# Patient Record
Sex: MALE | Race: WHITE | NOT HISPANIC OR LATINO | Employment: OTHER | URBAN - METROPOLITAN AREA
[De-identification: names, ages, dates, MRNs, and addresses within clinical notes are randomized per-mention and may not be internally consistent; named-entity substitution may affect disease eponyms.]

---

## 2017-01-02 ENCOUNTER — PRE VISIT (OUTPATIENT)
Dept: UROLOGY | Facility: CLINIC | Age: 72
End: 2017-01-02

## 2017-01-04 ENCOUNTER — ANTICOAGULATION THERAPY VISIT (OUTPATIENT)
Dept: ANTICOAGULATION | Facility: CLINIC | Age: 72
End: 2017-01-04
Payer: MEDICARE

## 2017-01-04 DIAGNOSIS — I48.0 PAROXYSMAL ATRIAL FIBRILLATION (H): Primary | ICD-10-CM

## 2017-01-04 DIAGNOSIS — Z79.01 LONG TERM CURRENT USE OF ANTICOAGULANT THERAPY: ICD-10-CM

## 2017-01-04 DIAGNOSIS — Z95.2 S/P AORTIC VALVE REPLACEMENT: ICD-10-CM

## 2017-01-04 DIAGNOSIS — Z95.2 S/P AVR (AORTIC VALVE REPLACEMENT): ICD-10-CM

## 2017-01-04 LAB — INR POINT OF CARE: 5 (ref 0.86–1.14)

## 2017-01-04 PROCEDURE — 85610 PROTHROMBIN TIME: CPT | Mod: QW

## 2017-01-04 PROCEDURE — 99207 ZZC NO CHARGE NURSE ONLY: CPT

## 2017-01-04 PROCEDURE — 36416 COLLJ CAPILLARY BLOOD SPEC: CPT

## 2017-01-04 RX ORDER — WARFARIN SODIUM 5 MG/1
TABLET ORAL
Qty: 172 TABLET | Refills: 0 | Status: SHIPPED | OUTPATIENT
Start: 2017-01-04 | End: 2017-01-18

## 2017-01-04 NOTE — MR AVS SNAPSHOT
Brooks Summers   1/4/2017 1:00 PM   Anticoagulation Therapy Visit    Description:  71 year old male   Provider:  LL ANTI COAG   Department:  [facility]           INR as of 1/4/2017     Selected INR 5.0! (1/4/2017)      Anticoagulation Summary as of 1/4/2017     INR goal 2.0-3.0   Selected INR 5.0! (1/4/2017)   Full instructions 1/4: Hold; 1/5: Hold; Otherwise 10 mg every day   Next INR check 1/18/2017    Indications   S/P aortic valve replacement [Z95.2]  Paroxysmal atrial fibrillation (H) [I48.0]  Long term current use of anticoagulant therapy [Z79.01]         Description     Recheck INR 2 weeks, 1/18/17  No warfarin Wed, or Thurs, then resume usual doseof 10 mg daily.  Eat a serving of greens today  Make sure to be consistent with weekly amount of greens      Contact Numbers     Please call [phone] to cancel and/or reschedule your appointment.  Please call [phone] with any problems or questions regarding your therapy          January 2017 Details    Sun Mon Tue Wed Thu Fri Sat     1               2               3               4      Hold   See details      5      Hold   See details      6      10 mg         7      10 mg           8      10 mg         9      10 mg         10      10 mg         11      10 mg         12      10 mg         13      10 mg         14      10 mg           15      10 mg         16      10 mg         17      10 mg         18            19               20               21                 22               23               24               25               26               27               28                 29               30               31                    Date Details   01/04 This INR check   Hold dose   have a serving of greens today      01/05 Hold dose   have a serving of greens today       Date of next INR:  1/18/2017         How to take your warfarin dose     To take:  10 mg Take 2 of the 5 mg tablets.    Hold Do not take your warfarin dose. See the Details table  to the right for additional instructions.

## 2017-01-04 NOTE — PROGRESS NOTES
ANTICOAGULATION FOLLOW-UP CLINIC VISIT    Patient Name:  Brooks Summers  Date:  1/4/2017  Contact Type:  Face to Face    SUBJECTIVE:     Patient Findings     Positives Diet Changes (pt thinks he has had his usual weekly amount of greens), Inflammation (possibly inflamation in the back causing the INR to be elevated.)    Comments Reteaching done on which veggies have enough Vit k to effect the INR.  His UA at his recent physical  Showed RBC's so he is seeing urology for a consult on 1/27/17           OBJECTIVE    INR PROTIME   Date Value Ref Range Status   01/04/2017 5.0* 0.86 - 1.14 Final       ASSESSMENT / PLAN  INR assessment SUPRA    Recheck INR In: 2 WEEKS    INR Location Clinic      Anticoagulation Summary as of 1/4/2017     INR goal 2.0-3.0   Selected INR 5.0! (1/4/2017)   Maintenance plan 10 mg (5 mg x 2) every day   Full instructions 1/4: Hold; 1/5: Hold; Otherwise 10 mg every day   Weekly total 70 mg   Plan last modified Lindsey Epstein RN (5/4/2016)   Next INR check 1/18/2017   Priority INR   Target end date Indefinite    Indications   S/P aortic valve replacement [Z95.2]  Paroxysmal atrial fibrillation (H) [I48.0]  Long term current use of anticoagulant therapy [Z79.01]         Anticoagulation Episode Summary     INR check location     Preferred lab     Send INR reminders to [facility]    Comments  * warfarin 5 mg tabs      Anticoagulation Care Providers     Provider Role Specialty Phone number    Edin Mays MD [facility] Internal Medicine [phone]            See the Encounter Report to view Anticoagulation Flowsheet and Dosing Calendar (Go to Encounters tab in chart review, and find the Anticoagulation Therapy Visit)        Lindsey Epstein RN

## 2017-01-16 ENCOUNTER — ALLIED HEALTH/NURSE VISIT (OUTPATIENT)
Dept: CARDIOLOGY | Facility: CLINIC | Age: 72
End: 2017-01-16
Attending: INTERNAL MEDICINE
Payer: MEDICARE

## 2017-01-16 DIAGNOSIS — I42.0 CARDIOMYOPATHY, DILATED, NONISCHEMIC (H): Primary | ICD-10-CM

## 2017-01-16 PROCEDURE — 93296 REM INTERROG EVL PM/IDS: CPT | Mod: ZF

## 2017-01-16 PROCEDURE — 93295 DEV INTERROG REMOTE 1/2/MLT: CPT | Performed by: INTERNAL MEDICINE

## 2017-01-17 NOTE — PROGRESS NOTES
Pt sent in a routine remote ICD check for evaluation per MD order.  His ICD check does not show any episodes of atrial or ventricular arrhythmias.  He is Bi V pacing 97.8% of the time, his heart rate histograms show good heart rate variation, his optivol is at baseline and his ICD battery estimates 4.7 years left.  Pt was called with the results and his wife reports that he is well and he does not offer any complaints.  We will plan to see him back on 4/7/17 when he returns to see Dr Be.    remote ICD

## 2017-01-18 ENCOUNTER — ANTICOAGULATION THERAPY VISIT (OUTPATIENT)
Dept: ANTICOAGULATION | Facility: CLINIC | Age: 72
End: 2017-01-18
Payer: MEDICARE

## 2017-01-18 DIAGNOSIS — Z79.01 LONG TERM CURRENT USE OF ANTICOAGULANT THERAPY: ICD-10-CM

## 2017-01-18 DIAGNOSIS — Z95.2 S/P AORTIC VALVE REPLACEMENT: ICD-10-CM

## 2017-01-18 DIAGNOSIS — I48.0 PAROXYSMAL ATRIAL FIBRILLATION (H): Primary | ICD-10-CM

## 2017-01-18 DIAGNOSIS — Z95.2 S/P AVR (AORTIC VALVE REPLACEMENT): ICD-10-CM

## 2017-01-18 LAB — INR POINT OF CARE: 3.3 (ref 0.86–1.14)

## 2017-01-18 PROCEDURE — 36416 COLLJ CAPILLARY BLOOD SPEC: CPT

## 2017-01-18 PROCEDURE — 99207 ZZC NO CHARGE NURSE ONLY: CPT

## 2017-01-18 PROCEDURE — 85610 PROTHROMBIN TIME: CPT | Mod: QW

## 2017-01-18 RX ORDER — WARFARIN SODIUM 5 MG/1
TABLET ORAL
Qty: 172 TABLET | Refills: 0 | COMMUNITY
Start: 2017-01-18 | End: 2017-03-16

## 2017-01-18 NOTE — MR AVS SNAPSHOT
Brooks Summers   1/18/2017 1:00 PM   Anticoagulation Therapy Visit    Description:  71 year old male   Provider:  LL ANTI COAG   Department:  [facility]           INR as of 1/18/2017     Selected INR 3.3! (1/18/2017)      Anticoagulation Summary as of 1/18/2017     INR goal 2.0-3.0   Selected INR 3.3! (1/18/2017)   Full instructions 7.5 mg on Wed; 10 mg all other days   Next INR check 2/1/2017    Indications   S/P aortic valve replacement [Z95.2]  Paroxysmal atrial fibrillation (H) [I48.0]  Long term current use of anticoagulant therapy [Z79.01]         Description     Recheck INR 2/1/17, 2 weeks  Decrease warfarin to 7.5 mg Wed, 10 mg rest of week       Your next Anticoagulation Clinic appointment(s)     Feb 01, 2017  1:15 PM   [facility] LL [facility]   [facility]    [address] [address] [phone]              Contact Numbers     Please call [phone] to cancel and/or reschedule your appointment.  Please call [phone] with any problems or questions regarding your therapy          January 2017 Details    Sun Mon Tue Wed Thu Fri Sat     1               2               3               4               5               6               7                 8               9               10               11               12               13               14                 15               16               17               18      7.5 mg   See details      19      10 mg         20      10 mg         21      10 mg           22      10 mg         23      10 mg         24      10 mg         25      7.5 mg         26      10 mg         27      10 mg         28      10 mg           29      10 mg         30      10 mg         31      10 mg              Date Details   01/18 This INR check               How to take your warfarin dose     To take:  7.5 mg Take 1.5 of the 5 mg tablets.    To take:  10 mg Take 2 of the 5 mg tablets.            February 2017 Details    Sun Mon Tue Wed Thu Fri Sat        1            2               3               4                 5               6               7               8               9               10               11                 12               13               14               15               16               17               18                 19               20               21               22               23               24               25                 26               27               28                    Date Details   No additional details    Date of next INR:  2/1/2017         How to take your warfarin dose     To take:  7.5 mg Take 1.5 of the 5 mg tablets.

## 2017-01-18 NOTE — PROGRESS NOTES
ANTICOAGULATION FOLLOW-UP CLINIC VISIT    Patient Name:  Brooks Summers  Date:  1/18/2017  Contact Type:  Face to Face    SUBJECTIVE:     Patient Findings     Positives Diet Changes (states diet is consistent with usual weekly amount of greens), Activity level change (he is active but not as active as he is in summer)           OBJECTIVE    INR PROTIME   Date Value Ref Range Status   01/18/2017 3.3* 0.86 - 1.14 Final       ASSESSMENT / PLAN  INR assessment SUPRA    Recheck INR In: 2 WEEKS    INR Location Clinic      Anticoagulation Summary as of 1/18/2017     INR goal 2.0-3.0   Selected INR 3.3! (1/18/2017)   Maintenance plan 7.5 mg (5 mg x 1.5) on Wed; 10 mg (5 mg x 2) all other days   Full instructions 7.5 mg on Wed; 10 mg all other days   Weekly total 67.5 mg   Plan last modified Lindsey Epstein RN (1/18/2017)   Next INR check 2/1/2017   Priority INR   Target end date Indefinite    Indications   S/P aortic valve replacement [Z95.2]  Paroxysmal atrial fibrillation (H) [I48.0]  Long term current use of anticoagulant therapy [Z79.01]         Anticoagulation Episode Summary     INR check location     Preferred lab     Send INR reminders to [facility]    Comments  * warfarin 5 mg tabs      Anticoagulation Care Providers     Provider Role Specialty Phone number    Edin Mays MD [facility] Internal Medicine [phone]            See the Encounter Report to view Anticoagulation Flowsheet and Dosing Calendar (Go to Encounters tab in chart review, and find the Anticoagulation Therapy Visit)        Lindsey Epstein RN

## 2017-01-27 ENCOUNTER — OFFICE VISIT (OUTPATIENT)
Dept: UROLOGY | Facility: CLINIC | Age: 72
End: 2017-01-27

## 2017-01-27 VITALS
HEIGHT: 70 IN | BODY MASS INDEX: 26.45 KG/M2 | SYSTOLIC BLOOD PRESSURE: 111 MMHG | DIASTOLIC BLOOD PRESSURE: 70 MMHG | HEART RATE: 71 BPM | WEIGHT: 184.8 LBS

## 2017-01-27 DIAGNOSIS — R31.9 HEMATURIA: Primary | ICD-10-CM

## 2017-01-27 LAB
ALBUMIN UR-MCNC: NEGATIVE MG/DL
AMORPH CRY #/AREA URNS HPF: ABNORMAL /HPF
APPEARANCE UR: ABNORMAL
BILIRUB UR QL STRIP: NEGATIVE
COLOR UR AUTO: YELLOW
GLUCOSE UR STRIP-MCNC: NEGATIVE MG/DL
HGB UR QL STRIP: NEGATIVE
KETONES UR STRIP-MCNC: NEGATIVE MG/DL
LEUKOCYTE ESTERASE UR QL STRIP: NEGATIVE
MUCOUS THREADS #/AREA URNS LPF: PRESENT /LPF
NITRATE UR QL: NEGATIVE
PH UR STRIP: 7 PH (ref 5–7)
RBC #/AREA URNS AUTO: 2 /HPF (ref 0–2)
SP GR UR STRIP: 1.02 (ref 1–1.03)
URN SPEC COLLECT METH UR: ABNORMAL
UROBILINOGEN UR STRIP-MCNC: 0 MG/DL (ref 0–2)
WBC #/AREA URNS AUTO: 0 /HPF (ref 0–2)

## 2017-01-27 PROCEDURE — 88112 CYTOPATH CELL ENHANCE TECH: CPT | Performed by: UROLOGY

## 2017-01-27 PROCEDURE — 87086 URINE CULTURE/COLONY COUNT: CPT | Performed by: UROLOGY

## 2017-01-27 ASSESSMENT — PAIN SCALES - GENERAL: PAINLEVEL: NO PAIN (0)

## 2017-01-27 NOTE — MR AVS SNAPSHOT
After Visit Summary   1/27/2017    Brooks Summers    MRN: [id number]           Patient Information     Date Of Birth          1945        Visit Information        Provider Department      1/27/2017 1:00 PM Marlena Mora MD [facility] Urology and [facility]        Today's Diagnoses     Hematuria    -  1       Care Instructions    Please follow up with  for a cystoscopy and CT before.        Follow-ups after your visit        Your next 10 appointments already scheduled     Feb 01, 2017  1:15 PM   Anticoagulation Visit with LL ANTI COAG   [facility]    [address] [address]   [phone]            Feb 24, 2017  9:00 AM   (Arrive by 8:45 AM)   CT ABDOMEN PELVIS W/O & W CONTRAST with UCCT1   [facility] CT ([facility])    [address] [address] [phone]           Please bring any scans or X-rays taken at other hospitals, if similar tests were done. Also bring a list of your medicines, including vitamins, minerals and over-the-counter drugs. It is safest to leave personal items at home.  Be sure to tell your doctor:   If you have any allergies.   If there s any chance you are pregnant.   If you are breastfeeding.   If you have any special needs.  You may have contrast for this exam. To prepare:   Do not eat or drink for 2 hours before your exam. If you need to take medicine, you may take it with small sips of water. (We may ask you to take liquid medicine as well.)   The day before your exam, drink extra fluids at least six 8-ounce glasses (unless your doctor tells you to restrict your fluids).  Patients over 70 or patients with diabetes or kidney problems:   If you haven t had a blood test (creatinine test) within the last 30 days, go to your clinic or Diagnostic Imaging Department for this test.  If you  have diabetes:   If your kidney function is normal, continue taking your metformin (Avandamet, Glucophage, Glucovance, Metaglip) on the day of your exam.   If your kidney function is abnormal, wait 48 hours before restarting this medicine.  You will have oral contrast for this exam:   You will drink the contrast at home. Get this from your clinic or Diagnostic Imaging Department. Please follow the directions given.  Please wear loose clothing, such as a sweat suit or jogging clothes. Avoid snaps, zippers and other metal. We may ask you to undress and put on a hospital gown.  If you have any questions, please call the Imaging Department where you will have your exam.            Feb 24, 2017  1:00 PM   (Arrive by 12:45 PM)   Cystoscopy with Marlena Mora MD   [facility] Urology and [facility]rologic C[facility] ([facility])    [address] [place] [id number] [phone]            Apr 07, 2017 11:30 AM   (Arrive by 11:15 AM)   Pacemaker Check with Uc Cv Device 1   [facility] ([facility])    [address] [place] [id number] [phone]            Apr 07, 2017  1:30 PM   (Arrive by 1:15 PM)   RETURN ARRHYTHMIA with Mekhi Be MD   [facility] ([facility])    [address] [place] [address] [phone]              Future tests that were ordered for you today     Open Future Orders        Priority Expected Expires Ordered    CT Urogram Routine  1/27/2018 1/27/2017            Who to contact     Please call your clinic at [phone] to:    Ask questions about your health    Make or cancel appointments    Discuss your medicines    Learn about your test results    Speak to your doctor   If you have compliments or concerns about an experience at your clinic, or if you wish to file a complaint, please contact [place]  [facility] Patient Relations at [phone] or email us at [email or web address]         Additional Information About Your Visit        [employer] Information     Venmo is an electronic gateway that provides easy, online access to your medical records. With Venmo, you can request a clinic appointment, read your test results, renew a prescription or communicate with your care team.     To sign up for Venmo visit the website at [email or web address]   You will be asked to enter the access code listed below, as well as some personal information. Please follow the directions to create your username and password.     Your access code is: [email or web address]  Expires: 2017  6:30 AM     Your access code will  in 90 days. If you need help or a new code, please contact your [facility] or call [phone] for assistance.        Care EveryWhere ID     This is your Care EveryWhere ID. This could be used by other organizations to access your [place] medical records  [id number]        Your Vitals Were     Pulse Height BMI (Body Mass Index)             71 1.778 m (5' 10\") 26.52 kg/m2          Blood Pressure from Last 3 Encounters:   17 111/70   16 130/87   10/10/16 127/82    Weight from Last 3 Encounters:   17 83.825 kg (184 lb 12.8 oz)   16 84.369 kg (186 lb)   10/10/16 85.049 kg (187 lb 8 oz)              We Performed the Following     Cytology non gyn     Routine UA with micro reflex to culture     Urine Culture Aerobic Bacterial        Primary Care Provider Office Phone # Fax #    Edin Mays [phone]       [address] [address]        Thank you!     Thank you for choosing [facility] UROLOGY AND [facility] FOR PROSTATE AND UROLOGIC CANCERS  for your care. Our goal is always to provide you with excellent care. Hearing back from our patients is one way we can continue to " improve our services. Please take a few minutes to complete the written survey that you may receive in the mail after your visit with us. Thank you!             Your Updated Medication List - Protect others around you: Learn how to safely use, store and throw away your medicines at www.disposemymeds.org.          This list is accurate as of: 1/27/17  2:07 PM.  Always use your most recent med list.                   Brand Name Dispense Instructions for use    allopurinol 100 MG tablet    ZYLOPRIM    90 tablet    Take 1 tablet (100 mg) by mouth daily       CALCIUM 600 + D PO      Take 1 tablet by mouth daily.       losartan 25 MG tablet    COZAAR    45 tablet    Take 0.5 tablets (12.5 mg) by mouth daily       metoprolol 50 MG 24 hr tablet    TOPROL XL    90 tablet    Take 1 tablet (50 mg) by mouth daily       Multi-vitamin Tabs tablet   Generic drug:  multivitamin, therapeutic with minerals      Take 1 tablet by mouth daily.       * PROVIDER DOSED MANAGED WARFARIN (COUMADIN) OUTPATIENT      Per coumadin clinic       REFRESH PLUS OP          simvastatin 40 MG tablet    ZOCOR    90 tablet    Take 1 tablet (40 mg) by mouth At Bedtime       * warfarin 5 MG tablet    COUMADIN    172 tablet    Take as directed by anticoagulation clinic.  Current dose 7.5 mg Wed, 10 mg rest of week       * Notice:  This list has 2 medication(s) that are the same as other medications prescribed for you. Read the directions carefully, and ask your doctor or other care provider to review them with you.

## 2017-01-27 NOTE — NURSING NOTE
"Chief Complaint   Patient presents with     Consult     Microscopic hematuria       Blood pressure 111/70, pulse 71, height 1.778 m (5' 10\"), weight 83.825 kg (184 lb 12.8 oz). Body mass index is 26.52 kg/(m^2).    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  Current dose 7.5 mg Wed, 10 mg rest of week 172 tablet 0     metoprolol (TOPROL XL) 50 MG 24 hr tablet Take 1 tablet (50 mg) by mouth daily 90 tablet 3     simvastatin (ZOCOR) 40 MG tablet Take 1 tablet (40 mg) by mouth At Bedtime 90 tablet 3     losartan (COZAAR) 25 MG tablet Take 0.5 tablets (12.5 mg) by mouth daily 45 tablet 3     allopurinol (ZYLOPRIM) 100 MG tablet Take 1 tablet (100 mg) by mouth daily 90 tablet 1     Carboxymethylcellulose Sodium (REFRESH PLUS OP)        PROVIDER DOSED MANAGED WARFARIN, COUMADIN, OUTPATIENT Per coumadin clinic       Calcium Carbonate-Vitamin D (CALCIUM 600 + D OR) Take 1 tablet by mouth daily.   "     Multiple Vitamin (MULTI-VITAMIN) per tablet Take 1 tablet by mouth daily.         Social History   Substance Use Topics     Smoking status: Former Smoker -- 1.00 packs/day for 20 years     Types: Cigarettes     Quit date: 12/31/1989     Smokeless tobacco: Never Used     Alcohol Use: Yes       TAINA Rojas  1/27/2017  12:56 PM

## 2017-01-27 NOTE — Clinical Note
1/27/2017       RE: Brooks Summers  [address]  [facility] [address]     Dear Colleague,    Thank you for referring your patient, Brooks Summers, to the [facility] UROLOGY AND INST FOR PROSTATE AND UROLOGIC CANCERS at [facility]. Please see a copy of my visit note below.    It was my pleasure to see Brooks Summers a 71 year old year old male today. Patient was seen in consultation for hematuria.    HPI:   Patient with an episode of microhematuria, no gross hematuria.   Denies significant lower urinary tract symptoms.   Patient smoked 1ppd for 20 years.       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 Tx with Dapto/RIFx 8 weeks.     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 Intra-operative Transesophogeal Echocardiogram.; Surgeon:STEPHANIE HAMPTON; Location:[facility] OR     Anesthesia cardioversion  7/18/2011     Procedure:ANESTHESIA CARDIOVERSION; Cardioversion; Surgeon:GENERIC ANESTHESIA PROVIDER; Location:[facility] OR     Redo sternotomy replace  valve aortic  1/25/2013     Procedure: REDO STERNOTOMY REPLACE VALVE AORTIC;  Redo Sternotomy, Redo Aortic Valve Replacement on pump oxygenator. Intraoperative Transesophageal Echocardiogram;  Surgeon: Navin Singh MD;  Location:  OR     Mohs micrographic procedure       Implant automatic implantable cardioverter defibrillator       Repair valve mitral       Coronary angiography adult order       Hc remov & replace implt defib pulse gen mult lead  12/3/2015             Family History   Problem Relation Age of Onset     C.A.D. Father 68     bypass, carotid      Prostate Cancer Father 70     CANCER Mother 92     stomach, colon     Hypertension Mother      Retinal detachment Mother      CANCER Brother 64     lung cancer, petroleum     Glaucoma No family hx of      Macular Degeneration No family hx of      Strabismus No family hx of      Glasses (<7 y/o) No family hx of        Current Outpatient Prescriptions   Medication Sig Dispense Refill     warfarin (COUMADIN) 5 MG tablet Take as directed by anticoagulation clinic.  Current dose 7.5 mg Wed, 10 mg rest of week 172 tablet 0     metoprolol (TOPROL XL) 50 MG 24 hr tablet Take 1 tablet (50 mg) by mouth daily 90 tablet 3     simvastatin (ZOCOR) 40 MG tablet Take 1 tablet (40 mg) by mouth At Bedtime 90 tablet 3     losartan (COZAAR) 25 MG tablet Take 0.5 tablets (12.5 mg) by mouth daily 45 tablet 3     allopurinol (ZYLOPRIM) 100 MG tablet Take 1 tablet (100 mg) by mouth daily 90 tablet 1     Carboxymethylcellulose Sodium (REFRESH PLUS OP)        PROVIDER DOSED MANAGED WARFARIN, COUMADIN, OUTPATIENT Per coumadin clinic       Calcium Carbonate-Vitamin D (CALCIUM 600 + D OR) Take 1 tablet by mouth daily.       Multiple Vitamin (MULTI-VITAMIN) per tablet Take 1 tablet by mouth daily.         ALLERGIES: Lisinopril      REVIEW OF SYSTEMS:  Skin: negative  Eyes: negative  Ears/Nose/Throat: negative  Respiratory: No shortness of breath, dyspnea on exertion, cough, or  "hemoptysis  Cardiovascular: negative  Gastrointestinal: negative  Genitourinary: as above  Musculoskeletal: negative  Neurologic: negative  Psychiatric: negative  Hematologic/Lymphatic/Immunologic: negative  Endocrine: negative      GENERAL PHYSICAL EXAM:   Vitals: /70 mmHg  Pulse 71  Ht 1.778 m (5' 10\")  Wt 83.825 kg (184 lb 12.8 oz)  BMI 26.52 kg/m2  Body mass index is 26.52 kg/(m^2).  Constitutional: healthy, alert and no distress  Head: Normocephalic. No masses, lesions, tenderness or abnormalities  Neck: Neck supple. No adenopathy. Thyroid symmetric, normal size,, Carotids without bruits.  ENT: ENT exam normal, no neck nodes or sinus tenderness  Cardiovascular: negative, PMI normal. No lifts, heaves, or thrills. RRR. No murmurs, clicks gallops or rub  Respiratory: negative, Percussion normal. Good diaphragmatic excursion. Lungs clear  Gastrointestinal: Abdomen soft, non-tender. BS normal. No masses, organomegaly  Musculoskeletal: extremities normal- no gross deformities noted, gait normal and normal muscle tone  Skin: no suspicious lesions or rashes  Neurologic: Gait normal. Reflexes normal and symmetric. Sensation grossly WNL.  Psychiatric: affect normal/bright and mentation appears normal.  Hematologic/Lymphatic/Immunologic: normal ant/post cervical, axillary, supraclavicular and inguinal nodes     EXAM:   Left Flank: negative  Right Flank: negative  Inguinal Area: normal  Phallus is meatus normal and no plaques palpated.   Left testis descended, size is normal , consistency is normal. Intra-testicular masses Absent.  Right testis descended, size is normal, consistency is normal. Intra-testicular masses Absent.    RECTAL EXAM:   Size: 1+  Sphincter tone: normal  Tenderness: Absent  Rectal Mass: Absent  Prostate Nodule: Absent    RADIOLOGY: The following tests were reviewed: Need to complete the following Radiology exams prior to the office appointment:  LABS: The last test results for Needs to " complete the necessary tests prior to appointment: were reviewed.     ASSESSMENT:  72 y/o M with hematuria     PLAN:   Will obtain CT urogram   Schedule for cystoscopy next available     I spent over 30 minutes with the patient.  Over half this time was spent on counseling for hematuria, work up for hematuria, cystoscopy.      Again, thank you for allowing me to participate in the care of your patient.      Sincerely,    Marlena Mora MD

## 2017-01-27 NOTE — PROGRESS NOTES
It was my pleasure to see Broosk Summers a 71 year old year old male today. Patient was seen in consultation for hematuria.    HPI:   Patient with an episode of microhematuria, no gross hematuria.   Denies significant lower urinary tract symptoms.   Patient smoked 1ppd for 20 years.       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 Tx with Dapto/RIFx 8 weeks.     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 Intra-operative Transesophogeal Echocardiogram.; Surgeon:STEPHANIE HAMPTON; Location[facility]     Anesthesia cardioversion  7/18/2011     Procedure:ANESTHESIA CARDIOVERSION; Cardioversion; Surgeon:GENERIC ANESTHESIA PROVIDER; Location:[facility] OR     Redo sternotomy replace valve aortic  1/25/2013     Procedure: REDO STERNOTOMY REPLACE VALVE AORTIC;  Redo Sternotomy, Redo Aortic Valve Replacement on pump oxygenator. Intraoperative Transesophageal Echocardiogram;  Surgeon: Stephanie Hampton MD;  Location: [facility] OR     Mohs micrographic procedure       Implant automatic implantable cardioverter  "defibrillator       Repair valve mitral       Coronary angiography adult order       Hc remov & replace implt defib pulse gen mult lead  12/3/2015             Family History   Problem Relation Age of Onset     C.A.D. Father 68     bypass, carotid      Prostate Cancer Father 70     CANCER Mother 92     stomach, colon     Hypertension Mother      Retinal detachment Mother      CANCER Brother 64     lung cancer, petroleum     Glaucoma No family hx of      Macular Degeneration No family hx of      Strabismus No family hx of      Glasses (<9 y/o) No family hx of        Current Outpatient Prescriptions   Medication Sig Dispense Refill     warfarin (COUMADIN) 5 MG tablet Take as directed by anticoagulation clinic.  Current dose 7.5 mg Wed, 10 mg rest of week 172 tablet 0     metoprolol (TOPROL XL) 50 MG 24 hr tablet Take 1 tablet (50 mg) by mouth daily 90 tablet 3     simvastatin (ZOCOR) 40 MG tablet Take 1 tablet (40 mg) by mouth At Bedtime 90 tablet 3     losartan (COZAAR) 25 MG tablet Take 0.5 tablets (12.5 mg) by mouth daily 45 tablet 3     allopurinol (ZYLOPRIM) 100 MG tablet Take 1 tablet (100 mg) by mouth daily 90 tablet 1     Carboxymethylcellulose Sodium (REFRESH PLUS OP)        PROVIDER DOSED MANAGED WARFARIN, COUMADIN, OUTPATIENT Per coumadin clinic       Calcium Carbonate-Vitamin D (CALCIUM 600 + D OR) Take 1 tablet by mouth daily.       Multiple Vitamin (MULTI-VITAMIN) per tablet Take 1 tablet by mouth daily.         ALLERGIES: Lisinopril      REVIEW OF SYSTEMS:  Skin: negative  Eyes: negative  Ears/Nose/Throat: negative  Respiratory: No shortness of breath, dyspnea on exertion, cough, or hemoptysis  Cardiovascular: negative  Gastrointestinal: negative  Genitourinary: as above  Musculoskeletal: negative  Neurologic: negative  Psychiatric: negative  Hematologic/Lymphatic/Immunologic: negative  Endocrine: negative      GENERAL PHYSICAL EXAM:   Vitals: /70 mmHg  Pulse 71  Ht 1.778 m (5' 10\")  Wt 83.825 " kg (184 lb 12.8 oz)  BMI 26.52 kg/m2  Body mass index is 26.52 kg/(m^2).  Constitutional: healthy, alert and no distress  Head: Normocephalic. No masses, lesions, tenderness or abnormalities  Neck: Neck supple. No adenopathy. Thyroid symmetric, normal size,, Carotids without bruits.  ENT: ENT exam normal, no neck nodes or sinus tenderness  Cardiovascular: negative, PMI normal. No lifts, heaves, or thrills. RRR. No murmurs, clicks gallops or rub  Respiratory: negative, Percussion normal. Good diaphragmatic excursion. Lungs clear  Gastrointestinal: Abdomen soft, non-tender. BS normal. No masses, organomegaly  Musculoskeletal: extremities normal- no gross deformities noted, gait normal and normal muscle tone  Skin: no suspicious lesions or rashes  Neurologic: Gait normal. Reflexes normal and symmetric. Sensation grossly WNL.  Psychiatric: affect normal/bright and mentation appears normal.  Hematologic/Lymphatic/Immunologic: normal ant/post cervical, axillary, supraclavicular and inguinal nodes     EXAM:   Left Flank: negative  Right Flank: negative  Inguinal Area: normal  Phallus is meatus normal and no plaques palpated.   Left testis descended, size is normal , consistency is normal. Intra-testicular masses Absent.  Right testis descended, size is normal, consistency is normal. Intra-testicular masses Absent.    RECTAL EXAM:   Size: 1+  Sphincter tone: normal  Tenderness: Absent  Rectal Mass: Absent  Prostate Nodule: Absent         RADIOLOGY: The following tests were reviewed: Need to complete the following Radiology exams prior to the office appointment:  LABS: The last test results for Needs to complete the necessary tests prior to appointment: were reviewed.     ASSESSMENT:  72 y/o M with hematuria     PLAN:   Will obtain CT urogram   Schedule for cystoscopy next available         I spent over 30 minutes with the patient.  Over half this time was spent on counseling for hematuria, work up for hematuria,  cystoscopy.

## 2017-01-28 LAB
BACTERIA SPEC CULT: NO GROWTH
Lab: NORMAL
MICRO REPORT STATUS: NORMAL
SPECIMEN SOURCE: NORMAL

## 2017-01-30 LAB — COPATH REPORT: NORMAL

## 2017-02-01 ENCOUNTER — TELEPHONE (OUTPATIENT)
Dept: ANTICOAGULATION | Facility: CLINIC | Age: 72
End: 2017-02-01

## 2017-02-01 ENCOUNTER — ANTICOAGULATION THERAPY VISIT (OUTPATIENT)
Dept: ANTICOAGULATION | Facility: CLINIC | Age: 72
End: 2017-02-01
Payer: MEDICARE

## 2017-02-01 DIAGNOSIS — Z79.01 LONG TERM CURRENT USE OF ANTICOAGULANT THERAPY: ICD-10-CM

## 2017-02-01 DIAGNOSIS — Z95.2 S/P AORTIC VALVE REPLACEMENT: ICD-10-CM

## 2017-02-01 DIAGNOSIS — I48.0 PAROXYSMAL ATRIAL FIBRILLATION (H): Primary | ICD-10-CM

## 2017-02-01 LAB — INR POINT OF CARE: 2.8 (ref 0.86–1.14)

## 2017-02-01 PROCEDURE — 36416 COLLJ CAPILLARY BLOOD SPEC: CPT

## 2017-02-01 PROCEDURE — 85610 PROTHROMBIN TIME: CPT | Mod: QW

## 2017-02-01 PROCEDURE — 99207 ZZC NO CHARGE NURSE ONLY: CPT

## 2017-02-01 NOTE — TELEPHONE ENCOUNTER
BALAJI Mora  Brooks Jacobs will have a cystoscopy on 2/24/17. Please advise if he will need to stop his warfarin before this procedure.  Is so for how long would you like him to hold his warfarin dose?  Please respond to the [facility] Anticoagulation [place] [phone] so all staff will see the plan.  We will contact the pt and give him specific directions as needed.  Thank you.  Lindsey Epstein RN  Anticoagulation Clinic

## 2017-02-01 NOTE — PROGRESS NOTES
ANTICOAGULATION FOLLOW-UP CLINIC VISIT    Patient Name:  Brooks Summers  Date:  2/1/2017  Contact Type:  Face to Face    SUBJECTIVE:     Patient Findings     Positives Blood in Urine (pt states that over a month ago he had a UA that showed some blood inthe urine.  He was sent to the  urologist by PCP.  he is to have a Cysto  on 2/24/17)    Comments chandleritr will check with Urology in regards to Brooks' s  Warfarin management before the procedure           OBJECTIVE    INR PROTIME   Date Value Ref Range Status   02/01/2017 2.8* 0.86 - 1.14 Final       ASSESSMENT / PLAN  INR assessment THER    Recheck INR In: 4 WEEKS    INR Location Clinic      Anticoagulation Summary as of 2/1/2017     INR goal 2.0-3.0   Selected INR 2.8 (2/1/2017)   Maintenance plan 7.5 mg (5 mg x 1.5) on Wed; 10 mg (5 mg x 2) all other days   Full instructions 7.5 mg on Wed; 10 mg all other days   Weekly total 67.5 mg   No change documented Lindsey Epstein RN   Plan last modified Lindsey Epstein RN (1/18/2017)   Next INR check 3/1/2017   Priority INR   Target end date Indefinite    Indications   S/P aortic valve replacement [Z95.2]  Paroxysmal atrial fibrillation (H) [I48.0]  Long term current use of anticoagulant therapy [Z79.01]         Anticoagulation Episode Summary     INR check location     Preferred lab     Send INR reminders to [facility]    Comments  * warfarin 5 mg tabs      Anticoagulation Care Providers     Provider Role Specialty Phone number    Edin Mays MD [facility] Internal Medicine [phone]            See the Encounter Report to view Anticoagulation Flowsheet and Dosing Calendar (Go to Encounters tab in chart review, and find the Anticoagulation Therapy Visit)        Lindsey Epstein RN

## 2017-02-01 NOTE — MR AVS SNAPSHOT
Brooks Summers   2/1/2017 1:15 PM   Anticoagulation Therapy Visit    Description:  71 year old male   Provider:  [facility]   Department:  [facility]           INR as of 2/1/2017     Selected INR 2.8 (2/1/2017)      Anticoagulation Summary as of 2/1/2017     INR goal 2.0-3.0   Selected INR 2.8 (2/1/2017)   Full instructions 7.5 mg on Wed; 10 mg all other days   Next INR check 3/1/2017    Indications   S/P aortic valve replacement [Z95.2]  Paroxysmal atrial fibrillation (H) [I48.0]  Long term current use of anticoagulant therapy [Z79.01]         Description     Recheck INR 4 weeks, 3/1/17  continue warfarin 7.5 mg Wed, 10 mg rest of week       Contact Numbers     Please call [phone] to cancel and/or reschedule your appointment.  Please call [phone] with any problems or questions regarding your therapy          February 2017 Details    Sun Mon Tue Wed Thu Fri Sat        1      7.5 mg   See details      2      10 mg         3      10 mg         4      10 mg           5      10 mg         6      10 mg         7      10 mg         8      7.5 mg         9      10 mg         10      10 mg         11      10 mg           12      10 mg         13      10 mg         14      10 mg         15      7.5 mg         16      10 mg         17      10 mg         18      10 mg           19      10 mg         20      10 mg         21      10 mg         22      7.5 mg         23      10 mg         24      10 mg         25      10 mg           26      10 mg         27      10 mg         28      10 mg              Date Details   02/01 This INR check               How to take your warfarin dose     To take:  7.5 mg Take 1.5 of the 5 mg tablets.    To take:  10 mg Take 2 of the 5 mg tablets.           March 2017 Details    Sun Mon Tue Wed Thu Fri Sat        1            2               3               4                 5               6               7               8               9               10               11                  12               13               14               15               16               17               18                 19               20               21               22               23               24               25                 26               27               28               29               30               31                 Date Details   No additional details    Date of next INR:  3/1/2017         How to take your warfarin dose     To take:  7.5 mg Take 1.5 of the 5 mg tablets.

## 2017-02-18 ENCOUNTER — PRE VISIT (OUTPATIENT)
Dept: UROLOGY | Facility: CLINIC | Age: 72
End: 2017-02-18

## 2017-02-18 NOTE — TELEPHONE ENCOUNTER
Patient is coming in to see  for a cystoscopy for hematuria with a CT before, CT is before appointment no need for a call.

## 2017-02-24 ENCOUNTER — ALLIED HEALTH/NURSE VISIT (OUTPATIENT)
Dept: UROLOGY | Facility: CLINIC | Age: 72
End: 2017-02-24

## 2017-02-24 ENCOUNTER — OFFICE VISIT (OUTPATIENT)
Dept: UROLOGY | Facility: CLINIC | Age: 72
End: 2017-02-24

## 2017-02-24 VITALS
HEART RATE: 65 BPM | DIASTOLIC BLOOD PRESSURE: 76 MMHG | WEIGHT: 185 LBS | BODY MASS INDEX: 26.48 KG/M2 | SYSTOLIC BLOOD PRESSURE: 113 MMHG | HEIGHT: 70 IN

## 2017-02-24 DIAGNOSIS — R31.0 GROSS HEMATURIA: Primary | ICD-10-CM

## 2017-02-24 DIAGNOSIS — N36.9 URETHRAL LESION: Primary | ICD-10-CM

## 2017-02-24 DIAGNOSIS — N36.9 URETHRAL LESION: ICD-10-CM

## 2017-02-24 RX ORDER — CEFAZOLIN SODIUM 1 G/3ML
1 INJECTION, POWDER, FOR SOLUTION INTRAMUSCULAR; INTRAVENOUS SEE ADMIN INSTRUCTIONS
Status: CANCELLED | OUTPATIENT
Start: 2017-02-24

## 2017-02-24 ASSESSMENT — PAIN SCALES - GENERAL
PAINLEVEL: NO PAIN (0)
PAINLEVEL: NO PAIN (0)

## 2017-02-24 NOTE — LETTER
2/24/2017       RE: Brooks Summers  [address]  [facility] [address]     Dear Colleague,    Thank you for referring your patient, Brooks Summers, to the [facility] UROLOGY AND INST FOR PROSTATE AND UROLOGIC CANCERS at [facility]. Please see a copy of my visit note below.    Indication: hematuria       February 24, 2017 CYSTOSCOPY:  The patient was brought to the procedures room where he was placed in the supine position.  He was prepped and draped in a sterile fashion.  A #16-Bruneian flexible cystoscope was introduced into the urinary bladder.  The anterior urethra and membranous urethra were negative.  The prostatic urethra was minimally obstructing with a slight enlargement of the lateral lobes. A very small lesion was noted lateral to the verumontanum.  Within the urinary bladder, there was no evidence of stones, tumors, or growths.  The ureteral orifices were well visualized bilaterally and found to have clear efflux of urine.  The patient had minimal trabeculation.    On retroflex view, no lesions were seen.       Will schedule for biopsy of the urethral lesion next available in the ASC    Again, thank you for allowing me to participate in the care of your patient.      Sincerely,    Marlena Mora MD

## 2017-02-24 NOTE — MR AVS SNAPSHOT
After Visit Summary   2/24/2017    Brooks Summers    MRN: [id number]           Patient Information     Date Of Birth          1945        Visit Information        Provider Department      2/24/2017 2:15 PM Nurse, [facility] and Inst for Prostate and Urologic Cancers        Today's Diagnoses     Urethral lesion    -  1       Follow-ups after your visit        Your next 10 appointments already scheduled     Mar 01, 2017  1:15 PM CST   Anticoagulation Visit with LL ANTI COAG   [facility] ([facility])    [address] [facility] [address]   [phone]            Mar 17, 2017  1:00 PM CDT   (Arrive by 12:45 PM)   PAC RN ASSESSMENT with Robert Pac Rn   [facility] [place] ([facility])    [address] [place] [address] [phone]            Mar 17, 2017  1:30 PM CDT   (Arrive by 1:15 PM)   PAC EVALUATION with  Pac Sherri 2   [facility] ([facility] [place])    [address] [place] [address] [phone]            Mar 17, 2017  2:30 PM CDT   (Arrive by 2:15 PM)   PAC Anesthesia Consult with  Pac Anesthesiologist   [facility] [place] ([facility])    [address] [place] [address] [phone]            Mar 27, 2017   Procedure with Marlena Mora MD   [facility] ([facility] [place])    [address] [place] [address]  [address] [facility] [address] [phone]           Located in the Clinics and Surgery Center at [address] [place] [address].   parking is very convenient and highly recommended.  is a $6 flat rate fee; no tips accepted.  Both  and self parkers should enter the main arrival plaza from [facility]; parking attendants  will direct you based on your parking preference.            2017  1:00 PM CDT   (Arrive by 12:45 PM)   Pacemaker Check with Uc Cv Device 1   [facility] ([facility])    [address] [place] [id number] [phone]            2017  1:00 PM CDT   RESEARCH with  Cv Research Coordinator   [facility])    [address] [place] [id number] [phone]            2017  1:30 PM CDT   (Arrive by 1:15 PM)   RETURN ATRIAL FIBULATION VISIT with SUKH Hernandez CNP   [facility] ([facility])    [address] [place] [address] [phone]              Who to contact     Please call your clinic at [phone] to:    Ask questions about your health    Make or cancel appointments    Discuss your medicines    Learn about your test results    Speak to your doctor   If you have compliments or concerns about an experience at your clinic, or if you wish to file a complaint, please contact [facility] Patient Relations at [phone] or email us at [email or web address]         Additional Information About Your Visit        [employer] Information     ZAOZAO is an electronic gateway that provides easy, online access to your medical records. With ZAOZAO, you can request a clinic appointment, read your test results, renew a prescription or communicate with your care team.     To sign up for [employer] visit the website at [email or web address]   You will be asked to enter the access code listed below, as well as some personal information. Please follow the directions to create your username and password.     Your access code is: [email or web address]  Expires: 2017  6:30 AM     Your access code will  in 90 days. If you need help or a new code, please contact your  [facility] or call [phone] for assistance.        Care EveryWhere ID     This is your Care EveryWhere ID. This could be used by other organizations to access your [place] medical records  [id number]         Blood Pressure from Last 3 Encounters:   02/24/17 113/76   01/27/17 111/70   11/28/16 130/87    Weight from Last 3 Encounters:   02/24/17 83.9 kg (185 lb)   01/27/17 83.8 kg (184 lb 12.8 oz)   11/28/16 84.4 kg (186 lb)              Today, you had the following     No orders found for display       Primary Care Provider Office Phone # Fax #    Edin Mays [phone]       [address] [address]        Thank you!     Thank you for choosing [facility] UROLOGY AND [facility] FOR PROSTATE AND UROLOGIC CANCERS  for your care. Our goal is always to provide you with excellent care. Hearing back from our patients is one way we can continue to improve our services. Please take a few minutes to complete the written survey that you may receive in the mail after your visit with us. Thank you!             Your Updated Medication List - Protect others around you: Learn how to safely use, store and throw away your medicines at www.disposemymeds.org.          This list is accurate as of: 2/24/17  2:27 PM.  Always use your most recent med list.                   Brand Name Dispense Instructions for use    allopurinol 100 MG tablet    ZYLOPRIM    90 tablet    Take 1 tablet (100 mg) by mouth daily       CALCIUM 600 + D PO      Take 1 tablet by mouth daily.       losartan 25 MG tablet    COZAAR    45 tablet    Take 0.5 tablets (12.5 mg) by mouth daily       metoprolol 50 MG 24 hr tablet    TOPROL XL    90 tablet    Take 1 tablet (50 mg) by mouth daily       Multi-vitamin Tabs tablet   Generic drug:  multivitamin, therapeutic with minerals      Take 1 tablet by mouth daily.       * PROVIDER DOSED MANAGED WARFARIN (COUMADIN) OUTPATIENT       Per coumadin clinic       REFRESH PLUS OP          simvastatin 40 MG tablet    ZOCOR    90 tablet    Take 1 tablet (40 mg) by mouth At Bedtime       * warfarin 5 MG tablet    COUMADIN    172 tablet    Take as directed by anticoagulation clinic.  Current dose 7.5 mg Wed, 10 mg rest of week       * Notice:  This list has 2 medication(s) that are the same as other medications prescribed for you. Read the directions carefully, and ask your doctor or other care provider to review them with you.

## 2017-02-24 NOTE — NURSING NOTE
Pre Op Teaching Flowsheet       Pre and Post op Patient Education  Relevant Diagnosis:  Urethral lesion  Teaching Topic:  Pre and post op teaching  Person Involved in teaching: Brooks Summers    Motivation Level:  Asks Questions: Yes  Eager to Learn:  Yes  Cooperative: Yes  Receptive (willing/able to accept information):  Yes  Patient demonstrates understanding of the following:  Date and time of surgery:  March 27th 7:15am  Location of surgery:  [facility]- 5th Floor If he passes PAC appointment. Patient has a very significant cardiac history.   History and Physical and any other testing necessary prior to surgery: Yes, Patient is having PAC on March 17th  Required time line for completion of History and Physical and any pre-op testing: Yes    NPO Guidelines: Nothing to eat 8hrs prior, Nothing to drink 2hrs prior    Patient demonstrates understanding of the following:  Pre-op bowel prep:  N/A  Pre-op showering/scrub information with PCMX Soap: Yes  Medications to take the day of surgery:  Per PCP  Blood thinner medications discussed and when to stop (if applicable):  Yes  Diabetes medication management (if applicable):  N/A  Discussed pain control after surgery: pain medications  Infection Prevention: Patient demonstrates understanding of the following  Signs and symptoms of infection taught:  Yes  Wound care will be taught at the time of discharge.  Central venous catheter care will be taught at the time of discharge (if applicable).    Post-op follow-up:  Discussed how to contact the hospital, nurse, and clinic scheduling staff if necessary.    Instructional materials used/given/mailed:  [facility] Booklet, post op teaching sheet, Map, Soap, and arrival/location information.    Surgical instructions packet given to patient in office:  Yes.    Malena Kline RN

## 2017-02-24 NOTE — NURSING NOTE
Invasive Procedure Safety Checklist:    Procedure: cystoscopy     Action: Complete sections and checkboxes as appropriate.    Pre-procedure:  1. Patient ID Verified with 2 identifiers (Ebony and  or MRN) : YES    2. Procedure and site verified with patient/designee (when able) : YES    3. Accurate consent documentation in medical record : YES    4. H&P (or appropriate assessment) documented in medical record : NO  H&P must be up to 30 days prior to procedure an updated within 24 hours of                 Procedure as applicable.     5. Relevant diagnostic and radiology test results appropriately labeled and displayed as applicable : YES    6. Blood products, implants, devices, and/or special equipment available for the procedure as applicable : YES    7. Procedure site(s) marked with provider initials [Exclusions: none] : NO    8. Marking not required. Reason : Yes  Procedure does not require site marking    Time Out:     Time-Out performed immediately prior to starting procedure, including verbal and active participation of all team members addressing: YES    1. Correct patient identity.  2. Confirmed that the correct side and site are marked.  3. An accurate procedure to be done.  4. Agreement on the procedure to be done.  5. Correct patient position.  6. Relevant images and results are properly labeled and appropriately displayed.  7. The need to administer antibiotics or fluids for irrigation purposes during the procedure as applicable.  8. Safety precautions based on patient history or medication use.    During Procedure: Verification of correct person, site, and procedure occurs any time the responsibility for care of the patient is transferred to another member of the care team.

## 2017-02-24 NOTE — PROGRESS NOTES
Indication: hematuria       February 24, 2017 CYSTOSCOPY:  The patient was brought to the procedures room where he was placed in the supine position.  He was prepped and draped in a sterile fashion.  A #16-Dominican flexible cystoscope was introduced into the urinary bladder.  The anterior urethra and membranous urethra were negative.  The prostatic urethra was minimally obstructing with a slight enlargement of the lateral lobes. A very small lesion was noted lateral to the verumontanum.  Within the urinary bladder, there was no evidence of stones, tumors, or growths.  The ureteral orifices were well visualized bilaterally and found to have clear efflux of urine.  The patient had minimal trabeculation.    On retroflex view, no lesions were seen.       Will schedule for biopsy of the urethral lesion next available in the ASC

## 2017-02-24 NOTE — MR AVS SNAPSHOT
After Visit Summary   2/24/2017    Brooks Summers    MRN: [id number]           Patient Information     Date Of Birth          1945        Visit Information        Provider Department      2/24/2017 1:00 PM Marlena Mora MD [facility] Urology and [facility] for Prostate and Urologic Cancers        Today's Diagnoses     Gross hematuria    -  1    Urethral lesion           Follow-ups after your visit        Additional Services     PAC Visit Referral (For [facility] Only)       Does this visit require an Anesthesia consult?  No -  If this visit is not for evaluation for co-morbidity (such as patient request due to anxiety), what is the purpose of the visit?: H and P     H&P done by:  N/A      Please be aware that coverage of these services is subject to the terms and limitations of your health insurance plan.  Call member services at your health plan with any benefit or coverage questions.      Please bring the following to your appointment:  >>   Any x-rays, CTs or MRIs which have been performed.  Contact the facility where they were done to arrange for  prior to your scheduled appointment.  Any new CT, MRI or other procedures ordered by your specialist must be performed at a [facility] facility or coordinated by your clinic's referral office.    >>   List of current medications  >>   This referral request   >>   Any documents/labs given to you for this referral                  Your next 10 appointments already scheduled     Mar 01, 2017  1:15 PM CST   Anticoagulation Visit with LL ANTI COAG   [facility]    [address] [facility] [address]   [phone]            Apr 28, 2017  1:00 PM CDT   (Arrive by 12:45 PM)   Pacemaker Check with  Cv Device [address] [place] [address] [facility] ([facility])    [address] [facility]  3rd Floor  [facility] [address] [phone]            Apr 28, 2017  1:00 PM CDT   RESEARCH with Robert  "Cv Research Coordinator   [facility] ([facility])    [address] [place] [address] [phone]            2017  1:30 PM CDT   (Arrive by 1:15 PM)   RETURN ATRIAL FIBULATION VISIT with SUKH Hernandez CNP   [facility] ([facility])    [address] [place] [address] [phone]              Who to contact     Please call your clinic at [phone] to:    Ask questions about your health    Make or cancel appointments    Discuss your medicines    Learn about your test results    Speak to your doctor   If you have compliments or concerns about an experience at your clinic, or if you wish to file a complaint, please contact [facility] Patient Relations at [phone] or email us at [email or web address]         Additional Information About Your Visit        [employer] Information     [employer] is an electronic gateway that provides easy, online access to your medical records. With [employer], you can request a clinic appointment, read your test results, renew a prescription or communicate with your care team.     To sign up for [employer] visit the website at [email or web address] [email or web address]   You will be asked to enter the access code listed below, as well as some personal information. Please follow the directions to create your username and password.     Your access code is: [email or web address]  Expires: 2017  6:30 AM     Your access code will  in 90 days. If you need help or a new code, please contact your [facility] or call [phone] for assistance.        Care EveryWhere ID     This is your Care EveryWhere ID. This could be used by other organizations to access your [place] medical records  [id number]        Your Vitals Were     Pulse Height BMI (Body Mass Index)             65 1.778 m (5' 10\") 26.54 " kg/m2          Blood Pressure from Last 3 Encounters:   02/24/17 113/76   01/27/17 111/70   11/28/16 130/87    Weight from Last 3 Encounters:   02/24/17 83.9 kg (185 lb)   01/27/17 83.8 kg (184 lb 12.8 oz)   11/28/16 84.4 kg (186 lb)              We Performed the Following     Cystoscopy ([address])     PAC Visit Referral (For [facility] Only)     [facility]aven-Operative Worksheet  (Urology General)        Primary Care Provider Office Phone # Fax #    Edin Mays [phone]       [facility] [address] [address]        Thank you!     Thank you for choosing [facility] UROLOGY AND [facility] FOR PROSTATE AND UROLOGIC CANCERS  for your care. Our goal is always to provide you with excellent care. Hearing back from our patients is one way we can continue to improve our services. Please take a few minutes to complete the written survey that you may receive in the mail after your visit with us. Thank you!             Your Updated Medication List - Protect others around you: Learn how to safely use, store and throw away your medicines at www.disposemymeds.org.          This list is accurate as of: 2/24/17  1:42 PM.  Always use your most recent med list.                   Brand Name Dispense Instructions for use    allopurinol 100 MG tablet    ZYLOPRIM    90 tablet    Take 1 tablet (100 mg) by mouth daily       CALCIUM 600 + D PO      Take 1 tablet by mouth daily.       losartan 25 MG tablet    COZAAR    45 tablet    Take 0.5 tablets (12.5 mg) by mouth daily       metoprolol 50 MG 24 hr tablet    TOPROL XL    90 tablet    Take 1 tablet (50 mg) by mouth daily       Multi-vitamin Tabs tablet   Generic drug:  multivitamin, therapeutic with minerals      Take 1 tablet by mouth daily.       * PROVIDER DOSED MANAGED WARFARIN (COUMADIN) OUTPATIENT      Per coumadin clinic       REFRESH PLUS OP          simvastatin 40 MG tablet    ZOCOR    90 tablet    Take 1 tablet (40 mg) by mouth At Bedtime       *  warfarin 5 MG tablet    COUMADIN    172 tablet    Take as directed by anticoagulation clinic.  Current dose 7.5 mg Wed, 10 mg rest of week       * Notice:  This list has 2 medication(s) that are the same as other medications prescribed for you. Read the directions carefully, and ask your doctor or other care provider to review them with you.

## 2017-03-01 ENCOUNTER — TELEPHONE (OUTPATIENT)
Dept: ANTICOAGULATION | Facility: CLINIC | Age: 72
End: 2017-03-01

## 2017-03-01 ENCOUNTER — ANTICOAGULATION THERAPY VISIT (OUTPATIENT)
Dept: ANTICOAGULATION | Facility: CLINIC | Age: 72
End: 2017-03-01
Payer: MEDICARE

## 2017-03-01 DIAGNOSIS — Z79.01 LONG TERM CURRENT USE OF ANTICOAGULANT THERAPY: ICD-10-CM

## 2017-03-01 DIAGNOSIS — I48.0 PAROXYSMAL ATRIAL FIBRILLATION (H): ICD-10-CM

## 2017-03-01 DIAGNOSIS — Z95.2 S/P AORTIC VALVE REPLACEMENT: ICD-10-CM

## 2017-03-01 LAB — INR POINT OF CARE: 2 (ref 0.86–1.14)

## 2017-03-01 PROCEDURE — 99207 ZZC NO CHARGE NURSE ONLY: CPT

## 2017-03-01 PROCEDURE — 36416 COLLJ CAPILLARY BLOOD SPEC: CPT

## 2017-03-01 PROCEDURE — 85610 PROTHROMBIN TIME: CPT | Mod: QW

## 2017-03-01 NOTE — TELEPHONE ENCOUNTER
Dr. Mays  Brooks Jacobs  is scheduled to have a biopsy of his urethral lesion by Dr. Mora completed on  3/17/17 and will need to hold warfarin for 5 days.   Patient is currently on warfarin for  A fib and Aortic Valve replaced  Current CHEST guidelines suggest considering bridging for those with high thrombotic risk.   While the patient is off warfarin, would you recommend the patient use enoxaparin injections as a bridge? If yes, would you like the prophylactic dose (40mg daily) or the therapeutic dose (1mg/kg twice daily)? Should the patient use enoxaparin both before and after the procedure or only afterwards?  CURRENT BRIDGING GUIDELINES  *NOTE: This does not take into consideration the bleeding risk of the procedure.     Pre-Procedural bridging is not needed for most patient's except for the following:    VTE within the previous month    Prior history of recurrent VTE during anticoagulation therapy interruption    Underingoing a procedure with high inherent risk for VTE (ie. Joint replacement, major abdominal cancer resection)     Perioperative Thrombotic Risk Stratification    High Thrombotic Risk Moderate Thrombotic Risk Low Thrombotic Risk     Mechanical Heart Valve   Any mitral valve prosthesis    Any caged-ball or tilting disk aortic    valve prothesis    Stroke or TIA within 6 months   Bileaflet aortic valve prothesis and one or more of the following risk factors: Afib, prior stroke or TIA, hypertension, diabetes, CHF, age >75 years   Bileaflet aortic valve prothesis without Afib and no other risk factors for stroke     Atrial Fibrillation   CHADS2 score 5 or 6    Stroke or TIA within 3 months    Rheumatic vavlular heart disease     CHADS2 score of 3 or 4   CHADS2 score of 0 to 2 (assuming no prior stroke or TIA)       VTE   VTE within 3 months    Severe thrombophilia (eg. Deficiency of protein C, protein S, or antithrombin; antiphospholipid antibodies; Homozygous Factor V Leiden or Prothrombin Gene  Mutation; muliple abormalities)   VTE within the past 3-12 months    Non-severe thrombophilia (eg. Heterozygous Factor V Leiden or Prothrombin Gene Mutation)    Recurrent VTE    Active cancer (treated within 6 months or palliative)   VTE >12 months and no other risk factors    For additional Anticoagulation Bridging Guidelines -- Click HERE    Please respond to the [facility] pool ([id number]) so all staff are aware of the plan. The Anticoagulation Clinic will order any necessary medications and contact the patient with a written plan regarding the upcoming procedure. Thank you!    Anticoagulation Clinic Staff  Phone: [phone]  Fax: [phone]  Pool # [id number]

## 2017-03-01 NOTE — TELEPHONE ENCOUNTER
Brooks Jacobs will be having a biopsy of the urethral lesion on 3/17/17.  Would like him to hold his warfarin in preparation for the biopsy?   If so for how many days would you like him to hold the warfarin before the  Procedure?  Please respond to the [facility] pool [phone] so all will be aware of the plan,  Thank you  Lindsey Epstein RN  Anticoagulation Clinic

## 2017-03-01 NOTE — MR AVS SNAPSHOT
Brooks Summers   3/1/2017 1:15 PM   Anticoagulation Therapy Visit    Description:  71 year old male   Provider:  LL ANTI COAG   Department:  [facility]           INR as of 3/1/2017     Today's INR 2.0      Anticoagulation Summary as of 3/1/2017     INR goal 2.0-3.0   Today's INR 2.0   Full instructions 7.5 mg on Wed; 10 mg all other days   Next INR check 3/29/2017    Indications   S/P aortic valve replacement [Z95.2]  Paroxysmal atrial fibrillation (H) [I48.0]  Long term current use of anticoagulant therapy [Z79.01]         Description     Recheck INR 3/29/17  Continue warfarin 7.5 mg Wed, 10 mg rest of week         Contact Numbers     Please call [phone] to cancel and/or reschedule your appointment.  Please call [phone] with any problems or questions regarding your therapy          March 2017 Details    Sun Mon Tue Wed Thu Fri Sat        1      7.5 mg   See details      2      10 mg         3      10 mg         4      10 mg           5      10 mg         6      10 mg         7      10 mg         8      7.5 mg         9      10 mg         10      10 mg         11      10 mg           12      10 mg         13      10 mg         14      10 mg         15      7.5 mg         16      10 mg         17      10 mg         18      10 mg           19      10 mg         20      10 mg         21      10 mg         22      7.5 mg         23      10 mg         24      10 mg         25      10 mg           26      10 mg         27      10 mg         28      10 mg         29            30               31                 Date Details   03/01 This INR check       Date of next INR:  3/29/2017         How to take your warfarin dose     To take:  7.5 mg Take 1.5 of the 5 mg tablets.    To take:  10 mg Take 2 of the 5 mg tablets.

## 2017-03-01 NOTE — PROGRESS NOTES
ANTICOAGULATION FOLLOW-UP CLINIC VISIT    Patient Name:  Brooks Summers  Date:  3/1/2017  Contact Type:  Face to Face    SUBJECTIVE:     Patient Findings     Positives Change in diet/appetite (extra greens in the past week, taught consistency, verbalized understanding)    Comments Pt will be having a biopsy of a urethral lesion on 3/17/17 by Dr Mora at [facility].  Writer will message providers about warfarin management           OBJECTIVE    INR Protime   Date Value Ref Range Status   03/01/2017 2.0 (A) 0.86 - 1.14 Final       ASSESSMENT / PLAN  INR assessment THER    Recheck INR In: 4 WEEKS    INR Location Clinic      Anticoagulation Summary as of 3/1/2017     INR goal 2.0-3.0   Today's INR 2.0   Maintenance plan 7.5 mg (5 mg x 1.5) on Wed; 10 mg (5 mg x 2) all other days   Full instructions 7.5 mg on Wed; 10 mg all other days   Weekly total 67.5 mg   No change documented Lindsey Epstein RN   Plan last modified Lindsey Epstein RN (1/18/2017)   Next INR check 3/29/2017   Priority INR   Target end date Indefinite    Indications   S/P aortic valve replacement [Z95.2]  Paroxysmal atrial fibrillation (H) [I48.0]  Long term current use of anticoagulant therapy [Z79.01]         Anticoagulation Episode Summary     INR check location     Preferred lab     Send INR reminders to [facility] POOL    Comments  * warfarin 5 mg tabs      Anticoagulation Care Providers     Provider Role Specialty Phone number    Edin Mays MD [facility] Internal Medicine [phone]            See the Encounter Report to view Anticoagulation Flowsheet and Dosing Calendar (Go to Encounters tab in chart review, and find the Anticoagulation Therapy Visit)        Lindsey Epstein RN

## 2017-03-03 ENCOUNTER — ANTICOAGULATION THERAPY VISIT (OUTPATIENT)
Dept: ANTICOAGULATION | Facility: CLINIC | Age: 72
End: 2017-03-03
Payer: MEDICARE

## 2017-03-03 ENCOUNTER — TELEPHONE (OUTPATIENT)
Dept: ANTICOAGULATION | Facility: CLINIC | Age: 72
End: 2017-03-03

## 2017-03-03 ENCOUNTER — TELEPHONE (OUTPATIENT)
Dept: INTERNAL MEDICINE | Facility: CLINIC | Age: 72
End: 2017-03-03

## 2017-03-03 DIAGNOSIS — Z95.2 S/P AORTIC VALVE REPLACEMENT: ICD-10-CM

## 2017-03-03 DIAGNOSIS — I48.0 PAROXYSMAL ATRIAL FIBRILLATION (H): ICD-10-CM

## 2017-03-03 DIAGNOSIS — Z79.01 LONG TERM CURRENT USE OF ANTICOAGULANT THERAPY: ICD-10-CM

## 2017-03-03 PROCEDURE — 99207 ZZC NO CHARGE NURSE ONLY: CPT | Performed by: REGISTERED NURSE

## 2017-03-03 NOTE — MR AVS SNAPSHOT
Brooks Summers   3/3/2017   Anticoagulation Therapy Visit    Description:  71 year old male   Provider:  Lindsey Epstein, RN   Department:  [facility]           INR as of 3/3/2017     Today's INR No new INR was available at the time of this encounter.      Anticoagulation Summary as of 3/3/2017     INR goal 2.0-3.0   Today's INR No new INR was available at the time of this encounter.   Full instructions 3/12: Hold; 3/13: Hold; 3/14: Hold; 3/15: Hold; 3/16: Hold; Otherwise 7.5 mg on Wed; 10 mg all other days   Next INR check 3/29/2017    Indications   S/P aortic valve replacement [Z95.2]  Paroxysmal atrial fibrillation (H) [I48.0]  Long term current use of anticoagulant therapy [Z79.01]         Description     restart your warfarin at you usual dose after your procedure unless directed other wise after procedure.  Recheck INR 3/29/17  Warfarin 7.5 mg Wed, 10 mg rest of week       Your next Anticoagulation Clinic appointment(s)     Mar 29, 2017  1:15 PM CDT   [facility]ticoagulation [facility] with LL ANTI COAG   [facility]    [address] [address] [phone]              Contact Numbers     Please call [phone] to cancel and/or reschedule your appointment.  Please call [phone] with any problems or questions regarding your therapy          March 2017 Details    Sun Mon Tue Wed Thu Fri Sat        1               2               3      10 mg   See details      4      10 mg           5      10 mg         6      10 mg         7      10 mg         8      7.5 mg         9      10 mg         10      10 mg         11      10 mg           12      Hold         13      Hold         14      Hold         15      Hold         16      Hold         17      10 mg   See details      18      10 mg           19      10 mg         20      10 mg         21      10 mg         22      7.5 mg         23      10 mg         24      10 mg         25      10 mg            26      10 mg         27      10 mg         28      10 mg         29            30               31                 Date Details   03/03 This INR check      03/17 restart warfarin as directed after your procedure       Date of next INR:  3/29/2017         How to take your warfarin dose     To take:  7.5 mg Take 1.5 of the 5 mg tablets.    To take:  10 mg Take 2 of the 5 mg tablets.    Hold Do not take your warfarin dose. See the Details table to the right for additional instructions.

## 2017-03-03 NOTE — TELEPHONE ENCOUNTER
P/Call to Brooks to inform that he does need to stop his warfarin for 5 days before his procedure on 3/17/17.  Also informed that this has been approved by Dr. Mays and he does not have to bridge while he is off his warfarin.  Lindsey Epstein RN  Anticoagulation Clinic

## 2017-03-03 NOTE — TELEPHONE ENCOUNTER
Dear Lindsey;    I discussed with Dr. Be, Cardiology. He has tissue AVR (does not need coumadin for this). For afib Dr. Be states no need to bridge before or after procedure.    ThanksBALAJI

## 2017-03-03 NOTE — PROGRESS NOTES
ANTICOAGULATION FOLLOW-UP CLINIC VISIT    Patient Name:  Boroks Summers  Date:  3/3/2017  Contact Type:  Telephone/ Pt called and given new dosing and recheck instructions.  AVS sent to pt via US mail    SUBJECTIVE:        OBJECTIVE    INR Protime   Date Value Ref Range Status   03/01/2017 2.0 (A) 0.86 - 1.14 Final       ASSESSMENT / PLAN  No question data found.  Anticoagulation Summary as of 3/3/2017     INR goal 2.0-3.0   Today's INR No new INR was available at the time of this encounter.   Maintenance plan 7.5 mg (5 mg x 1.5) on Wed; 10 mg (5 mg x 2) all other days   Full instructions 3/12: Hold; 3/13: Hold; 3/14: Hold; 3/15: Hold; 3/16: Hold; Otherwise 7.5 mg on Wed; 10 mg all other days   Weekly total 67.5 mg   Plan last modified Lindsey Epstein RN (1/18/2017)   Next INR check 3/29/2017   Priority INR   Target end date Indefinite    Indications   S/P aortic valve replacement [Z95.2]  Paroxysmal atrial fibrillation (H) [I48.0]  Long term current use of anticoagulant therapy [Z79.01]         Anticoagulation Episode Summary     INR check location     Preferred lab     Send INR reminders to [facility]    Comments  * warfarin 5 mg tabs      Anticoagulation Care Providers     Provider Role Specialty Phone number    Edin Mays MD [facility] Internal Medicine [phone]            See the Encounter Report to view Anticoagulation Flowsheet and Dosing Calendar (Go to Encounters tab in chart review, and find the Anticoagulation Therapy Visit)        Lindsey Epstein RN               HPI      ROS      Physical Exam

## 2017-03-13 NOTE — PROGRESS NOTES
ANTICOAGULATION FOLLOW-UP CLINIC VISIT    Patient Name:  Brooks Summers  Date:  3/13/2017  Contact Type:  Telephone/ Mrs Jacobs called today, 3/13/17 with date change for Brooks's procedure.  New instructions were given to Mrs. Jacobs who wrote down instructions and verbalized understanding    SUBJECTIVE:     Patient Findings     Positives Other complaints, Intentional hold of therapy (intentional hold 5 days before procedure which is 3/27/27, restart after procedure as directed by provider)    Comments Pt procedure is 3/27/17             OBJECTIVE    INR Protime   Date Value Ref Range Status   03/01/2017 2.0 (A) 0.86 - 1.14 Final       ASSESSMENT / PLAN  No question data found.  Anticoagulation Summary as of 3/3/2017     INR goal 2.0-3.0   Today's INR No new INR was available at the time of this encounter.   Maintenance plan 7.5 mg (5 mg x 1.5) on Wed; 10 mg (5 mg x 2) all other days   Full instructions 3/22: Hold; 3/23: Hold; 3/24: Hold; 3/25: Hold; 3/26: Hold; Otherwise 7.5 mg on Wed; 10 mg all other days   Weekly total 67.5 mg   Plan last modified Lindsey Epstein RN (1/18/2017)   Next INR check 4/12/2017   Priority INR   Target end date Indefinite    Indications   S/P aortic valve replacement [Z95.2]  Paroxysmal atrial fibrillation (H) [I48.0]  Long term current use of anticoagulant therapy [Z79.01]         Anticoagulation Episode Summary     INR check location     Preferred lab     Send INR reminders to [facility]    Comments  * warfarin 5 mg tabs      Anticoagulation Care Providers     Provider Role Specialty Phone number    Edin Mays MD [facility] Internal Medicine [phone]            See the Encounter Report to view Anticoagulation Flowsheet and Dosing Calendar (Go to Encounters tab in chart review, and find the Anticoagulation Therapy Visit)        Lindsey Epstein RN

## 2017-03-15 ENCOUNTER — ANESTHESIA EVENT (OUTPATIENT)
Dept: SURGERY | Facility: AMBULATORY SURGERY CENTER | Age: 72
End: 2017-03-15

## 2017-03-16 DIAGNOSIS — Z95.2 S/P AVR (AORTIC VALVE REPLACEMENT): ICD-10-CM

## 2017-03-16 DIAGNOSIS — I48.0 PAROXYSMAL ATRIAL FIBRILLATION (H): ICD-10-CM

## 2017-03-16 DIAGNOSIS — Z95.2 S/P AORTIC VALVE REPLACEMENT: ICD-10-CM

## 2017-03-16 DIAGNOSIS — Z79.01 LONG TERM CURRENT USE OF ANTICOAGULANT THERAPY: ICD-10-CM

## 2017-03-17 ENCOUNTER — ALLIED HEALTH/NURSE VISIT (OUTPATIENT)
Dept: SURGERY | Facility: CLINIC | Age: 72
End: 2017-03-17

## 2017-03-17 ENCOUNTER — OFFICE VISIT (OUTPATIENT)
Dept: SURGERY | Facility: CLINIC | Age: 72
End: 2017-03-17

## 2017-03-17 VITALS
OXYGEN SATURATION: 95 % | DIASTOLIC BLOOD PRESSURE: 77 MMHG | RESPIRATION RATE: 16 BRPM | BODY MASS INDEX: 26.27 KG/M2 | HEART RATE: 77 BPM | TEMPERATURE: 98.1 F | HEIGHT: 70 IN | SYSTOLIC BLOOD PRESSURE: 116 MMHG | WEIGHT: 183.5 LBS

## 2017-03-17 DIAGNOSIS — Z01.818 PREOP EXAMINATION: Primary | ICD-10-CM

## 2017-03-17 DIAGNOSIS — D69.6 THROMBOCYTOPENIA (H): Primary | ICD-10-CM

## 2017-03-17 DIAGNOSIS — I10 ESSENTIAL HYPERTENSION: ICD-10-CM

## 2017-03-17 DIAGNOSIS — Z01.818 PRE-OP EVALUATION: ICD-10-CM

## 2017-03-17 DIAGNOSIS — N36.9 URETHRAL LESION: ICD-10-CM

## 2017-03-17 DIAGNOSIS — D69.6 THROMBOCYTOPENIA (H): ICD-10-CM

## 2017-03-17 LAB
ALBUMIN UR-MCNC: NEGATIVE MG/DL
APPEARANCE UR: ABNORMAL
BILIRUB UR QL STRIP: NEGATIVE
COLOR UR AUTO: YELLOW
GLUCOSE UR STRIP-MCNC: NEGATIVE MG/DL
HGB BLD-MCNC: 14.7 G/DL (ref 13.3–17.7)
HGB UR QL STRIP: NEGATIVE
KETONES UR STRIP-MCNC: NEGATIVE MG/DL
LEUKOCYTE ESTERASE UR QL STRIP: NEGATIVE
MUCOUS THREADS #/AREA URNS LPF: PRESENT /LPF
NITRATE UR QL: NEGATIVE
PH UR STRIP: 7 PH (ref 5–7)
PLATELET # BLD AUTO: 96 10E9/L (ref 150–450)
POTASSIUM SERPL-SCNC: 4.7 MMOL/L (ref 3.4–5.3)
RBC #/AREA URNS AUTO: 6 /HPF (ref 0–2)
SP GR UR STRIP: 1.01 (ref 1–1.03)
URN SPEC COLLECT METH UR: ABNORMAL
UROBILINOGEN UR STRIP-MCNC: 0 MG/DL (ref 0–2)
WBC #/AREA URNS AUTO: 1 /HPF (ref 0–2)

## 2017-03-17 RX ORDER — WARFARIN SODIUM 5 MG/1
TABLET ORAL
Qty: 172 TABLET | Refills: 1 | Status: SHIPPED | OUTPATIENT
Start: 2017-03-17 | End: 2017-06-07

## 2017-03-17 ASSESSMENT — LIFESTYLE VARIABLES: TOBACCO_USE: 1

## 2017-03-17 NOTE — PATIENT INSTRUCTIONS
Preparing for Your Surgery      Name:  Brooks Summers   MRN:  [id number]   :  1945   Today's Date:  3/17/2017     Arriving for surgery:  Surgery date:  4/3/17    Surgery time:  1105  Arrival time:  0900    Please come to:       [facility] [address]  [address] [place]  [address]    -   parking is available in front of the hospital from 5:15 am to 8:00 pm    -  Stop at the Information Desk in the lobby    -   Inform the information person that you are here for surgery. An escort to 3c will be provided. If you would not like an escort, please proceed to 3C on the 3rd floor. [phone]     What can I eat or drink?  -  You may have solid food or milk products until 8 hours prior to your surgery. 0300  -  You may have water, apple juice or 7up/Sprite until 2 hours prior to your surgery. 0900    Which medicines can I take?  -  Do NOT take these medications in the morning, the day of surgery:  Stop coumadin 5 days prior to surgery, as of 3/29/17 take evening medications the night before    -  Please take these medications the day of surgery:  Take metoprolol the morning of surgery    How do I prepare myself?  -  Take two showers: one the night before surgery; and one the morning of surgery.         Use Scrubcare or Hibiclens to wash from neck down.  You may use your own shampoo and conditioner. No other hair products.   -  Do NOT use lotion, powder, deodorant, or antiperspirant the day of your surgery.  -  Do NOT wear any jewelry.  -Do not bring your own medications to the hospital, except for inhalers and eye drops.  -  Bring your ID and insurance card.    Questions or Concerns:  If you have questions or concerns, please call the  Preoperative Assessment Center, Monday-Friday 7AM-7PM:  [phone]          AFTER YOUR SURGERY  Breathing exercises   Breathing exercises help you recover faster. Take deep breaths and let the air out slowly. This will:     Help you  wake up after surgery.    Help prevent complications like pneumonia.  Preventing complications will help you go home sooner.   Nausea and vomiting   You may feel sick to your stomach after surgery; if so, let your nurse know.    Pain control:  After surgery, you may have pain. Our goal is to help you manage your pain. Pain medicine will help you feel comfortable enough to do activities that will help you heal.  These activities may include breathing exercises, walking and physical therapy.   To help your health care team treat your pain we will ask: 1) If you have pain  2) where it is located 3) describe your pain in your words  Methods of pain control include medications given by mouth, vein or by nerve block for some surgeries.  Sequential Compression Device (SCD) or Pneumo Boots:  You may need to wear SCD S on your legs or feet. These are wraps connected to a machine that pumps in air and releases it. The repeated pumping helps prevent blood clots from forming.

## 2017-03-17 NOTE — MR AVS SNAPSHOT
After Visit Summary   3/17/2017    Brooks Summers    MRN: [id number]           Patient Information     Date Of Birth          1945        Visit Information        Provider Department      3/17/2017 2:30 PM Rn, [facility] Preoperative [facility]        Care Instructions    Preparing for Your Surgery      Name:  Brooks Summers   MRN:  [id number]   :  1945   Today's Date:  3/17/2017     Arriving for surgery:  Surgery date:  4/3/17    Surgery time:  1105  Arrival time:  0900    Please come to:       [facility] [address]  [address] [place]  [address]    -   parking is available in front of the hospital from 5:15 am to 8:00 pm    -  Stop at the Information Desk in the lobby    -   Inform the information person that you are here for surgery. An escort to 3c will be provided. If you would not like an escort, please proceed to 3C on the 3rd floor. [phone]     What can I eat or drink?  -  You may have solid food or milk products until 8 hours prior to your surgery. 0300  -  You may have water, apple juice or 7up/Sprite until 2 hours prior to your surgery. 0900    Which medicines can I take?  -  Do NOT take these medications in the morning, the day of surgery:  Stop coumadin 5 days prior to surgery, as of 3/29/17 take evening medications the night before    -  Please take these medications the day of surgery:  Take metoprolol the morning of surgery    How do I prepare myself?  -  Take two showers: one the night before surgery; and one the morning of surgery.         Use Scrubcare or Hibiclens to wash from neck down.  You may use your own shampoo and conditioner. No other hair products.   -  Do NOT use lotion, powder, deodorant, or antiperspirant the day of your surgery.  -  Do NOT wear any jewelry.  -Do not bring your own medications to the hospital, except for inhalers and eye drops.  -  Bring your ID and insurance card.    Questions  or Concerns:  If you have questions or concerns, please call the  Preoperative Assessment Center, Monday-Friday 7AM-7PM:  [phone]          AFTER YOUR SURGERY  Breathing exercises   Breathing exercises help you recover faster. Take deep breaths and let the air out slowly. This will:     Help you wake up after surgery.    Help prevent complications like pneumonia.  Preventing complications will help you go home sooner.   Nausea and vomiting   You may feel sick to your stomach after surgery; if so, let your nurse know.    Pain control:  After surgery, you may have pain. Our goal is to help you manage your pain. Pain medicine will help you feel comfortable enough to do activities that will help you heal.  These activities may include breathing exercises, walking and physical therapy.   To help your health care team treat your pain we will ask: 1) If you have pain  2) where it is located 3) describe your pain in your words  Methods of pain control include medications given by mouth, vein or by nerve block for some surgeries.  Sequential Compression Device (SCD) or Pneumo Boots:  You may need to wear SCD S on your legs or feet. These are wraps connected to a machine that pumps in air and releases it. The repeated pumping helps prevent blood clots from forming.         Follow-ups after your visit        Your next 10 appointments already scheduled     Mar 17, 2017  2:30 PM CDT   (Arrive by 2:15 PM)   PAC RN ASSESSMENT with  Pac Rn   [facility] Preoperative [facility] Center ([facility])    [address] [place] [address] [phone]            Mar 17, 2017  3:00 PM CDT   (Arrive by 2:45 PM)   PAC Anesthesia Consult with  Pac Anesthesiologist   [facility] Preoperative Assessment Center ([facility])    [address] [place] [address] [phone]            Mar 17, 2017  3:15 PM CDT   LAB with JAIME LAB   [facility]  Lab ([facility])    [address]  1st Floor  [place] [address] [phone]           Patient must bring picture ID.  Patient should be prepared to give a urine specimen  Please do not eat 10-12 hours before your appointment if you are coming in fasting for labs on lipids, cholesterol, or glucose (sugar).  Pregnant women should follow their Care Team instructions. Water with medications is okay. Do not drink coffee or other fluids.   If you have concerns about taking  your medications, please ask at office or if scheduling via [employer], send a message by clicking on Secure Messaging, Message Your Care Team.            Mar 29, 2017  1:15 PM CDT   Anticoagulation Visit with LL ANTI COAG   [facility] ([facility])    [address] [address]   [phone]            Apr 03, 2017   Procedure with Marlena Mora MD   [facility] [place] Same Day Surgery (--)    [address] [address]   [phone]            Apr 05, 2017  1:15 PM CDT   Anticoagulation Visit with LL ANTI COAG   [facility] ([facility])    [address] [address]   [phone]            Apr 07, 2017 11:15 AM CDT   (Arrive by 11:00 AM)   Post-Op with Marlena Mora MD   [facility] ([facility])    [address]  4th Floor  [place] [address] [phone]            Apr 12, 2017  1:00 PM CDT   Anticoagulation Visit with LL ANTI COAG   [facility] ([facility])    [address] [address]   [phone]            Apr 28, 2017  1:00 PM CDT   (Arrive by 12:45 PM)   Pacemaker Check with Uc Cv Device 1   [facility] ([facility])    [address]  3rd Floor  [place] [address] [phone]             2017  1:00 PM CDT   RESEARCH with  Cv Research Coordinator   [facility] ([facility])    [address]  [address]rd [place] [id number] [phone]              Future tests that were ordered for you today     Open Future Orders        Priority Expected Expires Ordered    UA reflex to Microscopic and Culture Routine 3/17/2017 2017 3/17/2017    Platelet count Routine 3/17/2017 2017 3/17/2017    Potassium Routine 3/17/2017 2017 3/17/2017    Hemoglobin Routine 3/17/2017 2017 3/17/2017            Who to contact     Please call your clinic at [phone] to:    Ask questions about your health    Make or cancel appointments    Discuss your medicines    Learn about your test results    Speak to your doctor   If you have compliments or concerns about an experience at your clinic, or if you wish to file a complaint, please contact [facility] Patient Relations at [phone] or email us at [email or web address]         Additional Information About Your Visit        [employer] Information     [employer] is an electronic gateway that provides easy, online access to your medical records. With [employer], you can request a clinic appointment, read your test results, renew a prescription or communicate with your care team.     To sign up for [employer] visit the website at [email or web address]   You will be asked to enter the access code listed below, as well as some personal information. Please follow the directions to create your username and password.     Your access code is: [email or web address]  Expires: 2017  7:30 AM     Your access code will  in 90 days. If you need help or a new code, please contact your [facility] or call [phone] for assistance.        Care EveryWhere ID     This is your Care EveryWhere ID. This could be used by other organizations to access your  [place] medical records  [id number]         Blood Pressure from Last 3 Encounters:   03/17/17 116/77   02/24/17 113/76   01/27/17 111/70    Weight from Last 3 Encounters:   03/17/17 83.2 kg (183 lb 8 oz)   02/24/17 83.9 kg (185 lb)   01/27/17 83.8 kg (184 lb 12.8 oz)              Today, you had the following     No orders found for display         Today's Medication Changes          These changes are accurate as of: 3/17/17  2:03 PM.  If you have any questions, ask your nurse or doctor.               These medicines have changed or have updated prescriptions.        Dose/Directions    allopurinol 100 MG tablet   Commonly known as:  ZYLOPRIM   This may have changed:  when to take this   Used for:  Idiopathic gout, unspecified chronicity, unspecified site        Dose:  100 mg   Take 1 tablet (100 mg) by mouth daily   Quantity:  90 tablet   Refills:  1       losartan 25 MG tablet   Commonly known as:  COZAAR   This may have changed:  when to take this   Used for:  Benign essential hypertension        Dose:  12.5 mg   Take 0.5 tablets (12.5 mg) by mouth daily   Quantity:  45 tablet   Refills:  3       metoprolol 50 MG 24 hr tablet   Commonly known as:  TOPROL XL   This may have changed:  when to take this   Used for:  Benign essential hypertension        Dose:  50 mg   Take 1 tablet (50 mg) by mouth daily   Quantity:  90 tablet   Refills:  3         Stop taking these medicines if you haven't already. Please contact your care team if you have questions.     Multi-vitamin Tabs tablet   Generic drug:  multivitamin, therapeutic with minerals   Stopped by:  Pharmacist,  Pac           REFRESH PLUS OP   Stopped by:  Pharmacist  Pac                    Primary Care Provider Office Phone # Fax #    Edin Mays [phone]       [address] [address]        Thank you!     Thank you for choosing [facility]  for your care. Our goal  is always to provide you with excellent care. Hearing back from our patients is one way we can continue to improve our services. Please take a few minutes to complete the written survey that you may receive in the mail after your visit with us. Thank you!             Your Updated Medication List - Protect others around you: Learn how to safely use, store and throw away your medicines at www.disposemymeds.org.          This list is accurate as of: 3/17/17  2:03 PM.  Always use your most recent med list.                   Brand Name Dispense Instructions for use    allopurinol 100 MG tablet    ZYLOPRIM    90 tablet    Take 1 tablet (100 mg) by mouth daily       CALCIUM 600 + D PO      Take 1 tablet by mouth every morning       losartan 25 MG tablet    COZAAR    45 tablet    Take 0.5 tablets (12.5 mg) by mouth daily       metoprolol 50 MG 24 hr tablet    TOPROL XL    90 tablet    Take 1 tablet (50 mg) by mouth daily       * PROVIDER DOSED MANAGED WARFARIN (COUMADIN) OUTPATIENT      Per coumadin clinic       simvastatin 40 MG tablet    ZOCOR    90 tablet    Take 1 tablet (40 mg) by mouth At Bedtime       * warfarin 5 MG tablet    COUMADIN    172 tablet    Take as directed by anticoagulation clinic.  Current dose 7.5 mg Wed, 10 mg rest of week       * Notice:  This list has 2 medication(s) that are the same as other medications prescribed for you. Read the directions carefully, and ask your doctor or other care provider to review them with you.

## 2017-03-17 NOTE — H&P
Pre-Operative H & P     CC:  Preoperative exam to assess for increased cardiopulmonary risk while undergoing surgery and anesthesia.    Date of Encounter: 3/17/2017  Primary Care Physician:  Edin Mays  Brooks Summers is a 72 year old male who presents for pre-operative H & P in preparation for  Cysto, biopsy urethral lesion on 4/3/17 at [facility] by [facility]. Mora in treatment of urethral lesion. Patient is a poor historian and cannot provide much in the way of details why he is having the surgery.  He did state blood was noted in his urine by his PMD.  He denies any urinary symptoms.  No gross hematuria.  Prior cysto done in office procedure room.     History is obtained from the patient and electronic health record.     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 Tx with Dapto/RIFx 8 weeks.     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     RE>>LE     Paroxysmal a-fib (H)      Post-op 7/14/2011, 1/26/13     Rotator Cuff (Capsule) Sprain and Strain      S/P aortic valve replacement      7/14/2011, re-do 1/25/13 due to endocarditis     Smoking hx      Thrombocytopenia (H)        Past Surgical History  Past Surgical History   Procedure Laterality Date     Orthopedic surgery       shoulder,right     Colonoscopy       Endoscopy upper, colonoscopy, combined       Teeth extractions       Hemorrhoid surgery       Replace valve aortic  7/14/2011     Procedure:REPLACE VALVE AORTIC; Median Sternotomy, Bioprosthesis,  Aortic Valve replacement on pump with oxygenator. Intra-operative Transesophogeal Echocardiogram.; Surgeon:STEPHANIE HAMPTON; Location:[facility] OR     Anesthesia cardioversion  7/18/2011     Procedure:ANESTHESIA CARDIOVERSION;  Cardioversion; Surgeon:GENERIC ANESTHESIA PROVIDER; Location:[facility] OR     Redo sternotomy replace valve aortic  1/25/2013     Procedure: REDO STERNOTOMY REPLACE VALVE AORTIC;  Redo Sternotomy, Redo Aortic Valve Replacement on pump oxygenator. Intraoperative Transesophageal Echocardiogram;  Surgeon: Navin Singh MD;  Location:  OR     Mohs micrographic procedure       Implant automatic implantable cardioverter defibrillator       Repair valve mitral  2013     Coronary angiography adult order       Hc remov & replace implt defib pulse gen mult lead  12/3/2015             Hx of Blood transfusions/reactions: no     Hx of abnormal bleeding or anti-platelet use: no    Steroid use in the last year: no    Personal or FH with difficulty with Anesthesia:  no    Prior to Admission Medications  Current Outpatient Prescriptions   Medication Sig Dispense Refill     warfarin (COUMADIN) 5 MG tablet Take as directed by anticoagulation clinic.  Current dose 7.5 mg Wed, 10 mg rest of week 172 tablet 1     metoprolol (TOPROL XL) 50 MG 24 hr tablet Take 1 tablet (50 mg) by mouth daily (Patient taking differently: Take 50 mg by mouth every morning ) 90 tablet 3     simvastatin (ZOCOR) 40 MG tablet Take 1 tablet (40 mg) by mouth At Bedtime 90 tablet 3     losartan (COZAAR) 25 MG tablet Take 0.5 tablets (12.5 mg) by mouth daily (Patient taking differently: Take 12.5 mg by mouth every evening ) 45 tablet 3     allopurinol (ZYLOPRIM) 100 MG tablet Take 1 tablet (100 mg) by mouth daily (Patient taking differently: Take 100 mg by mouth every evening ) 90 tablet 1     PROVIDER DOSED MANAGED WARFARIN, COUMADIN, OUTPATIENT Per coumadin clinic       Calcium Carbonate-Vitamin D (CALCIUM 600 + D OR) Take 1 tablet by mouth every morning          Allergies  Allergies   Allergen Reactions     Lisinopril Cough       Social History  Social History     Social History     Marital status:      Spouse name: N/A     Number of children: N/A     Years of  "education: N/A     Occupational History     Not on file.     Social History Main Topics     Smoking status: Former Smoker     Packs/day: 1.00     Years: 20.00     Types: Cigarettes     Quit date: 12/31/1989     Smokeless tobacco: Never Used     Alcohol use 1.2 oz/week     2 Cans of beer per week     Drug use: No     Sexual activity: Yes     Partners: Female      Comment:      Other Topics Concern     Not on file     Social History Narrative     since 2/1982 2 children 4 grandchildren.    Carpet sales:  Semi retired. Athlete in school, Golf, fish, yardwork       Family History  Family History   Problem Relation Age of Onset     C.A.D. Father 68     bypass, carotid      Prostate Cancer Father 70     CANCER Mother 92     stomach, colon     Hypertension Mother      Retinal detachment Mother      CANCER Brother 64     lung cancer, petroleum     Glaucoma No family hx of      Macular Degeneration No family hx of      Strabismus No family hx of      Glasses (<9 y/o) No family hx of          ROS/MED HX  The complete review of systems is negative other than noted in the HPI or here.     ENT/Pulmonary: Comment: Per patient, wife has told stops breathing \"once in awhile\" but no other symptoms.    (+)RUBY risk factors hypertension, observed stopped breathing tobacco use, Past use , . .    Neurologic:     (+)neuropathy - Tingling in both legs,     Cardiovascular: Comment: Diastolic dysfunction    (+) Dyslipidemia, hypertension----. Taking blood thinners Pt has received instructions: Instructions Given to patient: Stop coumadin 5 days before (no bridging). . . pacemaker :type: Medtronic - Patient is not dependent on pacemaker ICD  type;Medtronic . valvular problems/murmurs Bioprosthetic Aortic Valve:.      (-) CAD, MORELOS and stent   METS/Exercise Tolerance: Comment: Golfs, fishes, yard work, bikes. >4 METS   Hematologic:  - neg hematologic  ROS       Musculoskeletal:  - neg musculoskeletal ROS (+) , , other " "musculoskeletal- Right shoulder: limited mobility      GI/Hepatic:  - neg GI/hepatic ROS       Renal/Genitourinary:     (+) Other Renal/ Genitourinary, Microhematuria      Endo:  - neg endo ROS       Psychiatric:  - neg psychiatric ROS       Infectious Disease:  - neg infectious disease ROS       Malignancy:      - no malignancy   Other:           Temp: 98.1  F (36.7  C) Temp src: Oral BP: 116/77 Pulse: 77   Resp: 16 SpO2: 95 %         183 lbs 8 oz  5' 10\"   Body mass index is 26.33 kg/(m^2).       Physical Exam  Constitutional: Awake, alert, cooperative, no apparent distress, and appears stated age.  Poor historian.   Eyes: Pupils equal, round and reactive to light,  sclera clear, conjunctiva normal.  HENT: Normocephalic, oral pharynx with moist mucus membranes. No goiter appreciated.   Respiratory: Clear to auscultation bilaterally, no crackles or wheezing.  Cardiovascular: Regular rate and rhythm, normal S1 and S2, II/VI  murmur noted RUSB and apex.   Carotids +2, no bruits. No edema. Palpable pulses to  DP and PT arteries.   GI: Normal bowel sounds, soft, non-distended, non-tender, no masses palpated, no hepatosplenomegaly.  Surgical scars: well healed  Lymph/Hematologic: No cervical lymphadenopathy and no supraclavicular lymphadenopathy.  Skin: Warm and dry.  No rashes at anticipated surgical site.   Musculoskeletal: Full ROM of neck. There is no redness, warmth, or swelling of the joints. Gross motor strength is normal.    Neurologic: Awake, alert, oriented to name, place and time. Cranial nerves II-XII are grossly intact. Gait is normal.   Neuropsychiatric: Calm, cooperative. Normal affect.     Labs: (personally reviewed)  Last Basic Metabolic Panel:  Lab Results   Component Value Date     11/28/2016      Lab Results   Component Value Date    POTASSIUM 4.1 11/28/2016     Lab Results   Component Value Date    CHLORIDE 106 11/28/2016     Lab Results   Component Value Date    MARTIN 9.2 11/28/2016     Lab " Results   Component Value Date    CO2 27 11/28/2016     Lab Results   Component Value Date    BUN 17 11/28/2016     Lab Results   Component Value Date    CR 1.03 11/28/2016     Lab Results   Component Value Date    GLC 92 11/28/2016     CBC RESULTS:   Recent Labs   Lab Test  11/28/16   1731   WBC  6.4   RBC  4.78   HGB  15.1   HCT  44.0   MCV  92   MCH  31.6   MCHC  34.3   RDW  13.2   PLT  99*       EKG: ordered for DOS.     Cardiac Echocardiogram 10/2016:   Mild concentric wall thickening consistent with left ventricular hypertrophy  is present. Global and regional left ventricular function is normal with an  EF of 55-60%.  Global right ventricular function is mildly reduced.  A bioprosthetic aortic valve is present.Doppler interrogation of the aortic  valve is normal. There is tarce to mild eccentric aortic insufficiency.  Pulmonary artery systolic pressure is normal.  The inferior vena cava was normal in size with preserved respiratory  variability.  No pericardial effusion is present.    Cardiac cath:  Cath 1/2013  LEFT MAIN:  The left main arises from the left coronary cusp.  The left main is widely patent . There are trivial luminal  irregularities composed of noncalcific plaque present.  LEFT ANTERIOR DESCENDING ARTERY:  The proximal left anterior descending has moderate calcification  present. There is mild (25 - 49%) mixed plaque present..  The mid left anterior descending is mild calcification. There is  trivial (<25%) mixed plaque present.  The distal left anterior descending trivial calcification. There is  trivial (<25%) mixed plaque present.  The first diagonal branch is small in caliber and patent.  The second diagonal branch is a smaller caliber vessel with trivial  noncalcified plaque present.     Ref. Range 3/17/2017 13:40 3/17/2017 14:53   Potassium Latest Ref Range: 3.4 - 5.3 mmol/L  4.7   Hemoglobin Latest Ref Range: 13.3 - 17.7 g/dL  14.7   Platelet Count Latest Ref Range: 150 - 450 10e9/L  96  "(L)   Color Urine Unknown Yellow    Appearance Urine Unknown Slightly Cloudy    Glucose Urine Latest Ref Range: NEG mg/dL Negative    Bilirubin Urine Latest Ref Range: NEG  Negative    Ketones Urine Latest Ref Range: NEG mg/dL Negative    Specific Gravity Urine Latest Ref Range: 1.003 - 1.035  1.015    pH Urine Latest Ref Range: 5.0 - 7.0 pH 7.0    Protein Albumin Urine Latest Ref Range: NEG mg/dL Negative    Urobilinogen mg/dL Latest Ref Range: 0.0 - 2.0 mg/dL 0.0    Nitrite Urine Latest Ref Range: NEG  Negative    Blood Urine Latest Ref Range: NEG  Negative    Leukocyte Esterase Urine Latest Ref Range: NEG  Negative    Source Unknown Midstream Urine    WBC Urine Latest Ref Range: 0 - 2 /HPF 1    RBC Urine Latest Ref Range: 0 - 2 /HPF 6 (H)    Mucous Urine Latest Ref Range: NEG /LPF Present (A)        ASSESSMENT and PLAN  Brooks Summers is a 72 year old male scheduled to undergo  Cysto, biopsy urethral lesion on 4/3/17 at [facility] by [facility]. Mora in treatment of urethral lesion.  PAC referral for risk assessment and optimization for anesthesia with comorbid conditions of:    **right shoulder limited mobility after injury - caution with positioning**    Pre-operative considerations:  1.  Cardiac:  Functional status very good.  METS >4. Mild CAD on cath 2013.  MACE < 1%  No further cardiac evaluation needed per 2014 ACC/AHA guidelines for non-cardiac surgery.   + Afib (on warfarin).  PPM. IACD (no shocks).PPM nurse notified.   EF 55-60%.  +bioprosthetic aortic valve.  + heart murmurs. 2 open heart surgeries (due to endocarditis).  INR DOS ordered.    2.  Pulm:  Airway feasible.  RUBY risk:  Intermediate:  Wife not here but he states she has noted apnea \"on occassions\".  No problems with anesthesia.   3.  GI:  Risk of PONV score = 1.  If > 2, anti-emetic intervention recommended.  4.  Heme:  Thrombocytopenia ~ 100.  ? Etiology.  Will defer to PMD.         Patient is optimized and is acceptable candidate for the proposed " procedure.  No further diagnostic evaluation is needed.       Patient was discussed with Dr Jane.    Geetha Cai PA-C  Preoperative Assessment Center  [facility]  Phone: [phone]  Fax: [phone]

## 2017-03-17 NOTE — ANESTHESIA PREPROCEDURE EVALUATION
"  Anesthesia Evaluation     . Pt has had prior anesthetic. Type: General and MAC    No history of anesthetic complications     ROS/MED HX    ENT/Pulmonary: Comment: Per patient, wife has told stops breathing \"once in awhile\" but no other symptoms.    (+)RUBY risk factors hypertension, observed stopped breathing tobacco use, Past use , . .    Neurologic:     (+)neuropathy - Tingling in both legs,     Cardiovascular: Comment: Diastolic dysfunction    (+) Dyslipidemia, hypertension----. Taking blood thinners Pt has received instructions: Instructions Given to patient: Stop coumadin 5 days before (no bridging). . . pacemaker :type: Medtronic - Patientt is not dependent on pacemaker ICD  type;Medtronic . valvular problems/murmurs Bioprosthetic Aortic Valve:. Previous cardiac testing Echodate:results:See belowdate: results:ECG reviewed date:pending: DOS results: date: results:         (-) CAD, MORELOS and stent   METS/Exercise Tolerance: Comment: Golfs, fishes, yard work, bikes. >4 METS   Hematologic:  - neg hematologic  ROS       Musculoskeletal:  - neg musculoskeletal ROS (+) , , other musculoskeletal- Right shoulder: limited mobility      GI/Hepatic:  - neg GI/hepatic ROS       Renal/Genitourinary:     (+) Other Renal/ Genitourinary, Microhematuria      Endo:  - neg endo ROS       Psychiatric:  - neg psychiatric ROS       Infectious Disease:  - neg infectious disease ROS       Malignancy:      - no malignancy   Other:               Physical Exam      Airway   Mallampati: III  TM distance: >3 FB  Neck ROM: full    Dental   (+) missing    Cardiovascular   Rhythm and rate: regular and normal  (+) murmur   (-) no peripheral edema    Pulmonary    breath sounds clear to auscultation    Other findings:   Cath 1/2013  LEFT MAIN:      The left main arises from the left coronary cusp.      The left main is widely patent . There are trivial luminal  irregularities composed of noncalcific plaque present.          LEFT ANTERIOR " DESCENDING ARTERY:      The proximal left anterior descending has moderate calcification  present. There is mild (25 - 49%) mixed plaque present..      The mid left anterior descending is mild calcification. There is  trivial (<25%) mixed plaque present.          The distal left anterior descending trivial calcification. There is  trivial (<25%) mixed plaque present.      The first diagonal branch is small in caliber and patent.      The second diagonal branch is a smaller caliber vessel with trivial  noncalcified plaque present.    Cardiac Echocardiogram 10/2016:   Mild concentric wall thickening consistent with left ventricular hypertrophy  is present. Global and regional left ventricular function is normal with an  EF of 55-60%.  Global right ventricular function is mildly reduced.  A bioprosthetic aortic valve is present.Doppler interrogation of the aortic  valve is normal. There is tarce to mild eccentric aortic insufficiency.  Pulmonary artery systolic pressure is normal.  The inferior vena cava was normal in size with preserved respiratory  variability.  No pericardial effusion is present.    3/17/2017 13:40  Color Urine: Yellow  Appearance Urine: Slightly Cloudy  Glucose Urine: Negative  Bilirubin Urine: Negative  Ketones Urine: Negative  Specific Gravity Urine: 1.015  pH Urine: 7.0  Protein Albumin Urine: Negative  Urobilinogen mg/dL: 0.0  Nitrite Urine: Negative  Blood Urine: Negative  Leukocyte Esterase Urine: Negative  Source: Midstream Urine  WBC Urine: 1  RBC Urine: 6 (H)  Mucous Urine: Present (A)    3/17/2017 14:53  Potassium: 4.7  Hemoglobin: 14.7  Platelet Count: 96 (L)           PAC Discussion and Assessment    ASA Classification: 3  Case is suitable for: [place]  Anesthetic techniques and relevant risks discussed:   Invasive monitoring and risk discussed: No  Types:   Possibility and Risk of blood transfusion discussed: No  NPO instructions given:   Additional anesthetic  "preparation and risks discussed:   Needs early admission to pre-op area:   Other:     PAC Resident/NP Anesthesia Assessment:  Scheduled for Cysto, biopsy urethral lesion on 4/3/17 at [facility] by [facility]. Mora in treatment of urethral lesion.  PAC referral for risk assessment and optimization for anesthesia with comorbid conditions of:    **right shoulder limited mobility after injury - caution with positioning**    Pre-operative considerations:  1.  Cardiac:  Functional status very good.  METS >4. Mild CAD on cath 2013. Cardiac echocardiogram 10/2016: EF 55-60%.  No significant valvular abnormalities. MACE < 1%  No further cardiac evaluation needed per 2014 ACC/AHA guidelines for non-cardiac surgery.   + Afib (on warfarin).  PPM. IACD (no shocks). +bioprosthetic aortic valve.  2 open heart surgeries   2.  Pulm:  Airway feasible.  RUBY risk:  Intermediate:  Wife not here but he states she has noted apnea \"on occassions\".  No problems with anesthesia.   3.  GI:  Risk of PONV score = 1.  If > 2, anti-emetic intervention recommended.  4.  Heme:  Thrombocytopenia ~ 100.  ? Etiology        Patient is optimized and is acceptable candidate for the proposed procedure.  No further diagnostic evaluation is needed.     Patient also evaluated by Dr. Jane. See recommendations below.           Reviewed and Signed by PAC Mid-Level Provider/Resident  Mid-Level Provider/Resident: Geetha Cai PA-C  Date: 3/17/17  Time: 1500    Attending Anesthesiologist Anesthesia Assessment:  72 year old for cysto, biopsy of urethral lesion. Chart reviewed, patient seen and evaluated; agree with above assessment. Patient is S/P 2 bioprosthetic aortic valves. First one placed in 2011, complicated by endocarditis; replaced in 2013 and has been asymptomatic since that time. He is physically very active, no symptoms. Although his risk of MACE is low, the presence of an ICD make him inappropriate for the ASC. Will reschedule to [facility] or [facility].    Patient is " appropriate for the planned procedure without further workup or medical management change. The final anesthesia plan will be determined by the physician anesthesiologist caring for the patient on the day of surgery.      Reviewed and Signed by PAC Anesthesiologist  Anesthesiologist: SAM  Date: 3/17/2017  Time:   Pass/Fail: Pass  Disposition:     PAC Pharmacist Assessment:        Pharmacist:   Date:   Time:                           .

## 2017-03-17 NOTE — PROGRESS NOTES
Preoperative Assessment Center medication history for March 17, 2017 is complete.  See Epic admission navigator for allergy information, pharmacy, prior to admission medications and immunization status.    Operating room staff will still need to confirm medications and last dose information on day of surgery.     Medication history interview sources:  Patient Interview     Changes made to PTA medication list (reason)  Added: None    Deleted:   -- MVI - patient reports no longer taking    Changed: None    Additional medication history information (including reliability of information, actions taken by pharmacist):  -- patient takes warfarin for his afib goal 2-3. He is taking 7.5mg on wednesdays and 10mg ROW. Currently being managed by [facility]      Prior to Admission medications    Medication Sig Last Dose Taking? Auth Provider   warfarin (COUMADIN) 5 MG tablet Take as directed by anticoagulation clinic.  Current dose 7.5 mg Wed, 10 mg rest of week Taking Yes Yemi Castro MD   metoprolol (TOPROL XL) 50 MG 24 hr tablet Take 1 tablet (50 mg) by mouth daily  Patient taking differently: Take 50 mg by mouth every morning  Taking Yes Yemi Castro MD   simvastatin (ZOCOR) 40 MG tablet Take 1 tablet (40 mg) by mouth At Bedtime Taking Yes Yemi Castro MD   losartan (COZAAR) 25 MG tablet Take 0.5 tablets (12.5 mg) by mouth daily  Patient taking differently: Take 12.5 mg by mouth every evening  Taking Yes Yemi Castro MD   allopurinol (ZYLOPRIM) 100 MG tablet Take 1 tablet (100 mg) by mouth daily  Patient taking differently: Take 100 mg by mouth every evening  Taking Yes Yemi Castro MD   Calcium Carbonate-Vitamin D (CALCIUM 600 + D OR) Take 1 tablet by mouth every morning  Taking Yes Reported, Patient   PROVIDER DOSED MANAGED WARFARIN, COUMADIN, OUTPATIENT Per coumadin clinic   Reported, Patient         Medication history completed by:   Corby Palomares, Pharm.D,  BCPS

## 2017-03-28 ENCOUNTER — ANTICOAGULATION THERAPY VISIT (OUTPATIENT)
Dept: ANTICOAGULATION | Facility: CLINIC | Age: 72
End: 2017-03-28
Payer: MEDICARE

## 2017-03-28 DIAGNOSIS — I48.0 PAROXYSMAL ATRIAL FIBRILLATION (H): ICD-10-CM

## 2017-03-28 DIAGNOSIS — Z95.2 S/P AORTIC VALVE REPLACEMENT: ICD-10-CM

## 2017-03-28 DIAGNOSIS — Z79.01 LONG TERM CURRENT USE OF ANTICOAGULANT THERAPY: ICD-10-CM

## 2017-03-28 PROCEDURE — 99207 ZZC NO CHARGE NURSE ONLY: CPT

## 2017-03-28 NOTE — PROGRESS NOTES
ANTICOAGULATION FOLLOW-UP CLINIC VISIT    Patient Name:  Brooks Summers  Date:  3/28/2017  Contact Type:  Telephone/ Lucero Summers    SUBJECTIVE:     Patient Findings     Positives Dental/Other procedures (cystoscopy rescheduled for 4-3-17)    Comments Received call from patient's wife. His cytoscopy was rescheduled to 4-3-17. He did not end up holding warfarin previously since he knew ahead of time the procedure would be rescheduled. Patient will start his 5 day hold tomorrow and resume warfarin at regular dose on 4-3-17 if okay with physician performing procedure. Recheck INR appointment already scheduled for 4-12-17.           OBJECTIVE    INR Protime   Date Value Ref Range Status   03/01/2017 2.0 (A) 0.86 - 1.14 Final       ASSESSMENT / PLAN  No question data found.  Anticoagulation Summary as of 3/28/2017     INR goal 2.0-3.0   Today's INR No new INR was available at the time of this encounter.   Maintenance plan 7.5 mg (5 mg x 1.5) on Wed; 10 mg (5 mg x 2) all other days   Full instructions 3/29: Hold; 3/30: Hold; 3/31: Hold; 4/1: Hold; 4/2: Hold; Otherwise 7.5 mg on Wed; 10 mg all other days   Weekly total 67.5 mg   Plan last modified Lindsey Epstein RN (1/18/2017)   Next INR check 4/12/2017   Priority INR   Target end date Indefinite    Indications   S/P aortic valve replacement [Z95.2]  Paroxysmal atrial fibrillation (H) [I48.0]  Long term current use of anticoagulant therapy [Z79.01]         Anticoagulation Episode Summary     INR check location     Preferred lab     Send INR reminders to [facility]    Comments  * warfarin 5 mg tabs      Anticoagulation Care Providers     Provider Role Specialty Phone number    Edin Mays MD [facility] Internal Medicine [phone]            See the Encounter Report to view Anticoagulation Flowsheet and Dosing Calendar (Go to Encounters tab in chart review, and find the Anticoagulation Therapy Visit)        Aniya Garcia RN

## 2017-03-28 NOTE — MR AVS SNAPSHOT
Brooks Summers   3/28/2017   Anticoagulation Therapy Visit    Description:  72 year old male   Provider:  Aniya Garcia, RN   Department:  [facility]           INR as of 3/28/2017     Today's INR No new INR was available at the time of this encounter.      Anticoagulation Summary as of 3/28/2017     INR goal 2.0-3.0   Today's INR No new INR was available at the time of this encounter.   Full instructions 3/29: Hold; 3/30: Hold; 3/31: Hold; 4/1: Hold; 4/2: Hold; Otherwise 7.5 mg on Wed; 10 mg all other days   Next INR check 4/12/2017    Indications   S/P aortic valve replacement [Z95.2]  Paroxysmal atrial fibrillation (H) [I48.0]  Long term current use of anticoagulant therapy [Z79.01]         Your next Anticoagulation Clinic appointment(s)     Apr 12, 2017  1:00 PM CDT   [facility]    [address] [facility] [address]   [phone]              March 2017 Details    Sun Mon Tue Wed Thu Fri Sat        1               2               3               4                 5               6               7               8               9               10               11                 12               13               14               15               16               17               18                 19               20               21               22               23               24               25                 26               27               28      10 mg   See details      29      Hold         30      Hold         31      Hold           Date Details   03/28 This INR check               How to take your warfarin dose     To take:  10 mg Take 2 of the 5 mg tablets.    Hold Do not take your warfarin dose. See the Details table to the right for additional instructions.                April 2017 Details    Sun Mon Tue Wed Thu Fri Sat           1      Hold           2      Hold         3      10 mg          4      10 mg         5      7.5 mg         6      10 mg         7      10 mg         8      10 mg           9      10 mg         10      10 mg         11      10 mg         12            13               14               15                 16               17               18               19               20               21               22                 23               24               25               26               27               28               29                 30                      Date Details   No additional details    Date of next INR:  4/12/2017         How to take your warfarin dose     To take:  7.5 mg Take 1.5 of the 5 mg tablets.    To take:  10 mg Take 2 of the 5 mg tablets.    Hold Do not take your warfarin dose. See the Details table to the right for additional instructions.

## 2017-04-03 ENCOUNTER — HOSPITAL ENCOUNTER (OUTPATIENT)
Facility: CLINIC | Age: 72
Discharge: HOME | End: 2017-04-03
Attending: UROLOGY | Admitting: UROLOGY
Payer: MEDICARE

## 2017-04-03 ENCOUNTER — ANESTHESIA EVENT (OUTPATIENT)
Dept: SURGERY | Facility: CLINIC | Age: 72
End: 2017-04-03
Payer: MEDICARE

## 2017-04-03 ENCOUNTER — ANESTHESIA (OUTPATIENT)
Dept: SURGERY | Facility: CLINIC | Age: 72
End: 2017-04-03
Payer: MEDICARE

## 2017-04-03 ENCOUNTER — SURGERY (OUTPATIENT)
Age: 72
End: 2017-04-03

## 2017-04-03 VITALS
HEIGHT: 70 IN | SYSTOLIC BLOOD PRESSURE: 109 MMHG | TEMPERATURE: 98 F | BODY MASS INDEX: 25.98 KG/M2 | RESPIRATION RATE: 16 BRPM | WEIGHT: 181.44 LBS | DIASTOLIC BLOOD PRESSURE: 75 MMHG | OXYGEN SATURATION: 98 %

## 2017-04-03 DIAGNOSIS — N36.9 URETHRAL LESION: Primary | ICD-10-CM

## 2017-04-03 LAB
CREAT SERPL-MCNC: 0.88 MG/DL (ref 0.66–1.25)
GFR SERPL CREATININE-BSD FRML MDRD: 85 ML/MIN/1.7M2
HGB BLD-MCNC: 13.6 G/DL (ref 13.3–17.7)
INR PPP: 1.17 (ref 0.86–1.14)
POTASSIUM SERPL-SCNC: 4 MMOL/L (ref 3.4–5.3)

## 2017-04-03 PROCEDURE — 36000053 ZZH SURGERY LEVEL 2 EA 15 ADDTL MIN - UMMC: Performed by: UROLOGY

## 2017-04-03 PROCEDURE — 40000065 ZZH STATISTIC EKG NON-CHARGEABLE

## 2017-04-03 PROCEDURE — 37000008 ZZH ANESTHESIA TECHNICAL FEE, 1ST 30 MIN: Performed by: UROLOGY

## 2017-04-03 PROCEDURE — 27210794 ZZH OR GENERAL SUPPLY STERILE: Performed by: UROLOGY

## 2017-04-03 PROCEDURE — 36000055 ZZH SURGERY LEVEL 2 W FLUORO 1ST 30 MIN - UMMC: Performed by: UROLOGY

## 2017-04-03 PROCEDURE — 25000128 H RX IP 250 OP 636: Performed by: UROLOGY

## 2017-04-03 PROCEDURE — 85018 HEMOGLOBIN: CPT | Performed by: PHYSICIAN ASSISTANT

## 2017-04-03 PROCEDURE — 84132 ASSAY OF SERUM POTASSIUM: CPT | Performed by: PHYSICIAN ASSISTANT

## 2017-04-03 PROCEDURE — 71000027 ZZH RECOVERY PHASE 2 EACH 15 MINS: Performed by: UROLOGY

## 2017-04-03 PROCEDURE — 25000125 ZZHC RX 250: Performed by: NURSE ANESTHETIST, CERTIFIED REGISTERED

## 2017-04-03 PROCEDURE — 88305 TISSUE EXAM BY PATHOLOGIST: CPT | Performed by: UROLOGY

## 2017-04-03 PROCEDURE — 25000128 H RX IP 250 OP 636: Performed by: NURSE ANESTHETIST, CERTIFIED REGISTERED

## 2017-04-03 PROCEDURE — 25800025 ZZH RX 258: Performed by: NURSE ANESTHETIST, CERTIFIED REGISTERED

## 2017-04-03 PROCEDURE — 82565 ASSAY OF CREATININE: CPT | Performed by: PHYSICIAN ASSISTANT

## 2017-04-03 PROCEDURE — 93010 ELECTROCARDIOGRAM REPORT: CPT | Performed by: INTERNAL MEDICINE

## 2017-04-03 PROCEDURE — 40000170 ZZH STATISTIC PRE-PROCEDURE ASSESSMENT II: Performed by: UROLOGY

## 2017-04-03 PROCEDURE — 85610 PROTHROMBIN TIME: CPT | Performed by: PHYSICIAN ASSISTANT

## 2017-04-03 PROCEDURE — 36415 COLL VENOUS BLD VENIPUNCTURE: CPT | Performed by: PHYSICIAN ASSISTANT

## 2017-04-03 PROCEDURE — 37000009 ZZH ANESTHESIA TECHNICAL FEE, EACH ADDTL 15 MIN: Performed by: UROLOGY

## 2017-04-03 RX ORDER — SODIUM CHLORIDE, SODIUM LACTATE, POTASSIUM CHLORIDE, CALCIUM CHLORIDE 600; 310; 30; 20 MG/100ML; MG/100ML; MG/100ML; MG/100ML
INJECTION, SOLUTION INTRAVENOUS CONTINUOUS
Status: DISCONTINUED | OUTPATIENT
Start: 2017-04-03 | End: 2017-04-03 | Stop reason: HOSPADM

## 2017-04-03 RX ORDER — LIDOCAINE HYDROCHLORIDE 20 MG/ML
INJECTION, SOLUTION INFILTRATION; PERINEURAL PRN
Status: DISCONTINUED | OUTPATIENT
Start: 2017-04-03 | End: 2017-04-03

## 2017-04-03 RX ORDER — MEPERIDINE HYDROCHLORIDE 25 MG/ML
12.5 INJECTION INTRAMUSCULAR; INTRAVENOUS; SUBCUTANEOUS EVERY 5 MIN PRN
Status: DISCONTINUED | OUTPATIENT
Start: 2017-04-03 | End: 2017-04-03 | Stop reason: HOSPADM

## 2017-04-03 RX ORDER — ONDANSETRON 2 MG/ML
4 INJECTION INTRAMUSCULAR; INTRAVENOUS EVERY 30 MIN PRN
Status: DISCONTINUED | OUTPATIENT
Start: 2017-04-03 | End: 2017-04-03 | Stop reason: HOSPADM

## 2017-04-03 RX ORDER — PROPOFOL 10 MG/ML
INJECTION, EMULSION INTRAVENOUS CONTINUOUS PRN
Status: DISCONTINUED | OUTPATIENT
Start: 2017-04-03 | End: 2017-04-03

## 2017-04-03 RX ORDER — HYDROMORPHONE HYDROCHLORIDE 1 MG/ML
.3-.5 INJECTION, SOLUTION INTRAMUSCULAR; INTRAVENOUS; SUBCUTANEOUS EVERY 5 MIN PRN
Status: DISCONTINUED | OUTPATIENT
Start: 2017-04-03 | End: 2017-04-03 | Stop reason: HOSPADM

## 2017-04-03 RX ORDER — FENTANYL CITRATE 50 UG/ML
25-50 INJECTION, SOLUTION INTRAMUSCULAR; INTRAVENOUS
Status: DISCONTINUED | OUTPATIENT
Start: 2017-04-03 | End: 2017-04-03 | Stop reason: HOSPADM

## 2017-04-03 RX ORDER — PROPOFOL 10 MG/ML
INJECTION, EMULSION INTRAVENOUS PRN
Status: DISCONTINUED | OUTPATIENT
Start: 2017-04-03 | End: 2017-04-03

## 2017-04-03 RX ORDER — ONDANSETRON 4 MG/1
4 TABLET, ORALLY DISINTEGRATING ORAL EVERY 30 MIN PRN
Status: DISCONTINUED | OUTPATIENT
Start: 2017-04-03 | End: 2017-04-03 | Stop reason: HOSPADM

## 2017-04-03 RX ORDER — LIDOCAINE 40 MG/G
CREAM TOPICAL
Status: DISCONTINUED | OUTPATIENT
Start: 2017-04-03 | End: 2017-04-03 | Stop reason: HOSPADM

## 2017-04-03 RX ORDER — CEFAZOLIN SODIUM 2 G/100ML
2 INJECTION, SOLUTION INTRAVENOUS
Status: COMPLETED | OUTPATIENT
Start: 2017-04-03 | End: 2017-04-03

## 2017-04-03 RX ORDER — ONDANSETRON 2 MG/ML
INJECTION INTRAMUSCULAR; INTRAVENOUS PRN
Status: DISCONTINUED | OUTPATIENT
Start: 2017-04-03 | End: 2017-04-03

## 2017-04-03 RX ORDER — NALOXONE HYDROCHLORIDE 0.4 MG/ML
.1-.4 INJECTION, SOLUTION INTRAMUSCULAR; INTRAVENOUS; SUBCUTANEOUS
Status: DISCONTINUED | OUTPATIENT
Start: 2017-04-03 | End: 2017-04-03 | Stop reason: HOSPADM

## 2017-04-03 RX ORDER — SODIUM CHLORIDE, SODIUM LACTATE, POTASSIUM CHLORIDE, CALCIUM CHLORIDE 600; 310; 30; 20 MG/100ML; MG/100ML; MG/100ML; MG/100ML
INJECTION, SOLUTION INTRAVENOUS CONTINUOUS PRN
Status: DISCONTINUED | OUTPATIENT
Start: 2017-04-03 | End: 2017-04-03

## 2017-04-03 RX ORDER — OXYCODONE HYDROCHLORIDE 5 MG/1
5-10 TABLET ORAL EVERY 4 HOURS PRN
Qty: 20 TABLET | Refills: 0 | Status: SHIPPED | OUTPATIENT
Start: 2017-04-03 | End: 2017-04-07

## 2017-04-03 RX ORDER — SODIUM CHLORIDE, SODIUM LACTATE, POTASSIUM CHLORIDE, CALCIUM CHLORIDE 600; 310; 30; 20 MG/100ML; MG/100ML; MG/100ML; MG/100ML
INJECTION, SOLUTION INTRAVENOUS CONTINUOUS
Status: DISCONTINUED | OUTPATIENT
Start: 2017-04-03 | End: 2017-04-03

## 2017-04-03 RX ORDER — CEFAZOLIN SODIUM 1 G/3ML
1 INJECTION, POWDER, FOR SOLUTION INTRAMUSCULAR; INTRAVENOUS SEE ADMIN INSTRUCTIONS
Status: DISCONTINUED | OUTPATIENT
Start: 2017-04-03 | End: 2017-04-03 | Stop reason: HOSPADM

## 2017-04-03 RX ORDER — HYDROCODONE BITARTRATE AND ACETAMINOPHEN 5; 325 MG/1; MG/1
1-2 TABLET ORAL ONCE
Status: DISCONTINUED | OUTPATIENT
Start: 2017-04-03 | End: 2017-04-03 | Stop reason: HOSPADM

## 2017-04-03 RX ORDER — FENTANYL CITRATE 50 UG/ML
INJECTION, SOLUTION INTRAMUSCULAR; INTRAVENOUS PRN
Status: DISCONTINUED | OUTPATIENT
Start: 2017-04-03 | End: 2017-04-03

## 2017-04-03 RX ADMIN — MIDAZOLAM HYDROCHLORIDE 1 MG: 1 INJECTION, SOLUTION INTRAMUSCULAR; INTRAVENOUS at 12:00

## 2017-04-03 RX ADMIN — PROPOFOL 10 MG: 10 INJECTION, EMULSION INTRAVENOUS at 12:24

## 2017-04-03 RX ADMIN — LIDOCAINE HYDROCHLORIDE 60 MG: 20 INJECTION, SOLUTION INFILTRATION; PERINEURAL at 12:08

## 2017-04-03 RX ADMIN — SODIUM CHLORIDE, POTASSIUM CHLORIDE, SODIUM LACTATE AND CALCIUM CHLORIDE: 600; 310; 30; 20 INJECTION, SOLUTION INTRAVENOUS at 12:00

## 2017-04-03 RX ADMIN — ONDANSETRON 4 MG: 2 INJECTION INTRAMUSCULAR; INTRAVENOUS at 12:15

## 2017-04-03 RX ADMIN — PROPOFOL 50 MG: 10 INJECTION, EMULSION INTRAVENOUS at 12:08

## 2017-04-03 RX ADMIN — PROPOFOL 20 MG: 10 INJECTION, EMULSION INTRAVENOUS at 12:15

## 2017-04-03 RX ADMIN — FENTANYL CITRATE 50 MCG: 50 INJECTION, SOLUTION INTRAMUSCULAR; INTRAVENOUS at 12:05

## 2017-04-03 RX ADMIN — CEFAZOLIN SODIUM 2 G: 2 INJECTION, SOLUTION INTRAVENOUS at 12:10

## 2017-04-03 RX ADMIN — PROPOFOL 10 MG: 10 INJECTION, EMULSION INTRAVENOUS at 12:21

## 2017-04-03 RX ADMIN — PROPOFOL 50 MCG/KG/MIN: 10 INJECTION, EMULSION INTRAVENOUS at 12:15

## 2017-04-03 ASSESSMENT — ENCOUNTER SYMPTOMS: DYSRHYTHMIAS: 1

## 2017-04-03 NOTE — IP AVS SNAPSHOT
Same Day Surgery [address] [address]    Phone:  [phone]                                       After Visit Summary   4/3/2017    Brooks Summers    MRN: [id number]           After Visit Summary Signature Page     I have received my discharge instructions, and my questions have been answered. I have discussed any challenges I see with this plan with the nurse or doctor.    ..........................................................................................................................................  Patient/Patient Representative Signature      ..........................................................................................................................................  Patient Representative Print Name and Relationship to Patient    ..................................................               ................................................  Date                                            Time    ..........................................................................................................................................  Reviewed by Signature/Title    ...................................................              ..............................................  Date                                                            Time

## 2017-04-03 NOTE — DISCHARGE INSTRUCTIONS
[facility]  Same-Day Surgery   Adult Discharge Orders & Instructions     For 24 hours after surgery    1. Get plenty of rest.  A responsible adult must stay with you for at least 24 hours after you leave the hospital.   2. Do not drive or use heavy equipment.  If you have weakness or tingling, don't drive or use heavy equipment until this feeling goes away.  3. Do not drink alcohol.  4. Avoid strenuous or risky activities.  Ask for help when climbing stairs.   5. You may feel lightheaded.  IF so, sit for a few minutes before standing.  Have someone help you get up.   6. If you have nausea (feel sick to your stomach): Drink only clear liquids such as apple juice, ginger ale, broth or 7-Up.  Rest may also help.  Be sure to drink enough fluids.  Move to a regular diet as you feel able.  7. You may have a slight fever. Call the doctor if your fever is over 100 F (37.7 C) (taken under the tongue) or lasts longer than 24 hours.  8. You may have a dry mouth, a sore throat, muscle aches or trouble sleeping.  These should go away after 24 hours.  9. Do not make important or legal decisions.   Call your doctor for any of the followin.  Signs of infection (fever, growing tenderness at the surgery site, a large amount of drainage or bleeding, severe pain, foul-smelling drainage, redness, swelling).    2. It has been over 8 to 10 hours since surgery and you are still not able to urinate (pass water).    3.  Headache for over 24 hours.    To contact a doctor, call Dr. Mora's  office at [phone] or:        [phone] and ask for the resident on call for surgery (answered 24 hours a day)      Emergency Department:    [facility]: [phone]       (TTY for hearing impaired: [phone])

## 2017-04-03 NOTE — ANESTHESIA CARE TRANSFER NOTE
Patient: Brooks Summers    Procedure(s):  Cystoscopy, Biopsy of Urethral Lesion - Wound Class: II-Clean Contaminated    Diagnosis: Urethral Lesions Hematuria  Diagnosis Additional Information: No value filed.    Anesthesia Type:   General, LMA     Note:  Airway :Room Air  Patient transferred to:Phase II  Comments:   -on RA, Pt Spont.  breathing, awake & alert, monitors placed, vss, RN at bedside, no airway      Vitals: (Last set prior to Anesthesia Care Transfer)    CRNA VITALS  4/3/2017 1208 - 4/3/2017 1244      4/3/2017             Pulse: 71    SpO2: 95 %    Resp Rate (set): 10                Electronically Signed By: SUKH Parker CRNA  April 3, 2017  12:44 PM

## 2017-04-03 NOTE — ANESTHESIA POSTPROCEDURE EVALUATION
Patient: Brooks Summers    Procedure(s):  Cystoscopy, Biopsy of Urethral Lesion - Wound Class: II-Clean Contaminated    Diagnosis:Urethral Lesions Hematuria  Diagnosis Additional Information: No value filed.    Anesthesia Type:  General, LMA    Note:  Anesthesia Post Evaluation    Patient location during evaluation: PACU  Patient participation: Able to fully participate in evaluation  Level of consciousness: awake and alert  Pain management: adequate  Airway patency: patent  Cardiovascular status: acceptable  Respiratory status: acceptable  Hydration status: acceptable  PONV: controlled     Anesthetic complications: None          Last vitals:  Vitals:    04/03/17 0855 04/03/17 1242 04/03/17 1245   BP: 124/79 99/71 106/64   Resp: 16 16 16   Temp: 36.8  C (98.2  F) 36.5  C (97.7  F)    SpO2: 99% 99% 98%         Electronically Signed By: Sawyer Hook MD  April 3, 2017  12:59 PM

## 2017-04-03 NOTE — ANESTHESIA PREPROCEDURE EVALUATION
Anesthesia Evaluation     .             ROS/MED HX    ENT/Pulmonary:  - neg pulmonary ROS     Neurologic:  - neg neurologic ROS     Cardiovascular:     (+) hypertension----. Taking blood thinners : . CHF . . :. dysrhythmias a-fib, Irregular Heartbeat/Palpitations, valvular problems/murmurs type: AI .       METS/Exercise Tolerance:     Hematologic:  - neg hematologic  ROS       Musculoskeletal:  - neg musculoskeletal ROS       GI/Hepatic:         Renal/Genitourinary:     (+) chronic renal disease,       Endo:  - neg endo ROS       Psychiatric:  - neg psychiatric ROS       Infectious Disease:  - neg infectious disease ROS       Malignancy:      - no malignancy   Other:    - neg other ROS                 Physical Exam      Airway   Mallampati: II  TM distance: >3 FB  Neck ROM: full    Dental     Cardiovascular       Pulmonary                     Anesthesia Plan      History & Physical Review  History and physical reviewed and following examination; no interval change.    ASA Status:  3 .        Plan for MAC          Postoperative Care      Consents  Anesthetic plan, risks, benefits and alternatives discussed with:  Patient..                          .

## 2017-04-03 NOTE — OP NOTE
OPERATIVE REPORT    PREOPERATIVE DIAGNOSIS:  Urethral lesion    POSTOPERATIVE DIAGNOSIS: Same    PROCEDURES PERFORMED:   1. Cystourethroscopy  2. Urethral biopsies  3. Fulguration of biopsied areas    STAFF SURGEON: Dr. Marlena Kent MD, available for entire case.     RESIDENT(S):  Cecil Hare MD    ANESTHESIA: General    ESTIMATED BLOOD LOSS: < 10 mL.     DRAINS: None    OPERATIVE INDICATIONS:   Brooks Summers is a(n) 72 year old male with a history of microhematuria; his urine cytology was negative and cystoscopy revealed a <5 mm urethral lesion adjacent to the veru. No lesions noted in the bladder. The patient elected for biopsy and maximal fulguration, after a discussion of all risks, benefits, and alternatives.    DESCRIPTION OF PROCEDURE:   After verification of informed consent was obtained, the patient was brought to the operating room, and moved to the operating table. After adequate anesthesia was induced, the patient was repositioned in dorsal lithotomy position and prepped and draped in the usual sterile fashion. A formal timeout was performed to confirm the correct patient, procedure and operative site.     A 22-Irish rigid cystoscope was inserted into a well lubricated urethra. We began by performing a white light cystourethroscopy. The urethra was remarkable for a 5 mm frondular lesion in the prostatic urethra, just to the right of the veru. The ureteral orifices were in their orthotopic positions bilaterally. There were no intravesical stones, tumors or diverticuli.     We used a cold-cup flexible biopsy forceps to biopsy the aforementioned lesion. A Bugbee was used to fulgurate the biopsied sites and any bleeding areas. The bladder was re-examined under low pressure and hemostasis was confirmed. At this point, the bladder was drained and the cystoscope withdrawn.    The procedure was then concluded. The patient was transferred to the postanesthesia care unit in stable condition and tolerated  the procedure well.    POSTOP PLAN:  1. Discharge to home today  2. RTC to review pathology

## 2017-04-03 NOTE — IP AVS SNAPSHOT
"                  MRN:[id number]                      After Visit Summary   4/3/2017    Brooks Summers    MRN: [id number]           Thank you!     Thank you for choosing [facility] for your care. Our goal is always to provide you with excellent care. Hearing back from our patients is one way we can continue to improve our services. Please take a few minutes to complete the written survey that you may receive in the mail after you visit with us. Thank you!        Patient Information     Date Of Birth          1945        About your hospital stay     You were admitted on:  April 3, 2017 You last received care in the:  Same Day Surgery [facility]    You were discharged on:  April 3, 2017       Who to Call     For medical emergencies, please call 911.  For non-urgent questions about your medical care, please call your primary care provider or clinic, [phone]  For questions related to your surgery, please call your surgery clinic        Attending Provider     Provider Marlena Ackerman MD Urology       Primary Care Provider Office Phone # Fax #    Edin BAUDILIO Mays [phone]       [place] [address]        After Care Instructions     Activity       - No lifting over 15 pounds and no strenuous physical activity for 4 weeks  - Do not strain with bowel movements.  - Do not drive until you can press the brake pedal quickly and fully without pain.   - Do not operate a motor vehicle while taking narcotic pain medications            Contact Information       Phone numbers:   - Monday through Friday 8am to 4:30pm: Call [phone] with questions or to schedule or confirm appointment.    - Nights or weekends: call the after hours emergency pager - [phone] and tell the  \"I would like to page the Urology Resident on call.\"  - For emergencies, call [phone]            Diet Instructions       Resume pre procedure diet            " Discharge Instructions       Resume Aspirin / warfarin (COUMADIN) when urine is clear to faint pink            Encourage fluids       Encourage fluids at home to keep urine clear to light pink            Follow-Up       Follow-Up:  - Follow up with Dr. Mora as previously scheduled  - Call your primary care provider to touch base regarding your recent procedure.   - Call or return sooner than your regularly scheduled visit if you develop any of the following: fever (greater than 101.5), uncontrolled pain, uncontrolled nausea or vomiting, as well as increased redness, swelling, or drainage from your wound.            Medication Instructions       - Transition from narcotic pain medications to tylenol (acetaminophen) as you are able.  Wean yourself off all pain medications as you are able.  - Some pain medications contain both tylenol (acetaminophen) and a narcotic (Norco, vicodin, percocet), do not take more than 4,000mg of Tylenol (acetaminophen) from all sources in any 24 hour period.  - Narcotics can make you constipated.  Take over the counter fiber (metamucil or benefiber) and stool softeners (miralax, docusate or senna) while taking narcotic pain medications, but stop if you develop diarrhea.  - No driving or operating machinery while taking narcotic pain medications            No Alcohol       for 24 hours post procedure            No driving or operating machinery        until the day after procedure                  Your next 10 appointments already scheduled     Apr 07, 2017 11:15 AM CDT   (Arrive by 11:00 AM)   Post-Op with Marlena Mora MD   [facility] ([facility])    [address] [facility] [address] [phone]            Apr 12, 2017  1:00 PM CDT   Anticoagulation Visit with LL ANTI COAG   [facility] ([facility])    [address]  [address]   [phone]            Apr 28, 2017  1:00 PM CDT   (Arrive by 12:45 PM)   Pacemaker Check with  Cv Device 1   [facility] ([facility])    [address] [place] [address]   [phone]            Apr 28, 2017  1:00 PM CDT   RESEARCH with  Cv Research Coordinator   [facility])    [address] [place] [address] [phone]            Apr 28, 2017  1:30 PM CDT   (Arrive by 1:15 PM)   RETURN ATRIAL FIBULATION VISIT with SUKH Hernandez CNP   [facility] ([facility])    [address] [place] [address] [phone]              Further instructions from your care team       [facility]  Same-Day Surgery   Adult Discharge Orders & Instructions     For 24 hours after surgery    1. Get plenty of rest.  A responsible adult must stay with you for at least 24 hours after you leave the hospital.   2. Do not drive or use heavy equipment.  If you have weakness or tingling, don't drive or use heavy equipment until this feeling goes away.  3. Do not drink alcohol.  4. Avoid strenuous or risky activities.  Ask for help when climbing stairs.   5. You may feel lightheaded.  IF so, sit for a few minutes before standing.  Have someone help you get up.   6. If you have nausea (feel sick to your stomach): Drink only clear liquids such as apple juice, ginger ale, broth or 7-Up.  Rest may also help.  Be sure to drink enough fluids.  Move to a regular diet as you feel able.  7. You may have a slight fever. Call the doctor if your fever is over 100 F (37.7 C) (taken under the tongue) or lasts longer than 24 hours.  8. You may have a dry mouth, a sore throat, muscle aches or trouble sleeping.  These should go away after 24 hours.  9. Do not make important or legal decisions.  "  Call your doctor for any of the followin.  Signs of infection (fever, growing tenderness at the surgery site, a large amount of drainage or bleeding, severe pain, foul-smelling drainage, redness, swelling).    2. It has been over 8 to 10 hours since surgery and you are still not able to urinate (pass water).    3.  Headache for over 24 hours.    To contact a doctor, call Dr. Mora's  office at [phone] or:        [phone] and ask for the resident on call for surgery (answered 24 hours a day)      Emergency Department:    [facility]: [phone]       (TTY for hearing impaired: [phone])            Pending Results     Date and Time Order Name Status Description    4/3/2017 1226 Surgical pathology exam In process     4/3/2017 1057 EKG 12-lead, complete Preliminary             Admission Information     Date & Time Provider Department Dept. Phone    4/3/2017 Marlena Mora MD Same Day Surgery [facility] [phone]      Your Vitals Were     Blood Pressure Temperature Respirations Height Weight Pulse Oximetry    106/64 97.7  F (36.5  C) (Oral) 16 1.778 m (5' 10\") 82.3 kg (181 lb 7 oz) 98%    BMI (Body Mass Index)                   26.03 kg/m2           MyChart Information     [employer] lets you send messages to your doctor, view your test results, renew your prescriptions, schedule appointments and more. To sign up, go to [email or web address] . Click on \"Log in\" on the left side of the screen, which will take you to the Welcome page. Then click on \"Sign up Now\" on the right side of the page.     You will be asked to enter the access code listed below, as well as some personal information. Please follow the directions to create your username and password.     Your access code is: [email or web address]  Expires: 2017  7:30 AM     Your access code will  in 90 days. If you need help or a new code, please call your [facility] or [phone].        Care EveryWhere ID     " This is your Care EveryWhere ID. This could be used by other organizations to access your [facility] medical records  [id number]           Review of your medicines      START taking        Dose / Directions    oxyCODONE 5 MG IR tablet   Commonly known as:  ROXICODONE   Used for:  Urethral lesion        Dose:  5-10 mg   Take 1-2 tablets (5-10 mg) by mouth every 4 hours as needed for pain   Quantity:  20 tablet   Refills:  0         CONTINUE these medicines which may have CHANGED, or have new prescriptions. If we are uncertain of the size of tablets/capsules you have at home, strength may be listed as something that might have changed.        Dose / Directions    allopurinol 100 MG tablet   Commonly known as:  ZYLOPRIM   This may have changed:  when to take this   Used for:  Idiopathic gout, unspecified chronicity, unspecified site        Dose:  100 mg   Take 1 tablet (100 mg) by mouth daily   Quantity:  90 tablet   Refills:  1       losartan 25 MG tablet   Commonly known as:  COZAAR   This may have changed:  when to take this   Used for:  Benign essential hypertension        Dose:  12.5 mg   Take 0.5 tablets (12.5 mg) by mouth daily   Quantity:  45 tablet   Refills:  3       metoprolol 50 MG 24 hr tablet   Commonly known as:  TOPROL XL   This may have changed:  when to take this   Used for:  Benign essential hypertension        Dose:  50 mg   Take 1 tablet (50 mg) by mouth daily   Quantity:  90 tablet   Refills:  3         CONTINUE these medicines which have NOT CHANGED        Dose / Directions    CALCIUM 600 + D PO        Dose:  1 tablet   Take 1 tablet by mouth every morning   Refills:  0       * PROVIDER DOSED MANAGED WARFARIN (COUMADIN) OUTPATIENT        Per coumadin clinic   Refills:  0       simvastatin 40 MG tablet   Commonly known as:  ZOCOR   Used for:  Hyperlipidemia LDL goal <100        Dose:  40 mg   Take 1 tablet (40 mg) by mouth At Bedtime   Quantity:  90 tablet   Refills:  3       * warfarin 5 MG  tablet   Commonly known as:  COUMADIN   Used for:  S/P AVR (aortic valve replacement), Paroxysmal atrial fibrillation (H), Long term current use of anticoagulant therapy, S/P aortic valve replacement        Take as directed by anticoagulation clinic.  Current dose 7.5 mg Wed, 10 mg rest of week   Quantity:  172 tablet   Refills:  1       * Notice:  This list has 2 medication(s) that are the same as other medications prescribed for you. Read the directions carefully, and ask your doctor or other care provider to review them with you.         Where to get your medicines      Some of these will need a paper prescription and others can be bought over the counter. Ask your nurse if you have questions.     Bring a paper prescription for each of these medications     oxyCODONE 5 MG IR tablet                Protect others around you: Learn how to safely use, store and throw away your medicines at www.disposemymeds.org.             Medication List: This is a list of all your medications and when to take them. Check marks below indicate your daily home schedule. Keep this list as a reference.      Medications           Morning Afternoon Evening Bedtime As Needed    allopurinol 100 MG tablet   Commonly known as:  ZYLOPRIM   Take 1 tablet (100 mg) by mouth daily                                CALCIUM 600 + D PO   Take 1 tablet by mouth every morning                                losartan 25 MG tablet   Commonly known as:  COZAAR   Take 0.5 tablets (12.5 mg) by mouth daily                                metoprolol 50 MG 24 hr tablet   Commonly known as:  TOPROL XL   Take 1 tablet (50 mg) by mouth daily                                oxyCODONE 5 MG IR tablet   Commonly known as:  ROXICODONE   Take 1-2 tablets (5-10 mg) by mouth every 4 hours as needed for pain                                * PROVIDER DOSED MANAGED WARFARIN (COUMADIN) OUTPATIENT   Per coumadin clinic                                simvastatin 40 MG tablet    Commonly known as:  ZOCOR   Take 1 tablet (40 mg) by mouth At Bedtime                                * warfarin 5 MG tablet   Commonly known as:  COUMADIN   Take as directed by anticoagulation clinic.  Current dose 7.5 mg Wed, 10 mg rest of week                                * Notice:  This list has 2 medication(s) that are the same as other medications prescribed for you. Read the directions carefully, and ask your doctor or other care provider to review them with you.

## 2017-04-03 NOTE — OR NURSING
The following text page sent to Dr. Hare:     PROMISE Summers. pt able to void 100 mls. Ok to d/c? Also urine yellow in color- ok to take warfarin tonight? thanks. [facility] [id number]: Dr. Hare returned call and states ok for pt to d/c with 100 mls UOP. MD states for pt to monitor UOP color this afternoon, if yellow without redness ok to take coumadin. RN relayed message.

## 2017-04-03 NOTE — PROGRESS NOTES
PRe-op Rn spoke with EP RN Shahnaz Wilson, who stated the patient has an implanted defibrillator that he is not dependent on. The OR staff may use a magnet, if cauterization is needed. If magnet is used, once it is removed the patient setting will go back to normal. The EP RN does not need to be contacted post op.

## 2017-04-04 LAB
COPATH REPORT: NORMAL
INTERPRETATION ECG - MUSE: NORMAL

## 2017-04-06 ENCOUNTER — PRE VISIT (OUTPATIENT)
Dept: UROLOGY | Facility: CLINIC | Age: 72
End: 2017-04-06

## 2017-04-06 NOTE — TELEPHONE ENCOUNTER
Patient is coming in to see  to review pathology, no need for a call very thing is in epic ready for appointment.

## 2017-04-07 ENCOUNTER — OFFICE VISIT (OUTPATIENT)
Dept: UROLOGY | Facility: CLINIC | Age: 72
End: 2017-04-07

## 2017-04-07 VITALS
HEART RATE: 65 BPM | HEIGHT: 70 IN | WEIGHT: 181.9 LBS | BODY MASS INDEX: 26.04 KG/M2 | DIASTOLIC BLOOD PRESSURE: 79 MMHG | SYSTOLIC BLOOD PRESSURE: 123 MMHG

## 2017-04-07 DIAGNOSIS — R31.9 HEMATURIA: Primary | ICD-10-CM

## 2017-04-07 ASSESSMENT — PAIN SCALES - GENERAL: PAINLEVEL: NO PAIN (0)

## 2017-04-07 NOTE — PATIENT INSTRUCTIONS
Follow up as needed.    It was a pleasure meeting with you today.  Thank you for allowing me and my team the privilege of caring for you today.  YOU are the reason we are here, and I truly hope we provided you with the excellent service you deserve.  Please let us know if there is anything else we can do for you so that we can be sure you are leaving completely satisfied with your care experience.

## 2017-04-07 NOTE — NURSING NOTE
"Chief Complaint   Patient presents with     RECHECK     Bladder biopsy results       Blood pressure 123/79, pulse 65, height 1.778 m (5' 10\"), weight 82.5 kg (181 lb 14.4 oz). Body mass index is 26.1 kg/(m^2).    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  Current dose 7.5 mg Wed, 10 mg rest of week 172 tablet 1     metoprolol (TOPROL XL) 50 MG 24 hr tablet Take 1 tablet (50 mg) by mouth daily (Patient taking differently: Take 50 mg by mouth every morning ) 90 tablet 3     simvastatin (ZOCOR) 40 MG tablet Take 1 tablet (40 mg) by mouth At Bedtime 90 tablet 3     losartan (COZAAR) 25 MG tablet Take 0.5 tablets (12.5 mg) by mouth daily (Patient taking differently: Take 12.5 mg by mouth every evening ) 45 tablet 3     allopurinol (ZYLOPRIM) 100 MG tablet Take 1 tablet (100 mg) by mouth daily (Patient taking differently: Take 100 mg by mouth every evening ) 90 tablet 1     PROVIDER DOSED " MANAGED WARFARIN, COUMADIN, OUTPATIENT Per coumadin clinic       Calcium Carbonate-Vitamin D (CALCIUM 600 + D OR) Take 1 tablet by mouth every morning          Social History   Substance Use Topics     Smoking status: Former Smoker     Packs/day: 1.00     Years: 20.00     Types: Cigarettes     Quit date: 12/31/1989     Smokeless tobacco: Never Used     Alcohol use 1.2 oz/week     2 Cans of beer per week       TAINA Rojas  4/7/2017  1:06 PM

## 2017-04-07 NOTE — LETTER
4/7/2017       RE: Brooks Summers  [address]  [facility] [address]     Dear Colleague,    Thank you for referring your patient, Brooks Summers, to the [facility]OLOGY AND INST FOR PROSTATE AND UROLOGIC CANCERS at [facility]. Please see a copy of my visit note below.    Office Visit Note      UROLOGIC DIAGNOSES:       CURRENT INTERVENTIONS:       HISTORY:   Patient with history of hematuria, found on cystoscopy in the office to have a prostatic urethral papillary lesion.   He is currently s/p biopsy of the lesion.   Pathology did not show any evidence of malignancy.   Patient notes no issues post op.       We reviewed the pathology and reviewed the need for follow up should hematuria recur.       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 Tx with Dapto/RIFx 8 weeks.     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     Paroxysmal a-fib (H)     Post-op 7/14/2011, 1/26/13     Rotator Cuff (Capsule) Sprain and Strain      S/P aortic valve replacement     7/14/2011, re-do 1/25/13 due to endocarditis     Smoking hx      Thrombocytopenia (H)        PAST SURGICAL HISTORY:   Past Surgical History:   Procedure Laterality Date     ANESTHESIA CARDIOVERSION  7/18/2011    Procedure:ANESTHESIA CARDIOVERSION; Cardioversion; Surgeon:GENERIC ANESTHESIA PROVIDER; Location:[facility] OR     COLONOSCOPY       CORONARY ANGIOGRAPHY ADULT ORDER       CYSTOSCOPY, BIOPSY URETHRA N/A 4/3/2017    Procedure: CYSTOSCOPY, BIOPSY URETHRA;  Surgeon: Marlena Mora MD;  Location: [facility] OR     ENDOSCOPY UPPER, COLONOSCOPY, COMBINED       HC REMOV & REPLACE IMPLT DEFIB PULSE GEN MULT LEAD  12/3/2015  "         HEMORRHOID SURGERY       IMPLANT AUTOMATIC IMPLANTABLE CARDIOVERTER DEFIBRILLATOR       MOHS MICROGRAPHIC PROCEDURE       ORTHOPEDIC SURGERY      shoulder,right     REDO STERNOTOMY REPLACE VALVE AORTIC  1/25/2013    Procedure: REDO STERNOTOMY REPLACE VALVE AORTIC;  Redo Sternotomy, Redo Aortic Valve Replacement on pump oxygenator. Intraoperative Transesophageal Echocardiogram;  Surgeon: Stephanie Hampton MD;  Location: [facility] OR     REPAIR VALVE MITRAL  2013     REPLACE VALVE AORTIC  7/14/2011    Procedure:REPLACE VALVE AORTIC; Median Sternotomy, Bioprosthesis,  Aortic Valve replacement on pump with oxygenator. Intra-operative Transesophogeal Echocardiogram.; Surgeon:STEPHANIE HAMPTON; Location:[facility] OR     teeth extractions         FAMILY HISTORY:   Family History   Problem Relation Age of Onset     C.A.D. Father 68     bypass, carotid      Prostate Cancer Father 70     CANCER Mother 92     stomach, colon     Hypertension Mother      Retinal detachment Mother      CANCER Brother 64     lung cancer, petroleum     Glaucoma No family hx of      Macular Degeneration No family hx of      Strabismus No family hx of      Glasses (<7 y/o) No family hx of        SOCIAL HISTORY:   Social History   Substance Use Topics     Smoking status: Former Smoker     Packs/day: 1.00     Years: 20.00     Types: Cigarettes     Quit date: 12/31/1989     Smokeless tobacco: Never Used     Alcohol use 1.2 oz/week     2 Cans of beer per week       Current Outpatient Prescriptions   Medication     warfarin (COUMADIN) 5 MG tablet     metoprolol (TOPROL XL) 50 MG 24 hr tablet     simvastatin (ZOCOR) 40 MG tablet     losartan (COZAAR) 25 MG tablet     allopurinol (ZYLOPRIM) 100 MG tablet     PROVIDER DOSED MANAGED WARFARIN, COUMADIN, OUTPATIENT     Calcium Carbonate-Vitamin D (CALCIUM 600 + D OR)     No current facility-administered medications for this visit.          PHYSICAL EXAM:    /79  Pulse 65  Ht 1.778 m (5' 10\")  Wt 82.5 kg (181 lb 14.4 " oz)  BMI 26.1 kg/m2    HEENT: Normocephalic and atraumatic   Cardiac: Not done  Back/Flank: Not done  CNS/PNS: Not done  Respiratory: Normal non-labored breathing  Abdomen: Soft nontender and nondistended  Peripheral Vascular: Not done  Mental Status: Not done    Penis: Not done  Scrotal Skin: Not done  Testicles: Not done  Epididymis: Not done  Digital Rectal Exam:     Cystoscopy: Not done    Imaging: None    Urinalysis: [facility] RESULTS:  Recent Labs   Lab Test  03/17/17   1340   COLOR  Yellow   APPEARANCE  Slightly Cloudy   URINEGLC  Negative   URINEBILI  Negative   URINEKETONE  Negative   SG  1.015   UBLD  Negative   URINEPH  7.0   PROTEIN  Negative   NITRITE  Negative   LEUKEST  Negative   RBCU  6*   WBCU  1       PSA:    Post Void Residual:     Other labs: None today      IMPRESSION:  71 y/o M with hematuria and benign prostatic lesion     PLAN:  Continue regular PSA screening with PCP   Follow up PRN     Total Time: 10 minutes                                       Total in Consultation: greater than 50%                     Again, thank you for allowing me to participate in the care of your patient.      Sincerely,    Marlena Mora MD

## 2017-04-07 NOTE — MR AVS SNAPSHOT
After Visit Summary   4/7/2017    Brooks Summers    MRN: [id number]           Patient Information     Date Of Birth          1945        Visit Information        Provider Department      4/7/2017 11:15 AM Marlena Mora MD [facility] and [facility] for Prostate and Urologic Cancers        Today's Diagnoses     Hematuria    -  1      Care Instructions    Follow up as needed.    It was a pleasure meeting with you today.  Thank you for allowing me and my team the privilege of caring for you today.  YOU are the reason we are here, and I truly hope we provided you with the excellent service you deserve.  Please let us know if there is anything else we can do for you so that we can be sure you are leaving completely satisfied with your care experience.                Follow-ups after your visit        Your next 10 appointments already scheduled     Apr 12, 2017  1:00 PM CDT   Anticoagulation Visit with LL ANTI COAG   [facility] ([facility])    [address] [address]   [phone]            Apr 28, 2017  1:00 PM CDT   (Arrive by 12:45 PM)   Pacemaker Check with  Cv Device 1   [facility])    [address] [place] [address] [id number] [phone]            Apr 28, 2017  1:00 PM CDT   RESEARCH with  Cv Research Coordinator   [facility])    [address] [place] [address] [id number] [phone]            Apr 28, 2017  1:30 PM CDT   (Arrive by 1:15 PM)   RETURN ATRIAL FIBULATION VISIT with SUKH Hernandez [facility])    [address] [place] [id number] [phone]              Who to contact     Please call your clinic at [phone] to:    Ask questions about your health    Make or cancel appointments    Discuss  "your medicines    Learn about your test results    Speak to your doctor   If you have compliments or concerns about an experience at your clinic, or if you wish to file a complaint, please contact [facility] Patient Relations at [phone] or email us at [email or web address]         Additional Information About Your Visit        [employer]art Information     [employer] is an electronic gateway that provides easy, online access to your medical records. With [employer], you can request a clinic appointment, read your test results, renew a prescription or communicate with your care team.     To sign up for [employer] visit the website at [email or web address]   You will be asked to enter the access code listed below, as well as some personal information. Please follow the directions to create your username and password.     Your access code is: [email or web address]  Expires: 2017  7:30 AM     Your access code will  in 90 days. If you need help or a new code, please contact your [facility] or call [phone] for assistance.        Care EveryWhere ID     This is your Care EveryWhere ID. This could be used by other organizations to access your [facility] medical records  [id number]        Your Vitals Were     Pulse Height BMI (Body Mass Index)             65 1.778 m (5' 10\") 26.1 kg/m2          Blood Pressure from Last 3 Encounters:   17 123/79   17 109/75   17 116/77    Weight from Last 3 Encounters:   17 82.5 kg (181 lb 14.4 oz)   17 82.3 kg (181 lb 7 oz)   17 83.2 kg (183 lb 8 oz)              Today, you had the following     No orders found for display         Today's Medication Changes          These changes are accurate as of: 17  7:37 PM.  If you have any questions, ask your nurse or doctor.               These medicines have changed or have updated prescriptions.        Dose/Directions    allopurinol 100 MG " tablet   Commonly known as:  ZYLOPRIM   This may have changed:  when to take this   Used for:  Idiopathic gout, unspecified chronicity, unspecified site        Dose:  100 mg   Take 1 tablet (100 mg) by mouth daily   Quantity:  90 tablet   Refills:  1       losartan 25 MG tablet   Commonly known as:  COZAAR   This may have changed:  when to take this   Used for:  Benign essential hypertension        Dose:  12.5 mg   Take 0.5 tablets (12.5 mg) by mouth daily   Quantity:  45 tablet   Refills:  3       metoprolol 50 MG 24 hr tablet   Commonly known as:  TOPROL XL   This may have changed:  when to take this   Used for:  Benign essential hypertension        Dose:  50 mg   Take 1 tablet (50 mg) by mouth daily   Quantity:  90 tablet   Refills:  3                Primary Care Provider Office Phone # Fax #    Edin Mays [phone]       [facility] [address] [place] [id number]        Thank you!     Thank you for choosing [facility] AND [facility] [[email or web address]] FOR PROSTATE AND UROLOGIC CANCERS  for your care. Our goal is always to provide you with excellent care. Hearing back from our patients is one way we can continue to improve our services. Please take a few minutes to complete the written survey that you may receive in the mail after your visit with us. Thank you!             Your Updated Medication List - Protect others around you: Learn how to safely use, store and throw away your medicines at www.disposemymeds.org.          This list is accurate as of: 4/7/17  7:37 PM.  Always use your most recent med list.                   Brand Name Dispense Instructions for use    allopurinol 100 MG tablet    ZYLOPRIM    90 tablet    Take 1 tablet (100 mg) by mouth daily       CALCIUM 600 + D PO      Take 1 tablet by mouth every morning       losartan 25 MG tablet    COZAAR    45 tablet    Take 0.5 tablets (12.5 mg) by mouth daily       metoprolol 50 MG 24 hr tablet    TOPROL XL    90 tablet    Take  1 tablet (50 mg) by mouth daily       * PROVIDER DOSED MANAGED WARFARIN (COUMADIN) OUTPATIENT      Per coumadin clinic       simvastatin 40 MG tablet    ZOCOR    90 tablet    Take 1 tablet (40 mg) by mouth At Bedtime       * warfarin 5 MG tablet    COUMADIN    172 tablet    Take as directed by anticoagulation clinic.  Current dose 7.5 mg Wed, 10 mg rest of week       * Notice:  This list has 2 medication(s) that are the same as other medications prescribed for you. Read the directions carefully, and ask your doctor or other care provider to review them with you.

## 2017-04-08 NOTE — PROGRESS NOTES
Office Visit Note      UROLOGIC DIAGNOSES:       CURRENT INTERVENTIONS:       HISTORY:   Patient with history of hematuria, found on cystoscopy in the office to have a prostatic urethral papillary lesion.   He is currently s/p biopsy of the lesion.   Pathology did not show any evidence of malignancy.   Patient notes no issues post op.       We reviewed the pathology and reviewed the need for follow up should hematuria recur.       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 Tx with Dapto/RIFx 8 weeks.     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     Paroxysmal a-fib (H)     Post-op 7/14/2011, 1/26/13     Rotator Cuff (Capsule) Sprain and Strain      S/P aortic valve replacement     7/14/2011, re-do 1/25/13 due to endocarditis     Smoking hx      Thrombocytopenia (H)        PAST SURGICAL HISTORY:   Past Surgical History:   Procedure Laterality Date     ANESTHESIA CARDIOVERSION  7/18/2011    Procedure:ANESTHESIA CARDIOVERSION; Cardioversion; Surgeon:GENERIC ANESTHESIA PROVIDER; Location:[facility] OR     COLONOSCOPY       CORONARY ANGIOGRAPHY ADULT ORDER       CYSTOSCOPY, BIOPSY URETHRA N/A 4/3/2017    Procedure: CYSTOSCOPY, BIOPSY URETHRA;  Surgeon: Marlena Mora MD;  Location: [facility] OR     ENDOSCOPY UPPER, COLONOSCOPY, COMBINED       HC REMOV & REPLACE IMPLT DEFIB PULSE GEN MULT LEAD  12/3/2015          HEMORRHOID SURGERY       IMPLANT AUTOMATIC IMPLANTABLE CARDIOVERTER DEFIBRILLATOR       MOHS MICROGRAPHIC PROCEDURE       ORTHOPEDIC SURGERY      shoulder,right     REDO STERNOTOMY REPLACE VALVE AORTIC  1/25/2013    Procedure: REDO STERNOTOMY REPLACE VALVE AORTIC;  Redo Sternotomy, Redo Aortic Valve Replacement on  "pump oxygenator. Intraoperative Transesophageal Echocardiogram;  Surgeon: Stephanie Hampton MD;  Location: [facility] OR     REPAIR VALVE MITRAL  2013     REPLACE VALVE AORTIC  7/14/2011    Procedure:REPLACE VALVE AORTIC; Median Sternotomy, Bioprosthesis,  Aortic Valve replacement on pump with oxygenator. Intra-operative Transesophogeal Echocardiogram.; Surgeon:STEPHANIE HAMPTON; Location:[facility] OR     teeth extractions         FAMILY HISTORY:   Family History   Problem Relation Age of Onset     C.A.D. Father 68     bypass, carotid      Prostate Cancer Father 70     CANCER Mother 92     stomach, colon     Hypertension Mother      Retinal detachment Mother      CANCER Brother 64     lung cancer, petroleum     Glaucoma No family hx of      Macular Degeneration No family hx of      Strabismus No family hx of      Glasses (<9 y/o) No family hx of        SOCIAL HISTORY:   Social History   Substance Use Topics     Smoking status: Former Smoker     Packs/day: 1.00     Years: 20.00     Types: Cigarettes     Quit date: 12/31/1989     Smokeless tobacco: Never Used     Alcohol use 1.2 oz/week     2 Cans of beer per week       Current Outpatient Prescriptions   Medication     warfarin (COUMADIN) 5 MG tablet     metoprolol (TOPROL XL) 50 MG 24 hr tablet     simvastatin (ZOCOR) 40 MG tablet     losartan (COZAAR) 25 MG tablet     allopurinol (ZYLOPRIM) 100 MG tablet     PROVIDER DOSED MANAGED WARFARIN, COUMADIN, OUTPATIENT     Calcium Carbonate-Vitamin D (CALCIUM 600 + D OR)     No current facility-administered medications for this visit.          PHYSICAL EXAM:    /79  Pulse 65  Ht 1.778 m (5' 10\")  Wt 82.5 kg (181 lb 14.4 oz)  BMI 26.1 kg/m2    HEENT: Normocephalic and atraumatic   Cardiac: Not done  Back/Flank: Not done  CNS/PNS: Not done  Respiratory: Normal non-labored breathing  Abdomen: Soft nontender and nondistended  Peripheral Vascular: Not done  Mental Status: Not done    Penis: Not done  Scrotal Skin: Not done  Testicles: Not " done  Epididymis: Not done  Digital Rectal Exam:     Cystoscopy: Not done    Imaging: None    Urinalysis: [facility] RESULTS:  Recent Labs   Lab Test  03/17/17   1340   COLOR  Yellow   APPEARANCE  Slightly Cloudy   URINEGLC  Negative   URINEBILI  Negative   URINEKETONE  Negative   SG  1.015   UBLD  Negative   URINEPH  7.0   PROTEIN  Negative   NITRITE  Negative   LEUKEST  Negative   RBCU  6*   WBCU  1       PSA:    Post Void Residual:     Other labs: None today      IMPRESSION:  73 y/o M with hematuria and benign prostatic lesion     PLAN:  Continue regular PSA screening with PCP   Follow up PRN     Total Time: 10 minutes                                       Total in Consultation: greater than 50%

## 2017-04-12 ENCOUNTER — ANTICOAGULATION THERAPY VISIT (OUTPATIENT)
Dept: ANTICOAGULATION | Facility: CLINIC | Age: 72
End: 2017-04-12
Payer: MEDICARE

## 2017-04-12 DIAGNOSIS — Z95.2 S/P AORTIC VALVE REPLACEMENT: ICD-10-CM

## 2017-04-12 DIAGNOSIS — I48.0 PAROXYSMAL ATRIAL FIBRILLATION (H): ICD-10-CM

## 2017-04-12 DIAGNOSIS — Z79.01 LONG TERM CURRENT USE OF ANTICOAGULANT THERAPY: ICD-10-CM

## 2017-04-12 LAB — INR POINT OF CARE: 2 (ref 0.86–1.14)

## 2017-04-12 PROCEDURE — 99207 ZZC NO CHARGE NURSE ONLY: CPT

## 2017-04-12 PROCEDURE — 36416 COLLJ CAPILLARY BLOOD SPEC: CPT

## 2017-04-12 PROCEDURE — 85610 PROTHROMBIN TIME: CPT | Mod: QW

## 2017-04-12 NOTE — PROGRESS NOTES
ANTICOAGULATION FOLLOW-UP CLINIC VISIT    Patient Name:  Brooks Summers  Date:  4/12/2017  Contact Type:  Face to Face    SUBJECTIVE:     Patient Findings     Positives Intentional hold of therapy (intentional hold before his Cystoscopy, restarted warfarin 4/3/17)    Comments His biopsy showed no malignancy.  He states he still feels a little tired but no other post op problems.             OBJECTIVE    INR Protime   Date Value Ref Range Status   04/12/2017 2.0 (A) 0.86 - 1.14 Final       ASSESSMENT / PLAN  INR assessment THER    Recheck INR In: 4 WEEKS    INR Location Clinic      Anticoagulation Summary as of 4/12/2017     INR goal 2.0-3.0   Today's INR 2.0   Maintenance plan 7.5 mg (5 mg x 1.5) on Wed; 10 mg (5 mg x 2) all other days   Full instructions 7.5 mg on Wed; 10 mg all other days   Weekly total 67.5 mg   No change documented Lindsey Epstein RN   Plan last modified Lindsey Epstein RN (1/18/2017)   Next INR check 5/10/2017   Priority INR   Target end date Indefinite    Indications   S/P aortic valve replacement [Z95.2]  Paroxysmal atrial fibrillation (H) [I48.0]  Long term current use of anticoagulant therapy [Z79.01]         Anticoagulation Episode Summary     INR check location     Preferred lab     Send INR reminders to [facility]    Comments  * warfarin 5 mg tabs      Anticoagulation Care Providers     Provider Role Specialty Phone number    Edin Mays MD [facility] Internal [facility] [phone]            See the Encounter Report to view Anticoagulation Flowsheet and Dosing Calendar (Go to Encounters tab in chart review, and find the Anticoagulation Therapy Visit)        Lindsey Epstein RN

## 2017-04-12 NOTE — MR AVS SNAPSHOT
Brooks Summers   4/12/2017 1:00 PM   Anticoagulation Therapy Visit    Description:  72 year old male   Provider:  LL ANTI COAG   Department:  [facility]           INR as of 4/12/2017     Today's INR 2.0      Anticoagulation Summary as of 4/12/2017     INR goal 2.0-3.0   Today's INR 2.0   Full instructions 7.5 mg on Wed; 10 mg all other days   Next INR check 5/10/2017    Indications   S/P aortic valve replacement [Z95.2]  Paroxysmal atrial fibrillation (H) [I48.0]  Long term current use of anticoagulant therapy [Z79.01]         Description     recheck INR 4 weeks, 5/10/17  Continue warfarin 7.5 mg Wed, 10 mg rest of week         Your next Anticoagulation Clinic appointment(s)     May 10, 2017  1:15 PM CDT   Anticoagulation Visit with LL ANTI COAG   [facility]    [address] [facility] [id number] [phone]              Contact Numbers     Please call [phone] to cancel and/or reschedule your appointment.  Please call [phone] with any problems or questions regarding your therapy          April 2017 Details    Sun Mon Tue Wed Thu Fri Sat           1                 2               3               4               5               6               7               8                 9               10               11               12      7.5 mg   See details      13      10 mg         14      10 mg         15      10 mg           16      10 mg         17      10 mg         18      10 mg         19      7.5 mg         20      10 mg         21      10 mg         22      10 mg           23      10 mg         24      10 mg         25      10 mg         26      7.5 mg         27      10 mg         28      10 mg         29      10 mg           30      10 mg                Date Details   04/12 This INR check               How to take your warfarin dose     To take:  7.5 mg Take 1.5 of the 5 mg tablets.    To take:  10 mg Take 2 of the 5 mg tablets.            May 2017 Details    Sun Mon Tue Wed Thu Fri Sat      1      10 mg         2      10 mg         3      7.5 mg         4      10 mg         5      10 mg         6      10 mg           7      10 mg         8      10 mg         9      10 mg         10            11               12               13                 14               15               16               17               18               19               20                 21               22               23               24               25               26               27                 28               29               30               31                   Date Details   No additional details    Date of next INR:  5/10/2017         How to take your warfarin dose     To take:  7.5 mg Take 1.5 of the 5 mg tablets.    To take:  10 mg Take 2 of the 5 mg tablets.

## 2017-04-28 ENCOUNTER — OFFICE VISIT (OUTPATIENT)
Dept: CARDIOLOGY | Facility: CLINIC | Age: 72
End: 2017-04-28
Attending: INTERNAL MEDICINE
Payer: MEDICARE

## 2017-04-28 ENCOUNTER — PRE VISIT (OUTPATIENT)
Dept: CARDIOLOGY | Facility: CLINIC | Age: 72
End: 2017-04-28

## 2017-04-28 VITALS
BODY MASS INDEX: 26.01 KG/M2 | DIASTOLIC BLOOD PRESSURE: 76 MMHG | WEIGHT: 181.7 LBS | OXYGEN SATURATION: 96 % | HEART RATE: 67 BPM | HEIGHT: 70 IN | SYSTOLIC BLOOD PRESSURE: 117 MMHG

## 2017-04-28 DIAGNOSIS — I48.0 PAROXYSMAL ATRIAL FIBRILLATION (H): Primary | ICD-10-CM

## 2017-04-28 DIAGNOSIS — I42.0 CARDIOMYOPATHY, DILATED, NONISCHEMIC (H): Primary | ICD-10-CM

## 2017-04-28 PROCEDURE — 99213 OFFICE O/P EST LOW 20 MIN: CPT | Mod: ZP | Performed by: NURSE PRACTITIONER

## 2017-04-28 PROCEDURE — 93289 INTERROG DEVICE EVAL HEART: CPT | Mod: 26 | Performed by: INTERNAL MEDICINE

## 2017-04-28 ASSESSMENT — PAIN SCALES - GENERAL: PAINLEVEL: NO PAIN (0)

## 2017-04-28 NOTE — PATIENT INSTRUCTIONS
It was a pleasure seeing you in clinic today.  Please do not hesitate to call with any questions or concerns.  You are scheduled for a remote transmission on 7/31/2017.  We look forward to seeing you in clinic at your next device check in 6 months.      Lashay Marie RN  Electrophysiology Nurse Clinician  [facility]  [facility]    During Business Hours Please Call:  [phone]  After Hours Please Call:  [phone]- select option #4 and ask for job code [phone]

## 2017-04-28 NOTE — PROGRESS NOTES
"      Clinical Cardiac Electrophysiology    Chief Complaint:     HPI: Mr. 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 He previously had severe left ventricular systolic dysfunction with previous EF 10-15% s/p ICD placement (1/2012) now with improved EF (50-55%).     13Nov2015: Saw Dr. Be due to nearing SIMONE (RRT) mode on his device. The patient and his wife note that he can walk up to 3-4 miles a day    19Apr2016 Saw Dr. Be for follow up of  generator revision in Dec 2015 due to battery depletion. He reports no problems with the incision. He has been feeling well     10/2016 Saw Dr. Castro and he subsequently improved his functional status and had been walking 3-4 miles a day, golfing 18 holes of golf without sx and carrying his golf clubs.     4/28/2017 Today he presents with his wife.  He recently had hematuria due to prostatic urethral papillary lesion which is now resolved. Has isolated paresthesia in legs which is relieved with activity.  Continues to  walk 3-4 miles 3x/week, yard work, golfing 18 holes carrying clubs, bike rides 2x/week for 6 miles, continues to work with floor covering.  Denies headaches, dizziness, syncope, angina, dyspnea at rest or with exertion, palpitations, orthopnea, PND, abdominal pain, pedal edema, claudication, or any new numbness/weakness, hematuria, hematochezia, and epistaxis.    Device check per device RN;\"1 NSVT  episodes recorded on 1/21/2017 for 8 seconds at a rate of 205 BPM. Intrinsic rhythm = SR @ 80 C (CHB) 40 bpm. AP= 26% and = 98.4%. OptiVol fluid index is at baseline. Estimated battery longevity = 3.2 years to SIMONE. No short v-v intervals recorded. RV lead impedance trends appear stable\"    Current Outpatient Prescriptions   Medication Sig " Dispense Refill     warfarin (COUMADIN) 5 MG tablet Take as directed by anticoagulation clinic.  Current dose 7.5 mg Wed, 10 mg rest of week 172 tablet 1     metoprolol (TOPROL XL) 50 MG 24 hr tablet Take 1 tablet (50 mg) by mouth daily (Patient taking differently: Take 50 mg by mouth every morning ) 90 tablet 3     simvastatin (ZOCOR) 40 MG tablet Take 1 tablet (40 mg) by mouth At Bedtime 90 tablet 3     losartan (COZAAR) 25 MG tablet Take 0.5 tablets (12.5 mg) by mouth daily (Patient taking differently: Take 12.5 mg by mouth every evening ) 45 tablet 3     allopurinol (ZYLOPRIM) 100 MG tablet Take 1 tablet (100 mg) by mouth daily (Patient taking differently: Take 100 mg by mouth every evening ) 90 tablet 1     PROVIDER DOSED MANAGED WARFARIN, COUMADIN, OUTPATIENT Per coumadin clinic       Calcium Carbonate-Vitamin D (CALCIUM 600 + D OR) Take 1 tablet by mouth every morning          Past Medical History:   Diagnosis Date     BCC (basal cell carcinoma), face 5/16/2013     Bicuspid aortic valve      Chronic systolic heart failure (H)      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 Tx with Dapto/RIFx 8 weeks.     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     Paroxysmal a-fib (H)     Post-op 7/14/2011, 1/26/13     Rotator Cuff (Capsule) Sprain and Strain      S/P aortic valve replacement     7/14/2011, re-do 1/25/13 due to endocarditis     Smoking hx      Thrombocytopenia (H)        Past Surgical History:   Procedure Laterality Date     ANESTHESIA CARDIOVERSION  7/18/2011    Procedure:ANESTHESIA CARDIOVERSION; Cardioversion; Surgeon:GENERIC ANESTHESIA PROVIDER; Location:[facility] OR     COLONOSCOPY       CORONARY ANGIOGRAPHY ADULT ORDER       CYSTOSCOPY, BIOPSY URETHRA N/A 4/3/2017    Procedure: CYSTOSCOPY, BIOPSY URETHRA;  Surgeon: Marlena Mora MD;  Location: [facility] OR     ENDOSCOPY UPPER,  COLONOSCOPY, COMBINED       HC REMOV & REPLACE IMPLT DEFIB PULSE GEN MULT LEAD  12/3/2015          HEMORRHOID SURGERY       IMPLANT AUTOMATIC IMPLANTABLE CARDIOVERTER DEFIBRILLATOR       MOHS MICROGRAPHIC PROCEDURE       ORTHOPEDIC SURGERY      shoulder,right     REDO STERNOTOMY REPLACE VALVE AORTIC  1/25/2013    Procedure: REDO STERNOTOMY REPLACE VALVE AORTIC;  Redo Sternotomy, Redo Aortic Valve Replacement on pump oxygenator. Intraoperative Transesophageal Echocardiogram;  Surgeon: Stephanie Hampton MD;  Location: [facility] OR     REPAIR VALVE MITRAL  2013     REPLACE VALVE AORTIC  7/14/2011    Procedure:REPLACE VALVE AORTIC; Median Sternotomy, Bioprosthesis,  Aortic Valve replacement on pump with oxygenator. Intra-operative Transesophogeal Echocardiogram.; Surgeon:STEPHANIE HAMPTON; Location:[facility] OR     teeth extractions         Family History   Problem Relation Age of Onset     C.A.D. Father 68     bypass, carotid      Prostate Cancer Father 70     CANCER Mother 92     stomach, colon     Hypertension Mother      Retinal detachment Mother      CANCER Brother 64     lung cancer, petroleum     Glaucoma No family hx of      Macular Degeneration No family hx of      Strabismus No family hx of      Glasses (<9 y/o) No family hx of        Social History   Substance Use Topics     Smoking status: Former Smoker     Packs/day: 1.00     Years: 20.00     Types: Cigarettes     Quit date: 12/31/1989     Smokeless tobacco: Never Used     Alcohol use 1.2 oz/week     2 Cans of beer per week       Allergies   Allergen Reactions     Lisinopril Cough         ROS:   CONSTITUTIONAL:No report of fever, chills,  or change in weight  RESPIRATORY: No cough, wheezing, SOB, or hemoptysis  CARDIOVASCULAR: see HPI  MUSCULO-SKELETAL: No joint pain/swelling, no muslce pain  NEURO: No paresthesias, syncope, pre-syncope, light headness, dizziness or vertigo  ENDOCRINE: No temperature intolerance, no skin/hair changes  PSYCHIATRIC: No change in mood, feeling  "down/anxious, no change in sleep or appetite  GI: no melena or hematochezia, no change in bowel habits  : no hematuria or dysurea, no hesitancy, dribbling or incontinence  HEME: no easy bruising or bleeding, no history of anemia, no history of blood clots  SKIN: no rashes or sores, no unusual itching        Physical Examination:  Vitals: /76 (BP Location: Right arm, Patient Position: Chair, Cuff Size: Adult Regular)  Pulse 67  Ht 1.778 m (5' 10\")  Wt 82.4 kg (181 lb 11.2 oz)  SpO2 96%  BMI 26.07 kg/m2  BMI= Body mass index is 26.07 kg/(m^2).    GENERAL APPEARANCE: healthy, alert and no distress  HEENT: no icterus, no xanthelasmas, normal pupil size and reaction, normal palate, mucosa moist, no cyanosis.  NECK: no adenopathy, no asymmetry, masses, or scars, thyroid normal to palpation, carotid upstrokes are normal bilaterally, no cervical bruits, JVP is not visible  CHEST: lungs clear to auscultation - no rales, rhonchi or wheezes, no use of accessory muscles, no retractions, respirations are unlabored, normal respiratory rate, no kyphosis, no scoliosis  CARDIOVASCULAR: regular rhythm, normal S1 with physiologic split S2, no S3 or S4 and no murmur, click or rub, precordium quiet with normal PMI.  ABDOMEN: soft, non tender, bowel sounds normal, aorta not enlarged by palpation, no abdominal bruits  EXTREMITIES: no clubbing, cyanosis or edema  NEURO: alert and oriented to person/place/time, normal speech, gait and affect  VASC: Radial and posterior tibialis pulses are normal in volumes and symmetric bilaterally.  SKIN: no ecchymoses, no rashes      Laboratory:    [facility]  Diagnostic and Treamtent-3rd Floor  [address] [place] [address]     Name: DAR CRUZ  MRN: [id number]  : 1945  Study Date: 10/10/2016 02:57 PM  Age: 71 yrs  Gender: Male  Patient Location: [facility]  Reason For Study: Acute and subacute infection  Ordering Physician: " NOEMÍ ROLDAN  Referring Physician: NOEMÍ ROLDAN MAZIN  Performed By: Feng Maguire RDCS     BSA: 2.0 m2  Height: 70 in  Weight: 190 lb  HR: 68  BP: 126/76 mmHg  ______________________________________________________________________________        Procedure  Echocardiogram with two-dimensional, color and spectral Doppler performed.  Contrast Optison. Optison (NDC #9897-2946-99) given intravenously. Patient  was given 4 ml mixture of 3 ml Optison and 6 ml saline. 5 ml wasted. IV start  location LAC . IV start time 15:25 .  ______________________________________________________________________________     Interpretation Summary  Mild concentric wall thickening consistent with left ventricular hypertrophy  is present. Global and regional left ventricular function is normal with an  EF of 55-60%.  Global right ventricular function is mildly reduced.  A bioprosthetic aortic valve is present.Doppler interrogation of the aortic  valve is normal. There is tarce to mild eccentric aortic insufficiency.  Pulmonary artery systolic pressure is normal.  The inferior vena cava was normal in size with preserved respiratory  variability.  No pericardial effusion is present.  ______________________________________________________________________________           Left Ventricle  Left ventricular size is normal. Global and regional left ventricular  function is normal with an EF of 55-60%. Mild concentric wall thickening  consistent with left ventricular hypertrophy is present. Some features of  diastolic dysfunction present, however the severity is indeterminate.     Right Ventricle  Mild right ventricular dilation is present. Global right ventricular function  is mildly reduced.  Atria  Moderate biatrial enlargement is present. A pacemaker lead is noted in the  right atrium.     Mitral Valve  Mild mitral annular calcification is present. Mild mitral insufficiency is  present.     Aortic Valve  There is tarce to mild eccentric aortic  insufficiency. A bioprosthetic aortic  valve is present. Doppler interrogation of the aortic valve is normal. The  mean gradient is 8 mmHg. DVI = 0.57.     Tricuspid Valve  The tricuspid valve is normal. Mild tricuspid insufficiency is present.  Estimated pulmonary artery systolic pressure is 21 mmHg plus right atrial  pressure. Pulmonary artery systolic pressure is normal.     Pulmonic Valve  The pulmonic valve is normal.     Vessels  The inferior vena cava was normal in size with preserved respiratory  variability. Mild dilatation of the aorta is present. Ascending aorta 4.1 cm.  Estimated mean right atrial pressure is <3 mmHg.  Pericardium  No pericardial effusion is present.     Compared to Previous Study  This study was compared with the study from 10.12.15, no significant  changes .     ______________________________________________________________________________  MMode/2D Measurements & Calculations  IVSd: 1.1 cm  LVIDd: 3.6 cm  LVIDs: 3.1 cm  LVPWd: 1.5 cm  FS: 12.8 %  EDV(Teich): 54.3 ml  ESV(Teich): 39.0 ml  LV mass(C)d: 159.7 grams  asc Aorta Diam: 4.1 cm  LVOT diam: 2.5 cm  LVOT area: 4.8 cm2  LA Volume (BP): 96.0 ml     LA Volume Index (BP): 47.1 ml/m2        Doppler Measurements & Calculations  MV E max harika: 56.9 cm/sec  MV A max harika: 85.3 cm/sec  MV E/A: 0.67  MV dec time: 0.19 sec  Ao V2 max: 180.3 cm/sec  Ao max P.1 mmHg  Ao V2 mean: 130.0 cm/sec  Ao mean P.6 mmHg  Ao V2 VTI: 35.8 cm  MCKENNA(I,D): 2.8 cm2  MCKENNA(V,D): 2.7 cm2  LV V1 max P.3 mmHg  LV V1 max: 102.6 cm/sec  LV V1 VTI: 21.4 cm  SV(LVOT): 101.9 ml  TR max harika: 218.9 cm/sec  TR max P.2 mmHg  MCKENNA Index (cm2/m2): 1.4         Assessment and recommendations:      -Complete heart block s/p CRT - normal device function    -Cardiomyopathy recovered EF 50-55% s/p CRT    -AVR redo secondary to endocarditis     Atrial fibrillation  1. Stroke Prophylaxis:  CHADSVASC=age, HF,   2, corresponding to a 2.2% annual stroke / systemic  [facility] event rate. indicating need for long term oral anticoagulation.  He is appropriately on warfarin  2. Rate Control: Has complete heart block and is on currently he is on metoprolol XL 50 mg daily   3. Risk Factor Management: continue statin therapy and BP control.  Recommend RUBY evaluation.      -hyperlipidemia - currently on simvastatin    -thrombocytopenia (In the past she was seen by hematology with a negative workup)       I appreciate the chance to help with Mr. Melina obregon. Please let me know if you have any questions or concerns.    SUKH Malloy, CNP

## 2017-04-28 NOTE — PATIENT INSTRUCTIONS
Cardiology Provider you saw in clinic today: SUKH Malloy, CNP    Medication Changes:  none    Labs/Tests needed: none     Follow-up Visit:  Sleep study    Further Instructions:      You will receive all normal lab and testing results via [employer]ISS[employer] or mail if not reviewed in clinic today. Please contact our office if you need assistance with setting up MyChart.    If you need a medication refill please contact your pharmacy. Please allow 3 business days for your refill to be completed.     As always, thank you for trusting us with your health care needs!    If you have any questions regarding your visit please contact your care team:   Cardiology  Telephone Number    EP RN  Electrophysiology Nurse Coordinator [phone]     Call for EP procedure scheduling concerns  VICKY Man  [phone]           Device Clinic (Pacemakers, ICDs, Loop)   RN's : Kailee Christie Connie, Dawn During business hours: [phone]    After business hours:   [phone]- select option 4 and ask for job code [phone].

## 2017-04-28 NOTE — MR AVS SNAPSHOT
After Visit Summary   4/28/2017    Brooks Summers    MRN: [id number]           Patient Information     Date Of Birth          1945        Visit Information        Provider Department      4/28/2017 1:00 PM 1, Robert Cv Device [facility]        Today's Diagnoses     Cardiomyopathy, dilated, nonischemic (H)    -  1      Care Instructions    It was a pleasure seeing you in clinic today.  Please do not hesitate to call with any questions or concerns.  You are scheduled for a remote transmission on 7/31/2017.  We look forward to seeing you in clinic at your next device check in 6 months.      Lashay Marie RN  Electrophysiology Nurse Clinician  [facility]    During Business Hours Please Call:  [phone]  After Hours Please Call:  [phone]- select option #4 and ask for job code [phone]             Follow-ups after your visit        Your next 10 appointments already scheduled     Apr 28, 2017  1:30 PM CDT   (Arrive by 1:15 PM)   RETURN ATRIAL FIBULATION VISIT with SUKH Hernandez [facility] ([facility])    [address] [address] [phone]            May 10, 2017  1:15 PM CDT   Anticoagulation Visit with LL ANTI COAG   [facility] ([facility])    [address] [address] [phone]              Who to contact     If you have questions or need follow up information about today's clinic visit or your schedule please contact [facility] directly at [phone].  Normal or non-critical lab and imaging results will be communicated to you by MyChart, letter or phone within 4 business days after the clinic has received the results. If you do not hear from us within 7 days, please contact the clinic through MyChart or phone. If you have a critical or abnormal lab result, we will notify you by phone as soon as  "possible.  Submit refill requests through [employer] or call your pharmacy and they will forward the refill request to us. Please allow 3 business days for your refill to be completed.          Additional Information About Your Visit        Carebase Information     [employer]base lets you send messages to your doctor, view your test results, renew your prescriptions, schedule appointments and more. To sign up, go to [email or web address] . Click on \"Log in\" on the left side of the screen, which will take you to the Welcome page. Then click on \"Sign up Now\" on the right side of the page.     You will be asked to enter the access code listed below, as well as some personal information. Please follow the directions to create your username and password.     Your access code is: [email or web address]  Expires: 2017  6:31 AM     Your access code will  in 90 days. If you need help or a new code, please call your [place] clinic or [phone].        Care EveryWhere ID     This is your Care EveryWhere ID. This could be used by other organizations to access your [place] medical records  [id number]         Blood Pressure from Last 3 Encounters:   17 123/79   17 109/75   17 116/77    Weight from Last 3 Encounters:   17 82.5 kg (181 lb 14.4 oz)   17 82.3 kg (181 lb 7 oz)   17 83.2 kg (183 lb 8 oz)              Today, you had the following     No orders found for display         Today's Medication Changes          These changes are accurate as of: 17  1:20 PM.  If you have any questions, ask your nurse or doctor.               These medicines have changed or have updated prescriptions.        Dose/Directions    allopurinol 100 MG tablet   Commonly known as:  ZYLOPRIM   This may have changed:  when to take this   Used for:  Idiopathic gout, unspecified chronicity, unspecified site        Dose:  100 mg   Take 1 tablet (100 mg) by mouth daily   Quantity:  90 tablet   Refills:  1       losartan " 25 MG tablet   Commonly known as:  COZAAR   This may have changed:  when to take this   Used for:  Benign essential hypertension        Dose:  12.5 mg   Take 0.5 tablets (12.5 mg) by mouth daily   Quantity:  45 tablet   Refills:  3       metoprolol 50 MG 24 hr tablet   Commonly known as:  TOPROL XL   This may have changed:  when to take this   Used for:  Benign essential hypertension        Dose:  50 mg   Take 1 tablet (50 mg) by mouth daily   Quantity:  90 tablet   Refills:  3                Primary Care Provider Office Phone # Fax #    Edin Mays [phone]       [facility] [[email or web address]] [address] [facility] [address] [facility] [address]        Thank you!     Thank you for choosing [facility]  for your care. Our goal is always to provide you with excellent care. Hearing back from our patients is one way we can continue to improve our services. Please take a few minutes to complete the written survey that you may receive in the mail after your visit with us. Thank you!             Your Updated Medication List - Protect others around you: Learn how to safely use, store and throw away your medicines at www.disposemymeds.org.          This list is accurate as of: 4/28/17  1:20 PM.  Always use your most recent med list.                   Brand Name Dispense Instructions for use    allopurinol 100 MG tablet    ZYLOPRIM    90 tablet    Take 1 tablet (100 mg) by mouth daily       CALCIUM 600 + D PO      Take 1 tablet by mouth every morning       losartan 25 MG tablet    COZAAR    45 tablet    Take 0.5 tablets (12.5 mg) by mouth daily       metoprolol 50 MG 24 hr tablet    TOPROL XL    90 tablet    Take 1 tablet (50 mg) by mouth daily       * PROVIDER DOSED MANAGED WARFARIN (COUMADIN) OUTPATIENT      Per coumadin clinic       simvastatin 40 MG tablet    ZOCOR    90 tablet    Take 1 tablet (40 mg) by mouth At Bedtime       * warfarin 5 MG tablet    COUMADIN    172 tablet    Take as directed by  anticoagulation clinic.  Current dose 7.5 mg Wed, 10 mg rest of week       * Notice:  This list has 2 medication(s) that are the same as other medications prescribed for you. Read the directions carefully, and ask your doctor or other care provider to review them with you.

## 2017-04-28 NOTE — TELEPHONE ENCOUNTER
Clinical Cardiac Electrophysiology    Chief Complaint:     HPI: Mr. 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 He previously had severe left ventricular systolic dysfunction with previous EF 10-15% s/p ICD placement (1/2012) now with improved EF (50-55%).     [id number]: Saw Dr. Be due to nearing SIMONE (RRT) mode on his device. The patient and his wife note that he can walk up to 3-4 miles a day    19Apr2016 Saw Dr. Be for follow up of  generator revision in Dec 2015 due to battery depletion. He reports no problems with the incision. He has been feeling well     10/2016 Saw Dr. Castro and he subsequently improved his functional status and had been walking 3-4 miles a day, golfing 18 holes of golf without sx and carrying his golf clubs.     4/28/2017 hematuria due to pprostatic urethral papillary lesion.     Current Outpatient Prescriptions   Medication Sig Dispense Refill     warfarin (COUMADIN) 5 MG tablet Take as directed by anticoagulation clinic.  Current dose 7.5 mg Wed, 10 mg rest of week 172 tablet 1     metoprolol (TOPROL XL) 50 MG 24 hr tablet Take 1 tablet (50 mg) by mouth daily (Patient taking differently: Take 50 mg by mouth every morning ) 90 tablet 3     simvastatin (ZOCOR) 40 MG tablet Take 1 tablet (40 mg) by mouth At Bedtime 90 tablet 3     losartan (COZAAR) 25 MG tablet Take 0.5 tablets (12.5 mg) by mouth daily (Patient taking differently: Take 12.5 mg by mouth every evening ) 45 tablet 3     allopurinol (ZYLOPRIM) 100 MG tablet Take 1 tablet (100 mg) by mouth daily (Patient taking differently: Take 100 mg by mouth every evening ) 90 tablet 1     PROVIDER DOSED MANAGED WARFARIN, COUMADIN, OUTPATIENT Per coumadin clinic       Calcium Carbonate-Vitamin D (CALCIUM 600  + D OR) Take 1 tablet by mouth every morning          Past Medical History:   Diagnosis Date     BCC (basal cell carcinoma), face 5/16/2013     Bicuspid aortic valve      Chronic systolic heart failure (H)      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 Tx with Dapto/RIFx 8 weeks.     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     Paroxysmal a-fib (H)     Post-op 7/14/2011, 1/26/13     Rotator Cuff (Capsule) Sprain and Strain      S/P aortic valve replacement     7/14/2011, re-do 1/25/13 due to endocarditis     Smoking hx      Thrombocytopenia (H)        Past Surgical History:   Procedure Laterality Date     ANESTHESIA CARDIOVERSION  7/18/2011    Procedure:ANESTHESIA CARDIOVERSION; Cardioversion; Surgeon:GENERIC ANESTHESIA PROVIDER; Location:[facility]     COLONOSCOPY       CORONARY ANGIOGRAPHY ADULT ORDER       CYSTOSCOPY, BIOPSY URETHRA N/A 4/3/2017    Procedure: CYSTOSCOPY, BIOPSY URETHRA;  Surgeon: Marlena Mora MD;  Location: [facility] OR     ENDOSCOPY UPPER, COLONOSCOPY, COMBINED       HC REMOV & REPLACE IMPLT DEFIB PULSE GEN MULT LEAD  12/3/2015          HEMORRHOID SURGERY       IMPLANT AUTOMATIC IMPLANTABLE CARDIOVERTER DEFIBRILLATOR       MOHS MICROGRAPHIC PROCEDURE       ORTHOPEDIC SURGERY      shoulder,right     REDO STERNOTOMY REPLACE VALVE AORTIC  1/25/2013    Procedure: REDO STERNOTOMY REPLACE VALVE AORTIC;  Redo Sternotomy, Redo Aortic Valve Replacement on pump oxygenator. Intraoperative Transesophageal Echocardiogram;  Surgeon: Navin Hampton MD;  Location: [facility] OR     REPAIR VALVE MITRAL  2013     REPLACE VALVE AORTIC  7/14/2011    Procedure:REPLACE VALVE AORTIC; Median Sternotomy, Bioprosthesis,  Aortic Valve replacement on pump with oxygenator. Intra-operative Transesophogeal Echocardiogram.; Surgeon:NAVIN HAMPTON; Location:[facility] OR     teeth extractions         Family History    Problem Relation Age of Onset     C.A.D. Father 68     bypass, carotid      Prostate Cancer Father 70     CANCER Mother 92     stomach, colon     Hypertension Mother      Retinal detachment Mother      CANCER Brother 64     lung cancer, petroleum     Glaucoma No family hx of      Macular Degeneration No family hx of      Strabismus No family hx of      Glasses (<9 y/o) No family hx of        Social History   Substance Use Topics     Smoking status: Former Smoker     Packs/day: 1.00     Years: 20.00     Types: Cigarettes     Quit date: 12/31/1989     Smokeless tobacco: Never Used     Alcohol use 1.2 oz/week     2 Cans of beer per week       Allergies   Allergen Reactions     Lisinopril Cough         ROS:   CONSTITUTIONAL:No report of fever, chills,  or change in weight  RESPIRATORY: No cough, wheezing, SOB, or hemoptysis  CARDIOVASCULAR: see HPI  MUSCULO-SKELETAL: No joint pain/swelling, no muslce pain  NEURO: No paresthesias, syncope, pre-syncope, light headness, dizziness or vertigo  ENDOCRINE: No temperature intolerance, no skin/hair changes  PSYCHIATRIC: No change in mood, feeling down/anxious, no change in sleep or appetite  GI: no melena or hematochezia, no change in bowel habits  : no hematuria or dysurea, no hesitancy, dribbling or incontinence  HEME: no easy bruising or bleeding, no history of anemia, no history of blood clots  SKIN: no rashes or sores, no unusual itching        Physical Examination:  Vitals: There were no vitals taken for this visit.  BMI= There is no height or weight on file to calculate BMI.    GENERAL APPEARANCE: healthy, alert and no distress  HEENT: no icterus, no xanthelasmas, normal pupil size and reaction, normal palate, mucosa moist, no cyanosis.  NECK: no adenopathy, no asymmetry, masses, or scars, thyroid normal to palpation, carotid upstrokes are normal bilaterally, no cervical bruits, JVP is not visible  CHEST: lungs clear to auscultation - no rales, rhonchi or wheezes, no  use of accessory muscles, no retractions, respirations are unlabored, normal respiratory rate, no kyphosis, no scoliosis  CARDIOVASCULAR: regular rhythm, normal S1 with physiologic split S2, no S3 or S4 and no murmur, click or rub, precordium quiet with normal PMI.  ABDOMEN: soft, non tender, without hepatosplenomegaly, no masses palpable, bowel sounds normal, aorta not enlarged by palpation, no abdominal bruits  EXTREMITIES: no clubbing, cyanosis or edema  NEURO: alert and oriented to person/place/time, normal speech, gait and affect  VASC: Radial, femoral, dorsalis pedis and posterior tibialis pulses are normal in volumes and symmetric bilaterally. No vascular bruits heard.  SKIN: no ecchymoses, no rashes      Laboratory:    [facility]  [facility] Floor  [address] [place] [address]     Name: DAR CRUZ  MRN: [id number]  : 1945  Study Date: 10/10/2016 02:57 PM  Age: 71 yrs  Gender: Male  Patient Location: [facility]  Reason For Study: Acute and subacute infection  Ordering Physician: NOEMÍ ROLDAN  Referring Physician: NOEMÍ ROLDANITROV  Performed By: Feng Maguire RDCS     BSA: 2.0 m2  Height: 70 in  Weight: 190 lb  HR: 68  BP: 126/76 mmHg  ______________________________________________________________________________        Procedure  Echocardiogram with two-dimensional, color and spectral Doppler performed.  Contrast Optison. Optison (NDC #3508-4407-43) given intravenously. Patient  was given 4 ml mixture of 3 ml Optison and 6 ml saline. 5 ml wasted. IV start  location LAC . IV start time 15:25 .  ______________________________________________________________________________     Interpretation Summary  Mild concentric wall thickening consistent with left ventricular hypertrophy  is present. Global and regional left ventricular function is normal with an  EF of 55-60%.  Global right ventricular function is mildly reduced.  A  bioprosthetic aortic valve is present.Doppler interrogation of the aortic  valve is normal. There is tarce to mild eccentric aortic insufficiency.  Pulmonary artery systolic pressure is normal.  The inferior vena cava was normal in size with preserved respiratory  variability.  No pericardial effusion is present.  ______________________________________________________________________________           Left Ventricle  Left ventricular size is normal. Global and regional left ventricular  function is normal with an EF of 55-60%. Mild concentric wall thickening  consistent with left ventricular hypertrophy is present. Some features of  diastolic dysfunction present, however the severity is indeterminate.     Right Ventricle  Mild right ventricular dilation is present. Global right ventricular function  is mildly reduced.  Atria  Moderate biatrial enlargement is present. A pacemaker lead is noted in the  right atrium.     Mitral Valve  Mild mitral annular calcification is present. Mild mitral insufficiency is  present.     Aortic Valve  There is tarce to mild eccentric aortic insufficiency. A bioprosthetic aortic  valve is present. Doppler interrogation of the aortic valve is normal. The  mean gradient is 8 mmHg. DVI = 0.57.     Tricuspid Valve  The tricuspid valve is normal. Mild tricuspid insufficiency is present.  Estimated pulmonary artery systolic pressure is 21 mmHg plus right atrial  pressure. Pulmonary artery systolic pressure is normal.     Pulmonic Valve  The pulmonic valve is normal.     Vessels  The inferior vena cava was normal in size with preserved respiratory  variability. Mild dilatation of the aorta is present. Ascending aorta 4.1 cm.  Estimated mean right atrial pressure is <3 mmHg.  Pericardium  No pericardial effusion is present.     Compared to Previous Study  This study was compared with the study from 10.12.15, no significant  changes  .     ______________________________________________________________________________  MMode/2D Measurements & Calculations  IVSd: 1.1 cm  LVIDd: 3.6 cm  LVIDs: 3.1 cm  LVPWd: 1.5 cm  FS: 12.8 %  EDV(Teich): 54.3 ml  ESV(Teich): 39.0 ml  LV mass(C)d: 159.7 grams  asc Aorta Diam: 4.1 cm  LVOT diam: 2.5 cm  LVOT area: 4.8 cm2  LA Volume (BP): 96.0 ml     LA Volume Index (BP): 47.1 ml/m2        Doppler Measurements & Calculations  MV E max harika: 56.9 cm/sec  MV A max harika: 85.3 cm/sec  MV E/A: 0.67  MV dec time: 0.19 sec  Ao V2 max: 180.3 cm/sec  Ao max P.1 mmHg  Ao V2 mean: 130.0 cm/sec  Ao mean P.6 mmHg  Ao V2 VTI: 35.8 cm  MCKENNA(I,D): 2.8 cm2  MCKENNA(V,D): 2.7 cm2  LV V1 max P.3 mmHg  LV V1 max: 102.6 cm/sec  LV V1 VTI: 21.4 cm  SV(LVOT): 101.9 ml  TR max harika: 218.9 cm/sec  TR max P.2 mmHg  MCKENNA Index (cm2/m2): 1.4         Assessment and recommendations:  Problem List  -Complete heart block s/p CRT  -Cardiomyopathy recovered EF 50-55% s/p CRT  -AVR redo secondary to endocarditis   -Paroxysmal AF on anticoagulation  -hyperlipidemia  -thrombocytopenia (seen by hematology; negative workup)     Plan  1. Complete heart block, hx of low EF s/p Biventricular pacemaker     - normal device function     2. Paroxysmal atrial fibrillation      - continue warfarin         I appreciate the chance to help with Mr. Melina obregon. Please let me know if you have any questions or concerns.

## 2017-04-28 NOTE — LETTER
"4/28/2017    RE: Brooks Summers  [address]  [facility] [address]       Dear Colleague,    Thank you for the opportunity to participate in the care of your patient, Brooks Summers, at the [facility]. Please see a copy of my visit note below.          Clinical Cardiac Electrophysiology    Chief Complaint:     HPI: Mr. 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 He previously had severe left ventricular systolic dysfunction with previous EF 10-15% s/p ICD placement (1/2012) now with improved EF (50-55%).     13Nov2015: Saw Dr. Be due to nearing SIMONE (RRT) mode on his device. The patient and his wife note that he can walk up to 3-4 miles a day    19Apr2016 Saw Dr. Be for follow up of  generator revision in Dec 2015 due to battery depletion. He reports no problems with the incision. He has been feeling well     10/2016 Saw Dr. Castro and he subsequently improved his functional status and had been walking 3-4 miles a day, golfing 18 holes of golf without sx and carrying his golf clubs.     4/28/2017 Today he presents with his wife.  He recently had hematuria due to prostatic urethral papillary lesion which is now resolved. Has isolated paresthesia in legs which is relieved with activity.  Continues to  walk 3-4 miles 3x/week, yard work, golfing 18 holes carrying clubs, bike rides 2x/week for 6 miles, continues to work with floor covering.  Denies headaches, dizziness, syncope, angina, dyspnea at rest or with exertion, palpitations, orthopnea, PND, abdominal pain, pedal edema, claudication, or any new numbness/weakness, hematuria, hematochezia, and epistaxis.    Device check per device RN;\"1 NSVT  episodes recorded on " "1/21/2017 for 8 seconds at a rate of 205 BPM. Intrinsic rhythm = SR @ 80 C (CHB) 40 bpm. AP= 26% and = 98.4%. OptiVol fluid index is at baseline. Estimated battery longevity = 3.2 years to SIMONE. No short v-v intervals recorded. RV lead impedance trends appear stable\"    Current Outpatient Prescriptions   Medication Sig Dispense Refill     warfarin (COUMADIN) 5 MG tablet Take as directed by anticoagulation clinic.  Current dose 7.5 mg Wed, 10 mg rest of week 172 tablet 1     metoprolol (TOPROL XL) 50 MG 24 hr tablet Take 1 tablet (50 mg) by mouth daily (Patient taking differently: Take 50 mg by mouth every morning ) 90 tablet 3     simvastatin (ZOCOR) 40 MG tablet Take 1 tablet (40 mg) by mouth At Bedtime 90 tablet 3     losartan (COZAAR) 25 MG tablet Take 0.5 tablets (12.5 mg) by mouth daily (Patient taking differently: Take 12.5 mg by mouth every evening ) 45 tablet 3     allopurinol (ZYLOPRIM) 100 MG tablet Take 1 tablet (100 mg) by mouth daily (Patient taking differently: Take 100 mg by mouth every evening ) 90 tablet 1     PROVIDER DOSED MANAGED WARFARIN, COUMADIN, OUTPATIENT Per coumadin clinic       Calcium Carbonate-Vitamin D (CALCIUM 600 + D OR) Take 1 tablet by mouth every morning          Past Medical History:   Diagnosis Date     BCC (basal cell carcinoma), face 5/16/2013     Bicuspid aortic valve      Chronic systolic heart failure (H)      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 Tx with Dapto/RIFx 8 weeks.     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     Paroxysmal a-fib (H)     Post-op 7/14/2011, 1/26/13     Rotator Cuff (Capsule) Sprain and Strain      S/P aortic valve replacement     7/14/2011, re-do 1/25/13 due to endocarditis     Smoking hx      Thrombocytopenia (H)        Past Surgical History:   Procedure Laterality Date     ANESTHESIA CARDIOVERSION  7/18/2011    " Procedure:ANESTHESIA CARDIOVERSION; Cardioversion; Surgeon:GENERIC ANESTHESIA PROVIDER; Location:[facility] OR     COLONOSCOPY       CORONARY ANGIOGRAPHY ADULT ORDER       CYSTOSCOPY, BIOPSY URETHRA N/A 4/3/2017    Procedure: CYSTOSCOPY, BIOPSY URETHRA;  Surgeon: Marlena Mora MD;  Location: [facility] OR     ENDOSCOPY UPPER, COLONOSCOPY, COMBINED       HC REMOV & REPLACE IMPLT DEFIB PULSE GEN MULT LEAD  12/3/2015          HEMORRHOID SURGERY       IMPLANT AUTOMATIC IMPLANTABLE CARDIOVERTER DEFIBRILLATOR       MOHS MICROGRAPHIC PROCEDURE       ORTHOPEDIC SURGERY      shoulder,right     REDO STERNOTOMY REPLACE VALVE AORTIC  1/25/2013    Procedure: REDO STERNOTOMY REPLACE VALVE AORTIC;  Redo Sternotomy, Redo Aortic Valve Replacement on pump oxygenator. Intraoperative Transesophageal Echocardiogram;  Surgeon: Navin Hampton MD;  Location: [facility] OR     REPAIR VALVE MITRAL  2013     REPLACE VALVE AORTIC  7/14/2011    Procedure:REPLACE VALVE AORTIC; Median Sternotomy, Bioprosthesis,  Aortic Valve replacement on pump with oxygenator. Intra-operative Transesophogeal Echocardiogram.; Surgeon:NAVIN HAMPTON; Location:[facility] OR     teeth extractions         Family History   Problem Relation Age of Onset     C.A.D. Father 68     bypass, carotid      Prostate Cancer Father 70     CANCER Mother 92     stomach, colon     Hypertension Mother      Retinal detachment Mother      CANCER Brother 64     lung cancer, petroleum     Glaucoma No family hx of      Macular Degeneration No family hx of      Strabismus No family hx of      Glasses (<7 y/o) No family hx of        Social History   Substance Use Topics     Smoking status: Former Smoker     Packs/day: 1.00     Years: 20.00     Types: Cigarettes     Quit date: 12/31/1989     Smokeless tobacco: Never Used     Alcohol use 1.2 oz/week     2 Cans of beer per week       Allergies   Allergen Reactions     Lisinopril Cough         ROS:   CONSTITUTIONAL:No report of fever, chills,  or change in  "weight  RESPIRATORY: No cough, wheezing, SOB, or hemoptysis  CARDIOVASCULAR: see HPI  MUSCULO-SKELETAL: No joint pain/swelling, no muslce pain  NEURO: No paresthesias, syncope, pre-syncope, light headness, dizziness or vertigo  ENDOCRINE: No temperature intolerance, no skin/hair changes  PSYCHIATRIC: No change in mood, feeling down/anxious, no change in sleep or appetite  GI: no melena or hematochezia, no change in bowel habits  : no hematuria or dysurea, no hesitancy, dribbling or incontinence  HEME: no easy bruising or bleeding, no history of anemia, no history of blood clots  SKIN: no rashes or sores, no unusual itching        Physical Examination:  Vitals: /76 (BP Location: Right arm, Patient Position: Chair, Cuff Size: Adult Regular)  Pulse 67  Ht 1.778 m (5' 10\")  Wt 82.4 kg (181 lb 11.2 oz)  SpO2 96%  BMI 26.07 kg/m2  BMI= Body mass index is 26.07 kg/(m^2).    GENERAL APPEARANCE: healthy, alert and no distress  HEENT: no icterus, no xanthelasmas, normal pupil size and reaction, normal palate, mucosa moist, no cyanosis.  NECK: no adenopathy, no asymmetry, masses, or scars, thyroid normal to palpation, carotid upstrokes are normal bilaterally, no cervical bruits, JVP is not visible  CHEST: lungs clear to auscultation - no rales, rhonchi or wheezes, no use of accessory muscles, no retractions, respirations are unlabored, normal respiratory rate, no kyphosis, no scoliosis  CARDIOVASCULAR: regular rhythm, normal S1 with physiologic split S2, no S3 or S4 and no murmur, click or rub, precordium quiet with normal PMI.  ABDOMEN: soft, non tender, bowel sounds normal, aorta not enlarged by palpation, no abdominal bruits  EXTREMITIES: no clubbing, cyanosis or edema  NEURO: alert and oriented to person/place/time, normal speech, gait and affect  VASC: Radial and posterior tibialis pulses are normal in volumes and symmetric bilaterally.  SKIN: no ecchymoses, no rashes      Laboratory:    [facility]  Diagnostic and Treamtent-3rd Floor  [address] [place] [address]     Name: DAR CRUZ  MRN: [id number]  : 1945  Study Date: 10/10/2016 02:57 PM  Age: 71 yrs  Gender: Male  Patient [facility]: [facility]  Reason For Study: Acute and subacute infection  Ordering Physician: NOEMÍ ROLDAN  Referring Physician: NOEMÍ ROLDAN  Performed By: Feng Maguire RDCS     BSA: 2.0 m2  Height: 70 in  Weight: 190 lb  HR: 68  BP: 126/76 mmHg  ______________________________________________________________________________        Procedure  Echocardiogram with two-dimensional, color and spectral Doppler performed.  Contrast Optison. Optison (NDC #6102-6943-98) given intravenously. Patient  was given 4 ml mixture of 3 ml Optison and 6 ml saline. 5 ml wasted. IV start  location LAC . IV start time 15:25 .  ______________________________________________________________________________     Interpretation Summary  Mild concentric wall thickening consistent with left ventricular hypertrophy  is present. Global and regional left ventricular function is normal with an  EF of 55-60%.  Global right ventricular function is mildly reduced.  A bioprosthetic aortic valve is present.Doppler interrogation of the aortic  valve is normal. There is tarce to mild eccentric aortic insufficiency.  Pulmonary artery systolic pressure is normal.  The inferior vena cava was normal in size with preserved respiratory  variability.  No pericardial effusion is present.  ______________________________________________________________________________           Left Ventricle  Left ventricular size is normal. Global and regional left ventricular  function is normal with an EF of 55-60%. Mild concentric wall thickening  consistent with left ventricular hypertrophy is present. Some features of  diastolic dysfunction present, however the severity is indeterminate.     Right Ventricle  Mild right ventricular  dilation is present. Global right ventricular function  is mildly reduced.  Atria  Moderate biatrial enlargement is present. A pacemaker lead is noted in the  right atrium.     Mitral Valve  Mild mitral annular calcification is present. Mild mitral insufficiency is  present.     Aortic Valve  There is tarce to mild eccentric aortic insufficiency. A bioprosthetic aortic  valve is present. Doppler interrogation of the aortic valve is normal. The  mean gradient is 8 mmHg. DVI = 0.57.     Tricuspid Valve  The tricuspid valve is normal. Mild tricuspid insufficiency is present.  Estimated pulmonary artery systolic pressure is 21 mmHg plus right atrial  pressure. Pulmonary artery systolic pressure is normal.     Pulmonic Valve  The pulmonic valve is normal.     Vessels  The inferior vena cava was normal in size with preserved respiratory  variability. Mild dilatation of the aorta is present. Ascending aorta 4.1 cm.  Estimated mean right atrial pressure is <3 mmHg.  Pericardium  No pericardial effusion is present.     Compared to Previous Study  This study was compared with the study from 10.12.15, no significant  changes .     ______________________________________________________________________________  MMode/2D Measurements & Calculations  IVSd: 1.1 cm  LVIDd: 3.6 cm  LVIDs: 3.1 cm  LVPWd: 1.5 cm  FS: 12.8 %  EDV(Teich): 54.3 ml  ESV(Teich): 39.0 ml  LV mass(C)d: 159.7 grams  asc Aorta Diam: 4.1 cm  LVOT diam: 2.5 cm  LVOT area: 4.8 cm2  LA Volume (BP): 96.0 ml     LA Volume Index (BP): 47.1 ml/m2        Doppler Measurements & Calculations  MV E max harika: 56.9 cm/sec  MV A max harika: 85.3 cm/sec  MV E/A: 0.67  MV dec time: 0.19 sec  Ao V2 max: 180.3 cm/sec  Ao max P.1 mmHg  Ao V2 mean: 130.0 cm/sec  Ao mean P.6 mmHg  Ao V2 VTI: 35.8 cm  MCKENNA(I,D): 2.8 cm2  MCKENNA(V,D): 2.7 cm2  LV V1 max P.3 mmHg  LV V1 max: 102.6 cm/sec  LV V1 VTI: 21.4 cm  SV(LVOT): 101.9 ml  TR max harika: 218.9 cm/sec  TR max P.2 mmHg  MCKENNA  Index (cm2/m2): 1.4         Assessment and recommendations:      -Complete heart block s/p CRT - normal device function    -Cardiomyopathy recovered EF 50-55% s/p CRT    -AVR redo secondary to endocarditis 1/13    Atrial fibrillation  1. Stroke Prophylaxis:  CHADSVASC=age, HF,   2, corresponding to a 2.2% annual stroke / systemic emolism event rate. indicating need for long term oral anticoagulation.  He is appropriately on warfarin  2. Rate Control: Has complete heart block and is on currently he is on metoprolol XL 50 mg daily   3. Risk Factor Management: continue statin therapy and BP control.  Recommend RUBY evaluation.      -hyperlipidemia - currently on simvastatin    -thrombocytopenia (In the past she was seen by hematology with a negative workup)     I appreciate the chance to help with Mr. Melina obregon. Please let me know if you have any questions or concerns.    SUKH Malloy, CNP

## 2017-04-28 NOTE — PROGRESS NOTES
Patient seen at [facility] [place] [place] for evaluation and iterative programming of a Med[employer] Multi lead ICD per MD orders. Patient has an appointment to see Joaquina Arana NP today. Normal ICD function.  1 NSVT  episodes recorded on 1/21/2017 for 8 seconds at a rate of 205 BPM. Intrinsic rhythm = SR @ 80 C (CHB) 40 bpm.  AP= 26% and = 98.4%. OptiVol fluid index is at baseline.  Estimated battery longevity = 3.2 years to SIMONE. No short v-v intervals recorded.  RV lead impedance trends appear stable. Patient reports that he is feeling well and denies complaints. Plan for patient to send a remote transmission on 7/3/1207 and return to clinic in 6 months. Changed voltage and pulse width from 2.5V@1.4ms to 3.0V@1.0ms to conserve battery.

## 2017-04-28 NOTE — NURSING NOTE
Chief Complaint   Patient presents with     Follow Up For     ATRIAL FIBRILLATION     TAINA Silverio

## 2017-04-28 NOTE — MR AVS SNAPSHOT
After Visit Summary   4/28/2017    Brooks Summers    MRN: [id number]           Patient Information     Date Of Birth          1945        Visit Information        Provider Department      4/28/2017 1:30 PM Joaquina Nowak APRN CNP [facility]        Today's Diagnoses     Paroxysmal atrial fibrillation (H)    -  1      Care Instructions    Cardiology Provider you saw in clinic today: SUKH Malloy CNP    Medication Changes:  none    Labs/Tests needed: none     Follow-up Visit:  Sleep study    Further Instructions:      You will receive all normal lab and testing results via MyChart or mail if not reviewed in clinic today. Please contact our office if you need assistance with setting up MyChart.    If you need a medication refill please contact your pharmacy. Please allow 3 business days for your refill to be completed.     As always, thank you for trusting us with your health care needs!    If you have any questions regarding your visit please contact your care team:   Cardiology  Telephone Number    EP RN  Electrophysiology Nurse Coordinator [phone]     Call for EP procedure scheduling concerns  VICKY Man  [phone]           Device Clinic (Pacemakers, ICDs, Loop)   RN's : Kailee Christie Connie, Dawn During business hours: [phone]    After business hours:   [phone]- select option 4 and ask for job code [id number].                Follow-ups after your visit        Additional Services     Sleep Study Referral       Please be aware that coverage of these services is subject to the terms and limitations of your health insurance plan.  Call member services at your health plan with any benefit or coverage questions.      Please bring the following to your appointment:    >>   List of current medications   >>   This referral request   >>   Any documents/labs given to you for this referral    [facility]  Ph [phone] (Age  15 and up)                  Follow-up notes from your care team     Return in about 6 months (around 10/28/2017) for Dr. Castro, Device check prior.      Your next 10 appointments already scheduled     May 10, 2017  1:15 PM CDT   Anticoagulation Visit with YVETTE ANTI COAG   [facility]    [address] [address]   [phone]            May 26, 2017  1:00 PM CDT   New Sleep Patient with SUKH Todd CNP   [facility] [place] ([facility])    [address] [place] [address]   [phone]            Nov 03, 2017  1:00 PM CDT   (Arrive by 12:45 PM)   Pacemaker Check with Uc Cv Device 1   [facility] ([facility])    [address] [place] [id number] [phone]            Nov 03, 2017  1:30 PM CDT   (Arrive by 1:15 PM)   RETURN HEART FAILURE with Yemi Castro MD   [facility] ([facility])    [address] [place] [address] [phone]              Future tests that were ordered for you today     Open Future Orders        Priority Expected Expires Ordered    Sleep Study Referral Routine  4/28/2018 4/28/2017            Who to contact     If you have questions or need follow up information about today's clinic visit or your schedule please contact [facility] directly at [phone].  Normal or non-critical lab and imaging results will be communicated to you by MyChart, letter or phone within 4 business days after the clinic has received the results. If you do not hear from us within 7 days, please contact the clinic through [employer] or phone. If you have a critical or abnormal lab result, we will notify you by phone as soon as possible.  Submit refill requests through [email or web address] or call your pharmacy and they will forward the refill request to  "us. Please allow 3 business days for your refill to be completed.          Additional Information About Your Visit        [employer]Acthart Information     [employer] lets you send messages to your doctor, view your test results, renew your prescriptions, schedule appointments and more. To sign up, go to [email or web address] . Click on \"Log in\" on the left side of the screen, which will take you to the Welcome page. Then click on \"Sign up Now\" on the right side of the page.     You will be asked to enter the access code listed below, as well as some personal information. Please follow the directions to create your username and password.     Your access code is: [email or web address]  Expires: 2017  6:31 AM     Your access code will  in 90 days. If you need help or a new code, please call your [place] clinic or [phone].        Care EveryWhere ID     This is your Care EveryWhere ID. This could be used by other organizations to access your [place] medical records  [id number]        Your Vitals Were     Pulse Height Pulse Oximetry BMI (Body Mass Index)          67 1.778 m (5' 10\") 96% 26.07 kg/m2         Blood Pressure from Last 3 Encounters:   17 117/76   17 123/79   17 109/75    Weight from Last 3 Encounters:   17 82.4 kg (181 lb 11.2 oz)   17 82.5 kg (181 lb 14.4 oz)   17 82.3 kg (181 lb 7 oz)                 Today's Medication Changes          These changes are accurate as of: 17  2:04 PM.  If you have any questions, ask your nurse or doctor.               These medicines have changed or have updated prescriptions.        Dose/Directions    allopurinol 100 MG tablet   Commonly known as:  ZYLOPRIM   This may have changed:  when to take this   Used for:  Idiopathic gout, unspecified chronicity, unspecified site        Dose:  100 mg   Take 1 tablet (100 mg) by mouth daily   Quantity:  90 tablet   Refills:  1       losartan 25 MG tablet   Commonly known as:  COZAAR   This may " have changed:  when to take this   Used for:  Benign essential hypertension        Dose:  12.5 mg   Take 0.5 tablets (12.5 mg) by mouth daily   Quantity:  45 tablet   Refills:  3       metoprolol 50 MG 24 hr tablet   Commonly known as:  TOPROL XL   This may have changed:  when to take this   Used for:  Benign essential hypertension        Dose:  50 mg   Take 1 tablet (50 mg) by mouth daily   Quantity:  90 tablet   Refills:  3                Primary Care Provider Office Phone # Fax #    Edin Mays [phone]       [address] [address]        Thank you!     Thank you for choosing [facility]  for your care. Our goal is always to provide you with excellent care. Hearing back from our patients is one way we can continue to improve our services. Please take a few minutes to complete the written survey that you may receive in the mail after your visit with us. Thank you!             Your Updated Medication List - Protect others around you: Learn how to safely use, store and throw away your medicines at www.disposemymeds.org.          This list is accurate as of: 4/28/17  2:04 PM.  Always use your most recent med list.                   Brand Name Dispense Instructions for use    allopurinol 100 MG tablet    ZYLOPRIM    90 tablet    Take 1 tablet (100 mg) by mouth daily       CALCIUM 600 + D PO      Take 1 tablet by mouth every morning       losartan 25 MG tablet    COZAAR    45 tablet    Take 0.5 tablets (12.5 mg) by mouth daily       metoprolol 50 MG 24 hr tablet    TOPROL XL    90 tablet    Take 1 tablet (50 mg) by mouth daily       * PROVIDER DOSED MANAGED WARFARIN (COUMADIN) OUTPATIENT      Per coumadin clinic       simvastatin 40 MG tablet    ZOCOR    90 tablet    Take 1 tablet (40 mg) by mouth At Bedtime       * warfarin 5 MG tablet    COUMADIN    172 tablet    Take as directed by anticoagulation clinic.  Current dose 7.5 mg Wed, 10 mg rest  of week       * Notice:  This list has 2 medication(s) that are the same as other medications prescribed for you. Read the directions carefully, and ask your doctor or other care provider to review them with you.

## 2017-05-10 ENCOUNTER — ANTICOAGULATION THERAPY VISIT (OUTPATIENT)
Dept: ANTICOAGULATION | Facility: CLINIC | Age: 72
End: 2017-05-10
Payer: MEDICARE

## 2017-05-10 DIAGNOSIS — Z95.2 S/P AORTIC VALVE REPLACEMENT: ICD-10-CM

## 2017-05-10 DIAGNOSIS — I48.0 PAROXYSMAL ATRIAL FIBRILLATION (H): ICD-10-CM

## 2017-05-10 DIAGNOSIS — Z79.01 LONG TERM CURRENT USE OF ANTICOAGULANT THERAPY: ICD-10-CM

## 2017-05-10 LAB — INR POINT OF CARE: 3.6 (ref 0.86–1.14)

## 2017-05-10 PROCEDURE — 36416 COLLJ CAPILLARY BLOOD SPEC: CPT

## 2017-05-10 PROCEDURE — 99207 ZZC NO CHARGE NURSE ONLY: CPT

## 2017-05-10 PROCEDURE — 85610 PROTHROMBIN TIME: CPT | Mod: QW

## 2017-05-10 NOTE — MR AVS SNAPSHOT
Brooks Summers   5/10/2017 1:15 PM   Anticoagulation Therapy Visit    Description:  72 year old male   Provider:  LL ANTI COAG   Department:  [facility]           INR as of 5/10/2017     Today's INR 3.6!      Anticoagulation Summary as of 5/10/2017     INR goal 2.0-3.0   Today's INR 3.6!   Full instructions 5/10: 2.5 mg; Otherwise 7.5 mg on Wed; 10 mg all other days   Next INR check 6/7/2017    Indications   S/P aortic valve replacement [Z95.2]  Paroxysmal atrial fibrillation (H) [I48.0]  Long term current use of anticoagulant therapy [Z79.01]         Description     Recheck INR 4 weeks, 6/7/17  Take warfarin 2.5 mg today, then resume usual 7.5 mg Wed, 10 mg rest of week         Your next Anticoagulation Clinic appointment(s)     Jun 07, 2017  1:45 PM CDT   Anticoagulation Visit with LL ANTI COAG   [facility]    [address] [address] [phone]              Contact Numbers     Please call [phone] to cancel and/or reschedule your appointment.  Please call [phone] with any problems or questions regarding your therapy          May 2017 Details    Sun Mon Tue Wed Thu Fri Sat      1               2               3               4               5               6                 7               8               9               10      2.5 mg   See details      11      10 mg         12      10 mg         13      10 mg           14      10 mg         15      10 mg         16      10 mg         17      7.5 mg         18      10 mg         19      10 mg         20      10 mg           21      10 mg         22      10 mg         23      10 mg         24      7.5 mg         25      10 mg         26      10 mg         27      10 mg           28      10 mg         29      10 mg         30      10 mg         31      7.5 mg             Date Details   05/10 This INR check   Take 2.5 mg (5 mg tablets x 0.5)   have a serving of greens                 How to take your warfarin dose     To take:  2.5 mg Take 0.5 of a 5 mg tablet.    To take:  7.5 mg Take 1.5 of the 5 mg tablets.    To take:  10 mg Take 2 of the 5 mg tablets.           June 2017 Details    Sun Mon Tue Wed Thu Fri Sat         1      10 mg         2      10 mg         3      10 mg           4      10 mg         5      10 mg         6      10 mg         7            8               9               10                 11               12               13               14               15               16               17                 18               19               20               21               22               23               24                 25               26               27               28               29               30                 Date Details   No additional details    Date of next INR:  6/7/2017         How to take your warfarin dose     To take:  7.5 mg Take 1.5 of the 5 mg tablets.    To take:  10 mg Take 2 of the 5 mg tablets.

## 2017-05-10 NOTE — PROGRESS NOTES
ANTICOAGULATION FOLLOW-UP CLINIC VISIT    Patient Name:  Brooks Summers  Date:  5/10/2017  Contact Type:  Face to Face    SUBJECTIVE:     Patient Findings     Positives Change in diet/appetite (he will have a serving of Vit K foods today, then resume his usual weekly amount), Inflammation (shoulder and elbow have been more painful than usual for the last few days)           OBJECTIVE    INR Protime   Date Value Ref Range Status   05/10/2017 3.6 (A) 0.86 - 1.14 Final       ASSESSMENT / PLAN  INR assessment THER    Recheck INR In: 4 WEEKS    INR Location Clinic      Anticoagulation Summary as of 5/10/2017     INR goal 2.0-3.0   Today's INR 3.6!   Maintenance plan 7.5 mg (5 mg x 1.5) on Wed; 10 mg (5 mg x 2) all other days   Full instructions 5/10: 2.5 mg; Otherwise 7.5 mg on Wed; 10 mg all other days   Weekly total 67.5 mg   Plan last modified Lindsey Epstein RN (1/18/2017)   Next INR check 6/7/2017   Priority INR   Target end date Indefinite    Indications   S/P aortic valve replacement [Z95.2]  Paroxysmal atrial fibrillation (H) [I48.0]  Long term current use of anticoagulant therapy [Z79.01]         Anticoagulation Episode Summary     INR check location     Preferred lab     Send INR reminders to [facility]    Comments  * warfarin 5 mg tabs      Anticoagulation Care Providers     Provider Role Specialty Phone number    Edin Mays MD [facility] [phone]            See the Encounter Report to view Anticoagulation Flowsheet and Dosing Calendar (Go to Encounters tab in chart review, and find the Anticoagulation Therapy Visit)        Lindsey Epstein RN

## 2017-05-19 DIAGNOSIS — I10 BENIGN ESSENTIAL HYPERTENSION: ICD-10-CM

## 2017-05-19 NOTE — TELEPHONE ENCOUNTER
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 Stroke Prophylaxis: CHADSVASC=age, HF, 2, corresponding to a 2.2% annual stroke / systemic emolism event rate. indicating need for long term oral anticoagulation. He is appropriately on warfarin  2. Rate Control: Has complete heart block and is on currently he is on metoprolol XL 50 mg daily   3. Risk Factor Management: continue statin therapy and BP control. Recommend RUBY evaluation.      -hyperlipidemia - currently on simvastatin     -thrombocytopenia (In the past she was seen by hematology with a negative workup)

## 2017-05-23 RX ORDER — METOPROLOL SUCCINATE 50 MG/1
50 TABLET, EXTENDED RELEASE ORAL DAILY
Qty: 90 TABLET | Refills: 3 | Status: SHIPPED | OUTPATIENT
Start: 2017-05-23 | End: 2017-11-13

## 2017-05-26 ENCOUNTER — OFFICE VISIT (OUTPATIENT)
Dept: SLEEP MEDICINE | Facility: CLINIC | Age: 72
End: 2017-05-26
Attending: NURSE PRACTITIONER
Payer: MEDICARE

## 2017-05-26 VITALS
HEIGHT: 70 IN | OXYGEN SATURATION: 97 % | SYSTOLIC BLOOD PRESSURE: 108 MMHG | BODY MASS INDEX: 25.96 KG/M2 | HEART RATE: 65 BPM | WEIGHT: 181.3 LBS | RESPIRATION RATE: 18 BRPM | DIASTOLIC BLOOD PRESSURE: 69 MMHG

## 2017-05-26 DIAGNOSIS — M10.00 IDIOPATHIC GOUT, UNSPECIFIED CHRONICITY, UNSPECIFIED SITE: ICD-10-CM

## 2017-05-26 DIAGNOSIS — I48.0 PAROXYSMAL ATRIAL FIBRILLATION (H): ICD-10-CM

## 2017-05-26 DIAGNOSIS — R06.83 SNORING: Primary | ICD-10-CM

## 2017-05-26 DIAGNOSIS — G25.81 RESTLESS LEGS SYNDROME (RLS): ICD-10-CM

## 2017-05-26 PROCEDURE — 99211 OFF/OP EST MAY X REQ PHY/QHP: CPT | Mod: ZF

## 2017-05-26 RX ORDER — ALLOPURINOL 100 MG/1
100 TABLET ORAL DAILY
Qty: 90 TABLET | Refills: 1 | OUTPATIENT
Start: 2017-05-26

## 2017-05-26 NOTE — MR AVS SNAPSHOT
"              After Visit Summary   5/26/2017    Brooks Summers    MRN: [id number]           Patient Information     Date Of Birth          1945        Visit Information        Provider Department      5/26/2017 1:00 PM Tawana Choudhury APRN [facility], [place], Sleep Study        Today's Diagnoses     Restless legs syndrome (RLS)    -  1    Paroxysmal atrial fibrillation (H)          Care Instructions    MY TREATMENT INFORMATION FOR SLEEP APNEA-  Brooks Summers    NP : Tawana Choudhury  SLEEP CENTER :  [facility]    MY CONTACT NUMBER: [phone]  Ferritin level-ask anti coag to do  Will review pacer involvement with home study.    Will call or write re: ferritin, pacer with home    Legs: develop a routine: massage, warm shower, stretching  Am I having a sleep study at a sleep center?  Make sure you have an appointment for the study before you leave!    Am I having a home sleep study?  Watch this video:  https://www.readeo.com/watch?v=CteI_[email or web address]&[email or web address]    Frequently asked questions:  1. What is Obstructive Sleep Apnea (RUBY)? RUBY is the most common type of sleep apnea. Apnea means, \"without breath.\"  Apnea is most often caused by narrowing or collapse of the upper airway as muscles relax during sleep.   Almost everyone has occasional apneas. Most people with sleep apnea have had brief interruptions at night frequently for many years.  The severity of sleep apnea is related to how frequent and severe the events are.   2. What are the consequences of RUBY? Symptoms include: feeling sleepy during the day, snoring loudly, gasping or stopping of breathing, trouble sleeping, and occasionally morning headaches or heartburn at night.  Sleepiness can be serious and even increase the risk of falling asleep while driving. Other health consequences may include development of high blood pressure and other cardiovascular disease in persons who are susceptible. Untreated RUBY  can " contribute to heart disease, stroke and diabetes.   3. What are the treatment options? In most situations, sleep apnea is a lifelong disease that must be managed with daily therapy. Medications are not effective for sleep apnea and surgery is generally not considered until other therapies have been tried. Your treatment is your choice . Continuous Positive Airway (CPAP) works right away and is the therapy that is effective in nearly everyone. An oral device to hold your jaw forward is usually the next most reliable option. Other options include postioning devices (to keep you off your back), weight loss, and surgery including a tongue pacing device. There is more detail about some of these options below.    Important tips for using CPAP and similar devices   Know your equipment:  CPAP is continuous positive airway pressure that prevents obstructive sleep apnea by keeping the throat from collapsing while you are sleeping. In most cases, the device is  smart  and can slowly self-adjusts if your throat collapses and keeps a record every day of how well you are treated-this information is available to you and your care team.  BPAP is bilevel positive airway pressure that keeps your throat open and also assists each breath with a pressure boost to maintain adequate breathing.  Special kinds of BPAP are used in patients who have inadequate breathing from lung or heart disease. In most cases, the device is  smart  and can slowly self-adjusts to assist breathing. Like CPAP, the device keeps a record of how well you are treated.  Your mask is your connection to the device. You get to choose what feels most comfortable and the staff will help to make sure if fits. Here: are some examples of the different masks that are available:       Key points to remember on your journey with sleep apnea:  1. Sleep study.  PAP devices often need to be adjusted during a sleep study to show that they are effective and adjusted  right.  2. Good tips to remember: Try wearing just the mask during a quiet time during the day so your body adapts to wearing it. A humidifier is recommended for comfort in most cases to prevent drying of your nose and throat. Allergy medication from your provider may help you if you are having nasal congestion.  3. Getting settled-in. It takes more than one night for most of us to get used to wearing a mask. Try wearing just the mask during a quiet time during the day so your body adapts to wearing it. A humidifier is recommended for comfort in most cases. Our team will work with you carefully on the first day and will be in contact within 4 days and again at 2 and 4 weeks for advice and remote device adjustments. Your therapy is evaluated by the device each day.   4. Use it every night. The more you are able to sleep naturally for 7-8 hours, the more likely you will have good sleep and to prevent health risks or symptoms from sleep apnea. Even if you use it 4 hours it helps. Occasionally all of us are unable to use a medical therapy, in sleep apnea, it is not dangerous to miss one night.   5. Communicate. Call our skilled team on the number provided on the first day if your visit for problems that make it difficult to wear the device. Over 2 out of 3 patients can learn to wear the device long-term with help from our team. Remember to call our team or your sleep providers if you are unable to wear the device as we may have other solutions for those who cannot adapt to mask CPAP therapy. It is recommended that you sleep your sleep provider within the first 3 months and yearly after that if you are not having problems.   Take care of your equipment. Make sure you clean your mask and tubing using directions every day and that your filter and mask are replaced as recommended or if they are not working.     BESIDES CPAP, WHAT OTHER THERAPIES ARE THERE?    Positioning Device  Positioning devices are generally used when  sleep apnea is mild and only occurs on your back.This example shows a pillow that straps around the waist. It may be appropriate for those whose sleep study shows milder sleep apnea that occurs primarily when lying flat on one's back. Preliminary studies have shown benefit but effectiveness at home may need to be verified by a home sleep test. These devices are generally not covered by medical insurance.  Positioning Device  Positioning devices are generally used when sleep apnea is mild and only occurs on your back.This example shows a pillow that straps around the waist. It may be appropriate for those whose sleep study shows milder sleep apnea that occurs primarily when lying flat on one's back. Preliminary studies have shown benefit but effectiveness at home may need to be verified by a home sleep test. These devices are generally not covered by medical insurance.    Try amazon  or  [employer] for a bumper snore pillow, or backpack w/ chest strap stuffed w/ pillow or t shirt 2 / pockets sew in tennis balls    Oral Appliance  What is oral appliance therapy?  An oral appliance device fits on your teeth at night like a retainer used after having braces. The device is made by a specialized dentist and requires several visits over 1-2 months before a manufactured device is made to fit your teeth and is adjusted to prevent your sleep apnea. Once an oral device is working properly, snoring should be improved. A home sleep test may be recommended at that time if to determine whether the sleep apnea is adequately treated.       Some things to remember:  -Oral devices are often, but not always, covered by your medical insurance. Be sure to check with your insurance provider.   -If you are referred for oral therapy, you will be given a list of specialized dentists to consider or you may choose to visit the Web site of the American Academy of Dental Sleep Medicine  -Oral devices are less likely to work if you have severe sleep  apnea or are extremely overweight.     More detailed information  An oral appliance is a small acrylic device that fits over the upper and lower teeth  (similar to a retainer or a mouth guard). This device slightly moves jaw forward, which moves the base of the tongue forward, opens the airway, improves breathing for effective treat snoring and obstructive sleep apnea in perhaps 7 out of 10 people .  The best working devices are custom-made by a dental device  after a mold is made of the teeth 1, 2, 3.  When is an oral appliance indicated?  Oral appliance therapy is recommended as a first-line treatment for patients with primary snoring, mild sleep apnea, and for patients with moderate sleep apnea who prefer appliance therapy to use of CPAP4, 5. Severity of sleep apnea is determined by sleep testing and is based on the number of respiratory events per hour of sleep.   How successful is oral appliance therapy?  The success rate of oral appliance therapy in patients with mild sleep apnea is 75-80% while in patients with moderate sleep apnea it is 50-70%. The chance of success in patients with severe sleep apnea is 40-50%. The research also shows that oral appliances have a beneficial effect on the cardiovascular health of RUBY patients at the same magnitude as CPAP therapy7.  Oral appliances should be a second-line treatment in cases of severe sleep apnea, but if not completely successful then a combination therapy utilizing CPAP plus oral appliance therapy may be effective. Oral appliances tend to be effective in a broad range of patients although studies show that the patients who have the highest success are females, younger patients, those with milder disease, and less severe obesity. 3, 6.   Finding a dentist that practices dental sleep medicine  Specific training is available through the American Academy of Dental Sleep Medicine for dentists interested in working in the field of sleep. To find a  dentist who is educated in the field of sleep and the use of oral appliances, near you, visit the Web site of the American Academy of Dental Sleep Medicine.    References  1. Alicia, et al. Objectively measured vs self-reported compliance during oral appliance therapy for sleep-disordered breathing. Chest 2013; 144(5): 3081-0090.  2. Lex et al. Objective measurement of compliance during oral appliance therapy for sleep-disordered breathing. Thorax 2013; 68(1): 91-96.  3. Shelby et al. Mandibular advancement devices in 620 men and women with RUBY and snoring: tolerability and predictors of treatment success. Chest 2004; 125: 7495-7104.  4. Lynn et al. Oral appliances for snoring and RUBY: a review. Sleep 2006; 29: 244-262.  5. Rey et al. Oral appliance treatment for RUBY: an update. J Clin Sleep Med 2014; 10(2): 215-227.  6. Amy et al. Predictors of OSAH treatment outcome. J Dent Res 2007; 86: 2534-6759.      Weight Loss:    Weight loss is a long-term strategy that may improve sleep apnea in some patients.    Weight management is a personal decision.  If you are interested in exploring weight loss strategies, the following discussion covers the impact on weight loss on sleep apnea and the approaches that may be successful.    Weight loss decreases severity of sleep apnea in most people with obesity. For those with mild obesity who have developed snoring with weight gain, even 15-30 pound weight loss can improve and occasionally eliminate sleep apnea.  Structured and life-long dietary and health habits are necessary to lose weight and keep healthier weight levels.     Though there may be significant health benefits from weight loss, long-term weight loss is very difficult to achieve- studies show success with dietary management in less than 10% of people. In addition, substantial weight loss may require years of dietary control and may be difficult if patients have severe obesity. In  these cases, surgical management may be considered.  Finally, older individuals who have tolerated obesity without health complications may be less likely to benefit from weight loss strategies.      More detailed information    Your BMI is Body mass index is 26.01 kg/(m^2).  Body mass index (BMI) is one way to tell whether you are at a healthy weight, overweight, or obese. It measures your weight in relation to your height.  A BMI of 18.5 to 24.9 is in the healthy range. A person with a BMI of 25 to 29.9 is considered overweight, and someone with a BMI of 30 or greater is considered obese. More than two-thirds of American adults are considered overweight or obese.  Being overweight or obese increases the risk for further weight gain. Excess weight may lead to heart disease and diabetes.  Creating and following plans for healthy eating and physical activity may help you improve your health.  Weight control is part of healthy lifestyle and includes exercise, emotional health, and healthy eating habits. Careful eating habits lifelong are the mainstay of weight control. Though there are significant health benefits from weight loss, long-term weight loss with diet alone may be very difficult to achieve- studies show long-term success with dietary management in less than 10% of people. Attaining a healthy weight may be especially difficult to achieve in those with severe obesity. In some cases, medications, devices and surgical management might be considered.  What can you do?  If you are overweight or obese and are interested in methods for weight loss, you should discuss this with your provider.     Consider reducing daily calorie intake by 500 calories.     Keep a food journal.     Avoiding skipping meals, consider cutting portions instead.    Diet combined with exercise helps maintain muscle while optimizing fat loss. Strength training is particularly important for building and maintaining muscle mass. Exercise helps  reduce stress, increase energy, and improves fitness. Increasing exercise without diet control, however, may not burn enough calories to loose weight.       Start walking three days a week 10-20 minutes at a time    Work towards walking thirty minutes five days a week     Eventually, increase the speed of your walking for 1-2 minutes at time    In addition, we recommend that you review healthy lifestyles and methods for weight loss available through the National Institutes of Health patient information sites:  http://win.niddk.nih.gov/publications/index.htm    And look into health and wellness programs that may be available through your health insurance provider, employer, local community center, or nayely club.    Weight management plan: Patient was referred to their primary physician to discuss a diet and exercise plan.    Surgery:    Surgery for obstructive sleep apnea is considered generally only when other therapies fail to work. Surgery may be discussed with you if you are having a difficult time tolerating CPAP and or when there is an abnormal structure that requires surgical correction.  Nose and throat surgeries often enlarge the airway to prevent collapse.  Most of these surgeries create pain for 1-2 weeks and up to half of the most common surgeries are not effective throughout life.  You should carefully discuss the benefits and drawbacks to surgery with your sleep provider and surgeon to determine if it is the best solution for you.   More information  Surgery for RUBY is directed at areas that are responsible for narrowing or complete obstruction of the airway during sleep.  There are a wide range of procedures available to enlarge and/or stabilize the airway to prevent blockage of breathing in the three major areas where it can occur: the palate, tongue, and nasal regions.  Successful surgical treatment depends on the accurate identification of the factors responsible for obstructive sleep apnea in each  person.  A personalized approach is required because there is no single treatment that works well for everyone.  Because of anatomic variation, consultation with an examination by a sleep surgeon is a critical first step in determining what surgical options are best for each patient.  In some cases, examination during sedation may be recommended in order to guide the selection of procedures.  Patients will be counseled about risks and benefits as well as the typical recovery course after surgery. Surgery is typically not a cure for a person s RUBY.  However, surgery will often significantly improve one s RBUY severity (termed  success rate ).  Even in the absence of a cure, surgery will decrease the cardiovascular risk associated with OSA7; improve overall quality of life8 (sleepiness, functionality, sleep quality, etc).      Palate Procedures:  Patients with RUBY often have narrowing of their airway in the region of their tonsils and uvula.  The goals of palate procedures are to widen the airway in this region as well as to help the tissues resist collapse.  Modern palate procedure techniques focus on tissue conservation and soft tissue rearrangement, rather than tissue removal.  Often the uvula is preserved in this procedure. Residual sleep apnea is common in patient after pharyngoplasty with an average reduction in sleep apnea events of 33%2.      Tongue Procedures:  ExamWhile patients are awake, the muscles that surround the throat are active and keep this region open for breathing. These muscles relax during sleep, allowing the tongue and other structures to collapse and block breathing.  There are several different tongue procedures available.  Selection of a tongue base procedure depends on characteristics seen on physical exam.  Generally, procedures are aimed at removing bulky tissues in this area or preventing the back of the tongue from falling back during sleep.  Success rates for tongue surgery range from  50-62%3.    Hypoglossal Nerve Stimulation:  Hypoglossal nerve stimulation has recently received approval from the [place] Food and Drug Administration for the treatment of obstructive sleep apnea.  This is based on research showing that the system was safe and effective in treating sleep apnea6.  Results showed that the median AHI score decreased 68%, from 29.3 to 9.0. This therapy uses an implant system that senses breathing patterns and delivers mild stimulation to airway muscles, which keeps the airway open during sleep.  The system consists of three fully implanted components: a small generator (similar in size to a pacemaker), a breathing sensor, and a stimulation lead.  Using a small handheld remote, a patient turns the therapy on before bed and off upon awakening.    Candidates for this device must be greater than 22 years of age, have moderate to severe RUBY (AHI between 20-65), BMI less than 32, have tried CPAP/oral appliance without success, and have appropriate upper airway anatomy (determined by a sleep endoscopy performed by Dr. Suero).    Hypoglossal Nerve Stimulation Pathway:    The sleep surgeon s office will work with the patient through the insurance prior-authorization process (including communications and appeals).    Nasal Procedures:  Nasal obstruction can interfere with nasal breathing during the day and night.  Studies have shown that relief of nasal obstruction can improve the ability of some patients to tolerate positive airway pressure therapy for obstructive sleep apnea1.  Treatment options include medications such as nasal saline, topical corticosteroid and antihistamine sprays, and oral medications such as antihistamines or decongestants. Non-surgical treatments can include external nasal dilators for selected patients. If these are not successful by themselves, surgery can improve the nasal airway either alone or in combination with these other options.      Combination  Procedures:  Combination of surgical procedures and other treatments may be recommended, particularly if patients have more than one area of narrowing or persistent positional disease.  The success rate of combination surgery ranges from 66-80%2,3.    References  1. Julius TERRELL. The Role of the Nose in Snoring and Obstructive Sleep Apnoea: An Update.  Eur Arch Otorhinolaryngol. 2011; 268: 1365-73.  2.  Judit SM; Lashell JA; Neymar JR; Pallanch JF; León MB; Marta SG; Brandon ECHOLS. Surgical modifications of the upper airway for obstructive sleep apnea in adults: a systematic review and meta-analysis. SLEEP 2010;33(10):7743-0534. Lauren BESS. Hypopharyngeal surgery in obstructive sleep apnea: an evidence-based medicine review.  Arch Otolaryngol Head Neck Surg. 2006 Feb;132(2):206-13.  3. Shady YH1, Grayson Y, Santi JANAY. The efficacy of anatomically based multilevel surgery for obstructive sleep apnea. Otolaryngol Head Neck Surg. 2003 Oct;129(4):327-35.  4. Kezirian E, Goldberg A. Hypopharyngeal Surgery in Obstructive Sleep Apnea: An Evidence-Based Medicine Review. Arch Otolaryngol Head Neck Surg. 2006 Feb;132(2):206-13.  5. Donna ASTUDILLO et al. Upper-Airway Stimulation for Obstructive Sleep Apnea.  N Engl J Med. 2014 Jan 9;370(2):139-49.  6. Eric Y et al. Increased Incidence of Cardiovascular Disease in Middle-aged Men with Obstructive Sleep Apnea. Am J Respir Crit Care Med; 2002 166: 159-165  7. Janay EM et al. Studying Life Effects and Effectiveness of Palatopharyngoplasty (SLEEP) study: Subjective Outcomes of Isolated Uvulopalatopharyngoplasty. Otolaryngol Head Neck Surg. 2011; 144: 623-631.              Your BMI is Body mass index is 26.01 kg/(m^2).  Weight management is a personal decision.  If you are interested in exploring weight loss strategies, the following discussion covers the approaches that may be successful. Body mass index (BMI) is one way to tell whether you are at a healthy weight, overweight, or obese.  It measures your weight in relation to your height.  A BMI of 18.5 to 24.9 is in the healthy range. A person with a BMI of 25 to 29.9 is considered overweight, and someone with a BMI of 30 or greater is considered obese. More than two-thirds of American adults are considered overweight or obese.  Being overweight or obese increases the risk for further weight gain. Excess weight may lead to heart disease and diabetes.  Creating and following plans for healthy eating and physical activity may help you improve your health.  Weight control is part of healthy lifestyle and includes exercise, emotional health, and healthy eating habits. Careful eating habits lifelong are the mainstay of weight control. Though there are significant health benefits from weight loss, long-term weight loss with diet alone may be very difficult to achieve- studies show long-term success with dietary management in less than 10% of people. Attaining a healthy weight may be especially difficult to achieve in those with severe obesity. In some cases, medications, devices and surgical management might be considered.  What can you do?  If you are overweight or obese and are interested in methods for weight loss, you should discuss this with your provider.     Consider reducing daily calorie intake by 500 calories.     Keep a food journal.     Avoiding skipping meals, consider cutting portions instead.    Diet combined with exercise helps maintain muscle while optimizing fat loss. Strength training is particularly important for building and maintaining muscle mass. Exercise helps reduce stress, increase energy, and improves fitness. Increasing exercise without diet control, however, may not burn enough calories to loose weight.       Start walking three days a week 10-20 minutes at a time    Work towards walking thirty minutes five days a week     Eventually, increase the speed of your walking for 1-2 minutes at time    In addition, we recommend  that you review healthy lifestyles and methods for weight loss available through the National Institutes of Health patient information sites:  http://win.niddk.nih.gov/publications/index.htm    And look into health and wellness programs that may be available through your health insurance provider, employer, local community center, or nayely club.    Weight management plan: Patient was referred to their PCP to discuss a diet and exercise plan.              Follow-ups after your visit        Your next 10 appointments already scheduled     Jun 07, 2017  1:45 PM CDT   Anticoagulation Visit with LL ANTI COAG   [facility] ([facility])    [address] [facility] [id number]   [phone]            Nov 03, 2017  1:00 PM CDT   (Arrive by 12:45 PM)   Pacemaker Check with [facility] Cv Device [address] [place] [address])    [address] [place] [address] [id number] [phone]            Nov 03, 2017  1:00 PM CDT   RESEARCH with  Cv Research Coordinator   [facility])    [address] [place] [address] [id number] [phone]            Nov 03, 2017  1:30 PM CDT   (Arrive by 1:15 PM)   RETURN HEART FAILURE with Yemi Castro MD   [facility])    [address] [place] [address] [id number] [phone]              Future tests that were ordered for you today     Open Future Orders        Priority Expected Expires Ordered    Ferritin Routine  7/25/2017 5/26/2017            Who to contact     If you have questions or need follow up information about today's clinic visit or your schedule please [facility] directly at [phone].  Normal or non-critical lab and imaging results will be communicated to you by MyChart, letter or phone within 4 business days after the clinic has received  "the results. If you do not hear from us within 7 days, please contact the clinic through [employer] or phone. If you have a critical or abnormal lab result, we will notify you by phone as soon as possible.  Submit refill requests through [employer] or call your pharmacy and they will forward the refill request to us. Please allow 3 business days for your refill to be completed.          Additional Information About Your Visit        [employer] Information     [employer] lets you send messages to your doctor, view your test results, renew your prescriptions, schedule appointments and more. To sign up, go to [email or web address] . Click on \"Log in\" on the left side of the screen, which will take you to the Welcome page. Then click on \"Sign up Now\" on the right side of the page.     You will be asked to enter the access code listed below, as well as some personal information. Please follow the directions to create your username and password.     Your access code is: [email or web address]  Expires: 2017  6:31 AM     Your access code will  in 90 days. If you need help or a new code, please call your [facility] clinic or [phone].        Care EveryWhere ID     This is your Care EveryWhere ID. This could be used by other organizations to access your [facility] medical records  [id number]        Your Vitals Were     Pulse Respirations Height Pulse Oximetry BMI (Body Mass Index)       65 18 1.778 m (5' 10\") 97% 26.01 kg/m2        Blood Pressure from Last 3 Encounters:   17 108/69   17 117/76   17 123/79    Weight from Last 3 Encounters:   17 82.2 kg (181 lb 4.8 oz)   17 82.4 kg (181 lb 11.2 oz)   17 82.5 kg (181 lb 14.4 oz)              We Performed the Following     Sleep Study Referral          Today's Medication Changes          These changes are accurate as of: 17  2:03 PM.  If you have any questions, ask your nurse or doctor.               These medicines have changed or have updated " prescriptions.        Dose/Directions    allopurinol 100 MG tablet   Commonly known as:  ZYLOPRIM   This may have changed:  when to take this   Used for:  Idiopathic gout, unspecified chronicity, unspecified site        Dose:  100 mg   Take 1 tablet (100 mg) by mouth daily   Quantity:  90 tablet   Refills:  1       losartan 25 MG tablet   Commonly known as:  COZAAR   This may have changed:  when to take this   Used for:  Benign essential hypertension        Dose:  12.5 mg   Take 0.5 tablets (12.5 mg) by mouth daily   Quantity:  45 tablet   Refills:  3                Primary Care Provider Office Phone # Fax #    Edin Mays [phone]       [address] [address]        Thank you!     Thank you for choosing [facility]  for your care. Our goal is always to provide you with excellent care. Hearing back from our patients is one way we can continue to improve our services. Please take a few minutes to complete the written survey that you may receive in the mail after your visit with us. Thank you!             Your Updated Medication List - Protect others around you: Learn how to safely use, store and throw away your medicines at www.disposemymeds.org.          This list is accurate as of: 5/26/17  2:03 PM.  Always use your most recent med list.                   Brand Name Dispense Instructions for use    allopurinol 100 MG tablet    ZYLOPRIM    90 tablet    Take 1 tablet (100 mg) by mouth daily       CALCIUM 600 + D PO      Take 1 tablet by mouth every morning       losartan 25 MG tablet    COZAAR    45 tablet    Take 0.5 tablets (12.5 mg) by mouth daily       metoprolol 50 MG 24 hr tablet    TOPROL XL    90 tablet    Take 1 tablet (50 mg) by mouth daily       * PROVIDER DOSED MANAGED WARFARIN (COUMADIN) OUTPATIENT      Per coumadin clinic       simvastatin 40 MG tablet    ZOCOR    90 tablet    Take 1 tablet (40 mg) by mouth At Bedtime        * warfarin 5 MG tablet    COUMADIN    172 tablet    Take as directed by anticoagulation clinic.  Current dose 7.5 mg Wed, 10 mg rest of week       * Notice:  This list has 2 medication(s) that are the same as other medications prescribed for you. Read the directions carefully, and ask your doctor or other care provider to review them with you.

## 2017-05-26 NOTE — Clinical Note
"Bobby-any interferance with PPM and HST or PSG?  I don't suspect it's a \"problem\" with either, but you need to know with psg"

## 2017-05-26 NOTE — PATIENT INSTRUCTIONS
"MY TREATMENT INFORMATION FOR SLEEP APNEA-  Brooks Summers    NP : Tawana Barnes [place]  SLEEP CENTER :  [place]    MY CONTACT NUMBER: [phone]  Ferritin level-ask anti coag to do  Will review pacer involvement with home study.    Will call or write re: ferritin, pacer with home    Legs: develop a routine: massage, warm shower, stretching  Am I having a sleep study at a sleep center?  Make sure you have an appointment for the study before you leave!    Am I having a home sleep study?  Watch this video:  https://www.Shoptiques.com/watch?v=CteI_GhyP9g&list=[email or web address]_[email or web address]    Frequently asked questions:  1. What is Obstructive Sleep Apnea (RUBY)? RUBY is the most common type of sleep apnea. Apnea means, \"without breath.\"  Apnea is most often caused by narrowing or collapse of the upper airway as muscles relax during sleep.   Almost everyone has occasional apneas. Most people with sleep apnea have had brief interruptions at night frequently for many years.  The severity of sleep apnea is related to how frequent and severe the events are.   2. What are the consequences of RUBY? Symptoms include: feeling sleepy during the day, snoring loudly, gasping or stopping of breathing, trouble sleeping, and occasionally morning headaches or heartburn at night.  Sleepiness can be serious and even increase the risk of falling asleep while driving. Other health consequences may include development of high blood pressure and other cardiovascular disease in persons who are susceptible. Untreated RUBY  can contribute to heart disease, stroke and diabetes.   3. What are the treatment options? In most situations, sleep apnea is a lifelong disease that must be managed with daily therapy. Medications are not effective for sleep apnea and surgery is generally not considered until other therapies have been tried. Your treatment is your choice . Continuous Positive Airway (CPAP) works right away and is the therapy that is " effective in nearly everyone. An oral device to hold your jaw forward is usually the next most reliable option. Other options include postioning devices (to keep you off your back), weight loss, and surgery including a tongue pacing device. There is more detail about some of these options below.    Important tips for using CPAP and similar devices   Know your equipment:  CPAP is continuous positive airway pressure that prevents obstructive sleep apnea by keeping the throat from collapsing while you are sleeping. In most cases, the device is  smart  and can slowly self-adjusts if your throat collapses and keeps a record every day of how well you are treated-this information is available to you and your care team.  BPAP is bilevel positive airway pressure that keeps your throat open and also assists each breath with a pressure boost to maintain adequate breathing.  Special kinds of BPAP are used in patients who have inadequate breathing from lung or heart disease. In most cases, the device is  smart  and can slowly self-adjusts to assist breathing. Like CPAP, the device keeps a record of how well you are treated.  Your mask is your connection to the device. You get to choose what feels most comfortable and the staff will help to make sure if fits. Here: are some examples of the different masks that are available:       Key points to remember on your journey with sleep apnea:  1. Sleep study.  PAP devices often need to be adjusted during a sleep study to show that they are effective and adjusted right.  2. Good tips to remember: Try wearing just the mask during a quiet time during the day so your body adapts to wearing it. A humidifier is recommended for comfort in most cases to prevent drying of your nose and throat. Allergy medication from your provider may help you if you are having nasal congestion.  3. Getting settled-in. It takes more than one night for most of us to get used to wearing a mask. Try wearing just  the mask during a quiet time during the day so your body adapts to wearing it. A humidifier is recommended for comfort in most cases. Our team will work with you carefully on the first day and will be in contact within 4 days and again at 2 and 4 weeks for advice and remote device adjustments. Your therapy is evaluated by the device each day.   4. Use it every night. The more you are able to sleep naturally for 7-8 hours, the more likely you will have good sleep and to prevent health risks or symptoms from sleep apnea. Even if you use it 4 hours it helps. Occasionally all of us are unable to use a medical therapy, in sleep apnea, it is not dangerous to miss one night.   5. Communicate. Call our skilled team on the number provided on the first day if your visit for problems that make it difficult to wear the device. Over 2 out of 3 patients can learn to wear the device long-term with help from our team. Remember to call our team or your sleep providers if you are unable to wear the device as we may have other solutions for those who cannot adapt to mask CPAP therapy. It is recommended that you sleep your sleep provider within the first 3 months and yearly after that if you are not having problems.   Take care of your equipment. Make sure you clean your mask and tubing using directions every day and that your filter and mask are replaced as recommended or if they are not working.     BESIDES CPAP, WHAT OTHER THERAPIES ARE THERE?    Positioning Device  Positioning devices are generally used when sleep apnea is mild and only occurs on your back.This example shows a pillow that straps around the waist. It may be appropriate for those whose sleep study shows milder sleep apnea that occurs primarily when lying flat on one's back. Preliminary studies have shown benefit but effectiveness at home may need to be verified by a home sleep test. These devices are generally not covered by medical insurance.  Positioning  Device  Positioning devices are generally used when sleep apnea is mild and only occurs on your back.This example shows a pillow that straps around the waist. It may be appropriate for those whose sleep study shows milder sleep apnea that occurs primarily when lying flat on one's back. Preliminary studies have shown benefit but effectiveness at home may need to be verified by a home sleep test. These devices are generally not covered by medical insurance.    Try amazon  or  ebay for a bumper snore pillow, or backpack w/ chest strap stuffed w/ pillow or t shirt 2 / pockets sew in tennis balls    Oral Appliance  What is oral appliance therapy?  An oral appliance device fits on your teeth at night like a retainer used after having braces. The device is made by a specialized dentist and requires several visits over 1-2 months before a manufactured device is made to fit your teeth and is adjusted to prevent your sleep apnea. Once an oral device is working properly, snoring should be improved. A home sleep test may be recommended at that time if to determine whether the sleep apnea is adequately treated.       Some things to remember:  -Oral devices are often, but not always, covered by your medical insurance. Be sure to check with your insurance provider.   -If you are referred for oral therapy, you will be given a list of specialized dentists to consider or you may choose to visit the Web site of the American Academy of Dental Sleep Medicine  -Oral devices are less likely to work if you have severe sleep apnea or are extremely overweight.     More detailed information  An oral appliance is a small acrylic device that fits over the upper and lower teeth  (similar to a retainer or a mouth guard). This device slightly moves jaw forward, which moves the base of the tongue forward, opens the airway, improves breathing for effective treat snoring and obstructive sleep apnea in perhaps 7 out of 10 people .  The best working  devices are custom-made by a dental device  after a mold is made of the teeth 1, 2, 3.  When is an oral appliance indicated?  Oral appliance therapy is recommended as a first-line treatment for patients with primary snoring, mild sleep apnea, and for patients with moderate sleep apnea who prefer appliance therapy to use of CPAP4, 5. Severity of sleep apnea is determined by sleep testing and is based on the number of respiratory events per hour of sleep.   How successful is oral appliance therapy?  The success rate of oral appliance therapy in patients with mild sleep apnea is 75-80% while in patients with moderate sleep apnea it is 50-70%. The chance of success in patients with severe sleep apnea is 40-50%. The research also shows that oral appliances have a beneficial effect on the cardiovascular health of RUBY patients at the same magnitude as CPAP therapy7.  Oral appliances should be a second-line treatment in cases of severe sleep apnea, but if not completely successful then a combination therapy utilizing CPAP plus oral appliance therapy may be effective. Oral appliances tend to be effective in a broad range of patients although studies show that the patients who have the highest success are females, younger patients, those with milder disease, and less severe obesity. 3, 6.   Finding a dentist that practices dental sleep medicine  Specific training is available through the American Academy of Dental Sleep Medicine for dentists interested in working in the field of sleep. To find a dentist who is educated in the field of sleep and the use of oral appliances, near you, visit the Web site of the American Academy of Dental Sleep Medicine.    References  1. Alicia et al. Objectively measured vs self-reported compliance during oral appliance therapy for sleep-disordered breathing. Chest 2013; 144(5): 1644-1380.  2. Lex et al. Objective measurement of compliance during oral appliance therapy  for sleep-disordered breathing. Thorax 2013; 68(1): 91-96.  3. Shelby et al. Mandibular advancement devices in 620 men and women with RUBY and snoring: tolerability and predictors of treatment success. Chest 2004; 125: 0048-1656.  4. Lynn et al. Oral appliances for snoring and RUBY: a review. Sleep 2006; 29: 244-262.  5. Rey et al. Oral appliance treatment for URBY: an update. J Clin Sleep Med 2014; 10(2): 215-227.  6. Amy et al. Predictors of OSAH treatment outcome. J Dent Res 2007; 86: 3718-7323.      Weight Loss:    Weight loss is a long-term strategy that may improve sleep apnea in some patients.    Weight management is a personal decision.  If you are interested in exploring weight loss strategies, the following discussion covers the impact on weight loss on sleep apnea and the approaches that may be successful.    Weight loss decreases severity of sleep apnea in most people with obesity. For those with mild obesity who have developed snoring with weight gain, even 15-30 pound weight loss can improve and occasionally eliminate sleep apnea.  Structured and life-long dietary and health habits are necessary to lose weight and keep healthier weight levels.     Though there may be significant health benefits from weight loss, long-term weight loss is very difficult to achieve- studies show success with dietary management in less than 10% of people. In addition, substantial weight loss may require years of dietary control and may be difficult if patients have severe obesity. In these cases, surgical management may be considered.  Finally, older individuals who have tolerated obesity without health complications may be less likely to benefit from weight loss strategies.      More detailed information    Your BMI is Body mass index is 26.01 kg/(m^2).  Body mass index (BMI) is one way to tell whether you are at a healthy weight, overweight, or obese. It measures your weight in relation to your  height.  A BMI of 18.5 to 24.9 is in the healthy range. A person with a BMI of 25 to 29.9 is considered overweight, and someone with a BMI of 30 or greater is considered obese. More than two-thirds of American adults are considered overweight or obese.  Being overweight or obese increases the risk for further weight gain. Excess weight may lead to heart disease and diabetes.  Creating and following plans for healthy eating and physical activity may help you improve your health.  Weight control is part of healthy lifestyle and includes exercise, emotional health, and healthy eating habits. Careful eating habits lifelong are the mainstay of weight control. Though there are significant health benefits from weight loss, long-term weight loss with diet alone may be very difficult to achieve- studies show long-term success with dietary management in less than 10% of people. Attaining a healthy weight may be especially difficult to achieve in those with severe obesity. In some cases, medications, devices and surgical management might be considered.  What can you do?  If you are overweight or obese and are interested in methods for weight loss, you should discuss this with your provider.     Consider reducing daily calorie intake by 500 calories.     Keep a food journal.     Avoiding skipping meals, consider cutting portions instead.    Diet combined with exercise helps maintain muscle while optimizing fat loss. Strength training is particularly important for building and maintaining muscle mass. Exercise helps reduce stress, increase energy, and improves fitness. Increasing exercise without diet control, however, may not burn enough calories to loose weight.       Start walking three days a week 10-20 minutes at a time    Work towards walking thirty minutes five days a week     Eventually, increase the speed of your walking for 1-2 minutes at time    In addition, we recommend that you review healthy lifestyles and methods  for weight loss available through the National Institutes of Health patient information sites:  http://win.niddk.nih.gov/publications/index.htm    And look into health and wellness programs that may be available through your health insurance provider, employer, local community center, or nayely club.    Weight management plan: Patient was referred to their primary physician to discuss a diet and exercise plan.    Surgery:    Surgery for obstructive sleep apnea is considered generally only when other therapies fail to work. Surgery may be discussed with you if you are having a difficult time tolerating CPAP and or when there is an abnormal structure that requires surgical correction.  Nose and throat surgeries often enlarge the airway to prevent collapse.  Most of these surgeries create pain for 1-2 weeks and up to half of the most common surgeries are not effective throughout life.  You should carefully discuss the benefits and drawbacks to surgery with your sleep provider and surgeon to determine if it is the best solution for you.   More information  Surgery for RUBY is directed at areas that are responsible for narrowing or complete obstruction of the airway during sleep.  There are a wide range of procedures available to enlarge and/or stabilize the airway to prevent blockage of breathing in the three major areas where it can occur: the palate, tongue, and nasal regions.  Successful surgical treatment depends on the accurate identification of the factors responsible for obstructive sleep apnea in each person.  A personalized approach is required because there is no single treatment that works well for everyone.  Because of anatomic variation, consultation with an examination by a sleep surgeon is a critical first step in determining what surgical options are best for each patient.  In some cases, examination during sedation may be recommended in order to guide the selection of procedures.  Patients will be  counseled about risks and benefits as well as the typical recovery course after surgery. Surgery is typically not a cure for a person s RUBY.  However, surgery will often significantly improve one s RUBY severity (termed  success rate ).  Even in the absence of a cure, surgery will decrease the cardiovascular risk associated with OSA7; improve overall quality of life8 (sleepiness, functionality, sleep quality, etc).      Palate Procedures:  Patients with RUBY often have narrowing of their airway in the region of their tonsils and uvula.  The goals of palate procedures are to widen the airway in this region as well as to help the tissues resist collapse.  Modern palate procedure techniques focus on tissue conservation and soft tissue rearrangement, rather than tissue removal.  Often the uvula is preserved in this procedure. Residual sleep apnea is common in patient after pharyngoplasty with an average reduction in sleep apnea events of 33%2.      Tongue Procedures:  ExamWhile patients are awake, the muscles that surround the throat are active and keep this region open for breathing. These muscles relax during sleep, allowing the tongue and other structures to collapse and block breathing.  There are several different tongue procedures available.  Selection of a tongue base procedure depends on characteristics seen on physical exam.  Generally, procedures are aimed at removing bulky tissues in this area or preventing the back of the tongue from falling back during sleep.  Success rates for tongue surgery range from 50-62%3.    Hypoglossal Nerve Stimulation:  Hypoglossal nerve stimulation has recently received approval from the [place] Food and Drug Administration for the treatment of obstructive sleep apnea.  This is based on research showing that the system was safe and effective in treating sleep apnea6.  Results showed that the median AHI score decreased 68%, from 29.3 to 9.0. This therapy uses an implant system  that senses breathing patterns and delivers mild stimulation to airway muscles, which keeps the airway open during sleep.  The system consists of three fully implanted components: a small generator (similar in size to a pacemaker), a breathing sensor, and a stimulation lead.  Using a small handheld remote, a patient turns the therapy on before bed and off upon awakening.    Candidates for this device must be greater than 22 years of age, have moderate to severe RUBY (AHI between 20-65), BMI less than 32, have tried CPAP/oral appliance without success, and have appropriate upper airway anatomy (determined by a sleep endoscopy performed by Dr. Suero).    Hypoglossal Nerve Stimulation Pathway:    The sleep surgeon s office will work with the patient through the insurance prior-authorization process (including communications and appeals).    Nasal Procedures:  Nasal obstruction can interfere with nasal breathing during the day and night.  Studies have shown that relief of nasal obstruction can improve the ability of some patients to tolerate positive airway pressure therapy for obstructive sleep apnea1.  Treatment options include medications such as nasal saline, topical corticosteroid and antihistamine sprays, and oral medications such as antihistamines or decongestants. Non-surgical treatments can include external nasal dilators for selected patients. If these are not successful by themselves, surgery can improve the nasal airway either alone or in combination with these other options.      Combination Procedures:  Combination of surgical procedures and other treatments may be recommended, particularly if patients have more than one area of narrowing or persistent positional disease.  The success rate of combination surgery ranges from 66-80%2,3.    References  1. Julius TERRELL. The Role of the Nose in Snoring and Obstructive Sleep Apnoea: An Update.  Eur Arch Otorhinolaryngol. 2011; 268: 1365-73.  2.  Judit NO; Lashell  JA; Neymar JR; Pallanch JF; León MB; Marta SG; Brandon ECHOLS. Surgical modifications of the upper airway for obstructive sleep apnea in adults: a systematic review and meta-analysis. SLEEP 2010;33(10):6039-1747. Lauren BESS. Hypopharyngeal surgery in obstructive sleep apnea: an evidence-based medicine review.  Arch Otolaryngol Head Neck Surg. 2006 Feb;132(2):206-13.  3. Shady YH1, Grayson Y, Santi JANAY. The efficacy of anatomically based multilevel surgery for obstructive sleep apnea. Otolaryngol Head Neck Surg. 2003 Oct;129(4):327-35.  4. Lauren BESS, Goldberg A. Hypopharyngeal Surgery in Obstructive Sleep Apnea: An Evidence-Based Medicine Review. Arch Otolaryngol Head Neck Surg. 2006 Feb;132(2):206-13.  5. Donna ASTUDILLO et al. Upper-Airway Stimulation for Obstructive Sleep Apnea.  N Engl J Med. 2014 Jan 9;370(2):139-49.  6. Eric Y et al. Increased Incidence of Cardiovascular Disease in Middle-aged Men with Obstructive Sleep Apnea. Am J Respir Crit Care Med; 2002 166: 159-165  7. Gustafson EM et al. Studying Life Effects and Effectiveness of Palatopharyngoplasty (SLEEP) study: Subjective Outcomes of Isolated Uvulopalatopharyngoplasty. Otolaryngol Head Neck Surg. 2011; 144: 623-631.              Your BMI is Body mass index is 26.01 kg/(m^2).  Weight management is a personal decision.  If you are interested in exploring weight loss strategies, the following discussion covers the approaches that may be successful. Body mass index (BMI) is one way to tell whether you are at a healthy weight, overweight, or obese. It measures your weight in relation to your height.  A BMI of 18.5 to 24.9 is in the healthy range. A person with a BMI of 25 to 29.9 is considered overweight, and someone with a BMI of 30 or greater is considered obese. More than two-thirds of American adults are considered overweight or obese.  Being overweight or obese increases the risk for further weight gain. Excess weight may lead to heart disease and  diabetes.  Creating and following plans for healthy eating and physical activity may help you improve your health.  Weight control is part of healthy lifestyle and includes exercise, emotional health, and healthy eating habits. Careful eating habits lifelong are the mainstay of weight control. Though there are significant health benefits from weight loss, long-term weight loss with diet alone may be very difficult to achieve- studies show long-term success with dietary management in less than 10% of people. Attaining a healthy weight may be especially difficult to achieve in those with severe obesity. In some cases, medications, devices and surgical management might be considered.  What can you do?  If you are overweight or obese and are interested in methods for weight loss, you should discuss this with your provider.     Consider reducing daily calorie intake by 500 calories.     Keep a food journal.     Avoiding skipping meals, consider cutting portions instead.    Diet combined with exercise helps maintain muscle while optimizing fat loss. Strength training is particularly important for building and maintaining muscle mass. Exercise helps reduce stress, increase energy, and improves fitness. Increasing exercise without diet control, however, may not burn enough calories to loose weight.       Start walking three days a week 10-20 minutes at a time    Work towards walking thirty minutes five days a week     Eventually, increase the speed of your walking for 1-2 minutes at time    In addition, we recommend that you review healthy lifestyles and methods for weight loss available through the National Institutes of Health patient information sites:  http://win.niddk.nih.gov/publications/index.htm    And look into health and wellness programs that may be available through your health insurance provider, employer, local community center, or nayely club.    Weight management plan: Patient was referred to their PCP to  discuss a diet and exercise plan.

## 2017-05-26 NOTE — NURSING NOTE
"Chief Complaint   Patient presents with     Consult     Snoring       Initial /69  Pulse 65  Resp 18  Ht 1.778 m (5' 10\")  Wt 82.2 kg (181 lb 4.8 oz)  SpO2 97%  BMI 26.01 kg/m2 Estimated body mass index is 26.01 kg/(m^2) as calculated from the following:    Height as of this encounter: 1.778 m (5' 10\").    Weight as of this encounter: 82.2 kg (181 lb 4.8 oz).  Medication Reconciliation: complete   Neck 41cm      Harrison CARRINGTON      "

## 2017-05-27 NOTE — PROGRESS NOTES
Allergies:    Allergies   Allergen Reactions     Lisinopril Cough       Medications:    Current Outpatient Prescriptions   Medication Sig Dispense Refill     metoprolol (TOPROL XL) 50 MG 24 hr tablet Take 1 tablet (50 mg) by mouth daily 90 tablet 3     warfarin (COUMADIN) 5 MG tablet Take as directed by anticoagulation clinic.  Current dose 7.5 mg Wed, 10 mg rest of week 172 tablet 1     simvastatin (ZOCOR) 40 MG tablet Take 1 tablet (40 mg) by mouth At Bedtime 90 tablet 3     losartan (COZAAR) 25 MG tablet Take 0.5 tablets (12.5 mg) by mouth daily (Patient taking differently: Take 12.5 mg by mouth every evening ) 45 tablet 3     allopurinol (ZYLOPRIM) 100 MG tablet Take 1 tablet (100 mg) by mouth daily (Patient taking differently: Take 100 mg by mouth every evening ) 90 tablet 1     PROVIDER DOSED MANAGED WARFARIN, COUMADIN, OUTPATIENT Per coumadin clinic       Calcium Carbonate-Vitamin D (CALCIUM 600 + D OR) Take 1 tablet by mouth every morning          Problem List:  Patient Active Problem List    Diagnosis Date Noted     Trigger ring finger of left hand 11/16/2015     Priority: Medium     Hyperlipidemia with target LDL less than 100 10/21/2013     Priority: Medium     Diagnosis updated by automated process. Provider to review and confirm.       Paroxysmal atrial fibrillation (H) 02/25/2015     Long term current use of anticoagulant therapy 02/25/2015     Problem list name updated by automated process. Provider to review       Finger injury 10/08/2014     BCC (basal cell carcinoma), face 05/16/2013     Need for prophylactic antibiotic 04/12/2013     Aortic valve and past endocarditis        S/P AVR 01/25/2013     Endocarditis 01/21/2013     Cultures negative but 16S rRNA PCR showed Staph capitis.  Treated with Dapto and RIF x 8 weeks.       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 Provider to  review       LBBB (left bundle branch block) 01/28/2012     Received CRT-D;  msec       Pneumothorax, left 01/28/2012     Observed following CRT-D implant; small. Observe and repeat CXR       Heart failure, chronic systolic (H) 01/28/2012     Stable; NYHA classII       Cardiomyopathy, dilated, nonischemic (H) 01/28/2012     Mild CAD; EF 15-20%       CKD (chronic kidney disease) stage 3, GFR 30-59 ml/min 01/28/2012     Dyslipidemia 01/28/2012     On Pravavastatin       Aortic valve stenosis, severe 07/14/2011     Chronic systolic heart failure (H) 07/14/2011     Bicuspid aortic valve 07/14/2011     S/P aortic valve replacement 07/14/2011     Smoking hx 07/14/2011     Rotator cuff (capsule) sprain 02/17/2009     Other postprocedural status(V45.89) 02/17/2009        Past Medical/Surgical History:  Past Medical History:   Diagnosis Date     BCC (basal cell carcinoma), face 5/16/2013     Bicuspid aortic valve      Chronic systolic heart failure (H)      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 Tx with Dapto/RIFx 8 weeks.     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     Paroxysmal a-fib (H)     Post-op 7/14/2011, 1/26/13     Rotator Cuff (Capsule) Sprain and Strain      S/P aortic valve replacement     7/14/2011, re-do 1/25/13 due to endocarditis     Smoking hx      Thrombocytopenia (H)      Past Surgical History:   Procedure Laterality Date     ANESTHESIA CARDIOVERSION  7/18/2011    Procedure:ANESTHESIA CARDIOVERSION; Cardioversion; Surgeon:GENERIC ANESTHESIA PROVIDER; Location:[facility]     COLONOSCOPY       CORONARY ANGIOGRAPHY ADULT ORDER       CYSTOSCOPY, BIOPSY URETHRA N/A 4/3/2017    Procedure: CYSTOSCOPY, BIOPSY URETHRA;  Surgeon: Marlena Mora MD;  Location: [facility] OR     ENDOSCOPY UPPER, COLONOSCOPY, COMBINED       HC REMOV & REPLACE IMPLT DEFIB PULSE GEN MULT LEAD   "12/3/2015          HEMORRHOID SURGERY       IMPLANT AUTOMATIC IMPLANTABLE CARDIOVERTER DEFIBRILLATOR       MOHS MICROGRAPHIC PROCEDURE       ORTHOPEDIC SURGERY      shoulder,right     REDO STERNOTOMY REPLACE VALVE AORTIC  1/25/2013    Procedure: REDO STERNOTOMY REPLACE VALVE AORTIC;  Redo Sternotomy, Redo Aortic Valve Replacement on pump oxygenator. Intraoperative Transesophageal Echocardiogram;  Surgeon: Stephanie Hampton MD;  Location: [facility] OR     REPAIR VALVE MITRAL  2013     REPLACE VALVE AORTIC  7/14/2011    Procedure:REPLACE VALVE AORTIC; Median Sternotomy, Bioprosthesis,  Aortic Valve replacement on pump with oxygenator. Intra-operative Transesophogeal Echocardiogram.; Surgeon:STEPHANIE HAMPTON; Location:[facility] OR     teeth extractions             Physical Examination:  Vitals: /69  Pulse 65  Resp 18  Ht 1.778 m (5' 10\")  Wt 82.2 kg (181 lb 4.8 oz)  SpO2 97%  BMI 26.01 kg/m2  BMI= Body mass index is 26.01 kg/(m^2).    Neck Cir (cm): 41 cm    [place] Total Score 5/26/2017   Total score - [place] 5       GENERAL APPEARANCE: healthy, alert and no distress  EYES: Eyes grossly normal to inspection  HENT: oropharynx crowded, uvula elongated, soft palate dependent with redundant tissue and nares patent  NECK: thyroid normal to palpation  RESP: lungs clear to auscultation - no rales, rhonchi or wheezes  CV: regular rates and rhythm, normal S1 S2, no S3 or S4 and no murmur, click or rub  Extremities are without clubbing, edema  Musculoskeletal:Moves without difficulty  Mallampati Class: IV.  Tonsillar Stage: 1  hidden by pillars.  Dental: Dentition in fair condition.  Good advancement of lower jaw without tmd  Skin: without facial rash        CC: Joaquina Nowak        "

## 2017-05-27 NOTE — PROGRESS NOTES
DATE OF VISIT:  05/26/2017      [facility]      CHIEF COMPLAINT:  I have been asked by provider Joaquina Arana see Mr. Brooks Summers in consultation, for possible obstructive sleep apnea in the setting of afib, s/p ablation and PPM, and valve surgery.     HISTORY OF PRESENT ILLNESS:  The patient, whose bed partner and wife is a nurse has noted witnessed apneas, loud snoring and occasional snort or gasping arousals for the past couple of years.  This was not present after his last heart surgery, but over time has developed.  Patient rates primarily on stomach and sides.  No signs of orexin deficiency, no abnormal behaviors with the exception of an occasional nightmare.  He does report every 2-3 days a tingling in his lower extremities that comes at the end of the day.  He can walk it out, a warm bath helps and sometimes his bed partner has massaged the lower extremities and that also does give him relief.  Last ferritin was in the 100s, quite normal.      SLEEP SCHEDULE:  Enters bed at 8:00 p.m. throughout the week and exits bed at 4:00 a.m. on workdays, 6:30 a.m. on weekends.  He works from 5:00 in the morning until the end of his shift and likes to get there early.  When he enters bed, the TV still might be on with his bed partner until about 1:00 in the morning.  It does not make it more difficult for him to fall asleep but as I had mentioned to them may be affecting his quality of sleep.  He often will set his alarm an hour and a half earlier so that he does not miss getting up for work.  He can fall back asleep after that alarm after he realizes he has another hour and a half.  Wakes up zero to twice a night to turn over or zero to 1 time a night he will use the bathroom.  Total sleep time is probably around 7 hours on workdays, around 8-1/2 hours on weekends.      SOCIAL AND PERSONAL HISTORY:  He is , lives with his wife.  He is an  in the [employer].   Sleep environment is conducive to good sleep.  He has several family members or friends who use CPAP and have  had an excellent response with improvement in their sleep.   Family members snore.  No one with a sleep diagnosis.  Does use alcohol to help himself fall asleep.  He will have a beer or two between 5:00 and 6:00 p.m.  He is getting into bed at 8:00 p.m.  He states he sleeps real well until 1:00, 1:30 in the morning.  We did have a discussion on the effects of alcohol and sleep quality.  Minimal caffeine.  Does exercise.      [place] Sleepiness Scale today is 5.      REVIEW OF SYSTEMS:  A 14-point comprehensive review of systems was  completed and negative with the exception of the following:   CONSTITUTIONAL:  Allergy to lisinopril.    EYES:  Some vision changes.   CARDIOVASCULAR:  Heart disease.  He is status post aortic valve replacement x2.  He has had what sounds like an ablation and a pacemaker placed for atrial fibrillation, has a remote history of heart failure; however, his last echo does show a normal ejection fraction.  It was done on 10/10/2016.  His LVEF is 55-60%.  Global right ventricular function is mildly reduced.  He has a little bit of LVH on the left side.  There is trace to mild eccentric aortic insufficiency, inferior vena cava normal in size with preserved variability.  Estimated right atrial pressure was less than 3 mmHg.     NEUROLOGIC:  RLS.   LUNGS:  Shortness of breath with activity but can do 2 flights of stairs without stopping without any difficulty.   MUSCLES AND BONES:  Some muscle and joint pain.         Allergies:    Allergies   Allergen Reactions     Lisinopril Cough       Medications:    Current Outpatient Prescriptions   Medication Sig Dispense Refill     metoprolol (TOPROL XL) 50 MG 24 hr tablet Take 1 tablet (50 mg) by mouth daily 90 tablet 3     warfarin (COUMADIN) 5 MG tablet Take as directed by anticoagulation clinic.  Current dose 7.5 mg Wed, 10 mg rest of week 172  tablet 1     simvastatin (ZOCOR) 40 MG tablet Take 1 tablet (40 mg) by mouth At Bedtime 90 tablet 3     losartan (COZAAR) 25 MG tablet Take 0.5 tablets (12.5 mg) by mouth daily (Patient taking differently: Take 12.5 mg by mouth every evening ) 45 tablet 3     allopurinol (ZYLOPRIM) 100 MG tablet Take 1 tablet (100 mg) by mouth daily (Patient taking differently: Take 100 mg by mouth every evening ) 90 tablet 1     PROVIDER DOSED MANAGED WARFARIN, COUMADIN, OUTPATIENT Per coumadin clinic       Calcium Carbonate-Vitamin D (CALCIUM 600 + D OR) Take 1 tablet by mouth every morning          Problem List:  Patient Active Problem List    Diagnosis Date Noted     Trigger ring finger of left hand 11/16/2015     Priority: Medium     Hyperlipidemia with target LDL less than 100 10/21/2013     Priority: Medium     Diagnosis updated by automated process. Provider to review and confirm.       Paroxysmal atrial fibrillation (H) 02/25/2015     Long term current use of anticoagulant therapy 02/25/2015     Problem list name updated by automated process. Provider to review       Finger injury 10/08/2014     BCC (basal cell carcinoma), face 05/16/2013     Need for prophylactic antibiotic 04/12/2013     Aortic valve and past endocarditis        S/P AVR 01/25/2013     Endocarditis 01/21/2013     Cultures negative but 16S rRNA PCR showed Staph capitis.  Treated with Dapto and RIF x 8 weeks.       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 Provider to review       LBBB (left bundle branch block) 01/28/2012     Received CRT-D;  msec       Pneumothorax, left 01/28/2012     Observed following CRT-D implant; small. Observe and repeat CXR       Heart failure, chronic systolic (H) 01/28/2012     Stable; NYHA classII       Cardiomyopathy, dilated, nonischemic (H) 01/28/2012     Mild CAD; EF 15-20%       CKD (chronic kidney  disease) stage 3, GFR 30-59 ml/min 01/28/2012     Dyslipidemia 01/28/2012     On Pravavastatin       Aortic valve stenosis, severe 07/14/2011     Chronic systolic heart failure (H) 07/14/2011     Bicuspid aortic valve 07/14/2011     S/P aortic valve replacement 07/14/2011     Smoking hx 07/14/2011     Rotator cuff (capsule) sprain 02/17/2009     Other postprocedural status(V45.89) 02/17/2009        Past Medical/Surgical History:  Past Medical History:   Diagnosis Date     BCC (basal cell carcinoma), face 5/16/2013     Bicuspid aortic valve      Chronic systolic heart failure (H)      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 Tx with Dapto/RIFx 8 weeks.     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     Paroxysmal a-fib (H)     Post-op 7/14/2011, 1/26/13     Rotator Cuff (Capsule) Sprain and Strain      S/P aortic valve replacement     7/14/2011, re-do 1/25/13 due to endocarditis     Smoking hx      Thrombocytopenia (H)      Past Surgical History:   Procedure Laterality Date     ANESTHESIA CARDIOVERSION  7/18/2011    Procedure:ANESTHESIA CARDIOVERSION; Cardioversion; Surgeon:GENERIC ANESTHESIA PROVIDER; Location:[facility]     COLONOSCOPY       CORONARY ANGIOGRAPHY ADULT ORDER       CYSTOSCOPY, BIOPSY URETHRA N/A 4/3/2017    Procedure: CYSTOSCOPY, BIOPSY URETHRA;  Surgeon: Marlena Mora MD;  Location: [facility] OR     ENDOSCOPY UPPER, COLONOSCOPY, COMBINED       HC REMOV & REPLACE IMPLT DEFIB PULSE GEN MULT LEAD  12/3/2015          HEMORRHOID SURGERY       IMPLANT AUTOMATIC IMPLANTABLE CARDIOVERTER DEFIBRILLATOR       MOHS MICROGRAPHIC PROCEDURE       ORTHOPEDIC SURGERY      shoulder,right     REDO STERNOTOMY REPLACE VALVE AORTIC  1/25/2013    Procedure: REDO STERNOTOMY REPLACE VALVE AORTIC;  Redo Sternotomy, Redo Aortic Valve Replacement on pump oxygenator. Intraoperative Transesophageal  "Echocardiogram;  Surgeon: Stephanie Hampton MD;  Location: [facility] OR     REPAIR VALVE MITRAL  2013     REPLACE VALVE AORTIC  7/14/2011    Procedure:REPLACE VALVE AORTIC; Median Sternotomy, Bioprosthesis,  Aortic Valve replacement on pump with oxygenator. Intra-operative Transesophogeal Echocardiogram.; Surgeon:STEPHANIE HAMPTON; Location:[facility] OR     teeth extractions             Physical Examination:  Vitals: /69  Pulse 65  Resp 18  Ht 1.778 m (5' 10\")  Wt 82.2 kg (181 lb 4.8 oz)  SpO2 97%  BMI 26.01 kg/m2  BMI= Body mass index is 26.01 kg/(m^2).    Neck Cir (cm): 41 cm    [place] Total Score 5/26/2017   Total score - [place] 5       GENERAL APPEARANCE: healthy, alert and no distress  EYES: Eyes grossly normal to inspection  HENT: oropharynx crowded, uvula elongated, soft palate dependent with redundant tissue and nares patent  NECK: thyroid normal to palpation  RESP: lungs clear to auscultation - no rales, rhonchi or wheezes  CV: regular rates and rhythm, normal S1 S2, no S3 or S4 and no murmur, click or rub  Extremities are without clubbing, edema  Musculoskeletal:Moves without difficulty  Mallampati Class: IV.  Tonsillar Stage: 1  hidden by pillars.  Dental: Dentition in fair condition.  Good advancement of lower jaw without tmd  Skin: without facial rash     ASSESSMENT AND PLAN:  It is my impression that Mr. Summers has a likely greater than 50% risk of having obstructive sleep apnea with a history of atrial fibrillation and with his age.  He does have minimal consequences due to possible sleep-disordered breathing; however, apparent from his wife is the fact that he does nap at times.  The following plan of care has been developed:   1.  Loud snoring with sleep fragmented with witnessed apneas in a patient with coronary artery disease.  I have discussed modalities of diagnosis and he would prefer to do a home study.  He understands the need to protect his sleep time in the morning as well as use nonpharmacologic " measures with regard to his lower extremity discomfort on a regular basis so that would not be a problem.  Otherwise, I see that he has no other sleep problems standing in the way.  I will be in touch with him following his ferritin level regarding treatment of intermittent restless legs syndrome and we will determine at that time whether we should move forward with a home study or an in-lab study.   2.  Restless legs syndrome.  I have given him  The American Academy of Sleep [employer] pamphlet, ordered a ferritin level and have recommended massage, warm showers and stretching at the end of the day done in a routine fashion.  I will write him regarding the ferritin level and possible treatments.   3.  Sleep habits.  His bed partner does listen to the television in the night while he is sleeping.  It does awaken him at times due to snoring and apneas.  Otherwise, his sleep in the last third of the night is likely fragmented from having alcohol within 3 hours of bedtime.  I have encouraged him to not have it within that window and we will see if this does improve his quality of sleep.  Other areas for improvement include setting 1 alarm and kind of sticking with that throughout the night.      Thank you for allowing me to participate in this kind man's care.      Time spent with the patient and bed partner 60 minutes with greater than 50% spent in counseling, education and coordination of care.         SUKH CARREON, DOMINICK             D: 2017 14:16   T: 2017 18:06   MT: SAIRA      Name:     DAR CURZ   MRN:      [id number]        Account:      [id number]   :      1945           Visit Date:   2017      Document: [id number]

## 2017-06-07 ENCOUNTER — ANTICOAGULATION THERAPY VISIT (OUTPATIENT)
Dept: ANTICOAGULATION | Facility: CLINIC | Age: 72
End: 2017-06-07
Payer: MEDICARE

## 2017-06-07 DIAGNOSIS — I48.0 PAROXYSMAL ATRIAL FIBRILLATION (H): ICD-10-CM

## 2017-06-07 DIAGNOSIS — Z95.2 S/P AVR (AORTIC VALVE REPLACEMENT): ICD-10-CM

## 2017-06-07 DIAGNOSIS — Z95.2 S/P AORTIC VALVE REPLACEMENT: ICD-10-CM

## 2017-06-07 DIAGNOSIS — Z79.01 LONG TERM CURRENT USE OF ANTICOAGULANT THERAPY: ICD-10-CM

## 2017-06-07 LAB — INR POINT OF CARE: 3.4 (ref 0.86–1.14)

## 2017-06-07 PROCEDURE — 36416 COLLJ CAPILLARY BLOOD SPEC: CPT

## 2017-06-07 PROCEDURE — 85610 PROTHROMBIN TIME: CPT | Mod: QW

## 2017-06-07 PROCEDURE — 99207 ZZC NO CHARGE NURSE ONLY: CPT

## 2017-06-07 RX ORDER — WARFARIN SODIUM 5 MG/1
TABLET ORAL
Qty: 172 TABLET | Refills: 1 | COMMUNITY
Start: 2017-06-07 | End: 2017-08-30

## 2017-06-07 NOTE — PROGRESS NOTES
ANTICOAGULATION FOLLOW-UP CLINIC VISIT    Patient Name:  Brooks Summers  Date:  6/7/2017  Contact Type:  Face to Face    SUBJECTIVE:     Patient Findings     Positives No Problem Findings (no concerns or problems reported), Unexplained INR or factor level change (no reason found for elevated INR )           OBJECTIVE    INR Protime   Date Value Ref Range Status   06/07/2017 3.4 (A) 0.86 - 1.14 Final       ASSESSMENT / PLAN  INR assessment SUPRA    Recheck INR In: 2 WEEKS    INR Location Clinic      Anticoagulation Summary as of 6/7/2017     INR goal 2.0-3.0   Today's INR 3.4!   Maintenance plan 7.5 mg (5 mg x 1.5) on Mon, Wed, Fri; 10 mg (5 mg x 2) all other days   Full instructions 7.5 mg on Mon, Wed, Fri; 10 mg all other days   Weekly total 62.5 mg   Plan last modified Lindsey Epstein RN (6/7/2017)   Next INR check 6/21/2017   Priority INR   Target end date Indefinite    Indications   S/P aortic valve replacement [Z95.2]  Paroxysmal atrial fibrillation (H) [I48.0]  Long term current use of anticoagulant therapy [Z79.01]         Anticoagulation Episode Summary     INR check location     Preferred lab     Send INR reminders to [facility]    Comments  * warfarin 5 mg tabs      Anticoagulation Care Providers     Provider Role Specialty Phone number    Edin Mays MD [facility] [phone]            See the Encounter Report to view Anticoagulation Flowsheet and Dosing Calendar (Go to Encounters tab in chart review, and find the Anticoagulation Therapy Visit)        Lindsey Epstein RN

## 2017-06-07 NOTE — MR AVS SNAPSHOT
Brooks Summers   6/7/2017 1:45 PM   Anticoagulation Therapy Visit    Description:  72 year old male   Provider:  LL ANTI COAG   Department:  [facility]           INR as of 6/7/2017     Today's INR 3.4!      Anticoagulation Summary as of 6/7/2017     INR goal 2.0-3.0   Today's INR 3.4!   Full instructions 7.5 mg on Mon, Wed, Fri; 10 mg all other days   Next INR check 6/21/2017    Indications   S/P aortic valve replacement [Z95.2]  Paroxysmal atrial fibrillation (H) [I48.0]  Long term current use of anticoagulant therapy [Z79.01]         Description     Recheck INR 2 weeks, 6/21/17  Decrease warfarin to 7.5 mg Mon, Wed, Fri, 10 mg rest of week , have a serving of greens today      Your next Anticoagulation Clinic appointment(s)     Jun 21, 2017  1:00 PM CDT   Anticoagulation Visit with [facility] ANTI COAG   [facility]    [address] [address] [phone]              Contact Numbers     Please call [phone] to cancel and/or reschedule your appointment.  Please call [phone] with any problems or questions regarding your therapy          June 2017 Details    Sun Mon Tue Wed Thu Fri Sat         1               2               3                 4               5               6               7      7.5 mg   See details      8      10 mg         9      7.5 mg         10      10 mg           11      10 mg         12      7.5 mg         13      10 mg         14      7.5 mg         15      10 mg         16      7.5 mg         17      10 mg           18      10 mg         19      7.5 mg         20      10 mg         21            22               23               24                 25               26               27               28               29               30                 Date Details   06/07 This INR check   have a serving of Vit K food today,        Date of next INR:  6/21/2017         How to take your warfarin dose     To take:  7.5  mg Take 1.5 of the 5 mg tablets.    To take:  10 mg Take 2 of the 5 mg tablets.

## 2017-06-20 ENCOUNTER — TELEPHONE (OUTPATIENT)
Dept: SLEEP MEDICINE | Facility: CLINIC | Age: 72
End: 2017-06-20

## 2017-06-20 NOTE — TELEPHONE ENCOUNTER
Call out to pt, wife, re: decision as to doing a home vs inlab study.    Wife (Pinky) will give him the numbers to call, or call back with his impression.  She feels an in-lab study would be in his best interest.  He does have medicare.

## 2017-06-21 ENCOUNTER — ANTICOAGULATION THERAPY VISIT (OUTPATIENT)
Dept: ANTICOAGULATION | Facility: CLINIC | Age: 72
End: 2017-06-21
Payer: MEDICARE

## 2017-06-21 DIAGNOSIS — Z95.2 S/P AORTIC VALVE REPLACEMENT: ICD-10-CM

## 2017-06-21 DIAGNOSIS — Z79.01 LONG TERM CURRENT USE OF ANTICOAGULANT THERAPY: ICD-10-CM

## 2017-06-21 DIAGNOSIS — I48.0 PAROXYSMAL ATRIAL FIBRILLATION (H): ICD-10-CM

## 2017-06-21 LAB — INR POINT OF CARE: 2.4 (ref 0.86–1.14)

## 2017-06-21 PROCEDURE — 85610 PROTHROMBIN TIME: CPT | Mod: QW

## 2017-06-21 PROCEDURE — 99207 ZZC NO CHARGE NURSE ONLY: CPT

## 2017-06-21 PROCEDURE — 36416 COLLJ CAPILLARY BLOOD SPEC: CPT

## 2017-06-21 NOTE — MR AVS SNAPSHOT
Brooks Summers   6/21/2017 1:00 PM   Anticoagulation Therapy Visit    Description:  72 year old male   Provider:  LL ANTI COAG   Department:  [facility]           INR as of 6/21/2017     Today's INR 2.4      Anticoagulation Summary as of 6/21/2017     INR goal 2.0-3.0   Today's INR 2.4   Full instructions 7.5 mg on Mon, Wed, Fri; 10 mg all other days   Next INR check 7/19/2017    Indications   S/P aortic valve replacement [Z95.2]  Paroxysmal atrial fibrillation (H) [I48.0]  Long term current use of anticoagulant therapy [Z79.01]         Your next Anticoagulation Clinic appointment(s)     Jul 19, 2017  1:15 PM CDT   [facility]agulation [facility] with LL ANTI COAG   [facility]    [address] [address] [phone]              Contact Numbers     Please call [phone] to cancel and/or reschedule your appointment.  Please call [phone] with any problems or questions regarding your therapy          June 2017 Details    Sun Mon Tue Wed Thu Fri Sat         1               2               3                 4               5               6               7               8               9               10                 11               12               13               14               15               16               17                 18               19               20               21      7.5 mg   See details      22      10 mg         23      7.5 mg         24      10 mg           25      10 mg         26      7.5 mg         27      10 mg         28      7.5 mg         29      10 mg         30      7.5 mg           Date Details   06/21 This INR check               How to take your warfarin dose     To take:  7.5 mg Take 1.5 of the 5 mg tablets.    To take:  10 mg Take 2 of the 5 mg tablets.           July 2017 Details    Sun Mon Tue Wed Thu Fri Sat           1      10 mg           2      10 mg         3      7.5 mg         4      10 mg          5      7.5 mg         6      10 mg         7      7.5 mg         8      10 mg           9      10 mg         10      7.5 mg         11      10 mg         12      7.5 mg         13      10 mg         14      7.5 mg         15      10 mg           16      10 mg         17      7.5 mg         18      10 mg         19            20               21               22                 23               24               25               26               27               28               29                 30               31                     Date Details   No additional details    Date of next INR:  7/19/2017         How to take your warfarin dose     To take:  7.5 mg Take 1.5 of the 5 mg tablets.    To take:  10 mg Take 2 of the 5 mg tablets.

## 2017-06-21 NOTE — PROGRESS NOTES
ANTICOAGULATION FOLLOW-UP CLINIC VISIT    Patient Name:  Brooks Summers  Date:  6/21/2017  Contact Type:  Face to Face    SUBJECTIVE:     Patient Findings     Positives No Problem Findings           OBJECTIVE    INR Protime   Date Value Ref Range Status   06/21/2017 2.4 (A) 0.86 - 1.14 Final       ASSESSMENT / PLAN  INR assessment THER    Recheck INR In: 4 WEEKS    INR Location Clinic      Anticoagulation Summary as of 6/21/2017     INR goal 2.0-3.0   Today's INR 2.4   Maintenance plan 7.5 mg (5 mg x 1.5) on Mon, Wed, Fri; 10 mg (5 mg x 2) all other days   Full instructions 7.5 mg on Mon, Wed, Fri; 10 mg all other days   Weekly total 62.5 mg   No change documented Sheri Frausto RN   Plan last modified Lindsey Epstein RN (6/7/2017)   Next INR check 7/19/2017   Priority INR   Target end date Indefinite    Indications   S/P aortic valve replacement [Z95.2]  Paroxysmal atrial fibrillation (H) [I48.0]  Long term current use of anticoagulant therapy [Z79.01]         Anticoagulation Episode Summary     INR check location     Preferred lab     Send INR reminders to [facility]    Comments  * warfarin 5 mg tabs      Anticoagulation Care Providers     Provider Role Specialty Phone number    Edin Mays MD [facility] Internal Medicine [phone]            See the Encounter Report to view Anticoagulation Flowsheet and Dosing Calendar (Go to Encounters tab in chart review, and find the Anticoagulation Therapy Visit)        Sheri Frausto RN

## 2017-07-03 DIAGNOSIS — E78.5 HYPERLIPIDEMIA LDL GOAL <100: ICD-10-CM

## 2017-07-03 DIAGNOSIS — I10 BENIGN ESSENTIAL HYPERTENSION: ICD-10-CM

## 2017-07-03 DIAGNOSIS — M10.00 IDIOPATHIC GOUT, UNSPECIFIED CHRONICITY, UNSPECIFIED SITE: ICD-10-CM

## 2017-07-03 DIAGNOSIS — Z29.89 NEED FOR SUBACUTE BACTERIAL ENDOCARDITIS PROPHYLAXIS: Primary | ICD-10-CM

## 2017-07-05 RX ORDER — LOSARTAN POTASSIUM 25 MG/1
12.5 TABLET ORAL EVERY EVENING
Qty: 45 TABLET | Refills: 2 | Status: SHIPPED | OUTPATIENT
Start: 2017-07-05 | End: 2018-03-30

## 2017-07-05 RX ORDER — AMOXICILLIN 500 MG/1
2000 CAPSULE ORAL ONCE
Qty: 4 CAPSULE | Refills: 0 | Status: SHIPPED | OUTPATIENT
Start: 2017-07-05 | End: 2017-07-05

## 2017-07-05 RX ORDER — SIMVASTATIN 40 MG
40 TABLET ORAL AT BEDTIME
Qty: 90 TABLET | Refills: 2 | Status: SHIPPED | OUTPATIENT
Start: 2017-07-05 | End: 2018-03-30

## 2017-07-05 RX ORDER — ALLOPURINOL 100 MG/1
100 TABLET ORAL DAILY
Qty: 90 TABLET | Refills: 1 | OUTPATIENT
Start: 2017-07-05

## 2017-07-05 NOTE — TELEPHONE ENCOUNTER
Pt calling and stating he needs before and after antibiotic for oral surgery. Need for prophylactic antibiotic..DX S/P aortic valve replacement

## 2017-07-07 DIAGNOSIS — M10.00 IDIOPATHIC GOUT, UNSPECIFIED CHRONICITY, UNSPECIFIED SITE: ICD-10-CM

## 2017-07-07 RX ORDER — ALLOPURINOL 100 MG/1
100 TABLET ORAL DAILY
Qty: 90 TABLET | Refills: 1 | OUTPATIENT
Start: 2017-07-07

## 2017-07-07 NOTE — TELEPHONE ENCOUNTER
LAST VISIT    10/10/16  NEXT VISIT    11/3/17  PLAN      ASSESSMENT   In summary, Mr. 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  Also has history of complete heart block, low EF 10-15%, now recovered to 50-55% in the setting of biventricular-ICD.     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); echo 12/15/2015 notable for Ao mean gradient 13 mm Hg  -Complete heart block s/p CRT-D (s/p generator change 12/3/2015)  -Cardiomyopathy recovered EF 50-55% s/p CRT-D  -Paroxysmal AF on anticoagulation  -Hyperlipidemia  -Thrombocytopenia (seen by hematology; negative workup)     Plan  -will continue current heart failure regimen (losartan 12.5, and metoprolol 50 xl), along with cardiac resynchronization therapy  -absolutely requires endocarditis prophylaxis for procedures (amoxicillin 2 g PO 1 hour prior to procedures)  -echo surveillance of his valve repair and replacement;  -continue warfarin for faesy5ghxa of 2, very low burden on device interrogation  -RTC in 1 year. If the echocardiogram is normal this year, will switch to every other year surveillance.

## 2017-07-07 NOTE — TELEPHONE ENCOUNTER
allopurinol (ZYLOPRIM) 100 MG tablet    [id number] - [place] [id number] AT [facility]

## 2017-07-10 RX ORDER — ALLOPURINOL 100 MG/1
100 TABLET ORAL EVERY EVENING
Qty: 90 TABLET | Refills: 3 | Status: SHIPPED | OUTPATIENT
Start: 2017-07-10 | End: 2018-08-13

## 2017-07-18 ENCOUNTER — ANTICOAGULATION THERAPY VISIT (OUTPATIENT)
Dept: ANTICOAGULATION | Facility: CLINIC | Age: 72
End: 2017-07-18
Payer: MEDICARE

## 2017-07-18 DIAGNOSIS — Z95.2 S/P AORTIC VALVE REPLACEMENT: ICD-10-CM

## 2017-07-18 DIAGNOSIS — I48.0 PAROXYSMAL ATRIAL FIBRILLATION (H): ICD-10-CM

## 2017-07-18 DIAGNOSIS — Z79.01 LONG TERM CURRENT USE OF ANTICOAGULANT THERAPY: ICD-10-CM

## 2017-07-18 PROCEDURE — 99207 ZZC NO CHARGE NURSE ONLY: CPT | Performed by: REGISTERED NURSE

## 2017-07-18 NOTE — PROGRESS NOTES
ANTICOAGULATION FOLLOW-UP CLINIC VISIT    Patient Name:  Brooks Summers  Date:  7/18/2017  Contact Type:  Telephone/ Call from Mrs. Jacobs, questions answered.  (see notes)    SUBJECTIVE:     Patient Findings     Positives Other complaints (Dental work pending)    Comments P/C from Mrs Jacobs today reporting pt will be having a dental extraction and wanted to know what she is to do with his warfarin dosing.  She handles Brooks's meds.  Pt is being treated at the dental school at the [facility].  Writer called the dental school and was told Brooks has an appt on 8/4/17 to complete his initial examination.  He will then be scheduled to see the oral surgeon and a plan will be developed.  At the appt with the oral surgeon all medical details are discussed including what range they want Alessias INR to be .  All of this information was given to Mrs Jacbos who verbalized understanding. Oral Surgeon at the [facility] [phone]           OBJECTIVE    INR Protime   Date Value Ref Range Status   06/21/2017 2.4 (A) 0.86 - 1.14 Final       ASSESSMENT / PLAN  No question data found.  Anticoagulation Summary as of 7/18/2017     INR goal 2.0-3.0   Today's INR No new INR was available at the time of this encounter.   Maintenance plan 7.5 mg (5 mg x 1.5) on Mon, Wed, Fri; 10 mg (5 mg x 2) all other days   Full instructions 7.5 mg on Mon, Wed, Fri; 10 mg all other days   Weekly total 62.5 mg   Plan last modified Lindsey Epstein RN (6/7/2017)   Next INR check 7/19/2017   Priority INR   Target end date Indefinite    Indications   S/P aortic valve replacement [Z95.2]  Paroxysmal atrial fibrillation (H) [I48.0]  Long term current use of anticoagulant therapy [Z79.01]         Anticoagulation Episode Summary     INR check location     Preferred lab     Send INR reminders to [facility] POOL    Comments  * warfarin 5 mg tabs, oral surgeon at the [facility], [phone]      Anticoagulation Care Providers     Provider Role Specialty Phone  number    Edin Mays MD [facility] Internal Medicine [phone]            See the Encounter Report to view Anticoagulation Flowsheet and Dosing Calendar (Go to Encounters tab in chart review, and find the Anticoagulation Therapy Visit)        Lindsey Epstein RN

## 2017-07-18 NOTE — MR AVS SNAPSHOT
Brooks Summers   7/18/2017   Anticoagulation Therapy Visit    Description:  72 year old male   Provider:  Lindsey Epstein, RN   Department:  [facility]           INR as of 7/18/2017     Today's INR No new INR was available at the time of this encounter.      Anticoagulation Summary as of 7/18/2017     INR goal 2.0-3.0   Today's INR No new INR was available at the time of this encounter.   Full instructions 7.5 mg on Mon, Wed, Fri; 10 mg all other days   Next INR check 7/19/2017    Indications   S/P aortic valve replacement [Z95.2]  Paroxysmal atrial fibrillation (H) [I48.0]  Long term current use of anticoagulant therapy [Z79.01]         Your next Anticoagulation Clinic appointment(s)     Jul 19, 2017  1:15 PM CDT   [facility]agulation [facility] with [facility]   [facility]    [address] [address]   [phone]              July 2017 Details    Sun Mon Tue Wed Thu Fri Sat           1                 2               3               4               5               6               7               8                 9               10               11               12               13               14               15                 16               17               18      10 mg   See details      19            20               21               22                 23               24               25               26               27               28               29                 30               31                     Date Details   07/18 This INR check       Date of next INR:  7/19/2017         How to take your warfarin dose     To take:  7.5 mg Take 1.5 of the 5 mg tablets.    To take:  10 mg Take 2 of the 5 mg tablets.

## 2017-07-19 ENCOUNTER — ANTICOAGULATION THERAPY VISIT (OUTPATIENT)
Dept: ANTICOAGULATION | Facility: CLINIC | Age: 72
End: 2017-07-19
Payer: MEDICARE

## 2017-07-19 DIAGNOSIS — Z95.2 S/P AORTIC VALVE REPLACEMENT: ICD-10-CM

## 2017-07-19 DIAGNOSIS — I48.0 PAROXYSMAL ATRIAL FIBRILLATION (H): ICD-10-CM

## 2017-07-19 DIAGNOSIS — Z79.01 LONG TERM CURRENT USE OF ANTICOAGULANT THERAPY: ICD-10-CM

## 2017-07-19 LAB — INR POINT OF CARE: 3 (ref 0.86–1.14)

## 2017-07-19 PROCEDURE — 99207 ZZC NO CHARGE NURSE ONLY: CPT

## 2017-07-19 PROCEDURE — 85610 PROTHROMBIN TIME: CPT | Mod: QW

## 2017-07-19 PROCEDURE — 36416 COLLJ CAPILLARY BLOOD SPEC: CPT

## 2017-07-19 NOTE — PROGRESS NOTES
ANTICOAGULATION FOLLOW-UP CLINIC VISIT    Patient Name:  Brooks Summers  Date:  7/19/2017  Contact Type:  Face to Face    SUBJECTIVE:     Patient Findings     Positives No Problem Findings    Comments Patient will be having upcoming dental work completed. The exact details are not worked out yet. Writer requested Brooks call [facility] if he needs any further assistance from our clinic. Looking at the [facility] note from 7/18, patient's wife will find out further information on August 4th at the initial consult appointment at the [facility].            OBJECTIVE    INR Protime   Date Value Ref Range Status   07/19/2017 3.0 (A) 0.86 - 1.14 Final       ASSESSMENT / PLAN  INR assessment THER    Recheck INR In: 4 WEEKS    INR Location Clinic      Anticoagulation Summary as of 7/19/2017     INR goal 2.0-3.0   Today's INR 3.0   Maintenance plan 7.5 mg (5 mg x 1.5) on Mon, Wed, Fri; 10 mg (5 mg x 2) all other days   Full instructions 7.5 mg on Mon, Wed, Fri; 10 mg all other days   Weekly total 62.5 mg   No change documented Sheri Frausto RN   Plan last modified Lindsey Epstein RN (6/7/2017)   Next INR check 8/16/2017   Priority INR   Target end date Indefinite    Indications   S/P aortic valve replacement [Z95.2]  Paroxysmal atrial fibrillation (H) [I48.0]  Long term current use of anticoagulant therapy [Z79.01]         Anticoagulation Episode Summary     INR check location     Preferred lab     Send INR reminders to [facility] POOL    Comments  * warfarin 5 mg tabs, [facility] , [phone]      Anticoagulation Care Providers     Provider Role Specialty Phone number    Edin Mays MD [facility] Internal Medicine [phone]            See the Encounter Report to view Anticoagulation Flowsheet and Dosing Calendar (Go to Encounters tab in chart review, and find the Anticoagulation Therapy Visit)        Sheri Frausto RN

## 2017-07-19 NOTE — MR AVS SNAPSHOT
Brooks Summers   7/19/2017 1:15 PM   Anticoagulation Therapy Visit    Description:  72 year old male   Provider:  [facility] ANTI COAG   Department:  [facility]           INR as of 7/19/2017     Today's INR 3.0      Anticoagulation Summary as of 7/19/2017     INR goal 2.0-3.0   Today's INR 3.0   Full instructions 7.5 mg on Mon, Wed, Fri; 10 mg all other days   Next INR check 8/16/2017    Indications   S/P aortic valve replacement [Z95.2]  Paroxysmal atrial fibrillation (H) [I48.0]  Long term current use of anticoagulant therapy [Z79.01]         Your next Anticoagulation Clinic appointment(s)     Aug 16, 2017  1:30 PM CDT   Anticoagulation Visit with [facility] ANTI COAG   [facility]    [address] [address] [phone]              Contact Numbers     Please call [phone] to cancel and/or reschedule your appointment.  Please call [phone] with any problems or questions regarding your therapy          July 2017 Details    Sun Mon Tue Wed Thu Fri Sat           1                 2               3               4               5               6               7               8                 9               10               11               12               13               14               15                 16               17               18               19      7.5 mg   See details      20      10 mg         21      7.5 mg         22      10 mg           23      10 mg         24      7.5 mg         25      10 mg         26      7.5 mg         27      10 mg         28      7.5 mg         29      10 mg           30      10 mg         31      7.5 mg               Date Details   07/19 This INR check               How to take your warfarin dose     To take:  7.5 mg Take 1.5 of the 5 mg tablets.    To take:  10 mg Take 2 of the 5 mg tablets.           August 2017 Details    Sun Mon Tue Wed Thu Fri Sat       1      10 mg         2      7.5 mg         3       10 mg         4      7.5 mg         5      10 mg           6      10 mg         7      7.5 mg         8      10 mg         9      7.5 mg         10      10 mg         11      7.5 mg         12      10 mg           13      10 mg         14      7.5 mg         15      10 mg         16            17               18               19                 20               21               22               23               24               25               26                 27               28               29               30               31                  Date Details   No additional details    Date of next INR:  8/16/2017         How to take your warfarin dose     To take:  7.5 mg Take 1.5 of the 5 mg tablets.    To take:  10 mg Take 2 of the 5 mg tablets.

## 2017-08-16 ENCOUNTER — ANTICOAGULATION THERAPY VISIT (OUTPATIENT)
Dept: ANTICOAGULATION | Facility: CLINIC | Age: 72
End: 2017-08-16
Payer: MEDICARE

## 2017-08-16 DIAGNOSIS — Z95.2 S/P AORTIC VALVE REPLACEMENT: ICD-10-CM

## 2017-08-16 DIAGNOSIS — I48.0 PAROXYSMAL ATRIAL FIBRILLATION (H): ICD-10-CM

## 2017-08-16 DIAGNOSIS — Z79.01 LONG TERM CURRENT USE OF ANTICOAGULANT THERAPY: ICD-10-CM

## 2017-08-16 LAB — INR POINT OF CARE: 1.6 (ref 0.86–1.14)

## 2017-08-16 PROCEDURE — 85610 PROTHROMBIN TIME: CPT | Mod: QW

## 2017-08-16 PROCEDURE — 36416 COLLJ CAPILLARY BLOOD SPEC: CPT

## 2017-08-16 PROCEDURE — 99207 ZZC NO CHARGE NURSE ONLY: CPT

## 2017-08-16 NOTE — PROGRESS NOTES
ANTICOAGULATION FOLLOW-UP CLINIC VISIT    Patient Name:  Brooks Summers  Date:  8/16/2017  Contact Type:  Face to Face, telephone with patient's wife    SUBJECTIVE:     Patient Findings     Positives Dental/Other procedures (Dental extraction on Friday - did not need to hold his warfarin), Activity level change (Increased activity level per his wife's report), Unexplained INR or factor level change    Comments Patient will be having a dental extraction on Friday. He really did not know anything about his plan for the warfarin. He suggested I call his wife - she stated there was no need to make any changes to his warfarin. She could not think of a reason why his INR was low today. Patient's diet has been about the same. They had family over this past couple weeks and they ate a lot of Dee. She is not sure if this has vitamin K in it or not. Writer is questioning if patient missed a dose while family was visiting since they were out of the usual routine. Since patient has a dental extraction on Friday, he was instructed to take the increased dose next Monday. Patient will return to [facility] in 2 weeks for an INR recheck. The appointment that was made for 4 weeks out may not be needed.            OBJECTIVE    INR Protime   Date Value Ref Range Status   08/16/2017 1.6 (A) 0.86 - 1.14 Final       ASSESSMENT / PLAN  INR assessment SUB    Recheck INR In: 2 WEEKS    INR Location Clinic      Anticoagulation Summary as of 8/16/2017     INR goal 2.0-3.0   Today's INR 1.6!   Maintenance plan 7.5 mg (5 mg x 1.5) on Mon, Wed, Fri; 10 mg (5 mg x 2) all other days   Full instructions 8/21: 12.5 mg; Otherwise 7.5 mg on Mon, Wed, Fri; 10 mg all other days   Weekly total 62.5 mg   Plan last modified Lindsey Epstein RN (6/7/2017)   Next INR check 8/30/2017   Priority INR   Target end date Indefinite    Indications   S/P aortic valve replacement [Z95.2]  Paroxysmal atrial fibrillation (H) [I48.0]  Long term current use of  anticoagulant therapy [Z79.01]         Anticoagulation Episode Summary     INR check location     Preferred lab     Send INR reminders to [facility] POOL    Comments  * warfarin 5 mg tabs, oral surgeon at the [facility], [phone]      Anticoagulation Care Providers     Provider Role Specialty Phone number    Edin Mays MD [facility] Internal Medicine [phone]            See the Encounter Report to view Anticoagulation Flowsheet and Dosing Calendar (Go to Encounters tab in chart review, and find the Anticoagulation Therapy Visit)        Sheri Frausto RN

## 2017-08-16 NOTE — MR AVS SNAPSHOT
Brooks Summers   8/16/2017 1:30 PM   Anticoagulation Therapy Visit    Description:  72 year old male   Provider:  [facility]   Department:  [facility]           INR as of 8/16/2017     Today's INR 1.6!      Anticoagulation Summary as of 8/16/2017     INR goal 2.0-3.0   Today's INR 1.6!   Full instructions 7.5 mg on Mon, Wed, Fri; 10 mg all other days   Next INR check 9/13/2017    Indications   S/P aortic valve replacement [Z95.2]  Paroxysmal atrial fibrillation (H) [I48.0]  Long term current use of anticoagulant therapy [Z79.01]         Your next Anticoagulation Clinic appointment(s)     Sep 13, 2017  1:30 PM CDT   [facility]   [facility]    [address] [address] [phone]              Contact Numbers     Please call [phone] to cancel and/or reschedule your appointment.  Please call [phone] with any problems or questions regarding your therapy          August 2017 Details    Sun Mon Tue Wed Thu Fri Sat       1               2               3               4               5                 6               7               8               9               10               11               12                 13               14               15               16      7.5 mg   See details      17      10 mg         18      7.5 mg         19      10 mg           20      10 mg         21      7.5 mg         22      10 mg         23      7.5 mg         24      10 mg         25      7.5 mg         26      10 mg           27      10 mg         28      7.5 mg         29      10 mg         30      7.5 mg         31      10 mg            Date Details   08/16 This INR check               How to take your warfarin dose     To take:  7.5 mg Take 1.5 of the 5 mg tablets.    To take:  10 mg Take 2 of the 5 mg tablets.           September 2017 Details    Sun Mon Tue Wed Thu Fri Sat          1      7.5 mg         2       10 mg           3      10 mg         4      7.5 mg         5      10 mg         6      7.5 mg         7      10 mg         8      7.5 mg         9      10 mg           10      10 mg         11      7.5 mg         12      10 mg         13            14               15               16                 17               18               19               20               21               22               23                 24               25               26               27               28               29               30                Date Details   No additional details    Date of next INR:  9/13/2017         How to take your warfarin dose     To take:  7.5 mg Take 1.5 of the 5 mg tablets.    To take:  10 mg Take 2 of the 5 mg tablets.

## 2017-08-30 ENCOUNTER — ANTICOAGULATION THERAPY VISIT (OUTPATIENT)
Dept: ANTICOAGULATION | Facility: CLINIC | Age: 72
End: 2017-08-30
Payer: MEDICARE

## 2017-08-30 DIAGNOSIS — Z95.2 S/P AVR (AORTIC VALVE REPLACEMENT): ICD-10-CM

## 2017-08-30 DIAGNOSIS — Z95.2 S/P AORTIC VALVE REPLACEMENT: ICD-10-CM

## 2017-08-30 DIAGNOSIS — Z79.01 LONG TERM CURRENT USE OF ANTICOAGULANT THERAPY: ICD-10-CM

## 2017-08-30 DIAGNOSIS — I48.0 PAROXYSMAL ATRIAL FIBRILLATION (H): ICD-10-CM

## 2017-08-30 LAB — INR POINT OF CARE: 1.9 (ref 0.86–1.14)

## 2017-08-30 PROCEDURE — 36416 COLLJ CAPILLARY BLOOD SPEC: CPT

## 2017-08-30 PROCEDURE — 99207 ZZC NO CHARGE NURSE ONLY: CPT

## 2017-08-30 PROCEDURE — 85610 PROTHROMBIN TIME: CPT | Mod: QW

## 2017-08-30 RX ORDER — WARFARIN SODIUM 5 MG/1
TABLET ORAL
Qty: 172 TABLET | Refills: 1 | COMMUNITY
Start: 2017-08-30 | End: 2017-09-18

## 2017-08-30 NOTE — PROGRESS NOTES
ANTICOAGULATION FOLLOW-UP CLINIC VISIT    Patient Name:  Brooks Summers  Date:  8/30/2017  Contact Type:  Face to Face    SUBJECTIVE:     Patient Findings     Positives Activity level change (probably more exercise as he is walking and bike riding and golfing.  This will change when the cold weather comes)    Comments Increased activity is probably why the INR is low, pt states no other changes           OBJECTIVE    INR Protime   Date Value Ref Range Status   08/30/2017 1.9 (A) 0.86 - 1.14 Final       ASSESSMENT / PLAN  INR assessment THER    Recheck INR In: 2 WEEKS    INR Location Clinic      Anticoagulation Summary as of 8/30/2017     INR goal 2.0-3.0   Today's INR 1.9!   Maintenance plan 7.5 mg (5 mg x 1.5) on Mon, Fri; 10 mg (5 mg x 2) all other days   Full instructions 7.5 mg on Mon, Fri; 10 mg all other days   Weekly total 65 mg   Plan last modified Lindsey Epstein RN (8/30/2017)   Next INR check 9/13/2017   Priority INR   Target end date Indefinite    Indications   S/P aortic valve replacement [Z95.2]  Paroxysmal atrial fibrillation (H) [I48.0]  Long term current use of anticoagulant therapy [Z79.01]         Anticoagulation Episode Summary     INR check location     Preferred lab     Send INR reminders to [facility]    Comments  * warfarin 5 mg tabs, oral surgeon at the  of , [phone]      Anticoagulation Care Providers     Provider Role Specialty Phone number    Edin Mays MD [facility] Internal Medicine [phone]            See the Encounter Report to view Anticoagulation Flowsheet and Dosing Calendar (Go to Encounters tab in chart review, and find the Anticoagulation Therapy Visit)        Lindsey Epstein RN

## 2017-08-30 NOTE — MR AVS SNAPSHOT
Brooks Summers   8/30/2017 1:15 PM   Anticoagulation Therapy Visit    Description:  72 year old male   Provider:  [facility] COAG   Department:  [facility]           INR as of 8/30/2017     Today's INR 1.9!      Anticoagulation Summary as of 8/30/2017     INR goal 2.0-3.0   Today's INR 1.9!   Full instructions 7.5 mg on Mon, Fri; 10 mg all other days   Next INR check 9/13/2017    Indications   S/P aortic valve replacement [Z95.2]  Paroxysmal atrial fibrillation (H) [I48.0]  Long term current use of anticoagulant therapy [Z79.01]         Description     Recheck INR 2 weeks, 9/13/17  Increase warfarin to 7.5 mg Mon, Fri, 10 mg rest of week         Your next Anticoagulation Clinic appointment(s)     Sep 13, 2017  1:30 PM CDT   [facility]    [address] [address] [phone]              Contact Numbers     Please call [phone] to cancel and/or reschedule your appointment.  Please call [phone] with any problems or questions regarding your therapy          August 2017 Details    Sun Mon Tue Wed Thu Fri Sat       1               2               3               4               5                 6               7               8               9               10               11               12                 13               14               15               16               17               18               19                 20               21               22               23               24               25               26                 27               28               29               30      10 mg   See details      31      10 mg            Date Details   08/30 This INR check               How to take your warfarin dose     To take:  10 mg Take 2 of the 5 mg tablets.           September 2017 Details    Sun Mon Tue Wed Thu Fri Sat          1      7.5 mg         2      10 mg           3       10 mg         4      7.5 mg         5      10 mg         6      10 mg         7      10 mg         8      7.5 mg         9      10 mg           10      10 mg         11      7.5 mg         12      10 mg         13            14               15               16                 17               18               19               20               21               22               23                 24               25               26               27               28               29               30                Date Details   No additional details    Date of next INR:  9/13/2017         How to take your warfarin dose     To take:  7.5 mg Take 1.5 of the 5 mg tablets.    To take:  10 mg Take 2 of the 5 mg tablets.

## 2017-09-13 ENCOUNTER — ANTICOAGULATION THERAPY VISIT (OUTPATIENT)
Dept: ANTICOAGULATION | Facility: CLINIC | Age: 72
End: 2017-09-13
Payer: MEDICARE

## 2017-09-13 DIAGNOSIS — I48.0 PAROXYSMAL ATRIAL FIBRILLATION (H): ICD-10-CM

## 2017-09-13 DIAGNOSIS — Z95.2 S/P AORTIC VALVE REPLACEMENT: ICD-10-CM

## 2017-09-13 DIAGNOSIS — Z79.01 LONG TERM CURRENT USE OF ANTICOAGULANT THERAPY: ICD-10-CM

## 2017-09-13 LAB — INR POINT OF CARE: 2.8 (ref 0.86–1.14)

## 2017-09-13 PROCEDURE — 85610 PROTHROMBIN TIME: CPT | Mod: QW

## 2017-09-13 PROCEDURE — 36416 COLLJ CAPILLARY BLOOD SPEC: CPT

## 2017-09-13 PROCEDURE — 99207 ZZC NO CHARGE NURSE ONLY: CPT

## 2017-09-13 NOTE — MR AVS SNAPSHOT
Brooks Summers   9/13/2017 1:30 PM   Anticoagulation Therapy Visit    Description:  72 year old male   Provider:  YEVTTE ANTI COAG   Department:  [facility]           INR as of 9/13/2017     Today's INR 2.8      Anticoagulation Summary as of 9/13/2017     INR goal 2.0-3.0   Today's INR 2.8   Full instructions 7.5 mg on Mon, Fri; 10 mg all other days   Next INR check 10/11/2017    Indications   S/P aortic valve replacement [Z95.2]  Paroxysmal atrial fibrillation (H) [I48.0]  Long term current use of anticoagulant therapy [Z79.01]         Description     Recheck INR 4 weeks, 10/11/17  Continue warfarin 7.5 mg Mon, Fri, 10 mg rest of week       Your next Anticoagulation Clinic appointment(s)     Sep 13, 2017  1:30 PM CDT   Anticoagulation Visit with [facility]   [facility] ([facility]    [address] [address]   [phone]            Oct 11, 2017  1:15 PM CDT   Anticoagulation Visit with LL ANTI COAG   [facility] ([facility])    [address] [id number] [phone]              Contact Numbers     Please call [phone] to cancel and/or reschedule your appointment.  Please call [phone] with any problems or questions regarding your therapy          September 2017 Details    Sun Mon Tue Wed Thu Fri Sat          1               2                 3               4               5               6               7               8               9                 10               11               12               13      10 mg   See details      14      10 mg         15      7.5 mg         16      10 mg           17      10 mg         18      7.5 mg         19      10 mg         20      10 mg         21      10 mg         22      7.5 mg         23      10 mg           24      10 mg         25      7.5 mg         26      10 mg         27      10 mg         28      10 mg         29      7.5 mg         30       10 mg          Date Details   09/13 This INR check               How to take your warfarin dose     To take:  7.5 mg Take 1.5 of the 5 mg tablets.    To take:  10 mg Take 2 of the 5 mg tablets.           October 2017 Details    Sun Mon Tue Wed Thu Fri Sat     1      10 mg         2      7.5 mg         3      10 mg         4      10 mg         5      10 mg         6      7.5 mg         7      10 mg           8      10 mg         9      7.5 mg         10      10 mg         11            12               13               14                 15               16               17               18               19               20               21                 22               23               24               25               26               27               28                 29               30               31                    Date Details   No additional details    Date of next INR:  10/11/2017         How to take your warfarin dose     To take:  7.5 mg Take 1.5 of the 5 mg tablets.    To take:  10 mg Take 2 of the 5 mg tablets.

## 2017-09-13 NOTE — PROGRESS NOTES
ANTICOAGULATION FOLLOW-UP CLINIC VISIT    Patient Name:  Brooks Summers  Date:  9/13/2017  Contact Type:  Face to Face    SUBJECTIVE:     Patient Findings     Positives Other complaints (pt had a dental extraction last week at the [facility].  Reports an INR of 2.7.   Extraction went well, no problems reported)           OBJECTIVE    INR Protime   Date Value Ref Range Status   09/13/2017 2.8 (A) 0.86 - 1.14 Final       ASSESSMENT / PLAN  INR assessment THER    Recheck INR In: 4 WEEKS    INR Location Clinic      Anticoagulation Summary as of 9/13/2017     INR goal 2.0-3.0   Today's INR 2.8   Maintenance plan 7.5 mg (5 mg x 1.5) on Mon, Fri; 10 mg (5 mg x 2) all other days   Full instructions 7.5 mg on Mon, Fri; 10 mg all other days   Weekly total 65 mg   No change documented Lindsey Epstein RN   Plan last modified Lindsey Epstein RN (8/30/2017)   Next INR check 10/11/2017   Priority INR   Target end date Indefinite    Indications   S/P aortic valve replacement [Z95.2]  Paroxysmal atrial fibrillation (H) [I48.0]  Long term current use of anticoagulant therapy [Z79.01]         Anticoagulation Episode Summary     INR check location     Preferred lab     Send INR reminders to [facility]    Comments  * warfarin 5 mg tabs, oral surgeon at the [facility], [phone]      Anticoagulation Care Providers     Provider Role Specialty Phone number    Edin Mays MD [facility] Internal Medicine [phone]            See the Encounter Report to view Anticoagulation Flowsheet and Dosing Calendar (Go to Encounters tab in chart review, and find the Anticoagulation Therapy Visit)        Lindsey Epstein RN

## 2017-09-18 DIAGNOSIS — Z95.2 S/P AORTIC VALVE REPLACEMENT: ICD-10-CM

## 2017-09-18 DIAGNOSIS — I48.0 PAROXYSMAL ATRIAL FIBRILLATION (H): ICD-10-CM

## 2017-09-18 DIAGNOSIS — Z95.2 S/P AVR (AORTIC VALVE REPLACEMENT): ICD-10-CM

## 2017-09-18 DIAGNOSIS — Z79.01 LONG TERM CURRENT USE OF ANTICOAGULANT THERAPY: ICD-10-CM

## 2017-09-18 NOTE — TELEPHONE ENCOUNTER
LAST VISIT       10/10/16  NEXT VISIT    11/3/17  PLAN                 Plan  -will continue current heart failure regimen (losartan 12.5, and metoprolol 50 xl), along with cardiac resynchronization therapy  -absolutely requires endocarditis prophylaxis for procedures (amoxicillin 2 g PO 1 hour prior to procedures)  -echo surveillance of his valve repair and replacement;  -continue warfarin for [id number] of 2, very low burden on device interrogation  -RTC in 1 year. If the echocardiogram is normal this year, will switch to every other year surveillance

## 2017-09-19 RX ORDER — WARFARIN SODIUM 5 MG/1
TABLET ORAL
Qty: 105 TABLET | Refills: 1 | Status: SHIPPED | OUTPATIENT
Start: 2017-09-19 | End: 2017-10-11

## 2017-09-29 ENCOUNTER — OFFICE VISIT (OUTPATIENT)
Dept: DERMATOLOGY | Facility: CLINIC | Age: 72
End: 2017-09-29

## 2017-09-29 DIAGNOSIS — L57.8 SOLAR ELASTOSIS: ICD-10-CM

## 2017-09-29 DIAGNOSIS — L57.0 ACTINIC KERATOSIS: ICD-10-CM

## 2017-09-29 DIAGNOSIS — D48.5 NEOPLASM OF UNCERTAIN BEHAVIOR OF SKIN: Primary | ICD-10-CM

## 2017-09-29 PROCEDURE — 88305 TISSUE EXAM BY PATHOLOGIST: CPT | Performed by: DERMATOLOGY

## 2017-09-29 RX ORDER — LIDOCAINE HYDROCHLORIDE AND EPINEPHRINE 10; 10 MG/ML; UG/ML
3 INJECTION, SOLUTION INFILTRATION; PERINEURAL ONCE
Qty: 2 ML | Refills: 0 | OUTPATIENT
Start: 2017-09-29 | End: 2017-09-29

## 2017-09-29 ASSESSMENT — PAIN SCALES - GENERAL
PAINLEVEL: NO PAIN (0)
PAINLEVEL: NO PAIN (0)

## 2017-09-29 NOTE — PROGRESS NOTES
"[facility] Dermatology Note      Dermatology Problem List:  1. H/o NMSC  - BCC dorsal nose s/p MMS 5/2013  2. AKs  3. Seborrheic keratoses        Encounter Date: Sep 29, 2017    CC:   Chief Complaint   Patient presents with     Derm Problem     Brooks is here today for lesions on his body.        History of Present Illness:  Mr. Brooks Summers is a 72 year old male who presents in self referral for new skin lesions.  H/o NMSC in 2013 of BCC on nose removed by Dr. Hancock with MMS.  Complains of:  -left cheek \"mole\" present for two years.  Feels something underneath the skin.  It does bleed and it is not healing.  -Left eyelid lesion present within the last month or two.  Non-tender, non bleeding.  -non-tender, non-bleeding spots on back    Wears hat, does not stay out of the sun.  Periodically uses sunscreen.    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[employer], Bi-Ventricular- INT dependent     Endocarditis     S/P AVR     Need for prophylactic antibiotic     BCC (basal cell carcinoma), face     Hyperlipidemia with target LDL less than 100     Finger injury     Paroxysmal atrial fibrillation (H)     Long term current use of anticoagulant therapy     Trigger ring finger of left hand     Past Medical History:   Diagnosis Date     BCC (basal cell carcinoma), face 5/16/2013     Bicuspid aortic valve      Chronic systolic heart failure (H)      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 Tx with Dapto/RIFx 8 weeks.     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     Paroxysmal a-fib (H)     Post-op 7/14/2011, 1/26/13     Rotator Cuff (Capsule) Sprain and Strain      S/P aortic valve replacement     7/14/2011, re-do 1/25/13 due to endocarditis     Smoking hx      Thrombocytopenia (H)      Past Surgical History:   Procedure Laterality Date     ANESTHESIA CARDIOVERSION  7/18/2011    Procedure:ANESTHESIA CARDIOVERSION; Cardioversion; Surgeon:GENERIC ANESTHESIA PROVIDER; Location:[facility] OR     COLONOSCOPY       CORONARY ANGIOGRAPHY ADULT ORDER       CYSTOSCOPY, BIOPSY URETHRA N/A 4/3/2017    Procedure: CYSTOSCOPY, BIOPSY URETHRA;  Surgeon: Marlena Mora MD;  Location: [facility] OR     ENDOSCOPY UPPER, COLONOSCOPY, COMBINED       HC REMOV & REPLACE IMPLT DEFIB PULSE GEN MULT LEAD  12/3/2015          HEMORRHOID SURGERY       IMPLANT AUTOMATIC IMPLANTABLE CARDIOVERTER DEFIBRILLATOR       MOHS MICROGRAPHIC PROCEDURE       ORTHOPEDIC SURGERY      shoulder,right     REDO STERNOTOMY REPLACE VALVE AORTIC  1/25/2013    Procedure: REDO STERNOTOMY REPLACE VALVE AORTIC;  Redo Sternotomy, Redo Aortic Valve Replacement on pump oxygenator. Intraoperative Transesophageal Echocardiogram;  Surgeon: Navin Hampton MD;  Location: [facility] OR     REPAIR VALVE MITRAL  2013     REPLACE VALVE AORTIC  7/14/2011    Procedure:REPLACE VALVE AORTIC; Median Sternotomy, Bioprosthesis,  Aortic Valve replacement on pump with oxygenator. 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 The patient denies use of tanning beds.  Lots of sun exposure in the past    Family History:  There is a family history of non-melanoma skin cancer in the patient's sister.    Medications:  Current Outpatient  Prescriptions   Medication Sig Dispense Refill     warfarin (COUMADIN) 5 MG tablet Take 1 and 1/2 Tablet on Wednesday, and take 2 Tablets on all other days. 105 tablet 1     allopurinol (ZYLOPRIM) 100 MG tablet Take 1 tablet (100 mg) by mouth every evening 90 tablet 3     losartan (COZAAR) 25 MG tablet Take 0.5 tablets (12.5 mg) by mouth every evening 45 tablet 2     simvastatin (ZOCOR) 40 MG tablet Take 1 tablet (40 mg) by mouth At Bedtime 90 tablet 2     metoprolol (TOPROL XL) 50 MG 24 hr tablet Take 1 tablet (50 mg) by mouth daily 90 tablet 3     PROVIDER DOSED MANAGED WARFARIN, COUMADIN, OUTPATIENT Per coumadin clinic       Calcium Carbonate-Vitamin D (CALCIUM 600 + D OR) Take 1 tablet by mouth every morning           Allergies   Allergen Reactions     Lisinopril Cough         Review of Systems:  -Skin/Heme New Pt: The patient admits to frequent sun exposure. The patient denies excessive scarring or problems healing except as per HPI. The patient denies excessive bleeding.  -Constitutional: The patient denies fatigue, fevers, chills, unintended weight loss, and night sweats.  -HEENT: Patient denies nonhealing oral sores.  -Skin: As above in HPI. No additional skin concerns.    Physical exam:  Vitals: There were no vitals taken for this visit.  GEN: This is a well developed, well-nourished male in no acute distress, in a pleasant mood.    SKIN: Waist-up skin, which includes the head/face, neck, both arms, chest, back, abdomen, digits and/or nails was examined.  -There is a 4mm ulcerated pearly pink papule with heaped borders lesion located on the left malar cheek.   -There is a 2mm telangiectatic pink papule on right nose with minimal superficial erosion.   - There are erythematous macules with overyling adherent scale on the right cheek, left malar cheek, lef temple, left upper eyelid and left forehead.   - multiple hyperpigmented stuck-on waxy papules on the trunk  -No other lesions of concern on areas  examined.     Impression/Plan:  1. Neoplasm of uncertain behavior on the left cheek. The differential diagnosis includes BCC vs. SCC.     Shave biopsy:  After discussion of benefits and risks including but not limited to bleeding/bruising, pain/swelling, infection, scar, incomplete removal, nerve damage/numbness, recurrence, and non-diagnostic biopsy, written consent, verbal consent and photographs were obtained. Time-out was performed. The area was cleaned with isopropyl alcohol.  was injected to obtain adequate anesthesia of the lesion on the left cheek. 1ml of 1% lidocaine was injected to obtain adequate anesthesia. A  shave biopsy was performed. Hemostasis was achieved with aluminium chloride. Vaseline and a sterile dressing were applied. The patient tolerated the procedure and no complications were noted. The patient was provided with verbal and written post care instructions.      2. Neoplasm of uncertain behavior on the right nasal ala. The differential diagnosis includes BCC vs. Sebaceous hyperplasia vs. AK.     Shave biopsy:  After discussion of benefits and risks including but not limited to bleeding/bruising, pain/swelling, infection, scar, incomplete removal, nerve damage/numbness, recurrence, and non-diagnostic biopsy, written consent, verbal consent and photographs were obtained. Time-out was performed. The area was cleaned with isopropyl alcohol.  was injected to obtain adequate anesthesia of the lesion on the right nasal ala. 0.5 ml of 1% lidocaine was injected to obtain adequate anesthesia. A  shave biopsy was performed. Hemostasis was achieved with aluminium chloride. Vaseline and a sterile dressing were applied. The patient tolerated the procedure and no complications were noted. The patient was provided with verbal and written post care instructions.      3. Actinic keratosis    Cryotherapy procedure note: After verbal consent and discussion of risks and benefits including but no limited to  dyspigmentation/scar, blister, and pain, above documented 4 sites treated with 1-2mm freeze border for 2 cycles with liquid nitrogen. Post cryotherapy instructions were provided.       4. Seborrheic keratosis, non irritated    Benign, reassurance given    Follow-up pending biopsy results    Dr. Loly Hirsch staffed the patient.    Staff Involved:  Resident(Marco Vora)/Staff(as above)  Marco Vora MD  PGY-2 Dermatology  (p) [phone]    I saw and evaluated this patient with Dr. Vora. The above note has been read and reviewed and where appropriate amended and corrected. It accurately reflects my clinical findings, observations, diagnoses, treatment recommendations and follow-up plans.  I was present during the entire surgical procedure which was performed by Dr. Vora appropriately and without complications.     Loly Hirsch MD  Clinical Professor   Department of Dermatology  [facility]

## 2017-09-29 NOTE — NURSING NOTE
Dermatology Rooming Note    Brooks Summers's goals for this visit include:   Chief Complaint   Patient presents with     Derm Problem     Brooks is here today for lesions on his body.      TAINA Church

## 2017-09-29 NOTE — PATIENT INSTRUCTIONS
We will let you know about the biopsy results within two weeks, but have high suspicion for at least 1 skin cancer.      Wound Care After a Biopsy    What is a skin biopsy?  A skin biopsy allows the doctor to examine a very small piece of tissue under the microscope to determine the diagnosis and the best treatment for the skin condition. A local anesthetic (numbing medicine)  is injected with a very small needle into the skin area to be tested. A small piece of skin is taken from the area. Sometimes a suture (stitch) is used.     What are the risks of a skin biopsy?  I will experience scar, bleeding, swelling, pain, crusting and redness. I may experience incomplete removal or recurrence. Risks of this procedure are excessive bleeding, bruising, infection, nerve damage, numbness, thick (hypertrophic or keloidal) scar and non-diagnostic biopsy.    How should I care for my wound for the first 24 hours?    Keep the wound dry and covered for 24 hours    If it bleeds, hold direct pressure on the area for 15 minutes. If bleeding does not stop then go to the emergency room    Avoid strenuous exercise the first 1-2 days or as your doctor instructs you    How should I care for the wound after 24 hours?    After 24 hours, remove the bandage    You may bathe or shower as normal    If you had a scalp biopsy, you can shampoo as usual and can use shower water to clean the biopsy site daily    Clean the wound twice a day with gentle soap and water    Do not scrub, be gentle    Apply white petroleum/Vaseline after cleaning the wound with a cotton swab or a clean finger, and keep the site covered with a Bandaid /bandage. Bandages are not necessary with a scalp biopsy    If you are unable to cover the site with a Bandaid /bandage, re-apply ointment 2-3 times a day to keep the site moist. Moisture will help with healing    Avoid strenuous activity for first 1-2 days    Avoid lakes, rivers, pools, and oceans until the stitches are  removed or the site is healed    How do I clean my wound?    Wash hands thoroughly with soap or use hand  before all wound care    Clean the wound with gentle soap and water    Apply white petroleum/Vaseline  to wound after it is clean    Replace the Bandaid /bandage to keep the wound covered for the first few days or as instructed by your doctor    If you had a scalp biopsy, warm shower water to the area on a daily basis should suffice    What should I use to clean my wound?     Cotton-tipped applicators (Qtips )    White petroleum jelly (Vaseline ). Use a clean new container and use Q-tips to apply.    Bandaids   as needed    Gentle soap     How should I care for my wound long term?    Do not get your wound dirty    Keep up with wound care for one week or until the area is healed.    A small scab will form and fall off by itself when the area is completely healed. The area will be red and will become pink in color as it heals. Sun protection is very important for how your scar will turn out. Sunscreen with an SPF 30 or greater is recommended once the area is healed.    If you have stitches, stitches need to be removed in 14 days. You may return to our clinic for this or you may have it done locally at your doctor s office.    You should have some soreness but it should be mild and slowly go away over several days. Talk to your doctor about using tylenol for pain,    When should I call my doctor?  If you have increased:     Pain or swelling    Pus or drainage (clear or slightly yellow drainage is ok)    Temperature over 100F    Spreading redness or warmth around wound    When will I hear about my results?  The biopsy results can take 2-3 weeks to come back. The clinic will call you with the results, send you a [employer] message, or have you schedule a follow-up clinic or phone time to discuss the results. Contact our clinics if you do not hear from us in 3 weeks.     Who should I call with  questions?    [facility]: [phone]     [facility]: [phone]    For urgent needs outside of business hours call the [facility] at [phone] and ask for the dermatology resident on call        Cryotherapy    What is it?    Use of a very cold liquid, such as liquid nitrogen, to freeze and destroy abnormal skin cells that need to be removed    What should I expect?    Tenderness and redness    A small blister that might grow and fill with dark purple blood. There may be crusting.    More than one treatment may be needed if the lesions do not go away.    How do I care for the treated area?    Gently wash the area with your hands when bathing.    Use a thin layer of Vaseline to help with healing. You may use a Band-Aid.     The area should heal within 7-10 days and may leave behind a pink or lighter color.     Do not use an antibiotic or Neosporin ointment.     You may take acetaminophen (Tylenol) for pain.     Call your Doctor if you have:    Severe pain    Signs of infection (warmth, redness, cloudy yellow drainage, and or a bad smell)    Questions or concerns    Who should I call with questions?       [facility]: [phone]       [facility]: [phone]       For urgent needs outside of business hours call the [facility] at [phone]        and ask for the dermatology resident on call

## 2017-09-29 NOTE — LETTER
"9/29/2017       RE: Brooks Summers  [address]  [facility] [address]     Dear Colleague,    Thank you for referring your patient, Brooks Summers, to the [facility]. Please see a copy of my visit note below.    [facility] Dermatology Note      Dermatology Problem List:  1. H/o NMSC  - BCC dorsal nose s/p MMS 5/2013  2. AKs  3. Seborrheic keratoses        Encounter Date: Sep 29, 2017    CC:   Chief Complaint   Patient presents with     Derm Problem     Brooks is here today for lesions on his body.        History of Present Illness:  Mr. Brooks Summers is a 72 year old male who presents in self referral for new skin lesions.  H/o NMSC in 2013 of BCC on nose removed by Dr. Hancock with MMS.  Complains of:  -left cheek \"mole\" present for two years.  Feels something underneath the skin.  It does bleed and it is not healing.  -Left eyelid lesion present within the last month or two.  Non-tender, non bleeding.  -non-tender, non-bleeding spots on back    Wears hat, does not stay out of the sun.  Periodically uses sunscreen.    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[employer], Bi-Ventricular- INT dependent     Endocarditis     S/P AVR     Need for prophylactic antibiotic     BCC (basal cell carcinoma), face     Hyperlipidemia with target LDL less than 100     Finger injury     Paroxysmal atrial fibrillation (H)     Long term current " use of anticoagulant therapy     Trigger ring finger of left hand     Past Medical History:   Diagnosis Date     BCC (basal cell carcinoma), face 5/16/2013     Bicuspid aortic valve      Chronic systolic heart failure (H)      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 Tx with Dapto/RIFx 8 weeks.     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     Paroxysmal a-fib (H)     Post-op 7/14/2011, 1/26/13     Rotator Cuff (Capsule) Sprain and Strain      S/P aortic valve replacement     7/14/2011, re-do 1/25/13 due to endocarditis     Smoking hx      Thrombocytopenia (H)      Past Surgical History:   Procedure Laterality Date     ANESTHESIA CARDIOVERSION  7/18/2011    Procedure:ANESTHESIA CARDIOVERSION; Cardioversion; Surgeon:GENERIC ANESTHESIA PROVIDER; Location:[facility] OR     COLONOSCOPY       CORONARY ANGIOGRAPHY ADULT ORDER       CYSTOSCOPY, BIOPSY URETHRA N/A 4/3/2017    Procedure: CYSTOSCOPY, BIOPSY URETHRA;  Surgeon: Marlena Mora MD;  Location: [facility] OR     ENDOSCOPY UPPER, COLONOSCOPY, COMBINED       HC REMOV & REPLACE IMPLT DEFIB PULSE GEN MULT LEAD  12/3/2015          HEMORRHOID SURGERY       IMPLANT AUTOMATIC IMPLANTABLE CARDIOVERTER DEFIBRILLATOR       MOHS MICROGRAPHIC PROCEDURE       ORTHOPEDIC SURGERY      shoulder,right     REDO STERNOTOMY REPLACE VALVE AORTIC  1/25/2013    Procedure: REDO STERNOTOMY REPLACE VALVE AORTIC;  Redo Sternotomy, Redo Aortic Valve Replacement on pump oxygenator. Intraoperative Transesophageal Echocardiogram;  Surgeon: Navin Hampton MD;  Location: [facility] OR     REPAIR VALVE MITRAL  2013     REPLACE VALVE AORTIC  7/14/2011    Procedure:REPLACE VALVE AORTIC; Median Sternotomy, Bioprosthesis,  Aortic Valve replacement on pump with oxygenator. 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 The patient denies use of tanning beds.  Lots of sun exposure in the past    Family History:  There is a family history of non-melanoma skin cancer in the patient's sister.    Medications:  Current Outpatient Prescriptions   Medication Sig Dispense Refill     warfarin (COUMADIN) 5 MG tablet Take 1 and 1/2 Tablet on Wednesday, and take 2 Tablets on all other days. 105 tablet 1     allopurinol (ZYLOPRIM) 100 MG tablet Take 1 tablet (100 mg) by mouth every evening 90 tablet 3     losartan (COZAAR) 25 MG tablet Take 0.5 tablets (12.5 mg) by mouth every evening 45 tablet 2     simvastatin (ZOCOR) 40 MG tablet Take 1 tablet (40 mg) by mouth At Bedtime 90 tablet 2     metoprolol (TOPROL XL) 50 MG 24 hr tablet Take 1 tablet (50 mg) by mouth daily 90 tablet 3     PROVIDER DOSED MANAGED WARFARIN, COUMADIN, OUTPATIENT Per coumadin clinic       Calcium Carbonate-Vitamin D (CALCIUM 600 + D OR) Take 1 tablet by mouth every morning           Allergies   Allergen Reactions     Lisinopril Cough         Review of Systems:  -Skin/Heme New Pt: The patient admits to frequent sun exposure. The patient denies excessive scarring or problems healing except as per HPI. The patient denies excessive bleeding.  -Constitutional: The patient denies fatigue, fevers, chills, unintended weight loss, and night sweats.  -HEENT: Patient denies nonhealing oral sores.  -Skin: As above in HPI. No additional skin concerns.    Physical exam:  Vitals: There were no vitals taken for this visit.  GEN: This is a well developed, well-nourished male in no acute distress, in a pleasant mood.    SKIN: Waist-up skin, which includes the head/face, neck, both arms, chest, back, abdomen, digits and/or nails was examined.  -There is a 4mm ulcerated pearly pink papule with heaped borders lesion located on the left malar cheek.   -There is a 2mm telangiectatic pink papule on right nose with  minimal superficial erosion.   - There are erythematous macules with overyling adherent scale on the right cheek, left malar cheek, lef temple, left upper eyelid and left forehead.   - multiple hyperpigmented stuck-on waxy papules on the trunk  -No other lesions of concern on areas examined.     Impression/Plan:  1. Neoplasm of uncertain behavior on the left cheek. The differential diagnosis includes BCC vs. SCC.     Shave biopsy:  After discussion of benefits and risks including but not limited to bleeding/bruising, pain/swelling, infection, scar, incomplete removal, nerve damage/numbness, recurrence, and non-diagnostic biopsy, written consent, verbal consent and photographs were obtained. Time-out was performed. The area was cleaned with isopropyl alcohol.  was injected to obtain adequate anesthesia of the lesion on the left cheek. 1ml of 1% lidocaine was injected to obtain adequate anesthesia. A  shave biopsy was performed. Hemostasis was achieved with aluminium chloride. Vaseline and a sterile dressing were applied. The patient tolerated the procedure and no complications were noted. The patient was provided with verbal and written post care instructions.      2. Neoplasm of uncertain behavior on the right nasal ala. The differential diagnosis includes BCC vs. Sebaceous hyperplasia vs. AK.     Shave biopsy:  After discussion of benefits and risks including but not limited to bleeding/bruising, pain/swelling, infection, scar, incomplete removal, nerve damage/numbness, recurrence, and non-diagnostic biopsy, written consent, verbal consent and photographs were obtained. Time-out was performed. The area was cleaned with isopropyl alcohol.  was injected to obtain adequate anesthesia of the lesion on the right nasal ala. 0.5 ml of 1% lidocaine was injected to obtain adequate anesthesia. A  shave biopsy was performed. Hemostasis was achieved with aluminium chloride. Vaseline and a sterile dressing were applied. The  patient tolerated the procedure and no complications were noted. The patient was provided with verbal and written post care instructions.      3. Actinic keratosis    Cryotherapy procedure note: After verbal consent and discussion of risks and benefits including but no limited to dyspigmentation/scar, blister, and pain, above documented 4 sites treated with 1-2mm freeze border for 2 cycles with liquid nitrogen. Post cryotherapy instructions were provided.       4. Seborrheic keratosis, non irritated    Benign, reassurance given    Follow-up pending biopsy results    Dr. Loly Hirsch staffed the patient.    Staff Involved:  Resident(Marco Vora)/Staff(as above)  Marco Vora MD  PGY-2 Dermatology  (p) [phone]    I saw and evaluated this patient with Dr. Vora. The above note has been read and reviewed and where appropriate amended and corrected. It accurately reflects my clinical findings, observations, diagnoses, treatment recommendations and follow-up plans.  I was present during the entire surgical procedure which was performed by Dr. Vora appropriately and without complications.     Loly Hirsch MD  Clinical Professor   Department of Dermatology  [facility]                    Pictures were placed in Pt's chart today for future reference.

## 2017-09-29 NOTE — MR AVS SNAPSHOT
After Visit Summary   9/29/2017    Brooks Summers    MRN: [id number]           Patient Information     Date Of Birth          1945        Visit Information        Provider Department      9/29/2017 10:30 AM Loly Hirsch MD [facility] Dermatology        Today's Diagnoses     Neoplasm of uncertain behavior of skin    -  1      Care Instructions    We will let you know about the biopsy results within two weeks, but have high suspicion for at least 1 skin cancer.      Wound Care After a Biopsy    What is a skin biopsy?  A skin biopsy allows the doctor to examine a very small piece of tissue under the microscope to determine the diagnosis and the best treatment for the skin condition. A local anesthetic (numbing medicine)  is injected with a very small needle into the skin area to be tested. A small piece of skin is taken from the area. Sometimes a suture (stitch) is used.     What are the risks of a skin biopsy?  I will experience scar, bleeding, swelling, pain, crusting and redness. I may experience incomplete removal or recurrence. Risks of this procedure are excessive bleeding, bruising, infection, nerve damage, numbness, thick (hypertrophic or keloidal) scar and non-diagnostic biopsy.    How should I care for my wound for the first 24 hours?    Keep the wound dry and covered for 24 hours    If it bleeds, hold direct pressure on the area for 15 minutes. If bleeding does not stop then go to the emergency room    Avoid strenuous exercise the first 1-2 days or as your doctor instructs you    How should I care for the wound after 24 hours?    After 24 hours, remove the bandage    You may bathe or shower as normal    If you had a scalp biopsy, you can shampoo as usual and can use shower water to clean the biopsy site daily    Clean the wound twice a day with gentle soap and water    Do not scrub, be gentle    Apply white petroleum/Vaseline after cleaning the wound with a cotton swab or a clean finger,  and keep the site covered with a Bandaid /bandage. Bandages are not necessary with a scalp biopsy    If you are unable to cover the site with a Bandaid /bandage, re-apply ointment 2-3 times a day to keep the site moist. Moisture will help with healing    Avoid strenuous activity for first 1-2 days    Avoid lakes, rivers, pools, and oceans until the stitches are removed or the site is healed    How do I clean my wound?    Wash hands thoroughly with soap or use hand  before all wound care    Clean the wound with gentle soap and water    Apply white petroleum/Vaseline  to wound after it is clean    Replace the Bandaid /bandage to keep the wound covered for the first few days or as instructed by your doctor    If you had a scalp biopsy, warm shower water to the area on a daily basis should suffice    What should I use to clean my wound?     Cotton-tipped applicators (Qtips )    White petroleum jelly (Vaseline ). Use a clean new container and use Q-tips to apply.    Bandaids   as needed    Gentle soap     How should I care for my wound long term?    Do not get your wound dirty    Keep up with wound care for one week or until the area is healed.    A small scab will form and fall off by itself when the area is completely healed. The area will be red and will become pink in color as it heals. Sun protection is very important for how your scar will turn out. Sunscreen with an SPF 30 or greater is recommended once the area is healed.    If you have stitches, stitches need to be removed in 14 days. You may return to our clinic for this or you may have it done locally at your doctor s office.    You should have some soreness but it should be mild and slowly go away over several days. Talk to your doctor about using tylenol for pain,    When should I call my doctor?  If you have increased:     Pain or swelling    Pus or drainage (clear or slightly yellow drainage is ok)    Temperature over 100F    Spreading redness or  warmth around wound    When will I hear about my results?  The biopsy results can take 2-3 weeks to come back. The clinic will call you with the results, send you a Rezolvet message, or have you schedule a follow-up clinic or phone time to discuss the results. Contact our clinics if you do not hear from us in 3 weeks.     Who should I call with questions?    [facility]: [phone]     [facility]: [phone]    For urgent needs outside of business hours call the [facility] at [phone] and ask for the dermatology resident on call        Cryotherapy    What is it?    Use of a very cold liquid, such as liquid nitrogen, to freeze and destroy abnormal skin cells that need to be removed    What should I expect?    Tenderness and redness    A small blister that might grow and fill with dark purple blood. There may be crusting.    More than one treatment may be needed if the lesions do not go away.    How do I care for the treated area?    Gently wash the area with your hands when bathing.    Use a thin layer of Vaseline to help with healing. You may use a Band-Aid.     The area should heal within 7-10 days and may leave behind a pink or lighter color.     Do not use an antibiotic or Neosporin ointment.     You may take acetaminophen (Tylenol) for pain.     Call your Doctor if you have:    Severe pain    Signs of infection (warmth, redness, cloudy yellow drainage, and or a bad smell)    Questions or concerns    Who should I call with questions?       [facility]: [phone]       [facility]: [phone]       For urgent needs outside of business hours call the [facility] at [phone]        and ask for the dermatology resident on call            Follow-ups after your visit        Your next 10 appointments already scheduled     Oct 11, 2017  1:15 PM CDT   Anticoagulation  Visit with LL ANTI COAG   [facility]    [address] [address]   [phone]            Nov 03, 2017  1:00 PM CDT   (Arrive by 12:45 PM)   Pacemaker Check with Uc Cv Device 1   [facility])    [address] [place] [id number] [phone]            Nov 03, 2017  1:00 PM CDT   RESEARCH with  Cv Research Coordinator   [facility] ([facility])    [address] [place] [id number] [phone]            Nov 03, 2017  1:30 PM CDT   (Arrive by 1:15 PM)   RETURN HEART FAILURE with Yemi Castro MD   [facility] ([facility])    [address] [place] [id number] [phone]              Who to contact     Please call your clinic at [phone] to:    Ask questions about your health    Make or cancel appointments    Discuss your medicines    Learn about your test results    Speak to your doctor   If you have compliments or concerns about an experience at your clinic, or if you wish to file a complaint, please contact [facility] Patient Relations at [phone] or email us at [email or web address]         Additional Information About Your Visit        MyChart Information     The[employer]est is an electronic gateway that provides easy, online access to your medical records. With [employer], you can request a clinic appointment, read your test results, renew a prescription or communicate with your care team.     To sign up for [employer]est visit the website at [email or web address]   You will be asked to enter the access code listed below, as well as some personal information. Please follow the directions to create your username and password.     Your access code is: [email or web address]  Expires: 12/14/2017  6:30 AM     Your access code  will  in 90 days. If you need help or a new code, please contact your [facility] or call [phone] for assistance.        Care EveryWhere ID     This is your Care EveryWhere ID. This could be used by other organizations to access your [place] medical records  [id number]         Blood Pressure from Last 3 Encounters:   17 108/69   17 117/76   17 123/79    Weight from Last 3 Encounters:   17 82.2 kg (181 lb 4.8 oz)   17 82.4 kg (181 lb 11.2 oz)   17 82.5 kg (181 lb 14.4 oz)              Today, you had the following     No orders found for display       Primary Care Provider Office Phone # Fax #    Edin Mays [phone] [address] [address]        Equal Access to Services     JASMYNE RALPH : Hadii aad ku hadasho Soomaali, waaxda luqadaha, qaybta kaalmada adeegyada, waxay randi berrios . So [facility] [phone].    ATENCIÓN: Si habla español, tiene a herrera disposición servicios gratuitos de asistencia lingüística. Llame al [phone].    We comply with applicable federal civil rights laws and [place] laws. We do not discriminate on the basis of race, color, national origin, age, disability sex, sexual orientation or gender identity.            Thank you!     Thank you for choosing [facility] DERMATOLOGY  for your care. Our goal is always to provide you with excellent care. Hearing back from our patients is one way we can continue to improve our services. Please take a few minutes to complete the written survey that you may receive in the mail after your visit with us. Thank you!             Your Updated Medication List - Protect others around you: Learn how to safely use, store and throw away your medicines at www.disposemymeds.org.          This list is accurate as of: 17 11:59 AM.  Always use your most recent med list.                   Brand Name Dispense  Instructions for use Diagnosis    allopurinol 100 MG tablet    ZYLOPRIM    90 tablet    Take 1 tablet (100 mg) by mouth every evening    Idiopathic gout, unspecified chronicity, unspecified site       CALCIUM 600 + D PO      Take 1 tablet by mouth every morning        losartan 25 MG tablet    COZAAR    45 tablet    Take 0.5 tablets (12.5 mg) by mouth every evening    Benign essential hypertension       metoprolol 50 MG 24 hr tablet    TOPROL XL    90 tablet    Take 1 tablet (50 mg) by mouth daily    Benign essential hypertension       * PROVIDER DOSED MANAGED WARFARIN (COUMADIN) OUTPATIENT      Per coumadin clinic        simvastatin 40 MG tablet    ZOCOR    90 tablet    Take 1 tablet (40 mg) by mouth At Bedtime    Hyperlipidemia LDL goal <100       * warfarin 5 MG tablet    COUMADIN    105 tablet    Take 1 and 1/2 Tablet on Wednesday, and take 2 Tablets on all other days.    S/P AVR (aortic valve replacement), Paroxysmal atrial fibrillation (H), Long term current use of anticoagulant therapy, S/P aortic valve replacement       * Notice:  This list has 2 medication(s) that are the same as other medications prescribed for you. Read the directions carefully, and ask your doctor or other care provider to review them with you.

## 2017-09-29 NOTE — NURSING NOTE
Lidocaine-epinephrine 1-1:777046 % injection   1mL once for one use, starting 9/29/2017 ending 9/29/2017,  2mL disp, R-0, injection  Injected by Dr. Vora

## 2017-10-02 LAB — COPATH REPORT: NORMAL

## 2017-10-03 DIAGNOSIS — C44.311 BASAL CELL CARCINOMA OF NOSE: ICD-10-CM

## 2017-10-03 DIAGNOSIS — C44.319 BASAL CELL CARCINOMA OF LEFT CHEEK: Primary | ICD-10-CM

## 2017-10-11 ENCOUNTER — ANTICOAGULATION THERAPY VISIT (OUTPATIENT)
Dept: ANTICOAGULATION | Facility: CLINIC | Age: 72
End: 2017-10-11
Payer: MEDICARE

## 2017-10-11 DIAGNOSIS — Z95.2 S/P AVR (AORTIC VALVE REPLACEMENT): ICD-10-CM

## 2017-10-11 DIAGNOSIS — I48.0 PAROXYSMAL ATRIAL FIBRILLATION (H): ICD-10-CM

## 2017-10-11 DIAGNOSIS — Z95.2 S/P AORTIC VALVE REPLACEMENT: ICD-10-CM

## 2017-10-11 DIAGNOSIS — Z79.01 LONG TERM CURRENT USE OF ANTICOAGULANT THERAPY: ICD-10-CM

## 2017-10-11 LAB — INR POINT OF CARE: 2 (ref 0.86–1.14)

## 2017-10-11 PROCEDURE — 36416 COLLJ CAPILLARY BLOOD SPEC: CPT

## 2017-10-11 PROCEDURE — 99207 ZZC NO CHARGE NURSE ONLY: CPT

## 2017-10-11 PROCEDURE — 85610 PROTHROMBIN TIME: CPT | Mod: QW

## 2017-10-11 RX ORDER — WARFARIN SODIUM 5 MG/1
TABLET ORAL
Qty: 105 TABLET | Refills: 1 | COMMUNITY
Start: 2017-10-11 | End: 2018-01-16

## 2017-10-11 NOTE — PROGRESS NOTES
ANTICOAGULATION FOLLOW-UP CLINIC VISIT    Patient Name:  Brooks Summers  Date:  10/11/2017  Contact Type:  Face to Face    SUBJECTIVE:     Patient Findings     Positives No Problem Findings    Comments Patient was seen by dermatology at the [facility] on 9/29. He had a few spots that were removed from his face that the biopsy came back as basal cell carcinoma. He was referred to dermatology surgery for follow up to remove more of the cancerous areas. Writer requested patient call [facility] if he would need to hold his warfarin. At this point there is no set date for his appointments.            OBJECTIVE    INR Protime   Date Value Ref Range Status   10/11/2017 2.0 (A) 0.86 - 1.14 Final       ASSESSMENT / PLAN  INR assessment THER    Recheck INR In: 4 WEEKS    INR Location Clinic      Anticoagulation Summary as of 10/11/2017     INR goal 2.0-3.0   Today's INR 2.0   Maintenance plan 7.5 mg (5 mg x 1.5) on Mon, Fri; 10 mg (5 mg x 2) all other days   Full instructions 7.5 mg on Mon, Fri; 10 mg all other days   Weekly total 65 mg   No change documented Sheri Frausto RN   Plan last modified Lindsey Epstein RN (8/30/2017)   Next INR check 11/8/2017   Priority INR   Target end date Indefinite    Indications   S/P aortic valve replacement [Z95.2]  Paroxysmal atrial fibrillation (H) [I48.0]  Long term current use of anticoagulant therapy [Z79.01]         Anticoagulation Episode Summary     INR check location     Preferred lab     Send INR reminders to [facility]    Comments  * warfarin 5 mg tabs, oral surgeon at the [facility], [phone]      Anticoagulation Care Providers     Provider Role Specialty Phone number    Edin Mays MD Responsible Internal Medicine [phone]            See the Encounter Report to view Anticoagulation Flowsheet and Dosing Calendar (Go to Encounters tab in chart review, and find the Anticoagulation Therapy Visit)        Sheri Frausto RN

## 2017-10-11 NOTE — MR AVS SNAPSHOT
Brooks Summers   10/11/2017 1:15 PM   Anticoagulation Therapy Visit    Description:  72 year old male   Provider:  LL ANTI COAG   Department:  [facility]           INR as of 10/11/2017     Today's INR 2.0      Anticoagulation Summary as of 10/11/2017     INR goal 2.0-3.0   Today's INR 2.0   Full instructions 7.5 mg on Mon, Fri; 10 mg all other days   Next INR check 11/8/2017    Indications   S/P aortic valve replacement [Z95.2]  Paroxysmal atrial fibrillation (H) [I48.0]  Long term current use of anticoagulant therapy [Z79.01]         Your next Anticoagulation Clinic appointment(s)     Nov 08, 2017  1:15 PM CST   Anticoagulation Visit with LL ANTI COAG   [facility]    [address] [address] [phone]              Contact Numbers     Please call [phone] to cancel and/or reschedule your appointment.  Please call [phone] with any problems or questions regarding your therapy          October 2017 Details    Sun Mon Tue Wed Thu Fri Sat     1               2               3               4               5               6               7                 8               9               10               11      10 mg   See details      12      10 mg         13      7.5 mg         14      10 mg           15      10 mg         16      7.5 mg         17      10 mg         18      10 mg         19      10 mg         20      7.5 mg         21      10 mg           22      10 mg         23      7.5 mg         24      10 mg         25      10 mg         26      10 mg         27      7.5 mg         28      10 mg           29      10 mg         30      7.5 mg         31      10 mg              Date Details   10/11 This INR check               How to take your warfarin dose     To take:  7.5 mg Take 1.5 of the 5 mg tablets.    To take:  10 mg Take 2 of the 5 mg tablets.           November 2017 Details    Sun Mon Tue Wed Thu Fri Sat        1      10  mg         2      10 mg         3      7.5 mg         4      10 mg           5      10 mg         6      7.5 mg         7      10 mg         8            9               10               11                 12               13               14               15               16               17               18                 19               20               21               22               23               24               25                 26               27               28               29               30                  Date Details   No additional details    Date of next INR:  11/8/2017         How to take your warfarin dose     To take:  7.5 mg Take 1.5 of the 5 mg tablets.    To take:  10 mg Take 2 of the 5 mg tablets.

## 2017-10-25 ENCOUNTER — OFFICE VISIT (OUTPATIENT)
Dept: DERMATOLOGY | Facility: CLINIC | Age: 72
End: 2017-10-25

## 2017-10-25 DIAGNOSIS — Z79.2 NEED FOR PROPHYLACTIC ANTIBIOTIC: Primary | ICD-10-CM

## 2017-10-25 ASSESSMENT — PAIN SCALES - GENERAL: PAINLEVEL: NO PAIN (0)

## 2017-10-25 NOTE — NURSING NOTE
History of Skin cancer : Yes    History of Mohs Surgery: Yes    History of a solid organ transplant: no Organ(s):  Year(s):  Creatinine:  Bone Marrow Transplant : no (year, )    Immunosuppressive Medications: no (if yes, which ones: )    HIV or Hepatitis B or C : no    Chronic lymphocytic leukemia: no    Diabetes: no (Type 1 or 2: )    Any Bleeding disorders: no    Do you have a Pacemaker or Defibrillator: Yes (year placed: 2015- second Defib)    Artificial heart valve: Yes (mechanical or porcine: Porcine)    Joint Replacement in the last 2 years: no Joint(s):  Year(s):     Do you typically take Prophylactic Antibiotics before seeing a dentist or having a procedure?: Yes    If yes, verify that patient has the antibiotic on hand and instruct to take one hour before their surgery appointment.    If they do not have the antibiotic, verify the patients pharmacy and notify Dr. Stahl by printing the pre-mohs call sheet.    Do you wear a C Pap Mask: no    If yes, and procedure is on the face, ask patient to bring in the mask with them (Mask only)    Do you have any mobility issues: no    If yes, is a van service is required to transport you to/from your appointment? Does not have a     Smoking History (tobacco of any sort): no    Do you have any other health issues that we should know about? no    Do you take any of the following Blood Thinners:    Aspirin: no    Plavix/Aggrastat/Brilinta: no    Warfarin: Yes (Last INR: 2.1 Date: 10/18/17)    Pradaxa/Eliquis/Xarelto: no    Ibuprofen (Advil/Motrin): no    Naproxen (Aleve): no    Vitamin E: no    Fish Oil: no    Ginkgo biloba: no    Other:    Done-Paient was instructed of the following: Ibuprofen, naproxen, Vitamin E, Fish Oil, and Ginkgo biloba should be stopped 1 week prior to and 1 week after the procedure.    Medication Allergies: in EHR    Are medications in Epic: in EHR    Done-Patient was reminded to take all medications as usual, other than those listed  above, on the day of surgery    Done-Patient was instructed to bring all medications to clinic on day of surgery    Done-Patient will bring a     (A  is helpful for the following reasons: some patients need medications to relax, but once given, patients should not drive; sometimes bandages obstruct your vision making it difficult to see and unsafe to drive; the day can get long so it s nice to have a rodolfo; sometimes the procedure wears people out/makes them quite tired)    Done-Photograph of the surgical site(s) is/are available    Done-Patient was reminded that this can be an all-day procedure and that no other appointments should be scheduled on the day of Mohs

## 2017-10-25 NOTE — NURSING NOTE
Chief Complaint   Patient presents with     Consult     Mr. Summers is here today to have a consultation for MOHS for two sites.      Natali Houston CMA

## 2017-10-25 NOTE — PROGRESS NOTES
[facility] Dermatology Note    Dermatology Problem List:  1. NMSC  -BCC, nodular and micronodular types, extending to the deep margin, left malar cheek, s/p Bx 09/29/2017 pending treatment  -BCC, superficial and nodular types, extending to the lateral and deep margins, right nasal ala, s/p Bx 09/29/2017 pending treatment  -BCC, dorsal nose, s/p Mohs 05/2013  2. AK  3. SK    Encounter Date: Oct 25, 2017    CC:   Chief Complaint   Patient presents with     Consult     Mr. Summers is here today to have a consultation for MOHS for two sites.        History of Present Illness:  This 72 year old male presents as a referral from Dr. Hirsch (General Dermatology) for treatment of two biopsy-proven basal cell carcinomas on the left malar cheek and the right nasal ala. These lesions were biopsied by Dr. Hirsch on 09/29/2017. The pathology report commented on positive margins in both biopsy sites. The patient is otherwise feeling well. There are no other skin concerns at this time.      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 Tx with Dapto/RIFx 8 weeks.     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     Paroxysmal a-fib (H)     Post-op 7/14/2011, 1/26/13     Rotator Cuff (Capsule) Sprain and Strain      S/P aortic valve replacement     7/14/2011, re-do 1/25/13 due to endocarditis     Smoking hx      Thrombocytopenia (H)      Past Surgical History:   Procedure Laterality Date     ANESTHESIA CARDIOVERSION  7/18/2011    Procedure:ANESTHESIA CARDIOVERSION; Cardioversion; Surgeon:GENERIC ANESTHESIA PROVIDER; Location:[facility] OR     COLONOSCOPY       CORONARY ANGIOGRAPHY ADULT ORDER       CYSTOSCOPY, BIOPSY URETHRA N/A 4/3/2017    Procedure: CYSTOSCOPY, BIOPSY URETHRA;  Surgeon: Marlena Mora MD;  Location: [facility] OR     ENDOSCOPY UPPER, COLONOSCOPY, COMBINED       HC REMOV & REPLACE IMPLT DEFIB PULSE GEN MULT LEAD  12/3/2015          HEMORRHOID SURGERY       IMPLANT AUTOMATIC IMPLANTABLE CARDIOVERTER DEFIBRILLATOR       MOHS MICROGRAPHIC PROCEDURE       ORTHOPEDIC SURGERY      shoulder,right     REDO STERNOTOMY REPLACE VALVE AORTIC  1/25/2013    Procedure: REDO STERNOTOMY REPLACE VALVE AORTIC;  Redo Sternotomy, Redo Aortic Valve Replacement on pump oxygenator. Intraoperative Transesophageal Echocardiogram;  Surgeon: Navin Hampton MD;  Location: [facility] OR     REPAIR VALVE MITRAL  2013     REPLACE VALVE AORTIC  7/14/2011    Procedure:REPLACE VALVE AORTIC; Median Sternotomy, Bioprosthesis,  Aortic Valve replacement on pump with oxygenator. Intra-operative Transesophogeal Echocardiogram.; Surgeon:NAVIN HAMPTON; Location:[facility] OR     teeth extractions       Social History:  The patient's wife is a nurse and is involved in his healthcare.    Family History:  Sister with NMSC    Medications:  Current  Outpatient Prescriptions   Medication Sig Dispense Refill     warfarin (COUMADIN) 5 MG tablet Take 7.5 mg on Mon, Fri; 10 mg all other days or as directed by the Anticoagulation Clinic 105 tablet 1     allopurinol (ZYLOPRIM) 100 MG tablet Take 1 tablet (100 mg) by mouth every evening 90 tablet 3     losartan (COZAAR) 25 MG tablet Take 0.5 tablets (12.5 mg) by mouth every evening 45 tablet 2     simvastatin (ZOCOR) 40 MG tablet Take 1 tablet (40 mg) by mouth At Bedtime 90 tablet 2     metoprolol (TOPROL XL) 50 MG 24 hr tablet Take 1 tablet (50 mg) by mouth daily 90 tablet 3     PROVIDER DOSED MANAGED WARFARIN, COUMADIN, OUTPATIENT Per coumadin clinic       Calcium Carbonate-Vitamin D (CALCIUM 600 + D OR) Take 1 tablet by mouth every morning           Allergies   Allergen Reactions     Lisinopril Cough       Review of Systems:  -Skin: As per HPI  -Constitutional: The patient is generally feeling well.    Physical exam:  There were no vitals taken for this visit.  -GEN: This is a well developed, well-nourished male in no acute distress, in a pleasant mood.    NEURO: Alert and oriented, answers questions appropriately.   PSYCH: in a pleasant mood, appropriate affect  -SKIN: Focused examination of the face was performed.  - Left malar cheek - pink depressed 6mm macule, minimal scale.   - Right nasal ala within alar groove - pink depressed 5mm macule   - No other lesions of concern on areas examined.     Impression/Plan:  1. Basal cell carcinoma x2 (left malar cheek and right nasal ala) in a patient with a history of heart valve replacement.     We discussed Mohs micrographic surgery. Benefits of the procedure were discussed, including high cure rate and skin-sparing nature. It offers a high chance for a good cosmetic result, especially given the anatomy of the nasal ala. Risks were discussed, including damage to the local anatomy. Discussed risk of infection, which is present any time the skin is cut. Signs of infection  discussed, such as heat associated with the surgical wound, pain associated with the surgical wound, purulence, or honey colored crusting. Risk of bleeding discussed, which is never lethal in this procedure. There may be some excessive bleeding after treatment given the patient's anticoagulation therapy; however, INR labs indicate that anticoagulation is well under control.    Keflex 2g x1; sent to pharmacy to be taken on day of procedure.    L Malar Cheek; AUC Score = 8  R Nasal Ala; AUC Score = 7  Schedule Mohs   Discussed two appointments with patient. He prefers both sites on one day. He is aware the day will be longer with two sites.   Ideally will receive his INR within 7 days of surgery.     Repair of L malar cheek, likely complex linear closure  Repair of R nasal ala, granulation vs flap.       Staff Involved:  Scribe/Staff  I, Ovidio Pruitt, am serving as a scribe to document services personally performed by Dr. Yobany Irving, D.BENJI., based on data collection and the provider's statements to me.       Provider Disclosure:   The documentation recorded by the scribe accurately reflects the services I personally performed and the decisions made by me.  Yobany Irving DO    Department of Dermatology  [facility]: Phone: [phone], Fax:[phone]  [facility]: Phone: [phone], Fax: [phone]

## 2017-10-25 NOTE — MR AVS SNAPSHOT
After Visit Summary   10/25/2017    Brooks Summers    MRN: [id number]           Patient Information     Date Of Birth          1945        Visit Information        Provider Department      10/25/2017 2:45 PM Yobany Irving MD [facility] Dermatologic Surgery        Today's Diagnoses     Need for prophylactic antibiotic    -  1       Follow-ups after your visit        Your next 10 appointments already scheduled     Nov 03, 2017  1:00 PM CDT   (Arrive by 12:45 PM)   Pacemaker Check with  Cv Device 1   [facility] ([facility])    [address] [place] [address]   [phone]            Nov 03, 2017  1:00 PM CDT   RESEARCH with  Cv Research Coordinator   [facility])    [address] [place] [address]   [phone]            Nov 03, 2017  1:30 PM CDT   (Arrive by 1:15 PM)   RETURN HEART FAILURE with Yemi Castro MD   [facility] ([facility])    [address] [place] [id number] [phone]            Nov 08, 2017  9:00 AM CST   (Arrive by 8:45 AM)   New Mohs with Yobany Irving MD   [facility] Dermatologic Surgery ([facility])    [address] [place] [id number] [phone]            Nov 08, 2017  1:15 PM CST   Anticoagulation Visit with LL ANTI COAG   [facility] ([facility])    [address] [address] [phone]              Who to contact     Please call your clinic at [phone] to:    Ask questions about your health    Make or cancel appointments    Discuss your medicines    Learn about your test results    Speak to your doctor   If you have compliments or concerns about an experience at your clinic, or if you wish to file a complaint, please contact [facility]  Physicians Patient Relations at [phone] or email us at [email or web address]         Additional Information About Your Visit        [employer] Information     [employer] is an electronic gateway that provides easy, online access to your medical records. With [employer], you can request a clinic appointment, read your test results, renew a prescription or communicate with your care team.     To sign up for [employer] visit the website at [email or web address]   You will be asked to enter the access code listed below, as well as some personal information. Please follow the directions to create your username and password.     Your access code is: [email or web address]  Expires: 2017  6:30 AM     Your access code will  in 90 days. If you need help or a new code, please contact your [facility] or call [phone] for assistance.        Care EveryWhere ID     This is your Care EveryWhere ID. This could be used by other organizations to access your [place] medical records  [id number]         Blood Pressure from Last 3 Encounters:   17 108/69   17 117/76   17 123/79    Weight from Last 3 Encounters:   17 82.2 kg (181 lb 4.8 oz)   17 82.4 kg (181 lb 11.2 oz)   17 82.5 kg (181 lb 14.4 oz)              Today, you had the following     No orders found for display       Primary Care Provider Office Phone # Fax #    Edin Mays [phone]       [address] [address]        Equal Access to Services     KUN RALPH AH: Hadii robin barnes Sodarrius, waaxda luqadaha, qaybta kaalmaelisabeth parra. So [facility] [phone].    ATENCIÓN: Si habla español, tiene a herrera disposición servicios gratuitos de asistencia lingüística. Llame al [phone].    We comply with applicable federal civil rights laws and [place] laws. We do not discriminate on the basis of race,  color, national origin, age, disability, sex, sexual orientation, or gender identity.            Thank you!     Thank you for choosing [facility] DERMATOLOGIC SURGERY  for your care. Our goal is always to provide you with excellent care. Hearing back from our patients is one way we can continue to improve our services. Please take a few minutes to complete the written survey that you may receive in the mail after your visit with us. Thank you!             Your Updated Medication List - Protect others around you: Learn how to safely use, store and throw away your medicines at www.disposemymeds.org.          This list is accurate as of: 10/25/17  3:31 PM.  Always use your most recent med list.                   Brand Name Dispense Instructions for use Diagnosis    allopurinol 100 MG tablet    ZYLOPRIM    90 tablet    Take 1 tablet (100 mg) by mouth every evening    Idiopathic gout, unspecified chronicity, unspecified site       CALCIUM 600 + D PO      Take 1 tablet by mouth every morning        losartan 25 MG tablet    COZAAR    45 tablet    Take 0.5 tablets (12.5 mg) by mouth every evening    Benign essential hypertension       metoprolol 50 MG 24 hr tablet    TOPROL XL    90 tablet    Take 1 tablet (50 mg) by mouth daily    Benign essential hypertension       * PROVIDER DOSED MANAGED WARFARIN (COUMADIN) OUTPATIENT      Per coumadin clinic        simvastatin 40 MG tablet    ZOCOR    90 tablet    Take 1 tablet (40 mg) by mouth At Bedtime    Hyperlipidemia LDL goal <100       * warfarin 5 MG tablet    COUMADIN    105 tablet    Take 7.5 mg on Mon, Fri; 10 mg all other days or as directed by the Anticoagulation Clinic    S/P AVR (aortic valve replacement), Paroxysmal atrial fibrillation (H), Long term current use of anticoagulant therapy, S/P aortic valve replacement       * Notice:  This list has 2 medication(s) that are the same as other medications prescribed for you. Read the directions carefully, and ask your doctor  or other care provider to review them with you.

## 2017-10-25 NOTE — LETTER
10/25/2017       RE: Brooks Summers  [address]  [facility] [address]     Dear Colleague,    Thank you for referring your patient, Brooks Summers, to the [facility] DERMATOLOGIC SURGERY at [facility]. Please see a copy of my visit note below.    [facility] Dermatology Note    Dermatology Problem List:  1. NMSC  -BCC, nodular and micronodular types, extending to the deep margin, left malar cheek, s/p Bx 09/29/2017 pending treatment  -BCC, superficial and nodular types, extending to the lateral and deep margins, right nasal ala, s/p Bx 09/29/2017 pending treatment  -BCC, dorsal nose, s/p Mohs 05/2013  2. AK  3. SK    Encounter Date: Oct 25, 2017    CC:   Chief Complaint   Patient presents with     Consult     Mr. Summers is here today to have a consultation for MOHS for two sites.        History of Present Illness:  This 72 year old male presents as a referral from Dr. Hirsch (General Dermatology) for treatment of two biopsy-proven basal cell carcinomas on the left malar cheek and the right nasal ala. These lesions were biopsied by Dr. Hirsch on 09/29/2017. The pathology report commented on positive margins in both biopsy sites. The patient is otherwise feeling well. There are no other skin concerns at this time.      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 Tx with Dapto/RIFx 8 weeks.     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     Paroxysmal a-fib (H)     Post-op 7/14/2011, 1/26/13     Rotator Cuff (Capsule) Sprain and Strain      S/P aortic valve replacement     7/14/2011, re-do 1/25/13 due to endocarditis     Smoking hx      Thrombocytopenia (H)      Past Surgical History:   Procedure Laterality Date     ANESTHESIA CARDIOVERSION  7/18/2011    Procedure:ANESTHESIA CARDIOVERSION; Cardioversion; Surgeon:GENERIC ANESTHESIA PROVIDER; Location:[facility] OR     COLONOSCOPY       CORONARY ANGIOGRAPHY ADULT ORDER       CYSTOSCOPY, BIOPSY URETHRA N/A 4/3/2017    Procedure: CYSTOSCOPY, BIOPSY URETHRA;  Surgeon: Marlena Mora MD;  Location: [facility] OR     ENDOSCOPY UPPER, COLONOSCOPY, COMBINED       HC REMOV & REPLACE IMPLT DEFIB PULSE GEN MULT LEAD  12/3/2015          HEMORRHOID SURGERY       IMPLANT AUTOMATIC IMPLANTABLE CARDIOVERTER DEFIBRILLATOR       MOHS MICROGRAPHIC PROCEDURE       ORTHOPEDIC SURGERY      shoulder,right     REDO STERNOTOMY REPLACE VALVE AORTIC  1/25/2013    Procedure: REDO STERNOTOMY REPLACE VALVE AORTIC;  Redo Sternotomy, Redo Aortic Valve Replacement on pump oxygenator. Intraoperative Transesophageal Echocardiogram;  Surgeon: Nvain Singh MD;  Location: [facility] OR     REPAIR VALVE MITRAL  2013     REPLACE VALVE AORTIC  7/14/2011    Procedure:REPLACE VALVE AORTIC; Median Sternotomy, Bioprosthesis,  Aortic Valve  replacement on pump with oxygenator. Intra-operative Transesophogeal Echocardiogram.; Surgeon:STEPHANIE HAMPTON; Location:[facility] OR     teeth extractions       Social History:  The patient's wife is a nurse and is involved in his healthcare.    Family History:  Sister with NMSC    Medications:  Current Outpatient Prescriptions   Medication Sig Dispense Refill     warfarin (COUMADIN) 5 MG tablet Take 7.5 mg on Mon, Fri; 10 mg all other days or as directed by the Anticoagulation Clinic 105 tablet 1     allopurinol (ZYLOPRIM) 100 MG tablet Take 1 tablet (100 mg) by mouth every evening 90 tablet 3     losartan (COZAAR) 25 MG tablet Take 0.5 tablets (12.5 mg) by mouth every evening 45 tablet 2     simvastatin (ZOCOR) 40 MG tablet Take 1 tablet (40 mg) by mouth At Bedtime 90 tablet 2     metoprolol (TOPROL XL) 50 MG 24 hr tablet Take 1 tablet (50 mg) by mouth daily 90 tablet 3     PROVIDER DOSED MANAGED WARFARIN, COUMADIN, OUTPATIENT Per coumadin clinic       Calcium Carbonate-Vitamin D (CALCIUM 600 + D OR) Take 1 tablet by mouth every morning           Allergies   Allergen Reactions     Lisinopril Cough       Review of Systems:  -Skin: As per HPI  -Constitutional: The patient is generally feeling well.    Physical exam:  There were no vitals taken for this visit.  -GEN: This is a well developed, well-nourished male in no acute distress, in a pleasant mood.    NEURO: Alert and oriented, answers questions appropriately.   PSYCH: in a pleasant mood, appropriate affect  -SKIN: Focused examination of the face was performed.  - Left malar cheek - pink depressed 6mm macule, minimal scale.   - Right nasal ala within alar groove - pink depressed 5mm macule   - No other lesions of concern on areas examined.     Impression/Plan:  1. Basal cell carcinoma x2 (left malar cheek and right nasal ala) in a patient with a history of heart valve replacement.     We discussed Mohs micrographic surgery. Benefits of the procedure were discussed,  including high cure rate and skin-sparing nature. It offers a high chance for a good cosmetic result, especially given the anatomy of the nasal ala. Risks were discussed, including damage to the local anatomy. Discussed risk of infection, which is present any time the skin is cut. Signs of infection discussed, such as heat associated with the surgical wound, pain associated with the surgical wound, purulence, or honey colored crusting. Risk of bleeding discussed, which is never lethal in this procedure. There may be some excessive bleeding after treatment given the patient's anticoagulation therapy; however, INR labs indicate that anticoagulation is well under control.    Keflex 2g x1; sent to pharmacy to be taken on day of procedure.    L Malar Cheek; AUC Score = 8  R Nasal Ala; AUC Score = 7  Schedule Mohs   Discussed two appointments with patient. He prefers both sites on one day. He is aware the day will be longer with two sites.   Ideally will receive his INR within 7 days of surgery.     Repair of L malar cheek, likely complex linear closure  Repair of R nasal ala, granulation vs flap.       Staff Involved:  Scribe/Staff  I, Ovidio Pruitt, am serving as a scribe to document services personally performed by Dr. Yobany Irving, D.O., based on data collection and the provider's statements to me.       Provider Disclosure:   The documentation recorded by the scribe accurately reflects the services I personally performed and the decisions made by me.  Yobany Irving DO    Department of Dermatology  [facility]: Phone: [phone], Fax:[phone]  [facility]: Phone: [phone], Fax: [phone]

## 2017-10-29 RX ORDER — CEPHALEXIN 500 MG/1
2000 CAPSULE ORAL ONCE
Qty: 4 CAPSULE | Refills: 0 | Status: SHIPPED | OUTPATIENT
Start: 2017-10-29 | End: 2017-10-29

## 2017-11-01 ENCOUNTER — PRE VISIT (OUTPATIENT)
Dept: CARDIOLOGY | Facility: CLINIC | Age: 72
End: 2017-11-01

## 2017-11-01 NOTE — TELEPHONE ENCOUNTER
10/10/16--Echo  Interpretation Summary  Mild concentric wall thickening consistent with left ventricular hypertrophy  is present. Global and regional left ventricular function is normal with an  EF of 55-60%.  Global right ventricular function is mildly reduced.  A bioprosthetic aortic valve is present.Doppler interrogation of the aortic  valve is normal. There is tarce to mild eccentric aortic insufficiency.  Pulmonary artery systolic pressure is normal.  The inferior vena cava was normal in size with preserved respiratory  variability.  No pericardial effusion is present.    6/20/16--Device interrogation  Remote ICD transmission received and reviewed.  Device transmission sent per MD orders.  Patient has a Medtronic multi lead ICD.  Normal ICD function.  4 AT/AF episodes recorded - 24 seconds in duration.  No ventricular arrythmias recorded. Presenting EGM = AP-BVP @ 70 bpm.  BVP = 99.8%.  OptiVol fluid index is near baseline.  Estimated battery longevity = 4 years.  Patient notified of interrogation results via voicemail.  Plan for patient to return to clinic in 3 months as scheduled     Echocardiogram 10/12/2015  Interpretation Summary  AVR with 23 mm Perimount Aortic valve 2011. Leaflet mobility is normal and there is no calcification seen. There is mild central AI and normal transvalvar velocities (peak velocity 2.4 m/s, DVI .4, EOA 2.5 cm2)  LV size and function are normal with an LVEF at lower limits of normal (53%). There is no LVH.  Mean Aortic valve gradient 13 mm Hg.     Device interrogation 10/12/2015  Patient seen in clinic for evaluation and iterative programming of his multi lead ICD and appointment with Dr. Castro. Normal ICD function. No ventricular arrhythmias recorded. 3 AT/AF episodes recorded - total AT/AF time = 15 seconds, no stored EGM's for review. Intrinsic rhythm = NSR with CHB. BVP = 99.5%. OptiVol fluid index is near baseline. Battery voltage = 2.64 V. SIMONE = 2.63 V.    Multi lead  ICD     12/03/2015 - CRT-D generator change   FINAL PROGRAMMING  Francis Settings:  -Pacing mode: DDDR.  -Lower Rate Limit: 60 ppm.  -Upper Rate Limit: 140 ppm.  -LV Lead pacing configuration: LV tip-RV coil.  Tachy Settings:  -VT Zone: set at 188 bpm, Therapy: 35 J x 6.  -VF zone: set at 143 bpm, Therapy: monitor only.

## 2017-11-03 ENCOUNTER — APPOINTMENT (OUTPATIENT)
Dept: CARDIOLOGY | Facility: CLINIC | Age: 72
End: 2017-11-03
Attending: INTERNAL MEDICINE
Payer: MEDICARE

## 2017-11-03 ENCOUNTER — RESEARCH ENCOUNTER (OUTPATIENT)
Dept: CARDIOLOGY | Facility: CLINIC | Age: 72
End: 2017-11-03

## 2017-11-03 VITALS
WEIGHT: 185 LBS | SYSTOLIC BLOOD PRESSURE: 114 MMHG | HEART RATE: 70 BPM | DIASTOLIC BLOOD PRESSURE: 70 MMHG | OXYGEN SATURATION: 95 % | BODY MASS INDEX: 26.48 KG/M2 | HEIGHT: 70 IN

## 2017-11-03 DIAGNOSIS — I35.0 AORTIC VALVE STENOSIS, SEVERE: ICD-10-CM

## 2017-11-03 DIAGNOSIS — Z23 FLU VACCINE NEED: Primary | ICD-10-CM

## 2017-11-03 DIAGNOSIS — I50.22 CHRONIC SYSTOLIC HEART FAILURE (H): ICD-10-CM

## 2017-11-03 DIAGNOSIS — Z95.2 S/P AORTIC VALVE REPLACEMENT: ICD-10-CM

## 2017-11-03 DIAGNOSIS — I42.0 CARDIOMYOPATHY, DILATED, NONISCHEMIC (H): Primary | ICD-10-CM

## 2017-11-03 PROCEDURE — 96372 THER/PROPH/DIAG INJ SC/IM: CPT | Mod: ZF

## 2017-11-03 PROCEDURE — 93284 PRGRMG EVAL IMPLANTABLE DFB: CPT | Mod: ZF

## 2017-11-03 PROCEDURE — 93284 PRGRMG EVAL IMPLANTABLE DFB: CPT | Mod: 26 | Performed by: INTERNAL MEDICINE

## 2017-11-03 PROCEDURE — 99213 OFFICE O/P EST LOW 20 MIN: CPT | Mod: 25 | Performed by: INTERNAL MEDICINE

## 2017-11-03 PROCEDURE — 90662 IIV NO PRSV INCREASED AG IM: CPT | Mod: ZF | Performed by: INTERNAL MEDICINE

## 2017-11-03 PROCEDURE — 99213 OFFICE O/P EST LOW 20 MIN: CPT | Mod: 25,ZF

## 2017-11-03 PROCEDURE — G0008 ADMIN INFLUENZA VIRUS VAC: HCPCS | Mod: ZF

## 2017-11-03 PROCEDURE — 25000128 H RX IP 250 OP 636: Mod: ZF | Performed by: INTERNAL MEDICINE

## 2017-11-03 RX ORDER — PHENOL 1.4 %
1 AEROSOL, SPRAY (ML) MUCOUS MEMBRANE DAILY
COMMUNITY

## 2017-11-03 RX ADMIN — INFLUENZA A VIRUSA/MICHIGAN/45/2015 X-275 (H1N1) ANTIGEN (FORMALDEHYDE INACTIVATED), INFLUENZA A VIRUS A/HONG KONG/4801/2014 X-263B (H3N2) ANTIGEN (FORMALDEHYDE INACTIVATED), AND INFLUENZA B VIRUS B/BRISBANE/60/2008 ANTIGEN (FORMALDEHYDE INACTIVATED) 0.5 ML: 60; 60; 60 INJECTION, SUSPENSION INTRAMUSCULAR at 14:44

## 2017-11-03 ASSESSMENT — PAIN SCALES - GENERAL: PAINLEVEL: NO PAIN (0)

## 2017-11-03 NOTE — NURSING NOTE
Cardiac Testing: Patient given instructions regarding  echocardiogram in one year. Discussed purpose, preparation, procedure and when to expect results reported back to the patient. Patient demonstrated understanding of this information and agreed to call with further questions or concerns.  Med Reconcile: Reviewed and verified all current medications with the patient. The updated medication list was printed and given to the patient.  Return Appointment: Follow up in one year. Patient given instructions regarding scheduling next clinic visit. Patient demonstrated understanding of this information and agreed to call with further questions or concerns.  Patient stated he understood all health information given and agreed to call with further questions or concerns.

## 2017-11-03 NOTE — NURSING NOTE
Chief Complaint   Patient presents with     Follow Up For     bicuspid aortic valve with severe AS s/p AVR (7/14/2011) with tissue valve,  mitral valve repair and debridement of aortic root (1/25/13),  systolic dysfunction      Vitals were taken and medications were reconciled.     Litzy Baum MA  12:57 PM

## 2017-11-03 NOTE — PROGRESS NOTES
Pt seen in the [facility] for evaluation and iterative programming of a Medtronic, Bi-Ventricular ICD, per MD orders. He also has an appointment with Dr. Castro. Today his intrinsic rhythm is Sinus 64 with CHB- ventricular rate is <30 bpm. Normal ICD function. 5 NSVT episodes recorded over the last 6 months, lasting up to 5 seconds. OptiVol thoracic impedance is near his baseline. AP= 22% and BVP= 99.3%. No short v-v intervals recorded. Lead trends appear stable. Pt reports that he is feeling well. Battery estimates 2.6 years to SIMONE. Plan for pt to send a remote transmission on 2/5/18 and RTC in 6 months.  Multi lead ICD

## 2017-11-03 NOTE — MR AVS SNAPSHOT
After Visit Summary   11/3/2017    Brooks Summers    MRN: [id number]           Patient Information     Date Of Birth          1945        Visit Information        Provider Department      11/3/2017 1:30 PM Yemi Castro MD [facility]        Today's Diagnoses     Flu vaccine need    -  1    Aortic valve stenosis, severe        Chronic systolic heart failure (H)        S/P aortic valve replacement           Follow-ups after your visit        Follow-up notes from your care team     Return in about 1 year (around 11/3/2018) for valve disorder, Dr. Castro.      Your next 10 appointments already scheduled     Nov 08, 2017  9:00 AM CST   (Arrive by 8:45 AM)   New Mohs with Yobany Irving MD   [facility] Dermatologic Surgery ([facility])    [address]  [address] [facility] [address] [phone]            Nov 08, 2017  1:15 PM CST   Anticoagulation Visit with LL ANTI COAG   [facility] ([facility])    [address] [address] [phone]              Future tests that were ordered for you today     Open Future Orders        Priority Expected Expires Ordered    Echocardiogram Complete Routine  11/3/2018 11/3/2017            Who to contact     If you have questions or need follow up information about today's clinic visit or your schedule please contact [facility] directly at [phone].  Normal or non-critical lab and imaging results will be communicated to you by MyChart, letter or phone within 4 business days after the clinic has received the results. If you do not hear from us within 7 days, please contact the clinic through MyChart or phone. If you have a critical or abnormal lab result, we will notify you by phone as soon as possible.  Submit refill requests through [employer] or call your pharmacy and they will forward the refill request to us. Please allow 3 business days for  "your refill to be completed.          Additional Information About Your Visit        [facility] Information     [facility] lets you send messages to your doctor, view your test results, renew your prescriptions, schedule appointments and more. To sign up, go to [email or web address] . Click on \"Log in\" on the left side of the screen, which will take you to the Welcome page. Then click on \"Sign up Now\" on the right side of the page.     You will be asked to enter the access code listed below, as well as some personal information. Please follow the directions to create your username and password.     Your access code is: [email or web address]  Expires: 2017  6:30 AM     Your access code will  in 90 days. If you need help or a new code, please call your [facility] clinic or [phone].        Care EveryWhere ID     This is your [facility] EveryWhere ID. This could be used by other organizations to access your [facility] medical records  [id number]        Your Vitals Were     Pulse Height Pulse Oximetry BMI (Body Mass Index)          70 1.778 m (5' 10\") 95% 26.54 kg/m2         Blood Pressure from Last 3 Encounters:   17 114/70   17 108/69   17 117/76    Weight from Last 3 Encounters:   17 83.9 kg (185 lb)   17 82.2 kg (181 lb 4.8 oz)   17 82.4 kg (181 lb 11.2 oz)                 Today's Medication Changes          These changes are accurate as of: 11/3/17  2:34 PM.  If you have any questions, ask your nurse or doctor.               Stop taking these medicines if you haven't already. Please contact your care team if you have questions.     CALCIUM 600 + D PO   Stopped by:  Yemi Castro MD                    Primary Care Provider Office Phone # Fax #    Edin Mays [phone] [address] [address]        Equal Access to Services     [facility] LOREE AH: Paris Lopez, violeta han, qaybelisabeth garcia" randi shuklaana cristina rooney'aan ah. So [facility] [phone].    ATENCIÓN: Si joaquínla naomie, tiene a herrera disposición servicios gratuitos de asistencia lingüística. Yohannes al [phone].    We comply with applicable federal civil rights laws and [place] laws. We do not discriminate on the basis of race, color, national origin, age, disability, sex, sexual orientation, or gender identity.            Thank you!     Thank you for choosing [facility]  for your care. Our goal is always to provide you with excellent care. Hearing back from our patients is one way we can continue to improve our services. Please take a few minutes to complete the written survey that you may receive in the mail after your visit with us. Thank you!             Your Updated Medication List - Protect others around you: Learn how to safely use, store and throw away your medicines at www.disposemymeds.org.          This list is accurate as of: 11/3/17  2:34 PM.  Always use your most recent med list.                   Brand Name Dispense Instructions for use Diagnosis    allopurinol 100 MG tablet    ZYLOPRIM    90 tablet    Take 1 tablet (100 mg) by mouth every evening    Idiopathic gout, unspecified chronicity, unspecified site       losartan 25 MG tablet    COZAAR    45 tablet    Take 0.5 tablets (12.5 mg) by mouth every evening    Benign essential hypertension       metoprolol 50 MG 24 hr tablet    TOPROL XL    90 tablet    Take 1 tablet (50 mg) by mouth daily    Benign essential hypertension       MULTIVITAMIN ADULTS 50+ Tabs       Flu vaccine need, Aortic valve stenosis, severe, Chronic systolic heart failure (H), S/P aortic valve replacement       * PROVIDER DOSED MANAGED WARFARIN (COUMADIN) OUTPATIENT      Per coumadin clinic        simvastatin 40 MG tablet    ZOCOR    90 tablet    Take 1 tablet (40 mg) by mouth At Bedtime    Hyperlipidemia LDL goal <100       VITAMIN D (CHOLECALCIFEROL) PO      Take 1,000 Units by mouth daily    Flu  vaccine need, Aortic valve stenosis, severe, Chronic systolic heart failure (H), S/P aortic valve replacement       * warfarin 5 MG tablet    COUMADIN    105 tablet    Take 7.5 mg on Mon, Fri; 10 mg all other days or as directed by the Anticoagulation Clinic    S/P AVR (aortic valve replacement), Paroxysmal atrial fibrillation (H), Long term current use of anticoagulant therapy, S/P aortic valve replacement       * Notice:  This list has 2 medication(s) that are the same as other medications prescribed for you. Read the directions carefully, and ask your doctor or other care provider to review them with you.

## 2017-11-03 NOTE — MR AVS SNAPSHOT
After Visit Summary   11/3/2017    Brooks Summers    MRN: [id number]           Patient Information     Date Of Birth          1945        Visit Information        Provider Department      11/3/2017 1:00 PM Robert Auguste Cv Device [facility]        Today's Diagnoses     Cardiomyopathy, dilated, nonischemic (H)    -  1      Care Instructions    It was a pleasure to see you in clinic today. Please do not hesitate to call with any questions or concerns. You are scheduled for a remote ICD transmission on 2/5/18. This will happen automatically in the night. We will call in 1-2 business days to discuss the results with you. We look forward to seeing you in clinic at your next device check in 6 months.    Shahnaz Wilson RN  Electrophysiology Nurse Clinician  [facility]  During business hours call:  [phone]  After business hours please call: [phone]- select option #4 and ask for job code [phone].            Follow-ups after your visit        Follow-up notes from your care team     Return in about 6 months (around 5/3/2018) for Medtr[employer], ICD check.      Your next 10 appointments already scheduled     Nov 08, 2017  9:00 AM CST   (Arrive by 8:45 AM)   New Mohs with Yobany Irving MD   [facility] ([facility])    [address] [address] [phone]            Nov 08, 2017  1:15 PM CST   Anticoagulation Visit with YVETTE ANTI COAG   [facility] ([facility])    [address] [facility] [address] [phone]              Future tests that were ordered for you today     Open Future Orders        Priority Expected Expires Ordered    Echocardiogram Complete Routine  11/3/2018 11/3/2017            Who to contact     If you have questions or need follow up information about today's clinic visit or your schedule please contact [facility] directly at  "[phone].  Normal or non-critical lab and imaging results will be communicated to you by Shoutlyhart, letter or phone within 4 business days after the clinic has received the results. If you do not hear from us within 7 days, please contact the clinic through Shoutlyhart or phone. If you have a critical or abnormal lab result, we will notify you by phone as soon as possible.  Submit refill requests through Tonx or call your pharmacy and they will forward the refill request to us. Please allow 3 business days for your refill to be completed.          Additional Information About Your Visit        Shoutlyhart Information     Tonx lets you send messages to your doctor, view your test results, renew your prescriptions, schedule appointments and more. To sign up, go to [email or web address] . Click on \"Log in\" on the left side of the screen, which will take you to the Welcome page. Then click on \"Sign up Now\" on the right side of the page.     You will be asked to enter the access code listed below, as well as some personal information. Please follow the directions to create your username and password.     Your access code is: [email or web address]  Expires: 2017  6:30 AM     Your access code will  in 90 days. If you need help or a new code, please call your [place] clinic or [phone].        Care EveryWhere ID     This is your Care EveryWhere ID. This could be used by other organizations to access your [place] medical records  [id number]         Blood Pressure from Last 3 Encounters:   17 114/70   17 108/69   17 117/76    Weight from Last 3 Encounters:   17 83.9 kg (185 lb)   17 82.2 kg (181 lb 4.8 oz)   17 82.4 kg (181 lb 11.2 oz)              We Performed the Following     ICD DEVICE PROGRAMMING EVAL, MULTI LEAD ICD          Today's Medication Changes          These changes are accurate as of: 11/3/17  4:35 PM.  If you have any questions, ask your nurse or doctor.       "         Stop taking these medicines if you haven't already. Please contact your care team if you have questions.     CALCIUM 600 + D PO   Stopped by:  Yemi Castro MD                    Primary Care Provider Office Phone # Fax #    Edin ASTUDILLO MD [place] [phone] [address] [address]        Equal Access to Services     [facility]: Hadii aad ku hadasho Soomaali, waaxda luqadaha, qaybta kaalmada adeegyada, waxay idiin hayaan adeeg kharash la'aan ah. So [facility] [phone].    ATENCIÓN: Si habla espnicola, tiene a herrera disposición servicios gratuitos de asistencia lingüística. Yohannes al [phone].    We comply with applicable federal civil rights laws and [place] laws. We do not discriminate on the basis of race, color, national origin, age, disability, sex, sexual orientation, or gender identity.            Thank you!     Thank you for choosing [facility]  for your care. Our goal is always to provide you with excellent care. Hearing back from our patients is one way we can continue to improve our services. Please take a few minutes to complete the written survey that you may receive in the mail after your visit with us. Thank you!             Your Updated Medication List - Protect others around you: Learn how to safely use, store and throw away your medicines at www.disposemymeds.org.          This list is accurate as of: 11/3/17  4:35 PM.  Always use your most recent med list.                   Brand Name Dispense Instructions for use Diagnosis    allopurinol 100 MG tablet    ZYLOPRIM    90 tablet    Take 1 tablet (100 mg) by mouth every evening    Idiopathic gout, unspecified chronicity, unspecified site       losartan 25 MG tablet    COZAAR    45 tablet    Take 0.5 tablets (12.5 mg) by mouth every evening    Benign essential hypertension       metoprolol 50 MG 24 hr tablet    TOPROL XL    90 tablet    Take 1 tablet (50 mg) by mouth daily    Benign  essential hypertension       MULTIVITAMIN ADULTS 50+ Tabs       Flu vaccine need, Aortic valve stenosis, severe, Chronic systolic heart failure (H), S/P aortic valve replacement       * PROVIDER DOSED MANAGED WARFARIN (COUMADIN) OUTPATIENT      Per coumadin clinic        simvastatin 40 MG tablet    ZOCOR    90 tablet    Take 1 tablet (40 mg) by mouth At Bedtime    Hyperlipidemia LDL goal <100       VITAMIN D (CHOLECALCIFEROL) PO      Take 1,000 Units by mouth daily    Flu vaccine need, Aortic valve stenosis, severe, Chronic systolic heart failure (H), S/P aortic valve replacement       * warfarin 5 MG tablet    COUMADIN    105 tablet    Take 7.5 mg on Mon, Fri; 10 mg all other days or as directed by the Anticoagulation Clinic    S/P AVR (aortic valve replacement), Paroxysmal atrial fibrillation (H), Long term current use of anticoagulant therapy, S/P aortic valve replacement       * Notice:  This list has 2 medication(s) that are the same as other medications prescribed for you. Read the directions carefully, and ask your doctor or other care provider to review them with you.

## 2017-11-03 NOTE — NURSING NOTE
Brooks Summers      1.  Has the patient received the information for the influenza vaccine? YES    2.  Does the patient have any of the following contraindications?     Allergy to eggs? No     Allergic reaction to previous influenza vaccines? No     Any other problems to previous influenza vaccines? No     Paralyzed by Guillain-Monticello syndrome? No     Currently pregnant? NO     Current moderate or severe illness? No     Allergy to contact lens solution? No    3.  The vaccine has been administered in the usual fashion and the patient was instructed to wait 20 minutes before leaving the building in the event of an allergic reaction: YES    Vaccination given by Erika Parra MA.  Recorded by Erika Parra

## 2017-11-03 NOTE — PATIENT INSTRUCTIONS
It was a pleasure to see you in clinic today. Please do not hesitate to call with any questions or concerns. You are scheduled for a remote ICD transmission on 2/5/18. This will happen automatically in the night. We will call in 1-2 business days to discuss the results with you. We look forward to seeing you in clinic at your next device check in 6 months.    Shahnaz Wilson RN  Electrophysiology Nurse Clinician  [facility]  During business hours call:  [phone]  After business hours please call: [phone]- select option #4 and ask for job code [phone].

## 2017-11-03 NOTE — PROGRESS NOTES
HPI Mr. 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 He previously had severe left ventricular systolic dysfunction with previous EF 10-15% s/p ICD placement (1/2012) now with improved EF (50-55%). He subsequently improved his functional status and had been walking 3-4 miles a day, golfing 18 holes of golf without sx and carrying his golf clubs.     Today, the patient denies chest pain, orthopnea, PND, LE edema, early satiety, fatigue, racing heart beat, lightheadedness. No nausea or diaphoresis. No f/c/s.    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 Tx with Dapto/RIFx 8 weeks.     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 Father 68     bypass, carotid      Prostate Cancer Father 70     CANCER Mother 92     stomach, colon     Hypertension Mother      Retinal detachment Mother      CANCER Brother 64     lung cancer, petroleum     Glaucoma No family hx of      Macular Degeneration No family hx of      Strabismus No family hx of      Glasses (<9 y/o) No family hx of        SOCIAL HISTORY:  History     Social History     Marital Status:      Spouse Name: N/A     Number of Children: N/A      "Years of Education: N/A     Social History Main Topics     Smoking status: Former Smoker -- 1.00 packs/day for 20 years     Types: Cigarettes     Quit date: 12/31/1989     Smokeless tobacco: Never Used     Alcohol Use: Yes     Drug Use: No     Sexual Activity:     Partners: Female      Comment:      Other Topics Concern     None     Social History Narrative     since 2/1982 2 children 4 grandchildren.Carpet sales. Athlete in school, Golf, fish, yardwork       CURRENT MEDICATIONS:  Current Outpatient Prescriptions   Medication Sig Dispense Refill     Multiple Vitamins-Minerals (MULTIVITAMIN ADULTS 50+) TABS        VITAMIN D, CHOLECALCIFEROL, PO Take 1,000 Units by mouth daily       warfarin (COUMADIN) 5 MG tablet Take 7.5 mg on Mon, Fri; 10 mg all other days or as directed by the Anticoagulation Clinic 105 tablet 1     allopurinol (ZYLOPRIM) 100 MG tablet Take 1 tablet (100 mg) by mouth every evening 90 tablet 3     losartan (COZAAR) 25 MG tablet Take 0.5 tablets (12.5 mg) by mouth every evening 45 tablet 2     simvastatin (ZOCOR) 40 MG tablet Take 1 tablet (40 mg) by mouth At Bedtime 90 tablet 2     metoprolol (TOPROL XL) 50 MG 24 hr tablet Take 1 tablet (50 mg) by mouth daily 90 tablet 3     PROVIDER DOSED MANAGED WARFARIN, COUMADIN, OUTPATIENT Per coumadin clinic         ROS:   Constitutional: No fever, chills, or sweats. No weight gain/loss.   ENT: No visual disturbance, ear ache, epistaxis, sore throat.   Allergies/Immunologic: Negative.   Respiratory: No cough, hemoptysis.   Cardiovascular: As per HPI.   GI: No nausea, vomiting, hematemesis, melena, or hematochezia.   : No urinary frequency, dysuria, or hematuria.   Integument: Negative.   Psychiatric: Negative.   Neuro: Negative.   Endocrinology: Negative.   Musculoskeletal: Negative.    EXAM:  /70 (BP Location: Left arm, Cuff Size: Adult Regular)  Pulse 70  Ht 1.778 m (5' 10\")  Wt 83.9 kg (185 lb)  SpO2 95%  BMI 26.54 " kg/m2  General: appears comfortable, alert and articulate  Head: normocephalic, atraumatic  Eyes: anicteric sclera, EOMI  Neck: no adenopathy  Orophyarynx: moist mucosa, no lesions, dentition intact  Heart: regular, S1/S2; 2-3/6 systolic ejection murmur at RUSB; no gallop, rub, estimated JVP 7  Lungs: clear, no rales or wheezing  Abdomen: soft, non-tender, bowel sounds present, no hepatosplenomegaly  Extremities: no clubbing, cyanosis or edema  Neurological: normal speech and affect, no gross motor deficits    Labs:  CBC RESULTS:  Lab Results   Component Value Date    WBC 6.4 11/28/2016    RBC 4.78 11/28/2016    HGB 13.6 04/03/2017    HCT 44.0 11/28/2016    MCV 92 11/28/2016    MCH 31.6 11/28/2016    MCHC 34.3 11/28/2016    RDW 13.2 11/28/2016    PLT 96 (L) 03/17/2017     CMP RESULTS:  Lab Results   Component Value Date     11/28/2016    POTASSIUM 4.0 04/03/2017    CHLORIDE 106 11/28/2016    CO2 27 11/28/2016    ANIONGAP 7 11/28/2016    GLC 92 11/28/2016    BUN 17 11/28/2016    CR 0.88 04/03/2017    GFRESTIMATED 85 04/03/2017    GFRESTBLACK >90   GFR Calc   04/03/2017    MARTIN 9.2 11/28/2016    BILITOTAL 1.0 11/28/2016    ALBUMIN 4.2 11/28/2016    ALKPHOS 68 11/28/2016    ALT 45 11/28/2016    AST 34 11/28/2016      INR RESULTS:  Lab Results   Component Value Date    INR 2.0 (A) 10/11/2017    INR 1.17 (H) 04/03/2017     Lab Results   Component Value Date    MAG 2.1 08/20/2014     No results found for: NTBNPI  Lab Results   Component Value Date    NTBNP 552 (H) 12/17/2012         Echocardiogram 10/12/2015  Interpretation Summary  AVR with 23 mm Perimount Aortic valve 2011. Leaflet mobility is normal and there is no calcification seen. There is mild central AI and normal transvalvar velocities (peak velocity 2.4 m/s, DVI .4, EOA 2.5 cm2)  LV size and function are normal with an LVEF at lower limits of normal (53%). There is no LVH.  Mean Aortic valve gradient 13 mm Hg.    Device interrogation  10/12/2015  Patient seen in clinic for evaluation and iterative programming of his multi lead ICD and appointment with Dr. Castro. Normal ICD function. No ventricular arrhythmias recorded. 3 AT/AF episodes recorded - total AT/AF time = 15 seconds, no stored EGM's for review. Intrinsic rhythm = NSR with CHB. BVP = 99.5%. OptiVol fluid index is near baseline. Battery voltage = 2.64 V. SIMONE = 2.63 V.   Multi lead ICD    12/03/2015 - CRT-D generator change   FINAL PROGRAMMING  Francis Settings:  -Pacing mode: DDDR.  -Lower Rate Limit: 60 ppm.  -Upper Rate Limit: 140 ppm.  -LV Lead pacing configuration: LV tip-RV coil.  Tachy Settings:  -VT Zone: set at 188 bpm, Therapy: 35 J x 6.  -VF zone: set at 143 bpm, Therapy: monitor only.    Device interrogation 10/07/2016  Normal device parameters. Normal ICD function. No ventricular arrhythmias recorded. OptiVol fluid index is at baseline.    ASSESSMENT   In summary, Mr. 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  Also has history of complete heart block, low EF 10-15%, now recovered to 50-55% in the setting of biventricular-ICD.    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); echo 12/15/2015 notable for Ao mean gradient 13 mm Hg  -Complete heart block s/p CRT-D (s/p generator change 12/3/2015)  -Cardiomyopathy recovered EF 50-55% s/p CRT-D  -Paroxysmal AF on anticoagulation  -Hyperlipidemia  -Thrombocytopenia (seen by hematology; negative workup)    Plan  -will continue current heart failure regimen (losartan 12.5, and metoprolol 50 xl), along with cardiac resynchronization therapy  -absolutely requires endocarditis prophylaxis for procedures  (amoxicillin 2 g PO 1 hour prior to procedures)  -continue warfarin for [id number] of 2, very low burden on device interrogation  -RTC in 1 year with echocardiogram at that time    Teto Merino MD  Cardiovascular Disease Fellow  Pager: [phone]    Attending Attestation:  Patient seen and examined by me with the Fellow. I have performed all pertinent elements of the physical examination and reviewed the note above. I have reviewed pertinent laboratory, echocardiographic, imaging, and cardiac catheterization results. I agree with the plan of care as described in this note.    Yemi Castro MD, PhD

## 2017-11-03 NOTE — PATIENT INSTRUCTIONS
Thank you for your visit today.  Please call me with any questions or concerns.   Sawyer Mejia RN  Cardiology Care Coordinator  [phone], press option 1 then option 3

## 2017-11-07 ENCOUNTER — TELEPHONE (OUTPATIENT)
Dept: DERMATOLOGY | Facility: CLINIC | Age: 72
End: 2017-11-07

## 2017-11-07 DIAGNOSIS — C44.310 BCC (BASAL CELL CARCINOMA), FACE: Primary | ICD-10-CM

## 2017-11-07 RX ORDER — CEPHALEXIN 500 MG/1
CAPSULE ORAL
Qty: 4 CAPSULE | Refills: 0 | Status: SHIPPED | OUTPATIENT
Start: 2017-11-07 | End: 2018-07-05

## 2017-11-07 NOTE — TELEPHONE ENCOUNTER
Prophylaxis antibiotics indicated for Porcine Heart valve prior to surgery.   I e-scribed Keflex to his local pharmacy.  Keflex 2000mg PO once one hour prior to surgery.

## 2017-11-08 ENCOUNTER — OFFICE VISIT (OUTPATIENT)
Dept: DERMATOLOGY | Facility: CLINIC | Age: 72
End: 2017-11-08

## 2017-11-08 VITALS — HEART RATE: 65 BPM | DIASTOLIC BLOOD PRESSURE: 86 MMHG | SYSTOLIC BLOOD PRESSURE: 152 MMHG

## 2017-11-08 DIAGNOSIS — C44.311 BASAL CELL CARCINOMA OF RIGHT SIDE OF NOSE: ICD-10-CM

## 2017-11-08 DIAGNOSIS — C44.319 BASAL CELL CARCINOMA OF LEFT MEDIAL CHEEK: Primary | ICD-10-CM

## 2017-11-08 ASSESSMENT — PAIN SCALES - GENERAL: PAINLEVEL: NO PAIN (0)

## 2017-11-08 NOTE — PROGRESS NOTES
MOHS MICROGRAPHIC SURGERY REPORT (1 of 2)   Nov 8, 2017    Surgeon: Yobany Irving D.O.  Fellow:  None  Resident: Dr. Rosanne Heath     INDICATION:    Preoperative Diagnosis: primary basal cell carcinoma, nodule and micronodular, extending to the deep margin  Location: Left malar cheek  Postoperative Diagnosis: Same  Preoperative Lesion size: 0.8 x 0.8 cm    After appropriate discussion and informed consent for Mohs surgery and possible repair of the Mohs surgery defect, the patient underwent Mohs surgery as follows:    STAGE I:  The patient was placed on the operating room table.  The area was cleansed with chlorhexidine and infiltrated with 1% Lidocaine and epinephrine. The tumor was debulked with a 3mm curette. Using a #15-blade, complete excision was made around the tumor in 1 section.  Hemostasis was obtained by electrodesiccation.  A dressing was placed.  Tissue was kept as 1 tissue block that was subsequently mapped, color coded and processed in the Mohs Laboratory.  Microscopic tumor was not found in any of the tissue blocks.    With the lesion clear of micrographic tumor, surgery was considered complete.  The defect extended to the fat and measured 1.8 x 1.8 cm.    RECONSTRUCTIVE DERMATOLOGIC SURGERY REPORT  We discussed the options for wound management in full with the patient including risks/benefits/possible outcomes.     Complex Layered Linear Closure:  Because of the size and full thickness nature of the defect and tightness of the surrounding skin, a complex closure was planned.  Patient was prepped with cholrhexidine, local anesthesia administered, and draped in a sterile fashion. The Mohs defect/wound was circumferentially debeveled. Burow s triangles were excised in the relaxed skin tension lines from opposite ends of the defect, oriented supine , and the wound edges were widely undermined by dissection in the subcutaneous plane until adequate tissue mobility had been obtained and tissue could be  cosmetically re-draped.  Hemostasis was obtained. Deep sutures were placed at the subcutaneous fascial plane to close dead space and prevent hematoma/seroma formation.  The wound edges were then advanced and closed in a layered fashion (5-0 Monocryl Buried vertical mattress sutures; 5-0 fast absorbing gut percutaneous simple running sutures).  Postoperative length was 4.3 cm.      Estimated blood loss, minimal; complications, none; bandaging and wound care, routine. Patient was discharged in good condition and will return for bandage change in 1 week.    MOHS MICROGRAPHIC SURGERY REPORT (2 of 2)  Nov 8, 2017    Surgeon: Yobany Irving D.O.  Fellow:  None  Resident: Dr. Rosanne Heath    INDICATION:    Preoperative Diagnosis: primary basal cell carcinoma, superficial and nodular, extending to the lateral and deep margin   Location: Right nasal ala  Postoperative Diagnosis: Same  Preoperative Lesion size: 0.5 x 0.5 cm     After appropriate discussion and informed consent for Mohs surgery and possible repair of the Mohs surgery defect, the patient underwent Mohs surgery as follows:    STAGE I:  The patient was placed on the operating room table.  The area was cleansed with chlorhexidine and infiltrated with 1% Lidocaine and epinephrine. The tumor was debulked with a 3mm curette. Using a #15-blade, complete excision was made around the tumor in 1 section.  Hemostasis was obtained by electrodesiccation.  A dressing was placed.  Tissue was kept as one block that was subsequently mapped, color coded and processed in the Mohs Laboratory.  Microscopic tumor was not found in the tissue block.    With the lesion clear of micrographic tumor, surgery was considered complete.  The defect extended to the fascia and measured 0.7 x 0.7 cm.    RECONSTRUCTIVE DERMATOLOGIC SURGERY REPORT  We discussed the options for wound management in full with the patient including risks/benefits/possible outcomes.     We discussed the options for wound  management in full with the patient including risks/benefits/possible outcomes. Because of the superficial nature of the wound, the decision was made to narrow the wound with a buried deep dermal suture (5-0 monocryl) and allow the remaining surgical defect to heal by second intention. Estimated blood loss, minimal; complications, none; wound care, routine. Patient was discharged in good condition and will return for assessment in 1 week.     I, Sary Villalta, am serving as a scribe to document services personally performed by Yobany Irving D.O., based on data collection and the provider's statements to me.    Provider Disclosure:   The documentation recorded by the scribe accurately reflects the services I personally performed and the decisions made by me.  I personally performed the procedures today.    Yobany Irving DO    Department of Dermatology  [facility]: Phone: [phone], Fax:[phone]  [facility]: Phone: [phone], Fax: [phone]

## 2017-11-08 NOTE — NURSING NOTE
1st layer performed on the Left Malar Cheek. Injected 2.0ml of Xylocaine  (Lidocaine 1% and Epinephrine  (1:100,000)    1st layer performed on the Right Nasal Ala. Injected 0.5ml of Xylocaine  (Lidocaine 1% and Epinephrine  (1:100,000)      Present for procedure:  Dr. Yobany Houston, [facility]

## 2017-11-08 NOTE — NURSING NOTE
Chief Complaint   Patient presents with     Skin Cancer     Mr. Summers is here today to have MOHS on the Left Malar Cheek and on the Right Nasal Ala.      Natali Houston CMA

## 2017-11-08 NOTE — PATIENT INSTRUCTIONS
Sutured Wound Care  Caring for your skin after surgery:    After your surgery, a pressure bandage will be placed over the area that has stitches. This will prevent bleeding. Please follow these instructions over the next 1 to 2 weeks. Following this regimen will help to prevent complications as your wound heals.      No strenuous activity for 48 hours. Resume moderate activity in 48 hours. No heavy exercising until you are seen for follow up.    Take Tylenol one hour after surgery. Take Tylenol every 4 hours as needed and do not take any aspirin products for pain (continue taking aspirin if prescribed by your doctor to prevent heart attacks and strokes).    Do not drink alcoholic beverages for 48 hours.    No dietary restrictions.    Keep the pressure bandage in place for 24 hours. If the bandage becomes blood tinged or loose, reinforce it with gauze and tape.     Keep the bandage dry. Wash around it carefully    If the tape becomes soiled or starts to come off, reinforce it with additional paper tape.    Do not smoke for 3 weeks; smoking is detrimental to wound healing.    It is normal to have swelling and bruising around the surgical site. The bruising will fade in approximately 10-14 days. Elevate the area to reduce swelling.    Numbness, itchiness and sensitivity to temperature changes can occur after surgery and may take up to 18 months to normalize.  Remove the outer pressure bandage in 24 hours. Leave the flat bandage in place for 10 days or until your follow-up appointment if it is within those 10 days.     Bleeding:  You may see a small amount of drainage or blood on your pressure dressing. This is normal and the following instructions should be followed if the wound is bleeding saturates the dressing.    1. Leave the bandage in place.  2. Use tightly rolled up gauze or a cloth to apply direct pressure over the bandage for 20 minutes.  3. Reapply pressure for an additional 20 minutes if necessary.  4. Call  the office or go to the nearest emergency room if pressure fails to stop the bleeding.  5. Use additional gauze and tape to maintain pressure once the bleeding has stopped.    Pain:  1. Post operative pain should slowly get better, never worse.  2. A severe increase in pain may indicate a problem. Call the office if this occurs.  3. You may use ice directly over the dressing, but make sure the dressing does not get wet.    Phone numbers:  During business hours (M-F 8:00-4:30 p.m.)  [facility]-  [phone] Option 1 appt. desk  [phone]  Option 3 nurse triage line  ---------------------------------------------------------  Evenings/Weekends/Holidays  Hospital - [phone]   TTY for hearing [id number]-[phone]  *Ask  to page dermatologist on-call  Emergency Opnh-[phone]  TTY for hearing impaired- [phone]  Wound care instructions for Superficial Wounds      After 24 hours you should remove the bandage and begin daily dressing changes as follows:    1. Remove Dressing    2.   Clean and dry the area with tap water using a Q-tip or sterile gauze pad    3.   Apply polysporin ointment, bacitracin ointment, aquaphor ointment, or vaseline ointment over entire wound. Do NOT use neosporin ointment.    4.   Cover the wound with a band-aid or a sterile non-stick gauze pad and micropore paper tape.    Repeat these instructions at least once a day until the wound has completely healed.    No dietary restrictions.    Continue normal activity    The wound will not heal better when exposed to air and allowed to dry out. The wound will heal faster with a better cosmetic result if it is kept moist with ointment and covered with a bandage.  Do not let the wound dry out.    What to expect:    All wounds develop a small ring of redness surrounding the wound that indicate healing. Severe itching with extensive redness usually indicates sensitivity to the ointment or bandage tape  used to dress the wound. You should call the nurse triage line if this occurs.    It is normal for there to be bruising or swelling around your surgical site, especially when it is near, or on, the eyelid.    If your wound is draining, this is normal. Larger wounds tend to drain more than smaller wounds. Please call if the draining is extensive or if there is yellow/green discharge. After about a week, the wound size should shrink. The wound is healed when you can see skin has formed over the entire area. A healed wound has a healthy shiny appearance and is red/dark pink in color. Wounds may take four to six weeks to heal. Larger wounds generally take a few weeks longer to heal than smaller wounds. Once the wound is healed, you may stop dressing changes.    There may be a tightness as the wound heals, but this is normal and will gradually decrease and disappear.    Your wound may be sensitive to temperature changes after it is healed. Avoid extreme temperature changes if you are having discomfort, but this will improve with time.    If the wound seems to itch once it is healed, you may use vaseline to help relieve the itching.          Phone numbers:  During business hours (M-F 8:00-4:30 p.m.)  [facility]-  [phone] Option 1 appt. desk  [phone]  Option 3 nurse triage line  ---------------------------------------------------------  Evenings/Weekends/Holidays  Hospital - [phone]   TTY for hearing [phone]  *Ask  to page dermatologist on-call  Emergency Mpoo-[phone]  TTY for hearing impaired- [phone]

## 2017-11-08 NOTE — MR AVS SNAPSHOT
After Visit Summary   11/8/2017    Brooks Summers    MRN: [id number]           Patient Information     Date Of Birth          1945        Visit Information        Provider Department      11/8/2017 9:00 AM Yobany Irving MD [facility] Dermatologic [facility]        Care Instructions    Sutured Wound Care  Caring for your skin after surgery:    After your surgery, a pressure bandage will be placed over the area that has stitches. This will prevent bleeding. Please follow these instructions over the next 1 to 2 weeks. Following this regimen will help to prevent complications as your wound heals.      No strenuous activity for 48 hours. Resume moderate activity in 48 hours. No heavy exercising until you are seen for follow up.    Take Tylenol one hour after surgery. Take Tylenol every 4 hours as needed and do not take any aspirin products for pain (continue taking aspirin if prescribed by your doctor to prevent heart attacks and strokes).    Do not drink alcoholic beverages for 48 hours.    No dietary restrictions.    Keep the pressure bandage in place for 24 hours. If the bandage becomes blood tinged or loose, reinforce it with gauze and tape.     Keep the bandage dry. Wash around it carefully    If the tape becomes soiled or starts to come off, reinforce it with additional paper tape.    Do not smoke for 3 weeks; smoking is detrimental to wound healing.    It is normal to have swelling and bruising around the surgical site. The bruising will fade in approximately 10-14 days. Elevate the area to reduce swelling.    Numbness, itchiness and sensitivity to temperature changes can occur after surgery and may take up to 18 months to normalize.  Remove the outer pressure bandage in 24 hours. Leave the flat bandage in place for 10 days or until your follow-up appointment if it is within those 10 days.     Bleeding:  You may see a small amount of drainage or blood on your pressure dressing. This is normal and  the following instructions should be followed if the wound is bleeding saturates the dressing.    1. Leave the bandage in place.  2. Use tightly rolled up gauze or a cloth to apply direct pressure over the bandage for 20 minutes.  3. Reapply pressure for an additional 20 minutes if necessary.  4. Call the office or go to the nearest emergency room if pressure fails to stop the bleeding.  5. Use additional gauze and tape to maintain pressure once the bleeding has stopped.    Pain:  1. Post operative pain should slowly get better, never worse.  2. A severe increase in pain may indicate a problem. Call the office if this occurs.  3. You may use ice directly over the dressing, but make sure the dressing does not get wet.    Phone numbers:  During business hours (M-F 8:00-4:30 p.m.)  [facility]-  [phone] Option 1 appt. desk  [phone]  Option 3 nurse triage line  ---------------------------------------------------------  Evenings/Weekends/Holidays  Hospital - [phone]   TTY for hearing [id number]-[phone]  *Ask  to page dermatologist on-call  Emergency Frpk-[phone]  TTY for hearing impaired- [phone]  Wound care instructions for Superficial Wounds      After 24 hours you should remove the bandage and begin daily dressing changes as follows:    1. Remove Dressing    2.   Clean and dry the area with tap water using a Q-tip or sterile gauze pad    3.   Apply polysporin ointment, bacitracin ointment, aquaphor ointment, or vaseline ointment over entire wound. Do NOT use neosporin ointment.    4.   Cover the wound with a band-aid or a sterile non-stick gauze pad and micropore paper tape.    Repeat these instructions at least once a day until the wound has completely healed.    No dietary restrictions.    Continue normal activity    The wound will not heal better when exposed to air and allowed to dry out. The wound will heal faster with a better cosmetic result  if it is kept moist with ointment and covered with a bandage.  Do not let the wound dry out.    What to expect:    All wounds develop a small ring of redness surrounding the wound that indicate healing. Severe itching with extensive redness usually indicates sensitivity to the ointment or bandage tape used to dress the wound. You should call the nurse triage line if this occurs.    It is normal for there to be bruising or swelling around your surgical site, especially when it is near, or on, the eyelid.    If your wound is draining, this is normal. Larger wounds tend to drain more than smaller wounds. Please call if the draining is extensive or if there is yellow/green discharge. After about a week, the wound size should shrink. The wound is healed when you can see skin has formed over the entire area. A healed wound has a healthy shiny appearance and is red/dark pink in color. Wounds may take four to six weeks to heal. Larger wounds generally take a few weeks longer to heal than smaller wounds. Once the wound is healed, you may stop dressing changes.    There may be a tightness as the wound heals, but this is normal and will gradually decrease and disappear.    Your wound may be sensitive to temperature changes after it is healed. Avoid extreme temperature changes if you are having discomfort, but this will improve with time.    If the wound seems to itch once it is healed, you may use vaseline to help relieve the itching.          Phone numbers:  During business hours (M-F 8:00-4:30 p.m.)  [facility]-  [phone] Option 1 appt. desk  [phone]  Option 3 nurse triage line  ---------------------------------------------------------  Evenings/Weekends/Holidays  Hospital - [phone]   TTY for hearing [id number]-[phone]  *Ask  to page dermatologist on-call  Emergency Cxmf-[phone]  TTY for hearing impaired- [phone]            Follow-ups after your visit         Who to contact     Please call your clinic at [phone] to:    Ask questions about your health    Make or cancel appointments    Discuss your medicines    Learn about your test results    Speak to your doctor   If you have compliments or concerns about an experience at your clinic, or if you wish to file a complaint, please contact [facility] Patient Relations at [phone] or email us at [email or web address]         Additional Information About Your Visit        [employer] Information     [employer] is an electronic gateway that provides easy, online access to your medical records. With [employer], you can request a clinic appointment, read your test results, renew a prescription or communicate with your care team.     To sign up for [employer] visit the website at [email or web address]   You will be asked to enter the access code listed below, as well as some personal information. Please follow the directions to create your username and password.     Your access code is: [email or web address]  Expires: 2017  5:30 AM     Your access code will  in 90 days. If you need help or a new code, please contact your [facility] or call [phone] for assistance.        Care EveryWhere ID     This is your Care EveryWhere ID. This could be used by other organizations to access your [facility] medical records  [id number]        Your Vitals Were     Pulse                   65            Blood Pressure from Last 3 Encounters:   17 152/86   17 114/70   17 108/69    Weight from Last 3 Encounters:   17 83.9 kg (185 lb)   17 82.2 kg (181 lb 4.8 oz)   17 82.4 kg (181 lb 11.2 oz)              Today, you had the following     No orders found for display       Primary Care Provider Office Phone # Fax #    Edin Mays [phone]        [address] [address]        Equal Access to  Services     [facility]: Hadii robin pizano jose davidafia Malickali, waaxda luqadaha, qaybta kaalmada emery, elisabeth berrios . So [facility] [phone].    ATENCIÓN: Si joaquínla naomie, tiene a herrera disposición servicios gratuitos de asistencia lingüística. Llame al [phone].    We comply with applicable federal civil rights laws and [place] laws. We do not discriminate on the basis of race, color, national origin, age, disability, sex, sexual orientation, or gender identity.            Thank you!     Thank you for choosing [facility] DERMATOLOG[facility]GERY  for your care. Our goal is always to provide you with excellent care. Hearing back from our patients is one way we can continue to improve our services. Please take a few minutes to complete the written survey that you may receive in the mail after your visit with us. Thank you!             Your Updated Medication List - Protect others around you: Learn how to safely use, store and throw away your medicines at www.disposemymeds.org.          This list is accurate as of: 11/8/17  1:46 PM.  Always use your most recent med list.                   Brand Name Dispense Instructions for use Diagnosis    allopurinol 100 MG tablet    ZYLOPRIM    90 tablet    Take 1 tablet (100 mg) by mouth every evening    Idiopathic gout, unspecified chronicity, unspecified site       cephALEXin 500 MG capsule    KEFLEX    4 capsule    Take 4 capsules by mouth once, one hour prior to surgery.    BCC (basal cell carcinoma), face       losartan 25 MG tablet    COZAAR    45 tablet    Take 0.5 tablets (12.5 mg) by mouth every evening    Benign essential hypertension       metoprolol 50 MG 24 hr tablet    TOPROL XL    90 tablet    Take 1 tablet (50 mg) by mouth daily    Benign essential hypertension       MULTIVITAMIN ADULTS 50+ Tabs       Flu vaccine need, Aortic valve stenosis, severe, Chronic systolic heart failure (H), S/P aortic valve replacement       * PROVIDER DOSED  MANAGED WARFARIN (COUMADIN) OUTPATIENT      Per coumadin clinic        simvastatin 40 MG tablet    ZOCOR    90 tablet    Take 1 tablet (40 mg) by mouth At Bedtime    Hyperlipidemia LDL goal <100       VITAMIN D (CHOLECALCIFEROL) PO      Take 1,000 Units by mouth daily    Flu vaccine need, Aortic valve stenosis, severe, Chronic systolic heart failure (H), S/P aortic valve replacement       * warfarin 5 MG tablet    COUMADIN    105 tablet    Take 7.5 mg on Mon, Fri; 10 mg all other days or as directed by the Anticoagulation Clinic    S/P AVR (aortic valve replacement), Paroxysmal atrial fibrillation (H), Long term current use of anticoagulant therapy, S/P aortic valve replacement       * Notice:  This list has 2 medication(s) that are the same as other medications prescribed for you. Read the directions carefully, and ask your doctor or other care provider to review them with you.

## 2017-11-08 NOTE — NURSING NOTE
Closure performed on the Left Malar Cheek and the Right Nasal Ala. Injected 5.0ml of Xylocaine  (Lidocaine 1% and Epinephrine  (1:100,000) between the two sites.    Defect Photo: DH

## 2017-11-08 NOTE — LETTER
11/8/2017       RE: Brooks Summers  [address]  [facility] [address]     Dear Colleague,    Thank you for referring your patient, Brooks Summers, to the [facility] DERMATOLOGIC SURGERY at [facility]. Please see a copy of my visit note below.    MOHS MICROGRAPHIC SURGERY REPORT (1 of 2)   Nov 8, 2017    Surgeon: Yobany Irving D.O.  Fellow:  None  Resident: Dr. Rosanne Heath     INDICATION:    Preoperative Diagnosis: primary basal cell carcinoma, nodule and micronodular, extending to the deep margin  Location: Left malar cheek  Postoperative Diagnosis: Same  Preoperative Lesion size: 0.8 x 0.8 cm    After appropriate discussion and informed consent for Mohs surgery and possible repair of the Mohs surgery defect, the patient underwent Mohs surgery as follows:    STAGE I:  The patient was placed on the operating room table.  The area was cleansed with chlorhexidine and infiltrated with 1% Lidocaine and epinephrine. The tumor was debulked with a 3mm curette. Using a #15-blade, complete excision was made around the tumor in 1 section.  Hemostasis was obtained by electrodesiccation.  A dressing was placed.  Tissue was kept as 1 tissue block that was subsequently mapped, color coded and processed in the Mohs Laboratory.  Microscopic tumor was not found in any of the tissue blocks.    With the lesion clear of micrographic tumor, surgery was considered complete.  The defect extended to the fat and measured 1.8 x 1.8 cm.    RECONSTRUCTIVE DERMATOLOGIC SURGERY REPORT  We discussed the options for wound management in full with the patient including risks/benefits/possible outcomes.     Complex Layered Linear Closure:  Because of the size and full thickness nature of the defect and tightness of the surrounding skin, a complex closure was planned.  Patient was prepped with cholrhexidine, local anesthesia administered, and draped in a sterile fashion. The Mohs defect/wound was  circumferentially debeveled. Burow s triangles were excised in the relaxed skin tension lines from opposite ends of the defect, oriented supine , and the wound edges were widely undermined by dissection in the subcutaneous plane until adequate tissue mobility had been obtained and tissue could be cosmetically re-draped.  Hemostasis was obtained. Deep sutures were placed at the subcutaneous fascial plane to close dead space and prevent hematoma/seroma formation.  The wound edges were then advanced and closed in a layered fashion (5-0 Monocryl Buried vertical mattress sutures; 5-0 fast absorbing gut percutaneous simple running sutures).  Postoperative length was 4.3 cm.      Estimated blood loss, minimal; complications, none; bandaging and wound care, routine. Patient was discharged in good condition and will return for bandage change in 1 week.    MOHS MICROGRAPHIC SURGERY REPORT (2 of 2)  Nov 8, 2017    Surgeon: Yobany Irving D.O.  Fellow:  None  Resident: Dr. Rosanne Heath    INDICATION:    Preoperative Diagnosis: primary basal cell carcinoma, superficial and nodular, extending to the lateral and deep margin   Location: Right nasal ala  Postoperative Diagnosis: Same  Preoperative Lesion size: 0.5 x 0.5 cm     After appropriate discussion and informed consent for Mohs surgery and possible repair of the Mohs surgery defect, the patient underwent Mohs surgery as follows:    STAGE I:  The patient was placed on the operating room table.  The area was cleansed with chlorhexidine and infiltrated with 1% Lidocaine and epinephrine. The tumor was debulked with a 3mm curette. Using a #15-blade, complete excision was made around the tumor in 1 section.  Hemostasis was obtained by electrodesiccation.  A dressing was placed.  Tissue was kept as one block that was subsequently mapped, color coded and processed in the Mohs Laboratory.  Microscopic tumor was not found in the tissue block.    With the lesion clear of micrographic  tumor, surgery was considered complete.  The defect extended to the fascia and measured 0.7 x 0.7 cm.    RECONSTRUCTIVE DERMATOLOGIC SURGERY REPORT  We discussed the options for wound management in full with the patient including risks/benefits/possible outcomes.     We discussed the options for wound management in full with the patient including risks/benefits/possible outcomes. Because of the superficial nature of the wound, the decision was made to narrow the wound with a buried deep dermal suture (5-0 monocryl) and allow the remaining surgical defect to heal by second intention. Estimated blood loss, minimal; complications, none; wound care, routine. Patient was discharged in good condition and will return for assessment in 1 week.     I, Sary Villalta, am serving as a scribe to document services personally performed by Yobany Irving D.O., based on data collection and the provider's statements to me.    Provider Disclosure:   The documentation recorded by the scribe accurately reflects the services I personally performed and the decisions made by me.  I personally performed the procedures today.    Yobany Irving DO    Department of Dermatology  [facility] [place]: Phone: [phone], Fax:[phone]  [facility]: Phone: [phone], Fax: [phone]

## 2017-11-13 DIAGNOSIS — I10 BENIGN ESSENTIAL HYPERTENSION: ICD-10-CM

## 2017-11-14 RX ORDER — METOPROLOL SUCCINATE 50 MG/1
50 TABLET, EXTENDED RELEASE ORAL DAILY
Qty: 90 TABLET | Refills: 3 | Status: SHIPPED | OUTPATIENT
Start: 2017-11-14 | End: 2018-11-05

## 2017-11-22 ENCOUNTER — ANTICOAGULATION THERAPY VISIT (OUTPATIENT)
Dept: ANTICOAGULATION | Facility: CLINIC | Age: 72
End: 2017-11-22
Payer: MEDICARE

## 2017-11-22 DIAGNOSIS — Z79.01 LONG TERM CURRENT USE OF ANTICOAGULANT THERAPY: ICD-10-CM

## 2017-11-22 DIAGNOSIS — I48.0 PAROXYSMAL ATRIAL FIBRILLATION (H): ICD-10-CM

## 2017-11-22 DIAGNOSIS — Z95.2 S/P AORTIC VALVE REPLACEMENT: ICD-10-CM

## 2017-11-22 LAB — INR POINT OF CARE: 2 (ref 0.86–1.14)

## 2017-11-22 PROCEDURE — 85610 PROTHROMBIN TIME: CPT | Mod: QW

## 2017-11-22 PROCEDURE — 36416 COLLJ CAPILLARY BLOOD SPEC: CPT

## 2017-11-22 PROCEDURE — 99207 ZZC NO CHARGE NURSE ONLY: CPT

## 2017-11-22 NOTE — PROGRESS NOTES
ANTICOAGULATION FOLLOW-UP CLINIC VISIT    Patient Name:  Brooks Summers  Date:  11/22/2017  Contact Type:  Face to Face    SUBJECTIVE:     Patient Findings     Positives No Problem Findings    Comments No changes in medications, diet, or activity noted. Took warfarin as instructed.      11/18: Patient had primary basal cell carcinoma, nodule and micronodular, extending to the deep margin removed. Patient has stiches on his left cheek. No bleeding complications noted.            OBJECTIVE    INR Protime   Date Value Ref Range Status   11/22/2017 2.0 (A) 0.86 - 1.14 Final       ASSESSMENT / PLAN  INR assessment THER    Recheck INR In: 4 WEEKS    INR Location Clinic      Anticoagulation Summary as of 11/22/2017     INR goal 2.0-3.0   Today's INR 2.0   Maintenance plan 7.5 mg (5 mg x 1.5) on Mon, Fri; 10 mg (5 mg x 2) all other days   Full instructions 7.5 mg on Mon, Fri; 10 mg all other days   Weekly total 65 mg   No change documented Sheri Frausto RN   Plan last modified Lindsey Epstein RN (8/30/2017)   Next INR check 12/20/2017   Priority INR   Target end date Indefinite    Indications   S/P aortic valve replacement [Z95.2]  Paroxysmal atrial fibrillation (H) [I48.0]  Long term current use of anticoagulant therapy [Z79.01]         Anticoagulation Episode Summary     INR check location     Preferred lab     Send INR reminders to [facility] [place]    Comments  * warfarin 5 mg tabs, oral surgeon at the  of , [phone]      Anticoagulation Care Providers     Provider Role Specialty Phone number    Edin Mays MD [facility] Internal Medicine [phone]            See the Encounter Report to view Anticoagulation Flowsheet and Dosing Calendar (Go to Encounters tab in chart review, and find the Anticoagulation Therapy Visit)        Sheri Frausto RN

## 2017-11-22 NOTE — MR AVS SNAPSHOT
Brooks Summers   11/22/2017 1:15 PM   Anticoagulation Therapy Visit    Description:  72 year old male   Provider:  LL ANTI COAG   Department:  [facility]           INR as of 11/22/2017     Today's INR 2.0      Anticoagulation Summary as of 11/22/2017     INR goal 2.0-3.0   Today's INR 2.0   Full instructions 7.5 mg on Mon, Fri; 10 mg all other days   Next INR check 12/20/2017    Indications   S/P aortic valve replacement [Z95.2]  Paroxysmal atrial fibrillation (H) [I48.0]  Long term current use of anticoagulant therapy [Z79.01]         Your next Anticoagulation Clinic appointment(s)     Dec 20, 2017  1:15 PM CST   Anticoagulation Visit with LL ANTI COAG   [facility]    [address] [address] [phone]              Contact Numbers     Please call [phone] to cancel and/or reschedule your appointment.  Please call [phone] with any problems or questions regarding your therapy          November 2017 Details    Sun Mon Tue Wed Thu Fri Sat        1               2               3               4                 5               6               7               8               9               10               11                 12               13               14               15               16               17               18                 19               20               21               22      10 mg   See details      23      10 mg         24      7.5 mg         25      10 mg           26      10 mg         27      7.5 mg         28      10 mg         29      10 mg         30      10 mg            Date Details   11/22 This INR check               How to take your warfarin dose     To take:  7.5 mg Take 1.5 of the 5 mg tablets.    To take:  10 mg Take 2 of the 5 mg tablets.           December 2017 Details    Sun Mon Tue Wed Thu Fri Sat          1      7.5 mg         2      10 mg           3      10 mg         4      7.5 mg          5      10 mg         6      10 mg         7      10 mg         8      7.5 mg         9      10 mg           10      10 mg         11      7.5 mg         12      10 mg         13      10 mg         14      10 mg         15      7.5 mg         16      10 mg           17      10 mg         18      7.5 mg         19      10 mg         20            21               22               23                 24               25               26               27               28               29               30                 31                      Date Details   No additional details    Date of next INR:  12/20/2017         How to take your warfarin dose     To take:  7.5 mg Take 1.5 of the 5 mg tablets.    To take:  10 mg Take 2 of the 5 mg tablets.

## 2017-12-20 ENCOUNTER — ANTICOAGULATION THERAPY VISIT (OUTPATIENT)
Dept: ANTICOAGULATION | Facility: CLINIC | Age: 72
End: 2017-12-20
Payer: MEDICARE

## 2017-12-20 DIAGNOSIS — Z95.2 S/P AORTIC VALVE REPLACEMENT: ICD-10-CM

## 2017-12-20 DIAGNOSIS — I48.0 PAROXYSMAL ATRIAL FIBRILLATION (H): ICD-10-CM

## 2017-12-20 DIAGNOSIS — Z79.01 LONG TERM CURRENT USE OF ANTICOAGULANT THERAPY: ICD-10-CM

## 2017-12-20 LAB — INR POINT OF CARE: 3 (ref 0.86–1.14)

## 2017-12-20 PROCEDURE — 85610 PROTHROMBIN TIME: CPT | Mod: QW

## 2017-12-20 PROCEDURE — 99207 ZZC NO CHARGE NURSE ONLY: CPT

## 2017-12-20 PROCEDURE — 36416 COLLJ CAPILLARY BLOOD SPEC: CPT

## 2017-12-20 NOTE — MR AVS SNAPSHOT
Brooks Summers   12/20/2017 1:15 PM   Anticoagulation Therapy Visit    Description:  72 year old male   Provider:  LL ANTI COAG   Department:  [facility]           INR as of 12/20/2017     Today's INR 3.0      Anticoagulation Summary as of 12/20/2017     INR goal 2.0-3.0   Today's INR 3.0   Full instructions 7.5 mg on Mon, Fri; 10 mg all other days   Next INR check 1/17/2018    Indications   S/P aortic valve replacement [Z95.2]  Paroxysmal atrial fibrillation (H) [I48.0]  Long term current use of anticoagulant therapy [Z79.01]         Your next Anticoagulation Clinic appointment(s)     Jan 17, 2018  1:15 PM CST   [facility] LL [facility] COAG   [facility]    [address] [address]   [phone]              December 2017 Details    Sun Mon Tue Wed Thu Fri Sat          1               2                 3               4               5               6               7               8               9                 10               11               12               13               14               15               16                 17               18               19               20      10 mg   See details      21      10 mg         22      7.5 mg         23      10 mg           24      10 mg         25      7.5 mg         26      10 mg         27      10 mg         28      10 mg         29      7.5 mg         30      10 mg           31      10 mg                Date Details   12/20 This INR check               How to take your warfarin dose     To take:  7.5 mg Take 1.5 of the 5 mg tablets.    To take:  10 mg Take 2 of the 5 mg tablets.           January 2018 Details    Sun Mon Tue Wed Thu Fri Sat      1      7.5 mg         2      10 mg         3      10 mg         4      10 mg         5      7.5 mg         6      10 mg           7      10 mg         8      7.5 mg         9      10 mg         10      10 mg         11      10 mg          12      7.5 mg         13      10 mg           14      10 mg         15      7.5 mg         16      10 mg         17            18               19               20                 21               22               23               24               25               26               27                 28               29               30               31                   Date Details   No additional details    Date of next INR:  1/17/2018         How to take your warfarin dose     To take:  7.5 mg Take 1.5 of the 5 mg tablets.    To take:  10 mg Take 2 of the 5 mg tablets.

## 2017-12-20 NOTE — PROGRESS NOTES
ANTICOAGULATION FOLLOW-UP CLINIC VISIT    Patient Name:  Brooks Summers  Date:  12/20/2017  Contact Type:  Face to Face    SUBJECTIVE:     Patient Findings     Positives No Problem Findings    Comments No changes in medications, diet, or activity noted. Took warfarin as instructed.               OBJECTIVE    INR Protime   Date Value Ref Range Status   12/20/2017 3.0 (A) 0.86 - 1.14 Final       ASSESSMENT / PLAN  INR assessment THER    Recheck INR In: 4 WEEKS    INR Location Clinic      Anticoagulation Summary as of 12/20/2017     INR goal 2.0-3.0   Today's INR 3.0   Maintenance plan 7.5 mg (5 mg x 1.5) on Mon, Fri; 10 mg (5 mg x 2) all other days   Full instructions 7.5 mg on Mon, Fri; 10 mg all other days   Weekly total 65 mg   No change documented Sheri Frausto RN   Plan last modified Lindsey Epstein RN (8/30/2017)   Next INR check 1/17/2018   Priority INR   Target end date Indefinite    Indications   S/P aortic valve replacement [Z95.2]  Paroxysmal atrial fibrillation (H) [I48.0]  Long term current use of anticoagulant therapy [Z79.01]         Anticoagulation Episode Summary     INR check location     Preferred lab     Send INR reminders to [facility]    Comments  * warfarin 5 mg tabs[facility] [phone]      Anticoagulation Care Providers     Provider Role Specialty Phone number    Edin Mays MD [facility] Internal Medicine [phone]            See the Encounter Report to view Anticoagulation Flowsheet and Dosing Calendar (Go to Encounters tab in chart review, and find the Anticoagulation Therapy Visit)        Sheri Frausto RN

## 2018-01-16 DIAGNOSIS — I48.0 PAROXYSMAL ATRIAL FIBRILLATION (H): ICD-10-CM

## 2018-01-16 DIAGNOSIS — Z95.2 S/P AVR (AORTIC VALVE REPLACEMENT): ICD-10-CM

## 2018-01-16 DIAGNOSIS — Z79.01 LONG TERM CURRENT USE OF ANTICOAGULANT THERAPY: ICD-10-CM

## 2018-01-16 DIAGNOSIS — Z95.2 S/P AORTIC VALVE REPLACEMENT: ICD-10-CM

## 2018-01-17 ENCOUNTER — ANTICOAGULATION THERAPY VISIT (OUTPATIENT)
Dept: ANTICOAGULATION | Facility: CLINIC | Age: 73
End: 2018-01-17
Payer: MEDICARE

## 2018-01-17 DIAGNOSIS — Z95.2 S/P AORTIC VALVE REPLACEMENT: ICD-10-CM

## 2018-01-17 DIAGNOSIS — Z79.01 LONG TERM CURRENT USE OF ANTICOAGULANT THERAPY: ICD-10-CM

## 2018-01-17 DIAGNOSIS — I48.0 PAROXYSMAL ATRIAL FIBRILLATION (H): ICD-10-CM

## 2018-01-17 LAB — INR POINT OF CARE: 1.7 (ref 0.86–1.14)

## 2018-01-17 PROCEDURE — 99207 ZZC NO CHARGE NURSE ONLY: CPT

## 2018-01-17 PROCEDURE — 36416 COLLJ CAPILLARY BLOOD SPEC: CPT

## 2018-01-17 PROCEDURE — 85610 PROTHROMBIN TIME: CPT | Mod: QW

## 2018-01-17 RX ORDER — WARFARIN SODIUM 5 MG/1
TABLET ORAL
Qty: 105 TABLET | Refills: 1 | Status: SHIPPED | OUTPATIENT
Start: 2018-01-17 | End: 2018-01-31

## 2018-01-17 NOTE — MR AVS SNAPSHOT
Brooks Summers   1/17/2018 1:15 PM   Anticoagulation Therapy Visit    Description:  72 year old male   Provider:  LL ANTI COAG   Department:  [facility]           INR as of 1/17/2018     Today's INR 1.7!      Anticoagulation Summary as of 1/17/2018     INR goal 2.0-3.0   Today's INR 1.7!   Full instructions 1/17: 15 mg; Otherwise 7.5 mg on Mon, Fri; 10 mg all other days   Next INR check 1/31/2018    Indications   S/P aortic valve replacement [Z95.2]  Paroxysmal atrial fibrillation (H) [I48.0]  Long term current use of anticoagulant therapy [Z79.01]         Your next Anticoagulation Clinic appointment(s)     Jan 31, 2018  1:15 PM CST   Anticoagulation Visit with LL ANTI COAG   [facility]    [address] [address] [phone]              Contact Numbers     Please call [phone] with any problems or questions regarding your therapy.    If you need to cancel and/or reschedule your appointment please call one of the following numbers:  [facility] [phone]  [place] - [phone]  [place] - [phone]  [place] - [phone]  [place] - [phone]            January 2018 Details    Sun Mon Tue Wed Thu Fri Sat      1               2               3               4               5               6                 7               8               9               10               11               12               13                 14               15               16               17      15 mg   See details      18      10 mg         19      7.5 mg         20      10 mg           21      10 mg         22      7.5 mg         23      10 mg         24      10 mg         25      10 mg         26      7.5 mg         27      10 mg           28      10 mg         29      7.5 mg         30      10 mg         31                Date Details   01/17 This INR check       Date of next INR:  1/31/2018         How to take your warfarin dose      To take:  7.5 mg Take 1.5 of the 5 mg tablets.    To take:  10 mg Take 2 of the 5 mg tablets.    To take:  15 mg Take 3 of the 5 mg tablets.

## 2018-01-17 NOTE — PROGRESS NOTES
ANTICOAGULATION FOLLOW-UP CLINIC VISIT    Patient Name:  Brooks Summers  Date:  1/17/2018  Contact Type:  Face to Face    SUBJECTIVE:     Patient Findings     Positives Unexplained INR or factor level change    Comments Patient states he had a few extra salads this past week, using iceberg lettuce. This is unlikely the cause of his subtherpeutic INR. Writer is questioning if he happened to miss a dose without realizing it.     Plan to increase dose today only, resume maintenance dose and recheck INR in 2 weeks.            OBJECTIVE    INR Protime   Date Value Ref Range Status   01/17/2018 1.7 (A) 0.86 - 1.14 Final       ASSESSMENT / PLAN  INR assessment SUB    Recheck INR In: 2 WEEKS    INR Location Clinic      Anticoagulation Summary as of 1/17/2018     INR goal 2.0-3.0   Today's INR 1.7!   Maintenance plan 7.5 mg (5 mg x 1.5) on Mon, Fri; 10 mg (5 mg x 2) all other days   Full instructions 1/17: 15 mg; Otherwise 7.5 mg on Mon, Fri; 10 mg all other days   Weekly total 65 mg   Plan last modified Lindsey Epstein RN (8/30/2017)   Next INR check 1/31/2018   Priority INR   Target end date Indefinite    Indications   S/P aortic valve replacement [Z95.2]  Paroxysmal atrial fibrillation (H) [I48.0]  Long term current use of anticoagulant therapy [Z79.01]         Anticoagulation Episode Summary     INR check location     Preferred lab     Send INR reminders to [facility]    Comments  * warfarin 5 mg tabs, [facility] , [phone]      Anticoagulation Care Providers     Provider Role Specialty Phone number    Edin Mays MD [facility] Internal Medicine [phone]            See the Encounter Report to view Anticoagulation Flowsheet and Dosing Calendar (Go to Encounters tab in chart review, and find the Anticoagulation Therapy Visit)        Sheri Frausto, CALLUM

## 2018-01-31 ENCOUNTER — ANTICOAGULATION THERAPY VISIT (OUTPATIENT)
Dept: ANTICOAGULATION | Facility: CLINIC | Age: 73
End: 2018-01-31
Payer: MEDICARE

## 2018-01-31 DIAGNOSIS — Z79.01 LONG TERM CURRENT USE OF ANTICOAGULANT THERAPY: ICD-10-CM

## 2018-01-31 DIAGNOSIS — I48.0 PAROXYSMAL ATRIAL FIBRILLATION (H): ICD-10-CM

## 2018-01-31 DIAGNOSIS — Z95.2 S/P AVR (AORTIC VALVE REPLACEMENT): ICD-10-CM

## 2018-01-31 DIAGNOSIS — Z95.2 S/P AORTIC VALVE REPLACEMENT: ICD-10-CM

## 2018-01-31 LAB — INR POINT OF CARE: 1.9 (ref 0.86–1.14)

## 2018-01-31 PROCEDURE — 85610 PROTHROMBIN TIME: CPT | Mod: QW

## 2018-01-31 PROCEDURE — 36416 COLLJ CAPILLARY BLOOD SPEC: CPT

## 2018-01-31 PROCEDURE — 99207 ZZC NO CHARGE NURSE ONLY: CPT

## 2018-01-31 RX ORDER — WARFARIN SODIUM 5 MG/1
TABLET ORAL
Qty: 105 TABLET | Refills: 1 | COMMUNITY
Start: 2018-01-31 | End: 2018-05-22

## 2018-01-31 NOTE — MR AVS SNAPSHOT
Brooks Summers   1/31/2018 1:15 PM   Anticoagulation Therapy Visit    Description:  72 year old male   Provider:  LL ANTI COAG   Department:  [facility]           INR as of 1/31/2018     Today's INR 1.9!      Anticoagulation Summary as of 1/31/2018     INR goal 2.0-3.0   Today's INR 1.9!   Full instructions 10 mg every day   Next INR check 2/14/2018    Indications   S/P aortic valve replacement [Z95.2]  Paroxysmal atrial fibrillation (H) [I48.0]  Long term current use of anticoagulant therapy [Z79.01]         Your next Anticoagulation Clinic appointment(s)     Feb 14, 2018  1:00 PM CST   Anticoagulation Visit with LL ANTI COAG   [facility]    [address] [address] [phone]              Contact Numbers     Please call [phone] with any problems or questions regarding your therapy.    If you need to cancel and/or reschedule your appointment please call one of the following numbers:  [facility] [phone]  [place] - [phone]  [facility] [phone]  [place] - [phone]  [place] - [phone]            January 2018 Details    Sun Mon Tue Wed Thu Fri Sat      1               2               3               4               5               6                 7               8               9               10               11               12               13                 14               15               16               17               18               19               20                 21               22               23               24               25               26               27                 28               29               30               31      10 mg   See details          Date Details   01/31 This INR check               How to take your warfarin dose     To take:  10 mg Take 2 of the 5 mg tablets.           February 2018 Details    Sun Mon Tue Wed Thu Fri Sat         1      10 mg         2       10 mg         3      10 mg           4      10 mg         5      10 mg         6      10 mg         7      10 mg         8      10 mg         9      10 mg         10      10 mg           11      10 mg         12      10 mg         13      10 mg         14            15               16               17                 18               19               20               21               22               23               24                 25               26               27               28                   Date Details   No additional details    Date of next INR:  2/14/2018         How to take your warfarin dose     To take:  10 mg Take 2 of the 5 mg tablets.

## 2018-01-31 NOTE — PROGRESS NOTES
ANTICOAGULATION FOLLOW-UP CLINIC VISIT    Patient Name:  Brooks Summers  Date:  1/31/2018  Contact Type:  Face to Face    SUBJECTIVE:     Patient Findings     Positives No Problem Findings    Comments Denies any changes, states he did not miss any doses. Plan to increase dose by ~8% and recheck INR in 2 weeks.            OBJECTIVE    INR Protime   Date Value Ref Range Status   01/31/2018 1.9 (A) 0.86 - 1.14 Final       ASSESSMENT / PLAN  INR assessment THER +/- 0.1 of goal INR range   Recheck INR In: 2 WEEKS    INR Location Clinic      Anticoagulation Summary as of 1/31/2018     INR goal 2.0-3.0   Today's INR 1.9!   Maintenance plan 10 mg (5 mg x 2) every day   Full instructions 10 mg every day   Weekly total 70 mg   Plan last modified Sheri Frausto RN (1/31/2018)   Next INR check 2/14/2018   Priority INR   Target end date Indefinite    Indications   S/P aortic valve replacement [Z95.2]  Paroxysmal atrial fibrillation (H) [I48.0]  Long term current use of anticoagulant therapy [Z79.01]         Anticoagulation Episode Summary     INR check location     Preferred lab     Send INR reminders to [facility]    Comments  * warfarin 5 mg tabs, [facility], [phone] - 2/14 is pt's anniversary      Anticoagulation Care Providers     Provider Role Specialty Phone number    Edin Mays MD [facility] Internal Medicine [phone]            See the Encounter Report to view Anticoagulation Flowsheet and Dosing Calendar (Go to Encounters tab in chart review, and find the Anticoagulation Therapy Visit)        Sheri Frausto RN

## 2018-02-01 NOTE — PROGRESS NOTES
WRAP-IT  Principle Investigator: Mekhi Be MD, Jessica (Clinical Research Coordinator) [phone]    24 Month Visit: November 3, 2017     Completed in Clinic: YES      Completed via [facility] and Phone Call: NO  Date of Transmission:    Visit Follow-up    Has a diagnosis for CIED infection been made since last visit:  NO    Has there been a JOSEPH alert, LNA alert, high voltage impedance out of range alert, or pace/sence impedance out of range?  NO     Has a lead failure, header/connection issue, lead dislodgment, or perforation occurred since the procedure?  NO    Change in Medications:  NO    Did the subject have any invasive procedures or surgeries since last visit: No      Interrogation Collected: YES  USB: YES   Floppy Disk:  NO  Printout/Strip:YES

## 2018-02-05 ENCOUNTER — ALLIED HEALTH/NURSE VISIT (OUTPATIENT)
Dept: CARDIOLOGY | Facility: CLINIC | Age: 73
End: 2018-02-05
Attending: INTERNAL MEDICINE
Payer: MEDICARE

## 2018-02-05 DIAGNOSIS — I42.0 CARDIOMYOPATHY, DILATED, NONISCHEMIC (H): Primary | ICD-10-CM

## 2018-02-05 PROCEDURE — 93295 DEV INTERROG REMOTE 1/2/MLT: CPT | Performed by: INTERNAL MEDICINE

## 2018-02-05 PROCEDURE — 93296 REM INTERROG EVL PM/IDS: CPT | Mod: ZF

## 2018-02-05 NOTE — MR AVS SNAPSHOT
"              After Visit Summary   2/5/2018    Brooks Summers    MRN: [id number]           Patient Information     Date Of Birth          1945        Visit Information        Provider Department      2/5/2018 6:00 AM UC ICD REMOTE [facility]        Today's Diagnoses     Cardiomyopathy, dilated, nonischemic (H)    -  1       Follow-ups after your visit        Your next 10 appointments already scheduled     Feb 14, 2018  1:00 PM CST   Anticoagulation Visit with LL ANTI COAG   [facility]    [address] [address]   [phone]            May 11, 2018  9:00 AM CDT   (Arrive by 8:45 AM)   Pacemaker Check with  Cv Device 1   [facility] ([facility])    [address]  [address] [place] [address] [phone]              Who to contact     If you have questions or need follow up information about today's clinic visit or your schedule please contact [facility] directly at [phone].  Normal or non-critical lab and imaging results will be communicated to you by MyChart, letter or phone within 4 business days after the clinic has received the results. If you do not hear from us within 7 days, please contact the clinic through [employer] or phone. If you have a critical or abnormal lab result, we will notify you by phone as soon as possible.  Submit refill requests through [employer] or call your pharmacy and they will forward the refill request to us. Please allow 3 business days for your refill to be completed.          Additional Information About Your Visit        [employer] Information     [employer] lets you send messages to your doctor, view your test results, renew your prescriptions, schedule appointments and more. To sign up, go to [email or web address] . Click on \"Log in\" on the left side of the screen, which will take you to the Welcome page. Then click on \"Sign up Now\" on the " right side of the page.     You will be asked to enter the access code listed below, as well as some personal information. Please follow the directions to create your username and password.     Your access code is: [email or web address]  Expires: 2018  9:10 AM     Your access code will  in 90 days. If you need help or a new code, please call your [place] clinic or [phone].        Care EveryWhere ID     This is your Care EveryWhere ID. This could be used by other organizations to access your [place] medical records  [id number]         Blood Pressure from Last 3 Encounters:   17 152/86   17 114/70   17 108/69    Weight from Last 3 Encounters:   17 83.9 kg (185 lb)   17 82.2 kg (181 lb 4.8 oz)   17 82.4 kg (181 lb 11.2 oz)              We Performed the Following     INTERROGATION DEVICE EVAL REMOTE, PACER/ICD        Primary Care Provider Office Phone # Fax #    Edin Mays [phone] [address] [address]        Equal Access to Services     JASMYNE RALPH : Hadii aad ku hadasho Soomaali, waaxda luqadaha, qaybta kaalmada adeegyada, waxay soniin hayleahn sissy berrios . So [facility] [phone].    ATENCIÓN: Si habla español, tiene a herrera disposición servicios gratuitos de asistencia lingüística. Llame al [phone].    We comply with applicable federal civil rights laws and [place] laws. We do not discriminate on the basis of race, color, national origin, age, disability, sex, sexual orientation, or gender identity.            Thank you!     Thank you for choosing [facility]  for your care. Our goal is always to provide you with excellent care. Hearing back from our patients is one way we can continue to improve our services. Please take a few minutes to complete the written survey that you may receive in the mail after your visit with us. Thank you!             Your Updated Medication List - Protect others  around you: Learn how to safely use, store and throw away your medicines at www.disposemymeds.org.          This list is accurate as of 2/5/18 11:59 PM.  Always use your most recent med list.                   Brand Name Dispense Instructions for use Diagnosis    allopurinol 100 MG tablet    ZYLOPRIM    90 tablet    Take 1 tablet (100 mg) by mouth every evening    Idiopathic gout, unspecified chronicity, unspecified site       cephALEXin 500 MG capsule    KEFLEX    4 capsule    Take 4 capsules by mouth once, one hour prior to surgery.    BCC (basal cell carcinoma), face       losartan 25 MG tablet    COZAAR    45 tablet    Take 0.5 tablets (12.5 mg) by mouth every evening    Benign essential hypertension       metoprolol succinate 50 MG 24 hr tablet    TOPROL XL    90 tablet    Take 1 tablet (50 mg) by mouth daily    Benign essential hypertension       MULTIVITAMIN ADULTS 50+ Tabs       Flu vaccine need, Aortic valve stenosis, severe, Chronic systolic heart failure (H), S/P aortic valve replacement       * PROVIDER DOSED MANAGED WARFARIN (COUMADIN) OUTPATIENT      Per coumadin clinic        simvastatin 40 MG tablet    ZOCOR    90 tablet    Take 1 tablet (40 mg) by mouth At Bedtime    Hyperlipidemia LDL goal <100       VITAMIN D (CHOLECALCIFEROL) PO      Take 1,000 Units by mouth daily    Flu vaccine need, Aortic valve stenosis, severe, Chronic systolic heart failure (H), S/P aortic valve replacement       * warfarin 5 MG tablet    COUMADIN    105 tablet    Take 10mg daily or as directed by the Anticoagulation Clinic    S/P AVR (aortic valve replacement), Paroxysmal atrial fibrillation (H), Long term current use of anticoagulant therapy, S/P aortic valve replacement       * Notice:  This list has 2 medication(s) that are the same as other medications prescribed for you. Read the directions carefully, and ask your doctor or other care provider to review them with you.

## 2018-02-07 NOTE — PROGRESS NOTES
Remote ICD transmission received and reviewed.  Device transmission sent per MD orders.  Patient has a [employer]tr[employer] multi lead ICD.  Normal ICD function.  No episodes recorded.  Presenting EGM = AS/BV @ 73 bpm.  AP = 23.9%.   = 99.5%.  BVP = 99.9%.  OptiVol fluid index is near baseline.  Estimated battery longevity to SIMONE = 2.2 years.  No short v-v intervals recorded.  RV lead impedance trends appear stable.  Patient notified of interrogation results.  Patient reports that he is feeling well and denies complaints.  Plan for patient to return to clinic in 3 months as scheduled.    Remote ICD transmission

## 2018-02-14 ENCOUNTER — ANTICOAGULATION THERAPY VISIT (OUTPATIENT)
Dept: ANTICOAGULATION | Facility: CLINIC | Age: 73
End: 2018-02-14
Payer: MEDICARE

## 2018-02-14 DIAGNOSIS — Z79.01 LONG TERM CURRENT USE OF ANTICOAGULANT THERAPY: ICD-10-CM

## 2018-02-14 DIAGNOSIS — Z95.2 S/P AORTIC VALVE REPLACEMENT: ICD-10-CM

## 2018-02-14 DIAGNOSIS — I48.0 PAROXYSMAL ATRIAL FIBRILLATION (H): ICD-10-CM

## 2018-02-14 LAB — INR POINT OF CARE: 2.5 (ref 0.86–1.14)

## 2018-02-14 PROCEDURE — 36416 COLLJ CAPILLARY BLOOD SPEC: CPT

## 2018-02-14 PROCEDURE — 85610 PROTHROMBIN TIME: CPT | Mod: QW

## 2018-02-14 PROCEDURE — 99207 ZZC NO CHARGE NURSE ONLY: CPT

## 2018-02-14 NOTE — PROGRESS NOTES
ANTICOAGULATION FOLLOW-UP CLINIC VISIT    Patient Name:  Brooks Summers  Date:  2/14/2018  Contact Type:  Face to Face    SUBJECTIVE:     Patient Findings     Positives No Problem Findings    Comments No changes in medications, diet, or activity noted. Took warfarin as instructed, denies missing any warfarin doses.    Patient stated he did have a stomach bug about 3 days ago. Plan to continue taking 10mg daily, recheck in 4 weeks.            OBJECTIVE    INR Protime   Date Value Ref Range Status   02/14/2018 2.5 (A) 0.86 - 1.14 Final       ASSESSMENT / PLAN  INR assessment THER    Recheck INR In: 4 WEEKS    INR Location Clinic      Anticoagulation Summary as of 2/14/2018     INR goal 2.0-3.0   Today's INR 2.5   Maintenance plan 10 mg (5 mg x 2) every day   Full instructions 10 mg every day   Weekly total 70 mg   No change documented Sheri Frausto RN   Plan last modified Sheri Frausto RN (1/31/2018)   Next INR check 3/14/2018   Priority INR   Target end date Indefinite    Indications   S/P aortic valve replacement [Z95.2]  Paroxysmal atrial fibrillation (H) [I48.0]  Long term current use of anticoagulant therapy [Z79.01]         Anticoagulation Episode Summary     INR check location     Preferred lab     Send INR reminders to [facility]    Comments  * warfarin 5 mg tabs      Anticoagulation Care Providers     Provider Role Specialty Phone number    Edin Mays MD [facility] [phone]            See the Encounter Report to view Anticoagulation Flowsheet and Dosing Calendar (Go to Encounters tab in chart review, and find the Anticoagulation Therapy Visit)        Sheri Frausto RN

## 2018-02-14 NOTE — MR AVS SNAPSHOT
Brooks Summers   2/14/2018 1:00 PM   Anticoagulation Therapy Visit    Description:  72 year old male   Provider:  LL ANTI COAG   Department:  [facility]           INR as of 2/14/2018     Today's INR 2.5      Anticoagulation Summary as of 2/14/2018     INR goal 2.0-3.0   Today's INR 2.5   Full instructions 10 mg every day   Next INR check 3/14/2018    Indications   S/P aortic valve replacement [Z95.2]  Paroxysmal atrial fibrillation (H) [I48.0]  Long term current use of anticoagulant therapy [Z79.01]         Your next Anticoagulation Clinic appointment(s)     Mar 14, 2018  1:15 PM CDT   [facility]    [address] [address] [phone]              Contact Numbers     Please call [phone] with any problems or questions regarding your therapy.    If you need to cancel and/or reschedule your appointment please call one of the following numbers:  [facility] [phone]  [place] - [phone]  [facility] [phone]  [place] - [phone]  [place] - [phone]            February 2018 Details    Sun Mon Tue Wed Thu Fri Sat         1               2               3                 4               5               6               7               8               9               10                 11               12               13               14      10 mg   See details      15      10 mg         16      10 mg         17      10 mg           18      10 mg         19      10 mg         20      10 mg         21      10 mg         22      10 mg         23      10 mg         24      10 mg           25      10 mg         26      10 mg         27      10 mg         28      10 mg             Date Details   02/14 This INR check               How to take your warfarin dose     To take:  10 mg Take 2 of the 5 mg tablets.           March 2018 Details    Sun Mon Tue Wed Thu Fri Sat         1      10  mg         2      10 mg         3      10 mg           4      10 mg         5      10 mg         6      10 mg         7      10 mg         8      10 mg         9      10 mg         10      10 mg           11      10 mg         12      10 mg         13      10 mg         14            15               16               17                 18               19               20               21               22               23               24                 25               26               27               28               29               30               31                Date Details   No additional details    Date of next INR:  3/14/2018         How to take your warfarin dose     To take:  10 mg Take 2 of the 5 mg tablets.

## 2018-03-14 ENCOUNTER — ANTICOAGULATION THERAPY VISIT (OUTPATIENT)
Dept: ANTICOAGULATION | Facility: CLINIC | Age: 73
End: 2018-03-14
Payer: MEDICARE

## 2018-03-14 DIAGNOSIS — I48.0 PAROXYSMAL ATRIAL FIBRILLATION (H): ICD-10-CM

## 2018-03-14 DIAGNOSIS — Z79.01 LONG TERM CURRENT USE OF ANTICOAGULANT THERAPY: ICD-10-CM

## 2018-03-14 DIAGNOSIS — Z95.2 S/P AORTIC VALVE REPLACEMENT: ICD-10-CM

## 2018-03-14 LAB — INR POINT OF CARE: 3.3 (ref 0.86–1.14)

## 2018-03-14 PROCEDURE — 36416 COLLJ CAPILLARY BLOOD SPEC: CPT

## 2018-03-14 PROCEDURE — 85610 PROTHROMBIN TIME: CPT | Mod: QW

## 2018-03-14 PROCEDURE — 99207 ZZC NO CHARGE NURSE ONLY: CPT

## 2018-03-14 NOTE — PROGRESS NOTES
ANTICOAGULATION FOLLOW-UP CLINIC VISIT    Patient Name:  Brooks Summers  Date:  3/14/2018  Contact Type:  Face to Face    SUBJECTIVE:     Patient Findings     Positives Unexplained INR or factor level change    Comments No changes in medications, activity, or diet noted. No bleeding or increased bruising noted. Took warfarin as prescribed.  Patient states that it is possible that his stress is a factor, he has had an increased level recently.  Patient verbalizes understanding and agrees to plan. No further questions or concerns.           OBJECTIVE    INR Protime   Date Value Ref Range Status   03/14/2018 3.3 (A) 0.86 - 1.14 Final       ASSESSMENT / PLAN  INR assessment SUPRA    Recheck INR In: 2 WEEKS    INR Location Clinic      Anticoagulation Summary as of 3/14/2018     INR goal 2.0-3.0   Today's INR 3.3!   Maintenance plan 10 mg (5 mg x 2) every day   Full instructions 10 mg every day   Weekly total 70 mg   No change documented Becca Escamilla RN   Plan last modified Sheri Frausto RN (1/31/2018)   Next INR check 3/28/2018   Priority INR   Target end date Indefinite    Indications   S/P aortic valve replacement [Z95.2]  Paroxysmal atrial fibrillation (H) [I48.0]  Long term current use of anticoagulant therapy [Z79.01]         Anticoagulation Episode Summary     INR check location     Preferred lab     Send INR reminders to [facility]    Comments  * warfarin 5 mg tabs      Anticoagulation Care Providers     Provider Role Specialty Phone number    Edin Mays MD [facility] Internal [facility] [phone]            See the Encounter Report to view Anticoagulation Flowsheet and Dosing Calendar (Go to Encounters tab in chart review, and find the Anticoagulation Therapy Visit)        Becca Escamilla RN

## 2018-03-14 NOTE — MR AVS SNAPSHOT
Brooks Summers   3/14/2018 1:15 PM   Anticoagulation Therapy Visit    Description:  72 year old male   Provider:  LL ANTI COAG   Department:  [facility]           INR as of 3/14/2018     Today's INR 3.3!      Anticoagulation Summary as of 3/14/2018     INR goal 2.0-3.0   Today's INR 3.3!   Full instructions 10 mg every day   Next INR check 3/28/2018    Indications   S/P aortic valve replacement [Z95.2]  Paroxysmal atrial fibrillation (H) [I48.0]  Long term current use of anticoagulant therapy [Z79.01]         Your next Anticoagulation Clinic appointment(s)     Mar 28, 2018  1:15 PM CDT   Anticoagulation Visit with LL ANTI COAG   [facility]    [address] [address] [phone]              Contact Numbers     Please call [phone] with any problems or questions regarding your therapy.    If you need to cancel and/or reschedule your appointment please call one of the following numbers:  [facility] [phone]  [place] - [phone]  [facility] [phone]  [place] - [phone]  [place] - [phone]            March 2018 Details    Sun Mon Tue Wed Thu Fri Sat         1               2               3                 4               5               6               7               8               9               10                 11               12               13               14      10 mg   See details      15      10 mg         16      10 mg         17      10 mg           18      10 mg         19      10 mg         20      10 mg         21      10 mg         22      10 mg         23      10 mg         24      10 mg           25      10 mg         26      10 mg         27      10 mg         28            29               30               31                Date Details   03/14 This INR check       Date of next INR:  3/28/2018         How to take your warfarin dose     To take:  10 mg Take 2 of the 5 mg tablets.

## 2018-03-27 ENCOUNTER — OFFICE VISIT (OUTPATIENT)
Dept: INTERNAL MEDICINE | Facility: CLINIC | Age: 73
End: 2018-03-27
Payer: MEDICARE

## 2018-03-27 ENCOUNTER — HOSPITAL ENCOUNTER (OUTPATIENT)
Facility: CLINIC | Age: 73
End: 2018-03-27
Attending: INTERNAL MEDICINE | Admitting: INTERNAL MEDICINE
Payer: COMMERCIAL

## 2018-03-27 VITALS
DIASTOLIC BLOOD PRESSURE: 73 MMHG | OXYGEN SATURATION: 96 % | BODY MASS INDEX: 25.9 KG/M2 | HEIGHT: 70 IN | WEIGHT: 180.9 LBS | SYSTOLIC BLOOD PRESSURE: 158 MMHG | HEART RATE: 67 BPM | TEMPERATURE: 97.7 F

## 2018-03-27 DIAGNOSIS — I10 BENIGN ESSENTIAL HYPERTENSION: Primary | ICD-10-CM

## 2018-03-27 DIAGNOSIS — Z12.5 ENCOUNTER FOR SCREENING FOR MALIGNANT NEOPLASM OF PROSTATE: ICD-10-CM

## 2018-03-27 DIAGNOSIS — Z12.11 SPECIAL SCREENING FOR MALIGNANT NEOPLASMS, COLON: ICD-10-CM

## 2018-03-27 DIAGNOSIS — R20.9 DISTURBANCE OF SKIN SENSATION: ICD-10-CM

## 2018-03-27 DIAGNOSIS — N40.0 BENIGN PROSTATIC HYPERPLASIA, UNSPECIFIED WHETHER LOWER URINARY TRACT SYMPTOMS PRESENT: ICD-10-CM

## 2018-03-27 DIAGNOSIS — I10 BENIGN ESSENTIAL HYPERTENSION: ICD-10-CM

## 2018-03-27 LAB
ALBUMIN SERPL-MCNC: 3.7 G/DL (ref 3.4–5)
ALBUMIN UR-MCNC: NEGATIVE MG/DL
ALP SERPL-CCNC: 98 U/L (ref 40–150)
ALT SERPL W P-5'-P-CCNC: 35 U/L (ref 0–70)
ANION GAP SERPL CALCULATED.3IONS-SCNC: 4 MMOL/L (ref 3–14)
APPEARANCE UR: ABNORMAL
AST SERPL W P-5'-P-CCNC: 27 U/L (ref 0–45)
BASOPHILS # BLD AUTO: 0 10E9/L (ref 0–0.2)
BASOPHILS NFR BLD AUTO: 0.4 %
BILIRUB SERPL-MCNC: 0.5 MG/DL (ref 0.2–1.3)
BILIRUB UR QL STRIP: NEGATIVE
BUN SERPL-MCNC: 14 MG/DL (ref 7–30)
CALCIUM SERPL-MCNC: 9.1 MG/DL (ref 8.5–10.1)
CHLORIDE SERPL-SCNC: 108 MMOL/L (ref 94–109)
CHOLEST SERPL-MCNC: 131 MG/DL
CK SERPL-CCNC: 74 U/L (ref 30–300)
CO2 SERPL-SCNC: 28 MMOL/L (ref 20–32)
COLOR UR AUTO: YELLOW
CREAT SERPL-MCNC: 0.94 MG/DL (ref 0.66–1.25)
DIFFERENTIAL METHOD BLD: ABNORMAL
EOSINOPHIL # BLD AUTO: 0.1 10E9/L (ref 0–0.7)
EOSINOPHIL NFR BLD AUTO: 1.3 %
ERYTHROCYTE [DISTWIDTH] IN BLOOD BY AUTOMATED COUNT: 13.5 % (ref 10–15)
GFR SERPL CREATININE-BSD FRML MDRD: 79 ML/MIN/1.7M2
GLUCOSE SERPL-MCNC: 128 MG/DL (ref 70–99)
GLUCOSE UR STRIP-MCNC: NEGATIVE MG/DL
HCT VFR BLD AUTO: 41.6 % (ref 40–53)
HDLC SERPL-MCNC: 43 MG/DL
HGB BLD-MCNC: 13.9 G/DL (ref 13.3–17.7)
HGB UR QL STRIP: NEGATIVE
IMM GRANULOCYTES # BLD: 0 10E9/L (ref 0–0.4)
IMM GRANULOCYTES NFR BLD: 0.2 %
KETONES UR STRIP-MCNC: NEGATIVE MG/DL
LDLC SERPL CALC-MCNC: 71 MG/DL
LEUKOCYTE ESTERASE UR QL STRIP: NEGATIVE
LYMPHOCYTES # BLD AUTO: 1.4 10E9/L (ref 0.8–5.3)
LYMPHOCYTES NFR BLD AUTO: 27.2 %
MCH RBC QN AUTO: 31.9 PG (ref 26.5–33)
MCHC RBC AUTO-ENTMCNC: 33.4 G/DL (ref 31.5–36.5)
MCV RBC AUTO: 95 FL (ref 78–100)
MONOCYTES # BLD AUTO: 0.4 10E9/L (ref 0–1.3)
MONOCYTES NFR BLD AUTO: 8.4 %
MUCOUS THREADS #/AREA URNS LPF: PRESENT /LPF
NEUTROPHILS # BLD AUTO: 3.3 10E9/L (ref 1.6–8.3)
NEUTROPHILS NFR BLD AUTO: 62.5 %
NITRATE UR QL: NEGATIVE
NONHDLC SERPL-MCNC: 88 MG/DL
NRBC # BLD AUTO: 0 10*3/UL
NRBC BLD AUTO-RTO: 0 /100
PH UR STRIP: 6 PH (ref 5–7)
PLATELET # BLD AUTO: 107 10E9/L (ref 150–450)
POTASSIUM SERPL-SCNC: 4 MMOL/L (ref 3.4–5.3)
PROT SERPL-MCNC: 6.9 G/DL (ref 6.8–8.8)
PSA SERPL-ACNC: 2.78 UG/L (ref 0–4)
RBC # BLD AUTO: 4.36 10E12/L (ref 4.4–5.9)
RBC #/AREA URNS AUTO: 5 /HPF (ref 0–2)
SODIUM SERPL-SCNC: 140 MMOL/L (ref 133–144)
SOURCE: ABNORMAL
SP GR UR STRIP: 1.01 (ref 1–1.03)
TRIGL SERPL-MCNC: 87 MG/DL
UROBILINOGEN UR STRIP-MCNC: 0 MG/DL (ref 0–2)
VIT B12 SERPL-MCNC: 395 PG/ML (ref 193–986)
WBC # BLD AUTO: 5.3 10E9/L (ref 4–11)
WBC #/AREA URNS AUTO: <1 /HPF (ref 0–5)

## 2018-03-27 ASSESSMENT — ENCOUNTER SYMPTOMS
NUMBNESS: 1
SEIZURES: 0
STIFFNESS: 1
NECK PAIN: 0
ARTHRALGIAS: 1
LOSS OF CONSCIOUSNESS: 0
MYALGIAS: 1
MUSCLE WEAKNESS: 1
JOINT SWELLING: 0
BACK PAIN: 0
TINGLING: 1
MEMORY LOSS: 0
TREMORS: 0
SPEECH CHANGE: 0
WEAKNESS: 1
PARALYSIS: 0
HEADACHES: 0
DISTURBANCES IN COORDINATION: 1
MUSCLE CRAMPS: 1
DIZZINESS: 1

## 2018-03-27 ASSESSMENT — ACTIVITIES OF DAILY LIVING (ADL)
IN_THE_PAST_7_DAYS,_DID_YOU_NEED_HELP_FROM_OTHERS_TO_PERFORM_EVERYDAY_ACTIVITIES_SUCH_AS_EATING,_GETTING_DRESSED,_GROOMING,_BATHING,_WALKING,_OR_USING_THE_TOILET: N
IN_THE_PAST_7_DAYS,_DID_YOU_NEED_HELP_FROM_OTHERS_TO_TAKE_CARE_OF_THINGS_SUCH_AS_LAUNDRY_AND_HOUSEKEEPING,_BANKING,_SHOPPING,_USING_THE_TELEPHONE,_FOOD_PREPARATION,_TRANSPORTATION,_OR_TAKING_YOUR_OWN_MEDICATIONS?: N

## 2018-03-27 ASSESSMENT — PAIN SCALES - GENERAL: PAINLEVEL: NO PAIN (0)

## 2018-03-27 NOTE — NURSING NOTE
Chief Complaint   Patient presents with     Physical     Patient here for annual physical.     Nola Islas LPN at 6:58 AM on 3/27/2018.

## 2018-03-27 NOTE — PROGRESS NOTES
HPI:    Pt. Comes in for physical  today.  He has h/o prosthetic aortic valve endocarditis (see detailed ID note from Dr. Bravo 3/13). He also has h/o atrial fibrillation on coumadin. He is s/p ICD for prior low EF now normal. He o/w is doing fine and denies any new HEENT, cardiopulmonary, abdominal, , lymphatic, endocrine, vascular, systemic sxs. He was seen 11/3/2017 by Dr. Castro Cardiology. He was seen by Dr. Romie mace for trigger finger 11/18/15. He was seen by Dr. Tim, Hematology 8/19/15 for low platelets. States AM B hand numbness that goes away during the day for about 2 months. He states B leg numbness for a few months. No neck or back pain. He remains active. No balance complaints.       PE:    Vitals noted gen nad cooperative alert, HEENT, ears normal oropharynx clear no exudate, neck supple nl rom no adenopathy, LCTA, B, RRR, S1,S2, murmur present, abdomen, nd ,nt, no masses, Cervical neck w/o tenderness. Lower back w/o tenderness or mass. Neurological exam B UE/LE/proximal/distal is normal and symmetric for sensation, reflexes, and strength.          A/P:    1. He will continue to follow in cardiology for h/o AVR and remains on coumadin for afib.   See by Dr. Castro 11/3/2017  2. Ordered colonoscopy today 3/27/2018. Will discuss with both ID and Dr. Castro Cardiology  3. Seen in Dermatology 11/2017  4. He saw Dr. Tim, Hematology 8/15 for low platelets and will follow.   5. Seen by Dr. Mora Urology 4/2017 for BPH/hematuria.   PSA done 11/16 Reordered today both PSA and UA  6. Up to date on vaccinations and recommend he check with insurance for new Shingles vaccination  7. Will get EMG B UE and B LE for B hand numbness and B leg numbness. Will check glucose and B12    Will see him back after above testing

## 2018-03-27 NOTE — MR AVS SNAPSHOT
After Visit Summary   3/27/2018    Brooks Summers    MRN: [id number]           Patient Information     Date Of Birth          1945        Visit Information        Provider Department      3/27/2018 7:05 AM Edin Mays MD [facility]        Today's Diagnoses     Benign essential hypertension    -  1    Benign prostatic hyperplasia, unspecified whether lower urinary tract symptoms present        Encounter for screening for malignant neoplasm of prostate         Special screening for malignant neoplasms, colon        Disturbance of skin sensation          Care Instructions    [facility] [phone]                Follow-ups after your visit        Additional Services     GASTROENTEROLOGY ADULT REF PROCEDURE ONLY       Last Lab Result: Creatinine (mg/dL)       Date                     Value                 04/03/2017               0.88             ----------  Body mass index is 25.61 kg/(m^2).     Needed:  No  Language:  English    Patient will be contacted to schedule procedure.     Please be aware that coverage of these services is subject to the terms and limitations of your health insurance plan.  Call member services at your health plan with any benefit or coverage questions.  Any procedures must be performed at a [facility] facility OR coordinated by your clinic's referral office.    Please bring the following with you to your appointment:    (1) Any X-Rays, CTs or MRIs which have been performed.  Contact the facility where they were done to arrange for  prior to your scheduled appointment.    (2) List of current medications   (3) This referral request   (4) Any documents/labs given to you for this referral                  Your next 10 appointments already scheduled     Mar 28, 2018  1:15 PM CDT   Anticoagulation Visit with LL ANTI COAG   [facility]    [address] [address]   [phone]             2018  7:35 AM CDT   (Arrive by 7:20 AM)   PHYSICAL with Edin Mays MD   [facility] ([facility])    [address]  [address]  [place] [address] [phone]            May 11, 2018  9:00 AM CDT   (Arrive by 8:45 AM)   Pacemaker Check with Uc Cv Device 1   [facility] ([facility])    [address]  [address]  [place] [address] [phone]              Future tests that were ordered for you today     Open Future Orders        Priority Expected Expires Ordered    EMG Routine  3/27/2019 3/27/2018    CK total Routine 3/27/2018 3/27/2019 3/27/2018    Vitamin B12 Routine 3/27/2018 3/27/2019 3/27/2018    CBC with platelets differential Routine 3/27/2018 4/10/2018 3/27/2018    Comprehensive metabolic panel Routine 3/27/2018 4/10/2018 3/27/2018    Lipid panel reflex to direct LDL Fasting Routine  3/27/2019 3/27/2018    UA with Micro reflex to Culture Routine 3/27/2018 3/27/2019 3/27/2018    PSA screen Routine 3/27/2018 3/27/2019 3/27/2018            Who to contact     Please call your clinic at [phone] to:    Ask questions about your health    Make or cancel appointments    Discuss your medicines    Learn about your test results    Speak to your doctor            Additional Information About Your Visit        MyChart Information     [employer] is an electronic gateway that provides easy, online access to your medical records. With [employer], you can request a clinic appointment, read your test results, renew a prescription or communicate with your care team.     To sign up for [employer] visit the website at [email or web address]   You will be asked to enter the access code listed below, as well as some personal information. Please follow the directions to create your username and password.     Your access code is: [email or web address]  Expires: 2018 10:10 AM     Your access code will  in  "90 days. If you need help or a new code, please contact your [facility] or call [phone] for assistance.        Care EveryWhere ID     This is your Care EveryWhere ID. This could be used by other organizations to access your [place] medical records  [id number]        Your Vitals Were     Pulse Temperature Height Pulse Oximetry BMI (Body Mass Index)       67 97.7  F (36.5  C) (Oral) 1.79 m (5' 10.47\") 96% 25.61 kg/m2        Blood Pressure from Last 3 Encounters:   03/27/18 158/73   11/08/17 152/86   11/03/17 114/70    Weight from Last 3 Encounters:   03/27/18 82.1 kg (180 lb 14.4 oz)   11/03/17 83.9 kg (185 lb)   05/26/17 82.2 kg (181 lb 4.8 oz)              We Performed the Following     GASTROENTEROLOGY ADULT REF PROCEDURE ONLY        Primary Care Provider Office Phone # Fax #    Edin Mays [phone]       [address] [address]        Equal Access to Services     [facility] : Hadii aad ku hadasho Sodarrius, waaxda luqadaha, qaybta kaalmada adeana cristinayada, elisabeth berrios . [facility] [phone].    ATENCIÓN: Si habla español, tiene a herrera disposición servicios gratuitos de asistencia lingüística. [facility] [phone].    We comply with applicable federal civil rights laws and [place] laws. We do not discriminate on the basis of race, color, national origin, age, disability, sex, sexual orientation, or gender identity.            Thank you!     Thank you for choosing [facility]  for your care. Our goal is always to provide you with excellent care. Hearing back from our patients is one way we can continue to improve our services. Please take a few minutes to complete the written survey that you may receive in the mail after your visit with us. Thank you!             Your Updated Medication List - Protect others around you: Learn how to safely use, store and throw away your medicines at " www.disposemymeds.org.          This list is accurate as of 3/27/18  7:35 AM.  Always use your most recent med list.                   Brand Name Dispense Instructions for use Diagnosis    allopurinol 100 MG tablet    ZYLOPRIM    90 tablet    Take 1 tablet (100 mg) by mouth every evening    Idiopathic gout, unspecified chronicity, unspecified site       cephALEXin 500 MG capsule    KEFLEX    4 capsule    Take 4 capsules by mouth once, one hour prior to surgery.    BCC (basal cell carcinoma), face       losartan 25 MG tablet    COZAAR    45 tablet    Take 0.5 tablets (12.5 mg) by mouth every evening    Benign essential hypertension       metoprolol succinate 50 MG 24 hr tablet    TOPROL XL    90 tablet    Take 1 tablet (50 mg) by mouth daily    Benign essential hypertension       MULTIVITAMIN ADULTS 50+ Tabs       Flu vaccine need, Aortic valve stenosis, severe, Chronic systolic heart failure (H), S/P aortic valve replacement       * PROVIDER DOSED MANAGED WARFARIN (COUMADIN) OUTPATIENT      Per coumadin clinic        simvastatin 40 MG tablet    ZOCOR    90 tablet    Take 1 tablet (40 mg) by mouth At Bedtime    Hyperlipidemia LDL goal <100       VITAMIN D (CHOLECALCIFEROL) PO      Take 1,000 Units by mouth daily    Flu vaccine need, Aortic valve stenosis, severe, Chronic systolic heart failure (H), S/P aortic valve replacement       * warfarin 5 MG tablet    COUMADIN    105 tablet    Take 10mg daily or as directed by the Anticoagulation Clinic    S/P AVR (aortic valve replacement), Paroxysmal atrial fibrillation (H), Long term current use of anticoagulant therapy, S/P aortic valve replacement       * Notice:  This list has 2 medication(s) that are the same as other medications prescribed for you. Read the directions carefully, and ask your doctor or other care provider to review them with you.

## 2018-03-28 ENCOUNTER — ANTICOAGULATION THERAPY VISIT (OUTPATIENT)
Dept: ANTICOAGULATION | Facility: CLINIC | Age: 73
End: 2018-03-28
Payer: MEDICARE

## 2018-03-28 ENCOUNTER — TELEPHONE (OUTPATIENT)
Dept: ANTICOAGULATION | Facility: CLINIC | Age: 73
End: 2018-03-28

## 2018-03-28 DIAGNOSIS — Z95.2 S/P AORTIC VALVE REPLACEMENT: ICD-10-CM

## 2018-03-28 DIAGNOSIS — I48.0 PAROXYSMAL ATRIAL FIBRILLATION (H): ICD-10-CM

## 2018-03-28 DIAGNOSIS — Z79.01 LONG TERM CURRENT USE OF ANTICOAGULANT THERAPY: ICD-10-CM

## 2018-03-28 LAB — INR POINT OF CARE: 3.1 (ref 0.86–1.14)

## 2018-03-28 PROCEDURE — 36416 COLLJ CAPILLARY BLOOD SPEC: CPT

## 2018-03-28 PROCEDURE — 99207 ZZC NO CHARGE NURSE ONLY: CPT

## 2018-03-28 PROCEDURE — 85610 PROTHROMBIN TIME: CPT | Mod: QW

## 2018-03-28 NOTE — PROGRESS NOTES
ADDENDUM: Patient's wife, Lucero, called [facility] to report that colonoscopy has been cancelled until follow up appointment with PCP on 4-27-18. There is concern about red blood cells showing up in his urine. INR has been elevated the last two visits so writer recommended patient increase his greens. Several options were given over the phone and can also give list of vitamin K foods at next [facility] visit.    Aniya Garcia RN, BSN, PHN  4-4-2018  ANTICOAGULATION FOLLOW-UP CLINIC VISIT    Patient Name:  Brooks Summers  Date:  3/28/2018  Contact Type:  Face to Face    SUBJECTIVE:     Patient Findings     Positives No Problem Findings    Comments Patient had a PCP appointment yesterday and it was recommended he have a colonoscopy completed. Writer sent a telephone note to PCP to clarify it is okay to hold without bridging. Patient states there are no other changes in medications, diet, or activity.           OBJECTIVE    INR Protime   Date Value Ref Range Status   03/28/2018 3.1 (A) 0.86 - 1.14 Final       ASSESSMENT / PLAN  INR assessment THER +/- 0.1 of goal INR range   Recheck INR In: 4 WEEKS    INR Location Clinic      Anticoagulation Summary as of 3/28/2018     INR goal 2.0-3.0   Today's INR 3.1!   Maintenance plan 10 mg (5 mg x 2) every day   Full instructions 4/15: Hold; 4/16: Hold; 4/17: Hold; 4/18: Hold; 4/19: Hold; 4/20: 15 mg; 4/21: 15 mg; Otherwise 10 mg every day   Weekly total 70 mg   Plan last modified Sheri Frausto RN (1/31/2018)   Next INR check 4/25/2018   Priority INR   Target end date Indefinite    Indications   S/P aortic valve replacement [Z95.2]  Paroxysmal atrial fibrillation (H) [I48.0]  Long term current use of anticoagulant therapy [Z79.01]         Anticoagulation Episode Summary     INR check location     Preferred lab     Send INR reminders to [facility] POOL    Comments  * warfarin 5 mg tabs      Anticoagulation Care Providers     Provider Role Specialty Phone number    [place]  Edin ASTUDILLO MD [facility] [phone]            See the Encounter Report to view Anticoagulation Flowsheet and Dosing Calendar (Go to Encounters tab in chart review, and find the Anticoagulation Therapy Visit)        Sheri Frausto RN

## 2018-03-28 NOTE — MR AVS SNAPSHOT
Brooks Summers   3/28/2018 1:15 PM   Anticoagulation Therapy Visit    Description:  73 year old male   Provider:  LL ANTI COAG   Department:  [facility]           INR as of 3/28/2018     Today's INR 3.1!      Anticoagulation Summary as of 3/28/2018     INR goal 2.0-3.0   Today's INR 3.1!   Full instructions 4/15: Hold; 4/16: Hold; 4/17: Hold; 4/18: Hold; 4/19: Hold; 4/20: 15 mg; 4/21: 15 mg; Otherwise 10 mg every day   Next INR check 4/25/2018    Indications   S/P aortic valve replacement [Z95.2]  Paroxysmal atrial fibrillation (H) [I48.0]  Long term current use of anticoagulant therapy [Z79.01]         Your next Anticoagulation Clinic appointment(s)     Apr 25, 2018  1:15 PM CDT   Anticoagulation Visit with YVETTE ANTI COLT   [facility]    [address] [address] [phone]              Contact Numbers     Please call [phone] with any problems or questions regarding your therapy.    If you need to cancel and/or reschedule your appointment please call one of the following numbers:  [facility] - [phone]  [place] - [phone]  [facility] [phone]  [place] - [phone]  [place] - [phone]            March 2018 Details    Sun Mon Tue Wed Thu Fri Sat         1               2               3                 4               5               6               7               8               9               10                 11               12               13               14               15               16               17                 18               19               20               21               22               23               24                 25               26               27               28      10 mg   See details      29      10 mg         30      10 mg         31      10 mg          Date Details   03/28 This INR check               How to take your warfarin dose     To take:  10 mg Take 2 of the 5  mg tablets.           April 2018 Details    Sun Mon Tue Wed Thu Fri Sat     1      10 mg         2      10 mg         3      10 mg         4      10 mg         5      10 mg         6      10 mg         7      10 mg           8      10 mg         9      10 mg         10      10 mg         11      10 mg         12      10 mg         13      10 mg         14      10 mg           15      Hold         16      Hold         17      Hold         18      Hold         19      Hold         20      15 mg         21      15 mg           22      10 mg         23      10 mg         24      10 mg         25            26               27               28                 29               30                     Date Details   No additional details    Date of next INR:  4/25/2018         How to take your warfarin dose     To take:  10 mg Take 2 of the 5 mg tablets.    To take:  15 mg Take 3 of the 5 mg tablets.    Hold Do not take your warfarin dose. See the Details table to the right for additional instructions.

## 2018-03-28 NOTE — TELEPHONE ENCOUNTER
Brooks is scheduled to have a colonoscopy completed on April 20th and will need to hold warfarin for 5 days.   Patient is currently on warfarin for Atrial Fibrillation (CHADSVASc of 3 for age of 73, heart failure, and hypertension -- last 2 SBP were >140). Patient also had tissue heart valve replacement in 2011.   Are you okay with patient holding for 5 days without bridging? If you do want him to bridge with Lovenox please specify which dose therapeutic (1mg/kg/twice daily) or prophylactic (40mg daily).    Perioperative Thrombotic Risk Stratification    High Thrombotic Risk Moderate Thrombotic Risk Low Thrombotic Risk     Mechanical Heart Valve   Any mitral valve prosthesis    Any caged-ball or tilting disk aortic valve prothesis    Stroke or TIA within 6 months   Bileaflet aortic valve prothesis and one or more of the following risk factors: Afib, prior stroke or TIA, hypertension, diabetes, CHF, age >75 years   Bileaflet aortic valve prothesis without Afib and no other risk factors for stroke     Atrial Fibrillation   CHADS2-VASc score 7 to 9    Stroke or TIA within 3 months    Rheumatic vavlular heart disease     CHADS2-VASc score of 5 to 6   CHADS2-VASc score of 4 or less (assuming no prior stroke or TIA)       VTE   VTE within 3 months    Severe thrombophilia (eg. Deficiency of protein C, protein S, or antithrombin; antiphospholipid antibodies; Homozygous Factor V Leiden or Prothrombin Gene Mutation; muliple abormalities)   VTE within the past 3-12 months    Non-severe thrombophilia (eg. Heterozygous Factor V Leiden or Prothrombin Gene Mutation)    Recurrent VTE    Active cancer (treated within 6 months or palliative)   VTE >12 months and no other risk factors        Please respond to the Anticoag pool ([id number]) so all staff are aware of the plan. The Anticoagulation Clinic will order any necessary medications and contact the patient with a written plan regarding the upcoming procedure. Thank  you!  Anticoagulation Clinic Staff  Phone: [phone]  [place] # [id number]

## 2018-03-30 DIAGNOSIS — E78.5 HYPERLIPIDEMIA LDL GOAL <100: ICD-10-CM

## 2018-03-30 DIAGNOSIS — I10 BENIGN ESSENTIAL HYPERTENSION: ICD-10-CM

## 2018-03-30 RX ORDER — LOSARTAN POTASSIUM 25 MG/1
12.5 TABLET ORAL EVERY EVENING
Qty: 45 TABLET | Refills: 2 | Status: SHIPPED | OUTPATIENT
Start: 2018-03-30 | End: 2018-12-05

## 2018-03-30 RX ORDER — SIMVASTATIN 40 MG
40 TABLET ORAL AT BEDTIME
Qty: 90 TABLET | Refills: 2 | Status: SHIPPED | OUTPATIENT
Start: 2018-03-30 | End: 2018-12-05

## 2018-04-04 ENCOUNTER — TELEPHONE (OUTPATIENT)
Dept: GASTROENTEROLOGY | Facility: CLINIC | Age: 73
End: 2018-04-04

## 2018-04-04 NOTE — TELEPHONE ENCOUNTER
This patient's colonoscopy has been cancelled due to blood in urine with last lab results. Patient will see PCP on 4-27-18 to review lab results and decide from there.     Aniya Garcia RN, BSN, PHN

## 2018-04-25 ENCOUNTER — ANTICOAGULATION THERAPY VISIT (OUTPATIENT)
Dept: ANTICOAGULATION | Facility: CLINIC | Age: 73
End: 2018-04-25
Payer: MEDICARE

## 2018-04-25 DIAGNOSIS — Z79.01 LONG TERM CURRENT USE OF ANTICOAGULANT THERAPY: ICD-10-CM

## 2018-04-25 DIAGNOSIS — I48.0 PAROXYSMAL ATRIAL FIBRILLATION (H): ICD-10-CM

## 2018-04-25 DIAGNOSIS — Z95.2 S/P AORTIC VALVE REPLACEMENT: ICD-10-CM

## 2018-04-25 LAB — INR POINT OF CARE: 2.8 (ref 0.86–1.14)

## 2018-04-25 PROCEDURE — 85610 PROTHROMBIN TIME: CPT | Mod: QW

## 2018-04-25 PROCEDURE — 36416 COLLJ CAPILLARY BLOOD SPEC: CPT

## 2018-04-25 PROCEDURE — 99207 ZZC NO CHARGE NURSE ONLY: CPT

## 2018-04-25 NOTE — PROGRESS NOTES
ANTICOAGULATION FOLLOW-UP CLINIC VISIT    Patient Name:  Brooks Summers  Date:  4/25/2018  Contact Type:  Face to Face    SUBJECTIVE:     Patient Findings     Positives Change in diet/appetite (increase in greens per [facility] advice)    Comments Patient is seeing PCP on 4-27 to discuss blood in his urine. Patient states this is not visible to him and he has not had any other signs of bleeding. He will let us know if his colonoscopy gets rescheduled following his visit with PCP. He did increase his greens, as suggested, and will continue this. No other changes or concerns.            OBJECTIVE    INR Protime   Date Value Ref Range Status   04/25/2018 2.8 (A) 0.86 - 1.14 Final       ASSESSMENT / PLAN  INR assessment THER    Recheck INR In: 5 WEEKS    INR Location Clinic      Anticoagulation Summary as of 4/25/2018     INR goal 2.0-3.0   Today's INR 2.8   Maintenance plan 10 mg (5 mg x 2) every day   Full instructions 10 mg every day   Weekly total 70 mg   No change documented Aniya Garcia RN   Plan last modified Sheri Frausto RN (1/31/2018)   Next INR check 5/30/2018   Priority INR   Target end date Indefinite    Indications   S/P aortic valve replacement [Z95.2]  Paroxysmal atrial fibrillation (H) [I48.0]  Long term current use of anticoagulant therapy [Z79.01]         Anticoagulation Episode Summary     INR check location     Preferred lab     Send INR reminders to [facility]    Comments  * warfarin 5 mg tabs      Anticoagulation Care Providers     Provider Role Specialty Phone number    Edin Mays MD [facility] Internal Medicine [phone]            See the Encounter Report to view Anticoagulation Flowsheet and Dosing Calendar (Go to Encounters tab in chart review, and find the Anticoagulation Therapy Visit)        Aniya Garcia RN

## 2018-04-25 NOTE — MR AVS SNAPSHOT
Brooks Summers   4/25/2018 1:15 PM   Anticoagulation Therapy Visit    Description:  73 year old male   Provider:  LL ANTI COAG   Department:  [facility]           INR as of 4/25/2018     Today's INR 2.8      Anticoagulation Summary as of 4/25/2018     INR goal 2.0-3.0   Today's INR 2.8   Full instructions 10 mg every day   Next INR check 5/30/2018    Indications   S/P aortic valve replacement [Z95.2]  Paroxysmal atrial fibrillation (H) [I48.0]  Long term current use of anticoagulant therapy [Z79.01]         Your next Anticoagulation Clinic appointment(s)     May 30, 2018  1:15 PM CDT   Anticoagulation Visit with LL ANTI COAG   [facility]    [address] [address] [phone]              Contact Numbers     Please call [phone] with any problems or questions regarding your therapy.    If you need to cancel and/or reschedule your appointment please call one of the following numbers:  [facility] [phone]  [place] - [phone]  [facility] [phone]  [place] - [phone]  [place] - [phone]            April 2018 Details    Sun Mon Tue Wed Thu Fri Sat     1               2               3               4               5               6               7                 8               9               10               11               12               13               14                 15               16               17               18               19               20               21                 22               23               24               25      10 mg   See details      26      10 mg         27      10 mg         28      10 mg           29      10 mg         30      10 mg               Date Details   04/25 This INR check               How to take your warfarin dose     To take:  10 mg Take 2 of the 5 mg tablets.           May 2018 Details    Sun Mon Tue Wed Thu Fri Sat       1      10 mg         2       10 mg         3      10 mg         4      10 mg         5      10 mg           6      10 mg         7      10 mg         8      10 mg         9      10 mg         10      10 mg         11      10 mg         12      10 mg           13      10 mg         14      10 mg         15      10 mg         16      10 mg         17      10 mg         18      10 mg         19      10 mg           20      10 mg         21      10 mg         22      10 mg         23      10 mg         24      10 mg         25      10 mg         26      10 mg           27      10 mg         28      10 mg         29      10 mg         30            31                  Date Details   No additional details    Date of next INR:  5/30/2018         How to take your warfarin dose     To take:  10 mg Take 2 of the 5 mg tablets.

## 2018-04-27 ENCOUNTER — TELEPHONE (OUTPATIENT)
Dept: INTERNAL MEDICINE | Facility: CLINIC | Age: 73
End: 2018-04-27

## 2018-04-27 ENCOUNTER — OFFICE VISIT (OUTPATIENT)
Dept: INTERNAL MEDICINE | Facility: CLINIC | Age: 73
End: 2018-04-27
Payer: MEDICARE

## 2018-04-27 VITALS
RESPIRATION RATE: 16 BRPM | DIASTOLIC BLOOD PRESSURE: 75 MMHG | HEART RATE: 71 BPM | TEMPERATURE: 97.9 F | HEIGHT: 70 IN | WEIGHT: 180.6 LBS | BODY MASS INDEX: 25.86 KG/M2 | SYSTOLIC BLOOD PRESSURE: 128 MMHG | OXYGEN SATURATION: 98 %

## 2018-04-27 DIAGNOSIS — R31.29 OTHER MICROSCOPIC HEMATURIA: Primary | ICD-10-CM

## 2018-04-27 DIAGNOSIS — R31.9 HEMATURIA, UNSPECIFIED TYPE: Primary | ICD-10-CM

## 2018-04-27 DIAGNOSIS — R20.0 LEG NUMBNESS: Primary | ICD-10-CM

## 2018-04-27 ASSESSMENT — PAIN SCALES - GENERAL: PAINLEVEL: NO PAIN (0)

## 2018-04-27 NOTE — NURSING NOTE
Chief Complaint   Patient presents with     Results     Patient is here for results.      Noemy Pascual LPN at 7:18 AM on 4/27/2018.

## 2018-04-27 NOTE — MR AVS SNAPSHOT
After Visit Summary   4/27/2018    Brooks Summers    MRN: [id number]           Patient Information     Date Of Birth          1945        Visit Information        Provider Department      4/27/2018 7:35 AM Edin Mays MD [facility]        Today's Diagnoses     Leg numbness    -  1      Care Instructions    Neurology [phone] (3rd Floor [facility])           Follow-ups after your visit        Additional Services     NEUROLOGY ADULT REFERRAL       B leg numbness; do B LE EMG                  Your next 10 appointments already scheduled     May 11, 2018  9:00 AM CDT   (Arrive by 8:45 AM)   Pacemaker Check with Uc Cv Device 1   [facility] ([facility]    [address]  [address]  [place] [address] [phone]            May 30, 2018  1:15 PM CDT   [facility]agulation Visit with LL ANTI COAG   [facility]    [address] [address] [phone]            Jul 20, 2018  9:05 AM CDT   (Arrive by 8:50 AM)   Return Visit with Edin Mays MD   [facility] ([facility])    [address]  4th Floor  [place] [address] [phone]              Who to contact     Please call your clinic at [phone] to:    Ask questions about your health    Make or cancel appointments    Discuss your medicines    Learn about your test results    Speak to your doctor            Additional Information About Your Visit        MyChart Information     [employer] is an electronic gateway that provides easy, online access to your medical records. With LifeBook, you can request a clinic appointment, read your test results, renew a prescription or communicate with your care team.     To sign up for [employer] visit the website at [email or web address]   You will be asked to enter the access code listed below, as well as some  "personal information. Please follow the directions to create your username and password.     Your access code is: [email or web address]  Expires: 2018 10:10 AM     Your access code will  in 90 days. If you need help or a new code, please contact your [facility] or call [phone] for assistance.        Care EveryWhere ID     This is your Care EveryWhere ID. This could be used by other organizations to access your [place] medical records  [id number]        Your Vitals Were     Pulse Temperature Respirations Height Pulse Oximetry BMI (Body Mass Index)    71 97.9  F (36.6  C) (Oral) 16 1.778 m (5' 10\") 98% 25.91 kg/m2       Blood Pressure from Last 3 Encounters:   18 128/75   18 158/73   17 152/86    Weight from Last 3 Encounters:   18 81.9 kg (180 lb 9.6 oz)   18 82.1 kg (180 lb 14.4 oz)   17 83.9 kg (185 lb)              We Performed the Following     NEUROLOGY ADULT REFERRAL        Primary Care Provider Office Phone # Fax #    Edin Mays [phone]       [address] [address]        Equal Access to Services     [facility] Hospital[facility]REYA : Hadii robin ku hadasho Soomaali, waaxda luqadaha, qaybta kaalmada adeegyada, waxay idiin hayjosie berrios . So [facility] [phone].    ATENCIÓN: Si habla español, tiene a herrera disposición servicios gratuitos de asistencia lingüística. Llame al [phone].    We comply with applicable federal civil rights laws and [place] laws. We do not discriminate on the basis of race, color, national origin, age, disability, sex, sexual orientation, or gender identity.            Thank you!     Thank you for choosing [facility]  for your care. Our goal is always to provide you with excellent care. Hearing back from our patients is one way we can continue to improve our services. Please take a few minutes to complete the written survey that you may receive in the " mail after your visit with us. Thank you!             Your Updated Medication List - Protect others around you: Learn how to safely use, store and throw away your medicines at www.disposemymeds.org.          This list is accurate as of 4/27/18  7:54 AM.  Always use your most recent med list.                   Brand Name Dispense Instructions for use Diagnosis    allopurinol 100 MG tablet    ZYLOPRIM    90 tablet    Take 1 tablet (100 mg) by mouth every evening    Idiopathic gout, unspecified chronicity, unspecified site       cephALEXin 500 MG capsule    KEFLEX    4 capsule    Take 4 capsules by mouth once, one hour prior to surgery.    BCC (basal cell carcinoma), face       losartan 25 MG tablet    COZAAR    45 tablet    Take 0.5 tablets (12.5 mg) by mouth every evening    Benign essential hypertension       metoprolol succinate 50 MG 24 hr tablet    TOPROL XL    90 tablet    Take 1 tablet (50 mg) by mouth daily    Benign essential hypertension       MULTIVITAMIN ADULTS 50+ Tabs       Flu vaccine need, Aortic valve stenosis, severe, Chronic systolic heart failure (H), S/P aortic valve replacement       * PROVIDER DOSED MANAGED WARFARIN (COUMADIN) OUTPATIENT      Per coumadin clinic        simvastatin 40 MG tablet    ZOCOR    90 tablet    Take 1 tablet (40 mg) by mouth At Bedtime    Hyperlipidemia LDL goal <100       VITAMIN D (CHOLECALCIFEROL) PO      Take 1,000 Units by mouth daily    Flu vaccine need, Aortic valve stenosis, severe, Chronic systolic heart failure (H), S/P aortic valve replacement       * warfarin 5 MG tablet    COUMADIN    105 tablet    Take 10mg daily or as directed by the Anticoagulation Clinic    S/P AVR (aortic valve replacement), Paroxysmal atrial fibrillation (H), Long term current use of anticoagulant therapy, S/P aortic valve replacement       * Notice:  This list has 2 medication(s) that are the same as other medications prescribed for you. Read the directions carefully, and ask your  doctor or other care provider to review them with you.

## 2018-04-27 NOTE — TELEPHONE ENCOUNTER
Dear Juliana;    I saw Brooks today in follow up. He had previous UA with 5 RBC's. I discussed this with Dr. Mora Urology (who he had seen before). She recommended just repeating a UA. Placed future order this encounter. Please give him a call with this recommendation.    BALAJI Beckman    Left message for pt to call back.  Juliana Flores RN 8:23 AM on 4/30/2018.  Left message for pt to call back.  Juliana Flores RN 8:47 AM on 5/1/2018.  Left message with plan of care. Clinic numbers given for questions or concerns.  Juliana Flores RN 9:59 AM on 5/8/2018.

## 2018-04-27 NOTE — PROGRESS NOTES
HPI:    Pt. Comes in for follow up  today.  He has h/o prosthetic aortic valve endocarditis (see detailed ID note from Dr. Bravo 3/13). He also has h/o atrial fibrillation on coumadin. He is s/p ICD for prior low EF now normal. He o/w is doing fine and denies any new HEENT, cardiopulmonary, abdominal, , lymphatic, endocrine, vascular, systemic sxs. He was seen 11/3/2017 by Dr. Castro Cardiology. He was seen by Dr. Romie mace for trigger finger 11/18/15. He was seen by Dr. Tim, Hematology 8/19/15 for low platelets. States AM B hand numbness that goes away during the day for about 2 months. He states B leg numbness for a few months. No neck or back pain. He remains active. No balance complaints.       PE:    Vitals noted gen nad cooperative alert, HEENT, ears normal oropharynx clear no exudate, neck supple nl rom no adenopathy, LCTA, B, RRR, S1,S2, murmur present, abdomen, nd ,nt, no masses, Cervical neck w/o tenderness. Lower back w/o tenderness or mass. Neurological exam B UE/LE/proximal/distal is normal and symmetric for sensation, reflexes, and strength.          A/P:    1. He will continue to follow in cardiology for h/o AVR and remains on coumadin for afib.   Seen by Dr. Castro 11/3/2017  2. Ordered colonoscopy today 3/27/2018. Will discuss with both ID and Dr. Castro Cardiology  3. Seen in Dermatology 11/2017  4. He saw Dr. Tim, Hematology 8/15 for low platelets and will follow.   5. Seen by Dr. Mroa Urology 4/2017 for BPH/hematuria.   PSA done 3/2018. UA with 5 RBC's; he has no sxs. Will discuss with Dr. Mora  6. Up to date on vaccinations and recommend he check with insurance for new Shingles vaccination  7. Will get EMG B UE and B LE for B hand numbness and B leg numbness. Will check glucose and B12

## 2018-05-11 ENCOUNTER — ALLIED HEALTH/NURSE VISIT (OUTPATIENT)
Dept: CARDIOLOGY | Facility: CLINIC | Age: 73
End: 2018-05-11
Attending: INTERNAL MEDICINE
Payer: MEDICARE

## 2018-05-11 DIAGNOSIS — I42.0 CARDIOMYOPATHY, DILATED, NONISCHEMIC (H): Primary | ICD-10-CM

## 2018-05-11 PROCEDURE — 93284 PRGRMG EVAL IMPLANTABLE DFB: CPT | Mod: ZF

## 2018-05-11 NOTE — PROGRESS NOTES
Preliminary Device Interrogation Results.  Final physician signed paceart report to be scanned and attached.    Pt seen in the [facility] for evaluation and iterative programming of a Medtronic, Bi-Ventricular ICD, per MD orders. Today his intrinsic rhythm is Sinus 78 with CHB- ventricular rate 34 bpm. Normal ICD function. 2 V-Sensing episodes recorded lasting 8-15 seconds with rates 70-80 bpm. OptiVol thoracic impedance is near his baseline. AP= 23% BVP= 99% and VSR pacing=0.2%. No short v-v intervals recorded. Lead trends appear stable. Pt reports that he is feeling well. Battery estimates 1.8 years to SIMONE. Plan for pt to send a remote transmission on 8/14/18 and RTC in 6 months.  Multi lead ICD

## 2018-05-11 NOTE — PATIENT INSTRUCTIONS
It was a pleasure to see you in clinic today. Please do not hesitate to call with any questions or concerns. You are scheduled for a remote ICD transmission on 8/14/18. This will happen automatically in the night. We will call in 1-2 business days to discuss the results with you. We look forward to seeing you in clinic at your next device check in 6 months.    Shahnaz Wilson RN  Electrophysiology Nurse Clinician  [facility]  During business hours call:  [phone]  After business hours please call: [phone]- select option #4 and ask for job code [id number].

## 2018-05-11 NOTE — MR AVS SNAPSHOT
After Visit Summary   5/11/2018    Brooks Summers    MRN: [id number]           Patient Information     Date Of Birth          1945        Visit Information        Provider Department      5/11/2018 9:00 AM Robert Auguste Cv Device [facility]        Care Instructions    It was a pleasure to see you in clinic today. Please do not hesitate to call with any questions or concerns. You are scheduled for a remote ICD transmission on 8/14/18. This will happen automatically in the night. We will call in 1-2 business days to discuss the results with you. We look forward to seeing you in clinic at your next device check in 6 months.    Shahnaz Wilson, RN  Electrophysiology Nurse Clinician  [facility]  During business hours call:  [phone]  After business hours please call: [phone]- select option #4 and ask for job code [phone].            Follow-ups after your visit        Your next 10 appointments already scheduled     May 30, 2018  1:15 PM CDT   Anticoagulation Visit with YVETTE ANTI COAG   [facility]    [address] [address]   [phone]            Jul 20, 2018  9:05 AM CDT   (Arrive by 8:50 AM)   Return Visit with Edin Mays MD   [facility] ([facility])    [address] [place] [address] [phone]              Who to contact     If you have questions or need follow up information about today's clinic visit or your schedule please contact [facility] directly at [phone].  Normal or non-critical lab and imaging results will be communicated to you by MyChart, letter or phone within 4 business days after the clinic has received the results. If you do not hear from us within 7 days, please contact the clinic through MyChart or phone. If you have a critical or abnormal lab result, we will notify you by phone as  "soon as possible.  Submit refill requests through [employer] or call your pharmacy and they will forward the refill request to us. Please allow 3 business days for your refill to be completed.          Additional Information About Your Visit        [employer]em[employer] Information     [employer] lets you send messages to your doctor, view your test results, renew your prescriptions, schedule appointments and more. To sign up, go to [email or web address] with [email or web address] . Click on \"Log in\" on the left side of the screen, which will take you to the Welcome page. Then click on \"Sign up Now\" on the right side of the page.     You will be asked to enter the access code listed below, as well as some personal information. Please follow the directions to create your username and password.     Your access code is: [email or web address]  Expires: 2018  9:20 AM     Your access code will  in 90 days. If you need help or a new code, please call your [place] clinic or [phone].        Care EveryWhere ID     This is your Care EveryWhere ID. This could be used by other organizations to access your [place] medical records  [id number]         Blood Pressure from Last 3 Encounters:   18 128/75   18 158/73   17 152/86    Weight from Last 3 Encounters:   18 81.9 kg (180 lb 9.6 oz)   18 82.1 kg (180 lb 14.4 oz)   17 83.9 kg (185 lb)              Today, you had the following     No orders found for display       Primary Care Provider Office Phone # Fax #    Edin Mays [phone] [address] [address]        Equal Access to Services     [facility]REYA AH: Hadii robin Lopez, walaurentda luqadaha, qalaurenta kaalmada emery, elisabeth quinones. So [facility] [place] [phone].    ATENCIÓN: Si habla español, tiene a herrera disposición servicios gratuitos de asistencia lingüística. Llame al [phone].    We comply with applicable federal civil rights laws and " [place] laws. We do not discriminate on the basis of race, color, national origin, age, disability, sex, sexual orientation, or gender identity.            Thank you!     Thank you for choosing [facility]  for your care. Our goal is always to provide you with excellent care. Hearing back from our patients is one way we can continue to improve our services. Please take a few minutes to complete the written survey that you may receive in the mail after your visit with us. Thank you!             Your Updated Medication List - Protect others around you: Learn how to safely use, store and throw away your medicines at www.disposemymeds.org.          This list is accurate as of 5/11/18  9:20 AM.  Always use your most recent med list.                   Brand Name Dispense Instructions for use Diagnosis    allopurinol 100 MG tablet    ZYLOPRIM    90 tablet    Take 1 tablet (100 mg) by mouth every evening    Idiopathic gout, unspecified chronicity, unspecified site       cephALEXin 500 MG capsule    KEFLEX    4 capsule    Take 4 capsules by mouth once, one hour prior to surgery.    BCC (basal cell carcinoma), face       losartan 25 MG tablet    COZAAR    45 tablet    Take 0.5 tablets (12.5 mg) by mouth every evening    Benign essential hypertension       metoprolol succinate 50 MG 24 hr tablet    TOPROL XL    90 tablet    Take 1 tablet (50 mg) by mouth daily    Benign essential hypertension       MULTIVITAMIN ADULTS 50+ Tabs       Flu vaccine need, Aortic valve stenosis, severe, Chronic systolic heart failure (H), S/P aortic valve replacement       * PROVIDER DOSED MANAGED WARFARIN (COUMADIN) OUTPATIENT      Per coumadin clinic        simvastatin 40 MG tablet    ZOCOR    90 tablet    Take 1 tablet (40 mg) by mouth At Bedtime    Hyperlipidemia LDL goal <100       VITAMIN D (CHOLECALCIFEROL) PO      Take 1,000 Units by mouth daily    Flu vaccine need, Aortic valve stenosis, severe, Chronic systolic heart failure (H),  S/P aortic valve replacement       * warfarin 5 MG tablet    COUMADIN    105 tablet    Take 10mg daily or as directed by the Anticoagulation Clinic    S/P AVR (aortic valve replacement), Paroxysmal atrial fibrillation (H), Long term current use of anticoagulant therapy, S/P aortic valve replacement       * Notice:  This list has 2 medication(s) that are the same as other medications prescribed for you. Read the directions carefully, and ask your doctor or other care provider to review them with you.

## 2018-05-18 DIAGNOSIS — R31.29 OTHER MICROSCOPIC HEMATURIA: ICD-10-CM

## 2018-05-18 LAB
ALBUMIN UR-MCNC: NEGATIVE MG/DL
APPEARANCE UR: ABNORMAL
BILIRUB UR QL STRIP: NEGATIVE
COLOR UR AUTO: YELLOW
GLUCOSE UR STRIP-MCNC: NEGATIVE MG/DL
HGB UR QL STRIP: ABNORMAL
KETONES UR STRIP-MCNC: NEGATIVE MG/DL
LEUKOCYTE ESTERASE UR QL STRIP: NEGATIVE
MUCOUS THREADS #/AREA URNS LPF: PRESENT /LPF
NITRATE UR QL: NEGATIVE
PH UR STRIP: 6 PH (ref 5–7)
RBC #/AREA URNS AUTO: 6 /HPF (ref 0–2)
SOURCE: ABNORMAL
SP GR UR STRIP: 1.02 (ref 1–1.03)
UROBILINOGEN UR STRIP-MCNC: 0 MG/DL (ref 0–2)
WBC #/AREA URNS AUTO: <1 /HPF (ref 0–5)

## 2018-05-21 ENCOUNTER — TELEPHONE (OUTPATIENT)
Dept: INTERNAL MEDICINE | Facility: CLINIC | Age: 73
End: 2018-05-21

## 2018-05-21 DIAGNOSIS — R31.29 MICROSCOPIC HEMATURIA: Primary | ICD-10-CM

## 2018-05-21 NOTE — TELEPHONE ENCOUNTER
Placed Urology referral this encounter      Dear Brooks;    You still have red-blood cells in your urine. 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 I placed a referral today and you can call [phone] to schedule this appointment.    BALAJI Mays MD

## 2018-05-22 DIAGNOSIS — Z95.2 S/P AORTIC VALVE REPLACEMENT: ICD-10-CM

## 2018-05-22 DIAGNOSIS — I48.0 PAROXYSMAL ATRIAL FIBRILLATION (H): ICD-10-CM

## 2018-05-22 DIAGNOSIS — Z79.01 LONG TERM CURRENT USE OF ANTICOAGULANT THERAPY: ICD-10-CM

## 2018-05-22 DIAGNOSIS — Z95.2 S/P AVR (AORTIC VALVE REPLACEMENT): ICD-10-CM

## 2018-05-24 RX ORDER — WARFARIN SODIUM 5 MG/1
TABLET ORAL
Qty: 180 TABLET | Refills: 0 | Status: SHIPPED | OUTPATIENT
Start: 2018-05-24 | End: 2018-08-22

## 2018-05-24 NOTE — TELEPHONE ENCOUNTER
Signed Prescriptions:                        Disp   Refills    warfarin (COUMADIN) 5 MG tablet            180 ta*0        Sig: Take 10mg daily or as directed by the Anticoagulation           Clinic  Authorizing Provider: NOEMÍ ROLDAN  Ordering User: STEFANIE GARCIA RN refilled medication per [facility] refill protocol.  Stefanie Garcia RN

## 2018-05-30 ENCOUNTER — ANTICOAGULATION THERAPY VISIT (OUTPATIENT)
Dept: ANTICOAGULATION | Facility: CLINIC | Age: 73
End: 2018-05-30
Payer: MEDICARE

## 2018-05-30 DIAGNOSIS — Z79.01 LONG TERM CURRENT USE OF ANTICOAGULANT THERAPY: ICD-10-CM

## 2018-05-30 DIAGNOSIS — I48.0 PAROXYSMAL ATRIAL FIBRILLATION (H): ICD-10-CM

## 2018-05-30 DIAGNOSIS — Z95.2 S/P AORTIC VALVE REPLACEMENT: ICD-10-CM

## 2018-05-30 LAB — INR POINT OF CARE: 2.9 (ref 0.86–1.14)

## 2018-05-30 PROCEDURE — 85610 PROTHROMBIN TIME: CPT | Mod: QW

## 2018-05-30 PROCEDURE — 36416 COLLJ CAPILLARY BLOOD SPEC: CPT

## 2018-05-30 PROCEDURE — 99207 ZZC NO CHARGE NURSE ONLY: CPT

## 2018-05-30 NOTE — PROGRESS NOTES
ANTICOAGULATION FOLLOW-UP CLINIC VISIT    Patient Name:  Brooks Summers  Date:  5/30/2018  Contact Type:  Face to Face    SUBJECTIVE:     Patient Findings     Positives No Problem Findings    Comments No changes in diet, activity level, medications (including over the counter), or health. No missed doses of warfarin. Patient took dosing as prescribed. No signs of clots or bleeding concerns. Patient will continue maintenance warfarin dosing.     Patient will be making an appointment to see urology regarding rbc in urine.           OBJECTIVE    INR Protime   Date Value Ref Range Status   05/30/2018 2.9 (A) 0.86 - 1.14 Final       ASSESSMENT / PLAN  INR assessment THER    Recheck INR In: 6 WEEKS    INR Location Clinic      Anticoagulation Summary as of 5/30/2018     INR goal 2.0-3.0   Today's INR 2.9   Warfarin maintenance plan 10 mg (5 mg x 2) every day   Full warfarin instructions 10 mg every day   Weekly warfarin total 70 mg   No change documented Aniya Garcia RN   Plan last modified Sheri Frausto RN (1/31/2018)   Next INR check 7/11/2018   Priority INR   Target end date Indefinite    Indications   S/P aortic valve replacement [Z95.2]  Paroxysmal atrial fibrillation (H) [I48.0]  Long term current use of anticoagulant therapy [Z79.01]         Anticoagulation Episode Summary     INR check location     Preferred lab     Send INR reminders to [facility]    Comments  * warfarin 5 mg tabs      Anticoagulation Care Providers     Provider Role Specialty Phone number    Edin Mays MD [facility] Internal Medicine [phone]            See the Encounter Report to view Anticoagulation Flowsheet and Dosing Calendar (Go to Encounters tab in chart review, and find the Anticoagulation Therapy Visit)        Aniya Garcia RN

## 2018-05-30 NOTE — MR AVS SNAPSHOT
Brooks Summers   5/30/2018 1:15 PM   Anticoagulation Therapy Visit    Description:  73 year old male   Provider:  [facility]   Department:  [facility]           INR as of 5/30/2018     Today's INR 2.9      Anticoagulation Summary as of 5/30/2018     INR goal 2.0-3.0   Today's INR 2.9   Full warfarin instructions 10 mg every day   Next INR check 7/11/2018    Indications   S/P aortic valve replacement [Z95.2]  Paroxysmal atrial fibrillation (H) [I48.0]  Long term current use of anticoagulant therapy [Z79.01]         Your next Anticoagulation Clinic appointment(s)     Jul 11, 2018  1:15 PM CDT   Anticoagulation Visit with [facility]   [facility] ([facility]    [address] [address] [phone]              Contact Numbers     Please call [phone] with any problems or questions regarding your therapy.    If you need to cancel and/or reschedule your appointment please call one of the following numbers:  [facility] [phone]  [place] - [phone]  [facility] [phone]  [place] - [phone]  [place] - [phone]            May 2018 Details    Sun Mon Tue Wed Thu Fri Sat       1               2               3               4               5                 6               7               8               9               10               11               12                 13               14               15               16               17               18               19                 20               21               22               23               24               25               26                 27               28               29               30      10 mg   See details      31      10 mg            Date Details   05/30 This INR check               How to take your warfarin dose     To take:  10 mg Take 2 of the 5 mg tablets.           June 2018 Details    Sun Mon Tue Wed Thu Fri Sat          1      10 mg         2       10 mg           3      10 mg         4      10 mg         5      10 mg         6      10 mg         7      10 mg         8      10 mg         9      10 mg           10      10 mg         11      10 mg         12      10 mg         13      10 mg         14      10 mg         15      10 mg         16      10 mg           17      10 mg         18      10 mg         19      10 mg         20      10 mg         21      10 mg         22      10 mg         23      10 mg           24      10 mg         25      10 mg         26      10 mg         27      10 mg         28      10 mg         29      10 mg         30      10 mg          Date Details   No additional details            How to take your warfarin dose     To take:  10 mg Take 2 of the 5 mg tablets.           July 2018 Details    Sun Mon Tue Wed Thu Fri Sat     1      10 mg         2      10 mg         3      10 mg         4      10 mg         5      10 mg         6      10 mg         7      10 mg           8      10 mg         9      10 mg         10      10 mg         11            12               13               14                 15               16               17               18               19               20               21                 22               23               24               25               26               27               28                 29               30               31                    Date Details   No additional details    Date of next INR:  7/11/2018         How to take your warfarin dose     To take:  10 mg Take 2 of the 5 mg tablets.

## 2018-06-19 ENCOUNTER — CARE COORDINATION (OUTPATIENT)
Dept: CARDIOLOGY | Facility: CLINIC | Age: 73
End: 2018-06-19

## 2018-06-19 NOTE — PROGRESS NOTES
Patient called c/o dizziness and irregular heart rate, blood pressure last night 115.50/ and 135/70. Patient held metoprolol today. Requesting appointment to see Dr. Castro. Appointment scheduled for Friday, 6/22 with device check prior.. Patient encouraged to go to ER if starts to feel worse and to check blood pressures and call back with an update.

## 2018-06-22 ENCOUNTER — ALLIED HEALTH/NURSE VISIT (OUTPATIENT)
Dept: CARDIOLOGY | Facility: CLINIC | Age: 73
End: 2018-06-22
Attending: INTERNAL MEDICINE
Payer: MEDICARE

## 2018-06-22 VITALS
SYSTOLIC BLOOD PRESSURE: 106 MMHG | OXYGEN SATURATION: 97 % | HEART RATE: 75 BPM | BODY MASS INDEX: 24.91 KG/M2 | DIASTOLIC BLOOD PRESSURE: 70 MMHG | HEIGHT: 70 IN | WEIGHT: 174 LBS

## 2018-06-22 DIAGNOSIS — G58.8 OTHER MONONEUROPATHY: ICD-10-CM

## 2018-06-22 DIAGNOSIS — I50.22 CHRONIC SYSTOLIC HEART FAILURE (H): Primary | ICD-10-CM

## 2018-06-22 DIAGNOSIS — Z79.2 PROPHYLACTIC ANTIBIOTIC: ICD-10-CM

## 2018-06-22 DIAGNOSIS — G47.30 SLEEP APNEA, UNSPECIFIED TYPE: ICD-10-CM

## 2018-06-22 DIAGNOSIS — I35.0 AORTIC VALVE STENOSIS, SEVERE: ICD-10-CM

## 2018-06-22 PROCEDURE — 93284 PRGRMG EVAL IMPLANTABLE DFB: CPT | Mod: 26 | Performed by: INTERNAL MEDICINE

## 2018-06-22 PROCEDURE — G0463 HOSPITAL OUTPT CLINIC VISIT: HCPCS | Mod: 25,ZF

## 2018-06-22 PROCEDURE — 99213 OFFICE O/P EST LOW 20 MIN: CPT | Mod: 25 | Performed by: INTERNAL MEDICINE

## 2018-06-22 PROCEDURE — 93284 PRGRMG EVAL IMPLANTABLE DFB: CPT | Mod: ZF

## 2018-06-22 RX ORDER — AMOXICILLIN 500 MG/1
CAPSULE ORAL
Qty: 4 CAPSULE | Refills: 1 | Status: SHIPPED | OUTPATIENT
Start: 2018-06-22 | End: 2018-11-08

## 2018-06-22 ASSESSMENT — PAIN SCALES - GENERAL: PAINLEVEL: NO PAIN (0)

## 2018-06-22 NOTE — LETTER
6/22/2018      RE: Brooks Summers  [address]  [facility] [address]       Dear Colleague,    Thank you for the opportunity to participate in the care of your patient, Brooks Summers, at the [facility]. Please see a copy of my visit note below.           Cardiology Clinic Note:    HPI:    Dear referring physicians and associated providers,    We had the pleasure of seeing Pt Brooks Summers in Cardiology today on 6/22/2018. 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 He previously had severe left ventricular systolic dysfunction with previous EF 10-15% s/p ICD placement (1/2012) now with improved EF (50-55%).     Since then, Pt Brooks Summers has been doing well. He has been very active, continuing to work part time as well as stay busy around the house with gardening. He does note that he has been slightly more fatigued than usual, with increased daytime somnolence. He denies any chest pain or discomfort, but does note some palpitations. His wife also notes that he is sometimes dizzy/lighteaded when he stands up from a seated position. He denies any symptoms of orthopnea/PND, or abnormal lower extremity swelling. He does note some numbness/tingling in his feet that has become more bothersome.    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 Tx with Dapto/RIFx 8 weeks.     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    RE>>LE     Paroxysmal a-fib (H)     Post-op 7/14/2011, 1/26/13     Rotator Cuff (Capsule) Sprain and Strain      S/P aortic valve replacement     7/14/2011, re-do 1/25/13 due to endocarditis     Smoking hx      Thrombocytopenia (H)        Past Surgical History:   Past Surgical History:   Procedure Laterality Date     ANESTHESIA CARDIOVERSION  7/18/2011    Procedure:ANESTHESIA CARDIOVERSION; Cardioversion; Surgeon:GENERIC ANESTHESIA PROVIDER; Location:[facility] OR     COLONOSCOPY       CORONARY ANGIOGRAPHY ADULT ORDER       CYSTOSCOPY, BIOPSY URETHRA N/A 4/3/2017    Procedure: CYSTOSCOPY, BIOPSY URETHRA;  Surgeon: Marlena Mora MD;  Location: [facility] OR     ENDOSCOPY UPPER, COLONOSCOPY, COMBINED       HC REMOV & REPLACE IMPLT DEFIB PULSE GEN MULT LEAD  12/3/2015          HEMORRHOID SURGERY       IMPLANT AUTOMATIC IMPLANTABLE CARDIOVERTER DEFIBRILLATOR       MOHS MICROGRAPHIC PROCEDURE       ORTHOPEDIC SURGERY      shoulder,right     REDO STERNOTOMY REPLACE VALVE AORTIC  1/25/2013    Procedure: REDO STERNOTOMY REPLACE VALVE AORTIC;  Redo Sternotomy, Redo Aortic Valve Replacement on pump oxygenator. Intraoperative Transesophageal Echocardiogram;  Surgeon: Navin Hampton MD;  Location: [facility] OR     REPAIR VALVE MITRAL  2013     REPLACE VALVE AORTIC  7/14/2011    Procedure:REPLACE VALVE AORTIC; Median Sternotomy, Bioprosthesis,  Aortic Valve replacement on pump with oxygenator. Intra-operative Transesophogeal Echocardiogram.; Surgeon:NAVIN HAMPTON; Location:[facility] OR     teeth extractions         Medications (outpatient):  Current Outpatient Prescriptions   Medication Sig Dispense Refill     allopurinol (ZYLOPRIM) 100 MG tablet Take 1 tablet (100 mg) by mouth every evening 90 tablet 3     cephALEXin (KEFLEX) 500 MG capsule Take 4 capsules by mouth once, one hour prior to surgery. (Patient not taking: Reported on 4/27/2018) 4 capsule 0     losartan (COZAAR) 25 MG tablet Take 0.5 tablets (12.5 mg) by mouth  every evening 45 tablet 2     metoprolol (TOPROL XL) 50 MG 24 hr tablet Take 1 tablet (50 mg) by mouth daily 90 tablet 3     Multiple Vitamins-Minerals (MULTIVITAMIN ADULTS 50+) TABS        PROVIDER DOSED MANAGED WARFARIN, COUMADIN, OUTPATIENT Per coumadin clinic       simvastatin (ZOCOR) 40 MG tablet Take 1 tablet (40 mg) by mouth At Bedtime 90 tablet 2     VITAMIN D, CHOLECALCIFEROL, PO Take 1,000 Units by mouth daily       warfarin (COUMADIN) 5 MG tablet Take 10mg daily or as directed by the Anticoagulation Clinic 180 tablet 0       Allergy:  Allergies   Allergen Reactions     Lisinopril Cough       Social History:   Social History     Social History     Marital status:      Spouse name: N/A     Number of children: N/A     Years of education: N/A     Occupational History     Not on file.     Social History Main Topics     Smoking status: Former Smoker     Packs/day: 1.00     Years: 20.00     Types: Cigarettes     Quit date: 12/31/1989     Smokeless tobacco: Never Used     Alcohol use 1.2 oz/week     2 Cans of beer per week     Drug use: No     Sexual activity: Yes     Partners: Female      Comment:      Other Topics Concern     Not on file     Social History Narrative     since 2/1982 2 children 4 grandchildren.    Carpet sales:  Semi retired. Athlete in school, Golf, fish, yardwork     History   Smoking Status     Former Smoker     Packs/day: 1.00     Years: 20.00     Types: Cigarettes     Quit date: 12/31/1989   Smokeless Tobacco     Never Used       Family History:  Family History   Problem Relation Age of Onset     C.A.D. Father 68     bypass, carotid      Prostate Cancer Father 70     Cancer Mother 92     stomach, colon     Hypertension Mother      Retinal detachment Mother      Cancer Brother 64     lung cancer, petroleum     Glaucoma No family hx of      Macular Degeneration No family hx of      Strabismus No family hx of      Glasses (<7 y/o) No family hx of        ROS: 10 point ROS  neg other than the symptoms noted above in the HPI.    OBJECTIVE:  There were no vitals taken for this visit.  Wt Readings from Last 2 Encounters:   04/27/18 81.9 kg (180 lb 9.6 oz)   03/27/18 82.1 kg (180 lb 14.4 oz)       Physical Exam:    Gen: WD WN in NAD  HEENT: PERRL, EOMI  Neck: JVP not elevated at 45' elevation  Resp: CTAB without wheezes or rales  Cardiac: RRR nl s1, s2, no s3, s4, 1/6 TRINA   Abd: S, NT, ND  Extrem: no CCE    Labs:  CBC:   Lab Results   Component Value Date    WBC 5.3 03/27/2018    RBC 4.36 (L) 03/27/2018    HGB 13.9 03/27/2018    HCT 41.6 03/27/2018    MCV 95 03/27/2018    MCH 31.9 03/27/2018    MCHC 33.4 03/27/2018    RDW 13.5 03/27/2018     (L) 03/27/2018       BMP:   Lab Results   Component Value Date     03/27/2018    POTASSIUM 4.0 03/27/2018    CHLORIDE 108 03/27/2018    CO2 28 03/27/2018    ANIONGAP 4 03/27/2018     (H) 03/27/2018    BUN 14 03/27/2018    CR 0.94 03/27/2018    GFRESTIMATED 79 03/27/2018    GFRESTBLACK >90 03/27/2018    MARTIN 9.1 03/27/2018        INR RESULTS:  Lab Results   Component Value Date    INR 2.9 (A) 05/30/2018    INR 2.8 (A) 04/25/2018    INR 3.1 (A) 03/28/2018    INR 3.3 (A) 03/14/2018    INR 1.17 (H) 04/03/2017    INR 2.85 (H) 11/28/2016    INR 2.33 (H) 02/23/2016    INR 2.40 (H) 12/03/2015       Prior Cardiology studies:  TTE 10/2016  Interpretation Summary  Mild concentric wall thickening consistent with left ventricular hypertrophy  is present. Global and regional left ventricular function is normal with an  EF of 55-60%.  Global right ventricular function is mildly reduced.  A bioprosthetic aortic valve is present.Doppler interrogation of the aortic  valve is normal. There is tarce to mild eccentric aortic insufficiency.  Pulmonary artery systolic pressure is normal.  The inferior vena cava was normal in size with preserved respiratory  variability.  No pericardial effusion is present.    ASSESSMENT & PLAN :  Mr. Summers is a  71-year-old male, s/p re-do median sternotomy with an aortic valve replacement with a 21-mm Johnson PERIMOUNT Magna tissue valve plus mitral valve repair with pericardial patch repair of a large anterior mitral valve leaflet perforation and debridement of the infected aortic root with subannular infection due to endocarditis.  Also has history of complete heart block, low EF 10-15%, now recovered to 50-55% in the setting of biventricular-ICD.     He endorses increased fatigue, which is likely a reflection of undiagnosed RUBY, however we will also check a TTE in order to reassess his cardiac function.      Problem List  -Bioprosthetic AVR redu secondary to endocarditis (7/14/2011,1/25/13); echo 12/15/2015 notable for Ao mean gradient 13 mm Hg  -Complete heart block s/p CRT-D (s/p generator change 12/3/2015)  -Cardiomyopathy recovered EF 50-55% s/p CRT-D  -Paroxysmal AF on anticoagulation  -Hyperlipidemia  -Thrombocytopenia (seen by hematology; negative workup)     Plan  -will continue current heart failure regimen (losartan 12.5mg, and metoprolol succinate 50mg), along with cardiac resynchronization therapy  -requires endocarditis prophylaxis due to increased risk for infection given prior extensive endocarditis, has a colonoscopy scheduled, will order amoxicillin 2g 30min to 1 hr for ppx  -next TTE in 2-3 weeks  -continue warfarin for [id number] of 2  - Recommend referral to Neurology for peripheral neuropathy   - Recommend sleep study for suspected RUBY    Thank you for allowing us to participate in the care of this patient. Please do not hesitate to call with any questions or concerns.     Sincerely,    Yemi Castro MD, PhD  Department of Cardiology   [facility]     Author:  Ian Delaney MD  PGY5 Cardiology  Pager: [phone]    Attending Attestation:  Patient seen and examined by me with the Fellow, Dr. Delaney. I have performed all pertinent elements of the physical examination and reviewed the note  above. I have reviewed pertinent laboratory, echocardiographic, imaging, and cardiac catheterization results. I agree with the plan of care as described in this note.    Yemi Castro MD, PhD

## 2018-06-22 NOTE — PATIENT INSTRUCTIONS
Thank you for your visit today.  Please call me with any questions or concerns.   Sawyer Mejia RN  Cardiology Care Coordinator  [phone], press option 1 then option 3

## 2018-06-22 NOTE — MR AVS SNAPSHOT
After Visit Summary   6/22/2018    Brooks Summers    MRN: [id number]           Patient Information     Date Of Birth          1945        Visit Information        Provider Department      6/22/2018 3:00 PM 1, Uc Cv Device [facility]        Today's Diagnoses     Chronic systolic heart failure (H)    -  1       Follow-ups after your visit        Additional Services     Follow-Up with Device Clinic-6 months                 Your next 10 appointments already scheduled     Jul 05, 2018 12:30 PM CDT   (Arrive by 12:15 PM)   New Patient Visit with Adebayo Ingram MD   [facility] ([facility])    [address] [place] [id number] [phone]            Jul 11, 2018  1:15 PM CDT   Anticoagulation Visit with LL ANTI COAG   [facility] ([facility])    [address] [facility] [phone]            Jul 16, 2018  2:30 PM CDT   Ech Complete with UCECHCR1   [facility] Echo ([facility])    [address] [place] [id number] [phone]           1. Please bring or wear a comfortable two-piece outfit. 2. You may eat, drink and take your normal medicines. 3. For any questions that cannot be answered, please contact the ordering physician            Jul 20, 2018  9:05 AM CDT   (Arrive by 8:50 AM)   Return Visit with Edin Mays MD   [facility] ([facility])    [address] [place] [address] [phone]            Aug 10, 2018  2:30 PM CDT   (Arrive by 2:15 PM)   Return Visit with Marlena Mora MD   [facility] and Inst for Prostate and Urologic Cancers ([facility])    [address] [place] [id number] [phone]            Dec 07, 2018  9:30 AM CST   (Arrive by 9:15 AM)   Pacemaker Check with  "Uc Cv Device 1   [facility] ([facility] [place])    [address] [facility]  [address] [place] [address] [phone]              Who to contact     If you have questions or need follow up information about today's clinic visit or your schedule please contact [facility] directly at [phone].  Normal or non-critical lab and imaging results will be communicated to you by MyChart, letter or phone within 4 business days after the clinic has received the results. If you do not hear from us within 7 days, please contact the clinic through MyChart or phone. If you have a critical or abnormal lab result, we will notify you by phone as soon as possible.  Submit refill requests through [employer] or call your pharmacy and they will forward the refill request to us. Please allow 3 business days for your refill to be completed.          Additional Information About Your Visit        [employer] Information     [employer] lets you send messages to your doctor, view your test results, renew your prescriptions, schedule appointments and more. To sign up, go to [email or web address] . Click on \"Log in\" on the left side of the screen, which will take you to the Welcome page. Then click on \"Sign up Now\" on the right side of the page.     You will be asked to enter the access code listed below, as well as some personal information. Please follow the directions to create your username and password.     Your access code is: [email or web address]  Expires: 2018  9:20 AM     Your access code will  in 90 days. If you need help or a new code, please call your [facility] clinic or [phone].        Care EveryWhere ID     This is your Care EveryWhere ID. This could be used by other organizations to access your [facility] medical records  [id number]         Blood Pressure from Last 3 Encounters:   18 106/70   18 128/75   18 158/73    Weight from Last 3 Encounters:   18 78.9 kg " (174 lb)   04/27/18 81.9 kg (180 lb 9.6 oz)   03/27/18 82.1 kg (180 lb 14.4 oz)              We Performed the Following     (32838) ICD DEVICE PROGRAMMING EVAL, MULTI LEAD ICD          Today's Medication Changes          These changes are accurate as of 6/22/18 11:59 PM.  If you have any questions, ask your nurse or doctor.               Start taking these medicines.        Dose/Directions    amoxicillin 500 MG capsule   Commonly known as:  AMOXIL   Used for:  Prophylactic antibiotic   Started by:  Yemi Castro MD        Take 2000 MG one hour before colonoscopy.   Quantity:  4 capsule   Refills:  1            Where to get your medicines      These medications were sent to [facility] [address] [place] [address]  AT [facility] & [address] [facility] [address]     Phone:  [phone]     amoxicillin 500 MG capsule                Primary Care Provider Office Phone # Fax #    Edin Mays [phone]       [address] [address]        Equal Access to Services     [facility]: Hadii aad ku hadasho Soomaali, waaxda luqadaha, qaybta kaalmada adeegyada, waxay randi berrios . So [facility] [phone].    ATENCIÓN: Si habla español, tiene a herrera disposición servicios gratuitos de asistencia lingüística. [facility] [phone].    We comply with applicable federal civil rights laws and [place] laws. We do not discriminate on the basis of race, color, national origin, age, disability, sex, sexual orientation, or gender identity.            Thank you!     Thank you for choosing [facility]  for your care. Our goal is always to provide you with excellent care. Hearing back from our patients is one way we can continue to improve our services. Please take a few minutes to complete the written survey that you may receive in the mail after your visit with us. Thank you!             Your Updated Medication  List - Protect others around you: Learn how to safely use, store and throw away your medicines at www.disposemymeds.org.          This list is accurate as of 6/22/18 11:59 PM.  Always use your most recent med list.                   Brand Name Dispense Instructions for use Diagnosis    allopurinol 100 MG tablet    ZYLOPRIM    90 tablet    Take 1 tablet (100 mg) by mouth every evening    Idiopathic gout, unspecified chronicity, unspecified site       amoxicillin 500 MG capsule    AMOXIL    4 capsule    Take 2000 MG one hour before colonoscopy.    Prophylactic antibiotic       cephALEXin 500 MG capsule    KEFLEX    4 capsule    Take 4 capsules by mouth once, one hour prior to surgery.    BCC (basal cell carcinoma), face       losartan 25 MG tablet    COZAAR    45 tablet    Take 0.5 tablets (12.5 mg) by mouth every evening    Benign essential hypertension       metoprolol succinate 50 MG 24 hr tablet    TOPROL XL    90 tablet    Take 1 tablet (50 mg) by mouth daily    Benign essential hypertension       MULTIVITAMIN ADULTS 50+ Tabs       Flu vaccine need, Aortic valve stenosis, severe, Chronic systolic heart failure (H), S/P aortic valve replacement       * PROVIDER DOSED MANAGED WARFARIN (COUMADIN) OUTPATIENT      Per coumadin clinic        simvastatin 40 MG tablet    ZOCOR    90 tablet    Take 1 tablet (40 mg) by mouth At Bedtime    Hyperlipidemia LDL goal <100       VITAMIN B 12 PO           VITAMIN D (CHOLECALCIFEROL) PO      Take 1,000 Units by mouth daily    Flu vaccine need, Aortic valve stenosis, severe, Chronic systolic heart failure (H), S/P aortic valve replacement       * warfarin 5 MG tablet    COUMADIN    180 tablet    Take 10mg daily or as directed by the Anticoagulation Clinic    S/P AVR (aortic valve replacement), Paroxysmal atrial fibrillation (H), Long term current use of anticoagulant therapy, S/P aortic valve replacement       * Notice:  This list has 2 medication(s) that are the same as other  medications prescribed for you. Read the directions carefully, and ask your doctor or other care provider to review them with you.

## 2018-06-22 NOTE — PROGRESS NOTES
"Preliminary Device Interrogation Results.  Final physician signed paceart report to be scanned and attached.    Pt seen in the [facility] for evaluation and iterative programming of a Medtronic, Bi-Ventricular ICD, per MD orders. He also has an appointment with Dr. Castro. Today his intrinsic rhythm is Sinus 64 with CHB- ventricular rate is 32 bpm. Normal ICD function. No arrhythmias recorded. Pt reports \"feeling tired\" and palpitations. PVCs have increased from 20/hr to 235/hr. OptiVol thoracic impedance suggests some fluid retention. AP= 34% BVP= 92.9% and VSR pacing= 0.7%. No short v-v intervals recorded. Lead trends appear stable. Battery estimates 1.6 years to SIMONE. Plan for pt to send a remote transmission on 9/25/18 and RTC in 6 months.  Multi lead ICD    "

## 2018-06-22 NOTE — MR AVS SNAPSHOT
After Visit Summary   6/22/2018    Brooks Summers    MRN: [id number]           Patient Information     Date Of Birth          1945        Visit Information        Provider Department      6/22/2018 3:30 PM Yemi Castro MD [facility]        Today's Diagnoses     Chronic systolic heart failure (H)    -  1    Aortic valve stenosis, severe        Sleep apnea, unspecified type        Other mononeuropathy        Prophylactic antibiotic          Care Instructions    Thank you for your visit today.  Please call me with any questions or concerns.   Sawyer Mejia RN  Cardiology Care Coordinator  [phone], press option 1 then option 3          Follow-ups after your visit        Additional Services     NEUROLOGY ADULT REFERRAL       Your provider has referred you for the following:   Consult at [facility]: Neurology Clinic - [place] [phone]   [email or web address]  Peripheral neuropathy    Please be aware that coverage of these services is subject to the terms and limitations of your health insurance plan.  Call member services at your health plan with any benefit or coverage questions.      Please bring the following with you to your appointment:    (1) Any X-Rays, CTs or MRIs which have been performed.  Contact the facility where they were done to arrange for  prior to your scheduled appointment.    (2) List of current medications  (3) This referral request   (4) Any documents/labs given to you for this referral            SLEEP EVALUATION & MANAGEMENT REFERRAL - ADULT -[facility]  [phone] (Age 2 and up)       Please be aware that coverage of these services is subject to the terms and limitations of your health insurance plan.  Call member services at your health plan with any benefit or coverage questions.      Please bring the following to your appointment:    >>   List of current  medications   >>   This referral request   >>   Any documents/labs given to you for this referral                Follow-Up with Cardiologist       Referral placed for sleep study and neurology. Sleep study for sleep apnea and neurology for peripheral neuropathy. Please schedule an echocardiogram in the next couple of weeks and a follow up clinic visit in one year.                  Your next 10 appointments already scheduled     Jul 05, 2018 12:30 PM CDT   (Arrive by 12:15 PM)   New Patient Visit with Adebayo Ingram MD   [facility] ([facility])    [address] [place] [id number] [phone]            Jul 11, 2018  1:15 PM CDT   Anticoagulation Visit with LL ANTI COAG   [facility] ([facility])    [address] [facility] [address] [phone]            Jul 16, 2018  2:30 PM CDT   Ech Complete with UCECHCR1   [facility] Echo ([facility])    [address] [place] [id number] [phone]           1. Please bring or wear a comfortable two-piece outfit. 2. You may eat, drink and take your normal medicines. 3. For any questions that cannot be answered, please contact the ordering physician            Jul 20, 2018  9:05 AM CDT   (Arrive by 8:50 AM)   Return Visit with Edin Mays MD   [facility] ([facility])    [address] [place] [id number] [phone]            Aug 10, 2018  2:30 PM CDT   (Arrive by 2:15 PM)   Return Visit with Marlena Mora MD   [facility] Urology and Inst for Prostate and Urologic Cancers ([facility])    [address] [place] [phone] [phone]            Dec 07, 2018  9:30 AM CST   (Arrive by 9:15 AM)   Pacemaker Check with  Cv Device 1   [facility] ([facility] [place]  [place]    [address] [facility]  [address] [place] [address] [phone]              Who to contact     If you have questions or need follow up information about today's clinic visit or your schedule please contact [facility] directly at [phone].  Normal or non-critical lab and imaging results will be communicated to you by MyChart, letter or phone within 4 business days after the clinic has received the results. If you do not hear from us within 7 days, please contact the clinic through MyChart or phone. If you have a critical or abnormal lab result, we will notify you by phone as soon as possible.  Submit refill requests through [employer] or call your pharmacy and they will forward the refill request to us. Please allow 3 business days for your refill to be completed.          Additional Information About Your Visit        MyChart Information     [employer] lets you send messages to your doctor, view your test results, renew your prescriptions, schedule appointments and more. To sign up, go to [email or web address] . Click on \"Log in\" on the left side of the screen, which will take you to the Welcome page. Then click on \"Sign up Now\" on the right side of the page.     You will be asked to enter the access code listed below, as well as some personal information. Please follow the directions to create your username and password.     Your access code is: [email or web address]  Expires: 2018  9:20 AM     Your access code will  in 90 days. If you need help or a new code, please call your [facility] clinic or [phone].        Care EveryWhere ID     This is your Care EveryWhere ID. This could be used by other organizations to access your [facility] medical records  [id number]        Your Vitals Were     Pulse Height Pulse Oximetry BMI (Body Mass Index)          75 1.778 m (5' 10\") 97% 24.97 kg/m2         Blood Pressure from Last 3 Encounters:   18 106/70   18 128/75   18 " 158/73    Weight from Last 3 Encounters:   06/22/18 78.9 kg (174 lb)   04/27/18 81.9 kg (180 lb 9.6 oz)   03/27/18 82.1 kg (180 lb 14.4 oz)              We Performed the Following     NEUROLOGY ADULT REFERRAL          Today's Medication Changes          These changes are accurate as of 6/22/18 11:59 PM.  If you have any questions, ask your nurse or doctor.               Start taking these medicines.        Dose/Directions    amoxicillin 500 MG capsule   Commonly known as:  AMOXIL   Used for:  Prophylactic antibiotic   Started by:  Yemi Castro MD        Take 2000 MG one hour before colonoscopy.   Quantity:  4 capsule   Refills:  1            Where to get your medicines      [facility] [address] [place] [address]  AT [facility] & [address] [facility] [address]     Phone:  [phone]     amoxicillin 500 MG capsule                Primary Care Provider Office Phone # Fax #    Edin Mays [phone]       0 [address] [address]        Equal Access to Services     [facility]: Hadii aad ku hadasho Soomaali, waaxda luqadaha, qaybta kaalmada adeegyada, elisabeth bryan hayjosie berrios . So [facility] [phone].    ATENCIÓN: Si habla español, tiene a herrera disposición servicios gratuitos de asistencia lingüística. [facility] [phone].    We comply with applicable federal civil rights laws and [place] laws. We do not discriminate on the basis of race, color, national origin, age, disability, sex, sexual orientation, or gender identity.            Thank you!     Thank you for choosing [facility]  for your care. Our goal is always to provide you with excellent care. Hearing back from our patients is one way we can continue to improve our services. Please take a few minutes to complete the written survey that you may receive in the mail after your visit with us. Thank you!              Your Updated Medication List - Protect others around you: Learn how to safely use, store and throw away your medicines at www.disposemymeds.org.          This list is accurate as of 6/22/18 11:59 PM.  Always use your most recent med list.                   Brand Name Dispense Instructions for use Diagnosis    allopurinol 100 MG tablet    ZYLOPRIM    90 tablet    Take 1 tablet (100 mg) by mouth every evening    Idiopathic gout, unspecified chronicity, unspecified site       amoxicillin 500 MG capsule    AMOXIL    4 capsule    Take 2000 MG one hour before colonoscopy.    Prophylactic antibiotic       cephALEXin 500 MG capsule    KEFLEX    4 capsule    Take 4 capsules by mouth once, one hour prior to surgery.    BCC (basal cell carcinoma), face       losartan 25 MG tablet    COZAAR    45 tablet    Take 0.5 tablets (12.5 mg) by mouth every evening    Benign essential hypertension       metoprolol succinate 50 MG 24 hr tablet    TOPROL XL    90 tablet    Take 1 tablet (50 mg) by mouth daily    Benign essential hypertension       MULTIVITAMIN ADULTS 50+ Tabs       Flu vaccine need, Aortic valve stenosis, severe, Chronic systolic heart failure (H), S/P aortic valve replacement       * PROVIDER DOSED MANAGED WARFARIN (COUMADIN) OUTPATIENT      Per coumadin clinic        simvastatin 40 MG tablet    ZOCOR    90 tablet    Take 1 tablet (40 mg) by mouth At Bedtime    Hyperlipidemia LDL goal <100       VITAMIN B 12 PO           VITAMIN D (CHOLECALCIFEROL) PO      Take 1,000 Units by mouth daily    Flu vaccine need, Aortic valve stenosis, severe, Chronic systolic heart failure (H), S/P aortic valve replacement       * warfarin 5 MG tablet    COUMADIN    180 tablet    Take 10mg daily or as directed by the Anticoagulation Clinic    S/P AVR (aortic valve replacement), Paroxysmal atrial fibrillation (H), Long term current use of anticoagulant therapy, S/P aortic valve replacement       * Notice:  This list has 2  medication(s) that are the same as other medications prescribed for you. Read the directions carefully, and ask your doctor or other care provider to review them with you.

## 2018-06-22 NOTE — NURSING NOTE
Chief Complaint   Patient presents with     Follow Up For     return heart failure     Vitals were performed, medications were reconciled. Julianne Rubin MA

## 2018-06-22 NOTE — PROGRESS NOTES
Cardiology Clinic Note:    HPI:    Dear referring physicians and associated providers,    We had the pleasure of seeing Mario Summers in Cardiology today on 6/22/2018. 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 He previously had severe left ventricular systolic dysfunction with previous EF 10-15% s/p ICD placement (1/2012) now with improved EF (50-55%).     Since then, Mario Summers has been doing well. He has been very active, continuing to work part time as well as stay busy around the house with gardening. He does note that he has been slightly more fatigued than usual, with increased daytime somnolence. He denies any chest pain or discomfort, but does note some palpitations. His wife also notes that he is sometimes dizzy/lighteaded when he stands up from a seated position. He denies any symptoms of orthopnea/PND, or abnormal lower extremity swelling. He does note some numbness/tingling in his feet that has become more bothersome.    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 Tx with Dapto/RIFx 8 weeks.     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     Paroxysmal a-fib (H)     Post-op 7/14/2011, 1/26/13     Rotator Cuff (Capsule) Sprain and Strain      S/P aortic valve replacement     7/14/2011, re-do 1/25/13 due to endocarditis     Smoking hx      Thrombocytopenia (H)        Past Surgical History:   Past Surgical History:   Procedure  Laterality Date     ANESTHESIA CARDIOVERSION  7/18/2011    Procedure:ANESTHESIA CARDIOVERSION; Cardioversion; Surgeon:GENERIC ANESTHESIA PROVIDER; Location:[facility] OR     COLONOSCOPY       CORONARY ANGIOGRAPHY ADULT ORDER       CYSTOSCOPY, BIOPSY URETHRA N/A 4/3/2017    Procedure: CYSTOSCOPY, BIOPSY URETHRA;  Surgeon: Malrena Mora MD;  Location: [facility] OR     ENDOSCOPY UPPER, COLONOSCOPY, COMBINED       HC REMOV & REPLACE IMPLT DEFIB PULSE GEN MULT LEAD  12/3/2015          HEMORRHOID SURGERY       IMPLANT AUTOMATIC IMPLANTABLE CARDIOVERTER DEFIBRILLATOR       MOHS MICROGRAPHIC PROCEDURE       ORTHOPEDIC SURGERY      shoulder,right     REDO STERNOTOMY REPLACE VALVE AORTIC  1/25/2013    Procedure: REDO STERNOTOMY REPLACE VALVE AORTIC;  Redo Sternotomy, Redo Aortic Valve Replacement on pump oxygenator. Intraoperative Transesophageal Echocardiogram;  Surgeon: Navin Hampton MD;  Location: [facility] OR     REPAIR VALVE MITRAL  2013     REPLACE VALVE AORTIC  7/14/2011    Procedure:REPLACE VALVE AORTIC; Median Sternotomy, Bioprosthesis,  Aortic Valve replacement on pump with oxygenator. Intra-operative Transesophogeal Echocardiogram.; Surgeon:NAVIN HAMPTON; Location:[facility] OR     teeth extractions         Medications (outpatient):  Current Outpatient Prescriptions   Medication Sig Dispense Refill     allopurinol (ZYLOPRIM) 100 MG tablet Take 1 tablet (100 mg) by mouth every evening 90 tablet 3     cephALEXin (KEFLEX) 500 MG capsule Take 4 capsules by mouth once, one hour prior to surgery. (Patient not taking: Reported on 4/27/2018) 4 capsule 0     losartan (COZAAR) 25 MG tablet Take 0.5 tablets (12.5 mg) by mouth every evening 45 tablet 2     metoprolol (TOPROL XL) 50 MG 24 hr tablet Take 1 tablet (50 mg) by mouth daily 90 tablet 3     Multiple Vitamins-Minerals (MULTIVITAMIN ADULTS 50+) TABS        PROVIDER DOSED MANAGED WARFARIN, COUMADIN, OUTPATIENT Per coumadin clinic       simvastatin (ZOCOR) 40 MG tablet Take 1 tablet (40 mg)  by mouth At Bedtime 90 tablet 2     VITAMIN D, CHOLECALCIFEROL, PO Take 1,000 Units by mouth daily       warfarin (COUMADIN) 5 MG tablet Take 10mg daily or as directed by the Anticoagulation Clinic 180 tablet 0       Allergy:  Allergies   Allergen Reactions     Lisinopril Cough       Social History:   Social History     Social History     Marital status:      Spouse name: N/A     Number of children: N/A     Years of education: N/A     Occupational History     Not on file.     Social History Main Topics     Smoking status: Former Smoker     Packs/day: 1.00     Years: 20.00     Types: Cigarettes     Quit date: 12/31/1989     Smokeless tobacco: Never Used     Alcohol use 1.2 oz/week     2 Cans of beer per week     Drug use: No     Sexual activity: Yes     Partners: Female      Comment:      Other Topics Concern     Not on file     Social History Narrative     since 2/1982 2 children 4 grandchildren.    Carpet sales:  Semi retired. Athlete in school, Golf, fish, yardwork     History   Smoking Status     Former Smoker     Packs/day: 1.00     Years: 20.00     Types: Cigarettes     Quit date: 12/31/1989   Smokeless Tobacco     Never Used       Family History:  Family History   Problem Relation Age of Onset     C.A.D. Father 68     bypass, carotid      Prostate Cancer Father 70     Cancer Mother 92     stomach, colon     Hypertension Mother      Retinal detachment Mother      Cancer Brother 64     lung cancer, petroleum     Glaucoma No family hx of      Macular Degeneration No family hx of      Strabismus No family hx of      Glasses (<9 y/o) No family hx of        ROS: 10 point ROS neg other than the symptoms noted above in the HPI.    OBJECTIVE:  There were no vitals taken for this visit.  Wt Readings from Last 2 Encounters:   04/27/18 81.9 kg (180 lb 9.6 oz)   03/27/18 82.1 kg (180 lb 14.4 oz)       Physical Exam:    Gen: WD WN in NAD  HEENT: PERRL, EOMI  Neck: JVP not elevated at 45'  elevation  Resp: CTAB without wheezes or rales  Cardiac: RRR nl s1, s2, no s3, s4, 1/6 TRINA   Abd: S, NT, ND  Extrem: no CCE    Labs:  CBC:   Lab Results   Component Value Date    WBC 5.3 03/27/2018    RBC 4.36 (L) 03/27/2018    HGB 13.9 03/27/2018    HCT 41.6 03/27/2018    MCV 95 03/27/2018    MCH 31.9 03/27/2018    MCHC 33.4 03/27/2018    RDW 13.5 03/27/2018     (L) 03/27/2018       BMP:   Lab Results   Component Value Date     03/27/2018    POTASSIUM 4.0 03/27/2018    CHLORIDE 108 03/27/2018    CO2 28 03/27/2018    ANIONGAP 4 03/27/2018     (H) 03/27/2018    BUN 14 03/27/2018    CR 0.94 03/27/2018    GFRESTIMATED 79 03/27/2018    GFRESTBLACK >90 03/27/2018    MARTIN 9.1 03/27/2018        INR RESULTS:  Lab Results   Component Value Date    INR 2.9 (A) 05/30/2018    INR 2.8 (A) 04/25/2018    INR 3.1 (A) 03/28/2018    INR 3.3 (A) 03/14/2018    INR 1.17 (H) 04/03/2017    INR 2.85 (H) 11/28/2016    INR 2.33 (H) 02/23/2016    INR 2.40 (H) 12/03/2015       Prior Cardiology studies:  TTE 10/2016  Interpretation Summary  Mild concentric wall thickening consistent with left ventricular hypertrophy  is present. Global and regional left ventricular function is normal with an  EF of 55-60%.  Global right ventricular function is mildly reduced.  A bioprosthetic aortic valve is present.Doppler interrogation of the aortic  valve is normal. There is tarce to mild eccentric aortic insufficiency.  Pulmonary artery systolic pressure is normal.  The inferior vena cava was normal in size with preserved respiratory  variability.  No pericardial effusion is present.    ASSESSMENT & PLAN :  Mr. Summers is a 71-year-old male, s/p re-do median sternotomy with an aortic valve replacement with a 21-mm Johnson PERIMOUNT Magna tissue valve plus mitral valve repair with pericardial patch repair of a large anterior mitral valve leaflet perforation and debridement of the infected aortic root with subannular infection due to  endocarditis.  Also has history of complete heart block, low EF 10-15%, now recovered to 50-55% in the setting of biventricular-ICD.     He endorses increased fatigue, which is likely a reflection of undiagnosed RUBY, however we will also check a TTE in order to reassess his cardiac function.      Problem List  -Bioprosthetic AVR redu secondary to endocarditis (7/14/2011,1/25/13); echo 12/15/2015 notable for Ao mean gradient 13 mm Hg  -Complete heart block s/p CRT-D (s/p generator change 12/3/2015)  -Cardiomyopathy recovered EF 50-55% s/p CRT-D  -Paroxysmal AF on anticoagulation  -Hyperlipidemia  -Thrombocytopenia (seen by hematology; negative workup)     Plan  -will continue current heart failure regimen (losartan 12.5mg, and metoprolol succinate 50mg), along with cardiac resynchronization therapy  -requires endocarditis prophylaxis due to increased risk for infection given prior extensive endocarditis, has a colonoscopy scheduled, will order amoxicillin 2g 30min to 1 hr for ppx  -next TTE in 2-3 weeks  -continue warfarin for [id number] of 2  - Recommend referral to Neurology for peripheral neuropathy   - Recommend sleep study for suspected RUBY    Thank you for allowing us to participate in the care of this patient. Please do not hesitate to call with any questions or concerns.     Sincerely,    Yemi Castro MD, PhD  Department of Cardiology   [facility]     Author:  Ian Delaney MD  PGY5 Cardiology  Pager: [phone]    Attending Attestation:  Patient seen and examined by me with the Fellow, Dr. Delaney. I have performed all pertinent elements of the physical examination and reviewed the note above. I have reviewed pertinent laboratory, echocardiographic, imaging, and cardiac catheterization results. I agree with the plan of care as described in this note.    Yemi Castro MD, PhD

## 2018-06-22 NOTE — NURSING NOTE
Cardiac Testing: Patient given instructions regarding  echocardiogram in the near future. Discussed purpose, preparation, procedure and when to expect results reported back to the patient. Patient demonstrated understanding of this information and agreed to call with further questions or concerns.  Med Reconcile: Reviewed and verified all current medications with the patient. The updated medication list was printed and given to the patient.  Return Appointment: Referral for neurology for peripheral neuropathy and referral for sleep study for sleep apnea, follow up with general cardiology in one year.Patient given instructions regarding scheduling next clinic visit. Patient demonstrated understanding of this information and agreed to call with further questions or concerns.  Patient stated he understood all health information given and agreed to call with further questions or concerns.

## 2018-06-29 NOTE — TELEPHONE ENCOUNTER
FUTURE VISIT INFORMATION        FUTURE VISIT INFORMATION:    Date: 07/05/2018    Time:     Location:   REFERRAL INFORMATION:    Referring provider:  NOEMÍ ROLDAN    Referring providers clinic:      Reason for visit/diagnosis          RECORDS STATUS    Internal Records, Epic, Care Everywhere and PACS.

## 2018-07-05 ENCOUNTER — PRE VISIT (OUTPATIENT)
Dept: NEUROLOGY | Facility: CLINIC | Age: 73
End: 2018-07-05

## 2018-07-05 ENCOUNTER — OFFICE VISIT (OUTPATIENT)
Dept: NEUROLOGY | Facility: CLINIC | Age: 73
End: 2018-07-05
Payer: MEDICARE

## 2018-07-05 VITALS
RESPIRATION RATE: 22 BRPM | DIASTOLIC BLOOD PRESSURE: 64 MMHG | BODY MASS INDEX: 25.38 KG/M2 | HEIGHT: 70 IN | OXYGEN SATURATION: 97 % | SYSTOLIC BLOOD PRESSURE: 111 MMHG | WEIGHT: 177.3 LBS | HEART RATE: 73 BPM

## 2018-07-05 DIAGNOSIS — R31.9 HEMATURIA, UNSPECIFIED TYPE: ICD-10-CM

## 2018-07-05 DIAGNOSIS — G25.81 RESTLESS LEGS SYNDROME (RLS): ICD-10-CM

## 2018-07-05 DIAGNOSIS — R20.9 SENSORY DISTURBANCE: Primary | ICD-10-CM

## 2018-07-05 DIAGNOSIS — R20.9 SENSORY DISTURBANCE: ICD-10-CM

## 2018-07-05 LAB
ALBUMIN UR-MCNC: NEGATIVE MG/DL
AMORPH CRY #/AREA URNS HPF: ABNORMAL /HPF
APPEARANCE UR: ABNORMAL
BILIRUB UR QL STRIP: NEGATIVE
COLOR UR AUTO: YELLOW
GLUCOSE UR STRIP-MCNC: NEGATIVE MG/DL
HGB UR QL STRIP: NEGATIVE
KETONES UR STRIP-MCNC: NEGATIVE MG/DL
LEUKOCYTE ESTERASE UR QL STRIP: NEGATIVE
MUCOUS THREADS #/AREA URNS LPF: PRESENT /LPF
NITRATE UR QL: NEGATIVE
PH UR STRIP: 7 PH (ref 5–7)
RBC #/AREA URNS AUTO: 8 /HPF (ref 0–2)
SOURCE: ABNORMAL
SP GR UR STRIP: 1.01 (ref 1–1.03)
SQUAMOUS #/AREA URNS AUTO: <1 /HPF (ref 0–1)
UROBILINOGEN UR STRIP-MCNC: 0 MG/DL (ref 0–2)
WBC #/AREA URNS AUTO: 1 /HPF (ref 0–5)

## 2018-07-05 PROCEDURE — 00000402 ZZHCL STATISTIC TOTAL PROTEIN: Performed by: PSYCHIATRY & NEUROLOGY

## 2018-07-05 PROCEDURE — 84165 PROTEIN E-PHORESIS SERUM: CPT | Performed by: PSYCHIATRY & NEUROLOGY

## 2018-07-05 RX ORDER — GABAPENTIN 100 MG/1
CAPSULE ORAL
Qty: 120 CAPSULE | Refills: 3 | Status: SHIPPED | OUTPATIENT
Start: 2018-07-05 | End: 2019-05-17

## 2018-07-05 ASSESSMENT — ENCOUNTER SYMPTOMS
EYE WATERING: 0
LOSS OF CONSCIOUSNESS: 0
PARALYSIS: 0
EYE IRRITATION: 0
DOUBLE VISION: 0
HEADACHES: 0
TREMORS: 0
DECREASED APPETITE: 1
SPEECH CHANGE: 0
POLYPHAGIA: 0
EYE REDNESS: 0
NUMBNESS: 0
HALLUCINATIONS: 0
CHILLS: 0
POLYDIPSIA: 0
FATIGUE: 1
SEIZURES: 0
DIZZINESS: 0
WEAKNESS: 0
NIGHT SWEATS: 1
INCREASED ENERGY: 0
TINGLING: 1
DISTURBANCES IN COORDINATION: 0
WEIGHT GAIN: 0
FEVER: 0
WEIGHT LOSS: 1
MEMORY LOSS: 1
ALTERED TEMPERATURE REGULATION: 0
EYE PAIN: 0

## 2018-07-05 ASSESSMENT — PAIN SCALES - GENERAL: PAINLEVEL: NO PAIN (0)

## 2018-07-05 NOTE — MR AVS SNAPSHOT
After Visit Summary   7/5/2018    Brooks Summers    MRN: [id number]           Patient Information     Date Of Birth          1945        Visit Information        Provider Department      7/5/2018 12:30 PM Adebayo Ingram MD [facility] Neurology        Today's Diagnoses     Sensory disturbance    -  1    Restless legs syndrome (RLS)           Follow-ups after your visit        Your next 10 appointments already scheduled     Jul 05, 2018  2:15 PM CDT   LAB with [facility] ([facility])    [address] [place] [address]  [address] [facility] [address] [phone]           Please do not eat 10-12 hours before your appointment if you are coming in fasting for labs on lipids, cholesterol, or glucose (sugar). This does not apply to pregnant women. Water, hot tea and black coffee (with nothing added) are okay. Do not drink other fluids, diet soda or chew gum.            Jul 11, 2018  1:15 PM CDT   Anticoagulation Visit with LL ANTI COLT   [facility] ([facility])    [address] [place] [address] [phone]            Jul 16, 2018  2:30 PM CDT   Ech Complete with [facility] [facility] ([facility])    [address] [place] [address]  [address] [facility] [address] [phone]           1. Please bring or wear a comfortable two-piece outfit. 2. You may eat, drink and take your normal medicines. 3. For any questions that cannot be answered, please contact the ordering physician            Jul 20, 2018  9:05 AM CDT   (Arrive by 8:50 AM)   Return Visit with Edin Mays MD   [facility] ([facility])    [address] [place] [address]  [address] [facility] [id number] [phone]            Jul 20, 2018  1:00 PM CDT   (Arrive by 12:45 PM)   EMG with Cammie Montejo MD   [facility] ([facility])    [address] [place] [address]  Floor  [facility] [address]   [phone]            Aug 10, 2018  2:30 PM CDT   (Arrive by 2:15 PM)   Return Visit with Marlena Mora MD   [facility] ([facility])    [address]  4th Floor  [facility] [address]   [phone]            Aug 17, 2018 10:00 AM CDT   (Arrive by 9:45 AM)   Return Visit with Adebayo Ingram MD   [facility] ([facility])    [address]  3rd Floor  [facility] [address]   [phone]            Dec 07, 2018  9:30 AM CST   (Arrive by 9:15 AM)   Pacemaker Check with Uc Cv Device 1   [facility] ([facility])    [address] [place] [address]  [address]  [facility] [address]   [phone]              Future tests that were ordered for you today     Open Future Orders        Priority Expected Expires Ordered    ELP Routine 2018    EMG Routine  2019            Who to contact     Please call your clinic at [phone] to:    Ask questions about your health    Make or cancel appointments    Discuss your medicines    Learn about your test results    Speak to your doctor            Additional Information About Your Visit        MyChart Information     [employer] is an electronic gateway that provides easy, online access to your medical records. With [employer], you can request a clinic appointment, read your test results, renew a prescription or communicate with your care team.     To sign up for [employer] visit the website at [email or web address]   You will be asked to enter the access code listed below, as well as some personal information. Please follow the directions to create your username and password.     Your access code is: [email or web address]  Expires: 2018  9:20 AM     Your access code will  in 90 days. If you need help or a new code, please contact your University  [facility] or call [phone] for assistance.        Care EveryWhere ID     This is your Care EveryWhere ID. This could be used by other organizations to access your [place] medical records  [id number]        Your Vitals Were     Pulse Respirations Height Pulse Oximetry BMI (Body Mass Index)       73 22 1.778 m (5' 10\") 97% 25.44 kg/m2        Blood Pressure from Last 3 Encounters:   07/05/18 111/64   06/22/18 106/70   04/27/18 128/75    Weight from Last 3 Encounters:   07/05/18 80.4 kg (177 lb 4.8 oz)   06/22/18 78.9 kg (174 lb)   04/27/18 81.9 kg (180 lb 9.6 oz)                 Today's Medication Changes          These changes are accurate as of 7/5/18  2:10 PM.  If you have any questions, ask your nurse or doctor.               Start taking these medicines.        Dose/Directions    gabapentin 100 MG capsule   Commonly known as:  NEURONTIN   Used for:  Restless legs syndrome (RLS), Sensory disturbance   Started by:  Adebayo Ingram MD        Take 1 tablet (100 mg) once daily for 3 days, then 2 tablets once daily for 3 days, then 3 tablets once daily   Quantity:  120 capsule   Refills:  3            Where to get your medicines      These medications were sent to [facility] [address] [address]  AT [address] [address]     Phone:  [phone]     gabapentin 100 MG capsule                Primary Care Provider Office Phone # Fax #    Edin W MD Davon [phone]       0 [address] [address]        Equal Access to Services     [facility] : Hadii robin barnes Sodarrius, walaurentda luqadaha, qaybta kaalmada emery, elisabeth quinones. So [facility] [phone].    ATENCIÓN: Si habla español, tiene a herrera disposición servicios gratuitos de asistencia lingüística. Llame al [phone].    We comply with applicable federal civil rights laws and [place] " laws. We do not discriminate on the basis of race, color, national origin, age, disability, sex, sexual orientation, or gender identity.            Thank you!     Thank you for choosing [facility] NEUROLOG[facility]  for your care. Our goal is always to provide you with excellent care. Hearing back from our patients is one way we can continue to improve our services. Please take a few minutes to complete the written survey that you may receive in the mail after your visit with us. Thank you!             Your Updated Medication List - Protect others around you: Learn how to safely use, store and throw away your medicines at www.disposemymeds.org.          This list is accurate as of 7/5/18  2:10 PM.  Always use your most recent med list.                   Brand Name Dispense Instructions for use Diagnosis    allopurinol 100 MG tablet    ZYLOPRIM    90 tablet    Take 1 tablet (100 mg) by mouth every evening    Idiopathic gout, unspecified chronicity, unspecified site       amoxicillin 500 MG capsule    AMOXIL    4 capsule    Take 2000 MG one hour before colonoscopy.    Prophylactic antibiotic       gabapentin 100 MG capsule    NEURONTIN    120 capsule    Take 1 tablet (100 mg) once daily for 3 days, then 2 tablets once daily for 3 days, then 3 tablets once daily    Restless legs syndrome (RLS), Sensory disturbance       losartan 25 MG tablet    COZAAR    45 tablet    Take 0.5 tablets (12.5 mg) by mouth every evening    Benign essential hypertension       metoprolol succinate 50 MG 24 hr tablet    TOPROL XL    90 tablet    Take 1 tablet (50 mg) by mouth daily    Benign essential hypertension       MULTIVITAMIN ADULTS 50+ Tabs       Flu vaccine need, Aortic valve stenosis, severe, Chronic systolic heart failure (H), S/P aortic valve replacement       * PROVIDER DOSED MANAGED WARFARIN (COUMADIN) OUTPATIENT      Per coumadin clinic        simvastatin 40 MG tablet    ZOCOR    90 tablet    Take 1 tablet (40 mg) by mouth At Bedtime     Hyperlipidemia LDL goal <100       VITAMIN B 12 PO           VITAMIN D (CHOLECALCIFEROL) PO      Take 1,000 Units by mouth daily    Flu vaccine need, Aortic valve stenosis, severe, Chronic systolic heart failure (H), S/P aortic valve replacement       * warfarin 5 MG tablet    COUMADIN    180 tablet    Take 10mg daily or as directed by the Anticoagulation Clinic    S/P AVR (aortic valve replacement), Paroxysmal atrial fibrillation (H), Long term current use of anticoagulant therapy, S/P aortic valve replacement       * Notice:  This list has 2 medication(s) that are the same as other medications prescribed for you. Read the directions carefully, and ask your doctor or other care provider to review them with you.

## 2018-07-05 NOTE — LETTER
"2018       RE: Brooks Summers  [address]  [facility] [place] [address]     Dear Colleague,    Thank you for referring your patient, Brooks Summers, to the [facility] NEUROLOGY at [facility]. Please see a copy of my visit note below.    Service Date: 2018      RE: Brooks Summers   MRN: [id number]   : 1945      Dear Dr. Mays:      Thank you for referring Brooks Summers for neurologic consultation on 2018.  The patient is a 73-year-old man who comes with a chief complaint of an \"electric tingling\" in his legs in the evening and an urge to move his legs.      The patient has an indescribable sensation in his legs at night.  He notes this as an \"electric tingling\".  This spares his feet.  He has this as he tries to sleep and he said it mainly involves the upper legs.  He has had this over the last 2 years.  He does have to massage his legs at night.  He has also used BenGay and that seems to help.  He has normal sensation to touch.  The patient said that the symptoms mainly involve the upper legs from the knees to above the pubic rami.  He said this does seem to go into his buttocks at times.  Walking does seem to help.  He has not noticed any weakness.  He said otherwise he is not certain he has lost any strength but he did note he has to use a railing to walk upstairs the last year.  He described his sensation as an irresistible urge to stretch his legs.  He also has rarely cramps in his hamstrings at night.  The patient has not had any other recent complaints.  Specifically, he denied any spine complaint including cervical, thoracic or lumbar.  He has not had Lhermitte sign.  He is able to pass his urine and stool and is not impotent.  He did have some problem.  He was the second of a set of twins coming out and he had some left ankle deformity and he dragged his leg as a baby and his family was told he had some type of bone infection in the ankle.  " Despite that, he grew up normally and was athletic.  The patient has had some weight loss possibly in the last 3 months up to 5-10 pounds.  He is more active and his weight has now stabilized.  The onset of his symptoms in retrospect may have started after he started using allopurinol for gout.  The patient has not had any history of anemia.  He has been in atrial fibrillation for the last 7 years since he had treatment of prosthetic aortic valve endocarditis.  He had 2 surgeries 7 years ago and 5 years ago.  He is on chronic Coumadin.  He also has had an ICD placed.  He has been doing well now and denied any new cardiopulmonary issues.  He does follow with Cardiology [facility] at [facility].  He has been followed in Hematology for low platelets.  The patient did review with me his prior history of trace hematuria and he was seen last year in the Urology Department and he had 5 RBCs noted.  He has not had any gross hematuria.  He denied any change in bowel habit and has had no history of diarrhea or constipation issues.  The patient did have a prior history of bicuspid aortic valve with severe aortic stenosis and had his aortic valve replaced on 07/14/2011 and then developed endocarditis.  The patient also had had mitral valve repair.  He has had a prior history of basal cell cancers removed from his face.  He did have gout after his first cardiac surgery.  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  He has also diagnosis of rotator cuff disease.  The patient quit smoking in 1989.  The patient drinks 2 bottles of beer per day.  He has never had on further review of systems amaurosis fugax, focal weakness, focal paresthesias, dysequilibrium, headache or speech disturbance.      The patient has had right shoulder surgery.  He had the second heart surgery on 01/25/2013.  The patient is currently on allopurinol, cephalexin prior to surgery, losartan, metoprolol, multivitamin, simvastatin, warfarin and vitamin D.        ALLERGIES:   He has  allergies to lisinopril.        The patient's family history is such that his father  at 85 from coronary artery disease.  His mother of cancer of the colon at 93.  He lost a brother to lung cancer who smoked at 64.  He denied other relevant family history.      The patient's most recent labs showed that on 2018 his platelet count was 107,000.  He had a hemoglobin of 13.9 and a white count of 5300.  He had normal electrolytes and his glucose was 128.  His last ERIKA in 10/20/2016 showed that he had a bioprosthetic aortic valve present and his global and regional left ventricular function was normal with an EF of 55%-60%.  The patient had mild left ventricular hypertrophy.        The patient worked in [employer] sales.  He is not a vegetarian.  He eats fruits, vegetables and meat.      The patient had on 2018 a B12 level of 395 picograms and a CPK of 74.  On 2016, his iron level was 106 and his TIBC 314 with a normal saturation.  His TSH was 2.31 and his folate was 34.7.  His ferritin was 147 and his vitamin D level was 81.  He denied taking any unusual supplements such as B6 or zinc.      Neurologic examination revealed a pleasant man.  Gait, station, cerebellar testing, muscle stretch reflexes that showed trace biceps and brachioradialis reflexes with normal triceps reflexes and normal knee and ankle jerks, plantar stimulation, strength, cranial nerve examination, superficial and cortical sensory testing are all unremarkable.  He had bilateral mild excessive blepharochalasis.  The patient did not have cervical bruits.  He had a loud systolic murmur heard at the apex.  The patient's left ankle is slightly enlarged compared to the right.      IMPRESSION:  Probable restless leg syndrome.      The patient most likely has restless leg syndrome.  I did review this with him at length and he did note that his sister probably has had this.  I suspect this could be genetic, although it would be  unusual to start at his age without iron deficiency and his ferritin level is 147 checked in 2016.  I did ask that he have some appropriate blood work done today.  The patient is going to go ahead and try medication for this condition.  I went over potential side effects of gabapentin with him and his wife.  I have asked that he have neurologic followup with me in the next few weeks and on a p.r.n. basis.        I did review with them off the Internet the possibility that allopurinol can be associated with restless leg syndrome.            I spent 1 hour with the patient.  Over 50% of the time this involved counseling and coordination of care.  A complete review of medical systems was done today and a positive review is listed in the report above.         D: 2018   T: 2018   MT: AKA      Name:     DAR CRUZ   MRN:      [id number]        Account:      [id number]   :      1945           Service Date: 2018      Document: [id number]       Again, thank you for allowing me to participate in the care of your patient.      Sincerely,    Adebayo Ingram MD    CC:  Edin Mays MD   [facility]    [address] [place] [address], [address] [address]

## 2018-07-06 LAB
ALBUMIN SERPL ELPH-MCNC: 4.1 G/DL (ref 3.7–5.1)
ALPHA1 GLOB SERPL ELPH-MCNC: 0.3 G/DL (ref 0.2–0.4)
ALPHA2 GLOB SERPL ELPH-MCNC: 0.5 G/DL (ref 0.5–0.9)
B-GLOBULIN SERPL ELPH-MCNC: 0.6 G/DL (ref 0.6–1)
GAMMA GLOB SERPL ELPH-MCNC: 0.8 G/DL (ref 0.7–1.6)
M PROTEIN SERPL ELPH-MCNC: 0 G/DL
PROT PATTERN SERPL ELPH-IMP: NORMAL

## 2018-07-09 ENCOUNTER — TELEPHONE (OUTPATIENT)
Dept: NEUROLOGY | Facility: CLINIC | Age: 73
End: 2018-07-09

## 2018-07-09 NOTE — TELEPHONE ENCOUNTER
----- Message from Adebayo Ingram MD sent at 7/6/2018  5:57 PM CDT -----  Tell him or wife no abn on elp[no monoclonal protein];good news

## 2018-07-09 NOTE — TELEPHONE ENCOUNTER
Left a voicemail with patient about results that Dr. Ingram sent for me to inform the patient, and if they have any questions to call us back at our clinic number, [phone] option #3.

## 2018-07-11 ENCOUNTER — ANTICOAGULATION THERAPY VISIT (OUTPATIENT)
Dept: ANTICOAGULATION | Facility: CLINIC | Age: 73
End: 2018-07-11
Payer: MEDICARE

## 2018-07-11 DIAGNOSIS — I48.0 PAROXYSMAL ATRIAL FIBRILLATION (H): ICD-10-CM

## 2018-07-11 DIAGNOSIS — Z79.01 LONG TERM CURRENT USE OF ANTICOAGULANT THERAPY: ICD-10-CM

## 2018-07-11 DIAGNOSIS — Z95.2 S/P AORTIC VALVE REPLACEMENT: ICD-10-CM

## 2018-07-11 LAB — INR POINT OF CARE: 3.6 (ref 0.86–1.14)

## 2018-07-11 PROCEDURE — 36416 COLLJ CAPILLARY BLOOD SPEC: CPT

## 2018-07-11 PROCEDURE — 85610 PROTHROMBIN TIME: CPT | Mod: QW

## 2018-07-11 PROCEDURE — 99207 ZZC NO CHARGE NURSE ONLY: CPT

## 2018-07-11 NOTE — MR AVS SNAPSHOT
Brooks Summers   7/11/2018 1:15 PM   Anticoagulation Therapy Visit    Description:  73 year old male   Provider:  [facility]   Department:  [facility]           INR as of 7/11/2018     Today's INR 3.6!      Anticoagulation Summary as of 7/11/2018     INR goal 2.0-3.0   Today's INR 3.6!   Full warfarin instructions 7/11: 5 mg; Otherwise 10 mg every day   Next INR check 8/8/2018    Indications   S/P aortic valve replacement [Z95.2]  Paroxysmal atrial fibrillation (H) [I48.0]  Long term current use of anticoagulant therapy [Z79.01]         Description     Take 5 mg only today. Then resume 10 mg daily as of tomorrow      Your next Anticoagulation Clinic appointment(s)     Aug 08, 2018  1:15 PM CDT   [facility] with [facility]   [facility]    [address] [facility] [id number] [phone]              Contact Numbers     Please call [phone] with any problems or questions regarding your therapy.    If you need to cancel and/or reschedule your appointment please call one of the following numbers:  [facility] - [phone]  [place] - [phone]  [place] - [phone]  [facility] [phone]  [place] - [phone]            July 2018 Details    Sun Mon Tue Wed Thu Fri Sat     1               2               3               4               5               6               7                 8               9               10               11      5 mg   See details      12      10 mg         13      10 mg         14      10 mg           15      10 mg         16      10 mg         17      10 mg         18      10 mg         19      10 mg         20      10 mg         21      10 mg           22      10 mg         23      10 mg         24      10 mg         25      10 mg         26      10 mg         27      10 mg         28      10 mg           29      10 mg         30      10 mg         31      10 mg              Date Details    07/11 This INR check               How to take your warfarin dose     To take:  5 mg Take 1 of the 5 mg tablets.    To take:  10 mg Take 2 of the 5 mg tablets.           August 2018 Details    Sun Mon Tue Wed Thu Fri Sat        1      10 mg         2      10 mg         3      10 mg         4      10 mg           5      10 mg         6      10 mg         7      10 mg         8            9               10               11                 12               13               14               15               16               17               18                 19               20               21               22               23               24               25                 26               27               28               29               30               31                 Date Details   No additional details    Date of next INR:  8/8/2018         How to take your warfarin dose     To take:  10 mg Take 2 of the 5 mg tablets.

## 2018-07-11 NOTE — PROGRESS NOTES
ANTICOAGULATION FOLLOW-UP CLINIC VISIT    Patient Name:  Brooks Summers  Date:  7/11/2018  Contact Type:  Face to Face    SUBJECTIVE:     Patient Findings     Positives Unexplained INR or factor level change    Comments No changes in diet, activity level, medications (including over the counter), or health. No missed doses of warfarin. Patient took dosing as prescribed. No signs of clots or bleeding concerns.     Patient has not been ill or had any swelling, signs of CHF, or diarrhea. No changes in alcohol use. No bleeding concerns. Will lower dose today only and resume maintenance dosing tomorrow. He was prescribed gabapentin by neurology but has not started this.             OBJECTIVE    INR Protime   Date Value Ref Range Status   07/11/2018 3.6 (A) 0.86 - 1.14 Final       ASSESSMENT / PLAN  INR assessment SUPRA    Recheck INR In: 4 WEEKS    INR Location Clinic      Anticoagulation Summary as of 7/11/2018     INR goal 2.0-3.0   Today's INR 3.6!   Warfarin maintenance plan 10 mg (5 mg x 2) every day   Full warfarin instructions 7/11: 5 mg; Otherwise 10 mg every day   Weekly warfarin total 70 mg   Plan last modified Sheri Frausto RN (1/31/2018)   Next INR check 8/8/2018   Priority INR   Target end date Indefinite    Indications   S/P aortic valve replacement [Z95.2]  Paroxysmal atrial fibrillation (H) [I48.0]  Long term current use of anticoagulant therapy [Z79.01]         Anticoagulation Episode Summary     INR check location     Preferred lab     Send INR reminders to [facility]    Comments  * warfarin 5 mg tabs      Anticoagulation Care Providers     Provider Role Specialty Phone number    Edin Mays MD [facility] [phone]            See the Encounter Report to view Anticoagulation Flowsheet and Dosing Calendar (Go to Encounters tab in chart review, and find the Anticoagulation Therapy Visit)        Aniya Garcia RN

## 2018-07-13 ENCOUNTER — OFFICE VISIT (OUTPATIENT)
Dept: SLEEP MEDICINE | Facility: CLINIC | Age: 73
End: 2018-07-13
Attending: INTERNAL MEDICINE
Payer: MEDICARE

## 2018-07-13 VITALS
BODY MASS INDEX: 25.05 KG/M2 | WEIGHT: 175 LBS | OXYGEN SATURATION: 97 % | HEART RATE: 97 BPM | RESPIRATION RATE: 18 BRPM | HEIGHT: 70 IN

## 2018-07-13 DIAGNOSIS — R71.8 OTHER ABNORMALITY OF RED BLOOD CELLS: ICD-10-CM

## 2018-07-13 DIAGNOSIS — R06.83 SNORING: Primary | ICD-10-CM

## 2018-07-13 DIAGNOSIS — N02.0 IDIOPATHIC HEMATURIA WITH MINOR GLOMERULAR ABNORMALITY: ICD-10-CM

## 2018-07-13 DIAGNOSIS — F51.02 ADJUSTMENT INSOMNIA: ICD-10-CM

## 2018-07-13 DIAGNOSIS — G25.81 RESTLESS LEGS SYNDROME (RLS): ICD-10-CM

## 2018-07-13 PROCEDURE — 99215 OFFICE O/P EST HI 40 MIN: CPT | Performed by: NURSE PRACTITIONER

## 2018-07-13 NOTE — PATIENT INSTRUCTIONS
"The following is recommended:    Fasting ferritin and TIBC.     Ferrous sulfate 325mg and 500mg of vitamin C _____ daily. Most common side effects is constipation, which can be treated with increasing water and fiber or adding miralax if needed.   An iron infusion is also a possibility, but there are some side effects of this treatment with rare but possible anaphylaxis, so most of my patients elect ferrous sulfate and add gabapentin till the ferritin level rises.    Other treatments that can work well is developing a routine of stretching legs, warm bath, massage of legs with lotion, all before bedtime.  Avoiding excessive caffeine, alcohol can help as well.    Gabapentin 100mg pill, 1.5 hrs before bedtime. 1 pill for 3-5 days, if needed increase to 2 pills, then after 3-5 days can increase to 3 pills if needed.    No driving after taking gabapentin.     Let me know what pharmacy you would like this to go to.    Time spent in bed:  Reduce time in bed not devoted to sleep  Worry time: 3 hrs before bedtime.  Journal : list: to do list for next day, for week, thoughts/feelings/concerns  With wakeup: \"i've already done this work\"  Cover your clock    Overnight oximetry          "

## 2018-07-13 NOTE — Clinical Note
Thank you for the referral.  Much going on with him right now, with USP looming-and with normal wakeups-is not able to return to sleep.  Also, sounds like a possible RLS-I see he's seeing neuro as well.  Will continue his evaluation, and keep you updated. SUKH Weaver, CNP-BC Sleep Medicine

## 2018-07-13 NOTE — MR AVS SNAPSHOT
"              After Visit Summary   7/13/2018    Brooks Summers    MRN: [id number]           Patient Information     Date Of Birth          1945        Visit Information        Provider Department      7/13/2018 10:00 AM Tawana Choudhury APRN [facility], [place], Sleep Study        Today's Diagnoses     Restless legs syndrome (RLS)    -  1    Sleep apnea, unspecified type        Disrupted sleep-wake cycle        Other abnormality of red blood cells         Idiopathic hematuria with minor glomerular abnormality          Care Instructions    The following is recommended:    Fasting ferritin and TIBC.     Ferrous sulfate 325mg and 500mg of vitamin C _____ daily. Most common side effects is constipation, which can be treated with increasing water and fiber or adding miralax if needed.   An iron infusion is also a possibility, but there are some side effects of this treatment with rare but possible anaphylaxis, so most of my patients elect ferrous sulfate and add gabapentin till the ferritin level rises.    Other treatments that can work well is developing a routine of stretching legs, warm bath, massage of legs with lotion, all before bedtime.  Avoiding excessive caffeine, alcohol can help as well.    Gabapentin 100mg pill, 1.5 hrs before bedtime. 1 pill for 3-5 days, if needed increase to 2 pills, then after 3-5 days can increase to 3 pills if needed.    No driving after taking gabapentin.     Let me know what pharmacy you would like this to go to.    Time spent in bed:  Reduce time in bed not devoted to sleep  Worry time: 3 hrs before bedtime.  Journal : list: to do list for next day, for week, thoughts/feelings/concerns  With wakeup: \"i've already done this work\"  Cover your clock    Overnight oximetry                  Follow-ups after your visit        Follow-up notes from your care team     Return for my chart , ferritin level fasting, overnight ox, I'll my chart back with results.      Your next 10 " appointments already scheduled     Jul 16, 2018 10:00 AM CDT   Oximetry  with SLEEP STUDY RM 7   [facility]    [address] [place] [id number] [phone]            Jul 16, 2018  2:30 PM CDT   Ech Complete with [id number]   [facility] Echo ([facility])    [address]  [address] [facility] [id number] [phone]           1.  Please bring or wear a comfortable two-piece outfit. 2.  You may eat, drink and take your normal medicines. 3.  For any questions that cannot be answered, please contact the ordering physician 4.  Please do not wear perfumes or scented lotions on the day of your exam.            Jul 17, 2018  9:00 AM CDT   Oximetry Drop Off with DME SCHEDULE   [facility], [place], Sleep Study ([facility])    [address] [place] [id number] [phone]            Jul 20, 2018  9:05 AM CDT   (Arrive by 8:50 AM)   Return Visit with Edin Mays MD   [facility] ([facility])    [address]  4th Floor  [facility] [id number] [phone]            Jul 24, 2018  2:30 PM CDT   (Arrive by 2:15 PM)   EMG with Carter Hernandez MD   [facility] EMG ([facility])    [address]  [address] [facility] [id number] [phone]           Do not use lotions or creams on the area to be tested. If you are on blood thinners (Warfarin, Coumadin, Lovenox, etc), please contact your primary care physician to check if it is safe to stop them 3 days prior to testing. If you have anxiety, please check with your referring physician to obtain anti-anxiety medication for the procedure.            Aug 08, 2018  1:15 PM CDT   Anticoagulation Visit with LL ANTI COAG   [facility] [place] ([facility])    [address] [place] [address]  Drive  [place] [address]   [phone]            Aug 10, 2018  2:30 PM CDT   (Arrive by 2:15 PM)   Return Visit with Marlena Mora MD   [facility] ([facility])    [address]  4th Floor  [facility] [place] [address]   [phone]            Aug 17, 2018 10:00 AM CDT   (Arrive by 9:45 AM)   Return Visit with Adebayo Ingram MD   [facility] ([facility])    [address]  3rd Floor  [place] [place] [address]   [phone]            Dec 07, 2018  9:30 AM CST   (Arrive by 9:15 AM)   Pacemaker Check with Uc Cv Device 1   [facility] ([facility])    [address]  [address]  [facility] [place] [address]   [phone]              Future tests that were ordered for you today     Open Future Orders        Priority Expected Expires Ordered    Ferritin Routine  9/11/2018 7/13/2018    Iron and Iron Binding Capacity Routine  9/11/2018 7/13/2018    Overnight oximetry study Routine  1/9/2019 7/13/2018            Who to contact     If you have questions or need follow up information about today's clinic visit or your schedule please [facility] directly at [phone].  Normal or non-critical lab and imaging results will be communicated to you by [employer], letter or phone within 4 business days after the clinic has received the results. If you do not hear from us within 7 days, please contact the clinic through [employer] or phone. If you have a critical or abnormal lab result, we will notify you by phone as soon as possible.  Submit refill requests through [employer] or call your pharmacy and they will forward the refill request to us. Please allow 3 business days for your refill to be completed.          Additional Information About Your Visit        [employer] Information     [employer] lets you send messages to your doctor, view  "your test results, renew your prescriptions, schedule appointments and more. To sign up, go to [email or web address] . Click on \"Log in\" on the left side of the screen, which will take you to the Welcome page. Then click on \"Sign up Now\" on the right side of the page.     You will be asked to enter the access code listed below, as well as some personal information. Please follow the directions to create your username and password.     Your access code is: [email or web address]  Expires: 10/11/2018 11:08 AM     Your access code will  in 90 days. If you need help or a new code, please call your [place] clinic or [phone].        Care EveryWhere ID     This is your Care EveryWhere ID. This could be used by other organizations to access your [place] medical records  [id number]        Your Vitals Were     Pulse Respirations Height Pulse Oximetry BMI (Body Mass Index)       97 18 1.778 m (5' 10\") 97% 25.11 kg/m2        Blood Pressure from Last 3 Encounters:   18 111/64   18 106/70   18 128/75    Weight from Last 3 Encounters:   18 79.4 kg (175 lb)   18 80.4 kg (177 lb 4.8 oz)   18 78.9 kg (174 lb)              We Performed the Following     SLEEP EVALUATION & MANAGEMENT REFERRAL - ADULT -[place] [facility]  [phone] (Age 2 and up)        Primary Care Provider Office Phone # Fax #    Edin Mays [phone] [address] [address]        Equal Access to Services     KUN RALPH AH: Paris Lopez, violeta han, melanie mariealmakelby land, elisabeth quinoens. So [facility] [phone].    ATENCIÓN: Si habla español, tiene a herrera disposición servicios gratuitos de asistencia lingüística. Llame al [phone].    We comply with applicable federal civil rights laws and [place] laws. We do not discriminate on the basis of race, color, national origin, age, " disability, sex, sexual orientation, or gender identity.            Thank you!     Thank you for choosing [facility], [place], SLEEP STUDY  for your care. Our goal is always to provide you with excellent care. Hearing back from our patients is one way we can continue to improve our services. Please take a few minutes to complete the written survey that you may receive in the mail after your visit with us. Thank you!             Your Updated Medication List - Protect others around you: Learn how to safely use, store and throw away your medicines at www.disposemymeds.org.          This list is accurate as of 7/13/18 11:10 AM.  Always use your most recent med list.                   Brand Name Dispense Instructions for use Diagnosis    allopurinol 100 MG tablet    ZYLOPRIM    90 tablet    Take 1 tablet (100 mg) by mouth every evening    Idiopathic gout, unspecified chronicity, unspecified site       amoxicillin 500 MG capsule    AMOXIL    4 capsule    Take 2000 MG one hour before colonoscopy.    Prophylactic antibiotic       gabapentin 100 MG capsule    NEURONTIN    120 capsule    Take 1 tablet (100 mg) once daily for 3 days, then 2 tablets once daily for 3 days, then 3 tablets once daily    Restless legs syndrome (RLS), Sensory disturbance       losartan 25 MG tablet    COZAAR    45 tablet    Take 0.5 tablets (12.5 mg) by mouth every evening    Benign essential hypertension       metoprolol succinate 50 MG 24 hr tablet    TOPROL XL    90 tablet    Take 1 tablet (50 mg) by mouth daily    Benign essential hypertension       MULTIVITAMIN ADULTS 50+ Tabs       Flu vaccine need, Aortic valve stenosis, severe, Chronic systolic heart failure (H), S/P aortic valve replacement       * PROVIDER DOSED MANAGED WARFARIN (COUMADIN) OUTPATIENT      Per coumadin clinic        simvastatin 40 MG tablet    ZOCOR    90 tablet    Take 1 tablet (40 mg) by mouth At Bedtime    Hyperlipidemia LDL goal <100       VITAMIN B 12 PO           VITAMIN D  (CHOLECALCIFEROL) PO      Take 1,000 Units by mouth daily    Flu vaccine need, Aortic valve stenosis, severe, Chronic systolic heart failure (H), S/P aortic valve replacement       * warfarin 5 MG tablet    COUMADIN    180 tablet    Take 10mg daily or as directed by the Anticoagulation Clinic    S/P AVR (aortic valve replacement), Paroxysmal atrial fibrillation (H), Long term current use of anticoagulant therapy, S/P aortic valve replacement       * Notice:  This list has 2 medication(s) that are the same as other medications prescribed for you. Read the directions carefully, and ask your doctor or other care provider to review them with you.

## 2018-07-15 NOTE — PROGRESS NOTES
Allergies:    Allergies   Allergen Reactions     Lisinopril Cough       Medications:    Current Outpatient Prescriptions   Medication Sig Dispense Refill     allopurinol (ZYLOPRIM) 100 MG tablet Take 1 tablet (100 mg) by mouth every evening 90 tablet 3     amoxicillin (AMOXIL) 500 MG capsule Take 2000 MG one hour before colonoscopy. 4 capsule 1     Cyanocobalamin (VITAMIN B 12 PO)        gabapentin (NEURONTIN) 100 MG capsule Take 1 tablet (100 mg) once daily for 3 days, then 2 tablets once daily for 3 days, then 3 tablets once daily 120 capsule 3     losartan (COZAAR) 25 MG tablet Take 0.5 tablets (12.5 mg) by mouth every evening 45 tablet 2     metoprolol (TOPROL XL) 50 MG 24 hr tablet Take 1 tablet (50 mg) by mouth daily 90 tablet 3     Multiple Vitamins-Minerals (MULTIVITAMIN ADULTS 50+) TABS        PROVIDER DOSED MANAGED WARFARIN, COUMADIN, OUTPATIENT Per coumadin clinic       simvastatin (ZOCOR) 40 MG tablet Take 1 tablet (40 mg) by mouth At Bedtime 90 tablet 2     VITAMIN D, CHOLECALCIFEROL, PO Take 1,000 Units by mouth daily       warfarin (COUMADIN) 5 MG tablet Take 10mg daily or as directed by the Anticoagulation Clinic 180 tablet 0       Problem List:  Patient Active Problem List    Diagnosis Date Noted     Trigger ring finger of left hand 11/16/2015     Priority: Medium     Paroxysmal atrial fibrillation (H) 02/25/2015     Priority: Medium     Long term current use of anticoagulant therapy 02/25/2015     Priority: Medium     Problem list name updated by automated process. Provider to review       Finger injury 10/08/2014     Priority: Medium     Hyperlipidemia with target LDL less than 100 10/21/2013     Priority: Medium     Diagnosis updated by automated process. Provider to review and confirm.       Need for prophylactic antibiotic 04/12/2013     Priority: Medium     Aortic valve and past endocarditis        S/P AVR 01/25/2013     Priority: Medium     Endocarditis 01/21/2013     Priority: Medium      Cultures negative but 16S rRNA PCR showed Staph capitis.  Treated with Dapto and RIF x 8 weeks.       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 Provider to review       LBBB (left bundle branch block) 01/28/2012     Priority: Medium     Received CRT-D;  msec       Pneumothorax, left 01/28/2012     Priority: Medium     Observed following CRT-D implant; small. Observe and repeat CXR       Heart failure, chronic systolic (H) 01/28/2012     Priority: Medium     Stable; NYHA classII       Cardiomyopathy, dilated, nonischemic (H) 01/28/2012     Priority: Medium     Mild CAD; EF 15-20%       CKD (chronic kidney disease) stage 3, GFR 30-59 ml/min 01/28/2012     Priority: Medium     Dyslipidemia 01/28/2012     Priority: Medium     On Pravavastatin       Aortic valve stenosis, severe 07/14/2011     Priority: Medium     Chronic systolic heart failure (H) 07/14/2011     Priority: Medium     Bicuspid aortic valve 07/14/2011     Priority: Medium     S/P aortic valve replacement 07/14/2011     Priority: Medium     Smoking hx 07/14/2011     Priority: Medium     Rotator cuff (capsule) sprain 02/17/2009     Priority: Medium     Other postprocedural status(V45.89) 02/17/2009     Priority: Medium        Past Medical/Surgical History:  Past Medical History:   Diagnosis Date     BCC (basal cell carcinoma), face 5/16/2013     Bicuspid aortic valve      Chronic systolic heart failure (H)      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 Tx with Dapto/RIFx 8 weeks.     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     Paroxysmal a-fib (H)     Post-op 7/14/2011, 1/26/13     Rotator Cuff (Capsule) Sprain and Strain      S/P aortic valve replacement     7/14/2011, re-do 1/25/13  "due to endocarditis     Smoking hx      Thrombocytopenia (H)      Past Surgical History:   Procedure Laterality Date     ANESTHESIA CARDIOVERSION  7/18/2011    Procedure:ANESTHESIA CARDIOVERSION; Cardioversion; Surgeon:GENERIC ANESTHESIA PROVIDER; Location:[facility] OR     COLONOSCOPY       CORONARY ANGIOGRAPHY ADULT ORDER       CYSTOSCOPY, BIOPSY URETHRA N/A 4/3/2017    Procedure: CYSTOSCOPY, BIOPSY URETHRA;  Surgeon: Marlena Mora MD;  Location: [facility] OR     ENDOSCOPY UPPER, COLONOSCOPY, COMBINED       HC REMOV & REPLACE IMPLT DEFIB PULSE GEN MULT LEAD  12/3/2015          HEMORRHOID SURGERY       IMPLANT AUTOMATIC IMPLANTABLE CARDIOVERTER DEFIBRILLATOR       MOHS MICROGRAPHIC PROCEDURE       ORTHOPEDIC SURGERY      shoulder,right     REDO STERNOTOMY REPLACE VALVE AORTIC  1/25/2013    Procedure: REDO STERNOTOMY REPLACE VALVE AORTIC;  Redo Sternotomy, Redo Aortic Valve Replacement on pump oxygenator. Intraoperative Transesophageal Echocardiogram;  Surgeon: Navin Hampton MD;  Location: [facility] OR     REPAIR VALVE MITRAL  2013     REPLACE VALVE AORTIC  7/14/2011    Procedure:REPLACE VALVE AORTIC; Median Sternotomy, Bioprosthesis,  Aortic Valve replacement on pump with oxygenator. Intra-operative Transesophogeal Echocardiogram.; Surgeon:NAVIN HAMPTON; Location:[facility] OR     teeth extractions       TRANSPLANT             Physical Examination:  Vitals: Pulse 97  Resp 18  Ht 1.778 m (5' 10\")  Wt 79.4 kg (175 lb)  SpO2 97%  BMI 25.11 kg/m2  BMI= Body mass index is 25.11 kg/(m^2).         [place] Total Score 7/13/2018   Total score - [place] 6       GENERAL APPEARANCE: healthy, alert and no distress  EYES: Eyes grossly normal to inspection  HENT: oropharynx crowded, soft palate dependent and nares patent  NECK: thyroid normal to palpation  RESP: lungs clear to auscultation - no rales, rhonchi or wheezes  CV: regular rates and rhythm, 2/6 murmur, without S3 or S4, click or rub   Extremities are without clubbing, " edema  Musculoskeletal:Moves without difficulty  Mallampati Class: IV.  Tonsillar Stage: 1  hidden by pillars.  Dental: Dentition in good condition.  Good advancement of lower jaw without tmd  Skin: without facial rash

## 2018-07-15 NOTE — PROGRESS NOTES
Visit Date:   07/13/2018      DATE OF VISIT:   07/13/2018      LOCATION:  [facility], [place].      CHIEF COMPLAINT:  I have been asked to see Mr. Summers in consultation at the request of Dr. Castro in Cardiology for possible obstructive sleep apnea, undiagnosed and untreated at this point.  Mr. 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 Prior to surgery had severe left systolic dysfunction with an EF of 10%-15%, status post ICD placement 01/2012, now has been improved EF of 50%-55%.        He presented to Cardiology for I believe a routine evaluation.  He reported at his office visit with Dr. Castro that he was feeling slightly more fatigued than normal with increased daytime somnolence, and with what patient reports is some orthostatic sensation with going from bending over to standing up and some palpitations.  Due to these reports, he is going to have his echo moved up, and return to sleep clinic.      HISTORY OF PRESENT ILLNESS:  He is a 73-year-old patient who is not new to me.  I last saw him on 05/26/2017, and with his bed partner present, Pinky, who reporting loud snoring, occasional snort, gasping arousals and witnessed apneas.  The plan was to do a home sleep study.  He did not follow through with this test and does report at this presentation that he is not snoring as much.  There are less frequent witnessed apneas and the biggest change that he has had  is difficulty with falling asleep with wakeups.  He is planning on retiring very shortly and it seems that the anticipatory life change has been causing some difficulty with return to sleep with wakeups resulting in some sleep deprivation and daytime sleepiness .  Enters bed at about 8:00 p.m. on workdays, 9:00 p.m. on  weekends, and exits bed at 5:00 a.m. on workdays, 6:00 a.m. on weekends.  Often, he does wake up at about 3:00 a.m. and then has difficulty reinitiating sleep.  Sleep latency 10 minutes, wakes up 3 times a night and feels that he is missing about 3 hours of sleep at night.  It takes approximately 1 hour to reinitiate sleep with a wakeup.  Total sleep time is estimated between 5 and 6 hours.  While he is awake and waiting to fall back asleep, he will do his to-do list, he will check the clock.        SOCIAL, PERSONAL AND SLEEPY SCALES:  He lives with his wife, Pinky.  He is very active in his employment.  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  Alcohol:  Really, none.  Does have some caffeine, but it is minimal.  He does exercise.  He is very fit.  His Aristes sleepiness scale today is 6/24, and his insomnia scale is 14 with an Aristes sleepiness scale of 6.        FAMILY HISTORY:  Please seen old medical chart.          REVIEW OF SYSTEMS:   CONSTITUTIONAL:  He can have some night sweats at night.   CARDIOVASCULAR:  Occasional irregular beats, he states those are with his wakeups.   PULMONARY:  Shortness of breath with activity.  Can do 2 flights of stairs without stopping.   URINARY TRACT:  Will urinate twice per night.   MENTAL HEALTH:  More anxiety about depression than he would like to have happen.     Allergies:    Allergies   Allergen Reactions     Lisinopril Cough       Medications:    Current Outpatient Prescriptions   Medication Sig Dispense Refill     allopurinol (ZYLOPRIM) 100 MG tablet Take 1 tablet (100 mg) by mouth every evening 90 tablet 3     amoxicillin (AMOXIL) 500 MG capsule Take 2000 MG one hour before colonoscopy. 4 capsule 1     Cyanocobalamin (VITAMIN B 12 PO)        gabapentin (NEURONTIN) 100 MG capsule Take 1 tablet (100 mg) once daily for 3 days, then 2 tablets once daily for 3 days, then 3 tablets once daily 120 capsule 3     losartan (COZAAR) 25 MG tablet Take 0.5 tablets (12.5 mg) by mouth  every evening 45 tablet 2     metoprolol (TOPROL XL) 50 MG 24 hr tablet Take 1 tablet (50 mg) by mouth daily 90 tablet 3     Multiple Vitamins-Minerals (MULTIVITAMIN ADULTS 50+) TABS        PROVIDER DOSED MANAGED WARFARIN, COUMADIN, OUTPATIENT Per coumadin clinic       simvastatin (ZOCOR) 40 MG tablet Take 1 tablet (40 mg) by mouth At Bedtime 90 tablet 2     VITAMIN D, CHOLECALCIFEROL, PO Take 1,000 Units by mouth daily       warfarin (COUMADIN) 5 MG tablet Take 10mg daily or as directed by the Anticoagulation Clinic 180 tablet 0       Problem List:  Patient Active Problem List    Diagnosis Date Noted     Trigger ring finger of left hand 11/16/2015     Priority: Medium     Paroxysmal atrial fibrillation (H) 02/25/2015     Priority: Medium     Long term current use of anticoagulant therapy 02/25/2015     Priority: Medium     Problem list name updated by automated process. Provider to review       Finger injury 10/08/2014     Priority: Medium     Hyperlipidemia with target LDL less than 100 10/21/2013     Priority: Medium     Diagnosis updated by automated process. Provider to review and confirm.       Need for prophylactic antibiotic 04/12/2013     Priority: Medium     Aortic valve and past endocarditis        S/P AVR 01/25/2013     Priority: Medium     Endocarditis 01/21/2013     Priority: Medium     Cultures negative but 16S rRNA PCR showed Staph capitis.  Treated with Dapto and RIF x 8 weeks.       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 Provider to review       LBBB (left bundle branch block) 01/28/2012     Priority: Medium     Received CRT-D;  msec       Pneumothorax, left 01/28/2012     Priority: Medium     Observed following CRT-D implant; small. Observe and repeat CXR       Heart failure, chronic systolic (H) 01/28/2012     Priority: Medium     Stable; NYHA  classII       Cardiomyopathy, dilated, nonischemic (H) 01/28/2012     Priority: Medium     Mild CAD; EF 15-20%       CKD (chronic kidney disease) stage 3, GFR 30-59 ml/min 01/28/2012     Priority: Medium     Dyslipidemia 01/28/2012     Priority: Medium     On Pravavastatin       Aortic valve stenosis, severe 07/14/2011     Priority: Medium     Chronic systolic heart failure (H) 07/14/2011     Priority: Medium     Bicuspid aortic valve 07/14/2011     Priority: Medium     S/P aortic valve replacement 07/14/2011     Priority: Medium     Smoking hx 07/14/2011     Priority: Medium     Rotator cuff (capsule) sprain 02/17/2009     Priority: Medium     Other postprocedural status(V45.89) 02/17/2009     Priority: Medium        Past Medical/Surgical History:  Past Medical History:   Diagnosis Date     BCC (basal cell carcinoma), face 5/16/2013     Bicuspid aortic valve      Chronic systolic heart failure (H)      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 Tx with Dapto/RIFx 8 weeks.     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     Paroxysmal a-fib (H)     Post-op 7/14/2011, 1/26/13     Rotator Cuff (Capsule) Sprain and Strain      S/P aortic valve replacement     7/14/2011, re-do 1/25/13 due to endocarditis     Smoking hx      Thrombocytopenia (H)      Past Surgical History:   Procedure Laterality Date     ANESTHESIA CARDIOVERSION  7/18/2011    Procedure:ANESTHESIA CARDIOVERSION; Cardioversion; Surgeon:GENERIC ANESTHESIA PROVIDER; Location:[facility] OR     COLONOSCOPY       CORONARY ANGIOGRAPHY ADULT ORDER       CYSTOSCOPY, BIOPSY URETHRA N/A 4/3/2017    Procedure: CYSTOSCOPY, BIOPSY URETHRA;  Surgeon: Marlena Mora MD;  Location: [facility] OR     ENDOSCOPY UPPER, COLONOSCOPY, COMBINED       HC REMOV & REPLACE IMPLT DEFIB PULSE GEN MULT LEAD  12/3/2015          HEMORRHOID SURGERY       IMPLANT  "AUTOMATIC IMPLANTABLE CARDIOVERTER DEFIBRILLATOR       MOHS MICROGRAPHIC PROCEDURE       ORTHOPEDIC SURGERY      shoulder,right     REDO STERNOTOMY REPLACE VALVE AORTIC  1/25/2013    Procedure: REDO STERNOTOMY REPLACE VALVE AORTIC;  Redo Sternotomy, Redo Aortic Valve Replacement on pump oxygenator. Intraoperative Transesophageal Echocardiogram;  Surgeon: Stephanie Hampton MD;  Location: [facility] OR     REPAIR VALVE MITRAL  2013     REPLACE VALVE AORTIC  7/14/2011    Procedure:REPLACE VALVE AORTIC; Median Sternotomy, Bioprosthesis,  Aortic Valve replacement on pump with oxygenator. Intra-operative Transesophogeal Echocardiogram.; Surgeon:STEPHANIE HAMPTON; Location:[facility] OR     teeth extractions       TRANSPLANT       Physical Examination:  Vitals: Pulse 97  Resp 18  Ht 1.778 m (5' 10\")  Wt 79.4 kg (175 lb)  SpO2 97%  BMI 25.11 kg/m2  BMI= Body mass index is 25.11 kg/(m^2).    Wilder Total Score 7/13/2018   Total score - Wilder 6       GENERAL APPEARANCE: healthy, alert and no distress  EYES: Eyes grossly normal to inspection  HENT: oropharynx crowded, soft palate dependent and nares patent  NECK: thyroid normal to palpation  RESP: lungs clear to auscultation - no rales, rhonchi or wheezes  CV: regular rates and rhythm, 2/6 murmur, without S3 or S4, click or rub   Extremities are without clubbing, edema  Musculoskeletal:Moves without difficulty  Mallampati Class: IV.  Tonsillar Stage: 1  hidden by pillars.  Dental: Dentition in good condition.  Good advancement of lower jaw without tmd  Skin: without facial rash     ASSESSMENT AND PLAN:  It is my impression that Mr. Summers, who prefers to be called Brooks, has had some changes in his symptoms.  He is not having a lot of snorting or gasping arousals.  He has had some palpitations at night with his wakeups and some night sweats.  Snoring volume is reduced as well.  To that end, we decided to proceed in a cautious manner and the following plan of care has been developed:   1.  " Snoring with a current reduction in frequency and intensity despite a worsening of sleep fragmentation.  It is possible that the perceived change by the bedpartner in a reduction in symptoms of sleep disordered breathing are due to less sleep with an insomnia.  I have ordered an overnight oximetry as well as will be benefitted by giving the results of his echo which is to take place I believe on Monday.  I will be in touch with Brooks once I see the copy of his echo and his overnight oximetry.  We will then make a plan as to whether to move forward with regard to an in-lab study.     2.  Psychophysiologic insomnia with mind being weighted down with thoughts of correction and long range plans.  I have recommended 2 behaviors, which are employing worry time, a journaling activity to get worry out of the bed, and covering the clock.  We will also consider treating some symptoms that sound to be similar to RLS with an urge to move his lower extremities as he enters bed at night; however, he is uncertain how often this happens.     3.  Possible RLS.  Ferritin level pending.      Thank you for allowing me to participate in this kind man's care.      Time spent with patient, 40 minutes, of which greater than 50% has been spent in counseling, education and coordination of care.  Followup will be determined on results of his overnight oximetry, ferritin level, and how we do elect to move forward.  At this point in time, I will review the results of his upcoming echo in light of his change in cardiac symptoms.         SUKH CARREON, CNP             D: 07/15/2018   T: 07/15/2018   MT: RAMONITA      Name:     BROOKS CRUZ   MRN:      [id number]        Account:      [id number]   :      1945           Visit Date:   2018      Document: [id number]       cc: Yemi Castro MD

## 2018-07-16 ENCOUNTER — OFFICE VISIT (OUTPATIENT)
Dept: SLEEP MEDICINE | Facility: CLINIC | Age: 73
End: 2018-07-16
Payer: MEDICARE

## 2018-07-16 ENCOUNTER — RADIANT APPOINTMENT (OUTPATIENT)
Dept: CARDIOLOGY | Facility: CLINIC | Age: 73
End: 2018-07-16
Payer: MEDICARE

## 2018-07-16 DIAGNOSIS — G25.81 RESTLESS LEGS SYNDROME (RLS): ICD-10-CM

## 2018-07-16 DIAGNOSIS — N02.0 IDIOPATHIC HEMATURIA WITH MINOR GLOMERULAR ABNORMALITY: ICD-10-CM

## 2018-07-16 DIAGNOSIS — I35.0 AORTIC VALVE STENOSIS, SEVERE: ICD-10-CM

## 2018-07-16 DIAGNOSIS — R71.8 OTHER ABNORMALITY OF RED BLOOD CELLS: ICD-10-CM

## 2018-07-16 DIAGNOSIS — G47.20 DISTURBED SLEEP RHYTHM: Primary | ICD-10-CM

## 2018-07-16 DIAGNOSIS — I50.22 CHRONIC SYSTOLIC HEART FAILURE (H): ICD-10-CM

## 2018-07-16 LAB
FERRITIN SERPL-MCNC: 134 NG/ML (ref 26–388)
IRON SATN MFR SERPL: 36 % (ref 15–46)
IRON SERPL-MCNC: 101 UG/DL (ref 35–180)
TIBC SERPL-MCNC: 284 UG/DL (ref 240–430)

## 2018-07-16 RX ADMIN — Medication 6 ML: at 14:15

## 2018-07-16 NOTE — PROGRESS NOTES
"Service Date: 2018      Edin Mays MD   [facility]    [facility], [address] [address]      RE: Brooks Summers   MRN: [id number]   : 1945      Dear Dr. Mays:      Thank you for referring Brooks Summers for neurologic consultation on 2018.  The patient is a 73-year-old man who comes with a chief complaint of an \"electric tingling\" in his legs in the evening and an urge to move his legs.      The patient has an indescribable sensation in his legs at night.  He notes this as an \"electric tingling\".  This spares his feet.  He has this as he tries to sleep and he said it mainly involves the upper legs.  He has had this over the last 2 years.  He does have to massage his legs at night.  He has also used BenGay and that seems to help.  He has normal sensation to touch.  The patient said that the symptoms mainly involve the upper legs from the knees to above the pubic rami.  He said this does seem to go into his buttocks at times.  Walking does seem to help.  He has not noticed any weakness.  He said otherwise he is not certain he has lost any strength but he did note he has to use a railing to walk upstairs the last year.  He described his sensation as an irresistible urge to stretch his legs.  He also has rarely cramps in his hamstrings at night.  The patient has not had any other recent complaints.  Specifically, he denied any spine complaint including cervical, thoracic or lumbar.  He has not had Lhermitte sign.  He is able to pass his urine and stool and is not impotent.  He did have some problem.  He was the second of a set of twins coming out and he had some left ankle deformity and he dragged his leg as a baby and his family was told he had some type of bone infection in the ankle.  Despite that, he grew up normally and was athletic.  The patient has had some weight loss possibly in the last 3 months up to 5-10 pounds.  He is more active and his weight has now " stabilized.  The onset of his symptoms in retrospect may have started after he started using allopurinol for gout.  The patient has not had any history of anemia.  He has been in atrial fibrillation for the last 7 years since he had treatment of prosthetic aortic valve endocarditis.  He had 2 surgeries 7 years ago and 5 years ago.  He is on chronic Coumadin.  He also has had an ICD placed.  He has been doing well now and denied any new cardiopulmonary issues.  He does follow with Cardiology here at [facility].  He has been followed in Hematology for low platelets.  The patient did review with me his prior history of trace hematuria and he was seen last year in the Urology Department and he had 5 RBCs noted.  He has not had any gross hematuria.  He denied any change in bowel habit and has had no history of diarrhea or constipation issues.  The patient did have a prior history of bicuspid aortic valve with severe aortic stenosis and had his aortic valve replaced on 2011 and then developed endocarditis.  The patient also had had mitral valve repair.  He has had a prior history of basal cell cancers removed from his face.  He did have gout after his first cardiac surgery.  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  He has also diagnosis of rotator cuff disease.  The patient quit smoking in .  The patient drinks 2 bottles of beer per day.  He has never had on further review of systems amaurosis fugax, focal weakness, focal paresthesias, dysequilibrium, headache or speech disturbance.      The patient has had right shoulder surgery.  He had the second heart surgery on 2013.  The patient is currently on allopurinol, cephalexin prior to surgery, losartan, metoprolol, multivitamin, simvastatin, warfarin and vitamin D.        ALLERGIES:   He has allergies to lisinopril.        The patient's family history is such that his father  at 85 from coronary artery disease.  His mother of cancer of the colon at 93.  He lost a  brother to lung cancer who smoked at 64.  He denied other relevant family history.      The patient's most recent labs showed that on 03/27/2018 his platelet count was 107,000.  He had a hemoglobin of 13.9 and a white count of 5300.  He had normal electrolytes and his glucose was 128.  His last ERIKA in 10/20/2016 showed that he had a bioprosthetic aortic valve present and his global and regional left ventricular function was normal with an EF of 55%-60%.  The patient had mild left ventricular hypertrophy.        The patient worked in [employer].  He is not a vegetarian.  He eats fruits, vegetables and meat.      The patient had on 03/27/2018 a B12 level of 395 picograms and a CPK of 74.  On 11/28/2016, his iron level was 106 and his TIBC 314 with a normal saturation.  His TSH was 2.31 and his folate was 34.7.  His ferritin was 147 and his vitamin D level was 81.  He denied taking any unusual supplements such as B6 or zinc.      Neurologic examination revealed a pleasant man.  Gait, station, cerebellar testing, muscle stretch reflexes that showed trace biceps and brachioradialis reflexes with normal triceps reflexes and normal knee and ankle jerks, plantar stimulation, strength, cranial nerve examination, superficial and cortical sensory testing are all unremarkable.  He had bilateral mild excessive blepharochalasis.  The patient did not have cervical bruits.  He had a loud systolic murmur heard at the apex.  The patient's left ankle is slightly enlarged compared to the right.      IMPRESSION:  Probable restless leg syndrome.      The patient most likely has restless leg syndrome.  I did review this with him at length and he did note that his sister probably has had this.  I suspect this could be genetic, although it would be unusual to start at his age without iron deficiency and his ferritin level is 147 checked in 11/2016.  I did ask that he have some appropriate blood work done today.  The patient is  going to go ahead and try medication for this condition.  I went over potential side effects of gabapentin with him and his wife.  I have asked that he have neurologic followup with me in the next few weeks and on a p.r.n. basis.        I did review with them off the Internet the possibility that allopurinol can be associated with restless leg syndrome.         Sincerely yours,       Maira Ingram MD      I spent 1 hour with the patient.  Over 50% of the time this involved counseling and coordination of care.  A complete review of medical systems was done today and a positive review is listed in the report above.         MAIRA INGRAM MD             D: 2018   T: 2018   MT: AKA      Name:     DAR CRUZ   MRN:      [id number]        Account:      [id number]   :      1945           Service Date: 2018      Document: [id number]

## 2018-07-16 NOTE — PROGRESS NOTES
Patient presented to clinic for  and demonstration of the overnight oximetry. Patient was set up and instructed use. Patient verbalized understanding and demonstrated set up back to me. Patient will be returning device by 10 am tomorrow.    Patient was given sleep logs and written instructions for use.      TAINA Donnelly  Sleep Clinic-Specialist,    Registered Medical Assistant   [facility]

## 2018-07-16 NOTE — MR AVS SNAPSHOT
After Visit Summary   7/16/2018    Brooks Summers    MRN: [id number]           Patient Information     Date Of Birth          1945        Visit Information        Provider Department      7/16/2018 10:00 AM SLEEP STUDY  [facility]        Today's Diagnoses     Disturbed sleep rhythm    -  1       Follow-ups after your visit        Your next 10 appointments already scheduled     Jul 16, 2018 10:00 AM CDT   Oximetry  with SLEEP STUDY  7   [facility] ([facility])    [address] [place] [id number] [phone]            Jul 16, 2018  2:30 PM CDT   [facility]   [facility] Echo ([facility])    [address] [place] [address] [phone]           1.  Please bring or wear a comfortable two-piece outfit. 2.  You may eat, drink and take your normal medicines. 3.  For any questions that cannot be answered, please contact the ordering physician 4.  Please do not wear perfumes or scented lotions on the day of your exam.            Jul 17, 2018  9:00 AM CDT   Oximetry Drop Off with DME SCHEDULE   [facility] ([facility])    [address] [place] [id number] [phone]            Jul 20, 2018  9:05 AM CDT   (Arrive by 8:50 AM)   Return Visit with Edin Mays MD   [facility] Primary Care Clinic ([facility])    [address] [place] [address]  4th [facility] [address] [phone]            Jul 24, 2018  2:30 PM CDT   (Arrive by 2:15 PM)   EMG with Carter Hernandez MD   [facility] EMG ([facility])    [address] [place] [address] [phone]           Do not use lotions or creams on the area to be tested. If you are on blood thinners (Warfarin, Coumadin, Lovenox, etc),  please contact your primary care physician to check if it is safe to stop them 3 days prior to testing. If you have anxiety, please check with your referring physician to obtain anti-anxiety medication for the procedure.            Aug 08, 2018  1:15 PM CDT   Anticoagulation Visit with YVETTE ANTI COAG   [facility] ([facility])    [address] [facility] [address]   [phone]            Aug 10, 2018  2:30 PM CDT   (Arrive by 2:15 PM)   Return Visit with Marlena Mora MD   [facility] Urology and Inst for Prostate and Urologic Cancers ([facility])    [address]  4th Floor  [facility] [address] [phone]            Aug 17, 2018 10:00 AM CDT   (Arrive by 9:45 AM)   Return Visit with Adebayo Ingram MD   [facility] Neurology ([facility])    [address]  3rd Floor  [facility] [id number] [phone]            Dec 07, 2018  9:30 AM CST   (Arrive by 9:15 AM)   Pacemaker Check with Uc Cv Device 1   [facility] Heart Care ([facility])    [address]  [address]  [facility] [address] [phone]              Who to contact     If you have questions or need follow up information about today's clinic visit or your schedule please [facility] directly at [phone].  Normal or non-critical lab and imaging results will be communicated to you by MyChart, letter or phone within 4 business days after the clinic has received the results. If you do not hear from us within 7 days, please contact the clinic through MyChart or phone. If you have a critical or abnormal lab result, we will notify you by phone as soon as possible.  Submit refill requests through [employer] or call your pharmacy and they will forward the refill request to us. Please allow 3 business days for your refill to be completed.          Additional Information About  Your Visit        [employer] Information     [employer] gives you secure access to your electronic health record. If you see a primary care provider, you can also send messages to your care team and make appointments. If you have questions, please call your primary care clinic.  If you do not have a primary care provider, please call [phone] and they will assist you.        Care EveryWhere ID     This is your Care EveryWhere ID. This could be used by other organizations to access your [place] medical records  [id number]         Blood Pressure from Last 3 Encounters:   07/05/18 111/64   06/22/18 106/70   04/27/18 128/75    Weight from Last 3 Encounters:   07/13/18 79.4 kg (175 lb)   07/05/18 80.4 kg (177 lb 4.8 oz)   06/22/18 78.9 kg (174 lb)              We Performed the Following     Overnight oximetry study        Primary Care Provider Office Phone # Fax #    Edin Mays [phone]       [address] [address]        Equal Access to Services     [facility]: Hadii aad ku hadasho Soomaali, waaxda luqadaha, qaybta kaalmada adeegyada, elisabeth berrios . So [facility] [phone].    ATENCIÓN: Si habla español, tiene a herrera disposición servicios gratuitos de asistencia lingüística. Ll[facility] Medical [facility] [phone].    We comply with applicable federal civil rights laws and [place] laws. We do not discriminate on the basis of race, color, national origin, age, disability, sex, sexual orientation, or gender identity.            Thank you!     Thank you for choosing [facility] SLEEP STUDY  for your care. Our goal is always to provide you with excellent care. Hearing back from our patients is one way we can continue to improve our services. Please take a few minutes to complete the written survey that you may receive in the mail after your visit with us. Thank you!             Your Updated Medication List - Protect others around you: Learn how to safely use,  store and throw away your medicines at www.disposemymeds.org.          This list is accurate as of 7/16/18  9:25 AM.  Always use your most recent med list.                   Brand Name Dispense Instructions for use Diagnosis    allopurinol 100 MG tablet    ZYLOPRIM    90 tablet    Take 1 tablet (100 mg) by mouth every evening    Idiopathic gout, unspecified chronicity, unspecified site       amoxicillin 500 MG capsule    AMOXIL    4 capsule    Take 2000 MG one hour before colonoscopy.    Prophylactic antibiotic       gabapentin 100 MG capsule    NEURONTIN    120 capsule    Take 1 tablet (100 mg) once daily for 3 days, then 2 tablets once daily for 3 days, then 3 tablets once daily    Restless legs syndrome (RLS), Sensory disturbance       losartan 25 MG tablet    COZAAR    45 tablet    Take 0.5 tablets (12.5 mg) by mouth every evening    Benign essential hypertension       metoprolol succinate 50 MG 24 hr tablet    TOPROL XL    90 tablet    Take 1 tablet (50 mg) by mouth daily    Benign essential hypertension       MULTIVITAMIN ADULTS 50+ Tabs       Flu vaccine need, Aortic valve stenosis, severe, Chronic systolic heart failure (H), S/P aortic valve replacement       * PROVIDER DOSED MANAGED WARFARIN (COUMADIN) OUTPATIENT      Per coumadin clinic        simvastatin 40 MG tablet    ZOCOR    90 tablet    Take 1 tablet (40 mg) by mouth At Bedtime    Hyperlipidemia LDL goal <100       VITAMIN B 12 PO           VITAMIN D (CHOLECALCIFEROL) PO      Take 1,000 Units by mouth daily    Flu vaccine need, Aortic valve stenosis, severe, Chronic systolic heart failure (H), S/P aortic valve replacement       * warfarin 5 MG tablet    COUMADIN    180 tablet    Take 10mg daily or as directed by the Anticoagulation Clinic    S/P AVR (aortic valve replacement), Paroxysmal atrial fibrillation (H), Long term current use of anticoagulant therapy, S/P aortic valve replacement       * Notice:  This list has 2 medication(s) that are the  same as other medications prescribed for you. Read the directions carefully, and ask your doctor or other care provider to review them with you.

## 2018-07-17 ENCOUNTER — DOCUMENTATION ONLY (OUTPATIENT)
Dept: SLEEP MEDICINE | Facility: CLINIC | Age: 73
End: 2018-07-17
Payer: MEDICARE

## 2018-07-17 NOTE — PROGRESS NOTES
Results received from [employer] for overnight oximetry that was picked up on 07/16/2018.     Results labeled and scanned into chart.     Copy given to provider for review. Encounter also forwarded to provider.     Recording Date: 07/16/2018    Duration: 8:53:28    Time (min) Spent below 88%: 5.8    Completed on: [employer]

## 2018-07-20 ENCOUNTER — TELEPHONE (OUTPATIENT)
Dept: GASTROENTEROLOGY | Facility: CLINIC | Age: 73
End: 2018-07-20

## 2018-07-20 ENCOUNTER — OFFICE VISIT (OUTPATIENT)
Dept: INTERNAL MEDICINE | Facility: CLINIC | Age: 73
End: 2018-07-20
Payer: MEDICARE

## 2018-07-20 VITALS
SYSTOLIC BLOOD PRESSURE: 129 MMHG | DIASTOLIC BLOOD PRESSURE: 75 MMHG | BODY MASS INDEX: 25.3 KG/M2 | WEIGHT: 176.3 LBS | HEART RATE: 62 BPM | RESPIRATION RATE: 18 BRPM

## 2018-07-20 DIAGNOSIS — Z12.11 SPECIAL SCREENING FOR MALIGNANT NEOPLASMS, COLON: Primary | ICD-10-CM

## 2018-07-20 ASSESSMENT — PAIN SCALES - GENERAL: PAINLEVEL: NO PAIN (0)

## 2018-07-20 NOTE — MR AVS SNAPSHOT
After Visit Summary   7/20/2018    Brooks Summers    MRN: [id number]           Patient Information     Date Of Birth          1945        Visit Information        Provider Department      7/20/2018 9:05 AM Edin Mays MD [facility] Primary Care [facility]        Today's Diagnoses     Special screening for malignant neoplasms, colon    -  1      Care Instructions    Primary Care Center Medication Refill Request Information:  * Please contact your pharmacy regarding ANY request for medication refills.  ** [facility] Prescription Fax = [phone]  * Please allow 3 business days for routine medication refills.  * Please allow 5 business days for controlled substance medication refills.     Primary Care Center Test Result notification information:  *You will be notified with in 7-10 days of your appointment day regarding the results of your test.  If you are on MyChart you will be notified as soon as the provider has reviewed the results and signed off on them.    Primary Care Center [phone]     Colonoscopy [phone]            Follow-ups after your visit        Additional Services     GASTROENTEROLOGY ADULT REF PROCEDURE ONLY       Last Lab Result: Creatinine (mg/dL)       Date                     Value                 03/27/2018               0.94             ----------  Body mass index is 25.3 kg/(m^2).     Needed:  No  Language:  English    Patient will be contacted to schedule procedure.     Please be aware that coverage of these services is subject to the terms and limitations of your health insurance plan.  Call member services at your health plan with any benefit or coverage questions.  Any procedures must be performed at a [place] facility OR coordinated by your clinic's referral office.    Please bring the following with you to your appointment:    (1) Any X-Rays, CTs or MRIs which have been performed.  Contact the facility where they were done to arrange for  prior to  your scheduled appointment.    (2) List of current medications   (3) This referral request   (4) Any documents/labs given to you for this referral                  Your next 10 appointments already scheduled     Jul 26, 2018  8:30 AM CDT   (Arrive by 8:15 AM)   EMG with Raúl Gibbons MD   [facility] EMG ([facility])    [address]  3rd Floor  [facility] [address]   [phone]            Aug 08, 2018  1:15 PM CDT   [facility]ticoagulation Visit with LL ANTI COAG   [facility]    [address] [facility] [address] [facility] [address]   [phone]            Aug 10, 2018  2:30 PM CDT   (Arrive by 2:15 PM)   Return Visit with Marlena Mora MD   [facility]rology [facility] Inst for Prostate and Urologic Cancers ([facility])    [address]  4th Floor  [facility] [address]   [phone]            Aug 17, 2018 10:00 AM CDT   (Arrive by 9:45 AM)   Return Visit with Adebayo Ingram MD   [facility] Neurology ([facility])    [address]  3rd [facility] [address] [phone]            Dec 07, 2018  9:30 AM CST   (Arrive by 9:15 AM)   Pacemaker Check with Uc Cv Device 1   [facility] Heart Care ([facility])    [address]  [address]  [facility] [address] [phone]              Who to contact     Please call your clinic at [phone] to:    Ask questions about your health    Make or cancel appointments    Discuss your medicines    Learn about your test results    Speak to your doctor            Additional Information About Your Visit        MyChart Information     [employer] gives you secure access to your electronic health record. If you see a primary care provider, you can also send messages to your care team and make appointments. If you have questions, please call your primary care clinic.  If you do not have a  primary care provider, please call [phone] and they will assist you.      [employer] is an electronic gateway that provides easy, online access to your medical records. With [employer], you can request a clinic appointment, read your test results, renew a prescription or communicate with your care team.     To access your existing account, please contact your [facility] or call [phone] for assistance.        Care EveryWhere ID     This is your Care EveryWhere ID. This could be used by other organizations to access your [place] medical records  [id number]        Your Vitals Were     Pulse Respirations BMI (Body Mass Index)             62 18 25.3 kg/m2          Blood Pressure from Last 3 Encounters:   07/20/18 129/75   07/05/18 111/64   06/22/18 106/70    Weight from Last 3 Encounters:   07/20/18 80 kg (176 lb 4.8 oz)   07/13/18 79.4 kg (175 lb)   07/05/18 80.4 kg (177 lb 4.8 oz)              We Performed the Following     GASTROENTEROLOGY ADULT REF PROCEDURE ONLY        Primary Care Provider Office Phone # Fax #    Edin Mays [phone]       [address] [address]        Equal Access to Services     [facility] : Hadii aad ku hadasho Soomaali, waaxda luqadaha, qaybta kaalmada adeegyada, elisabeth quinones. So [facility] [phone].    ATENCIÓN: Si habla español, tiene a herrera disposición servicios gratuitos de asistencia lingüística. Llame al [phone].    We comply with applicable federal civil rights laws and [place] laws. We do not discriminate on the basis of race, color, national origin, age, disability, sex, sexual orientation, or gender identity.            Thank you!     Thank you for choosing [facility]  for your care. Our goal is always to provide you with excellent care. Hearing back from our patients is one way we can continue to improve our services. Please take a few minutes to  complete the written survey that you may receive in the mail after your visit with us. Thank you!             Your Updated Medication List - Protect others around you: Learn how to safely use, store and throw away your medicines at www.disposemymeds.org.          This list is accurate as of 7/20/18  9:09 AM.  Always use your most recent med list.                   Brand Name Dispense Instructions for use Diagnosis    allopurinol 100 MG tablet    ZYLOPRIM    90 tablet    Take 1 tablet (100 mg) by mouth every evening    Idiopathic gout, unspecified chronicity, unspecified site       amoxicillin 500 MG capsule    AMOXIL    4 capsule    Take 2000 MG one hour before colonoscopy.    Prophylactic antibiotic       gabapentin 100 MG capsule    NEURONTIN    120 capsule    Take 1 tablet (100 mg) once daily for 3 days, then 2 tablets once daily for 3 days, then 3 tablets once daily    Restless legs syndrome (RLS), Sensory disturbance       losartan 25 MG tablet    COZAAR    45 tablet    Take 0.5 tablets (12.5 mg) by mouth every evening    Benign essential hypertension       metoprolol succinate 50 MG 24 hr tablet    TOPROL XL    90 tablet    Take 1 tablet (50 mg) by mouth daily    Benign essential hypertension       MULTIVITAMIN ADULTS 50+ Tabs       Flu vaccine need, Aortic valve stenosis, severe, Chronic systolic heart failure (H), S/P aortic valve replacement       * PROVIDER DOSED MANAGED WARFARIN (COUMADIN) OUTPATIENT      Per coumadin clinic        simvastatin 40 MG tablet    ZOCOR    90 tablet    Take 1 tablet (40 mg) by mouth At Bedtime    Hyperlipidemia LDL goal <100       VITAMIN B 12 PO           VITAMIN D (CHOLECALCIFEROL) PO      Take 1,000 Units by mouth daily    Flu vaccine need, Aortic valve stenosis, severe, Chronic systolic heart failure (H), S/P aortic valve replacement       * warfarin 5 MG tablet    COUMADIN    180 tablet    Take 10mg daily or as directed by the Anticoagulation Clinic    S/P AVR (aortic  valve replacement), Paroxysmal atrial fibrillation (H), Long term current use of anticoagulant therapy, S/P aortic valve replacement       * Notice:  This list has 2 medication(s) that are the same as other medications prescribed for you. Read the directions carefully, and ask your doctor or other care provider to review them with you.

## 2018-07-20 NOTE — PATIENT INSTRUCTIONS
[facility] Medication Refill Request Information:  * Please contact your pharmacy regarding ANY request for medication refills.  ** [facility] Prescription Fax = [phone]  * Please allow 3 business days for routine medication refills.  * Please allow 5 business days for controlled substance medication refills.     [facility] Test Result notification information:  *You will be notified with in 7-10 days of your appointment day regarding the results of your test.  If you are on MyChart you will be notified as soon as the provider has reviewed the results and signed off on them.    [facility] [phone]     [facility] [phone]

## 2018-07-20 NOTE — PROGRESS NOTES
SUBJECTIVE:   Brooks Summers is a 73 year old male who presents alone to clinic for follow up from leg numbness visit on 4/27/18.  Was seen on 7/5/18 by Neurologist Dr. Ingram with a diagnosis of probable restless leg syndrome.  Was started on Gabapentin 100mg with an increase to 300mg daily on day 7.  States his leg symptoms have completely resolved.  He is doing so well that he was considering cancelling EMG appointment next week-encouraged patient to keep appointment and he was agreeable.      Completed sleep study last week.  [facility] message sent by ALVARO Choudhury to patient today.  Ferritin level: 134, iron: 101, iron binding cap: 284, iron saturation index: 36.  NP Tawana Choudhury is recommending non pharmacologic measures.  Plans to review overnight oximetry and connect with patient on Tuesday 7/24/18.      Brooks plans to fully retire and move to [place] January-March 2019.  He is also planning to downsize. He stated he is feeling some stress from these upcoming changes.      Denies cardiac symptoms including SOB, chest pain, headache, or dizziness.  Tries to walk or bike multiple times per week for exercise.  Active with housework and mowing the lawn.     Problem list and histories reviewed & adjusted, as indicated.  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[employer], Bi-Ventricular- INT dependent     Endocarditis     S/P AVR     Need for prophylactic antibiotic      Hyperlipidemia with target LDL less than 100     Finger injury     Paroxysmal atrial fibrillation (H)     Long term current use of anticoagulant therapy     Trigger ring finger of left hand     Past Surgical History:   Procedure Laterality Date     ANESTHESIA CARDIOVERSION  7/18/2011    Procedure:ANESTHESIA CARDIOVERSION; Cardioversion; Surgeon:GENERIC ANESTHESIA PROVIDER; Location:[facility] OR     COLONOSCOPY       CORONARY ANGIOGRAPHY ADULT ORDER       CYSTOSCOPY, BIOPSY URETHRA N/A 4/3/2017    Procedure: CYSTOSCOPY, BIOPSY URETHRA;  Surgeon: Marlena Mora MD;  Location: [facility] OR     ENDOSCOPY UPPER, COLONOSCOPY, COMBINED       HC REMOV & REPLACE IMPLT DEFIB PULSE GEN MULT LEAD  12/3/2015          HEMORRHOID SURGERY       IMPLANT AUTOMATIC IMPLANTABLE CARDIOVERTER DEFIBRILLATOR       MOHS MICROGRAPHIC PROCEDURE       ORTHOPEDIC SURGERY      shoulder,right     REDO STERNOTOMY REPLACE VALVE AORTIC  1/25/2013    Procedure: REDO STERNOTOMY REPLACE VALVE AORTIC;  Redo Sternotomy, Redo Aortic Valve Replacement on pump oxygenator. Intraoperative Transesophageal Echocardiogram;  Surgeon: Navin Hampton MD;  Location: [facility] OR     REPAIR VALVE MITRAL  2013     REPLACE VALVE AORTIC  7/14/2011    Procedure:REPLACE VALVE AORTIC; Median Sternotomy, Bioprosthesis,  Aortic Valve replacement on pump with oxygenator. Intra-operative Transesophogeal Echocardiogram.; Surgeon:NAVIN HAMPTON; Location:[facility] OR     teeth extractions       TRANSPLANT         Social History   Substance Use Topics     Smoking status: Former Smoker     Packs/day: 1.00     Years: 20.00     Types: Cigarettes     Quit date: 12/31/1989     Smokeless tobacco: Never Used     Alcohol use 1.2 oz/week     Family History   Problem Relation Age of Onset     C.A.D. Father 68     bypass, carotid      Prostate Cancer Father 70     HEART DISEASE Father      Cancer Mother 92     stomach, colon     Hypertension Mother      Retinal detachment Mother      Cancer Brother 64     lung  cancer, petroleum     Cancer Brother      Glaucoma No family hx of      Macular Degeneration No family hx of      Strabismus No family hx of      Glasses (<7 y/o) No family hx of          Current Outpatient Prescriptions   Medication Sig Dispense Refill     allopurinol (ZYLOPRIM) 100 MG tablet Take 1 tablet (100 mg) by mouth every evening 90 tablet 3     amoxicillin (AMOXIL) 500 MG capsule Take 2000 MG one hour before colonoscopy. 4 capsule 1     Cyanocobalamin (VITAMIN B 12 PO)        gabapentin (NEURONTIN) 100 MG capsule Take 1 tablet (100 mg) once daily for 3 days, then 2 tablets once daily for 3 days, then 3 tablets once daily 120 capsule 3     losartan (COZAAR) 25 MG tablet Take 0.5 tablets (12.5 mg) by mouth every evening 45 tablet 2     metoprolol (TOPROL XL) 50 MG 24 hr tablet Take 1 tablet (50 mg) by mouth daily 90 tablet 3     Multiple Vitamins-Minerals (MULTIVITAMIN ADULTS 50+) TABS        PROVIDER DOSED MANAGED WARFARIN, COUMADIN, OUTPATIENT Per coumadin clinic       simvastatin (ZOCOR) 40 MG tablet Take 1 tablet (40 mg) by mouth At Bedtime 90 tablet 2     VITAMIN D, CHOLECALCIFEROL, PO Take 1,000 Units by mouth daily       warfarin (COUMADIN) 5 MG tablet Take 10mg daily or as directed by the Anticoagulation Clinic 180 tablet 0     Reviewed and updated as needed this visit by clinical staff  Tobacco  Allergies  Meds       Reviewed and updated as needed this visit by Provider         ROS:  Constitutional, HEENT, cardiovascular, pulmonary, gi and gu systems are negative, except as otherwise noted.    OBJECTIVE:     /75 (BP Location: Right arm, Patient Position: Sitting, Cuff Size: Adult Regular)  Pulse 62  Resp 18  Wt 80 kg (176 lb 4.8 oz)  BMI 25.3 kg/m2  Body mass index is 25.3 kg/(m^2).  GENERAL: healthy, alert and no distress  RESP: lungs clear to auscultation - no rales, rhonchi or wheezes  CV: regular rate and rhythm, normal S1 S2, no S3 or S4, no murmur, click or rub  NEURO: mentation  intact and speech normal  PSYCH: mentation appears normal, affect normal/bright    ASSESSMENT/PLAN:   1. Special screening for malignant neoplasms, colon  - GASTROENTEROLOGY ADULT REF PROCEDURE ONLY    -Recommended keeping EMG appointment on 7/26/18.  Explained importance of keeping appointment  -Recommended keeping Pacemaker check appointment on 12/7/18  -Colonoscopy: plans to complete in September 2018  -Discussed Shingles vaccines- recommended checking with insurance before getting vaccine for [place].  Discussed need for completing two step vaccine series.      Health Maintenance:  1. Tetanus: completed 4/12/13  2. 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  This note reflects history and physical findings, which were verified by the above provider.     Edin Mays MD  [facility]      Staff note; I personally reviewed past medical history. I conducted interview and physical examination. I agree with above assessment and plan    Ordered future colonoscopy. Neuropathy less sxs. On Gabapentin    BALAJI Mays MD    Total time spent 25 minutes.  More than 50% of the time spent with Mr. Summers on counseling / coordinating his care

## 2018-07-20 NOTE — NURSING NOTE
Chief Complaint   Patient presents with     Recheck Medication     Patient is here for follow up       Fercho Benito CMA ([facility]) at 8:29 AM on 7/20/2018

## 2018-07-23 ENCOUNTER — TELEPHONE (OUTPATIENT)
Dept: GASTROENTEROLOGY | Facility: CLINIC | Age: 73
End: 2018-07-23

## 2018-07-24 ENCOUNTER — TELEPHONE (OUTPATIENT)
Dept: GASTROENTEROLOGY | Facility: CLINIC | Age: 73
End: 2018-07-24

## 2018-07-26 ENCOUNTER — OFFICE VISIT (OUTPATIENT)
Dept: NEUROLOGY | Facility: CLINIC | Age: 73
End: 2018-07-26
Attending: PSYCHIATRY & NEUROLOGY
Payer: MEDICARE

## 2018-07-26 DIAGNOSIS — G25.81 RESTLESS LEGS SYNDROME (RLS): ICD-10-CM

## 2018-07-26 DIAGNOSIS — R20.9 DISTURBANCE OF SKIN SENSATION: Primary | ICD-10-CM

## 2018-07-26 DIAGNOSIS — R20.9 SENSORY DISTURBANCE: ICD-10-CM

## 2018-07-26 NOTE — MR AVS SNAPSHOT
After Visit Summary   7/26/2018    Brooks Summers    MRN: [id number]           Patient Information     Date Of Birth          1945        Visit Information        Provider Department      7/26/2018 8:30 AM Raúl Gibbons MD [facility] EMG        Today's Diagnoses     Disturbance of skin sensation    -  1    Sensory disturbance        Restless legs syndrome (RLS)           Follow-ups after your visit        Your next 10 appointments already scheduled     Aug 08, 2018  1:15 PM CDT   [facility]   [facility]    [address] [address]   [phone]            Aug 10, 2018  2:30 PM CDT   (Arrive by 2:15 PM)   Return Visit with Marlena Mora MD   [facility] Urology and Inst for Prostate and Urologic Cancers ([facility])    [address]  [address] [facility] [id number] [phone]            Aug 17, 2018 10:00 AM CDT   (Arrive by 9:45 AM)   Return Visit with Adebayo Ingram MD   [facility] Neurology ([facility])    [address]  3rd Floor  [facility] [id number] [phone]            Dec 07, 2018  9:30 AM CST   (Arrive by 9:15 AM)   Pacemaker Check with Uc Cv Device 1   [facility] ([facility])    [address]  [address]  [facility] [address] [phone]            Dec 07, 2018 10:35 AM CST   (Arrive by 10:20 AM)   Return Visit with Edin Mays MD   [facility] Primary Care Clinic ([facility])    [address] [place] [address]  4th Floor  [facility] [id number] [phone]              Future tests that were ordered for you today     Open Future Orders        Priority Expected Expires Ordered    Needle EMG Each Extremity w/Paraspinal Area Complete (38959) Routine  7/26/2019 7/26/2018    Needle EMG Each Extremity w/Paraspinal Area Limited  (61238) Routine  7/26/2019 7/26/2018            Who to contact     Please call your clinic at [phone] to:    Ask questions about your health    Make or cancel appointments    Discuss your medicines    Learn about your test results    Speak to your doctor            Additional Information About Your Visit        MyChart Information     [employer] gives you secure access to your electronic health record. If you see a primary care provider, you can also send messages to your care team and make appointments. If you have questions, please call your primary care clinic.  If you do not have a primary care provider, please call [phone] and they will assist you.      [employer] is an electronic gateway that provides easy, online access to your medical records. With [employer], you can request a clinic appointment, read your test results, renew a prescription or communicate with your care team.     To access your existing account, please contact your [facility] or call [phone] for assistance.        Care EveryWhere ID     This is your Care EveryWhere ID. This could be used by other organizations to access your [facility] medical records  [id number]         Blood Pressure from Last 3 Encounters:   07/20/18 129/75   07/05/18 111/64   06/22/18 106/70    Weight from Last 3 Encounters:   07/20/18 80 kg (176 lb 4.8 oz)   07/13/18 79.4 kg (175 lb)   07/05/18 80.4 kg (177 lb 4.8 oz)              We Performed the Following     EMG     NCS Motor with or without F-Wave, 7-8 nerves (70599)        Primary Care Provider Office Phone # Fax #    Edin Mays [phone] [address] [address]        Equal Access to Services     JASMYNE [facility]REYA : Hadii robin Lopez, waaxda luqadaha, qaybta kaalmada emery, elisabeth quinones. So [facility] [phone].    ATENCIÓN: Si habla español, tiene a herrera disposición servicios gratuitos de  john lingüísticjosep. Yohannes al [phone].    We comply with applicable federal civil rights laws and [place] laws. We do not discriminate on the basis of race, color, national origin, age, disability, sex, sexual orientation, or gender identity.            Thank you!     Thank you for choosing [facility]  for your care. Our goal is always to provide you with excellent care. Hearing back from our patients is one way we can continue to improve our services. Please take a few minutes to complete the written survey that you may receive in the mail after your visit with us. Thank you!             Your Updated Medication List - Protect others around you: Learn how to safely use, store and throw away your medicines at www.disposemymeds.org.          This list is accurate as of 7/26/18  9:21 AM.  Always use your most recent med list.                   Brand Name Dispense Instructions for use Diagnosis    allopurinol 100 MG tablet    ZYLOPRIM    90 tablet    Take 1 tablet (100 mg) by mouth every evening    Idiopathic gout, unspecified chronicity, unspecified site       amoxicillin 500 MG capsule    AMOXIL    4 capsule    Take 2000 MG one hour before colonoscopy.    Prophylactic antibiotic       gabapentin 100 MG capsule    NEURONTIN    120 capsule    Take 1 tablet (100 mg) once daily for 3 days, then 2 tablets once daily for 3 days, then 3 tablets once daily    Restless legs syndrome (RLS), Sensory disturbance       losartan 25 MG tablet    COZAAR    45 tablet    Take 0.5 tablets (12.5 mg) by mouth every evening    Benign essential hypertension       metoprolol succinate 50 MG 24 hr tablet    TOPROL XL    90 tablet    Take 1 tablet (50 mg) by mouth daily    Benign essential hypertension       MULTIVITAMIN ADULTS 50+ Tabs       Flu vaccine need, Aortic valve stenosis, severe, Chronic systolic heart failure (H), S/P aortic valve replacement       * PROVIDER DOSED MANAGED WARFARIN (COUMADIN) OUTPATIENT      Per  coumadin clinic        simvastatin 40 MG tablet    ZOCOR    90 tablet    Take 1 tablet (40 mg) by mouth At Bedtime    Hyperlipidemia LDL goal <100       VITAMIN B 12 PO           VITAMIN D (CHOLECALCIFEROL) PO      Take 1,000 Units by mouth daily    Flu vaccine need, Aortic valve stenosis, severe, Chronic systolic heart failure (H), S/P aortic valve replacement       * warfarin 5 MG tablet    COUMADIN    180 tablet    Take 10mg daily or as directed by the Anticoagulation Clinic    S/P AVR (aortic valve replacement), Paroxysmal atrial fibrillation (H), Long term current use of anticoagulant therapy, S/P aortic valve replacement       * Notice:  This list has 2 medication(s) that are the same as other medications prescribed for you. Read the directions carefully, and ask your doctor or other care provider to review them with you.

## 2018-07-26 NOTE — PROGRESS NOTES
[facility]  Electrodiagnostic Laboratory    Nerve Conduction & EMG Report          Patient: Brooks Summers YOB: 1945  Patient ID: [id number] Age: 73 Years 4 Months  Gender: Male      History & Examination: This is a 73-year-old gentleman with probable restless leg syndrome referred by Dr. Ingram.  The patient does have some sensory disturbance in his feet and Dr. Ingram assess to evaluate for peripheral neuropathy.    Techniques:  Motor conduction studies were done with surface recording electrodes. Sensory conduction studies were performed with surface electrodes, unless indicated otherwise by (n), designating the use of subdermal recording electrodes. Temperature was monitored and recorded throughout the study. Upper extremities were maintained at a temperature of 32 degrees Centigrade or higher. Lower extremities were maintained at a temperature of 31 degrees Centigrade or higher. EMG was done with a concentric needle electrode.     Results: Bilateral sural and superficial peroneal sensory nerve action potentials are normal.  The left peroneal/EDB compound motor action potential is absent.  The right peroneal/EDB, bilateral peroneal/AT and bilateral tibial motor conduction studies are normal.  Needle examination revealed mild prolongation of motor unit potential durations in the left L5 muscles both proximal and distal.  No fibrillation was seen.    Impression:   The EMG is abnormal.  There is EMG evidence for a mild, chronic, inactive left L5 radiculopathy.  There is no EMG evidence for a generalized, sensorimotor,  peripheral neuropathy.    Examiner: Raúl Gibbons MD      Sensory NCS      Nerve / Sites Rec. Site Onset Peak NP Amp Ref. PP Amp Dist Adebayo Ref. Temp     ms ms  V  V  V cm m/s m/s  C   R SURAL - Lat Mall 60      Calf Ankle 3.13 4.01 5.6 5.0 4.1 14 44.8 38.0 31.5   L SURAL - Lat Mall 60      Calf Ankle 3.39 4.27 6.6 5.0 7.5 14 41.4 38.0 29.7   R SUP PERONEAL       Lat Leg Perez 3.28 4.69 1.9  1.9 12.5 38.1 38.0 31.5   L SUP PERONEAL      Lat Leg Perez 2.97 3.85 3.9  3.9 12.5 42.1 38.0 30.6       Motor NCS      Nerve / Sites Rec. Site Lat Ref. Amp Ref. Rel Amp Dist Adebayo Ref. Dur. Area Temp.     ms ms mV mV % cm m/s m/s ms %  C   R DEEP PERONEAL - EDB 60      Ankle EDB 3.54 6.00 6.4 2.0 100 8   4.74 100 32.1      FibHead EDB 11.56  5.3  81.8 36 44.9 38.0 5.47 90.7 32      Pop Fos EDB 13.44  4.7  72.7 9 48.0 38.0 5.68 86.9 32   L DEEP PERONEAL - EDB 60      Ankle EDB NR 6.00 NR 2.0 NR 8   NR NR 28.1   R TIBIAL - AH      Ankle AH 4.11 6.00 9.1 4.0 100 8   5.62 100 31.5      Pop Fos AH 13.75  7.6  83.7 40 41.5 38.0 6.61 108 31.5   L TIBIAL - AH      Ankle AH 3.85 6.00 9.2 4.0 100 8   6.25 100 29.9   L PERONEAL - Tib Ant      Fib Head Tib Ant 4.43  4.6  100 10   7.08 100 28.3      Knee Tib Ant 6.51  4.6  99.3 12 57.6  7.24 104 28.1   R PERONEAL - Tib Ant      Fib Head Tib Ant 4.69  7.0  100    8.59 100 30.4      Knee Tib Ant 6.35  7.0  100    8.65 102 30.6           F  Wave      Nerve Min F Lat Max F Lat Mean FLat Temp.    ms ms ms  C   R TIBIAL 53.44 61.56 57.34 31.7       Needle EMG (W)          Rep Nerve Stim 6      EMG Summary Table     Spontaneous MUAP Recruitment    IA Fib PSW Fasc H.F. Amp Dur. PPP Pattern   L. FIRST D INTEROSS N None None None None N N None Normal   L. TIB ANTERIOR N None None None None 1+ 1+ None Normal   L. GASTROCN (MED) N None None None None N N None Normal   L. TIB POSTERIOR N None None None None 1+ 1+ None Normal   L. PERON TERTIUS N None None None None 1+ 1+ None Normal   R. TIB ANTERIOR N None None None None N N None Normal   L. T FASCIA JYOTI N None None None None 1+ 1+ None Normal

## 2018-07-26 NOTE — LETTER
7/26/2018       RE: Brooks Summers  [address]  [facility] [address]     Dear Colleague,    Thank you for referring your patient, rBooks Summers, to the [facility] EMG at [facility]. Please see a copy of my visit note below.        [facility]  Electrodiagnostic Laboratory    Nerve Conduction & EMG Report          Patient: Brooks Summers YOB: 1945  Patient ID: [id number] Age: 73 Years 4 Months  Gender: Male      History & Examination: This is a 73-year-old gentleman with probable restless leg syndrome referred by Dr. Ingram.  The patient does have some sensory disturbance in his feet and Dr. Ingram assess to evaluate for peripheral neuropathy.    Techniques:  Motor conduction studies were done with surface recording electrodes. Sensory conduction studies were performed with surface electrodes, unless indicated otherwise by (n), designating the use of subdermal recording electrodes. Temperature was monitored and recorded throughout the study. Upper extremities were maintained at a temperature of 32 degrees Centigrade or higher. Lower extremities were maintained at a temperature of 31 degrees Centigrade or higher. EMG was done with a concentric needle electrode.     Results: Bilateral sural and superficial peroneal sensory nerve action potentials are normal.  The left peroneal/EDB compound motor action potential is absent.  The right peroneal/EDB, bilateral peroneal/AT and bilateral tibial motor conduction studies are normal.  Needle examination revealed mild prolongation of motor unit potential durations in the left L5 muscles both proximal and distal.  No fibrillation was seen.    Impression:   The EMG is abnormal.  There is EMG evidence for a mild, chronic, inactive left L5 radiculopathy.  There is no EMG evidence for a generalized, sensorimotor,  peripheral neuropathy.    Examiner: Raúl Gibbons MD      Sensory NCS      Nerve /  Sites Rec. Site Onset Peak NP Amp Ref. PP Amp Dist Adebayo Ref. Temp     ms ms  V  V  V cm m/s m/s  C   R SURAL - Lat Mall 60      Calf Ankle 3.13 4.01 5.6 5.0 4.1 14 44.8 38.0 31.5   L SURAL - Lat Mall 60      Calf Ankle 3.39 4.27 6.6 5.0 7.5 14 41.4 38.0 29.7   R SUP PERONEAL      Lat Leg Perez 3.28 4.69 1.9  1.9 12.5 38.1 38.0 31.5   L SUP PERONEAL      Lat Leg Perez 2.97 3.85 3.9  3.9 12.5 42.1 38.0 30.6       Motor NCS      Nerve / Sites Rec. Site Lat Ref. Amp Ref. Rel Amp Dist Adebayo Ref. Dur. Area Temp.     ms ms mV mV % cm m/s m/s ms %  C   R DEEP PERONEAL - EDB 60      Ankle EDB 3.54 6.00 6.4 2.0 100 8   4.74 100 32.1      FibHead EDB 11.56  5.3  81.8 36 44.9 38.0 5.47 90.7 32      Pop Fos EDB 13.44  4.7  72.7 9 48.0 38.0 5.68 86.9 32   L DEEP PERONEAL - EDB 60      Ankle EDB NR 6.00 NR 2.0 NR 8   NR NR 28.1   R TIBIAL - AH      Ankle AH 4.11 6.00 9.1 4.0 100 8   5.62 100 31.5      Pop Fos AH 13.75  7.6  83.7 40 41.5 38.0 6.61 108 31.5   L TIBIAL - AH      Ankle AH 3.85 6.00 9.2 4.0 100 8   6.25 100 29.9   L PERONEAL - Tib Ant      Fib Head Tib Ant 4.43  4.6  100 10   7.08 100 28.3      Knee Tib Ant 6.51  4.6  99.3 12 57.6  7.24 104 28.1   R PERONEAL - Tib Ant      Fib Head Tib Ant 4.69  7.0  100    8.59 100 30.4      Knee Tib Ant 6.35  7.0  100    8.65 102 30.6           F  Wave      Nerve Min F Lat Max F Lat Mean FLat Temp.    ms ms ms  C   R TIBIAL 53.44 61.56 57.34 31.7       Needle EMG (W)          Rep Nerve Stim 6      EMG Summary Table     Spontaneous MUAP Recruitment    IA Fib PSW Fasc H.F. Amp Dur. PPP Pattern   L. FIRST D INTEROSS N None None None None N N None Normal   L. TIB ANTERIOR N None None None None 1+ 1+ None Normal   L. GASTROCN (MED) N None None None None N N None Normal   L. TIB POSTERIOR N None None None None 1+ 1+ None Normal   L. PERON TERTIUS N None None None None 1+ 1+ None Normal   R. TIB ANTERIOR N None None None None N N None Normal   L. T FASCIA JYOTI N None None None None 1+ 1+ None  Normal                                      Again, thank you for allowing me to participate in the care of your patient.      Sincerely,    Raúl Gibbons MD

## 2018-07-27 ENCOUNTER — TELEPHONE (OUTPATIENT)
Dept: NEUROLOGY | Facility: CLINIC | Age: 73
End: 2018-07-27

## 2018-07-27 NOTE — TELEPHONE ENCOUNTER
Left a voicemail with patient about results that Dr. Ingram sent for me to inform the patient, and if they have any questions to call us back at our clinic number, [phone] option #3.

## 2018-07-27 NOTE — TELEPHONE ENCOUNTER
----- Message from Adebayo Ingram MD sent at 7/27/2018 10:56 AM CDT -----  Tell him old inactive L5 radiculopathy on emg study; I'll review it with him when I see him again

## 2018-07-28 ENCOUNTER — PRE VISIT (OUTPATIENT)
Dept: UROLOGY | Facility: CLINIC | Age: 73
End: 2018-07-28

## 2018-07-28 ENCOUNTER — MYC MEDICAL ADVICE (OUTPATIENT)
Dept: SLEEP MEDICINE | Facility: CLINIC | Age: 73
End: 2018-07-28

## 2018-07-28 NOTE — PROCEDURES
PHYSICIAN INTERPRETATION   HOME OXIMETRY   Patient: Brooks Summers  MRN: [id number]  YOB: 1945  Study Date: 7/`6/18   Referring Physician: Edin Mays  Ordering Physician: Tawana Choudhury MD     Conditions:  Room air  Quality: artifact noted 0.7%     Measure [threshold]                 Time less than or equal to SpO2 88% [5 min]:  5.8 min  Duration monitoring [> 2 hours artifact free]:  8:53 hours                    4% O2 desat index [ > 15/ hour]:    14.7/ hour                    Pattern:       oscillatory  With hypoxemia                                Assessment: likely obstructive sleep apnea in pt with recent insomnia (wakeups between events or positional sleep apnea  Recommendations:    Discussion of pt wishes: 02 or sleep study    Diagnosis Code(s):  Hypoxemia G47.36, Sleep apnea G47.33   SUKH Todd CNP, July 28, 2018

## 2018-07-31 NOTE — TELEPHONE ENCOUNTER
Please call and ask if he'd like a virtual visit or face to face as recommended in my mychart to him.    SUKH Weaver, CNP-BC  Sleep Medicine

## 2018-08-08 ENCOUNTER — ANTICOAGULATION THERAPY VISIT (OUTPATIENT)
Dept: ANTICOAGULATION | Facility: CLINIC | Age: 73
End: 2018-08-08
Payer: MEDICARE

## 2018-08-08 DIAGNOSIS — I48.0 PAROXYSMAL ATRIAL FIBRILLATION (H): ICD-10-CM

## 2018-08-08 DIAGNOSIS — Z79.01 LONG TERM CURRENT USE OF ANTICOAGULANT THERAPY: ICD-10-CM

## 2018-08-08 DIAGNOSIS — Z95.2 S/P AORTIC VALVE REPLACEMENT: ICD-10-CM

## 2018-08-08 LAB
INR POINT OF CARE: 7.9 (ref 0.86–1.14)
INR PPP: 4.32 (ref 0.86–1.14)

## 2018-08-08 PROCEDURE — 85610 PROTHROMBIN TIME: CPT | Mod: QW

## 2018-08-08 PROCEDURE — 36416 COLLJ CAPILLARY BLOOD SPEC: CPT

## 2018-08-08 PROCEDURE — 99207 ZZC NO CHARGE NURSE ONLY: CPT

## 2018-08-08 PROCEDURE — 85610 PROTHROMBIN TIME: CPT | Performed by: INTERNAL MEDICINE

## 2018-08-08 NOTE — MR AVS SNAPSHOT
Brooks Summers   8/8/2018 1:15 PM   Anticoagulation Therapy Visit    Description:  73 year old male   Provider:  [facility]   Department:  [facility]           INR as of 8/8/2018     Today's INR 7.9!      Anticoagulation Summary as of 8/8/2018     INR goal 2.0-3.0   Today's INR 7.9!   Full warfarin instructions 8/8: Hold; Otherwise 10 mg every day   Next INR check 8/9/2018    Indications   S/P aortic valve replacement [Z95.2]  Paroxysmal atrial fibrillation (H) [I48.0]  Long term current use of anticoagulant therapy [Z79.01]         August 2018 Details    Sun Mon Tue Wed Thu Fri Sat        1               2               3               4                 5               6               7               8      Hold   See details      9            10               11                 12               13               14               15               16               17               18                 19               20               21               22               23               24               25                 26               27               28               29               30               31                 Date Details   08/08 This INR check       Date of next INR:  8/9/2018         How to take your warfarin dose     To take:  10 mg Take 2 of the 5 mg tablets.    Hold Do not take your warfarin dose. See the Details table to the right for additional instructions.

## 2018-08-08 NOTE — PROGRESS NOTES
Addendum: Writer called and spoke with wife, Lucero (we have signed consent to do so). Advised of new dosing instructions and recheck appointment scheduled for 2 weeks in [facility]. She confirmed that Brooks did hold his warfarin yesterday.    Aniya Garcia RN, BSN, PHN  8-9-18  ANTICOAGULATION FOLLOW-UP CLINIC VISIT    Patient Name:  Brooks Summers  Date:  8/8/2018  Contact Type:  Face to Face    SUBJECTIVE:     Patient Findings     Positives Unexplained INR or factor level change    Comments The patient reports no changes in health and no recent illnesses. No signs of CHF or swelling. No diarrhea or changes in alcohol use (has 2 beers at night). No medication or activity changes. It is unclear why INR is supra therapeutic but no bleeding signs. Will have the patient hold his warfarin today, recheck at lab and [facility] will call either today or tomorrow with results. Okay to leave a detailed voicemail for the patient on his mobile phone.           OBJECTIVE    INR Protime   Date Value Ref Range Status   08/08/2018 7.9 (A) 0.86 - 1.14 Final       ASSESSMENT / PLAN  INR assessment SUPRA    Recheck INR In: 1 DAY    INR Location Clinic      Anticoagulation Summary as of 8/8/2018     INR goal 2.0-3.0   Today's INR 7.9!   Warfarin maintenance plan 10 mg (5 mg x 2) every day   Full warfarin instructions 8/8: Hold; Otherwise 10 mg every day   Weekly warfarin total 70 mg   Plan last modified Sheri Frausto RN (1/31/2018)   Next INR check 8/9/2018   Priority INR   Target end date Indefinite    Indications   S/P aortic valve replacement [Z95.2]  Paroxysmal atrial fibrillation (H) [I48.0]  Long term current use of anticoagulant therapy [Z79.01]         Anticoagulation Episode Summary     INR check location     Preferred lab     Send INR reminders to [facility] CLINIC POOL    Comments  * warfarin 5 mg tabs      Anticoagulation Care Providers     Provider Role Specialty Phone number    Edin Mays MD Responsible Internal  Medicine [phone]            See the Encounter Report to view Anticoagulation Flowsheet and Dosing Calendar (Go to Encounters tab in chart review, and find the Anticoagulation Therapy Visit)        Aniya Garcia RN

## 2018-08-10 ENCOUNTER — OFFICE VISIT (OUTPATIENT)
Dept: UROLOGY | Facility: CLINIC | Age: 73
End: 2018-08-10
Payer: MEDICARE

## 2018-08-10 VITALS
HEIGHT: 70 IN | HEART RATE: 67 BPM | DIASTOLIC BLOOD PRESSURE: 66 MMHG | SYSTOLIC BLOOD PRESSURE: 103 MMHG | WEIGHT: 175 LBS | BODY MASS INDEX: 25.05 KG/M2

## 2018-08-10 DIAGNOSIS — R31.0 GROSS HEMATURIA: ICD-10-CM

## 2018-08-10 DIAGNOSIS — R31.29 MICROHEMATURIA: Primary | ICD-10-CM

## 2018-08-10 LAB
ALBUMIN UR-MCNC: 30 MG/DL
AMORPH CRY #/AREA URNS HPF: ABNORMAL /HPF
APPEARANCE UR: CLEAR
BILIRUB UR QL STRIP: NEGATIVE
COLOR UR AUTO: YELLOW
GLUCOSE UR STRIP-MCNC: NEGATIVE MG/DL
HGB UR QL STRIP: NEGATIVE
HYALINE CASTS #/AREA URNS LPF: 3 /LPF (ref 0–2)
KETONES UR STRIP-MCNC: NEGATIVE MG/DL
LEUKOCYTE ESTERASE UR QL STRIP: NEGATIVE
MUCOUS THREADS #/AREA URNS LPF: PRESENT /LPF
NITRATE UR QL: NEGATIVE
PH UR STRIP: 7 PH (ref 5–7)
RBC #/AREA URNS AUTO: 5 /HPF (ref 0–2)
SOURCE: ABNORMAL
SP GR UR STRIP: 1.02 (ref 1–1.03)
UROBILINOGEN UR STRIP-MCNC: 0 MG/DL (ref 0–2)
WBC #/AREA URNS AUTO: <1 /HPF (ref 0–5)

## 2018-08-10 PROCEDURE — 88112 CYTOPATH CELL ENHANCE TECH: CPT | Performed by: UROLOGY

## 2018-08-10 PROCEDURE — 87086 URINE CULTURE/COLONY COUNT: CPT | Performed by: UROLOGY

## 2018-08-10 RX ORDER — DIAZEPAM 10 MG
10 TABLET ORAL EVERY 6 HOURS PRN
Qty: 1 TABLET | Refills: 0 | Status: SHIPPED | OUTPATIENT
Start: 2018-08-10 | End: 2019-05-17

## 2018-08-10 ASSESSMENT — PAIN SCALES - GENERAL: PAINLEVEL: NO PAIN (0)

## 2018-08-10 NOTE — LETTER
8/10/2018       RE: Brooks Summers  [address]  [facility] [address]     Dear Colleague,    Thank you for referring your patient, Brooks Summers, to the [facility] UROLOGY AND INST FOR PROSTATE AND UROLOGIC CANCERS at [facility]. Please see a copy of my visit note below.    Office Visit Note      UROLOGIC DIAGNOSES:       CURRENT INTERVENTIONS:       HISTORY:   Patient with history of hematuria, found on cystoscopy in the office to have a prostatic urethral papillary lesion.   He is currently s/p biopsy of the lesion.   Pathology did not show any evidence of malignancy.   Patient notes no issues post op.   Had biopsy of prostatic lesion that was benign.     Patient has had another episode of gross hematuria.       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 Tx with Dapto/RIFx 8 weeks.     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     Paroxysmal a-fib (H)     Post-op 7/14/2011, 1/26/13     Rotator Cuff (Capsule) Sprain and Strain      S/P aortic valve replacement     7/14/2011, re-do 1/25/13 due to endocarditis     Smoking hx      Thrombocytopenia (H)        PAST SURGICAL HISTORY:   Past Surgical History:   Procedure Laterality Date     ANESTHESIA CARDIOVERSION  7/18/2011    Procedure:ANESTHESIA CARDIOVERSION; Cardioversion; Surgeon:GENERIC ANESTHESIA PROVIDER; Location:[facility] OR     COLONOSCOPY       CORONARY ANGIOGRAPHY ADULT ORDER       CYSTOSCOPY, BIOPSY URETHRA N/A 4/3/2017    Procedure: CYSTOSCOPY, BIOPSY URETHRA;  Surgeon: Marlena Mora MD;  Location: [facility] OR     ENDOSCOPY UPPER, COLONOSCOPY, COMBINED       HC REMOV & REPLACE IMPLT DEFIB PULSE GEN MULT  LEAD  12/3/2015          HEMORRHOID SURGERY       IMPLANT AUTOMATIC IMPLANTABLE CARDIOVERTER DEFIBRILLATOR       MOHS MICROGRAPHIC PROCEDURE       ORTHOPEDIC SURGERY      shoulder,right     REDO STERNOTOMY REPLACE VALVE AORTIC  1/25/2013    Procedure: REDO STERNOTOMY REPLACE VALVE AORTIC;  Redo Sternotomy, Redo Aortic Valve Replacement on pump oxygenator. Intraoperative Transesophageal Echocardiogram;  Surgeon: Stephanie Hampton MD;  Location: [facility] OR     REPAIR VALVE MITRAL  2013     REPLACE VALVE AORTIC  7/14/2011    Procedure:REPLACE VALVE AORTIC; Median Sternotomy, Bioprosthesis,  Aortic Valve replacement on pump with oxygenator. Intra-operative Transesophogeal Echocardiogram.; Surgeon:STEPHANIE HAMPTON; Location:[facility] OR     teeth extractions       TRANSPLANT         FAMILY HISTORY:   Family History   Problem Relation Age of Onset     C.A.D. Father 68     bypass, carotid      Prostate Cancer Father 70     HEART DISEASE Father      Cancer Mother 92     stomach, colon     Hypertension Mother      Retinal detachment Mother      Cancer Brother 64     lung cancer, petroleum     Cancer Brother      Glaucoma No family hx of      Macular Degeneration No family hx of      Strabismus No family hx of      Glasses (<9 y/o) No family hx of        SOCIAL HISTORY:   Social History   Substance Use Topics     Smoking status: Former Smoker     Packs/day: 1.00     Years: 20.00     Types: Cigarettes     Quit date: 12/31/1989     Smokeless tobacco: Never Used     Alcohol use 1.2 oz/week       Current Outpatient Prescriptions   Medication     allopurinol (ZYLOPRIM) 100 MG tablet     amoxicillin (AMOXIL) 500 MG capsule     Cyanocobalamin (VITAMIN B 12 PO)     gabapentin (NEURONTIN) 100 MG capsule     losartan (COZAAR) 25 MG tablet     metoprolol (TOPROL XL) 50 MG 24 hr tablet     Multiple Vitamins-Minerals (MULTIVITAMIN ADULTS 50+) TABS     PROVIDER DOSED MANAGED WARFARIN, COUMADIN, OUTPATIENT     simvastatin (ZOCOR) 40 MG tablet     VITAMIN D,  "CHOLECALCIFEROL, PO     warfarin (COUMADIN) 5 MG tablet     No current facility-administered medications for this visit.          PHYSICAL EXAM:    /66  Pulse 67  Ht 1.778 m (5' 10\")  Wt 79.4 kg (175 lb)  BMI 25.11 kg/m2    HEENT: Normocephalic and atraumatic   Cardiac: Not done  Back/Flank: Not done  CNS/PNS: Not done  Respiratory: Normal non-labored breathing  Abdomen: Soft nontender and nondistended  Peripheral Vascular: Not done  Mental Status: Not done    Penis: Not done  Scrotal Skin: Not done  Testicles: Not done  Epididymis: Not done  Digital Rectal Exam:     Cystoscopy: Not done    Imaging: None    Urinalysis: [facility] RESULTS:  Recent Labs   Lab Test  03/17/17   1340   COLOR  Yellow   APPEARANCE  Slightly Cloudy   URINEGLC  Negative   URINEBILI  Negative   URINEKETONE  Negative   SG  1.015   UBLD  Negative   URINEPH  7.0   PROTEIN  Negative   NITRITE  Negative   LEUKEST  Negative   RBCU  6*   WBCU  1       PSA:    Post Void Residual:     Other labs: None today      IMPRESSION:  72 y/o M with hematuria and benign prostatic lesion now with another episode of gross hematuria     PLAN:  Will send urinalysis, urine culture, urine cytology   CT urogram next available   Cystoscopy next available     Total Time: 15 minutes                                       Total in Consultation: greater than 50%     Again, thank you for allowing me to participate in the care of your patient.      Sincerely,    Marlena Mora MD      "

## 2018-08-10 NOTE — PATIENT INSTRUCTIONS
Please make a imaging appointment   Please make a appointment for a cystoscopy before November    It was a pleasure meeting with you today.  Thank you for allowing me and my team the privilege of caring for you today.  YOU are the reason we are here, and I truly hope we provided you with the excellent service you deserve.  Please let us know if there is anything else we can do for you so that we can be sure you are leaving completely satisfied with your care experience.

## 2018-08-10 NOTE — PROGRESS NOTES
Office Visit Note      UROLOGIC DIAGNOSES:       CURRENT INTERVENTIONS:       HISTORY:   Patient with history of hematuria, found on cystoscopy in the office to have a prostatic urethral papillary lesion.   He is currently s/p biopsy of the lesion.   Pathology did not show any evidence of malignancy.   Patient notes no issues post op.   Had biopsy of prostatic lesion that was benign.     Patient has had another episode of gross hematuria.       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 Tx with Dapto/RIFx 8 weeks.     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     Paroxysmal a-fib (H)     Post-op 7/14/2011, 1/26/13     Rotator Cuff (Capsule) Sprain and Strain      S/P aortic valve replacement     7/14/2011, re-do 1/25/13 due to endocarditis     Smoking hx      Thrombocytopenia (H)        PAST SURGICAL HISTORY:   Past Surgical History:   Procedure Laterality Date     ANESTHESIA CARDIOVERSION  7/18/2011    Procedure:ANESTHESIA CARDIOVERSION; Cardioversion; Surgeon:GENERIC ANESTHESIA PROVIDER; Location:[facility] OR     COLONOSCOPY       CORONARY ANGIOGRAPHY ADULT ORDER       CYSTOSCOPY, BIOPSY URETHRA N/A 4/3/2017    Procedure: CYSTOSCOPY, BIOPSY URETHRA;  Surgeon: Marlena Mora MD;  Location: [facility] OR     ENDOSCOPY UPPER, COLONOSCOPY, COMBINED       HC REMOV & REPLACE IMPLT DEFIB PULSE GEN MULT LEAD  12/3/2015          HEMORRHOID SURGERY       IMPLANT AUTOMATIC IMPLANTABLE CARDIOVERTER DEFIBRILLATOR       MOHS MICROGRAPHIC PROCEDURE       ORTHOPEDIC SURGERY      shoulder,right     REDO STERNOTOMY REPLACE VALVE AORTIC  1/25/2013    Procedure: REDO STERNOTOMY REPLACE VALVE AORTIC;  Redo Sternotomy, Redo Aortic Valve  "Replacement on pump oxygenator. Intraoperative Transesophageal Echocardiogram;  Surgeon: Stephanie Hampton MD;  Location: [facility] OR     REPAIR VALVE MITRAL  2013     REPLACE VALVE AORTIC  7/14/2011    Procedure:REPLACE VALVE AORTIC; Median Sternotomy, Bioprosthesis,  Aortic Valve replacement on pump with oxygenator. Intra-operative Transesophogeal Echocardiogram.; Surgeon:STEPHANIE HAMPTON; Location:[facility] OR     teeth extractions       TRANSPLANT         FAMILY HISTORY:   Family History   Problem Relation Age of Onset     C.A.D. Father 68     bypass, carotid      Prostate Cancer Father 70     HEART DISEASE Father      Cancer Mother 92     stomach, colon     Hypertension Mother      Retinal detachment Mother      Cancer Brother 64     lung cancer, petroleum     Cancer Brother      Glaucoma No family hx of      Macular Degeneration No family hx of      Strabismus No family hx of      Glasses (<9 y/o) No family hx of        SOCIAL HISTORY:   Social History   Substance Use Topics     Smoking status: Former Smoker     Packs/day: 1.00     Years: 20.00     Types: Cigarettes     Quit date: 12/31/1989     Smokeless tobacco: Never Used     Alcohol use 1.2 oz/week       Current Outpatient Prescriptions   Medication     allopurinol (ZYLOPRIM) 100 MG tablet     amoxicillin (AMOXIL) 500 MG capsule     Cyanocobalamin (VITAMIN B 12 PO)     gabapentin (NEURONTIN) 100 MG capsule     losartan (COZAAR) 25 MG tablet     metoprolol (TOPROL XL) 50 MG 24 hr tablet     Multiple Vitamins-Minerals (MULTIVITAMIN ADULTS 50+) TABS     PROVIDER DOSED MANAGED WARFARIN, COUMADIN, OUTPATIENT     simvastatin (ZOCOR) 40 MG tablet     VITAMIN D, CHOLECALCIFEROL, PO     warfarin (COUMADIN) 5 MG tablet     No current facility-administered medications for this visit.          PHYSICAL EXAM:    /66  Pulse 67  Ht 1.778 m (5' 10\")  Wt 79.4 kg (175 lb)  BMI 25.11 kg/m2    HEENT: Normocephalic and atraumatic   Cardiac: Not done  Back/Flank: Not done  CNS/PNS: Not " done  Respiratory: Normal non-labored breathing  Abdomen: Soft nontender and nondistended  Peripheral Vascular: Not done  Mental Status: Not done    Penis: Not done  Scrotal Skin: Not done  Testicles: Not done  Epididymis: Not done  Digital Rectal Exam:     Cystoscopy: Not done    Imaging: None    Urinalysis: [facility] RESULTS:  Recent Labs   Lab Test  03/17/17   1340   COLOR  Yellow   APPEARANCE  Slightly Cloudy   URINEGLC  Negative   URINEBILI  Negative   URINEKETONE  Negative   SG  1.015   UBLD  Negative   URINEPH  7.0   PROTEIN  Negative   NITRITE  Negative   LEUKEST  Negative   RBCU  6*   WBCU  1       PSA:    Post Void Residual:     Other labs: None today      IMPRESSION:  74 y/o M with hematuria and benign prostatic lesion now with another episode of gross hematuria     PLAN:  Will send urinalysis, urine culture, urine cytology   CT urogram next available   Cystoscopy next available     Total Time: 15 minutes                                       Total in Consultation: greater than 50%

## 2018-08-10 NOTE — MR AVS SNAPSHOT
After Visit Summary   8/10/2018    Brooks Summers    MRN: [id number]           Patient Information     Date Of Birth          1945        Visit Information        Provider Department      8/10/2018 2:30 PM Marlena Mora MD [facility] and Inst for Prostate and Urologic Cancers        Today's Diagnoses     Microhematuria    -  1      Care Instructions    Please make a imaging appointment   Please make a appointment for a cystoscopy before November    It was a pleasure meeting with you today.  Thank you for allowing me and my team the privilege of caring for you today.  YOU are the reason we are here, and I truly hope we provided you with the excellent service you deserve.  Please let us know if there is anything else we can do for you so that we can be sure you are leaving completely satisfied with your care experience.                   Follow-ups after your visit        Your next 10 appointments already scheduled     Aug 17, 2018 10:00 AM CDT   (Arrive by 9:45 AM)   Return Visit with Adebayo Ingram MD   [facility] Neurology ([facility])    [address] [place] [address]  3rd [facility] [id number] [phone]            Aug 17, 2018 12:20 PM CDT   CT ABDOMEN PELVIS W/O & W CONTRAST with UCCT1   [facility] ([facility])    [address] [place] [id number] [phone]           Please bring any scans or X-rays taken at other hospitals, if similar tests were done. Also bring a list of your medicines, including vitamins, minerals and over-the-counter drugs. It is safest to leave personal items at home.  Be sure to tell your doctor:   If you have any allergies.   If there s any chance you are pregnant.   If you are breastfeeding.  How to prepare:   Do not eat or drink for 2 hours before your exam. If you need to take medicine, you may take it with small sips of water. (We may ask you  to take liquid medicine as well.)   Please wear loose clothing, such as a sweat suit or jogging clothes. Avoid snaps, zippers and other metal. We may ask you to undress and put on a hospital gown.  Please arrive 30 minutes early for your CT. Once in the department you might be asked to drink water 15-20 minutes prior to your exam.  If indicated you may be asked to drink an oral contrast in advance of your CT.  If this is the case, the imaging team will let you know or be in contact with you prior to your appointment  Patients over 70 or patients with diabetes or kidney problems:   If you haven t had a blood test (creatinine test) within the last 30 days, the Cardiologist/Radiologist may require you to get this test prior to your exam.  If you have diabetes:   Continue to take your metformin medication on the day of your exam  If you have any questions, please call the Imaging Department where you will have your exam.            Aug 22, 2018  1:00 PM CDT   Anticoagulation Visit with LL ANTI COAG   [facility]    [address] [place] [address]   [phone]            Oct 12, 2018 10:00 AM CDT   (Arrive by 9:45 AM)   Cystoscopy with Marlena Mora MD   [facility] Prostate and Urologic Cancers ([facility])    [address] [place] [address] [id number] [phone]            Dec 07, 2018  9:30 AM CST   (Arrive by 9:15 AM)   Pacemaker Check with Uc Cv Device 1   [facility] ([facility])    [address] [place] [address]  [address]  [facility] [id number] [phone]            Dec 07, 2018 10:35 AM CST   (Arrive by 10:20 AM)   Return Visit with Edin Mays MD   [facility] ([facility])    [address] [place] [id number] [phone]              Future tests that were ordered for you  "today     Open Future Orders        Priority Expected Expires Ordered    CT Abdomen Pelvis w/o & w Contrast Routine  8/10/2019 8/10/2018            Who to contact     Please call your clinic at [phone] to:    Ask questions about your health    Make or cancel appointments    Discuss your medicines    Learn about your test results    Speak to your doctor            Additional Information About Your Visit        [employer] Information     [employer] gives you secure access to your electronic health record. If you see a primary care provider, you can also send messages to your care team and make appointments. If you have questions, please call your primary care clinic.  If you do not have a primary care provider, please call [phone] and they will assist you.      [employer] is an electronic gateway that provides easy, online access to your medical records. With [employer], you can request a clinic appointment, read your test results, renew a prescription or communicate with your care team.     To access your existing account, please contact your [facility] or call [phone] for assistance.        Care EveryWhere ID     This is your Care EveryWhere ID. This could be used by other organizations to access your [place] medical records  [id number]        Your Vitals Were     Pulse Height BMI (Body Mass Index)             67 1.778 m (5' 10\") 25.11 kg/m2          Blood Pressure from Last 3 Encounters:   08/10/18 103/66   07/20/18 129/75   07/05/18 111/64    Weight from Last 3 Encounters:   08/10/18 79.4 kg (175 lb)   07/20/18 80 kg (176 lb 4.8 oz)   07/13/18 79.4 kg (175 lb)              We Performed the Following     Cytology non gyn     Routine UA with microscopic - No culture     Urine Culture Aerobic Bacterial          Today's Medication Changes          These changes are accurate as of 8/10/18  3:04 PM.  If you have any questions, ask your nurse or doctor.               Start taking " these medicines.        Dose/Directions    diazepam 10 MG tablet   Commonly known as:  VALIUM   Used for:  Microhematuria   Started by:  Marlena Mora MD        Dose:  10 mg   Take 1 tablet (10 mg) by mouth every 6 hours as needed for anxiety or sleep Take 30-60 minutes before procedure.  Do not operate a vehicle after taking this medication.   Quantity:  1 tablet   Refills:  0            Where to get your medicines      Some of these will need a paper prescription and others can be bought over the counter.  Ask your nurse if you have questions.     Bring a paper prescription for each of these medications     diazepam 10 MG tablet                Primary Care Provider Office Phone # Fax #    Edin Mays [phone]       [address] [address]        Equal Access to Services     JASMYNE RALPH : [place] maino Sodarrius, waaxda luqadaha, qaybta kaalmada adeegyada, elisabeth berrios . So [facility] [phone].    ATENCIÓN: Si habla español, tiene a herrera disposición servicios gratuitos de asistencia lingüística. [facility] [phone].    We comply with applicable federal civil rights laws and [place] laws. We do not discriminate on the basis of race, color, national origin, age, disability, sex, sexual orientation, or gender identity.            Thank you!     Thank you for choosing [facility] UROLOGY AND [facility] FOR PROSTATE AND UROLOGIC CANCERS  for your care. Our goal is always to provide you with excellent care. Hearing back from our patients is one way we can continue to improve our services. Please take a few minutes to complete the written survey that you may receive in the mail after your visit with us. Thank you!             Your Updated Medication List - Protect others around you: Learn how to safely use, store and throw away your medicines at www.disposemymeds.org.          This list is accurate as of 8/10/18  3:04 PM.  Always use  your most recent med list.                   Brand Name Dispense Instructions for use Diagnosis    allopurinol 100 MG tablet    ZYLOPRIM    90 tablet    Take 1 tablet (100 mg) by mouth every evening    Idiopathic gout, unspecified chronicity, unspecified site       amoxicillin 500 MG capsule    AMOXIL    4 capsule    Take 2000 MG one hour before colonoscopy.    Prophylactic antibiotic       diazepam 10 MG tablet    VALIUM    1 tablet    Take 1 tablet (10 mg) by mouth every 6 hours as needed for anxiety or sleep Take 30-60 minutes before procedure.  Do not operate a vehicle after taking this medication.    Microhematuria       gabapentin 100 MG capsule    NEURONTIN    120 capsule    Take 1 tablet (100 mg) once daily for 3 days, then 2 tablets once daily for 3 days, then 3 tablets once daily    Restless legs syndrome (RLS), Sensory disturbance       losartan 25 MG tablet    COZAAR    45 tablet    Take 0.5 tablets (12.5 mg) by mouth every evening    Benign essential hypertension       metoprolol succinate 50 MG 24 hr tablet    TOPROL XL    90 tablet    Take 1 tablet (50 mg) by mouth daily    Benign essential hypertension       MULTIVITAMIN ADULTS 50+ Tabs       Flu vaccine need, Aortic valve stenosis, severe, Chronic systolic heart failure (H), S/P aortic valve replacement       * PROVIDER DOSED MANAGED WARFARIN (COUMADIN) OUTPATIENT      Per coumadin clinic        simvastatin 40 MG tablet    ZOCOR    90 tablet    Take 1 tablet (40 mg) by mouth At Bedtime    Hyperlipidemia LDL goal <100       VITAMIN B 12 PO           VITAMIN D (CHOLECALCIFEROL) PO      Take 1,000 Units by mouth daily    Flu vaccine need, Aortic valve stenosis, severe, Chronic systolic heart failure (H), S/P aortic valve replacement       * warfarin 5 MG tablet    COUMADIN    180 tablet    Take 10mg daily or as directed by the Anticoagulation Clinic    S/P AVR (aortic valve replacement), Paroxysmal atrial fibrillation (H), Long term current use of  anticoagulant therapy, S/P aortic valve replacement       * Notice:  This list has 2 medication(s) that are the same as other medications prescribed for you. Read the directions carefully, and ask your doctor or other care provider to review them with you.

## 2018-08-11 LAB
BACTERIA SPEC CULT: NO GROWTH
Lab: NORMAL
SPECIMEN SOURCE: NORMAL

## 2018-08-13 DIAGNOSIS — M10.00 IDIOPATHIC GOUT, UNSPECIFIED CHRONICITY, UNSPECIFIED SITE: ICD-10-CM

## 2018-08-13 LAB — COPATH REPORT: NORMAL

## 2018-08-14 RX ORDER — ALLOPURINOL 100 MG/1
100 TABLET ORAL EVERY EVENING
Qty: 90 TABLET | Refills: 3 | Status: SHIPPED | OUTPATIENT
Start: 2018-08-14 | End: 2018-12-07

## 2018-08-17 ENCOUNTER — RADIANT APPOINTMENT (OUTPATIENT)
Dept: CT IMAGING | Facility: CLINIC | Age: 73
End: 2018-08-17
Attending: UROLOGY
Payer: MEDICARE

## 2018-08-17 ENCOUNTER — OFFICE VISIT (OUTPATIENT)
Dept: NEUROLOGY | Facility: CLINIC | Age: 73
End: 2018-08-17
Payer: MEDICARE

## 2018-08-17 VITALS
SYSTOLIC BLOOD PRESSURE: 122 MMHG | HEIGHT: 70 IN | HEART RATE: 69 BPM | BODY MASS INDEX: 24.79 KG/M2 | DIASTOLIC BLOOD PRESSURE: 70 MMHG | WEIGHT: 173.2 LBS

## 2018-08-17 DIAGNOSIS — G25.81 RESTLESS LEGS SYNDROME (RLS): ICD-10-CM

## 2018-08-17 DIAGNOSIS — R31.29 MICROHEMATURIA: ICD-10-CM

## 2018-08-17 DIAGNOSIS — R41.3 MEMORY LOSS: Primary | ICD-10-CM

## 2018-08-17 LAB
CREAT BLD-MCNC: 1 MG/DL (ref 0.66–1.25)
GFR SERPL CREATININE-BSD FRML MDRD: 73 ML/MIN/1.7M2

## 2018-08-17 RX ORDER — IOPAMIDOL 755 MG/ML
105 INJECTION, SOLUTION INTRAVASCULAR ONCE
Status: COMPLETED | OUTPATIENT
Start: 2018-08-17 | End: 2018-08-17

## 2018-08-17 RX ADMIN — IOPAMIDOL 105 ML: 755 INJECTION, SOLUTION INTRAVASCULAR at 12:14

## 2018-08-17 ASSESSMENT — PAIN SCALES - GENERAL: PAINLEVEL: NO PAIN (0)

## 2018-08-17 NOTE — NURSING NOTE
Composite Cancer Quality Initiative    Colon Cancer Initiative satisfied?  No, Patient needs to have another procedure before having colonscopy     Updated: August 17, 2018 by Kathy Bright MA

## 2018-08-17 NOTE — LETTER
"2018       RE: Brooks Summers  [address]  [facility] [address]     Dear Colleague,    Thank you for referring your patient, Brooks Summers, to the [facility] NEUROLOGY at [facility]. Please see a copy of my visit note below.    Service Date: 2018      Edin Mays MD   [facility]    [address] [facility] [address]   [place] [address]      RE: Brooks Summers   MRN: [id number]   : 1945      Dear Dr. Mays:      This is in regard to followup on Brooks Summers.  The patient returned with his wife today.  His chief complaints are that of an \"electric tingling\" in his legs in the evening with an urge to move his legs as well as possible memory loss.      The patient did try gabapentin at my suggestion.  He thought he was \"nervous\" taking the drug and stopped it after a few doses.  With it, though, he noted complete resolution of the symptoms he had had in his legs.  He did tell me that he is not certain that the gabapentin caused any significant problem and he is going to keep it on hand in case his symptoms return.  He never had any other trouble with it including daytime sedation or depression.  The patient and his wife did discuss with me their upcoming followup with their sleep physician.  They were concerned as they had not really heard from them, but they did note they had had some missed calls.  His wife is going to make sure now that he has an appointment.  The patient did have a sleep study and I did review it with them.  He did have some episodes of hypoxemia.  I am not certain of the significance of this and it will be discussed then on this upcoming visit.  The patient did have blood tests again and I did note that his ferritin level was 134.  I reassured them of this.  His MRI scan was reviewed and he had findings of a mild chronic inactive left L5 radiculopathy, according to Dr. Gibbons.  The patient had no EMG evidence " for generalized sensorimotor peripheral neuropathy.  It was an extensive test and I reviewed those studies with them.  The patient did discuss a fall he had had with some left leg pain but that symptom is quiescent now and he has no pain, weakness or persistent numbness in the left leg, nor anything to suggest radicular symptoms.  His wife brought up the possibility that he has developed memory loss over the last year.  They both noted that his mother had memory loss starting at 75 and lived to be 93 and it was quite profound at the end.  His sisters have noted memory loss also.  This is mainly for names and appointments.  The patient is possibly aware he has an issue, but he downplayed it to me.  He also reviewed with upcoming studies for microscopic hematuria and this includes cystoscopy and a CT scan.      Neurologic examination revealed a pleasant man.  His blood pressure was 119/74 with a pulse of 67 seated.  Standing, it was 114/70 with a pulse of 66.  He, on Mini Cog, was completely oriented and alert.  He could recall only 1/3 objects after 5 minutes and then with prompting initially 2/3 objects and then again with further prompting 3/3 objects.  He could tell me the states that touch [place] and he could tell me some information about the [facility] presidency.  The patient could draw a clock correctly to state 4:30.  He again had a soft systolic murmur heard between the apex and base of his heart.  He did not have cervical bruits.  He had no frontal lobe release signs.      I have recommended now that the patient have formal psychometric testing.  He is going to have neurologic followup with me once this done.  He and his wife did tell me they are quite pleased that the sensation he had had in his legs with the urge to move his legs has now left.  He will retry gabapentin if those symptoms recur.      Thank you for allowing me to see this patient.      Sincerely yours,       Adebayo Ingram MD      I spent  30 minutes with the patient and his wife.  Over 50% of the time this involved counseling and coordination of care.      D: 2018   T: 2018   MT: AKA      Name:     DAR CRUZ   MRN:      [id number]        Account:      [id number]   :      [birth year]           Service Date: 2018      Document: [id number]

## 2018-08-17 NOTE — MR AVS SNAPSHOT
After Visit Summary   8/17/2018    Brooks Summers    MRN: [id number]           Patient Information     Date Of Birth          1945        Visit Information        Provider Department      8/17/2018 10:00 AM Adebayo Ingram MD [facility] Neurology        Today's Diagnoses     Memory loss    -  1    Restless legs syndrome (RLS)           Follow-ups after your visit        Your next 10 appointments already scheduled     Aug 17, 2018 12:20 PM CDT   CT ABDOMEN PELVIS W/O & W CONTRAST with UCCT1   [facility] [place] [facility] ([facility])    [address] [address] [phone]           Please bring any scans or X-rays taken at other hospitals, if similar tests were done. Also bring a list of your medicines, including vitamins, minerals and over-the-counter drugs. It is safest to leave personal items at home.  Be sure to tell your doctor:   If you have any allergies.   If there s any chance you are pregnant.   If you are breastfeeding.  How to prepare:   Do not eat or drink for 2 hours before your exam. If you need to take medicine, you may take it with small sips of water. (We may ask you to take liquid medicine as well.)   Please wear loose clothing, such as a sweat suit or jogging clothes. Avoid snaps, zippers and other metal. We may ask you to undress and put on a hospital gown.  Please arrive 30 minutes early for your CT. Once in the department you might be asked to drink water 15-20 minutes prior to your exam.  If indicated you may be asked to drink an oral contrast in advance of your CT.  If this is the case, the imaging team will let you know or be in contact with you prior to your appointment  Patients over 70 or patients with diabetes or kidney problems:   If you haven t had a blood test (creatinine test) within the last 30 days, the Cardiologist/Radiologist may require you to get this test prior to your exam.  If you have  Problem: Goal Outcome Summary  Goal: Goal Outcome Summary  Outcome: No Change  D/I: Alert and oriented x 4. At times forgetful about medications. Calm and cooperative. Using call light appropriately. Ate and tolerated a regular diet.  No c/o nausea. 1 stool tonight  Has had 4-5 stools today. Voiding spontaneously. Not saving urine to measure. No c/o pain. Possible d/c tomorrow.  P: Continue POC.       diabetes:   Continue to take your metformin medication on the day of your exam  If you have any questions, please call the Imaging Department where you will have your exam.            Aug 22, 2018  1:00 PM CDT   [facility]agulation Visit with LL ANTI COAG   [facility]    [address] [facility] [address]   [phone]            Oct 12, 2018 10:00 AM CDT   (Arrive by 9:45 AM)   Cystoscopy with Marlena Mora MD   [facility] Urology and Inst for Prostate and Urologic Cancers ([facility])    [address] [facility]  4th Floor  [facility] [id number] [phone]            Dec 07, 2018  9:30 AM CST   (Arrive by 9:15 AM)   Pacemaker Check with Uc Cv Device 1   [facility] Care ([facility])    [address] [facility] [address] [facility] [address]  [address]  [facility] [phone] [phone]            Dec 07, 2018 10:35 AM CST   (Arrive by 10:20 AM)   Return Visit with Edin Mays MD   [facility] ([facility])    [address] [facility]  4th Floor  [facility] [phone] [phone]              Who to contact     Please call your clinic at [phone] to:    Ask questions about your health    Make or cancel appointments    Discuss your medicines    Learn about your test results    Speak to your doctor            Additional Information About Your Visit        [employer] Information     [employer] gives you secure access to your electronic health record. If you see a primary care provider, you can also send messages to your care team and make appointments. If you have questions, please call your primary care clinic.  If you do not have a primary care provider, please call [phone] and they will assist you.      [employer] is an electronic gateway that provides easy, online access to your medical records. With [employer], you can request a clinic appointment, read your test results,  "renew a prescription or communicate with your care team.     To access your existing account, please contact your [facility] or call [phone] for assistance.        Care EveryWhere ID     This is your Care EveryWhere ID. This could be used by other organizations to access your [place] medical records  [id number]        Your Vitals Were     Pulse Height BMI (Body Mass Index)             69 1.778 m (5' 10\") 24.85 kg/m2          Blood Pressure from Last 3 Encounters:   08/17/18 122/70   08/10/18 103/66   07/20/18 129/75    Weight from Last 3 Encounters:   08/17/18 78.6 kg (173 lb 3.2 oz)   08/10/18 79.4 kg (175 lb)   07/20/18 80 kg (176 lb 4.8 oz)              We Performed the Following     PSYCHOLOGICAL TESTING ([facility]        Primary Care Provider Office Phone # Fax #    Edin Mays [phone]       [address] [address]        Equal Access to Services     [facility]: Hadii aad ku hadasho Soomaali, waaxda luqadaha, qaybta kaalmada adeegyada, waxay idiin hayjosie berrios . So [facility] [phone].    ATENCIÓN: Si habla español, tiene a herrera disposición servicios gratuitos de asistencia lingüística. Llame al [phone].    We comply with applicable federal civil rights laws and [place] laws. We do not discriminate on the basis of race, color, national origin, age, disability, sex, sexual orientation, or gender identity.            Thank you!     Thank you for choosing [facility] NEUROLOGY  for your care. Our goal is always to provide you with excellent care. Hearing back from our patients is one way we can continue to improve our services. Please take a few minutes to complete the written survey that you may receive in the mail after your visit with us. Thank you!             Your Updated Medication List - Protect others around you: Learn how to safely use, store and throw away your medicines at www.disposemymeds.org.        "   This list is accurate as of 8/17/18 11:05 AM.  Always use your most recent med list.                   Brand Name Dispense Instructions for use Diagnosis    allopurinol 100 MG tablet    ZYLOPRIM    90 tablet    Take 1 tablet (100 mg) by mouth every evening    Idiopathic gout, unspecified chronicity, unspecified site       amoxicillin 500 MG capsule    AMOXIL    4 capsule    Take 2000 MG one hour before colonoscopy.    Prophylactic antibiotic       diazepam 10 MG tablet    VALIUM    1 tablet    Take 1 tablet (10 mg) by mouth every 6 hours as needed for anxiety or sleep Take 30-60 minutes before procedure.  Do not operate a vehicle after taking this medication.    Microhematuria       gabapentin 100 MG capsule    NEURONTIN    120 capsule    Take 1 tablet (100 mg) once daily for 3 days, then 2 tablets once daily for 3 days, then 3 tablets once daily    Restless legs syndrome (RLS), Sensory disturbance       losartan 25 MG tablet    COZAAR    45 tablet    Take 0.5 tablets (12.5 mg) by mouth every evening    Benign essential hypertension       metoprolol succinate 50 MG 24 hr tablet    TOPROL XL    90 tablet    Take 1 tablet (50 mg) by mouth daily    Benign essential hypertension       MULTIVITAMIN ADULTS 50+ Tabs       Flu vaccine need, Aortic valve stenosis, severe, Chronic systolic heart failure (H), S/P aortic valve replacement       * PROVIDER DOSED MANAGED WARFARIN (COUMADIN) OUTPATIENT      Per coumadin clinic        simvastatin 40 MG tablet    ZOCOR    90 tablet    Take 1 tablet (40 mg) by mouth At Bedtime    Hyperlipidemia LDL goal <100       VITAMIN B 12 PO           VITAMIN D (CHOLECALCIFEROL) PO      Take 1,000 Units by mouth daily    Flu vaccine need, Aortic valve stenosis, severe, Chronic systolic heart failure (H), S/P aortic valve replacement       * warfarin 5 MG tablet    COUMADIN    180 tablet    Take 10mg daily or as directed by the Anticoagulation Clinic    S/P AVR (aortic valve replacement),  Paroxysmal atrial fibrillation (H), Long term current use of anticoagulant therapy, S/P aortic valve replacement       * Notice:  This list has 2 medication(s) that are the same as other medications prescribed for you. Read the directions carefully, and ask your doctor or other care provider to review them with you.

## 2018-08-17 NOTE — DISCHARGE INSTRUCTIONS

## 2018-08-18 NOTE — PROGRESS NOTES
"Service Date: 2018      Edin Mays MD   [facility]    [facility], [address] [address]      RE: Brooks Summers   MRN: [id number]   : 1945      Dear Dr. Mays:      This is in regard to followup on Brooks Summers.  The patient returned with his wife today.  His chief complaints are that of an \"electric tingling\" in his legs in the evening with an urge to move his legs as well as possible memory loss.      The patient did try gabapentin at my suggestion.  He thought he was \"nervous\" taking the drug and stopped it after a few doses.  With it, though, he noted complete resolution of the symptoms he had had in his legs.  He did tell me that he is not certain that the gabapentin caused any significant problem and he is going to keep it on hand in case his symptoms return.  He never had any other trouble with it including daytime sedation or depression.  The patient and his wife did discuss with me their upcoming followup with their sleep physician.  They were concerned as they had not really heard from them, but they did note they had had some missed calls.  His wife is going to make sure now that he has an appointment.  The patient did have a sleep study and I did review it with them.  He did have some episodes of hypoxemia.  I am not certain of the significance of this and it will be discussed then on this upcoming visit.  The patient did have blood tests again and I did note that his ferritin level was 134.  I reassured them of this.  His MRI scan was reviewed and he had findings of a mild chronic inactive left L5 radiculopathy, according to Dr. Gibbons.  The patient had no EMG evidence for generalized sensorimotor peripheral neuropathy.  It was an extensive test and I reviewed those studies with them.  The patient did discuss a fall he had had with some left leg pain but that symptom is quiescent now and he has no pain, weakness or persistent numbness in the left " leg, nor anything to suggest radicular symptoms.  His wife brought up the possibility that he has developed memory loss over the last year.  They both noted that his mother had memory loss starting at 75 and lived to be 93 and it was quite profound at the end.  His sisters have noted memory loss also.  This is mainly for names and appointments.  The patient is possibly aware he has an issue, but he downplayed it to me.  He also reviewed with upcoming studies for microscopic hematuria and this includes cystoscopy and a CT scan.      Neurologic examination revealed a pleasant man.  His blood pressure was 119/74 with a pulse of 67 seated.  Standing, it was 114/70 with a pulse of 66.  He, on Mini Cog, was completely oriented and alert.  He could recall only 1/3 objects after 5 minutes and then with prompting initially 2/3 objects and then again with further prompting 3/3 objects.  He could tell me the states that touch [place] and he could tell me some information about the [facility] presidency.  The patient could draw a clock correctly to state 4:30.  He again had a soft systolic murmur heard between the apex and base of his heart.  He did not have cervical bruits.  He had no frontal lobe release signs.      I have recommended now that the patient have formal psychometric testing.  He is going to have neurologic followup with me once this done.  He and his wife did tell me they are quite pleased that the sensation he had had in his legs with the urge to move his legs has now left.  He will retry gabapentin if those symptoms recur.      Thank you for allowing me to see this patient.      Sincerely yours,       Maira Ingram MD      I spent 30 minutes with the patient and his wife.  Over 50% of the time this involved counseling and coordination of care.         MAIRA INGRAM MD             D: 08/18/2018   T: 08/18/2018   MT: AKA      Name:     DAR CRUZ   MRN:      [id number]        Account:       [id number]   :      1945           Service Date: 2018      Document: [id number]

## 2018-08-22 ENCOUNTER — ANTICOAGULATION THERAPY VISIT (OUTPATIENT)
Dept: ANTICOAGULATION | Facility: CLINIC | Age: 73
End: 2018-08-22
Payer: MEDICARE

## 2018-08-22 DIAGNOSIS — Z95.2 S/P AVR (AORTIC VALVE REPLACEMENT): ICD-10-CM

## 2018-08-22 DIAGNOSIS — I48.0 PAROXYSMAL ATRIAL FIBRILLATION (H): ICD-10-CM

## 2018-08-22 DIAGNOSIS — Z79.01 LONG TERM CURRENT USE OF ANTICOAGULANT THERAPY: ICD-10-CM

## 2018-08-22 DIAGNOSIS — Z95.2 S/P AORTIC VALVE REPLACEMENT: ICD-10-CM

## 2018-08-22 LAB — INR POINT OF CARE: 3.9 (ref 0.86–1.14)

## 2018-08-22 PROCEDURE — 99207 ZZC NO CHARGE NURSE ONLY: CPT

## 2018-08-22 PROCEDURE — 36416 COLLJ CAPILLARY BLOOD SPEC: CPT

## 2018-08-22 PROCEDURE — 85610 PROTHROMBIN TIME: CPT | Mod: QW

## 2018-08-22 RX ORDER — WARFARIN SODIUM 5 MG/1
TABLET ORAL
Qty: 180 TABLET | Refills: 0 | COMMUNITY
Start: 2018-08-22 | End: 2018-08-28

## 2018-08-22 NOTE — PROGRESS NOTES
ANTICOAGULATION FOLLOW-UP CLINIC VISIT    Patient Name:  Brooks Summers  Date:  8/22/2018  Contact Type:  Face to Face    SUBJECTIVE:     Patient Findings     Positives Blood in urine, Unexplained INR or factor level change    Comments Patient saw Marlena Mora MD on 8-10-18 about the hematuria. A CT of the abdomen/pelvis was done but patient not contacted yet about those findings. A cystoscopy is scheduled for 10-12-18. Writer told pt to check with Dr. Mora to see if he needs to hold his warfarin but it is unlikely. His INR has been elevated, which could be causing the hematuria. INR still elevated today (twice in a row) with no definable reason so will decrease maintenance dose by 7.7% and recheck in 2 weeks. No other bleeding concerns.    Patient is also interested in a home [place] Writer will send physician form to PCP for signature.            OBJECTIVE    INR Protime   Date Value Ref Range Status   08/22/2018 3.9 (A) 0.86 - 1.14 Final       ASSESSMENT / PLAN  INR assessment SUPRA    Recheck INR In: 2 WEEKS    INR Location Clinic      Anticoagulation Summary as of 8/22/2018     INR goal 2.0-3.0   Today's INR 3.9!   Warfarin maintenance plan 10 mg (5 mg x 2) on Mon, Wed, Fri; 7.5 mg (5 mg x 1.5) all other days   Full warfarin instructions 8/22: 2.5 mg; Otherwise 10 mg on Mon, Wed, Fri; 7.5 mg all other days   Weekly warfarin total 60 mg   Plan last modified Aniya Garcia RN (8/22/2018)   Next INR check 9/5/2018   Priority INR   Target end date Indefinite    Indications   S/P aortic valve replacement [Z95.2]  Paroxysmal atrial fibrillation (H) [I48.0]  Long term current use of anticoagulant therapy [Z79.01]         Anticoagulation Episode Summary     INR check location     Preferred lab     Send INR reminders to [facility]    Comments  * warfarin 5 mg tabs. [place]      Anticoagulation Care Providers      Provider Role Specialty Phone number    Edin Mays MD [facility] Internal Medicine [phone]            See the Encounter Report to view Anticoagulation Flowsheet and Dosing Calendar (Go to Encounters tab in chart review, and find the Anticoagulation Therapy Visit)        Aniya Garcia RN

## 2018-08-22 NOTE — MR AVS SNAPSHOT
Brooks Summers   8/22/2018 1:00 PM   Anticoagulation Therapy Visit    Description:  73 year old male   Provider:  LL ANTI COAG   Department:  [facility]           INR as of 8/22/2018     Today's INR 3.9!      Anticoagulation Summary as of 8/22/2018     INR goal 2.0-3.0   Today's INR 3.9!   Full warfarin instructions 8/22: 2.5 mg; Otherwise 10 mg on Mon, Wed, Fri; 7.5 mg all other days   Next INR check 9/5/2018    Indications   S/P aortic valve replacement [Z95.2]  Paroxysmal atrial fibrillation (H) [I48.0]  Long term current use of anticoagulant therapy [Z79.01]         Your next Anticoagulation Clinic appointment(s)     Sep 05, 2018  1:00 PM CDT   [facility]    [address] [address] [phone]              Contact Numbers     Please call [phone] with any problems or questions regarding your therapy.    If you need to cancel and/or reschedule your appointment please call one of the following numbers:  [facility] - [phone]  [place] - [phone]  [place] - [phone]  [place] - [phone]  [place] - [phone]            August 2018 Details    Sun Mon Tue Wed Thu Fri Sat        1               2               3               4                 5               6               7               8               9               10               11                 12               13               14               15               16               17               18                 19               20               21               22      2.5 mg   See details      23      7.5 mg         24      10 mg         25      7.5 mg           26      7.5 mg         27      10 mg         28      7.5 mg         29      10 mg         30      7.5 mg         31      10 mg           Date Details   08/22 This INR check               How to take your warfarin dose     To take:  2.5 mg Take 0.5 of a  5 mg tablet.    To take:  7.5 mg Take 1.5 of the 5 mg tablets.    To take:  10 mg Take 2 of the 5 mg tablets.           September 2018 Details    Sun Mon Tue Wed Thu Fri Sat           1      7.5 mg           2      7.5 mg         3      10 mg         4      7.5 mg         5            6               7               8                 9               10               11               12               13               14               15                 16               17               18               19               20               21               22                 23               24               25               26               27               28               29                 30                      Date Details   No additional details    Date of next INR:  9/5/2018         How to take your warfarin dose     To take:  7.5 mg Take 1.5 of the 5 mg tablets.    To take:  10 mg Take 2 of the 5 mg tablets.

## 2018-08-23 ENCOUNTER — TELEPHONE (OUTPATIENT)
Dept: INTERNAL MEDICINE | Facility: CLINIC | Age: 73
End: 2018-08-23

## 2018-08-24 ENCOUNTER — MEDICAL CORRESPONDENCE (OUTPATIENT)
Dept: HEALTH INFORMATION MANAGEMENT | Facility: CLINIC | Age: 73
End: 2018-08-24

## 2018-08-28 DIAGNOSIS — Z79.01 LONG TERM CURRENT USE OF ANTICOAGULANT THERAPY: ICD-10-CM

## 2018-08-28 DIAGNOSIS — Z95.2 S/P AVR (AORTIC VALVE REPLACEMENT): ICD-10-CM

## 2018-08-28 DIAGNOSIS — Z95.2 S/P AORTIC VALVE REPLACEMENT: ICD-10-CM

## 2018-08-28 DIAGNOSIS — I48.0 PAROXYSMAL ATRIAL FIBRILLATION (H): ICD-10-CM

## 2018-08-29 RX ORDER — WARFARIN SODIUM 5 MG/1
TABLET ORAL
Qty: 180 TABLET | Refills: 3 | Status: SHIPPED | OUTPATIENT
Start: 2018-08-29 | End: 2018-09-26

## 2018-09-05 ENCOUNTER — TELEPHONE (OUTPATIENT)
Dept: ANTICOAGULATION | Facility: CLINIC | Age: 73
End: 2018-09-05

## 2018-09-05 ENCOUNTER — ANTICOAGULATION THERAPY VISIT (OUTPATIENT)
Dept: ANTICOAGULATION | Facility: CLINIC | Age: 73
End: 2018-09-05
Payer: MEDICARE

## 2018-09-05 DIAGNOSIS — Z95.2 S/P AORTIC VALVE REPLACEMENT: ICD-10-CM

## 2018-09-05 DIAGNOSIS — I48.0 PAROXYSMAL ATRIAL FIBRILLATION (H): ICD-10-CM

## 2018-09-05 DIAGNOSIS — Z79.01 LONG TERM CURRENT USE OF ANTICOAGULANT THERAPY: ICD-10-CM

## 2018-09-05 LAB — INR POINT OF CARE: 1.3 (ref 0.86–1.14)

## 2018-09-05 PROCEDURE — 36416 COLLJ CAPILLARY BLOOD SPEC: CPT

## 2018-09-05 PROCEDURE — 85610 PROTHROMBIN TIME: CPT | Mod: QW

## 2018-09-05 PROCEDURE — 99207 ZZC NO CHARGE NURSE ONLY: CPT

## 2018-09-05 NOTE — MR AVS SNAPSHOT
Brooks Summers   9/5/2018 1:00 PM   Anticoagulation Therapy Visit    Description:  73 year old male   Provider:  LL ANTI COAG   Department:  [facility]           INR as of 9/5/2018     Today's INR 1.3!      Anticoagulation Summary as of 9/5/2018     INR goal 2.0-3.0   Today's INR 1.3!   Full warfarin instructions 9/5: 15 mg; 9/6: 15 mg; Otherwise 10 mg on Mon, Wed, Fri; 7.5 mg all other days   Next INR check 9/7/2018    Indications   S/P aortic valve replacement [Z95.2]  Paroxysmal atrial fibrillation (H) [I48.0]  Long term current use of anticoagulant therapy [Z79.01]         Description     Take 15 mg of warfarin as soon as you get home today. Take 15 mg tomorrow at regular time. Avoid greens. Recheck INR on Friday at [place] lab at 9:45 am. We will call you with results.      Contact Numbers     Please call [phone] with any problems or questions regarding your therapy.    If you need to cancel and/or reschedule your appointment please call one of the following numbers:  [facility] [phone]  [place] - [phone]  [place] - [phone]  [place] - [phone]  [place] - [phone]            September 2018 Details    Sun Mon Tue Wed Thu Fri Sat           1                 2               3               4               5      15 mg   See details      6      15 mg         7            8                 9               10               11               12               13               14               15                 16               17               18               19               20               21               22                 23               24               25               26               27               28               29                 30                      Date Details   09/05 This INR check       Date of next INR:  9/7/2018         How to take your warfarin dose     To take:  10 mg Take 2 of the 5 mg tablets.    To take:  15 mg Take 3 of the 5 mg tablets.

## 2018-09-05 NOTE — TELEPHONE ENCOUNTER
Dr. Mays- This patient has a 1.3 INR today. He has a bovine aortic valve, no clot history and 2-3 [id number] score. Are you okay with us increasing his warfarin to get INR back up or do you think he is a candidate for bridging with lovenox until INR back in range?      CrCl cannot be calculated (Patient's most recent sCr result is older than the maximum 90 days allowed.).    Please reply to pool [id number].    Aniya Garcia, RN, BSN, PHN

## 2018-09-05 NOTE — PROGRESS NOTES
Addendum: No need to bridge per PCP.    Aniya Garcia, RN, BSN, PHN  9-6-18    ANTICOAGULATION FOLLOW-UP CLINIC VISIT    Patient Name:  Brooks Summers  Date:  9/5/2018  Contact Type:  Face to Face    SUBJECTIVE:     Patient Findings     Positives Unexplained INR or factor level change    Comments Patient has not had any signs of blood in his urine since last INR check. His dosing was reduced by 7.7% and INR went from 3.9 to 1.3 today. He has not had any increase in greens. He does not think he missed any doses but will check with his wife, who is a nurse. No change in activity or change in medications/OTC. Patient denies any signs of a DVT, PE or stroke. No signs of MI either.     Writer will check with PCP to see if patient needs to bridge. If no need, do not call pt. No prior clot hx and aortic valve is not mechanical. [id number] is 2-3 so low risk there as well. Pt will recheck INR at [facility] on Friday.            OBJECTIVE    INR Protime   Date Value Ref Range Status   09/05/2018 1.3 (A) 0.86 - 1.14 Final       ASSESSMENT / PLAN  INR assessment SUB    Recheck INR In: 2 DAYS    INR Location Clinic      Anticoagulation Summary as of 9/5/2018     INR goal 2.0-3.0   Today's INR 1.3!   Warfarin maintenance plan 10 mg (5 mg x 2) on Mon, Wed, Fri; 7.5 mg (5 mg x 1.5) all other days   Full warfarin instructions 9/5: 15 mg; 9/6: 15 mg; Otherwise 10 mg on Mon, Wed, Fri; 7.5 mg all other days   Weekly warfarin total 60 mg   Plan last modified Aniya Garcia, RN (8/22/2018)   Next INR check 9/7/2018   Priority INR   Target end date Indefinite    Indications   S/P aortic valve replacement [Z95.2]  Paroxysmal atrial fibrillation (H) [I48.0]  Long term current use of anticoagulant therapy [Z79.01]         Anticoagulation Episode Summary     INR check location     Preferred lab     Send INR reminders to [facility]    Comments  * warfarin 5 mg tabs. [place] 21 mm Johnson Perimount  Magna Tissue Valve      Anticoagulation Care Providers     Provider Role Specialty Phone number    Edin Mays MD [facility] Internal Medicine [phone]            See the Encounter Report to view Anticoagulation Flowsheet and Dosing Calendar (Go to Encounters tab in chart review, and find the Anticoagulation Therapy Visit)        Aniya Garcia RN

## 2018-09-07 ENCOUNTER — ANTICOAGULATION THERAPY VISIT (OUTPATIENT)
Dept: ANTICOAGULATION | Facility: CLINIC | Age: 73
End: 2018-09-07

## 2018-09-07 DIAGNOSIS — Z95.2 S/P AORTIC VALVE REPLACEMENT: ICD-10-CM

## 2018-09-07 DIAGNOSIS — Z79.01 LONG TERM CURRENT USE OF ANTICOAGULANT THERAPY: ICD-10-CM

## 2018-09-07 DIAGNOSIS — I48.0 PAROXYSMAL ATRIAL FIBRILLATION (H): ICD-10-CM

## 2018-09-07 LAB — INR PPP: 1.79 (ref 0.86–1.14)

## 2018-09-07 PROCEDURE — 99207 ZZC NO CHARGE NURSE ONLY: CPT

## 2018-09-07 PROCEDURE — 85610 PROTHROMBIN TIME: CPT | Performed by: INTERNAL MEDICINE

## 2018-09-07 PROCEDURE — 36415 COLL VENOUS BLD VENIPUNCTURE: CPT | Performed by: INTERNAL MEDICINE

## 2018-09-07 NOTE — MR AVS SNAPSHOT
Brooks Summers   9/7/2018   Anticoagulation Therapy Visit    Description:  73 year old male   Provider:  Aniya Garcia, RN   Department:  [place]           INR as of 9/7/2018     Today's INR 1.79!      Anticoagulation Summary as of 9/7/2018     INR goal 2.0-3.0   Today's INR 1.79!   Full warfarin instructions 10 mg on Mon, Wed, Fri; 7.5 mg all other days   Next INR check 9/12/2018    Indications   S/P aortic valve replacement [Z95.2]  Paroxysmal atrial fibrillation (H) [I48.0]  Long term current use of anticoagulant therapy [Z79.01]         Your next Anticoagulation Clinic appointment(s)     Sep 12, 2018  1:00 PM CDT   [facility]    [address] [address]   [phone]              September 2018 Details    Sun Mon Tue Wed Thu Fri Sat           1                 2               3               4               5               6               7      10 mg   See details      8      7.5 mg           9      7.5 mg         10      10 mg         11      7.5 mg         12            13               14               15                 16               17               18               19               20               21               22                 23               24               25               26               27               28               29                 30                      Date Details   09/07 This INR check       Date of next INR:  9/12/2018         How to take your warfarin dose     To take:  7.5 mg Take 1.5 of the 5 mg tablets.    To take:  10 mg Take 2 of the 5 mg tablets.

## 2018-09-07 NOTE — PROGRESS NOTES
ANTICOAGULATION FOLLOW-UP CLINIC VISIT    Patient Name:  Brooks Summers  Date:  9/7/2018  Contact Type:  Telephone/ spoke with Pinky on phone    SUBJECTIVE:     Patient Findings     Positives Extra doses (15 mg last 2 days due to sub therapeutic INR), Unexplained INR or factor level change    Comments Writer spoke with Brooks Vance's wife (he was in background talking). She denies any missed doses or increase in activity or greens. It is still unclear why INR was so low 2 days ago. INR has started to rise and will continue to increase over the weekend from extra dosing the last 2 days. Will resume maintenance dose and recheck in 5 days.           OBJECTIVE    INR   Date Value Ref Range Status   09/07/2018 1.79 (H) 0.86 - 1.14 Final       ASSESSMENT / PLAN  INR assessment SUB    Recheck INR In: 5 DAYS    INR Location Clinic lab     Anticoagulation Summary as of 9/7/2018     INR goal 2.0-3.0   Today's INR 1.79!   Warfarin maintenance plan 10 mg (5 mg x 2) on Mon, Wed, Fri; 7.5 mg (5 mg x 1.5) all other days   Full warfarin instructions 10 mg on Mon, Wed, Fri; 7.5 mg all other days   Weekly warfarin total 60 mg   No change documented Aniya Garcia, RN   Plan last modified Aniya Garcia, RN (8/22/2018)   Next INR check 9/12/2018   Priority INR   Target end date Indefinite    Indications   S/P aortic valve replacement [Z95.2]  Paroxysmal atrial fibrillation (H) [I48.0]  Long term current use of anticoagulant therapy [Z79.01]         Anticoagulation Episode Summary     INR check location     Preferred lab     Send INR reminders to [facility]    Comments  * warfarin 5 mg tabs. [place] 21 mm Johnson Perimount Magna Tissue Valve      Anticoagulation Care Providers     Provider Role Specialty Phone number    Edin Mays MD Responsible Internal Medicine [phone]            See the Encounter Report to view Anticoagulation Flowsheet and Dosing Calendar (Go to Encounters  tab in chart review, and find the Anticoagulation Therapy Visit)        Aniya Garcia RN

## 2018-09-12 ENCOUNTER — ANTICOAGULATION THERAPY VISIT (OUTPATIENT)
Dept: ANTICOAGULATION | Facility: CLINIC | Age: 73
End: 2018-09-12
Payer: MEDICARE

## 2018-09-12 DIAGNOSIS — I48.0 PAROXYSMAL ATRIAL FIBRILLATION (H): ICD-10-CM

## 2018-09-12 DIAGNOSIS — Z79.01 LONG TERM CURRENT USE OF ANTICOAGULANT THERAPY: ICD-10-CM

## 2018-09-12 DIAGNOSIS — Z95.2 S/P AORTIC VALVE REPLACEMENT: ICD-10-CM

## 2018-09-12 LAB — INR POINT OF CARE: 2.2 (ref 0.86–1.14)

## 2018-09-12 PROCEDURE — 85610 PROTHROMBIN TIME: CPT | Mod: QW

## 2018-09-12 PROCEDURE — 99207 ZZC NO CHARGE NURSE ONLY: CPT

## 2018-09-12 PROCEDURE — 36416 COLLJ CAPILLARY BLOOD SPEC: CPT

## 2018-09-12 NOTE — MR AVS SNAPSHOT
Brooks SEGURA Melina   9/12/2018 1:00 PM   Anticoagulation Therapy Visit    Description:  73 year old male   Provider:  LL ANTI COAG   Department:  [facility]           INR as of 9/12/2018     Today's INR 2.2      Anticoagulation Summary as of 9/12/2018     INR goal 2.0-3.0   Today's INR 2.2   Full warfarin instructions 9/15: 10 mg; 9/22: 10 mg; Otherwise 10 mg on Mon, Wed, Fri; 7.5 mg all other days   Next INR check 9/26/2018    Indications   S/P aortic valve replacement [Z95.2]  Paroxysmal atrial fibrillation (H) [I48.0]  Long term current use of anticoagulant therapy [Z79.01]         Your next Anticoagulation Clinic appointment(s)     Sep 12, 2018  1:00 PM CDT   Anticoagulation Visit with LL ANTI COAG   [facility]    [address] [address] [phone]            Sep 26, 2018  1:00 PM CDT   Anticoagulation Visit with LL ANTI COAG   [facility]    [address] [address] [phone]              Contact Numbers     Please call [phone] with any problems or questions regarding your therapy.    If you need to cancel and/or reschedule your appointment please call one of the following numbers:  [facility] - [phone]  [place] - [phone]  [place] - [phone]  [place] - [phone]  [place] - [phone]            September 2018 Details    Sun Mon Tue Wed Thu Fri Sat           1                 2               3               4               5               6               7               8                 9               10               11               12      10 mg   See details      13      7.5 mg         14      10 mg         15      10 mg           16      7.5 mg         17      10 mg         18      7.5 mg         19      10 mg         20      7.5 mg         21      10 mg         22      10 mg           23      7.5 mg         24      10 mg         25       7.5 mg         26            27               28               29                 30                      Date Details   09/12 This INR check       Date of next INR:  9/26/2018         How to take your warfarin dose     To take:  7.5 mg Take 1.5 of the 5 mg tablets.    To take:  10 mg Take 2 of the 5 mg tablets.

## 2018-09-12 NOTE — PROGRESS NOTES
ANTICOAGULATION FOLLOW-UP CLINIC VISIT    Patient Name:  Brooks Summers  Date:  9/12/2018  Contact Type:  Face to Face    SUBJECTIVE:     Patient Findings     Positives No Problem Findings    Comments INR was low last week and writer could not determine why. Patient is back in range now but has required 72.5 mg/week to get there. He usually has 60 mg/week. The larger doses given were 6 and 7 days ago so likely not having much effect at this point. Writer will try 62.5 mg/week for the next 2 weeks. If INR in range at follow up visit, okay to extend longer on that same dosing. No other changes or concerns per patient. No signs of clots and no bleeding concerns.            OBJECTIVE    INR Protime   Date Value Ref Range Status   09/12/2018 2.2 (A) 0.86 - 1.14 Final       ASSESSMENT / PLAN  INR assessment THER    Recheck INR In: 2 WEEKS    INR Location Clinic      Anticoagulation Summary as of 9/12/2018     INR goal 2.0-3.0   Today's INR 2.2   Warfarin maintenance plan 10 mg (5 mg x 2) on Mon, Wed, Fri; 7.5 mg (5 mg x 1.5) all other days   Full warfarin instructions 9/15: 10 mg; 9/22: 10 mg; Otherwise 10 mg on Mon, Wed, Fri; 7.5 mg all other days   Weekly warfarin total 60 mg   Plan last modified Aniya Garcia RN (8/22/2018)   Next INR check 9/26/2018   Priority INR   Target end date Indefinite    Indications   S/P aortic valve replacement [Z95.2]  Paroxysmal atrial fibrillation (H) [I48.0]  Long term current use of anticoagulant therapy [Z79.01]         Anticoagulation Episode Summary     INR check location     Preferred lab     Send INR reminders to [facility]    Comments  * warfarin 5 mg tabs. [place] 21 mm Johnson Perimount Magna Tissue Valve      Anticoagulation Care Providers     Provider Role Specialty Phone number    Edin Mays MD [facility] Internal Medicine [phone]            See the Encounter Report to view Anticoagulation Flowsheet and Dosing  Calendar (Go to Encounters tab in chart review, and find the Anticoagulation Therapy Visit)        Aniya Garcia RN

## 2018-09-19 ENCOUNTER — PRE VISIT (OUTPATIENT)
Dept: UROLOGY | Facility: CLINIC | Age: 73
End: 2018-09-19

## 2018-09-26 ENCOUNTER — ANTICOAGULATION THERAPY VISIT (OUTPATIENT)
Dept: ANTICOAGULATION | Facility: CLINIC | Age: 73
End: 2018-09-26
Payer: MEDICARE

## 2018-09-26 DIAGNOSIS — I48.0 PAROXYSMAL ATRIAL FIBRILLATION (H): ICD-10-CM

## 2018-09-26 DIAGNOSIS — Z95.2 S/P AVR (AORTIC VALVE REPLACEMENT): ICD-10-CM

## 2018-09-26 DIAGNOSIS — Z95.2 S/P AORTIC VALVE REPLACEMENT: ICD-10-CM

## 2018-09-26 DIAGNOSIS — Z79.01 LONG TERM CURRENT USE OF ANTICOAGULANT THERAPY: ICD-10-CM

## 2018-09-26 LAB — INR POINT OF CARE: 2.7 (ref 0.86–1.14)

## 2018-09-26 PROCEDURE — 36416 COLLJ CAPILLARY BLOOD SPEC: CPT

## 2018-09-26 PROCEDURE — 85610 PROTHROMBIN TIME: CPT | Mod: QW

## 2018-09-26 PROCEDURE — 99207 ZZC NO CHARGE NURSE ONLY: CPT

## 2018-09-26 RX ORDER — WARFARIN SODIUM 5 MG/1
TABLET ORAL
Qty: 180 TABLET | Refills: 3 | COMMUNITY
Start: 2018-09-26 | End: 2019-10-14

## 2018-09-26 NOTE — MR AVS SNAPSHOT
Brooks COLTON Melina   9/26/2018 1:00 PM   Anticoagulation Therapy Visit    Description:  73 year old male   Provider:  LL ANTI COAG   Department:  [facility]           INR as of 9/26/2018     Today's INR 2.7      Anticoagulation Summary as of 9/26/2018     INR goal 2.0-3.0   Today's INR 2.7   Full warfarin instructions 7.5 mg on Sun, Tue, Thu; 10 mg all other days   Next INR check 10/24/2018    Indications   S/P aortic valve replacement [Z95.2]  Paroxysmal atrial fibrillation (H) [I48.0]  Long term current use of anticoagulant therapy [Z79.01]         Your next Anticoagulation Clinic appointment(s)     Oct 24, 2018  1:00 PM CDT   [facility]    [address] [facility] [id number] [phone]              Contact Numbers     Please call [phone] with any problems or questions regarding your therapy.    If you need to cancel and/or reschedule your appointment please call one of the following numbers:  [facility] [phone]  [place] - [phone]  [facility] [phone]  [facility] [phone]  [place] - [phone]            September 2018 Details    Sun Mon Tue Wed Thu Fri Sat           1                 2               3               4               5               6               7               8                 9               10               11               12               13               14               15                 16               17               18               19               20               21               22                 23               24               25               26      10 mg   See details      27      7.5 mg         28      10 mg         29      10 mg           30      7.5 mg                Date Details   09/26 This INR check               How to take your warfarin dose     To take:  7.5 mg Take 1.5 of the 5 mg tablets.    To take:  10 mg Take 2 of the 5 mg  tablets.           October 2018 Details    Sun Mon Tue Wed Thu Fri Sat      1      10 mg         2      7.5 mg         3      10 mg         4      7.5 mg         5      10 mg         6      10 mg           7      7.5 mg         8      10 mg         9      7.5 mg         10      10 mg         11      7.5 mg         12      10 mg         13      10 mg           14      7.5 mg         15      10 mg         16      7.5 mg         17      10 mg         18      7.5 mg         19      10 mg         20      10 mg           21      7.5 mg         22      10 mg         23      7.5 mg         24            25               26               27                 28               29               30               31                   Date Details   No additional details    Date of next INR:  10/24/2018         How to take your warfarin dose     To take:  7.5 mg Take 1.5 of the 5 mg tablets.    To take:  10 mg Take 2 of the 5 mg tablets.

## 2018-09-26 NOTE — PROGRESS NOTES
ANTICOAGULATION FOLLOW-UP CLINIC VISIT    Patient Name:  Brooks Summers  Date:  9/26/2018  Contact Type:  Face to Face    SUBJECTIVE:     Patient Findings     Positives No Problem Findings    Comments No changes in diet, activity level, medications (including over the counter), or health. No missed doses of warfarin. Patient took dosing as prescribed. No signs of clots or bleeding concerns. Patient will continue maintenance warfarin dosing.             OBJECTIVE    INR Protime   Date Value Ref Range Status   09/26/2018 2.7 (A) 0.86 - 1.14 Final       ASSESSMENT / PLAN  INR assessment THER    Recheck INR In: 4 WEEKS    INR Location Clinic      Anticoagulation Summary as of 9/26/2018     INR goal 2.0-3.0   Today's INR 2.7   Warfarin maintenance plan 7.5 mg (5 mg x 1.5) on Sun, Tue, Thu; 10 mg (5 mg x 2) all other days   Full warfarin instructions 7.5 mg on Sun, Tue, Thu; 10 mg all other days   Weekly warfarin total 62.5 mg   Plan last modified Aniya Garcia RN (9/26/2018)   Next INR check 10/24/2018   Priority INR   Target end date Indefinite    Indications   S/P aortic valve replacement [Z95.2]  Paroxysmal atrial fibrillation (H) [I48.0]  Long term current use of anticoagulant therapy [Z79.01]         Anticoagulation Episode Summary     INR check location     Preferred lab     Send INR reminders to [facility]    Comments  * war[place]in 5 [place]. [place] 21 mm Johnson Perimount Magna Tissue Valve      Anticoagulation Care Providers     Provider Role Specialty Phone number    Edin Mays MD [facility] Internal Medicine [phone]            See the Encounter Report to view Anticoagulation Flowsheet and Dosing Calendar (Go to Encounters tab in chart review, and find the Anticoagulation Therapy Visit)        Aniya Garcia RN

## 2018-10-02 ENCOUNTER — TELEPHONE (OUTPATIENT)
Dept: ANTICOAGULATION | Facility: CLINIC | Age: 73
End: 2018-10-02

## 2018-10-02 NOTE — TELEPHONE ENCOUNTER
10/16/18  Per Jayne from [facility], patient is ready to be set up for a training date with Alisha.    Lo Worley RN, BSN, PHN  Anticoagulation Clinic   [phone]        Jayne from [facility] called. Patient is approved for a home meter, however Jayne needs to get in touch with the patient to verify information. Numerous messages have been left from [facility] and no return call from the patient has been received. Writer reviewed contact information with Jayne. Writer will attempt to contact patient as well.

## 2018-10-04 ENCOUNTER — CARE COORDINATION (OUTPATIENT)
Dept: CARDIOLOGY | Facility: CLINIC | Age: 73
End: 2018-10-04

## 2018-10-04 NOTE — PROGRESS NOTES
Patient's wife called and transfered to scheduling to establish care with cardiologist now that Dr. Castro is gone.

## 2018-10-11 ENCOUNTER — PATIENT OUTREACH (OUTPATIENT)
Dept: CARE COORDINATION | Facility: CLINIC | Age: 73
End: 2018-10-11

## 2018-10-12 ENCOUNTER — OFFICE VISIT (OUTPATIENT)
Dept: UROLOGY | Facility: CLINIC | Age: 73
End: 2018-10-12
Payer: MEDICARE

## 2018-10-12 VITALS — WEIGHT: 179 LBS | HEIGHT: 70 IN | BODY MASS INDEX: 25.62 KG/M2

## 2018-10-12 DIAGNOSIS — R31.9 HEMATURIA, UNSPECIFIED TYPE: Primary | ICD-10-CM

## 2018-10-12 DIAGNOSIS — R31.0 GROSS HEMATURIA: ICD-10-CM

## 2018-10-12 ASSESSMENT — PAIN SCALES - GENERAL: PAINLEVEL: NO PAIN (0)

## 2018-10-12 NOTE — MR AVS SNAPSHOT
After Visit Summary   10/12/2018    Brooks Summers    MRN: [id number]           Patient Information     Date Of Birth          1945        Visit Information        Provider Department      10/12/2018 10:00 AM Marlena Mora MD [facility]        Today's Diagnoses     Hematuria    -  1      Care Instructions    Please follow up in 6 months with    Please drop off a urine one week before appointment.    It was a pleasure meeting with you today.  Thank you for allowing me and my team the privilege of caring for you today.  YOU are the reason we are here, and I truly hope we provided you with the excellent service you deserve.  Please let us know if there is anything else we can do for you so that we can be sure you are leaving completely satisfied with your care experience.                  Follow-ups after your visit        Your next 10 appointments already scheduled     Oct 15, 2018  9:30 AM CDT   Return Sleep Patient with [facility])    [address] [place] [address] [phone]            Oct 24, 2018  1:00 PM CDT   Anticoagulation Visit with LL ANTI COAG   [facility] ([facility])    [address] [address] [phone]            Dec 04, 2018  1:30 PM CST   (Arrive by 1:15 PM)   Return Visit with TED Salgado MD   [facility])    [address] [place] [address] [phone]            Dec 07, 2018  9:30 AM CST   (Arrive by 9:15 AM)   Pacemaker Check with Uc Cv Device 1   [facility] ([facility])    [address] [place] [address]  3rd [facility]  [address]               Dec 07, 2018 10:35 AM CST   (Arrive by 10:20 AM)   Return Visit with Edin ASTUDILLO  "MD Davon   [facility] ([facility] [place])    [address]  [address] [facility] [address] [phone]              Future tests that were ordered for you today     Open Future Orders        Priority Expected Expires Ordered    Routine UA with microscopic - No culture Routine 10/12/2018 10/12/2019 10/12/2018    Cytology non gyn Routine 10/12/2018 10/12/2019 10/12/2018            Who to contact     Please call your clinic at [phone] to:    Ask questions about your health    Make or cancel appointments    Discuss your medicines    Learn about your test results    Speak to your doctor            Additional Information About Your Visit        [employer] Information     [employer] gives you secure access to your electronic health record. If you see a primary care provider, you can also send messages to your care team and make appointments. If you have questions, please call your primary care clinic.  If you do not have a primary care provider, please call [phone] and they will assist you.      [employer] is an electronic gateway that provides easy, online access to your medical records. With [employer], you can request a clinic appointment, read your test results, renew a prescription or communicate with your care team.     To access your existing account, please contact your [facility] or call [phone] for assistance.        Care EveryWhere ID     This is your Care EveryWhere ID. This could be used by other organizations to access your [place] medical records  [id number]        Your Vitals Were     Height BMI (Body Mass Index)                1.778 m (5' 10\") 25.68 kg/m2           Blood Pressure from Last 3 Encounters:   08/17/18 122/70   08/10/18 103/66   07/20/18 129/75    Weight from Last 3 Encounters:   10/12/18 81.2 kg (179 lb)   08/17/18 78.6 kg (173 lb 3.2 oz)   08/10/18 79.4 kg (175 lb)              We Performed the " Following     Urine Culture Aerobic Bacterial        Primary Care Provider Office Phone # Fax #    Edin Mays [phone] [address] [address]        Equal Access to Services     KUN RALPH : Paris robin pizano molly Sodarrius, waaxda luqadaha, qaybta kaalmada adeegyada, elisabeth serrano laRosajosie quinones. So [facility] [phone].    ATENCIÓN: Si habla español, tiene a herrera disposición servicios gratuitos de asistencia lingüística. Llame al [phone].    We comply with applicable federal civil rights laws and [place] laws. We do not discriminate on the basis of race, color, national origin, age, disability, sex, sexual orientation, or gender identity.            Thank you!     Thank you for choosing [facility] UROLOGY AND [facility] FOR PROSTATE AND UROLOGIC CANCERS  for your care. Our goal is always to provide you with excellent care. Hearing back from our patients is one way we can continue to improve our services. Please take a few minutes to complete the written survey that you may receive in the mail after your visit with us. Thank you!             Your Updated Medication List - Protect others around you: Learn how to safely use, store and throw away your medicines at www.disposemymeds.org.          This list is accurate as of 10/12/18 11:00 AM.  Always use your most recent med list.                   Brand Name Dispense Instructions for use Diagnosis    allopurinol 100 MG tablet    ZYLOPRIM    90 tablet    Take 1 tablet (100 mg) by mouth every evening    Idiopathic gout, unspecified chronicity, unspecified site       amoxicillin 500 MG capsule    AMOXIL    4 capsule    Take 2000 MG one hour before colonoscopy.    Prophylactic antibiotic       diazepam 10 MG tablet    VALIUM    1 tablet    Take 1 tablet (10 mg) by mouth every 6 hours as needed for anxiety or sleep Take 30-60 minutes before procedure.  Do not operate a vehicle after taking this medication.     Microhematuria       gabapentin 100 MG capsule    NEURONTIN    120 capsule    Take 1 tablet (100 mg) once daily for 3 days, then 2 tablets once daily for 3 days, then 3 tablets once daily    Restless legs syndrome (RLS), Sensory disturbance       losartan 25 MG tablet    COZAAR    45 tablet    Take 0.5 tablets (12.5 mg) by mouth every evening    Benign essential hypertension       metoprolol succinate 50 MG 24 hr tablet    TOPROL XL    90 tablet    Take 1 tablet (50 mg) by mouth daily    Benign essential hypertension       MULTIVITAMIN ADULTS 50+ Tabs       Flu vaccine need, Aortic valve stenosis, severe, Chronic systolic heart failure (H), S/P aortic valve replacement       * PROVIDER DOSED MANAGED WARFARIN (COUMADIN) OUTPATIENT      Per coumadin clinic        simvastatin 40 MG tablet    ZOCOR    90 tablet    Take 1 tablet (40 mg) by mouth At Bedtime    Hyperlipidemia LDL goal <100       VITAMIN B 12 PO           VITAMIN D (CHOLECALCIFEROL) PO      Take 1,000 Units by mouth daily    Flu vaccine need, Aortic valve stenosis, severe, Chronic systolic heart failure (H), S/P aortic valve replacement       * warfarin 5 MG tablet    COUMADIN    180 tablet    7.5 mg Tues/Thurs/Sun; 10 mg all other days or as directed by the Anticoagulation Clinic    S/P AVR (aortic valve replacement), Paroxysmal atrial fibrillation (H), Long term current use of anticoagulant therapy, S/P aortic valve replacement       * Notice:  This list has 2 medication(s) that are the same as other medications prescribed for you. Read the directions carefully, and ask your doctor or other care provider to review them with you.

## 2018-10-12 NOTE — PROGRESS NOTES
PRE-PROCEDURE DIAGNOSIS: gross hematuria   POST-PROCEDURE DIAGNOSIS: gross hematuria   PROCEDURE: Cystoscopy  HISTORY: Brooks Summers is a 73 year old male with recurrent gross hematuria. Had history of urethral lesion that was benign. Had new onset gross hematuria. Negative CT urogram.   REVIEW OF OFFICE STUDIES (e.g., uroflow or PVR):   DESCRIPTION OF PROCEDURE: After informed consent was obtained, the patient was brought to the procedure room where he was placed in the supine position with all pressure points well padded.  The penis and scrotum were prepped and draped in a sterile fashion. A flexible cystoscope was introduced through a well-lubricated urethra. Anterior urethra strictures were absent.   The urinary sphincter was intact.  The prostate demonstrated mild bilateral hypertrophy.  Bladder neck was open.   Bladder signififcant for presence of the following:      Diverticuli: absent      Cellules: absent      Trabeculation: present 0-1      Tumors: absent      Stones: absent  The flexible cystoscope was removed and the findings were described to the patient.   ASSESSMENT AND PLAN: 73 year old male with gross hematuria negative work up   Follow up in six months for U/a, urine culture, urine cytology

## 2018-10-12 NOTE — NURSING NOTE
The following medication was given:     MEDICATION:  Lidocaine  ROUTE: topical   SITE: urethra   DOSE: 20mg  LOT #: [id number]  : limited  EXPIRATION DATE: 4/20  NDC#: [id number]   Was there drug waste? Georgiana Rees CMA  October 12, 2018

## 2018-10-12 NOTE — NURSING NOTE
Invasive Procedure Safety Checklist:    Procedure: cysto     Action: Complete sections and checkboxes as appropriate.    Pre-procedure:  1. Patient ID Verified with 2 identifiers (Ebony and  or MRN) : YES    2. Procedure and site verified with patient/designee (when able) : YES    3. Accurate consent documentation in medical record : YES    4. H&P (or appropriate assessment) documented in medical record : NO  H&P must be up to 30 days prior to procedure an updated within 24 hours of                 Procedure as applicable.     5. Relevant diagnostic and radiology test results appropriately labeled and displayed as applicable : NO    6. Blood products, implants, devices, and/or special equipment available for the procedure as applicable : NO    7. Procedure site(s) marked with provider initials [Exclusions: none] : NO    8. Marking not required. Reason : Yes  Procedure does not require site marking    Time Out:     Time-Out performed immediately prior to starting procedure, including verbal and active participation of all team members addressing: YES    1. Correct patient identity.  2. Confirmed that the correct side and site are marked.  3. An accurate procedure to be done.  4. Agreement on the procedure to be done.  5. Correct patient position.  6. Relevant images and results are properly labeled and appropriately displayed.  7. The need to administer antibiotics or fluids for irrigation purposes during the procedure as applicable.  8. Safety precautions based on patient history or medication use.    During Procedure: Verification of correct person, site, and procedure occurs any time the responsibility for care of the patient is transferred to another member of the care team.

## 2018-10-12 NOTE — LETTER
10/12/2018       RE: Brooks Summers  [address]  [facility] [address]     Dear Colleague,    Thank you for referring your patient, Brooks Summers, to the [facility] AND [facility] FOR PROSTATE AND UROLOGIC CANCERS at [facility]. Please see a copy of my visit note below.      PRE-PROCEDURE DIAGNOSIS: gross hematuria   POST-PROCEDURE DIAGNOSIS: gross hematuria   PROCEDURE: Cystoscopy  HISTORY: Brooks Summers is a 73 year old male with recurrent gross hematuria. Had history of urethral lesion that was benign. Had new onset gross hematuria. Negative CT urogram.   REVIEW OF OFFICE STUDIES (e.g., uroflow or PVR):   DESCRIPTION OF PROCEDURE: After informed consent was obtained, the patient was brought to the procedure room where he was placed in the supine position with all pressure points well padded.  The penis and scrotum were prepped and draped in a sterile fashion. A flexible cystoscope was introduced through a well-lubricated urethra. Anterior urethra strictures were absent.   The urinary sphincter was intact.  The prostate demonstrated mild bilateral hypertrophy.  Bladder neck was open.   Bladder signififcant for presence of the following:      Diverticuli: absent      Cellules: absent      Trabeculation: present 0-1      Tumors: absent      Stones: absent  The flexible cystoscope was removed and the findings were described to the patient.   ASSESSMENT AND PLAN: 73 year old male with gross hematuria negative work up   Follow up in six months for U/a, urine culture, urine cytology    Again, thank you for allowing me to participate in the care of your patient.      Sincerely,    Marlena Mora MD

## 2018-10-12 NOTE — PATIENT INSTRUCTIONS
"Please follow up in 6 months with    Please drop off a urine one week before appointment.    It was a pleasure meeting with you today.  Thank you for allowing me and my team the privilege of caring for you today.  YOU are the reason we are here, and I truly hope we provided you with the excellent service you deserve.  Please let us know if there is anything else we can do for you so that we can be sure you are leaving completely satisfied with your care experience.            AFTER YOUR CYSTOSCOPY        You have just completed a cystoscopy, or \"cysto\", which allowed your physician to learn more about your bladder (or to remove a stent placed after surgery). We suggest that you continue to avoid caffeine, fruit juice, and alcohol for the next 24 hours, however, you are encouraged to return to your normal activities.         A few things that are considered normal after your cystoscopy:     * Small amount of bleeding (or spotting) that clears within the next 24 hours     * Slight burning sensation with urination     * Sensation to of needing to avoid more frequently     * The feeling of \"air\" in your urine     * Mild discomfort that is relieved with Tylenol        Please contact our office promptly if you:     * Develop a fever above 101 degrees     * Are unable to urinate     * Develop bright red blood that does not stop     * Severe pain or swelling         Please contact our office with any concerns or questions @DEPHN.  "

## 2018-10-13 ENCOUNTER — ALLIED HEALTH/NURSE VISIT (OUTPATIENT)
Dept: CARDIOLOGY | Facility: CLINIC | Age: 73
End: 2018-10-13
Attending: INTERNAL MEDICINE
Payer: MEDICARE

## 2018-10-13 DIAGNOSIS — I50.22 HEART FAILURE, CHRONIC SYSTOLIC (H): Primary | ICD-10-CM

## 2018-10-13 PROCEDURE — 93296 REM INTERROG EVL PM/IDS: CPT | Mod: ZF

## 2018-10-13 PROCEDURE — 93295 DEV INTERROG REMOTE 1/2/MLT: CPT | Performed by: INTERNAL MEDICINE

## 2018-10-13 NOTE — MR AVS SNAPSHOT
After Visit Summary   10/13/2018    Brooks Summers    MRN: [id number]           Patient Information     Date Of Birth          1945        Visit Information        Provider Department      10/13/2018 6:00 AM UC ICD REMOTE [facility]        Today's Diagnoses     Heart failure, chronic systolic (H)    -  1       Follow-ups after your visit        Your next 10 appointments already scheduled     Oct 24, 2018  1:00 PM CDT   Anticoagulation Visit with LL ANTI COAG   [facility]    [address] [address]   [phone]            Nov 20, 2018  1:30 PM CST   Return Sleep Patient with SU[facility] [place] ([facility])    [address] [place] [id number] [phone]            Dec 04, 2018  1:30 PM CST   (Arrive by 1:15 PM)   Return Visit with TED Salgado MD   [facility] ([facility])    [address] [place] [id number] [phone]            Dec 04, 2018  2:00 PM CST   Pacemaker Check with Uc Cv Device 1   [facility] ([facility])    [address]  [address] [facility]  [address]               Dec 07, 2018 10:35 AM CST   (Arrive by 10:20 AM)   Return Visit with Edin Mays MD   [facility] ([facility])    [address]  [address] [facility] [id number] [phone]            Apr 26, 2019 10:30 AM CDT   (Arrive by 10:15 AM)   Return Visit with Marlena Mora MD   [facility] ([facility])    [address]  [address] [facility] [id number] [phone]              Who to contact     If you have questions or need follow up information about today's clinic visit or your schedule  please contact [facility] directly at [phone].  Normal or non-critical lab and imaging results will be communicated to you by MyChart, letter or phone within 4 business days after the clinic has received the results. If you do not hear from us within 7 days, please contact the clinic through MyChart or phone. If you have a critical or abnormal lab result, we will notify you by phone as soon as possible.  Submit refill requests through [employer] or call your pharmacy and they will forward the refill request to us. Please allow 3 business days for your refill to be completed.          Additional Information About Your Visit        [facility] Information     [employer] gives you secure access to your electronic health record. If you see a primary care provider, you can also send messages to your care team and make appointments. If you have questions, please call your primary care clinic.  If you do not have a primary care provider, please call [phone] and they will assist you.        Care EveryWhere ID     This is your Care EveryWhere ID. This could be used by other organizations to access your [facility] medical records  [id number]         Blood Pressure from Last 3 Encounters:   10/15/18 137/84   08/17/18 122/70   08/10/18 103/66    Weight from Last 3 Encounters:   10/15/18 82.6 kg (182 lb)   10/12/18 81.2 kg (179 lb)   08/17/18 78.6 kg (173 lb 3.2 oz)              We Performed the Following     INTERROGATION DEVICE EVAL REMOTE, PACER/ICD        Primary Care Provider Office Phone # Fax #    Edin W MD Davon [phone]       [address] [address]        Equal Access to Services     [facility]NE [facility] : Hadii aad ku hadasho Soomaali, waaxda luqadaha, qaybta kaalmada emery, elisabeth quinonse. So [facility] [phone].    ATENCIÓN: Si habla español, tiene a herrera disposición servicios gratuitos de asistencia lingüística. Yohannes al [phone].    We  comply with applicable federal civil rights laws and [place] laws. We do not discriminate on the basis of race, color, national origin, age, disability, sex, sexual orientation, or gender identity.            Thank you!     Thank you for choosing [facility]  for your care. Our goal is always to provide you with excellent care. Hearing back from our patients is one way we can continue to improve our services. Please take a few minutes to complete the written survey that you may receive in the mail after your visit with us. Thank you!             Your Updated Medication List - Protect others around you: Learn how to safely use, store and throw away your medicines at www.disposemymeds.org.          This list is accurate as of 10/13/18 11:59 PM.  Always use your most recent med list.                   Brand Name Dispense Instructions for use Diagnosis    allopurinol 100 MG tablet    ZYLOPRIM    90 tablet    Take 1 tablet (100 mg) by mouth every evening    Idiopathic gout, unspecified chronicity, unspecified site       amoxicillin 500 MG capsule    AMOXIL    4 capsule    Take 2000 MG one hour before colonoscopy.    Prophylactic antibiotic       diazepam 10 MG tablet    VALIUM    1 tablet    Take 1 tablet (10 mg) by mouth every 6 hours as needed for anxiety or sleep Take 30-60 minutes before procedure.  Do not operate a vehicle after taking this medication.    Microhematuria       gabapentin 100 MG capsule    NEURONTIN    120 capsule    Take 1 tablet (100 mg) once daily for 3 days, then 2 tablets once daily for 3 days, then 3 tablets once daily    Restless legs syndrome (RLS), Sensory disturbance       losartan 25 MG tablet    COZAAR    45 tablet    Take 0.5 tablets (12.5 mg) by mouth every evening    Benign essential hypertension       metoprolol succinate 50 MG 24 hr tablet    TOPROL XL    90 tablet    Take 1 tablet (50 mg) by mouth daily    Benign essential hypertension       MULTIVITAMIN ADULTS 50+ Tabs        Flu vaccine need, Aortic valve stenosis, severe, Chronic systolic heart failure (H), S/P aortic valve replacement       * PROVIDER DOSED MANAGED WARFARIN (COUMADIN) OUTPATIENT      Per coumadin clinic        simvastatin 40 MG tablet    ZOCOR    90 tablet    Take 1 tablet (40 mg) by mouth At Bedtime    Hyperlipidemia LDL goal <100       VITAMIN B 12 PO           VITAMIN D (CHOLECALCIFEROL) PO      Take 1,000 Units by mouth daily    Flu vaccine need, Aortic valve stenosis, severe, Chronic systolic heart failure (H), S/P aortic valve replacement       * warfarin 5 MG tablet    COUMADIN    180 tablet    7.5 mg Tues/Thurs/Sun; 10 mg all other days or as directed by the Anticoagulation Clinic    S/P AVR (aortic valve replacement), Paroxysmal atrial fibrillation (H), Long term current use of anticoagulant therapy, S/P aortic valve replacement       * Notice:  This list has 2 medication(s) that are the same as other medications prescribed for you. Read the directions carefully, and ask your doctor or other care provider to review them with you.

## 2018-10-14 ENCOUNTER — PRE VISIT (OUTPATIENT)
Dept: SLEEP MEDICINE | Facility: CLINIC | Age: 73
End: 2018-10-14

## 2018-10-15 ENCOUNTER — OFFICE VISIT (OUTPATIENT)
Dept: SLEEP MEDICINE | Facility: CLINIC | Age: 73
End: 2018-10-15
Payer: MEDICARE

## 2018-10-15 VITALS
HEART RATE: 62 BPM | OXYGEN SATURATION: 98 % | SYSTOLIC BLOOD PRESSURE: 137 MMHG | WEIGHT: 182 LBS | DIASTOLIC BLOOD PRESSURE: 84 MMHG | HEIGHT: 70 IN | BODY MASS INDEX: 26.05 KG/M2 | RESPIRATION RATE: 16 BRPM

## 2018-10-15 DIAGNOSIS — G47.34 NOCTURNAL HYPOXEMIA: Primary | ICD-10-CM

## 2018-10-15 PROCEDURE — 99214 OFFICE O/P EST MOD 30 MIN: CPT | Performed by: NURSE PRACTITIONER

## 2018-10-15 NOTE — PATIENT INSTRUCTIONS
"MY TREATMENT INFORMATION FOR SLEEP APNEA-  Brooks Summers    NP : Tawana Choudhury  SLEEP CENTER :   [place]   MY CONTACT NUMBER: [phone]  02 for 02 [facility]-  Follow up end of November  Look at how to treat sleep apnea below  Patient to follow up with Primary Care provider regarding elevated blood pressure.      Am I having a sleep study at a sleep center?  Make sure you have an appointment for the study before you leave!    Am I having a home sleep study?  Watch this video:  https://www.Telller.com/watch?v=CteI_[email or web address]&[email or web address]  Please verify your insurance coverage with your insurance carrier    Frequently asked questions:  1. What is Obstructive Sleep Apnea (RUBY)? RUBY is the most common type of sleep apnea. Apnea means, \"without breath.\"  Apnea is most often caused by narrowing or collapse of the upper airway as muscles relax during sleep.   Almost everyone has occasional apneas. Most people with sleep apnea have had brief interruptions at night frequently for many years.  The severity of sleep apnea is related to how frequent and severe the events are.   2. What are the consequences of RUBY? Symptoms include: feeling sleepy during the day, snoring loudly, gasping or stopping of breathing, trouble sleeping, and occasionally morning headaches or heartburn at night.  Sleepiness can be serious and even increase the risk of falling asleep while driving. Other health consequences may include development of high blood pressure and other cardiovascular disease in persons who are susceptible. Untreated RUBY  can contribute to heart disease, stroke and diabetes.   3. What are the treatment options? In most situations, sleep apnea is a lifelong disease that must be managed with daily therapy. Medications are not effective for sleep apnea and surgery is generally not considered until other therapies have been tried. Your treatment is your choice . Continuous Positive Airway (CPAP) works " right away and is the therapy that is effective in nearly everyone. An oral device to hold your jaw forward is usually the next most reliable option. Other options include postioning devices (to keep you off your back), weight loss, and surgery including a tongue pacing device. There is more detail about some of these options below.    Important tips for using CPAP and similar devices   Know your equipment:  CPAP is continuous positive airway pressure that prevents obstructive sleep apnea by keeping the throat from collapsing while you are sleeping. In most cases, the device is  smart  and can slowly self-adjusts if your throat collapses and keeps a record every day of how well you are treated-this information is available to you and your care team.  BPAP is bilevel positive airway pressure that keeps your throat open and also assists each breath with a pressure boost to maintain adequate breathing.  Special kinds of BPAP are used in patients who have inadequate breathing from lung or heart disease. In most cases, the device is  smart  and can slowly self-adjusts to assist breathing. Like CPAP, the device keeps a record of how well you are treated.  Your mask is your connection to the device. You get to choose what feels most comfortable and the staff will help to make sure if fits. Here: are some examples of the different masks that are available:       Key points to remember on your journey with sleep apnea:  1. Sleep study.  PAP devices often need to be adjusted during a sleep study to show that they are effective and adjusted right.  2. Good tips to remember: Try wearing just the mask during a quiet time during the day so your body adapts to wearing it. A humidifier is recommended for comfort in most cases to prevent drying of your nose and throat. Allergy medication from your provider may help you if you are having nasal congestion.  3. Getting settled-in. It takes more than one night for most of us to get used  to wearing a mask. Try wearing just the mask during a quiet time during the day so your body adapts to wearing it. A humidifier is recommended for comfort in most cases. Our team will work with you carefully on the first day and will be in contact within 4 days and again at 2 and 4 weeks for advice and remote device adjustments. Your therapy is evaluated by the device each day.   4. Use it every night. The more you are able to sleep naturally for 7-8 hours, the more likely you will have good sleep and to prevent health risks or symptoms from sleep apnea. Even if you use it 4 hours it helps. Occasionally all of us are unable to use a medical therapy, in sleep apnea, it is not dangerous to miss one night.   5. Communicate. Call our skilled team on the number provided on the first day if your visit for problems that make it difficult to wear the device. Over 2 out of 3 patients can learn to wear the device long-term with help from our team. Remember to call our team or your sleep providers if you are unable to wear the device as we may have other solutions for those who cannot adapt to mask CPAP therapy. It is recommended that you sleep your sleep provider within the first 3 months and yearly after that if you are not having problems.   Take care of your equipment. Make sure you clean your mask and tubing using directions every day and that your filter and mask are replaced as recommended or if they are not working.     BESIDES CPAP, WHAT OTHER THERAPIES ARE THERE?    Positioning Device  Positioning devices are generally used when sleep apnea is mild and only occurs on your back.This example shows a pillow that straps around the waist. It may be appropriate for those whose sleep study shows milder sleep apnea that occurs primarily when lying flat on one's back. Preliminary studies have shown benefit but effectiveness at home may need to be verified by a home sleep test. These devices are generally not covered by  medical insurance.  Examples of devices that maintain sleeping on the back to prevent snoring and mild sleep apnea.    Belt type body positioner  Http://[employer].com/    Electronic reminder  Http://nightshifttherapy.com/  Http://[email or web address].org.au/    Oral Appliance  What is oral appliance therapy?  An oral appliance device fits on your teeth at night like a retainer used after having braces. The device is made by a specialized dentist and requires several visits over 1-2 months before a manufactured device is made to fit your teeth and is adjusted to prevent your sleep apnea. Once an oral device is working properly, snoring should be improved. A home sleep test may be recommended at that time if to determine whether the sleep apnea is adequately treated.       Some things to remember:  -Oral devices are often, but not always, covered by your medical insurance. Be sure to check with your insurance provider.   -If you are referred for oral therapy, you will be given a list of specialized dentists to consider or you may choose to visit the Web site of the American Academy of Dental Sleep Medicine  -Oral devices are less likely to work if you have severe sleep apnea or are extremely overweight.     More detailed information  An oral appliance is a small acrylic device that fits over the upper and lower teeth  (similar to a retainer or a mouth guard). This device slightly moves jaw forward, which moves the base of the tongue forward, opens the airway, improves breathing for effective treat snoring and obstructive sleep apnea in perhaps 7 out of 10 people .  The best working devices are custom-made by a dental device  after a mold is made of the teeth 1, 2, 3.  When is an oral appliance indicated?  Oral appliance therapy is recommended as a first-line treatment for patients with primary snoring, mild sleep apnea, and for patients with moderate sleep apnea who prefer appliance therapy to use of CPAP4, 5.  Severity of sleep apnea is determined by sleep testing and is based on the number of respiratory events per hour of sleep.   How successful is oral appliance therapy?  The success rate of oral appliance therapy in patients with mild sleep apnea is 75-80% while in patients with moderate sleep apnea it is 50-70%. The chance of success in patients with severe sleep apnea is 40-50%. The research also shows that oral appliances have a beneficial effect on the cardiovascular health of RUBY patients at the same magnitude as CPAP therapy7.  Oral appliances should be a second-line treatment in cases of severe sleep apnea, but if not completely successful then a combination therapy utilizing CPAP plus oral appliance therapy may be effective. Oral appliances tend to be effective in a broad range of patients although studies show that the patients who have the highest success are females, younger patients, those with milder disease, and less severe obesity. 3, 6.   Finding a dentist that practices dental sleep medicine  Specific training is available through the American Academy of Dental Sleep Medicine for dentists interested in working in the field of sleep. To find a dentist who is educated in the field of sleep and the use of oral appliances, near you, visit the Web site of the American Academy of Dental Sleep Medicine.    References  1. Alicia, et al. Objectively measured vs self-reported compliance during oral appliance therapy for sleep-disordered breathing. Chest 2013; 144(5): 0338-5023.  2. Lex et al. Objective measurement of compliance during oral appliance therapy for sleep-disordered breathing. Thorax 2013; 68(1): 91-96.  3. Shelby, et al. Mandibular advancement devices in 620 men and women with RUBY and snoring: tolerability and predictors of treatment success. Chest 2004; 125: 3390-7243.  4. Lynn et al. Oral appliances for snoring and RUBY: a review. Sleep 2006; 29: 244-262.  5. Rey et al.  Oral appliance treatment for RUBY: an update. J Clin Sleep Med 2014; 10(2): 215-227.  6. Amy et al. Predictors of OSAH treatment outcome. J Dent Res 2007; 86: 4786-7602.      Weight Loss:    Weight loss is a long-term strategy that may improve sleep apnea in some patients.    Weight management is a personal decision and the decision should be based on your interest and the potential benefits.  If you are interested in exploring weight loss strategies, the following discussion covers the impact on weight loss on sleep apnea and the approaches that may be successful.    Being overweight does not necessarily mean you will have health consequences.  Those who have BMI over 35 or over 27 with existing medical conditions carries greater risk.   Weight loss decreases severity of sleep apnea in most people with obesity. For those with mild obesity who have developed snoring with weight gain, even 15-30 pound weight loss can improve and occasionally eliminate sleep apnea.  Structured and life-long dietary and health habits are necessary to lose weight and keep healthier weight levels.     Though there may be significant health benefits from weight loss, long-term weight loss is very difficult to achieve- studies show success with dietary management in less than 10% of people. In addition, substantial weight loss may require years of dietary control and may be difficult if patients have severe obesity. In these cases, surgical management may be considered.  Finally, older individuals who have tolerated obesity without health complications may be less likely to benefit from weight loss strategies.      [unfilled]    Surgery:    Surgery for obstructive sleep apnea is considered generally only when other therapies fail to work. Surgery may be discussed with you if you are having a difficult time tolerating CPAP and or when there is an abnormal structure that requires surgical correction.  Nose and throat surgeries often  enlarge the airway to prevent collapse.  Most of these surgeries create pain for 1-2 weeks and up to half of the most common surgeries are not effective throughout life.  You should carefully discuss the benefits and drawbacks to surgery with your sleep provider and surgeon to determine if it is the best solution for you.   More information  Surgery for RUBY is directed at areas that are responsible for narrowing or complete obstruction of the airway during sleep.  There are a wide range of procedures available to enlarge and/or stabilize the airway to prevent blockage of breathing in the three major areas where it can occur: the palate, tongue, and nasal regions.  Successful surgical treatment depends on the accurate identification of the factors responsible for obstructive sleep apnea in each person.  A personalized approach is required because there is no single treatment that works well for everyone.  Because of anatomic variation, consultation with an examination by a sleep surgeon is a critical first step in determining what surgical options are best for each patient.  In some cases, examination during sedation may be recommended in order to guide the selection of procedures.  Patients will be counseled about risks and benefits as well as the typical recovery course after surgery. Surgery is typically not a cure for a person s RUBY.  However, surgery will often significantly improve one s RUBY severity (termed  success rate ).  Even in the absence of a cure, surgery will decrease the cardiovascular risk associated with OSA7; improve overall quality of life8 (sleepiness, functionality, sleep quality, etc).      Palate Procedures:  Patients with RUBY often have narrowing of their airway in the region of their tonsils and uvula.  The goals of palate procedures are to widen the airway in this region as well as to help the tissues resist collapse.  Modern palate procedure techniques focus on tissue conservation and  soft tissue rearrangement, rather than tissue removal.  Often the uvula is preserved in this procedure. Residual sleep apnea is common in patient after pharyngoplasty with an average reduction in sleep apnea events of 33%2.      Tongue Procedures:  ExamWhile patients are awake, the muscles that surround the throat are active and keep this region open for breathing. These muscles relax during sleep, allowing the tongue and other structures to collapse and block breathing.  There are several different tongue procedures available.  Selection of a tongue base procedure depends on characteristics seen on physical exam.  Generally, procedures are aimed at removing bulky tissues in this area or preventing the back of the tongue from falling back during sleep.  Success rates for tongue surgery range from 50-62%3.    Hypoglossal Nerve Stimulation:  Hypoglossal nerve stimulation has recently received approval from the [place] Food and Drug Administration for the treatment of obstructive sleep apnea.  This is based on research showing that the system was safe and effective in treating sleep apnea6.  Results showed that the median AHI score decreased 68%, from 29.3 to 9.0. This therapy uses an implant system that senses breathing patterns and delivers mild stimulation to airway muscles, which keeps the airway open during sleep.  The system consists of three fully implanted components: a small generator (similar in size to a pacemaker), a breathing sensor, and a stimulation lead.  Using a small handheld remote, a patient turns the therapy on before bed and off upon awakening.    Candidates for this device must be greater than 22 years of age, have moderate to severe RUBY (AHI between 20-65), BMI less than 32, have tried CPAP/oral appliance without success, and have appropriate upper airway anatomy (determined by a sleep endoscopy performed by Dr. Suero).    Hypoglossal Nerve Stimulation Pathway:    The sleep surgeon s office  will work with the patient through the insurance prior-authorization process (including communications and appeals).    Nasal Procedures:  Nasal obstruction can interfere with nasal breathing during the day and night.  Studies have shown that relief of nasal obstruction can improve the ability of some patients to tolerate positive airway pressure therapy for obstructive sleep apnea1.  Treatment options include medications such as nasal saline, topical corticosteroid and antihistamine sprays, and oral medications such as antihistamines or decongestants. Non-surgical treatments can include external nasal dilators for selected patients. If these are not successful by themselves, surgery can improve the nasal airway either alone or in combination with these other options.      Combination Procedures:  Combination of surgical procedures and other treatments may be recommended, particularly if patients have more than one area of narrowing or persistent positional disease.  The success rate of combination surgery ranges from 66-80%2,3.    References  1. Julius TERRELL. The Role of the Nose in Snoring and Obstructive Sleep Apnoea: An Update.  Eur Arch Otorhinolaryngol. 2011; 268: 1365-73.  2.  Judit SM; Lashell JA; Neymar JR; Pallanch JF; León MB; Marta SG; Brandon ECHOLS. Surgical modifications of the upper airway for obstructive sleep apnea in adults: a systematic review and meta-analysis. SLEEP 2010;33(10):5107-3726. Lauren BESS. Hypopharyngeal surgery in obstructive sleep apnea: an evidence-based medicine review.  Arch Otolaryngol Head Neck Surg. 2006 Feb;132(2):206-13.  3. Shady YH1, Grayson Y, Santi JANAY. The efficacy of anatomically based multilevel surgery for obstructive sleep apnea. Otolaryngol Head Neck Surg. 2003 Oct;129(4):327-35.  4. Lauren BESS, Goldberg A. Hypopharyngeal Surgery in Obstructive Sleep Apnea: An Evidence-Based Medicine Review. Arch Otolaryngol Head Neck Surg. 2006 Feb;132(2):206-13.  5. Donna ASTUDILLO et al.  Upper-Airway Stimulation for Obstructive Sleep Apnea.  N Engl J Med. 2014 Jan 9;370(2):139-49.  6. Eric Y et al. Increased Incidence of Cardiovascular Disease in Middle-aged Men with Obstructive Sleep Apnea. Am J Respir Crit Care Med; 2002 166: 159-165  7. Janay WILSON et al. Studying Life Effects and Effectiveness of Palatopharyngoplasty (SLEEP) study: Subjective Outcomes of Isolated Uvulopalatopharyngoplasty. Otolaryngol Head Neck Surg. 2011; 144: 623-631.

## 2018-10-15 NOTE — NURSING NOTE
"Rechecked blood pressure.      Vital signs:      BP: 137/84 Pulse: 62   Resp: 16 SpO2: 98 % O2 Device: None (Room air)   Height: 177.8 cm (5' 10\") Weight: 82.6 kg (182 lb)  Estimated body mass index is 26.11 kg/(m^2) as calculated from the following:    Height as of this encounter: 1.778 m (5' 10\").    Weight as of this encounter: 82.6 kg (182 lb).        [facility] ~[place]    "

## 2018-10-15 NOTE — TELEPHONE ENCOUNTER
7/16/18 ECHO: Interpretation Summary  s/p 23 mm Bogdan Johnson Perimount Magna tissue valve in aortic position  (2011)  Doppler interrogation of the aortic valve is normal.  Global and regional left ventricular function is normal with an EF of 55-60%.  Right ventricular function, chamber size, wall motion, and thickness are  normal.  Estimated PA systolic pressure 20 mmHg + RAP.  This study was compared with the study from 10/10/2016. There has been no  Change.  Atria  Moderate left atrial enlargement is present. Mild right atrial enlargement is  present. The atrial septum is intact as assessed by color Doppler .      Mitral Valve  Mild mitral annular calcification is present. Trace mitral insufficiency is  present.         I have been asked to see Mr. Summers in consultation at the request of Dr. Castro in Cardiology for possible obstructive sleep apnea, undiagnosed and untreated at this point.  Mr. 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 Prior to surgery had severe left systolic dysfunction with an EF of 10%-15%, status post ICD placement 01/2012, now has been improved EF of 50%-55%.         He presented to Cardiology for I believe a routine evaluation.  He reported at his office visit with Dr. Castro that he was feeling slightly more fatigued than normal with increased daytime somnolence, and with what patient reports is some orthostatic sensation with going from bending over to standing up and some palpitations.  Due to these reports, he is going to have his echo moved up, and return to sleep clinic.         7/15/18 VISIT:   bed partner present, Pinky, who reporting loud snoring, occasional snort, gasping arousals and witnessed apneas.  The plan was to do a home sleep study.  He did not  follow through with this test and does report at this presentation that he is not snoring as much.  There are less frequent witnessed apneas

## 2018-10-15 NOTE — PROGRESS NOTES
Chief complaint: Patient returns to clinic to discuss results of overnight oximetry and develop a plan of care    HPI: Mr. Summers, seen in consultation at the request of Dr. Castro in Cardiology for possible obstructive sleep apnea, undiagnosed and untreated at this point.  Mr. 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 Prior to surgery had severe left systolic dysfunction with an EF of 10%-15%, status post ICD placement 01/2012, now has been improved EF of 50%-55%.         He presented to Cardiology for I believe a routine evaluation.  He reported at his office visit with Dr. Castro that he was feeling slightly more fatigued than normal with increased daytime somnolence, and with what patient reports is some orthostatic sensation with going from bending over to standing up and some palpitations.  Due to these reports, he is going to have his echo moved up, and return to sleep clinic.       7/15/18 VISIT:   bed partner present, Pinky, who reporting loud snoring, occasional snort, gasping arousals and witnessed apneas.  The plan was to do a home sleep study.  He did not follow through with this test and does report at this presentation that he is not snoring as much.  There are less frequent witnessed apneas.  Overnight oximetry was completed as follows:    7/16/18 overnight oximetry shows 5.9% of night with O2 sats below 90%, adjusted index of 4% desaturations of 10 seconds or longer 14.7 time less than 88% 5.8 minutes average flow SPO2 88.9, average SPO2 less than 88% 85.8.      7/16/18 ECHO: Interpretation Summary  s/p 23 mm Bogdan Johnson [facility] Magna tissue valve in aortic position  (2011)  Doppler interrogation of the aortic valve is normal.  Global and regional left ventricular  function is normal with an EF of 55-60%.  Right ventricular function, chamber size, wall motion, and thickness are  normal.  Estimated PA systolic pressure 20 mmHg + RAP.  This study was compared with the study from 10/10/2016. There has been no  Change.  Atria  Moderate left atrial enlargement is present. Mild right atrial enlargement is  present. The atrial septum is intact as assessed by color Doppler .      Mitral Valve  Mild mitral annular calcification is present. Trace mitral insufficiency is  Present.    Allergies:    Allergies   Allergen Reactions     Lisinopril Cough       Medications:    Current Outpatient Prescriptions   Medication Sig Dispense Refill     allopurinol (ZYLOPRIM) 100 MG tablet Take 1 tablet (100 mg) by mouth every evening 90 tablet 3     amoxicillin (AMOXIL) 500 MG capsule Take 2000 MG one hour before colonoscopy. 4 capsule 1     Cyanocobalamin (VITAMIN B 12 PO)        diazepam (VALIUM) 10 MG tablet Take 1 tablet (10 mg) by mouth every 6 hours as needed for anxiety or sleep Take 30-60 minutes before procedure.  Do not operate a vehicle after taking this medication. 1 tablet 0     gabapentin (NEURONTIN) 100 MG capsule Take 1 tablet (100 mg) once daily for 3 days, then 2 tablets once daily for 3 days, then 3 tablets once daily 120 capsule 3     losartan (COZAAR) 25 MG tablet Take 0.5 tablets (12.5 mg) by mouth every evening 45 tablet 2     metoprolol (TOPROL XL) 50 MG 24 hr tablet Take 1 tablet (50 mg) by mouth daily 90 tablet 3     Multiple Vitamins-Minerals (MULTIVITAMIN ADULTS 50+) TABS        order for DME Oxygen 1.5 Li/min  at night 1 Device 11     PROVIDER DOSED MANAGED WARFARIN, COUMADIN, OUTPATIENT Per coumadin clinic       simvastatin (ZOCOR) 40 MG tablet Take 1 tablet (40 mg) by mouth At Bedtime 90 tablet 2     VITAMIN D, CHOLECALCIFEROL, PO Take 1,000 Units by mouth daily       warfarin (COUMADIN) 5 MG tablet 7.5 mg Tues/Thurs/Sun; 10 mg all other days or as directed by the  Anticoagulation Clinic 180 tablet 3       Problem List:  Patient Active Problem List    Diagnosis Date Noted     Trigger ring finger of left hand 11/16/2015     Priority: Medium     Paroxysmal atrial fibrillation (H) 02/25/2015     Priority: Medium     Long term current use of anticoagulant therapy 02/25/2015     Priority: Medium     Problem list name updated by automated process. Provider to review       Finger injury 10/08/2014     Priority: Medium     Hyperlipidemia with target LDL less than 100 10/21/2013     Priority: Medium     Diagnosis updated by automated process. Provider to review and confirm.       Need for prophylactic antibiotic 04/12/2013     Priority: Medium     Aortic valve and past endocarditis        S/P AVR 01/25/2013     Priority: Medium     Endocarditis 01/21/2013     Priority: Medium     Cultures negative but 16S rRNA PCR showed Staph capitis.  Treated with Dapto and RIF x 8 weeks.       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 Provider to review       LBBB (left bundle branch block) 01/28/2012     Priority: Medium     Received CRT-D;  msec       Pneumothorax, left 01/28/2012     Priority: Medium     Observed following CRT-D implant; small. Observe and repeat CXR       Heart failure, chronic systolic (H) 01/28/2012     Priority: Medium     Stable; NYHA classII       Cardiomyopathy, dilated, nonischemic (H) 01/28/2012     Priority: Medium     Mild CAD; EF 15-20%       CKD (chronic kidney disease) stage 3, GFR 30-59 ml/min (H) 01/28/2012     Priority: Medium     Dyslipidemia 01/28/2012     Priority: Medium     On Pravavastatin       Aortic valve stenosis, severe 07/14/2011     Priority: Medium     Chronic systolic heart failure (H) 07/14/2011     Priority: Medium     Bicuspid aortic valve 07/14/2011     Priority: Medium     S/P aortic valve  replacement 07/14/2011     Priority: Medium     Smoking hx 07/14/2011     Priority: Medium     Rotator cuff (capsule) sprain 02/17/2009     Priority: Medium     Other postprocedural status(V45.89) 02/17/2009     Priority: Medium        Past Medical/Surgical History:  Past Medical History:   Diagnosis Date     BCC (basal cell carcinoma), face 5/16/2013     Bicuspid aortic valve      Chronic systolic heart failure (H)      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 Tx with Dapto/RIFx 8 weeks.     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     Paroxysmal A-fib (H)     Post-op 7/14/2011, 1/26/13     Rotator Cuff (Capsule) Sprain and Strain      S/P aortic valve replacement     7/14/2011, re-do 1/25/13 due to endocarditis     Smoking hx      Thrombocytopenia (H)      Past Surgical History:   Procedure Laterality Date     ANESTHESIA CARDIOVERSION  7/18/2011    Procedure:ANESTHESIA CARDIOVERSION; Cardioversion; Surgeon:GENERIC ANESTHESIA PROVIDER; Location:[facility] OR     COLONOSCOPY       CORONARY ANGIOGRAPHY ADULT ORDER       CYSTOSCOPY, BIOPSY URETHRA N/A 4/3/2017    Procedure: CYSTOSCOPY, BIOPSY URETHRA;  Surgeon: Marlena Mora MD;  Location: [facility] OR     ENDOSCOPY UPPER, COLONOSCOPY, COMBINED       HC REMOV & REPLACE IMPLT DEFIB PULSE GEN MULT LEAD  12/3/2015          HEMORRHOID SURGERY       IMPLANT AUTOMATIC IMPLANTABLE CARDIOVERTER DEFIBRILLATOR       MOHS MICROGRAPHIC PROCEDURE       ORTHOPEDIC SURGERY      shoulder,right     REDO STERNOTOMY REPLACE VALVE AORTIC  1/25/2013    Procedure: REDO STERNOTOMY REPLACE VALVE AORTIC;  Redo Sternotomy, Redo Aortic Valve Replacement on pump oxygenator. Intraoperative Transesophageal Echocardiogram;  Surgeon: Navin Singh MD;  Location: [facility] OR     REPAIR VALVE MITRAL  2013     REPLACE VALVE AORTIC  7/14/2011    Procedure:REPLACE VALVE AORTIC; Median  "Sternotomy, Bioprosthesis,  Aortic Valve replacement on pump with oxygenator. Intra-operative Transesophogeal Echocardiogram.; Surgeon:STEPHANIE HAMPTON; Location:[facility] OR     teeth extractions       TRANSPLANT             Physical Examination:  Vitals: /84  Pulse 62  Resp 16  Ht 1.778 m (5' 10\")  Wt 82.6 kg (182 lb)  SpO2 98%  BMI 26.11 kg/m2  BMI= Body mass index is 26.11 kg/(m^2).    146/86 recheck    [place] Total Score 7/13/2018   Total score - [place] 6   [place] Sleepiness Scale 8/24, GRADY: 10/28    GENERAL APPEARANCE: healthy, alert and no distress      Assessment plan: It is my impression that Mr. Summers likely has either REM related mild RUBY, or supine related RUBY.  He denies significant time spent supine.  With his coronary history, status post replacement of aortic valve, born with bicuspid aortic valve, and ICD; along with the fact that his twin sister who was also born with a bicuspid aortic valve has recently gone on O2 at night, he wishes to consider nocturnal O2 via O2 concentrator.  And the following plan of care has been developed  1.  Nocturnal hypoxemia: Would suggest 1.2 L of O2 via concentrator at night.  Hoping to secure his oxygen prior to [place] leaving the [place] on [place], and validating correction of nocturnal hypoxemia with an overnight oximetry report prior to his departure.  I have placed an order for [facility] to provide him with a concentrator and will update him via my chart as to need to validate correction.  I have discussed the importance of maintaining the same weight as we have concentrated on his low oxygen level at this point.  He is to update me should he have any concerns about his therapy and I will see him again in follow-up approximately 1 years time.  Of note, he does carry the diagnosis of chronic systolic heart failure.  Although his echo does look good at this time, it may be reasonable to correct his nocturnal hypoxemia with O2 rather than consider " Pap therapy with risk of Cheyne-Rahman respirations.    Time spent with patient is 25 minutes, of which >50% was spent in counseling, education and coordination of care.

## 2018-10-15 NOTE — MR AVS SNAPSHOT
"              After Visit Summary   10/15/2018    Brooks Summers    MRN: [id number]           Patient Information     Date Of Birth          1945        Visit Information        Provider Department      10/15/2018 9:30 AM Tawana Choudhury APRN CNP [facility]        Today's Diagnoses     Hypoxemia    -  1      Care Instructions    MY TREATMENT INFORMATION FOR SLEEP APNEA-  Brooks Summers    NP : Tawana Choudhury  SLEEP CENTER :   [place]   MY CONTACT NUMBER: [phone]  02 for 02 FVE-  Follow up end of November  Look at how to treat sleep apnea below  Patient to follow up with Primary Care provider regarding elevated blood pressure.      Am I having a sleep study at a sleep center?  Make sure you have an appointment for the study before you leave!    Am I having a home sleep study?  Watch this video:  [email or web address]  Please verify your insurance coverage with your insurance carrier    Frequently asked questions:  1. What is Obstructive Sleep Apnea (RUBY)? RUBY is the most common type of sleep apnea. Apnea means, \"without breath.\"  Apnea is most often caused by narrowing or collapse of the upper airway as muscles relax during sleep.   Almost everyone has occasional apneas. Most people with sleep apnea have had brief interruptions at night frequently for many years.  The severity of sleep apnea is related to how frequent and severe the events are.   2. What are the consequences of RUBY? Symptoms include: feeling sleepy during the day, snoring loudly, gasping or stopping of breathing, trouble sleeping, and occasionally morning headaches or heartburn at night.  Sleepiness can be serious and even increase the risk of falling asleep while driving. Other health consequences may include development of high blood pressure and other cardiovascular disease in persons who are susceptible. Untreated RUBY  can contribute " to heart disease, stroke and diabetes.   3. What are the treatment options? In most situations, sleep apnea is a lifelong disease that must be managed with daily therapy. Medications are not effective for sleep apnea and surgery is generally not considered until other therapies have been tried. Your treatment is your choice . Continuous Positive Airway (CPAP) works right away and is the therapy that is effective in nearly everyone. An oral device to hold your jaw forward is usually the next most reliable option. Other options include postioning devices (to keep you off your back), weight loss, and surgery including a tongue pacing device. There is more detail about some of these options below.    Important tips for using CPAP and similar devices   Know your equipment:  CPAP is continuous positive airway pressure that prevents obstructive sleep apnea by keeping the throat from collapsing while you are sleeping. In most cases, the device is  smart  and can slowly self-adjusts if your throat collapses and keeps a record every day of how well you are treated-this information is available to you and your care team.  BPAP is bilevel positive airway pressure that keeps your throat open and also assists each breath with a pressure boost to maintain adequate breathing.  Special kinds of BPAP are used in patients who have inadequate breathing from lung or heart disease. In most cases, the device is  smart  and can slowly self-adjusts to assist breathing. Like CPAP, the device keeps a record of how well you are treated.  Your mask is your connection to the device. You get to choose what feels most comfortable and the staff will help to make sure if fits. Here: are some examples of the different masks that are available:       Key points to remember on your journey with sleep apnea:  1. Sleep study.  PAP devices often need to be adjusted during a sleep study to show that they are effective and adjusted right.  2. Good tips to  remember: Try wearing just the mask during a quiet time during the day so your body adapts to wearing it. A humidifier is recommended for comfort in most cases to prevent drying of your nose and throat. Allergy medication from your provider may help you if you are having nasal congestion.  3. Getting settled-in. It takes more than one night for most of us to get used to wearing a mask. Try wearing just the mask during a quiet time during the day so your body adapts to wearing it. A humidifier is recommended for comfort in most cases. Our team will work with you carefully on the first day and will be in contact within 4 days and again at 2 and 4 weeks for advice and remote device adjustments. Your therapy is evaluated by the device each day.   4. Use it every night. The more you are able to sleep naturally for 7-8 hours, the more likely you will have good sleep and to prevent health risks or symptoms from sleep apnea. Even if you use it 4 hours it helps. Occasionally all of us are unable to use a medical therapy, in sleep apnea, it is not dangerous to miss one night.   5. Communicate. Call our skilled team on the number provided on the first day if your visit for problems that make it difficult to wear the device. Over 2 out of 3 patients can learn to wear the device long-term with help from our team. Remember to call our team or your sleep providers if you are unable to wear the device as we may have other solutions for those who cannot adapt to mask CPAP therapy. It is recommended that you sleep your sleep provider within the first 3 months and yearly after that if you are not having problems.   Take care of your equipment. Make sure you clean your mask and tubing using directions every day and that your filter and mask are replaced as recommended or if they are not working.     BESIDES CPAP, WHAT OTHER THERAPIES ARE THERE?    Positioning Device  Positioning devices are generally used when sleep apnea is mild and  only occurs on your back.This example shows a pillow that straps around the waist. It may be appropriate for those whose sleep study shows milder sleep apnea that occurs primarily when lying flat on one's back. Preliminary studies have shown benefit but effectiveness at home may need to be verified by a home sleep test. These devices are generally not covered by medical insurance.  Examples of devices that maintain sleeping on the back to prevent snoring and mild sleep apnea.    Belt type body positioner  [email or web address]    Electronic reminder  [email or web address]  [email or web address]    Oral Appliance  What is oral appliance therapy?  An oral appliance device fits on your teeth at night like a retainer used after having braces. The device is made by a specialized dentist and requires several visits over 1-2 months before a manufactured device is made to fit your teeth and is adjusted to prevent your sleep apnea. Once an oral device is working properly, snoring should be improved. A home sleep test may be recommended at that time if to determine whether the sleep apnea is adequately treated.       Some things to remember:  -Oral devices are often, but not always, covered by your medical insurance. Be sure to check with your insurance provider.   -If you are referred for oral therapy, you will be given a list of specialized dentists to consider or you may choose to visit the Web site of the American Academy of Dental Sleep Medicine  -Oral devices are less likely to work if you have severe sleep apnea or are extremely overweight.     More detailed information  An oral appliance is a small acrylic device that fits over the upper and lower teeth  (similar to a retainer or a mouth guard). This device slightly moves jaw forward, which moves the base of the tongue forward, opens the airway, improves breathing for effective treat snoring and obstructive sleep apnea in perhaps 7 out of 10 people .  The  best working devices are custom-made by a dental device  after a mold is made of the teeth 1, 2, 3.  When is an oral appliance indicated?  Oral appliance therapy is recommended as a first-line treatment for patients with primary snoring, mild sleep apnea, and for patients with moderate sleep apnea who prefer appliance therapy to use of CPAP4, 5. Severity of sleep apnea is determined by sleep testing and is based on the number of respiratory events per hour of sleep.   How successful is oral appliance therapy?  The success rate of oral appliance therapy in patients with mild sleep apnea is 75-80% while in patients with moderate sleep apnea it is 50-70%. The chance of success in patients with severe sleep apnea is 40-50%. The research also shows that oral appliances have a beneficial effect on the cardiovascular health of RUBY patients at the same magnitude as CPAP therapy7.  Oral appliances should be a second-line treatment in cases of severe sleep apnea, but if not completely successful then a combination therapy utilizing CPAP plus oral appliance therapy may be effective. Oral appliances tend to be effective in a broad range of patients although studies show that the patients who have the highest success are females, younger patients, those with milder disease, and less severe obesity. 3, 6.   Finding a dentist that practices dental sleep medicine  Specific training is available through the American Academy of Dental Sleep Medicine for dentists interested in working in the field of sleep. To find a dentist who is educated in the field of sleep and the use of oral appliances, near you, visit the Web site of the American Academy of Dental Sleep Medicine.    References  1. Alicia et al. Objectively measured vs self-reported compliance during oral appliance therapy for sleep-disordered breathing. Chest 2013; 144(5): 0210-9387.  2. Lex et al. Objective measurement of compliance during oral  appliance therapy for sleep-disordered breathing. Thorax 2013; 68(1): 91-96.  3. Shelby, et al. Mandibular advancement devices in 620 men and women with RUBY and snoring: tolerability and predictors of treatment success. Chest 2004; 125: 8659-5749.  4. Lynn et al. Oral appliances for snoring and RUBY: a review. Sleep 2006; 29: 244-262.  5. Rey et al. Oral appliance treatment for RUBY: an update. J Clin Sleep Med 2014; 10(2): 215-227.  6. Amy et al. Predictors of OSAH treatment outcome. J Dent Res 2007; 86: 0856-3333.      Weight Loss:    Weight loss is a long-term strategy that may improve sleep apnea in some patients.    Weight management is a personal decision and the decision should be based on your interest and the potential benefits.  If you are interested in exploring weight loss strategies, the following discussion covers the impact on weight loss on sleep apnea and the approaches that may be successful.    Being overweight does not necessarily mean you will have health consequences.  Those who have BMI over 35 or over 27 with existing medical conditions carries greater risk.   Weight loss decreases severity of sleep apnea in most people with obesity. For those with mild obesity who have developed snoring with weight gain, even 15-30 pound weight loss can improve and occasionally eliminate sleep apnea.  Structured and life-long dietary and health habits are necessary to lose weight and keep healthier weight levels.     Though there may be significant health benefits from weight loss, long-term weight loss is very difficult to achieve- studies show success with dietary management in less than 10% of people. In addition, substantial weight loss may require years of dietary control and may be difficult if patients have severe obesity. In these cases, surgical management may be considered.  Finally, older individuals who have tolerated obesity without health complications may be less likely to  benefit from weight loss strategies.      [unfilled]    Surgery:    Surgery for obstructive sleep apnea is considered generally only when other therapies fail to work. Surgery may be discussed with you if you are having a difficult time tolerating CPAP and or when there is an abnormal structure that requires surgical correction.  Nose and throat surgeries often enlarge the airway to prevent collapse.  Most of these surgeries create pain for 1-2 weeks and up to half of the most common surgeries are not effective throughout life.  You should carefully discuss the benefits and drawbacks to surgery with your sleep provider and surgeon to determine if it is the best solution for you.   More information  Surgery for RUBY is directed at areas that are responsible for narrowing or complete obstruction of the airway during sleep.  There are a wide range of procedures available to enlarge and/or stabilize the airway to prevent blockage of breathing in the three major areas where it can occur: the palate, tongue, and nasal regions.  Successful surgical treatment depends on the accurate identification of the factors responsible for obstructive sleep apnea in each person.  A personalized approach is required because there is no single treatment that works well for everyone.  Because of anatomic variation, consultation with an examination by a sleep surgeon is a critical first step in determining what surgical options are best for each patient.  In some cases, examination during sedation may be recommended in order to guide the selection of procedures.  Patients will be counseled about risks and benefits as well as the typical recovery course after surgery. Surgery is typically not a cure for a person s RUBY.  However, surgery will often significantly improve one s RUBY severity (termed  success rate ).  Even in the absence of a cure, surgery will decrease the cardiovascular risk associated with OSA7; improve overall quality of  life8 (sleepiness, functionality, sleep quality, etc).      Palate Procedures:  Patients with URBY often have narrowing of their airway in the region of their tonsils and uvula.  The goals of palate procedures are to widen the airway in this region as well as to help the tissues resist collapse.  Modern palate procedure techniques focus on tissue conservation and soft tissue rearrangement, rather than tissue removal.  Often the uvula is preserved in this procedure. Residual sleep apnea is common in patient after pharyngoplasty with an average reduction in sleep apnea events of 33%2.      Tongue Procedures:  ExamWhile patients are awake, the muscles that surround the throat are active and keep this region open for breathing. These muscles relax during sleep, allowing the tongue and other structures to collapse and block breathing.  There are several different tongue procedures available.  Selection of a tongue base procedure depends on characteristics seen on physical exam.  Generally, procedures are aimed at removing bulky tissues in this area or preventing the back of the tongue from falling back during sleep.  Success rates for tongue surgery range from 50-62%3.    Hypoglossal Nerve Stimulation:  Hypoglossal nerve stimulation has recently received approval from the [place] Food and Drug Administration for the treatment of obstructive sleep apnea.  This is based on research showing that the system was safe and effective in treating sleep apnea6.  Results showed that the median AHI score decreased 68%, from 29.3 to 9.0. This therapy uses an implant system that senses breathing patterns and delivers mild stimulation to airway muscles, which keeps the airway open during sleep.  The system consists of three fully implanted components: a small generator (similar in size to a pacemaker), a breathing sensor, and a stimulation lead.  Using a small handheld remote, a patient turns the therapy on before bed and off upon  awakening.    Candidates for this device must be greater than 22 years of age, have moderate to severe RUBY (AHI between 20-65), BMI less than 32, have tried CPAP/oral appliance without success, and have appropriate upper airway anatomy (determined by a sleep endoscopy performed by Dr. Suero).    Hypoglossal Nerve Stimulation Pathway:    The sleep surgeon s office will work with the patient through the insurance prior-authorization process (including communications and appeals).    Nasal Procedures:  Nasal obstruction can interfere with nasal breathing during the day and night.  Studies have shown that relief of nasal obstruction can improve the ability of some patients to tolerate positive airway pressure therapy for obstructive sleep apnea1.  Treatment options include medications such as nasal saline, topical corticosteroid and antihistamine sprays, and oral medications such as antihistamines or decongestants. Non-surgical treatments can include external nasal dilators for selected patients. If these are not successful by themselves, surgery can improve the nasal airway either alone or in combination with these other options.      Combination Procedures:  Combination of surgical procedures and other treatments may be recommended, particularly if patients have more than one area of narrowing or persistent positional disease.  The success rate of combination surgery ranges from 66-80%2,3.    References  1. Julius TERRELL. The Role of the Nose in Snoring and Obstructive Sleep Apnoea: An Update.  Eur Arch Otorhinolaryngol. 2011; 268: 1365-73.  2.  Judit SM; Lashell JA; Neymar JR; Pallanch JF; León MB; Marta SG; Brandon ECHOLS. Surgical modifications of the upper airway for obstructive sleep apnea in adults: a systematic review and meta-analysis. SLEEP 2010;33(10):0697-0839. Lauren BESS. Hypopharyngeal surgery in obstructive sleep apnea: an evidence-based medicine review.  Arch Otolaryngol Head Neck Surg. 2006  Feb;132(2):206-13.  3. Shady YH1, Grayson Y, Santi JANAY. The efficacy of anatomically based multilevel surgery for obstructive sleep apnea. Otolaryngol Head Neck Surg. 2003 Oct;129(4):327-35.  4. Lauren BESS, Goldberg A. Hypopharyngeal Surgery in Obstructive Sleep Apnea: An Evidence-Based Medicine Review. Arch Otolaryngol Head Neck Surg. 2006 Feb;132(2):206-13.  5. Donna ASTUDILLO et al. Upper-Airway Stimulation for Obstructive Sleep Apnea.  N Engl J Med. 2014 Jan 9;370(2):139-49.  6. Eric Y et al. Increased Incidence of Cardiovascular Disease in Middle-aged Men with Obstructive Sleep Apnea. Am J Respir Crit Care Med; 2002 166: 159-165  7. Janay WILSON et al. Studying Life Effects and Effectiveness of Palatopharyngoplasty (SLEEP) study: Subjective Outcomes of Isolated Uvulopalatopharyngoplasty. Otolaryngol Head Neck Surg. 2011; 144: 623-631.                      Follow-ups after your visit        Additional Services     Oxygen Therapy Referral       Oxygen vendor: [facility]: [place] [phone]    [email or web address]    Click the [facility] Oxygen Form box (in the SmartSet) to get the [facility] Certificate of Necessity                  Follow-up notes from your care team     Return for follow up end of november,.      Your next 10 appointments already scheduled     Oct 24, 2018  1:00 PM CDT   [facility]    [address] [facility] [address]   [phone]            Nov 20, 2018  1:30 PM CST   Return Sleep Patient with SUKH [facility] ([facility])    [address] [place] [id number] [phone]            Dec 04, 2018  1:30 PM CST   (Arrive by 1:15 PM)   Return Visit with TED Salgado MD   [facility] ([facility])    [address] [place] [address]  [address]  [address]  [facility] [address] [phone]            Dec 07, 2018  9:30 AM CST   (Arrive by 9:15 AM)   Pacemaker Check with Uc Cv Device 1   [facility] ([facility])    [address] [facility]  3rd Floor  [id number]               Dec 07, 2018 10:35 AM CST   (Arrive by 10:20 AM)   Return Visit with Edin Mays MD   [facility] ([facility])    [address] [place] [address]  [address] [facility] [id number] [phone]            Apr 26, 2019 10:30 AM CDT   (Arrive by 10:15 AM)   Return Visit with Marlena Mora MD   [facility] ([facility])    [address] [place] [address]  [address] [facility] [id number] [phone]              Who to contact     If you have questions or need follow up information about today's clinic visit or your schedule please contact [facility] directly at [phone].  Normal or non-critical lab and imaging results will be communicated to you by Educanonhart, letter or phone within 4 business days after the clinic has received the results. If you do not hear from us within 7 days, please contact the clinic through [employer] or phone. If you have a critical or abnormal lab result, we will notify you by phone as soon as possible.  Submit refill requests through [employer] or call your pharmacy and they will forward the refill request to us. Please allow 3 business days for your refill to be completed.          Additional Information About Your Visit        Promon Information     [employer]on gives you secure access to your electronic health record. If you see a primary care provider, you can also send messages to your care team and make appointments. If you have questions, please call your primary care clinic.  If you do not have a primary care provider, please call [phone] and they will assist you.        Care  "EveryWhere ID     This is your Care EveryWhere ID. This could be used by other organizations to access your [facility] medical records  [id number]        Your Vitals Were     Pulse Respirations Height Pulse Oximetry BMI (Body Mass Index)       62 16 1.778 m (5' 10\") 98% 26.11 kg/m2        Blood Pressure from Last 3 Encounters:   10/15/18 137/84   08/17/18 122/70   08/10/18 103/66    Weight from Last 3 Encounters:   10/15/18 82.6 kg (182 lb)   10/12/18 81.2 kg (179 lb)   08/17/18 78.6 kg (173 lb 3.2 oz)              We Performed the Following     Oxygen Therapy Referral          Today's Medication Changes          These changes are accurate as of 10/15/18 10:12 AM.  If you have any questions, ask your nurse or doctor.               Start taking these medicines.        Dose/Directions    order for DME   Used for:  Hypoxemia   Started by:  Tawana Choudhury APRN CNP        Oxygen 1.5 Li/min at night   Quantity:  1 Device   Refills:  11            Where to get your medicines      Some of these will need a paper prescription and others can be bought over the counter.  Ask your nurse if you have questions.     Bring a paper prescription for each of these medications     order for DME                Primary Care Provider Office Phone # Fax #    Edin W MD [place] [phone] [address] [address]        Equal Access to Services     [facility] Center[facility]RE[facility] : Hadii robin maino Sodarrius, waaxda luqadaha, qaybta kaalmada adeana cristinayada, elisabeth berrios . So [facility] [phone].    ATENCIÓN: Si habla español, tiene a herrera disposición servicios gratuitos de asistencia lingüística. Llame al [phone].    We comply with applicable federal civil rights laws and [place] laws. We do not discriminate on the basis of race, color, national origin, age, disability, sex, sexual orientation, or gender identity.            Thank you!     Thank you for choosing [facility] " [facility]  for your care. Our goal is always to provide you with excellent care. Hearing back from our patients is one way we can continue to improve our services. Please take a few minutes to complete the written survey that you may receive in the mail after your visit with us. Thank you!             Your Updated Medication List - Protect others around you: Learn how to safely use, store and throw away your medicines at www.disposemymeds.org.          This list is accurate as of 10/15/18 10:12 AM.  Always use your most recent med list.                   Brand Name Dispense Instructions for use Diagnosis    allopurinol 100 MG tablet    ZYLOPRIM    90 tablet    Take 1 tablet (100 mg) by mouth every evening    Idiopathic gout, unspecified chronicity, unspecified site       amoxicillin 500 MG capsule    AMOXIL    4 capsule    Take 2000 MG one hour before colonoscopy.    Prophylactic antibiotic       diazepam 10 MG tablet    VALIUM    1 tablet    Take 1 tablet (10 mg) by mouth every 6 hours as needed for anxiety or sleep Take 30-60 minutes before procedure.  Do not operate a vehicle after taking this medication.    Microhematuria       gabapentin 100 MG capsule    NEURONTIN    120 capsule    Take 1 tablet (100 mg) once daily for 3 days, then 2 tablets once daily for 3 days, then 3 tablets once daily    Restless legs syndrome (RLS), Sensory disturbance       losartan 25 MG tablet    COZAAR    45 tablet    Take 0.5 tablets (12.5 mg) by mouth every evening    Benign essential hypertension       metoprolol succinate 50 MG 24 hr tablet    TOPROL XL    90 tablet    Take 1 tablet (50 mg) by mouth daily    Benign essential hypertension       MULTIVITAMIN ADULTS 50+ Tabs       Flu vaccine need, Aortic valve stenosis, severe, Chronic systolic heart failure (H), S/P aortic valve replacement       order for DME     1 Device    Oxygen 1.5 Li/min at night    Hypoxemia       * PROVIDER DOSED MANAGED WARFARIN (COUMADIN)  OUTPATIENT      Per coumadin clinic        simvastatin 40 MG tablet    ZOCOR    90 tablet    Take 1 tablet (40 mg) by mouth At Bedtime    Hyperlipidemia LDL goal <100       VITAMIN B 12 PO           VITAMIN D (CHOLECALCIFEROL) PO      Take 1,000 Units by mouth daily    Flu vaccine need, Aortic valve stenosis, severe, Chronic systolic heart failure (H), S/P aortic valve replacement       * warfarin 5 MG tablet    COUMADIN    180 tablet    7.5 mg Tues/Thurs/Sun; 10 mg all other days or as directed by the Anticoagulation Clinic    S/P AVR (aortic valve replacement), Paroxysmal atrial fibrillation (H), Long term current use of anticoagulant therapy, S/P aortic valve replacement       * Notice:  This list has 2 medication(s) that are the same as other medications prescribed for you. Read the directions carefully, and ask your doctor or other care provider to review them with you.

## 2018-10-15 NOTE — NURSING NOTE
"    Chief Complaint   Patient presents with     Study Results     JORGE       Initial /86  Pulse 65  Resp 16  Ht 1.778 m (5' 10\")  Wt 82.6 kg (182 lb)  SpO2 98%  BMI 26.11 kg/m2 Estimated body mass index is 26.11 kg/(m^2) as calculated from the following:    Height as of this encounter: 1.778 m (5' 10\").    Weight as of this encounter: 82.6 kg (182 lb).    Medication Reconciliation: complete    Neck circumference:  inches /  centimeters.    DME:     [facility] ~[place]       "

## 2018-10-18 NOTE — PROGRESS NOTES
Preliminary Device Interrogation Results.  Final physician signed paceart report to be scanned and attached.    Scheduled Medtronic, Bi-Ventricular ICD, remote transmission received and reviewed. Device transmission sent per MD orders. His presenting rhythm is AP/BVP @ 64 bpm. No arrhythmias recorded. Normal device function. AP= 40% and BVP= 94.5%. No short V-V intervals recorded. Lead trends appear stable. OptiVol thoracic impedance is near his baseline. Battery estimates 18 months to SIMONE. Pt notified of transmission results. Plan for pt to RTC in 3 months as scheduled.  Remote ICD transmission

## 2018-10-19 ENCOUNTER — ANTICOAGULATION THERAPY VISIT (OUTPATIENT)
Dept: ANTICOAGULATION | Facility: CLINIC | Age: 73
End: 2018-10-19
Payer: MEDICARE

## 2018-10-19 ENCOUNTER — DOCUMENTATION ONLY (OUTPATIENT)
Dept: SLEEP MEDICINE | Facility: CLINIC | Age: 73
End: 2018-10-19

## 2018-10-19 DIAGNOSIS — I48.0 PAROXYSMAL ATRIAL FIBRILLATION (H): ICD-10-CM

## 2018-10-19 DIAGNOSIS — Z95.2 S/P AORTIC VALVE REPLACEMENT: ICD-10-CM

## 2018-10-19 DIAGNOSIS — Z79.01 LONG TERM CURRENT USE OF ANTICOAGULANT THERAPY: ICD-10-CM

## 2018-10-19 LAB — INR PPP: 3

## 2018-10-19 PROCEDURE — 99207 ZZC NO CHARGE NURSE ONLY: CPT

## 2018-10-19 NOTE — PROGRESS NOTES
Received intake call for home oxygen at 1:20APM. Reviewed patient's chart; Patient does not qualify for home oxygen due to medicare guidelines. Patient would need an in lab titration to rule out RUBY or correct his possible RUBY.   2:00PM- Called clinic @ [phone] and left voicemail for return call to discuss what is needed for patient to qualify for oxygen.

## 2018-10-19 NOTE — PROGRESS NOTES
ANTICOAGULATION FOLLOW-UP CLINIC VISIT    Patient Name:  Brooks Summers  Date:  10/19/2018  Contact Type:  Fax Alere    SUBJECTIVE:     Patient Findings     Positives No Problem Findings    Comments Writer left VM. Patient will continue weekly maintenance dose. INR is therapeutic.              OBJECTIVE    INR   Date Value Ref Range Status   10/19/2018 3.0  Final       ASSESSMENT / PLAN  INR assessment THER    Recheck INR In: 1 WEEK    INR Location Home INR      Anticoagulation Summary as of 10/19/2018     INR goal 2.0-3.0   Today's INR 3.0   Warfarin maintenance plan 7.5 mg (5 mg x 1.5) on Sun, Tue, Thu; 10 mg (5 mg x 2) all other days   Full warfarin instructions 7.5 mg on Sun, Tue, Thu; 10 mg all other days   Weekly warfarin total 62.5 mg   Plan last modified Aniya Garcia RN (9/26/2018)   Next INR check 10/26/2018   Priority INR   Target end date Indefinite    Indications   S/P aortic valve replacement [Z95.2]  Paroxysmal atrial fibrillation (H) [I48.0]  Long term current use of anticoagulant therapy [Z79.01]         Anticoagulation Episode Summary     INR check location     Preferred lab     Send INR reminders to [place] PH[facility] POOL    Comments  * [place] 5 [place]. [place] 21 mm Johnson Perimount Magna Tissue Valve      Anticoagulation Care Providers     Provider Role Specialty Phone number    Edin Mays MD [facility] Internal Medicine [phone]            See the Encounter Report to view Anticoagulation Flowsheet and Dosing Calendar (Go to Encounters tab in chart review, and find the Anticoagulation Therapy Visit)        Lo Worley RN

## 2018-10-19 NOTE — MR AVS SNAPSHOT
Brooks Summers   10/19/2018   Anticoagulation Therapy Visit    Description:  73 year old male   Provider:  Lo Worley, RN   Department:  [facility]           INR as of 10/19/2018     Today's INR 3.0      Anticoagulation Summary as of 10/19/2018     INR goal 2.0-3.0   Today's INR 3.0   Full warfarin instructions 7.5 mg on Sun, Tue, Thu; 10 mg all other days   Next INR check 10/26/2018    Indications   S/P aortic valve replacement [Z95.2]  Paroxysmal atrial fibrillation (H) [I48.0]  Long term current use of anticoagulant therapy [Z79.01]         Your next Anticoagulation Clinic appointment(s)     Oct 24, 2018  1:00 PM CDT   Anticoagulation Visit with [facility] ANTI COAG   [facility] ([facility])    [address] [address]   [phone]              October 2018 Details    Sun Mon Tue Wed Thu Fri Sat      1               2               3               4               5               6                 7               8               9               10               11               12               13                 14               15               16               17               18               19      10 mg   See details      20      10 mg           21      7.5 mg         22      10 mg         23      7.5 mg         24      10 mg         25      7.5 mg         26            27                 28               29               30               31                   Date Details   10/19 This INR check       Date of next INR:  10/26/2018         How to take your warfarin dose     To take:  7.5 mg Take 1.5 of the 5 mg tablets.    To take:  10 mg Take 2 of the 5 mg tablets.

## 2018-10-26 NOTE — PROGRESS NOTES
10/22 @ 8:33am called [phone] and left voicemail.  10/23 @ 2:21pm- Called sleep clinic and left voicemail.

## 2018-11-01 NOTE — PROGRESS NOTES
Patients wife called today regarding Brooks getting set up with O2 and that they had not heard from anyone.  Upon reviewing his chart I found the below note from [facility], I am not sure where the voicemails regarding this have gone but wanted to be sure that this was sent to you.

## 2018-11-02 ENCOUNTER — ANTICOAGULATION THERAPY VISIT (OUTPATIENT)
Dept: ANTICOAGULATION | Facility: CLINIC | Age: 73
End: 2018-11-02
Payer: MEDICARE

## 2018-11-02 DIAGNOSIS — Z79.01 LONG TERM CURRENT USE OF ANTICOAGULANT THERAPY: ICD-10-CM

## 2018-11-02 DIAGNOSIS — Z95.2 S/P AORTIC VALVE REPLACEMENT: ICD-10-CM

## 2018-11-02 DIAGNOSIS — I48.0 PAROXYSMAL ATRIAL FIBRILLATION (H): ICD-10-CM

## 2018-11-02 LAB — INR PPP: 2.6

## 2018-11-02 PROCEDURE — 99207 ZZC NO CHARGE NURSE ONLY: CPT

## 2018-11-02 NOTE — PROGRESS NOTES
ANTICOAGULATION FOLLOW-UP CLINIC VISIT    Patient Name:  Brooks Summers  Date:  11/2/2018  Contact Type:  Telephone/ left voicemail, requesting a call back  [facility] home meter fax    SUBJECTIVE:     Patient Findings     Positives No Problem Findings    Comments No changes in EPIC noted. Left message for patient with results and dosing recommendations. Requested patient to call [facility] back ([phone]) to report any missed doses, falls, signs/symptoms of bleeding or clotting, any changes in health, medications, or diet. Writer additionally asked patient to call back on Monday to report how using the home meter is going, what doses of warfarin he has been taking, as well as ask if he would like a call every INR check or just when out of range (and every 4-6 weeks).                OBJECTIVE    INR   Date Value Ref Range Status   11/02/2018 2.6  Final       ASSESSMENT / PLAN  No question data found.  Anticoagulation Summary as of 11/2/2018     INR goal 2.0-3.0   Today's INR 2.6   Warfarin maintenance plan 7.5 mg (5 mg x 1.5) on Sun, Tue, Thu; 10 mg (5 mg x 2) all other days   Full warfarin instructions 7.5 mg on Sun, Tue, Thu; 10 mg all other days   Weekly warfarin total 62.5 mg   No change documented Sheri Frausto RN   Plan last modified Aniya Garcia, RN (9/26/2018)   Next INR check 11/16/2018   Priority INR   Target end date Indefinite    Indications   S/P aortic valve replacement [Z95.2]  Paroxysmal atrial fibrillation (H) [I48.0]  Long term current use of anticoagulant therapy [Z79.01]         Anticoagulation Episode Summary     INR check location     Preferred lab     Send INR reminders to [place] [facility] POOL    Comments  * [place]in 5 mg [place]. [place] 21 mm Johnson Perimount Magna Tissue Valve. [facility]re home meter      Anticoagulation Care Providers     Provider Role Specialty Phone number    Edin Mays MD [facility] Internal Medicine [phone]            See the  Encounter Report to view Anticoagulation Flowsheet and Dosing Calendar (Go to Encounters tab in chart review, and find the Anticoagulation Therapy Visit)        [facility] RN [facility]

## 2018-11-02 NOTE — MR AVS SNAPSHOT
Brooks Summers   11/2/2018   Anticoagulation Therapy Visit    Description:  73 year old male   Provider:  Sheri Frausto RN   Department:  [facility]           INR as of 11/2/2018     Today's INR 2.6      Anticoagulation Summary as of 11/2/2018     INR goal 2.0-3.0   Today's INR 2.6   Full warfarin instructions 7.5 mg on Sun, Tue, Thu; 10 mg all other days   Next INR check 11/16/2018    Indications   S/P aortic valve replacement [Z95.2]  Paroxysmal atrial fibrillation (H) [I48.0]  Long term current use of anticoagulant therapy [Z79.01]         November 2018 Details    Sun Mon Tue Wed Thu Fri Sat         1               2      10 mg   See details      3      10 mg           4      7.5 mg         5      10 mg         6      7.5 mg         7      10 mg         8      7.5 mg         9      10 mg         10      10 mg           11      7.5 mg         12      10 mg         13      7.5 mg         14      10 mg         15      7.5 mg         16            17                 18               19               20               21               22               23               24                 25               26               27               28               29               30                 Date Details   11/02 This INR check       Date of next INR:  11/16/2018         How to take your warfarin dose     To take:  7.5 mg Take 1.5 of the 5 mg tablets.    To take:  10 mg Take 2 of the 5 mg tablets.

## 2018-11-05 ENCOUNTER — TELEPHONE (OUTPATIENT)
Dept: FAMILY MEDICINE | Facility: CLINIC | Age: 73
End: 2018-11-05

## 2018-11-05 DIAGNOSIS — I10 BENIGN ESSENTIAL HYPERTENSION: ICD-10-CM

## 2018-11-05 DIAGNOSIS — Z79.2 PROPHYLACTIC ANTIBIOTIC: ICD-10-CM

## 2018-11-05 RX ORDER — METOPROLOL SUCCINATE 50 MG/1
50 TABLET, EXTENDED RELEASE ORAL DAILY
Qty: 90 TABLET | Refills: 0 | Status: SHIPPED | OUTPATIENT
Start: 2018-11-05 | End: 2018-12-05

## 2018-11-05 NOTE — TELEPHONE ENCOUNTER
[facility] Call [facility]    Phone Message    May a detailed message be left on voicemail: yes    Reason for Call: Other: Patients' wife called in to ask if pt should stop Warfarin prior to his colonoscopy on Nov 19. Please call back to discuss, ok to leave detailed VM.     Action Taken: Message routed to:  [facility] ([facility] MICHAEL

## 2018-11-08 RX ORDER — AMOXICILLIN 500 MG/1
CAPSULE ORAL
Qty: 4 CAPSULE | Refills: 1 | Status: SHIPPED | OUTPATIENT
Start: 2018-11-08 | End: 2018-12-07

## 2018-11-08 NOTE — TELEPHONE ENCOUNTER
(1) OK to stop Warfarin about 5 days before colonoscopy    (2) He needs to take antibiotics (amoxicillin) before colonoscopy; he should have this Rx.    Karuna, BALAJI Mays      Spoke to pt's wife and plan of care discussed. Clinic numbers given for questions or concerns.  Juliana Flores RN 9:11 AM on 11/8/2018.

## 2018-11-12 ENCOUNTER — TELEPHONE (OUTPATIENT)
Dept: GASTROENTEROLOGY | Facility: CLINIC | Age: 73
End: 2018-11-12

## 2018-11-12 DIAGNOSIS — Z12.11 SPECIAL SCREENING FOR MALIGNANT NEOPLASMS, COLON: Primary | ICD-10-CM

## 2018-11-12 NOTE — TELEPHONE ENCOUNTER
Patient scheduled for Colonoscopy    Indication for procedure. Screening    Referring Provider. Davon    ? N/A    Arrival time verified? Gold Waiting waiting Rm: 11:45  Endoscopy: 1240    Facility location verified?   [facility] - [address] [place] [address] [place], [address]st Floor, Rm [phone]    Instructions given regarding prep and procedure    Prep Type   Golytely eRx: [facility] [id number] [place] [address] AT [facility]    Are you taking any anticoagulants or blood thinners? Warfarin, last dose Tuesday, 13-Nov-2018    Instructions given? Rec'd in mail, Split-dose Golytely    Electronic implanted devices? IPG    Pre procedure teaching completed? Yes    Transportation from procedure? Wife, accompany    H&P / Pre op physical completed? N/A    Aspen Quinones RN  [facility]

## 2018-11-15 NOTE — PROGRESS NOTES
I was asked to assess this pts oxygen orders and qualifiers per Medicare guidelines.  After review the qualifiers and face to face were not within 30 days of each other and of current date.  Pt would require another JORGE to be re qualified along with another face to face visit.   I was concerned about the dx being used for his oxygen RX, this also was not accepted by Medicare guidelines.  I researched his medical record and found he had a hx of smoking x 20 yrs but no complications associated with that hx to warrant oxygen.  I attempted to contact him and ended up speaking with his wife, she stated his smoking hx was 1ppd x 20 yrs and he quit 32 yrs ago when they were . We talked about his need for oxygen and the concerns she originally shared about his stopping breathing.  She stated he continues to snore and she still has to poke him on an average of twice a night to make him start breathing again.  I did some education on how oxygen will not help if the sleep apnea is not treated.  She talked about how he can't use the mask because he sleeps on his stomach, I was able to share my use of CPAP and how you are able to sleep in any position you want with any mask.  That you can make it work if you find the importance of taking care of your sleep apnea for your sleep and heart health.  I asked what it might take to get him to have a sleep study and she said, he'll do what ever I tell him to do.  I contacted Tawana Romero to relay my findings and make the recommendation of a sleep study instead of oxygen.  Hafas Castellanos RRT

## 2018-11-19 ENCOUNTER — HOSPITAL ENCOUNTER (OUTPATIENT)
Facility: CLINIC | Age: 73
Discharge: HOME | End: 2018-11-19
Attending: INTERNAL MEDICINE | Admitting: INTERNAL MEDICINE
Payer: MEDICARE

## 2018-11-19 ENCOUNTER — ANTICOAGULATION THERAPY VISIT (OUTPATIENT)
Dept: ANTICOAGULATION | Facility: CLINIC | Age: 73
End: 2018-11-19
Payer: MEDICARE

## 2018-11-19 VITALS
HEIGHT: 70 IN | RESPIRATION RATE: 11 BRPM | WEIGHT: 175 LBS | DIASTOLIC BLOOD PRESSURE: 75 MMHG | SYSTOLIC BLOOD PRESSURE: 119 MMHG | BODY MASS INDEX: 25.05 KG/M2 | HEART RATE: 71 BPM | OXYGEN SATURATION: 94 %

## 2018-11-19 DIAGNOSIS — I48.0 PAROXYSMAL ATRIAL FIBRILLATION (H): ICD-10-CM

## 2018-11-19 DIAGNOSIS — Z95.2 S/P AORTIC VALVE REPLACEMENT: ICD-10-CM

## 2018-11-19 DIAGNOSIS — Z79.01 LONG TERM CURRENT USE OF ANTICOAGULANT THERAPY: ICD-10-CM

## 2018-11-19 LAB
COLONOSCOPY: NORMAL
INR PPP: 1.17 (ref 0.86–1.14)

## 2018-11-19 PROCEDURE — 36415 COLL VENOUS BLD VENIPUNCTURE: CPT | Performed by: INTERNAL MEDICINE

## 2018-11-19 PROCEDURE — G0500 MOD SEDAT ENDO SERVICE >5YRS: HCPCS | Performed by: INTERNAL MEDICINE

## 2018-11-19 PROCEDURE — 85610 PROTHROMBIN TIME: CPT | Performed by: INTERNAL MEDICINE

## 2018-11-19 PROCEDURE — G0121 COLON CA SCRN NOT HI RSK IND: HCPCS | Performed by: INTERNAL MEDICINE

## 2018-11-19 PROCEDURE — 25000128 H RX IP 250 OP 636: Performed by: INTERNAL MEDICINE

## 2018-11-19 PROCEDURE — 45378 DIAGNOSTIC COLONOSCOPY: CPT | Performed by: INTERNAL MEDICINE

## 2018-11-19 PROCEDURE — 99207 ZZC NO CHARGE NURSE ONLY: CPT

## 2018-11-19 RX ORDER — FENTANYL CITRATE 50 UG/ML
INJECTION, SOLUTION INTRAMUSCULAR; INTRAVENOUS PRN
Status: DISCONTINUED | OUTPATIENT
Start: 2018-11-19 | End: 2018-11-19 | Stop reason: HOSPADM

## 2018-11-19 RX ORDER — LIDOCAINE 40 MG/G
CREAM TOPICAL
Status: DISCONTINUED | OUTPATIENT
Start: 2018-11-19 | End: 2018-11-19 | Stop reason: HOSPADM

## 2018-11-19 NOTE — OR NURSING
Procedure: Colonoscopy with no interventions  Sedation: Conscious  O2: 2L  Tolerated Procedure: WEll  Patient returned to recovery room in stable condition with CALLUM Pierre RN

## 2018-11-19 NOTE — MR AVS SNAPSHOT
Brooks Summers   11/19/2018   Anticoagulation Therapy Visit    Description:  73 year old male   Provider:  Estefani Marvin RN   Department:  [facility]           INR as of 11/19/2018     Today's INR 1.17!      Anticoagulation Summary as of 11/19/2018     INR goal 2.0-3.0   Today's INR 1.17!   Full warfarin instructions 11/20: 15 mg; Otherwise 7.5 mg on Sun, Tue, Thu; 10 mg all other days   Next INR check 11/23/2018    Indications   S/P aortic valve replacement [Z95.2]  Paroxysmal atrial fibrillation (H) [I48.0]  Long term current use of anticoagulant therapy [Z79.01]         November 2018 Details    Sun Mon Tue Wed Thu Fri Sat         1               2               3                 4               5               6               7               8               9               10                 11               12               13               14               15               16               17                 18               19      10 mg   See details      20      15 mg         21      10 mg         22      7.5 mg         23            24                 25               26               27               28               29               30                 Date Details   11/19 This INR check       Date of next INR:  11/23/2018         How to take your warfarin dose     To take:  7.5 mg Take 1.5 of the 5 mg tablets.    To take:  10 mg Take 2 of the 5 mg tablets.    To take:  15 mg Take 3 of the 5 mg tablets.

## 2018-11-19 NOTE — DISCHARGE INSTRUCTIONS
Discharge Instructions after Colonoscopy  or Sigmoidoscopy    Today you had a _x___ Colonoscopy ____    Activity and Diet  You were given medicine for pain. You may be dizzy or sleepy.  For 24 hours:    Do not drive or use heavy equipment.    Do not make important decisions.    Do not drink any alcohol.  You may return to your normal diet and medicines.    Discomfort    Air was placed in your colon during the exam in order to see it. Walking helps to pass the air.    You may take Tylenol (acetaminophen) for pain unless your doctor has told you not to.  Do not take aspirin or ibuprofen (Advil, Motrin, or other anti-inflammatory  drugs) for __3___ days.    Follow-up  ____      When to call:    Call right away if you have:    Unusual pain in belly or chest pain not relieved with passing air.    More than 1 to 2 Tablespoons of bleeding from your rectum.    Fever above 100.6  F (37.5  C).    If you have severe pain, bleeding, or shortness of breath, go to an emergency room.    If you have questions, call:  Monday to Friday, 7 a.m. to 4:30 p.m.  Endoscopy: [phone] (We may have to call you back)    After hours  Hospital: [phone] (Ask for the GI fellow on call)

## 2018-11-19 NOTE — IP AVS SNAPSHOT
[facility], [place], Endoscopy    [address] [address]    Phone:  [phone]                                       After Visit Summary   11/19/2018    Brooks Summers    MRN: [id number]           After Visit Summary Signature Page     I have received my discharge instructions, and my questions have been answered. I have discussed any challenges I see with this plan with the nurse or doctor.    ..........................................................................................................................................  Patient/Patient Representative Signature      ..........................................................................................................................................  Patient Representative Print Name and Relationship to Patient    ..................................................               ................................................  Date                                   Time    ..........................................................................................................................................  Reviewed by Signature/Title    ...................................................              ..............................................  Date                                               Time          22EPIC Rev 08/18

## 2018-11-19 NOTE — PROGRESS NOTES
ANTICOAGULATION FOLLOW-UP CLINIC VISIT    Patient Name:  Brooks Summers  Date:  11/19/2018  Contact Type:  Telephone/     SUBJECTIVE:     Patient Findings     Positives Intentional hold of therapy    Comments Left detailed message on patient home phone. Dosing instructions given per calendar. Instructed to call [facility] with any questions or concerns.              OBJECTIVE    INR   Date Value Ref Range Status   11/19/2018 1.17 (H) 0.86 - 1.14 Final       ASSESSMENT / PLAN  INR assessment SUB    Recheck INR In: 4 DAYS    INR Location Outside lab      Anticoagulation Summary as of 11/19/2018     INR goal 2.0-3.0   Today's INR 1.17!   Warfarin maintenance plan 7.5 mg (5 mg x 1.5) on Sun, Tue, Thu; 10 mg (5 mg x 2) all other days   Full warfarin instructions 11/20: 15 mg; Otherwise 7.5 mg on Sun, Tue, Thu; 10 mg all other days   Weekly warfarin total 62.5 mg   Plan last modified Aniya Garcia RN (9/26/2018)   Next INR check 11/23/2018   Priority INR   Target end date Indefinite    Indications   S/P aortic valve replacement [Z95.2]  Paroxysmal atrial fibrillation (H) [I48.0]  Long term current use of anticoagulant therapy [Z79.01]         Anticoagulation Episode Summary     INR check location     Preferred lab     Send INR reminders to [place] [facility] POOL    Comments  * [place]in 5 [place]. [place] 21 mm Johnson Perimount Magna Tissue Valve. [facility] home meter      Anticoagulation Care Providers     Provider Role Specialty Phone number    Edin Mays MD [facility] Internal Medicine [phone]            See the Encounter Report to view Anticoagulation Flowsheet and Dosing Calendar (Go to Encounters tab in chart review, and find the Anticoagulation Therapy Visit)        Estefani Marvin RN

## 2018-11-19 NOTE — IP AVS SNAPSHOT
MRN:[id number]                      After Visit Summary   11/19/2018    Brooks Summers    MRN: [id number]           Thank you!     Thank you for choosing [facility] for your care. Our goal is always to provide you with excellent care. Hearing back from our patients is one way we can continue to improve our services. Please take a few minutes to complete the written survey that you may receive in the mail after you visit with us. Thank you!        Patient Information     Date Of Birth          1945        About your hospital stay     You were admitted on:  November 19, 2018 You last received care in the:  [facility], Endoscopy    You were discharged on:  November 19, 2018       Who to Call     For medical emergencies, please call 911.  For non-urgent questions about your medical care, please call your primary care provider or clinic, [phone]  For questions related to your surgery, please call your surgery clinic        Attending Provider     Provider Specialty    Herman Mcginnis MD Gastroenterology       Primary Care Provider Office Phone # Fax #    Edin Mays [phone]      Your next 10 appointments already scheduled     Nov 21, 2018  8:00 AM CST   Return Sleep Patient with Mega Kelly MD   [facility] ([facility])    [address] [place] [id number] [phone]            Dec 04, 2018  1:00 PM CST   Pacemaker Check with [facility] C[facility] Device [address] [place] [address] [facility])    [address] [place] [address]  3rd Floor  [address]               Dec 04, 2018  1:30 PM CST   (Arrive by 1:15 PM)   Return Visit with TED Salgado MD   [facility] ([facility])    [address] [place] [address]  [address] [place] [address] [phone]            Dec 07, 2018 10:35 AM CST   (Arrive by 10:20 AM)   Return Visit with Edin Mays MD    [facility] ([facility])    [address] [place] [id number] [phone]            Apr 26, 2019 10:30 AM CDT   (Arrive by 10:15 AM)   Return Visit with Marlena Mora MD   [facility] ([facility])    [address] [place] [id number] [phone]              Further instructions from your care team       Discharge Instructions after Colonoscopy  or Sigmoidoscopy    Today you had a _x___ Colonoscopy ____    Activity and Diet  You were given medicine for pain. You may be dizzy or sleepy.  For 24 hours:    Do not drive or use heavy equipment.    Do not make important decisions.    Do not drink any alcohol.  You may return to your normal diet and medicines.    Discomfort    Air was placed in your colon during the exam in order to see it. Walking helps to pass the air.    You may take Tylenol (acetaminophen) for pain unless your doctor has told you not to.  Do not take aspirin or ibuprofen (Advil, Motrin, or other anti-inflammatory  drugs) for __3___ days.    Follow-up  ____      When to call:    Call right away if you have:    Unusual pain in belly or chest pain not relieved with passing air.    More than 1 to 2 Tablespoons of bleeding from your rectum.    Fever above 100.6  F (37.5  C).    If you have severe pain, bleeding, or shortness of breath, go to an emergency room.    If you have questions, call:  Monday to Friday, 7 a.m. to 4:30 p.m.  Endoscopy: [phone] (We may have to call you back)    After hours  Hospital: [phone] (Ask for the GI fellow on call)    Pending Results     No orders found from 11/17/2018 to 11/20/2018.            Admission Information     Date & Time Provider Department Dept. Phone    11/19/2018 Herman Mcginnis MD [facility] [place], Endoscopy [phone]      Your Vitals Were     Blood Pressure Pulse  "Respirations Height Weight Pulse Oximetry    127/84 71 15 1.778 m (5' 10\") 79.4 kg (175 lb) 95%    BMI (Body Mass Index)                   25.11 kg/m2           [employer] Information     [employer] gives you secure access to your electronic health record. If you see a primary care provider, you can also send messages to your care team and make appointments. If you have questions, please call your primary care clinic.  If you do not have a primary care provider, please call [phone] and they will assist you.        Care EveryWhere ID     This is your Care EveryWhere ID. This could be used by other organizations to access your [facility] medical records  [id number]        Equal Access to Services     KUN RALPH : Paris Lopez, violeta han, melanie land, elisabeth quinones. So [facility] [phone].    ATENCIÓN: Si habla español, tiene a herrera disposición servicios gratuitos de asistencia lingüística. Llame al [phone].    We comply with applicable federal civil rights laws and [place] laws. We do not discriminate on the basis of race, color, national origin, age, disability, sex, sexual orientation, or gender identity.               Review of your medicines      UNREVIEWED medicines. Ask your doctor about these medicines        Dose / Directions    allopurinol 100 MG tablet   Commonly known as:  ZYLOPRIM   Used for:  Idiopathic gout, unspecified chronicity, unspecified site        Dose:  100 mg   Take 1 tablet (100 mg) by mouth every evening   Quantity:  90 tablet   Refills:  3       amoxicillin 500 MG capsule   Commonly known as:  AMOXIL   Used for:  Prophylactic antibiotic        Take 2000 MG one hour before colonoscopy.   Quantity:  4 capsule   Refills:  1       diazepam 10 MG tablet   Commonly known as:  VALIUM   Used for:  Microhematuria        Dose:  10 mg   Take 1 tablet (10 mg) by mouth every 6 hours as needed for anxiety or sleep Take 30-60 minutes " before procedure.  Do not operate a vehicle after taking this medication.   Quantity:  1 tablet   Refills:  0       gabapentin 100 MG capsule   Commonly known as:  NEURONTIN   Used for:  Restless legs syndrome (RLS), Sensory disturbance        Take 1 tablet (100 mg) once daily for 3 days, then 2 tablets once daily for 3 days, then 3 tablets once daily   Quantity:  120 capsule   Refills:  3       losartan 25 MG tablet   Commonly known as:  COZAAR   Used for:  Benign essential hypertension        Dose:  12.5 mg   Take 0.5 tablets (12.5 mg) by mouth every evening   Quantity:  45 tablet   Refills:  2       metoprolol succinate 50 MG 24 hr tablet   Commonly known as:  TOPROL XL   Used for:  Benign essential hypertension        Dose:  50 mg   Take 1 tablet (50 mg) by mouth daily   Quantity:  90 tablet   Refills:  0       MULTIVITAMIN ADULTS 50+ Tabs   Used for:  Flu vaccine need, Aortic valve stenosis, severe, Chronic systolic heart failure (H), S/P aortic valve replacement        Refills:  0       * PROVIDER DOSED MANAGED WARFARIN (COUMADIN) OUTPATIENT        Per coumadin clinic   Refills:  0       simvastatin 40 MG tablet   Commonly known as:  ZOCOR   Used for:  Hyperlipidemia LDL goal <100        Dose:  40 mg   Take 1 tablet (40 mg) by mouth At Bedtime   Quantity:  90 tablet   Refills:  2       VITAMIN B 12 PO        Refills:  0       VITAMIN D (CHOLECALCIFEROL) PO   Used for:  Flu vaccine need, Aortic valve stenosis, severe, Chronic systolic heart failure (H), S/P aortic valve replacement        Dose:  1000 Units   Take 1,000 Units by mouth daily   Refills:  0       * warfarin 5 MG tablet   Commonly known as:  COUMADIN   Used for:  S/P AVR (aortic valve replacement), Paroxysmal atrial fibrillation (H), Long term current use of anticoagulant therapy, S/P aortic valve replacement        7.5 mg Tues/Thurs/Sun; 10 mg all other days or as directed by the Anticoagulation Clinic   Quantity:  180 tablet   Refills:  3       *  Notice:  This list has 2 medication(s) that are the same as other medications prescribed for you. Read the directions carefully, and ask your doctor or other care provider to review them with you.      CONTINUE these medicines which have NOT CHANGED        Dose / Directions    order for DME        Oxygen 1.5 Li/min at night   Quantity:  1 Device   Refills:  11                Protect others around you: Learn how to safely use, store and throw away your medicines at www.disposemymeds.org.             Medication List: This is a list of all your medications and when to take them. Check marks below indicate your daily home schedule. Keep this list as a reference.      Medications           Morning Afternoon Evening Bedtime As Needed    allopurinol 100 MG tablet   Commonly known as:  ZYLOPRIM   Take 1 tablet (100 mg) by mouth every evening                                amoxicillin 500 MG capsule   Commonly known as:  AMOXIL   Take 2000 MG one hour before colonoscopy.                                diazepam 10 MG tablet   Commonly known as:  VALIUM   Take 1 tablet (10 mg) by mouth every 6 hours as needed for anxiety or sleep Take 30-60 minutes before procedure.  Do not operate a vehicle after taking this medication.                                gabapentin 100 MG capsule   Commonly known as:  NEURONTIN   Take 1 tablet (100 mg) once daily for 3 days, then 2 tablets once daily for 3 days, then 3 tablets once daily                                losartan 25 MG tablet   Commonly known as:  COZAAR   Take 0.5 tablets (12.5 mg) by mouth every evening                                metoprolol succinate 50 MG 24 hr tablet   Commonly known as:  TOPROL XL   Take 1 tablet (50 mg) by mouth daily                                MULTIVITAMIN ADULTS 50+ Tabs                                order for DME   Oxygen 1.5 Li/min at night                                * PROVIDER DOSED MANAGED WARFARIN (COUMADIN) OUTPATIENT   Per coumadin clinic                                 simvastatin 40 MG tablet   Commonly known as:  ZOCOR   Take 1 tablet (40 mg) by mouth At Bedtime                                VITAMIN B 12 PO                                VITAMIN D (CHOLECALCIFEROL) PO   Take 1,000 Units by mouth daily                                * warfarin 5 MG tablet   Commonly known as:  COUMADIN   7.5 mg Tues/Thurs/Sun; 10 mg all other days or as directed by the Anticoagulation Clinic                                * Notice:  This list has 2 medication(s) that are the same as other medications prescribed for you. Read the directions carefully, and ask your doctor or other care provider to review them with you.

## 2018-11-23 ENCOUNTER — ANTICOAGULATION THERAPY VISIT (OUTPATIENT)
Dept: ANTICOAGULATION | Facility: CLINIC | Age: 73
End: 2018-11-23

## 2018-11-23 DIAGNOSIS — Z79.01 LONG TERM CURRENT USE OF ANTICOAGULANT THERAPY: ICD-10-CM

## 2018-11-23 DIAGNOSIS — I48.0 PAROXYSMAL ATRIAL FIBRILLATION (H): ICD-10-CM

## 2018-11-23 DIAGNOSIS — Z95.2 S/P AORTIC VALVE REPLACEMENT: ICD-10-CM

## 2018-11-23 LAB — INR PPP: 1.79 (ref 0.86–1.14)

## 2018-11-23 PROCEDURE — 36415 COLL VENOUS BLD VENIPUNCTURE: CPT | Performed by: INTERNAL MEDICINE

## 2018-11-23 PROCEDURE — 85610 PROTHROMBIN TIME: CPT | Performed by: INTERNAL MEDICINE

## 2018-11-23 PROCEDURE — 99207 ZZC NO CHARGE NURSE ONLY: CPT

## 2018-11-23 NOTE — MR AVS SNAPSHOT
Brooks Summers   11/23/2018   Anticoagulation Therapy Visit    Description:  73 year old male   Provider:  Lo Worley, RN   Department:  [facility]           INR as of 11/23/2018     Today's INR 1.79!      Anticoagulation Summary as of 11/23/2018     INR goal 2.0-3.0   Today's INR 1.79!   Full warfarin instructions 11/23: 15 mg; Otherwise 7.5 mg on Sun, Tue, Thu; 10 mg all other days   Next INR check 11/30/2018    Indications   S/P aortic valve replacement [Z95.2]  Paroxysmal atrial fibrillation (H) [I48.0]  Long term current use of anticoagulant therapy [Z79.01]         November 2018 Details    Sun Mon Tue Wed Thu Fri Sat         1               2               3                 4               5               6               7               8               9               10                 11               12               13               14               15               16               17                 18               19               20               21               22               23      15 mg   See details      24      10 mg           25      7.5 mg         26      10 mg         27      7.5 mg         28      10 mg         29      7.5 mg         30              Date Details   11/23 This INR check       Date of next INR:  11/30/2018         How to take your warfarin dose     To take:  7.5 mg Take 1.5 of the 5 mg tablets.    To take:  10 mg Take 2 of the 5 mg tablets.    To take:  15 mg Take 3 of the 5 mg tablets.

## 2018-11-23 NOTE — PROGRESS NOTES
ANTICOAGULATION FOLLOW-UP CLINIC VISIT    Patient Name:  Brooks Summers  Date:  11/23/2018  Contact Type:  Telephone/ Left DVM    SUBJECTIVE:     Patient Findings     Positives Unexplained INR or factor level change    Comments Writer left detailed VM. Requested call back to [facility].  Increased patient's dose today to 15 mg then patient will resume maintenance dose.   Recheck in 1 week.             OBJECTIVE    INR   Date Value Ref Range Status   11/23/2018 1.79 (H) 0.86 - 1.14 Final       ASSESSMENT / PLAN  INR assessment SUB    Recheck INR In: 1 WEEK    INR Location Outside lab      Anticoagulation Summary as of 11/23/2018     INR goal 2.0-3.0   Today's INR 1.79!   Warfarin maintenance plan 7.5 mg (5 mg x 1.5) on Sun, Tue, Thu; 10 mg (5 mg x 2) all other days   Full warfarin instructions 11/23: 15 mg; Otherwise 7.5 mg on Sun, Tue, Thu; 10 mg all other days   Weekly warfarin total 62.5 mg   Plan last modified Aniya Garcia RN (9/26/2018)   Next INR check 11/30/2018   Priority INR   Target end date Indefinite    Indications   S/P aortic valve replacement [Z95.2]  Paroxysmal atrial fibrillation (H) [I48.0]  Long term current use of anticoagulant therapy [Z79.01]         Anticoagulation Episode Summary     INR check location     Preferred lab     Send INR reminders to [facility] PHONE [facility] POOL    Comments  * war[place]in 5 [place]. [place] 21 mm Johnson Perimount Magna Tissue Valve. Alere home meter      Anticoagulation Care Providers     Provider Role Specialty Phone number    Edin Mays MD [facility] [phone]            See the Encounter Report to view Anticoagulation Flowsheet and Dosing Calendar (Go to Encounters tab in chart review, and find the Anticoagulation Therapy Visit)        Lo Worley RN

## 2018-12-04 ENCOUNTER — OFFICE VISIT (OUTPATIENT)
Dept: CARDIOLOGY | Facility: CLINIC | Age: 73
End: 2018-12-04
Attending: INTERNAL MEDICINE
Payer: MEDICARE

## 2018-12-04 VITALS
WEIGHT: 178 LBS | HEART RATE: 67 BPM | OXYGEN SATURATION: 97 % | BODY MASS INDEX: 25.48 KG/M2 | HEIGHT: 70 IN | DIASTOLIC BLOOD PRESSURE: 75 MMHG | SYSTOLIC BLOOD PRESSURE: 131 MMHG

## 2018-12-04 DIAGNOSIS — Z23 FLU VACCINE NEED: Primary | ICD-10-CM

## 2018-12-04 DIAGNOSIS — I50.22 CHRONIC SYSTOLIC HEART FAILURE (H): ICD-10-CM

## 2018-12-04 DIAGNOSIS — Z95.2 S/P AVR: ICD-10-CM

## 2018-12-04 DIAGNOSIS — I42.0 CARDIOMYOPATHY, DILATED, NONISCHEMIC (H): ICD-10-CM

## 2018-12-04 PROCEDURE — 96372 THER/PROPH/DIAG INJ SC/IM: CPT | Mod: ZF

## 2018-12-04 PROCEDURE — 90662 IIV NO PRSV INCREASED AG IM: CPT | Mod: ZF | Performed by: INTERNAL MEDICINE

## 2018-12-04 PROCEDURE — G0008 ADMIN INFLUENZA VIRUS VAC: HCPCS

## 2018-12-04 PROCEDURE — 25000128 H RX IP 250 OP 636: Mod: ZF | Performed by: INTERNAL MEDICINE

## 2018-12-04 PROCEDURE — 99214 OFFICE O/P EST MOD 30 MIN: CPT | Mod: ZP | Performed by: INTERNAL MEDICINE

## 2018-12-04 PROCEDURE — G0463 HOSPITAL OUTPT CLINIC VISIT: HCPCS | Mod: 25,ZF

## 2018-12-04 RX ADMIN — INFLUENZA A VIRUS A/MICHIGAN/45/2015 X-275 (H1N1) ANTIGEN (FORMALDEHYDE INACTIVATED), INFLUENZA A VIRUS A/SINGAPORE/INFIMH-16-0019/2016 IVR-186 (H3N2) ANTIGEN (FORMALDEHYDE INACTIVATED), AND INFLUENZA B VIRUS B/MARYLAND/15/2016 BX-69A (A B/COLORADO/6/2017-LIKE VIRUS) ANTIGEN (FORMALDEHYDE INACTIVATED) 0.5 ML: 60; 60; 60 INJECTION, SUSPENSION INTRAMUSCULAR at 13:36

## 2018-12-04 ASSESSMENT — PAIN SCALES - GENERAL: PAINLEVEL: NO PAIN (0)

## 2018-12-04 NOTE — LETTER
12/4/2018      RE: Brooks Summers  [address]  [facility] [address]       Dear Colleague,    Thank you for the opportunity to participate in the care of your patient, Brooks Summers, at the [facility]. Please see a copy of my visit note below.    Med Reconcile: Reviewed and verified all current medications with the patient. The updated medication list was printed and given to the patient.  Return Appointment: Patient given instructions regarding scheduling next clinic visit. Patient demonstrated understanding of this information and agreed to call with further questions or concerns.  Patient stated he understood all health information given and agreed to call with further questions or concerns.     SUBJECTIVE:  Brooks Summers is a 73 year old male who presents for follow up.    S/P Aortic valve replacement (23 mm Bogdan Johnson PERIMOUNT Magna tissue valve). On 7/14/11. For bicuspid aortic valve with severe aortic stenosis,severe LV dysfunction.    On 1/25/2013 had    1.  Redo median sternotomy.   2.  Aortic valve replacement with 21 mm Johnson Perimount Magna Tissue Valve.   3.  Mitral valve repair with pericardial patch repair of large anterior mitral valve leaflet perforation.     4.  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       NOTE:  An extra 2-3 hours was required for this operation because of the extreme complexity of this operation from severe prosthetic valve endocarditis and recent redo     Remain asymptomatic. LV function recovered.  PAF. On coumadin.    Patient Active Problem List    Diagnosis Date Noted     Nocturnal hypoxemia 10/15/2018     Priority: Medium     Trigger ring finger of left hand 11/16/2015     Priority: Medium     Paroxysmal atrial fibrillation (H) 02/25/2015     Priority: Medium     Long term current use of anticoagulant therapy 02/25/2015     Priority: Medium     Problem list name  updated by automated process. Provider to review       Finger injury 10/08/2014     Priority: Medium     Hyperlipidemia with target LDL less than 100 10/21/2013     Priority: Medium     Diagnosis updated by automated process. Provider to review and confirm.       Need for prophylactic antibiotic 04/12/2013     Priority: Medium     Aortic valve and past endocarditis        S/P AVR 01/25/2013     Priority: Medium     Endocarditis 01/21/2013     Priority: Medium     Cultures negative but 16S rRNA PCR showed Staph capitis.  Treated with Dapto and RIF x 8 weeks.       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 Provider to review       LBBB (left bundle branch block) 01/28/2012     Priority: Medium     Received CRT-D;  msec       Pneumothorax, left 01/28/2012     Priority: Medium     Observed following CRT-D implant; small. Observe and repeat CXR       Heart failure, chronic systolic (H) 01/28/2012     Priority: Medium     Stable; NYHA classII       Cardiomyopathy, dilated, nonischemic (H) 01/28/2012     Priority: Medium     Mild CAD; EF 15-20%       CKD (chronic kidney disease) stage 3, GFR 30-59 ml/min (H) 01/28/2012     Priority: Medium     Dyslipidemia 01/28/2012     Priority: Medium     On Pravavastatin       Aortic valve stenosis, severe 07/14/2011     Priority: Medium     Chronic systolic heart failure (H) 07/14/2011     Priority: Medium     Bicuspid aortic valve 07/14/2011     Priority: Medium     S/P aortic valve replacement 07/14/2011     Priority: Medium     Smoking hx 07/14/2011     Priority: Medium     Rotator cuff (capsule) sprain 02/17/2009     Priority: Medium     Other postprocedural status(V45.89) 02/17/2009     Priority: Medium    .  Current Outpatient Prescriptions   Medication Sig     allopurinol (ZYLOPRIM) 100 MG tablet Take 1 tablet (100 mg) by mouth  every evening     amoxicillin (AMOXIL) 500 MG capsule Take 2000 MG one hour before colonoscopy.     Cyanocobalamin (VITAMIN B 12 PO)      losartan (COZAAR) 25 MG tablet Take 0.5 tablets (12.5 mg) by mouth every evening     metoprolol succinate (TOPROL XL) 50 MG 24 hr tablet Take 1 tablet (50 mg) by mouth daily     Multiple Vitamins-Minerals (MULTIVITAMIN ADULTS 50+) TABS      PROVIDER DOSED MANAGED WARFARIN, COUMADIN, OUTPATIENT Per coumadin clinic     simvastatin (ZOCOR) 40 MG tablet Take 1 tablet (40 mg) by mouth At Bedtime     VITAMIN D, CHOLECALCIFEROL, PO Take 1,000 Units by mouth daily     warfarin (COUMADIN) 5 MG tablet 7.5 mg Tues/Thurs/Sun; 10 mg all other days or as directed by the Anticoagulation Clinic     diazepam (VALIUM) 10 MG tablet Take 1 tablet (10 mg) by mouth every 6 hours as needed for anxiety or sleep Take 30-60 minutes before procedure.  Do not operate a vehicle after taking this medication. (Patient not taking: Reported on 12/4/2018)     gabapentin (NEURONTIN) 100 MG capsule Take 1 tablet (100 mg) once daily for 3 days, then 2 tablets once daily for 3 days, then 3 tablets once daily (Patient not taking: Reported on 12/4/2018)     order for DME Oxygen 1.5 Li/min  at night (Patient not taking: Reported on 12/4/2018)     No current facility-administered medications for this visit.      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 Tx with Dapto/RIFx 8 weeks.     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     Paroxysmal A-fib (H)     Post-op 7/14/2011, 1/26/13     Rotator Cuff (Capsule) Sprain and Strain      S/P aortic valve replacement     7/14/2011, re-do 1/25/13 due to endocarditis     Smoking hx      Thrombocytopenia  (H)      Past Surgical History:   Procedure Laterality Date     ANESTHESIA CARDIOVERSION  7/18/2011    Procedure:ANESTHESIA CARDIOVERSION; Cardioversion; Surgeon:GENERIC ANESTHESIA PROVIDER; Location:[facility] OR     COLONOSCOPY       COLONOSCOPY N/A 11/19/2018    Procedure: COLONOSCOPY;  Surgeon: Herman Mcginnis MD;  Location: [facility] GI     CORONARY ANGIOGRAPHY ADULT ORDER       CYSTOSCOPY, BIOPSY URETHRA N/A 4/3/2017    Procedure: CYSTOSCOPY, BIOPSY URETHRA;  Surgeon: Marlena Mora MD;  Location: [facility] OR     ENDOSCOPY UPPER, COLONOSCOPY, COMBINED       HC REMOV & REPLACE IMPLT DEFIB PULSE GEN MULT LEAD  12/3/2015          HEMORRHOID SURGERY       IMPLANT AUTOMATIC IMPLANTABLE CARDIOVERTER DEFIBRILLATOR       MOHS MICROGRAPHIC PROCEDURE       ORTHOPEDIC SURGERY      shoulder,right     REDO STERNOTOMY REPLACE VALVE AORTIC  1/25/2013    Procedure: REDO STERNOTOMY REPLACE VALVE AORTIC;  Redo Sternotomy, Redo Aortic Valve Replacement on pump oxygenator. Intraoperative Transesophageal Echocardiogram;  Surgeon: Stephanie Hampton MD;  Location: [facility] OR     REPAIR VALVE MITRAL  2013     REPLACE VALVE AORTIC  7/14/2011    Procedure:REPLACE VALVE AORTIC; Median Sternotomy, Bioprosthesis,  Aortic Valve replacement on pump with oxygenator. Intra-operative Transesophogeal Echocardiogram.; Surgeon:STEPHANIE HAMPTON; Location:[facility] OR     teeth extractions       TRANSPLANT       Allergies   Allergen Reactions     Lisinopril Cough     Social History     Social History     Marital status:      Spouse name: N/A     Number of children: N/A     Years of education: N/A     Occupational History     Not on file.     Social History Main Topics     Smoking status: Former Smoker     Packs/day: 1.00     Years: 20.00     Types: Cigarettes     Quit date: 12/31/1989     Smokeless tobacco: Never Used     Alcohol use 1.2 oz/week     Drug use: No     Sexual activity: Yes     Partners: Female     Birth control/ protection: None      Comment:   "    Other Topics Concern     Parent/Sibling W/ Cabg, Mi Or Angioplasty Before 65f 55m? No     Social History Narrative     since 2/1982 2 children 4 grandchildren.    Carpet sales:  Semi retired. Athlete in school, Golf, fish, yardwork     Family History   Problem Relation Age of Onset     C.A.D. Father 68     bypass, carotid      Prostate Cancer Father 70     Heart Disease Father      Cancer Mother 92     stomach, colon     Hypertension Mother      Retinal detachment Mother      Cancer Brother 64     lung cancer, petroleum     Cancer Brother      Glaucoma No family hx of      Macular Degeneration No family hx of      Strabismus No family hx of      Glasses (<7 y/o) No family hx of           REVIEW OF SYSTEMS:  General: negative, fever, chills, night sweats  Skin: negative, acne, rash and scaling  Eyes: negative, double vision, eye pain and photophobia  Ears/Nose/Throat: negative, nasal congestion and purulent rhinorrhea  Respiratory: No dyspnea on exertion, No cough, No hemoptysis and negative  Cardiovascular: negative, palpitations, tachycardia, irregular heart beat, chest pain, exertional chest pain or pressure, paroxysmal nocturnal dyspnea, dyspnea on exertion and orthopnea       OBJECTIVE:  Blood pressure 131/75, pulse 67, height 1.778 m (5' 10\"), weight 80.7 kg (178 lb), SpO2 97 %.  General Appearance: healthy, alert, active and no distress  Head: Normocephalic. No masses, lesions, tenderness or abnormalities  Eyes: conjuctiva clear, PERRL, EOM intact  Ears: External ears normal. Canals clear. TM's normal.  Nose: Nares normal  Mouth: normal  Neck: Supple, no cervical adenopathy, no thyromegaly  Lungs: clear to auscultation  Cardiac: regular rate and rhythm, normal S1 and S2, no murmur         ASSESSMENT/PLAM:  S/p Bioprosthetic AVR for BCAV with severe Aortic stenosis-2011.  Redo AVR for endocarditis 2013. Also had closure of mitral leaflet perforation and aortic root debridment.  Currently " asymptomatic.  Echo from this year reviewed. Normal LV function. Normal valve function.  Result discussed with patient.  Will continue current meds .  ICD check-normal. Battery life 18 months.  Per orders.   Return to Clinic 1yr.    Please do not hesitate to contact me if you have any questions/concerns.     Sincerely,     TED Salgado MD

## 2018-12-04 NOTE — PATIENT INSTRUCTIONS
It was a pleasure to see you in clinic today.  Please do not hesitate to call with any questions or concerns.  You are scheduled for a remote transmission on 3/7/19.  We look forward to seeing you in clinic at your next device check in 6 months.    Kailee Rowland RN, BSN  Electrophysiology Nurse Clinician  [facility]    During Business Hours Please Call:  [phone]  After Hours Please Call:  [phone] - select option #4 and ask for job code [phone]

## 2018-12-04 NOTE — PATIENT INSTRUCTIONS
Patient Instructions:  Follow up in 1 year with Dr. Mckenzie per your discussion.      It was a pleasure to see you in the cardiology clinic today.    We are encouraging the use of [address] [employer] to communicate with your Healthcare Provider.  If you have any questions, call  Melvin Quezada LPN, at [phone].  Press Option #1 for the [facility], and then press Option #3 for nursing.      If you have an urgent need after hours (8:00 am to 4:30 pm) please call [phone] and ask for the cardiology fellow on call.

## 2018-12-04 NOTE — NURSING NOTE
Brooks Summers      1.  Has the patient received the information for the influenza vaccine? YES    2.  Does the patient have any of the following contraindications?     Allergy to eggs? No     Allergic reaction to previous influenza vaccines? No     Any other problems to previous influenza vaccines? No     Paralyzed by Guillain-[place] syndrome? No     Currently pregnant? NO     Current moderate or severe illness? No     Allergy to contact lens solution? No    3.  The vaccine has been administered in the usual fashion and the patient was instructed to wait 20 minutes before leaving the building in the event of an allergic reaction: YES    Vaccination given by TAINA Haley.  Recorded by Tanvi Bledsoe

## 2018-12-04 NOTE — PROGRESS NOTES
Med Reconcile: Reviewed and verified all current medications with the patient. The updated medication list was printed and given to the patient.  Return Appointment: Patient given instructions regarding scheduling next clinic visit. Patient demonstrated understanding of this information and agreed to call with further questions or concerns.  Patient stated he understood all health information given and agreed to call with further questions or concerns.     SUBJECTIVE:  Brooks Summers is a 73 year old male who presents for follow up.    S/P Aortic valve replacement (23 mm Bogdan Johnson PERIMOUNT Magna tissue valve). On 7/14/11. For bicuspid aortic valve with severe aortic stenosis,severe LV dysfunction.    On 1/25/2013 had    1.  Redo median sternotomy.   2.  Aortic valve replacement with 21 mm Johnson Perimount Magna Tissue Valve.   3.  Mitral valve repair with pericardial patch repair of large anterior mitral valve leaflet perforation.     4.  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       NOTE:  An extra 2-3 hours was required for this operation because of the extreme complexity of this operation from severe prosthetic valve endocarditis and recent redo     Remain asymptomatic. LV function recovered.  PAF. On coumadin.    Patient Active Problem List    Diagnosis Date Noted     Nocturnal hypoxemia 10/15/2018     Priority: Medium     Trigger ring finger of left hand 11/16/2015     Priority: Medium     Paroxysmal atrial fibrillation (H) 02/25/2015     Priority: Medium     Long term current use of anticoagulant therapy 02/25/2015     Priority: Medium     Problem list name updated by automated process. Provider to review       Finger injury 10/08/2014     Priority: Medium     Hyperlipidemia with target LDL less than 100 10/21/2013     Priority: Medium     Diagnosis updated by automated process. Provider to review and confirm.       Need for prophylactic antibiotic 04/12/2013     Priority:  Medium     Aortic valve and past endocarditis        S/P AVR 01/25/2013     Priority: Medium     Endocarditis 01/21/2013     Priority: Medium     Cultures negative but 16S rRNA PCR showed Staph capitis.  Treated with Dapto and RIF x 8 weeks.       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 Provider to review       LBBB (left bundle branch block) 01/28/2012     Priority: Medium     Received CRT-D;  msec       Pneumothorax, left 01/28/2012     Priority: Medium     Observed following CRT-D implant; small. Observe and repeat CXR       Heart failure, chronic systolic (H) 01/28/2012     Priority: Medium     Stable; NYHA classII       Cardiomyopathy, dilated, nonischemic (H) 01/28/2012     Priority: Medium     Mild CAD; EF 15-20%       CKD (chronic kidney disease) stage 3, GFR 30-59 ml/min (H) 01/28/2012     Priority: Medium     Dyslipidemia 01/28/2012     Priority: Medium     On Pravavastatin       Aortic valve stenosis, severe 07/14/2011     Priority: Medium     Chronic systolic heart failure (H) 07/14/2011     Priority: Medium     Bicuspid aortic valve 07/14/2011     Priority: Medium     S/P aortic valve replacement 07/14/2011     Priority: Medium     Smoking hx 07/14/2011     Priority: Medium     Rotator cuff (capsule) sprain 02/17/2009     Priority: Medium     Other postprocedural status(V45.89) 02/17/2009     Priority: Medium    .  Current Outpatient Prescriptions   Medication Sig     allopurinol (ZYLOPRIM) 100 MG tablet Take 1 tablet (100 mg) by mouth every evening     amoxicillin (AMOXIL) 500 MG capsule Take 2000 MG one hour before colonoscopy.     Cyanocobalamin (VITAMIN B 12 PO)      losartan (COZAAR) 25 MG tablet Take 0.5 tablets (12.5 mg) by mouth every evening     metoprolol succinate (TOPROL XL) 50 MG 24 hr tablet Take 1 tablet (50 mg) by mouth daily     Multiple  Vitamins-Minerals (MULTIVITAMIN ADULTS 50+) TABS      PROVIDER DOSED MANAGED WARFARIN, COUMADIN, OUTPATIENT Per coumadin clinic     simvastatin (ZOCOR) 40 MG tablet Take 1 tablet (40 mg) by mouth At Bedtime     VITAMIN D, CHOLECALCIFEROL, PO Take 1,000 Units by mouth daily     warfarin (COUMADIN) 5 MG tablet 7.5 mg Tues/Thurs/Sun; 10 mg all other days or as directed by the Anticoagulation Clinic     diazepam (VALIUM) 10 MG tablet Take 1 tablet (10 mg) by mouth every 6 hours as needed for anxiety or sleep Take 30-60 minutes before procedure.  Do not operate a vehicle after taking this medication. (Patient not taking: Reported on 12/4/2018)     gabapentin (NEURONTIN) 100 MG capsule Take 1 tablet (100 mg) once daily for 3 days, then 2 tablets once daily for 3 days, then 3 tablets once daily (Patient not taking: Reported on 12/4/2018)     order for DME Oxygen 1.5 Li/min  at night (Patient not taking: Reported on 12/4/2018)     No current facility-administered medications for this visit.      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 Tx with Dapto/RIFx 8 weeks.     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     Paroxysmal A-fib (H)     Post-op 7/14/2011, 1/26/13     Rotator Cuff (Capsule) Sprain and Strain      S/P aortic valve replacement     7/14/2011, re-do 1/25/13 due to endocarditis     Smoking hx      Thrombocytopenia (H)      Past Surgical History:   Procedure Laterality Date     ANESTHESIA CARDIOVERSION  7/18/2011    Procedure:ANESTHESIA CARDIOVERSION; Cardioversion; Surgeon:GENERIC ANESTHESIA PROVIDER; Location:[facility] OR     COLONOSCOPY       COLONOSCOPY N/A 11/19/2018    Procedure: COLONOSCOPY;  Surgeon: Herman Mcginnis MD;   Location: [facility]     CORONARY ANGIOGRAPHY ADULT ORDER       CYSTOSCOPY, BIOPSY URETHRA N/A 4/3/2017    Procedure: CYSTOSCOPY, BIOPSY URETHRA;  Surgeon: Marlena Mora MD;  Location:  OR     ENDOSCOPY UPPER, COLONOSCOPY, COMBINED       HC REMOV & REPLACE IMPLT DEFIB PULSE GEN MULT LEAD  12/3/2015          HEMORRHOID SURGERY       IMPLANT AUTOMATIC IMPLANTABLE CARDIOVERTER DEFIBRILLATOR       MOHS MICROGRAPHIC PROCEDURE       ORTHOPEDIC SURGERY      shoulder,right     REDO STERNOTOMY REPLACE VALVE AORTIC  1/25/2013    Procedure: REDO STERNOTOMY REPLACE VALVE AORTIC;  Redo Sternotomy, Redo Aortic Valve Replacement on pump oxygenator. Intraoperative Transesophageal Echocardiogram;  Surgeon: Navin Hampton MD;  Location: [facility]     REPAIR VALVE MITRAL  2013     REPLACE VALVE AORTIC  7/14/2011    Procedure:REPLACE VALVE AORTIC; Median Sternotomy, Bioprosthesis,  Aortic Valve replacement on pump with oxygenator. Intra-operative Transesophogeal Echocardiogram.; Surgeon:NAVIN HAMPTON; Location:[facility] OR     teeth extractions       TRANSPLANT       Allergies   Allergen Reactions     Lisinopril Cough     Social History     Social History     Marital status:      Spouse name: N/A     Number of children: N/A     Years of education: N/A     Occupational History     Not on file.     Social History Main Topics     Smoking status: Former Smoker     Packs/day: 1.00     Years: 20.00     Types: Cigarettes     Quit date: 12/31/1989     Smokeless tobacco: Never Used     Alcohol use 1.2 oz/week     Drug use: No     Sexual activity: Yes     Partners: Female     Birth control/ protection: None      Comment:      Other Topics Concern     Parent/Sibling W/ Cabg, Mi Or Angioplasty Before 65f 55m? No     Social History Narrative     since 2/1982 2 children 4 grandchildren.    Carpet sales:  Semi retired. Athlete in school, Golf, fish, yardwork     Family History   Problem Relation Age of Onset     C.A.D. Father 68      "bypass, carotid      Prostate Cancer Father 70     Heart Disease Father      Cancer Mother 92     stomach, colon     Hypertension Mother      Retinal detachment Mother      Cancer Brother 64     lung cancer, petroleum     Cancer Brother      Glaucoma No family hx of      Macular Degeneration No family hx of      Strabismus No family hx of      Glasses (<7 y/o) No family hx of           REVIEW OF SYSTEMS:  General: negative, fever, chills, night sweats  Skin: negative, acne, rash and scaling  Eyes: negative, double vision, eye pain and photophobia  Ears/Nose/Throat: negative, nasal congestion and purulent rhinorrhea  Respiratory: No dyspnea on exertion, No cough, No hemoptysis and negative  Cardiovascular: negative, palpitations, tachycardia, irregular heart beat, chest pain, exertional chest pain or pressure, paroxysmal nocturnal dyspnea, dyspnea on exertion and orthopnea       OBJECTIVE:  Blood pressure 131/75, pulse 67, height 1.778 m (5' 10\"), weight 80.7 kg (178 lb), SpO2 97 %.  General Appearance: healthy, alert, active and no distress  Head: Normocephalic. No masses, lesions, tenderness or abnormalities  Eyes: conjuctiva clear, PERRL, EOM intact  Ears: External ears normal. Canals clear. TM's normal.  Nose: Nares normal  Mouth: normal  Neck: Supple, no cervical adenopathy, no thyromegaly  Lungs: clear to auscultation  Cardiac: regular rate and rhythm, normal S1 and S2, no murmur         ASSESSMENT/PLAM:  S/p Bioprosthetic AVR for BCAV with severe Aortic stenosis-2011.  Redo AVR for endocarditis 2013. Also had closure of mitral leaflet perforation and aortic root debridment.  Currently asymptomatic.  Echo from this year reviewed. Normal LV function. Normal valve function.  Result discussed with patient.  Will continue current meds .  ICD check-normal. Battery life 18 months.  Per orders.   Return to Clinic 1yr.  "

## 2018-12-04 NOTE — MR AVS SNAPSHOT
MRN:[id number]                      After Visit Summary   12/4/2018    Brooks Summers    MRN: [id number]           Visit Information        Provider Department      12/4/2018 12:30 PM UC CV DEVICE 1 [facility]        Your next 10 appointments already scheduled     Dec 05, 2018 10:00 AM CST   [facility] Visit with LL ANTI COAG   [facility]    [address] [facility] [address] [facility] [address]   [phone]            Dec 07, 2018 10:35 AM CST   (Arrive by 10:20 AM)   Return Visit with Edin Mays MD   [facility] ([facility])    [address]  4th Floor  [facility] [address] [phone]            Mar 07, 2019 12:00 AM CST   CARDIAC DEVICE CHECK - REMOTE with UC ICD REMOTE   [facility] ([facility])    [address]  [address] [place] [id number] [phone]            Apr 26, 2019 10:30 AM CDT   (Arrive by 10:15 AM)   Return Visit with Marlena Mora MD   [facility] ([facility])    [address]  4th Floor  [facility] [id number] [phone]              Care Instructions    It was a pleasure to see you in clinic today.  Please do not hesitate to call with any questions or concerns.  You are scheduled for a remote transmission on 3/7/19.  We look forward to seeing you in clinic at your next device check in 6 months.    Kailee Rowland RN, BSN  Electrophysiology Nurse Clinician  [facility]    During Business Hours Please Call:  [phone]  After Hours Please Call:  [phone] - select option #4 and ask for job code [phone]         [employer] Information     [employer] gives you secure access to your electronic health record. If you see a primary care provider, you can also send messages to your care team and make  appointments. If you have questions, please call your primary care clinic.  If you do not have a primary care provider, please call [phone] and they will assist you.      [employer] is an electronic gateway that provides easy, online access to your medical records. With [employer], you can request a clinic appointment, read your test results, renew a prescription or communicate with your care team.     To access your existing account, please contact your [facility] or call [phone] for assistance.        Care EveryWhere ID     This is your Care EveryWhere ID. This could be used by other organizations to access your [place] medical records  [id number]        Equal Access to Services     KUN RALPH : Paris Lopez, violeta han, melanie land, elisabeth quinones. So [facility] [phone].    ATENCIÓN: Si habla español, tiene a herrera disposición servicios gratuitos de asistencia lingüística. Llame al [phone].    We comply with applicable federal civil rights laws and [place] laws. We do not discriminate on the basis of race, color, national origin, age, disability, sex, sexual orientation, or gender identity.

## 2018-12-04 NOTE — MR AVS SNAPSHOT
After Visit Summary   12/4/2018    Brooks Summers    MRN: [id number]           Patient Information     Date Of Birth          1945        Visit Information        Provider Department      12/4/2018 1:00 PM TED Salgado MD [facility]        Today's Diagnoses     Chronic systolic heart failure (H)    -  1      Care Instructions    Patient Instructions:  Follow up in 1 year with Dr. Mckenzie per your discussion.      It was a pleasure to see you in the cardiology clinic today.    We are encouraging the use of [employer] to communicate with your Healthcare Provider.  If you have any questions, call  Melvin Quezada LPN, at [phone].  Press Option #1 for the [facility], and then press Option #3 for nursing.      If you have an urgent need after hours (8:00 am to 4:30 pm) please call [phone] and ask for the cardiology fellow on call.            Follow-ups after your visit        Additional Services     Follow-Up with Cardiologist                 Your next 10 appointments already scheduled     Dec 05, 2018 10:00 AM CST   Anticoagulation Visit with LL ANTI COAG   [facility]    [address] [place] [address]   [phone]            Dec 07, 2018 10:35 AM CST   (Arrive by 10:20 AM)   Return Visit with Edin Mays MD   [facility] ([facility])    [address] [place] [address]  4th Floor  [facility] [address] [phone]            Mar 07, 2019 12:00 AM CST   CARDIAC DEVICE CHECK - REMOTE with  ICD REMOTE   [facility] ([facility])    [address] [place] [address]  [address] [place] [id number] [phone]            Apr 26, 2019 10:30 AM CDT   (Arrive by 10:15 AM)   Return Visit with Marlena Mora MD   [facility] ([facility])    [facility]  "[address] [address] [phone]              Future tests that were ordered for you today     Open Future Orders        Priority Expected Expires Ordered    Follow-Up with Cardiologist Routine 12/4/2019 12/5/2019 12/4/2018            Who to contact     If you have questions or need follow up information about today's clinic visit or your schedule please contact [facility] directly at [phone].  Normal or non-critical lab and imaging results will be communicated to you by hhgregghart, letter or phone within 4 business days after the clinic has received the results. If you do not hear from us within 7 days, please contact the clinic through [employer] or phone. If you have a critical or abnormal lab result, we will notify you by phone as soon as possible.  Submit refill requests through [employer] or call your pharmacy and they will forward the refill request to us. Please allow 3 business days for your refill to be completed.          Additional Information About Your Visit        [employer] Information     [employer] gives you secure access to your electronic health record. If you see a primary care provider, you can also send messages to your care team and make appointments. If you have questions, please call your primary care clinic.  If you do not have a primary care provider, please call [phone] and they will assist you.        Care EveryWhere ID     This is your Care EveryWhere ID. This could be used by other organizations to access your [place] medical records  [id number]        Your Vitals Were     Pulse Height Pulse Oximetry BMI (Body Mass Index)          67 1.778 m (5' 10\") 97% 25.54 kg/m2         Blood Pressure from Last 3 Encounters:   12/04/18 131/75   11/19/18 119/75   10/15/18 137/84    Weight from Last 3 Encounters:   12/04/18 80.7 kg (178 lb)   11/19/18 79.4 kg (175 lb)   10/15/18 82.6 kg (182 lb)               Primary Care Provider Office Phone # Fax #    " Edin Mays [phone]       [address] [address]        Equal Access to Services     KUN RALPH : Paris robin pizano molly Lopez, walaurentda luqkirk, qalaurenta kaalmada emery, elisabeth salterdennys joni. So [facility] [phone].    ATENCIÓN: Si habla español, tiene a herrera disposición servicios gratuitos de asistencia lingüística. Llame al [phone].    We comply with applicable federal civil rights laws and [place] laws. We do not discriminate on the basis of race, color, national origin, age, disability, sex, sexual orientation, or gender identity.            Thank you!     Thank you for choosing [facility]  for your care. Our goal is always to provide you with excellent care. Hearing back from our patients is one way we can continue to improve our services. Please take a few minutes to complete the written survey that you may receive in the mail after your visit with us. Thank you!             Your Updated Medication List - Protect others around you: Learn how to safely use, store and throw away your medicines at www.disposemymeds.org.          This list is accurate as of 12/4/18  1:08 PM.  Always use your most recent med list.                   Brand Name Dispense Instructions for use Diagnosis    allopurinol 100 MG tablet    ZYLOPRIM    90 tablet    Take 1 tablet (100 mg) by mouth every evening    Idiopathic gout, unspecified chronicity, unspecified site       amoxicillin 500 MG capsule    AMOXIL    4 capsule    Take 2000 MG one hour before colonoscopy.    Prophylactic antibiotic       diazepam 10 MG tablet    VALIUM    1 tablet    Take 1 tablet (10 mg) by mouth every 6 hours as needed for anxiety or sleep Take 30-60 minutes before procedure.  Do not operate a vehicle after taking this medication.    Microhematuria       gabapentin 100 MG capsule    NEURONTIN    120 capsule    Take 1 tablet (100 mg) once daily for 3 days, then 2 tablets  once daily for 3 days, then 3 tablets once daily    Restless legs syndrome (RLS), Sensory disturbance       losartan 25 MG tablet    COZAAR    45 tablet    Take 0.5 tablets (12.5 mg) by mouth every evening    Benign essential hypertension       metoprolol succinate ER 50 MG 24 hr tablet    TOPROL XL    90 tablet    Take 1 tablet (50 mg) by mouth daily    Benign essential hypertension       MULTIVITAMIN ADULTS 50+ Tabs       Flu vaccine need, Aortic valve stenosis, severe, Chronic systolic heart failure (H), S/P aortic valve replacement       order for DME     1 Device    Oxygen 1.5 Li/min at night        * PROVIDER DOSED MANAGED WARFARIN (COUMADIN) OUTPATIENT      Per coumadin clinic        simvastatin 40 MG tablet    ZOCOR    90 tablet    Take 1 tablet (40 mg) by mouth At Bedtime    Hyperlipidemia LDL goal <100       VITAMIN B 12 PO           VITAMIN D (CHOLECALCIFEROL) PO      Take 1,000 Units by mouth daily    Flu vaccine need, Aortic valve stenosis, severe, Chronic systolic heart failure (H), S/P aortic valve replacement       * warfarin 5 MG tablet    COUMADIN    180 tablet    7.5 mg Tues/Thurs/Sun; 10 mg all other days or as directed by the Anticoagulation Clinic    S/P AVR (aortic valve replacement), Paroxysmal atrial fibrillation (H), Long term current use of anticoagulant therapy, S/P aortic valve replacement       * Notice:  This list has 2 medication(s) that are the same as other medications prescribed for you. Read the directions carefully, and ask your doctor or other care provider to review them with you.

## 2018-12-04 NOTE — PROGRESS NOTES
Preliminary Device Interrogation Results.  Final physician signed paceart report to be scanned and attached.    Patient seen in [facility] for evaluation and iterative programming of his Medtronic multi lead ICD per MD orders.  Patient has an appointment to see Dr. Rodriguez today.  Normal ICD function.  No ventricular arrhythmias. 2 AT/AF detections, total of 6 seconds, with no available egms for review.   Intrinsic rhythm = SR @ 70 bpm with CHB and a ventricular rate of 32 bpm.   AP = 35.8%.  BVP = 95.8%.  OptiVol fluid index is at baseline.  Estimated battery longevity to SIMONE = 18 months years.   No short v-v intervals recorded.  Lead  trends appear stable.  Patient reports that he is feeling well and denies complaints.  Plan for patient to send a remote transmission in 3 months and return to clinic in 6 months.    Multi lead ICD

## 2018-12-04 NOTE — NURSING NOTE
Chief Complaint   Patient presents with     Follow Up For     establish care with Dr. Mckenzie     Vitals were taken and medications were reconciled.    Erna Godfrey, ZEB     12:53 PM

## 2018-12-05 ENCOUNTER — ANTICOAGULATION THERAPY VISIT (OUTPATIENT)
Dept: ANTICOAGULATION | Facility: CLINIC | Age: 73
End: 2018-12-05
Payer: MEDICARE

## 2018-12-05 DIAGNOSIS — Z95.2 S/P AORTIC VALVE REPLACEMENT: ICD-10-CM

## 2018-12-05 DIAGNOSIS — I10 BENIGN ESSENTIAL HYPERTENSION: ICD-10-CM

## 2018-12-05 DIAGNOSIS — I48.0 PAROXYSMAL ATRIAL FIBRILLATION (H): ICD-10-CM

## 2018-12-05 DIAGNOSIS — E78.5 HYPERLIPIDEMIA LDL GOAL <100: ICD-10-CM

## 2018-12-05 DIAGNOSIS — Z79.01 LONG TERM CURRENT USE OF ANTICOAGULANT THERAPY: ICD-10-CM

## 2018-12-05 LAB — INR POINT OF CARE: 2.5 (ref 0.86–1.14)

## 2018-12-05 PROCEDURE — 99207 ZZC NO CHARGE NURSE ONLY: CPT

## 2018-12-05 PROCEDURE — 36416 COLLJ CAPILLARY BLOOD SPEC: CPT

## 2018-12-05 PROCEDURE — 85610 PROTHROMBIN TIME: CPT | Mod: QW

## 2018-12-05 RX ORDER — METOPROLOL SUCCINATE 50 MG/1
50 TABLET, EXTENDED RELEASE ORAL DAILY
Qty: 90 TABLET | Refills: 3 | Status: SHIPPED | OUTPATIENT
Start: 2018-12-05 | End: 2020-01-28

## 2018-12-05 RX ORDER — SIMVASTATIN 40 MG
40 TABLET ORAL AT BEDTIME
Qty: 90 TABLET | Refills: 3 | Status: SHIPPED | OUTPATIENT
Start: 2018-12-05 | End: 2020-01-27

## 2018-12-05 RX ORDER — LOSARTAN POTASSIUM 25 MG/1
12.5 TABLET ORAL EVERY EVENING
Qty: 45 TABLET | Refills: 3 | Status: SHIPPED | OUTPATIENT
Start: 2018-12-05 | End: 2020-01-10

## 2018-12-05 NOTE — MR AVS SNAPSHOT
Brooks Summers   12/5/2018 10:00 AM   Anticoagulation Therapy Visit    Description:  73 year old male   Provider:  [facility]   Department:  [facility]           INR as of 12/5/2018     Today's INR 2.5      Anticoagulation Summary as of 12/5/2018     INR goal 2.0-3.0   Today's INR 2.5   Full warfarin instructions 7.5 mg on Sun, Tue, Thu; 10 mg all other days   Next INR check 12/19/2018    Indications   S/P aortic valve replacement [Z95.2]  Paroxysmal atrial fibrillation (H) [I48.0]  Long term current use of anticoagulant therapy [Z79.01]         Description     Use your home meter to check INR in 2 weeks. Report the result to [facility].      Contact Numbers     Please call [phone] with any problems or questions regarding your therapy.    If you need to cancel and/or reschedule your appointment please call one of the following numbers:  [facility] [phone]  [place] - [phone]  [place] - [phone]  [place] - [phone]  [place] - [phone]            December 2018 Details    Sun Mon Tue Wed Thu Fri Sat           1                 2               3               4               5      10 mg   See details      6      7.5 mg         7      10 mg         8      10 mg           9      7.5 mg         10      10 mg         11      7.5 mg         12      10 mg         13      7.5 mg         14      10 mg         15      10 mg           16      7.5 mg         17      10 mg         18      7.5 mg         19            20               21               22                 23               24               25               26               27               28               29                 30               31                     Date Details   12/05 This INR check       Date of next INR:  12/19/2018         How to take your warfarin dose     To take:  7.5 mg Take 1.5 of the 5 mg tablets.    To take:  10 mg Take 2 of the 5 mg tablets.

## 2018-12-05 NOTE — PROGRESS NOTES
ANTICOAGULATION FOLLOW-UP CLINIC VISIT    Patient Name:  Brooks Summers  Date:  12/5/2018  Contact Type:  Face to Face    SUBJECTIVE:     Patient Findings     Positives No Problem Findings    Comments No changes in diet, activity level, medications (including over the counter), or health. No missed doses of warfarin. Patient took dosing as prescribed. No signs of clots or bleeding concerns. Patient will continue maintenance warfarin dosing.    The patient is leaving for [place] on Monday. He will be checking his INR with the home meter.           OBJECTIVE    INR Protime   Date Value Ref Range Status   12/05/2018 2.5 (A) 0.86 - 1.14 Final       ASSESSMENT / PLAN  INR assessment THER    Recheck INR In: 2 WEEKS    INR Location Clinic      Anticoagulation Summary as of 12/5/2018     INR goal 2.0-3.0   Today's INR 2.5   Warfarin maintenance plan 7.5 mg (5 mg x 1.5) on Sun, Tue, Thu; 10 mg (5 mg x 2) all other days   Full warfarin instructions 7.5 mg on Sun, Tue, Thu; 10 mg all other days   Weekly warfarin total 62.5 mg   No change documented Aniya Garcia, RN   Plan last modified Aniya Garcia, RN (9/26/2018)   Next INR check 12/19/2018   Priority INR   Target end date Indefinite    Indications   S/P aortic valve replacement [Z95.2]  Paroxysmal atrial fibrillation (H) [I48.0]  Long term current use of anticoagulant therapy [Z79.01]         Anticoagulation Episode Summary     INR check location     Preferred lab     Send INR reminders to [place] [facility]    Comments  * war[place]in 5 mg [place]. [place] 21 mm [employer] Perimount Magna Tissue Valve. [facility] home meter      Anticoagulation Care Providers     Provider Role Specialty Phone number    Edin Mays MD Responsible Internal Medicine [phone]            See the Encounter Report to view Anticoagulation Flowsheet and Dosing Calendar (Go to Encounters tab in chart review, and find the Anticoagulation Therapy Visit)        Aniya  CALLUM Garcia

## 2018-12-07 ENCOUNTER — OFFICE VISIT (OUTPATIENT)
Dept: INTERNAL MEDICINE | Facility: CLINIC | Age: 73
End: 2018-12-07
Payer: MEDICARE

## 2018-12-07 VITALS
BODY MASS INDEX: 25.44 KG/M2 | DIASTOLIC BLOOD PRESSURE: 87 MMHG | HEART RATE: 76 BPM | WEIGHT: 177.3 LBS | OXYGEN SATURATION: 94 % | SYSTOLIC BLOOD PRESSURE: 121 MMHG

## 2018-12-07 DIAGNOSIS — M10.00 IDIOPATHIC GOUT, UNSPECIFIED CHRONICITY, UNSPECIFIED SITE: ICD-10-CM

## 2018-12-07 DIAGNOSIS — Z79.2 PROPHYLACTIC ANTIBIOTIC: ICD-10-CM

## 2018-12-07 RX ORDER — AMOXICILLIN 500 MG/1
CAPSULE ORAL
Qty: 4 CAPSULE | Refills: 3 | Status: SHIPPED | OUTPATIENT
Start: 2018-12-07 | End: 2019-12-18

## 2018-12-07 RX ORDER — ALLOPURINOL 100 MG/1
100 TABLET ORAL EVERY EVENING
Qty: 90 TABLET | Refills: 3 | Status: SHIPPED | OUTPATIENT
Start: 2018-12-07 | End: 2020-01-28

## 2018-12-07 ASSESSMENT — PAIN SCALES - GENERAL: PAINLEVEL: NO PAIN (0)

## 2018-12-07 NOTE — MR AVS SNAPSHOT
After Visit Summary   12/7/2018    Brooks Summers    MRN: [id number]           Patient Information     Date Of Birth          1945        Visit Information        Provider Department      12/7/2018 10:35 AM Edin Mays MD [facility]        Today's Diagnoses     Idiopathic gout, unspecified chronicity, unspecified site        Prophylactic antibiotic          Care Instructions    Primary Care Center Phone Number [phone]  Primary Care Center Medication Refill Request Information:  * Please contact your pharmacy regarding ANY request for medication refills.  ** [facility] Prescription Fax = [phone]  * Please allow 3 business days for routine medication refills.  * Please allow 5 business days for controlled substance medication refills.     Primary Care Center Test Result notification information:  *You will be notified with in 7-10 days of your appointment day regarding the results of your test.  If you are on MyChart you will be notified as soon as the provider has reviewed the results and signed off on them.                    Follow-ups after your visit        Your next 10 appointments already scheduled     Mar 07, 2019 12:00 AM CST   CARDIAC DEVICE CHECK - REMOTE with  ICD REMOTE   [facility] ([facility])    [address] [place] [address]  [address] [place] [address] [id number] [phone]            Apr 26, 2019 10:30 AM CDT   (Arrive by 10:15 AM)   Return Visit with Marlena Mora MD   [facility] ([facility])    [address] [place] [address]  4th Floor  [facility] [id number] [phone]              Who to contact     Please call your clinic at [phone] to:    Ask questions about your health    Make or cancel appointments    Discuss your medicines    Learn about your test results    Speak to your doctor            Additional Information About Your Visit         [employer] Information     [employer] gives you secure access to your electronic health record. If you see a primary care provider, you can also send messages to your care team and make appointments. If you have questions, please call your primary care clinic.  If you do not have a primary care provider, please call [phone] and they will assist you.      [employer]ality is an electronic gateway that provides easy, online access to your medical records. With [employer], you can request a clinic appointment, read your test results, renew a prescription or communicate with your care team.     To access your existing account, please contact your [facility] or call [phone] for assistance.        Care EveryWhere ID     This is your Care EveryWhere ID. This could be used by other organizations to access your [facility] medical records  [id number]        Your Vitals Were     Pulse Pulse Oximetry BMI (Body Mass Index)             76 94% 25.44 kg/m2          Blood Pressure from Last 3 Encounters:   12/07/18 121/87   12/04/18 131/75   11/19/18 119/75    Weight from Last 3 Encounters:   12/07/18 80.4 kg (177 lb 4.8 oz)   12/04/18 80.7 kg (178 lb)   11/19/18 79.4 kg (175 lb)              Today, you had the following     No orders found for display         Where to get your medicines      [facility] [address] [address]  AT [address] [facility] [address]     Phone:  [phone]     allopurinol 100 MG tablet    amoxicillin 500 MG capsule          Primary Care Provider Office Phone # Fax #    Edin Mays [phone]       [address] [address]        Equal Access to Services     KUN RALPH : Paris Lopez, violeta han, elisabeth winston. So [facility] [phone].    ATENCIÓN: Matty rodriguez  español, tiene a herrera disposición servicios gratuitos de asistencia lingüística. Yohannes eaton [phone].    We comply with applicable federal civil rights laws and [place] laws. We do not discriminate on the basis of race, color, national origin, age, disability, sex, sexual orientation, or gender identity.            Thank you!     Thank you for choosing [facility]  for your care. Our goal is always to provide you with excellent care. Hearing back from our patients is one way we can continue to improve our services. Please take a few minutes to complete the written survey that you may receive in the mail after your visit with us. Thank you!             Your Updated Medication List - Protect others around you: Learn how to safely use, store and throw away your medicines at www.disposemymeds.org.          This list is accurate as of 12/7/18  1:32 PM.  Always use your most recent med list.                   Brand Name Dispense Instructions for use Diagnosis    allopurinol 100 MG tablet    ZYLOPRIM    90 tablet    Take 1 tablet (100 mg) by mouth every evening    Idiopathic gout, unspecified chronicity, unspecified site       amoxicillin 500 MG capsule    AMOXIL    4 capsule    Take 2000 MG one hour before colonoscopy.    Prophylactic antibiotic       diazepam 10 MG tablet    VALIUM    1 tablet    Take 1 tablet (10 mg) by mouth every 6 hours as needed for anxiety or sleep Take 30-60 minutes before procedure.  Do not operate a vehicle after taking this medication.    Microhematuria       gabapentin 100 MG capsule    NEURONTIN    120 capsule    Take 1 tablet (100 mg) once daily for 3 days, then 2 tablets once daily for 3 days, then 3 tablets once daily    Restless legs syndrome (RLS), Sensory disturbance       losartan 25 MG tablet    COZAAR    45 tablet    Take 0.5 tablets (12.5 mg) by mouth every evening    Benign essential hypertension       metoprolol succinate ER 50 MG 24 hr tablet    TOPROL XL    90  tablet    Take 1 tablet (50 mg) by mouth daily    Benign essential hypertension       MULTIVITAMIN ADULTS 50+ Tabs       Flu vaccine need, Aortic valve stenosis, severe, Chronic systolic heart failure (H), S/P aortic valve replacement       order for DME     1 Device    Oxygen 1.5 Li/min at night        * PROVIDER DOSED MANAGED WARFARIN (COUMADIN) OUTPATIENT      Per coumadin clinic        simvastatin 40 MG tablet    ZOCOR    90 tablet    Take 1 tablet (40 mg) by mouth At Bedtime    Hyperlipidemia LDL goal <100       VITAMIN B 12 PO           VITAMIN D (CHOLECALCIFEROL) PO      Take 1,000 Units by mouth daily    Flu vaccine need, Aortic valve stenosis, severe, Chronic systolic heart failure (H), S/P aortic valve replacement       * warfarin 5 MG tablet    COUMADIN    180 tablet    7.5 mg Tues/Thurs/Sun; 10 mg all other days or as directed by the Anticoagulation Clinic    S/P AVR (aortic valve replacement), Paroxysmal atrial fibrillation (H), Long term current use of anticoagulant therapy, S/P aortic valve replacement       * Notice:  This list has 2 medication(s) that are the same as other medications prescribed for you. Read the directions carefully, and ask your doctor or other care provider to review them with you.

## 2018-12-07 NOTE — NURSING NOTE
Chief Complaint   Patient presents with     Recheck Medication     Pt is here for a follow up.     Estefani Sultana [facility]  10:47 AM 12/7/2018

## 2018-12-07 NOTE — PATIENT INSTRUCTIONS
Primary Care Center Phone Number [phone]  Primary Care Center Medication Refill Request Information:  * Please contact your pharmacy regarding ANY request for medication refills.  ** [facility] Prescription Fax = [phone]  * Please allow 3 business days for routine medication refills.  * Please allow 5 business days for controlled substance medication refills.     Primary Care Center Test Result notification information:  *You will be notified with in 7-10 days of your appointment day regarding the results of your test.  If you are on MyChart you will be notified as soon as the provider has reviewed the results and signed off on them.

## 2018-12-18 ENCOUNTER — ANTICOAGULATION THERAPY VISIT (OUTPATIENT)
Dept: ANTICOAGULATION | Facility: CLINIC | Age: 73
End: 2018-12-18
Payer: MEDICARE

## 2018-12-18 DIAGNOSIS — Z79.01 LONG TERM CURRENT USE OF ANTICOAGULANT THERAPY: ICD-10-CM

## 2018-12-18 DIAGNOSIS — I48.0 PAROXYSMAL ATRIAL FIBRILLATION (H): ICD-10-CM

## 2018-12-18 DIAGNOSIS — Z95.2 S/P AORTIC VALVE REPLACEMENT: ICD-10-CM

## 2018-12-18 LAB — INR PPP: 2

## 2018-12-18 PROCEDURE — 99207 ZZC NO CHARGE NURSE ONLY: CPT

## 2018-12-18 NOTE — PROGRESS NOTES
ANTICOAGULATION FOLLOW-UP CLINIC VISIT    Patient Name:  Brooks Summers  Date:  2018  Contact Type:  Telephone/     SUBJECTIVE:     Patient Findings     Positives:   No Problem Findings    Comments:   Continue maintenance warfarin dose. Will recheck INR in 2 weeks.              OBJECTIVE    INR   Date Value Ref Range Status   2018 2.0  Final       ASSESSMENT / PLAN  INR assessment THER    Recheck INR In: 2 WEEKS    INR Location Home INR    Billed home INR No      Anticoagulation Summary  As of 2018    INR goal:   2.0-3.0   TTR:   70.0 % (3.8 y)   INR used for dosin.0 (2018)   Warfarin maintenance plan:   7.5 mg (5 mg x 1.5) every Sun, Tue, Thu; 10 mg (5 mg x 2) all other days   Full warfarin instructions:   7.5 mg every Sun, Tue, Thu; 10 mg all other days   Weekly warfarin total:   62.5 mg   No change documented:   Estefani Marvin RN   Plan last modified:   Aniya Garcia RN (2018)   Next INR check:   2019   Priority:   INR   Target end date:   Indefinite    Indications    S/P aortic valve replacement [Z95.2]  Paroxysmal atrial fibrillation (H) [I48.0]  Long term current use of anticoagulant therapy [Z79.01]             Anticoagulation Episode Summary     INR check location:       Preferred lab:       Send INR reminders to:   [facility] POOL    Comments:    * warfarin 5 mg tabs[place] 21 mm [employer] Perimount Magna Tissue Valve. Alere home meter      Anticoagulation Care Providers     Provider Role Specialty Phone number    Edin Mays MD [facility] Internal Medicine [phone]            See the Encounter Report to view Anticoagulation Flowsheet and Dosing Calendar (Go to Encounters tab in chart review, and find the Anticoagulation Therapy Visit)        Estefani Marvin, RN

## 2019-01-02 ENCOUNTER — ANTICOAGULATION THERAPY VISIT (OUTPATIENT)
Dept: ANTICOAGULATION | Facility: CLINIC | Age: 74
End: 2019-01-02

## 2019-01-02 DIAGNOSIS — Z79.01 LONG TERM CURRENT USE OF ANTICOAGULANT THERAPY: ICD-10-CM

## 2019-01-02 DIAGNOSIS — Z95.2 S/P AORTIC VALVE REPLACEMENT: ICD-10-CM

## 2019-01-02 DIAGNOSIS — I48.0 PAROXYSMAL ATRIAL FIBRILLATION (H): ICD-10-CM

## 2019-01-02 NOTE — PROGRESS NOTES
Patient's wife called [facility] requesting an INR order to be sent to [facility]. She is getting error messages when trying to use the home meter and just wants to bring him into the lab.     Writer faxed an order to [phone] per her request, verified the fax was successfully transmitted.     Writer let her know she should call [facility] if she does not hear from our clinic within 24 hours of going in for an INR check.     Doug DUPREE RN, CACP 1/2/2019 at 2:01 PM

## 2019-01-03 NOTE — PROGRESS NOTES
Writer received a fax back form [place] stating there was no record of this patient. Writer explained he is new to [place] and planned to make an appointment there. Writer was transferred to a  who gave a new fax so the order could be added. Writer faxed the order to [phone] and verified the fax transmitted.     Doug DUPREE RN, CACP 1/3/2019 at 11:27 AM

## 2019-01-07 LAB — INR PPP: 2.6

## 2019-01-08 ENCOUNTER — ANTICOAGULATION THERAPY VISIT (OUTPATIENT)
Dept: ANTICOAGULATION | Facility: CLINIC | Age: 74
End: 2019-01-08
Payer: MEDICARE

## 2019-01-08 DIAGNOSIS — Z79.01 LONG TERM CURRENT USE OF ANTICOAGULANT THERAPY: ICD-10-CM

## 2019-01-08 DIAGNOSIS — I48.0 PAROXYSMAL ATRIAL FIBRILLATION (H): ICD-10-CM

## 2019-01-08 DIAGNOSIS — Z95.2 S/P AORTIC VALVE REPLACEMENT: ICD-10-CM

## 2019-01-08 PROCEDURE — 99207 ZZC NO CHARGE NURSE ONLY: CPT

## 2019-01-08 NOTE — PROGRESS NOTES
Writer left a detailed message, per demo page, on mobile number. Writer asked patient to call [facility] if any changes in diet, health, activity or medications or if he has had any bleeding/clot concerns. Asked to recheck INR in 4 weeks. INR was faxed from [facility].    Aniya Garcia RN, BSN, PHN  19  ANTICOAGULATION FOLLOW-UP CLINIC VISIT    Patient Name:  Brooks Summers  Date:  2019  Contact Type:  Telephone/ faxed INR from [facility]. Detailed message left on pt cell.    SUBJECTIVE:        OBJECTIVE    INR   Date Value Ref Range Status   2019 2.6  Final       ASSESSMENT / PLAN  INR assessment THER    Recheck INR In: 4 WEEKS    INR Location Outside lab [facility]     Anticoagulation Summary  As of 2019    INR goal:   2.0-3.0   TTR:   70.4 % (3.8 y)   INR used for dosin.6 (2019)   Warfarin maintenance plan:   7.5 mg (5 mg x 1.5) every Sun, Tue, Thu; 10 mg (5 mg x 2) all other days   Full warfarin instructions:   7.5 mg every Sun, Tue, Thu; 10 mg all other days   Weekly warfarin total:   62.5 mg   No change documented:   Aniya Garcia RN   Plan last modified:   Aniya Garcia RN (2018)   Next INR check:   2019   Priority:   INR   Target end date:   Indefinite    Indications    S/P aortic valve replacement [Z95.2]  Paroxysmal atrial fibrillation (H) [I48.0]  Long term current use of anticoagulant therapy [Z79.01]             Anticoagulation Episode Summary     INR check location:       Preferred lab:       Send INR reminders to:   [place] [facility]    Comments:    * warfarin 5 mg tabs[place] 21 mm [employer] Perimount Magna Tissue Valve. [facility] home meter      Anticoagulation Care Providers     Provider Role Specialty Phone number    Edin Mays MD Responsible Internal Medicine [phone]            See the Encounter Report to view Anticoagulation Flowsheet and Dosing Calendar (Go to Encounters tab in chart review, and find the  Anticoagulation Therapy Visit)        Aniya Garcia RN

## 2019-02-15 LAB — INR PPP: 2.5 (ref 0.8–1.1)

## 2019-02-18 ENCOUNTER — ANTICOAGULATION THERAPY VISIT (OUTPATIENT)
Dept: ANTICOAGULATION | Facility: CLINIC | Age: 74
End: 2019-02-18
Payer: COMMERCIAL

## 2019-02-18 DIAGNOSIS — Z95.2 S/P AORTIC VALVE REPLACEMENT: ICD-10-CM

## 2019-02-18 DIAGNOSIS — I48.0 PAROXYSMAL ATRIAL FIBRILLATION (H): ICD-10-CM

## 2019-02-18 DIAGNOSIS — Z79.01 LONG TERM CURRENT USE OF ANTICOAGULANT THERAPY: ICD-10-CM

## 2019-02-18 PROCEDURE — 99207 ZZC NO CHARGE NURSE ONLY: CPT

## 2019-02-18 NOTE — PROGRESS NOTES
ANTICOAGULATION FOLLOW-UP CLINIC VISIT    Patient Name:  Brooks Summers  Date:  2019  Contact Type:  Telephone/ /detailed VM left for pt/ fax from [place]     SUBJECTIVE:     Patient Findings     Positives:   No Problem Findings    Comments:   No changes noted in Epic or reported by patient. Detailed VM left for patient with dosing instructions and asked him to return call to [facility] with any questions or concerns.            OBJECTIVE    INR   Date Value Ref Range Status   02/15/2019 2.5 (A) 0.8 - 1.1 Final       ASSESSMENT / PLAN  INR assessment THER    Recheck INR In: 4 WEEKS    INR Location Outside lab      Anticoagulation Summary  As of 2019    INR goal:   2.0-3.0   TTR:   71.2 % (3.9 y)   INR used for dosin.5 (2/15/2019)   Warfarin maintenance plan:   7.5 mg (5 mg x 1.5) every Sun, Tue, Thu; 10 mg (5 mg x 2) all other days   Full warfarin instructions:   7.5 mg every Sun, Tue, Thu; 10 mg all other days   Weekly warfarin total:   62.5 mg   No change documented:   Gina Medina RN   Plan last modified:   Aniya Garcia RN (2018)   Next INR check:   3/15/2019   Priority:   INR   Target end date:   Indefinite    Indications    S/P aortic valve replacement [Z95.2]  Paroxysmal atrial fibrillation (H) [I48.0]  Long term current use of anticoagulant therapy [Z79.01]             Anticoagulation Episode Summary     INR check location:       Preferred lab:       Send INR reminders to:   [place] [facility]    Comments:    * warfarin 5 mg tabs. [place] 21 mm [employer] Perimount Magna Tissue Valve. [facility]re home meter      Anticoagulation Care Providers     Provider Role Specialty Phone number    Edin Mays MD Responsible Internal Medicine [phone]            See the Encounter Report to view Anticoagulation Flowsheet and Dosing Calendar (Go to Encounters tab in chart review, and find the Anticoagulation Therapy Visit)        Gina SAINI  CALLUM Medina

## 2019-03-07 ENCOUNTER — ANCILLARY PROCEDURE (OUTPATIENT)
Dept: CARDIOLOGY | Facility: CLINIC | Age: 74
End: 2019-03-07
Attending: INTERNAL MEDICINE
Payer: MEDICARE

## 2019-03-07 DIAGNOSIS — I42.9 CARDIOMYOPATHY (H): ICD-10-CM

## 2019-03-07 PROCEDURE — 93295 DEV INTERROG REMOTE 1/2/MLT: CPT | Performed by: INTERNAL MEDICINE

## 2019-03-07 PROCEDURE — 93296 REM INTERROG EVL PM/IDS: CPT | Mod: ZF

## 2019-03-15 ENCOUNTER — ANTICOAGULATION THERAPY VISIT (OUTPATIENT)
Dept: ANTICOAGULATION | Facility: CLINIC | Age: 74
End: 2019-03-15
Payer: COMMERCIAL

## 2019-03-15 DIAGNOSIS — Z79.01 LONG TERM CURRENT USE OF ANTICOAGULANT THERAPY: ICD-10-CM

## 2019-03-15 DIAGNOSIS — I48.0 PAROXYSMAL ATRIAL FIBRILLATION (H): ICD-10-CM

## 2019-03-15 DIAGNOSIS — Z95.2 S/P AORTIC VALVE REPLACEMENT: ICD-10-CM

## 2019-03-15 LAB — INR PPP: 2.5 (ref 0.9–1.1)

## 2019-03-15 PROCEDURE — 99207 ZZC NO CHARGE NURSE ONLY: CPT

## 2019-03-15 NOTE — PROGRESS NOTES
ANTICOAGULATION FOLLOW-UP CLINIC VISIT    Patient Name:  Brooks Summers  Date:  3/15/2019  Contact Type:  Telephone/ writer left detailed message on patient phone with INR result, dosing, recheck date and to call [facility] if any changes or concerns.    SUBJECTIVE:        OBJECTIVE    INR   Date Value Ref Range Status   2019 2.5 (A) 0.9 - 1.1 Final       ASSESSMENT / PLAN  INR assessment THER    Recheck INR In: 6 WEEKS    INR Location Outside lab      Anticoagulation Summary  As of 3/15/2019    INR goal:   2.0-3.0   TTR:   71.3 % (4 y)   INR used for dosin.5 (3/14/2019)   Warfarin maintenance plan:   7.5 mg (5 mg x 1.5) every Sun, Tue, Thu; 10 mg (5 mg x 2) all other days   Full warfarin instructions:   7.5 mg every Sun, Tue, Thu; 10 mg all other days   Weekly warfarin total:   62.5 mg   No change documented:   Aniya Garcia RN   Plan last modified:   Aniya Garcia RN (2018)   Next INR check:   2019   Priority:   INR   Target end date:   Indefinite    Indications    S/P aortic valve replacement [Z95.2]  Paroxysmal atrial fibrillation (H) [I48.0]  Long term current use of anticoagulant therapy [Z79.01]             Anticoagulation Episode Summary     INR check location:       Preferred lab:       Send INR reminders to:   [place] [facility] POOL    Comments:    * warfarin 5 mg tabs[place] 21 mm [employer] Perimount Magna Tissue Valve. [facility] home meter      Anticoagulation Care Providers     Provider Role Specialty Phone number    Edin Mays MD [facility] Internal Medicine [phone]            See the Encounter Report to view Anticoagulation Flowsheet and Dosing Calendar (Go to Encounters tab in chart review, and find the Anticoagulation Therapy Visit)        Aniya Garcia RN

## 2019-03-28 LAB
MDC_IDC_EPISODE_DTM: NORMAL
MDC_IDC_EPISODE_DURATION: 9 S
MDC_IDC_EPISODE_ID: 21
MDC_IDC_EPISODE_TYPE: NORMAL
MDC_IDC_LEAD_IMPLANT_DT: NORMAL
MDC_IDC_LEAD_LOCATION: NORMAL
MDC_IDC_LEAD_LOCATION_DETAIL_1: NORMAL
MDC_IDC_LEAD_MFG: NORMAL
MDC_IDC_LEAD_MODEL: NORMAL
MDC_IDC_LEAD_POLARITY_TYPE: NORMAL
MDC_IDC_LEAD_SERIAL: NORMAL
MDC_IDC_LEAD_SPECIAL_FUNCTION: NORMAL
MDC_IDC_MSMT_BATTERY_DTM: NORMAL
MDC_IDC_MSMT_BATTERY_REMAINING_LONGEVITY: 16 MO
MDC_IDC_MSMT_BATTERY_RRT_TRIGGER: 2.73
MDC_IDC_MSMT_BATTERY_STATUS: NORMAL
MDC_IDC_MSMT_BATTERY_VOLTAGE: 2.91 V
MDC_IDC_MSMT_CAP_CHARGE_DTM: NORMAL
MDC_IDC_MSMT_CAP_CHARGE_ENERGY: 18 J
MDC_IDC_MSMT_CAP_CHARGE_TIME: 4.35
MDC_IDC_MSMT_CAP_CHARGE_TYPE: NORMAL
MDC_IDC_MSMT_LEADCHNL_LV_IMPEDANCE_VALUE: 380 OHM
MDC_IDC_MSMT_LEADCHNL_LV_IMPEDANCE_VALUE: 437 OHM
MDC_IDC_MSMT_LEADCHNL_LV_IMPEDANCE_VALUE: 703 OHM
MDC_IDC_MSMT_LEADCHNL_LV_PACING_THRESHOLD_AMPLITUDE: 1.38 V
MDC_IDC_MSMT_LEADCHNL_LV_PACING_THRESHOLD_PULSEWIDTH: 1 MS
MDC_IDC_MSMT_LEADCHNL_RA_IMPEDANCE_VALUE: 456 OHM
MDC_IDC_MSMT_LEADCHNL_RA_PACING_THRESHOLD_AMPLITUDE: 0.75 V
MDC_IDC_MSMT_LEADCHNL_RA_PACING_THRESHOLD_PULSEWIDTH: 0.4 MS
MDC_IDC_MSMT_LEADCHNL_RA_SENSING_INTR_AMPL: 1.62 MV
MDC_IDC_MSMT_LEADCHNL_RA_SENSING_INTR_AMPL: 1.62 MV
MDC_IDC_MSMT_LEADCHNL_RV_IMPEDANCE_VALUE: 437 OHM
MDC_IDC_MSMT_LEADCHNL_RV_IMPEDANCE_VALUE: 494 OHM
MDC_IDC_MSMT_LEADCHNL_RV_PACING_THRESHOLD_AMPLITUDE: 0.5 V
MDC_IDC_MSMT_LEADCHNL_RV_PACING_THRESHOLD_PULSEWIDTH: 0.4 MS
MDC_IDC_MSMT_LEADCHNL_RV_SENSING_INTR_AMPL: 13.88 MV
MDC_IDC_MSMT_LEADCHNL_RV_SENSING_INTR_AMPL: 8.12 MV
MDC_IDC_PG_IMPLANT_DTM: NORMAL
MDC_IDC_PG_MFG: NORMAL
MDC_IDC_PG_MODEL: NORMAL
MDC_IDC_PG_SERIAL: NORMAL
MDC_IDC_PG_TYPE: NORMAL
MDC_IDC_SESS_CLINIC_NAME: NORMAL
MDC_IDC_SESS_DTM: NORMAL
MDC_IDC_SESS_TYPE: NORMAL
MDC_IDC_SET_BRADY_AT_MODE_SWITCH_RATE: 171 {BEATS}/MIN
MDC_IDC_SET_BRADY_LOWRATE: 60 {BEATS}/MIN
MDC_IDC_SET_BRADY_MAX_SENSOR_RATE: 140 {BEATS}/MIN
MDC_IDC_SET_BRADY_MAX_TRACKING_RATE: 140 {BEATS}/MIN
MDC_IDC_SET_BRADY_MODE: NORMAL
MDC_IDC_SET_BRADY_PAV_DELAY_HIGH: 100 MS
MDC_IDC_SET_BRADY_PAV_DELAY_LOW: 130 MS
MDC_IDC_SET_BRADY_SAV_DELAY_HIGH: 70 MS
MDC_IDC_SET_BRADY_SAV_DELAY_LOW: 100 MS
MDC_IDC_SET_CRT_LVRV_DELAY: 0 MS
MDC_IDC_SET_CRT_PACED_CHAMBERS: NORMAL
MDC_IDC_SET_LEADCHNL_LV_PACING_AMPLITUDE: 3 V
MDC_IDC_SET_LEADCHNL_LV_PACING_ANODE_ELECTRODE_1: NORMAL
MDC_IDC_SET_LEADCHNL_LV_PACING_ANODE_LOCATION_1: NORMAL
MDC_IDC_SET_LEADCHNL_LV_PACING_CAPTURE_MODE: NORMAL
MDC_IDC_SET_LEADCHNL_LV_PACING_CATHODE_ELECTRODE_1: NORMAL
MDC_IDC_SET_LEADCHNL_LV_PACING_CATHODE_LOCATION_1: NORMAL
MDC_IDC_SET_LEADCHNL_LV_PACING_POLARITY: NORMAL
MDC_IDC_SET_LEADCHNL_LV_PACING_PULSEWIDTH: 1 MS
MDC_IDC_SET_LEADCHNL_RA_PACING_AMPLITUDE: 1.5 V
MDC_IDC_SET_LEADCHNL_RA_PACING_ANODE_ELECTRODE_1: NORMAL
MDC_IDC_SET_LEADCHNL_RA_PACING_ANODE_LOCATION_1: NORMAL
MDC_IDC_SET_LEADCHNL_RA_PACING_CAPTURE_MODE: NORMAL
MDC_IDC_SET_LEADCHNL_RA_PACING_CATHODE_ELECTRODE_1: NORMAL
MDC_IDC_SET_LEADCHNL_RA_PACING_CATHODE_LOCATION_1: NORMAL
MDC_IDC_SET_LEADCHNL_RA_PACING_POLARITY: NORMAL
MDC_IDC_SET_LEADCHNL_RA_PACING_PULSEWIDTH: 0.4 MS
MDC_IDC_SET_LEADCHNL_RA_SENSING_ANODE_ELECTRODE_1: NORMAL
MDC_IDC_SET_LEADCHNL_RA_SENSING_ANODE_LOCATION_1: NORMAL
MDC_IDC_SET_LEADCHNL_RA_SENSING_CATHODE_ELECTRODE_1: NORMAL
MDC_IDC_SET_LEADCHNL_RA_SENSING_CATHODE_LOCATION_1: NORMAL
MDC_IDC_SET_LEADCHNL_RA_SENSING_POLARITY: NORMAL
MDC_IDC_SET_LEADCHNL_RA_SENSING_SENSITIVITY: 0.3 MV
MDC_IDC_SET_LEADCHNL_RV_PACING_AMPLITUDE: 2 V
MDC_IDC_SET_LEADCHNL_RV_PACING_ANODE_ELECTRODE_1: NORMAL
MDC_IDC_SET_LEADCHNL_RV_PACING_ANODE_LOCATION_1: NORMAL
MDC_IDC_SET_LEADCHNL_RV_PACING_CAPTURE_MODE: NORMAL
MDC_IDC_SET_LEADCHNL_RV_PACING_CATHODE_ELECTRODE_1: NORMAL
MDC_IDC_SET_LEADCHNL_RV_PACING_CATHODE_LOCATION_1: NORMAL
MDC_IDC_SET_LEADCHNL_RV_PACING_POLARITY: NORMAL
MDC_IDC_SET_LEADCHNL_RV_PACING_PULSEWIDTH: 0.4 MS
MDC_IDC_SET_LEADCHNL_RV_SENSING_ANODE_ELECTRODE_1: NORMAL
MDC_IDC_SET_LEADCHNL_RV_SENSING_ANODE_LOCATION_1: NORMAL
MDC_IDC_SET_LEADCHNL_RV_SENSING_CATHODE_ELECTRODE_1: NORMAL
MDC_IDC_SET_LEADCHNL_RV_SENSING_CATHODE_LOCATION_1: NORMAL
MDC_IDC_SET_LEADCHNL_RV_SENSING_POLARITY: NORMAL
MDC_IDC_SET_LEADCHNL_RV_SENSING_SENSITIVITY: 0.3 MV
MDC_IDC_SET_ZONE_DETECTION_BEATS_DENOMINATOR: 40 {BEATS}
MDC_IDC_SET_ZONE_DETECTION_BEATS_NUMERATOR: 30 {BEATS}
MDC_IDC_SET_ZONE_DETECTION_INTERVAL: 320 MS
MDC_IDC_SET_ZONE_DETECTION_INTERVAL: 350 MS
MDC_IDC_SET_ZONE_DETECTION_INTERVAL: 360 MS
MDC_IDC_SET_ZONE_DETECTION_INTERVAL: 420 MS
MDC_IDC_SET_ZONE_DETECTION_INTERVAL: NORMAL
MDC_IDC_SET_ZONE_TYPE: NORMAL
MDC_IDC_STAT_AT_BURDEN_PERCENT: 0 %
MDC_IDC_STAT_AT_DTM_END: NORMAL
MDC_IDC_STAT_AT_DTM_START: NORMAL
MDC_IDC_STAT_BRADY_AP_VP_PERCENT: 17.87 %
MDC_IDC_STAT_BRADY_AP_VS_PERCENT: 0.01 %
MDC_IDC_STAT_BRADY_AS_VP_PERCENT: 81.96 %
MDC_IDC_STAT_BRADY_AS_VS_PERCENT: 0.16 %
MDC_IDC_STAT_BRADY_DTM_END: NORMAL
MDC_IDC_STAT_BRADY_DTM_START: NORMAL
MDC_IDC_STAT_BRADY_RA_PERCENT_PACED: 17.65 %
MDC_IDC_STAT_BRADY_RV_PERCENT_PACED: 98.87 %
MDC_IDC_STAT_CRT_DTM_END: NORMAL
MDC_IDC_STAT_CRT_DTM_START: NORMAL
MDC_IDC_STAT_CRT_LV_PERCENT_PACED: 98.79 %
MDC_IDC_STAT_CRT_PERCENT_PACED: 98.79 %
MDC_IDC_STAT_EPISODE_RECENT_COUNT: 0
MDC_IDC_STAT_EPISODE_RECENT_COUNT_DTM_END: NORMAL
MDC_IDC_STAT_EPISODE_RECENT_COUNT_DTM_START: NORMAL
MDC_IDC_STAT_EPISODE_TOTAL_COUNT: 0
MDC_IDC_STAT_EPISODE_TOTAL_COUNT: 2
MDC_IDC_STAT_EPISODE_TOTAL_COUNT_DTM_END: NORMAL
MDC_IDC_STAT_EPISODE_TOTAL_COUNT_DTM_START: NORMAL
MDC_IDC_STAT_EPISODE_TYPE: NORMAL
MDC_IDC_STAT_TACHYTHERAPY_ATP_DELIVERED_RECENT: 0
MDC_IDC_STAT_TACHYTHERAPY_ATP_DELIVERED_TOTAL: 0
MDC_IDC_STAT_TACHYTHERAPY_RECENT_DTM_END: NORMAL
MDC_IDC_STAT_TACHYTHERAPY_RECENT_DTM_START: NORMAL
MDC_IDC_STAT_TACHYTHERAPY_SHOCKS_ABORTED_RECENT: 0
MDC_IDC_STAT_TACHYTHERAPY_SHOCKS_ABORTED_TOTAL: 0
MDC_IDC_STAT_TACHYTHERAPY_SHOCKS_DELIVERED_RECENT: 0
MDC_IDC_STAT_TACHYTHERAPY_SHOCKS_DELIVERED_TOTAL: 0
MDC_IDC_STAT_TACHYTHERAPY_TOTAL_DTM_END: NORMAL
MDC_IDC_STAT_TACHYTHERAPY_TOTAL_DTM_START: NORMAL

## 2019-04-19 ENCOUNTER — PRE VISIT (OUTPATIENT)
Dept: UROLOGY | Facility: CLINIC | Age: 74
End: 2019-04-19

## 2019-04-19 NOTE — TELEPHONE ENCOUNTER
Chief Complaint : Return-6 Mo     New Hx/Sx: Hematuria    Records/Orders/Proced: Available    Pt Contacted:Y-Call went straight to ; mailbox not set up, message not left.    At Rooming: Urine (UA UC Cyto)

## 2019-04-24 ENCOUNTER — ANTICOAGULATION THERAPY VISIT (OUTPATIENT)
Dept: ANTICOAGULATION | Facility: CLINIC | Age: 74
End: 2019-04-24

## 2019-04-24 DIAGNOSIS — Z95.2 S/P AORTIC VALVE REPLACEMENT: ICD-10-CM

## 2019-04-24 DIAGNOSIS — I48.0 PAROXYSMAL ATRIAL FIBRILLATION (H): ICD-10-CM

## 2019-04-24 DIAGNOSIS — Z79.01 LONG TERM CURRENT USE OF ANTICOAGULANT THERAPY: ICD-10-CM

## 2019-04-24 LAB — INR PPP: 2.2 (ref 0.86–1.14)

## 2019-04-24 PROCEDURE — 85610 PROTHROMBIN TIME: CPT | Performed by: INTERNAL MEDICINE

## 2019-04-24 PROCEDURE — 99207 ZZC NO CHARGE NURSE ONLY: CPT

## 2019-04-24 PROCEDURE — 36415 COLL VENOUS BLD VENIPUNCTURE: CPT | Performed by: INTERNAL MEDICINE

## 2019-04-24 NOTE — PROGRESS NOTES
Writer left detailed message on mobile number to continue current dosing and recheck INR in 6 weeks. Writer is unsure if the patient still has a home meter or not.    Asked patient to call [facility] with any changes in meds, diet, activity or health or if any bleeding/clot concerns. Asked to call if he is not taking dosing indicated in our calendar also.    Aniya Garcia RN, BSN, PHN  2019 at 3:10 pm    ANTICOAGULATION FOLLOW-UP CLINIC VISIT    Patient Name:  Brooks Summers  Date:  2019  Contact Type:  Telephone    SUBJECTIVE:        OBJECTIVE    INR   Date Value Ref Range Status   2019 2.20 (H) 0.86 - 1.14 Final       ASSESSMENT / PLAN  INR assessment THER    Recheck INR In: 6 WEEKS    INR Location Clinic lab     Anticoagulation Summary  As of 2019    INR goal:   2.0-3.0   TTR:   72.1 % (4.1 y)   INR used for dosin.20 (2019)   Warfarin maintenance plan:   7.5 mg (5 mg x 1.5) every Sun, Tue, Thu; 10 mg (5 mg x 2) all other days   Full warfarin instructions:   7.5 mg every Sun, Tue, Thu; 10 mg all other days   Weekly warfarin total:   62.5 mg   No change documented:   Aniya Garcia RN   Plan last modified:   Aniya Garcia RN (2018)   Next INR check:   2019   Priority:   INR   Target end date:   Indefinite    Indications    S/P aortic valve replacement [Z95.2]  Paroxysmal atrial fibrillation (H) [I48.0]  Long term current use of anticoagulant therapy [Z79.01]             Anticoagulation Episode Summary     INR check location:       Preferred lab:       Send INR reminders to:   [employer] POOL    Comments:    * warfarin 5 mg tabs[place] 21 mm [employer] Perimount Magna Tissue Valve. Alere home meter      Anticoagulation Care Providers     Provider Role Specialty Phone number    Edin Mays MD [facility] Internal Medicine [phone]            See the Encounter Report to view Anticoagulation Flowsheet and Dosing Calendar (Go to  Encounters tab in chart review, and find the Anticoagulation Therapy Visit)        Aniya Garcia RN

## 2019-04-26 ENCOUNTER — OFFICE VISIT (OUTPATIENT)
Dept: UROLOGY | Facility: CLINIC | Age: 74
End: 2019-04-26
Payer: MEDICARE

## 2019-04-26 VITALS
HEART RATE: 67 BPM | DIASTOLIC BLOOD PRESSURE: 82 MMHG | SYSTOLIC BLOOD PRESSURE: 129 MMHG | WEIGHT: 179.5 LBS | BODY MASS INDEX: 25.7 KG/M2 | HEIGHT: 70 IN

## 2019-04-26 DIAGNOSIS — R31.0 GROSS HEMATURIA: Primary | ICD-10-CM

## 2019-04-26 DIAGNOSIS — Z95.2 S/P AORTIC VALVE REPLACEMENT: ICD-10-CM

## 2019-04-26 DIAGNOSIS — Z79.01 LONG TERM CURRENT USE OF ANTICOAGULANT THERAPY: ICD-10-CM

## 2019-04-26 DIAGNOSIS — R31.0 GROSS HEMATURIA: ICD-10-CM

## 2019-04-26 DIAGNOSIS — I48.0 PAROXYSMAL ATRIAL FIBRILLATION (H): ICD-10-CM

## 2019-04-26 LAB
ALBUMIN UR-MCNC: NEGATIVE MG/DL
APPEARANCE UR: CLEAR
BILIRUB UR QL STRIP: NEGATIVE
COLOR UR AUTO: YELLOW
GLUCOSE UR STRIP-MCNC: NEGATIVE MG/DL
HGB UR QL STRIP: NEGATIVE
INR PPP: 2.45 (ref 0.86–1.14)
KETONES UR STRIP-MCNC: NEGATIVE MG/DL
LEUKOCYTE ESTERASE UR QL STRIP: NEGATIVE
NITRATE UR QL: NEGATIVE
PH UR STRIP: 5 PH (ref 5–7)
PSA SERPL-MCNC: 2.83 UG/L (ref 0–4)
RBC #/AREA URNS AUTO: 4 /HPF (ref 0–2)
SOURCE: ABNORMAL
SP GR UR STRIP: 1.02 (ref 1–1.03)
SQUAMOUS #/AREA URNS AUTO: <1 /HPF (ref 0–1)
UROBILINOGEN UR STRIP-MCNC: 0 MG/DL (ref 0–2)
WBC #/AREA URNS AUTO: <1 /HPF (ref 0–5)

## 2019-04-26 PROCEDURE — 87086 URINE CULTURE/COLONY COUNT: CPT | Performed by: UROLOGY

## 2019-04-26 PROCEDURE — 88112 CYTOPATH CELL ENHANCE TECH: CPT | Performed by: UROLOGY

## 2019-04-26 ASSESSMENT — PAIN SCALES - GENERAL: PAINLEVEL: NO PAIN (0)

## 2019-04-26 ASSESSMENT — MIFFLIN-ST. JEOR: SCORE: 1560.46

## 2019-04-26 NOTE — PROGRESS NOTES
Office Visit Note      UROLOGIC DIAGNOSES:       CURRENT INTERVENTIONS:       HISTORY:   Patient with history of hematuria, found on cystoscopy in the office to have a prostatic urethral papillary lesion.   He is currently s/p biopsy of the lesion.   Pathology did not show any evidence of malignancy.   Patient notes no issues post op.   Had biopsy of prostatic lesion that was benign.     Patient has had another episode of gross hematuria but work up was negative.    Notes no other symptoms, no recurrence of gross hematuria since last visit.     We discussed urine testing, results, and further follow up.         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 Tx with Dapto/RIFx 8 weeks.     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     Paroxysmal A-fib (H)     Post-op 7/14/2011, 1/26/13     Rotator Cuff (Capsule) Sprain and Strain      S/P aortic valve replacement     7/14/2011, re-do 1/25/13 due to endocarditis     Smoking hx      Thrombocytopenia (H)        PAST SURGICAL HISTORY:   Past Surgical History:   Procedure Laterality Date     ANESTHESIA CARDIOVERSION  7/18/2011    Procedure:ANESTHESIA CARDIOVERSION; Cardioversion; Surgeon:GENERIC ANESTHESIA PROVIDER; Location:[facility]     COLONOSCOPY       COLONOSCOPY N/A 11/19/2018    Procedure: COLONOSCOPY;  Surgeon: Herman Mcginnis MD;  Location: [facility]     CORONARY ANGIOGRAPHY ADULT ORDER       CYSTOSCOPY, BIOPSY URETHRA N/A 4/3/2017    Procedure: CYSTOSCOPY, BIOPSY URETHRA;  Surgeon: Marlena Mora MD;  Location: [facility]     ENDOSCOPY UPPER, COLONOSCOPY, COMBINED       HC REMOV & REPLACE IMPLT DEFIB PULSE GEN MULT LEAD  12/3/2015          HEMORRHOID SURGERY        IMPLANT AUTOMATIC IMPLANTABLE CARDIOVERTER DEFIBRILLATOR       MOHS MICROGRAPHIC PROCEDURE       ORTHOPEDIC SURGERY      shoulder,right     REDO STERNOTOMY REPLACE VALVE AORTIC  2013    Procedure: REDO STERNOTOMY REPLACE VALVE AORTIC;  Redo Sternotomy, Redo Aortic Valve Replacement on pump oxygenator. Intraoperative Transesophageal Echocardiogram;  Surgeon: Stephanie Hampton MD;  Location: [facility]     REPAIR VALVE MITRAL  2013     REPLACE VALVE AORTIC  2011    Procedure:REPLACE VALVE AORTIC; Median Sternotomy, Bioprosthesis,  Aortic Valve replacement on pump with oxygenator. Intra-operative Transesophogeal Echocardiogram.; Surgeon:STEPHANIE HAMPTON; Location:[facility]     teeth extractions       TRANSPLANT         FAMILY HISTORY:   Family History   Problem Relation Age of Onset     C.A.D. Father 68        bypass, carotid      Prostate Cancer Father 70     Heart Disease Father      Cancer Mother 92        stomach, colon     Hypertension Mother      Retinal detachment Mother      Cancer Brother 64        lung cancer, petroleum     Cancer Brother      Glaucoma No family hx of      Macular Degeneration No family hx of      Strabismus No family hx of      Glasses (<7 y/o) No family hx of        SOCIAL HISTORY:   Social History     Tobacco Use     Smoking status: Former Smoker     Packs/day: 1.00     Years: 20.00     Pack years: 20.00     Types: Cigarettes     Last attempt to quit: 1989     Years since quittin.3     Smokeless tobacco: Never Used   Substance Use Topics     Alcohol use: Yes     Alcohol/week: 1.2 oz       Current Outpatient Medications   Medication     allopurinol (ZYLOPRIM) 100 MG tablet     Cyanocobalamin (VITAMIN B 12 PO)     losartan (COZAAR) 25 MG tablet     metoprolol succinate ER (TOPROL XL) 50 MG 24 hr tablet     Multiple Vitamins-Minerals (MULTIVITAMIN ADULTS 50+) TABS     PROVIDER DOSED MANAGED WARFARIN, COUMADIN, OUTPATIENT     simvastatin (ZOCOR) 40 MG tablet     VITAMIN D,  "CHOLECALCIFEROL, PO     warfarin (COUMADIN) 5 MG tablet     amoxicillin (AMOXIL) 500 MG capsule     diazepam (VALIUM) 10 MG tablet     gabapentin (NEURONTIN) 100 MG capsule     order for DME     No current facility-administered medications for this visit.          PHYSICAL EXAM:    /82   Pulse 67   Ht 1.778 m (5' 10\")   Wt 81.4 kg (179 lb 8 oz)   BMI 25.76 kg/m      HEENT: Normocephalic and atraumatic   Cardiac: Not done  Back/Flank: Not done  CNS/PNS: Not done  Respiratory: Normal non-labored breathing  Abdomen: Soft nontender and nondistended  Peripheral Vascular: Not done  Mental Status: Not done    Penis: Not done  Scrotal Skin: Not done  Testicles: Not done  Epididymis: Not done  Digital Rectal Exam:     Cystoscopy: Not done    Imaging: None    Urinalysis: [facility] RESULTS:  Recent Labs   Lab Test  03/17/17   1340   COLOR  Yellow   APPEARANCE  Slightly Cloudy   URINEGLC  Negative   URINEBILI  Negative   URINEKETONE  Negative   SG  1.015   UBLD  Negative   URINEPH  7.0   PROTEIN  Negative   NITRITE  Negative   LEUKEST  Negative   RBCU  6*   WBCU  1       PSA:    Post Void Residual:     Other labs: None today      IMPRESSION:  73 y/o M with hematuria and benign prostatic lesion now with another episode of gross hematuria work up negative     PLAN:  Will send U/a, urine culture, urine cytology today   If above is negative, patient may follow up PRN     Total Time: 15 minutes                                       Total in Consultation: greater than 50%                   "

## 2019-04-26 NOTE — NURSING NOTE
"Chief Complaint   Patient presents with     RECHECK      Return-6 Mo        Blood pressure 129/82, pulse 67, height 1.778 m (5' 10\"), weight 81.4 kg (179 lb 8 oz). Body mass index is 25.76 kg/m .    Patient Active Problem List   Diagnosis     Rotator cuff (capsule) sprain     Other postprocedural status(V45.89)     Aortic valve stenosis, severe     Chronic systolic heart failure (H)     Bicuspid aortic valve     S/P aortic valve replacement     Smoking hx     LBBB (left bundle branch block)     Pneumothorax, left     Heart failure, chronic systolic (H)     Cardiomyopathy, dilated, nonischemic (H)     CKD (chronic kidney disease) stage 3, GFR 30-59 ml/min (H)     Dyslipidemia     Automatic implantable cardioverter-defibrillator in situ- [employer], Bi-Ventricular- INT dependent     Endocarditis     S/P AVR     Need for prophylactic antibiotic     Hyperlipidemia with target LDL less than 100     Finger injury     Paroxysmal atrial fibrillation (H)     Long term current use of anticoagulant therapy     Trigger ring finger of left hand     Nocturnal hypoxemia       Allergies   Allergen Reactions     Lisinopril Cough       Current Outpatient Medications   Medication Sig Dispense Refill     allopurinol (ZYLOPRIM) 100 MG tablet Take 1 tablet (100 mg) by mouth every evening 90 tablet 3     Cyanocobalamin (VITAMIN B 12 PO)        losartan (COZAAR) 25 MG tablet Take 0.5 tablets (12.5 mg) by mouth every evening 45 tablet 3     metoprolol succinate ER (TOPROL XL) 50 MG 24 hr tablet Take 1 tablet (50 mg) by mouth daily 90 tablet 3     Multiple Vitamins-Minerals (MULTIVITAMIN ADULTS 50+) TABS        PROVIDER DOSED MANAGED WARFARIN, COUMADIN, OUTPATIENT Per coumadin clinic       simvastatin (ZOCOR) 40 MG tablet Take 1 tablet (40 mg) by mouth At Bedtime 90 tablet 3     VITAMIN D, CHOLECALCIFEROL, PO Take 1,000 Units by mouth daily       warfarin (COUMADIN) 5 MG tablet 7.5 mg Tues/Thurs/Sun; 10 mg all other days or as directed by the " Anticoagulation Clinic 180 tablet 3     amoxicillin (AMOXIL) 500 MG capsule Take 2000 MG one hour before colonoscopy. (Patient not taking: Reported on 2019) 4 capsule 3     diazepam (VALIUM) 10 MG tablet Take 1 tablet (10 mg) by mouth every 6 hours as needed for anxiety or sleep Take 30-60 minutes before procedure.  Do not operate a vehicle after taking this medication. (Patient not taking: Reported on 2018) 1 tablet 0     gabapentin (NEURONTIN) 100 MG capsule Take 1 tablet (100 mg) once daily for 3 days, then 2 tablets once daily for 3 days, then 3 tablets once daily (Patient not taking: Reported on 2019) 120 capsule 3     order for DME Oxygen 1.5 Li/min  at night (Patient not taking: Reported on 2018) 1 Device 11       Social History     Tobacco Use     Smoking status: Former Smoker     Packs/day: 1.00     Years: 20.00     Pack years: 20.00     Types: Cigarettes     Last attempt to quit: 1989     Years since quittin.3     Smokeless tobacco: Never Used   Substance Use Topics     Alcohol use: Yes     Alcohol/week: 1.2 oz     Drug use: No       Cinthia Lang LPN  2019  10:34 AM

## 2019-04-26 NOTE — PATIENT INSTRUCTIONS
Please follow up with Urology as needed!    It was a pleasure meeting with you today.  Thank you for allowing me and my team the privilege of caring for you today.  YOU are the reason we are here, and I truly hope we provided you with the excellent service you deserve.  Please let us know if there is anything else we can do for you so that we can be sure you are leaving completely satisfied with your care experience.      Jase Kline, EMT

## 2019-04-26 NOTE — LETTER
RE: Brooks Summers  [address]  [facility] [address]     Dear Colleague,    Thank you for referring your patient, Brooks Summers, to the [facility] UROLOGY AND INST FOR PROSTATE AND UROLOGIC CANCERS at [facility]. Please see a copy of my visit note below.    Office Visit Note      UROLOGIC DIAGNOSES:       CURRENT INTERVENTIONS:       HISTORY:   Patient with history of hematuria, found on cystoscopy in the office to have a prostatic urethral papillary lesion.   He is currently s/p biopsy of the lesion.   Pathology did not show any evidence of malignancy.   Patient notes no issues post op.   Had biopsy of prostatic lesion that was benign.     Patient has had another episode of gross hematuria but work up was negative.    Notes no other symptoms, no recurrence of gross hematuria since last visit.     We discussed urine testing, results, and further follow up.         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 Tx with Dapto/RIFx 8 weeks.     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     Paroxysmal A-fib (H)     Post-op 7/14/2011, 1/26/13     Rotator Cuff (Capsule) Sprain and Strain      S/P aortic valve replacement     7/14/2011, re-do 1/25/13 due to endocarditis     Smoking hx      Thrombocytopenia (H)        PAST SURGICAL HISTORY:   Past Surgical History:   Procedure Laterality Date     ANESTHESIA CARDIOVERSION  7/18/2011    Procedure:ANESTHESIA CARDIOVERSION; Cardioversion; Surgeon:GENERIC ANESTHESIA PROVIDER; Location:[facility]     COLONOSCOPY       COLONOSCOPY N/A 11/19/2018    Procedure: COLONOSCOPY;  Surgeon: Herman Mcginnis MD;  Location: [facility]     CORONARY  ANGIOGRAPHY ADULT ORDER       CYSTOSCOPY, BIOPSY URETHRA N/A 4/3/2017    Procedure: CYSTOSCOPY, BIOPSY URETHRA;  Surgeon: Marlena Mora MD;  Location: [facility]     ENDOSCOPY UPPER, COLONOSCOPY, COMBINED       HC REMOV & REPLACE IMPLT DEFIB PULSE GEN MULT LEAD  12/3/2015          HEMORRHOID SURGERY       IMPLANT AUTOMATIC IMPLANTABLE CARDIOVERTER DEFIBRILLATOR       MOHS MICROGRAPHIC PROCEDURE       ORTHOPEDIC SURGERY      shoulder,right     REDO STERNOTOMY REPLACE VALVE AORTIC  2013    Procedure: REDO STERNOTOMY REPLACE VALVE AORTIC;  Redo Sternotomy, Redo Aortic Valve Replacement on pump oxygenator. Intraoperative Transesophageal Echocardiogram;  Surgeon: Navin Hampton MD;  Location: [facility]     REPAIR VALVE MITRAL       REPLACE VALVE AORTIC  2011    Procedure:REPLACE VALVE AORTIC; Median Sternotomy, Bioprosthesis,  Aortic Valve replacement on pump with oxygenator. Intra-operative Transesophogeal Echocardiogram.; Surgeon:NAVIN HAMPTON; Location:[facility] OR     teeth extractions       TRANSPLANT         FAMILY HISTORY:   Family History   Problem Relation Age of Onset     C.A.D. Father 68        bypass, carotid      Prostate Cancer Father 70     Heart Disease Father      Cancer Mother 92        stomach, colon     Hypertension Mother      Retinal detachment Mother      Cancer Brother 64        lung cancer, petroleum     Cancer Brother      Glaucoma No family hx of      Macular Degeneration No family hx of      Strabismus No family hx of      Glasses (<7 y/o) No family hx of        SOCIAL HISTORY:   Social History     Tobacco Use     Smoking status: Former Smoker     Packs/day: 1.00     Years: 20.00     Pack years: 20.00     Types: Cigarettes     Last attempt to quit: 1989     Years since quittin.3     Smokeless tobacco: Never Used   Substance Use Topics     Alcohol use: Yes     Alcohol/week: 1.2 oz       Current Outpatient Medications   Medication     allopurinol (ZYLOPRIM) 100 MG tablet      "Cyanocobalamin (VITAMIN B 12 PO)     losartan (COZAAR) 25 MG tablet     metoprolol succinate ER (TOPROL XL) 50 MG 24 hr tablet     Multiple Vitamins-Minerals (MULTIVITAMIN ADULTS 50+) TABS     PROVIDER DOSED MANAGED WARFARIN, COUMADIN, OUTPATIENT     simvastatin (ZOCOR) 40 MG tablet     VITAMIN D, CHOLECALCIFEROL, PO     warfarin (COUMADIN) 5 MG tablet     amoxicillin (AMOXIL) 500 MG capsule     diazepam (VALIUM) 10 MG tablet     gabapentin (NEURONTIN) 100 MG capsule     order for DME     No current facility-administered medications for this visit.          PHYSICAL EXAM:    /82   Pulse 67   Ht 1.778 m (5' 10\")   Wt 81.4 kg (179 lb 8 oz)   BMI 25.76 kg/m       HEENT: Normocephalic and atraumatic   Cardiac: Not done  Back/Flank: Not done  CNS/PNS: Not done  Respiratory: Normal non-labored breathing  Abdomen: Soft nontender and nondistended  Peripheral Vascular: Not done  Mental Status: Not done    Penis: Not done  Scrotal Skin: Not done  Testicles: Not done  Epididymis: Not done  Digital Rectal Exam:     Cystoscopy: Not done    Imaging: None    Urinalysis: [facility] RESULTS:  Recent Labs   Lab Test  03/17/17   1340   COLOR  Yellow   APPEARANCE  Slightly Cloudy   URINEGLC  Negative   URINEBILI  Negative   URINEKETONE  Negative   SG  1.015   UBLD  Negative   URINEPH  7.0   PROTEIN  Negative   NITRITE  Negative   LEUKEST  Negative   RBCU  6*   WBCU  1       PSA:    Post Void Residual:     Other labs: None today      IMPRESSION:  75 y/o M with hematuria and benign prostatic lesion now with another episode of gross hematuria work up negative     PLAN:  Will send U/a, urine culture, urine cytology today   If above is negative, patient may follow up PRN     Total Time: 15 minutes                                       Total in Consultation: greater than 50%     Again, thank you for allowing me to participate in the care of your patient.      Sincerely,    Marlena Mora MD      "

## 2019-04-27 LAB
BACTERIA SPEC CULT: NO GROWTH
Lab: NORMAL
SPECIMEN SOURCE: NORMAL

## 2019-04-29 LAB — COPATH REPORT: NORMAL

## 2019-05-17 ENCOUNTER — ANTICOAGULATION THERAPY VISIT (OUTPATIENT)
Dept: ANTICOAGULATION | Facility: CLINIC | Age: 74
End: 2019-05-17
Payer: MEDICARE

## 2019-05-17 ENCOUNTER — OFFICE VISIT (OUTPATIENT)
Dept: INTERNAL MEDICINE | Facility: CLINIC | Age: 74
End: 2019-05-17
Payer: MEDICARE

## 2019-05-17 VITALS
RESPIRATION RATE: 16 BRPM | HEART RATE: 70 BPM | WEIGHT: 176.8 LBS | SYSTOLIC BLOOD PRESSURE: 150 MMHG | BODY MASS INDEX: 25.37 KG/M2 | DIASTOLIC BLOOD PRESSURE: 76 MMHG

## 2019-05-17 DIAGNOSIS — Z95.2 S/P AORTIC VALVE REPLACEMENT: ICD-10-CM

## 2019-05-17 DIAGNOSIS — Z79.01 LONG TERM CURRENT USE OF ANTICOAGULANT THERAPY: ICD-10-CM

## 2019-05-17 DIAGNOSIS — I10 BENIGN ESSENTIAL HYPERTENSION: Primary | ICD-10-CM

## 2019-05-17 DIAGNOSIS — I48.0 PAROXYSMAL ATRIAL FIBRILLATION (H): ICD-10-CM

## 2019-05-17 DIAGNOSIS — I10 BENIGN ESSENTIAL HYPERTENSION: ICD-10-CM

## 2019-05-17 LAB
ALBUMIN SERPL-MCNC: 3.9 G/DL (ref 3.4–5)
ALP SERPL-CCNC: 85 U/L (ref 40–150)
ALT SERPL W P-5'-P-CCNC: 44 U/L (ref 0–70)
ANION GAP SERPL CALCULATED.3IONS-SCNC: 5 MMOL/L (ref 3–14)
AST SERPL W P-5'-P-CCNC: 34 U/L (ref 0–45)
BASOPHILS # BLD AUTO: 0 10E9/L (ref 0–0.2)
BASOPHILS NFR BLD AUTO: 0.3 %
BILIRUB SERPL-MCNC: 0.7 MG/DL (ref 0.2–1.3)
BUN SERPL-MCNC: 18 MG/DL (ref 7–30)
CALCIUM SERPL-MCNC: 9.5 MG/DL (ref 8.5–10.1)
CHLORIDE SERPL-SCNC: 109 MMOL/L (ref 94–109)
CHOLEST SERPL-MCNC: 140 MG/DL
CO2 SERPL-SCNC: 28 MMOL/L (ref 20–32)
CREAT SERPL-MCNC: 0.94 MG/DL (ref 0.66–1.25)
DIFFERENTIAL METHOD BLD: ABNORMAL
EOSINOPHIL # BLD AUTO: 0.1 10E9/L (ref 0–0.7)
EOSINOPHIL NFR BLD AUTO: 1.4 %
ERYTHROCYTE [DISTWIDTH] IN BLOOD BY AUTOMATED COUNT: 13.8 % (ref 10–15)
GFR SERPL CREATININE-BSD FRML MDRD: 80 ML/MIN/{1.73_M2}
GLUCOSE SERPL-MCNC: 112 MG/DL (ref 70–99)
HCT VFR BLD AUTO: 43.4 % (ref 40–53)
HDLC SERPL-MCNC: 52 MG/DL
HGB BLD-MCNC: 14.1 G/DL (ref 13.3–17.7)
IMM GRANULOCYTES # BLD: 0 10E9/L (ref 0–0.4)
IMM GRANULOCYTES NFR BLD: 0.3 %
INR PPP: 2.6 (ref 0.86–1.14)
LDLC SERPL CALC-MCNC: 57 MG/DL
LYMPHOCYTES # BLD AUTO: 1.6 10E9/L (ref 0.8–5.3)
LYMPHOCYTES NFR BLD AUTO: 25 %
MCH RBC QN AUTO: 31.8 PG (ref 26.5–33)
MCHC RBC AUTO-ENTMCNC: 32.5 G/DL (ref 31.5–36.5)
MCV RBC AUTO: 98 FL (ref 78–100)
MONOCYTES # BLD AUTO: 0.7 10E9/L (ref 0–1.3)
MONOCYTES NFR BLD AUTO: 11.3 %
NEUTROPHILS # BLD AUTO: 3.8 10E9/L (ref 1.6–8.3)
NEUTROPHILS NFR BLD AUTO: 61.7 %
NONHDLC SERPL-MCNC: 88 MG/DL
NRBC # BLD AUTO: 0 10*3/UL
NRBC BLD AUTO-RTO: 0 /100
PLATELET # BLD AUTO: 99 10E9/L (ref 150–450)
POTASSIUM SERPL-SCNC: 4.3 MMOL/L (ref 3.4–5.3)
PROT SERPL-MCNC: 6.8 G/DL (ref 6.8–8.8)
RBC # BLD AUTO: 4.43 10E12/L (ref 4.4–5.9)
SODIUM SERPL-SCNC: 141 MMOL/L (ref 133–144)
TRIGL SERPL-MCNC: 156 MG/DL
WBC # BLD AUTO: 6.2 10E9/L (ref 4–11)

## 2019-05-17 PROCEDURE — 99207 ZZC NO CHARGE NURSE ONLY: CPT

## 2019-05-17 ASSESSMENT — PAIN SCALES - GENERAL: PAINLEVEL: NO PAIN (0)

## 2019-05-17 NOTE — NURSING NOTE
Chief Complaint   Patient presents with     Results     Patient is here for follow up. Patient was saw Cardiology       Fercho Benito CMA ([facility]) at 9:25 AM on 5/17/2019

## 2019-05-17 NOTE — PATIENT INSTRUCTIONS
Your health care Provider has recommended that you receive the new Shingle vaccine called Shingrix to prevent shingles for ages 50 and above. Many private insurance and Medicare Part D plans cover Shingrix. However, not all insurance carriers cover the entire cost of the Shingrix vaccine if the vaccine is administered at your primary care clinic. The clinic cannot determine your insurance benefits.  Please call your insurance carrier prior. The vaccine comes in two doses. Your second dose should be 2-6 months from your first dose.     There is a nationwide shortage of the Shingle vaccine. Because the second dose must be given 2-6 months from the first dose, the CDC DOES NOT recommend your 1st Shingle vaccine until the vaccine is back in stock next year 2020.  Currently, our clinic shortage is reserved for 2nd dose only (those who already had their first Shingrix vaccine).  You may check with your Pharmacy if you want the vaccine for this year.       Prior to receiving the vaccine, we recommend that you call your insurance carrier and ask them the following questions:            1. Is there a cost difference if I receive the vaccine at my doctor's office or a pharmacy?          2. Does my insurance cover the Shingrix Vaccine and administration of the vaccine?          3. What is my co-pay or deductible for the vaccine?        Nurse Visit hours are available Monday, Wednesday, and Friday from 9:00 AM-11:00 AM and 1:00 PM-3:00 PM.

## 2019-05-17 NOTE — PROGRESS NOTES
ANTICOAGULATION FOLLOW-UP CLINIC VISIT    Patient Name:  Brooks Summers  Date:  2019  Contact Type:  Telephone/ [place]    SUBJECTIVE:  Patient Findings     Comments:   No changes in medications, activity, health, or diet noted. No bleeding or increased bruising noted. Took warfarin as prescribed.  Patient will continue weekly maintenance dose. INR is therapeutic.   Recheck in 6 weeks.   Patient verbalizes understanding and agrees to plan. No further questions or concerns.          Clinical Outcomes     Negatives:   Major bleeding event, Thromboembolic event, Anticoagulation-related hospital admission, Anticoagulation-related ED visit, Anticoagulation-related fatality    Comments:   No changes in medications, activity, health, or diet noted. No bleeding or increased bruising noted. Took warfarin as prescribed.  Patient will continue weekly maintenance dose. INR is therapeutic.   Recheck in 6 weeks.   Patient verbalizes understanding and agrees to plan. No further questions or concerns.             OBJECTIVE    INR   Date Value Ref Range Status   2019 2.60 (H) 0.86 - 1.14 Final       ASSESSMENT / PLAN  INR assessment THER    Recheck INR In: 6 WEEKS    INR Location Outside lab      Anticoagulation Summary  As of 2019    INR goal:   2.0-3.0   TTR:   72.5 % (4.2 y)   INR used for dosin.60 (2019)   Warfarin maintenance plan:   7.5 mg (5 mg x 1.5) every Sun, Tue, Thu; 10 mg (5 mg x 2) all other days   Full warfarin instructions:   7.5 mg every Sun, Tue, Thu; 10 mg all other days   Weekly warfarin total:   62.5 mg   No change documented:   Lo Worley RN   Plan last modified:   Aniya Garcia RN (2018)   Next INR check:   2019   Priority:   INR   Target end date:   Indefinite    Indications    S/P aortic valve replacement [Z95.2]  Paroxysmal atrial fibrillation (H) [I48.0]  Long term current use of anticoagulant therapy [Z79.01]             Anticoagulation Episode Summary      INR check location:       Preferred lab:       Send INR reminders to:   [place] PHONE ANTICOAG POOL    Comments:    * war[place]in 5 mg tabs[place] 21 mm Johnson Perimount Magna Tissue Valve. Alere home meter      Anticoagulation Care Providers     Provider Role Specialty Phone number    Edin Mays MD [facility] Internal Medicine [phone]            See the Encounter Report to view Anticoagulation Flowsheet and Dosing Calendar (Go to Encounters tab in chart review, and find the Anticoagulation Therapy Visit)        Lo Worley RN

## 2019-05-17 NOTE — PROGRESS NOTES
"HPI:    Pt. Comes in for follow up  today.  He has h/o prosthetic aortic valve endocarditis (see detailed ID note from Dr. Bravo 3/13). He also has h/o atrial fibrillation on coumadin. He is s/p ICD for prior low EF now normal. He o/w is doing fine and denies any new HEENT, cardiopulmonary, abdominal, , lymphatic, endocrine, vascular, systemic sxs. He was seen 11/3/2017 by Dr. Castro Cardiology and  Saw  Dr. Mckenzie 12/4/2018.  He was seen by Dr. Tim, Hematology 8/19/15 for low platelets.   He remains active and states he walked 1.5 miles 7 days a week in [place]. No balance complaints.       PE:    Vitals noted gen nad cooperative alert, HEENT, ears normal oropharynx clear no exudate, neck supple nl rom no adenopathy, LCTA, B, RRR, S1,S2, murmur present, abdomen, nd ,nt, no masses, Cervical neck w/o tenderness. Lower back w/o tenderness or mass. Neurological exam B UE/LE/proximal/distal is normal and symmetric for sensation, reflexes, and strength.  No tenderness to palpation of lower back.         A/P:    1. He will continue to follow in cardiology for h/o AVR and remains on coumadin for afib.   Seen by Dr. Mckenzie 12/4/2018  2. Colonoscopy done 11/19/2018  3. Seen in Dermatology 11/2017 and he has follow up with Dr. Angulo [id number]  4. He saw Dr. Tim, Hematology 8/15 for low platelets and will follow.   5. Seen by Dr. Mora Urology 4/2017 for BPH/hematuria.   PSA done 3/2018. Seen again by  Dr. Mora 10/12/2018 and again 4/26/2019 for hematuria  6. Up to date on vaccinations (influenza 12/4/2018) and recommend he check with insurance for new Shingles vaccination  7. He had EMG and saw Dr. Ingram, Neurology 8/17/2018 for \"electric\" extremity complaints.   8. He has very mild very intermittent L lower back pain when he swings the golf club. No recent sxs.        Total time spent 25 minutes.  More than 50% of the time spent with Mr. Summers on counseling / coordinating his care                            "

## 2019-06-06 ENCOUNTER — OFFICE VISIT (OUTPATIENT)
Dept: DERMATOLOGY | Facility: CLINIC | Age: 74
End: 2019-06-06
Payer: MEDICARE

## 2019-06-06 DIAGNOSIS — D48.5 NEOPLASM OF UNCERTAIN BEHAVIOR OF SKIN: Primary | ICD-10-CM

## 2019-06-06 DIAGNOSIS — Z85.828 HISTORY OF NONMELANOMA SKIN CANCER: ICD-10-CM

## 2019-06-06 DIAGNOSIS — L57.0 AK (ACTINIC KERATOSIS): ICD-10-CM

## 2019-06-06 PROCEDURE — 88305 TISSUE EXAM BY PATHOLOGIST: CPT | Mod: TC | Performed by: DERMATOLOGY

## 2019-06-06 RX ORDER — LIDOCAINE HYDROCHLORIDE AND EPINEPHRINE 10; 10 MG/ML; UG/ML
3 INJECTION, SOLUTION INFILTRATION; PERINEURAL ONCE
Status: DISCONTINUED | OUTPATIENT
Start: 2019-06-06 | End: 2020-08-10 | Stop reason: CLARIF

## 2019-06-06 NOTE — LETTER
"Patient:  Brooks Summers  :   1945  MRN:     [id number]      2019    Brooks Summers  [address] [facility] [address]      Dear Mr. Summers,    The results of your recent tests are noted below.      Results for orders placed or performed in visit on 19   Dermatological path order and indications   Result Value Ref Range    Copath Report       Patient Name: BROOKS SUMMERS  MR#: [id number]  Specimen #: [id number]  Collected: 2019  Received: 2019  Reported: 6/10/2019 12:07  Ordering Phy(s): JD ORTEZ    For improved result formatting, select 'View Enhanced Report Format' under   Linked Documents section.    SPECIMEN(S):  A: Shave, R back  B: Shave, R jawline    FINAL DIAGNOSIS:  A. SHREYA Skinner:  - Basal cell carcinoma, nodular type - (see comment and description)    B. 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 Tumor islands appear narrowly excised in the planes examined, however   lesional stroma extends to the deep  margin. Thus, additional treatment or close clinical follow-up are   recommended at this site.  B. The squamous cell carcinoma in situ is completely excised in the planes   examined; actinic keratosis extends  to both lateral margins and focally to the deep margin.  I have personally reviewed all specimens a nd/or slides, including the   listed special stains, and used them  with my medical judgement to determine or confirm the final diagnosis.    Electronically signed out by:    Manish Matute M.D., [facility]    CLINICAL HISTORY:  The patient is a 74-year-old male.    GROSS:  A:  The specimen is received in formalin with proper patient   identification, labeled \"R back\".  The specimen  consists of a 0.9 x 0.5 cm skin shave which displays a 0.4 x 0.2 cm tan,   crusted lesion. The resection margin  is inked blue and specimen is sectioned and submitted in " "A1.    B:  The specimen is received in formalin with proper patient   identification, labeled \"R jaw\".  The specimen  consists of a 0.6 x 0.5 cm skin shave displaying a 0.2 x 0.2 cm tan,   crusted lesion. The resection margin is  inked blue and the specimen is bisected and submitted in B1. (Dictated by:   EDWARD Vega 6/7/2019 09:55  AM)    MICROSCOPIC:  A. The specimen exhibits a tumor of atypical basaloid epithelium arranged   a s islands in the dermis with  fibromyxoid stroma and clefting artifact. Tumor islands appear narrowly   excised in the planes examined;  lesional stroma extends to the deep margin.  B. The specimen exhibits epidermal hyperplasia with full-thickness   keratinocyte atypia and suprabasal mitoses  and a central zone flanked by areas of partial thickness keratinocyte   atypia. Partial-thickness atypia extends  to both lateral margins and focally to the deep margin.    The technical component of this testing was completed at the [facility], with the professional component performed   at the [facility], [address] [place] [id number] ([phone])    CPT Codes:  A: 74253-BT9.P, 45386-MW8.T  B: 72329-DP8.P, 00598-TB8.T    COLLECTION SITE:  Client: [facility]  Location: [facility] [place]           It was a pleasure to see you at your last appointment. If you have any  questions or concerns, please call [phone].      Sincerely,      Carlos Alberto Kong MD  Department of Dermatology  [facility]    "

## 2019-06-06 NOTE — PROGRESS NOTES
DERMATOLOGY FOLLOWUP VISIT      CHIEF COMPLAINT:  Followup skin check.      SUBJECTIVE:  Brooks is a very pleasant 74-year-old male with a history of multiple nonmelanoma skin cancers, all basal cell carcinomas, most recently basal cell carcinomas of the left malar cheek and right nasal ala diagnosed in 10/2017 and subsequently treated with Mohs surgery here by Dr. Yobany Irving.  Today Brooks reports he has several new rough areas on his lateral cheeks which have appeared over the past winter and are occasionally sore.  They are not currently bleeding spontaneously but are not resolving.  Otherwise, he denies any new or changing moles and has no localized areas of itch, pain or bleeding.  He denies any problems at his prior surgical scar sites.  He thinks he may also have had a remote history of melanoma biopsied and treated on the right chest, though he is unsure, and I am unable to identify a history of melanoma from his prior records in our Dermatology Clinic with Dr. Serge Hancock dating back as far as 2012.      REVIEW OF SYSTEMS:  No recent fevers or chills.  No oral sores.      PHYSICAL EXAMINATION:   GENERAL:  This is a well-appearing, well-nourished male with a normal mood and affect who is oriented x3.   SKIN:  A cutaneous exam of the head, neck, chest, abdomen, back, bilateral upper and lower extremities and buttocks was performed.  On the left zygomatic cheek, there are a total of 4 rough firm, flat-topped, 3-5 mm, scaly, pink papules.  In the right preauricular cheek, there is 1 rough, scaly, 4 mm, flat-topped, firm papule.  On the right lateral back, there is a crusted, eroded 4 mm pink papule.  On the right jawline, there is a crusted, eroded, firm, 3 mm domed papule.  On the dorsal nose and cheek, there are linear well-healed scars without nodularity or erythema.  On the central chest, there is a linear vertically oriented scar consistent with a prior cardiothoracic surgery.  On the right mid chest, there is  a vertically oriented linear, well-healed scar without pigment or nodularity.      ASSESSMENT AND PLAN:   1.  Neoplasm of uncertain behavior of the right back.  My initial clinical impression was for a basal cell carcinoma, and thus, a shave biopsy was performed.  However, during the procedure, the lesion had a notably vascular appearance at its base once it was shaved off.  Thus, a differential diagnosis also includes an angiokeratoma or a traumatized hemangioma.  Please see the procedure note below.   2.  Neoplasm of uncertain behavior of the right jawline.  The differential diagnosis includes basal cell carcinoma, traumatized actinic keratosis or traumatized folliculitis.  Please see the procedure note below as a shave biopsy was performed for this area.   3.  Actinic keratoses x5 on the face.  Each of these after informed verbal consent was treated with liquid nitrogen x5 seconds x2 cycles.  The patient tolerated this without complication.   4.  History of multiple basal cell carcinomas.  Clinically, there is no evidence of recurrence today.  Reassurance was provided.  We discussed the importance of ongoing high SPF sunscreen and sun avoidance.   5.  Possible remote history of melanoma.  There is no clear documentation of this in our electronic medical record.  Brooks is unsure but thinks that he may have had a low risk melanoma of the right chest biopsied and treated many years ago.  Clinically, there are no concerning features at the scar site in this area.  We will simply follow it clinically.      PROCEDURE NOTE:  After signed informed consent, the 2 affected areas were swabbed with an alcohol pad and injected with 1% lidocaine with epinephrine.  Biopsies via shave technique were taken and sent for histopathology.  In each case, hemostasis was achieved with aluminum chloride 20% and the defects were covered with petrolatum and a bandage.  The patient tolerated this without complication.       Carlos Alberto Kong  MD  Dermatology Attending

## 2019-06-06 NOTE — NURSING NOTE
Chief Complaint   Patient presents with     Skin Check     Brooks is here for a skin check-concerns include spots on bilateral cheeks.      Eri Benítez LPN

## 2019-06-06 NOTE — LETTER
6/6/2019       RE: Brooks Summers  [address]  [facility] [address]     Dear Colleague,    Thank you for referring your patient, Brooks Summers, to the [facility] D[facility] at [facility]. Please see a copy of my visit note below.    DERMATOLOGY FOLLOWUP VISIT      CHIEF COMPLAINT:  Followup skin check.      SUBJECTIVE:  Brooks is a very pleasant 74-year-old male with a history of multiple nonmelanoma skin cancers, all basal cell carcinomas, most recently basal cell carcinomas of the left malar cheek and right nasal ala diagnosed in 10/2017 and subsequently treated with Mohs surgery here by Dr. Yobany Irving.  Today Brooks reports he has several new rough areas on his lateral cheeks which have appeared over the past winter and are occasionally sore.  They are not currently bleeding spontaneously but are not resolving.  Otherwise, he denies any new or changing moles and has no localized areas of itch, pain or bleeding.  He denies any problems at his prior surgical scar sites.  He thinks he may also have had a remote history of melanoma biopsied and treated on the right chest, though he is unsure, and I am unable to identify a history of melanoma from his prior records in our Dermatology Clinic with Dr. Serge Hancock dating back as far as 2012.      REVIEW OF SYSTEMS:  No recent fevers or chills.  No oral sores.      PHYSICAL EXAMINATION:   GENERAL:  This is a well-appearing, well-nourished male with a normal mood and affect who is oriented x3.   SKIN:  A cutaneous exam of the head, neck, chest, abdomen, back, bilateral upper and lower extremities and buttocks was performed.  On the left zygomatic cheek, there are a total of 4 rough firm, flat-topped, 3-5 mm, scaly, pink papules.  In the right preauricular cheek, there is 1 rough, scaly, 4 mm, flat-topped, firm papule.  On the right lateral back, there is a crusted, eroded 4 mm pink papule.  On the right jawline, there is a  crusted, eroded, firm, 3 mm domed papule.  On the dorsal nose and cheek, there are linear well-healed scars without nodularity or erythema.  On the central chest, there is a linear vertically oriented scar consistent with a prior cardiothoracic surgery.  On the right mid chest, there is a vertically oriented linear, well-healed scar without pigment or nodularity.      ASSESSMENT AND PLAN:   1.  Neoplasm of uncertain behavior of the right back.  My initial clinical impression was for a basal cell carcinoma, and thus, a shave biopsy was performed.  However, during the procedure, the lesion had a notably vascular appearance at its base once it was shaved off.  Thus, a differential diagnosis also includes an angiokeratoma or a traumatized hemangioma.  Please see the procedure note below.   2.  Neoplasm of uncertain behavior of the right jawline.  The differential diagnosis includes basal cell carcinoma, traumatized actinic keratosis or traumatized folliculitis.  Please see the procedure note below as a shave biopsy was performed for this area.   3.  Actinic keratoses x5 on the face.  Each of these after informed verbal consent was treated with liquid nitrogen x5 seconds x2 cycles.  The patient tolerated this without complication.   4.  History of multiple basal cell carcinomas.  Clinically, there is no evidence of recurrence today.  Reassurance was provided.  We discussed the importance of ongoing high SPF sunscreen and sun avoidance.   5.  Possible remote history of melanoma.  There is no clear documentation of this in our electronic medical record.  Brooks is unsure but thinks that he may have had a low risk melanoma of the right chest biopsied and treated many years ago.  Clinically, there are no concerning features at the scar site in this area.  We will simply follow it clinically.      PROCEDURE NOTE:  After signed informed consent, the 2 affected areas were swabbed with an alcohol pad and injected with 1% lidocaine  with epinephrine.  Biopsies via shave technique were taken and sent for histopathology.  In each case, hemostasis was achieved with aluminum chloride 20% and the defects were covered with petrolatum and a bandage.  The patient tolerated this without complication.       Carlos Alberto Kong MD  Dermatology Attending

## 2019-06-06 NOTE — NURSING NOTE
Lidocaine-epinephrine 1-1:032013 % injection   1mL once for one use, starting 6/6/2019 ending 6/6/2019,  2mL disp, R-0, injection  Injected by Debra Potter LPN

## 2019-06-06 NOTE — PATIENT INSTRUCTIONS
Wound Care After a Biopsy    What is a skin biopsy?  A skin biopsy allows the doctor to examine a very small piece of tissue under the microscope to determine the diagnosis and the best treatment for the skin condition. A local anesthetic (numbing medicine)  is injected with a very small needle into the skin area to be tested. A small piece of skin is taken from the area. Sometimes a suture (stitch) is used.     What are the risks of a skin biopsy?  I will experience scar, bleeding, swelling, pain, crusting and redness. I may experience incomplete removal or recurrence. Risks of this procedure are excessive bleeding, bruising, infection, nerve damage, numbness, thick (hypertrophic or keloidal) scar and non-diagnostic biopsy.    How should I care for my wound for the first 24 hours?    Keep the wound dry and covered for 24 hours    If it bleeds, hold direct pressure on the area for 15 minutes. If bleeding does not stop then go to the emergency room    Avoid strenuous exercise the first 1-2 days or as your doctor instructs you    How should I care for the wound after 24 hours?    After 24 hours, remove the bandage    You may bathe or shower as normal    If you had a scalp biopsy, you can shampoo as usual and can use shower water to clean the biopsy site daily    Clean the wound twice a day with gentle soap and water    Do not scrub, be gentle    Apply white petroleum/Vaseline after cleaning the wound with a cotton swab or a clean finger, and keep the site covered with a Bandaid /bandage. Bandages are not necessary with a scalp biopsy    If you are unable to cover the site with a Bandaid /bandage, re-apply ointment 2-3 times a day to keep the site moist. Moisture will help with healing    Avoid strenuous activity for first 1-2 days    Avoid lakes, rivers, pools, and oceans until the stitches are removed or the site is healed    How do I clean my wound?    Wash hands thoroughly with soap or use hand  before all  wound care    Clean the wound with gentle soap and water    Apply white petroleum/Vaseline  to wound after it is clean    Replace the Bandaid /bandage to keep the wound covered for the first few days or as instructed by your doctor    If you had a scalp biopsy, warm shower water to the area on a daily basis should suffice    What should I use to clean my wound?     Cotton-tipped applicators (Qtips )    White petroleum jelly (Vaseline ). Use a clean new container and use Q-tips to apply.    Bandaids   as needed    Gentle soap     How should I care for my wound long term?    Do not get your wound dirty    Keep up with wound care for one week or until the area is healed.    A small scab will form and fall off by itself when the area is completely healed. The area will be red and will become pink in color as it heals. Sun protection is very important for how your scar will turn out. Sunscreen with an SPF 30 or greater is recommended once the area is healed.    If you have stitches, stitches need to be removed in  days. You may return to our clinic for this or you may have it done locally at your doctor s office.    You should have some soreness but it should be mild and slowly go away over several days. Talk to your doctor about using tylenol for pain,    When should I call my doctor?  If you have increased:     Pain or swelling    Pus or drainage (clear or slightly yellow drainage is ok)    Temperature over 100F    Spreading redness or warmth around wound    When will I hear about my results?  The biopsy results can take 2-3 weeks to come back. The clinic will call you with the results, send you a [employer] message, or have you schedule a follow-up clinic or phone time to discuss the results. Contact our clinics if you do not hear from us in 3 weeks.     Who should I call with questions?    [facility]: [phone]     [facility]: [phone]    For  urgent needs outside of business hours call the [facility] at [phone] and ask for the dermatology resident on call  Cryotherapy    What is it?    Use of a very cold liquid, such as liquid nitrogen, to freeze and destroy abnormal skin cells that need to be removed    What should I expect?    Tenderness and redness    A small blister that might grow and fill with dark purple blood. There may be crusting.    More than one treatment may be needed if the lesions do not go away.    How do I care for the treated area?    Gently wash the area with your hands when bathing.    Use a thin layer of Vaseline to help with healing. You may use a Band-Aid.     The area should heal within 7-10 days and may leave behind a pink or lighter color.     Do not use an antibiotic or Neosporin ointment.     You may take acetaminophen (Tylenol) for pain.     Call your Doctor if you have:    Severe pain    Signs of infection (warmth, redness, cloudy yellow drainage, and or a bad smell)    Questions or concerns    Who should I call with questions?       [facility]: [phone]       [facility]: [phone]       For urgent needs outside of business hours call the [facility] at [phone]        and ask for the dermatology resident on call

## 2019-06-08 PROBLEM — L57.0 AK (ACTINIC KERATOSIS): Status: ACTIVE | Noted: 2019-06-08

## 2019-06-08 PROBLEM — Z85.828 HISTORY OF NONMELANOMA SKIN CANCER: Status: ACTIVE | Noted: 2019-06-08

## 2019-06-08 PROBLEM — D48.5 NEOPLASM OF UNCERTAIN BEHAVIOR OF SKIN: Status: ACTIVE | Noted: 2019-06-08

## 2019-06-10 LAB — COPATH REPORT: NORMAL

## 2019-06-11 ENCOUNTER — ANCILLARY PROCEDURE (OUTPATIENT)
Dept: CARDIOLOGY | Facility: CLINIC | Age: 74
End: 2019-06-11
Attending: INTERNAL MEDICINE
Payer: MEDICARE

## 2019-06-11 DIAGNOSIS — I42.9 CARDIOMYOPATHY (H): ICD-10-CM

## 2019-06-11 PROCEDURE — 93295 DEV INTERROG REMOTE 1/2/MLT: CPT | Performed by: INTERNAL MEDICINE

## 2019-06-11 PROCEDURE — 93296 REM INTERROG EVL PM/IDS: CPT | Mod: ZF

## 2019-06-13 ENCOUNTER — TELEPHONE (OUTPATIENT)
Dept: DERMATOLOGY | Facility: CLINIC | Age: 74
End: 2019-06-13

## 2019-06-13 NOTE — TELEPHONE ENCOUNTER
[facility] Call Center    Phone Message    May a detailed message be left on voicemail: yes    Reason for Call: Other: Brooks returning a call from someone on Dr. Kong's team.  Please call him back at your earliest convenience     Action Taken: Message routed to:  [facility]

## 2019-06-26 ENCOUNTER — ANTICOAGULATION THERAPY VISIT (OUTPATIENT)
Dept: ANTICOAGULATION | Facility: CLINIC | Age: 74
End: 2019-06-26
Payer: MEDICARE

## 2019-06-26 DIAGNOSIS — Z95.2 S/P AORTIC VALVE REPLACEMENT: ICD-10-CM

## 2019-06-26 DIAGNOSIS — I48.0 PAROXYSMAL ATRIAL FIBRILLATION (H): ICD-10-CM

## 2019-06-26 DIAGNOSIS — Z79.01 LONG TERM CURRENT USE OF ANTICOAGULANT THERAPY: ICD-10-CM

## 2019-06-26 LAB — INR POINT OF CARE: 2.9 (ref 0.86–1.14)

## 2019-06-26 PROCEDURE — 85610 PROTHROMBIN TIME: CPT | Mod: QW

## 2019-06-26 PROCEDURE — 99207 ZZC NO CHARGE NURSE ONLY: CPT

## 2019-06-26 PROCEDURE — 36416 COLLJ CAPILLARY BLOOD SPEC: CPT

## 2019-06-26 NOTE — PROGRESS NOTES
ANTICOAGULATION FOLLOW-UP CLINIC VISIT    Patient Name:  Brooks Summers  Date:  2019  Contact Type:  Face to Face    SUBJECTIVE:  Patient Findings     Comments:   No changes in diet, activity level, medications (including over the counter), or health. No missed doses of warfarin. Patient took dosing as prescribed. No signs of clots or bleeding concerns. Patient will continue maintenance warfarin dosing.          Clinical Outcomes     Negatives:   Major bleeding event, Thromboembolic event, Anticoagulation-related hospital admission, Anticoagulation-related ED visit, Anticoagulation-related fatality    Comments:   No changes in diet, activity level, medications (including over the counter), or health. No missed doses of warfarin. Patient took dosing as prescribed. No signs of clots or bleeding concerns. Patient will continue maintenance warfarin dosing.             OBJECTIVE    INR Protime   Date Value Ref Range Status   2019 2.9 (A) 0.86 - 1.14 Final       ASSESSMENT / PLAN  INR assessment THER    Recheck INR In: 6 WEEKS    INR Location Clinic      Anticoagulation Summary  As of 2019    INR goal:   2.0-3.0   TTR:   73.2 % (4.3 y)   INR used for dosin.9 (2019)   Warfarin maintenance plan:   7.5 mg (5 mg x 1.5) every Sun, Tue, Thu; 10 mg (5 mg x 2) all other days   Full warfarin instructions:   7.5 mg every Sun, Tue, Thu; 10 mg all other days   Weekly warfarin total:   62.5 mg   No change documented:   Aniya Garcia RN   Plan last modified:   Aniya Garcia RN (2018)   Next INR check:   2019   Priority:   INR   Target end date:   Indefinite    Indications    S/P aortic valve replacement [Z95.2]  Paroxysmal atrial fibrillation (H) [I48.0]  Long term current use of anticoagulant therapy [Z79.01]             Anticoagulation Episode Summary     INR check location:       Preferred lab:       Send INR reminders to:   [place] [facility] [employer] POOL    Comments:    * [place] 5 mg tabs[place] 21 mm Johnson Perimount Magna Tissue Valve. Alere home meter      Anticoagulation Care Providers     Provider Role Specialty Phone number    Edin Mays MD [facility] Internal Medicine [phone]            See the Encounter Report to view Anticoagulation Flowsheet and Dosing Calendar (Go to Encounters tab in chart review, and find the Anticoagulation Therapy Visit)        Aniya Garcia RN

## 2019-06-28 LAB
MDC_IDC_EPISODE_DTM: NORMAL
MDC_IDC_EPISODE_DURATION: 0 S
MDC_IDC_EPISODE_DURATION: 13 S
MDC_IDC_EPISODE_DURATION: 45 S
MDC_IDC_EPISODE_DURATION: 6 S
MDC_IDC_EPISODE_ID: 22
MDC_IDC_EPISODE_ID: 23
MDC_IDC_EPISODE_ID: 24
MDC_IDC_EPISODE_ID: 3
MDC_IDC_EPISODE_TYPE: NORMAL
MDC_IDC_LEAD_IMPLANT_DT: NORMAL
MDC_IDC_LEAD_LOCATION: NORMAL
MDC_IDC_LEAD_LOCATION_DETAIL_1: NORMAL
MDC_IDC_LEAD_MFG: NORMAL
MDC_IDC_LEAD_MODEL: NORMAL
MDC_IDC_LEAD_POLARITY_TYPE: NORMAL
MDC_IDC_LEAD_SERIAL: NORMAL
MDC_IDC_LEAD_SPECIAL_FUNCTION: NORMAL
MDC_IDC_MSMT_BATTERY_DTM: NORMAL
MDC_IDC_MSMT_BATTERY_REMAINING_LONGEVITY: 16 MO
MDC_IDC_MSMT_BATTERY_RRT_TRIGGER: 2.73
MDC_IDC_MSMT_BATTERY_STATUS: NORMAL
MDC_IDC_MSMT_BATTERY_VOLTAGE: 2.91 V
MDC_IDC_MSMT_CAP_CHARGE_DTM: NORMAL
MDC_IDC_MSMT_CAP_CHARGE_ENERGY: 18 J
MDC_IDC_MSMT_CAP_CHARGE_TIME: 4.37
MDC_IDC_MSMT_CAP_CHARGE_TYPE: NORMAL
MDC_IDC_MSMT_LEADCHNL_LV_IMPEDANCE_VALUE: 380 OHM
MDC_IDC_MSMT_LEADCHNL_LV_IMPEDANCE_VALUE: 437 OHM
MDC_IDC_MSMT_LEADCHNL_LV_IMPEDANCE_VALUE: 665 OHM
MDC_IDC_MSMT_LEADCHNL_LV_PACING_THRESHOLD_AMPLITUDE: 2.12 V
MDC_IDC_MSMT_LEADCHNL_LV_PACING_THRESHOLD_PULSEWIDTH: 1 MS
MDC_IDC_MSMT_LEADCHNL_RA_IMPEDANCE_VALUE: 456 OHM
MDC_IDC_MSMT_LEADCHNL_RA_PACING_THRESHOLD_AMPLITUDE: 0.62 V
MDC_IDC_MSMT_LEADCHNL_RA_PACING_THRESHOLD_PULSEWIDTH: 0.4 MS
MDC_IDC_MSMT_LEADCHNL_RA_SENSING_INTR_AMPL: 1.88 MV
MDC_IDC_MSMT_LEADCHNL_RA_SENSING_INTR_AMPL: 1.88 MV
MDC_IDC_MSMT_LEADCHNL_RV_IMPEDANCE_VALUE: 399 OHM
MDC_IDC_MSMT_LEADCHNL_RV_IMPEDANCE_VALUE: 456 OHM
MDC_IDC_MSMT_LEADCHNL_RV_PACING_THRESHOLD_AMPLITUDE: 0.62 V
MDC_IDC_MSMT_LEADCHNL_RV_PACING_THRESHOLD_PULSEWIDTH: 0.4 MS
MDC_IDC_MSMT_LEADCHNL_RV_SENSING_INTR_AMPL: 22.38 MV
MDC_IDC_MSMT_LEADCHNL_RV_SENSING_INTR_AMPL: 22.38 MV
MDC_IDC_PG_IMPLANT_DTM: NORMAL
MDC_IDC_PG_MFG: NORMAL
MDC_IDC_PG_MODEL: NORMAL
MDC_IDC_PG_SERIAL: NORMAL
MDC_IDC_PG_TYPE: NORMAL
MDC_IDC_SESS_CLINIC_NAME: NORMAL
MDC_IDC_SESS_DTM: NORMAL
MDC_IDC_SESS_TYPE: NORMAL
MDC_IDC_SET_BRADY_AT_MODE_SWITCH_RATE: 171 {BEATS}/MIN
MDC_IDC_SET_BRADY_LOWRATE: 60 {BEATS}/MIN
MDC_IDC_SET_BRADY_MAX_SENSOR_RATE: 140 {BEATS}/MIN
MDC_IDC_SET_BRADY_MAX_TRACKING_RATE: 140 {BEATS}/MIN
MDC_IDC_SET_BRADY_MODE: NORMAL
MDC_IDC_SET_BRADY_PAV_DELAY_HIGH: 100 MS
MDC_IDC_SET_BRADY_PAV_DELAY_LOW: 130 MS
MDC_IDC_SET_BRADY_SAV_DELAY_HIGH: 70 MS
MDC_IDC_SET_BRADY_SAV_DELAY_LOW: 100 MS
MDC_IDC_SET_CRT_LVRV_DELAY: 0 MS
MDC_IDC_SET_CRT_PACED_CHAMBERS: NORMAL
MDC_IDC_SET_LEADCHNL_LV_PACING_AMPLITUDE: 3.25 V
MDC_IDC_SET_LEADCHNL_LV_PACING_ANODE_ELECTRODE_1: NORMAL
MDC_IDC_SET_LEADCHNL_LV_PACING_ANODE_LOCATION_1: NORMAL
MDC_IDC_SET_LEADCHNL_LV_PACING_CAPTURE_MODE: NORMAL
MDC_IDC_SET_LEADCHNL_LV_PACING_CATHODE_ELECTRODE_1: NORMAL
MDC_IDC_SET_LEADCHNL_LV_PACING_CATHODE_LOCATION_1: NORMAL
MDC_IDC_SET_LEADCHNL_LV_PACING_POLARITY: NORMAL
MDC_IDC_SET_LEADCHNL_LV_PACING_PULSEWIDTH: 1 MS
MDC_IDC_SET_LEADCHNL_RA_PACING_AMPLITUDE: 1.5 V
MDC_IDC_SET_LEADCHNL_RA_PACING_ANODE_ELECTRODE_1: NORMAL
MDC_IDC_SET_LEADCHNL_RA_PACING_ANODE_LOCATION_1: NORMAL
MDC_IDC_SET_LEADCHNL_RA_PACING_CAPTURE_MODE: NORMAL
MDC_IDC_SET_LEADCHNL_RA_PACING_CATHODE_ELECTRODE_1: NORMAL
MDC_IDC_SET_LEADCHNL_RA_PACING_CATHODE_LOCATION_1: NORMAL
MDC_IDC_SET_LEADCHNL_RA_PACING_POLARITY: NORMAL
MDC_IDC_SET_LEADCHNL_RA_PACING_PULSEWIDTH: 0.4 MS
MDC_IDC_SET_LEADCHNL_RA_SENSING_ANODE_ELECTRODE_1: NORMAL
MDC_IDC_SET_LEADCHNL_RA_SENSING_ANODE_LOCATION_1: NORMAL
MDC_IDC_SET_LEADCHNL_RA_SENSING_CATHODE_ELECTRODE_1: NORMAL
MDC_IDC_SET_LEADCHNL_RA_SENSING_CATHODE_LOCATION_1: NORMAL
MDC_IDC_SET_LEADCHNL_RA_SENSING_POLARITY: NORMAL
MDC_IDC_SET_LEADCHNL_RA_SENSING_SENSITIVITY: 0.3 MV
MDC_IDC_SET_LEADCHNL_RV_PACING_AMPLITUDE: 2 V
MDC_IDC_SET_LEADCHNL_RV_PACING_ANODE_ELECTRODE_1: NORMAL
MDC_IDC_SET_LEADCHNL_RV_PACING_ANODE_LOCATION_1: NORMAL
MDC_IDC_SET_LEADCHNL_RV_PACING_CAPTURE_MODE: NORMAL
MDC_IDC_SET_LEADCHNL_RV_PACING_CATHODE_ELECTRODE_1: NORMAL
MDC_IDC_SET_LEADCHNL_RV_PACING_CATHODE_LOCATION_1: NORMAL
MDC_IDC_SET_LEADCHNL_RV_PACING_POLARITY: NORMAL
MDC_IDC_SET_LEADCHNL_RV_PACING_PULSEWIDTH: 0.4 MS
MDC_IDC_SET_LEADCHNL_RV_SENSING_ANODE_ELECTRODE_1: NORMAL
MDC_IDC_SET_LEADCHNL_RV_SENSING_ANODE_LOCATION_1: NORMAL
MDC_IDC_SET_LEADCHNL_RV_SENSING_CATHODE_ELECTRODE_1: NORMAL
MDC_IDC_SET_LEADCHNL_RV_SENSING_CATHODE_LOCATION_1: NORMAL
MDC_IDC_SET_LEADCHNL_RV_SENSING_POLARITY: NORMAL
MDC_IDC_SET_LEADCHNL_RV_SENSING_SENSITIVITY: 0.3 MV
MDC_IDC_SET_ZONE_DETECTION_BEATS_DENOMINATOR: 40 {BEATS}
MDC_IDC_SET_ZONE_DETECTION_BEATS_NUMERATOR: 30 {BEATS}
MDC_IDC_SET_ZONE_DETECTION_INTERVAL: 320 MS
MDC_IDC_SET_ZONE_DETECTION_INTERVAL: 350 MS
MDC_IDC_SET_ZONE_DETECTION_INTERVAL: 360 MS
MDC_IDC_SET_ZONE_DETECTION_INTERVAL: 420 MS
MDC_IDC_SET_ZONE_DETECTION_INTERVAL: NORMAL
MDC_IDC_SET_ZONE_TYPE: NORMAL
MDC_IDC_STAT_AT_BURDEN_PERCENT: 0 %
MDC_IDC_STAT_AT_DTM_END: NORMAL
MDC_IDC_STAT_AT_DTM_START: NORMAL
MDC_IDC_STAT_BRADY_AP_VP_PERCENT: 17.85 %
MDC_IDC_STAT_BRADY_AP_VS_PERCENT: 0.01 %
MDC_IDC_STAT_BRADY_AS_VP_PERCENT: 81.86 %
MDC_IDC_STAT_BRADY_AS_VS_PERCENT: 0.29 %
MDC_IDC_STAT_BRADY_DTM_END: NORMAL
MDC_IDC_STAT_BRADY_DTM_START: NORMAL
MDC_IDC_STAT_BRADY_RA_PERCENT_PACED: 17.76 %
MDC_IDC_STAT_BRADY_RV_PERCENT_PACED: 99.27 %
MDC_IDC_STAT_CRT_DTM_END: NORMAL
MDC_IDC_STAT_CRT_DTM_START: NORMAL
MDC_IDC_STAT_CRT_LV_PERCENT_PACED: 99.18 %
MDC_IDC_STAT_CRT_PERCENT_PACED: 99.18 %
MDC_IDC_STAT_EPISODE_RECENT_COUNT: 0
MDC_IDC_STAT_EPISODE_RECENT_COUNT: 1
MDC_IDC_STAT_EPISODE_RECENT_COUNT_DTM_END: NORMAL
MDC_IDC_STAT_EPISODE_RECENT_COUNT_DTM_START: NORMAL
MDC_IDC_STAT_EPISODE_TOTAL_COUNT: 0
MDC_IDC_STAT_EPISODE_TOTAL_COUNT: 3
MDC_IDC_STAT_EPISODE_TOTAL_COUNT_DTM_END: NORMAL
MDC_IDC_STAT_EPISODE_TOTAL_COUNT_DTM_START: NORMAL
MDC_IDC_STAT_EPISODE_TYPE: NORMAL
MDC_IDC_STAT_TACHYTHERAPY_ATP_DELIVERED_RECENT: 0
MDC_IDC_STAT_TACHYTHERAPY_ATP_DELIVERED_TOTAL: 0
MDC_IDC_STAT_TACHYTHERAPY_RECENT_DTM_END: NORMAL
MDC_IDC_STAT_TACHYTHERAPY_RECENT_DTM_START: NORMAL
MDC_IDC_STAT_TACHYTHERAPY_SHOCKS_ABORTED_RECENT: 0
MDC_IDC_STAT_TACHYTHERAPY_SHOCKS_ABORTED_TOTAL: 0
MDC_IDC_STAT_TACHYTHERAPY_SHOCKS_DELIVERED_RECENT: 0
MDC_IDC_STAT_TACHYTHERAPY_SHOCKS_DELIVERED_TOTAL: 0
MDC_IDC_STAT_TACHYTHERAPY_TOTAL_DTM_END: NORMAL
MDC_IDC_STAT_TACHYTHERAPY_TOTAL_DTM_START: NORMAL

## 2019-07-03 ENCOUNTER — TELEPHONE (OUTPATIENT)
Dept: DERMATOLOGY | Facility: CLINIC | Age: 74
End: 2019-07-03

## 2019-07-03 ENCOUNTER — OFFICE VISIT (OUTPATIENT)
Dept: DERMATOLOGY | Facility: CLINIC | Age: 74
End: 2019-07-03
Payer: MEDICARE

## 2019-07-03 DIAGNOSIS — C44.519 BASAL CELL CARCINOMA OF BACK: Primary | ICD-10-CM

## 2019-07-03 DIAGNOSIS — D04.39 SQUAMOUS CELL CARCINOMA IN SITU OF SKIN OF JAWLINE: ICD-10-CM

## 2019-07-03 RX ORDER — CEPHALEXIN 500 MG/1
500 CAPSULE ORAL ONCE
Qty: 4 CAPSULE | Refills: 0 | Status: SHIPPED | OUTPATIENT
Start: 2019-07-03 | End: 2019-07-03

## 2019-07-03 ASSESSMENT — PAIN SCALES - GENERAL: PAINLEVEL: NO PAIN (0)

## 2019-07-03 NOTE — PROGRESS NOTES
[facility] Dermatology Note    Dermatology Surgery Clinic  [facility]  Clinics and Surgery Center  [address] [place] [id number]    Dermatology Problem List:  1. Hx of NMSC:  - BCC, R back s/p Bx 06/06/19; MMS date TBD  - SCCIS, R jawline s/p Bx 06/06/19; MMS date TBD  - BCC, L malar cheek, s/p 11/17  -BCC, R nasal ala, s/p 11/17  -BCC, dorsal nose, s/p Mohs 05/2013  2. AK  3. SK    Encounter Date: Jul 3, 2019    CC:  Chief Complaint   Patient presents with     Derm Problem     scc jawline, bcc back     History of Present Illness:  Mr. 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 These lesions were discovered and biopsied during his visit Dr. Kong on 06/06/2019. At that visit he also received cryotherapy for multiple AKs. Today the pt has not questions or concerns regarding his upcoming surgery. Pt has a replaced heart valve, pacemaker, is on coumadin, and takes antibiotics prior to surgical procedures. The patient is otherwise feeling well. There are no other skin concerns at this time.    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, Bi-Ventricular- INT dependent     Endocarditis     S/P AVR     Need for prophylactic antibiotic     Hyperlipidemia with  target LDL less than 100     Finger injury     Paroxysmal atrial fibrillation (H)     Long term current use of anticoagulant therapy     Trigger ring finger of left hand     Nocturnal hypoxemia     Neoplasm of uncertain behavior of skin     AK (actinic keratosis)     History of nonmelanoma skin cancer     Past Medical History:   Diagnosis Date     BCC (basal cell carcinoma), face 5/16/2013     Bicuspid aortic valve      Chronic systolic heart failure (H)      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 Tx with Dapto/RIFx 8 weeks.     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     Paroxysmal A-fib (H)     Post-op 7/14/2011, 1/26/13     Rotator Cuff (Capsule) Sprain and Strain      S/P aortic valve replacement     7/14/2011, re-do 1/25/13 due to endocarditis     Smoking hx      Thrombocytopenia (H)      Past Surgical History:   Procedure Laterality Date     ANESTHESIA CARDIOVERSION  7/18/2011    Procedure:ANESTHESIA CARDIOVERSION; Cardioversion; Surgeon:GENERIC ANESTHESIA PROVIDER; Location:[facility] OR     COLONOSCOPY       COLONOSCOPY N/A 11/19/2018    Procedure: COLONOSCOPY;  Surgeon: Herman Mcginnis MD;  Location: [facility] GI     CORONARY ANGIOGRAPHY ADULT ORDER       CYSTOSCOPY, BIOPSY URETHRA N/A 4/3/2017    Procedure: CYSTOSCOPY, BIOPSY URETHRA;  Surgeon: Marlena Mora MD;  Location: [facility] OR     ENDOSCOPY UPPER, COLONOSCOPY, COMBINED       HC REMOV & REPLACE IMPLT DEFIB PULSE GEN MULT LEAD  12/3/2015          HEMORRHOID SURGERY       IMPLANT AUTOMATIC IMPLANTABLE CARDIOVERTER DEFIBRILLATOR       MOHS MICROGRAPHIC PROCEDURE       ORTHOPEDIC SURGERY      shoulder,right     REDO STERNOTOMY REPLACE VALVE AORTIC  1/25/2013    Procedure: REDO STERNOTOMY REPLACE VALVE AORTIC;  Redo Sternotomy, Redo Aortic Valve Replacement on pump oxygenator. Intraoperative Transesophageal Echocardiogram;   Surgeon: Stephanie Hampton MD;  Location: [facility] OR     REPAIR VALVE MITRAL  2013     REPLACE VALVE AORTIC  2011    Procedure:REPLACE VALVE AORTIC; Median Sternotomy, Bioprosthesis,  Aortic Valve replacement on pump with oxygenator. Intra-operative Transesophogeal Echocardiogram.; Surgeon:STEPHANIE HAMPTON; Location:[facility] OR     teeth extractions       TRANSPLANT         Social History:  Social History     Socioeconomic History     Marital status:      Spouse name: Not on file     Number of children: Not on file     Years of education: Not on file     Highest education level: Not on file   Occupational History     Not on file   Social Needs     Financial resource strain: Not on file     Food insecurity:     Worry: Not on file     Inability: Not on file     Transportation needs:     Medical: Not on file     Non-medical: Not on file   Tobacco Use     Smoking status: Former Smoker     Packs/day: 1.00     Years: 20.00     Pack years: 20.00     Types: Cigarettes     Last attempt to quit: 1989     Years since quittin.5     Smokeless tobacco: Never Used   Substance and Sexual Activity     Alcohol use: Yes     Alcohol/week: 1.2 oz     Drug use: No     Sexual activity: Yes     Partners: Female     Birth control/protection: None     Comment:    Lifestyle     Physical activity:     Days per week: Not on file     Minutes per session: Not on file     Stress: Not on file   Relationships     Social connections:     Talks on phone: Not on file     Gets together: Not on file     Attends Hoahaoism service: Not on file     Active member of club or organization: Not on file     Attends meetings of clubs or organizations: Not on file     Relationship status: Not on file     Intimate partner violence:     Fear of current or ex partner: Not on file     Emotionally abused: Not on file     Physically abused: Not on file     Forced sexual activity: Not on file   Other Topics Concern     Parent/sibling w/ CABG, MI or angioplasty  before 65F 55M? No   Social History Narrative     since 2/1982 2 children 4 grandchildren.    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 Father 68        bypass, carotid      Prostate Cancer Father 70     Heart Disease Father      Cancer Mother 92        stomach, colon     Hypertension Mother      Retinal detachment Mother      Cancer Brother 64        lung cancer, petroleum     Cancer Brother      Glaucoma No family hx of      Macular Degeneration No family hx of      Strabismus No family hx of      Glasses (<9 y/o) No family hx of         Medications:  Current Outpatient Medications   Medication Sig Dispense Refill     allopurinol (ZYLOPRIM) 100 MG tablet Take 1 tablet (100 mg) by mouth every evening 90 tablet 3     cephALEXin (KEFLEX) 500 MG capsule Take 1 capsule (500 mg) by mouth once for 1 dose 4 capsule 0     Cyanocobalamin (VITAMIN B 12 PO)        losartan (COZAAR) 25 MG tablet Take 0.5 tablets (12.5 mg) by mouth every evening 45 tablet 3     metoprolol succinate ER (TOPROL XL) 50 MG 24 hr tablet Take 1 tablet (50 mg) by mouth daily 90 tablet 3     Multiple Vitamins-Minerals (MULTIVITAMIN ADULTS 50+) TABS        PROVIDER DOSED MANAGED WARFARIN, COUMADIN, OUTPATIENT Per coumadin clinic       simvastatin (ZOCOR) 40 MG tablet Take 1 tablet (40 mg) by mouth At Bedtime 90 tablet 3     VITAMIN D, CHOLECALCIFEROL, PO Take 1,000 Units by mouth daily       warfarin (COUMADIN) 5 MG tablet 7.5 mg Tues/Thurs/Sun; 10 mg all other days or as directed by the Anticoagulation Clinic 180 tablet 3     amoxicillin (AMOXIL) 500 MG capsule Take 2000 MG one hour before colonoscopy. (Patient not taking: Reported on 4/26/2019) 4 capsule 3     order for DME Oxygen 1.5 Li/min  at night (Patient not taking: Reported on 12/4/2018) 1 Device 11       Allergies   Allergen Reactions     Lisinopril Cough       Review of  Systems:  -As per HPI  -Otherwise nml state of health. No other skin concerns.    Physical exam:  Vitals: There were no vitals taken for this visit.  GEN: This is a well developed, well-nourished male in no acute distress, in a pleasant mood.    SKIN: Focused examination of the waist-up was performed.  - 2cm pearly ill-defined on the R mid back  - Pink scaling depressed papule on R neck, just inferior to the jawline.  - No other lesions of concern on areas examined.     Impression/Plan:  1. 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 Mohs surgery recommended.    Reviewed pathology report and nature of diagnosis.     Risks, benefits, and process of Mohs micrographic surgery were discussed including possibility of damage to surrounding anatomical structures and infection. Patient is comfortable proceeding with the surgery; consent form was obtained. He will be scheduled at his convenience.    Indication for pre-op antibiotics needed. Pt prescribed Keflex, which should be taken 500mg the morning of surgery.    Pre-op written instructions provided.     2. Basal cell carcinoma of the R back. Mohs surgery recommended.    Reviewed pathology report and nature of diagnosis.     Risks, benefits, and process of Mohs micrographic surgery were discussed including possibility of damage to surrounding anatomical structures and infection. Patient is comfortable proceeding with the surgery; consent form was obtained. He will be scheduled at his convenience.      Follow-up as scheduled for [facility].       Staff Involved:    Scribe Disclosure  I, Kiran Saldana, am serving as a scribe to document services personally performed by Dr. Erasmo Story, based on data collection and the provider's statements to me.     Attending Attestation  I attest that the Scribe recorded the interview and exam that I personally performed.  I have reviewed the note and edited it as necessary.    Erasmo Story M.D.  Professor  Director of Dermatologic  Surgery  Department of Dermatology  [facility]

## 2019-07-03 NOTE — TELEPHONE ENCOUNTER
Clarified with Dr. Story that order should be 2,000 mg one time dose the morning of procedure, relayed this to pharmacy.

## 2019-07-03 NOTE — NURSING NOTE
Chief Complaint   Patient presents with     Derm Problem     scc jawline, bcc back     Mercedes Coronado, EMT

## 2019-07-03 NOTE — PATIENT INSTRUCTIONS
Mohs Cutaneous Micrographic Surgery Unit  Erasmo Story MD      Pre-Surgical Instruction Sheet    1. Expect to be here majority of the morning.  The Mohs surgical procedure can be an extensive surgery requiring multiple stages. Each stage may take 1-2 hours.    ? You may eat breakfast  ? You may bring snacks or beverages with you  ? Take all normal medications morning of surgery -- unless otherwise indicated by your physician  ? If you have an artificial heart valve, or joint replacement within two years, we will prescribe an antibiotic. Please take one hour prior to surgery.    2. You will need a  to be with you if your surgical site is close to your eyes. You can get swelling/bruising immediately after surgery and will go home with a big bulky bandage that could obstruct your view of driving safely.    3. Wear comfortable clothing -- preferably a button down shirt or a loose fitted shirt. (to avoid pulling over pressure bandage off when you get home) If your surgery site is on your leg, try wearing loose fitted pants. If your surgery site is on your arm, try wearing a short-sleeve shirt.    4. Bring reading material or other items to help pass time. We do have internet access available if you own a laptop, iPad, etc.)    5. Women - do NOT wear make-up. We will be washing it off anyone needing surgery on the face    6. Do NOT stop blood thinning medication unless instructed to do so by your surgeon. This will include: Warfarin, Coumadin, Jantoven, Aspirin, Plavix and Pradaxa. If you are taking Warfarin or Coumadin, please have your INR blood test results sent to our office no more than 7 days prior to your surgery.     7. Tylenol is a good alternative to take for headaches and pain, and can be taken throughout your surgery and postoperative recovery.    8. If your surgery is on your trunk, arms, hands, legs, feet, fingernail or a toenail, please wash the area with Hibiclens, an over-the-counter antiseptic soap,  the night before and morning of your surgery.     If you have any questions, please feel free to contact us.    Phone numbers:  During business hours (M-F 8:00-4:30 p.m.)  [facility]:  [phone] - select option 1 for appt. desk  [phone] - select option 3 for nurse triage line  [facility]:   [phone] - goes straight to call center   ---------------------------------------------------------  Evenings/Weekends/Holidays  Hospital - [phone]   TTY for hearing xoyodpkw-[phone]  *Ask  to page dermatologist on-call  Emergency Atoc-[phone]  TTY for hearing impaired- [phone]

## 2019-07-03 NOTE — TELEPHONE ENCOUNTER
[facility] Call Center    Phone Message    May a detailed message be left on voicemail: yes    Reason for Call: Medication Question or concern regarding medication   Prescription Clarification  Name of Medication:   cephALEXin (KEFLEX) 500 MG capsule 4 capsule 0 7/3/2019 7/3/2019 --   Sig - Route: Take 1 capsule (500 mg) by mouth once for 1 dose - Oral       Prescribing Provider: Porsha   Pharmacy: [facility]   What on the order needs clarification? Instructions, Is is suppose to be 4 capsule's for one dose, Please call the pharmacy as soon as possible today if you can!          Action Taken: Message routed to:  [facility] ([facility]): Derm Surg

## 2019-07-03 NOTE — LETTER
7/3/2019       RE: Brooks Summers  [address]  [facility] [address]     Dear Colleague,    Thank you for referring your patient, Brooks Summers, to the [facility] DERMATOLOGIC SURGERY at [facility]. Please see a copy of my visit note below.    [facility] Dermatology Note    Dermatology Surgery Clinic  [facility]  [address] [place] [id number]    Dermatology Problem List:  1. Hx of NMSC:  - BCC, R back s/p Bx 06/06/19; MMS date TBD  - SCCIS, R jawline s/p Bx 06/06/19; MMS date TBD  - BCC, L malar cheek, s/p 11/17  -BCC, R nasal ala, s/p 11/17  -BCC, dorsal nose, s/p Mohs 05/2013  2. AK  3. SK    Encounter Date: Jul 3, 2019    CC:  Chief Complaint   Patient presents with     Derm Problem     scc jawline, bcc back     History of Present Illness:  Mr. 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 These lesions were discovered and biopsied during his visit Dr. Kong on 06/06/2019. At that visit he also received cryotherapy for multiple AKs. Today the pt has not questions or concerns regarding his upcoming surgery. Pt has a replaced heart valve, pacemaker, is on coumadin, and takes antibiotics prior to surgical procedures. The patient is otherwise feeling well. There are no other skin concerns at this time.    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, Bi-Ventricular- INT dependent     Endocarditis     S/P AVR     Need for prophylactic antibiotic     Hyperlipidemia with target LDL less than 100     Finger injury     Paroxysmal atrial fibrillation (H)     Long term current use of anticoagulant therapy     Trigger ring finger of left hand     Nocturnal hypoxemia     Neoplasm of uncertain behavior of skin     AK (actinic keratosis)     History of nonmelanoma skin cancer     Past Medical History:   Diagnosis Date     BCC (basal cell carcinoma), face 5/16/2013     Bicuspid aortic valve      Chronic systolic heart failure (H)      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 Tx with Dapto/RIFx 8 weeks.     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     Paroxysmal A-fib (H)     Post-op 7/14/2011, 1/26/13     Rotator Cuff (Capsule) Sprain and Strain      S/P aortic valve replacement     7/14/2011, re-do 1/25/13 due to endocarditis     Smoking hx      Thrombocytopenia (H)      Past Surgical History:   Procedure Laterality Date     ANESTHESIA CARDIOVERSION  7/18/2011    Procedure:ANESTHESIA CARDIOVERSION; Cardioversion; Surgeon:GENERIC ANESTHESIA PROVIDER; Location:[facility] OR     COLONOSCOPY       COLONOSCOPY N/A 11/19/2018    Procedure: COLONOSCOPY;  Surgeon: Herman Mcginnis MD;  Location: [facility]     CORONARY ANGIOGRAPHY ADULT ORDER       CYSTOSCOPY, BIOPSY URETHRA N/A 4/3/2017    Procedure: CYSTOSCOPY, BIOPSY URETHRA;  Surgeon: Marlena Mora MD;  [facility]: [facility] OR     ENDOSCOPY UPPER, COLONOSCOPY, COMBINED       HC REMOV & REPLACE IMPLT DEFIB PULSE GEN MULT LEAD  12/3/2015          HEMORRHOID SURGERY       IMPLANT AUTOMATIC IMPLANTABLE CARDIOVERTER DEFIBRILLATOR       MOHS  MICROGRAPHIC PROCEDURE       ORTHOPEDIC SURGERY      shoulder,right     REDO STERNOTOMY REPLACE VALVE AORTIC  2013    Procedure: REDO STERNOTOMY REPLACE VALVE AORTIC;  Redo Sternotomy, Redo Aortic Valve Replacement on pump oxygenator. Intraoperative Transesophageal Echocardiogram;  Surgeon: Stephanie Hampton MD;  Location[facility] OR     REPAIR VALVE MITRAL  2013     REPLACE VALVE AORTIC  2011    Procedure:REPLACE VALVE AORTIC; Median Sternotomy, Bioprosthesis,  Aortic Valve replacement on pump with oxygenator. Intra-operative Transesophogeal Echocardiogram.; Surgeon:STEPHANIE HAMPTON; Location: OR     teeth extractions       TRANSPLANT         Social History:  Social History     Socioeconomic History     Marital status:      Spouse name: Not on file     Number of children: Not on file     Years of education: Not on file     Highest education level: Not on file   Occupational History     Not on file   Social Needs     Financial resource strain: Not on file     Food insecurity:     Worry: Not on file     Inability: Not on file     Transportation needs:     Medical: Not on file     Non-medical: Not on file   Tobacco Use     Smoking status: Former Smoker     Packs/day: 1.00     Years: 20.00     Pack years: 20.00     Types: Cigarettes     Last attempt to quit: 1989     Years since quittin.5     Smokeless tobacco: Never Used   Substance and Sexual Activity     Alcohol use: Yes     Alcohol/week: 1.2 oz     Drug use: No     Sexual activity: Yes     Partners: Female     Birth control/protection: None     Comment:    Lifestyle     Physical activity:     Days per week: Not on file     Minutes per session: Not on file     Stress: Not on file   Relationships     Social connections:     Talks on phone: Not on file     Gets together: Not on file     Attends Nondenominational service: Not on file     Active member of club or organization: Not on file     Attends meetings of clubs or organizations: Not on file      Relationship status: Not on file     Intimate partner violence:     Fear of current or ex partner: Not on file     Emotionally abused: Not on file     Physically abused: Not on file     Forced sexual activity: Not on file   Other Topics Concern     Parent/sibling w/ CABG, MI or angioplasty before 65F 55M? No   Social History Narrative     since 2/1982 2 children 4 grandchildren.    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 Father 68        bypass, carotid      Prostate Cancer Father 70     Heart Disease Father      Cancer Mother 92        stomach, colon     Hypertension Mother      Retinal detachment Mother      Cancer Brother 64        lung cancer, petroleum     Cancer Brother      Glaucoma No family hx of      Macular Degeneration No family hx of      Strabismus No family hx of      Glasses (<7 y/o) No family hx of         Medications:  Current Outpatient Medications   Medication Sig Dispense Refill     allopurinol (ZYLOPRIM) 100 MG tablet Take 1 tablet (100 mg) by mouth every evening 90 tablet 3     cephALEXin (KEFLEX) 500 MG capsule Take 1 capsule (500 mg) by mouth once for 1 dose 4 capsule 0     Cyanocobalamin (VITAMIN B 12 PO)        losartan (COZAAR) 25 MG tablet Take 0.5 tablets (12.5 mg) by mouth every evening 45 tablet 3     metoprolol succinate ER (TOPROL XL) 50 MG 24 hr tablet Take 1 tablet (50 mg) by mouth daily 90 tablet 3     Multiple Vitamins-Minerals (MULTIVITAMIN ADULTS 50+) TABS        PROVIDER DOSED MANAGED WARFARIN, COUMADIN, OUTPATIENT Per coumadin clinic       simvastatin (ZOCOR) 40 MG tablet Take 1 tablet (40 mg) by mouth At Bedtime 90 tablet 3     VITAMIN D, CHOLECALCIFEROL, PO Take 1,000 Units by mouth daily       warfarin (COUMADIN) 5 MG tablet 7.5 mg Tues/Thurs/Sun; 10 mg all other days or as directed by the Anticoagulation Clinic 180 tablet 3     amoxicillin (AMOXIL) 500 MG  capsule Take 2000 MG one hour before colonoscopy. (Patient not taking: Reported on 4/26/2019) 4 capsule 3     order for DME Oxygen 1.5 Li/min  at night (Patient not taking: Reported on 12/4/2018) 1 Device 11       Allergies   Allergen Reactions     Lisinopril Cough       Review of Systems:  -As per HPI  -Otherwise nml state of health. No other skin concerns.    Physical exam:  Vitals: There were no vitals taken for this visit.  GEN: This is a well developed, well-nourished male in no acute distress, in a pleasant mood.    SKIN: Focused examination of the waist-up was performed.  - 2cm pearly ill-defined on the R mid back  - Pink scaling depressed papule on R neck, just inferior to the jawline.  - No other lesions of concern on areas examined.     Impression/Plan:  1. 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 Mohs surgery recommended.    Reviewed pathology report and nature of diagnosis.     Risks, benefits, and process of Mohs micrographic surgery were discussed including possibility of damage to surrounding anatomical structures and infection. Patient is comfortable proceeding with the surgery; consent form was obtained. He will be scheduled at his convenience.    Indication for pre-op antibiotics needed. Pt prescribed Keflex, which should be taken 500mg the morning of surgery.    Pre-op written instructions provided.     2. Basal cell carcinoma of the R back. Mohs surgery recommended.    Reviewed pathology report and nature of diagnosis.     Risks, benefits, and process of Mohs micrographic surgery were discussed including possibility of damage to surrounding anatomical structures and infection. Patient is comfortable proceeding with the surgery; consent form was obtained. He will be scheduled at his convenience.      Follow-up as scheduled for [facility].       Staff Involved:    Scribe Disclosure  I, Kiran Saldana, am serving as a scribe to document services personally performed by Dr. Erasmo Story, based  on data collection and the provider's statements to me.     Attending Attestation  I attest that the Scribe recorded the interview and exam that I personally performed.  I have reviewed the note and edited it as necessary.    Erasmo Story M.D.  Professor  Director of Dermatologic Surgery  Department of Dermatology  [facility]

## 2019-07-09 NOTE — TELEPHONE ENCOUNTER
FUTURE VISIT INFORMATION      FUTURE VISIT INFORMATION:    Date: 7.11.19    Time: 7:30 am    Location:  Derm/Surg  REFERRAL INFORMATION:    Referring provider:  Dr. Carlos Alberto Kong    Referring providers clinic:   Dermatology    Reason for visit/diagnosis: Mohs for BCC right back and SCC right jawline    RECORDS REQUESTED FROM:       Clinic name Comments Records Status Imaging Status    Derm/Surg 7.3.19 consult with Dr. Porsha nelson    Dermatology 6.6.19 office visit with Dr. Dilan nelson

## 2019-07-11 ENCOUNTER — OFFICE VISIT (OUTPATIENT)
Dept: DERMATOLOGY | Facility: CLINIC | Age: 74
End: 2019-07-11
Payer: MEDICARE

## 2019-07-11 ENCOUNTER — PRE VISIT (OUTPATIENT)
Dept: DERMATOLOGY | Facility: CLINIC | Age: 74
End: 2019-07-11

## 2019-07-11 VITALS — SYSTOLIC BLOOD PRESSURE: 146 MMHG | DIASTOLIC BLOOD PRESSURE: 77 MMHG | HEART RATE: 70 BPM

## 2019-07-11 DIAGNOSIS — D04.39 SQUAMOUS CELL CARCINOMA IN SITU OF SKIN OF JAWLINE: Primary | ICD-10-CM

## 2019-07-11 DIAGNOSIS — C44.519 BASAL CELL CARCINOMA OF BACK: ICD-10-CM

## 2019-07-11 ASSESSMENT — PAIN SCALES - GENERAL
PAINLEVEL: NO PAIN (0)
PAINLEVEL: NO PAIN (0)

## 2019-07-11 NOTE — PROGRESS NOTES
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    Informed consent was obtained and the patient underwent the procedure as follows:  The patient was placed left lateral decubitus on the operating table.  The cancer was identified, outlined with a marker, and verified by the patient.  The entire surgical field was prepped with Hibiclens.  The surgical site was anesthetized using Lidocaine 1% with epi 1:100,000.    The area of clinically apparent tumor was not debulked. The layer of tissue was then surgically excised using a #15 blade and was then transferred onto a specimen sheet maintaining the orientation of the specimen. Hemostasis was obtained using bipolar electrocoagulation. The wound site was then covered with a dressing while the tissue samples were processed for examination.    The excised tissue was transported to the Mohs histology laboratory maintaining the tissue orientation.  The tissue specimen was relaxed so that the entire surgical margin was in a a single horizontal plane for  sectioning and inked for precise mapping.  A precise reference map was drawn to reflect the sectioning of the specimen, colored inking of the margins, and orientation on the patient. The tissue was processed using horizontal sectioning of the base and continuous peripheral margins.  The histopathologic sections were reviewed in conjunction with the reference map.    Total blocks: 2    Total slides:  6    There were no cancer cells visualized on examination, therefore Mohs surgery was complete.      Reconstruction: Complex Closure    The patient was taken to the operative suite and placed supine on the operating room table.  The defect was identified.  Appropriate markings were made with a marking pen to plan the repair.  The area was infiltrated with Lidocaine 1% with epi 1:100,000 and prepped with Hibiclens and draped with sterile towels.     The wound was debeveled and undermined widely.  Cones were excised within relaxed skin tension lines on both sides of the defect.  Hemostasis was obtained using electrocoagulation. The wound was narrowed with SMAS placation and the dermis and subcutaneous tissue were then approximated using 4-0 Monocryl buried vertical mattress sutures.  The wound edges were then approximated additional buried sutures were placed in a similar fashion where needed.  Percutaneous simple sunning 5-0 fast absorbing gut sutures were carefully placed for maximum eversion and meticulous approximation.    Repair Size: 6.9cm    The wound was cleansed with saline and ointment was applied along the wound surface. A sterile pressure dressing was applied.  Wound care instructions were given verbally and in writing.  The patient left the operating suite in stable condition.  Patient was informed that additional refinement of the resulting surgical scar may be used as a second stage of this reconstruction.     The Attending surgeon was present for key portions of the surgery and always immediately  available.                Photo placed into patients chart note for further reference.         Staff Involved:    Scribe Disclosure  I, Kiran Saldana, am serving as a scribe to document services personally performed by Dr. Erasmo Story, based on data collection and the provider's statements to me.       Attending attestation:  I was present for key elements of the procedure and immediately available for all other portions of the procedure.  I have reviewed the note and edited it as necessary.    Erasmo Story M.D.  Professor  Director of Dermatologic Surgery  Department of Dermatology  [facility]    Dermatology Surgery Clinic  [facility]  [address] [place], [place] [id number]

## 2019-07-11 NOTE — PATIENT INSTRUCTIONS
Wound Care Instructions  I will experience scar, altered skin color, bleeding, swelling, pain, crusting and redness. I may experience altered sensation. Risks are excessive bleeding, infection, muscle weakness, thick (hypertrophic or keloidal) scar, and recurrence,. A second procedure may be recommended to obtain the best cosmetic or functional result.  Possible complications of any surgical procedure are bleeding, infection, scarring, alteration in skin color and sensation, muscle weakness in the area, wound dehiscence or seperation, or recurrence of the lesion or disease. On occasion, after healing, a secondary procedure or revision may be recommended in order to obtain the best cosmetic or functional result.   After your surgery, a pressure bandage will be placed over the area that has sutures. This will help prevent bleeding. Please follow these instructions as they will help you to prevent complications as your wound heals.  For the First 48 hours After Surgery:  1. Leave the pressure bandage on and keep it dry. If it should come loose, you may retape it, but do not take it off.  2. Relax and take it easy. Do not do any vigorous exercise, heavy lifting, or bending forward. This could cause the wound to bleed.  3. Post-operative pain is usually mild. You may take plain or extra strength Tylenol every 4 hours as needed (do not take more than 4,000mg in one day). Do not take any medicine that contains aspirin, ibuprofen or motrin unless you have been recommended these by a doctor.  Avoid alcohol and vitamin E as these may increase your tendency to bleed.  4. You may put an ice pack around the bandaged area for 20 minutes every 2-3 hours. This may help reduce swelling, bruising, and pain. Make sure the ice pack is waterproof so that the pressure bandage does not get wet.   5. You may see a small amount of drainage or blood on your pressure bandage. This is normal. However, if drainage or bleeding continues or  saturates the bandage, you will need to apply firm pressure over the bandage with a washcloth for 15 minutes. If bleeding continues after applying pressure for 15 minutes then go to the nearest emergency room.  48 Hours After Surgery  Carefully remove the bandage and start daily wound care and dressing changes. You may also now shower and get the wound wet. Wash wound with a mild soap and water.  Use caution when washing the wound. Be gentle and do not let the forceful shower stream hit the wound directly.  PAT dry.  Daily Wound Care:  1. Wash wound with a mild soap and water.  Use caution when washing the wound, be gentle and do not let the forceful shower stream hit the wound directly.  2. PAT DRY.  3. Apply Vaseline (from a new container or tube) over the suture line with a Q-tip. It is very important to keep the wound continuously moist, as wounds heal best in a moist environment.  4.  Keep the site covered until sutures are dissolved (7-10 days), you can cover it with a Telfa (non-stick) dressing and tape or a band-aid.    5. If you are unable to keep wound covered, you must apply Vaseline every 2 - 3 hours (while awake) to ensure it is being kept moist for optimal healing. A dressing overnight is recommended to keep the area moist.   Call Us If:  1. You have pain that is not controlled with Tylenol.  2. You have signs or symptoms of an infection, such as: fever over 100 degrees F, redness, warmth, or foul-smelling or yellow/creamy drainage from the wound.  Who should I call with questions?    [facility]: [phone]     [facility]: [phone]    For urgent needs outside of business hours call the [facility] at [phone] and ask for the dermatology resident on call

## 2019-07-11 NOTE — LETTER
7/11/2019       RE: Brooks Summers  [address]  [facility] [address]     Dear Colleague,    Thank you for referring your patient, Brooks Summers, to the [facility] DERMATOLOGIC SURGERY at [facility]. Please see a copy of my visit note below.    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    Informed consent was obtained and the patient underwent the procedure as follows:  The patient was placed left lateral decubitus on the operating table.  The cancer was identified, outlined with a marker, and verified by the patient.  The entire surgical field was prepped with Hibiclens.  The surgical site was anesthetized using Lidocaine 1% with epi 1:100,000.    The area of clinically apparent tumor was not debulked. The layer of tissue was then surgically excised using a #15 blade and was then transferred onto a specimen sheet maintaining the orientation of the specimen. Hemostasis was obtained using bipolar electrocoagulation. The wound site was then  covered with a dressing while the tissue samples were processed for examination.    The excised [facility] maintaining the tissue orientation.  The tissue specimen was relaxed so that the entire surgical margin was in a a single horizontal plane for sectioning and inked for precise mapping.  A precise reference map was drawn to reflect the sectioning of the specimen, colored inking of the margins, and orientation on the patient. The tissue was processed using horizontal sectioning of the base and continuous peripheral margins.  The histopathologic sections were reviewed in conjunction with the reference map.    Total blocks: 2    Total slides:  6    There were no cancer cells visualized on examination, therefore Mohs surgery was complete.      Reconstruction: Complex Closure    The patient was taken to the operative suite and placed supine on the operating room table.  The defect was identified.  Appropriate markings were made with a marking pen to plan the repair.  The area was infiltrated with Lidocaine 1% with epi 1:100,000 and prepped with Hibiclens and draped with sterile towels.     The wound was debeveled and undermined widely.  Cones were excised within relaxed skin tension lines on both sides of the defect.  Hemostasis was obtained using electrocoagulation. The wound was narrowed with SMAS placation and the dermis and subcutaneous tissue were then approximated using 4-0 Monocryl buried vertical mattress sutures.  The wound edges were then approximated additional buried sutures were placed in a similar fashion where needed.  Percutaneous simple sunning 5-0 fast absorbing gut sutures were carefully placed for maximum eversion and meticulous approximation.    Repair Size: 6.9cm    The wound was cleansed with saline and ointment was applied along the wound surface. A sterile pressure dressing was applied.  Wound care instructions were given verbally and in writing.   The patient left the operating suite in stable condition.  Patient was informed that additional refinement of the resulting surgical scar may be used as a second stage of this reconstruction.     The Attending surgeon was present for key portions of the surgery and always immediately available.                Photo placed into patients chart note for further reference.         Staff Involved:    Scribe Disclosure  I, Balajishelia Shana, am serving as a scribe to document services personally performed by Dr. Erasmo Story, based on data collection and the provider's statements to me.       Attending attestation:  I was present for key elements of the procedure and immediately available for all other portions of the procedure.  I have reviewed the note and edited it as necessary.    Erasmo Story M.D.  Professor  Director of Dermatologic Surgery  Department of Dermatology  [facility]    Dermatology Surgery Clinic  [address] Mohs Dermatologic Surgery Procedure Note    [place] [address]    Date of Service:  Jul 11, 2019  Surgery: Mohs micrographic surgery    Primary Surgeon: Porsha   Assistant Surgeon: Marcelo    Case 2  Repair Type: complex  Repair Size: 4.8 cm  Suture Material: Monocryl 4-0; Fast Absorbing Gut 5-0  Tumor Type: BCC - Basal cell carcinoma  Location: right back  Derm-Path Accession #: [id number]  PreOp Size: 1.0 x 1.5 cm  PostOp Size: 2.0 x 1.5 cm  Mohs Accession #:  IM  Level of Defect: fat    Procedure:  We discussed the principles of treatment and most likely complications including scarring, bleeding, infection, swelling, pain, crusting, nerve damage, large wound,  incomplete excision, wound dehiscence,  nerve damage, recurrence, and a second procedure  may be recommended to obtain the best cosmetic or functional result.    Informed consent was obtained and the patient underwent the procedure as follows:  The patient was placed left lateral decubitus on the operating table.  The cancer was identified, outlined with a marker, and verified by the patient.  The entire surgical field was prepped with Hibiclens.  The surgical site was anesthetized using Lidocaine 1% with epi 1:100,000.    The area of clinically apparent tumor was not debulked. The layer of tissue was then surgically excised using a #15 blade and was then transferred onto a specimen sheet maintaining the orientation of the specimen. Hemostasis was obtained using bipolar electrocoagulation. The wound site was then covered with a dressing while the tissue samples were processed for examination.    The excised tissue was transported to the Mohs histology laboratory maintaining the tissue orientation.  The tissue specimen was relaxed so that the entire surgical margin was in a a single horizontal plane for sectioning and inked for precise mapping.  A precise reference map was drawn to reflect the sectioning of the specimen, colored inking of the margins, and orientation on the patient.  The tissue was processed using horizontal sectioning of the base and continuous peripheral margins.  The histopathologic sections were reviewed in conjunction with the reference map.    Total blocks: 1    Total slides:  3    There were no cancer cells visualized on examination, therefore Mohs surgery was complete.      Reconstruction: Complex Closure    The patient was taken to the operative suite and placed supine on the operating room table.  The defect was identified.  Appropriate markings were made with a marking pen to plan the repair.  The area was infiltrated with Lidocaine 1% with epi 1:100,000 and prepped with Hibiclens and draped with sterile towels.     The wound was debeveled and undermined widely.  Cones were  excised within relaxed skin tension lines on both sides of the defect.  Hemostasis was obtained using electrocoagulation. The wound was narrowed with SMAS placation and the dermis and subcutaneous tissue were then approximated using 4-0 Monocryl buried vertical mattress sutures.  The wound edges were then approximated additional buried sutures were placed in a similar fashion where needed.  Percutaneous simple sunning 5-0 fast absorbing gut sutures were carefully placed for maximum eversion and meticulous approximation.    Repair Size: 4.8cm    The wound was cleansed with saline and ointment was applied along the wound surface. A sterile pressure dressing was applied.  Wound care instructions were given verbally and in writing.  The patient left the operating suite in stable condition.  Patient was informed that additional refinement of the resulting surgical scar may be used as a second stage of this reconstruction.     The Attending surgeon was present for key portions of the surgery and always immediately available.                Photo placed into patients chart note for further reference.         Staff Involved:    Scribe Disclosure  I, Kiran Saldana, am serving as a scribe to document services personally performed by Dr. Erasmo Story, based on data collection and the provider's statements to me.       Attending attestation:  I was present for key elements of the procedure and immediately available for all other portions of the procedure.  I have reviewed the note and edited it as necessary.    Erasmo Story M.D.  Professor  Director of Dermatologic Surgery  Department of Dermatology  [facility]    Dermatology Surgery Clinic  [facility]  [address] [place], [place] [id number]

## 2019-07-11 NOTE — PROGRESS NOTES
[facility] Mohs Dermatologic Surgery Procedure Note    Dermatology Surgery Clinic  [facility]  Clinics and Surgery Center  [address] [place] [address]    Date of Service:  Jul 11, 2019  Surgery: Mohs micrographic surgery    Primary Surgeon: Porsha   Assistant Surgeon: Marcelo    Case 2  Repair Type: complex  Repair Size: 4.8 cm  Suture Material: Monocryl 4-0; Fast Absorbing Gut 5-0  Tumor Type: BCC - Basal cell carcinoma  Location: right back  Derm-Path Accession #: [id number]  PreOp Size: 1.0 x 1.5 cm  PostOp Size: 2.0 x 1.5 cm  Mohs Accession #:  IM  Level of Defect: fat    Procedure:  We discussed the principles of treatment and most likely complications including scarring, bleeding, infection, swelling, pain, crusting, nerve damage, large wound,  incomplete excision, wound dehiscence,  nerve damage, recurrence, and a second procedure may be recommended to obtain the best cosmetic or functional result.    Informed consent was obtained and the patient underwent the procedure as follows:  The patient was placed left lateral decubitus on the operating table.  The cancer was identified, outlined with a marker, and verified by the patient.  The entire surgical field was prepped with Hibiclens.  The surgical site was anesthetized using Lidocaine 1% with epi 1:100,000.    The area of clinically apparent tumor was not debulked. The layer of tissue was then surgically excised using a #15 blade and was then transferred onto a specimen sheet maintaining the orientation of the specimen. Hemostasis was obtained using bipolar electrocoagulation. The wound site was then covered with a dressing while the tissue samples were processed for examination.    The excised tissue was transported to the Mohs histology laboratory maintaining the tissue orientation.  The tissue specimen was relaxed so that the entire surgical margin was in a a single horizontal plane for sectioning and  inked for precise mapping.  A precise reference map was drawn to reflect the sectioning of the specimen, colored inking of the margins, and orientation on the patient.  The tissue was processed using horizontal sectioning of the base and continuous peripheral margins.  The histopathologic sections were reviewed in conjunction with the reference map.    Total blocks: 1    Total slides:  3    There were no cancer cells visualized on examination, therefore Mohs surgery was complete.      Reconstruction: Complex Closure    The patient was taken to the operative suite and placed supine on the operating room table.  The defect was identified.  Appropriate markings were made with a marking pen to plan the repair.  The area was infiltrated with Lidocaine 1% with epi 1:100,000 and prepped with Hibiclens and draped with sterile towels.     The wound was debeveled and undermined widely.  Cones were excised within relaxed skin tension lines on both sides of the defect.  Hemostasis was obtained using electrocoagulation. The wound was narrowed with SMAS placation and the dermis and subcutaneous tissue were then approximated using 4-0 Monocryl buried vertical mattress sutures.  The wound edges were then approximated additional buried sutures were placed in a similar fashion where needed.  Percutaneous simple sunning 5-0 fast absorbing gut sutures were carefully placed for maximum eversion and meticulous approximation.    Repair Size: 4.8cm    The wound was cleansed with saline and ointment was applied along the wound surface. A sterile pressure dressing was applied.  Wound care instructions were given verbally and in writing.  The patient left the operating suite in stable condition.  Patient was informed that additional refinement of the resulting surgical scar may be used as a second stage of this reconstruction.     The Attending surgeon was present for key portions of the surgery and always immediately  available.                Photo placed into patients chart note for further reference.         Staff Involved:    Scribe Disclosure  I, Kiran Saldana, am serving as a scribe to document services personally performed by Dr. Erasmo Story, based on data collection and the provider's statements to me.       Attending attestation:  I was present for key elements of the procedure and immediately available for all other portions of the procedure.  I have reviewed the note and edited it as necessary.    Erasmo Story M.D.  Professor  Director of Dermatologic Surgery  Department of Dermatology  [facility]    Dermatology Surgery Clinic  [facility] and [facility]  [address] [place], [place] [id number]

## 2019-07-11 NOTE — NURSING NOTE
Chief Complaint   Patient presents with     Derm Problem     Patient is here today Mohs on jawline and right back.      Nicole Perry CMA

## 2019-08-07 ENCOUNTER — ANTICOAGULATION THERAPY VISIT (OUTPATIENT)
Dept: ANTICOAGULATION | Facility: CLINIC | Age: 74
End: 2019-08-07
Payer: MEDICARE

## 2019-08-07 DIAGNOSIS — Z95.2 S/P AORTIC VALVE REPLACEMENT: ICD-10-CM

## 2019-08-07 DIAGNOSIS — Z79.01 LONG TERM CURRENT USE OF ANTICOAGULANT THERAPY: ICD-10-CM

## 2019-08-07 DIAGNOSIS — I48.0 PAROXYSMAL ATRIAL FIBRILLATION (H): ICD-10-CM

## 2019-08-07 LAB — INR POINT OF CARE: 2.4 (ref 0.86–1.14)

## 2019-08-07 PROCEDURE — 36416 COLLJ CAPILLARY BLOOD SPEC: CPT

## 2019-08-07 PROCEDURE — 85610 PROTHROMBIN TIME: CPT | Mod: QW

## 2019-08-07 PROCEDURE — 99207 ZZC NO CHARGE NURSE ONLY: CPT

## 2019-08-07 NOTE — PROGRESS NOTES
ANTICOAGULATION FOLLOW-UP CLINIC VISIT    Patient Name:  Brooks Summers  Date:  2019  Contact Type:  Face to Face    SUBJECTIVE:  Patient Findings     Comments:   No changes in diet, activity level, medications (including over the counter), or health. No missed doses of warfarin. Patient took dosing as prescribed. No signs of clots or bleeding concerns. Patient will continue maintenance warfarin dosing.          Clinical Outcomes     Negatives:   Major bleeding event, Thromboembolic event, Anticoagulation-related hospital admission, Anticoagulation-related ED visit, Anticoagulation-related fatality    Comments:   No changes in diet, activity level, medications (including over the counter), or health. No missed doses of warfarin. Patient took dosing as prescribed. No signs of clots or bleeding concerns. Patient will continue maintenance warfarin dosing.             OBJECTIVE    INR Protime   Date Value Ref Range Status   2019 2.4 (A) 0.86 - 1.14 Final       ASSESSMENT / PLAN  INR assessment THER    Recheck INR In: 6 WEEKS    INR Location Clinic      Anticoagulation Summary  As of 2019    INR goal:   2.0-3.0   TTR:   73.9 % (4.4 y)   INR used for dosin.4 (2019)   Warfarin maintenance plan:   7.5 mg (5 mg x 1.5) every Sun, Tue, Thu; 10 mg (5 mg x 2) all other days   Full warfarin instructions:   7.5 mg every Sun, Tue, Thu; 10 mg all other days   Weekly warfarin total:   62.5 mg   No change documented:   Aniya Garcia RN   Plan last modified:   Aniya Garcia RN (2018)   Next INR check:   2019   Priority:   INR   Target end date:   Indefinite    Indications    S/P aortic valve replacement [Z95.2]  Paroxysmal atrial fibrillation (H) [I48.0]  Long term current use of anticoagulant therapy [Z79.01]             Anticoagulation Episode Summary     INR check location:       Preferred lab:       Send INR reminders to:   JONATHAN OWUSU    Comments:    * warfarin 5 mg tabs[place] 21 mm Johnson Perimount Magna Tissue Valve. Alere home meter      Anticoagulation Care Providers     Provider Role Specialty Phone number    Edin Mays MD [facility] Internal Medicine [phone]            See the Encounter Report to view Anticoagulation Flowsheet and Dosing Calendar (Go to Encounters tab in chart review, and find the Anticoagulation Therapy Visit)        Aniya Garcia RN

## 2019-08-27 ENCOUNTER — TELEPHONE (OUTPATIENT)
Dept: OPHTHALMOLOGY | Facility: CLINIC | Age: 74
End: 2019-08-27

## 2019-08-27 NOTE — TELEPHONE ENCOUNTER
----- Message -----      From: Aniya Garcia, CALLUM Mcneil. This patient would like to sign up for a home INR meter. We will continue to monitor him here at the anticoagulation clinic once he is signed up.     Please let me know what fax number to send the physician form to for you signature.     Thank you,   Aniya Garcia, RN, BSN, PHN   [facility]   [phone]                       Fax number given to INR clinic for form to be sent.  Juliana Flores RN 9:23 AM on 8/23/2018.    
Alisha physician form faxed to [phone] with confirmation.    Aniya Garcia RN, BSN, PHN    
done

## 2019-09-13 ENCOUNTER — ANCILLARY PROCEDURE (OUTPATIENT)
Dept: CARDIOLOGY | Facility: CLINIC | Age: 74
End: 2019-09-13
Attending: INTERNAL MEDICINE
Payer: MEDICARE

## 2019-09-13 DIAGNOSIS — I42.9 CARDIOMYOPATHY (H): ICD-10-CM

## 2019-09-13 PROCEDURE — 93297 REM INTERROG DEV EVAL ICPMS: CPT | Performed by: INTERNAL MEDICINE

## 2019-09-13 PROCEDURE — 93296 REM INTERROG EVL PM/IDS: CPT | Mod: ZF

## 2019-09-13 PROCEDURE — 93295 DEV INTERROG REMOTE 1/2/MLT: CPT | Mod: ZP | Performed by: INTERNAL MEDICINE

## 2019-09-18 ENCOUNTER — TELEPHONE (OUTPATIENT)
Dept: ANTICOAGULATION | Facility: CLINIC | Age: 74
End: 2019-09-18

## 2019-09-18 ENCOUNTER — ANTICOAGULATION THERAPY VISIT (OUTPATIENT)
Dept: ANTICOAGULATION | Facility: CLINIC | Age: 74
End: 2019-09-18
Payer: MEDICARE

## 2019-09-18 DIAGNOSIS — Z95.2 S/P AORTIC VALVE REPLACEMENT: ICD-10-CM

## 2019-09-18 DIAGNOSIS — I48.0 PAROXYSMAL ATRIAL FIBRILLATION (H): ICD-10-CM

## 2019-09-18 DIAGNOSIS — Z79.01 LONG TERM CURRENT USE OF ANTICOAGULANT THERAPY: ICD-10-CM

## 2019-09-18 DIAGNOSIS — Z79.01 ENCOUNTER FOR MONITORING COUMADIN THERAPY: Primary | ICD-10-CM

## 2019-09-18 DIAGNOSIS — Z51.81 ENCOUNTER FOR MONITORING COUMADIN THERAPY: Primary | ICD-10-CM

## 2019-09-18 LAB — INR POINT OF CARE: 2.4 (ref 0.86–1.14)

## 2019-09-18 PROCEDURE — 36416 COLLJ CAPILLARY BLOOD SPEC: CPT

## 2019-09-18 PROCEDURE — 85610 PROTHROMBIN TIME: CPT | Mod: QW

## 2019-09-18 PROCEDURE — 99207 ZZC NO CHARGE NURSE ONLY: CPT

## 2019-09-18 NOTE — PROGRESS NOTES
ANTICOAGULATION FOLLOW-UP CLINIC VISIT    Patient Name:  Brooks Summers  Date:  2019  Contact Type:  Face to Face    SUBJECTIVE:  Patient Findings     Comments:   No changes in diet, activity level, medications (including over the counter), or health. No missed doses of warfarin. Patient took dosing as prescribed. No signs of clots or bleeding concerns. Patient will continue maintenance warfarin dosing.  Writer sent phone encounter to PCP to review current goal range. He has a valve replacement and atrial fib. Patient will be traveling outside of the [place]. Writer printed travel exercises for the patient. Will check INR the Wed after he returns.         Clinical Outcomes     Negatives:   Major bleeding event, Thromboembolic event, Anticoagulation-related hospital admission, Anticoagulation-related ED visit, Anticoagulation-related fatality    Comments:   No changes in diet, activity level, medications (including over the counter), or health. No missed doses of warfarin. Patient took dosing as prescribed. No signs of clots or bleeding concerns. Patient will continue maintenance warfarin dosing.  Writer sent phone encounter to PCP to review current goal range. He has a valve replacement and atrial fib. Patient will be traveling outside of the country soon. Writer printed travel exercises for the patient. Will check INR the Wed after he returns.            OBJECTIVE    INR Protime   Date Value Ref Range Status   2019 2.4 (A) 0.86 - 1.14 Final       ASSESSMENT / PLAN  INR assessment THER    Recheck INR In: 7 WEEKS    INR Location Clinic      Anticoagulation Summary  As of 2019    INR goal:   2.0-3.0   TTR:   74.6 % (4.5 y)   INR used for dosin.4 (2019)   Warfarin maintenance plan:   7.5 mg (5 mg x 1.5) every Sun, Tue, Thu; 10 mg (5 mg x 2) all other days   Full warfarin instructions:   7.5 mg every Sun, Tue, Thu; 10 mg all other days   Weekly warfarin total:   62.5 mg   No change  documented:   Aniya Garcia RN   Plan last modified:   Aniya Garcia RN (9/26/2018)   Next INR check:   11/6/2019   Priority:   INR   Target end date:   Indefinite    Indications    S/P aortic valve replacement [Z95.2]  Paroxysmal atrial fibrillation (H) [I48.0]  Long term current use of anticoagulant therapy [Z79.01]             Anticoagulation Episode Summary     INR check location:       Preferred lab:       Send INR reminders to:   JONATHAN OWUSU    Comments:    * warfarin 5 mg tabs. Aortic 21 mm Johnson Perimount Magna Tissue Valve.       Anticoagulation Care Providers     Provider Role Specialty Phone number    Edin Mays MD [facility] Internal Medicine [phone]            See the Encounter Report to view Anticoagulation Flowsheet and Dosing Calendar (Go to Encounters tab in chart review, and find the Anticoagulation Therapy Visit)        Aniya Garcia RN

## 2019-09-18 NOTE — TELEPHONE ENCOUNTER
On second glance, it appears he has a tissue valve, not mechanical, and the goal range is correct. Writer will send an updated INR referral for co-signature.    Aniya Garcia RN, BSN, PHN

## 2019-09-18 NOTE — PATIENT INSTRUCTIONS
Keep your body moving when traveling by airplane.    Seated Exercises:    Ankle Circles: Lift your feet off the floor and twirl your feet as if you re drawing circles with your toes. Continue this for 15 seconds, then reverse direction. Repeat as desired.    Foot Pumps: Keep your heels on the floor and lift the front of your feet toward you as high as possible. Hold for a second or two, then flatten your feet and lift your heels as high as possible, keeping the balls of your feet on the floor. Continue for 30 seconds, and repeat as desired.    Knee Lifts: Keeping your leg bent, lift your knee up to your chest. Bring back to normal position and repeat with your other leg. Repeat 20 to 30 times for each leg.      General Tips:    If you have an opportunity to move around the cabin, walk to the restroom and back.    Drink plenty of fluids, preferably water, to avoid dehydration.    Walk for 30 minutes before boarding the plane.

## 2019-09-18 NOTE — TELEPHONE ENCOUNTER
Hello. This patient's INR referral came up to be renewed. It looks like he has an aortic valve replacement and atrial fib. The following is [facility]'s most recent recommendation for goal ranges:    Appendix A: INR Goal Ranges Indication  INR  Duration of Therapy    Cardiovascular Disease6    Acute MI - large anterior MI, left ventricular thrombus treatment/prevention with 81mg aspirin  2-3  3 months    Coronary Artery Disease  2-3  Lifetime    Valvular Disease3, 4    Aortic Valve, Mechanical     Bileaflet or current generation single tilt disc, and no other thrombotic risk factor    2-3  Lifetime    Aortic Valve, Mechanical     Older generation Edgar Shiley, Ball and Cage, monoleaflet    2.5-3.5  Lifetime    Aortic Valve, Mechanical     With other thrombotic risk factors (Afib, previous thromboembolism, LV dysfunction, hypercoagulable condition)    2.5-3.5  Lifetime    Aortic On-X Mechanical Valve 4  2-3  First 3 months    1.5-2  Lifetime after initial 3 months with 81mg ASA    Mitral valve- Bioprosthetic  2-3  3 months post procedure    Mitral valve-Mechanical  2.5-3.5  Lifetime    Mitral On-X Mechanical valve 4  2.5-3.5  Initial 3 months & Lifetime    Non-valvular Afib2    Moderate to High risk [id number] score   (Male>1, Female>2)  2-3  Lifetime    Cardioversion  2-3  3 weeks prior and 4 weeks post    DVT/PE, CVA/TIA 1    Provoked VTE  2-3  3 months minimum    Unprovoked VTE  2-3  3 months minimum to lifetime    Recurrent VTE  2-3  Lifetime    Clotting Disorders5    Antiphospholipid antibody syndrome  2-3  Lifetime    Recurrent clot while adequately anticoagulated  TBD  Refer to hematology      Due to having a valve replacement and atrial fib, should he be switched to a 2.5-3.5 goal range?    Thank you,  Aniya Garcia, RN, BSN, PHN  Pool [id number]

## 2019-09-19 LAB
MDC_IDC_EPISODE_DTM: NORMAL
MDC_IDC_EPISODE_DURATION: 12 S
MDC_IDC_EPISODE_DURATION: 13 S
MDC_IDC_EPISODE_DURATION: 7 S
MDC_IDC_EPISODE_DURATION: 8 S
MDC_IDC_EPISODE_ID: 25
MDC_IDC_EPISODE_ID: 26
MDC_IDC_EPISODE_ID: 27
MDC_IDC_EPISODE_ID: 28
MDC_IDC_EPISODE_TYPE: NORMAL
MDC_IDC_LEAD_IMPLANT_DT: NORMAL
MDC_IDC_LEAD_LOCATION: NORMAL
MDC_IDC_LEAD_LOCATION_DETAIL_1: NORMAL
MDC_IDC_LEAD_MFG: NORMAL
MDC_IDC_LEAD_MODEL: NORMAL
MDC_IDC_LEAD_POLARITY_TYPE: NORMAL
MDC_IDC_LEAD_SERIAL: NORMAL
MDC_IDC_LEAD_SPECIAL_FUNCTION: NORMAL
MDC_IDC_MSMT_BATTERY_DTM: NORMAL
MDC_IDC_MSMT_BATTERY_REMAINING_LONGEVITY: 15 MO
MDC_IDC_MSMT_BATTERY_RRT_TRIGGER: 2.73
MDC_IDC_MSMT_BATTERY_STATUS: NORMAL
MDC_IDC_MSMT_BATTERY_VOLTAGE: 2.9 V
MDC_IDC_MSMT_CAP_CHARGE_DTM: NORMAL
MDC_IDC_MSMT_CAP_CHARGE_ENERGY: 18 J
MDC_IDC_MSMT_CAP_CHARGE_TIME: 4.4
MDC_IDC_MSMT_CAP_CHARGE_TYPE: NORMAL
MDC_IDC_MSMT_LEADCHNL_LV_IMPEDANCE_VALUE: 399 OHM
MDC_IDC_MSMT_LEADCHNL_LV_IMPEDANCE_VALUE: 437 OHM
MDC_IDC_MSMT_LEADCHNL_LV_IMPEDANCE_VALUE: 760 OHM
MDC_IDC_MSMT_LEADCHNL_LV_PACING_THRESHOLD_AMPLITUDE: 1.5 V
MDC_IDC_MSMT_LEADCHNL_LV_PACING_THRESHOLD_PULSEWIDTH: 1 MS
MDC_IDC_MSMT_LEADCHNL_RA_IMPEDANCE_VALUE: 456 OHM
MDC_IDC_MSMT_LEADCHNL_RA_PACING_THRESHOLD_AMPLITUDE: 0.62 V
MDC_IDC_MSMT_LEADCHNL_RA_PACING_THRESHOLD_PULSEWIDTH: 0.4 MS
MDC_IDC_MSMT_LEADCHNL_RA_SENSING_INTR_AMPL: 1.5 MV
MDC_IDC_MSMT_LEADCHNL_RA_SENSING_INTR_AMPL: 1.5 MV
MDC_IDC_MSMT_LEADCHNL_RV_IMPEDANCE_VALUE: 456 OHM
MDC_IDC_MSMT_LEADCHNL_RV_IMPEDANCE_VALUE: 551 OHM
MDC_IDC_MSMT_LEADCHNL_RV_PACING_THRESHOLD_AMPLITUDE: 0.62 V
MDC_IDC_MSMT_LEADCHNL_RV_PACING_THRESHOLD_PULSEWIDTH: 0.4 MS
MDC_IDC_MSMT_LEADCHNL_RV_SENSING_INTR_AMPL: 22.38 MV
MDC_IDC_MSMT_LEADCHNL_RV_SENSING_INTR_AMPL: 22.38 MV
MDC_IDC_PG_IMPLANT_DTM: NORMAL
MDC_IDC_PG_MFG: NORMAL
MDC_IDC_PG_MODEL: NORMAL
MDC_IDC_PG_SERIAL: NORMAL
MDC_IDC_PG_TYPE: NORMAL
MDC_IDC_SESS_CLINIC_NAME: NORMAL
MDC_IDC_SESS_DTM: NORMAL
MDC_IDC_SESS_TYPE: NORMAL
MDC_IDC_SET_BRADY_AT_MODE_SWITCH_RATE: 171 {BEATS}/MIN
MDC_IDC_SET_BRADY_LOWRATE: 60 {BEATS}/MIN
MDC_IDC_SET_BRADY_MAX_SENSOR_RATE: 140 {BEATS}/MIN
MDC_IDC_SET_BRADY_MAX_TRACKING_RATE: 140 {BEATS}/MIN
MDC_IDC_SET_BRADY_MODE: NORMAL
MDC_IDC_SET_BRADY_PAV_DELAY_HIGH: 100 MS
MDC_IDC_SET_BRADY_PAV_DELAY_LOW: 130 MS
MDC_IDC_SET_BRADY_SAV_DELAY_HIGH: 70 MS
MDC_IDC_SET_BRADY_SAV_DELAY_LOW: 100 MS
MDC_IDC_SET_CRT_LVRV_DELAY: 0 MS
MDC_IDC_SET_CRT_PACED_CHAMBERS: NORMAL
MDC_IDC_SET_LEADCHNL_LV_PACING_AMPLITUDE: 2.5 V
MDC_IDC_SET_LEADCHNL_LV_PACING_ANODE_ELECTRODE_1: NORMAL
MDC_IDC_SET_LEADCHNL_LV_PACING_ANODE_LOCATION_1: NORMAL
MDC_IDC_SET_LEADCHNL_LV_PACING_CAPTURE_MODE: NORMAL
MDC_IDC_SET_LEADCHNL_LV_PACING_CATHODE_ELECTRODE_1: NORMAL
MDC_IDC_SET_LEADCHNL_LV_PACING_CATHODE_LOCATION_1: NORMAL
MDC_IDC_SET_LEADCHNL_LV_PACING_POLARITY: NORMAL
MDC_IDC_SET_LEADCHNL_LV_PACING_PULSEWIDTH: 1 MS
MDC_IDC_SET_LEADCHNL_RA_PACING_AMPLITUDE: 1.5 V
MDC_IDC_SET_LEADCHNL_RA_PACING_ANODE_ELECTRODE_1: NORMAL
MDC_IDC_SET_LEADCHNL_RA_PACING_ANODE_LOCATION_1: NORMAL
MDC_IDC_SET_LEADCHNL_RA_PACING_CAPTURE_MODE: NORMAL
MDC_IDC_SET_LEADCHNL_RA_PACING_CATHODE_ELECTRODE_1: NORMAL
MDC_IDC_SET_LEADCHNL_RA_PACING_CATHODE_LOCATION_1: NORMAL
MDC_IDC_SET_LEADCHNL_RA_PACING_POLARITY: NORMAL
MDC_IDC_SET_LEADCHNL_RA_PACING_PULSEWIDTH: 0.4 MS
MDC_IDC_SET_LEADCHNL_RA_SENSING_ANODE_ELECTRODE_1: NORMAL
MDC_IDC_SET_LEADCHNL_RA_SENSING_ANODE_LOCATION_1: NORMAL
MDC_IDC_SET_LEADCHNL_RA_SENSING_CATHODE_ELECTRODE_1: NORMAL
MDC_IDC_SET_LEADCHNL_RA_SENSING_CATHODE_LOCATION_1: NORMAL
MDC_IDC_SET_LEADCHNL_RA_SENSING_POLARITY: NORMAL
MDC_IDC_SET_LEADCHNL_RA_SENSING_SENSITIVITY: 0.3 MV
MDC_IDC_SET_LEADCHNL_RV_PACING_AMPLITUDE: 2 V
MDC_IDC_SET_LEADCHNL_RV_PACING_ANODE_ELECTRODE_1: NORMAL
MDC_IDC_SET_LEADCHNL_RV_PACING_ANODE_LOCATION_1: NORMAL
MDC_IDC_SET_LEADCHNL_RV_PACING_CAPTURE_MODE: NORMAL
MDC_IDC_SET_LEADCHNL_RV_PACING_CATHODE_ELECTRODE_1: NORMAL
MDC_IDC_SET_LEADCHNL_RV_PACING_CATHODE_LOCATION_1: NORMAL
MDC_IDC_SET_LEADCHNL_RV_PACING_POLARITY: NORMAL
MDC_IDC_SET_LEADCHNL_RV_PACING_PULSEWIDTH: 0.4 MS
MDC_IDC_SET_LEADCHNL_RV_SENSING_ANODE_ELECTRODE_1: NORMAL
MDC_IDC_SET_LEADCHNL_RV_SENSING_ANODE_LOCATION_1: NORMAL
MDC_IDC_SET_LEADCHNL_RV_SENSING_CATHODE_ELECTRODE_1: NORMAL
MDC_IDC_SET_LEADCHNL_RV_SENSING_CATHODE_LOCATION_1: NORMAL
MDC_IDC_SET_LEADCHNL_RV_SENSING_POLARITY: NORMAL
MDC_IDC_SET_LEADCHNL_RV_SENSING_SENSITIVITY: 0.3 MV
MDC_IDC_SET_ZONE_DETECTION_BEATS_DENOMINATOR: 40 {BEATS}
MDC_IDC_SET_ZONE_DETECTION_BEATS_NUMERATOR: 30 {BEATS}
MDC_IDC_SET_ZONE_DETECTION_INTERVAL: 320 MS
MDC_IDC_SET_ZONE_DETECTION_INTERVAL: 350 MS
MDC_IDC_SET_ZONE_DETECTION_INTERVAL: 360 MS
MDC_IDC_SET_ZONE_DETECTION_INTERVAL: 420 MS
MDC_IDC_SET_ZONE_DETECTION_INTERVAL: NORMAL
MDC_IDC_SET_ZONE_TYPE: NORMAL
MDC_IDC_STAT_AT_BURDEN_PERCENT: 0 %
MDC_IDC_STAT_AT_DTM_END: NORMAL
MDC_IDC_STAT_AT_DTM_START: NORMAL
MDC_IDC_STAT_BRADY_AP_VP_PERCENT: 26.8 %
MDC_IDC_STAT_BRADY_AP_VS_PERCENT: 0.02 %
MDC_IDC_STAT_BRADY_AS_VP_PERCENT: 73 %
MDC_IDC_STAT_BRADY_AS_VS_PERCENT: 0.18 %
MDC_IDC_STAT_BRADY_DTM_END: NORMAL
MDC_IDC_STAT_BRADY_DTM_START: NORMAL
MDC_IDC_STAT_BRADY_RA_PERCENT_PACED: 26.72 %
MDC_IDC_STAT_BRADY_RV_PERCENT_PACED: 99.54 %
MDC_IDC_STAT_CRT_DTM_END: NORMAL
MDC_IDC_STAT_CRT_DTM_START: NORMAL
MDC_IDC_STAT_CRT_LV_PERCENT_PACED: 99.43 %
MDC_IDC_STAT_CRT_PERCENT_PACED: 99.43 %
MDC_IDC_STAT_EPISODE_RECENT_COUNT: 0
MDC_IDC_STAT_EPISODE_RECENT_COUNT_DTM_END: NORMAL
MDC_IDC_STAT_EPISODE_RECENT_COUNT_DTM_START: NORMAL
MDC_IDC_STAT_EPISODE_TOTAL_COUNT: 0
MDC_IDC_STAT_EPISODE_TOTAL_COUNT: 3
MDC_IDC_STAT_EPISODE_TOTAL_COUNT_DTM_END: NORMAL
MDC_IDC_STAT_EPISODE_TOTAL_COUNT_DTM_START: NORMAL
MDC_IDC_STAT_EPISODE_TYPE: NORMAL
MDC_IDC_STAT_TACHYTHERAPY_ATP_DELIVERED_RECENT: 0
MDC_IDC_STAT_TACHYTHERAPY_ATP_DELIVERED_TOTAL: 0
MDC_IDC_STAT_TACHYTHERAPY_RECENT_DTM_END: NORMAL
MDC_IDC_STAT_TACHYTHERAPY_RECENT_DTM_START: NORMAL
MDC_IDC_STAT_TACHYTHERAPY_SHOCKS_ABORTED_RECENT: 0
MDC_IDC_STAT_TACHYTHERAPY_SHOCKS_ABORTED_TOTAL: 0
MDC_IDC_STAT_TACHYTHERAPY_SHOCKS_DELIVERED_RECENT: 0
MDC_IDC_STAT_TACHYTHERAPY_SHOCKS_DELIVERED_TOTAL: 0
MDC_IDC_STAT_TACHYTHERAPY_TOTAL_DTM_END: NORMAL
MDC_IDC_STAT_TACHYTHERAPY_TOTAL_DTM_START: NORMAL

## 2019-10-07 ENCOUNTER — TELEPHONE (OUTPATIENT)
Dept: DERMATOLOGY | Facility: CLINIC | Age: 74
End: 2019-10-07

## 2019-10-07 NOTE — TELEPHONE ENCOUNTER
[facility] Center    Phone Message    May a detailed message be left on voicemail: yes    Reason for Call: Other: Lucero calling to cancel Brooks's upcoming appointment in Derm Surg with the nurse.  She states he has a cold and would like to reschedule the appointment for a later date.  Please call her back to reschedule     Action Taken: Message routed to:  [facility]

## 2019-10-08 NOTE — TELEPHONE ENCOUNTER
Called to reschedule appt with derm surg nurse. Lucero suggested she will call back in November to schedule. Call back number was provided.

## 2019-10-14 ENCOUNTER — TELEPHONE (OUTPATIENT)
Dept: INTERNAL MEDICINE | Facility: CLINIC | Age: 74
End: 2019-10-14

## 2019-10-14 DIAGNOSIS — Z95.2 S/P AORTIC VALVE REPLACEMENT: ICD-10-CM

## 2019-10-14 DIAGNOSIS — Z79.01 LONG TERM CURRENT USE OF ANTICOAGULANT THERAPY: ICD-10-CM

## 2019-10-14 DIAGNOSIS — I48.0 PAROXYSMAL ATRIAL FIBRILLATION (H): ICD-10-CM

## 2019-10-14 DIAGNOSIS — Z95.2 S/P AVR (AORTIC VALVE REPLACEMENT): ICD-10-CM

## 2019-10-14 RX ORDER — WARFARIN SODIUM 5 MG/1
TABLET ORAL
Qty: 160 TABLET | Refills: 0 | Status: SHIPPED | OUTPATIENT
Start: 2019-10-14 | End: 2020-01-10

## 2019-10-14 NOTE — TELEPHONE ENCOUNTER
[facility] Call [facility]    Phone Message    May a detailed message be left on voicemail: yes    Reason for Call: Medication Refill Request    Has the patient contacted the pharmacy for the refill? Yes   Name of medication being requested: warfarin (COUMADIN) 5 MG tablet  Provider who prescribed the medication: Dr Mays  Pharmacy: [id number] [address]  AT [facility]  Date medication is needed: ASAP - Pt is out - needs Today please      Action Taken: Message routed to:  [facility]

## 2019-10-14 NOTE — TELEPHONE ENCOUNTER
Current warfarin dose:  Full warfarin instructions:   7.5 mg every Sun, Tue, Thu; 10 mg all other days   Weekly warfarin total:   62.5 mg   Next INR check:   11/6/2019     Last INR result:    INR Protime   Date Value Ref Range Status   09/18/2019 2.4 (A) 0.86 - 1.14 Final     Last office visit: 5/17/2019     Refill authorized per [facility] protocol.    Doug DUPREE RN, CACP

## 2019-11-06 ENCOUNTER — ANTICOAGULATION THERAPY VISIT (OUTPATIENT)
Dept: ANTICOAGULATION | Facility: CLINIC | Age: 74
End: 2019-11-06
Payer: MEDICARE

## 2019-11-06 DIAGNOSIS — Z95.2 S/P AORTIC VALVE REPLACEMENT: ICD-10-CM

## 2019-11-06 DIAGNOSIS — I48.0 PAROXYSMAL ATRIAL FIBRILLATION (H): ICD-10-CM

## 2019-11-06 DIAGNOSIS — Z79.01 LONG TERM CURRENT USE OF ANTICOAGULANT THERAPY: ICD-10-CM

## 2019-11-06 LAB — INR POINT OF CARE: 2.4 (ref 0.86–1.14)

## 2019-11-06 PROCEDURE — 85610 PROTHROMBIN TIME: CPT | Mod: QW

## 2019-11-06 PROCEDURE — 36416 COLLJ CAPILLARY BLOOD SPEC: CPT

## 2019-11-06 PROCEDURE — 99207 ZZC NO CHARGE NURSE ONLY: CPT

## 2019-11-06 NOTE — PROGRESS NOTES
ANTICOAGULATION FOLLOW-UP CLINIC VISIT    Patient Name:  Brooks Summers  Date:  2019  Contact Type:  Face to Face    SUBJECTIVE:  Patient Findings     Comments:   No changes in diet, activity level, medications (including over the counter), or health. No missed doses of warfarin. Patient took dosing as prescribed. No signs of clots or bleeding concerns. Patient will continue maintenance warfarin dosing.          Clinical Outcomes     Negatives:   Major bleeding event, Thromboembolic event, Anticoagulation-related hospital admission, Anticoagulation-related ED visit, Anticoagulation-related fatality    Comments:   No changes in diet, activity level, medications (including over the counter), or health. No missed doses of warfarin. Patient took dosing as prescribed. No signs of clots or bleeding concerns. Patient will continue maintenance warfarin dosing.             OBJECTIVE    INR Protime   Date Value Ref Range Status   2019 2.4 (A) 0.86 - 1.14 Final       ASSESSMENT / PLAN  INR assessment THER    Recheck INR In: 6 WEEKS    INR Location Clinic      Anticoagulation Summary  As of 2019    INR goal:   2.0-3.0   TTR:   75.3 % (4.7 y)   INR used for dosin.4 (2019)   Warfarin maintenance plan:   7.5 mg (5 mg x 1.5) every Sun, Tue, Thu; 10 mg (5 mg x 2) all other days   Full warfarin instructions:   7.5 mg every Sun, Tue, Thu; 10 mg all other days   Weekly warfarin total:   62.5 mg   No change documented:   Aniya Garcia RN   Plan last modified:   Aniya Garcia RN (2018)   Next INR check:   2019   Priority:   INR   Target end date:   Indefinite    Indications    S/P aortic valve replacement [Z95.2]  Paroxysmal atrial fibrillation (H) [I48.0]  Long term current use of anticoagulant therapy [Z79.01]             Anticoagulation Episode Summary     INR check location:       Preferred lab:       Send INR reminders to:   JONATHAN OWUSU    Comments:    * warfarin 5 mg tabs. Aortic  21 mm Johnson Perimount Magna Tissue Valve.       Anticoagulation Care Providers     Provider Role Specialty Phone number    Edin Mays MD [facility] Internal Medicine [phone]            See the Encounter Report to view Anticoagulation Flowsheet and Dosing Calendar (Go to Encounters tab in chart review, and find the Anticoagulation Therapy Visit)        Aniya Garcia RN

## 2019-11-09 ENCOUNTER — HEALTH MAINTENANCE LETTER (OUTPATIENT)
Age: 74
End: 2019-11-09

## 2019-12-18 ENCOUNTER — ANTICOAGULATION THERAPY VISIT (OUTPATIENT)
Dept: ANTICOAGULATION | Facility: CLINIC | Age: 74
End: 2019-12-18
Payer: MEDICARE

## 2019-12-18 ENCOUNTER — OFFICE VISIT (OUTPATIENT)
Dept: INTERNAL MEDICINE | Facility: CLINIC | Age: 74
End: 2019-12-18
Payer: MEDICARE

## 2019-12-18 VITALS
BODY MASS INDEX: 24.94 KG/M2 | DIASTOLIC BLOOD PRESSURE: 90 MMHG | TEMPERATURE: 97.3 F | OXYGEN SATURATION: 97 % | HEIGHT: 70 IN | HEART RATE: 80 BPM | WEIGHT: 174.2 LBS | SYSTOLIC BLOOD PRESSURE: 155 MMHG

## 2019-12-18 DIAGNOSIS — Z79.01 LONG TERM CURRENT USE OF ANTICOAGULANT THERAPY: ICD-10-CM

## 2019-12-18 DIAGNOSIS — E78.00 HIGH CHOLESTEROL: Primary | ICD-10-CM

## 2019-12-18 DIAGNOSIS — I48.0 PAROXYSMAL ATRIAL FIBRILLATION (H): ICD-10-CM

## 2019-12-18 DIAGNOSIS — Z95.2 S/P AORTIC VALVE REPLACEMENT: ICD-10-CM

## 2019-12-18 LAB — INR POINT OF CARE: 2.5 (ref 0.86–1.14)

## 2019-12-18 PROCEDURE — 36416 COLLJ CAPILLARY BLOOD SPEC: CPT

## 2019-12-18 PROCEDURE — 85610 PROTHROMBIN TIME: CPT | Mod: QW

## 2019-12-18 PROCEDURE — 99207 ZZC NO CHARGE NURSE ONLY: CPT

## 2019-12-18 ASSESSMENT — PAIN SCALES - GENERAL: PAINLEVEL: NO PAIN (0)

## 2019-12-18 ASSESSMENT — MIFFLIN-ST. JEOR: SCORE: 1532.67

## 2019-12-18 NOTE — PROGRESS NOTES
"HPI:    Pt. Comes in for follow up  today.  He has h/o prosthetic aortic valve endocarditis (see detailed ID note from Dr. Bravo 3/13). He also has h/o atrial fibrillation on coumadin. He is s/p ICD for prior low EF now normal. He o/w is doing fine and denies any new HEENT, cardiopulmonary, abdominal, , lymphatic, endocrine, vascular, systemic sxs. He was seen 11/3/2017 by Dr. Castro Cardiology and  Saw  Dr. Mckenzie 12/4/2018.  He was seen by Dr. Tim, Hematology 8/19/15 for low platelets.   He remains active  No balance complaints.       PE:    Vitals noted gen nad cooperative alert, HEENT, ears normal oropharynx clear no exudate, neck supple nl rom no adenopathy, LCTA, B, RRR, S1,S2, murmur present, abdomen, nd ,nt, no masses, Cervical neck w/o tenderness. Lower back w/o tenderness or mass. Neurological exam B UE/LE/proximal/distal is normal and symmetric for sensation, reflexes, and strength.  No tenderness to palpation of lower back.         A/P:    1. He will continue to follow in cardiology for h/o AVR and remains on coumadin for afib.   Seen by Dr. Mckenzie 12/4/2018 and has follow up 1/28/2020  2. Colonoscopy done 11/19/2018  3. Seen in Dermatology 7/11/2019  4. He saw Dr. Tim, Hematology 8/15 for low platelets and will follow.   5. Seen by Dr. Mora Urology 4/2017 for BPH/hematuria.   PSA done 3/2018 and 4/26/2019 at 2.83. Seen again by  Dr. Mora 10/12/2018 and again 4/26/2019 for hematuria  6. Up to date on vaccinations (influenza 10/5/2019) and recommend he check with insurance for new Shingles vaccination  7. He had EMG and saw Dr. Ingram, Neurology 8/17/2018 for \"electric\" extremity complaints. .        Total time spent 25 minutes.  More than 50% of the time spent with Mr. Summers on counseling / coordinating his care                            "

## 2019-12-18 NOTE — PROGRESS NOTES
ANTICOAGULATION FOLLOW-UP CLINIC VISIT    Patient Name:  Brooks Summres  Date:  2019  Contact Type:  Face to Face    SUBJECTIVE:  Patient Findings     Comments:   No changes in diet, activity level, medications (including over the counter), or health. No missed doses of warfarin. Patient took dosing as prescribed. No signs of clots or bleeding concerns. Patient will continue maintenance warfarin dosing.          Clinical Outcomes     Negatives:   Major bleeding event, Thromboembolic event, Anticoagulation-related hospital admission, Anticoagulation-related ED visit, Anticoagulation-related fatality    Comments:   No changes in diet, activity level, medications (including over the counter), or health. No missed doses of warfarin. Patient took dosing as prescribed. No signs of clots or bleeding concerns. Patient will continue maintenance warfarin dosing.             OBJECTIVE    INR Protime   Date Value Ref Range Status   2019 2.5 (A) 0.86 - 1.14 Final       ASSESSMENT / PLAN  INR assessment THER    Recheck INR In: 6 WEEKS    INR Location Clinic      Anticoagulation Summary  As of 2019    INR goal:   2.0-3.0   TTR:   100.0 % (1 y)   INR used for dosin.5 (2019)   Warfarin maintenance plan:   7.5 mg (5 mg x 1.5) every Sun, Tue, Thu; 10 mg (5 mg x 2) all other days   Full warfarin instructions:   7.5 mg every Sun, Tue, Thu; 10 mg all other days   Weekly warfarin total:   62.5 mg   No change documented:   Aniya Garcia RN   Plan last modified:   Aniya Garcia RN (2018)   Next INR check:   2020   Priority:   Maintenance   Target end date:   Indefinite    Indications    S/P aortic valve replacement [Z95.2]  Paroxysmal atrial fibrillation (H) [I48.0]  Long term current use of anticoagulant therapy [Z79.01]             Anticoagulation Episode Summary     INR check location:       Preferred lab:       Send INR reminders to:   JONATHAN OWUSU    Comments:    * warfarin 5 mg tabs.  Aortic 21 mm Johnson Perimount Magna Tissue Valve.       Anticoagulation Care Providers     Provider Role Specialty Phone number    Edin Mays MD [facility] Internal Medicine [phone]            See the Encounter Report to view Anticoagulation Flowsheet and Dosing Calendar (Go to Encounters tab in chart review, and find the Anticoagulation Therapy Visit)        Aniya Garcia RN

## 2019-12-18 NOTE — NURSING NOTE
Chief Complaint   Patient presents with     Physical     pt here for physical       Miriam Hatfield CMA at 6:56 AM on 12/18/2019.

## 2020-01-08 DIAGNOSIS — I10 BENIGN ESSENTIAL HYPERTENSION: Primary | ICD-10-CM

## 2020-01-10 DIAGNOSIS — Z95.2 S/P AVR (AORTIC VALVE REPLACEMENT): ICD-10-CM

## 2020-01-10 DIAGNOSIS — Z79.01 LONG TERM CURRENT USE OF ANTICOAGULANT THERAPY: ICD-10-CM

## 2020-01-10 DIAGNOSIS — Z95.2 S/P AORTIC VALVE REPLACEMENT: ICD-10-CM

## 2020-01-10 DIAGNOSIS — I48.0 PAROXYSMAL ATRIAL FIBRILLATION (H): ICD-10-CM

## 2020-01-10 RX ORDER — WARFARIN SODIUM 5 MG/1
TABLET ORAL
Qty: 160 TABLET | Refills: 0 | Status: SHIPPED | OUTPATIENT
Start: 2020-01-10 | End: 2020-04-06

## 2020-01-10 RX ORDER — LOSARTAN POTASSIUM 25 MG/1
TABLET ORAL
Qty: 45 TABLET | Refills: 0 | Status: SHIPPED | OUTPATIENT
Start: 2020-01-10 | End: 2020-01-28

## 2020-01-10 NOTE — TELEPHONE ENCOUNTER
Current warfarin dose:  Full warfarin instructions:   7.5 mg every Sun, Tue, Thu; 10 mg all other days   Weekly warfarin total:   62.5 mg   No change documented:   Aniya Garcia RN   Plan last modified:   Aniya Garcia RN (9/26/2018)   Next INR check:   1/29/2020     Last office visit: 12/18/2019    Last INR result:  INR Protime   Date Value Ref Range Status   12/18/2019 2.5 (A) 0.86 - 1.14 Final       Refill authorized per [facility] protocol.    LUIS Medina RN BSN

## 2020-01-22 ENCOUNTER — OFFICE VISIT (OUTPATIENT)
Dept: FAMILY MEDICINE | Facility: CLINIC | Age: 75
End: 2020-01-22
Payer: MEDICARE

## 2020-01-22 ENCOUNTER — ANCILLARY PROCEDURE (OUTPATIENT)
Dept: GENERAL RADIOLOGY | Facility: CLINIC | Age: 75
End: 2020-01-22
Attending: FAMILY MEDICINE
Payer: MEDICARE

## 2020-01-22 VITALS
SYSTOLIC BLOOD PRESSURE: 116 MMHG | WEIGHT: 175 LBS | BODY MASS INDEX: 25.28 KG/M2 | TEMPERATURE: 97.6 F | OXYGEN SATURATION: 98 % | DIASTOLIC BLOOD PRESSURE: 68 MMHG | HEART RATE: 71 BPM

## 2020-01-22 DIAGNOSIS — M25.552 HIP PAIN, LEFT: Primary | ICD-10-CM

## 2020-01-22 DIAGNOSIS — M25.552 HIP PAIN, LEFT: ICD-10-CM

## 2020-01-22 PROCEDURE — 99203 OFFICE O/P NEW LOW 30 MIN: CPT | Performed by: FAMILY MEDICINE

## 2020-01-22 PROCEDURE — 73502 X-RAY EXAM HIP UNI 2-3 VIEWS: CPT | Mod: FY

## 2020-01-22 ASSESSMENT — ENCOUNTER SYMPTOMS
CHILLS: 0
HEADACHES: 0
SORE THROAT: 0
COUGH: 0
NERVOUS/ANXIOUS: 0
WEAKNESS: 0
DIZZINESS: 0
MYALGIAS: 0
FEVER: 0
PARESTHESIAS: 0
PALPITATIONS: 0
JOINT SWELLING: 0
ARTHRALGIAS: 1
SHORTNESS OF BREATH: 0

## 2020-01-22 NOTE — PROGRESS NOTES
Subjective     Brooks Summers is a 74 year old male who presents to clinic today for the following health issues:    HPI   Chief Complaint   Patient presents with     Musculoskeletal Problem     fell on ice 2 weeks ago.  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[employer], Bi-Ventricular- INT dependent     Endocarditis     S/P AVR     Need for prophylactic antibiotic     Hyperlipidemia with target LDL less than 100     Finger injury     Paroxysmal atrial fibrillation (H)     Long term current use of anticoagulant therapy     Trigger ring finger of left hand     Nocturnal hypoxemia     Neoplasm of uncertain behavior of skin     AK (actinic keratosis)     History of nonmelanoma skin cancer     Past Surgical History:   Procedure Laterality Date     ANESTHESIA CARDIOVERSION  7/18/2011    Procedure:ANESTHESIA CARDIOVERSION; Cardioversion; Surgeon:GENERIC ANESTHESIA PROVIDER; Location:[facility]     COLONOSCOPY       COLONOSCOPY N/A 11/19/2018    Procedure: COLONOSCOPY;  Surgeon: Herman Mcginnis MD;  Location: [facility]     CORONARY ANGIOGRAPHY ADULT ORDER       CYSTOSCOPY, BIOPSY URETHRA N/A 4/3/2017    Procedure: CYSTOSCOPY, BIOPSY URETHRA;  Surgeon: Marlena Mora MD;  Location: [facility]     ENDOSCOPY UPPER, COLONOSCOPY, COMBINED       HC REMOV & REPLACE IMPLT DEFIB PULSE GEN MULT LEAD  12/3/2015          HEMORRHOID SURGERY       IMPLANT AUTOMATIC IMPLANTABLE  CARDIOVERTER DEFIBRILLATOR       MOHS MICROGRAPHIC PROCEDURE       ORTHOPEDIC SURGERY      shoulder,right     REDO STERNOTOMY REPLACE VALVE AORTIC  2013    Procedure: REDO STERNOTOMY REPLACE VALVE AORTIC;  Redo Sternotomy, Redo Aortic Valve Replacement on pump oxygenator. Intraoperative Transesophageal Echocardiogram;  Surgeon: Stephanie Hampton MD;  Location: [facility]     REPAIR VALVE MITRAL  2013     REPLACE VALVE AORTIC  2011    Procedure:REPLACE VALVE AORTIC; Median Sternotomy, Bioprosthesis,  Aortic Valve replacement on pump with oxygenator. Intra-operative Transesophogeal Echocardiogram.; Surgeon:STEPHANIE HAMPTON; Location[facility]     teeth extractions       TRANSPLANT         Social History     Tobacco Use     Smoking status: Former Smoker     Packs/day: 1.00     Years: 20.00     Pack years: 20.00     Types: Cigarettes     Last attempt to quit: 1989     Years since quittin.0     Smokeless tobacco: Never Used   Substance Use Topics     Alcohol use: Yes     Alcohol/week: 2.0 standard drinks     Family History   Problem Relation Age of Onset     C.A.D. Father 68        bypass, carotid      Prostate Cancer Father 70     Heart Disease Father      Cancer Mother 92        stomach, colon     Hypertension Mother      Retinal detachment Mother      Cancer Brother 64        lung cancer, petroleum     Cancer Brother      Glaucoma No family hx of      Macular Degeneration No family hx of      Strabismus No family hx of      Glasses (<7 y/o) No family hx of          Current Outpatient Medications   Medication Sig Dispense Refill     allopurinol (ZYLOPRIM) 100 MG tablet Take 1 tablet (100 mg) by mouth every evening 90 tablet 3     Cyanocobalamin (VITAMIN B 12 PO)        losartan (COZAAR) 25 MG tablet Take (1/2) tab daily Please remind patient to keep appt 20 45 tablet 0     metoprolol succinate ER (TOPROL XL) 50 MG 24 hr tablet Take 1 tablet (50 mg) by mouth daily 90 tablet 3     Multiple Vitamins-Minerals  (MULTIVITAMIN ADULTS 50+) TABS        simvastatin (ZOCOR) 40 MG tablet Take 1 tablet (40 mg) by mouth At Bedtime 90 tablet 3     VITAMIN D, CHOLECALCIFEROL, PO Take 1,000 Units by mouth daily       warfarin ANTICOAGULANT (COUMADIN) 5 MG tablet 7.5 mg Tues/Thurs/Sun; 10 mg all other days or as directed by the Anticoagulation Clinic 160 tablet 0     PROVIDER DOSED MANAGED WARFARIN, COUMADIN, OUTPATIENT Per coumadin clinic       Allergies   Allergen Reactions     Lisinopril Cough       1. Left hip pain: Had a fall 10 days ago.  Patient fell down on left side of hip while walking on ice.  Able to continue walk his walking. But has difficulty placing pressure 4 days after fall.  Noticed swelling.  Pain radiates to groin.  Hard to bear weight on left hip.  States of improvement with tylenol.     Review of Systems   Constitutional: Negative for chills and fever.   HENT: Negative for congestion, ear pain, hearing loss and sore throat.    Respiratory: Negative for cough and shortness of breath.    Cardiovascular: Negative for chest pain, palpitations and peripheral edema.   Musculoskeletal: Positive for arthralgias (left hip). Negative for joint swelling and myalgias.   Skin: Negative for rash.   Neurological: Negative for dizziness, weakness, headaches and paresthesias.   Psychiatric/Behavioral: Negative for mood changes. The patient is not nervous/anxious.             Objective    /68 (BP Location: Left arm, Cuff Size: Adult Large)   Pulse 71   Temp 97.6  F (36.4  C) (Tympanic)   Wt 79.4 kg (175 lb)   SpO2 98%   BMI 25.28 kg/m    Body mass index is 25.28 kg/m .  Physical Exam  Constitutional:       General: He is not in acute distress.     Appearance: He is well-developed.   HENT:      Head: Normocephalic and atraumatic.      Nose: Nose normal.   Eyes:      Conjunctiva/sclera: Conjunctivae normal.   Neck:      Musculoskeletal: Normal range of motion.      Trachea: No tracheal deviation.   Cardiovascular:       Rate and Rhythm: Normal rate and regular rhythm.      Heart sounds: Normal heart sounds.   Pulmonary:      Effort: Pulmonary effort is normal.      Breath sounds: No wheezing.   Musculoskeletal: Normal range of motion.      Comments: Mild bony prominence involving left hip involving greater trochanter.  Positive KRISTIN, decrease muscle strength in regards to hip flexion and hip abduction   Skin:     Findings: No erythema or rash.   Neurological:      Mental Status: He is alert and oriented to person, place, and time.   Psychiatric:         Behavior: Behavior normal.        Left hip xray: No acute fracture, no dislocation, mild arthritis? (prelim read me)    Assessment & Plan     1. Hip pain, left  May continue with tylenol as needed for pain  - XR Hip Left 2-3 Views; Future  - Orthopedic & Spine  Referral; Future    I spent 25 minutes with patient of which 50% was spent counseling and coordinating patient's care as well as discussing patient's plan of care.     See Patient Instructions    No follow-ups on file.    Alec Fleming DO  [facility]

## 2020-01-22 NOTE — PATIENT INSTRUCTIONS
Tammie Guy,    Thank you for allowing [facility] to manage your care.    May continue with tylenol as needed for pain.     I made an orthopedic surgery referral, they will be calling in approximately 1 week to set up your appointment.  If you do not hear from them, please call the specialty number on your after visit.     Please allow 1-2 business days for our office to call you in regards to your laboratory/radiological studies.  If not done so, I encourage you to login into [employer] ([email or web address] to review your results as well.     If you have any questions or concerns, please feel free to call us at [phone].    Sincerely,    Dr. Fleming    Did you know?  You can schedule an e-Visit for certain simple non-emergent issue for your convenience.  To learn more about or start an eVisit, simply login to [employer], click  Visits  on top banner, click  Start a Virtual Visit  drop down, and click  Symptom-Specific E-Visit

## 2020-01-23 DIAGNOSIS — E78.5 HYPERLIPIDEMIA LDL GOAL <100: ICD-10-CM

## 2020-01-23 NOTE — TELEPHONE ENCOUNTER
DIAGNOSIS: Hip pain, left/Dr Fleming/XR/Medicare/ortho con   APPOINTMENT DATE: 2/5   NOTES STATUS DETAILS   OFFICE NOTE from referring provider N/A    OFFICE NOTE from other specialist Internal    DISCHARGE SUMMARY from hospital N/A    DISCHARGE REPORT from the ER N/A    OPERATIVE REPORT N/A    MEDICATION LIST Internal    MRI N/A    CT SCAN Internal    XRAYS (IMAGES & REPORTS) Internal

## 2020-01-27 RX ORDER — SIMVASTATIN 40 MG
40 TABLET ORAL AT BEDTIME
Qty: 90 TABLET | Refills: 0 | Status: SHIPPED | OUTPATIENT
Start: 2020-01-27 | End: 2020-04-24

## 2020-01-27 NOTE — TELEPHONE ENCOUNTER
Refill request: simvastatin (ZOCOR) 40 MG tablet    Last Office visit: 12/4/2018  RTC: 1 year  Next Clinic visit: 1/28/2020

## 2020-01-28 ENCOUNTER — OFFICE VISIT (OUTPATIENT)
Dept: CARDIOLOGY | Facility: CLINIC | Age: 75
End: 2020-01-28
Attending: INTERNAL MEDICINE
Payer: MEDICARE

## 2020-01-28 VITALS
DIASTOLIC BLOOD PRESSURE: 80 MMHG | BODY MASS INDEX: 25.07 KG/M2 | HEART RATE: 71 BPM | HEIGHT: 70 IN | OXYGEN SATURATION: 95 % | SYSTOLIC BLOOD PRESSURE: 135 MMHG | WEIGHT: 175.1 LBS

## 2020-01-28 DIAGNOSIS — Z95.2 S/P AVR: Primary | ICD-10-CM

## 2020-01-28 DIAGNOSIS — M10.00 IDIOPATHIC GOUT, UNSPECIFIED CHRONICITY, UNSPECIFIED SITE: ICD-10-CM

## 2020-01-28 DIAGNOSIS — I10 BENIGN ESSENTIAL HYPERTENSION: ICD-10-CM

## 2020-01-28 DIAGNOSIS — I42.9 CARDIOMYOPATHY (H): ICD-10-CM

## 2020-01-28 DIAGNOSIS — E78.5 DYSLIPIDEMIA: ICD-10-CM

## 2020-01-28 PROCEDURE — G0463 HOSPITAL OUTPT CLINIC VISIT: HCPCS

## 2020-01-28 PROCEDURE — 99214 OFFICE O/P EST MOD 30 MIN: CPT | Mod: ZP | Performed by: INTERNAL MEDICINE

## 2020-01-28 RX ORDER — ALLOPURINOL 100 MG/1
100 TABLET ORAL EVERY EVENING
Qty: 90 TABLET | Refills: 3 | Status: SHIPPED | OUTPATIENT
Start: 2020-01-28 | End: 2021-03-09

## 2020-01-28 RX ORDER — LOSARTAN POTASSIUM 25 MG/1
12.5 TABLET ORAL DAILY
Qty: 45 TABLET | Refills: 3 | Status: SHIPPED | OUTPATIENT
Start: 2020-01-28 | End: 2021-03-29

## 2020-01-28 RX ORDER — METOPROLOL SUCCINATE 50 MG/1
50 TABLET, EXTENDED RELEASE ORAL DAILY
Qty: 90 TABLET | Refills: 3 | Status: SHIPPED | OUTPATIENT
Start: 2020-01-28 | End: 2021-02-06

## 2020-01-28 ASSESSMENT — MIFFLIN-ST. JEOR: SCORE: 1540.5

## 2020-01-28 ASSESSMENT — PAIN SCALES - GENERAL: PAINLEVEL: NO PAIN (0)

## 2020-01-28 NOTE — NURSING NOTE
Chief Complaint   Patient presents with     Follow Up      1 year return      Vitals were taken and medications were reconciled.   Evelyn oJhn  10:33 AM

## 2020-01-28 NOTE — LETTER
1/28/2020      RE: Brooks Summers  [address]  [facility] [address]       Dear Colleague,    Thank you for the opportunity to participate in the care of your patient, Brooks Summers, at the [facility]. Please see a copy of my visit note below.       SUBJECTIVE:  Brooks Summers is a 74 year old male who presents for follow up.  S/P Aortic valve replacement (23 mm Bogdan Johnson PERIMOUNT Magna tissue valve). On 7/14/11. For bicuspid aortic valve with severe aortic stenosis,severe LV dysfunction.     On 1/25/2013 had     1.  Redo median sternotomy.   2.  Aortic valve replacement with 21 mm Johnson Perimount Magna Tissue Valve.   3.  Mitral valve repair with pericardial patch repair of large anterior mitral valve leaflet perforation.     4.  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       NOTE:  An extra 2-3 hours was required for this operation because of the extreme complexity of this operation from severe prosthetic valve endocarditis and recent redo     Since her redo surgery patient is doing extremely well.  Had no cardiac complaints today.    Patient Active Problem List    Diagnosis Date Noted     Neoplasm of uncertain behavior of skin 06/08/2019     Priority: Medium     AK (actinic keratosis) 06/08/2019     Priority: Medium     History of nonmelanoma skin cancer 06/08/2019     Priority: Medium     Nocturnal hypoxemia 10/15/2018     Priority: Medium     Trigger ring finger of left hand 11/16/2015     Priority: Medium     Paroxysmal atrial fibrillation (H) 02/25/2015     Priority: Medium     Long term current use of anticoagulant therapy 02/25/2015     Priority: Medium     Problem list name updated by automated process. Provider to review       Finger injury 10/08/2014     Priority: Medium     Hyperlipidemia with target LDL less than 100 10/21/2013     Priority: Medium     Diagnosis updated by automated process.  Provider to review and confirm.       Need for prophylactic antibiotic 04/12/2013     Priority: Medium     Aortic valve and past endocarditis        S/P AVR 01/25/2013     Priority: Medium     Endocarditis 01/21/2013     Priority: Medium     Cultures negative but 16S rRNA PCR showed Staph capitis.  Treated with Dapto and RIF x 8 weeks.       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 Provider to review       LBBB (left bundle branch block) 01/28/2012     Priority: Medium     Received CRT-D;  msec       Pneumothorax, left 01/28/2012     Priority: Medium     Observed following CRT-D implant; small. Observe and repeat CXR       Heart failure, chronic systolic (H) 01/28/2012     Priority: Medium     Stable; NYHA classII       Cardiomyopathy, dilated, nonischemic (H) 01/28/2012     Priority: Medium     Mild CAD; EF 15-20%       CKD (chronic kidney disease) stage 3, GFR 30-59 ml/min (H) 01/28/2012     Priority: Medium     Dyslipidemia 01/28/2012     Priority: Medium     On Pravavastatin       Aortic valve stenosis, severe 07/14/2011     Priority: Medium     Chronic systolic heart failure (H) 07/14/2011     Priority: Medium     Bicuspid aortic valve 07/14/2011     Priority: Medium     S/P aortic valve replacement 07/14/2011     Priority: Medium     Smoking hx 07/14/2011     Priority: Medium     Rotator cuff (capsule) sprain 02/17/2009     Priority: Medium     Other postprocedural status(V45.89) 02/17/2009     Priority: Medium    .  Current Outpatient Medications   Medication Sig     allopurinol (ZYLOPRIM) 100 MG tablet Take 1 tablet (100 mg) by mouth every evening     Cyanocobalamin (VITAMIN B 12 PO)      losartan (COZAAR) 25 MG tablet Take 0.5 tablets (12.5 mg) by mouth daily Take (1/2) tab daily     metoprolol succinate ER (TOPROL XL) 50 MG 24 hr tablet Take 1 tablet (50 mg)  by mouth daily     Multiple Vitamins-Minerals (MULTIVITAMIN ADULTS 50+) TABS      PROVIDER DOSED MANAGED WARFARIN, COUMADIN, OUTPATIENT Per coumadin clinic     simvastatin (ZOCOR) 40 MG tablet Take 1 tablet (40 mg) by mouth At Bedtime     VITAMIN D, CHOLECALCIFEROL, PO Take 1,000 Units by mouth daily     warfarin ANTICOAGULANT (COUMADIN) 5 MG tablet 7.5 mg Tues/Thurs/Sun; 10 mg all other days or as directed by the Anticoagulation Clinic     Current Facility-Administered Medications   Medication     lidocaine 1% with EPINEPHrine 1:100,000 injection 3 mL     Past Medical History:   Diagnosis Date     BCC (basal cell carcinoma), face 5/16/2013     Bicuspid aortic valve      Chronic systolic heart failure (H)      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 Tx with Dapto/RIFx 8 weeks.     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     Paroxysmal A-fib (H)     Post-op 7/14/2011, 1/26/13     Rotator Cuff (Capsule) Sprain and Strain      S/P aortic valve replacement     7/14/2011, re-do 1/25/13 due to endocarditis     Smoking hx      Thrombocytopenia (H)      Past Surgical History:   Procedure Laterality Date     ANESTHESIA CARDIOVERSION  7/18/2011    Procedure:ANESTHESIA CARDIOVERSION; Cardioversion; Surgeon:GENERIC ANESTHESIA PROVIDER; Location:[facility]     COLONOSCOPY       COLONOSCOPY N/A 11/19/2018    Procedure: COLONOSCOPY;  Surgeon: Herman Mcginnis MD;  Location: [facility]     CORONARY ANGIOGRAPHY ADULT ORDER       CYSTOSCOPY, BIOPSY URETHRA N/A 4/3/2017    Procedure: CYSTOSCOPY, BIOPSY URETHRA;  Surgeon: Marlena Mora MD;  Location: [facility] OR     ENDOSCOPY UPPER, COLONOSCOPY, COMBINED       HC REMOV & REPLACE IMPLT DEFIB PULSE GEN MULT LEAD  12/3/2015          HEMORRHOID SURGERY       IMPLANT AUTOMATIC IMPLANTABLE CARDIOVERTER DEFIBRILLATOR       MOHS MICROGRAPHIC PROCEDURE        ORTHOPEDIC SURGERY      shoulder,right     REDO STERNOTOMY REPLACE VALVE AORTIC  2013    Procedure: REDO STERNOTOMY REPLACE VALVE AORTIC;  Redo Sternotomy, Redo Aortic Valve Replacement on pump oxygenator. Intraoperative Transesophageal Echocardiogram;  Surgeon: Stephanie Hampton MD;  Location:  OR     REPAIR VALVE MITRAL  2013     REPLACE VALVE AORTIC  2011    Procedure:REPLACE VALVE AORTIC; Median Sternotomy, Bioprosthesis,  Aortic Valve replacement on pump with oxygenator. Intra-operative Transesophogeal Echocardiogram.; Surgeon:STEPHANIE HAMPTON; Location: OR     teeth extractions       TRANSPLANT       Allergies   Allergen Reactions     Lisinopril Cough     Social History     Socioeconomic History     Marital status:      Spouse name: Not on file     Number of children: Not on file     Years of education: Not on file     Highest education level: Not on file   Occupational History     Not on file   Social Needs     Financial resource strain: Not on file     Food insecurity:     Worry: Not on file     Inability: Not on file     Transportation needs:     Medical: Not on file     Non-medical: Not on file   Tobacco Use     Smoking status: Former Smoker     Packs/day: 1.00     Years: 20.00     Pack years: 20.00     Types: Cigarettes     Last attempt to quit: 1989     Years since quittin.0     Smokeless tobacco: Never Used   Substance and Sexual Activity     Alcohol use: Yes     Alcohol/week: 2.0 standard drinks     Drug use: No     Sexual activity: Yes     Partners: Female     Birth control/protection: None     Comment:    Lifestyle     Physical activity:     Days per week: Not on file     Minutes per session: Not on file     Stress: Not on file   Relationships     Social connections:     Talks on phone: Not on file     Gets together: Not on file     Attends Mosque service: Not on file     Active member of club or organization: Not on file     Attends meetings of clubs or  "organizations: Not on file     Relationship status: Not on file     Intimate partner violence:     Fear of current or ex partner: Not on file     Emotionally abused: Not on file     Physically abused: Not on file     Forced sexual activity: Not on file   Other Topics Concern     Parent/sibling w/ CABG, MI or angioplasty before 65F 55M? No   Social History Narrative     since 2/1982 2 children 4 grandchildren.    Carpet sales:  Semi retired. Athlete in school, Golf, fish, yardwork     Family History   Problem Relation Age of Onset     C.A.D. Father 68        bypass, carotid      Prostate Cancer Father 70     Heart Disease Father      Cancer Mother 92        stomach, colon     Hypertension Mother      Retinal detachment Mother      Cancer Brother 64        lung cancer, petroleum     Cancer Brother      Glaucoma No family hx of      Macular Degeneration No family hx of      Strabismus No family hx of      Glasses (<7 y/o) No family hx of           REVIEW OF SYSTEMS:  General: negative, fever, chills, night sweats  Skin: negative, acne, rash and scaling  Eyes: negative, double vision, eye pain and photophobia  Ears/Nose/Throat: negative, nasal congestion and purulent rhinorrhea  Respiratory: No dyspnea on exertion, No cough, No hemoptysis and negative  Cardiovascular: negative, palpitations, tachycardia, irregular heart beat, chest pain, exertional chest pain or pressure, paroxysmal nocturnal dyspnea, dyspnea on exertion and orthopnea         OBJECTIVE:  Blood pressure 135/80, pulse 71, height 1.778 m (5' 10\"), weight 79.4 kg (175 lb 1.6 oz), SpO2 95 %.  General Appearance: healthy, alert, active and no distress  Head: Normocephalic. No masses, lesions, tenderness or abnormalities  Eyes: conjuctiva clear, PERRL, EOM intact  Ears: External ears normal. Canals clear. TM's normal.  Nose: Nares normal  Mouth: normal  Neck: Supple, no cervical adenopathy, no thyromegaly  Lungs: clear to auscultation  Cardiac: regular " rate and rhythm, normal S1 and S2, ESM.       ASSESSMENT/PLAN:  Patient here for follow-up.  Status post AVR with a bioprosthetic valve in 2011 for bicuspid aortic valve with severe aortic stenosis.  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  Subsequently patient had extensive aortic root surgery and replacement of aortic valve with bioprosthetic valve as well as repair of the mitral valve.  Since then patient is doing well had no cardiac complaints today.  Patient also needed pacemaker placement during the surgery.  Also has a history of paroxysmal atrial fibrillation.  Today had no complaints.  Last echocardiogram was 2018.  This showed normal LV function and normal prosthetic function.  Will reassess valve function with an echocardiogram.  Also will do a basic metabolic panel, CBC, lipid profile and LFTs.  Per orders.   Return to Clinic 1yr.    Please do not hesitate to contact me if you have any questions/concerns.     Sincerely,     TED Salgado MD

## 2020-01-28 NOTE — PROGRESS NOTES
SUBJECTIVE:  Brooks Summers is a 74 year old male who presents for follow up.  S/P Aortic valve replacement (23 mm Bogdan Johnson PERIMOUNT Magna tissue valve). On 7/14/11. For bicuspid aortic valve with severe aortic stenosis,severe LV dysfunction.     On 1/25/2013 had     1.  Redo median sternotomy.   2.  Aortic valve replacement with 21 mm Johnson Perimount Magna Tissue Valve.   3.  Mitral valve repair with pericardial patch repair of large anterior mitral valve leaflet perforation.     4.  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       NOTE:  An extra 2-3 hours was required for this operation because of the extreme complexity of this operation from severe prosthetic valve endocarditis and recent redo     Since her redo surgery patient is doing extremely well.  Had no cardiac complaints today.    Patient Active Problem List    Diagnosis Date Noted     Neoplasm of uncertain behavior of skin 06/08/2019     Priority: Medium     AK (actinic keratosis) 06/08/2019     Priority: Medium     History of nonmelanoma skin cancer 06/08/2019     Priority: Medium     Nocturnal hypoxemia 10/15/2018     Priority: Medium     Trigger ring finger of left hand 11/16/2015     Priority: Medium     Paroxysmal atrial fibrillation (H) 02/25/2015     Priority: Medium     Long term current use of anticoagulant therapy 02/25/2015     Priority: Medium     Problem list name updated by automated process. Provider to review       Finger injury 10/08/2014     Priority: Medium     Hyperlipidemia with target LDL less than 100 10/21/2013     Priority: Medium     Diagnosis updated by automated process. Provider to review and confirm.       Need for prophylactic antibiotic 04/12/2013     Priority: Medium     Aortic valve and past endocarditis        S/P AVR 01/25/2013     Priority: Medium     Endocarditis 01/21/2013     Priority: Medium     Cultures negative but 16S rRNA PCR showed Staph capitis.  Treated with Dapto and RIF  x 8 weeks.       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 Provider to review       LBBB (left bundle branch block) 01/28/2012     Priority: Medium     Received CRT-D;  msec       Pneumothorax, left 01/28/2012     Priority: Medium     Observed following CRT-D implant; small. Observe and repeat CXR       Heart failure, chronic systolic (H) 01/28/2012     Priority: Medium     Stable; NYHA classII       Cardiomyopathy, dilated, nonischemic (H) 01/28/2012     Priority: Medium     Mild CAD; EF 15-20%       CKD (chronic kidney disease) stage 3, GFR 30-59 ml/min (H) 01/28/2012     Priority: Medium     Dyslipidemia 01/28/2012     Priority: Medium     On Pravavastatin       Aortic valve stenosis, severe 07/14/2011     Priority: Medium     Chronic systolic heart failure (H) 07/14/2011     Priority: Medium     Bicuspid aortic valve 07/14/2011     Priority: Medium     S/P aortic valve replacement 07/14/2011     Priority: Medium     Smoking hx 07/14/2011     Priority: Medium     Rotator cuff (capsule) sprain 02/17/2009     Priority: Medium     Other postprocedural status(V45.89) 02/17/2009     Priority: Medium    .  Current Outpatient Medications   Medication Sig     allopurinol (ZYLOPRIM) 100 MG tablet Take 1 tablet (100 mg) by mouth every evening     Cyanocobalamin (VITAMIN B 12 PO)      losartan (COZAAR) 25 MG tablet Take 0.5 tablets (12.5 mg) by mouth daily Take (1/2) tab daily     metoprolol succinate ER (TOPROL XL) 50 MG 24 hr tablet Take 1 tablet (50 mg) by mouth daily     Multiple Vitamins-Minerals (MULTIVITAMIN ADULTS 50+) TABS      PROVIDER DOSED MANAGED WARFARIN, COUMADIN, OUTPATIENT Per coumadin clinic     simvastatin (ZOCOR) 40 MG tablet Take 1 tablet (40 mg) by mouth At Bedtime     VITAMIN D, CHOLECALCIFEROL, PO Take 1,000 Units by mouth daily     warfarin ANTICOAGULANT  (COUMADIN) 5 MG tablet 7.5 mg Tues/Thurs/Sun; 10 mg all other days or as directed by the Anticoagulation Clinic     Current Facility-Administered Medications   Medication     lidocaine 1% with EPINEPHrine 1:100,000 injection 3 mL     Past Medical History:   Diagnosis Date     BCC (basal cell carcinoma), face 5/16/2013     Bicuspid aortic valve      Chronic systolic heart failure (H)      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 Tx with Dapto/RIFx 8 weeks.     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     Paroxysmal A-fib (H)     Post-op 7/14/2011, 1/26/13     Rotator Cuff (Capsule) Sprain and Strain      S/P aortic valve replacement     7/14/2011, re-do 1/25/13 due to endocarditis     Smoking hx      Thrombocytopenia (H)      Past Surgical History:   Procedure Laterality Date     ANESTHESIA CARDIOVERSION  7/18/2011    Procedure:ANESTHESIA CARDIOVERSION; Cardioversion; Surgeon:GENERIC ANESTHESIA PROVIDER; Location:[facility]     COLONOSCOPY       COLONOSCOPY N/A 11/19/2018    Procedure: COLONOSCOPY;  Surgeon: Herman Mcginnis MD;  Location: [facility]     CORONARY ANGIOGRAPHY ADULT ORDER       CYSTOSCOPY, BIOPSY URETHRA N/A 4/3/2017    Procedure: CYSTOSCOPY, BIOPSY URETHRA;  Surgeon: Marlena Mora MD;  Location: [facility]     ENDOSCOPY UPPER, COLONOSCOPY, COMBINED       HC REMOV & REPLACE IMPLT DEFIB PULSE GEN MULT LEAD  12/3/2015          HEMORRHOID SURGERY       IMPLANT AUTOMATIC IMPLANTABLE CARDIOVERTER DEFIBRILLATOR       MOHS MICROGRAPHIC PROCEDURE       ORTHOPEDIC SURGERY      shoulder,right     REDO STERNOTOMY REPLACE VALVE AORTIC  1/25/2013    Procedure: REDO STERNOTOMY REPLACE VALVE AORTIC;  Redo Sternotomy, Redo Aortic Valve Replacement on pump oxygenator. Intraoperative Transesophageal Echocardiogram;  Surgeon: Navin Singh MD;  Location: [facility] OR     REPAIR VALVE MITRAL  2013      REPLACE VALVE AORTIC  2011    Procedure:REPLACE VALVE AORTIC; Median Sternotomy, Bioprosthesis,  Aortic Valve replacement on pump with oxygenator. Intra-operative Transesophogeal Echocardiogram.; Surgeon:STEPHANIE HAMPTON; Location:[facility] OR     teeth extractions       TRANSPLANT       Allergies   Allergen Reactions     Lisinopril Cough     Social History     Socioeconomic History     Marital status:      Spouse name: Not on file     Number of children: Not on file     Years of education: Not on file     Highest education level: Not on file   Occupational History     Not on file   Social Needs     Financial resource strain: Not on file     Food insecurity:     Worry: Not on file     Inability: Not on file     Transportation needs:     Medical: Not on file     Non-medical: Not on file   Tobacco Use     Smoking status: Former Smoker     Packs/day: 1.00     Years: 20.00     Pack years: 20.00     Types: Cigarettes     Last attempt to quit: 1989     Years since quittin.0     Smokeless tobacco: Never Used   Substance and Sexual Activity     Alcohol use: Yes     Alcohol/week: 2.0 standard drinks     Drug use: No     Sexual activity: Yes     Partners: Female     Birth control/protection: None     Comment:    Lifestyle     Physical activity:     Days per week: Not on file     Minutes per session: Not on file     Stress: Not on file   Relationships     Social connections:     Talks on phone: Not on file     Gets together: Not on file     Attends Tenriism service: Not on file     Active member of club or organization: Not on file     Attends meetings of clubs or organizations: Not on file     Relationship status: Not on file     Intimate partner violence:     Fear of current or ex partner: Not on file     Emotionally abused: Not on file     Physically abused: Not on file     Forced sexual activity: Not on file   Other Topics Concern     Parent/sibling w/ CABG, MI or angioplasty before 65F 55M? No   Social  "History Narrative     since 2/1982 2 children 4 grandchildren.    Carpet sales:  Semi retired. Athlete in school, Golf, fish, yardwork     Family History   Problem Relation Age of Onset     C.A.D. Father 68        bypass, carotid      Prostate Cancer Father 70     Heart Disease Father      Cancer Mother 92        stomach, colon     Hypertension Mother      Retinal detachment Mother      Cancer Brother 64        lung cancer, petroleum     Cancer Brother      Glaucoma No family hx of      Macular Degeneration No family hx of      Strabismus No family hx of      Glasses (<9 y/o) No family hx of           REVIEW OF SYSTEMS:  General: negative, fever, chills, night sweats  Skin: negative, acne, rash and scaling  Eyes: negative, double vision, eye pain and photophobia  Ears/Nose/Throat: negative, nasal congestion and purulent rhinorrhea  Respiratory: No dyspnea on exertion, No cough, No hemoptysis and negative  Cardiovascular: negative, palpitations, tachycardia, irregular heart beat, chest pain, exertional chest pain or pressure, paroxysmal nocturnal dyspnea, dyspnea on exertion and orthopnea         OBJECTIVE:  Blood pressure 135/80, pulse 71, height 1.778 m (5' 10\"), weight 79.4 kg (175 lb 1.6 oz), SpO2 95 %.  General Appearance: healthy, alert, active and no distress  Head: Normocephalic. No masses, lesions, tenderness or abnormalities  Eyes: conjuctiva clear, PERRL, EOM intact  Ears: External ears normal. Canals clear. TM's normal.  Nose: Nares normal  Mouth: normal  Neck: Supple, no cervical adenopathy, no thyromegaly  Lungs: clear to auscultation  Cardiac: regular rate and rhythm, normal S1 and S2, ESM.       ASSESSMENT/PLAN:  Patient here for follow-up.  Status post AVR with a bioprosthetic valve in 2011 for bicuspid aortic valve with severe aortic stenosis.  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  Subsequently patient had extensive aortic root surgery and " replacement of aortic valve with bioprosthetic valve as well as repair of the mitral valve.  Since then patient is doing well had no cardiac complaints today.  Patient also needed pacemaker placement during the surgery.  Also has a history of paroxysmal atrial fibrillation.  Today had no complaints.  Last echocardiogram was 2018.  This showed normal LV function and normal prosthetic function.  Will reassess valve function with an echocardiogram.  Also will do a basic metabolic panel, CBC, lipid profile and LFTs.  Per orders.   Return to Clinic 1yr.

## 2020-01-28 NOTE — PATIENT INSTRUCTIONS
It was a pleasure to see you in clinic today. Please do not hesitate to call with any questions or concerns. You are scheduled for a remote ICD transmission on 4/28/20. This will happen automatically in the night. We will call in 1-2 business days to discuss the results with you. We look forward to seeing you in clinic at your next device check in 6 months    Mabel Bee, RN  Electrophysiology Nurse Clinician  [facility]  During business hours call:  [phone]  After business hours please call: [phone]- select option #4 and ask for job code [id number].

## 2020-01-28 NOTE — PATIENT INSTRUCTIONS
Patient Instructions:  Complete echocardiogram - you will be notified as soon as results are compiled and reviewed.   Labs at your convenience : CBC, BMP, LFT, Fasting Lipid Panel (no food/dairy for 12 hours prior.      It was a pleasure to see you in the cardiology clinic today.    We are encouraging the use of [employer] to communicate with your Healthcare Provider.  If you have any questions, call  Melvin Quezada LPN, at [phone].  Press Option #1 for the [facility], and then press Option #4 for nursing.  Cardiology Fax  : [phone]      If you have an urgent need after hours (8:00 am to 4:30 pm) please call [phone] and ask for the cardiology fellow on call.

## 2020-01-29 ENCOUNTER — ANTICOAGULATION THERAPY VISIT (OUTPATIENT)
Dept: ANTICOAGULATION | Facility: CLINIC | Age: 75
End: 2020-01-29
Payer: MEDICARE

## 2020-01-29 DIAGNOSIS — Z95.2 S/P AORTIC VALVE REPLACEMENT: ICD-10-CM

## 2020-01-29 DIAGNOSIS — I48.0 PAROXYSMAL ATRIAL FIBRILLATION (H): ICD-10-CM

## 2020-01-29 DIAGNOSIS — Z79.01 LONG TERM CURRENT USE OF ANTICOAGULANT THERAPY: ICD-10-CM

## 2020-01-29 LAB — INR POINT OF CARE: 2.4 (ref 0.86–1.14)

## 2020-01-29 PROCEDURE — 99207 ZZC NO CHARGE NURSE ONLY: CPT

## 2020-01-29 PROCEDURE — 36416 COLLJ CAPILLARY BLOOD SPEC: CPT

## 2020-01-29 PROCEDURE — 85610 PROTHROMBIN TIME: CPT | Mod: QW

## 2020-01-29 NOTE — PROGRESS NOTES
ANTICOAGULATION FOLLOW-UP CLINIC VISIT    Patient Name:  Brooks Summers  Date:  2020  Contact Type:  Face to Face    SUBJECTIVE:  Patient Findings     Positives:   Other complaints (hip pain from fall)    Comments:   No changes in diet, activity level, medications (including over the counter), or health. He did fall about 3 weeks ago and has hip pain and swelling. He saw a provider on  and is seeing ortho as well. No missed doses of warfarin. Patient took dosing as prescribed. No signs of clots or bleeding concerns. Patient will continue maintenance warfarin dosing.          Clinical Outcomes     Negatives:   Major bleeding event, Thromboembolic event, Anticoagulation-related hospital admission, Anticoagulation-related ED visit, Anticoagulation-related fatality    Comments:   No changes in diet, activity level, medications (including over the counter), or health. He did fall about 3 weeks ago and has hip pain and swelling. He saw a provider on  and is seeing ortho as well. No missed doses of warfarin. Patient took dosing as prescribed. No signs of clots or bleeding concerns. Patient will continue maintenance warfarin dosing.             OBJECTIVE    INR Protime   Date Value Ref Range Status   2020 2.4 (A) 0.86 - 1.14 Final       ASSESSMENT / PLAN  INR assessment THER    Recheck INR In: 6 WEEKS    INR Location Clinic      Anticoagulation Summary  As of 2020    INR goal:   2.0-3.0   TTR:   100.0 % (1 y)   INR used for dosin.4 (2020)   Warfarin maintenance plan:   7.5 mg (5 mg x 1.5) every Sun, Tue, Thu; 10 mg (5 mg x 2) all other days   Full warfarin instructions:   7.5 mg every Sun, Tue, Thu; 10 mg all other days   Weekly warfarin total:   62.5 mg   No change documented:   Aniya Garcia, RN   Plan last modified:   Aniya Garcia RN (2018)   Next INR check:   3/11/2020   Priority:   Maintenance   Target end date:   Indefinite    Indications    S/P aortic valve replacement  [Z95.2]  Paroxysmal atrial fibrillation (H) [I48.0]  Long term current use of anticoagulant therapy [Z79.01]             Anticoagulation Episode Summary     INR check location:       Preferred lab:       Send INR reminders to:   JONATHAN OWUSU    Comments:    * warfarin 5 mg tabs. Aortic 21 mm Johnson Perimount Magna Tissue Valve.       Anticoagulation Care Providers     Provider Role Specialty Phone number    Edin Mays MD [facility] Internal Medicine [phone]            See the Encounter Report to view Anticoagulation Flowsheet and Dosing Calendar (Go to Encounters tab in chart review, and find the Anticoagulation Therapy Visit)        Aniya Garcia RN

## 2020-01-31 DIAGNOSIS — E78.5 DYSLIPIDEMIA: ICD-10-CM

## 2020-01-31 DIAGNOSIS — Z95.2 S/P AVR: ICD-10-CM

## 2020-01-31 LAB
ANION GAP SERPL CALCULATED.3IONS-SCNC: 3 MMOL/L (ref 3–14)
BUN SERPL-MCNC: 14 MG/DL (ref 7–30)
CALCIUM SERPL-MCNC: 9 MG/DL (ref 8.5–10.1)
CHLORIDE SERPL-SCNC: 108 MMOL/L (ref 94–109)
CHOLEST SERPL-MCNC: 143 MG/DL
CO2 SERPL-SCNC: 27 MMOL/L (ref 20–32)
CREAT SERPL-MCNC: 0.97 MG/DL (ref 0.66–1.25)
ERYTHROCYTE [DISTWIDTH] IN BLOOD BY AUTOMATED COUNT: 14.2 % (ref 10–15)
GFR SERPL CREATININE-BSD FRML MDRD: 76 ML/MIN/{1.73_M2}
GLUCOSE SERPL-MCNC: 98 MG/DL (ref 70–99)
HCT VFR BLD AUTO: 43.2 % (ref 40–53)
HDLC SERPL-MCNC: 52 MG/DL
HGB BLD-MCNC: 14.7 G/DL (ref 13.3–17.7)
LDLC SERPL CALC-MCNC: 76 MG/DL
MCH RBC QN AUTO: 32.6 PG (ref 26.5–33)
MCHC RBC AUTO-ENTMCNC: 34 G/DL (ref 31.5–36.5)
MCV RBC AUTO: 96 FL (ref 78–100)
NONHDLC SERPL-MCNC: 91 MG/DL
PLATELET # BLD AUTO: 109 10E9/L (ref 150–450)
POTASSIUM SERPL-SCNC: 4.2 MMOL/L (ref 3.4–5.3)
RBC # BLD AUTO: 4.51 10E12/L (ref 4.4–5.9)
SODIUM SERPL-SCNC: 138 MMOL/L (ref 133–144)
TRIGL SERPL-MCNC: 76 MG/DL
WBC # BLD AUTO: 5.6 10E9/L (ref 4–11)

## 2020-01-31 PROCEDURE — 80048 BASIC METABOLIC PNL TOTAL CA: CPT | Performed by: INTERNAL MEDICINE

## 2020-01-31 PROCEDURE — 36415 COLL VENOUS BLD VENIPUNCTURE: CPT | Performed by: INTERNAL MEDICINE

## 2020-01-31 PROCEDURE — 80061 LIPID PANEL: CPT | Performed by: INTERNAL MEDICINE

## 2020-01-31 PROCEDURE — 85027 COMPLETE CBC AUTOMATED: CPT | Performed by: INTERNAL MEDICINE

## 2020-02-05 ENCOUNTER — PRE VISIT (OUTPATIENT)
Dept: ORTHOPEDICS | Facility: CLINIC | Age: 75
End: 2020-02-05

## 2020-02-05 ENCOUNTER — OFFICE VISIT (OUTPATIENT)
Dept: ORTHOPEDICS | Facility: CLINIC | Age: 75
End: 2020-02-05
Payer: MEDICARE

## 2020-02-05 VITALS — RESPIRATION RATE: 16 BRPM | WEIGHT: 175 LBS | BODY MASS INDEX: 25.05 KG/M2 | HEIGHT: 70 IN

## 2020-02-05 DIAGNOSIS — M54.50 LUMBAR PAIN: Primary | ICD-10-CM

## 2020-02-05 DIAGNOSIS — R29.898 WEAKNESS OF LEFT HIP: ICD-10-CM

## 2020-02-05 DIAGNOSIS — M25.552 HIP PAIN, LEFT: ICD-10-CM

## 2020-02-05 ASSESSMENT — MIFFLIN-ST. JEOR: SCORE: 1540.04

## 2020-02-05 NOTE — PROGRESS NOTES
Subjective:   Brooks Summers is a 74 year old male who presents with left hip pain. He slipped and fell on left side.  Walks 18, golfs.  Fell on ice- injury to right shoulder.  Tingling in his legs, pacemaker and open heart surgeries at the .  Landed on the left hip. 3 weeks ago in Jan.    Started hurting when up to walk.  Had x-rays last week.  Groin pain is going away.  Pain in buttock, walking down a path and slipped landing onto left hip.  Pt reports it's getting better, works all day and gets tired.    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 He also has no past medical history of Arthritis, Asthma, Blood transfusion, Chronic infection, Congestive heart failure, unspecified, COPD (chronic obstructive pulmonary disease) (H), Coronary artery disease, Diabetes mellitus (H), Difficult intubation, Hypertension, Malignant hyperthermia, PONV (postoperative nausea and vomiting), Spinal headache, Thyroid disease, or Unspecified cerebral artery occlusion with cerebral infarction.  He  has a past surgical history that includes orthopedic surgery; colonoscopy; Endoscopy upper, colonoscopy, combined; teeth extractions; Hemorrhoid surgery; Replace valve aortic (7/14/2011); Anesthesia cardioversion (7/18/2011); Redo sternotomy replace valve aortic (1/25/2013); Mohs  "micrographic procedure; Implant automatic implantable cardioverter defibrillator; Repair valve mitral (2013); Coronary Angiography Adult Order; hc remov & replace implt defib pulse gen mult lead (12/3/2015); Cystoscopy, Biopsy Urethra (N/A, 4/3/2017); transplant; and Colonoscopy (N/A, 11/19/2018).  His family history includes C.A.D. (age of onset: 68) in his father; Cancer in his brother; Cancer (age of onset: 64) in his brother; Cancer (age of onset: 92) in his mother; Heart Disease in his father; Hypertension in his mother; Prostate Cancer (age of onset: 70) in his father; Retinal detachment in his mother.  He reports that he quit smoking about 30 years ago. His smoking use included cigarettes. He has a 20.00 pack-year smoking history. He has never used smokeless tobacco. He reports current alcohol use of about 2.0 standard drinks of alcohol per week. He reports that he does not use drugs.    ALLERGIES: He is allergic to lisinopril.    CURRENT MEDICATIONS: He has a current medication list which includes the following prescription(s): allopurinol, cyanocobalamin, losartan, metoprolol succinate er, multivitamin adults 50+, PROVIDER DOSED MANAGED WARFARIN, COUMADIN, OUTPATIENT, simvastatin, cholecalciferol, and warfarin anticoagulant, and the following Facility-Administered Medications: lidocaine 1% with epinephrine 1:100,000.     REVIEW OF SYSTEMS: 10 point review of systems is negative except as noted above.     Exam:   Resp 16   Ht 1.778 m (5' 10\")   Wt 79.4 kg (175 lb)   BMI 25.11 kg/m             CONSTITUTIONAL: healthy, alert, no distress and cooperative  HEAD: Normocephalic. No masses, lesions, tenderness or abnormalities  SKIN: no suspicious lesions or rashes  GAIT: normal  NEUROLOGIC: Non-focal  PSYCHIATRIC: affect normal/bright and mentation appears normal.    MUSCULOSKELETAL: left Low back/buttock pain  Tender:  left para lumbar muscles  Non-tender:  thoracic spinous processes, left parathoracic " muscles, right parathoracic muscles, lumbar spinous processes  Range of Motion:  lumbar flexion  full, lumbar extension  full  Strength:  able to heel walk, able to toe walk  Special tests:  negative straight leg raises    Hip Exam: Hip ROM decreased, weakness glute medius         Assessment/Plan:   Patient is a 74-year-old white male with past medical history of hyperlipidemia, bicuspid aortic valve presenting with left hip pain after a fall on ice    1. Left gluteal pain after fall- likely coming from low back  Weakness glute medius, mild hip OA  Pt will attend PT  Strengthening is required    RTC 8 weeks, PRN    X-RAY INTERPRETATION:   Reviewed hip imaging

## 2020-02-05 NOTE — LETTER
2/5/2020    RE: Brooks Summers  [address]  [facility] [address]      Subjective:   Brooks Summers is a 74 year old male who presents with left hip pain. He slipped and fell on left side.  Walks 18, golfs.  Fell on ice- injury to right shoulder.  Tingling in his legs, pacemaker and open heart surgeries at the .  Landed on the left hip. 3 weeks ago in Jan.    Started hurting when up to walk.  Had x-rays last week.  Groin pain is going away.  Pain in buttock, walking down a path and slipped landing onto left hip.  Pt reports it's getting better, works all day and gets tired.    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 He also has no past medical history of Arthritis, Asthma, Blood transfusion, Chronic infection, Congestive heart failure, unspecified, COPD (chronic obstructive pulmonary disease) (H), Coronary artery disease, Diabetes mellitus (H), Difficult intubation, Hypertension, Malignant hyperthermia, PONV (postoperative nausea and vomiting), Spinal headache, Thyroid disease, or Unspecified cerebral artery occlusion with cerebral infarction.  He  has a past surgical history that includes orthopedic surgery; colonoscopy; Endoscopy upper, colonoscopy, combined; teeth extractions; Hemorrhoid surgery; Replace valve aortic (7/14/2011);  "Anesthesia cardioversion (7/18/2011); Redo sternotomy replace valve aortic (1/25/2013); Mohs micrographic procedure; Implant automatic implantable cardioverter defibrillator; Repair valve mitral (2013); Coronary Angiography Adult Order; hc remov & replace implt defib pulse gen mult lead (12/3/2015); Cystoscopy, Biopsy Urethra (N/A, 4/3/2017); transplant; and Colonoscopy (N/A, 11/19/2018).  His family history includes C.A.D. (age of onset: 68) in his father; Cancer in his brother; Cancer (age of onset: 64) in his brother; Cancer (age of onset: 92) in his mother; Heart Disease in his father; Hypertension in his mother; Prostate Cancer (age of onset: 70) in his father; Retinal detachment in his mother.  He reports that he quit smoking about 30 years ago. His smoking use included cigarettes. He has a 20.00 pack-year smoking history. He has never used smokeless tobacco. He reports current alcohol use of about 2.0 standard drinks of alcohol per week. He reports that he does not use drugs.    ALLERGIES: He is allergic to lisinopril.    CURRENT MEDICATIONS: He has a current medication list which includes the following prescription(s): allopurinol, cyanocobalamin, losartan, metoprolol succinate er, multivitamin adults 50+, PROVIDER DOSED MANAGED WARFARIN, COUMADIN, OUTPATIENT, simvastatin, cholecalciferol, and warfarin anticoagulant, and the following Facility-Administered Medications: lidocaine 1% with epinephrine 1:100,000.     REVIEW OF SYSTEMS: 10 point review of systems is negative except as noted above.     Exam:   Resp 16   Ht 1.778 m (5' 10\")   Wt 79.4 kg (175 lb)   BMI 25.11 kg/m              CONSTITUTIONAL: healthy, alert, no distress and cooperative  HEAD: Normocephalic. No masses, lesions, tenderness or abnormalities  SKIN: no suspicious lesions or rashes  GAIT: normal  NEUROLOGIC: Non-focal  PSYCHIATRIC: affect normal/bright and mentation appears normal.    MUSCULOSKELETAL: left Low back/buttock pain  Tender: "  left para lumbar muscles  Non-tender:  thoracic spinous processes, left parathoracic muscles, right parathoracic muscles, lumbar spinous processes  Range of Motion:  lumbar flexion  full, lumbar extension  full  Strength:  able to heel walk, able to toe walk  Special tests:  negative straight leg raises    Hip Exam: Hip ROM decreased, weakness glute medius         Assessment/Plan:   Patient is a 74-year-old white male with past medical history of hyperlipidemia, bicuspid aortic valve presenting with left hip pain after a fall on ice    1. Left gluteal pain after fall- likely coming from low back  Weakness glute medius, mild hip OA  Pt will attend PT  Strengthening is required    RTC 8 weeks, PRN    X-RAY INTERPRETATION:   Reviewed hip imaging    Sarina Fairbanks MD

## 2020-02-10 ENCOUNTER — ANCILLARY PROCEDURE (OUTPATIENT)
Dept: CARDIOLOGY | Facility: CLINIC | Age: 75
End: 2020-02-10
Attending: INTERNAL MEDICINE
Payer: MEDICARE

## 2020-02-10 DIAGNOSIS — E78.5 DYSLIPIDEMIA: ICD-10-CM

## 2020-02-10 DIAGNOSIS — Z95.2 S/P AVR: ICD-10-CM

## 2020-02-10 RX ADMIN — Medication 5 ML: at 15:15

## 2020-02-21 ENCOUNTER — OFFICE VISIT (OUTPATIENT)
Dept: FAMILY MEDICINE | Facility: CLINIC | Age: 75
End: 2020-02-21
Payer: MEDICARE

## 2020-02-21 VITALS
TEMPERATURE: 97 F | SYSTOLIC BLOOD PRESSURE: 124 MMHG | WEIGHT: 177 LBS | DIASTOLIC BLOOD PRESSURE: 70 MMHG | BODY MASS INDEX: 25.4 KG/M2 | HEART RATE: 76 BPM | RESPIRATION RATE: 12 BRPM

## 2020-02-21 DIAGNOSIS — B02.7 DISSEMINATED HERPES ZOSTER: Primary | ICD-10-CM

## 2020-02-21 PROCEDURE — 99213 OFFICE O/P EST LOW 20 MIN: CPT | Performed by: NURSE PRACTITIONER

## 2020-02-21 RX ORDER — VALACYCLOVIR HYDROCHLORIDE 1 G/1
1000 TABLET, FILM COATED ORAL 3 TIMES DAILY
Qty: 21 TABLET | Refills: 0 | Status: SHIPPED | OUTPATIENT
Start: 2020-02-21 | End: 2020-08-10

## 2020-02-21 ASSESSMENT — ENCOUNTER SYMPTOMS
WHEEZING: 0
DIARRHEA: 0
RHINORRHEA: 0
SORE THROAT: 0
VOMITING: 0
COUGH: 0
EYE DISCHARGE: 0
SINUS PRESSURE: 0
FATIGUE: 0
DIAPHORESIS: 0
HEADACHES: 0
FEVER: 0
NAUSEA: 0
SHORTNESS OF BREATH: 0

## 2020-02-21 ASSESSMENT — PAIN SCALES - GENERAL: PAINLEVEL: NO PAIN (0)

## 2020-02-21 NOTE — PROGRESS NOTES
Subjective     Brooks Summers is a 74 year old male who presents to clinic today for the following health issues:    HPI     Rash      Duration: 10 days    Description  Location: left upper back  Itching: moderate    Intensity:  moderate    Accompanying signs and symptoms: none    History (similar episodes/previous evaluation): basal cell removed from nose, chest and back    Precipitating or alleviating factors:  New exposures:  None  Recent travel: no      Therapies tried and outcome: vaseline and neosporin not effective      Current Outpatient Medications   Medication Sig Dispense Refill     allopurinol (ZYLOPRIM) 100 MG tablet Take 1 tablet (100 mg) by mouth every evening 90 tablet 3     Cyanocobalamin (VITAMIN B 12 PO)        losartan (COZAAR) 25 MG tablet Take 0.5 tablets (12.5 mg) by mouth daily Take (1/2) tab daily 45 tablet 3     metoprolol succinate ER (TOPROL XL) 50 MG 24 hr tablet Take 1 tablet (50 mg) by mouth daily 90 tablet 3     Multiple Vitamins-Minerals (MULTIVITAMIN ADULTS 50+) TABS        PROVIDER DOSED MANAGED WARFARIN, COUMADIN, OUTPATIENT Per coumadin clinic       simvastatin (ZOCOR) 40 MG tablet Take 1 tablet (40 mg) by mouth At Bedtime 90 tablet 0     valACYclovir 1000 mg PO tablet Take 1 tablet (1,000 mg) by mouth 3 times daily for 7 days 21 tablet 0     VITAMIN D, CHOLECALCIFEROL, PO Take 1,000 Units by mouth daily       warfarin ANTICOAGULANT (COUMADIN) 5 MG tablet 7.5 mg Tues/Thurs/Sun; 10 mg all other days or as directed by the Anticoagulation Clinic 160 tablet 0     Allergies   Allergen Reactions     Lisinopril Cough       Reviewed and updated as needed this visit by Provider       Has been having rash to left upper back.  Normally receives health care at the [facility].  Does have some itching. Thinks did scratch it some. Area does not burn or hurt. Area of rash is staying about the same. Has had skin cancer in the past and has had areas removed. Has been putting Vaseline  and neosporin at home and that really does not help. Does have a humidifier at home.  Unsure if his had shingles vaccine.  Not listed on our immunization record.  However, unable to pull up [facility].        Objective    /70 (BP Location: Left arm, Patient Position: Sitting, Cuff Size: Adult Regular)   Pulse 76   Temp 97  F (36.1  C) (Tympanic)   Resp 12   Wt 80.3 kg (177 lb)   BMI 25.40 kg/m    Body mass index is 25.4 kg/m .          Assessment & Plan     Review of Systems   Constitutional: Negative for diaphoresis, fatigue and fever.   HENT: Negative for congestion, ear pain, rhinorrhea, sinus pressure and sore throat.    Eyes: Negative for discharge.   Respiratory: Negative for cough, shortness of breath and wheezing.    Cardiovascular: Negative for chest pain.   Gastrointestinal: Negative for diarrhea, nausea and vomiting.   Skin:        Itching rash, left upper back   Neurological: Negative for headaches.       Physical Exam  Constitutional:       Appearance: He is well-developed.   Cardiovascular:      Rate and Rhythm: Normal rate and regular rhythm.      Heart sounds: Normal heart sounds.   Pulmonary:      Effort: Pulmonary effort is normal.      Breath sounds: Normal breath sounds.   Skin:     General: Skin is warm and dry.          Neurological:      Mental Status: He is alert.         1. Disseminated herpes zoster  Educated on the use of med  Informed that may take some time for shingles pain to resolve  Stay away from those with compromised immune systems, pregnant women, or people with cancer or HIV  Keep covered-if starts to drains it is more contagious   - valACYclovir 1000 mg PO tablet; Take 1 tablet (1,000 mg) by mouth 3 times daily for 7 days  Dispense: 21 tablet; Refill: 0    Return in about 2 weeks (around 3/6/2020), or if symptoms worsen or fail to improve.    SUKH Padilla [facility]

## 2020-02-21 NOTE — PATIENT INSTRUCTIONS
These are general instructions and may not be specific to you. Please call, email or follow up if you have any questions or concerns.       Patient Education     Shingles  Shingles is a viral infection caused by the same virus that causes chicken pox. Anyone who has had chicken pox may get shingles later in life. The virus stays in the body, but remains asleep (dormant). Shingles often occurs in older persons or persons with lowered immunity. But it can affect anyone at any age.  Shingles starts as a tingling patch of skin on one side of the body. Small, painful blisters may then appear. The rash rarely spreads to other parts of the body.  Exposure to shingles can't cause shingles. However, it can cause chicken pox in anyone who has not had chicken pox or has not been vaccinated. The contagious period ends when all blisters have crusted over, generally 1 to 2 weeks after the illness starts.  After the blisters heal, the affected skin may be sensitive or painful for weeks or months, gradually resolving over time. But, sometimes this can last longer and be permanent (called postherpetic neuralgia.)  Shingles vaccines are available. Vaccination can help prevent shingles or make it less painful. It is generally recommended for adults older than 50, even if you've had singles in the past. Talk with your healthcare provider about when to get vaccinated and which vaccine is best for you.  Home care    Medicines may be prescribed to help relieve pain. Take these medicines as directed. Ask your healthcare provider or pharmacist before using over-the-counter medicines for helping treat pain and itching.    In certain cases, antiviral medicines may be prescribed to reduce pain, shorten the illness, and prevent neuralgia. Take these medicines as directed.    Compresses made from a solution of cool water mixed with cornstarch or baking soda may help relieve pain and itching.     Gently wash skin daily with soap and water to help  prevent infection. Be certain to rinse off all of the soap, which can be irritating.    Trim fingernails and try not to scratch. Scratching the sores may leave scars.    Stay home from work or school until all blisters have formed a crust and you are no longer contagious.  Follow-up care  Follow up with your healthcare provider, or as directed.  When to seek medical advice    Fever of 100.4 F (38 C) or higher, or as directed by your healthcare provider    Affected skin is on the face or neck and any of the following occur:  ? Headache  ? Eye pain  ? Changes in vision  ? Sores near the eye  ? Weakness of facial muscles    Blisters occurring on new areas of the body    Pain, redness, or swelling of a joint    Signs of skin infection: colored drainage from the sores, warmth, increasing redness, fever, or increasing pain  Date Last Reviewed: 4/1/2018 2000-2019 [employer] [address] [place] [address]. All rights reserved. This information is not intended as a substitute for professional medical care. Always follow your healthcare professional's instructions.

## 2020-02-23 ENCOUNTER — HEALTH MAINTENANCE LETTER (OUTPATIENT)
Age: 75
End: 2020-02-23

## 2020-02-26 LAB
MDC_IDC_EPISODE_DTM: NORMAL
MDC_IDC_EPISODE_DURATION: 10 S
MDC_IDC_EPISODE_DURATION: 13 S
MDC_IDC_EPISODE_DURATION: 5 S
MDC_IDC_EPISODE_DURATION: 6 S
MDC_IDC_EPISODE_DURATION: 9 S
MDC_IDC_EPISODE_ID: 29
MDC_IDC_EPISODE_ID: 30
MDC_IDC_EPISODE_ID: 31
MDC_IDC_EPISODE_ID: 32
MDC_IDC_EPISODE_ID: 33
MDC_IDC_EPISODE_TYPE: NORMAL
MDC_IDC_LEAD_IMPLANT_DT: NORMAL
MDC_IDC_LEAD_LOCATION: NORMAL
MDC_IDC_LEAD_LOCATION_DETAIL_1: NORMAL
MDC_IDC_LEAD_MFG: NORMAL
MDC_IDC_LEAD_MODEL: NORMAL
MDC_IDC_LEAD_POLARITY_TYPE: NORMAL
MDC_IDC_LEAD_SERIAL: NORMAL
MDC_IDC_LEAD_SPECIAL_FUNCTION: NORMAL
MDC_IDC_MSMT_BATTERY_DTM: NORMAL
MDC_IDC_MSMT_BATTERY_REMAINING_LONGEVITY: 12 MO
MDC_IDC_MSMT_BATTERY_RRT_TRIGGER: 2.73
MDC_IDC_MSMT_BATTERY_STATUS: NORMAL
MDC_IDC_MSMT_BATTERY_VOLTAGE: 2.88 V
MDC_IDC_MSMT_CAP_CHARGE_DTM: NORMAL
MDC_IDC_MSMT_CAP_CHARGE_ENERGY: 18 J
MDC_IDC_MSMT_CAP_CHARGE_TIME: 4.53
MDC_IDC_MSMT_CAP_CHARGE_TYPE: NORMAL
MDC_IDC_MSMT_LEADCHNL_LV_IMPEDANCE_VALUE: 380 OHM
MDC_IDC_MSMT_LEADCHNL_LV_IMPEDANCE_VALUE: 437 OHM
MDC_IDC_MSMT_LEADCHNL_LV_IMPEDANCE_VALUE: 703 OHM
MDC_IDC_MSMT_LEADCHNL_LV_PACING_THRESHOLD_AMPLITUDE: 2 V
MDC_IDC_MSMT_LEADCHNL_LV_PACING_THRESHOLD_PULSEWIDTH: 1 MS
MDC_IDC_MSMT_LEADCHNL_RA_IMPEDANCE_VALUE: 456 OHM
MDC_IDC_MSMT_LEADCHNL_RA_PACING_THRESHOLD_AMPLITUDE: 0.75 V
MDC_IDC_MSMT_LEADCHNL_RA_PACING_THRESHOLD_AMPLITUDE: 0.75 V
MDC_IDC_MSMT_LEADCHNL_RA_PACING_THRESHOLD_PULSEWIDTH: 0.4 MS
MDC_IDC_MSMT_LEADCHNL_RA_PACING_THRESHOLD_PULSEWIDTH: 0.4 MS
MDC_IDC_MSMT_LEADCHNL_RA_SENSING_INTR_AMPL: 1.88 MV
MDC_IDC_MSMT_LEADCHNL_RA_SENSING_INTR_AMPL: 1.88 MV
MDC_IDC_MSMT_LEADCHNL_RV_IMPEDANCE_VALUE: 456 OHM
MDC_IDC_MSMT_LEADCHNL_RV_IMPEDANCE_VALUE: 551 OHM
MDC_IDC_MSMT_LEADCHNL_RV_PACING_THRESHOLD_AMPLITUDE: 0.5 V
MDC_IDC_MSMT_LEADCHNL_RV_PACING_THRESHOLD_AMPLITUDE: 0.62 V
MDC_IDC_MSMT_LEADCHNL_RV_PACING_THRESHOLD_PULSEWIDTH: 0.4 MS
MDC_IDC_MSMT_LEADCHNL_RV_PACING_THRESHOLD_PULSEWIDTH: 0.4 MS
MDC_IDC_MSMT_LEADCHNL_RV_SENSING_INTR_AMPL: 17.88 MV
MDC_IDC_MSMT_LEADCHNL_RV_SENSING_INTR_AMPL: 21.12 MV
MDC_IDC_PG_IMPLANT_DTM: NORMAL
MDC_IDC_PG_MFG: NORMAL
MDC_IDC_PG_MODEL: NORMAL
MDC_IDC_PG_SERIAL: NORMAL
MDC_IDC_PG_TYPE: NORMAL
MDC_IDC_SESS_CLINIC_NAME: NORMAL
MDC_IDC_SESS_DTM: NORMAL
MDC_IDC_SESS_TYPE: NORMAL
MDC_IDC_SET_BRADY_AT_MODE_SWITCH_RATE: 171 {BEATS}/MIN
MDC_IDC_SET_BRADY_LOWRATE: 60 {BEATS}/MIN
MDC_IDC_SET_BRADY_MAX_SENSOR_RATE: 140 {BEATS}/MIN
MDC_IDC_SET_BRADY_MAX_TRACKING_RATE: 140 {BEATS}/MIN
MDC_IDC_SET_BRADY_MODE: NORMAL
MDC_IDC_SET_BRADY_PAV_DELAY_HIGH: 100 MS
MDC_IDC_SET_BRADY_PAV_DELAY_LOW: 130 MS
MDC_IDC_SET_BRADY_SAV_DELAY_HIGH: 70 MS
MDC_IDC_SET_BRADY_SAV_DELAY_LOW: 100 MS
MDC_IDC_SET_CRT_LVRV_DELAY: 0 MS
MDC_IDC_SET_CRT_PACED_CHAMBERS: NORMAL
MDC_IDC_SET_LEADCHNL_LV_PACING_AMPLITUDE: 3 V
MDC_IDC_SET_LEADCHNL_LV_PACING_ANODE_ELECTRODE_1: NORMAL
MDC_IDC_SET_LEADCHNL_LV_PACING_ANODE_LOCATION_1: NORMAL
MDC_IDC_SET_LEADCHNL_LV_PACING_CAPTURE_MODE: NORMAL
MDC_IDC_SET_LEADCHNL_LV_PACING_CATHODE_ELECTRODE_1: NORMAL
MDC_IDC_SET_LEADCHNL_LV_PACING_CATHODE_LOCATION_1: NORMAL
MDC_IDC_SET_LEADCHNL_LV_PACING_POLARITY: NORMAL
MDC_IDC_SET_LEADCHNL_LV_PACING_PULSEWIDTH: 1 MS
MDC_IDC_SET_LEADCHNL_RA_PACING_AMPLITUDE: 1.5 V
MDC_IDC_SET_LEADCHNL_RA_PACING_ANODE_ELECTRODE_1: NORMAL
MDC_IDC_SET_LEADCHNL_RA_PACING_ANODE_LOCATION_1: NORMAL
MDC_IDC_SET_LEADCHNL_RA_PACING_CAPTURE_MODE: NORMAL
MDC_IDC_SET_LEADCHNL_RA_PACING_CATHODE_ELECTRODE_1: NORMAL
MDC_IDC_SET_LEADCHNL_RA_PACING_CATHODE_LOCATION_1: NORMAL
MDC_IDC_SET_LEADCHNL_RA_PACING_POLARITY: NORMAL
MDC_IDC_SET_LEADCHNL_RA_PACING_PULSEWIDTH: 0.4 MS
MDC_IDC_SET_LEADCHNL_RA_SENSING_ANODE_ELECTRODE_1: NORMAL
MDC_IDC_SET_LEADCHNL_RA_SENSING_ANODE_LOCATION_1: NORMAL
MDC_IDC_SET_LEADCHNL_RA_SENSING_CATHODE_ELECTRODE_1: NORMAL
MDC_IDC_SET_LEADCHNL_RA_SENSING_CATHODE_LOCATION_1: NORMAL
MDC_IDC_SET_LEADCHNL_RA_SENSING_POLARITY: NORMAL
MDC_IDC_SET_LEADCHNL_RA_SENSING_SENSITIVITY: 0.3 MV
MDC_IDC_SET_LEADCHNL_RV_PACING_AMPLITUDE: 2 V
MDC_IDC_SET_LEADCHNL_RV_PACING_ANODE_ELECTRODE_1: NORMAL
MDC_IDC_SET_LEADCHNL_RV_PACING_ANODE_LOCATION_1: NORMAL
MDC_IDC_SET_LEADCHNL_RV_PACING_CAPTURE_MODE: NORMAL
MDC_IDC_SET_LEADCHNL_RV_PACING_CATHODE_ELECTRODE_1: NORMAL
MDC_IDC_SET_LEADCHNL_RV_PACING_CATHODE_LOCATION_1: NORMAL
MDC_IDC_SET_LEADCHNL_RV_PACING_POLARITY: NORMAL
MDC_IDC_SET_LEADCHNL_RV_PACING_PULSEWIDTH: 0.4 MS
MDC_IDC_SET_LEADCHNL_RV_SENSING_ANODE_ELECTRODE_1: NORMAL
MDC_IDC_SET_LEADCHNL_RV_SENSING_ANODE_LOCATION_1: NORMAL
MDC_IDC_SET_LEADCHNL_RV_SENSING_CATHODE_ELECTRODE_1: NORMAL
MDC_IDC_SET_LEADCHNL_RV_SENSING_CATHODE_LOCATION_1: NORMAL
MDC_IDC_SET_LEADCHNL_RV_SENSING_POLARITY: NORMAL
MDC_IDC_SET_LEADCHNL_RV_SENSING_SENSITIVITY: 0.3 MV
MDC_IDC_SET_ZONE_DETECTION_BEATS_DENOMINATOR: 40 {BEATS}
MDC_IDC_SET_ZONE_DETECTION_BEATS_NUMERATOR: 30 {BEATS}
MDC_IDC_SET_ZONE_DETECTION_INTERVAL: 320 MS
MDC_IDC_SET_ZONE_DETECTION_INTERVAL: 350 MS
MDC_IDC_SET_ZONE_DETECTION_INTERVAL: 360 MS
MDC_IDC_SET_ZONE_DETECTION_INTERVAL: 420 MS
MDC_IDC_SET_ZONE_DETECTION_INTERVAL: NORMAL
MDC_IDC_SET_ZONE_TYPE: NORMAL
MDC_IDC_STAT_AT_BURDEN_PERCENT: 0 %
MDC_IDC_STAT_AT_DTM_END: NORMAL
MDC_IDC_STAT_AT_DTM_START: NORMAL
MDC_IDC_STAT_BRADY_AP_VP_PERCENT: 22.83 %
MDC_IDC_STAT_BRADY_AP_VS_PERCENT: 0.02 %
MDC_IDC_STAT_BRADY_AS_VP_PERCENT: 76.92 %
MDC_IDC_STAT_BRADY_AS_VS_PERCENT: 0.23 %
MDC_IDC_STAT_BRADY_DTM_END: NORMAL
MDC_IDC_STAT_BRADY_DTM_START: NORMAL
MDC_IDC_STAT_BRADY_RA_PERCENT_PACED: 22.7 %
MDC_IDC_STAT_BRADY_RV_PERCENT_PACED: 99.25 %
MDC_IDC_STAT_CRT_DTM_END: NORMAL
MDC_IDC_STAT_CRT_DTM_START: NORMAL
MDC_IDC_STAT_CRT_LV_PERCENT_PACED: 99.13 %
MDC_IDC_STAT_CRT_PERCENT_PACED: 99.13 %
MDC_IDC_STAT_EPISODE_RECENT_COUNT: 0
MDC_IDC_STAT_EPISODE_RECENT_COUNT: 1
MDC_IDC_STAT_EPISODE_RECENT_COUNT_DTM_END: NORMAL
MDC_IDC_STAT_EPISODE_RECENT_COUNT_DTM_START: NORMAL
MDC_IDC_STAT_EPISODE_TOTAL_COUNT: 0
MDC_IDC_STAT_EPISODE_TOTAL_COUNT: 3
MDC_IDC_STAT_EPISODE_TOTAL_COUNT_DTM_END: NORMAL
MDC_IDC_STAT_EPISODE_TOTAL_COUNT_DTM_START: NORMAL
MDC_IDC_STAT_EPISODE_TYPE: NORMAL
MDC_IDC_STAT_TACHYTHERAPY_ATP_DELIVERED_RECENT: 0
MDC_IDC_STAT_TACHYTHERAPY_ATP_DELIVERED_TOTAL: 0
MDC_IDC_STAT_TACHYTHERAPY_RECENT_DTM_END: NORMAL
MDC_IDC_STAT_TACHYTHERAPY_RECENT_DTM_START: NORMAL
MDC_IDC_STAT_TACHYTHERAPY_SHOCKS_ABORTED_RECENT: 0
MDC_IDC_STAT_TACHYTHERAPY_SHOCKS_ABORTED_TOTAL: 0
MDC_IDC_STAT_TACHYTHERAPY_SHOCKS_DELIVERED_RECENT: 0
MDC_IDC_STAT_TACHYTHERAPY_SHOCKS_DELIVERED_TOTAL: 0
MDC_IDC_STAT_TACHYTHERAPY_TOTAL_DTM_END: NORMAL
MDC_IDC_STAT_TACHYTHERAPY_TOTAL_DTM_START: NORMAL

## 2020-03-11 ENCOUNTER — ANTICOAGULATION THERAPY VISIT (OUTPATIENT)
Dept: ANTICOAGULATION | Facility: CLINIC | Age: 75
End: 2020-03-11
Payer: MEDICARE

## 2020-03-11 DIAGNOSIS — I48.0 PAROXYSMAL ATRIAL FIBRILLATION (H): ICD-10-CM

## 2020-03-11 DIAGNOSIS — Z95.2 S/P AORTIC VALVE REPLACEMENT: ICD-10-CM

## 2020-03-11 DIAGNOSIS — Z79.01 LONG TERM CURRENT USE OF ANTICOAGULANT THERAPY: ICD-10-CM

## 2020-03-11 LAB — INR POINT OF CARE: 2.1 (ref 0.86–1.14)

## 2020-03-11 PROCEDURE — 99207 ZZC NO CHARGE NURSE ONLY: CPT

## 2020-03-11 PROCEDURE — 36416 COLLJ CAPILLARY BLOOD SPEC: CPT

## 2020-03-11 PROCEDURE — 85610 PROTHROMBIN TIME: CPT | Mod: QW

## 2020-03-11 NOTE — PROGRESS NOTES
ANTICOAGULATION FOLLOW-UP CLINIC VISIT    Patient Name:  Brooks Summers  Date:  3/11/2020  Contact Type:  Face to Face    SUBJECTIVE:  Patient Findings     Comments:   Patient was on valACYclovir for shingles through . He says he does not have any open sores today. The shingles was on his upper back area and is gone now. No other changes or concerns. Recheck INR in 6 weeks.         Clinical Outcomes     Negatives:   Major bleeding event, Thromboembolic event, Anticoagulation-related hospital admission, Anticoagulation-related ED visit, Anticoagulation-related fatality    Comments:   Patient was on valACYclovir for shingles through . He says he does not have any open sores today. The shingles was on his upper back area and is gone now. No other changes or concerns. Recheck INR in 6 weeks.            OBJECTIVE    INR Protime   Date Value Ref Range Status   2020 2.1 (A) 0.86 - 1.14 Final       ASSESSMENT / PLAN  INR assessment THER    Recheck INR In: 6 WEEKS    INR Location Clinic      Anticoagulation Summary  As of 3/11/2020    INR goal:   2.0-3.0   TTR:   100.0 % (1 y)   INR used for dosin.1 (3/11/2020)   Warfarin maintenance plan:   7.5 mg (5 mg x 1.5) every Sun, Tue, Thu; 10 mg (5 mg x 2) all other days   Full warfarin instructions:   7.5 mg every Sun, Tue, Thu; 10 mg all other days   Weekly warfarin total:   62.5 mg   No change documented:   Aniya Garcia RN   Plan last modified:   Aniya Garcia RN (2018)   Next INR check:   2020   Priority:   Maintenance   Target end date:   Indefinite    Indications    S/P aortic valve replacement [Z95.2]  Paroxysmal atrial fibrillation (H) [I48.0]  Long term current use of anticoagulant therapy [Z79.01]             Anticoagulation Episode Summary     INR check location:       Preferred lab:       Send INR reminders to:   JONATHAN OWUSU    Comments:    * warfarin 5 mg tabs. Aortic 21 mm Johnson Perimount Magna Tissue Valve.        Anticoagulation Care Providers     Provider Role Specialty Phone number    Edin Mays MD [facility] Internal Medicine [phone]            See the Encounter Report to view Anticoagulation Flowsheet and Dosing Calendar (Go to Encounters tab in chart review, and find the Anticoagulation Therapy Visit)        Aniya Garcia RN

## 2020-03-13 ENCOUNTER — OFFICE VISIT (OUTPATIENT)
Dept: FAMILY MEDICINE | Facility: CLINIC | Age: 75
End: 2020-03-13
Payer: MEDICARE

## 2020-03-13 VITALS
BODY MASS INDEX: 25.34 KG/M2 | WEIGHT: 176.6 LBS | DIASTOLIC BLOOD PRESSURE: 68 MMHG | TEMPERATURE: 96.8 F | HEART RATE: 64 BPM | SYSTOLIC BLOOD PRESSURE: 108 MMHG | RESPIRATION RATE: 12 BRPM

## 2020-03-13 DIAGNOSIS — S49.92XA SHOULDER INJURY, LEFT, INITIAL ENCOUNTER: Primary | ICD-10-CM

## 2020-03-13 PROCEDURE — 99213 OFFICE O/P EST LOW 20 MIN: CPT | Performed by: NURSE PRACTITIONER

## 2020-03-13 ASSESSMENT — ENCOUNTER SYMPTOMS
VOMITING: 0
DIAPHORESIS: 0
DIARRHEA: 0
FEVER: 0
ARTHRALGIAS: 1
SORE THROAT: 0
COUGH: 0
HEADACHES: 0
EYE DISCHARGE: 0
RHINORRHEA: 0
FATIGUE: 0
WHEEZING: 0
SINUS PRESSURE: 0
NAUSEA: 0
SHORTNESS OF BREATH: 0

## 2020-03-13 ASSESSMENT — PAIN SCALES - GENERAL: PAINLEVEL: MODERATE PAIN (4)

## 2020-03-13 NOTE — PROGRESS NOTES
Subjective     Brooks Summers is a 74 year old male who presents to clinic today for the following health issues:    HPI     Chief Complaint   Patient presents with     Follow Up     Shingles-prescribed Valacyclovir 2/21/2020. Rash is cleared. Has pain in left arm that started 2-3 days after rash cleared. Needs to discuss if he still needs shingles vaccine.        Current Outpatient Medications   Medication Sig Dispense Refill     allopurinol (ZYLOPRIM) 100 MG tablet Take 1 tablet (100 mg) by mouth every evening 90 tablet 3     Cyanocobalamin (VITAMIN B 12 PO)        losartan (COZAAR) 25 MG tablet Take 0.5 tablets (12.5 mg) by mouth daily Take (1/2) tab daily 45 tablet 3     metoprolol succinate ER (TOPROL XL) 50 MG 24 hr tablet Take 1 tablet (50 mg) by mouth daily 90 tablet 3     Multiple Vitamins-Minerals (MULTIVITAMIN ADULTS 50+) TABS        PROVIDER DOSED MANAGED WARFARIN, COUMADIN, OUTPATIENT Per coumadin clinic       simvastatin (ZOCOR) 40 MG tablet Take 1 tablet (40 mg) by mouth At Bedtime 90 tablet 0     VITAMIN D, CHOLECALCIFEROL, PO Take 1,000 Units by mouth daily       valACYclovir 1000 mg PO tablet Take 1 tablet (1,000 mg) by mouth 3 times daily for 7 days 21 tablet 0     warfarin ANTICOAGULANT (COUMADIN) 5 MG tablet 7.5 mg Tues/Thurs/Sun; 10 mg all other days or as directed by the Anticoagulation Clinic 160 tablet 0     Allergies   Allergen Reactions     Lisinopril Cough       Reviewed and updated as needed this visit by Provider         Was treated for shingles on 2/21/20. After shingles cleared up started to hurt in left shoulder. Not able to move shoulder all around. Did fall prior to getting shingles.  Fall was in January 2020.  Does not recall hurting his shoulder at that time.  When is driving and goes to grab to turn to the wheel will get a catch in shoulder. If goes to turn over will wake at night. No previous injury to this shoulder. Has injure right shoulder in the past. Is right handed.  Occasionally will take some tylenol for this shoulder but really does not help.  No numbness or tingling.  No chest pain, no increased shortness of breath.  No back pain.        Objective    /68 (BP Location: Right arm, Patient Position: Sitting, Cuff Size: Adult Regular)   Pulse 64   Temp 96.8  F (36  C) (Tympanic)   Resp 12   Wt 80.1 kg (176 lb 9.6 oz)   BMI 25.34 kg/m    Body mass index is 25.34 kg/m .          Assessment & Plan      Review of Systems   Constitutional: Negative for diaphoresis, fatigue and fever.   HENT: Negative for congestion, ear pain, rhinorrhea, sinus pressure and sore throat.    Eyes: Negative for discharge.   Respiratory: Negative for cough, shortness of breath and wheezing.    Cardiovascular: Negative for chest pain.   Gastrointestinal: Negative for diarrhea, nausea and vomiting.   Musculoskeletal: Positive for arthralgias (left shoulder).   Neurological: Negative for headaches.       Physical Exam  Constitutional:       Appearance: He is well-developed.   Cardiovascular:      Rate and Rhythm: Normal rate and regular rhythm.      Heart sounds: Normal heart sounds.   Pulmonary:      Effort: Pulmonary effort is normal.      Breath sounds: Normal breath sounds.   Musculoskeletal:      Left shoulder: He exhibits decreased range of motion, tenderness, bony tenderness, pain and decreased strength. He exhibits no swelling.   Skin:     General: Skin is warm and dry.   Neurological:      Mental Status: He is alert.       1. Shoulder injury, left, initial encounter  Will refer to physical therapy.  If no improvement with physical therapy over the next 3 to 4 weeks may need to set up for imaging or refer to orthopedics.  - [facility]OPRA[facility] REFERRAL; Future    May go to pharmacy for shingles vaccine.      No follow-ups on file.    SUKH Padilla [facility]

## 2020-03-24 DIAGNOSIS — Z95.2 S/P AVR: Primary | ICD-10-CM

## 2020-03-24 DIAGNOSIS — I48.91 A-FIB (H): ICD-10-CM

## 2020-04-06 DIAGNOSIS — I48.0 PAROXYSMAL ATRIAL FIBRILLATION (H): ICD-10-CM

## 2020-04-06 DIAGNOSIS — Z95.2 S/P AVR (AORTIC VALVE REPLACEMENT): ICD-10-CM

## 2020-04-06 DIAGNOSIS — Z95.2 S/P AORTIC VALVE REPLACEMENT: ICD-10-CM

## 2020-04-06 DIAGNOSIS — Z79.01 LONG TERM CURRENT USE OF ANTICOAGULANT THERAPY: ICD-10-CM

## 2020-04-06 RX ORDER — WARFARIN SODIUM 5 MG/1
TABLET ORAL
Qty: 160 TABLET | Refills: 0 | Status: SHIPPED | OUTPATIENT
Start: 2020-04-06 | End: 2020-07-02

## 2020-04-06 NOTE — TELEPHONE ENCOUNTER
Current warfarin dose:  Warfarin maintenance plan:   7.5 mg (5 mg x 1.5) every Sun, Tue, Thu; 10 mg (5 mg x 2) all other days   Full warfarin instructions:   7.5 mg every Sun, Tue, Thu; 10 mg all other days   Weekly warfarin total:   62.5 mg   Next INR check:   4/22/2020     Last INR result:    INR Protime   Date Value Ref Range Status   03/11/2020 2.1 (A) 0.86 - 1.14 Final     Last office visit: 12/18/2019     Refill authorized per [facility] protocol.    Doug DUPREE RN, CACP

## 2020-04-08 ENCOUNTER — TELEPHONE (OUTPATIENT)
Dept: ANTICOAGULATION | Facility: CLINIC | Age: 75
End: 2020-04-08

## 2020-04-08 DIAGNOSIS — Z79.01 LONG TERM CURRENT USE OF ANTICOAGULANT THERAPY: ICD-10-CM

## 2020-04-08 DIAGNOSIS — Z95.2 S/P AORTIC VALVE REPLACEMENT: ICD-10-CM

## 2020-04-08 DIAGNOSIS — I48.0 PAROXYSMAL ATRIAL FIBRILLATION (H): ICD-10-CM

## 2020-04-08 NOTE — TELEPHONE ENCOUNTER
Pt is scheduled for an appt at [facility] for an INR - due to COVID-19 the clinic lab/[facility] nurse schedule will be closed. Pt will need to be rescheduled to another clinic ([place] or [place]).   left for pt to return call to [facility] - [phone].  Becca Escamilla RN on 4/8/2020 at 10:49 AM

## 2020-04-10 NOTE — TELEPHONE ENCOUNTER
Brooks returned call to [facility] to reschedule for INR    Chart reviewed for appropriateness of extended INR interval.    %    INR stable past 18+ months, with stable warfarin dose  [facility] INR   Latest Ref Rng & Units 0.86 - 1.14 0.86 - 1.14   3/11/2020  2.1 (A)   1/29/2020  2.4 (A)   12/18/2019  2.5 (A)   11/6/2019  2.4 (A)   9/18/2019  2.4 (A)   8/7/2019  2.4 (A)   6/26/2019  2.9 (A)   5/17/2019 2.60 (H)    4/26/2019 2.45 (H)    4/24/2019 2.20 (H)    3/15/2019     3/14/2019 2.5 (A)    2/18/2019     2/15/2019 2.5 (A)    1/8/2019 1/7/2019 2.6    12/18/2018 2.0    12/5/2018  2.5 (A)       Patient may extend INR interval up to 12 weeks if no S/S bleeding, clotting, illness or changes in medications that affect warfarin levels    Jess Kline, PharmD BCACP  Anticoagulation Clinical Pharmacist

## 2020-04-23 DIAGNOSIS — E78.5 HYPERLIPIDEMIA LDL GOAL <100: ICD-10-CM

## 2020-04-24 RX ORDER — SIMVASTATIN 40 MG
TABLET ORAL
Qty: 90 TABLET | Refills: 2 | Status: SHIPPED | OUTPATIENT
Start: 2020-04-24 | End: 2021-02-03

## 2020-04-28 ENCOUNTER — ANCILLARY PROCEDURE (OUTPATIENT)
Dept: CARDIOLOGY | Facility: CLINIC | Age: 75
End: 2020-04-28
Attending: INTERNAL MEDICINE
Payer: MEDICARE

## 2020-04-28 DIAGNOSIS — I42.9 CARDIOMYOPATHY (H): ICD-10-CM

## 2020-04-28 PROCEDURE — 93295 DEV INTERROG REMOTE 1/2/MLT: CPT | Performed by: INTERNAL MEDICINE

## 2020-04-28 PROCEDURE — 93296 REM INTERROG EVL PM/IDS: CPT | Mod: ZF

## 2020-05-04 LAB
MDC_IDC_EPISODE_DTM: NORMAL
MDC_IDC_EPISODE_DTM: NORMAL
MDC_IDC_EPISODE_DURATION: 7 S
MDC_IDC_EPISODE_DURATION: 8 S
MDC_IDC_EPISODE_ID: 34
MDC_IDC_EPISODE_ID: 35
MDC_IDC_EPISODE_TYPE: NORMAL
MDC_IDC_EPISODE_TYPE: NORMAL
MDC_IDC_LEAD_IMPLANT_DT: NORMAL
MDC_IDC_LEAD_LOCATION: NORMAL
MDC_IDC_LEAD_LOCATION_DETAIL_1: NORMAL
MDC_IDC_LEAD_MFG: NORMAL
MDC_IDC_LEAD_MODEL: NORMAL
MDC_IDC_LEAD_POLARITY_TYPE: NORMAL
MDC_IDC_LEAD_SERIAL: NORMAL
MDC_IDC_LEAD_SPECIAL_FUNCTION: NORMAL
MDC_IDC_MSMT_BATTERY_DTM: NORMAL
MDC_IDC_MSMT_BATTERY_REMAINING_LONGEVITY: 10 MO
MDC_IDC_MSMT_BATTERY_RRT_TRIGGER: 2.73
MDC_IDC_MSMT_BATTERY_STATUS: NORMAL
MDC_IDC_MSMT_BATTERY_VOLTAGE: 2.86 V
MDC_IDC_MSMT_CAP_CHARGE_DTM: NORMAL
MDC_IDC_MSMT_CAP_CHARGE_ENERGY: 18 J
MDC_IDC_MSMT_CAP_CHARGE_TIME: 4.61
MDC_IDC_MSMT_CAP_CHARGE_TYPE: NORMAL
MDC_IDC_MSMT_LEADCHNL_LV_IMPEDANCE_VALUE: 399 OHM
MDC_IDC_MSMT_LEADCHNL_LV_IMPEDANCE_VALUE: 399 OHM
MDC_IDC_MSMT_LEADCHNL_LV_IMPEDANCE_VALUE: 665 OHM
MDC_IDC_MSMT_LEADCHNL_LV_PACING_THRESHOLD_AMPLITUDE: 1.62 V
MDC_IDC_MSMT_LEADCHNL_LV_PACING_THRESHOLD_PULSEWIDTH: 1 MS
MDC_IDC_MSMT_LEADCHNL_RA_IMPEDANCE_VALUE: 456 OHM
MDC_IDC_MSMT_LEADCHNL_RA_PACING_THRESHOLD_AMPLITUDE: 0.62 V
MDC_IDC_MSMT_LEADCHNL_RA_PACING_THRESHOLD_PULSEWIDTH: 0.4 MS
MDC_IDC_MSMT_LEADCHNL_RA_SENSING_INTR_AMPL: 1.5 MV
MDC_IDC_MSMT_LEADCHNL_RA_SENSING_INTR_AMPL: 1.5 MV
MDC_IDC_MSMT_LEADCHNL_RV_IMPEDANCE_VALUE: 399 OHM
MDC_IDC_MSMT_LEADCHNL_RV_IMPEDANCE_VALUE: 494 OHM
MDC_IDC_MSMT_LEADCHNL_RV_PACING_THRESHOLD_AMPLITUDE: 0.5 V
MDC_IDC_MSMT_LEADCHNL_RV_PACING_THRESHOLD_PULSEWIDTH: 0.4 MS
MDC_IDC_MSMT_LEADCHNL_RV_SENSING_INTR_AMPL: 22.5 MV
MDC_IDC_MSMT_LEADCHNL_RV_SENSING_INTR_AMPL: 22.5 MV
MDC_IDC_PG_IMPLANT_DTM: NORMAL
MDC_IDC_PG_MFG: NORMAL
MDC_IDC_PG_MODEL: NORMAL
MDC_IDC_PG_SERIAL: NORMAL
MDC_IDC_PG_TYPE: NORMAL
MDC_IDC_SESS_CLINIC_NAME: NORMAL
MDC_IDC_SESS_DTM: NORMAL
MDC_IDC_SESS_TYPE: NORMAL
MDC_IDC_SET_BRADY_AT_MODE_SWITCH_RATE: 171 {BEATS}/MIN
MDC_IDC_SET_BRADY_LOWRATE: 60 {BEATS}/MIN
MDC_IDC_SET_BRADY_MAX_SENSOR_RATE: 140 {BEATS}/MIN
MDC_IDC_SET_BRADY_MAX_TRACKING_RATE: 140 {BEATS}/MIN
MDC_IDC_SET_BRADY_MODE: NORMAL
MDC_IDC_SET_BRADY_PAV_DELAY_HIGH: 100 MS
MDC_IDC_SET_BRADY_PAV_DELAY_LOW: 130 MS
MDC_IDC_SET_BRADY_SAV_DELAY_HIGH: 70 MS
MDC_IDC_SET_BRADY_SAV_DELAY_LOW: 100 MS
MDC_IDC_SET_CRT_LVRV_DELAY: 0 MS
MDC_IDC_SET_CRT_PACED_CHAMBERS: NORMAL
MDC_IDC_SET_LEADCHNL_LV_PACING_AMPLITUDE: 2.75 V
MDC_IDC_SET_LEADCHNL_LV_PACING_ANODE_ELECTRODE_1: NORMAL
MDC_IDC_SET_LEADCHNL_LV_PACING_ANODE_LOCATION_1: NORMAL
MDC_IDC_SET_LEADCHNL_LV_PACING_CAPTURE_MODE: NORMAL
MDC_IDC_SET_LEADCHNL_LV_PACING_CATHODE_ELECTRODE_1: NORMAL
MDC_IDC_SET_LEADCHNL_LV_PACING_CATHODE_LOCATION_1: NORMAL
MDC_IDC_SET_LEADCHNL_LV_PACING_POLARITY: NORMAL
MDC_IDC_SET_LEADCHNL_LV_PACING_PULSEWIDTH: 1 MS
MDC_IDC_SET_LEADCHNL_RA_PACING_AMPLITUDE: 1.5 V
MDC_IDC_SET_LEADCHNL_RA_PACING_ANODE_ELECTRODE_1: NORMAL
MDC_IDC_SET_LEADCHNL_RA_PACING_ANODE_LOCATION_1: NORMAL
MDC_IDC_SET_LEADCHNL_RA_PACING_CAPTURE_MODE: NORMAL
MDC_IDC_SET_LEADCHNL_RA_PACING_CATHODE_ELECTRODE_1: NORMAL
MDC_IDC_SET_LEADCHNL_RA_PACING_CATHODE_LOCATION_1: NORMAL
MDC_IDC_SET_LEADCHNL_RA_PACING_POLARITY: NORMAL
MDC_IDC_SET_LEADCHNL_RA_PACING_PULSEWIDTH: 0.4 MS
MDC_IDC_SET_LEADCHNL_RA_SENSING_ANODE_ELECTRODE_1: NORMAL
MDC_IDC_SET_LEADCHNL_RA_SENSING_ANODE_LOCATION_1: NORMAL
MDC_IDC_SET_LEADCHNL_RA_SENSING_CATHODE_ELECTRODE_1: NORMAL
MDC_IDC_SET_LEADCHNL_RA_SENSING_CATHODE_LOCATION_1: NORMAL
MDC_IDC_SET_LEADCHNL_RA_SENSING_POLARITY: NORMAL
MDC_IDC_SET_LEADCHNL_RA_SENSING_SENSITIVITY: 0.3 MV
MDC_IDC_SET_LEADCHNL_RV_PACING_AMPLITUDE: 2 V
MDC_IDC_SET_LEADCHNL_RV_PACING_ANODE_ELECTRODE_1: NORMAL
MDC_IDC_SET_LEADCHNL_RV_PACING_ANODE_LOCATION_1: NORMAL
MDC_IDC_SET_LEADCHNL_RV_PACING_CAPTURE_MODE: NORMAL
MDC_IDC_SET_LEADCHNL_RV_PACING_CATHODE_ELECTRODE_1: NORMAL
MDC_IDC_SET_LEADCHNL_RV_PACING_CATHODE_LOCATION_1: NORMAL
MDC_IDC_SET_LEADCHNL_RV_PACING_POLARITY: NORMAL
MDC_IDC_SET_LEADCHNL_RV_PACING_PULSEWIDTH: 0.4 MS
MDC_IDC_SET_LEADCHNL_RV_SENSING_ANODE_ELECTRODE_1: NORMAL
MDC_IDC_SET_LEADCHNL_RV_SENSING_ANODE_LOCATION_1: NORMAL
MDC_IDC_SET_LEADCHNL_RV_SENSING_CATHODE_ELECTRODE_1: NORMAL
MDC_IDC_SET_LEADCHNL_RV_SENSING_CATHODE_LOCATION_1: NORMAL
MDC_IDC_SET_LEADCHNL_RV_SENSING_POLARITY: NORMAL
MDC_IDC_SET_LEADCHNL_RV_SENSING_SENSITIVITY: 0.3 MV
MDC_IDC_SET_ZONE_DETECTION_BEATS_DENOMINATOR: 40 {BEATS}
MDC_IDC_SET_ZONE_DETECTION_BEATS_NUMERATOR: 30 {BEATS}
MDC_IDC_SET_ZONE_DETECTION_INTERVAL: 320 MS
MDC_IDC_SET_ZONE_DETECTION_INTERVAL: 350 MS
MDC_IDC_SET_ZONE_DETECTION_INTERVAL: 360 MS
MDC_IDC_SET_ZONE_DETECTION_INTERVAL: 420 MS
MDC_IDC_SET_ZONE_DETECTION_INTERVAL: NORMAL
MDC_IDC_SET_ZONE_TYPE: NORMAL
MDC_IDC_STAT_AT_BURDEN_PERCENT: 0 %
MDC_IDC_STAT_AT_DTM_END: NORMAL
MDC_IDC_STAT_AT_DTM_START: NORMAL
MDC_IDC_STAT_BRADY_AP_VP_PERCENT: 23.95 %
MDC_IDC_STAT_BRADY_AP_VS_PERCENT: 0.02 %
MDC_IDC_STAT_BRADY_AS_VP_PERCENT: 75.81 %
MDC_IDC_STAT_BRADY_AS_VS_PERCENT: 0.22 %
MDC_IDC_STAT_BRADY_DTM_END: NORMAL
MDC_IDC_STAT_BRADY_DTM_START: NORMAL
MDC_IDC_STAT_BRADY_RA_PERCENT_PACED: 23.87 %
MDC_IDC_STAT_BRADY_RV_PERCENT_PACED: 99.42 %
MDC_IDC_STAT_CRT_DTM_END: NORMAL
MDC_IDC_STAT_CRT_DTM_START: NORMAL
MDC_IDC_STAT_CRT_LV_PERCENT_PACED: 99.3 %
MDC_IDC_STAT_CRT_PERCENT_PACED: 99.3 %
MDC_IDC_STAT_EPISODE_RECENT_COUNT: 0
MDC_IDC_STAT_EPISODE_RECENT_COUNT_DTM_END: NORMAL
MDC_IDC_STAT_EPISODE_RECENT_COUNT_DTM_START: NORMAL
MDC_IDC_STAT_EPISODE_TOTAL_COUNT: 0
MDC_IDC_STAT_EPISODE_TOTAL_COUNT: 3
MDC_IDC_STAT_EPISODE_TOTAL_COUNT_DTM_END: NORMAL
MDC_IDC_STAT_EPISODE_TOTAL_COUNT_DTM_START: NORMAL
MDC_IDC_STAT_EPISODE_TYPE: NORMAL
MDC_IDC_STAT_TACHYTHERAPY_ATP_DELIVERED_RECENT: 0
MDC_IDC_STAT_TACHYTHERAPY_ATP_DELIVERED_TOTAL: 0
MDC_IDC_STAT_TACHYTHERAPY_RECENT_DTM_END: NORMAL
MDC_IDC_STAT_TACHYTHERAPY_RECENT_DTM_START: NORMAL
MDC_IDC_STAT_TACHYTHERAPY_SHOCKS_ABORTED_RECENT: 0
MDC_IDC_STAT_TACHYTHERAPY_SHOCKS_ABORTED_TOTAL: 0
MDC_IDC_STAT_TACHYTHERAPY_SHOCKS_DELIVERED_RECENT: 0
MDC_IDC_STAT_TACHYTHERAPY_SHOCKS_DELIVERED_TOTAL: 0
MDC_IDC_STAT_TACHYTHERAPY_TOTAL_DTM_END: NORMAL
MDC_IDC_STAT_TACHYTHERAPY_TOTAL_DTM_START: NORMAL

## 2020-05-10 ENCOUNTER — APPOINTMENT (OUTPATIENT)
Dept: GENERAL RADIOLOGY | Facility: CLINIC | Age: 75
End: 2020-05-10
Attending: EMERGENCY MEDICINE
Payer: MEDICARE

## 2020-05-10 ENCOUNTER — ANCILLARY PROCEDURE (OUTPATIENT)
Dept: CARDIOLOGY | Facility: CLINIC | Age: 75
End: 2020-05-10
Attending: EMERGENCY MEDICINE
Payer: MEDICARE

## 2020-05-10 ENCOUNTER — HOSPITAL ENCOUNTER (OUTPATIENT)
Facility: CLINIC | Age: 75
Setting detail: OBSERVATION
Discharge: HOME | End: 2020-05-11
Attending: EMERGENCY MEDICINE | Admitting: INTERNAL MEDICINE
Payer: MEDICARE

## 2020-05-10 ENCOUNTER — APPOINTMENT (OUTPATIENT)
Dept: CT IMAGING | Facility: CLINIC | Age: 75
End: 2020-05-10
Attending: EMERGENCY MEDICINE
Payer: MEDICARE

## 2020-05-10 DIAGNOSIS — R55 SYNCOPE, UNSPECIFIED SYNCOPE TYPE: ICD-10-CM

## 2020-05-10 DIAGNOSIS — I50.22 CHRONIC SYSTOLIC HEART FAILURE (H): ICD-10-CM

## 2020-05-10 DIAGNOSIS — Q23.81 BICUSPID AORTIC VALVE: ICD-10-CM

## 2020-05-10 DIAGNOSIS — J93.9 PNEUMOTHORAX, LEFT: ICD-10-CM

## 2020-05-10 DIAGNOSIS — N18.30 CKD (CHRONIC KIDNEY DISEASE) STAGE 3, GFR 30-59 ML/MIN (H): ICD-10-CM

## 2020-05-10 DIAGNOSIS — G47.34 NOCTURNAL HYPOXEMIA: ICD-10-CM

## 2020-05-10 DIAGNOSIS — I48.0 PAROXYSMAL ATRIAL FIBRILLATION (H): ICD-10-CM

## 2020-05-10 DIAGNOSIS — R56.9 SEIZURE (H): Primary | ICD-10-CM

## 2020-05-10 DIAGNOSIS — I38 ENDOCARDITIS: ICD-10-CM

## 2020-05-10 DIAGNOSIS — Z95.810 AUTOMATIC IMPLANTABLE CARDIOVERTER-DEFIBRILLATOR IN SITU: ICD-10-CM

## 2020-05-10 DIAGNOSIS — Z95.2 S/P AVR: ICD-10-CM

## 2020-05-10 DIAGNOSIS — I50.22 HEART FAILURE, CHRONIC SYSTOLIC (H): ICD-10-CM

## 2020-05-10 DIAGNOSIS — M65.342 TRIGGER RING FINGER OF LEFT HAND: ICD-10-CM

## 2020-05-10 DIAGNOSIS — E78.5 HYPERLIPIDEMIA WITH TARGET LDL LESS THAN 100: ICD-10-CM

## 2020-05-10 DIAGNOSIS — Z79.01 LONG TERM CURRENT USE OF ANTICOAGULANT THERAPY: ICD-10-CM

## 2020-05-10 DIAGNOSIS — L57.0 AK (ACTINIC KERATOSIS): ICD-10-CM

## 2020-05-10 DIAGNOSIS — Z87.891 SMOKING HX: ICD-10-CM

## 2020-05-10 DIAGNOSIS — S69.90XA FINGER INJURY: ICD-10-CM

## 2020-05-10 DIAGNOSIS — I44.7 LBBB (LEFT BUNDLE BRANCH BLOCK): ICD-10-CM

## 2020-05-10 DIAGNOSIS — R56.9 WITNESSED SEIZURE-LIKE ACTIVITY (H): ICD-10-CM

## 2020-05-10 DIAGNOSIS — Z79.2 NEED FOR PROPHYLACTIC ANTIBIOTIC: ICD-10-CM

## 2020-05-10 DIAGNOSIS — I35.0 AORTIC VALVE STENOSIS, SEVERE: ICD-10-CM

## 2020-05-10 DIAGNOSIS — Z95.2 S/P AORTIC VALVE REPLACEMENT: ICD-10-CM

## 2020-05-10 DIAGNOSIS — Z85.828 HISTORY OF NONMELANOMA SKIN CANCER: ICD-10-CM

## 2020-05-10 DIAGNOSIS — S43.429A ROTATOR CUFF (CAPSULE) SPRAIN: ICD-10-CM

## 2020-05-10 DIAGNOSIS — E78.5 DYSLIPIDEMIA: ICD-10-CM

## 2020-05-10 DIAGNOSIS — D48.5 NEOPLASM OF UNCERTAIN BEHAVIOR OF SKIN: ICD-10-CM

## 2020-05-10 DIAGNOSIS — I42.0 CARDIOMYOPATHY, DILATED, NONISCHEMIC (H): ICD-10-CM

## 2020-05-10 LAB
ALBUMIN SERPL-MCNC: 4 G/DL (ref 3.4–5)
ALBUMIN UR-MCNC: 10 MG/DL
ALP SERPL-CCNC: 82 U/L (ref 40–150)
ALT SERPL W P-5'-P-CCNC: 43 U/L (ref 0–70)
ANION GAP SERPL CALCULATED.3IONS-SCNC: 4 MMOL/L (ref 3–14)
APPEARANCE UR: CLEAR
AST SERPL W P-5'-P-CCNC: 39 U/L (ref 0–45)
BILIRUB DIRECT SERPL-MCNC: 0.2 MG/DL (ref 0–0.2)
BILIRUB SERPL-MCNC: 0.8 MG/DL (ref 0.2–1.3)
BILIRUB UR QL STRIP: NEGATIVE
BUN SERPL-MCNC: 13 MG/DL (ref 7–30)
CALCIUM SERPL-MCNC: 8.9 MG/DL (ref 8.5–10.1)
CHLORIDE SERPL-SCNC: 110 MMOL/L (ref 94–109)
CO2 SERPL-SCNC: 26 MMOL/L (ref 20–32)
COLOR UR AUTO: YELLOW
CREAT SERPL-MCNC: 0.94 MG/DL (ref 0.66–1.25)
ERYTHROCYTE [DISTWIDTH] IN BLOOD BY AUTOMATED COUNT: 13.9 % (ref 10–15)
ETHANOL SERPL-MCNC: <0.01 G/DL
GFR SERPL CREATININE-BSD FRML MDRD: 79 ML/MIN/{1.73_M2}
GLUCOSE SERPL-MCNC: 107 MG/DL (ref 70–99)
GLUCOSE UR STRIP-MCNC: NEGATIVE MG/DL
HCT VFR BLD AUTO: 47.6 % (ref 40–53)
HGB BLD-MCNC: 15.2 G/DL (ref 13.3–17.7)
HGB UR QL STRIP: ABNORMAL
INR PPP: 2.75 (ref 0.86–1.14)
INTERPRETATION ECG - MUSE: NORMAL
KETONES UR STRIP-MCNC: NEGATIVE MG/DL
LEUKOCYTE ESTERASE UR QL STRIP: NEGATIVE
LIPASE SERPL-CCNC: 183 U/L (ref 73–393)
MCH RBC QN AUTO: 32.1 PG (ref 26.5–33)
MCHC RBC AUTO-ENTMCNC: 31.9 G/DL (ref 31.5–36.5)
MCV RBC AUTO: 100 FL (ref 78–100)
MUCOUS THREADS #/AREA URNS LPF: PRESENT /LPF
NITRATE UR QL: NEGATIVE
PH UR STRIP: 7 PH (ref 5–7)
PLATELET # BLD AUTO: 107 10E9/L (ref 150–450)
POTASSIUM SERPL-SCNC: 3.9 MMOL/L (ref 3.4–5.3)
PROT SERPL-MCNC: 7.3 G/DL (ref 6.8–8.8)
RBC # BLD AUTO: 4.74 10E12/L (ref 4.4–5.9)
RBC #/AREA URNS AUTO: 6 /HPF (ref 0–2)
SODIUM SERPL-SCNC: 140 MMOL/L (ref 133–144)
SOURCE: ABNORMAL
SP GR UR STRIP: 1.02 (ref 1–1.03)
TROPONIN I SERPL-MCNC: 0.02 UG/L (ref 0–0.04)
UROBILINOGEN UR STRIP-MCNC: NORMAL MG/DL (ref 0–2)
WBC # BLD AUTO: 5.8 10E9/L (ref 4–11)
WBC #/AREA URNS AUTO: 1 /HPF (ref 0–5)

## 2020-05-10 PROCEDURE — 80048 BASIC METABOLIC PNL TOTAL CA: CPT | Performed by: EMERGENCY MEDICINE

## 2020-05-10 PROCEDURE — 99219 ZZC INITIAL OBSERVATION CARE,LEVL II: CPT | Mod: AI | Performed by: INTERNAL MEDICINE

## 2020-05-10 PROCEDURE — 85610 PROTHROMBIN TIME: CPT | Performed by: EMERGENCY MEDICINE

## 2020-05-10 PROCEDURE — 71045 X-RAY EXAM CHEST 1 VIEW: CPT

## 2020-05-10 PROCEDURE — 99285 EMERGENCY DEPT VISIT HI MDM: CPT | Mod: 25 | Performed by: EMERGENCY MEDICINE

## 2020-05-10 PROCEDURE — 25000132 ZZH RX MED GY IP 250 OP 250 PS 637: Mod: GY | Performed by: INTERNAL MEDICINE

## 2020-05-10 PROCEDURE — 99207 CARDIAC DEVICE CHECK - REMOTE: CPT | Performed by: INTERNAL MEDICINE

## 2020-05-10 PROCEDURE — 84484 ASSAY OF TROPONIN QUANT: CPT | Performed by: EMERGENCY MEDICINE

## 2020-05-10 PROCEDURE — 83690 ASSAY OF LIPASE: CPT | Performed by: EMERGENCY MEDICINE

## 2020-05-10 PROCEDURE — 80076 HEPATIC FUNCTION PANEL: CPT | Performed by: EMERGENCY MEDICINE

## 2020-05-10 PROCEDURE — 25000132 ZZH RX MED GY IP 250 OP 250 PS 637: Mod: GY

## 2020-05-10 PROCEDURE — 93005 ELECTROCARDIOGRAM TRACING: CPT | Performed by: EMERGENCY MEDICINE

## 2020-05-10 PROCEDURE — 99207 ZZC CDG-CODE CATEGORY CHANGED: CPT | Performed by: INTERNAL MEDICINE

## 2020-05-10 PROCEDURE — 93010 ELECTROCARDIOGRAM REPORT: CPT | Mod: Z6 | Performed by: EMERGENCY MEDICINE

## 2020-05-10 PROCEDURE — 96360 HYDRATION IV INFUSION INIT: CPT | Performed by: EMERGENCY MEDICINE

## 2020-05-10 PROCEDURE — 70450 CT HEAD/BRAIN W/O DYE: CPT

## 2020-05-10 PROCEDURE — 96361 HYDRATE IV INFUSION ADD-ON: CPT | Performed by: EMERGENCY MEDICINE

## 2020-05-10 PROCEDURE — 85027 COMPLETE CBC AUTOMATED: CPT | Performed by: EMERGENCY MEDICINE

## 2020-05-10 PROCEDURE — 80320 DRUG SCREEN QUANTALCOHOLS: CPT | Performed by: EMERGENCY MEDICINE

## 2020-05-10 PROCEDURE — G0378 HOSPITAL OBSERVATION PER HR: HCPCS

## 2020-05-10 PROCEDURE — 81001 URINALYSIS AUTO W/SCOPE: CPT | Performed by: EMERGENCY MEDICINE

## 2020-05-10 PROCEDURE — 93296 REM INTERROG EVL PM/IDS: CPT | Mod: ZF

## 2020-05-10 PROCEDURE — 25800030 ZZH RX IP 258 OP 636: Performed by: EMERGENCY MEDICINE

## 2020-05-10 RX ORDER — VITAMIN B COMPLEX
1000 TABLET ORAL DAILY
Status: DISCONTINUED | OUTPATIENT
Start: 2020-05-10 | End: 2020-05-11 | Stop reason: HOSPADM

## 2020-05-10 RX ORDER — WARFARIN SODIUM 7.5 MG/1
7.5 TABLET ORAL
Status: COMPLETED | OUTPATIENT
Start: 2020-05-10 | End: 2020-05-10

## 2020-05-10 RX ORDER — FOLIC ACID 1 MG/1
1 TABLET ORAL DAILY
Status: DISCONTINUED | OUTPATIENT
Start: 2020-05-10 | End: 2020-05-11 | Stop reason: HOSPADM

## 2020-05-10 RX ORDER — NALOXONE HYDROCHLORIDE 0.4 MG/ML
.1-.4 INJECTION, SOLUTION INTRAMUSCULAR; INTRAVENOUS; SUBCUTANEOUS
Status: DISCONTINUED | OUTPATIENT
Start: 2020-05-10 | End: 2020-05-11 | Stop reason: HOSPADM

## 2020-05-10 RX ORDER — ALLOPURINOL 100 MG/1
100 TABLET ORAL EVERY EVENING
Status: DISCONTINUED | OUTPATIENT
Start: 2020-05-10 | End: 2020-05-11 | Stop reason: HOSPADM

## 2020-05-10 RX ORDER — LORAZEPAM 1 MG/1
1-2 TABLET ORAL EVERY 30 MIN PRN
Status: DISCONTINUED | OUTPATIENT
Start: 2020-05-10 | End: 2020-05-11 | Stop reason: HOSPADM

## 2020-05-10 RX ORDER — LIDOCAINE 40 MG/G
CREAM TOPICAL
Status: DISCONTINUED | OUTPATIENT
Start: 2020-05-10 | End: 2020-05-11 | Stop reason: HOSPADM

## 2020-05-10 RX ORDER — LANOLIN ALCOHOL/MO/W.PET/CERES
100 CREAM (GRAM) TOPICAL DAILY
Status: DISCONTINUED | OUTPATIENT
Start: 2020-05-10 | End: 2020-05-11 | Stop reason: HOSPADM

## 2020-05-10 RX ORDER — METOPROLOL SUCCINATE 50 MG/1
50 TABLET, EXTENDED RELEASE ORAL DAILY
Status: DISCONTINUED | OUTPATIENT
Start: 2020-05-10 | End: 2020-05-11 | Stop reason: HOSPADM

## 2020-05-10 RX ORDER — SIMVASTATIN 20 MG
40 TABLET ORAL AT BEDTIME
Status: DISCONTINUED | OUTPATIENT
Start: 2020-05-10 | End: 2020-05-11 | Stop reason: HOSPADM

## 2020-05-10 RX ADMIN — FOLIC ACID 1 MG: 1 TABLET ORAL at 19:17

## 2020-05-10 RX ADMIN — MELATONIN 1000 UNITS: at 15:35

## 2020-05-10 RX ADMIN — THIAMINE HCL TAB 100 MG 100 MG: 100 TAB at 19:17

## 2020-05-10 RX ADMIN — WARFARIN SODIUM 7.5 MG: 7.5 TABLET ORAL at 18:12

## 2020-05-10 RX ADMIN — ALLOPURINOL 100 MG: 100 TABLET ORAL at 19:17

## 2020-05-10 RX ADMIN — Medication 12.5 MG: at 15:36

## 2020-05-10 RX ADMIN — METOPROLOL SUCCINATE 50 MG: 50 TABLET, EXTENDED RELEASE ORAL at 15:35

## 2020-05-10 RX ADMIN — SIMVASTATIN 40 MG: 20 TABLET, FILM COATED ORAL at 22:01

## 2020-05-10 RX ADMIN — SODIUM CHLORIDE 1000 ML: 900 INJECTION, SOLUTION INTRAVENOUS at 10:37

## 2020-05-10 ASSESSMENT — ENCOUNTER SYMPTOMS
NUMBNESS: 0
SPEECH DIFFICULTY: 0
BLOOD IN STOOL: 0
DIARRHEA: 0
DYSURIA: 0
SHORTNESS OF BREATH: 0
HEADACHES: 0
ABDOMINAL PAIN: 0
CONFUSION: 1
SEIZURES: 1
FEVER: 0
WEAKNESS: 0
COUGH: 0
LIGHT-HEADEDNESS: 1
VOMITING: 0
CONSTIPATION: 0
NAUSEA: 0

## 2020-05-10 NOTE — ED TRIAGE NOTES
Pt sat up in bed and screamed. Wife then witnessed full body shakes. Wife reports no seizure hx. Positive for cardiac hx. Pt was responsive for EMS bs 146  Medics report he was incontinent with this episode  Pt reports he has no complaints now and does not remember the incident.

## 2020-05-10 NOTE — H&P
[facility], [place]    History and Physical - [facility] 3 Service        Date of Admission:  5/10/2020    Assessment & Plan   Brooks Summers is a 75 year old male admitted on 5/10/2020. 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     #. Encephalopathy--resolved  #. C/f Seizure  This is a 1st episode of shaking & stiffening followed by urinary incontinence and LOC. Patient denies h/o seizures or LOC. Drinks 2 beers/day. Vitals and labs unremarkable. CT Head shows no acute intracranial pathology and chronic infarcts. DDx broad: seizure, alcohol withdrawal, orthostasis, cardiovascular syncope, arrhythmia, PE, medication effect, migraine, or psychogenic spell. Neurology evaluated in the ED and are concerned that this might have been an unprovoked seizure. Also of concern is elevated blood pressure c/f PRES. Plan/Work-up as below:  - Neurology consulted in ED, appreciate assistance  - Continue telemetry    - EEG ordered   - Pacemaker interrogation ordered  - CIWA w/ ativan, thiamine + folate  - Seizure precautions  - Unfortunately his pacemaker is NOT MRI compatible  - Will close outpatient follow-up with Neurology as an outpatient     #. Bicuspid valve w/ severe stenosis s/p AVR in 7/2011   #. 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 Paroxysmal A-fib, chronic thrombocytopenia   - continue pta losartan, simvastatin, metoprolol XL, warfarin  - pacemaker interrogation ordered    #. Gout  - continue pta allopurinol    #. Chronic thrombocytopenia  Plts stable.   -  monitor       Diet: Regular Diet Adult    Fluids: PO  DVT Prophylaxis: Warfarin  James Catheter: not present  Code Status: Full Code      Disposition Plan   Expected discharge: 2 - 3 days, recommended to prior living arrangement once seizure is evaluated.  Entered: Fariha Lara MD 05/10/2020, 3:50 PM       The patient's care was discussed with the Attending Physician, Dr. Pickett..    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     He was in his usual state of health and reports works in his yard and goes golfing. He states that he was sleeping next to his wife and per hs wife, he woke up and started to scream and his whole body started shaking and then became stiff. He was incontinent of urine afterwards. EMS arrived and found patient confused and was brought to the ED. He denies h/o seizures, strokes, LOC in the past. Denies tongue biting. He doesn't recall what happened either. On ROS, denies headaches, vision changes, chest pain, SOB, palpitations, abdominal pain, N/V, urinary/bladder sx, numbness/tingling/weakness of extremities.     He is a previous smoker, and drinks 2 beers daily, denies using any drugs of abuse. Lives at home with his wife and is pretty active.       Review of Systems    The 10 point Review of Systems is negative other than noted in the HPI.     Past  Medical History    I have reviewed this patient's medical history and updated it with pertinent information if needed.   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 Tx with Dapto/RIFx 8 weeks.     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  Past Surgical History:   Procedure Laterality Date     ANESTHESIA CARDIOVERSION  7/18/2011    Procedure:ANESTHESIA CARDIOVERSION; Cardioversion; Surgeon:GENERIC ANESTHESIA PROVIDER; Location:[facility] OR     COLONOSCOPY       COLONOSCOPY N/A 11/19/2018    Procedure: COLONOSCOPY;  Surgeon: Herman Mcginnis MD;  Location: [facility] GI     CORONARY ANGIOGRAPHY ADULT ORDER       CYSTOSCOPY, BIOPSY URETHRA N/A 4/3/2017    Procedure: CYSTOSCOPY, BIOPSY URETHRA;  Surgeon: Marlena Mora MD;  Location: [facility] OR     ENDOSCOPY UPPER, COLONOSCOPY, COMBINED       HC REMOV & REPLACE IMPLT DEFIB PULSE GEN MULT LEAD  12/3/2015          HEMORRHOID SURGERY       IMPLANT AUTOMATIC IMPLANTABLE CARDIOVERTER DEFIBRILLATOR       MOHS MICROGRAPHIC PROCEDURE       ORTHOPEDIC SURGERY      shoulder,right     REDO STERNOTOMY REPLACE VALVE AORTIC  1/25/2013    Procedure: REDO STERNOTOMY REPLACE VALVE AORTIC;  Redo Sternotomy, Redo Aortic Valve Replacement on pump oxygenator. Intraoperative Transesophageal Echocardiogram;  Surgeon: Navin Singh MD;  Location: [facility] OR     REPAIR VALVE  MITRAL  2013     REPLACE VALVE AORTIC  7/14/2011    Procedure:REPLACE VALVE AORTIC; Median Sternotomy, Bioprosthesis,  Aortic Valve replacement on pump with oxygenator. Intra-operative Transesophogeal Echocardiogram.; Surgeon:STEPHANIE HAMPTON; Location:[facility] OR     teeth extractions       TRANSPLANT         Social History   I have reviewed this patient's social history and updated it with pertinent information if needed. Brooks Summers  reports that he quit smoking about 30 years ago. His smoking use included cigarettes. He has a 20.00 pack-year smoking history. He has never used smokeless tobacco. He reports current alcohol use of about 2.0 standard drinks of alcohol per week. He reports that he does not use drugs.    Family History   I have reviewed this patient's family history and updated it with pertinent information if needed.   Family History   Problem Relation Age of Onset     C.A.D. Father 68        bypass, carotid      Prostate Cancer Father 70     Heart Disease Father      Cancer Mother 92        stomach, colon     Hypertension Mother      Retinal detachment Mother      Cancer Brother 64        lung cancer, petroleum     Cancer Brother      Glaucoma No family hx of      Macular Degeneration No family hx of      Strabismus No family hx of      Glasses (<7 y/o) No family hx of        Prior to Admission Medications   Prior to Admission Medications   Prescriptions Last Dose Informant Patient Reported? Taking?   Cyanocobalamin (VITAMIN B 12 PO)   Yes No   Multiple Vitamins-Minerals (MULTIVITAMIN ADULTS 50+) TABS   Yes No   PROVIDER DOSED MANAGED WARFARIN, COUMADIN, OUTPATIENT  Self Yes No   Sig: Per coumadin clinic   VITAMIN D, CHOLECALCIFEROL, PO   Yes No   Sig: Take 1,000 Units by mouth daily   allopurinol (ZYLOPRIM) 100 MG tablet   No No   Sig: Take 1 tablet (100 mg) by mouth every evening   losartan (COZAAR) 25 MG tablet   No No   Sig: Take 0.5 tablets (12.5 mg) by mouth daily Take (1/2) tab daily   metoprolol  succinate ER (TOPROL XL) 50 MG 24 hr tablet   No No   Sig: Take 1 tablet (50 mg) by mouth daily   simvastatin (ZOCOR) 40 MG tablet   No No   Sig: TAKE 1 TABLET(40 MG) BY MOUTH AT BEDTIME   valACYclovir 1000 mg PO tablet   No No   Sig: Take 1 tablet (1,000 mg) by mouth 3 times daily for 7 days   warfarin ANTICOAGULANT (COUMADIN) 5 MG tablet   No No   Si.5 mg Tues/Thurs/Sun; 10 mg all other days or as directed by the Anticoagulation Clinic      Facility-Administered Medications Last Administration Doses Remaining   lidocaine 1% with EPINEPHrine 1:100,000 injection 3 mL None recorded 1        Allergies   Allergies   Allergen Reactions     Lisinopril Cough       Physical Exam   Vital Signs: Temp: 98.6  F (37  C) Temp src: Oral BP: (!) 141/86 Pulse: 71 Heart Rate: 83 Resp: 18 SpO2: 96 % O2 Device: None (Room air)    Weight: 175 lbs 1.6 oz    Constitutional: awake, alert and cooperative  Eyes: Lids and lashes normal, pupils equal, round and reactive to light, extra ocular muscles intact, sclera clear, conjunctiva normal and pupils equal, round and reactive to light  Respiratory: No increased work of breathing, good air exchange, clear to auscultation bilaterally, no crackles or wheezing  Cardiovascular: Normal apical impulse, regular rate and rhythm, normal S1 and S2, no S3 or S4, and no murmur noted  GI: No scars, normal bowel sounds, soft, non-distended, non-tender, no masses palpated, no hepatosplenomegally  Musculoskeletal: There is no redness, warmth, or swelling of the joints.  Full range of motion noted.  Motor strength is 5 out of 5 all extremities bilaterally.  Tone is normal.  Neurologic: Awake, alert, oriented to name, place and time.  Cranial nerves II-XII are grossly intact.  Motor is 5 out of 5 bilaterally.  Cerebellar finger to nose, heel to shin intact.  Sensory is intact.  Babinski down going, Romberg negative, and gait is normal.    Data   Data reviewed today: I reviewed all medications, new labs and  imaging results over the last 24 hours. I personally reviewed no images or EKG's today.    Recent Labs   Lab 05/10/20  0654   WBC 5.8   HGB 15.2      *   INR 2.75*      POTASSIUM 3.9   CHLORIDE 110*   CO2 26   BUN 13   CR 0.94   ANIONGAP 4   MARTIN 8.9   *   ALBUMIN 4.0   PROTTOTAL 7.3   BILITOTAL 0.8   ALKPHOS 82   ALT 43   AST 39   LIPASE 183   TROPI 0.017

## 2020-05-10 NOTE — PROGRESS NOTES
Arrived on 6d.  VSS.  LS clear.  BS audible.  vitals stable.  neuros intact.  Baseline numbness bilat LE.  Denies pain.  A/O x 4.  Oriented to room and unit routine.  CIWA score 3.  Seizure pads in place.  Suction at bedside.  md notified of arrival to unit and paged for orders.  Monitor tech notified of tele

## 2020-05-10 NOTE — LETTER
Transition Communication Hand-off for Care Transitions to Next Level of Care Provider    Name: Brooks Summers  : 1945  MRN #: [id number]  Primary Care Provider: Edin Mays     Primary Clinic: [address] [place] [id number]     Reason for Hospitalization:  Neoplasm of uncertain behavior of skin [D48.5]  Rotator cuff (capsule) sprain [S43.429A]  Chronic systolic heart failure (H) [I50.22]  Bicuspid aortic valve [Q23.1]  Endocarditis [I38]  Finger injury [S69.90XA]  LBBB (left bundle branch block) [I44.7]  Paroxysmal atrial fibrillation (H) [I48.0]  AK (actinic keratosis) [L57.0]  Dyslipidemia [E78.5]  CKD (chronic kidney disease) stage 3, GFR 30-59 ml/min (H) [N18.3]  Smoking hx [Z87.891]  S/P aortic valve replacement [Z95.2]  Hyperlipidemia with target LDL less than 100 [E78.5]  Aortic valve stenosis, severe [I35.0]  Pneumothorax, left [J93.9]  Need for prophylactic antibiotic [Z79.2]  S/P AVR [Z95.2]  Heart failure, chronic systolic (H) [I50.22]  Trigger ring finger of left hand [M65.342]  History of nonmelanoma skin cancer [Z85.828]  Cardiomyopathy, dilated, nonischemic (H) [I42.0]  Nocturnal hypoxemia [G47.34]  Automatic implantable cardioverter-defibrillator in situ [Z95.810]  Long term current use of anticoagulant therapy [Z79.01]  Witnessed seizure-like activity (H) [R56.9]  Admit Date/Time: 5/10/2020  6:50 AM  Discharge Date:   Payor Source: Payor: MEDICARE / Plan: MEDICARE / Product Type: Medicare   Readmission Assessment Measure (CARLY) Risk Score/category:  Average.       Reason for Communication Hand-off Referral: Multiple providers/specialties  Discharge Plan:  Discharge Needs Assessment:  Follow-up specialty is recommended: Yes    Follow-up plan:    Future Appointments   Date Time Provider Department Center   6/3/2020  1:00 PM [facility]   2020  1:05 PM Edin Mays MD [facility]   2020 12:00 AM  ICD REMOTE [facility]       Any outstanding  tests or procedures:        Referrals     Future Labs/Procedures    Neurology Adult Referral     Comments:    Schedule for 1st seizure episode.    Neurology Adult Referral     Scheduling Instructions:    Resident or general neurology clinic            Key Recommendations:  Post hospitalization follow up.   STEPHANIE CUEVAS RN BSN  Care Coordinator Unit [address] [place] [address]  Phone: [phone]

## 2020-05-10 NOTE — ED PROVIDER NOTES
ED Provider Note  [facility]      History     Chief Complaint   Patient presents with     Seizures     HPI  Brooks Summers is a 75 year old male with a medical history significant for bicuspid aortic valve and severe aortic stenosis s/p aortic valve replacementx2 (2011; 2013), c/b endocarditis (2013), severe LV dysfunction s/p AICD (7/6/2012), CHF (EF 55-60%; 2/10/2020), atrial fibrillation (on warfarin), and CKD stage III who presents to the Emergency Department for evaluation after a possible seizure.  Patient was reportedly sleeping next to his wife in bed this morning.  Wife witnessed patient sit up and scream and then began having total body shaking.  Patient has no known seizure history.  On EMS arrival, patient was responsive with a blood sugar of 146.  Medics noted that he had been incontinent of urine.  Patient was initially repetitive and confused on arrival to the emergency department.  He was unable to remember the seizure episode but does recall being loaded into the ambulance for transport to the ED.  He denies recent illness and currently feels back to baseline.  He denies chest pain, cough, shortness of breath, fever, nausea, vomiting, diarrhea, headache.  He has been quite active doing yard work and has not had any dyspnea on exertion or exercise-induced angina.  He has not felt palpitations.  However, he does report recently that he has had some lightheadedness with standing.  He reports drinking about 2 beers a day.  Last drink was yesterday.  He has no known sick contacts but has been spending time with family members who have been over to visit him and his wife.    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 Tx with Dapto/RIFx  8 weeks.     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     Paroxysmal A-fib (H)     Post-op 7/14/2011, 1/26/13     Rotator Cuff (Capsule) Sprain and Strain      S/P aortic valve replacement     7/14/2011, re-do 1/25/13 due to endocarditis     Smoking hx      Thrombocytopenia (H)      Past Surgical History:   Procedure Laterality Date     ANESTHESIA CARDIOVERSION  7/18/2011    Procedure:ANESTHESIA CARDIOVERSION; Cardioversion; Surgeon:GENERIC ANESTHESIA PROVIDER; Location:[facility] OR     COLONOSCOPY       COLONOSCOPY N/A 11/19/2018    Procedure: COLONOSCOPY;  Surgeon: Herman Mcginnis MD;  Location: [facility]     CORONARY ANGIOGRAPHY ADULT ORDER       CYSTOSCOPY, BIOPSY URETHRA N/A 4/3/2017    Procedure: CYSTOSCOPY, BIOPSY URETHRA;  Surgeon: Marlena Mora MD;  [facility]     ENDOSCOPY UPPER, COLONOSCOPY, COMBINED       HC REMOV & REPLACE IMPLT DEFIB PULSE GEN MULT LEAD  12/3/2015          HEMORRHOID SURGERY       IMPLANT AUTOMATIC IMPLANTABLE CARDIOVERTER DEFIBRILLATOR       MOHS MICROGRAPHIC PROCEDURE       ORTHOPEDIC SURGERY      shoulder,right     REDO STERNOTOMY REPLACE VALVE AORTIC  1/25/2013    Procedure: REDO STERNOTOMY REPLACE VALVE AORTIC;  Redo Sternotomy, Redo Aortic Valve Replacement on pump oxygenator. Intraoperative Transesophageal Echocardiogram;  Surgeon: Stephanie Hampton MD;  [facility]     REPAIR VALVE MITRAL  2013     REPLACE VALVE AORTIC  7/14/2011    Procedure:REPLACE VALVE AORTIC; Median Sternotomy, Bioprosthesis,  Aortic Valve replacement on pump with oxygenator. Intra-operative Transesophogeal Echocardiogram.; Surgeon:STEPHANIE HAMPTON; Location:[facility]     teeth extractions       TRANSPLANT       allopurinol (ZYLOPRIM) 100 MG tablet  Cyanocobalamin (VITAMIN B 12 PO)  losartan (COZAAR) 25 MG tablet  metoprolol succinate ER (TOPROL XL) 50 MG 24 hr tablet  Multiple Vitamins-Minerals (MULTIVITAMIN ADULTS 50+) TABS  PROVIDER DOSED  MANAGED WARFARIN, COUMADIN, OUTPATIENT  simvastatin (ZOCOR) 40 MG tablet  valACYclovir 1000 mg PO tablet  VITAMIN D, CHOLECALCIFEROL, PO  warfarin ANTICOAGULANT (COUMADIN) 5 MG tablet      Allergies   Allergen Reactions     Lisinopril Cough     Past medical history, past surgical history, medications, and allergies were reviewed with the patient. Additional pertinent items: None    Family History  Family History   Problem Relation Age of Onset     C.A.D. Father 68        bypass, carotid      Prostate Cancer Father 70     Heart Disease Father      Cancer Mother 92        stomach, colon     Hypertension Mother      Retinal detachment Mother      Cancer Brother 64        lung cancer, petroleum     Cancer Brother      Glaucoma No family hx of      Macular Degeneration No family hx of      Strabismus No family hx of      Glasses (<9 y/o) No family hx of      Family history was reviewed with the patient. Additional pertinent items: None    Social History  Social History     Tobacco Use     Smoking status: Former Smoker     Packs/day: 1.00     Years: 20.00     Pack years: 20.00     Types: Cigarettes     Last attempt to quit: 1989     Years since quittin.3     Smokeless tobacco: Never Used   Substance Use Topics     Alcohol use: Yes     Alcohol/week: 2.0 standard drinks     Drug use: No      Social history was reviewed with the patient. Additional pertinent items: None    Review of Systems   Constitutional: Negative for fever.   Eyes: Negative for visual disturbance.   Respiratory: Negative for cough and shortness of breath.    Cardiovascular: Negative for chest pain.   Gastrointestinal: Negative for abdominal pain, blood in stool, constipation, diarrhea, nausea and vomiting.   Genitourinary: Negative for dysuria.   Neurological: Positive for seizures, syncope and light-headedness. Negative for speech difficulty, weakness, numbness and headaches.   Psychiatric/Behavioral: Positive for confusion.   All other  systems reviewed and are negative.    A complete review of systems was performed with pertinent positives and negatives noted in the HPI, and all other systems negative.    Physical Exam   BP: (!) 152/88  Pulse: 81  Temp: 98  F (36.7  C)  Weight: 79.4 kg (175 lb 1.6 oz)  SpO2: 98 %  Physical Exam  Vitals signs and nursing note reviewed.   Constitutional:       General: He is not in acute distress.     Appearance: He is well-developed. He is not ill-appearing or diaphoretic.   HENT:      Head: Normocephalic and atraumatic.      Nose: Nose normal. No rhinorrhea.      Mouth/Throat:      Mouth: Mucous membranes are moist.   Eyes:      General: No scleral icterus.     Extraocular Movements: Extraocular movements intact.      Conjunctiva/sclera: Conjunctivae normal.      Pupils: Pupils are equal, round, and reactive to light.   Neck:      Musculoskeletal: Normal range of motion and neck supple.   Cardiovascular:      Rate and Rhythm: Normal rate.   Pulmonary:      Effort: Pulmonary effort is normal. No respiratory distress.      Breath sounds: No stridor.   Abdominal:      General: There is no distension.   Musculoskeletal: Normal range of motion.         General: No deformity.   Skin:     General: Skin is warm and dry.      Coloration: Skin is not jaundiced or pale.      Findings: No bruising or rash.   Neurological:      General: No focal deficit present.      Mental Status: He is alert and oriented to person, place, and time.      GCS: GCS eye subscore is 4. GCS verbal subscore is 5. GCS motor subscore is 6.      Cranial Nerves: Cranial nerves are intact. No dysarthria or facial asymmetry.      Sensory: Sensation is intact.      Motor: Motor function is intact. No tremor or abnormal muscle tone.      Coordination: Coordination is intact.      Comments: Tongue fasciculations   Psychiatric:         Attention and Perception: Attention and perception normal.         Mood and Affect: Mood normal.         Speech: Speech  normal. Speech is not delayed or slurred.         Behavior: Behavior normal. Behavior is cooperative.         Thought Content: Thought content normal.         Cognition and Memory: Cognition normal.         ED Course      Procedures             EKG Interpretation:      Interpreted by Debra Montana MD  Time reviewed: 0717  Symptoms at time of EKG: syncope   Rhythm: Atrial sensed ventricular paced rhythm  Rate: Normal  Axis: Left Axis Deviation  Ectopy: none  Conduction: nonspecific interventricular conduction block  ST Segments/ T Waves: No acute ischemic changes  Q Waves: nonspecific  Comparison to prior: Unchanged    Clinical Impression: no acute changes and non-specific EKG                            Results for orders placed or performed during the hospital encounter of 05/10/20   CT Head w/o Contrast     Status: None    Narrative    CT HEAD W/O CONTRAST 5/10/2020 8:30 AM    Provided History: Seizure, new, nontraumatic, >40 yrs; Altered level  of consciousness (LOC), unexplained    Comparison: None available.    Technique: Using multidetector thin collimation helical acquisition  technique, axial, coronal and sagittal CT images from the skull base  to the vertex were obtained without intravenous contrast.     Findings:    No intracranial hemorrhage, mass effect, or midline shift. The  ventricles are proportionate to the cerebral sulci. Small  hypoattenuating area noted in the left caudate, likely representing  lacunar infarct. Nonspecific area of periventricular hypodensity,  which may represent chronic small vessel ischemic disease. Mild  generalized brain volume loss. The gray to white matter  differentiation of the cerebral hemispheres is preserved. The basal  cisterns are patent.    The visualized paranasal sinuses are clear. The mastoid air cells are  clear.       Impression    Impression:   1.  No acute intracranial pathology.  2.  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  3.  Periventricular hypodensities,  nonspecific which may represent  sequelae of chronic small vessel ischemic disease,  infectious/inflammatory process, versus demyelinating disease.    I have personally reviewed the examination and initial interpretation  and I agree with the findings.    ISABELLA GOMEZ MD   XR Chest Port 1 View     Status: None    Narrative    XR CHEST PORT 1 VW  5/10/2020 8:12 AM    History:  seizure, ams.     Comparison: Chest DeGraff dated 1/28/2013    Findings:   Upright AP chest radiograph. Stable left the chest wall implantable  cardiac defibrillator. Median sternotomy wires are intact. Heart size  is at upper normal mass. Pulmonary vasculature is relatively distinct.  No acute airspace opacities. No pneumothorax or left pleural effusion.  Small portion of right costophrenic angle is collimated outside the  field of view.      Impression    IMPRESSION:  Stable left chest wall implantable cardiac defibrillator without acute  focal pneumonia.    I have personally reviewed the examination and initial interpretation  and I agree with the findings.    RENATA WOODALL MD   CBC with platelets     Status: Abnormal   Result Value Ref Range    WBC 5.8 4.0 - 11.0 10e9/L    RBC Count 4.74 4.4 - 5.9 10e12/L    Hemoglobin 15.2 13.3 - 17.7 g/dL    Hematocrit 47.6 40.0 - 53.0 %     78 - 100 fl    MCH 32.1 26.5 - 33.0 pg    MCHC 31.9 31.5 - 36.5 g/dL    RDW 13.9 10.0 - 15.0 %    Platelet Count 107 (L) 150 - 450 10e9/L   Basic metabolic panel     Status: Abnormal   Result Value Ref Range    Sodium 140 133 - 144 mmol/L    Potassium 3.9 3.4 - 5.3 mmol/L    Chloride 110 (H) 94 - 109 mmol/L    Carbon Dioxide 26 20 - 32 mmol/L    Anion Gap 4 3 - 14 mmol/L    Glucose 107 (H) 70 - 99 mg/dL    Urea Nitrogen 13 7 - 30 mg/dL    Creatinine 0.94 0.66 - 1.25 mg/dL    GFR Estimate 79 >60 mL/min/[1.73_m2]    GFR Estimate If Black >90 >60 mL/min/[1.73_m2]    Calcium 8.9 8.5 - 10.1 mg/dL   INR     Status: Abnormal   Result Value Ref Range    INR 2.75  (H) 0.86 - 1.14   Troponin I     Status: None   Result Value Ref Range    Troponin I ES 0.017 0.000 - 0.045 ug/L   EKG 12 lead     Status: None (Preliminary result)   Result Value Ref Range    Interpretation ECG Click View Image link to view waveform and result    Results for orders placed or performed in visit on 05/10/20   Cardiac Device Check - Remote     Status: None (In process)   Result Value Ref Range    Date Time Interrogation Session [id number]     Implantable Pulse Generator  Medtronic     Implantable Pulse Generator Model [id number] Viva XT CRT-D     Implantable Pulse Generator Serial Number [id number]     Type Interrogation Session Remote      Clinic Name [facility]     Implantable Pulse Generator Type Cardiac Resynchronization Therapy - Defibrillator     Implantable Pulse Generator Implant Date 20151203     Implantable Lead  Medtronic     Implantable Lead Model 4296 Attain Ability Plus     Implantable Lead Serial Number [id number]     Implantable Lead Implant Date 20120126     Implantable Lead Polarity Type Bipolar Lead     Implantable Lead Location Detail 1 EPICARDIAL     Implantable Lead Special Function Coronary Sinus     Implantable Lead Location Left Ventricle     Implantable Lead  Medtronic     Implantable Lead Model 5076 CapSureFix Novus     Implantable Lead Serial Number [id number]     Implantable Lead Implant Date 20120126     Implantable Lead Polarity Type Bipolar Lead     Implantable Lead Location Detail 1 APPENDAGE     Implantable Lead Location Right Atrium     Implantable Lead  Medtronic     Implantable Lead Model 6947 Sprint Quattro Secure     Implantable Lead Serial Number [id number]     Implantable Lead Implant Date 20120126     Implantable Lead Polarity Type Quadripolar Lead     Implantable Lead Location Detail 1 APEX     Implantable Lead Location Right Ventricle     Francis Setting Mode (NBG Code) DDDR     Francis  Setting Lower Rate Limit 60 [beats]/min    Francis Setting Maximum Tracking Rate 140 [beats]/min    Francis Setting Maximum Sensor Rate 140 [beats]/min    Francis Setting ADENIKE Delay Low 100 ms    Francis Setting PAV Delay Low 130 ms    Francis Setting PAV Delay High 100 ms    Francis Setting ADENIKE Delay High 70 ms    Francis Setting AT Mode Switch Rate 171 [beats]/min    Lead Channel Setting Sensing Polarity Bipolar     Lead Channel Setting Sensing Anode Location Right Atrium     Lead Channel Setting Sensing Anode Terminal Ring     Lead Channel Setting Sensing Cathode Location Right Atrium     Lead Channel Setting Sensing Cathode Terminal Tip     Lead Channel Setting Sensing Sensitivity 0.3 mV    Lead Channel Setting Sensing Polarity Bipolar     Lead Channel Setting Sensing Anode Location Right Ventricle     Lead Channel Setting Sensing Anode Terminal Ring     Lead Channel Setting Sensing Cathode Location Right Ventricle     Lead Channel Setting Sensing Cathode Terminal Tip     Lead Channel Setting Sensing Sensitivity 0.3 mV    Ventricular chambers paced during CRT pacing. BiV     CRT LV-RV Delay 0 ms    Lead Channel Setting Pacing Polarity Bipolar     Lead Channel Setting Pacing Anode Location Right Atrium     Lead Channel Setting Pacing Anode Terminal Ring     Lead Channel Setting Sensing Cathode Location Right Atrium     Lead Channel Setting Sensing Cathode Terminal Tip     Lead Channel Setting Pacing Pulse Width 0.4 ms    Lead Channel Setting Pacing Amplitude 1.5 V    Lead Channel Setting Pacing Capture Mode Adaptive     Lead Channel Setting Pacing Polarity Bipolar     Lead Channel Setting Pacing Anode Location Right Ventricle     Lead Channel Setting Pacing Anode Terminal Ring     Lead Channel Setting Sensing Cathode Location Right Ventricle     Lead Channel Setting Sensing Cathode Terminal Tip     Lead Channel Setting Pacing Pulse Width 0.4 ms    Lead Channel Setting Pacing Amplitude 2 V    Lead Channel Setting Pacing Capture  Mode Adaptive     Lead Channel Setting Pacing Polarity Bipolar     Lead Channel Setting Pacing Anode Location Right Ventricle     Lead Channel Setting Pacing Anode Terminal Coil     Lead Channel Setting Sensing Cathode Location Left Ventricle     Lead Channel Setting Sensing Cathode Terminal Tip     Lead Channel Setting Pacing Pulse Width 1 ms    Lead Channel Setting Pacing Amplitude 3 V    Lead Channel Setting Pacing Capture Mode Adaptive     Zone Setting Type Category VF     Zone Setting Detection Interval 320 ms    Zone Setting Detection Beats Numerator 30 [beats]    Zone Setting Detection Beats Denominator 40 [beats]    Zone Setting Type Category VT     Zone Setting Detection Interval Blank     Zone Setting Type Category VT     Zone Setting Detection Interval 360 ms    Zone Setting Type Category VT     Zone Setting Detection Interval 420 ms    Zone Setting Type Category ATRIAL_FIBRILLATION     Zone Setting Type Category AT/AF     Zone Setting Detection Interval 350 ms    Lead Channel Impedance Value 456 ohm    Lead Channel Sensing Intrinsic Amplitude 1.75 mV    Lead Channel Sensing Intrinsic Amplitude 1.75 mV    Lead Channel Pacing Threshold Amplitude 0.625 V    Lead Channel Pacing Threshold Pulse Width 0.4 ms    Lead Channel Impedance Value 494 ohm    Lead Channel Impedance Value 437 ohm    Lead Channel Sensing Intrinsic Amplitude 22.5 mV    Lead Channel Sensing Intrinsic Amplitude 22.5 mV    Lead Channel Pacing Threshold Amplitude 0.5 V    Lead Channel Pacing Threshold Pulse Width 0.4 ms    Lead Channel Impedance Value 703 ohm    Lead Channel Impedance Value 437 ohm    Lead Channel Impedance Value 380 ohm    Lead Channel Pacing Threshold Amplitude 2 V    Lead Channel Pacing Threshold Pulse Width 1 ms    Battery Date Time of Measurements [id number]     Battery Status OK     Battery RRT Trigger 2.727     Battery Remaining Longevity 10 mo    Battery Voltage 2.86 V    Capacitor Charge Type Reformation      Capacitor Last Charge Date Time [id number]     Capacitor Charge Time 4.614     Capacitor Charge Energy 18 J    Francis Statistic Date Time Start [id number]     Francis Statistic Date Time End [id number]     Francis Statistic RA Percent Paced 23.22 %    Francis Statistic RV Percent Paced 99.46 %    CRT Statistic LV Percent Paced 99.34 %    Francis Statistic AP  Percent 23.29 %    Francis Statistic AS  Percent 76.48 %    Francis Statistic AP VS Percent 0.02 %    Francis Statistic AS VS Percent 0.21 %    CRT Statistic Date Time Start 20200128105233     CRT Statistic Date Time End [id number]     CRT Statistic CRT Percent Paced 99.34 %    Atrial Tachy Statistic Date Time Start 20200128105233     Atrial Tachy Statistic Date Time End 20200510061309     Atrial Tachy Statistic AT/AF [place] Percent 0 %    Therapy Statistic Recent Shocks Delivered 0     Therapy Statistic Recent Shocks Aborted 0     Therapy Statistic Recent ATP Delivered 0     Therapy Statistic Recent Date Time Start 78570127171249     Therapy Statistic Recent Date Time End 84848534235923     Therapy Statistic Total Shocks Delivered 0     Therapy Statistic Total Shocks Aborted 0     Therapy Statistic Total ATP Delivered 0     Therapy Statistic Total  Date Time Start 76962227312246     Therapy Statistic Total  Date Time End 08508117834268     Episode Statistic Recent Count 0     Episode Statistic Type Category AT/AF     Episode Statistic Recent Count 0     Episode Statistic Type Category SVT     Episode Statistic Recent Count 0     Episode Statistic Type Category VT     Episode Statistic Recent Count 0     Episode Statistic Type Category VF     Episode Statistic Recent Count 0     Episode Statistic Type Category VT     Episode Statistic Recent Count 0     Episode Statistic Type Category VT     Episode Statistic Recent Count 0     Episode Statistic Type Category VT     Episode Statistic Recent Date Time Start 68919739880198     Episode Statistic Recent Date  Time End 97639661680041     Episode Statistic Recent Date Time Start 20200128105233     Episode Statistic Recent Date Time End 92832035907432     Episode Statistic Recent Date Time Start 20200128105233     Episode Statistic Recent Date Time End 20200510061309     Episode Statistic Recent Date Time Start 20200128105233     Episode Statistic Recent Date Time End 20200510061309     Episode Statistic Recent Date Time Start 20200128105233     Episode Statistic Recent Date Time End 20200510061309     Episode Statistic Recent Date Time Start 20200128105233     Episode Statistic Recent Date Time End 20200510061309     Episode Statistic Recent Date Time Start 20200128105233     Episode Statistic Recent Date Time End 50541903141063     Episode Statistic Total Count 0     Episode Statistic Type Category AT/AF     Episode Statistic Total Count 0     Episode Statistic Type Category SVT     Episode Statistic Total Count 3     Episode Statistic Type Category VT     Episode Statistic Total Count 0     Episode Statistic Type Category VF     Episode Statistic Total Count 0     Episode Statistic Type Category VT     Episode Statistic Total Count 0     Episode Statistic Type Category VT     Episode Statistic Total Count 0     Episode Statistic Type Category VT     Episode Statistic Total Date Time Start 26194142224860     Episode Statistic Total Date Time End 49441732257663     Episode Statistic Total Date Time Start 37903878228751     Episode Statistic Total Date Time End 75413313447135     Episode Statistic Total Date Time Start 91760243191505     Episode Statistic Total Date Time End 74464779442287     Episode Statistic Total Date Time Start 57989616842858     Episode Statistic Total Date Time End 37668691687394     Episode Statistic Total Date Time Start 56058040200931     Episode Statistic Total Date Time End 28290397117461     Episode Statistic Total Date Time Start 76074972556558     Episode Statistic Total Date Time End  12941637055599     Episode Statistic Total Date Time Start 85964354505035     Episode Statistic Total Date Time End 01848929124553     Narrative    Medtronic, Bi-Ventricular ICD, remote transmission received and reviewed. Device transmission sent per MD orders from the [facility] ER. His presenting rhythm is AS/BVP @ 80 bpm. No arrhythmias recorded. Normal device function. AP= 23% and BVP= 99%. No short V-V intervals recorded. Lead trends appear stable. OptiVol thoracic impedance is near the reference line. Battery estimates 10 months to SIMONE. Plan for pt to send a remote transmission in 3 months as scheduled. CALLUM Giron.    Remote ICD transmission.    I have reviewed and interpreted the device interrogation, settings, programming and nurse's summary. The device is functioning within normal device parameters. I agree with the current findings, assessment and plan.     Medications   0.9% sodium chloride BOLUS (1,000 mLs Intravenous New Bag 5/10/20 1037)        Assessments & Plan (with Medical Decision Making)   Brooks Summers is a 75 year old male with a medical history significant for bicuspid aortic valve and severe aortic stenosis s/p aortic valve replacementx2 (2011; 2013), c/b endocarditis (2013), severe LV dysfunction s/p AICD (7/6/2012), CHF (EF 55-60%; 2/10/2020), atrial fibrillation (on warfarin), and CKD stage III who presents to the Emergency Department for evaluation after a possible seizure.      Ddx: ICH, brain mass, convulsive syncope, cardiac arrhythmia, alcohol withdrawal seizure, infection    Patient is an elderly male with significant cardiac disease who presents with a witnessed seizure-like episode which would constitute a first-time seizure.  Patient is back to baseline on my initial evaluation.  However, nurses initially reported that he was repeatedly asking about his wife and seemed confused.  This may have been a postictal phase which cleared prior to my initial discussion with the patient.   Patient's pacemaker was interrogated and did not show any arrhythmias.  Functioning normally.  Troponin within normal range.  Patient not having any chest pain or infectious symptoms.  No focal neurologic deficits on exam.  CT head without acute intracranial hemorrhage or mass.  There is an old lacunar infarct within the left caudate.  Also evidence of chronic small vessel ischemic disease.     Discussed with cardiology staff who felt that patient did not have any indication for further cardiac work-up at this time.  Neurology was consulted and recommended discharge with outpatient EEG and neurology follow-up in clinic.  Should not drive for the next 3 months.  They also reported they were concerned that patient may have had a provoked seizure brought on by alcohol withdrawal.    Reassessment, I do think this is a significant possibility.  Patient does report daily drinking and is probably getting more heavily than usual during isolation.  He is hypertensive.  Normal heart rate but he is paced and on a beta-blocker so I would not expect a significant tachycardic response.  Patient does have tongue fasciculations.  No tremor.  Currently appears mildly diaphoretic.  We will continue to monitor on telemetry and admit to medicine for observation in case this does develop into a full-blown withdrawal syndrome.  Patient has not given any benzodiazepines during this encounter or by EMS.  If he does well overnight and does not require medication, he is likely safe for discharge back to his home.  I did discuss with patient that he should not drive for the next 3 months.  This should be reinforced with patient upon the time of his discharge.  I discussed my concern for alcohol withdrawal with the patient and plan for observation admission.  Patient was agreeable with the plan.        I have reviewed the nursing notes. I have reviewed the findings, diagnosis, plan and need for follow up with the patient.    New Prescriptions     No medications on file       Final diagnoses:   Witnessed seizure-like activity (H)       --    [facility], [place], EMERGENCY DEPARTMENT  5/10/2020     Debra Montana MD  05/10/20 1050

## 2020-05-10 NOTE — ED NOTES
[facility], [place]   ED Nurse to Floor Handoff     Brooks Summers is a 75 year old male who speaks English and lives with a spouse,  in a home  They arrived in the ED by ambulance from home    ED Chief Complaint: Seizures    ED Dx;   Final diagnoses:   Witnessed seizure-like activity (H)         Needed?: No    Allergies:   Allergies   Allergen Reactions     Lisinopril Cough   .  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 Tx with Dapto/RIFx 8 weeks.     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     Paroxysmal A-fib (H)     Post-op 7/14/2011, 1/26/13     Rotator Cuff (Capsule) Sprain and Strain      S/P aortic valve replacement     7/14/2011, re-do 1/25/13 due to endocarditis     Smoking hx      Thrombocytopenia (H)       Baseline Mental status: other Perseverating, earlier at arrival; clearing, now.  Current Mental Status changes: at basesline    Infection present or suspected this encounter: no  Sepsis suspected: No  Isolation type: No active isolations     Activity level - Baseline/Home:  Stand with Assist  Activity Level - Current:   Stand with Assist    Bariatric equipment needed?: No    In the ED these meds were given:   Medications   0.9% sodium chloride BOLUS (1,000 mLs Intravenous New Bag 5/10/20 1037)       Drips running?  Yes    Home pump  No    Current LDAs  Peripheral IV 05/10/20 Left Lower forearm (Active)   Site Assessment WDL 05/10/20 0658   Line Status Saline locked 05/10/20 0658   Phlebitis Scale 0-->no symptoms 05/10/20 0658   Infiltration Scale 0 05/10/20 0658   Infiltration Site Treatment Method  None 05/10/20 0658   Extravasation? No 05/10/20 0658   Dressing  Intervention New dressing  05/10/20 0658   Number of days: 0       Labs results:   Labs Ordered and Resulted from Time of ED Arrival Up to the Time of Departure from the ED   CBC WITH PLATELETS - Abnormal; Notable for the following components:       Result Value    Platelet Count 107 (*)     All other components within normal limits   BASIC METABOLIC PANEL - Abnormal; Notable for the following components:    Chloride 110 (*)     Glucose 107 (*)     All other components within normal limits   INR - Abnormal; Notable for the following components:    INR 2.75 (*)     All other components within normal limits   TROPONIN I   ALCOHOL ETHYL   HEPATIC PANEL   LIPASE   UA MACROSCOPIC WITH REFLEX TO MICRO AND CULTURE   PERIPHERAL IV CATHETER       Imaging Studies:   Recent Results (from the past 24 hour(s))   Cardiac Device Check - Remote   Result Value    Date Time Interrogation Session [id number]    Implantable Pulse Generator  Medtronic    Implantable Pulse Generator Model [id number] Viva XT CRT-D    Implantable Pulse Generator Serial Number [id number]    Type Interrogation Session Remote     Clinic Name [facility]    Implantable Pulse Generator Type Cardiac Resynchronization Therapy - Defibrillator    Implantable Pulse Generator Implant Date 20151203    Implantable Lead  Medtronic    Implantable Lead Model 4296 Attain Ability Plus    Implantable Lead Serial Number [id number]    Implantable Lead Implant Date 20120126    Implantable Lead Polarity Type Bipolar Lead    Implantable Lead Location Detail 1 EPICARDIAL    Implantable Lead Special Function Coronary Sinus    Implantable Lead Location Left Ventricle    Implantable Lead  Medtronic    Implantable Lead Model 5076 CapSureFix Novus    Implantable Lead Serial Number [id number]    Implantable Lead Implant Date 20120126    Implantable Lead Polarity Type Bipolar Lead    Implantable Lead Location Detail 1 APPENDAGE     Implantable Lead Location Right Atrium    Implantable Lead  Medtronic    Implantable Lead Model 6947 Sprint Quattro Secure    Implantable Lead Serial Number [id number]    Implantable Lead Implant Date 20120126    Implantable Lead Polarity Type Quadripolar Lead    Implantable Lead Location Detail 1 APEX    Implantable Lead Location Right Ventricle    Francis Setting Mode (NBG Code) DDDR    Francis Setting Lower Rate Limit 60    Francis Setting Maximum Tracking Rate 140    Francis Setting Maximum Sensor Rate 140    Francis Setting ADENIKE Delay Low 100    Francis Setting PAV Delay Low 130    Francis Setting PAV Delay High 100    Francis Setting ADENIKE Delay High 70    Francis Setting AT Mode Switch Rate 171    Lead Channel Setting Sensing Polarity Bipolar    Lead Channel Setting Sensing Anode Location Right Atrium    Lead Channel Setting Sensing Anode Terminal Ring    Lead Channel Setting Sensing Cathode Location Right Atrium    Lead Channel Setting Sensing Cathode Terminal Tip    Lead Channel Setting Sensing Sensitivity 0.3    Lead Channel Setting Sensing Polarity Bipolar    Lead Channel Setting Sensing Anode Location Right Ventricle    Lead Channel Setting Sensing Anode Terminal Ring    Lead Channel Setting Sensing Cathode Location Right Ventricle    Lead Channel Setting Sensing Cathode Terminal Tip    Lead Channel Setting Sensing Sensitivity 0.3    Ventricular chambers paced during CRT pacing. BiV    CRT LV-RV Delay 0    Lead Channel Setting Pacing Polarity Bipolar    Lead Channel Setting Pacing Anode Location Right Atrium    Lead Channel Setting Pacing Anode Terminal Ring    Lead Channel Setting Sensing Cathode Location Right Atrium    Lead Channel Setting Sensing Cathode Terminal Tip    Lead Channel Setting Pacing Pulse Width 0.4    Lead Channel Setting Pacing Amplitude 1.5    Lead Channel Setting Pacing Capture Mode Adaptive    Lead Channel Setting Pacing Polarity Bipolar    Lead Channel Setting Pacing Anode Location  Right Ventricle    Lead Channel Setting Pacing Anode Terminal Ring    Lead Channel Setting Sensing Cathode Location Right Ventricle    Lead Channel Setting Sensing Cathode Terminal Tip    Lead Channel Setting Pacing Pulse Width 0.4    Lead Channel Setting Pacing Amplitude 2    Lead Channel Setting Pacing Capture Mode Adaptive    Lead Channel Setting Pacing Polarity Bipolar    Lead Channel Setting Pacing Anode Location Right Ventricle    Lead Channel Setting Pacing Anode Terminal Coil    Lead Channel Setting Sensing Cathode Location Left Ventricle    Lead Channel Setting Sensing Cathode Terminal Tip    Lead Channel Setting Pacing Pulse Width 1    Lead Channel Setting Pacing Amplitude 3    Lead Channel Setting Pacing Capture Mode Adaptive    Zone Setting Type Category VF    Zone Setting Detection Interval 320    Zone Setting Detection Beats Numerator 30    Zone Setting Detection Beats Denominator 40    Zone Setting Type Category VT    Zone Setting Detection Interval Blank    Zone Setting Type Category VT    Zone Setting Detection Interval 360    Zone Setting Type Category VT    Zone Setting Detection Interval 420    Zone Setting Type Category ATRIAL_FIBRILLATION    Zone Setting Type Category AT/AF    Zone Setting Detection Interval 350    Lead Channel Impedance Value 456    Lead Channel Sensing Intrinsic Amplitude 1.75    Lead Channel Sensing Intrinsic Amplitude 1.75    Lead Channel Pacing Threshold Amplitude 0.625    Lead Channel Pacing Threshold Pulse Width 0.4    Lead Channel Impedance Value 494    Lead Channel Impedance Value 437    Lead Channel Sensing Intrinsic Amplitude 22.5    Lead Channel Sensing Intrinsic Amplitude 22.5    Lead Channel Pacing Threshold Amplitude 0.5    Lead Channel Pacing Threshold Pulse Width 0.4    Lead Channel Impedance Value 703    Lead Channel Impedance Value 437    Lead Channel Impedance Value 380    Lead Channel Pacing Threshold Amplitude 2    Lead Channel Pacing Threshold Pulse Width  1    Battery Date Time of Measurements 01359503865321    Battery Status OK    Battery RRT Trigger 2.727    Battery Remaining Longevity 10    Battery Voltage 2.86    Capacitor Charge Type Reformation    Capacitor Last Charge Date Time 95505205732143    Capacitor Charge Time 4.614    Capacitor Charge Energy 18    Francis Statistic Date Time Start 20200128105233    Francis Statistic Date Time End 42447179020705    Francis Statistic RA Percent Paced 23.22    Francis Statistic RV Percent Paced 99.46    CRT Statistic LV Percent Paced 99.34    Francis Statistic AP  Percent 23.29    Francis Statistic AS  Percent 76.48    Francis Statistic AP VS Percent 0.02    Francis Statistic AS VS Percent 0.21    CRT Statistic Date Time Start 13625151415731    CRT Statistic Date Time End 98612811944589    CRT Statistic CRT Percent Paced 99.34    Atrial Tachy Statistic Date Time Start 20200128105233    Atrial Tachy Statistic Date Time End 90918412230024    Atrial Tachy Statistic AT/AF [place] Percent 0    Therapy Statistic Recent Shocks Delivered 0    Therapy Statistic Recent Shocks Aborted 0    Therapy Statistic Recent ATP Delivered 0    Therapy Statistic Recent Date Time Start 92833522517927    Therapy Statistic Recent Date Time End 86784029598737    Therapy Statistic Total Shocks Delivered 0    Therapy Statistic Total Shocks Aborted 0    Therapy Statistic Total ATP Delivered 0    Therapy Statistic Total  Date Time Start 01765551780062    Therapy Statistic Total  Date Time End 20369133719067    Episode Statistic Recent Count 0    Episode Statistic Type Category AT/AF    Episode Statistic Recent Count 0    Episode Statistic Type Category SVT    Episode Statistic Recent Count 0    Episode Statistic Type Category VT    Episode Statistic Recent Count 0    Episode Statistic Type Category VF    Episode Statistic Recent Count 0    Episode Statistic Type Category VT    Episode Statistic Recent Count 0    Episode Statistic Type Category VT    Episode  Statistic Recent Count 0    Episode Statistic Type Category VT    Episode Statistic Recent Date Time Start 20200128105233    Episode Statistic Recent Date Time End 62213918285181    Episode Statistic Recent Date Time Start 20200128105233    Episode Statistic Recent Date Time End 20200510061309    Episode Statistic Recent Date Time Start 20200128105233    Episode Statistic Recent Date Time End 20200510061309    Episode Statistic Recent Date Time Start 20200128105233    Episode Statistic Recent Date Time End 20200510061309    Episode Statistic Recent Date Time Start 20200128105233    Episode Statistic Recent Date Time End 20200510061309    Episode Statistic Recent Date Time Start 20200128105233    Episode Statistic Recent Date Time End 52428135757168    Episode Statistic Recent Date Time Start 20200128105233    Episode Statistic Recent Date Time End 92587836360707    Episode Statistic Total Count 0    Episode Statistic Type Category AT/AF    Episode Statistic Total Count 0    Episode Statistic Type Category SVT    Episode Statistic Total Count 3    Episode Statistic Type Category VT    Episode Statistic Total Count 0    Episode Statistic Type Category VF    Episode Statistic Total Count 0    Episode Statistic Type Category VT    Episode Statistic Total Count 0    Episode Statistic Type Category VT    Episode Statistic Total Count 0    Episode Statistic Type Category VT    Episode Statistic Total Date Time Start 82458685736895    Episode Statistic Total Date Time End 09172076500315    Episode Statistic Total Date Time Start 69605715229266    Episode Statistic Total Date Time End 43364506235324    Episode Statistic Total Date Time Start 65867916215026    Episode Statistic Total Date Time End 85480999203554    Episode Statistic Total Date Time Start 88909709427651    Episode Statistic Total Date Time End 73307566855421    Episode Statistic Total Date Time Start 60696743081899    Episode Statistic Total Date Time End  43000729294906    Episode Statistic Total Date Time Start 48860330431826    Episode Statistic Total Date Time End 33454267735681    Episode Statistic Total Date Time Start 18709867578678    Episode Statistic Total Date Time End 43132322547036    Narrative    Medtronic, Bi-Ventricular ICD, remote transmission received and reviewed. Device transmission sent per MD orders from the [facility]. His presenting rhythm is AS/BVP @ 80 bpm. No arrhythmias recorded. Normal device function. AP= 23% and BVP= 99%. No short V-V intervals recorded. Lead trends appear stable. OptiVol thoracic impedance is near the reference line. Battery estimates 10 months to SIMONE. Plan for pt to send a remote transmission in 3 months as scheduled. CALLUM Giron.    Remote ICD transmission.    I have reviewed and interpreted the device interrogation, settings, programming and nurse's summary. The device is functioning within normal device parameters. I agree with the current findings, assessment and plan.   XR Chest Port 1 View    Narrative    XR CHEST PORT 1 VW  5/10/2020 8:12 AM    History:  seizure, ams.     Comparison: Chest DeGraff dated 1/28/2013    Findings:   Upright AP chest radiograph. Stable left the chest wall implantable  cardiac defibrillator. Median sternotomy wires are intact. Heart size  is at upper normal mass. Pulmonary vasculature is relatively distinct.  No acute airspace opacities. No pneumothorax or left pleural effusion.  Small portion of right costophrenic angle is collimated outside the  field of view.      Impression    IMPRESSION:  Stable left chest wall implantable cardiac defibrillator without acute  focal pneumonia.    I have personally reviewed the examination and initial interpretation  and I agree with the findings.    RENATA WOODALL MD   CT Head w/o Contrast    Narrative    CT HEAD W/O CONTRAST 5/10/2020 8:30 AM    Provided History: Seizure, new, nontraumatic, >40 yrs; Altered level  of consciousness (LOC),  unexplained    Comparison: None available.    Technique: Using multidetector thin collimation helical acquisition  technique, axial, coronal and sagittal CT images from the skull base  to the vertex were obtained without intravenous contrast.     Findings:    No intracranial hemorrhage, mass effect, or midline shift. The  ventricles are proportionate to the cerebral sulci. Small  hypoattenuating area noted in the left caudate, likely representing  lacunar infarct. Nonspecific area of periventricular hypodensity,  which may represent chronic small vessel ischemic disease. Mild  generalized brain volume loss. The gray to white matter  differentiation of the cerebral hemispheres is preserved. The basal  cisterns are patent.    The visualized paranasal sinuses are clear. The mastoid air cells are  clear.       Impression    Impression:   1.  No acute intracranial pathology.  2.  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  3.  Periventricular hypodensities, nonspecific which may represent  sequelae of chronic small vessel ischemic disease,  infectious/inflammatory process, versus demyelinating disease.    I have personally reviewed the examination and initial interpretation  and I agree with the findings.    ISABELLA GOMEZ MD       Recent vital signs:   BP (!) 152/88   Pulse 81   Temp 98  F (36.7  C) (Oral)   Wt 79.4 kg (175 lb 1.6 oz)   SpO2 98%   BMI 25.12 kg/m      Randolph Coma Scale Score: 14 (05/10/20 0707)       Cardiac Rhythm: Normal Sinus  Pt needs tele? Yes  Skin/wound Issues: None    Code Status: Full Code    Pain control: good    Nausea control: good    Abnormal labs/tests/findings requiring intervention: SEE LABS / IMAGING    Family present during ED course? No   Family Comments/Social Situation comments: N/a    Tasks needing completion: RANGEL Cavanaugh RN  [facility] -- [phone] [place] [facility]  [phone] [facility]

## 2020-05-10 NOTE — ED NOTES
Seizure pads placed on side rails. Patient was incontinent prior to arrival. Bedding and clothing changed.

## 2020-05-10 NOTE — PHARMACY-ANTICOAGULATION SERVICE
Clinical Pharmacy - Warfarin Dosing Consult     Pharmacy has been consulted to manage this patient s warfarin therapy.  Indication: Bioprosthetic Heart Valve  Therapy Goal: INR 2-3  OP Anticoag Clinic: [facility]  Warfarin Prior to Admission: Yes  Warfarin PTA Regimen: 7.5mg on Sun, Tues, & Thurs. 10mg all other days  Recent documented change in oral intake/nutrition: No  Dose Comments: continue PTA regimen, last dose 5/9/20    INR   Date Value Ref Range Status   05/10/2020 2.75 (H) 0.86 - 1.14 Final     INR Protime   Date Value Ref Range Status   03/11/2020 2.1 (A) 0.86 - 1.14 Final       Recommend warfarin 7.5 mg today.  Pharmacy will monitor Brooks Summers daily and order warfarin doses to achieve specified goal.      Please contact pharmacy as soon as possible if the warfarin needs to be held for a procedure or if the warfarin goals change.      Rose Cardozo PharmD

## 2020-05-10 NOTE — CONSULTS
[facility]  Neurology Consultation    Patient Name:  Brooks Summers  MRN:  [id number]    :  1945  Date of Service:  May 10, 2020  Primary care provider:  Edin Mays was asked to see Brooks Summers in consultation at the request of Dr. Montana for the evaluation of concern for seizure.      HISTORY OF PRESENT ILLNESS:       Mr. Brooks Summers is 75 year old male patient with PMH of bicuspid aortic valve and severe aortic stenosis s/p aortic valve replacementx2 (; ), c/b endocarditis (), severe LV dysfunction s/p AICD (2012), CHF (EF 55-60%; 2/10/2020), atrial fibrillation (on warfarin), and CKD stage III  who neurology service was asked to see for concern for seizure. Patient was apparently in his usual state of health and went to bed healthy on . Patient then awoke this AM at 0500 with a scream, followed by stiffening and shaking of 15 seconds. Patient did have urinary incontinence. Patient was then encephalopathic upon EMS arrival. Patient has since cleared in the ED. Patient does not have a history of seizures. Does endorse daily alcohol use of 2 beers since March when he stopped working. No recent illnesses. Denies other drug use.     Patient denies any history of previous seizures including febrile seizures, CNS infections, TBI, CNS tumors.       PAST MEDICAL HISTORY:        Past Medical History:   Diagnosis Date     BCC (basal cell carcinoma), face 2013     Bicuspid aortic valve      Chronic systolic heart failure (H)      Endocarditis of prosthetic valve (H) 2013    Cultures negative, 16S rRNA PCR showed Staph capitis (a CoNS). Tx with Dapto/RIFx 8 weeks.     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    RE>>LE     Paroxysmal A-fib (H)     Post-op 2011, 13     Rotator Cuff (Capsule) Sprain and Strain      S/P aortic valve replacement     2011, re-do 13 due to  endocarditis     Smoking hx      Thrombocytopenia (H)            Past Surgical History:   Procedure Laterality Date     ANESTHESIA CARDIOVERSION  7/18/2011    Procedure:ANESTHESIA CARDIOVERSION; Cardioversion; Surgeon:GENERIC ANESTHESIA PROVIDER; Location:[facility] OR     COLONOSCOPY       COLONOSCOPY N/A 11/19/2018    Procedure: COLONOSCOPY;  Surgeon: Herman Mcginnis MD;  Location: [facility] GI     CORONARY ANGIOGRAPHY ADULT ORDER       CYSTOSCOPY, BIOPSY URETHRA N/A 4/3/2017    Procedure: CYSTOSCOPY, BIOPSY URETHRA;  Surgeon: Marlena Mora MD;  Location: [facility] OR     ENDOSCOPY UPPER, COLONOSCOPY, COMBINED       HC REMOV & REPLACE IMPLT DEFIB PULSE GEN MULT LEAD  12/3/2015          HEMORRHOID SURGERY       IMPLANT AUTOMATIC IMPLANTABLE CARDIOVERTER DEFIBRILLATOR       MOHS MICROGRAPHIC PROCEDURE       ORTHOPEDIC SURGERY      shoulder,right     REDO STERNOTOMY REPLACE VALVE AORTIC  1/25/2013    Procedure: REDO STERNOTOMY REPLACE VALVE AORTIC;  Redo Sternotomy, Redo Aortic Valve Replacement on pump oxygenator. Intraoperative Transesophageal Echocardiogram;  Surgeon: Stephanie Hampton MD;  Location: [facility] OR     REPAIR VALVE MITRAL  2013     REPLACE VALVE AORTIC  7/14/2011    Procedure:REPLACE VALVE AORTIC; Median Sternotomy, Bioprosthesis,  Aortic Valve replacement on pump with oxygenator. Intra-operative Transesophogeal Echocardiogram.; Surgeon:STEPHANIE HAMPTON; Location:[facility] OR     teeth extractions       TRANSPLANT         MEDICATIONS:   Current Facility-Administered Medications   Medication     lidocaine 1% with EPINEPHrine 1:100,000 injection 3 mL     Current Outpatient Medications   Medication     allopurinol (ZYLOPRIM) 100 MG tablet     Cyanocobalamin (VITAMIN B 12 PO)     losartan (COZAAR) 25 MG tablet     metoprolol succinate ER (TOPROL XL) 50 MG 24 hr tablet     Multiple Vitamins-Minerals (MULTIVITAMIN ADULTS 50+) TABS     PROVIDER DOSED MANAGED WARFARIN, COUMADIN, OUTPATIENT     simvastatin (ZOCOR) 40 MG tablet      valACYclovir 1000 mg PO tablet     VITAMIN D, CHOLECALCIFEROL, PO     warfarin ANTICOAGULANT (COUMADIN) 5 MG tablet     (Not in a hospital admission)                 ALLERGIES:    Allergies   Allergen Reactions     Lisinopril Cough       SOCIAL HISTORY  Social History     Socioeconomic History     Marital status:      Spouse name: Not on file     Number of children: Not on file     Years of education: Not on file     Highest education level: Not on file   Occupational History     Not on file   Social Needs     Financial resource strain: Not on file     Food insecurity     Worry: Not on file     Inability: Not on file     Transportation needs     Medical: Not on file     Non-medical: Not on file   Tobacco Use     Smoking status: Former Smoker     Packs/day: 1.00     Years: 20.00     Pack years: 20.00     Types: Cigarettes     Last attempt to quit: 1989     Years since quittin.3     Smokeless tobacco: Never Used   Substance and Sexual Activity     Alcohol use: Yes     Alcohol/week: 2.0 standard drinks     Drug use: No     Sexual activity: Yes     Partners: Female     Birth control/protection: None     Comment:    Lifestyle     Physical activity     Days per week: Not on file     Minutes per session: Not on file     Stress: Not on file   Relationships     Social connections     Talks on phone: Not on file     Gets together: Not on file     Attends Hoahaoism service: Not on file     Active member of club or organization: Not on file     Attends meetings of clubs or organizations: Not on file     Relationship status: Not on file     Intimate partner violence     Fear of current or ex partner: Not on file     Emotionally abused: Not on file     Physically abused: Not on file     Forced sexual activity: Not on file   Other Topics Concern     Parent/sibling w/ CABG, MI or angioplasty before 65F 55M? No   Social History Narrative     since 1982 2 children 4 grandchildren.    Carpet sales:   Semi retired. Athlete in school, Golf, fish, yardwork         FAMILY HISTORY:  Family History   Problem Relation Age of Onset     C.A.D. Father 68        bypass, carotid      Prostate Cancer Father 70     Heart Disease Father      Cancer Mother 92        stomach, colon     Hypertension Mother      Retinal detachment Mother      Cancer Brother 64        lung cancer, petroleum     Cancer Brother      Glaucoma No family hx of      Macular Degeneration No family hx of      Strabismus No family hx of      Glasses (<9 y/o) No family hx of          REVIEW OF SYSTEMS:  10  point ROS was done as per HPI.       PHYSICAL EXAMINATIONS:  Vital Signs: Blood pressure (!) 152/88, pulse 81, temperature 98  F (36.7  C), temperature source Oral, weight 79.4 kg (175 lb 1.6 oz), SpO2 98 %.  General: alert, no distress. Does appear slightly diaphoretic  HEENT: Atraumatic  Chest: Not in respiratory distress  Abdomen: Non-distended  Extremities: No abnormal swelling noted  Skin: No abnormal bruising noted  Psych: Pleasant mood, appropriate affect  Neurologic exam:  Mental Status: alert, oriented to person and place. States it is mother's day, but the year is 2015. Tells me correct president. Follows commands. Speech is fluent. Comprehension and repetition intact. No dysarthria.  Cranial Nerves: pupils 2 mm, equal and reactive to light and accommodation, EOMI without nystagmus, eyes move conjugately, no saccadic movement, no skew. Visual fields full. Smile and eyebrow raise equal, blinking symmetric, no weakness. Tongue midline, good power. Shoulder shrug symmetric, hearing intact to conversation.  Motor: patient has 5/5 strength in the bilateral upper and lower extremities.   Reflexes: 2+ and symmetric at BR and biceps. 1+ at patella and achilles. Toes are mute.   Sensation: intact to light touch  in all four extremities.  Coordination: FNF no dysmetria,   Gait: deferred due to encephalopathy        REVIEW OF LABORATORY, PATHOLOGY, AND  RADIOLOGY DATA:  CT head: No acute pathology.     ASSESSMENT AND PLAN BY PROBLEM:  75 year old male patient with extensive cardiac PMH seen today for concern for seizures. His neurological exam today is non-focal, but does continue to have slight encephalopathy. Differential for his loss of consciousness includes seizure, orthostasis, cardiovascular syncope, arrhythmia, PE, medication effect, migraine, or psychogenic spell. Certainly, he is at risk of cardiovascular syncope or arrhythmia given his cardiac history. Would recommend pacemaker interrogation. I am concerned the episode described is a seizure. At this point, the patient is endorsing several beers daily, especially since he quit working in March. It could possibly be a provoked seizure, though the patient does not have overt evidence on examination of withdrawal. Would recommend workup of unprovoked seizure with a routine EEG and follow up with epileptology as an outpatient. If patient is admitted, can obtain routine EEG while inpatient on 5/11. If not, this is ok to be completed as an outpatient.    Of note, the patient has had significant hypertension noted at his outpatient appointments over the years. Unfortunately, we cannot obtain a brain MRI due to pacemaker, but I am concerned that he is at risk of Pres as an etiology of possible seizure. Therefore, would recommend close outpatient follow up of blood pressure.     Recommendations:   -Pacemaker interrogation  -Outpatient EEG  -Epilepsy outpatient follow up      I reviewed with the patient in detail [place] regulations on seizures and driving. He appeared to clearly understand that He is prohibited from operating a motor vehicle within 3 months following any seizure or other episode with sudden unconsciousness or inability to sit up, and that He is required to report any future such seizure to the [facility] within 30 days after the event. I also recommended that He and family review all other activities,  and avoid any activities that might lead to self-injury or injury of others, within 3 months following any seizure with impaired awareness or impaired motor control. Such activities include but are not limited to holding babies or young children at heights from which they might be injured if dropped, bathing infants or young children in situations in which they might drown without continuous interactive care by an adult who is fully capable at all times during the bath, operating power cutting or other tools, handling firearms, exposure to heights from which she might fall, exposure to vessels with hot cooking oil or water, and swimming alone.     Thank you for involving neurology in the care of this patient.  Please do not hesitate to call with questions or concerns.     Patient was d/w staff Dr. Otoole.    Giles Kapadia MD  Neurology G4  [phone]

## 2020-05-10 NOTE — ED NOTES
Pt okayed RN to talk to his son JR Navarro and give him information about his care  [phone]- would like updates

## 2020-05-10 NOTE — PROGRESS NOTES
EFFECTIVE NOW      Comments:    If respirations drop below 12 per minute = Resp 18  If patient is unable to take oral medications = able to swallow normal  If patient is not easily arousable= alert and oreintated x 4  If benzodiazepine is being held more than once for nystagmus, sedation, ataxia, dysarthria, or respiratory rate less than 12= Not given at all

## 2020-05-11 ENCOUNTER — DOCUMENTATION ONLY (OUTPATIENT)
Dept: INTERNAL MEDICINE | Facility: CLINIC | Age: 75
End: 2020-05-11

## 2020-05-11 ENCOUNTER — ALLIED HEALTH/NURSE VISIT (OUTPATIENT)
Dept: NEUROLOGY | Facility: CLINIC | Age: 75
End: 2020-05-11
Attending: PSYCHIATRY & NEUROLOGY
Payer: MEDICARE

## 2020-05-11 VITALS
RESPIRATION RATE: 16 BRPM | OXYGEN SATURATION: 97 % | TEMPERATURE: 97.5 F | HEART RATE: 72 BPM | DIASTOLIC BLOOD PRESSURE: 91 MMHG | BODY MASS INDEX: 25.12 KG/M2 | SYSTOLIC BLOOD PRESSURE: 129 MMHG | WEIGHT: 175.1 LBS

## 2020-05-11 DIAGNOSIS — R56.9 SEIZURE (H): Primary | ICD-10-CM

## 2020-05-11 LAB
ANION GAP SERPL CALCULATED.3IONS-SCNC: 6 MMOL/L (ref 3–14)
BUN SERPL-MCNC: 18 MG/DL (ref 7–30)
CALCIUM SERPL-MCNC: 8.6 MG/DL (ref 8.5–10.1)
CHLORIDE SERPL-SCNC: 111 MMOL/L (ref 94–109)
CO2 SERPL-SCNC: 25 MMOL/L (ref 20–32)
CREAT SERPL-MCNC: 0.97 MG/DL (ref 0.66–1.25)
ERYTHROCYTE [DISTWIDTH] IN BLOOD BY AUTOMATED COUNT: 14.1 % (ref 10–15)
GFR SERPL CREATININE-BSD FRML MDRD: 76 ML/MIN/{1.73_M2}
GLUCOSE SERPL-MCNC: 87 MG/DL (ref 70–99)
HCT VFR BLD AUTO: 41.6 % (ref 40–53)
HGB BLD-MCNC: 13.4 G/DL (ref 13.3–17.7)
INR PPP: 2.23 (ref 0.86–1.14)
MAGNESIUM SERPL-MCNC: 2.4 MG/DL (ref 1.6–2.3)
MCH RBC QN AUTO: 32.1 PG (ref 26.5–33)
MCHC RBC AUTO-ENTMCNC: 32.2 G/DL (ref 31.5–36.5)
MCV RBC AUTO: 100 FL (ref 78–100)
PLATELET # BLD AUTO: 93 10E9/L (ref 150–450)
POTASSIUM SERPL-SCNC: 4 MMOL/L (ref 3.4–5.3)
RBC # BLD AUTO: 4.18 10E12/L (ref 4.4–5.9)
SODIUM SERPL-SCNC: 142 MMOL/L (ref 133–144)
WBC # BLD AUTO: 5.5 10E9/L (ref 4–11)

## 2020-05-11 PROCEDURE — 99217 ZZC OBSERVATION CARE DISCHARGE: CPT | Mod: GC | Performed by: INTERNAL MEDICINE

## 2020-05-11 PROCEDURE — 80048 BASIC METABOLIC PNL TOTAL CA: CPT | Performed by: INTERNAL MEDICINE

## 2020-05-11 PROCEDURE — 36415 COLL VENOUS BLD VENIPUNCTURE: CPT | Performed by: INTERNAL MEDICINE

## 2020-05-11 PROCEDURE — 95712 VEEG 2-12 HR INTMT MNTR: CPT | Mod: ZF

## 2020-05-11 PROCEDURE — 25000132 ZZH RX MED GY IP 250 OP 250 PS 637: Mod: GY

## 2020-05-11 PROCEDURE — 99207 ZZC CDG-CODE CATEGORY CHANGED: CPT | Performed by: INTERNAL MEDICINE

## 2020-05-11 PROCEDURE — 85610 PROTHROMBIN TIME: CPT | Performed by: INTERNAL MEDICINE

## 2020-05-11 PROCEDURE — 83735 ASSAY OF MAGNESIUM: CPT | Performed by: INTERNAL MEDICINE

## 2020-05-11 PROCEDURE — 85027 COMPLETE CBC AUTOMATED: CPT

## 2020-05-11 PROCEDURE — G0378 HOSPITAL OBSERVATION PER HR: HCPCS

## 2020-05-11 RX ORDER — WARFARIN SODIUM 10 MG/1
10 TABLET ORAL
Status: DISCONTINUED | OUTPATIENT
Start: 2020-05-11 | End: 2020-05-11 | Stop reason: HOSPADM

## 2020-05-11 RX ADMIN — FOLIC ACID 1 MG: 1 TABLET ORAL at 08:22

## 2020-05-11 RX ADMIN — THIAMINE HCL TAB 100 MG 100 MG: 100 TAB at 08:22

## 2020-05-11 RX ADMIN — MELATONIN 1000 UNITS: at 08:22

## 2020-05-11 RX ADMIN — METOPROLOL SUCCINATE 50 MG: 50 TABLET, EXTENDED RELEASE ORAL at 08:22

## 2020-05-11 NOTE — PLAN OF CARE
Problem: Adult Inpatient Plan of Care  Goal: Plan of Care Review  Outcome: No Change     Temp: 98.7  F (37.1  C) Temp src: Oral BP: 124/73 Pulse: 75 Heart Rate: 70 Resp: 18 SpO2: 96 % O2 Device: None (Room air)       Vitals stable = Yes  Seizure evaluation complete = No  Plan in place for follow-up = No    -no events this evening  -denied pain and nausea  -on Regular diet with good appetite  -PIV saline locked  -had BM yesterday 5/9  -voiding not saving  -on seizure precaution  -CIWA-AR score = 0    Plan: EEG tomorrow.  Continue with plan of care.

## 2020-05-11 NOTE — PLAN OF CARE
Observation Goals:   Vitals stable: met   Seizure evaluation complete and plan in place for follow-up: not met.     /73 (BP Location: Right arm)   Pulse 75   Temp 98.7  F (37.1  C) (Oral)   Resp 18   Wt 79.4 kg (175 lb 1.6 oz)   SpO2 96%   BMI 25.12 kg/m

## 2020-05-11 NOTE — PLAN OF CARE
DC instructions given to pt and discussed over the phone with pts wife, verbalized understanding.  All belongings with pt, IV DC'd and documented.     Pt ambulated to second floor for discharge.

## 2020-05-11 NOTE — PROGRESS NOTES
"Brief Neurology Progress Note:    S: No acute events overnight. Patient feels well this AM. He states \"I feel too good to be in the hospital\".    O: Alert and oriented to person, place, time and situation. Intact comprehension and naming. EOMI, face appears symmetric, hearing intact to conversation. Tongue is midline. 5/5 strength in the bilateral upper and lower extremities. Gait appears steady, normal width and stride length.    A/P:  #First time seizure  Not clearly provoked given lack of evidence on examination of withdrawal symptoms. EEG being completed today. Unable to complete MRI due to ICD. No need for AED at this time given first time seizure. Patient will follow up as an outpatient with epileptology.     Patient discussed with Dr. Kulkarni.    Giles Kapadia MD  Neurology Resident, PGY-4    "

## 2020-05-11 NOTE — PLAN OF CARE
Observation Goals:   Vitals stable: met   Seizure evaluation complete and plan in place for follow-up: not met. EEG today.      /80 (BP Location: Right arm)   Pulse 75   Temp 98.1  F (36.7  C) (Oral)   Resp 16   Wt 79.4 kg (175 lb 1.6 oz)   SpO2 97%   BMI 25.12 kg/m           Pt A&Ox4 , CIWAs 0. Plan for EEG today and Continue with POC

## 2020-05-11 NOTE — PROGRESS NOTES
ANTICOAGULATION  MANAGEMENT: Discharge Review    Brooks Summers chart reviewed for anticoagulation continuity of care    Hospital Admission on 5/10/20-5/11/20 for seizure.    Discharge disposition: Home    Results:    Recent labs: (last 7 days)     05/11/20  0544   INR 2.23*     Anticoagulation inpatient management:     home regimen continued    Anticoagulation discharge instructions:     Warfarin dosing: home regimen continued   Bridging: No   INR goal change: No      Medication changes affecting anticoagulation: No    Additional factors affecting anticoagulation: Yes: witnessed seizure, risk for falls    Plan     No adjustment to anticoagulation plan needed    Patient not contacted    No changes were made, patient already has his next INR scheduled in 2 weeks. INR remained therapeutic.    Gina Medina RN

## 2020-05-11 NOTE — DISCHARGE SUMMARY
[facility], [place]  Discharge Summary - Medicine & Pediatrics       Date of Admission:  5/10/2020  Date of Discharge:  5/11/2020  Discharging Provider: Dr. Kiran Pickett  Discharge Service: [facility] 3    Discharge Diagnoses   #. Seizure  #. Bicuspid valve w/ severe stenosis s/p AVR in 7/2011   #. 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 Paroxysmal A-fib  #. Chronic thrombocytopenia     Follow-ups Needed After Discharge   Follow-up Appointments     Adult [facility] Follow-up and recommended labs and tests      Follow up with primary care provider, Edin Mays, within 7 days   for hospital follow- up.  No follow up labs or test are needed.    Follow up with Neurology (establish care) in 2 weeks.       Appointments on [place] and/or [facility] (with [facility] or [facility]   provider or service). Call [phone] if you haven't heard regarding   these appointments within 7 days of discharge.             Unresulted Labs Ordered in the Past 30 Days of this Admission     No orders found for last 31 day(s).        Neurology to follow-up on EEG results    Discharge Disposition   Discharged to home  Condition at discharge: Stable        Hospital Course   Brooks Summers is a 75 year old male admitted on 5/10/2020. 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     The following problems were addressed during his hospitalization:    #. Seizure  #. Encephalopathy--resolved  This is a 1st episode of shaking & stiffening followed by urinary incontinence and LOC.  Neuro exam on admission non-focal. CT head on 5/10 without any intracranial pathology and old caudate infarct with no lesion. Although episode was convincing for seizure, other etiologies were explored such as: cardiogenic syncope (pacemaker interrogation without any alarms or arrhythmias), orthostasis (this is likely as patient had made several trips to bathroom prior to the episode, however, he is unable to recall the event), and less likely PRES (h/o of hypertension but no headaches) and alcohol withdrawal (alcohol level negative, drinks 2beers/day and scored low on CIWA). Unfortunately, MRI is contraindicated due to pacemaker incompatibility.  Neurology assessed him and believe this was likely a seizure. Video EEG was completed in-patient with the results to be followed a an outpatient. They did NOT initiate any AED at this time.       - Follow up with Neurology (epileptology)  in 2 weeks      - Neurology to follow-up on EEG results      - PCP follow-up in 1 week for post-hospitalization       - Advised by Neurology to NOT drive for the next 3 months and they discussed seizure precautions        #. Bicuspid valve w/ severe stenosis s/p AVR in 7/2011   #. 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 Paroxysmal A-fib, chronic thrombocytopenia   - continue pta losartan, simvastatin, metoprolol XL, warfarin  - pacemaker interrogation without any arrhythmias      #. Gout  - continue pta allopurinol     #. Chronic thrombocytopenia  Plts stable.          Consultations This Hospital Stay   PHARMACY TO DOSE WARFARIN    Code Status   Full Code       The patient was discussed with Dr. Pickett.    MD Damaris GrahamMay[facility]  [facility], [place]  Pager: [phone]  ______________________________________________________________________    Physical Exam   Vital Signs: Temp: 98.4  F  (36.9  C) Temp src: Oral BP: 128/86 Pulse: 71 Heart Rate: 59 Resp: 16 SpO2: 94 % O2 Device: None (Room air)    Weight: 175 lbs 1.6 oz  General Appearance: Conversant, NAD  Respiratory: Clear to auscultation bilaterally, no wheezes  Cardiovascular: RRR, no murmurs, ICD in place  GI: Soft, non-tender, non-distended  Skin: warm and dry  Neuro: A&O to person, place and time, CN 2-12 intact, 5/5 strength in all extremities w/ good hand  strength, negative pronator drift         Primary Care Physician   Edin Mays    Discharge Orders      Neurology Adult Referral      Reason for your hospital stay    You were hospitalized after a possible seizure. We evaluated by video EEG and this will help detect any seizures. The results of the test will be communicated with you by neurology as an outpatient.     Adult [facility] Follow-up and recommended labs and tests    Follow up with primary care provider, Edin Mays, within 7 days for hospital follow- up.  No follow up labs or test are needed.    Follow up with Neurology (establish care) in 2 weeks.       Appointments on [place] and/or [facility] (with [facility] or [facility] provider or service). Call [phone] if you haven't heard regarding these appointments within 7 days of discharge.     Activity    Your activity upon discharge: activity as tolerated     Discharge Instructions    Recommendations from Neurology: He is prohibited from operating a motor vehicle within 3 months following any seizure or other episode with sudden unconsciousness or inability to sit up, and that He is required to report any future such seizure to the DM within 30 days after the event. He and family review all other activities, and avoid any activities that might lead to self-injury or injury of others, within 3 months following any seizure with impaired awareness or impaired motor control. Such activities include but are not limited to holding babies or young children at heights  from which they might be injured if dropped, bathing infants or young children in situations in which they might drown without continuous interactive care by an adult who is fully capable at all times during the bath, operating power cutting or other tools, handling firearms, exposure to heights from which she might fall, exposure to vessels with hot cooking oil or water, and swimming alone.       Please follow up with neurology in 2 weeks.     Full Code     Diet    Follow this diet upon discharge: Orders Placed This Encounter      Regular Diet Adult     Cardiac Device Check - Remote       Significant Results and Procedures   Most Recent 3 CBC's:  Recent Labs   Lab Test 05/11/20  0544 05/10/20  0654 01/31/20  0943   WBC 5.5 5.8 5.6   HGB 13.4 15.2 14.7    100 96   PLT 93* 107* 109*     Most Recent 3 BMP's:  Recent Labs   Lab Test 05/11/20  0544 05/10/20  0654 01/31/20  0943    140 138   POTASSIUM 4.0 3.9 4.2   CHLORIDE 111* 110* 108   CO2 25 26 27   BUN 18 13 14   CR 0.97 0.94 0.97   ANIONGAP 6 4 3   MARTIN 8.6 8.9 9.0   GLC 87 107* 98     Most Recent 2 LFT's:  Recent Labs   Lab Test 05/10/20  0654 05/17/19  1011   AST 39 34   ALT 43 44   ALKPHOS 82 85   BILITOTAL 0.8 0.7     Most Recent 3 INR's:  Recent Labs   Lab Test 05/11/20  0544 05/10/20  0654 03/11/20   INR 2.23* 2.75* 2.1*       Discharge Medications   Current Discharge Medication List      CONTINUE these medications which have NOT CHANGED    Details   allopurinol (ZYLOPRIM) 100 MG tablet Take 1 tablet (100 mg) by mouth every evening  Qty: 90 tablet, Refills: 3    Associated Diagnoses: Idiopathic gout, unspecified chronicity, unspecified site      Cyanocobalamin (VITAMIN B 12 PO)       losartan (COZAAR) 25 MG tablet Take 0.5 tablets (12.5 mg) by mouth daily Take (1/2) tab daily  Qty: 45 tablet, Refills: 3    Associated Diagnoses: Benign essential hypertension      metoprolol succinate ER (TOPROL XL) 50 MG 24 hr tablet Take 1 tablet (50 mg) by mouth  daily  Qty: 90 tablet, Refills: 3    Associated Diagnoses: Benign essential hypertension      Multiple Vitamins-Minerals (MULTIVITAMIN ADULTS 50+) TABS     Associated Diagnoses: Flu vaccine need; Aortic valve stenosis, severe; Chronic systolic heart failure (H); S/P aortic valve replacement      PROVIDER DOSED MANAGED WARFARIN, COUMADIN, OUTPATIENT Per coumadin clinic      simvastatin (ZOCOR) 40 MG tablet TAKE 1 TABLET(40 MG) BY MOUTH AT BEDTIME  Qty: 90 tablet, Refills: 2    Associated Diagnoses: Hyperlipidemia LDL goal <100      valACYclovir 1000 mg PO tablet Take 1 tablet (1,000 mg) by mouth 3 times daily for 7 days  Qty: 21 tablet, Refills: 0    Associated Diagnoses: Disseminated herpes zoster      VITAMIN D, CHOLECALCIFEROL, PO Take 1,000 Units by mouth daily    Associated Diagnoses: Flu vaccine need; Aortic valve stenosis, severe; Chronic systolic heart failure (H); S/P aortic valve replacement      warfarin ANTICOAGULANT (COUMADIN) 5 MG tablet 7.5 mg Tues/Thurs/Sun; 10 mg all other days or as directed by the Anticoagulation Clinic  Qty: 160 tablet, Refills: 0    Associated Diagnoses: S/P AVR (aortic valve replacement); Paroxysmal atrial fibrillation (H); Long term current use of anticoagulant therapy; S/P aortic valve replacement           Allergies   Allergies   Allergen Reactions     Lisinopril Cough

## 2020-05-11 NOTE — PLAN OF CARE
Observation Goals:    Vitals stable: /86 (BP Location: Right arm)   Pulse 71   Temp 98.4  F (36.9  C) (Oral)   Resp 16   Wt 79.4 kg (175 lb 1.6 oz)   SpO2 94%   BMI 25.12 kg/m      Pt had EEG preformed this morning. Plan is to discharge home today with outpatient check ups.

## 2020-05-11 NOTE — PLAN OF CARE
Observation Goals:    -Vitals stable:/86 (BP Location: Right arm)   Pulse 71   Temp 98.4  F (36.9  C) (Oral)   Resp 16   Wt 79.4 kg (175 lb 1.6 oz)   SpO2 94%   BMI 25.12 kg/m       Pt will have EEG completed this AM. Pt is alert and oriented x4. CIWA score of 0. Will continue to monitor.

## 2020-05-11 NOTE — PROGRESS NOTES
Care Coordinator - Discharge Planning    Admission Date/Time:  5/10/2020  Attending MD:  Kiran Pickett   Data  Chart reviewed, discussed with interdisciplinary team.   Patient was admitted for:   1. Rotator cuff (capsule) sprain    2. Aortic valve stenosis, severe    3. Chronic systolic heart failure (H)    4. Bicuspid aortic valve    5. S/P aortic valve replacement    6. Smoking hx    7. LBBB (left bundle branch block)    8. Pneumothorax, left    9. Heart failure, chronic systolic (H)    10. Cardiomyopathy, dilated, nonischemic (H)    11. CKD (chronic kidney disease) stage 3, GFR 30-59 ml/min (H)    12. Dyslipidemia    13. 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 Endocarditis    15. S/P AVR    16. Need for prophylactic antibiotic    17. Hyperlipidemia with target LDL less than 100    18. Finger injury    19. Paroxysmal atrial fibrillation (H)    20. Long term current use of anticoagulant therapy    21. Trigger ring finger of left hand    22. Nocturnal hypoxemia    23. Neoplasm of uncertain behavior of skin    24. AK (actinic keratosis)    25. History of nonmelanoma skin cancer    26. Witnessed seizure-like activity (H)    27. Syncope, unspecified syncope type    Pt is Observation Status, pt is aware of his status, pt given MOON Info sheet; signed copy placed in pt's chart.   Assessment   Concerns with insurance coverage for discharge needs: OBS status.   Current Living Situation: Patient lives with spouse. Pt told this writer that his wife is a retired nurse. Pt added that he is independent with his own care.   Support System: Supportive and Involved wife.   Services Involved: [facility]  Transportation at Discharge: Family or friend will provide  Transportation to Medical Appointments:    - Name of caregiver: self or wife.      Coordination of Care and Referrals: No home care needs per pt.       Plan  Anticipated  Discharge Date:  5/11/2020  Anticipated Discharge Plan:  Discharge to home.    CTS Handoff completed:  YES    STEPHANIE CUEVAS, RN BSN  Care Coordinator Unit   [phone]

## 2020-05-12 ENCOUNTER — PATIENT OUTREACH (OUTPATIENT)
Dept: CARE COORDINATION | Facility: CLINIC | Age: 75
End: 2020-05-12

## 2020-05-12 LAB
MDC_IDC_LEAD_IMPLANT_DT: NORMAL
MDC_IDC_LEAD_LOCATION: NORMAL
MDC_IDC_LEAD_LOCATION_DETAIL_1: NORMAL
MDC_IDC_LEAD_MFG: NORMAL
MDC_IDC_LEAD_MODEL: NORMAL
MDC_IDC_LEAD_POLARITY_TYPE: NORMAL
MDC_IDC_LEAD_SERIAL: NORMAL
MDC_IDC_LEAD_SPECIAL_FUNCTION: NORMAL
MDC_IDC_MSMT_BATTERY_DTM: NORMAL
MDC_IDC_MSMT_BATTERY_REMAINING_LONGEVITY: 10 MO
MDC_IDC_MSMT_BATTERY_RRT_TRIGGER: 2.73
MDC_IDC_MSMT_BATTERY_STATUS: NORMAL
MDC_IDC_MSMT_BATTERY_VOLTAGE: 2.86 V
MDC_IDC_MSMT_CAP_CHARGE_DTM: NORMAL
MDC_IDC_MSMT_CAP_CHARGE_ENERGY: 18 J
MDC_IDC_MSMT_CAP_CHARGE_TIME: 4.61
MDC_IDC_MSMT_CAP_CHARGE_TYPE: NORMAL
MDC_IDC_MSMT_LEADCHNL_LV_IMPEDANCE_VALUE: 380 OHM
MDC_IDC_MSMT_LEADCHNL_LV_IMPEDANCE_VALUE: 437 OHM
MDC_IDC_MSMT_LEADCHNL_LV_IMPEDANCE_VALUE: 703 OHM
MDC_IDC_MSMT_LEADCHNL_LV_PACING_THRESHOLD_AMPLITUDE: 2 V
MDC_IDC_MSMT_LEADCHNL_LV_PACING_THRESHOLD_PULSEWIDTH: 1 MS
MDC_IDC_MSMT_LEADCHNL_RA_IMPEDANCE_VALUE: 456 OHM
MDC_IDC_MSMT_LEADCHNL_RA_PACING_THRESHOLD_AMPLITUDE: 0.62 V
MDC_IDC_MSMT_LEADCHNL_RA_PACING_THRESHOLD_PULSEWIDTH: 0.4 MS
MDC_IDC_MSMT_LEADCHNL_RA_SENSING_INTR_AMPL: 1.75 MV
MDC_IDC_MSMT_LEADCHNL_RA_SENSING_INTR_AMPL: 1.75 MV
MDC_IDC_MSMT_LEADCHNL_RV_IMPEDANCE_VALUE: 437 OHM
MDC_IDC_MSMT_LEADCHNL_RV_IMPEDANCE_VALUE: 494 OHM
MDC_IDC_MSMT_LEADCHNL_RV_PACING_THRESHOLD_AMPLITUDE: 0.5 V
MDC_IDC_MSMT_LEADCHNL_RV_PACING_THRESHOLD_PULSEWIDTH: 0.4 MS
MDC_IDC_MSMT_LEADCHNL_RV_SENSING_INTR_AMPL: 22.5 MV
MDC_IDC_MSMT_LEADCHNL_RV_SENSING_INTR_AMPL: 22.5 MV
MDC_IDC_PG_IMPLANT_DTM: NORMAL
MDC_IDC_PG_MFG: NORMAL
MDC_IDC_PG_MODEL: NORMAL
MDC_IDC_PG_SERIAL: NORMAL
MDC_IDC_PG_TYPE: NORMAL
MDC_IDC_SESS_CLINIC_NAME: NORMAL
MDC_IDC_SESS_DTM: NORMAL
MDC_IDC_SESS_TYPE: NORMAL
MDC_IDC_SET_BRADY_AT_MODE_SWITCH_RATE: 171 {BEATS}/MIN
MDC_IDC_SET_BRADY_LOWRATE: 60 {BEATS}/MIN
MDC_IDC_SET_BRADY_MAX_SENSOR_RATE: 140 {BEATS}/MIN
MDC_IDC_SET_BRADY_MAX_TRACKING_RATE: 140 {BEATS}/MIN
MDC_IDC_SET_BRADY_MODE: NORMAL
MDC_IDC_SET_BRADY_PAV_DELAY_HIGH: 100 MS
MDC_IDC_SET_BRADY_PAV_DELAY_LOW: 130 MS
MDC_IDC_SET_BRADY_SAV_DELAY_HIGH: 70 MS
MDC_IDC_SET_BRADY_SAV_DELAY_LOW: 100 MS
MDC_IDC_SET_CRT_LVRV_DELAY: 0 MS
MDC_IDC_SET_CRT_PACED_CHAMBERS: NORMAL
MDC_IDC_SET_LEADCHNL_LV_PACING_AMPLITUDE: 3 V
MDC_IDC_SET_LEADCHNL_LV_PACING_ANODE_ELECTRODE_1: NORMAL
MDC_IDC_SET_LEADCHNL_LV_PACING_ANODE_LOCATION_1: NORMAL
MDC_IDC_SET_LEADCHNL_LV_PACING_CAPTURE_MODE: NORMAL
MDC_IDC_SET_LEADCHNL_LV_PACING_CATHODE_ELECTRODE_1: NORMAL
MDC_IDC_SET_LEADCHNL_LV_PACING_CATHODE_LOCATION_1: NORMAL
MDC_IDC_SET_LEADCHNL_LV_PACING_POLARITY: NORMAL
MDC_IDC_SET_LEADCHNL_LV_PACING_PULSEWIDTH: 1 MS
MDC_IDC_SET_LEADCHNL_RA_PACING_AMPLITUDE: 1.5 V
MDC_IDC_SET_LEADCHNL_RA_PACING_ANODE_ELECTRODE_1: NORMAL
MDC_IDC_SET_LEADCHNL_RA_PACING_ANODE_LOCATION_1: NORMAL
MDC_IDC_SET_LEADCHNL_RA_PACING_CAPTURE_MODE: NORMAL
MDC_IDC_SET_LEADCHNL_RA_PACING_CATHODE_ELECTRODE_1: NORMAL
MDC_IDC_SET_LEADCHNL_RA_PACING_CATHODE_LOCATION_1: NORMAL
MDC_IDC_SET_LEADCHNL_RA_PACING_POLARITY: NORMAL
MDC_IDC_SET_LEADCHNL_RA_PACING_PULSEWIDTH: 0.4 MS
MDC_IDC_SET_LEADCHNL_RA_SENSING_ANODE_ELECTRODE_1: NORMAL
MDC_IDC_SET_LEADCHNL_RA_SENSING_ANODE_LOCATION_1: NORMAL
MDC_IDC_SET_LEADCHNL_RA_SENSING_CATHODE_ELECTRODE_1: NORMAL
MDC_IDC_SET_LEADCHNL_RA_SENSING_CATHODE_LOCATION_1: NORMAL
MDC_IDC_SET_LEADCHNL_RA_SENSING_POLARITY: NORMAL
MDC_IDC_SET_LEADCHNL_RA_SENSING_SENSITIVITY: 0.3 MV
MDC_IDC_SET_LEADCHNL_RV_PACING_AMPLITUDE: 2 V
MDC_IDC_SET_LEADCHNL_RV_PACING_ANODE_ELECTRODE_1: NORMAL
MDC_IDC_SET_LEADCHNL_RV_PACING_ANODE_LOCATION_1: NORMAL
MDC_IDC_SET_LEADCHNL_RV_PACING_CAPTURE_MODE: NORMAL
MDC_IDC_SET_LEADCHNL_RV_PACING_CATHODE_ELECTRODE_1: NORMAL
MDC_IDC_SET_LEADCHNL_RV_PACING_CATHODE_LOCATION_1: NORMAL
MDC_IDC_SET_LEADCHNL_RV_PACING_POLARITY: NORMAL
MDC_IDC_SET_LEADCHNL_RV_PACING_PULSEWIDTH: 0.4 MS
MDC_IDC_SET_LEADCHNL_RV_SENSING_ANODE_ELECTRODE_1: NORMAL
MDC_IDC_SET_LEADCHNL_RV_SENSING_ANODE_LOCATION_1: NORMAL
MDC_IDC_SET_LEADCHNL_RV_SENSING_CATHODE_ELECTRODE_1: NORMAL
MDC_IDC_SET_LEADCHNL_RV_SENSING_CATHODE_LOCATION_1: NORMAL
MDC_IDC_SET_LEADCHNL_RV_SENSING_POLARITY: NORMAL
MDC_IDC_SET_LEADCHNL_RV_SENSING_SENSITIVITY: 0.3 MV
MDC_IDC_SET_ZONE_DETECTION_BEATS_DENOMINATOR: 40 {BEATS}
MDC_IDC_SET_ZONE_DETECTION_BEATS_NUMERATOR: 30 {BEATS}
MDC_IDC_SET_ZONE_DETECTION_INTERVAL: 320 MS
MDC_IDC_SET_ZONE_DETECTION_INTERVAL: 350 MS
MDC_IDC_SET_ZONE_DETECTION_INTERVAL: 360 MS
MDC_IDC_SET_ZONE_DETECTION_INTERVAL: 420 MS
MDC_IDC_SET_ZONE_DETECTION_INTERVAL: NORMAL
MDC_IDC_SET_ZONE_TYPE: NORMAL
MDC_IDC_STAT_AT_BURDEN_PERCENT: 0 %
MDC_IDC_STAT_AT_DTM_END: NORMAL
MDC_IDC_STAT_AT_DTM_START: NORMAL
MDC_IDC_STAT_BRADY_AP_VP_PERCENT: 23.29 %
MDC_IDC_STAT_BRADY_AP_VS_PERCENT: 0.02 %
MDC_IDC_STAT_BRADY_AS_VP_PERCENT: 76.48 %
MDC_IDC_STAT_BRADY_AS_VS_PERCENT: 0.21 %
MDC_IDC_STAT_BRADY_DTM_END: NORMAL
MDC_IDC_STAT_BRADY_DTM_START: NORMAL
MDC_IDC_STAT_BRADY_RA_PERCENT_PACED: 23.22 %
MDC_IDC_STAT_BRADY_RV_PERCENT_PACED: 99.46 %
MDC_IDC_STAT_CRT_DTM_END: NORMAL
MDC_IDC_STAT_CRT_DTM_START: NORMAL
MDC_IDC_STAT_CRT_LV_PERCENT_PACED: 99.34 %
MDC_IDC_STAT_CRT_PERCENT_PACED: 99.34 %
MDC_IDC_STAT_EPISODE_RECENT_COUNT: 0
MDC_IDC_STAT_EPISODE_RECENT_COUNT_DTM_END: NORMAL
MDC_IDC_STAT_EPISODE_RECENT_COUNT_DTM_START: NORMAL
MDC_IDC_STAT_EPISODE_TOTAL_COUNT: 0
MDC_IDC_STAT_EPISODE_TOTAL_COUNT: 3
MDC_IDC_STAT_EPISODE_TOTAL_COUNT_DTM_END: NORMAL
MDC_IDC_STAT_EPISODE_TOTAL_COUNT_DTM_START: NORMAL
MDC_IDC_STAT_EPISODE_TYPE: NORMAL
MDC_IDC_STAT_TACHYTHERAPY_ATP_DELIVERED_RECENT: 0
MDC_IDC_STAT_TACHYTHERAPY_ATP_DELIVERED_TOTAL: 0
MDC_IDC_STAT_TACHYTHERAPY_RECENT_DTM_END: NORMAL
MDC_IDC_STAT_TACHYTHERAPY_RECENT_DTM_START: NORMAL
MDC_IDC_STAT_TACHYTHERAPY_SHOCKS_ABORTED_RECENT: 0
MDC_IDC_STAT_TACHYTHERAPY_SHOCKS_ABORTED_TOTAL: 0
MDC_IDC_STAT_TACHYTHERAPY_SHOCKS_DELIVERED_RECENT: 0
MDC_IDC_STAT_TACHYTHERAPY_SHOCKS_DELIVERED_TOTAL: 0
MDC_IDC_STAT_TACHYTHERAPY_TOTAL_DTM_END: NORMAL
MDC_IDC_STAT_TACHYTHERAPY_TOTAL_DTM_START: NORMAL

## 2020-05-12 NOTE — PROCEDURES
3-HOUR VIDEO EEG    VIDEO EEG DATE: May 11, 2020    VIDEO EEG #:     VIDEO EEG SOURCE FILE DURATION: 02:57:31    PATIENT INFORMATION: Brooks Summers is a 75 year old male  With a PMHx of bicuspid aortic valve and severe aortic stenosis s/p aortic valve replacement, CHF, atrial fibrillation and CKD stage III who presented with concern for seizure. No previous history of seizures.    MEDICATIONS:   Thiamine    TECHNICAL SUMMARY: This video EEG monitoring procedure was performed with 23 scalp electrodes in 10-20 system placements, and additional scalp, precordial and other surface electrodes used for electrical referencing and artifact detection. Video was reviewed intermittently by EEG technologist and physician for electroclinical seizures.     BACKGROUND ACTIVITY:  During wakefulness, the background activity consists of synchronous and symmetric 9-10 Hz posterior dominant rhythm. The posterior dominant rhythm attenuated with eye opening. It has a maximum amplitude of 30 millivolts. There is a normal AP gradient with beta activity present frontocentrally. During drowsiness, the background activity waxed and waned and there were periods of slowing and attenuation of the posterior alpha rhythm and increased theta activity. Stage II sleep was recorded in which synchronous and symmetrical sleep spindles were identified. Vertex waves were also seen. Good examples of each are at 10:03:17 and 10:03:43 respectively.  No focal abnormalities were observed.    ACTIVATION PROCEDURE:   Photic stimulation and hyperventilation were not done. During baseline testing patient is asked to count backwards from 50 to 1 with no errors. Patient was able to answer orientation questions.   EEG during this time was reactive.      EPILEPTIFORM DISCHARGES: No epileptiform activity was recorded.    ICTAL:  No clinical events or electrographic seizures were recorded.    IMPRESSION OF 3-HOUR VEEG: This is a normal 3 hour awake and sleep  video electroencephalogram. No electrographic seizures or epileptiform discharges were recorded. Clinical correlation is advised.    Discussed with Dr Bermeo.     Allyssa Washington  Neurology Resident, PGY3  Pager: [phone]    I personally reviewed the video EEG and agree with the reported findings.    Michelle Lott MD

## 2020-05-20 ENCOUNTER — VIRTUAL VISIT (OUTPATIENT)
Dept: NEUROLOGY | Facility: CLINIC | Age: 75
End: 2020-05-20
Payer: MEDICARE

## 2020-05-20 ENCOUNTER — PRE VISIT (OUTPATIENT)
Dept: NEUROLOGY | Facility: CLINIC | Age: 75
End: 2020-05-20

## 2020-05-20 DIAGNOSIS — G40.89 OTHER SEIZURES (H): Primary | ICD-10-CM

## 2020-05-20 ASSESSMENT — PAIN SCALES - GENERAL: PAINLEVEL: NO PAIN (0)

## 2020-05-20 NOTE — PROGRESS NOTES
Service Date: 05/20/2020      CHIEF COMPLAINT:  New onset seizure.      HISTORY OF PRESENT ILLNESS:  This patient is a 75-year-old male with a history of bicuspid valve, severe stenosis, status post AVR with prosthetic valve endocarditis, then mitral valve repair and pacemaker placed in 2013, on Coumadin, paroxysmal AFib, chronic thrombocytopenia, gout.  He presented with 1 isolated new-onset seizure about 10 days ago and was seen at [facility].      Today's visit is a video conference.  Patient's wife is also with the video conference.  According to the wife, the patient was at his usual state of health until about 10 days ago at 5 a.m. in the morning, she noted that the patient had a loud screeching, then his body stiffened up.  This lasted for about 15 seconds, then patient was unresponsive for a few minutes.  After he came to, he was still confused about 15 minutes.  He was sent to [facility] and was evaluated in the emergency room.  CT had showed no intracranial pathology.  EEG was done at that time which was a completely normal study.  The patient was discharged without any anti-seizure medications and was instructed to follow up with Neurology.      At that time, differential diagnosis included cardiogenic syncope, orthostatic hypotension or PRES, alcohol withdrawal, which were all thought to be less likely.  Seizure was the most likely diagnosis.      TRIGGERS FOR SEIZURES:  Not clear.      RISK FACTORS FOR SEIZURES:  He had no history of head trauma with loss of consciousness.  No history of CNS infection.  No history of febrile convulsions.        Current Outpatient Medications   Medication Sig Dispense Refill     allopurinol (ZYLOPRIM) 100 MG tablet Take 1 tablet (100 mg) by mouth every evening 90 tablet 3     Cyanocobalamin (VITAMIN B 12 PO)        losartan (COZAAR) 25 MG tablet Take 0.5 tablets (12.5 mg) by mouth daily Take (1/2) tab daily 45 tablet 3     metoprolol succinate ER  (TOPROL XL) 50 MG 24 hr tablet Take 1 tablet (50 mg) by mouth daily 90 tablet 3     Multiple Vitamins-Minerals (MULTIVITAMIN ADULTS 50+) TABS        PROVIDER DOSED MANAGED WARFARIN, COUMADIN, OUTPATIENT Per coumadin clinic       simvastatin (ZOCOR) 40 MG tablet TAKE 1 TABLET(40 MG) BY MOUTH AT BEDTIME 90 tablet 2     VITAMIN D, CHOLECALCIFEROL, PO Take 1,000 Units by mouth daily       warfarin ANTICOAGULANT (COUMADIN) 5 MG tablet 7.5 mg Tues/Thurs/Sun; 10 mg all other days or as directed by the Anticoagulation Clinic 160 tablet 0     valACYclovir 1000 mg PO tablet Take 1 tablet (1,000 mg) by mouth 3 times daily for 7 days 21 tablet 0       PAST MEDICAL HISTORY:  Basal cell carcinoma in face, bicuspid aortic valve, chronic systolic heart failure, endocarditis of the prosthetic valve, implantable cardiac defibrillator in place, rotator cuff sprain and strain, paroxysmal AFib, keratoconus, thrombocytopenia.      PAST SURGICAL HISTORY:  Aortic valve replacement, anesthesia cardioversion, colonoscopy, shoulder surgery, hemorrhoid surgery, cystoscopy, AVR.      FAMILY HISTORY:  No family history of seizures.      SOCIAL HISTORY:  He is , living with his wife.  He is retired.  He drinks alcohol, several beers per day.  He had a 20-pack-year smoking history.  No history of drug use.      ALLERGIES:  Lisinopril caused coughing.      PHYSICAL EXAMINATION:  Alert and oriented x3.  Speech fluent, appropriate.  No facial weakness.      REVIEW OF SYSTEMS:  A 12-point review of systems is essentially negative.      PREVIOUS DIAGNOSTIC TESTING:  CT head on 05/10 was negative for acute intracranial pathology, positive for left caudate old lacunar infarct, periventricular hypodensities, nonspecific.      EEG for 3 hours, which was a normal study.      IMPRESSION:  New onset seizure, etiology unclear at this time.  Based on the description, this was most likely an epileptic seizure.  It is unclear if this is a focal seizure or  generalized seizure at this time.  His recent CT of the head was negative for any intracranial acute pathology.  3 hours EEG showed no abnormalities.  At this time, we should continue to observe.  No antiepileptic medications are indicated at this time.      PLAN:   1.  No antiepileptic medications are indicated at this time.   2.  Return to clinic as needed.  If seizure recurs, the patient will need to be started on antiepileptic medication such as Keppra.        30 min was spent on the visit.  Over 50% of the time was spent on education, counseling about optimal seizure control and coordination of care.         LILIANE SANDHU MD             D: 2020   T: 2020   MT: ENDY      Name:     DAR CRUZ   MRN:      [id number]        Account:      [id number]   :      1945           Service Date: 2020      Document: [id number]

## 2020-05-20 NOTE — PROGRESS NOTES
"Brooks Summers is a 75 year old male who is being evaluated via a billable video visit.      The patient has been notified of following:     \"This video visit will be conducted via a call between you and your physician/provider. We have found that certain health care needs can be provided without the need for an in-person physical exam.  This service lets us provide the care you need with a video conversation.  If a prescription is necessary we can send it directly to your pharmacy.  If lab work is needed we can place an order for that and you can then stop by our lab to have the test done at a later time.    Video visits are billed at different rates depending on your insurance coverage.  Please reach out to your insurance provider with any questions.    If during the course of the call the physician/provider feels a video visit is not appropriate, you will not be charged for this service.\"    Patient has given verbal consent for Video visit? YES     How would you like to obtain your AVS? MAIL     Patient would like the video invitation sent by: TEXT [phone]    Will anyone else be joining your video visit? YES       CHIEF COMPLAINT:  New onset seizure.      HISTORY OF PRESENT ILLNESS:  This patient is a 75-year-old male with a history of bicuspid valve, severe stenosis, status post AVR with prosthetic valve endocarditis, then mitral valve repair and pacemaker placed in 2013, on Coumadin, paroxysmal AFib, chronic thrombocytopenia, gout.  He presented with 1 isolated new-onset seizure about 10 days ago and was seen at [facility].      Today's visit is a video conference.  Patient's wife is also with the video conference.  According to the wife, the patient was at his usual state of health until about 10 days ago at 5 a.m. in the morning, she noted that the patient had a loud screeching, then his body stiffened up.  This lasted for about 15 seconds, then patient was unresponsive for a few minutes.  After " he came to, he was still confused about 15 minutes.  He was sent to [facility] and was evaluated in the emergency room.  CT had showed no intracranial pathology.  EEG was done at that time which was a completely normal study.  The patient was discharged without any anti-seizure medications and was instructed to follow up with Neurology.      At that time, differential diagnosis included cardiogenic syncope, orthostatic hypotension or PRES, alcohol withdrawal, which were all thought to be less likely.  Seizure was the most likely diagnosis.      TRIGGERS FOR SEIZURES:  Not clear.      RISK FACTORS FOR SEIZURES:  He had no history of head trauma with loss of consciousness.  No history of CNS infection.  No history of febrile convulsions.        Current Outpatient Medications   Medication Sig Dispense Refill     allopurinol (ZYLOPRIM) 100 MG tablet Take 1 tablet (100 mg) by mouth every evening 90 tablet 3     Cyanocobalamin (VITAMIN B 12 PO)        losartan (COZAAR) 25 MG tablet Take 0.5 tablets (12.5 mg) by mouth daily Take (1/2) tab daily 45 tablet 3     metoprolol succinate ER (TOPROL XL) 50 MG 24 hr tablet Take 1 tablet (50 mg) by mouth daily 90 tablet 3     Multiple Vitamins-Minerals (MULTIVITAMIN ADULTS 50+) TABS        PROVIDER DOSED MANAGED WARFARIN, COUMADIN, OUTPATIENT Per coumadin clinic       simvastatin (ZOCOR) 40 MG tablet TAKE 1 TABLET(40 MG) BY MOUTH AT BEDTIME 90 tablet 2     VITAMIN D, CHOLECALCIFEROL, PO Take 1,000 Units by mouth daily       warfarin ANTICOAGULANT (COUMADIN) 5 MG tablet 7.5 mg Tues/Thurs/Sun; 10 mg all other days or as directed by the Anticoagulation Clinic 160 tablet 0     valACYclovir 1000 mg PO tablet Take 1 tablet (1,000 mg) by mouth 3 times daily for 7 days 21 tablet 0       PAST MEDICAL HISTORY:  Basal cell carcinoma in face, bicuspid aortic valve, chronic systolic heart failure, endocarditis of the prosthetic valve, implantable cardiac defibrillator in place,  rotator cuff sprain and strain, paroxysmal AFib, keratoconus, thrombocytopenia.      PAST SURGICAL HISTORY:  Aortic valve replacement, anesthesia cardioversion, colonoscopy, shoulder surgery, hemorrhoid surgery, cystoscopy, AVR.      FAMILY HISTORY:  No family history of seizures.      SOCIAL HISTORY:  He is , living with his wife.  He is retired.  He drinks alcohol, several beers per day.  He had a 20-pack-year smoking history.  No history of drug use.      ALLERGIES:  Lisinopril caused coughing.      PHYSICAL EXAMINATION:  Alert and oriented x3.  Speech fluent, appropriate.  No facial weakness.      REVIEW OF SYSTEMS:  A 12-point review of systems is essentially negative.      PREVIOUS DIAGNOSTIC TESTING:  CT head on 05/10 was negative for acute intracranial pathology, positive for left caudate old lacunar infarct, periventricular hypodensities, nonspecific.      EEG for 3 hours, which was a normal study.      IMPRESSION:  New onset seizure, etiology unclear at this time.  Based on the description, this was most likely an epileptic seizure.  It is unclear if this is a focal seizure or generalized seizure at this time.  His recent CT of the head was negative for any intracranial acute pathology.  3 hours EEG showed no abnormalities.  At this time, we should continue to observe.  No antiepileptic medications are indicated at this time.      PLAN:   1.  No antiepileptic medications are indicated at this time.   2.  Return to clinic as needed.  If seizure recurs, the patient will need to be started on antiepileptic medication such as Keppra.            Video-Visit Details    Type of service:  Video Visit    Video Start Time: 3:38 pm  Video End Time: 4:05 pm    Originating Location (pt. Location): Home    Distant Location (provider location):  [facility]     Platform used for Video Visit: TapBookAuthor      30 min was spent on the visit.  Over 50% of the time was spent on education, counseling about optimal  seizure control and coordination of care.        Pierce Westbrook MD

## 2020-05-20 NOTE — LETTER
"5/20/2020     RE: Brooks Summers  [address]  [facility] [address]     Dear Colleague,    Thank you for referring your patient, Brooks Summers, to the [facility] NEUROLOGY at [facility]. Please see a copy of my visit note below.    Brooks Summers is a 75 year old male who is being evaluated via a billable video visit.      The patient has been notified of following:     \"This video visit will be conducted via a call between you and your physician/provider. We have found that certain health care needs can be provided without the need for an in-person physical exam.  This service lets us provide the care you need with a video conversation.  If a prescription is necessary we can send it directly to your pharmacy.  If lab work is needed we can place an order for that and you can then stop by our lab to have the test done at a later time.    Video visits are billed at different rates depending on your insurance coverage.  Please reach out to your insurance provider with any questions.    If during the course of the call the physician/provider feels a video visit is not appropriate, you will not be charged for this service.\"    Patient has given verbal consent for Video visit? YES     How would you like to obtain your AVS? MAIL     Patient would like the video invitation sent by: TEXT [phone]    Will anyone else be joining your video visit? YES       CHIEF COMPLAINT:  New onset seizure.      HISTORY OF PRESENT ILLNESS:  This patient is a 75-year-old male with a history of bicuspid valve, severe stenosis, status post AVR with prosthetic valve endocarditis, then mitral valve repair and pacemaker placed in 2013, on Coumadin, paroxysmal AFib, chronic thrombocytopenia, gout.  He presented with 1 isolated new-onset seizure about 10 days ago and was seen at [facility].      Today's visit is a video conference.  Patient's wife is also with the video conference.  " According to the wife, the patient was at his usual state of health until about 10 days ago at 5 a.m. in the morning, she noted that the patient had a loud screeching, then his body stiffened up.  This lasted for about 15 seconds, then patient was unresponsive for a few minutes.  After he came to, he was still confused about 15 minutes.  He was sent to [facility] and was evaluated in the emergency room.  CT had showed no intracranial pathology.  EEG was done at that time which was a completely normal study.  The patient was discharged without any anti-seizure medications and was instructed to follow up with Neurology.      At that time, differential diagnosis included cardiogenic syncope, orthostatic hypotension or PRES, alcohol withdrawal, which were all thought to be less likely.  Seizure was the most likely diagnosis.      TRIGGERS FOR SEIZURES:  Not clear.      RISK FACTORS FOR SEIZURES:  He had no history of head trauma with loss of consciousness.  No history of CNS infection.  No history of febrile convulsions.        Current Outpatient Medications   Medication Sig Dispense Refill     allopurinol (ZYLOPRIM) 100 MG tablet Take 1 tablet (100 mg) by mouth every evening 90 tablet 3     Cyanocobalamin (VITAMIN B 12 PO)        losartan (COZAAR) 25 MG tablet Take 0.5 tablets (12.5 mg) by mouth daily Take (1/2) tab daily 45 tablet 3     metoprolol succinate ER (TOPROL XL) 50 MG 24 hr tablet Take 1 tablet (50 mg) by mouth daily 90 tablet 3     Multiple Vitamins-Minerals (MULTIVITAMIN ADULTS 50+) TABS        PROVIDER DOSED MANAGED WARFARIN, COUMADIN, OUTPATIENT Per coumadin clinic       simvastatin (ZOCOR) 40 MG tablet TAKE 1 TABLET(40 MG) BY MOUTH AT BEDTIME 90 tablet 2     VITAMIN D, CHOLECALCIFEROL, PO Take 1,000 Units by mouth daily       warfarin ANTICOAGULANT (COUMADIN) 5 MG tablet 7.5 mg Tues/Thurs/Sun; 10 mg all other days or as directed by the Anticoagulation Clinic 160 tablet 0      valACYclovir 1000 mg PO tablet Take 1 tablet (1,000 mg) by mouth 3 times daily for 7 days 21 tablet 0       PAST MEDICAL HISTORY:  Basal cell carcinoma in face, bicuspid aortic valve, chronic systolic heart failure, endocarditis of the prosthetic valve, implantable cardiac defibrillator in place, rotator cuff sprain and strain, paroxysmal AFib, keratoconus, thrombocytopenia.      PAST SURGICAL HISTORY:  Aortic valve replacement, anesthesia cardioversion, colonoscopy, shoulder surgery, hemorrhoid surgery, cystoscopy, AVR.      FAMILY HISTORY:  No family history of seizures.      SOCIAL HISTORY:  He is , living with his wife.  He is retired.  He drinks alcohol, several beers per day.  He had a 20-pack-year smoking history.  No history of drug use.      ALLERGIES:  Lisinopril caused coughing.      PHYSICAL EXAMINATION:  Alert and oriented x3.  Speech fluent, appropriate.  No facial weakness.      REVIEW OF SYSTEMS:  A 12-point review of systems is essentially negative.      PREVIOUS DIAGNOSTIC TESTING:  CT head on 05/10 was negative for acute intracranial pathology, positive for left caudate old lacunar infarct, periventricular hypodensities, nonspecific.      EEG for 3 hours, which was a normal study.      IMPRESSION:  New onset seizure, etiology unclear at this time.  Based on the description, this was most likely an epileptic seizure.  It is unclear if this is a focal seizure or generalized seizure at this time.  His recent CT of the head was negative for any intracranial acute pathology.  3 hours EEG showed no abnormalities.  At this time, we should continue to observe.  No antiepileptic medications are indicated at this time.      PLAN:   1.  No antiepileptic medications are indicated at this time.   2.  Return to clinic as needed.  If seizure recurs, the patient will need to be started on antiepileptic medication such as Keppra.            Video-Visit Details    Type of service:  Video Visit    Video Start  Time: 3:38 pm  Video End Time: 4:05 pm    Originating Location (pt. Location): Home    Distant Location (provider location):  [facility] NEUROLOGY     Platform used for Video Visit: [employer]      30 min was spent on the visit.  Over 50% of the time was spent on education, counseling about optimal seizure control and coordination of care.        Pierce Westbrook MD          Service Date: 05/20/2020      CHIEF COMPLAINT:  New onset seizure.      HISTORY OF PRESENT ILLNESS:  This patient is a 75-year-old male with a history of bicuspid valve, severe stenosis, status post AVR with prosthetic valve endocarditis, then mitral valve repair and pacemaker placed in 2013, on Coumadin, paroxysmal AFib, chronic thrombocytopenia, gout.  He presented with 1 isolated new-onset seizure about 10 days ago and was seen at [facility].      Today's visit is a video conference.  Patient's wife is also with the video conference.  According to the wife, the patient was at his usual state of health until about 10 days ago at 5 a.m. in the morning, she noted that the patient had a loud screeching, then his body stiffened up.  This lasted for about 15 seconds, then patient was unresponsive for a few minutes.  After he came to, he was still confused about 15 minutes.  He was sent to [facility] and was evaluated in the emergency room.  CT had showed no intracranial pathology.  EEG was done at that time which was a completely normal study.  The patient was discharged without any anti-seizure medications and was instructed to follow up with Neurology.      At that time, differential diagnosis included cardiogenic syncope, orthostatic hypotension or PRES, alcohol withdrawal, which were all thought to be less likely.  Seizure was the most likely diagnosis.      TRIGGERS FOR SEIZURES:  Not clear.      RISK FACTORS FOR SEIZURES:  He had no history of head trauma with loss of consciousness.  No history of CNS infection.  No history of  febrile convulsions.        Current Outpatient Medications   Medication Sig Dispense Refill     allopurinol (ZYLOPRIM) 100 MG tablet Take 1 tablet (100 mg) by mouth every evening 90 tablet 3     Cyanocobalamin (VITAMIN B 12 PO)        losartan (COZAAR) 25 MG tablet Take 0.5 tablets (12.5 mg) by mouth daily Take (1/2) tab daily 45 tablet 3     metoprolol succinate ER (TOPROL XL) 50 MG 24 hr tablet Take 1 tablet (50 mg) by mouth daily 90 tablet 3     Multiple Vitamins-Minerals (MULTIVITAMIN ADULTS 50+) TABS        PROVIDER DOSED MANAGED WARFARIN, COUMADIN, OUTPATIENT Per coumadin clinic       simvastatin (ZOCOR) 40 MG tablet TAKE 1 TABLET(40 MG) BY MOUTH AT BEDTIME 90 tablet 2     VITAMIN D, CHOLECALCIFEROL, PO Take 1,000 Units by mouth daily       warfarin ANTICOAGULANT (COUMADIN) 5 MG tablet 7.5 mg Tues/Thurs/Sun; 10 mg all other days or as directed by the Anticoagulation Clinic 160 tablet 0     valACYclovir 1000 mg PO tablet Take 1 tablet (1,000 mg) by mouth 3 times daily for 7 days 21 tablet 0       PAST MEDICAL HISTORY:  Basal cell carcinoma in face, bicuspid aortic valve, chronic systolic heart failure, endocarditis of the prosthetic valve, implantable cardiac defibrillator in place, rotator cuff sprain and strain, paroxysmal AFib, keratoconus, thrombocytopenia.      PAST SURGICAL HISTORY:  Aortic valve replacement, anesthesia cardioversion, colonoscopy, shoulder surgery, hemorrhoid surgery, cystoscopy, AVR.      FAMILY HISTORY:  No family history of seizures.      SOCIAL HISTORY:  He is , living with his wife.  He is retired.  He drinks alcohol, several beers per day.  He had a 20-pack-year smoking history.  No history of drug use.      ALLERGIES:  Lisinopril caused coughing.      PHYSICAL EXAMINATION:  Alert and oriented x3.  Speech fluent, appropriate.  No facial weakness.      REVIEW OF SYSTEMS:  A 12-point review of systems is essentially negative.      PREVIOUS DIAGNOSTIC TESTING:  CT head on 05/10  was negative for acute intracranial pathology, positive for left caudate old lacunar infarct, periventricular hypodensities, nonspecific.      EEG for 3 hours, which was a normal study.      IMPRESSION:  New onset seizure, etiology unclear at this time.  Based on the description, this was most likely an epileptic seizure.  It is unclear if this is a focal seizure or generalized seizure at this time.  His recent CT of the head was negative for any intracranial acute pathology.  3 hours EEG showed no abnormalities.  At this time, we should continue to observe.  No antiepileptic medications are indicated at this time.      PLAN:   1.  No antiepileptic medications are indicated at this time.   2.  Return to clinic as needed.  If seizure recurs, the patient will need to be started on antiepileptic medication such as Keppra.      30 min was spent on the visit.  Over 50% of the time was spent on education, counseling about optimal seizure control and coordination of care.       LILIANE SANDHU MD        D: 2020   T: 2020   MT: ENDY    Name:     DAR CRUZ   MRN:      [id number]        Account:      [id number]   :      1945           Service Date: 2020    Document: [id number]

## 2020-05-20 NOTE — TELEPHONE ENCOUNTER
FUTURE VISIT INFORMATION      FUTURE VISIT INFORMATION:    Date: 5/20/2020    Time: 320pm    [place] [facility]  REFERRAL INFORMATION:    Referring provider:  Dr. Pickett     Referring providers clinic:  [facility] ED    Reason for visit/diagnosis  Seizures    RECORDS REQUESTED FROM:       Clinic name Comments Records Status Imaging Status   Internal ED Visit-5/10/2020    CT Head 5/10/2020 Epic PACS

## 2020-06-03 ENCOUNTER — ANTICOAGULATION THERAPY VISIT (OUTPATIENT)
Dept: INTERNAL MEDICINE | Facility: CLINIC | Age: 75
End: 2020-06-03

## 2020-06-03 DIAGNOSIS — Z79.01 LONG TERM CURRENT USE OF ANTICOAGULANT THERAPY: ICD-10-CM

## 2020-06-03 DIAGNOSIS — Z95.2 S/P AVR: ICD-10-CM

## 2020-06-03 DIAGNOSIS — Z95.2 S/P AORTIC VALVE REPLACEMENT: ICD-10-CM

## 2020-06-03 DIAGNOSIS — I48.0 PAROXYSMAL ATRIAL FIBRILLATION (H): ICD-10-CM

## 2020-06-03 DIAGNOSIS — I48.91 A-FIB (H): ICD-10-CM

## 2020-06-03 LAB
CAPILLARY BLOOD COLLECTION: NORMAL
INR PPP: 2.6 (ref 0.86–1.14)

## 2020-06-03 PROCEDURE — 85610 PROTHROMBIN TIME: CPT | Performed by: INTERNAL MEDICINE

## 2020-06-03 PROCEDURE — 36416 COLLJ CAPILLARY BLOOD SPEC: CPT | Performed by: INTERNAL MEDICINE

## 2020-06-03 NOTE — PROGRESS NOTES
Patient called back and confirmed below. Lab appt scheduled for 7/15.  Sharon Jacobson RN  Anticoagulation Nurse - [facility] [place]

## 2020-06-03 NOTE — PROGRESS NOTES
ANTICOAGULATION FOLLOW-UP CLINIC VISIT    Patient Name:  Brooks Summers  Date:  6/3/2020  Contact Type:  Telephone/ Left DVM    SUBJECTIVE:  Patient Findings     Comments:   Writer left DVM.  Patient will continue weekly maintenance dose. INR is therapeutic.   Recheck in 6 weeks.        Clinical Outcomes     Negatives:   Major bleeding event, Thromboembolic event, Anticoagulation-related hospital admission, Anticoagulation-related ED visit, Anticoagulation-related fatality    Comments:   Writer left DVM.  Patient will continue weekly maintenance dose. INR is therapeutic.   Recheck in 6 weeks.           OBJECTIVE    Recent labs: (last 7 days)     20  1233   INR 2.60*       ASSESSMENT / PLAN  INR assessment THER    Recheck INR In: 6 WEEKS    INR Location Outside lab      Anticoagulation Summary  As of 6/3/2020    INR goal:   2.0-3.0   TTR:   100.0 % (1 y)   INR used for dosin.60 (6/3/2020)   Warfarin maintenance plan:   7.5 mg (5 mg x 1.5) every Sun, Tue, Thu; 10 mg (5 mg x 2) all other days   Full warfarin instructions:   7.5 mg every Sun, Tue, Thu; 10 mg all other days   Weekly warfarin total:   62.5 mg   No change documented:   Lo Worley RN   Plan last modified:   Aniya Garcia RN (2018)   Next INR check:   7/15/2020   Priority:   Maintenance   Target end date:   Indefinite    Indications    S/P aortic valve replacement [Z95.2]  Paroxysmal atrial fibrillation (H) [I48.0]  Long term current use of anticoagulant therapy [Z79.01]             Anticoagulation Episode Summary     INR check location:       Preferred lab:       Send INR reminders to:   JONATHAN OWUSU    Comments:    * warfarin 5 mg tabs. Aortic 21 mm Johnson Perimount Magna Tissue Valve.       Anticoagulation Care Providers     Provider Role Specialty Phone number    Edin Mays MD [facility] Internal Medicine [phone]            See the Encounter Report to view Anticoagulation Flowsheet and Dosing Calendar  (Go to Encounters tab in chart review, and find the Anticoagulation Therapy Visit)        Lo Worley RN

## 2020-06-22 ENCOUNTER — OFFICE VISIT (OUTPATIENT)
Dept: INTERNAL MEDICINE | Facility: CLINIC | Age: 75
End: 2020-06-22
Payer: MEDICARE

## 2020-06-22 VITALS
SYSTOLIC BLOOD PRESSURE: 145 MMHG | DIASTOLIC BLOOD PRESSURE: 80 MMHG | WEIGHT: 174.1 LBS | HEART RATE: 71 BPM | BODY MASS INDEX: 24.98 KG/M2

## 2020-06-22 DIAGNOSIS — I48.19 PERSISTENT ATRIAL FIBRILLATION (H): Primary | ICD-10-CM

## 2020-06-22 ASSESSMENT — PAIN SCALES - GENERAL: PAINLEVEL: NO PAIN (0)

## 2020-06-22 NOTE — NURSING NOTE
Chief Complaint   Patient presents with     Hospital F/U     pt here following a hospital visit       Miriam Hatfield CMA at 12:50 PM on 6/22/2020.

## 2020-06-22 NOTE — PATIENT INSTRUCTIONS
[facility] Medication Refill Request Information:  * Please contact your pharmacy regarding ANY request for medication refills.  ** [facility] Prescription Fax = [phone]  * Please allow 3 business days for routine medication refills.  * Please allow 5 business days for controlled substance medication refills.     [facility] Test Result notification information:  *You will be notified with in 7-10 days of your appointment day regarding the results of your test.  If you are on MyChart you will be notified as soon as the provider has reviewed the results and signed off on them.    [facility]: [phone]

## 2020-06-22 NOTE — PROGRESS NOTES
HPI:    Pt. With h/o endocarditis, AVR, Pacemaker, and afib on coumadin comes in for hospital follow up. He was discharged 5/11/2020 for seizure and had a detailed negative evaluation. He has no had any sxs. Since. He was seen by Dr. Westbrook, Neurology 5/20/2020 and no medication has been started. He o/w is doing well and denies any  New/additional HEENT, cardiopulmonary, abdominal, , neurological, systemic, psychiatric, lymphatic, or endocrine complaints. He is still driving w/o problems. He continues to exercise w/o problems.    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 Tx with Dapto/RIFx 8 weeks.     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     Paroxysmal A-fib (H)     Post-op 7/14/2011, 1/26/13     Rotator Cuff (Capsule) Sprain and Strain      S/P aortic valve replacement     7/14/2011, re-do 1/25/13 due to endocarditis     Smoking hx      Thrombocytopenia (H)      Past Surgical History:   Procedure Laterality Date     ANESTHESIA CARDIOVERSION  7/18/2011    Procedure:ANESTHESIA CARDIOVERSION; Cardioversion; Surgeon:GENERIC ANESTHESIA PROVIDER; Location:[facility] OR     COLONOSCOPY       COLONOSCOPY N/A 11/19/2018    Procedure: COLONOSCOPY;  Surgeon: Herman Mcginnis MD;  Location: [facility] GI     CORONARY ANGIOGRAPHY ADULT ORDER       CYSTOSCOPY, BIOPSY URETHRA N/A 4/3/2017    Procedure: CYSTOSCOPY, BIOPSY URETHRA;  Surgeon: Marlena Mora MD;  Location: [facility] OR     ENDOSCOPY UPPER, COLONOSCOPY, COMBINED       HC REMOV & REPLACE IMPLT DEFIB PULSE GEN MULT LEAD  12/3/2015          HEMORRHOID SURGERY       IMPLANT AUTOMATIC IMPLANTABLE CARDIOVERTER DEFIBRILLATOR       MOHS MICROGRAPHIC PROCEDURE       ORTHOPEDIC SURGERY       shoulder,right     REDO STERNOTOMY REPLACE VALVE AORTIC  1/25/2013    Procedure: REDO STERNOTOMY REPLACE VALVE AORTIC;  Redo Sternotomy, Redo Aortic Valve Replacement on pump oxygenator. Intraoperative Transesophageal Echocardiogram;  Surgeon: Stephanie Hampton MD;  Location:  OR     REPAIR VALVE MITRAL  2013     REPLACE VALVE AORTIC  7/14/2011    Procedure:REPLACE VALVE AORTIC; Median Sternotomy, Bioprosthesis,  Aortic Valve replacement on pump with oxygenator. Intra-operative Transesophogeal Echocardiogram.; Surgeon:STEPHANIE HAMPTON; Location: OR     teeth extractions       TRANSPLANT       PE:    Vitals noted, gen, nad, cooperative alert, HEENT, he is wearing a mask, lungs with good air movement, RRR, S1, S2, no MRG, abdomen, no acute findings, grossly normal neurological exam.    A/P:    1. Cardiac; he is stable and remains on his same medications. He has his INR checked on coumadin for afib. He saw Dr. Mckenzie, Cardiology 1/28/2020  2. Recent hospital stay and outpt. Follow up for seizure. Negative evaluation and he is not on medication. He has not had any further sxs.   3. Immunizations; he should get the Shingrex vaccination.     Total time spent 25 minutes.  More than 50% of the time spent with Mr. Summers on counseling / coordinating his care

## 2020-07-02 DIAGNOSIS — Z95.2 S/P AVR (AORTIC VALVE REPLACEMENT): ICD-10-CM

## 2020-07-02 DIAGNOSIS — Z95.2 S/P AORTIC VALVE REPLACEMENT: ICD-10-CM

## 2020-07-02 DIAGNOSIS — Z79.01 LONG TERM CURRENT USE OF ANTICOAGULANT THERAPY: ICD-10-CM

## 2020-07-02 DIAGNOSIS — I48.0 PAROXYSMAL ATRIAL FIBRILLATION (H): ICD-10-CM

## 2020-07-02 RX ORDER — WARFARIN SODIUM 5 MG/1
TABLET ORAL
Qty: 160 TABLET | Refills: 0 | Status: SHIPPED | OUTPATIENT
Start: 2020-07-02 | End: 2020-10-01

## 2020-07-02 NOTE — TELEPHONE ENCOUNTER
Current warfarin dose:  Warfarin maintenance plan:   7.5 mg (5 mg x 1.5) every Sun, Tue, Thu; 10 mg (5 mg x 2) all other days   Full warfarin instructions:   7.5 mg every Sun, Tue, Thu; 10 mg all other days   Weekly warfarin total:   62.5 mg       Last INR result:  INR   Date Value Ref Range Status   06/03/2020 2.60 (H) 0.86 - 1.14 Final     Comment:     This test is intended for monitoring Coumadin therapy.  Results are not   accurate in patients with prolonged INR due to factor deficiency.       Last office visit: 6/22/2020     Refill authorized per [facility] protocol.    Doug DUPREE RN, CACP

## 2020-07-15 ENCOUNTER — ANTICOAGULATION THERAPY VISIT (OUTPATIENT)
Dept: ANTICOAGULATION | Facility: CLINIC | Age: 75
End: 2020-07-15

## 2020-07-15 DIAGNOSIS — I48.0 PAROXYSMAL ATRIAL FIBRILLATION (H): ICD-10-CM

## 2020-07-15 DIAGNOSIS — I48.91 A-FIB (H): ICD-10-CM

## 2020-07-15 DIAGNOSIS — Z95.2 S/P AORTIC VALVE REPLACEMENT: ICD-10-CM

## 2020-07-15 DIAGNOSIS — Z95.2 S/P AVR: ICD-10-CM

## 2020-07-15 DIAGNOSIS — Z79.01 LONG TERM CURRENT USE OF ANTICOAGULANT THERAPY: ICD-10-CM

## 2020-07-15 LAB
CAPILLARY BLOOD COLLECTION: NORMAL
INR PPP: 2.9 (ref 0.86–1.14)

## 2020-07-15 PROCEDURE — 36416 COLLJ CAPILLARY BLOOD SPEC: CPT | Performed by: INTERNAL MEDICINE

## 2020-07-15 PROCEDURE — 85610 PROTHROMBIN TIME: CPT | Performed by: INTERNAL MEDICINE

## 2020-07-15 NOTE — PROGRESS NOTES
4:19 PM: Writer received a message from [facility] that patient tried to call back and left a VM. Writer called patient back, left another voicemail for him. Writer instructed patient to take his usual 10mg dose of warfarin tonight if he could not call back prior to 5pm. If he is unable to call, he should try again tomorrow AM.    Doug DUPREE RN, CACP     Anticoagulation Management    Unable to reach Brooks.    Today's INR result of 2.90 is therapeutic (goal INR of 2.0-3.0).  Result received from: Clinic Lab    Follow up required to confirm warfarin dose taken and assess for changes    Left non-detailed voicemail for patient to call [facility] back.     Tentative plan: Continue 7.5mg (1.5 tabs) Sun,Tues,Thurs; 10mg (2 tabs) all other days. Recheck the INR again in 6 weeks, August 26th.    Of note, patient saw his PCP on 6/22 - no changes noted.    Anticoagulation clinic to follow up    Sheri Frausto RN CACP

## 2020-07-15 NOTE — PROGRESS NOTES
ANTICOAGULATION MANAGEMENT     Patient Name:  Brooks Summers  Date:  7/15/2020    ASSESSMENT /SUBJECTIVE:    Today's INR result of 2.90 is therapeutic. Goal INR of 2.0-3.0      Warfarin dose taken: Warfarin taken as previously instructed    Diet: No new diet changes affecting INR    Medication changes/ interactions: No new medications/supplements affecting INR    Previous INR: Therapeutic 2.60    S/S of bleeding or thromboembolism: No    New injury or illness: No    Upcoming surgery, procedure or cardioversion: No    Additional findings: None      PLAN:    Spoke with Brooks regarding INR result and instructed:     Warfarin Dosing Instructions: Continue your current warfarin dose 7.5mg Sun,Tues,Thurs; 10mg all other days    Instructed patient to follow up no later than: 6 weeks  Lab visit scheduled    Education provided: Importance of notifying clinic for changes in medications/health      Brooks verbalizes understanding and agrees to warfarin dosing plan.    Instructed to call the Anticoagulation Clinic for any changes, questions or concerns. (#[phone])      OBJECTIVE:  Recent labs: (last 7 days)     07/15/20  1301   INR 2.90*         INR assessment THER    Recheck INR In: 6 WEEKS    INR Location Clinic      Anticoagulation Summary  As of 7/15/2020    INR goal:   2.0-3.0   TTR:   100.0 % (1 y)   INR used for dosin.90 (7/15/2020)   Warfarin maintenance plan:   7.5 mg (5 mg x 1.5) every Sun, Tue, Thu; 10 mg (5 mg x 2) all other days   Full warfarin instructions:   7.5 mg every Sun, Tue, Thu; 10 mg all other days   Weekly warfarin total:   62.5 mg   No change documented:   Sheri Frausto RN   Plan last modified:   Aniya Garcia RN (2018)   Next INR check:   2020   Priority:   Maintenance   Target end date:   Indefinite    Indications    S/P aortic valve replacement [Z95.2]  Paroxysmal atrial fibrillation (H) [I48.0]  Long term current use of anticoagulant therapy [Z79.01]              Anticoagulation Episode Summary     INR check location:       Preferred lab:       Send INR reminders to:   JONATHAN OWUSU    Comments:    * warfarin 5 mg tabs. Aortic 21 mm Johnson Perimount Magna Tissue Valve.       Anticoagulation Care Providers     Provider Role Specialty Phone number    Edin Mays MD [facility] Internal Medicine [phone]

## 2020-08-08 DIAGNOSIS — I10 BENIGN ESSENTIAL HYPERTENSION: ICD-10-CM

## 2020-08-10 ENCOUNTER — VIRTUAL VISIT (OUTPATIENT)
Dept: NEUROLOGY | Facility: CLINIC | Age: 75
End: 2020-08-10
Payer: MEDICARE

## 2020-08-10 ENCOUNTER — NURSE TRIAGE (OUTPATIENT)
Dept: CALL CENTER | Age: 75
End: 2020-08-10

## 2020-08-10 DIAGNOSIS — G40.909 RECURRENT SEIZURES (H): Primary | ICD-10-CM

## 2020-08-10 RX ORDER — LEVETIRACETAM 500 MG/1
500 TABLET ORAL 2 TIMES DAILY
Qty: 60 TABLET | Refills: 11 | Status: SHIPPED | OUTPATIENT
Start: 2020-08-10 | End: 2021-08-13

## 2020-08-10 NOTE — PROGRESS NOTES
"Brooks Summers is a 75 year old male who is being evaluated via a billable telephone visit.      The patient has been notified of following:     \"This telephone visit will be conducted via a call between you and your physician/provider. We have found that certain health care needs can be provided without the need for a physical exam.  This service lets us provide the care you need with a short phone conversation.  If a prescription is necessary we can send it directly to your pharmacy.  If lab work is needed we can place an order for that and you can then stop by our lab to have the test done at a later time.    Telephone visits are billed at different rates depending on your insurance coverage. During this emergency period, for some insurers they may be billed the same as an in-person visit.  Please reach out to your insurance provider with any questions.    If during the course of the call the physician/provider feels a telephone visit is not appropriate, you will not be charged for this service.\"    Patient has given verbal consent for Telephone visit?  Yes    What phone number would you like to be contacted at? [phone]    How would you like to obtain your AVS? [facility]        CHIEF COMPLAINT:  Follow up for new onset seizures.      HISTORY OF PRESENT ILLNESS:  Video call for follow up. This patient is a 75-year-old male with a history of bicuspid valve, severe stenosis, status post AVR with prosthetic valve endocarditis, then mitral valve repair and pacemaker placed in 2013, on Coumadin, paroxysmal AFib, chronic thrombocytopenia, gout.  He presented with new-onset seizure in May 2020. Patient's wife is also with the video conference.  Patient was doing well until this morning, when he had another witnessed GTC, which lasted for about 2 minutes, followed by post-ictal confusion for 5 minutes. He did bite his tongue. No incontinence. He has been stressed out recently because he moved recently.    According to " the wife, the patient was at his usual state of health until about 10 days ago at 5 a.m. in the morning, she noted that the patient had a loud screeching, then his body stiffened up.  This lasted for about 15 seconds, then patient was unresponsive for a few minutes.  After he came to, he was still confused about 15 minutes.  He was sent to [facility] and was evaluated in the emergency room.  CT had showed no intracranial pathology.  EEG was done at that time which was a completely normal study.  The patient was discharged without any anti-seizure medications and was instructed to follow up with Neurology.      At that time, differential diagnosis included cardiogenic syncope, orthostatic hypotension or PRES, alcohol withdrawal, which were all thought to be less likely.  Seizure was the most likely diagnosis.      TRIGGERS FOR SEIZURES:  Not clear.      RISK FACTORS FOR SEIZURES:  He had no history of head trauma with loss of consciousness.  No history of CNS infection.  No history of febrile convulsions.      Current Outpatient Medications   Medication Sig Dispense Refill     allopurinol (ZYLOPRIM) 100 MG tablet Take 1 tablet (100 mg) by mouth every evening 90 tablet 3     losartan (COZAAR) 25 MG tablet Take 0.5 tablets (12.5 mg) by mouth daily Take (1/2) tab daily 45 tablet 3     metoprolol succinate ER (TOPROL XL) 50 MG 24 hr tablet Take 1 tablet (50 mg) by mouth daily 90 tablet 3     Multiple Vitamins-Minerals (MULTIVITAMIN ADULTS 50+) TABS        PROVIDER DOSED MANAGED WARFARIN, COUMADIN, OUTPATIENT Per coumadin clinic       simvastatin (ZOCOR) 40 MG tablet TAKE 1 TABLET(40 MG) BY MOUTH AT BEDTIME 90 tablet 2     VITAMIN D, CHOLECALCIFEROL, PO Take 1,000 Units by mouth daily       warfarin ANTICOAGULANT (COUMADIN) 5 MG tablet 7.5 mg Tues/Thurs/Sun; 10 mg all other days or as directed by the Anticoagulation Clinic 160 tablet 0        PAST MEDICAL HISTORY:  Basal cell carcinoma in face, bicuspid  aortic valve, chronic systolic heart failure, endocarditis of the prosthetic valve, implantable cardiac defibrillator in place, rotator cuff sprain and strain, paroxysmal AFib, keratoconus, thrombocytopenia.      PAST SURGICAL HISTORY:  Aortic valve replacement, anesthesia cardioversion, colonoscopy, shoulder surgery, hemorrhoid surgery, cystoscopy, AVR.      FAMILY HISTORY:  No family history of seizures.      SOCIAL HISTORY:  He is , living with his wife.  He is retired.  He drinks alcohol, several beers per day.  He had a 20-pack-year smoking history.  No history of drug use.      ALLERGIES:  Lisinopril caused coughing.      PHYSICAL EXAMINATION:     Alert and oriented x3.  Speech fluent, appropriate.  No facial weakness.      REVIEW OF SYSTEMS: 8-point review of systems is essentially negative.      PREVIOUS DIAGNOSTIC TESTING:  CT head on 05/10 was negative for acute intracranial pathology, positive for left caudate old lacunar infarct, periventricular hypodensities, nonspecific.      EEG for 3 hours, which was a normal study.      IMPRESSION:      New onset seizure, etiology unclear at this time.  He presented with new-onset seizure in May 2020. Patient's wife is also with the video conference.  Patient was doing well until this morning, when he had another witnessed GTC, which lasted for about 2 minutes, followed by post-ictal confusion for 5 minutes. He did bite his tongue. No incontinence. He has been stressed out recently because he moved recently. No apparent triggers.     PLAN:   1.  Start keppra 250 gm bid for 2 weeks, then 500 mg bid.  2.  Return to clinic in 3 months.       Phone call duration:  21 minutes        Pierce Westbrook MD

## 2020-08-10 NOTE — LETTER
8/10/2020     RE: Brooks Summers  : 1945   MRN: [id number]      Dear Colleague,    Thank you for referring your patient, Brooks Summers, to the [facility] Please see a copy of my visit note below.    Brooks Summers is a 75 year old male who is being evaluated via a billable telephone visit.      CHIEF COMPLAINT:  Follow up for new onset seizures.      HISTORY OF PRESENT ILLNESS:  Video call for follow up. This patient is a 75-year-old male with a history of bicuspid valve, severe stenosis, status post AVR with prosthetic valve endocarditis, then mitral valve repair and pacemaker placed in , on Coumadin, paroxysmal AFib, chronic thrombocytopenia, gout.  He presented with new-onset seizure in May 2020. Patient's wife is also with the video conference.  Patient was doing well until this morning, when he had another witnessed GTC, which lasted for about 2 minutes, followed by post-ictal confusion for 5 minutes. He did bite his tongue. No incontinence. He has been stressed out recently because he moved recently.    According to the wife, the patient was at his usual state of health until about 10 days ago at 5 a.m. in the morning, she noted that the patient had a loud screeching, then his body stiffened up.  This lasted for about 15 seconds, then patient was unresponsive for a few minutes.  After he came to, he was still confused about 15 minutes.  He was sent to [facility] and was evaluated in the emergency room.  CT had showed no intracranial pathology.  EEG was done at that time which was a completely normal study.  The patient was discharged without any anti-seizure medications and was instructed to follow up with Neurology.      At that time, differential diagnosis included cardiogenic syncope, orthostatic hypotension or PRES, alcohol withdrawal, which were all thought to be less likely.  Seizure was the most likely diagnosis.       TRIGGERS FOR SEIZURES:  Not clear.      RISK FACTORS FOR SEIZURES:  He had no history of head trauma with loss of consciousness.  No history of CNS infection.  No history of febrile convulsions.      Current Outpatient Medications   Medication Sig Dispense Refill     allopurinol (ZYLOPRIM) 100 MG tablet Take 1 tablet (100 mg) by mouth every evening 90 tablet 3     losartan (COZAAR) 25 MG tablet Take 0.5 tablets (12.5 mg) by mouth daily Take (1/2) tab daily 45 tablet 3     metoprolol succinate ER (TOPROL XL) 50 MG 24 hr tablet Take 1 tablet (50 mg) by mouth daily 90 tablet 3     Multiple Vitamins-Minerals (MULTIVITAMIN ADULTS 50+) TABS        PROVIDER DOSED MANAGED WARFARIN, COUMADIN, OUTPATIENT Per coumadin clinic       simvastatin (ZOCOR) 40 MG tablet TAKE 1 TABLET(40 MG) BY MOUTH AT BEDTIME 90 tablet 2     VITAMIN D, CHOLECALCIFEROL, PO Take 1,000 Units by mouth daily       warfarin ANTICOAGULANT (COUMADIN) 5 MG tablet 7.5 mg Tues/Thurs/Sun; 10 mg all other days or as directed by the Anticoagulation Clinic 160 tablet 0        PAST MEDICAL HISTORY:  Basal cell carcinoma in face, bicuspid aortic valve, chronic systolic heart failure, endocarditis of the prosthetic valve, implantable cardiac defibrillator in place, rotator cuff sprain and strain, paroxysmal AFib, keratoconus, thrombocytopenia.      PAST SURGICAL HISTORY:  Aortic valve replacement, anesthesia cardioversion, colonoscopy, shoulder surgery, hemorrhoid surgery, cystoscopy, AVR.      FAMILY HISTORY:  No family history of seizures.      SOCIAL HISTORY:  He is , living with his wife.  He is retired.  He drinks alcohol, several beers per day.  He had a 20-pack-year smoking history.  No history of drug use.      ALLERGIES:  Lisinopril caused coughing.      PHYSICAL EXAMINATION:     Alert and oriented x3.  Speech fluent, appropriate.  No facial weakness.      REVIEW OF SYSTEMS: 8-point review of systems is essentially negative.      PREVIOUS  DIAGNOSTIC TESTING:  CT head on 05/10 was negative for acute intracranial pathology, positive for left caudate old lacunar infarct, periventricular hypodensities, nonspecific.      EEG for 3 hours, which was a normal study.      IMPRESSION:      New onset seizure, etiology unclear at this time.  He presented with new-onset seizure in May 2020. Patient's wife is also with the video conference.  Patient was doing well until this morning, when he had another witnessed GTC, which lasted for about 2 minutes, followed by post-ictal confusion for 5 minutes. He did bite his tongue. No incontinence. He has been stressed out recently because he moved recently. No apparent triggers.     PLAN:   1.  Start keppra 250 gm bid for 2 weeks, then 500 mg bid.  2.  Return to clinic in 3 months.     Phone call duration:  21 minutes      Pierce Westbrook MD

## 2020-08-10 NOTE — TELEPHONE ENCOUNTER
"Call returned to the patient's wife. She reiterates that the patient is back to baseline and doing fine now. 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    We arranged for a video visit for today at 3:30 PM with Dr. Westbrook to discuss starting an antiseizure medication.   "

## 2020-08-10 NOTE — TELEPHONE ENCOUNTER
"      Answer Assessment - Initial Assessment Questions  1. ONSET: \"How long did the seizure last?\" (Minutes)       2 minutes,  At 2:30 AM  2. CONTENT: \"Describe what happened during the seizure. Did the body become stiff? Was there any jerking?\"       Stretch body out, stiff and shaking all extremity  3. CIRCUMSTANCE: \"What was the individual doing when the seizure began?\"       sleeping  4. MENTAL STATUS: \"Does he know who he is, who you are, and where he is?\"       Per wife, yes  5. PRIOR SEIZURES: \"Has the individual had a seizure (convulsion) before?\" If so, ask: \"When was the last time?\" and \"What happened last time?\"      3 months ago, work up negative -Neurology. No meds ordered  6. EPILEPSY: \"Does the individual have epilepsy?\" (note: check for medical ID josé)      no  7. MEDICATIONS: \"Does the individual take anticonvulsant medications?\" (e.g., yes/no, compliance, any recent changes)      no  8. INJURY: \"Did the individual hurt himself during the seizure?\" (e.g., head, tongue)      Bit tongue under neath tip of tongue, ok now, did bleed  9. OTHER SYMPTOMS: \"Are there any other symptoms?\" (e.g., fever, headache)      no  10. PREGNANCY: \"Is there any chance you are pregnant?\" \"When was your last menstrual period?\"        no    Protocols used: [id number]: Nurse Triage Note  SITUATION/BACKGROUND                                                      Brooks Summers is a 75 year old male who calls with   Wife Lucero calling reporting pt had Sz at 2:30 AM.  Lucero # [phone]  This is pt 2nd sz, last Sz was last May. Eval done by Neurology - neg work up. Was told to call if another sz happened.  She is wondering if any further work up is needed or if pt needs to be on any medication.  See above for initial questions:  Per Lucero: pt was in bed, she rolled him onto his side, did not hit head, no loss of bladder/bowel. No fever.  No pain.  He did to back to sleep after " the Sz.   Did bite tip of tongue, no problems. Is eating/talking with out problem/difficulty.  Up walking, conversation/ actions are appropriate.        RECOMMENDATION/PLAN                                                      RECOMMENDED DISPOSITION:  Will send high priority note onto Neurology and PCP.  Reviewed home care instruction per protocol  Will comply with recommendation: Yes    If further questions/concerns or if symptoms do not improve, worsen or new symptoms develop, call your PCP or [phone] to talk with the Resident on call, as soon as possible.    Guideline used: Seizure  Telephone Triage Protocols for Nurses, Fifth Edition, Shahnaz Ramos RN

## 2020-08-10 NOTE — PATIENT INSTRUCTIONS
Times of Days am pm        Medication Tablet Size Number of Tablets/Capsules Total Daily Dosage    Keppra 500 1 1       1000 mg                                                                                                                                   Carry this with you at all times.  CONTINUE TAKING YOUR OTHER MEDICATIONS AS PREVIOUSLY DIRECTED.      * * *Do not store medications in the bathroom.  Keep medications away from children!* * *

## 2020-08-11 ENCOUNTER — ANCILLARY PROCEDURE (OUTPATIENT)
Dept: CARDIOLOGY | Facility: CLINIC | Age: 75
End: 2020-08-11
Attending: INTERNAL MEDICINE
Payer: MEDICARE

## 2020-08-11 DIAGNOSIS — I42.9 CARDIOMYOPATHY (H): ICD-10-CM

## 2020-08-11 PROCEDURE — 93295 DEV INTERROG REMOTE 1/2/MLT: CPT | Performed by: INTERNAL MEDICINE

## 2020-08-11 PROCEDURE — 93296 REM INTERROG EVL PM/IDS: CPT | Mod: ZF

## 2020-08-12 RX ORDER — METOPROLOL SUCCINATE 50 MG/1
TABLET, EXTENDED RELEASE ORAL
Qty: 90 TABLET | Refills: 3 | OUTPATIENT
Start: 2020-08-12

## 2020-08-15 LAB
MDC_IDC_EPISODE_DTM: NORMAL
MDC_IDC_EPISODE_DURATION: 1 S
MDC_IDC_EPISODE_DURATION: 16 S
MDC_IDC_EPISODE_DURATION: 4 S
MDC_IDC_EPISODE_DURATION: 4 S
MDC_IDC_EPISODE_ID: 36
MDC_IDC_EPISODE_ID: 37
MDC_IDC_EPISODE_ID: 38
MDC_IDC_EPISODE_ID: 4
MDC_IDC_EPISODE_TYPE: NORMAL
MDC_IDC_LEAD_IMPLANT_DT: NORMAL
MDC_IDC_LEAD_LOCATION: NORMAL
MDC_IDC_LEAD_LOCATION_DETAIL_1: NORMAL
MDC_IDC_LEAD_MFG: NORMAL
MDC_IDC_LEAD_MODEL: NORMAL
MDC_IDC_LEAD_POLARITY_TYPE: NORMAL
MDC_IDC_LEAD_SERIAL: NORMAL
MDC_IDC_LEAD_SPECIAL_FUNCTION: NORMAL
MDC_IDC_MSMT_BATTERY_DTM: NORMAL
MDC_IDC_MSMT_BATTERY_REMAINING_LONGEVITY: 7 MO
MDC_IDC_MSMT_BATTERY_RRT_TRIGGER: 2.73
MDC_IDC_MSMT_BATTERY_STATUS: NORMAL
MDC_IDC_MSMT_BATTERY_VOLTAGE: 2.83 V
MDC_IDC_MSMT_CAP_CHARGE_DTM: NORMAL
MDC_IDC_MSMT_CAP_CHARGE_ENERGY: 18 J
MDC_IDC_MSMT_CAP_CHARGE_TIME: 4.68
MDC_IDC_MSMT_CAP_CHARGE_TYPE: NORMAL
MDC_IDC_MSMT_LEADCHNL_LV_IMPEDANCE_VALUE: 380 OHM
MDC_IDC_MSMT_LEADCHNL_LV_IMPEDANCE_VALUE: 380 OHM
MDC_IDC_MSMT_LEADCHNL_LV_IMPEDANCE_VALUE: 665 OHM
MDC_IDC_MSMT_LEADCHNL_LV_PACING_THRESHOLD_AMPLITUDE: 1.38 V
MDC_IDC_MSMT_LEADCHNL_LV_PACING_THRESHOLD_PULSEWIDTH: 1 MS
MDC_IDC_MSMT_LEADCHNL_RA_IMPEDANCE_VALUE: 456 OHM
MDC_IDC_MSMT_LEADCHNL_RA_PACING_THRESHOLD_AMPLITUDE: 0.62 V
MDC_IDC_MSMT_LEADCHNL_RA_PACING_THRESHOLD_PULSEWIDTH: 0.4 MS
MDC_IDC_MSMT_LEADCHNL_RA_SENSING_INTR_AMPL: 1.62 MV
MDC_IDC_MSMT_LEADCHNL_RA_SENSING_INTR_AMPL: 1.62 MV
MDC_IDC_MSMT_LEADCHNL_RV_IMPEDANCE_VALUE: 399 OHM
MDC_IDC_MSMT_LEADCHNL_RV_IMPEDANCE_VALUE: 456 OHM
MDC_IDC_MSMT_LEADCHNL_RV_PACING_THRESHOLD_AMPLITUDE: 0.5 V
MDC_IDC_MSMT_LEADCHNL_RV_PACING_THRESHOLD_PULSEWIDTH: 0.4 MS
MDC_IDC_MSMT_LEADCHNL_RV_SENSING_INTR_AMPL: 22.5 MV
MDC_IDC_MSMT_LEADCHNL_RV_SENSING_INTR_AMPL: 22.5 MV
MDC_IDC_PG_IMPLANT_DTM: NORMAL
MDC_IDC_PG_MFG: NORMAL
MDC_IDC_PG_MODEL: NORMAL
MDC_IDC_PG_SERIAL: NORMAL
MDC_IDC_PG_TYPE: NORMAL
MDC_IDC_SESS_CLINIC_NAME: NORMAL
MDC_IDC_SESS_DTM: NORMAL
MDC_IDC_SESS_TYPE: NORMAL
MDC_IDC_SET_BRADY_AT_MODE_SWITCH_RATE: 171 {BEATS}/MIN
MDC_IDC_SET_BRADY_LOWRATE: 60 {BEATS}/MIN
MDC_IDC_SET_BRADY_MAX_SENSOR_RATE: 140 {BEATS}/MIN
MDC_IDC_SET_BRADY_MAX_TRACKING_RATE: 140 {BEATS}/MIN
MDC_IDC_SET_BRADY_MODE: NORMAL
MDC_IDC_SET_BRADY_PAV_DELAY_HIGH: 100 MS
MDC_IDC_SET_BRADY_PAV_DELAY_LOW: 130 MS
MDC_IDC_SET_BRADY_SAV_DELAY_HIGH: 70 MS
MDC_IDC_SET_BRADY_SAV_DELAY_LOW: 100 MS
MDC_IDC_SET_CRT_LVRV_DELAY: 0 MS
MDC_IDC_SET_CRT_PACED_CHAMBERS: NORMAL
MDC_IDC_SET_LEADCHNL_LV_PACING_AMPLITUDE: 2.75 V
MDC_IDC_SET_LEADCHNL_LV_PACING_ANODE_ELECTRODE_1: NORMAL
MDC_IDC_SET_LEADCHNL_LV_PACING_ANODE_LOCATION_1: NORMAL
MDC_IDC_SET_LEADCHNL_LV_PACING_CAPTURE_MODE: NORMAL
MDC_IDC_SET_LEADCHNL_LV_PACING_CATHODE_ELECTRODE_1: NORMAL
MDC_IDC_SET_LEADCHNL_LV_PACING_CATHODE_LOCATION_1: NORMAL
MDC_IDC_SET_LEADCHNL_LV_PACING_POLARITY: NORMAL
MDC_IDC_SET_LEADCHNL_LV_PACING_PULSEWIDTH: 1 MS
MDC_IDC_SET_LEADCHNL_RA_PACING_AMPLITUDE: 1.5 V
MDC_IDC_SET_LEADCHNL_RA_PACING_ANODE_ELECTRODE_1: NORMAL
MDC_IDC_SET_LEADCHNL_RA_PACING_ANODE_LOCATION_1: NORMAL
MDC_IDC_SET_LEADCHNL_RA_PACING_CAPTURE_MODE: NORMAL
MDC_IDC_SET_LEADCHNL_RA_PACING_CATHODE_ELECTRODE_1: NORMAL
MDC_IDC_SET_LEADCHNL_RA_PACING_CATHODE_LOCATION_1: NORMAL
MDC_IDC_SET_LEADCHNL_RA_PACING_POLARITY: NORMAL
MDC_IDC_SET_LEADCHNL_RA_PACING_PULSEWIDTH: 0.4 MS
MDC_IDC_SET_LEADCHNL_RA_SENSING_ANODE_ELECTRODE_1: NORMAL
MDC_IDC_SET_LEADCHNL_RA_SENSING_ANODE_LOCATION_1: NORMAL
MDC_IDC_SET_LEADCHNL_RA_SENSING_CATHODE_ELECTRODE_1: NORMAL
MDC_IDC_SET_LEADCHNL_RA_SENSING_CATHODE_LOCATION_1: NORMAL
MDC_IDC_SET_LEADCHNL_RA_SENSING_POLARITY: NORMAL
MDC_IDC_SET_LEADCHNL_RA_SENSING_SENSITIVITY: 0.3 MV
MDC_IDC_SET_LEADCHNL_RV_PACING_AMPLITUDE: 2 V
MDC_IDC_SET_LEADCHNL_RV_PACING_ANODE_ELECTRODE_1: NORMAL
MDC_IDC_SET_LEADCHNL_RV_PACING_ANODE_LOCATION_1: NORMAL
MDC_IDC_SET_LEADCHNL_RV_PACING_CAPTURE_MODE: NORMAL
MDC_IDC_SET_LEADCHNL_RV_PACING_CATHODE_ELECTRODE_1: NORMAL
MDC_IDC_SET_LEADCHNL_RV_PACING_CATHODE_LOCATION_1: NORMAL
MDC_IDC_SET_LEADCHNL_RV_PACING_POLARITY: NORMAL
MDC_IDC_SET_LEADCHNL_RV_PACING_PULSEWIDTH: 0.4 MS
MDC_IDC_SET_LEADCHNL_RV_SENSING_ANODE_ELECTRODE_1: NORMAL
MDC_IDC_SET_LEADCHNL_RV_SENSING_ANODE_LOCATION_1: NORMAL
MDC_IDC_SET_LEADCHNL_RV_SENSING_CATHODE_ELECTRODE_1: NORMAL
MDC_IDC_SET_LEADCHNL_RV_SENSING_CATHODE_LOCATION_1: NORMAL
MDC_IDC_SET_LEADCHNL_RV_SENSING_POLARITY: NORMAL
MDC_IDC_SET_LEADCHNL_RV_SENSING_SENSITIVITY: 0.3 MV
MDC_IDC_SET_ZONE_DETECTION_BEATS_DENOMINATOR: 40 {BEATS}
MDC_IDC_SET_ZONE_DETECTION_BEATS_NUMERATOR: 30 {BEATS}
MDC_IDC_SET_ZONE_DETECTION_INTERVAL: 320 MS
MDC_IDC_SET_ZONE_DETECTION_INTERVAL: 350 MS
MDC_IDC_SET_ZONE_DETECTION_INTERVAL: 360 MS
MDC_IDC_SET_ZONE_DETECTION_INTERVAL: 420 MS
MDC_IDC_SET_ZONE_DETECTION_INTERVAL: NORMAL
MDC_IDC_SET_ZONE_TYPE: NORMAL
MDC_IDC_STAT_AT_BURDEN_PERCENT: 0 %
MDC_IDC_STAT_AT_DTM_END: NORMAL
MDC_IDC_STAT_AT_DTM_START: NORMAL
MDC_IDC_STAT_BRADY_AP_VP_PERCENT: 26.61 %
MDC_IDC_STAT_BRADY_AP_VS_PERCENT: 0.01 %
MDC_IDC_STAT_BRADY_AS_VP_PERCENT: 73.3 %
MDC_IDC_STAT_BRADY_AS_VS_PERCENT: 0.08 %
MDC_IDC_STAT_BRADY_DTM_END: NORMAL
MDC_IDC_STAT_BRADY_DTM_START: NORMAL
MDC_IDC_STAT_BRADY_RA_PERCENT_PACED: 26.53 %
MDC_IDC_STAT_BRADY_RV_PERCENT_PACED: 99.67 %
MDC_IDC_STAT_CRT_DTM_END: NORMAL
MDC_IDC_STAT_CRT_DTM_START: NORMAL
MDC_IDC_STAT_CRT_LV_PERCENT_PACED: 99.58 %
MDC_IDC_STAT_CRT_PERCENT_PACED: 99.58 %
MDC_IDC_STAT_EPISODE_RECENT_COUNT: 0
MDC_IDC_STAT_EPISODE_RECENT_COUNT: 1
MDC_IDC_STAT_EPISODE_RECENT_COUNT_DTM_END: NORMAL
MDC_IDC_STAT_EPISODE_RECENT_COUNT_DTM_START: NORMAL
MDC_IDC_STAT_EPISODE_TOTAL_COUNT: 0
MDC_IDC_STAT_EPISODE_TOTAL_COUNT: 4
MDC_IDC_STAT_EPISODE_TOTAL_COUNT_DTM_END: NORMAL
MDC_IDC_STAT_EPISODE_TOTAL_COUNT_DTM_START: NORMAL
MDC_IDC_STAT_EPISODE_TYPE: NORMAL
MDC_IDC_STAT_TACHYTHERAPY_ATP_DELIVERED_RECENT: 0
MDC_IDC_STAT_TACHYTHERAPY_ATP_DELIVERED_TOTAL: 0
MDC_IDC_STAT_TACHYTHERAPY_RECENT_DTM_END: NORMAL
MDC_IDC_STAT_TACHYTHERAPY_RECENT_DTM_START: NORMAL
MDC_IDC_STAT_TACHYTHERAPY_SHOCKS_ABORTED_RECENT: 0
MDC_IDC_STAT_TACHYTHERAPY_SHOCKS_ABORTED_TOTAL: 0
MDC_IDC_STAT_TACHYTHERAPY_SHOCKS_DELIVERED_RECENT: 0
MDC_IDC_STAT_TACHYTHERAPY_SHOCKS_DELIVERED_TOTAL: 0
MDC_IDC_STAT_TACHYTHERAPY_TOTAL_DTM_END: NORMAL
MDC_IDC_STAT_TACHYTHERAPY_TOTAL_DTM_START: NORMAL

## 2020-08-26 ENCOUNTER — ANTICOAGULATION THERAPY VISIT (OUTPATIENT)
Dept: ANTICOAGULATION | Facility: CLINIC | Age: 75
End: 2020-08-26

## 2020-08-26 ENCOUNTER — TELEPHONE (OUTPATIENT)
Dept: ANTICOAGULATION | Facility: CLINIC | Age: 75
End: 2020-08-26

## 2020-08-26 DIAGNOSIS — I48.0 PAROXYSMAL ATRIAL FIBRILLATION (H): ICD-10-CM

## 2020-08-26 DIAGNOSIS — Z79.01 LONG TERM CURRENT USE OF ANTICOAGULANT THERAPY: ICD-10-CM

## 2020-08-26 DIAGNOSIS — Z95.2 S/P AORTIC VALVE REPLACEMENT: ICD-10-CM

## 2020-08-26 DIAGNOSIS — I48.0 PAROXYSMAL ATRIAL FIBRILLATION (H): Primary | ICD-10-CM

## 2020-08-26 LAB
CAPILLARY BLOOD COLLECTION: NORMAL
INR PPP: 2.5 (ref 0.86–1.14)

## 2020-08-26 PROCEDURE — 36416 COLLJ CAPILLARY BLOOD SPEC: CPT | Performed by: INTERNAL MEDICINE

## 2020-08-26 PROCEDURE — 85610 PROTHROMBIN TIME: CPT | Performed by: INTERNAL MEDICINE

## 2020-08-26 PROCEDURE — 99207 ZZC NO CHARGE NURSE ONLY: CPT

## 2020-08-26 NOTE — TELEPHONE ENCOUNTER
ANTICOAGULATION MANAGEMENT      Brooks Summers due for annual renewal of referral to anticoagulation monitoring. Order pended for your review and signature.      ANTICOAGULATION SUMMARY      Warfarin indication(s)     Atrial fibrillation  S/P aortic valve replacement     Heart valve present?  NO       Current goal range   INR: 2.0-3.0     Goal appropriate for indication? Yes, INR 2-3 appropriate for hx of DVT, PE, hypercoagulable state, Afib, LVAD, or bileaflet AVR without risk factors     Current duration of therapy Indefinite/long term therapy   Time in Therapeutic Range (TTR)  (Goal > 60%) 100 %       Office visit with referring provider's group within last year yes on 3/13/2020       Becca Escamilla RN

## 2020-08-26 NOTE — PROGRESS NOTES
ANTICOAGULATION FOLLOW-UP CLINIC VISIT    Patient Name:  Brooks Summers  Date:  2020  Contact Type:  Telephone    SUBJECTIVE:  Patient Findings     Comments:   No changes in medications, activity, or diet noted. No concerns with clotting, bleeding, or increased bruising noted. Took warfarin as prescribed.  Patient is to continue maintenance warfarin plan, and check INR in 6 weeks.  Patient verbalizes understanding and agrees to plan. No further questions or concerns.        Clinical Outcomes     Negatives:   Major bleeding event, Thromboembolic event, Anticoagulation-related hospital admission, Anticoagulation-related ED visit, Anticoagulation-related fatality    Comments:   No changes in medications, activity, or diet noted. No concerns with clotting, bleeding, or increased bruising noted. Took warfarin as prescribed.  Patient is to continue maintenance warfarin plan, and check INR in 6 weeks.  Patient verbalizes understanding and agrees to plan. No further questions or concerns.           OBJECTIVE    Recent labs: (last 7 days)     20  1310   INR 2.50*       ASSESSMENT / PLAN  INR assessment THER    Recheck INR In: 6 WEEKS    INR Location Clinic      Anticoagulation Summary  As of 2020    INR goal:   2.0-3.0   TTR:   100.0 % (1 y)   INR used for dosin.50 (2020)   Warfarin maintenance plan:   7.5 mg (5 mg x 1.5) every Sun, Tue, Thu; 10 mg (5 mg x 2) all other days   Full warfarin instructions:   7.5 mg every Sun, Tue, Thu; 10 mg all other days   Weekly warfarin total:   62.5 mg   No change documented:   Becca Escamilla RN   Plan last modified:   Aniya Garcia RN (2018)   Next INR check:   10/7/2020   Priority:   Maintenance   Target end date:   Indefinite    Indications    S/P aortic valve replacement [Z95.2]  Paroxysmal atrial fibrillation (H) [I48.0]  Long term current use of anticoagulant therapy [Z79.01]             Anticoagulation Episode Summary     INR check location:        Preferred lab:       Send INR reminders to:   JONATHAN OWUSU    Comments:    * warfarin 5 mg tabs. Aortic 21 mm Johnson Perimount Magna Tissue Valve.       Anticoagulation Care Providers     Provider Role Specialty Phone number    Edin Mays MD [facility] Internal Medicine [phone]            See the Encounter Report to view Anticoagulation Flowsheet and Dosing Calendar (Go to Encounters tab in chart review, and find the Anticoagulation Therapy Visit)        Becca Escamilla RN

## 2020-09-04 ENCOUNTER — TELEPHONE (OUTPATIENT)
Dept: INTERNAL MEDICINE | Facility: CLINIC | Age: 75
End: 2020-09-04

## 2020-09-04 NOTE — TELEPHONE ENCOUNTER
[facility] Call Center    Phone Message    May a detailed message be left on voicemail: yes     Reason for Call: Symptoms or Concerns     If patient has red-flag symptoms, warm transfer to triage line    Current symptom or concern: bumped toe    Symptoms have been present for:  2 day(s)    Has patient previously been seen for this? No      Are there any new or worsening symptoms? Yes: Patients wife calling to state that patient bumped his toe and it looks infected, Patients wife is requesting a return call to discuss possible treatment, thank you.       Action Taken: Message routed to:  [facility]    Travel Screening: Not Applicable

## 2020-09-08 NOTE — TELEPHONE ENCOUNTER
I called and left VM for them to call us back and update us on how Brooks is doing.   Mary Ellen Montano, EMT at 3:29 PM on 9/8/2020.

## 2020-10-01 DIAGNOSIS — Z95.2 S/P AORTIC VALVE REPLACEMENT: ICD-10-CM

## 2020-10-01 DIAGNOSIS — Z95.2 S/P AVR (AORTIC VALVE REPLACEMENT): ICD-10-CM

## 2020-10-01 DIAGNOSIS — I48.0 PAROXYSMAL ATRIAL FIBRILLATION (H): ICD-10-CM

## 2020-10-01 DIAGNOSIS — Z79.01 LONG TERM CURRENT USE OF ANTICOAGULANT THERAPY: ICD-10-CM

## 2020-10-01 RX ORDER — WARFARIN SODIUM 5 MG/1
TABLET ORAL
Qty: 160 TABLET | Refills: 0 | Status: SHIPPED | OUTPATIENT
Start: 2020-10-01 | End: 2021-01-07

## 2020-10-01 NOTE — TELEPHONE ENCOUNTER
Current warfarin dose:  Warfarin maintenance plan:   7.5 mg (5 mg x 1.5) every Sun, Tue, Thu; 10 mg (5 mg x 2) all other days   Full warfarin instructions:   7.5 mg every Sun, Tue, Thu; 10 mg all other days   Weekly warfarin total:   62.5 mg       Last office visit: 6/22/2020    Last INR result:  INR   Date Value Ref Range Status   08/26/2020 2.50 (H) 0.86 - 1.14 Final       Refill authorized per [facility] protocol.    LUIS Medina RN BSN

## 2020-10-09 ENCOUNTER — ANTICOAGULATION THERAPY VISIT (OUTPATIENT)
Dept: ANTICOAGULATION | Facility: CLINIC | Age: 75
End: 2020-10-09

## 2020-10-09 DIAGNOSIS — Z95.2 S/P AORTIC VALVE REPLACEMENT: ICD-10-CM

## 2020-10-09 DIAGNOSIS — I48.0 PAROXYSMAL ATRIAL FIBRILLATION (H): ICD-10-CM

## 2020-10-09 DIAGNOSIS — Z79.01 LONG TERM CURRENT USE OF ANTICOAGULANT THERAPY: ICD-10-CM

## 2020-10-09 LAB
CAPILLARY BLOOD COLLECTION: NORMAL
INR PPP: 2.9 (ref 0.86–1.14)

## 2020-10-09 PROCEDURE — 36416 COLLJ CAPILLARY BLOOD SPEC: CPT | Performed by: INTERNAL MEDICINE

## 2020-10-09 PROCEDURE — 85610 PROTHROMBIN TIME: CPT | Performed by: INTERNAL MEDICINE

## 2020-10-09 NOTE — PROGRESS NOTES
Anticoagulation Management    Unable to reach Brooks today.    Today's INR result of 2.90 is therapeutic (goal INR of 2.0-3.0).  Result received from: Clinic Lab    Follow up required to confirm warfarin dose taken and assess for changes    Left message to continue current dose of warfarin 10 mg tonight.      Anticoagulation clinic to follow up    Lo Worley RN

## 2020-10-12 NOTE — PROGRESS NOTES
ANTICOAGULATION MANAGEMENT     Patient Name:  Brooks Summers  Date:  10/12/2020    ASSESSMENT /SUBJECTIVE:    Friday's INR result of 2.90 is therapeutic. Goal INR of 2.0-3.0      Warfarin dose taken: Warfarin taken as instructed    Diet: No new diet changes affecting INR    Medication changes/ interactions: No new medications/supplements affecting INR    Previous INR: Therapeutic 2.50    S/S of bleeding or thromboembolism: No    New injury or illness: No    Upcoming surgery, procedure or cardioversion: No    Additional findings: None      PLAN:    Telephone call with Brooks regarding INR result and instructed:     Warfarin Dosing Instructions: Continue your current warfarin dose 7.5mg Sun,Tues,Thurs; 10mg all other days    Instructed patient to follow up no later than: 6 weeks  Lab visit scheduled    Education provided: Contact the anticoagulation clinic with any changes, questions or concerns at #[phone]       Brooks verbalizes understanding and agrees to warfarin dosing plan.    Instructed to call the Anticoagulation Clinic for any changes, questions or concerns. (#[phone])        Sheri Frausto RN CACP       OBJECTIVE:  Recent labs: (last 7 days)     10/09/20  1602   INR 2.90*         INR assessment THER    Recheck INR In: 6 WEEKS    INR Location Clinic      Anticoagulation Summary  As of 10/9/2020    INR goal:  2.0-3.0   TTR:  100.0 % (1 y)   INR used for dosin.90 (10/9/2020)   Warfarin maintenance plan:  7.5 mg (5 mg x 1.5) every Sun, Tue, Thu; 10 mg (5 mg x 2) all other days   Full warfarin instructions:  7.5 mg every Sun, Tue, Thu; 10 mg all other days   Weekly warfarin total:  62.5 mg   No change documented:  Lo Worley RN   Plan last modified:  Aniya Garcia RN (2018)   Next INR check:  2020   Priority:  Maintenance   Target end date:  Indefinite    Indications    S/P aortic valve replacement [Z95.2]  Paroxysmal atrial fibrillation (H) [I48.0]  Long term current use  of anticoagulant therapy [Z79.01]             Anticoagulation Episode Summary     INR check location:      Preferred lab:      Send INR reminders to:  JONATHAN OWUSU    Comments:   * warfarin 5 mg tabs. Aortic 21 mm Johnson Perimount Magna Tissue Valve.       Anticoagulation Care Providers     Provider Role Specialty Phone number    Edin Mays MD Referring Internal Medicine [phone]

## 2020-11-17 ENCOUNTER — ANCILLARY PROCEDURE (OUTPATIENT)
Dept: CARDIOLOGY | Facility: CLINIC | Age: 75
End: 2020-11-17
Attending: INTERNAL MEDICINE
Payer: MEDICARE

## 2020-11-17 DIAGNOSIS — I42.9 CARDIOMYOPATHY (H): ICD-10-CM

## 2020-11-17 PROCEDURE — 93296 REM INTERROG EVL PM/IDS: CPT

## 2020-11-17 PROCEDURE — 93295 DEV INTERROG REMOTE 1/2/MLT: CPT | Performed by: INTERNAL MEDICINE

## 2020-11-20 ENCOUNTER — ANTICOAGULATION THERAPY VISIT (OUTPATIENT)
Dept: ANTICOAGULATION | Facility: CLINIC | Age: 75
End: 2020-11-20

## 2020-11-20 DIAGNOSIS — Z79.01 LONG TERM CURRENT USE OF ANTICOAGULANT THERAPY: ICD-10-CM

## 2020-11-20 DIAGNOSIS — I48.0 PAROXYSMAL ATRIAL FIBRILLATION (H): ICD-10-CM

## 2020-11-20 DIAGNOSIS — Z95.2 S/P AORTIC VALVE REPLACEMENT: ICD-10-CM

## 2020-11-20 LAB
CAPILLARY BLOOD COLLECTION: NORMAL
INR PPP: 2.9 (ref 0.86–1.14)

## 2020-11-20 PROCEDURE — 85610 PROTHROMBIN TIME: CPT | Performed by: INTERNAL MEDICINE

## 2020-11-20 PROCEDURE — 99207 PR NO CHARGE NURSE ONLY: CPT | Performed by: INTERNAL MEDICINE

## 2020-11-20 NOTE — PROGRESS NOTES
ANTICOAGULATION FOLLOW-UP CLINIC VISIT    Patient Name:  Brooks Summers  Date:  2020  Contact Type:  Telephone/ DVM left    SUBJECTIVE:  Patient Findings     Comments:  Unable to reach patient by phone. DVM left per signed consent. Patient to call [facility] if any changes or concerns. He should call local clinic to schedule next INR.        Clinical Outcomes     Comments:  Unable to reach patient by phone. DVM left per signed consent. Patient to call [facility] if any changes or concerns. He should call local clinic to schedule next INR.           OBJECTIVE    Recent labs: (last 7 days)     20  1302   INR 2.90*       ASSESSMENT / PLAN  INR assessment THER    Recheck INR In: 6 WEEKS    INR Location Clinic      Anticoagulation Summary  As of 2020    INR goal:  2.0-3.0   TTR:  100.0 % (1 y)   INR used for dosin.90 (2020)   Warfarin maintenance plan:  7.5 mg (5 mg x 1.5) every Sun, Tue, Thu; 10 mg (5 mg x 2) all other days   Full warfarin instructions:  7.5 mg every Sun, Tue, Thu; 10 mg all other days   Weekly warfarin total:  62.5 mg   No change documented:  Aniya Garcia RN   Plan last modified:  Aniya Garcia RN (2018)   Next INR check:  2020   Priority:  Maintenance   Target end date:  Indefinite    Indications    S/P aortic valve replacement [Z95.2]  Paroxysmal atrial fibrillation (H) [I48.0]  Long term current use of anticoagulant therapy [Z79.01]             Anticoagulation Episode Summary     INR check location:      Preferred lab:      Send INR reminders to:  JONATHAN OWUSU    Comments:   * warfarin 5 mg tabs. Aortic 21 mm Johnson Perimount Magna Tissue Valve.       Anticoagulation Care Providers     Provider Role Specialty Phone number    Edin Mays MD Referring Internal Medicine [phone]            See the Encounter Report to view Anticoagulation Flowsheet and Dosing Calendar (Go to Encounters tab in chart review, and find the Anticoagulation Therapy  Visit)        Aniya Garcia RN

## 2020-11-23 ENCOUNTER — VIRTUAL VISIT (OUTPATIENT)
Dept: NEUROLOGY | Facility: CLINIC | Age: 75
End: 2020-11-23
Payer: MEDICARE

## 2020-11-23 DIAGNOSIS — R41.3 MEMORY LOSS: Primary | ICD-10-CM

## 2020-11-23 NOTE — LETTER
"2020       RE: Brooks Summers  : 1945   MRN: [id number]      Dear Colleague,    Thank you for referring your patient, Brooks Summers, to the [facility]PILE[facility] at [facility]. Please see a copy of my visit note below.    Brooks Summers is a 75 year old male who is being evaluated via a billable video visit.      The patient has been notified of following:     \"This video visit will be conducted via a call between you and your physician/provider. We have found that certain health care needs can be provided without the need for an in-person physical exam.  This service lets us provide the care you need with a video conversation.  If a prescription is necessary we can send it directly to your pharmacy.  If lab work is needed we can place an order for that and you can then stop by our lab to have the test done at a later time.    Video visits are billed at different rates depending on your insurance coverage.  Please reach out to your insurance provider with any questions.    If during the course of the call the physician/provider feels a video visit is not appropriate, you will not be charged for this service.\"    Patient has given verbal consent for Video visit? Yes  How would you like to obtain your AVS? Mail a copy  If you are dropped from the video visit, the video invite should be resent to: Text to cell phone: [phone]  Will anyone else be joining your video visit? Yes: wife. How would they like to receive their invitation? Text to cell phone: [phone]        Brooks Summers is a 75 year old male who is being evaluated via a billable video visit.      The patient has been notified of following:     \"This video visit will be conducted via a call between you and your physician/provider. We have found that certain health care needs can be provided without the need for a physical exam.  This service lets us provide the care you need with a short phone " "conversation.  If a prescription is necessary we can send it directly to your pharmacy.  If lab work is needed we can place an order for that and you can then stop by our lab to have the test done at a later time.    Video visits are billed at different rates depending on your insurance coverage. During this emergency period, for some insurers they may be billed the same as an in-person visit.  Please reach out to your insurance provider with any questions.    If during the course of the call the physician/provider feels a telephone visit is not appropriate, you will not be charged for this service.\"    Patient has given verbal consent for Telephone visit?  Yes    What phone number would you like to be contacted at? [phone]    How would you like to obtain your AVS? Monalisa        CHIEF COMPLAINT:  Follow up for new onset seizures.      HISTORY OF PRESENT ILLNESS:  Video call for follow up. This patient is a 75-year-old male with a history of bicuspid valve, severe stenosis, status post AVR with prosthetic valve endocarditis, then mitral valve repair and pacemaker placed in 2013, on Coumadin, paroxysmal AFib, chronic thrombocytopenia, gout.  He presented with new-onset seizure in May 2020 and another seizure on 8/10/2020. The seizure was GTC with tongue biting then post ictal confusion. No incontinence. The seizures likely provoked by recent stress.       Interval history:  Since his last visit on 8/10/2020 he has not had any seizure. He started Keppra 250 mg bid. He was supposed to increase the Keppra to 500 mg bid but he did not do so. Now he is on 250 mg bid. He denies any side effects of Keppra. He states he has what is seems to be restless leg syndrome for long time. He asked if the Keppra increased it. His wife was with him during this video visit. She states that his short memory is not as good as before. He is asking questions repeatedly and forget what they talks about. His mother had dementia. She " passed away at her 90s.      Current Outpatient Medications   Medication Sig Dispense Refill     allopurinol (ZYLOPRIM) 100 MG tablet Take 1 tablet (100 mg) by mouth every evening 90 tablet 3     levETIRAcetam (KEPPRA) 500 MG tablet Take 1 tablet (500 mg) by mouth 2 times daily Start with 250 mg twice daily for 2 weeks, then 500 mg twice daily. 60 tablet 11     losartan (COZAAR) 25 MG tablet Take 0.5 tablets (12.5 mg) by mouth daily Take (1/2) tab daily 45 tablet 3     metoprolol succinate ER (TOPROL XL) 50 MG 24 hr tablet Take 1 tablet (50 mg) by mouth daily 90 tablet 3     Multiple Vitamins-Minerals (MULTIVITAMIN ADULTS 50+) TABS        PROVIDER DOSED MANAGED WARFARIN, COUMADIN, OUTPATIENT Per coumadin clinic       simvastatin (ZOCOR) 40 MG tablet TAKE 1 TABLET(40 MG) BY MOUTH AT BEDTIME 90 tablet 2     VITAMIN D, CHOLECALCIFEROL, PO Take 1,000 Units by mouth daily       warfarin ANTICOAGULANT (COUMADIN) 5 MG tablet 7.5 mg Tues/Thurs/Sun; 10 mg all other days or as directed by the Anticoagulation Clinic 160 tablet 0         PHYSICAL EXAMINATION: Is limited because of the nature of the video visits    Alert and oriented x3.  Speech fluent, appropriate.  EOMI, No facial weakness. No arm drift     REVIEW OF SYSTEMS: 8-point review of systems is essentially negative except what is mentioned in the HPI.      IMPRESSION:      New onset seizure, etiology unclear at this time.  He presented with new-onset seizure in May 2020 and another seizure in August 2020. He started on Keppra 250 mg bid. He did not increase the Keppra as scheduled because they were confused about the dosing and the time of increase. His wife is concerned about his memory and that he is more forgetful. They are agreeable to more formal testing of the memory. He is doing well with no more seizures or side effect from the Keppra.      PLAN:   1.  increase Keppra to 500 mg bid.  2. Neuropsychological test for assessment of the memory  3.  Return to  clinic in 3 months.     Korey Goldstein MD  PGY 3 neurology      Neurology Attending Note:    I was on the video with the patient and with the resident.  I have reviewed the labs and imaging results available.  I agree with the assessment and plan.    Pierce Westbrook MD

## 2020-11-23 NOTE — PROGRESS NOTES
"Brooks Summers is a 75 year old male who is being evaluated via a billable video visit.      The patient has been notified of following:     \"This video visit will be conducted via a call between you and your physician/provider. We have found that certain health care needs can be provided without the need for an in-person physical exam.  This service lets us provide the care you need with a video conversation.  If a prescription is necessary we can send it directly to your pharmacy.  If lab work is needed we can place an order for that and you can then stop by our lab to have the test done at a later time.    Video visits are billed at different rates depending on your insurance coverage.  Please reach out to your insurance provider with any questions.    If during the course of the call the physician/provider feels a video visit is not appropriate, you will not be charged for this service.\"    Patient has given verbal consent for Video visit? Yes  How would you like to obtain your AVS? Mail a copy  If you are dropped from the video visit, the video invite should be resent to: Text to cell phone: [phone]  Will anyone else be joining your video visit? Yes: wife. How would they like to receive their invitation? Text to cell phone: [phone]      "

## 2020-11-24 LAB
MDC_IDC_EPISODE_DTM: NORMAL
MDC_IDC_EPISODE_DURATION: 12 S
MDC_IDC_EPISODE_DURATION: 13 S
MDC_IDC_EPISODE_DURATION: 13 S
MDC_IDC_EPISODE_DURATION: 27 S
MDC_IDC_EPISODE_DURATION: 29 S
MDC_IDC_EPISODE_DURATION: 38 S
MDC_IDC_EPISODE_ID: 39
MDC_IDC_EPISODE_ID: 40
MDC_IDC_EPISODE_ID: 41
MDC_IDC_EPISODE_ID: 42
MDC_IDC_EPISODE_ID: 43
MDC_IDC_EPISODE_ID: 44
MDC_IDC_EPISODE_TYPE: NORMAL
MDC_IDC_LEAD_IMPLANT_DT: NORMAL
MDC_IDC_LEAD_LOCATION: NORMAL
MDC_IDC_LEAD_LOCATION_DETAIL_1: NORMAL
MDC_IDC_LEAD_MFG: NORMAL
MDC_IDC_LEAD_MODEL: NORMAL
MDC_IDC_LEAD_POLARITY_TYPE: NORMAL
MDC_IDC_LEAD_SERIAL: NORMAL
MDC_IDC_LEAD_SPECIAL_FUNCTION: NORMAL
MDC_IDC_MSMT_BATTERY_DTM: NORMAL
MDC_IDC_MSMT_BATTERY_REMAINING_LONGEVITY: 4 MO
MDC_IDC_MSMT_BATTERY_RRT_TRIGGER: 2.73
MDC_IDC_MSMT_BATTERY_STATUS: NORMAL
MDC_IDC_MSMT_BATTERY_VOLTAGE: 2.79 V
MDC_IDC_MSMT_CAP_CHARGE_DTM: NORMAL
MDC_IDC_MSMT_CAP_CHARGE_ENERGY: 18 J
MDC_IDC_MSMT_CAP_CHARGE_TIME: 4.97
MDC_IDC_MSMT_CAP_CHARGE_TYPE: NORMAL
MDC_IDC_MSMT_LEADCHNL_LV_IMPEDANCE_VALUE: 380 OHM
MDC_IDC_MSMT_LEADCHNL_LV_IMPEDANCE_VALUE: 437 OHM
MDC_IDC_MSMT_LEADCHNL_LV_IMPEDANCE_VALUE: 703 OHM
MDC_IDC_MSMT_LEADCHNL_LV_PACING_THRESHOLD_AMPLITUDE: 1.62 V
MDC_IDC_MSMT_LEADCHNL_LV_PACING_THRESHOLD_PULSEWIDTH: 1 MS
MDC_IDC_MSMT_LEADCHNL_RA_IMPEDANCE_VALUE: 456 OHM
MDC_IDC_MSMT_LEADCHNL_RA_PACING_THRESHOLD_AMPLITUDE: 0.75 V
MDC_IDC_MSMT_LEADCHNL_RA_PACING_THRESHOLD_PULSEWIDTH: 0.4 MS
MDC_IDC_MSMT_LEADCHNL_RA_SENSING_INTR_AMPL: 1.75 MV
MDC_IDC_MSMT_LEADCHNL_RA_SENSING_INTR_AMPL: 1.75 MV
MDC_IDC_MSMT_LEADCHNL_RV_IMPEDANCE_VALUE: 437 OHM
MDC_IDC_MSMT_LEADCHNL_RV_IMPEDANCE_VALUE: 494 OHM
MDC_IDC_MSMT_LEADCHNL_RV_PACING_THRESHOLD_AMPLITUDE: 0.5 V
MDC_IDC_MSMT_LEADCHNL_RV_PACING_THRESHOLD_PULSEWIDTH: 0.4 MS
MDC_IDC_MSMT_LEADCHNL_RV_SENSING_INTR_AMPL: 21 MV
MDC_IDC_MSMT_LEADCHNL_RV_SENSING_INTR_AMPL: 21 MV
MDC_IDC_PG_IMPLANT_DTM: NORMAL
MDC_IDC_PG_MFG: NORMAL
MDC_IDC_PG_MODEL: NORMAL
MDC_IDC_PG_SERIAL: NORMAL
MDC_IDC_PG_TYPE: NORMAL
MDC_IDC_SESS_CLINIC_NAME: NORMAL
MDC_IDC_SESS_DTM: NORMAL
MDC_IDC_SESS_TYPE: NORMAL
MDC_IDC_SET_BRADY_AT_MODE_SWITCH_RATE: 171 {BEATS}/MIN
MDC_IDC_SET_BRADY_LOWRATE: 60 {BEATS}/MIN
MDC_IDC_SET_BRADY_MAX_SENSOR_RATE: 140 {BEATS}/MIN
MDC_IDC_SET_BRADY_MAX_TRACKING_RATE: 140 {BEATS}/MIN
MDC_IDC_SET_BRADY_MODE: NORMAL
MDC_IDC_SET_BRADY_PAV_DELAY_HIGH: 100 MS
MDC_IDC_SET_BRADY_PAV_DELAY_LOW: 130 MS
MDC_IDC_SET_BRADY_SAV_DELAY_HIGH: 70 MS
MDC_IDC_SET_BRADY_SAV_DELAY_LOW: 100 MS
MDC_IDC_SET_CRT_LVRV_DELAY: 0 MS
MDC_IDC_SET_CRT_PACED_CHAMBERS: NORMAL
MDC_IDC_SET_LEADCHNL_LV_PACING_AMPLITUDE: 3 V
MDC_IDC_SET_LEADCHNL_LV_PACING_ANODE_ELECTRODE_1: NORMAL
MDC_IDC_SET_LEADCHNL_LV_PACING_ANODE_LOCATION_1: NORMAL
MDC_IDC_SET_LEADCHNL_LV_PACING_CAPTURE_MODE: NORMAL
MDC_IDC_SET_LEADCHNL_LV_PACING_CATHODE_ELECTRODE_1: NORMAL
MDC_IDC_SET_LEADCHNL_LV_PACING_CATHODE_LOCATION_1: NORMAL
MDC_IDC_SET_LEADCHNL_LV_PACING_POLARITY: NORMAL
MDC_IDC_SET_LEADCHNL_LV_PACING_PULSEWIDTH: 1 MS
MDC_IDC_SET_LEADCHNL_RA_PACING_AMPLITUDE: 1.5 V
MDC_IDC_SET_LEADCHNL_RA_PACING_ANODE_ELECTRODE_1: NORMAL
MDC_IDC_SET_LEADCHNL_RA_PACING_ANODE_LOCATION_1: NORMAL
MDC_IDC_SET_LEADCHNL_RA_PACING_CAPTURE_MODE: NORMAL
MDC_IDC_SET_LEADCHNL_RA_PACING_CATHODE_ELECTRODE_1: NORMAL
MDC_IDC_SET_LEADCHNL_RA_PACING_CATHODE_LOCATION_1: NORMAL
MDC_IDC_SET_LEADCHNL_RA_PACING_POLARITY: NORMAL
MDC_IDC_SET_LEADCHNL_RA_PACING_PULSEWIDTH: 0.4 MS
MDC_IDC_SET_LEADCHNL_RA_SENSING_ANODE_ELECTRODE_1: NORMAL
MDC_IDC_SET_LEADCHNL_RA_SENSING_ANODE_LOCATION_1: NORMAL
MDC_IDC_SET_LEADCHNL_RA_SENSING_CATHODE_ELECTRODE_1: NORMAL
MDC_IDC_SET_LEADCHNL_RA_SENSING_CATHODE_LOCATION_1: NORMAL
MDC_IDC_SET_LEADCHNL_RA_SENSING_POLARITY: NORMAL
MDC_IDC_SET_LEADCHNL_RA_SENSING_SENSITIVITY: 0.3 MV
MDC_IDC_SET_LEADCHNL_RV_PACING_AMPLITUDE: 2 V
MDC_IDC_SET_LEADCHNL_RV_PACING_ANODE_ELECTRODE_1: NORMAL
MDC_IDC_SET_LEADCHNL_RV_PACING_ANODE_LOCATION_1: NORMAL
MDC_IDC_SET_LEADCHNL_RV_PACING_CAPTURE_MODE: NORMAL
MDC_IDC_SET_LEADCHNL_RV_PACING_CATHODE_ELECTRODE_1: NORMAL
MDC_IDC_SET_LEADCHNL_RV_PACING_CATHODE_LOCATION_1: NORMAL
MDC_IDC_SET_LEADCHNL_RV_PACING_POLARITY: NORMAL
MDC_IDC_SET_LEADCHNL_RV_PACING_PULSEWIDTH: 0.4 MS
MDC_IDC_SET_LEADCHNL_RV_SENSING_ANODE_ELECTRODE_1: NORMAL
MDC_IDC_SET_LEADCHNL_RV_SENSING_ANODE_LOCATION_1: NORMAL
MDC_IDC_SET_LEADCHNL_RV_SENSING_CATHODE_ELECTRODE_1: NORMAL
MDC_IDC_SET_LEADCHNL_RV_SENSING_CATHODE_LOCATION_1: NORMAL
MDC_IDC_SET_LEADCHNL_RV_SENSING_POLARITY: NORMAL
MDC_IDC_SET_LEADCHNL_RV_SENSING_SENSITIVITY: 0.3 MV
MDC_IDC_SET_ZONE_DETECTION_BEATS_DENOMINATOR: 40 {BEATS}
MDC_IDC_SET_ZONE_DETECTION_BEATS_NUMERATOR: 30 {BEATS}
MDC_IDC_SET_ZONE_DETECTION_INTERVAL: 320 MS
MDC_IDC_SET_ZONE_DETECTION_INTERVAL: 350 MS
MDC_IDC_SET_ZONE_DETECTION_INTERVAL: 360 MS
MDC_IDC_SET_ZONE_DETECTION_INTERVAL: 420 MS
MDC_IDC_SET_ZONE_DETECTION_INTERVAL: NORMAL
MDC_IDC_SET_ZONE_TYPE: NORMAL
MDC_IDC_STAT_AT_BURDEN_PERCENT: 0 %
MDC_IDC_STAT_AT_DTM_END: NORMAL
MDC_IDC_STAT_AT_DTM_START: NORMAL
MDC_IDC_STAT_BRADY_AP_VP_PERCENT: 29.54 %
MDC_IDC_STAT_BRADY_AP_VS_PERCENT: 0.01 %
MDC_IDC_STAT_BRADY_AS_VP_PERCENT: 70.44 %
MDC_IDC_STAT_BRADY_AS_VS_PERCENT: 0.02 %
MDC_IDC_STAT_BRADY_DTM_END: NORMAL
MDC_IDC_STAT_BRADY_DTM_START: NORMAL
MDC_IDC_STAT_BRADY_RA_PERCENT_PACED: 29.46 %
MDC_IDC_STAT_BRADY_RV_PERCENT_PACED: 99.78 %
MDC_IDC_STAT_CRT_DTM_END: NORMAL
MDC_IDC_STAT_CRT_DTM_START: NORMAL
MDC_IDC_STAT_CRT_LV_PERCENT_PACED: 99.7 %
MDC_IDC_STAT_CRT_PERCENT_PACED: 99.7 %
MDC_IDC_STAT_EPISODE_RECENT_COUNT: 0
MDC_IDC_STAT_EPISODE_RECENT_COUNT_DTM_END: NORMAL
MDC_IDC_STAT_EPISODE_RECENT_COUNT_DTM_START: NORMAL
MDC_IDC_STAT_EPISODE_TOTAL_COUNT: 0
MDC_IDC_STAT_EPISODE_TOTAL_COUNT: 4
MDC_IDC_STAT_EPISODE_TOTAL_COUNT_DTM_END: NORMAL
MDC_IDC_STAT_EPISODE_TOTAL_COUNT_DTM_START: NORMAL
MDC_IDC_STAT_EPISODE_TYPE: NORMAL
MDC_IDC_STAT_TACHYTHERAPY_ATP_DELIVERED_RECENT: 0
MDC_IDC_STAT_TACHYTHERAPY_ATP_DELIVERED_TOTAL: 0
MDC_IDC_STAT_TACHYTHERAPY_RECENT_DTM_END: NORMAL
MDC_IDC_STAT_TACHYTHERAPY_RECENT_DTM_START: NORMAL
MDC_IDC_STAT_TACHYTHERAPY_SHOCKS_ABORTED_RECENT: 0
MDC_IDC_STAT_TACHYTHERAPY_SHOCKS_ABORTED_TOTAL: 0
MDC_IDC_STAT_TACHYTHERAPY_SHOCKS_DELIVERED_RECENT: 0
MDC_IDC_STAT_TACHYTHERAPY_SHOCKS_DELIVERED_TOTAL: 0
MDC_IDC_STAT_TACHYTHERAPY_TOTAL_DTM_END: NORMAL
MDC_IDC_STAT_TACHYTHERAPY_TOTAL_DTM_START: NORMAL

## 2020-12-31 ENCOUNTER — ANTICOAGULATION THERAPY VISIT (OUTPATIENT)
Dept: ANTICOAGULATION | Facility: CLINIC | Age: 75
End: 2020-12-31

## 2020-12-31 DIAGNOSIS — I48.0 PAROXYSMAL ATRIAL FIBRILLATION (H): ICD-10-CM

## 2020-12-31 DIAGNOSIS — Z79.01 LONG TERM CURRENT USE OF ANTICOAGULANT THERAPY: ICD-10-CM

## 2020-12-31 DIAGNOSIS — Z95.2 S/P AORTIC VALVE REPLACEMENT: ICD-10-CM

## 2020-12-31 LAB
CAPILLARY BLOOD COLLECTION: NORMAL
INR PPP: 2.6 (ref 0.86–1.14)

## 2020-12-31 PROCEDURE — 36416 COLLJ CAPILLARY BLOOD SPEC: CPT | Performed by: INTERNAL MEDICINE

## 2020-12-31 PROCEDURE — 85610 PROTHROMBIN TIME: CPT | Performed by: INTERNAL MEDICINE

## 2020-12-31 NOTE — PROGRESS NOTES
ANTICOAGULATION MANAGEMENT     Patient Name:  Brooks Summers  Date:  2020    ASSESSMENT /SUBJECTIVE:    Today's INR result of 2.60 is therapeutic. Goal INR of 2.0-3.0      Warfarin dose taken: Warfarin taken as instructed    Diet: No new diet changes affecting INR    Medication changes/ interactions: No new medications/supplements affecting INR    Previous INR: Therapeutic     S/S of bleeding or thromboembolism: No    New injury or illness: No    Upcoming surgery, procedure or cardioversion: No    Additional findings: None      PLAN:    Telephone call with Brooks regarding INR result and instructed:     Warfarin Dosing Instructions: Continue your current warfarin dose 7.5 mg every Sun, Tue, Thu; 10 mg all other days    Instructed patient to follow up no later than: 6 weeks  Lab visit scheduled    Education provided: Target INR goal and significance of current INR result      Brooks verbalizes understanding and agrees to warfarin dosing plan.    Instructed to call the Anticoagulation Clinic for any changes, questions or concerns. (#[phone])        Gina Medina RN      OBJECTIVE:  Recent labs: (last 7 days)     20  1251   INR 2.60*         No question data found.  Anticoagulation Summary  As of 2020    INR goal:  2.0-3.0   TTR:  100.0 % (1 y)   INR used for dosin.60 (2020)   Warfarin maintenance plan:  7.5 mg (5 mg x 1.5) every Sun, Tue, Thu; 10 mg (5 mg x 2) all other days   Full warfarin instructions:  7.5 mg every Sun, Tue, Thu; 10 mg all other days   Weekly warfarin total:  62.5 mg   No change documented:  Gina Medina RN   Plan last modified:  Aniya Garcia RN (2018)   Next INR check:  2021   Priority:  Maintenance   Target end date:  Indefinite    Indications    S/P aortic valve replacement [Z95.2]  Paroxysmal atrial fibrillation (H) [I48.0]  Long term current use of anticoagulant therapy [Z79.01]             Anticoagulation Episode Summary     INR check  location:      Preferred lab:      Send INR reminders to:  JONATHAN OWUSU    Comments:   * warfarin 5 mg tabs. Aortic 21 mm Johnson Perimount Magna Tissue Valve.       Anticoagulation Care Providers     Provider Role Specialty Phone number    Edin Mays MD Referring Internal Medicine [phone]

## 2021-01-07 DIAGNOSIS — I48.0 PAROXYSMAL ATRIAL FIBRILLATION (H): ICD-10-CM

## 2021-01-07 DIAGNOSIS — Z95.2 S/P AVR (AORTIC VALVE REPLACEMENT): ICD-10-CM

## 2021-01-07 DIAGNOSIS — Z95.2 S/P AORTIC VALVE REPLACEMENT: ICD-10-CM

## 2021-01-07 DIAGNOSIS — Z79.01 LONG TERM CURRENT USE OF ANTICOAGULANT THERAPY: ICD-10-CM

## 2021-01-07 RX ORDER — WARFARIN SODIUM 5 MG/1
TABLET ORAL
Qty: 160 TABLET | Refills: 0 | Status: SHIPPED | OUTPATIENT
Start: 2021-01-07 | End: 2021-03-08

## 2021-01-18 ENCOUNTER — ANCILLARY PROCEDURE (OUTPATIENT)
Dept: CARDIOLOGY | Facility: CLINIC | Age: 76
End: 2021-01-18
Attending: INTERNAL MEDICINE
Payer: MEDICARE

## 2021-01-18 DIAGNOSIS — I42.9 CARDIOMYOPATHY (H): ICD-10-CM

## 2021-01-18 DIAGNOSIS — I42.9 CARDIOMYOPATHY (H): Primary | ICD-10-CM

## 2021-01-18 DIAGNOSIS — Z95.810 AUTOMATIC IMPLANTABLE CARDIOVERTER-DEFIBRILLATOR IN SITU: ICD-10-CM

## 2021-01-18 DIAGNOSIS — Z95.810 AUTOMATIC IMPLANTABLE CARDIOVERTER-DEFIBRILLATOR IN SITU: Primary | ICD-10-CM

## 2021-01-18 LAB
MDC_IDC_EPISODE_DTM: NORMAL
MDC_IDC_EPISODE_DURATION: 10 S
MDC_IDC_EPISODE_DURATION: 10 S
MDC_IDC_EPISODE_DURATION: 13 S
MDC_IDC_EPISODE_DURATION: 35 S
MDC_IDC_EPISODE_DURATION: 5 S
MDC_IDC_EPISODE_DURATION: 74 S
MDC_IDC_EPISODE_DURATION: 8 S
MDC_IDC_EPISODE_DURATION: 8 S
MDC_IDC_EPISODE_ID: 120
MDC_IDC_EPISODE_ID: 202
MDC_IDC_EPISODE_ID: 203
MDC_IDC_EPISODE_ID: 204
MDC_IDC_EPISODE_ID: 205
MDC_IDC_EPISODE_ID: 206
MDC_IDC_EPISODE_ID: 207
MDC_IDC_EPISODE_ID: 208
MDC_IDC_EPISODE_TYPE: NORMAL
MDC_IDC_LEAD_IMPLANT_DT: NORMAL
MDC_IDC_LEAD_LOCATION: NORMAL
MDC_IDC_LEAD_LOCATION_DETAIL_1: NORMAL
MDC_IDC_LEAD_MFG: NORMAL
MDC_IDC_LEAD_MODEL: NORMAL
MDC_IDC_LEAD_POLARITY_TYPE: NORMAL
MDC_IDC_LEAD_SERIAL: NORMAL
MDC_IDC_LEAD_SPECIAL_FUNCTION: NORMAL
MDC_IDC_MSMT_BATTERY_DTM: NORMAL
MDC_IDC_MSMT_BATTERY_REMAINING_LONGEVITY: 3 MO
MDC_IDC_MSMT_BATTERY_RRT_TRIGGER: 2.73
MDC_IDC_MSMT_BATTERY_STATUS: NORMAL
MDC_IDC_MSMT_BATTERY_VOLTAGE: 2.73 V
MDC_IDC_MSMT_CAP_CHARGE_DTM: NORMAL
MDC_IDC_MSMT_CAP_CHARGE_ENERGY: 18 J
MDC_IDC_MSMT_CAP_CHARGE_TIME: 5.72
MDC_IDC_MSMT_CAP_CHARGE_TYPE: NORMAL
MDC_IDC_MSMT_LEADCHNL_LV_IMPEDANCE_VALUE: 380 OHM
MDC_IDC_MSMT_LEADCHNL_LV_IMPEDANCE_VALUE: 399 OHM
MDC_IDC_MSMT_LEADCHNL_LV_IMPEDANCE_VALUE: 665 OHM
MDC_IDC_MSMT_LEADCHNL_LV_PACING_THRESHOLD_AMPLITUDE: 1.88 V
MDC_IDC_MSMT_LEADCHNL_LV_PACING_THRESHOLD_PULSEWIDTH: 1 MS
MDC_IDC_MSMT_LEADCHNL_RA_IMPEDANCE_VALUE: 456 OHM
MDC_IDC_MSMT_LEADCHNL_RA_PACING_THRESHOLD_AMPLITUDE: 0.62 V
MDC_IDC_MSMT_LEADCHNL_RA_PACING_THRESHOLD_PULSEWIDTH: 0.4 MS
MDC_IDC_MSMT_LEADCHNL_RA_SENSING_INTR_AMPL: 1.5 MV
MDC_IDC_MSMT_LEADCHNL_RA_SENSING_INTR_AMPL: 1.75 MV
MDC_IDC_MSMT_LEADCHNL_RV_IMPEDANCE_VALUE: 399 OHM
MDC_IDC_MSMT_LEADCHNL_RV_IMPEDANCE_VALUE: 513 OHM
MDC_IDC_MSMT_LEADCHNL_RV_PACING_THRESHOLD_AMPLITUDE: 0.5 V
MDC_IDC_MSMT_LEADCHNL_RV_PACING_THRESHOLD_PULSEWIDTH: 0.4 MS
MDC_IDC_MSMT_LEADCHNL_RV_SENSING_INTR_AMPL: 17.88 MV
MDC_IDC_MSMT_LEADCHNL_RV_SENSING_INTR_AMPL: 17.88 MV
MDC_IDC_PG_IMPLANT_DTM: NORMAL
MDC_IDC_PG_MFG: NORMAL
MDC_IDC_PG_MODEL: NORMAL
MDC_IDC_PG_SERIAL: NORMAL
MDC_IDC_PG_TYPE: NORMAL
MDC_IDC_SESS_CLINIC_NAME: NORMAL
MDC_IDC_SESS_DTM: NORMAL
MDC_IDC_SESS_TYPE: NORMAL
MDC_IDC_SET_BRADY_AT_MODE_SWITCH_RATE: 171 {BEATS}/MIN
MDC_IDC_SET_BRADY_LOWRATE: 60 {BEATS}/MIN
MDC_IDC_SET_BRADY_MAX_SENSOR_RATE: 140 {BEATS}/MIN
MDC_IDC_SET_BRADY_MAX_TRACKING_RATE: 140 {BEATS}/MIN
MDC_IDC_SET_BRADY_MODE: NORMAL
MDC_IDC_SET_BRADY_PAV_DELAY_HIGH: 100 MS
MDC_IDC_SET_BRADY_PAV_DELAY_LOW: 130 MS
MDC_IDC_SET_BRADY_SAV_DELAY_HIGH: 70 MS
MDC_IDC_SET_BRADY_SAV_DELAY_LOW: 100 MS
MDC_IDC_SET_CRT_LVRV_DELAY: 0 MS
MDC_IDC_SET_CRT_PACED_CHAMBERS: NORMAL
MDC_IDC_SET_LEADCHNL_LV_PACING_AMPLITUDE: 3 V
MDC_IDC_SET_LEADCHNL_LV_PACING_ANODE_ELECTRODE_1: NORMAL
MDC_IDC_SET_LEADCHNL_LV_PACING_ANODE_LOCATION_1: NORMAL
MDC_IDC_SET_LEADCHNL_LV_PACING_CAPTURE_MODE: NORMAL
MDC_IDC_SET_LEADCHNL_LV_PACING_CATHODE_ELECTRODE_1: NORMAL
MDC_IDC_SET_LEADCHNL_LV_PACING_CATHODE_LOCATION_1: NORMAL
MDC_IDC_SET_LEADCHNL_LV_PACING_POLARITY: NORMAL
MDC_IDC_SET_LEADCHNL_LV_PACING_PULSEWIDTH: 1 MS
MDC_IDC_SET_LEADCHNL_RA_PACING_AMPLITUDE: 1.5 V
MDC_IDC_SET_LEADCHNL_RA_PACING_ANODE_ELECTRODE_1: NORMAL
MDC_IDC_SET_LEADCHNL_RA_PACING_ANODE_LOCATION_1: NORMAL
MDC_IDC_SET_LEADCHNL_RA_PACING_CAPTURE_MODE: NORMAL
MDC_IDC_SET_LEADCHNL_RA_PACING_CATHODE_ELECTRODE_1: NORMAL
MDC_IDC_SET_LEADCHNL_RA_PACING_CATHODE_LOCATION_1: NORMAL
MDC_IDC_SET_LEADCHNL_RA_PACING_POLARITY: NORMAL
MDC_IDC_SET_LEADCHNL_RA_PACING_PULSEWIDTH: 0.4 MS
MDC_IDC_SET_LEADCHNL_RA_SENSING_ANODE_ELECTRODE_1: NORMAL
MDC_IDC_SET_LEADCHNL_RA_SENSING_ANODE_LOCATION_1: NORMAL
MDC_IDC_SET_LEADCHNL_RA_SENSING_CATHODE_ELECTRODE_1: NORMAL
MDC_IDC_SET_LEADCHNL_RA_SENSING_CATHODE_LOCATION_1: NORMAL
MDC_IDC_SET_LEADCHNL_RA_SENSING_POLARITY: NORMAL
MDC_IDC_SET_LEADCHNL_RA_SENSING_SENSITIVITY: 0.3 MV
MDC_IDC_SET_LEADCHNL_RV_PACING_AMPLITUDE: 2 V
MDC_IDC_SET_LEADCHNL_RV_PACING_ANODE_ELECTRODE_1: NORMAL
MDC_IDC_SET_LEADCHNL_RV_PACING_ANODE_LOCATION_1: NORMAL
MDC_IDC_SET_LEADCHNL_RV_PACING_CAPTURE_MODE: NORMAL
MDC_IDC_SET_LEADCHNL_RV_PACING_CATHODE_ELECTRODE_1: NORMAL
MDC_IDC_SET_LEADCHNL_RV_PACING_CATHODE_LOCATION_1: NORMAL
MDC_IDC_SET_LEADCHNL_RV_PACING_POLARITY: NORMAL
MDC_IDC_SET_LEADCHNL_RV_PACING_PULSEWIDTH: 0.4 MS
MDC_IDC_SET_LEADCHNL_RV_SENSING_ANODE_ELECTRODE_1: NORMAL
MDC_IDC_SET_LEADCHNL_RV_SENSING_ANODE_LOCATION_1: NORMAL
MDC_IDC_SET_LEADCHNL_RV_SENSING_CATHODE_ELECTRODE_1: NORMAL
MDC_IDC_SET_LEADCHNL_RV_SENSING_CATHODE_LOCATION_1: NORMAL
MDC_IDC_SET_LEADCHNL_RV_SENSING_POLARITY: NORMAL
MDC_IDC_SET_LEADCHNL_RV_SENSING_SENSITIVITY: 0.3 MV
MDC_IDC_SET_ZONE_DETECTION_BEATS_DENOMINATOR: 40 {BEATS}
MDC_IDC_SET_ZONE_DETECTION_BEATS_NUMERATOR: 30 {BEATS}
MDC_IDC_SET_ZONE_DETECTION_INTERVAL: 320 MS
MDC_IDC_SET_ZONE_DETECTION_INTERVAL: 350 MS
MDC_IDC_SET_ZONE_DETECTION_INTERVAL: 360 MS
MDC_IDC_SET_ZONE_DETECTION_INTERVAL: 420 MS
MDC_IDC_SET_ZONE_DETECTION_INTERVAL: NORMAL
MDC_IDC_SET_ZONE_TYPE: NORMAL
MDC_IDC_STAT_AT_BURDEN_PERCENT: 0 %
MDC_IDC_STAT_AT_DTM_END: NORMAL
MDC_IDC_STAT_AT_DTM_START: NORMAL
MDC_IDC_STAT_BRADY_AP_VP_PERCENT: 31.54 %
MDC_IDC_STAT_BRADY_AP_VS_PERCENT: 0.01 %
MDC_IDC_STAT_BRADY_AS_VP_PERCENT: 68.44 %
MDC_IDC_STAT_BRADY_AS_VS_PERCENT: 0.01 %
MDC_IDC_STAT_BRADY_DTM_END: NORMAL
MDC_IDC_STAT_BRADY_DTM_START: NORMAL
MDC_IDC_STAT_BRADY_RA_PERCENT_PACED: 31.48 %
MDC_IDC_STAT_BRADY_RV_PERCENT_PACED: 99.83 %
MDC_IDC_STAT_CRT_DTM_END: NORMAL
MDC_IDC_STAT_CRT_DTM_START: NORMAL
MDC_IDC_STAT_CRT_LV_PERCENT_PACED: 99.76 %
MDC_IDC_STAT_CRT_PERCENT_PACED: 99.76 %
MDC_IDC_STAT_EPISODE_RECENT_COUNT: 0
MDC_IDC_STAT_EPISODE_RECENT_COUNT_DTM_END: NORMAL
MDC_IDC_STAT_EPISODE_RECENT_COUNT_DTM_START: NORMAL
MDC_IDC_STAT_EPISODE_TOTAL_COUNT: 0
MDC_IDC_STAT_EPISODE_TOTAL_COUNT: 4
MDC_IDC_STAT_EPISODE_TOTAL_COUNT_DTM_END: NORMAL
MDC_IDC_STAT_EPISODE_TOTAL_COUNT_DTM_START: NORMAL
MDC_IDC_STAT_EPISODE_TYPE: NORMAL
MDC_IDC_STAT_TACHYTHERAPY_ATP_DELIVERED_RECENT: 0
MDC_IDC_STAT_TACHYTHERAPY_ATP_DELIVERED_TOTAL: 0
MDC_IDC_STAT_TACHYTHERAPY_RECENT_DTM_END: NORMAL
MDC_IDC_STAT_TACHYTHERAPY_RECENT_DTM_START: NORMAL
MDC_IDC_STAT_TACHYTHERAPY_SHOCKS_ABORTED_RECENT: 0
MDC_IDC_STAT_TACHYTHERAPY_SHOCKS_ABORTED_TOTAL: 0
MDC_IDC_STAT_TACHYTHERAPY_SHOCKS_DELIVERED_RECENT: 0
MDC_IDC_STAT_TACHYTHERAPY_SHOCKS_DELIVERED_TOTAL: 0
MDC_IDC_STAT_TACHYTHERAPY_TOTAL_DTM_END: NORMAL
MDC_IDC_STAT_TACHYTHERAPY_TOTAL_DTM_START: NORMAL

## 2021-01-18 PROCEDURE — 93295 DEV INTERROG REMOTE 1/2/MLT: CPT | Performed by: INTERNAL MEDICINE

## 2021-01-18 PROCEDURE — 93296 REM INTERROG EVL PM/IDS: CPT

## 2021-01-20 ENCOUNTER — TELEPHONE (OUTPATIENT)
Dept: CARDIOLOGY | Facility: CLINIC | Age: 76
End: 2021-01-20

## 2021-01-20 NOTE — TELEPHONE ENCOUNTER
EP Scheduling called the patient to schedule Consultation for generator change with EP MD. The number [phone] was left for the patient to return the call and schedule the procedure.    Chelly England  Periop Electrophysiology   [phone]

## 2021-01-21 NOTE — LETTER
February 17, 2021      TO: Brooks Summers  [address]  [address]  [place] [address]         Dear Brooks,      You are scheduled for an ICD generator change at the [facility].      You will need to undergo a COVID-19 PCR swab test within 4 days of procedure. You will receive a phone call with more information. If you do not hear from the COVID scheduling team, please call: [phone] to schedule.    Your appointment is scheduled for March 8, 2021 at 10:15 am. Location: The clinic is located at [address] [place] [address] in [place]. We offer free parking in our on-site lot.    Below are instructions, if you have questions please contact our office.     1. You should arrive at the [facility]   ([address] [facility]) to the [facility] Waiting Room at _6:30 am__ on   ___March 10, 2021___.  2. Do not eat for 8 hours prior to arrival, you can drink water up until 2 hours prior.  3. If you are diabetic follow the following instructions: (Contact your diabetes team if you have questions)   * Hold oral diabetic medications and short-acting insulins (aspart, lispro, regular) the morning of the procedure.              * Hold non-insulin injectable liraglutide (Victoza) the evening before and/or morning of the procedure.   * Hold mixed insulins (70/30, 75/25, 50/50) the morning of procedure. Instead, take 66% of NPH (N) component.   * Take 80% of your normal long-acting insulin (glargine/Lantus or levemir/Detemir, degludec/Tresiba) the evening before and/or morning of the procedure.  4. The morning of your procedure, you may take your scheduled medications with a sip of water - If you take a blood thinner, continue this medication (warfarin, Pradaxa, Eliquis, Xarelto).  5. You will discharge the same day. You will need a  and someone to stay with you for 24 hours.  6. Use the antibacterial wipes the night before and morning of your procedure. Follow the included  instructions.         Post-Procedure Instructions  Wound Care  1. If no Dermabond has been applied to your incision: Keep your incision (surgery wound) dry for 3 days.  After 3 days, you may remove the outer bandage.  Keep the strips of tape on.  They will be removed at your clinic visit.  a. If Dermabond has been applied to your incision,  do not apply powder or lotion to the incision for 14 days. You may shower in the morning.  2. Check for signs of infection each day.  These include increased redness, swelling, drainage or a fever over 101 F (38.3 C).  Call us immediately if you see any of   these signs.  3. If there are no signs of infection, you may shower in 3 days.  Do not submerge the incision (in a bath tub, hot tub, or swimming pool) until fully healed.  Pain  1. You may have mild to moderate pain for 3 to 5 days.  Take acetaminophen (Tylenol) or ibuprofen (Advil) for the pain.  Call us if the pain is severe or lasts more than 5 days.  Activity  1. You should slowly go back to your normal activities after 24 hours.  Healing will take 4 to 6 weeks.  2. No driving for 24 hours  3. For 3 days, do not raise your affected arm above your shoulder  4. For 10 days, do not use your affected arm to push, pull, or lift anything over 10 pounds  5. Avoid climbing a ladder alone.  It is best to stay within 4 feet of the ground.  6. Avoid anything that may cause rough contact or a hard hit to your chest.  This includes football, hockey, and other contact sports.  7. Do not go swimming or boating alone.      Follow Up Appointments Date & Time   7-10 day incision check with device clinic  March 17, 2021   10a device check     3 month EP NP and device check June 11, 2021  130 device clinic    & 2pm  Kailee Mcdonald NP       If you have further questions, please utilize [employer] to contact us.   If your question concerns the above instructions, contact:  Marlena Deras, CALLUM   Electrophysiology Nurse  "Coordinator.  [phone]    If your question concerns the schedule/appointment times, contact:  [facility]   [phone]    Device Clinic (Pacemakers, Defibrillators, Loop Recorders)  [phone]                                                    Preparing the Skin Before Surgery  Preparing or \"prepping\" skin before surgery can reduce the risk of infection at the surgical site. To make the process easier, this facility has chosen disposable cloths moistened with rinse-free 2% Chlorhexidine Gluconate antiseptic solution designed to reduce the bacteria on the skin. The steps below outline the prepping process and should be carefully followed.    Prep the skin at the following time(s):    - If you wish to shower, bathe or shampoo your hair, do so the night before and prep your skin afterwards for the first time using one package of cloths.    - Skin must be prepped the morning of the procedure using the second package of cloths.        Prep The Night Before Procedure    Do not allow this product to come in contact with your eyes, ears, mouth or mucous membranes.     Reach into one of the packages, remove the two cloths with the foam dent and place onto a clean table.    Use one clean cloth to prep each are of the body. One cloth for #1 - neck & chest. One cloth for #2 & #3 - both arms, shoulder to fingertips including armpits. Wipe each area in a back and forth motion for 3 minutes. Be sure to wipe each area thoroughly.    Do not rinse or apply any lotions, moisturizer or make up after prepping.    Discard cloths in trash can.     Allow your skin to air dry. Dress in clean clothes/sleepwear      Prepping your skin on the morning of surgery:    Do not shower, bathe or shampoo hair.    Open a new package and follow the instructions listed above.                           "

## 2021-02-03 DIAGNOSIS — E78.5 HYPERLIPIDEMIA LDL GOAL <100: ICD-10-CM

## 2021-02-03 DIAGNOSIS — I10 BENIGN ESSENTIAL HYPERTENSION: ICD-10-CM

## 2021-02-06 RX ORDER — METOPROLOL SUCCINATE 50 MG/1
50 TABLET, EXTENDED RELEASE ORAL DAILY
Qty: 90 TABLET | Refills: 0 | Status: SHIPPED | OUTPATIENT
Start: 2021-02-06 | End: 2021-05-05

## 2021-02-06 RX ORDER — SIMVASTATIN 40 MG
40 TABLET ORAL AT BEDTIME
Qty: 90 TABLET | Refills: 0 | Status: SHIPPED | OUTPATIENT
Start: 2021-02-06 | End: 2021-05-05

## 2021-02-08 ENCOUNTER — VIRTUAL VISIT (OUTPATIENT)
Dept: CARDIOLOGY | Facility: CLINIC | Age: 76
End: 2021-02-08
Payer: MEDICARE

## 2021-02-08 DIAGNOSIS — Z45.02 ICD (IMPLANTABLE CARDIOVERTER-DEFIBRILLATOR) BATTERY DEPLETION: Primary | ICD-10-CM

## 2021-02-08 DIAGNOSIS — Z95.810 AUTOMATIC IMPLANTABLE CARDIOVERTER-DEFIBRILLATOR IN SITU: ICD-10-CM

## 2021-02-08 DIAGNOSIS — Z95.2 S/P AORTIC VALVE REPLACEMENT: ICD-10-CM

## 2021-02-08 DIAGNOSIS — I42.0 CARDIOMYOPATHY, DILATED, NONISCHEMIC (H): ICD-10-CM

## 2021-02-08 DIAGNOSIS — I50.22 HEART FAILURE, CHRONIC SYSTOLIC (H): ICD-10-CM

## 2021-02-08 PROCEDURE — 99214 OFFICE O/P EST MOD 30 MIN: CPT | Mod: 95 | Performed by: INTERNAL MEDICINE

## 2021-02-08 NOTE — PROGRESS NOTES
Brooks is a 75 year old who is being evaluated via a billable video visit.        Video-Visit Details    Type of service:  Video Visit    Video Start Time: 4:08 PM    Video End Time:4:25 PM    Originating Location (pt. Location): Home    Distant Location (provider location):  [facility]     Platform used for Video Visit: Sally     Additional Provider Notes          Clinical Cardiac Electrophysiology    Chief Complaint: generator revision    HPI: Patient with history of ICD after aortic valve replacement referred to Electrophysiology for generator revision due to battery depletion by Dr. Mckenzie.    He last saw Dr. Mckenzie 1/28/2020:    Patient here for follow-up.  Status post AVR with a bioprosthetic valve in 2011 for bicuspid aortic valve with severe aortic stenosis.  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  Subsequently patient had extensive aortic root surgery and replacement of aortic valve with bioprosthetic valve as well as repair of the mitral valve.  Since then patient is doing well had no cardiac complaints today.  Patient also needed pacemaker placement during the surgery.  Also has a history of paroxysmal atrial fibrillation.    He reports he has been feeling well despite the coronavirus pandemic. He has not had any unusual dyspnea on exertion/sob, chest pain/discomfort or decline in his exertional abilities.     He reports no syncope or near syncope.     His original implant was in 2012. He underwent generator revision in Dec 2015.     Current Outpatient Medications   Medication Sig Dispense Refill     allopurinol (ZYLOPRIM) 100 MG tablet Take 1 tablet (100 mg) by mouth every evening 90 tablet 3     levETIRAcetam (KEPPRA) 500 MG tablet Take 1 tablet (500 mg) by mouth 2 times daily Start with 250 mg twice daily for 2 weeks, then 500 mg twice daily. 60 tablet 11     losartan (COZAAR) 25 MG tablet Take 0.5 tablets (12.5 mg) by  mouth daily Take (1/2) tab daily 45 tablet 3     metoprolol succinate ER (TOPROL-XL) 50 MG 24 hr tablet Take 1 tablet (50 mg) by mouth daily Call clinic to schedule follow up appointment. 90 tablet 0     Multiple Vitamins-Minerals (MULTIVITAMIN ADULTS 50+) TABS        simvastatin (ZOCOR) 40 MG tablet Take 1 tablet (40 mg) by mouth At Bedtime 90 tablet 0     VITAMIN D, CHOLECALCIFEROL, PO Take 1,000 Units by mouth daily       warfarin ANTICOAGULANT (COUMADIN) 5 MG tablet 7.5 mg Tues/Thurs/Sun; 10 mg all other days or as directed by the Anticoagulation Clinic 160 tablet 0     PROVIDER DOSED MANAGED WARFARIN, COUMADIN, OUTPATIENT Per coumadin clinic         Past Medical History:   Diagnosis Date     BCC (basal cell carcinoma), face 5/16/2013     Bicuspid aortic valve      Chronic systolic heart failure (H)      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 Tx with Dapto/RIFx 8 weeks.     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     Paroxysmal A-fib (H)     Post-op 7/14/2011, 1/26/13     Rotator Cuff (Capsule) Sprain and Strain      S/P aortic valve replacement     7/14/2011, re-do 1/25/13 due to endocarditis     Smoking hx      Thrombocytopenia (H)        Past Surgical History:   Procedure Laterality Date     ANESTHESIA CARDIOVERSION  7/18/2011    Procedure:ANESTHESIA CARDIOVERSION; Cardioversion; Surgeon:GENERIC ANESTHESIA PROVIDER; Location:[facility] OR     COLONOSCOPY       COLONOSCOPY N/A 11/19/2018    Procedure: COLONOSCOPY;  Surgeon: Herman Mcginnis MD;  Location: [facility] GI     CORONARY ANGIOGRAPHY ADULT ORDER       CYSTOSCOPY, BIOPSY URETHRA N/A 4/3/2017    Procedure: CYSTOSCOPY, BIOPSY URETHRA;  Surgeon: Marlena Mora MD;  Location: [facility] OR     ENDOSCOPY UPPER, COLONOSCOPY, COMBINED       HC REMOV & REPLACE IMPLT DEFIB PULSE GEN MULT LEAD  12/3/2015          HEMORRHOID SURGERY        IMPLANT AUTOMATIC IMPLANTABLE CARDIOVERTER DEFIBRILLATOR       MOHS MICROGRAPHIC PROCEDURE       ORTHOPEDIC SURGERY      shoulder,right     REDO STERNOTOMY REPLACE VALVE AORTIC  2013    Procedure: REDO STERNOTOMY REPLACE VALVE AORTIC;  Redo Sternotomy, Redo Aortic Valve Replacement on pump oxygenator. Intraoperative Transesophageal Echocardiogram;  Surgeon: Stephanie Hampton MD;  Location: [facility] OR     REPAIR VALVE MITRAL  2013     REPLACE VALVE AORTIC  2011    Procedure:REPLACE VALVE AORTIC; Median Sternotomy, Bioprosthesis,  Aortic Valve replacement on pump with oxygenator. Intra-operative Transesophogeal Echocardiogram.; Surgeon:STEPHANIE HAMPTON; Location:[facility] OR     teeth extractions       TRANSPLANT         Family History   Problem Relation Age of Onset     C.A.D. Father 68        bypass, carotid      Prostate Cancer Father 70     Heart Disease Father      Cancer Mother 92        stomach, colon     Hypertension Mother      Retinal detachment Mother      Cancer Brother 64        lung cancer, petroleum     Cancer Brother      Glaucoma No family hx of      Macular Degeneration No family hx of      Strabismus No family hx of      Glasses (<7 y/o) No family hx of        Social History     Tobacco Use     Smoking status: Former Smoker     Packs/day: 1.00     Years: 20.00     Pack years: 20.00     Types: Cigarettes     Quit date: 1989     Years since quittin.1     Smokeless tobacco: Never Used   Substance Use Topics     Alcohol use: Yes     Alcohol/week: 2.0 standard drinks       Allergies   Allergen Reactions     Lisinopril Cough         ROS:   CONSTITUTIONAL:No report of fever, chills,  or change in weight  RESPIRATORY: No cough, wheezing, SOB, or hemoptysis  CARDIOVASCULAR: see HPI  MUSCULO-SKELETAL: No joint pain/swelling, no muslce pain  NEURO: No paresthesias, syncope, pre-syncope, light headness, dizziness or vertigo  ENDOCRINE: No temperature intolerance, no skin/hair changes  PSYCHIATRIC: No change in  mood, feeling down/anxious, no change in sleep or appetite  GI: no melena or hematochezia, no change in bowel habits  : no hematuria or dysurea, no hesitancy, dribbling or incontinence  HEME: no easy bruising or bleeding, no history of anemia, no history of blood clots  SKIN: no rashes or sores, no unusual itching        Physical Examination:  CONSITUTIONAL: no acute distress  HEENT: no icterus, sclerae white  CV: no visible edema of visualized upper extremities, no gross JVD  CHEST: respirations nonlabored, no audible wheezing  NEURO: AA&Ox3, speech fluent/appropriate, motor grossly nonfocal  PSYCH: cooperative, affect appropriate  DERM: no rashes on visualized face/neck/upper extremities    The rest of a comprehensive physical examination is deferred due to PHE (public health emergency) video visit restrictions.      Laboratory and diagnostic data reviewed:    Exam Date Exam Time Exam Date Exam Time Accession # Performing Department Results    1/18/21  2:15 AM 1/18/21  9:24 AM [id number] [facility]    Narrative & Impression    Alert initiated remote ICD transmission received and reviewed. Device transmission sent per MD orders. Patient has a Medtronic multi lead ICD. Normal ICD function. No episodes recorded. No arrhythmias recorded. Presenting EGM = AS/BVP @ 72 bpm. AP = 32%.  = 100%. BVP = 100%. OptiVol fluid index is at baseline. Estimated battery longevity to SIMONE = RRT reached on 1/18/21. Battery voltage = 2.73V. No short v-v intervals recorded. Lead impedance trends appear stable. Patient notified of interrogation results via voicemail. Plan for patient to follow up with EP to discuss generator change.  Follow up in device clinic post generator change. Chelly ISAIAS notified of plan.  YESY Escudero RN         Assessment and recommendations:    1) CRT - D - placed for primary prevention    2) ICD battery depletion    He has had one generator revision in the past.  I reviewed the  procedure with him.  I reminded him that it would be done with moderate sedation not general anesthesia.  We discussed that the primary risk are bleeding related to his warfarin and infection.    I will not have him hold his warfarin due to his prosthetic aortic valve.  As long as the INR is less than 3.5 we will plan to proceed.    He was part of the WRAP-IT trial.  We will plan to use the antibiotic pouch for this change as well.    There is been no evidence of any problems with his leads.  Lead revision is not anticipated.    I appreciate the chance to help with Mr. Summers's care. Please let me know if you have any questions or concerns.

## 2021-02-11 ENCOUNTER — ANTICOAGULATION THERAPY VISIT (OUTPATIENT)
Dept: ANTICOAGULATION | Facility: CLINIC | Age: 76
End: 2021-02-11
Payer: MEDICARE

## 2021-02-11 DIAGNOSIS — Z95.2 S/P AORTIC VALVE REPLACEMENT: ICD-10-CM

## 2021-02-11 DIAGNOSIS — Z79.01 LONG TERM CURRENT USE OF ANTICOAGULANT THERAPY: ICD-10-CM

## 2021-02-11 DIAGNOSIS — I48.0 PAROXYSMAL ATRIAL FIBRILLATION (H): ICD-10-CM

## 2021-02-11 LAB
CAPILLARY BLOOD COLLECTION: NORMAL
INR PPP: 3.7 (ref 0.86–1.14)

## 2021-02-11 PROCEDURE — 99207 PR NO CHARGE NURSE ONLY: CPT

## 2021-02-11 PROCEDURE — 85610 PROTHROMBIN TIME: CPT | Performed by: INTERNAL MEDICINE

## 2021-02-11 PROCEDURE — 36416 COLLJ CAPILLARY BLOOD SPEC: CPT | Performed by: INTERNAL MEDICINE

## 2021-02-11 NOTE — PROGRESS NOTES
"ANTICOAGULATION FOLLOW-UP CLINIC VISIT    Patient Name:  Brooks Summers  Date:  2/11/2021  Contact Type:  Telephone    SUBJECTIVE:  Patient Findings     Positives:  Upcoming invasive procedure    Comments:  Patient will be having a battery change done with his pacemaker in early March. This is not yet scheduled. Per cards notes \"I will not have him hold his warfarin due to his prosthetic aortic valve.  As long as the INR is less than 3.5 we will plan to proceed.\" Patient will let ACC know when the procedure is scheduled. No changes in diet, activity level, medications (including over the counter), or health. No recent illnesses, diarrhea, pain or swelling. No missed doses of warfarin. Patient took dosing as prescribed. No signs of clots or bleeding concerns. Patient will continue maintenance warfarin dosing with a slight reduction in today's dose. Recheck in 10-14 days.          Clinical Outcomes     Negatives:  Major bleeding event, Thromboembolic event, Anticoagulation-related hospital admission, Anticoagulation-related ED visit, Anticoagulation-related fatality    Comments:  Patient will be having a battery change done with his pacemaker in early March. This is not yet scheduled. Per cards notes \"I will not have him hold his warfarin due to his prosthetic aortic valve.  As long as the INR is less than 3.5 we will plan to proceed.\" Patient will let ACC know when the procedure is scheduled. No changes in diet, activity level, medications (including over the counter), or health. No recent illnesses, diarrhea, pain or swelling. No missed doses of warfarin. Patient took dosing as prescribed. No signs of clots or bleeding concerns. Patient will continue maintenance warfarin dosing with a slight reduction in today's dose. Recheck in 10-14 days.             OBJECTIVE    Recent labs: (last 7 days)     02/11/21  1234   INR 3.70*       ASSESSMENT / PLAN  INR assessment SUPRA    Recheck INR In: 10 DAYS    INR Location Clinic  "     Anticoagulation Summary  As of 2/11/2021    INR goal:  2.0-3.0   TTR:  92.7 % (1 y)   INR used for dosing:  3.70 (2/11/2021)   Warfarin maintenance plan:  7.5 mg (5 mg x 1.5) every Sun, Tue, Thu; 10 mg (5 mg x 2) all other days   Full warfarin instructions:  2/11: 5 mg; Otherwise 7.5 mg every Sun, Tue, Thu; 10 mg all other days   Weekly warfarin total:  62.5 mg   Plan last modified:  Aniya Garcia RN (9/26/2018)   Next INR check:  2/23/2021   Priority:  High   Target end date:  Indefinite    Indications    S/P aortic valve replacement [Z95.2]  Paroxysmal atrial fibrillation (H) [I48.0]  Long term current use of anticoagulant therapy [Z79.01]             Anticoagulation Episode Summary     INR check location:      Preferred lab:      Send INR reminders to:  JONATHAN OWUSU    Comments:   * warfarin 5 mg tabs. Aortic 21 mm Johnson Perimount Magna Tissue Valve.       Anticoagulation Care Providers     Provider Role Specialty Phone number    Edin Mays MD Referring Internal Medicine [phone]            See the Encounter Report to view Anticoagulation Flowsheet and Dosing Calendar (Go to Encounters tab in chart review, and find the Anticoagulation Therapy Visit)        Aniya Garcia RN

## 2021-02-13 PROBLEM — Z45.02 ICD (IMPLANTABLE CARDIOVERTER-DEFIBRILLATOR) BATTERY DEPLETION: Status: ACTIVE | Noted: 2021-02-13

## 2021-02-15 ENCOUNTER — TELEPHONE (OUTPATIENT)
Dept: CARDIOLOGY | Facility: CLINIC | Age: 76
End: 2021-02-15

## 2021-02-15 NOTE — TELEPHONE ENCOUNTER
EP Scheduling called the patient to schedule generator change with Dr. Be. The number [phone] was left for the patient to return the call and schedule the procedure.    Chelly England  Periop Electrophysiology   [phone]

## 2021-02-20 DIAGNOSIS — Z11.59 ENCOUNTER FOR SCREENING FOR OTHER VIRAL DISEASES: ICD-10-CM

## 2021-02-23 ENCOUNTER — ANTICOAGULATION THERAPY VISIT (OUTPATIENT)
Dept: INTERNAL MEDICINE | Facility: CLINIC | Age: 76
End: 2021-02-23

## 2021-02-23 DIAGNOSIS — I48.0 PAROXYSMAL ATRIAL FIBRILLATION (H): ICD-10-CM

## 2021-02-23 DIAGNOSIS — Z79.01 LONG TERM CURRENT USE OF ANTICOAGULANT THERAPY: ICD-10-CM

## 2021-02-23 DIAGNOSIS — Z95.2 S/P AORTIC VALVE REPLACEMENT: ICD-10-CM

## 2021-02-23 LAB
CAPILLARY BLOOD COLLECTION: NORMAL
INR PPP: 3.9 (ref 0.86–1.14)

## 2021-02-23 PROCEDURE — 85610 PROTHROMBIN TIME: CPT | Performed by: INTERNAL MEDICINE

## 2021-02-23 PROCEDURE — 36416 COLLJ CAPILLARY BLOOD SPEC: CPT | Performed by: INTERNAL MEDICINE

## 2021-02-23 NOTE — PROGRESS NOTES
"ANTICOAGULATION MANAGEMENT     Patient Name:  Brooks Summers  Date:  2/23/2021    ASSESSMENT /SUBJECTIVE:    Today's INR result of 3.9 is supratherapeutic. Goal INR of 2.5-3.5      Warfarin dose taken: Warfarin taken as instructed    Diet: No new diet changes affecting INR    Medication changes/ interactions: No new medications/supplements affecting INR    Previous INR: Supratherapeutic     S/S of bleeding or thromboembolism: No    New injury or illness: No    Upcoming surgery, procedure or cardioversion: Yes: 3/10 Per cards notes \"I will not have him hold his warfarin due to his prosthetic aortic valve.  As long as the INR is less than 3.5 we will plan to proceed.\"     Additional findings: None      PLAN:    Telephone call with Brooks regarding INR result and instructed:     Warfarin Dosing Instructions: Change your warfarin dose to 10 mg Mon/Fri and 7.5 mg ROW  . (8 % change)    Instructed patient to follow up no later than: 2 weeks  Lab visit scheduled    Education provided: Target INR goal and significance of current INR result      Brooks verbalizes understanding and agrees to warfarin dosing plan.    Instructed to call the Anticoagulation Clinic for any changes, questions or concerns. (#[phone])        Beth Mijares RN      OBJECTIVE:  Recent labs: (last 7 days)     02/23/21  1017   INR 3.90*         No question data found.  Anticoagulation Summary  As of 2/23/2021    INR goal:  2.0-3.0   TTR:  89.4 % (1 y)   INR used for dosing:  3.90 (2/23/2021)   Warfarin maintenance plan:  10 mg (5 mg x 2) every Mon, Fri; 7.5 mg (5 mg x 1.5) all other days   Full warfarin instructions:  10 mg every Mon, Fri; 7.5 mg all other days   Weekly warfarin total:  57.5 mg   Plan last modified:  Beth Mijares, RN (2/23/2021)   Next INR check:  3/9/2021   Priority:  High   Target end date:  Indefinite    Indications    S/P aortic valve replacement [Z95.2]  Paroxysmal atrial fibrillation (H) [I48.0]  Long term current use of " anticoagulant therapy [Z79.01]             Anticoagulation Episode Summary     INR check location:      Preferred lab:      Send INR reminders to:  JONATHAN OWUSU    Comments:   * warfarin 5 mg tabs. Aortic 21 mm Johnson Perimount Magna Tissue Valve.       Anticoagulation Care Providers     Provider Role Specialty Phone number    Edin Mays MD Referring Internal Medicine [phone]

## 2021-03-08 ENCOUNTER — ANTICOAGULATION THERAPY VISIT (OUTPATIENT)
Dept: ANTICOAGULATION | Facility: CLINIC | Age: 76
End: 2021-03-08

## 2021-03-08 DIAGNOSIS — M10.00 IDIOPATHIC GOUT, UNSPECIFIED CHRONICITY, UNSPECIFIED SITE: ICD-10-CM

## 2021-03-08 DIAGNOSIS — Z11.59 ENCOUNTER FOR SCREENING FOR OTHER VIRAL DISEASES: ICD-10-CM

## 2021-03-08 DIAGNOSIS — I48.0 PAROXYSMAL ATRIAL FIBRILLATION (H): ICD-10-CM

## 2021-03-08 DIAGNOSIS — Z95.2 S/P AORTIC VALVE REPLACEMENT: ICD-10-CM

## 2021-03-08 DIAGNOSIS — Z79.01 LONG TERM CURRENT USE OF ANTICOAGULANT THERAPY: ICD-10-CM

## 2021-03-08 DIAGNOSIS — Z95.2 S/P AVR (AORTIC VALVE REPLACEMENT): ICD-10-CM

## 2021-03-08 LAB
CAPILLARY BLOOD COLLECTION: NORMAL
INR PPP: 2.9 (ref 0.86–1.14)
SARS-COV-2 RNA RESP QL NAA+PROBE: NORMAL
SPECIMEN SOURCE: NORMAL

## 2021-03-08 PROCEDURE — U0005 INFEC AGEN DETEC AMPLI PROBE: HCPCS | Performed by: INTERNAL MEDICINE

## 2021-03-08 PROCEDURE — U0003 INFECTIOUS AGENT DETECTION BY NUCLEIC ACID (DNA OR RNA); SEVERE ACUTE RESPIRATORY SYNDROME CORONAVIRUS 2 (SARS-COV-2) (CORONAVIRUS DISEASE [COVID-19]), AMPLIFIED PROBE TECHNIQUE, MAKING USE OF HIGH THROUGHPUT TECHNOLOGIES AS DESCRIBED BY CMS-2020-01-R: HCPCS | Performed by: INTERNAL MEDICINE

## 2021-03-08 PROCEDURE — 85610 PROTHROMBIN TIME: CPT | Performed by: INTERNAL MEDICINE

## 2021-03-08 PROCEDURE — 36416 COLLJ CAPILLARY BLOOD SPEC: CPT | Performed by: INTERNAL MEDICINE

## 2021-03-08 RX ORDER — WARFARIN SODIUM 5 MG/1
TABLET ORAL
Qty: 160 TABLET | Refills: 0 | COMMUNITY
Start: 2021-03-08 | End: 2021-04-07

## 2021-03-08 NOTE — PROGRESS NOTES
ANTICOAGULATION MANAGEMENT     Patient Name:  Brooks Summers  Date:  3/8/2021    ASSESSMENT /SUBJECTIVE:    Today's INR result of 2.90 is therapeutic. Goal INR of 2.0-3.0      Warfarin dose taken: Warfarin taken as instructed    Diet: No new diet changes affecting INR    Medication changes/ interactions: No new medications/supplements affecting INR    Previous INR: Supratherapeutic 3.90    S/S of bleeding or thromboembolism: No    New injury or illness: No    Upcoming surgery, procedure or cardioversion: Yes: having his ICD generator changed on Wednesday. INR needs to be <3.5.     Additional findings: None      PLAN:    Telephone call with Brooks regarding INR result and instructed:     Warfarin Dosing Instructions: Continue your current warfarin dose 10mg Mon/Fri; 7.5mg all other days    Instructed patient to follow up no later than: 2 weeks  Lab visit scheduled    Education provided: Importance of notifying clinic for changes in medications or health; a sooner lab recheck maybe needed, Impact of vitamin K foods on INR -- okay to increase greens the next 2 days to help keep INR below 3.50.      Brooks verbalizes understanding and agrees to warfarin dosing plan.    Instructed to call the Anticoagulation Clinic for any changes, questions or concerns. (#[phone])        Sheri Frausto RN CACP       OBJECTIVE:  Recent labs: (last 7 days)     21  1009   INR 2.90*         INR assessment THER    Recheck INR In: 2 WEEKS    INR Location Clinic      Anticoagulation Summary  As of 3/8/2021    INR goal:  2.0-3.0   TTR:  86.2 % (1 y)   INR used for dosin.90 (3/8/2021)   Warfarin maintenance plan:  10 mg (5 mg x 2) every Mon, Fri; 7.5 mg (5 mg x 1.5) all other days   Full warfarin instructions:  10 mg every Mon, Fri; 7.5 mg all other days   Weekly warfarin total:  57.5 mg   No change documented:  Sheri Frausto RN   Plan last modified:  Beth Mijares RN (2021)   Next INR check:  3/22/2021    Priority:  High   Target end date:  Indefinite    Indications    S/P aortic valve replacement [Z95.2]  Paroxysmal atrial fibrillation (H) [I48.0]  Long term current use of anticoagulant therapy [Z79.01]             Anticoagulation Episode Summary     INR check location:      Preferred lab:      Send INR reminders to:  JONATHAN OWUSU    Comments:   * warfarin 5 mg tabs. Aortic 21 mm Johnson Perimount Magna Tissue Valve.       Anticoagulation Care Providers     Provider Role Specialty Phone number    Edin Mays MD Referring Internal Medicine [phone]

## 2021-03-09 ENCOUNTER — TELEPHONE (OUTPATIENT)
Dept: CARDIOLOGY | Facility: CLINIC | Age: 76
End: 2021-03-09

## 2021-03-09 LAB
LABORATORY COMMENT REPORT: NORMAL
SARS-COV-2 RNA RESP QL NAA+PROBE: NEGATIVE
SPECIMEN SOURCE: NORMAL

## 2021-03-09 RX ORDER — ALLOPURINOL 100 MG/1
100 TABLET ORAL EVERY EVENING
Qty: 90 TABLET | Refills: 0 | Status: SHIPPED | OUTPATIENT
Start: 2021-03-09 | End: 2021-06-16

## 2021-03-09 NOTE — TELEPHONE ENCOUNTER
Left voicemail for patient to complete Travel Screen for Cardiac Cath Lab appointment on 3/10 and inform patient of updated Visitor Policy.       covid in process

## 2021-03-09 NOTE — TELEPHONE ENCOUNTER
Last Clinic Visit: 6/22/2020  [facility]      Lab(s)- Uric Acid level past due.  Erna refill of allopurinol was sent for a 90 day supply and FYI to [facility] clinic.

## 2021-03-10 ENCOUNTER — ANTICOAGULATION THERAPY VISIT (OUTPATIENT)
Dept: ANTICOAGULATION | Facility: CLINIC | Age: 76
End: 2021-03-10

## 2021-03-10 ENCOUNTER — HOSPITAL ENCOUNTER (OUTPATIENT)
Facility: CLINIC | Age: 76
Discharge: HOME | End: 2021-03-10
Attending: INTERNAL MEDICINE | Admitting: INTERNAL MEDICINE
Payer: MEDICARE

## 2021-03-10 ENCOUNTER — APPOINTMENT (OUTPATIENT)
Dept: MEDSURG UNIT | Facility: CLINIC | Age: 76
End: 2021-03-10
Attending: INTERNAL MEDICINE
Payer: MEDICARE

## 2021-03-10 ENCOUNTER — APPOINTMENT (OUTPATIENT)
Dept: LAB | Facility: CLINIC | Age: 76
End: 2021-03-10
Attending: INTERNAL MEDICINE
Payer: MEDICARE

## 2021-03-10 VITALS
WEIGHT: 170 LBS | HEIGHT: 70 IN | SYSTOLIC BLOOD PRESSURE: 112 MMHG | RESPIRATION RATE: 16 BRPM | DIASTOLIC BLOOD PRESSURE: 77 MMHG | OXYGEN SATURATION: 95 % | HEART RATE: 70 BPM | BODY MASS INDEX: 24.34 KG/M2 | TEMPERATURE: 97.5 F

## 2021-03-10 DIAGNOSIS — I48.0 PAROXYSMAL ATRIAL FIBRILLATION (H): ICD-10-CM

## 2021-03-10 DIAGNOSIS — I50.22 HEART FAILURE, CHRONIC SYSTOLIC (H): ICD-10-CM

## 2021-03-10 DIAGNOSIS — Z79.01 LONG TERM CURRENT USE OF ANTICOAGULANT THERAPY: ICD-10-CM

## 2021-03-10 DIAGNOSIS — Z45.02 ICD (IMPLANTABLE CARDIOVERTER-DEFIBRILLATOR) BATTERY DEPLETION: ICD-10-CM

## 2021-03-10 DIAGNOSIS — Z95.2 S/P AORTIC VALVE REPLACEMENT: ICD-10-CM

## 2021-03-10 DIAGNOSIS — I42.0 CARDIOMYOPATHY, DILATED, NONISCHEMIC (H): ICD-10-CM

## 2021-03-10 DIAGNOSIS — Z95.810 AUTOMATIC IMPLANTABLE CARDIOVERTER-DEFIBRILLATOR IN SITU: Primary | ICD-10-CM

## 2021-03-10 DIAGNOSIS — Z95.810 AUTOMATIC IMPLANTABLE CARDIOVERTER-DEFIBRILLATOR IN SITU: ICD-10-CM

## 2021-03-10 LAB
ANION GAP SERPL CALCULATED.3IONS-SCNC: 3 MMOL/L (ref 3–14)
BUN SERPL-MCNC: 14 MG/DL (ref 7–30)
CALCIUM SERPL-MCNC: 9.3 MG/DL (ref 8.5–10.1)
CHLORIDE SERPL-SCNC: 112 MMOL/L (ref 94–109)
CO2 SERPL-SCNC: 28 MMOL/L (ref 20–32)
CREAT SERPL-MCNC: 1.03 MG/DL (ref 0.66–1.25)
ERYTHROCYTE [DISTWIDTH] IN BLOOD BY AUTOMATED COUNT: 13.6 % (ref 10–15)
GFR SERPL CREATININE-BSD FRML MDRD: 70 ML/MIN/{1.73_M2}
GLUCOSE SERPL-MCNC: 109 MG/DL (ref 70–99)
HCT VFR BLD AUTO: 42.5 % (ref 40–53)
HGB BLD-MCNC: 14 G/DL (ref 13.3–17.7)
INR PPP: 2.32 (ref 0.86–1.14)
MCH RBC QN AUTO: 32 PG (ref 26.5–33)
MCHC RBC AUTO-ENTMCNC: 32.9 G/DL (ref 31.5–36.5)
MCV RBC AUTO: 97 FL (ref 78–100)
PLATELET # BLD AUTO: 92 10E9/L (ref 150–450)
POTASSIUM SERPL-SCNC: 4.8 MMOL/L (ref 3.4–5.3)
RBC # BLD AUTO: 4.38 10E12/L (ref 4.4–5.9)
SODIUM SERPL-SCNC: 143 MMOL/L (ref 133–144)
WBC # BLD AUTO: 4.7 10E9/L (ref 4–11)

## 2021-03-10 PROCEDURE — 250N000009 HC RX 250: Performed by: INTERNAL MEDICINE

## 2021-03-10 PROCEDURE — 250N000011 HC RX IP 250 OP 636: Performed by: INTERNAL MEDICINE

## 2021-03-10 PROCEDURE — 93005 ELECTROCARDIOGRAM TRACING: CPT

## 2021-03-10 PROCEDURE — 85027 COMPLETE CBC AUTOMATED: CPT | Performed by: INTERNAL MEDICINE

## 2021-03-10 PROCEDURE — 33264 RMVL & RPLCMT DFB GEN MLT LD: CPT | Performed by: INTERNAL MEDICINE

## 2021-03-10 PROCEDURE — 999N000133 HC STATISTIC PP CARE STAGE 2

## 2021-03-10 PROCEDURE — 36415 COLL VENOUS BLD VENIPUNCTURE: CPT | Performed by: INTERNAL MEDICINE

## 2021-03-10 PROCEDURE — 999N000054 HC STATISTIC EKG NON-CHARGEABLE

## 2021-03-10 PROCEDURE — 93010 ELECTROCARDIOGRAM REPORT: CPT | Mod: 59 | Performed by: INTERNAL MEDICINE

## 2021-03-10 PROCEDURE — 85610 PROTHROMBIN TIME: CPT | Performed by: INTERNAL MEDICINE

## 2021-03-10 PROCEDURE — C1882 AICD, OTHER THAN SING/DUAL: HCPCS | Performed by: INTERNAL MEDICINE

## 2021-03-10 PROCEDURE — 99152 MOD SED SAME PHYS/QHP 5/>YRS: CPT | Performed by: INTERNAL MEDICINE

## 2021-03-10 PROCEDURE — 80048 BASIC METABOLIC PNL TOTAL CA: CPT | Performed by: INTERNAL MEDICINE

## 2021-03-10 PROCEDURE — 99153 MOD SED SAME PHYS/QHP EA: CPT | Performed by: INTERNAL MEDICINE

## 2021-03-10 PROCEDURE — 272N000001 HC OR GENERAL SUPPLY STERILE: Performed by: INTERNAL MEDICINE

## 2021-03-10 RX ORDER — ATROPINE SULFATE 0.1 MG/ML
0.5 INJECTION INTRAVENOUS EVERY 5 MIN PRN
Status: CANCELLED | OUTPATIENT
Start: 2021-03-10 | End: 2021-03-10

## 2021-03-10 RX ORDER — CEFAZOLIN SODIUM 2 G/100ML
2 INJECTION, SOLUTION INTRAVENOUS
Status: COMPLETED | OUTPATIENT
Start: 2021-03-10 | End: 2021-03-10

## 2021-03-10 RX ORDER — OXYCODONE AND ACETAMINOPHEN 5; 325 MG/1; MG/1
1 TABLET ORAL EVERY 6 HOURS PRN
Qty: 12 TABLET | Refills: 0 | Status: SHIPPED | OUTPATIENT
Start: 2021-03-10 | End: 2021-07-09

## 2021-03-10 RX ORDER — NALOXONE HYDROCHLORIDE 0.4 MG/ML
0.2 INJECTION, SOLUTION INTRAMUSCULAR; INTRAVENOUS; SUBCUTANEOUS
Status: DISCONTINUED | OUTPATIENT
Start: 2021-03-10 | End: 2021-03-10 | Stop reason: HOSPADM

## 2021-03-10 RX ORDER — NALOXONE HYDROCHLORIDE 0.4 MG/ML
0.2 INJECTION, SOLUTION INTRAMUSCULAR; INTRAVENOUS; SUBCUTANEOUS
Status: CANCELLED | OUTPATIENT
Start: 2021-03-10

## 2021-03-10 RX ORDER — FENTANYL CITRATE 50 UG/ML
INJECTION, SOLUTION INTRAMUSCULAR; INTRAVENOUS
Status: DISCONTINUED | OUTPATIENT
Start: 2021-03-10 | End: 2021-03-10 | Stop reason: HOSPADM

## 2021-03-10 RX ORDER — FENTANYL CITRATE 50 UG/ML
25-50 INJECTION, SOLUTION INTRAMUSCULAR; INTRAVENOUS
Status: CANCELLED | OUTPATIENT
Start: 2021-03-10 | End: 2021-03-10

## 2021-03-10 RX ORDER — NALOXONE HYDROCHLORIDE 0.4 MG/ML
0.4 INJECTION, SOLUTION INTRAMUSCULAR; INTRAVENOUS; SUBCUTANEOUS
Status: DISCONTINUED | OUTPATIENT
Start: 2021-03-10 | End: 2021-03-10 | Stop reason: HOSPADM

## 2021-03-10 RX ORDER — OXYCODONE AND ACETAMINOPHEN 5; 325 MG/1; MG/1
1 TABLET ORAL EVERY 4 HOURS PRN
Status: DISCONTINUED | OUTPATIENT
Start: 2021-03-10 | End: 2021-03-10 | Stop reason: HOSPADM

## 2021-03-10 RX ORDER — CEPHALEXIN 500 MG/1
500 TABLET ORAL 3 TIMES DAILY
Qty: 15 TABLET | Refills: 0 | Status: SHIPPED | OUTPATIENT
Start: 2021-03-10 | End: 2021-03-15

## 2021-03-10 RX ORDER — NALOXONE HYDROCHLORIDE 0.4 MG/ML
0.4 INJECTION, SOLUTION INTRAMUSCULAR; INTRAVENOUS; SUBCUTANEOUS
Status: CANCELLED | OUTPATIENT
Start: 2021-03-10

## 2021-03-10 RX ORDER — FLUMAZENIL 0.1 MG/ML
0.2 INJECTION, SOLUTION INTRAVENOUS
Status: CANCELLED | OUTPATIENT
Start: 2021-03-10 | End: 2021-03-10

## 2021-03-10 RX ORDER — OXYCODONE AND ACETAMINOPHEN 5; 325 MG/1; MG/1
1 TABLET ORAL EVERY 4 HOURS PRN
Status: CANCELLED | OUTPATIENT
Start: 2021-03-10

## 2021-03-10 RX ORDER — LIDOCAINE HYDROCHLORIDE 20 MG/ML
INJECTION, SOLUTION INFILTRATION; PERINEURAL
Status: DISCONTINUED | OUTPATIENT
Start: 2021-03-10 | End: 2021-03-10 | Stop reason: HOSPADM

## 2021-03-10 ASSESSMENT — MIFFLIN-ST. JEOR: SCORE: 1512.36

## 2021-03-10 NOTE — PROGRESS NOTES
Pt ambulated in [place] with steady gait-denies dizziness/lightheadedness. Slime, device nurse, came to see pt and provide education. Discharge instructions re printed and given to pt. Pt and son do not have any questions. PIV d/c'd.   [id number] Pt transported to discharge pharmacy via wheelchair and then son's vehicle by this writer.

## 2021-03-10 NOTE — IP AVS SNAPSHOT
[facility] [address] [address]                                    After Visit Summary   3/10/2021    Brooks Summers    MRN: [id number]           After Visit Summary Signature Page    I have received my discharge instructions, and my questions have been answered. I have discussed any challenges I see with this plan with the nurse or doctor.    ..........................................................................................................................................  Patient/Patient Representative Signature      ..........................................................................................................................................  Patient Representative Print Name and Relationship to Patient    ..................................................               ................................................  Date                                   Time    ..........................................................................................................................................  Reviewed by Signature/Title    ...................................................              ..............................................  Date                                               Time          22EPIC Rev 08/18

## 2021-03-10 NOTE — H&P
Cardiac Electrophysiology History and Physical      Reason For Admission: CRT-D Changeout     HPI:  75 year old male with prior HFrEF (recovered) and AVR s/p replacement secondary to endocarditis.  His device is at [facility] and he presents for changeout.  He has no recent changes since his last visit with Dr. Be.  He feels well.     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 Tx with Dapto/RIFx 8 weeks.     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     Paroxysmal A-fib (H)     Post-op 7/14/2011, 1/26/13     Rotator Cuff (Capsule) Sprain and Strain      S/P aortic valve replacement     7/14/2011, re-do 1/25/13 due to endocarditis     Smoking hx      Thrombocytopenia (H)        Past Surgical  History:      Past Surgical History:   Procedure Laterality Date     ANESTHESIA CARDIOVERSION  7/18/2011    Procedure:ANESTHESIA CARDIOVERSION; Cardioversion; Surgeon:GENERIC ANESTHESIA PROVIDER; Location:[facility] OR     COLONOSCOPY       COLONOSCOPY N/A 11/19/2018    Procedure: COLONOSCOPY;  Surgeon: Herman Mcginnis MD;  Location: [facility]     CORONARY ANGIOGRAPHY ADULT ORDER       CYSTOSCOPY, BIOPSY URETHRA N/A 4/3/2017    Procedure: CYSTOSCOPY, BIOPSY URETHRA;  Surgeon: Marlena Mora MD;  Location: [facility] OR     ENDOSCOPY UPPER, COLONOSCOPY, COMBINED       HC REMOV & REPLACE IMPLT DEFIB PULSE GEN MULT LEAD  12/3/2015          HEMORRHOID SURGERY       IMPLANT AUTOMATIC IMPLANTABLE CARDIOVERTER DEFIBRILLATOR       MOHS MICROGRAPHIC PROCEDURE       ORTHOPEDIC SURGERY      shoulder,right     REDO STERNOTOMY REPLACE VALVE AORTIC  1/25/2013    Procedure: REDO STERNOTOMY REPLACE VALVE AORTIC;  Redo Sternotomy, Redo Aortic  Valve Replacement on pump oxygenator. Intraoperative Transesophageal Echocardiogram;  Surgeon: Stephanie Hampton MD;  Location: [facility] OR     REPAIR VALVE MITRAL  2013     REPLACE VALVE AORTIC  2011    Procedure:REPLACE VALVE AORTIC; Median Sternotomy, Bioprosthesis,  Aortic Valve replacement on pump with oxygenator. Intra-operative Transesophogeal Echocardiogram.; Surgeon:STEPHANIE HAMPTON; Location:[facility] OR     teeth extractions       TRANSPLANT         Family History:      Family History   Problem Relation Age of Onset     C.A.D. Father 68        bypass, carotid      Prostate Cancer Father 70     Heart Disease Father      Cancer Mother 92        stomach, colon     Hypertension Mother      Retinal detachment Mother      Cancer Brother 64        lung cancer, petroleum     Cancer Brother      Glaucoma No family hx of      Macular Degeneration No family hx of      Strabismus No family hx of      Glasses (<7 y/o) No family hx of        Past Medical History:      Social History     Socioeconomic History     Marital status:      Spouse name: Not on file     Number of children: Not on file     Years of education: Not on file     Highest education level: Not on file   Occupational History     Not on file   Social Needs     Financial resource strain: Not on file     Food insecurity     Worry: Not on file     Inability: Not on file     Transportation needs     Medical: Not on file     Non-medical: Not on file   Tobacco Use     Smoking status: Former Smoker     Packs/day: 1.00     Years: 20.00     Pack years: 20.00     Types: Cigarettes     Quit date: 1989     Years since quittin.2     Smokeless tobacco: Never Used   Substance and Sexual Activity     Alcohol use: Yes     Alcohol/week: 2.0 standard drinks     Drug use: No     Sexual activity: Yes     Partners: Female     Birth control/protection: None     Comment:    Lifestyle     Physical activity     Days per week: Not on file     Minutes per session: Not on  "file     Stress: Not on file   Relationships     Social connections     Talks on phone: Not on file     Gets together: Not on file     Attends Scientology service: Not on file     Active member of club or organization: Not on file     Attends meetings of clubs or organizations: Not on file     Relationship status: Not on file     Intimate partner violence     Fear of current or ex partner: Not on file     Emotionally abused: Not on file     Physically abused: Not on file     Forced sexual activity: Not on file   Other Topics Concern     Parent/sibling w/ CABG, MI or angioplasty before 65F 55M? No   Social History Narrative     since 2/1982 2 children 4 grandchildren.    Carpet sales:  Semi retired. Athlete in school, Golf, fish, yardwork       Past Medical History:    A complete review of systems is negative except as noted above in the HPI.    Physical Exam:   Vitals: BP (!) 130/90 (BP Location: Right arm)   Pulse 68   Temp 97.5  F (36.4  C) (Oral)   Resp 16   Ht 1.778 m (5' 10\")   Wt 77.1 kg (170 lb)   SpO2 96%   BMI 24.39 kg/m    Gen: Well appearing    HEENT: NC AT  Neck: No JVD  Neuro: A&OX4  Skin: Warm, dry         Relevant labs, imaging, medications and procedures were reviewed.    Assessment and Recommendations:    76 y/o male with recovered HFrEF and AVR on AC presents for CRT-D changeout.  Will proceed as planned.    Kobe Alexis MD   Cardiac electrophysiology fellow  &p  "

## 2021-03-10 NOTE — PROGRESS NOTES
Prepped for pacemaker generator change. Denies pain.  Son waiting in room with him.  H & P per  2/8/21.  Consent needed.  Labs pending.

## 2021-03-10 NOTE — PROGRESS NOTES
Returned from CCL to 2A post procedure.  Denies pain.  Left upper chest pacer insertion site C/D/I with pressure dressing in place over incision.  Son in room with him.  Dr. Be has talked with patient & his family.  Provided with po food & fluids.  [employer] rep here now to discuss care.

## 2021-03-10 NOTE — PROGRESS NOTES
Patient seen on 2A for discharge teaching. Patient is s/p ICD generator change on 3/10/21. ICD discharge teaching provided in written and verbal form. Patient verbalized understanding of instructions. Plan for patient to return to clinic on 3/17/21 for an incision and device check.

## 2021-03-10 NOTE — IP AVS SNAPSHOT
MRN:[id number]                      After Visit Summary   3/10/2021    Brooks Sumemrs    MRN: [id number]           Visit Information        Department      3/10/2021  6:35 AM [facility] [address] [place]          Review of your medicines      START taking       Dose / Directions   cephalexin 500 MG tablet  Used for: ICD (implantable cardioverter-defibrillator) battery depletion      Dose: 500 mg  Take 500 mg by mouth 3 times daily for 5 days  Quantity: 15 tablet  Refills: 0     * oxyCODONE-acetaminophen 5-325 MG tablet  Commonly known as: Percocet  Used for: ICD (implantable cardioverter-defibrillator) battery depletion      Dose: 1 tablet  Take 1 tablet by mouth every 6 hours as needed for severe pain  Quantity: 12 tablet  Refills: 0     * oxyCODONE-acetaminophen 5-325 MG tablet  Commonly known as: Percocet  Used for: Heart failure, chronic systolic (H), Automatic implantable cardioverter-defibrillator in situ- [employer], Bi-Ventricular- INT dependent      Dose: 1 tablet  Take 1 tablet by mouth every 6 hours as needed for severe pain  Quantity: 12 tablet  Refills: 0         * This list has 2 medication(s) that are the same as other medications prescribed for you. Read the directions carefully, and ask your doctor or other care provider to review them with you.            CONTINUE these medicines which have NOT CHANGED       Dose / Directions   allopurinol 100 MG tablet  Commonly known as: ZYLOPRIM  Used for: Idiopathic gout, unspecified chronicity, unspecified site      Dose: 100 mg  Take 1 tablet (100 mg) by mouth every evening  Quantity: 90 tablet  Refills: 0     levETIRAcetam 500 MG tablet  Commonly known as: KEPPRA  Used for: Recurrent seizures (H)      Dose: 500 mg  Take 1 tablet (500 mg) by mouth 2 times daily Start with 250 mg twice daily for 2 weeks, then 500 mg twice daily.  Quantity: 60 tablet  Refills: 11     losartan 25 MG tablet  Commonly known as: COZAAR  Used for: Benign  essential hypertension      Dose: 12.5 mg  Take 0.5 tablets (12.5 mg) by mouth daily Take (1/2) tab daily  Quantity: 45 tablet  Refills: 3     metoprolol succinate ER 50 MG 24 hr tablet  Commonly known as: TOPROL-XL  Used for: Benign essential hypertension      Dose: 50 mg  Take 1 tablet (50 mg) by mouth daily Call clinic to schedule follow up appointment.  Quantity: 90 tablet  Refills: 0     Multivitamin Adults 50+ Tabs  Used for: Flu vaccine need, Aortic valve stenosis, severe, Chronic systolic heart failure (H), S/P aortic valve replacement      Refills: 0     PROVIDER DOSED MANAGED WARFARIN (COUMADIN) OUTPATIENT      Per coumadin clinic  Refills: 0     simvastatin 40 MG tablet  Commonly known as: ZOCOR  Used for: Hyperlipidemia LDL goal <100      Dose: 40 mg  Take 1 tablet (40 mg) by mouth At Bedtime  Quantity: 90 tablet  Refills: 0     VITAMIN D (CHOLECALCIFEROL) PO  Used for: Flu vaccine need, Aortic valve stenosis, severe, Chronic systolic heart failure (H), S/P aortic valve replacement      Dose: 1,000 Units  Take 1,000 Units by mouth daily  Refills: 0     warfarin ANTICOAGULANT 5 MG tablet  Commonly known as: COUMADIN  Used for: S/P AVR (aortic valve replacement), Paroxysmal atrial fibrillation (H), Long term current use of anticoagulant therapy, S/P aortic valve replacement      Take as directed. If you are unsure how to take this medication, talk to your nurse or doctor.  Original instructions: Take 10mg Mon/Fri; 7.5mg all other days or as directed by the Anticoagulation Clinic  Quantity: 160 tablet  Refills: 0           Where to get your medicines      Some of these will need a paper prescription and others can be bought over the counter. Ask your nurse if you have questions.    Bring a paper prescription for each of these medications  cephalexin 500 MG tablet  oxyCODONE-acetaminophen 5-325 MG tablet  oxyCODONE-acetaminophen 5-325 MG tablet           Prescriptions were sent or printed at these locations  (3 Prescriptions)            Other Prescriptions                Printed at Department/Unit printer (3 of 3)         cephalexin 500 MG tablet               oxyCODONE-acetaminophen (PERCOCET) 5-325 MG tablet               oxyCODONE-acetaminophen (PERCOCET) 5-325 MG tablet                Protect others around you: Learn how to safely use, store and throw away your medicines at www.disposemymeds.org.       Follow-ups after your visit       Your next 10 appointments already scheduled    Mar 17, 2021 10:00 AM  (Arrive by 9:45 AM)  CARDIAC DEVICE CHECK - IN CLINIC with UC CV DEVICE 1  [facility] ([facility] ) [address] [place] [address]  3rd Floor  [facility] [address]  [phone]      Mar 22, 2021  1:00 PM  INR LAB with BE LAB  [facility] ([facility] ) [address] [facility] [address]  [phone]      Apr 28, 2021 12:00 AM  CARDIAC DEVICE CHECK - REMOTE with UC ICD REMOTE  [facility] ([facility] ) [address] [place] [address]  [address]  [facility] [address]  [phone]      Jun 11, 2021  1:30 PM  (Arrive by 1:15 PM)  CARDIAC DEVICE CHECK - IN CLINIC with UC CV DEVICE 1  [facility] ([facility] ) [address] [place] [address]  3rd [facility] [address] [phone]      Jun 11, 2021  2:00 PM  RETURN ARRHYTHMIA with SUKH Robb CNP  [facility] ([facility] ) [address] [place] [id number]  [phone]         Care Instructions       After Care Instructions     Discharge Instructions - Follow up with Device Check RN       Follow up with Device Check RN in 7-10 days.         Discharge Instructions - Follow up with Device Check RN       Follow up with Device Check RN in 7-10 days.          Discharge Instructions - Keep incision dry for 72 hours      Keep incision dry for 72 hours (unless Derma Coley was applied)         Discharge Instructions - Keep incision dry for 72 hours      Keep incision dry for 72 hours (unless Derma Coley was applied)           Further instructions from your care team         Home Care after an ICD Battery Change    Wound care:  Keep your incision (surgery wound) dry for 3 days.  After 3 days, you may remove the outer bandage.  Keep the strips of tape on.  They will be removed at your clinic visit.  Check for signs of infection each day.  These include increased redness, swelling, drainage or a fever over 101 F (38.3 C).  Call us immediately if you see any of these signs.  If there are no signs of infection, you may shower in 3 days.  Do not submerge the incision (in a bath tub, hot tub, or swimming pool) until fully healed.    Pain:   You may have mild to moderate pain for 3 to 5 days.  Take acetaminophen (Tylenol) or ibuprofen (Advil) for the pain.  Call us if the pain is severe or lasts more than 5 days.    Activity:  After 24 hours, slowly return to your normal activities.  Healing will take 4 to 6 weeks.    Do not drive for 24 hours.    For 3 days, do not raise your affected arm above your shoulder.    For 10 days, do not use your affected arm to push, pull or lift anything over 10 pounds.    Avoid climbing a ladder alone. It is best to stay within 4 feet off the ground.    Do not go swimming or boating alone.     Avoid anything that may cause rough contact or a hard hit to your chest.  This includes football, hockey, and other contact sports.    Follow Up Visits:  Return to the clinic in 7 to 10 days to have your device and wound checked.      Telling others about your device:  Before you have any medical tests or treatments, tell the doctors, dentists, and other care providers about your device.  There are a few tests and treatments that may interfere with your  device.  (These include MRI, radiation therapy, electrocautery, and others.)  Your care team may need to take special steps to keep you safe.  Before you leave the hospital, you will receive a temporary ID card.  A permanent card will be mailed to you about 6 to 8 weeks later.  Always carry the ID card with you.  It has important details about your device.  You should also get a MedicAlert ID.  Please ask us for a MedicAlert brochure, or go to [email or web address].    Safety near electrical equipment:  All of these are safe to use when in good repair -    Microwaves    Radios    Cordless phone    Remote controls    Small electrical tools  Cell phones: Keep cell phones at least 6 inches from your device.  Do not carry the phone in a pocket near your device.  Security sheldon: It is okay to walk through security sheldon at the airports and department stores.  Tell airport security that you have a defibrillator.  They should keep the screening wand at least 6 inches from your device.  Full-body scanners are safe.    Avoid the following:    MRI tests in the hospital unless you have a MRI safe defibrillator.    Arc welding, chain saws and high-powered industrial or commercial tools.    Power lines, power plants and large power generators.    Electric body fat scales.    Magnetic mattress pads or pillow.    What to do after a shock from your ICD:  1.      Stop what you re doing and rest.  2.      If you feel fine before and after the shock, call the device clinic.  3.      If you feel unwell or receive more than one shock, call 911 or go to the          emergency room.  A shock could mean that your condition has changed and you may need to see a doctor.    Questions?  Please call [facility].    General inquiry: [phone]    Device Nurse:          Business Hours:  [phone]                          After Hours:  [phone]   Choose option 4, then ask for the on-call device nurse at job code  [id number].    Your next device clinic appointment is scheduled on:    3/17/21 at 10 am.                                                [facility] - [address]  [address] [place]  [address]                  Information about OPIOIDS    PRESCRIPTION OPIOIDS: WHAT YOU NEED TO KNOW   We gave you an opioid (narcotic) pain medicine. It is important to manage your pain, but opioids are not always the best choice. You should first try all the other options your care team gave you. Take this medicine for as short a time (and as few doses) as possible.    Some activities can increase your pain, such as bandage changes or therapy sessions. It may help to take your pain medicine 30 to 60 minutes before these activities. Reduce your stress by getting enough sleep, working on hobbies you enjoy and practicing relaxation or meditation. Talk to your care team about ways to manage your pain beyond prescription opioids.    These medicines have risks:    DO NOT drive when on new or higher doses of pain medicine. These medicines can affect your alertness and reaction times, and you could be arrested for driving under the influence (DUI). If you need to use opioids long-term, talk to your care team about driving.    DO NOT operate heavy machinery    DO NOT do any other dangerous activities while taking these medicines.    DO NOT drink any alcohol while taking these medicines.     If the opioid prescribed includes acetaminophen, DO NOT take with any other medicines that contain acetaminophen. Read all labels carefully. Look for the word  acetaminophen  or  Tylenol.  Ask your pharmacist if you have questions or are unsure.    You can get addicted to pain medicines, especially if you have a history of addiction (chemical, alcohol or substance dependence). Talk to your care team about ways to reduce this risk.    All opioids tend to cause constipation. Drink plenty of water and  "eat foods that have a lot of fiber, such as fruits, vegetables, prune juice, apple juice and high-fiber cereal. Take a laxative (Miralax, milk of magnesia, Colace, Senna) if you don t move your bowels at least every other day. Other side effects include upset stomach, sleepiness, dizziness, throwing up, tolerance (needing more of the medicine to have the same effect), physical dependence and slowed breathing.    Store your pills in a secure place, locked if possible. We will not replace any lost or stolen medicine. If you don t finish your medicine, please throw away (dispose) as directed by your pharmacist. Medication waste collection kiosks are conveniently located at all [facility] retail pharmacy locations.  The [place] Pollution Control Agency has more information about safe disposal: https://www.pca.[facility].[place].us/living-green/managing-unwanted-medications       Additional Information About Your Visit       MyChart Information    [employer] gives you secure access to your electronic health record. If you see a primary care provider, you can also send messages to your care team and make appointments. If you have questions, please call your primary care clinic.  If you do not have a primary care provider, please call [phone] and they will assist you.       Care EveryWhere ID    This is your Care EveryWhere ID. This could be used by other organizations to access your [facility] medical records  [id number]       Your Vitals Were  Most recent update: 3/10/2021 11:50 AM    Blood Pressure   114/75 (BP Location: Right arm)          Pulse   70          Temperature   97.5  F (36.4  C) (Oral)          Respirations   16          Height   1.778 m (5' 10\")             Weight   77.1 kg (170 lb)    Pulse Oximetry   95%    BMI (Body Mass Index)   24.39 kg/m           Primary Care Provider Office Phone # Fax #    Edin Mays [phone]      Equal Access to Services    KUN RALPH AH: " Hadii aad ku funmilayoloretoo Sosilasali, waaxda luqadaha, qaybta kaalmada adedariana, elisabeth quinones. So [facility] [phone].    ATENCIÓN: Si habla naomie, tiene a herrera disposición servicios gratuitos de asistencia lingüística. Llame al [phone].    We comply with applicable federal and state civil rights laws, including the [place] Human Rights Act. We do not discriminate on the basis of race, color, creed, Spiritism, national origin, marital status, age, disability, sex, sexual orientation, or gender identity.    If you would like an itemization of your charges they will now be available in [employer] 30 days after discharge. To access the itemized statements in [employer] go to billing/billing summary. From there select view account. There will be multiple tabs showing an overview of your account, detail, payments, and communications. From the communications tab you can see your monthly statements, your itemized statements, and any billing letters generated for your account. If you do not have a [employer] account and need help getting access please contact [employer] support at [phone].  If you would prefer to have your itemized statements mailed please contact our automated itemized bill request line at [phone] option  2.       Thank you!    Thank you for choosing [facility] for your care. Our goal is always to provide you with excellent care. Hearing back from our patients is one way we can continue to improve our services. Please take a few minutes to complete the written survey that you may receive in the mail after you visit with us. Thank you!            Medication List      Medications          Morning Afternoon Evening Bedtime As Needed    allopurinol 100 MG tablet  Also known as: ZYLOPRIM  INSTRUCTIONS: Take 1 tablet (100 mg) by mouth every evening                     cephalexin 500 MG tablet  INSTRUCTIONS: Take 500 mg by mouth 3 times daily for 5 days                     levETIRAcetam 500  MG tablet  Also known as: KEPPRA  INSTRUCTIONS: Take 1 tablet (500 mg) by mouth 2 times daily Start with 250 mg twice daily for 2 weeks, then 500 mg twice daily.                     losartan 25 MG tablet  Also known as: COZAAR  INSTRUCTIONS: Take 0.5 tablets (12.5 mg) by mouth daily Take (1/2) tab daily                     metoprolol succinate ER 50 MG 24 hr tablet  Also known as: TOPROL-XL  INSTRUCTIONS: Take 1 tablet (50 mg) by mouth daily Call clinic to schedule follow up appointment.                     Multivitamin Adults 50+ Tabs                     * oxyCODONE-acetaminophen 5-325 MG tablet  Also known as: Percocet  INSTRUCTIONS: Take 1 tablet by mouth every 6 hours as needed for severe pain                     * oxyCODONE-acetaminophen 5-325 MG tablet  Also known as: Percocet  INSTRUCTIONS: Take 1 tablet by mouth every 6 hours as needed for severe pain                     PROVIDER DOSED MANAGED WARFARIN (COUMADIN) OUTPATIENT  INSTRUCTIONS: Per coumadin clinic                     simvastatin 40 MG tablet  Also known as: ZOCOR  INSTRUCTIONS: Take 1 tablet (40 mg) by mouth At Bedtime                     VITAMIN D (CHOLECALCIFEROL) PO  INSTRUCTIONS: Take 1,000 Units by mouth daily                     warfarin ANTICOAGULANT 5 MG tablet  Also known as: COUMADIN  Take as directed. If you are unsure how to take this medication, talk to your nurse or doctor.  Original instructions: Take 10mg Mon/Fri; 7.5mg all other days or as directed by the Anticoagulation Clinic                        * This list has 2 medication(s) that are the same as other medications prescribed for you. Read the directions carefully, and ask your doctor or other care provider to review them with you.

## 2021-03-10 NOTE — PROGRESS NOTES
ANTICOAGULATION  MANAGEMENT: Discharge Review    Brooks COLTON Summers chart reviewed for anticoagulation continuity of care    Outpatient surgery/procedure on 3/10/21 for ICD placement.    Discharge disposition: Home    Results:    Recent labs: (last 7 days)     03/08/21  1009 03/10/21  0657   INR 2.90* 2.32*     Anticoagulation inpatient management:     not applicable     Anticoagulation discharge instructions:     Warfarin dosing: home regimen continued   Bridging: No   INR goal change: No      Medication changes affecting anticoagulation: Yes: Percocet, Cephalexin    Additional factors affecting anticoagulation: Yes: Procedure.    Plan     Agree with discharge plan for follow up on 3/17    Left a detailed message for Brooks. Requested a call back to schedule an INR next week.    Anticoagulation Calendar updated    Lo Worley RN

## 2021-03-10 NOTE — Clinical Note
Generator attached  
Generator inserted into pocket.  
Incision made.  
Irrigated with antibiotic solution.   
Lab results reviewed and abnormals discussed with physician.  
MRI Compatible.
Patient education provided.   
Pocket closure completed by physician.  
Procedure is scheduled in [facility].  
Pt chest scrubbed with geovanna wipes
Report given to 2a RN
The ECG shows a paced rhythm. ECG rate  = 60 bpm. 
ambulatory

## 2021-03-10 NOTE — PROGRESS NOTES
Discharge instructions reviewed with patient & his son.  No change in right upper chest incision, pressure dressing remains in place.  Taking po food  & fluids.

## 2021-03-10 NOTE — DISCHARGE INSTRUCTIONS
Home Care after an ICD Battery Change    Wound care:  Keep your incision (surgery wound) dry for 3 days.  After 3 days, you may remove the outer bandage.  Keep the strips of tape on.  They will be removed at your clinic visit.  Check for signs of infection each day.  These include increased redness, swelling, drainage or a fever over 101 F (38.3 C).  Call us immediately if you see any of these signs.  If there are no signs of infection, you may shower in 3 days.  Do not submerge the incision (in a bath tub, hot tub, or swimming pool) until fully healed.    Pain:   You may have mild to moderate pain for 3 to 5 days.  Take acetaminophen (Tylenol) or ibuprofen (Advil) for the pain.  Call us if the pain is severe or lasts more than 5 days.    Activity:  After 24 hours, slowly return to your normal activities.  Healing will take 4 to 6 weeks.    Do not drive for 24 hours.    For 3 days, do not raise your affected arm above your shoulder.    For 10 days, do not use your affected arm to push, pull or lift anything over 10 pounds.    Avoid climbing a ladder alone. It is best to stay within 4 feet off the ground.    Do not go swimming or boating alone.     Avoid anything that may cause rough contact or a hard hit to your chest.  This includes football, hockey, and other contact sports.    Follow Up Visits:  Return to the clinic in 7 to 10 days to have your device and wound checked.      Telling others about your device:  Before you have any medical tests or treatments, tell the doctors, dentists, and other care providers about your device.  There are a few tests and treatments that may interfere with your device.  (These include MRI, radiation therapy, electrocautery, and others.)  Your care team may need to take special steps to keep you safe.  Before you leave the hospital, you will receive a temporary ID card.  A permanent card will be mailed to you about 6 to 8 weeks later.  Always carry the ID card with you.  It has  important details about your device.  You should also get a MedicAlert ID.  Please ask us for a MedicAlert brochure, or go to [email or web address].    Safety near electrical equipment:  All of these are safe to use when in good repair -    Microwaves    Radios    Cordless phone    Remote controls    Small electrical tools  Cell phones: Keep cell phones at least 6 inches from your device.  Do not carry the phone in a pocket near your device.  Security sheldon: It is okay to walk through security sheldon at the airports and department stores.  Tell airport security that you have a defibrillator.  They should keep the screening wand at least 6 inches from your device.  Full-body scanners are safe.    Avoid the following:    MRI tests in the hospital unless you have a MRI safe defibrillator.    Arc welding, chain saws and high-powered industrial or commercial tools.    Power lines, power plants and large power generators.    Electric body fat scales.    Magnetic mattress pads or pillow.    What to do after a shock from your ICD:  1.      Stop what you re doing and rest.  2.      If you feel fine before and after the shock, call the device clinic.  3.      If you feel unwell or receive more than one shock, call 911 or go to the          emergency room.  A shock could mean that your condition has changed and you may need to see a doctor.    Questions?  Please call [facility].    General inquiry: [phone]    Device Nurse:          Business Hours:  [phone]                          After Hours:  [phone]   Choose option 4, then ask for the on-call device nurse at job code [id number].    Your next device clinic appointment is scheduled on:    3/17/21 at 10 am.                                                [facility] - Clinic [address]  [address]

## 2021-03-11 LAB — INTERPRETATION ECG - MUSE: NORMAL

## 2021-03-15 ENCOUNTER — ANTICOAGULATION THERAPY VISIT (OUTPATIENT)
Dept: ANTICOAGULATION | Facility: CLINIC | Age: 76
End: 2021-03-15

## 2021-03-15 ENCOUNTER — PATIENT OUTREACH (OUTPATIENT)
Dept: CARDIOLOGY | Facility: CLINIC | Age: 76
End: 2021-03-15

## 2021-03-15 DIAGNOSIS — I48.0 PAROXYSMAL ATRIAL FIBRILLATION (H): ICD-10-CM

## 2021-03-15 DIAGNOSIS — Z79.01 LONG TERM CURRENT USE OF ANTICOAGULANT THERAPY: ICD-10-CM

## 2021-03-15 DIAGNOSIS — Z95.2 S/P AORTIC VALVE REPLACEMENT: ICD-10-CM

## 2021-03-15 LAB — INR PPP: 2.6 (ref 0.86–1.14)

## 2021-03-15 PROCEDURE — 36416 COLLJ CAPILLARY BLOOD SPEC: CPT | Performed by: INTERNAL MEDICINE

## 2021-03-15 PROCEDURE — 85610 PROTHROMBIN TIME: CPT | Performed by: INTERNAL MEDICINE

## 2021-03-15 NOTE — TELEPHONE ENCOUNTER
Post-Procedure Follow-up Phone Call (Electrophysiology)    Procedure: CRT-D generator change.  Indication: CRT-D at SIMONE.  Date of Call: 3/15/2021  Date of Procedure: 3/10/2021  Physician: Mekhi Be MD    Procedure Site    Left  Closure: Steri-strips + bandage Bandage has been removed. Not red, hot, tender, or swollen. Steri-strips have been left in place; Advised these will be removed at device visit on 3/17. Denies presence of fluid or blood draining from site.    Symptoms Denies chest pain and/or shortness of breath. Denies pain.    Infection Denies chills or fever. Denies hives, rash, or unusual itching.     Rhythm No shocks reported.   Medications Cephalexin  500mg TID x5 days - taking  Oxycodone-acetaminophen 5-325mg, 1 tab q6h for severe pain - not taking   Appointments 3/17/2021 @ 1000 w/ device for device and incision check  6/11/2021 @ 1330 w/ device, 1400 w/ SUKH Camp, CNP.    Patient has a remote on 4/28 -will cancel (Okay per [place]).    Reviewed appointment dates and times with patient. Instructed patient to keep appointments.     Education The following was reviewed with the patient:    Keep the strips of tape on. They will be removed at your clinic visit. Check for signs of infection each day. These include increased redness, swelling, drainage or a fever over 101 F (38.3 C). Call us immediately if you see any of these signs. Do not submerge the incision (in a bath tub, hot tub, or swimming pool) until fully healed. Healing will take 4 to 6 weeks. For 5 more days, do not use your affected arm to push, pull or lift anything over 10 pounds. Avoid climbing a ladder alone. It is best to stay within 4 feet off the ground. Do not go swimming or boating alone. Avoid anything that may cause rough contact or a hard hit to your chest. This includes football, hockey, and  other contact sports.   Patient Comments Patient is feeling well.     Patient verbalized understanding of information in  call and will call with any other questions at [phone].    Katelyn Avendano, BSN, RN, PHN  Electrophysiology Nurse Coordinator

## 2021-03-15 NOTE — PROGRESS NOTES
ANTICOAGULATION MANAGEMENT     Patient Name:  Brooks Summers  Date:  3/15/2021    ASSESSMENT /SUBJECTIVE:    Today's INR result of 2.60 is therapeutic. Goal INR of 2.0-3.0      Warfarin dose taken: Warfarin taken as instructed    Diet: No new diet changes affecting INR    Medication changes/ interactions: Patient completed his cephalexin    Previous INR: Therapeutic 2.32    S/S of bleeding or thromboembolism: No    New injury or illness: No    Upcoming surgery, procedure or cardioversion: No    Additional findings: None      PLAN:    Telephone call with Brooks regarding INR result and instructed:     Warfarin Dosing Instructions: Continue your current warfarin dose 10 mg every Mon, Fri; 7.5 mg all other days    Instructed patient to follow up no later than: 2 weeks  Lab visit scheduled    Education provided: Target INR goal and significance of current INR result and Importance of notifying clinic for changes in medications; a sooner lab recheck maybe needed.      Brooks verbalizes understanding and agrees to warfarin dosing plan.    Instructed to call the Anticoagulation Clinic for any changes, questions or concerns. (#[phone])        Gina Medina RN      OBJECTIVE:  Recent labs: (last 7 days)     03/10/21  0657 03/15/21  1521   INR 2.32* 2.60*         No question data found.  Anticoagulation Summary  As of 3/15/2021    INR goal:  2.0-3.0   TTR:  86.2 % (1 y)   INR used for dosin.60 (3/15/2021)   Warfarin maintenance plan:  10 mg (5 mg x 2) every Mon, Fri; 7.5 mg (5 mg x 1.5) all other days   Full warfarin instructions:  10 mg every Mon, Fri; 7.5 mg all other days   Weekly warfarin total:  57.5 mg   No change documented:  Gina Medina RN   Plan last modified:  Beth Mijares RN (2021)   Next INR check:  3/29/2021   Priority:  Maintenance   Target end date:  Indefinite    Indications    S/P aortic valve replacement [Z95.2]  Paroxysmal atrial fibrillation (H) [I48.0]  Long term current use of  anticoagulant therapy [Z79.01]             Anticoagulation Episode Summary     INR check location:      Preferred lab:      Send INR reminders to:  JONATHAN OWUSU    Comments:   * warfarin 5 mg tabs. Aortic 21 mm Johnson Perimount Magna Tissue Valve.       Anticoagulation Care Providers     Provider Role Specialty Phone number    Edin Mays MD Referring Internal Medicine [phone]

## 2021-03-17 ENCOUNTER — ANCILLARY PROCEDURE (OUTPATIENT)
Dept: CARDIOLOGY | Facility: CLINIC | Age: 76
End: 2021-03-17
Payer: MEDICARE

## 2021-03-17 DIAGNOSIS — I42.9 CARDIOMYOPATHY (H): ICD-10-CM

## 2021-03-17 DIAGNOSIS — Z95.810 S/P IMPLANTATION OF AUTOMATIC CARDIOVERTER/DEFIBRILLATOR (AICD): ICD-10-CM

## 2021-03-17 PROCEDURE — 93284 PRGRMG EVAL IMPLANTABLE DFB: CPT | Performed by: INTERNAL MEDICINE

## 2021-03-17 NOTE — PATIENT INSTRUCTIONS
It was a pleasure to see you in clinic today.  Please do not hesitate to call with any questions or concerns.  We look forward to seeing you in clinic at your next device check in 3 months.    Slime Churchill, RN, MS, CCRN  Electrophysiology Nurse Clinician  [facility]    During Business Hours Please Call:  [phone]  After Hours Please Call:  [phone] - select option #4 and ask for job code [id number]

## 2021-03-21 LAB
MDC_IDC_LEAD_IMPLANT_DT: NORMAL
MDC_IDC_LEAD_LOCATION: NORMAL
MDC_IDC_LEAD_LOCATION_DETAIL_1: NORMAL
MDC_IDC_LEAD_MFG: NORMAL
MDC_IDC_LEAD_MODEL: NORMAL
MDC_IDC_LEAD_POLARITY_TYPE: NORMAL
MDC_IDC_LEAD_SERIAL: NORMAL
MDC_IDC_LEAD_SPECIAL_FUNCTION: NORMAL
MDC_IDC_MSMT_BATTERY_DTM: NORMAL
MDC_IDC_MSMT_BATTERY_REMAINING_LONGEVITY: 62 MO
MDC_IDC_MSMT_BATTERY_RRT_TRIGGER: NORMAL
MDC_IDC_MSMT_BATTERY_VOLTAGE: 3.03 V
MDC_IDC_MSMT_CAP_CHARGE_ENERGY: 40 J
MDC_IDC_MSMT_CAP_CHARGE_TIME: 0 S
MDC_IDC_MSMT_CAP_CHARGE_TYPE: NORMAL
MDC_IDC_MSMT_LEADCHNL_LV_IMPEDANCE_VALUE: 323 OHM
MDC_IDC_MSMT_LEADCHNL_LV_IMPEDANCE_VALUE: 399 OHM
MDC_IDC_MSMT_LEADCHNL_LV_IMPEDANCE_VALUE: 665 OHM
MDC_IDC_MSMT_LEADCHNL_LV_PACING_THRESHOLD_AMPLITUDE: 2.5 V
MDC_IDC_MSMT_LEADCHNL_LV_PACING_THRESHOLD_AMPLITUDE: 3.25 V
MDC_IDC_MSMT_LEADCHNL_LV_PACING_THRESHOLD_PULSEWIDTH: 0.4 MS
MDC_IDC_MSMT_LEADCHNL_LV_PACING_THRESHOLD_PULSEWIDTH: 0.4 MS
MDC_IDC_MSMT_LEADCHNL_RA_IMPEDANCE_VALUE: 475 OHM
MDC_IDC_MSMT_LEADCHNL_RA_PACING_THRESHOLD_AMPLITUDE: 0.62 V
MDC_IDC_MSMT_LEADCHNL_RA_PACING_THRESHOLD_AMPLITUDE: 0.75 V
MDC_IDC_MSMT_LEADCHNL_RA_PACING_THRESHOLD_PULSEWIDTH: 0.4 MS
MDC_IDC_MSMT_LEADCHNL_RA_PACING_THRESHOLD_PULSEWIDTH: 0.4 MS
MDC_IDC_MSMT_LEADCHNL_RA_SENSING_INTR_AMPL: 1.3 MV
MDC_IDC_MSMT_LEADCHNL_RV_IMPEDANCE_VALUE: 342 OHM
MDC_IDC_MSMT_LEADCHNL_RV_IMPEDANCE_VALUE: 418 OHM
MDC_IDC_MSMT_LEADCHNL_RV_PACING_THRESHOLD_AMPLITUDE: 0.62 V
MDC_IDC_MSMT_LEADCHNL_RV_PACING_THRESHOLD_AMPLITUDE: 0.75 V
MDC_IDC_MSMT_LEADCHNL_RV_PACING_THRESHOLD_PULSEWIDTH: 0.4 MS
MDC_IDC_MSMT_LEADCHNL_RV_PACING_THRESHOLD_PULSEWIDTH: 0.4 MS
MDC_IDC_MSMT_LEADCHNL_RV_SENSING_INTR_AMPL: 21.5 MV
MDC_IDC_PG_IMPLANT_DTM: NORMAL
MDC_IDC_PG_MFG: NORMAL
MDC_IDC_PG_MODEL: NORMAL
MDC_IDC_PG_SERIAL: NORMAL
MDC_IDC_PG_TYPE: NORMAL
MDC_IDC_SESS_CLINIC_NAME: NORMAL
MDC_IDC_SESS_DTM: NORMAL
MDC_IDC_SESS_TYPE: NORMAL
MDC_IDC_SET_BRADY_AT_MODE_SWITCH_RATE: 171 {BEATS}/MIN
MDC_IDC_SET_BRADY_LOWRATE: 60 {BEATS}/MIN
MDC_IDC_SET_BRADY_MAX_SENSOR_RATE: 120 {BEATS}/MIN
MDC_IDC_SET_BRADY_MAX_TRACKING_RATE: 130 {BEATS}/MIN
MDC_IDC_SET_BRADY_MODE: NORMAL
MDC_IDC_SET_BRADY_NIGHT_RATE: 50 {BEATS}/MIN
MDC_IDC_SET_BRADY_PAV_DELAY_LOW: 150 MS
MDC_IDC_SET_BRADY_SAV_DELAY_LOW: 100 MS
MDC_IDC_SET_CRT_LVRV_DELAY: 0 MS
MDC_IDC_SET_CRT_PACED_CHAMBERS: NORMAL
MDC_IDC_SET_LEADCHNL_LV_PACING_AMPLITUDE: 4.5 V
MDC_IDC_SET_LEADCHNL_LV_PACING_ANODE_ELECTRODE_1: NORMAL
MDC_IDC_SET_LEADCHNL_LV_PACING_ANODE_LOCATION_1: NORMAL
MDC_IDC_SET_LEADCHNL_LV_PACING_CAPTURE_MODE: NORMAL
MDC_IDC_SET_LEADCHNL_LV_PACING_CATHODE_ELECTRODE_1: NORMAL
MDC_IDC_SET_LEADCHNL_LV_PACING_CATHODE_LOCATION_1: NORMAL
MDC_IDC_SET_LEADCHNL_LV_PACING_POLARITY: NORMAL
MDC_IDC_SET_LEADCHNL_LV_PACING_PULSEWIDTH: 0.4 MS
MDC_IDC_SET_LEADCHNL_RA_PACING_AMPLITUDE: 1.5 V
MDC_IDC_SET_LEADCHNL_RA_PACING_ANODE_ELECTRODE_1: NORMAL
MDC_IDC_SET_LEADCHNL_RA_PACING_ANODE_LOCATION_1: NORMAL
MDC_IDC_SET_LEADCHNL_RA_PACING_CAPTURE_MODE: NORMAL
MDC_IDC_SET_LEADCHNL_RA_PACING_CATHODE_ELECTRODE_1: NORMAL
MDC_IDC_SET_LEADCHNL_RA_PACING_CATHODE_LOCATION_1: NORMAL
MDC_IDC_SET_LEADCHNL_RA_PACING_POLARITY: NORMAL
MDC_IDC_SET_LEADCHNL_RA_PACING_PULSEWIDTH: 0.4 MS
MDC_IDC_SET_LEADCHNL_RA_SENSING_ANODE_ELECTRODE_1: NORMAL
MDC_IDC_SET_LEADCHNL_RA_SENSING_ANODE_LOCATION_1: NORMAL
MDC_IDC_SET_LEADCHNL_RA_SENSING_CATHODE_ELECTRODE_1: NORMAL
MDC_IDC_SET_LEADCHNL_RA_SENSING_CATHODE_LOCATION_1: NORMAL
MDC_IDC_SET_LEADCHNL_RA_SENSING_POLARITY: NORMAL
MDC_IDC_SET_LEADCHNL_RA_SENSING_SENSITIVITY: 0.3 MV
MDC_IDC_SET_LEADCHNL_RV_PACING_AMPLITUDE: 2 V
MDC_IDC_SET_LEADCHNL_RV_PACING_ANODE_ELECTRODE_1: NORMAL
MDC_IDC_SET_LEADCHNL_RV_PACING_ANODE_LOCATION_1: NORMAL
MDC_IDC_SET_LEADCHNL_RV_PACING_CAPTURE_MODE: NORMAL
MDC_IDC_SET_LEADCHNL_RV_PACING_CATHODE_ELECTRODE_1: NORMAL
MDC_IDC_SET_LEADCHNL_RV_PACING_CATHODE_LOCATION_1: NORMAL
MDC_IDC_SET_LEADCHNL_RV_PACING_POLARITY: NORMAL
MDC_IDC_SET_LEADCHNL_RV_PACING_PULSEWIDTH: 0.4 MS
MDC_IDC_SET_LEADCHNL_RV_SENSING_ANODE_ELECTRODE_1: NORMAL
MDC_IDC_SET_LEADCHNL_RV_SENSING_ANODE_LOCATION_1: NORMAL
MDC_IDC_SET_LEADCHNL_RV_SENSING_CATHODE_ELECTRODE_1: NORMAL
MDC_IDC_SET_LEADCHNL_RV_SENSING_CATHODE_LOCATION_1: NORMAL
MDC_IDC_SET_LEADCHNL_RV_SENSING_POLARITY: NORMAL
MDC_IDC_SET_LEADCHNL_RV_SENSING_SENSITIVITY: 0.3 MV
MDC_IDC_SET_ZONE_DETECTION_BEATS_DENOMINATOR: 40 {BEATS}
MDC_IDC_SET_ZONE_DETECTION_BEATS_NUMERATOR: 30 {BEATS}
MDC_IDC_SET_ZONE_DETECTION_INTERVAL: 320 MS
MDC_IDC_SET_ZONE_DETECTION_INTERVAL: 350 MS
MDC_IDC_SET_ZONE_DETECTION_INTERVAL: 360 MS
MDC_IDC_SET_ZONE_DETECTION_INTERVAL: 420 MS
MDC_IDC_SET_ZONE_TYPE: NORMAL
MDC_IDC_STAT_AT_BURDEN_PERCENT: 0 %
MDC_IDC_STAT_AT_DTM_END: NORMAL
MDC_IDC_STAT_AT_DTM_START: NORMAL
MDC_IDC_STAT_BRADY_AP_VP_PERCENT: 21.94 %
MDC_IDC_STAT_BRADY_AP_VS_PERCENT: 0.01 %
MDC_IDC_STAT_BRADY_AS_VP_PERCENT: 77.91 %
MDC_IDC_STAT_BRADY_AS_VS_PERCENT: 0.13 %
MDC_IDC_STAT_BRADY_DTM_END: NORMAL
MDC_IDC_STAT_BRADY_DTM_START: NORMAL
MDC_IDC_STAT_BRADY_RA_PERCENT_PACED: 22 %
MDC_IDC_STAT_BRADY_RV_PERCENT_PACED: 99.86 %
MDC_IDC_STAT_CRT_DTM_END: NORMAL
MDC_IDC_STAT_CRT_DTM_START: NORMAL
MDC_IDC_STAT_CRT_LV_PERCENT_PACED: 99.83 %
MDC_IDC_STAT_CRT_PERCENT_PACED: 99.83 %
MDC_IDC_STAT_EPISODE_RECENT_COUNT: 0
MDC_IDC_STAT_EPISODE_RECENT_COUNT_DTM_END: NORMAL
MDC_IDC_STAT_EPISODE_RECENT_COUNT_DTM_START: NORMAL
MDC_IDC_STAT_EPISODE_TOTAL_COUNT: 0
MDC_IDC_STAT_EPISODE_TOTAL_COUNT_DTM_END: NORMAL
MDC_IDC_STAT_EPISODE_TOTAL_COUNT_DTM_START: NORMAL
MDC_IDC_STAT_EPISODE_TYPE: NORMAL
MDC_IDC_STAT_TACHYTHERAPY_ATP_DELIVERED_RECENT: 0
MDC_IDC_STAT_TACHYTHERAPY_ATP_DELIVERED_TOTAL: 0
MDC_IDC_STAT_TACHYTHERAPY_RECENT_DTM_END: NORMAL
MDC_IDC_STAT_TACHYTHERAPY_RECENT_DTM_START: NORMAL
MDC_IDC_STAT_TACHYTHERAPY_SHOCKS_ABORTED_RECENT: 0
MDC_IDC_STAT_TACHYTHERAPY_SHOCKS_ABORTED_TOTAL: 0
MDC_IDC_STAT_TACHYTHERAPY_SHOCKS_DELIVERED_RECENT: 0
MDC_IDC_STAT_TACHYTHERAPY_SHOCKS_DELIVERED_TOTAL: 0
MDC_IDC_STAT_TACHYTHERAPY_TOTAL_DTM_END: NORMAL
MDC_IDC_STAT_TACHYTHERAPY_TOTAL_DTM_START: NORMAL

## 2021-03-24 DIAGNOSIS — I10 BENIGN ESSENTIAL HYPERTENSION: ICD-10-CM

## 2021-03-29 ENCOUNTER — ANTICOAGULATION THERAPY VISIT (OUTPATIENT)
Dept: ANTICOAGULATION | Facility: CLINIC | Age: 76
End: 2021-03-29

## 2021-03-29 DIAGNOSIS — I48.0 PAROXYSMAL ATRIAL FIBRILLATION (H): ICD-10-CM

## 2021-03-29 DIAGNOSIS — Z95.2 S/P AORTIC VALVE REPLACEMENT: ICD-10-CM

## 2021-03-29 DIAGNOSIS — Z79.01 LONG TERM CURRENT USE OF ANTICOAGULANT THERAPY: ICD-10-CM

## 2021-03-29 LAB
CAPILLARY BLOOD COLLECTION: NORMAL
INR PPP: 3.3 (ref 0.86–1.14)

## 2021-03-29 PROCEDURE — 36416 COLLJ CAPILLARY BLOOD SPEC: CPT | Performed by: INTERNAL MEDICINE

## 2021-03-29 PROCEDURE — 85610 PROTHROMBIN TIME: CPT | Performed by: INTERNAL MEDICINE

## 2021-03-29 RX ORDER — LOSARTAN POTASSIUM 25 MG/1
12.5 TABLET ORAL DAILY
Qty: 45 TABLET | Refills: 3 | Status: SHIPPED | OUTPATIENT
Start: 2021-03-29 | End: 2022-03-11

## 2021-03-29 NOTE — PROGRESS NOTES
ANTICOAGULATION MANAGEMENT     Patient Name:  Brooks Summers  Date:  3/29/2021    ASSESSMENT /SUBJECTIVE:    Today's INR result of 3.30 is supratherapeutic. Goal INR of 2.0-3.0      Warfarin dose taken: Warfarin taken as instructed    Diet: No new diet changes affecting INR    Medication changes/ interactions: No new medications/supplements affecting INR    Previous INR: Therapeutic 2.60    S/S of bleeding or thromboembolism: No    New injury or illness: No    Upcoming surgery, procedure or cardioversion: No    Additional findings: No identifiable reason for supratherapeutic INR. If next INR elevated, patient will need maintenance dose adjustment.       PLAN:    Telephone call with Brooks regarding INR result and instructed:     Warfarin Dosing Instructions: Continue your current warfarin dose 10 mg every Mon, Fri; 7.5 mg all other days    Instructed patient to follow up no later than: 2 weeks  Lab visit scheduled    Education provided: Target INR goal and significance of current INR result, Importance of following up for INR monitoring at instructed interval and Monitoring for bleeding signs and symptoms      Brooks verbalizes understanding and agrees to warfarin dosing plan.    Instructed to call the Anticoagulation Clinic for any changes, questions or concerns. (#[phone])        Gina Medina, CALLUM      OBJECTIVE:  Recent labs: (last 7 days)     03/29/21  1209   INR 3.30*         No question data found.  Anticoagulation Summary  As of 3/29/2021    INR goal:  2.0-3.0   TTR:  84.6 % (1 y)   INR used for dosing:  3.30 (3/29/2021)   Warfarin maintenance plan:  10 mg (5 mg x 2) every Mon, Fri; 7.5 mg (5 mg x 1.5) all other days   Full warfarin instructions:  10 mg every Mon, Fri; 7.5 mg all other days   Weekly warfarin total:  57.5 mg   Plan last modified:  Beht Mijares RN (2/23/2021)   Next INR check:     Priority:  Maintenance   Target end date:  Indefinite    Indications    S/P aortic valve replacement  [Z95.2]  Paroxysmal atrial fibrillation (H) [I48.0]  Long term current use of anticoagulant therapy [Z79.01]             Anticoagulation Episode Summary     INR check location:      Preferred lab:      Send INR reminders to:  JONATHAN OWUSU    Comments:   * warfarin 5 mg tabs. Aortic 21 mm Johnson Perimount Magna Tissue Valve.       Anticoagulation Care Providers     Provider Role Specialty Phone number    Edin Mays MD Referring Internal Medicine [phone]

## 2021-04-07 DIAGNOSIS — Z79.01 LONG TERM CURRENT USE OF ANTICOAGULANT THERAPY: ICD-10-CM

## 2021-04-07 DIAGNOSIS — Z95.2 S/P AVR (AORTIC VALVE REPLACEMENT): ICD-10-CM

## 2021-04-07 DIAGNOSIS — I48.0 PAROXYSMAL ATRIAL FIBRILLATION (H): ICD-10-CM

## 2021-04-07 DIAGNOSIS — Z95.2 S/P AORTIC VALVE REPLACEMENT: ICD-10-CM

## 2021-04-07 RX ORDER — WARFARIN SODIUM 5 MG/1
TABLET ORAL
Qty: 145 TABLET | Refills: 0 | Status: SHIPPED | OUTPATIENT
Start: 2021-04-07 | End: 2021-04-21

## 2021-04-11 ENCOUNTER — HEALTH MAINTENANCE LETTER (OUTPATIENT)
Age: 76
End: 2021-04-11

## 2021-04-12 ENCOUNTER — ANTICOAGULATION THERAPY VISIT (OUTPATIENT)
Dept: ANTICOAGULATION | Facility: CLINIC | Age: 76
End: 2021-04-12

## 2021-04-12 DIAGNOSIS — I48.0 PAROXYSMAL ATRIAL FIBRILLATION (H): ICD-10-CM

## 2021-04-12 DIAGNOSIS — Z79.01 LONG TERM CURRENT USE OF ANTICOAGULANT THERAPY: ICD-10-CM

## 2021-04-12 DIAGNOSIS — Z95.2 S/P AORTIC VALVE REPLACEMENT: ICD-10-CM

## 2021-04-12 LAB
CAPILLARY BLOOD COLLECTION: NORMAL
INR PPP: 4.3 (ref 0.86–1.14)

## 2021-04-12 PROCEDURE — 85610 PROTHROMBIN TIME: CPT | Performed by: INTERNAL MEDICINE

## 2021-04-12 PROCEDURE — 36416 COLLJ CAPILLARY BLOOD SPEC: CPT | Performed by: INTERNAL MEDICINE

## 2021-04-12 NOTE — PROGRESS NOTES
ANTICOAGULATION MANAGEMENT     Patient Name:  Brooks Summers  Date:  2021    ASSESSMENT /SUBJECTIVE:    Today's INR result of 4.30 is supratherapeutic. Goal INR of 2.0-3.0      Warfarin dose taken: Warfarin taken as instructed    Diet: Change in alcohol intake may be affecting INR. Patient went golfing yesterday and had some alcohol     Medication changes/ interactions: No new medications/supplements affecting INR    Previous INR: Supratherapeutic 3.30    S/S of bleeding or thromboembolism: No    New injury or illness: No    Upcoming surgery, procedure or cardioversion: No    Additional findings: None      PLAN:    Telephone call with Brooks regarding INR result and instructed:     Warfarin Dosing Instructions: Hold warfarin today then change your warfarin dose to 10mg Fri; 7.5mg all other days  . (4.5 % change)     Due to change in alcohol contributing to the elevated INR, will only decrease 4.5%. Previous INR was 3.30.     Instructed patient to follow up no later than: 9 days  Lab visit scheduled    Education provided: Importance of notifying clinic for changes in medications or health; a sooner lab recheck maybe needed, Monitoring for bleeding signs and symptoms and When to seek medical attention/emergency care      Brooks verbalizes understanding and agrees to warfarin dosing plan.    Instructed to call the Anticoagulation Clinic for any changes, questions or concerns. (#[phone])        Sheri Frausto RN CACP       OBJECTIVE:  Recent labs: (last 7 days)     21  1241   INR 4.30*         INR assessment SUPRA    Recheck INR In: 9 DAYS    INR Location Clinic      Anticoagulation Summary  As of 2021    INR goal:  2.0-3.0   TTR:  80.8 % (1 y)   INR used for dosin.30 (2021)   Warfarin maintenance plan:  10 mg (5 mg x 2) every Fri; 7.5 mg (5 mg x 1.5) all other days   Full warfarin instructions:  : Hold; Otherwise 10 mg every Fri; 7.5 mg all other days   Weekly warfarin total:  55 mg    Plan last modified:  Sheri Frausto RN (4/12/2021)   Next INR check:  4/21/2021   Priority:  High   Target end date:  Indefinite    Indications    S/P aortic valve replacement [Z95.2]  Paroxysmal atrial fibrillation (H) [I48.0]  Long term current use of anticoagulant therapy [Z79.01]             Anticoagulation Episode Summary     INR check location:      Preferred lab:      Send INR reminders to:  JONATHAN OWUSU    Comments:   * warfarin 5 mg tabs. Aortic 21 mm Johnson Perimount Magna Tissue Valve.       Anticoagulation Care Providers     Provider Role Specialty Phone number    Edin Mays MD Referring Internal Medicine [phone]

## 2021-04-21 ENCOUNTER — ANTICOAGULATION THERAPY VISIT (OUTPATIENT)
Dept: ANTICOAGULATION | Facility: CLINIC | Age: 76
End: 2021-04-21

## 2021-04-21 DIAGNOSIS — Z95.2 S/P AVR (AORTIC VALVE REPLACEMENT): ICD-10-CM

## 2021-04-21 DIAGNOSIS — Z79.01 LONG TERM CURRENT USE OF ANTICOAGULANT THERAPY: ICD-10-CM

## 2021-04-21 DIAGNOSIS — I48.0 PAROXYSMAL ATRIAL FIBRILLATION (H): ICD-10-CM

## 2021-04-21 DIAGNOSIS — Z95.2 S/P AORTIC VALVE REPLACEMENT: ICD-10-CM

## 2021-04-21 LAB
CAPILLARY BLOOD COLLECTION: NORMAL
INR PPP: 2.3 (ref 0.86–1.14)

## 2021-04-21 PROCEDURE — 36416 COLLJ CAPILLARY BLOOD SPEC: CPT | Performed by: INTERNAL MEDICINE

## 2021-04-21 PROCEDURE — 85610 PROTHROMBIN TIME: CPT | Performed by: INTERNAL MEDICINE

## 2021-04-21 RX ORDER — WARFARIN SODIUM 5 MG/1
TABLET ORAL
Qty: 145 TABLET | Refills: 0 | COMMUNITY
Start: 2021-04-21 | End: 2021-07-22

## 2021-04-21 NOTE — PROGRESS NOTES
ANTICOAGULATION MANAGEMENT     Patient Name:  Brooks Summers  Date:  2021    ASSESSMENT /SUBJECTIVE:    Today's INR result of 2.30 is therapeutic. Goal INR of 2.0-3.0      Warfarin dose taken: Warfarin taken as instructed    Diet: No new diet changes affecting INR    Medication changes/ interactions: No new medications/supplements affecting INR    Previous INR: Supratherapeutic     S/S of bleeding or thromboembolism: No    New injury or illness: No    Upcoming surgery, procedure or cardioversion: No    Additional findings: None      PLAN:    Telephone call with Brooks regarding INR result and instructed:     Warfarin Dosing Instructions: Continue your current warfarin dose 10 mg Fri; 7.5 mg ROW.    Instructed patient to follow up no later than: 2 weeks  Lab visit scheduled    Education provided: Contact [facility] Anticoagulation: [phone]  with any changes, questions or concerns.       Brooks verbalizes understanding and agrees to warfarin dosing plan.    Instructed to call the Anticoagulation Clinic for any changes, questions or concerns. (#[phone])        Lo Worley RN      OBJECTIVE:  Recent labs: (last 7 days)     21  1241   INR 2.30*         No question data found.  Anticoagulation Summary  As of 2021    INR goal:  2.0-3.0   TTR:  79.2 % (1 y)   INR used for dosin.30 (2021)   Warfarin maintenance plan:  10 mg (5 mg x 2) every Fri; 7.5 mg (5 mg x 1.5) all other days   Full warfarin instructions:  10 mg every Fri; 7.5 mg all other days   Weekly warfarin total:  55 mg   Plan last modified:  Sheri Frausto RN (2021)   Next INR check:  2021   Priority:  Maintenance   Target end date:  Indefinite    Indications    S/P aortic valve replacement [Z95.2]  Paroxysmal atrial fibrillation (H) [I48.0]  Long term current use of anticoagulant therapy [Z79.01]             Anticoagulation Episode Summary     INR check location:      Preferred lab:      Send  INR reminders to:  JONATHAN OWUSU    Comments:   * warfarin 5 mg tabs. Aortic 21 mm Johnson Perimount Magna Tissue Valve.       Anticoagulation Care Providers     Provider Role Specialty Phone number    Edin Mays MD Referring Internal Medicine [phone]

## 2021-05-03 DIAGNOSIS — E78.5 HYPERLIPIDEMIA LDL GOAL <100: ICD-10-CM

## 2021-05-03 DIAGNOSIS — I10 BENIGN ESSENTIAL HYPERTENSION: ICD-10-CM

## 2021-05-05 ENCOUNTER — ANTICOAGULATION THERAPY VISIT (OUTPATIENT)
Dept: ANTICOAGULATION | Facility: CLINIC | Age: 76
End: 2021-05-05

## 2021-05-05 DIAGNOSIS — I48.0 PAROXYSMAL ATRIAL FIBRILLATION (H): ICD-10-CM

## 2021-05-05 DIAGNOSIS — Z95.2 S/P AORTIC VALVE REPLACEMENT: ICD-10-CM

## 2021-05-05 DIAGNOSIS — Z79.01 LONG TERM CURRENT USE OF ANTICOAGULANT THERAPY: ICD-10-CM

## 2021-05-05 LAB
CAPILLARY BLOOD COLLECTION: NORMAL
INR PPP: 2 (ref 0.86–1.14)

## 2021-05-05 RX ORDER — SIMVASTATIN 40 MG
40 TABLET ORAL AT BEDTIME
Qty: 90 TABLET | Refills: 0 | Status: SHIPPED | OUTPATIENT
Start: 2021-05-05 | End: 2021-08-07

## 2021-05-05 RX ORDER — METOPROLOL SUCCINATE 50 MG/1
50 TABLET, EXTENDED RELEASE ORAL DAILY
Qty: 90 TABLET | Refills: 3 | Status: SHIPPED | OUTPATIENT
Start: 2021-05-05 | End: 2022-05-06

## 2021-05-05 NOTE — PROGRESS NOTES
ANTICOAGULATION MANAGEMENT     Patient Name:  Brooks Summers  Date:  2021    ASSESSMENT /SUBJECTIVE:    Today's INR result of 2.00 is therapeutic. Goal INR of 2.0-3.0      Warfarin dose taken: Warfarin taken as instructed    Diet: No new diet changes affecting INR    Medication changes/ interactions: No new medications/supplements affecting INR    Previous INR: Therapeutic     S/S of bleeding or thromboembolism: No    New injury or illness: No    Upcoming surgery, procedure or cardioversion: No    Additional findings: None      PLAN:    Telephone call with Brooks regarding INR result and instructed:     Warfarin Dosing Instructions: Continue your current warfarin dose 10 mg Fri; 7.5 mg ROW.    Instructed patient to follow up no later than: 3 weeks  Lab visit scheduled    Education provided: Contact [facility] Anticoagulation: [phone]  with any changes, questions or concerns.       Brooks verbalizes understanding and agrees to warfarin dosing plan.    Instructed to call the Anticoagulation Clinic for any changes, questions or concerns. (#[phone])        Lo Worley RN      OBJECTIVE:  Recent labs: (last 7 days)     21  1233   INR 2.00*         No question data found.  Anticoagulation Summary  As of 2021    INR goal:  2.0-3.0   TTR:  79.2 % (1 y)   INR used for dosin.00 (2021)   Warfarin maintenance plan:  10 mg (5 mg x 2) every Fri; 7.5 mg (5 mg x 1.5) all other days   Full warfarin instructions:  10 mg every Fri; 7.5 mg all other days   Weekly warfarin total:  55 mg   Plan last modified:  Sheri Frausto RN (2021)   Next INR check:  2021   Priority:  Maintenance   Target end date:  Indefinite    Indications    S/P aortic valve replacement [Z95.2]  Paroxysmal atrial fibrillation (H) [I48.0]  Long term current use of anticoagulant therapy [Z79.01]             Anticoagulation Episode Summary     INR check location:      Preferred lab:      Send INR  reminders to:  JONATHAN OWUSU    Comments:   * warfarin 5 mg tabs. Aortic 21 mm Johnson Perimount Magna Tissue Valve.       Anticoagulation Care Providers     Provider Role Specialty Phone number    Edin Mays MD Referring Internal Medicine [phone]

## 2021-05-05 NOTE — CONFIDENTIAL NOTE
metoprolol succinate ER (TOPROL-XL) 50 MG 24 hr tablet   Last Written Prescription Date:  2/6/21  Last Fill Quantity: 90,   # refills: 0  Last Office Visit : 2/8/21  Future Office visit:  6/1/21       simvastatin (ZOCOR) 40 MG tablet  Last Written Prescription Date:  2/26/21  Last Fill Quantity: 90   # refills: 0    Routing refill request to provider for review/approval because:  LDL

## 2021-05-27 ENCOUNTER — ANTICOAGULATION THERAPY VISIT (OUTPATIENT)
Dept: ANTICOAGULATION | Facility: CLINIC | Age: 76
End: 2021-05-27

## 2021-05-27 DIAGNOSIS — I48.0 PAROXYSMAL ATRIAL FIBRILLATION (H): ICD-10-CM

## 2021-05-27 DIAGNOSIS — Z79.01 LONG TERM CURRENT USE OF ANTICOAGULANT THERAPY: ICD-10-CM

## 2021-05-27 DIAGNOSIS — Z95.2 S/P AORTIC VALVE REPLACEMENT: ICD-10-CM

## 2021-05-27 LAB
CAPILLARY BLOOD COLLECTION: NORMAL
INR PPP: 2.4 (ref 0.86–1.14)

## 2021-05-27 PROCEDURE — 85610 PROTHROMBIN TIME: CPT | Performed by: INTERNAL MEDICINE

## 2021-05-27 PROCEDURE — 36416 COLLJ CAPILLARY BLOOD SPEC: CPT | Performed by: INTERNAL MEDICINE

## 2021-05-27 NOTE — PROGRESS NOTES
ANTICOAGULATION MANAGEMENT     Patient Name:  Brooks Summers  Date:  2021    ASSESSMENT /SUBJECTIVE:    Today's INR result of 2.40 is therapeutic. Goal INR of 2.0-3.0      Warfarin dose taken: Warfarin taken as instructed    Diet: No new diet changes affecting INR    Medication changes/ interactions: No new medications/supplements affecting INR    Previous INR: Therapeutic     S/S of bleeding or thromboembolism: No    New injury or illness: No    Upcoming surgery, procedure or cardioversion: No    Additional findings: None      PLAN:    Telephone call with Brooks regarding INR result and instructed:     Warfarin Dosing Instructions: Continue your current warfarin dose 10 mg every Fri; 7.5 mg all other days    Instructed patient to follow up no later than: 4 weeks  Check at provider office visit    Education provided: Contact [facility] Anticoagulation: [phone]  with any changes, questions or concerns.       Brooks verbalizes understanding and agrees to warfarin dosing plan.    Instructed to call the Anticoagulation Clinic for any changes, questions or concerns. (#[phone])        Gina Medina RN      OBJECTIVE:  Recent labs: (last 7 days)     21  1438   INR 2.40*         No question data found.  Anticoagulation Summary  As of 2021    INR goal:  2.0-3.0   TTR:  79.2 % (1 y)   INR used for dosin.40 (2021)   Warfarin maintenance plan:  10 mg (5 mg x 2) every Fri; 7.5 mg (5 mg x 1.5) all other days   Full warfarin instructions:  10 mg every Fri; 7.5 mg all other days   Weekly warfarin total:  55 mg   No change documented:  Gina Medina RN   Plan last modified:  Sheri Frausto RN (2021)   Next INR check:  2021   Priority:  Maintenance   Target end date:  Indefinite    Indications    S/P aortic valve replacement [Z95.2]  Paroxysmal atrial fibrillation (H) [I48.0]  Long term current use of anticoagulant therapy [Z79.01]             Anticoagulation  Episode Summary     INR check location:      Preferred lab:      Send INR reminders to:  JONATHAN NGUYEN    Comments:   * warfarin 5 mg tabs. Aortic 21 mm Johnson Perimount Magna Tissue Valve.       Anticoagulation Care Providers     Provider Role Specialty Phone number    Edin Mays MD Referring Internal Medicine [phone]

## 2021-06-01 ENCOUNTER — VIRTUAL VISIT (OUTPATIENT)
Dept: CARDIOLOGY | Facility: CLINIC | Age: 76
End: 2021-06-01
Attending: INTERNAL MEDICINE
Payer: MEDICARE

## 2021-06-01 DIAGNOSIS — Z95.810 BIVENTRICULAR IMPLANTABLE CARDIOVERTER-DEFIBRILLATOR (ICD) IN SITU: ICD-10-CM

## 2021-06-01 DIAGNOSIS — I33.0 ACUTE BACTERIAL ENDOCARDITIS: ICD-10-CM

## 2021-06-01 DIAGNOSIS — I48.0 PAROXYSMAL ATRIAL FIBRILLATION (H): ICD-10-CM

## 2021-06-01 DIAGNOSIS — Z95.2 S/P AVR: Primary | ICD-10-CM

## 2021-06-01 PROCEDURE — 99215 OFFICE O/P EST HI 40 MIN: CPT | Mod: 95 | Performed by: INTERNAL MEDICINE

## 2021-06-01 RX ORDER — AMOXICILLIN 500 MG/1
2000 CAPSULE ORAL ONCE
Qty: 12 CAPSULE | Refills: 0 | Status: SHIPPED | OUTPATIENT
Start: 2021-06-01 | End: 2021-06-01

## 2021-06-01 NOTE — LETTER
"6/1/2021       RE: Brooks Summers  [address]  [address]  [place] [address]       Dear Colleague,    Thank you for the opportunity to participate in the care of your patient, Brooks Summers, at the [facility]. Please see a copy of my visit note below.    Brooks is a 76 year old who is being evaluated via a billable video visit.      How would you like to obtain your AVS? MyChart  If the video visit is dropped, the invitation should be resent by: Text to cell phone: [phone]  Will anyone else be joining your video visit? No    Vitals - Patient Reported  Weight (Patient Reported): 74.8 kg (165 lb)  Height (Patient Reported): 177.8 cm (5' 10\")  BMI (Based on Pt Reported Ht/Wt): 23.67  Pain Score: No Pain (0)  Pain Loc: Chest    Subjective   Brooks is a 76 year old who presents for yearly follow-up.  Prosthetic valve endocarditis, status post redo AVR.    HPI    S/P Aortic valve replacement (23 mm Bogdan Johnson PERIMOUNT Magna tissue valve). On 7/14/11. 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  This involved aortic subvalvular debridement, AVR with 21 mm Johnson Perimount magna valve and pericardial patch repair of a large perforation on the anterior mitral leaflet.  Postprocedure patient did extremely well.  Patient also needed biventricular pacing with defibrillator for low LV function.  Subsequently his LV function improved to normal.  Currently patient is active and asymptomatic.  Had no cardiac complaints today.    Review of Systems   Constitutional, HEENT, cardiovascular, pulmonary, gi and gu systems are negative, except as otherwise noted.      Objective    Vitals - Patient Reported  Weight (Patient Reported): 74.8 kg (165 lb)  Height (Patient Reported): 177.8 cm " "(5' 10\")  BMI (Based on Pt Reported Ht/Wt): 23.67  Pain Score: No Pain (0)  Pain Loc: Chest        Physical Exam   GENERAL: Healthy, alert and no distress  EYES: Eyes grossly normal to inspection.  No discharge or erythema, or obvious scleral/conjunctival abnormalities.  RESP: No audible wheeze, cough, or visible cyanosis.  No visible retractions or increased work of breathing.    SKIN: Visible skin clear. No significant rash, abnormal pigmentation or lesions.  NEURO: Cranial nerves grossly intact.  Mentation and speech appropriate for age.  PSYCH: Mentation appears normal, affect normal/bright, judgement and insight intact, normal speech and appearance well-groomed.    ASSESSMENT/PLAN:  Patient with bioprosthetic AVR in 2011 for bicuspid aortic valve with severe aortic stenosis and LV dysfunction.  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  He had repeat surgery and aortic valve replacement with 21 mm Johnson Perimount magna valve, subaortic annular debridement and pericardial patch closure and repair of mitral valve perforation.  Patient also had biventricular pacing and ICD placement for low LV function.  Subsequently his LV function improved.  Currently patient is active and asymptomatic had no cardiac complaints today.  Overall he is doing extremely well.  Last echocardiogram done in February 2020 showed normal biventricular function and more of normal prosthetic function.  Will plan to repeat an echocardiogram now.  Then he will follow up with yearly echocardiogram.  He is planning for dental procedure.  Will give amoxicillin for endocarditis prophylaxis.  Amoxicillin 2 g single dose 1 hour prior to dental procedure.  Recent pacemaker check showed 99.7% BiV pacing, normal pacemaker ICD function and no arrhythmia.  Patient do have a history of paroxysmal atrial fibrillation and he is on Coumadin for this.  He is " advised to continue his current medication and keep an yearly follow-up.        Video-Visit Details    Type of service:  Video Visit  Video visit start time: 1:44 PM  Video End Time:1:53 PM    Originating Location (pt. Location): Home    Distant Location (provider location):  [facility]     Platform used for Video Visit: [employer].  Total visit duration 40 minutes.  This include video interview, chart review, review of operative reports echocardiogram and documentation.        Please do not hesitate to contact me if you have any questions/concerns.     Sincerely,     TED Salgado MD

## 2021-06-01 NOTE — PATIENT INSTRUCTIONS
Echocardiogram now (at your convenience). As soon as results are compiled and reviewed, you will be notified.     You may wait to be called by our scheduling team, or you may call to schedule : [phone]    Amoxicillin prior to dental procedures. RX sent to pharmacy.    In one year, repeat echocardiogram and follow up with Dr. Mckenzie afterwards. For these appointments you will receive a scheduling reminder in advance.

## 2021-06-01 NOTE — PROGRESS NOTES
"Brooks is a 76 year old who is being evaluated via a billable video visit.      How would you like to obtain your AVS? MyChart  If the video visit is dropped, the invitation should be resent by: Text to cell phone: [phone]  Will anyone else be joining your video visit? No    Vitals - Patient Reported  Weight (Patient Reported): 74.8 kg (165 lb)  Height (Patient Reported): 177.8 cm (5' 10\")  BMI (Based on Pt Reported Ht/Wt): 23.67  Pain Score: No Pain (0)  Pain Loc: Chest    Subjective   Brooks is a 76 year old who presents for yearly follow-up.  Prosthetic valve endocarditis, status post redo AVR.    HPI    S/P Aortic valve replacement (23 mm Bogdan Johnson PERIMOUNT Magna tissue valve). On 7/14/11. 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  This involved aortic subvalvular debridement, AVR with 21 mm Johnson Perimount magna valve and pericardial patch repair of a large perforation on the anterior mitral leaflet.  Postprocedure patient did extremely well.  Patient also needed biventricular pacing with defibrillator for low LV function.  Subsequently his LV function improved to normal.  Currently patient is active and asymptomatic.  Had no cardiac complaints today.    Review of Systems   Constitutional, HEENT, cardiovascular, pulmonary, gi and gu systems are negative, except as otherwise noted.      Objective    Vitals - Patient Reported  Weight (Patient Reported): 74.8 kg (165 lb)  Height (Patient Reported): 177.8 cm (5' 10\")  BMI (Based on Pt Reported Ht/Wt): 23.67  Pain Score: No Pain (0)  Pain Loc: Chest        Physical Exam   GENERAL: Healthy, alert and no distress  EYES: Eyes grossly normal to inspection.  No discharge or erythema, or obvious scleral/conjunctival abnormalities.  RESP: No audible wheeze, cough, or visible cyanosis.  No visible " retractions or increased work of breathing.    SKIN: Visible skin clear. No significant rash, abnormal pigmentation or lesions.  NEURO: Cranial nerves grossly intact.  Mentation and speech appropriate for age.  PSYCH: Mentation appears normal, affect normal/bright, judgement and insight intact, normal speech and appearance well-groomed.    ASSESSMENT/PLAN:  Patient with bioprosthetic AVR in 2011 for bicuspid aortic valve with severe aortic stenosis and LV dysfunction.  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  He had repeat surgery and aortic valve replacement with 21 mm Johnson Perimount magna valve, subaortic annular debridement and pericardial patch closure and repair of mitral valve perforation.  Patient also had biventricular pacing and ICD placement for low LV function.  Subsequently his LV function improved.  Currently patient is active and asymptomatic had no cardiac complaints today.  Overall he is doing extremely well.  Last echocardiogram done in February 2020 showed normal biventricular function and more of normal prosthetic function.  Will plan to repeat an echocardiogram now.  Then he will follow up with yearly echocardiogram.  He is planning for dental procedure.  Will give amoxicillin for endocarditis prophylaxis.  Amoxicillin 2 g single dose 1 hour prior to dental procedure.  Recent pacemaker check showed 99.7% BiV pacing, normal pacemaker ICD function and no arrhythmia.  Patient do have a history of paroxysmal atrial fibrillation and he is on Coumadin for this.  He is advised to continue his current medication and keep an yearly follow-up.        Video-Visit Details    Type of service:  Video Visit  Video visit start time: 1:44 PM  Video End Time:1:53 PM    Originating Location (pt. Location): Home    Distant Location (provider location):  [facility]     Platform used for  Video Visit: [employer].  Total visit duration 40 minutes.  This include video interview, chart review, review of operative reports echocardiogram and documentation.

## 2021-06-11 ENCOUNTER — OFFICE VISIT (OUTPATIENT)
Dept: CARDIOLOGY | Facility: CLINIC | Age: 76
End: 2021-06-11
Attending: NURSE PRACTITIONER
Payer: MEDICARE

## 2021-06-11 ENCOUNTER — ANCILLARY PROCEDURE (OUTPATIENT)
Dept: CARDIOLOGY | Facility: CLINIC | Age: 76
End: 2021-06-11
Payer: MEDICARE

## 2021-06-11 VITALS
BODY MASS INDEX: 24.08 KG/M2 | WEIGHT: 172 LBS | HEIGHT: 71 IN | SYSTOLIC BLOOD PRESSURE: 138 MMHG | OXYGEN SATURATION: 96 % | DIASTOLIC BLOOD PRESSURE: 77 MMHG | HEART RATE: 68 BPM

## 2021-06-11 DIAGNOSIS — Z95.2 S/P AVR: ICD-10-CM

## 2021-06-11 DIAGNOSIS — I42.9 CARDIOMYOPATHY (H): ICD-10-CM

## 2021-06-11 DIAGNOSIS — Q23.81 BICUSPID AORTIC VALVE: ICD-10-CM

## 2021-06-11 DIAGNOSIS — I48.0 PAROXYSMAL ATRIAL FIBRILLATION (H): ICD-10-CM

## 2021-06-11 DIAGNOSIS — Z95.810 BIVENTRICULAR IMPLANTABLE CARDIOVERTER-DEFIBRILLATOR (ICD) IN SITU: Primary | ICD-10-CM

## 2021-06-11 DIAGNOSIS — Z95.810 S/P IMPLANTATION OF AUTOMATIC CARDIOVERTER/DEFIBRILLATOR (AICD): ICD-10-CM

## 2021-06-11 PROCEDURE — 99214 OFFICE O/P EST MOD 30 MIN: CPT | Mod: 25 | Performed by: NURSE PRACTITIONER

## 2021-06-11 PROCEDURE — G0463 HOSPITAL OUTPT CLINIC VISIT: HCPCS | Mod: 25

## 2021-06-11 PROCEDURE — 93284 PRGRMG EVAL IMPLANTABLE DFB: CPT | Performed by: INTERNAL MEDICINE

## 2021-06-11 ASSESSMENT — MIFFLIN-ST. JEOR: SCORE: 1527.06

## 2021-06-11 ASSESSMENT — PAIN SCALES - GENERAL: PAINLEVEL: NO PAIN (0)

## 2021-06-11 NOTE — LETTER
6/11/2021      RE: Brooks Summers  [address]  [address]  [place] [address]       Dear Colleague,    Thank you for the opportunity to participate in the care of your patient, Brooks Summers, at the [facility] [place] [facility]. Please see a copy of my visit note below.    Electrophysiology Clinic Note  HPI:   Mr. Summers is a 76 year old male who has a past medical history significant for BAV with aortic stenosis s/p AVR 7/2011, endocarditis of prosthetic valve s/p aortic subvalvular debridement, AVR with 21 mm Johnson Perimount magna valve and pericardial patch repair of a large perforation on the anterior mitral leaflet 2013, NICM LVEF 15-20% with later normalization of LVEF, LBBB, s/p CRTD 2012 s/p gen change 2015 and 3/10/2021, PAF (CHADSVASC 3 on warfarin), gout, thrombocytopenia, and prior tobacco use.     He had BAV that developed severe aortic stenosis. He underwent a AVR with 23 mm Bogdan Johnson Perimount Magna tissue valve in 2011. He had reduced LVEF 15-20% with LBBB and underwent a CRTD in 2012. His LVEF has subsequently normalized. He developed coagulase-negative staph endocarditis of prosthetic valve and had an extensive redo surgery on 1/25/2013 which  involved aortic subvalvular debridement, AVR with 21 mm Johnson Perimount magna valve and pericardial patch repair of a large perforation on the anterior mitral leaflet. His ICD system was not removed during this.He had some post operative AF. He had a CRTD generator change in 2015 and then again in 3/2021.     He is following up today after his CRTD generator change. Device site well healed. He reports feeling well. He denies any chest pain/pressures, dizziness, lightheadedness, worsening shortness of breath, leg/ankle swelling, PND, orthopnea, palpitations, or syncopal symptoms. Device interrogation shows normal device function, LV 2.5@ 0.4, intrinsic is CHB VS @ 44 bpm, and  %. Lasts echo from 2/2020 showed LVEF 55-60%, mild RV dilation, normal tissue aortic valve function. Current cardiac medications include: Losartan, Toprol XL, and Warfarin.     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 Tx with Dapto/RIFx 8 weeks.     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 60 tablet 11     losartan (COZAAR) 25 MG tablet Take 0.5 tablets (12.5 mg) by mouth daily 45 tablet 3     metoprolol succinate ER (TOPROL-XL) 50 MG 24 hr tablet Take 1 tablet (50 mg) by mouth daily 90 tablet 3     Multiple Vitamins-Minerals (MULTIVITAMIN ADULTS 50+) TABS        oxyCODONE-acetaminophen (PERCOCET) 5-325 MG tablet Take 1 tablet by mouth every 6 hours as needed for severe pain (Patient not taking: Reported on 6/1/2021) 12 tablet 0     oxyCODONE-acetaminophen (PERCOCET) 5-325 MG tablet Take 1 tablet by mouth every 6 hours as needed for severe pain (Patient not taking: Reported on 6/1/2021) 12 tablet 0     PROVIDER DOSED MANAGED WARFARIN, COUMADIN, OUTPATIENT Per coumadin clinic       simvastatin (ZOCOR) 40 MG tablet Take 1 tablet (40 mg) by mouth  At Bedtime 90 tablet 0     VITAMIN D, CHOLECALCIFEROL, PO Take 1,000 Units by mouth daily       warfarin ANTICOAGULANT (COUMADIN) 5 MG tablet Take 10mg Fri; 7.5mg all other days or as directed by the Anticoagulation Clinic 145 tablet 0       PAST SURGICAL HISTORY:  Past Surgical History:   Procedure Laterality Date     ANESTHESIA CARDIOVERSION  7/18/2011    Procedure:ANESTHESIA CARDIOVERSION; Cardioversion; Surgeon:GENERIC ANESTHESIA PROVIDER; Location:[facility] OR     COLONOSCOPY       COLONOSCOPY N/A 11/19/2018    Procedure: COLONOSCOPY;  Surgeon: Herman Mcginnis MD;  Location:  GI     CORONARY ANGIOGRAPHY ADULT ORDER       CYSTOSCOPY, BIOPSY URETHRA N/A 4/3/2017    Procedure: CYSTOSCOPY, BIOPSY URETHRA;  Surgeon: Marlena Mora MD;  Location:  OR     ENDOSCOPY UPPER, COLONOSCOPY, COMBINED       EP ICD GENERATOR CHANGE DUAL N/A 3/10/2021    Procedure: EP ICD GENERATOR CHANGE DUAL;  Surgeon: Mekhi Be MD;  Location:  HEART CARDIAC CATH LAB     HC REMOV & REPLACE IMPLT DEFIB PULSE GEN MULT LEAD  12/3/2015          HEMORRHOID SURGERY       IMPLANT AUTOMATIC IMPLANTABLE CARDIOVERTER DEFIBRILLATOR       MOHS MICROGRAPHIC PROCEDURE       ORTHOPEDIC SURGERY      shoulder,right     REDO STERNOTOMY REPLACE VALVE AORTIC  1/25/2013    Procedure: REDO STERNOTOMY REPLACE VALVE AORTIC;  Redo Sternotomy, Redo Aortic Valve Replacement on pump oxygenator. Intraoperative Transesophageal Echocardiogram;  Surgeon: Navin Hampton MD;  Location:  OR     REPAIR VALVE MITRAL  2013     REPLACE VALVE AORTIC  7/14/2011    Procedure:REPLACE VALVE AORTIC; Median Sternotomy, Bioprosthesis,  Aortic Valve replacement on pump with oxygenator. Intra-operative Transesophogeal Echocardiogram.; Surgeon:NAVIN HAMPTON; Location: OR     teeth extractions       TRANSPLANT         ALLERGIES:     Allergies   Allergen Reactions     Lisinopril Cough       FAMILY HISTORY:  Family History   Problem Relation Age of Onset     C.A.D.  "Father 68        bypass, carotid      Prostate Cancer Father 70     Heart Disease Father      Cancer Mother 92        stomach, colon     Hypertension Mother      Retinal detachment Mother      Cancer Brother 64        lung cancer, petroleum     Cancer Brother      Glaucoma No family hx of      Macular Degeneration No family hx of      Strabismus No family hx of      Glasses (<7 y/o) No family hx of        SOCIAL HISTORY:  Social History     Tobacco Use     Smoking status: Former Smoker     Packs/day: 1.00     Years: 20.00     Pack years: 20.00     Types: Cigarettes     Quit date: 1989     Years since quittin.4     Smokeless tobacco: Never Used   Substance Use Topics     Alcohol use: Yes     Alcohol/week: 2.0 standard drinks     Drug use: No       ROS:   A comprehensive 10 point review of systems negative other than as mentioned in HPI.  Exam:  /77 (BP Location: Left arm, Patient Position: Chair, Cuff Size: Adult Regular)   Pulse 68   Ht 1.795 m (5' 10.67\")   Wt 78 kg (172 lb)   SpO2 96%   BMI 24.21 kg/m    GENERAL APPEARANCE: alert and no distress  HEENT: MMM. PERRLA.  NECK: supple.   RESPIRATORY: lungs clear to auscultation - no rales, rhonchi or wheezes, no use of accessory muscles, no retractions, respirations are unlabored, normal respiratory rate  CARDIOVASCULAR: regular rhythm, S1/S2, no murmur.   ABDOMEN: soft, NT, ND, +BS.  EXTREMITIES: peripheral pulses normal, no edema  NEURO: alert and oriented to person/place/time, normal speech, gait and affect  SKIN: no ecchymoses, no rashes  PSYCH: normal affect, cooperative    Labs:  Reviewed.     Testing/Procedures:  2/10/20 ECHOCARDIOGRAM:   Interpretation Summary  Global and regional left ventricular function is normal with an EF of 55-60%.  Mild right ventricular dilation with normal global right ventricular function.  s/p 23 mm Bogdan Johnson Perimount Magna tissue valve in aortic position  (). Normal doppler assessment (mean " gradient 9mmHg,  EOA is 1.8 cm^2, DVI 0.48). Mild eccentric AI.  The mean gradient is 9 mmHg.  Estimated pulmonary artery systolic pressure is 19 mmHg plus right atrial  pressure.  The inferior vena cava was normal in size with preserved respiratory  variability.     This study was compared with the study from 7/16/18. Minimal interval change.      Assessment and Plan:   Mr. Summers is a 76 year old male who has a past medical history significant for BAV with aortic stenosis s/p AVR 7/2011, endocarditis of prosthetic valve s/p aortic subvalvular debridement, AVR with 21 mm Johnson Perimount magna valve and pericardial patch repair of a large perforation on the anterior mitral leaflet 2013, NICM LVEF 15-20% with later normalization of LVEF, LBBB, s/p CRTD 2012 s/p gen change 2015 and 3/10/2021, PAF (CHADSVASC 3 on warfarin), gout, thrombocytopenia, and prior tobacco use.He is following up today after his CRTD generator change. Device site well healed. He reports feeling well. He denies any chest pain/pressures, dizziness, lightheadedness, worsening shortness of breath, leg/ankle swelling, PND, orthopnea, palpitations, or syncopal symptoms. Device interrogation shows normal device function, LV 2.5@ 0.4, intrinsic is CHB VS @ 44 bpm, and %. Lasts echo from 2/2020 showed LVEF 55-60%, mild RV dilation, normal tissue aortic valve function. Current cardiac medications include: Losartan, Toprol XL, and Warfarin.     BAV s/p AVR 7/2011, endocarditis of prosthetic valve s/p aortic subvalvular debridement, AVR  and pericardial patch repair of a large perforation on the anterior mitral leaflet 2013  PAF  NICM LVEF 15-20% with normalization, NYHA II:  1. ACEi/ARB: Continue Losartan.  2. BB: Continue Toprol XL   3. Aldosterone antagonist: not currently required. .  4. SCD prophylaxis: s/p CRTD 2012 with gen change 2015 and 3/10/21. Good BVP. Device well healed with normal function.   5. Fluid status: Euvolemic on exam.   6.  PAF- low burden and not symptomatic, continue Warfarin and Toprol XL. No further rhythm control measures at this time.     He will continue to follow with Dr. Mckenzie and device clinic per routine. He can follow up with EP on an as needed basis.     The patient states understanding and is agreeable with plan.   SUKH Camp CNP  Pager: [phone]  ISABELLA GONZALEZ

## 2021-06-11 NOTE — NURSING NOTE
Chief Complaint   Patient presents with     Follow Up     f/u post gen change.      Vitals were taken and medications reconciled.    Ja Calderon, EMT  2:02 PM

## 2021-06-11 NOTE — PATIENT INSTRUCTIONS
It was a pleasure to see you in clinic today.  Please do not hesitate to call with any questions or concerns.  You are scheduled for a remote transmission on 9/15/21.  We look forward to seeing you in clinic at your next device check in 6 months.    Sawyer Escudero, RN  Electrophysiology Nurse Clinician  [facility]    During Business Hours Please Call:  [phone]  After Hours Please Call:  [phone] - select option #4 and ask for job code [phone]

## 2021-06-16 DIAGNOSIS — M10.00 IDIOPATHIC GOUT, UNSPECIFIED CHRONICITY, UNSPECIFIED SITE: ICD-10-CM

## 2021-06-18 RX ORDER — ALLOPURINOL 100 MG/1
TABLET ORAL
Qty: 90 TABLET | OUTPATIENT
Start: 2021-06-18

## 2021-06-20 LAB
MDC_IDC_LEAD_IMPLANT_DT: NORMAL
MDC_IDC_LEAD_LOCATION: NORMAL
MDC_IDC_LEAD_LOCATION_DETAIL_1: NORMAL
MDC_IDC_LEAD_MFG: NORMAL
MDC_IDC_LEAD_MODEL: NORMAL
MDC_IDC_LEAD_POLARITY_TYPE: NORMAL
MDC_IDC_LEAD_SERIAL: NORMAL
MDC_IDC_LEAD_SPECIAL_FUNCTION: NORMAL
MDC_IDC_MSMT_BATTERY_DTM: NORMAL
MDC_IDC_MSMT_BATTERY_REMAINING_LONGEVITY: 66 MO
MDC_IDC_MSMT_BATTERY_RRT_TRIGGER: NORMAL
MDC_IDC_MSMT_BATTERY_VOLTAGE: 2.99 V
MDC_IDC_MSMT_CAP_CHARGE_DTM: NORMAL
MDC_IDC_MSMT_CAP_CHARGE_ENERGY: NORMAL
MDC_IDC_MSMT_CAP_CHARGE_TIME: 3.9 S
MDC_IDC_MSMT_CAP_CHARGE_TYPE: NORMAL
MDC_IDC_MSMT_LEADCHNL_LV_IMPEDANCE_VALUE: 342 OHM
MDC_IDC_MSMT_LEADCHNL_LV_IMPEDANCE_VALUE: 399 OHM
MDC_IDC_MSMT_LEADCHNL_LV_IMPEDANCE_VALUE: 665 OHM
MDC_IDC_MSMT_LEADCHNL_LV_PACING_THRESHOLD_AMPLITUDE: 2 V
MDC_IDC_MSMT_LEADCHNL_LV_PACING_THRESHOLD_AMPLITUDE: 3 V
MDC_IDC_MSMT_LEADCHNL_LV_PACING_THRESHOLD_PULSEWIDTH: 0.4 MS
MDC_IDC_MSMT_LEADCHNL_LV_PACING_THRESHOLD_PULSEWIDTH: 1 MS
MDC_IDC_MSMT_LEADCHNL_RA_IMPEDANCE_VALUE: 494 OHM
MDC_IDC_MSMT_LEADCHNL_RA_PACING_THRESHOLD_AMPLITUDE: 0.75 V
MDC_IDC_MSMT_LEADCHNL_RA_PACING_THRESHOLD_PULSEWIDTH: 0.4 MS
MDC_IDC_MSMT_LEADCHNL_RA_SENSING_INTR_AMPL: 1.4 MV
MDC_IDC_MSMT_LEADCHNL_RV_IMPEDANCE_VALUE: 399 OHM
MDC_IDC_MSMT_LEADCHNL_RV_IMPEDANCE_VALUE: 475 OHM
MDC_IDC_MSMT_LEADCHNL_RV_PACING_THRESHOLD_AMPLITUDE: 0.5 V
MDC_IDC_MSMT_LEADCHNL_RV_PACING_THRESHOLD_PULSEWIDTH: 0.4 MS
MDC_IDC_MSMT_LEADCHNL_RV_SENSING_INTR_AMPL: 20.5 MV
MDC_IDC_PG_IMPLANT_DTM: NORMAL
MDC_IDC_PG_MFG: NORMAL
MDC_IDC_PG_MODEL: NORMAL
MDC_IDC_PG_SERIAL: NORMAL
MDC_IDC_PG_TYPE: NORMAL
MDC_IDC_SESS_CLINIC_NAME: NORMAL
MDC_IDC_SESS_DTM: NORMAL
MDC_IDC_SESS_TYPE: NORMAL
MDC_IDC_SET_BRADY_AT_MODE_SWITCH_RATE: 171 {BEATS}/MIN
MDC_IDC_SET_BRADY_LOWRATE: 60 {BEATS}/MIN
MDC_IDC_SET_BRADY_MAX_SENSOR_RATE: 120 {BEATS}/MIN
MDC_IDC_SET_BRADY_MAX_TRACKING_RATE: 130 {BEATS}/MIN
MDC_IDC_SET_BRADY_MODE: NORMAL
MDC_IDC_SET_BRADY_NIGHT_RATE: 50 {BEATS}/MIN
MDC_IDC_SET_BRADY_PAV_DELAY_LOW: 170 MS
MDC_IDC_SET_BRADY_SAV_DELAY_LOW: 90 MS
MDC_IDC_SET_CRT_LVRV_DELAY: 0 MS
MDC_IDC_SET_CRT_PACED_CHAMBERS: NORMAL
MDC_IDC_SET_LEADCHNL_LV_PACING_AMPLITUDE: 3 V
MDC_IDC_SET_LEADCHNL_LV_PACING_ANODE_ELECTRODE_1: NORMAL
MDC_IDC_SET_LEADCHNL_LV_PACING_ANODE_LOCATION_1: NORMAL
MDC_IDC_SET_LEADCHNL_LV_PACING_CAPTURE_MODE: NORMAL
MDC_IDC_SET_LEADCHNL_LV_PACING_CATHODE_ELECTRODE_1: NORMAL
MDC_IDC_SET_LEADCHNL_LV_PACING_CATHODE_LOCATION_1: NORMAL
MDC_IDC_SET_LEADCHNL_LV_PACING_POLARITY: NORMAL
MDC_IDC_SET_LEADCHNL_LV_PACING_PULSEWIDTH: 1 MS
MDC_IDC_SET_LEADCHNL_RA_PACING_AMPLITUDE: 1.5 V
MDC_IDC_SET_LEADCHNL_RA_PACING_ANODE_ELECTRODE_1: NORMAL
MDC_IDC_SET_LEADCHNL_RA_PACING_ANODE_LOCATION_1: NORMAL
MDC_IDC_SET_LEADCHNL_RA_PACING_CAPTURE_MODE: NORMAL
MDC_IDC_SET_LEADCHNL_RA_PACING_CATHODE_ELECTRODE_1: NORMAL
MDC_IDC_SET_LEADCHNL_RA_PACING_CATHODE_LOCATION_1: NORMAL
MDC_IDC_SET_LEADCHNL_RA_PACING_POLARITY: NORMAL
MDC_IDC_SET_LEADCHNL_RA_PACING_PULSEWIDTH: 0.4 MS
MDC_IDC_SET_LEADCHNL_RA_SENSING_ANODE_ELECTRODE_1: NORMAL
MDC_IDC_SET_LEADCHNL_RA_SENSING_ANODE_LOCATION_1: NORMAL
MDC_IDC_SET_LEADCHNL_RA_SENSING_CATHODE_ELECTRODE_1: NORMAL
MDC_IDC_SET_LEADCHNL_RA_SENSING_CATHODE_LOCATION_1: NORMAL
MDC_IDC_SET_LEADCHNL_RA_SENSING_POLARITY: NORMAL
MDC_IDC_SET_LEADCHNL_RA_SENSING_SENSITIVITY: 0.3 MV
MDC_IDC_SET_LEADCHNL_RV_PACING_AMPLITUDE: 2 V
MDC_IDC_SET_LEADCHNL_RV_PACING_ANODE_ELECTRODE_1: NORMAL
MDC_IDC_SET_LEADCHNL_RV_PACING_ANODE_LOCATION_1: NORMAL
MDC_IDC_SET_LEADCHNL_RV_PACING_CAPTURE_MODE: NORMAL
MDC_IDC_SET_LEADCHNL_RV_PACING_CATHODE_ELECTRODE_1: NORMAL
MDC_IDC_SET_LEADCHNL_RV_PACING_CATHODE_LOCATION_1: NORMAL
MDC_IDC_SET_LEADCHNL_RV_PACING_POLARITY: NORMAL
MDC_IDC_SET_LEADCHNL_RV_PACING_PULSEWIDTH: 0.4 MS
MDC_IDC_SET_LEADCHNL_RV_SENSING_ANODE_ELECTRODE_1: NORMAL
MDC_IDC_SET_LEADCHNL_RV_SENSING_ANODE_LOCATION_1: NORMAL
MDC_IDC_SET_LEADCHNL_RV_SENSING_CATHODE_ELECTRODE_1: NORMAL
MDC_IDC_SET_LEADCHNL_RV_SENSING_CATHODE_LOCATION_1: NORMAL
MDC_IDC_SET_LEADCHNL_RV_SENSING_POLARITY: NORMAL
MDC_IDC_SET_LEADCHNL_RV_SENSING_SENSITIVITY: 0.3 MV
MDC_IDC_SET_ZONE_DETECTION_BEATS_DENOMINATOR: 40 {BEATS}
MDC_IDC_SET_ZONE_DETECTION_BEATS_NUMERATOR: 30 {BEATS}
MDC_IDC_SET_ZONE_DETECTION_INTERVAL: 320 MS
MDC_IDC_SET_ZONE_DETECTION_INTERVAL: 350 MS
MDC_IDC_SET_ZONE_DETECTION_INTERVAL: 360 MS
MDC_IDC_SET_ZONE_DETECTION_INTERVAL: 420 MS
MDC_IDC_SET_ZONE_TYPE: NORMAL
MDC_IDC_STAT_AT_BURDEN_PERCENT: 0 %
MDC_IDC_STAT_AT_DTM_END: NORMAL
MDC_IDC_STAT_AT_DTM_START: NORMAL
MDC_IDC_STAT_BRADY_AP_VP_PERCENT: 25.44 %
MDC_IDC_STAT_BRADY_AP_VS_PERCENT: 0.02 %
MDC_IDC_STAT_BRADY_AS_VP_PERCENT: 74.41 %
MDC_IDC_STAT_BRADY_AS_VS_PERCENT: 0.12 %
MDC_IDC_STAT_BRADY_DTM_END: NORMAL
MDC_IDC_STAT_BRADY_DTM_START: NORMAL
MDC_IDC_STAT_BRADY_RA_PERCENT_PACED: 25.47 %
MDC_IDC_STAT_BRADY_RV_PERCENT_PACED: 99.86 %
MDC_IDC_STAT_CRT_DTM_END: NORMAL
MDC_IDC_STAT_CRT_DTM_START: NORMAL
MDC_IDC_STAT_CRT_LV_PERCENT_PACED: 99.83 %
MDC_IDC_STAT_CRT_PERCENT_PACED: 99.83 %
MDC_IDC_STAT_EPISODE_RECENT_COUNT: 0
MDC_IDC_STAT_EPISODE_RECENT_COUNT_DTM_END: NORMAL
MDC_IDC_STAT_EPISODE_RECENT_COUNT_DTM_START: NORMAL
MDC_IDC_STAT_EPISODE_TOTAL_COUNT: 0
MDC_IDC_STAT_EPISODE_TOTAL_COUNT_DTM_END: NORMAL
MDC_IDC_STAT_EPISODE_TOTAL_COUNT_DTM_START: NORMAL
MDC_IDC_STAT_EPISODE_TYPE: NORMAL
MDC_IDC_STAT_TACHYTHERAPY_ATP_DELIVERED_RECENT: 0
MDC_IDC_STAT_TACHYTHERAPY_ATP_DELIVERED_TOTAL: 0
MDC_IDC_STAT_TACHYTHERAPY_RECENT_DTM_END: NORMAL
MDC_IDC_STAT_TACHYTHERAPY_RECENT_DTM_START: NORMAL
MDC_IDC_STAT_TACHYTHERAPY_SHOCKS_ABORTED_RECENT: 0
MDC_IDC_STAT_TACHYTHERAPY_SHOCKS_ABORTED_TOTAL: 0
MDC_IDC_STAT_TACHYTHERAPY_SHOCKS_DELIVERED_RECENT: 0
MDC_IDC_STAT_TACHYTHERAPY_SHOCKS_DELIVERED_TOTAL: 0
MDC_IDC_STAT_TACHYTHERAPY_TOTAL_DTM_END: NORMAL
MDC_IDC_STAT_TACHYTHERAPY_TOTAL_DTM_START: NORMAL

## 2021-06-24 ENCOUNTER — ANTICOAGULATION THERAPY VISIT (OUTPATIENT)
Dept: ANTICOAGULATION | Facility: CLINIC | Age: 76
End: 2021-06-24

## 2021-06-24 ENCOUNTER — OFFICE VISIT (OUTPATIENT)
Dept: INTERNAL MEDICINE | Facility: CLINIC | Age: 76
End: 2021-06-24
Payer: MEDICARE

## 2021-06-24 VITALS
BODY MASS INDEX: 24.24 KG/M2 | SYSTOLIC BLOOD PRESSURE: 126 MMHG | HEART RATE: 79 BPM | DIASTOLIC BLOOD PRESSURE: 76 MMHG | RESPIRATION RATE: 16 BRPM | OXYGEN SATURATION: 98 % | WEIGHT: 172.2 LBS

## 2021-06-24 DIAGNOSIS — R68.89 COLD INTOLERANCE: ICD-10-CM

## 2021-06-24 DIAGNOSIS — R53.83 OTHER FATIGUE: ICD-10-CM

## 2021-06-24 DIAGNOSIS — Z79.01 LONG TERM CURRENT USE OF ANTICOAGULANT THERAPY: ICD-10-CM

## 2021-06-24 DIAGNOSIS — Z95.2 S/P AORTIC VALVE REPLACEMENT: ICD-10-CM

## 2021-06-24 DIAGNOSIS — I48.0 PAROXYSMAL ATRIAL FIBRILLATION (H): ICD-10-CM

## 2021-06-24 DIAGNOSIS — Z12.83 SCREENING FOR SKIN CANCER: ICD-10-CM

## 2021-06-24 DIAGNOSIS — R35.1 NOCTURIA: Primary | ICD-10-CM

## 2021-06-24 DIAGNOSIS — R35.1 NOCTURIA: ICD-10-CM

## 2021-06-24 LAB
BASOPHILS # BLD AUTO: 0 10E9/L (ref 0–0.2)
BASOPHILS NFR BLD AUTO: 0.2 %
CRP SERPL-MCNC: <2.9 MG/L (ref 0–8)
DIFFERENTIAL METHOD BLD: ABNORMAL
EOSINOPHIL # BLD AUTO: 0.1 10E9/L (ref 0–0.7)
EOSINOPHIL NFR BLD AUTO: 1.2 %
ERYTHROCYTE [DISTWIDTH] IN BLOOD BY AUTOMATED COUNT: 13.4 % (ref 10–15)
ERYTHROCYTE [SEDIMENTATION RATE] IN BLOOD BY WESTERGREN METHOD: 5 MM/H (ref 0–20)
HCT VFR BLD AUTO: 43.2 % (ref 40–53)
HGB BLD-MCNC: 13.7 G/DL (ref 13.3–17.7)
IMM GRANULOCYTES # BLD: 0 10E9/L (ref 0–0.4)
IMM GRANULOCYTES NFR BLD: 0.5 %
INR PPP: 2.09 (ref 0.86–1.14)
LYMPHOCYTES # BLD AUTO: 1.2 10E9/L (ref 0.8–5.3)
LYMPHOCYTES NFR BLD AUTO: 30.4 %
MCH RBC QN AUTO: 32.2 PG (ref 26.5–33)
MCHC RBC AUTO-ENTMCNC: 31.7 G/DL (ref 31.5–36.5)
MCV RBC AUTO: 101 FL (ref 78–100)
MONOCYTES # BLD AUTO: 0.6 10E9/L (ref 0–1.3)
MONOCYTES NFR BLD AUTO: 15.7 %
NEUTROPHILS # BLD AUTO: 2.1 10E9/L (ref 1.6–8.3)
NEUTROPHILS NFR BLD AUTO: 52 %
NRBC # BLD AUTO: 0 10*3/UL
NRBC BLD AUTO-RTO: 0 /100
PLATELET # BLD AUTO: 79 10E9/L (ref 150–450)
RBC # BLD AUTO: 4.26 10E12/L (ref 4.4–5.9)
TSH SERPL DL<=0.005 MIU/L-ACNC: 1.52 MU/L (ref 0.4–4)
WBC # BLD AUTO: 4 10E9/L (ref 4–11)

## 2021-06-24 PROCEDURE — 86140 C-REACTIVE PROTEIN: CPT | Performed by: PATHOLOGY

## 2021-06-24 PROCEDURE — 85652 RBC SED RATE AUTOMATED: CPT | Performed by: PATHOLOGY

## 2021-06-24 PROCEDURE — 84443 ASSAY THYROID STIM HORMONE: CPT | Performed by: PATHOLOGY

## 2021-06-24 PROCEDURE — 36415 COLL VENOUS BLD VENIPUNCTURE: CPT | Performed by: PATHOLOGY

## 2021-06-24 PROCEDURE — 99214 OFFICE O/P EST MOD 30 MIN: CPT | Performed by: INTERNAL MEDICINE

## 2021-06-24 PROCEDURE — 85610 PROTHROMBIN TIME: CPT | Performed by: PATHOLOGY

## 2021-06-24 PROCEDURE — 85025 COMPLETE CBC W/AUTO DIFF WBC: CPT | Performed by: PATHOLOGY

## 2021-06-24 ASSESSMENT — PAIN SCALES - GENERAL: PAINLEVEL: NO PAIN (0)

## 2021-06-24 NOTE — NURSING NOTE
The patient was greeted in the 4th floor lobby and escorted back to the clinic. The patient's weight was recorded without incident. The patient was escorted to the exam room without incident. The reason for today's visit was discussed with the patient. The following are the primary complaints of the patient:     Chief Complaint   Patient presents with     Physical     The patient presents for a physical and possible labs. The patient has a list to review.       The patient's allergies and medications were reviewed as noted. A set of vitals were recorded as noted without incident. The patient does not have any other questions for the provider.    Harshad Giles, EMT at 2:09 PM on 6/24/2021

## 2021-06-24 NOTE — PATIENT INSTRUCTIONS
1. Zoster immunization- check if insurance covers. If not, get at [facility] or [facility]'s for lower cost  2. Make appointment with Urology for urinary symptoms  3. Make appointment with Dermatology for skin check  4. When going to get INR checked, get other labs that were ordered today (ESR, CRP, CBC)    Things to consider for future:  Make appointment with Psychology for Neuropsych testing  Follow up with Neurology after Neuropsych testing

## 2021-06-24 NOTE — PROGRESS NOTES
ANTICOAGULATION MANAGEMENT     Brooks Summers 76 year old male is on warfarin with therapeutic INR result. (Goal INR 2.0-3.0)    Recent labs: (last 7 days)     06/24/21  1533   INR 2.09*       ASSESSMENT     Source(s): Chart Review       Previous INR: Therapeutic last 2(+) visits    Additional findings: Per chart, okay to leave DVM on cell     PLAN     Recommended plan for no diet, medication or health factor changes affecting INR     Dosing Instructions: Continue your current warfarin dose with next INR in 5 weeks       Summary  As of 6/24/2021    Full warfarin instructions:  10 mg every Fri; 7.5 mg all other days   Next INR check:  7/29/2021             Detailed voice message left for Brooks with dosing instructions and follow up date.     Contact [phone]  to schedule and with any changes, questions or concerns.     Education provided: Please call back if any changes to your diet, medications or how you've been taking warfarin    Plan made per [facility] anticoagulation protocol    Gina Medina RN  Anticoagulation Clinic  6/24/2021    _______________________________________________________________________     Anticoagulation Episode Summary     Current INR goal:  2.0-3.0   TTR:  79.2 % (1 y)   Target end date:  Indefinite   Send INR reminders to:  JONATHAN NGUYEN    Indications    S/P aortic valve replacement [Z95.2]  Paroxysmal atrial fibrillation (H) [I48.0]  Long term current use of anticoagulant therapy [Z79.01]           Comments:   * warfarin 5 mg tabs. Aortic 21 mm Johnson Perimount Magna Tissue Valve.          Anticoagulation Care Providers     Provider Role Specialty Phone number    Edin Mays MD Referring Internal Medicine [phone]

## 2021-06-24 NOTE — PROGRESS NOTES
HPI       Brooks Summers is a 76 year old male who presents for the new concern(s) of:.  Chief Complaint   Patient presents with     Physical     The patient presents for a physical and possible labs. The patient has a list to review.     Brooks present today for a physical, as well as multiple concerns including all-over tingling, night sweats, cold intolerance, memory loss, and requests for referrals to Urology and Dermatology. The patient brought a list that his wife gave him with those concerns listed. He states he feels well overall; he has little concern regarding night sweats, cold intolerance, and memory loss but would like to focus on Urology and Dermatology referrals.    Problem, Medication and Allergy Lists were   reviewed and updated if needed.     Patient Active Problem List    Diagnosis Date Noted     ICD (implantable cardioverter-defibrillator) battery depletion 02/13/2021     Priority: Medium     Added automatically from request for surgery [id number]       Cardiomyopathy (H) 01/18/2021     Priority: Medium     Added automatically from request for surgery [id number]       Seizure (H) 05/10/2020     Priority: Medium     Neoplasm of uncertain behavior of skin 06/08/2019     Priority: Medium     AK (actinic keratosis) 06/08/2019     Priority: Medium     History of nonmelanoma skin cancer 06/08/2019     Priority: Medium     Nocturnal hypoxemia 10/15/2018     Priority: Medium     Trigger ring finger of left hand 11/16/2015     Priority: Medium     Paroxysmal atrial fibrillation (H) 02/25/2015     Priority: Medium     Long term current use of anticoagulant therapy 02/25/2015     Priority: Medium     Problem list name updated by automated process. Provider to review       Finger injury 10/08/2014     Priority: Medium     Hyperlipidemia with target LDL less than 100 10/21/2013     Priority: Medium     Diagnosis updated by automated process. Provider to review and confirm.       Need for prophylactic antibiotic  04/12/2013     Priority: Medium     Aortic valve and past endocarditis        S/P AVR 01/25/2013     Priority: Medium     Endocarditis 01/21/2013     Priority: Medium     Cultures negative but 16S rRNA PCR showed Staph capitis.  Treated with Dapto and RIF x 8 weeks.       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 Provider to review       LBBB (left bundle branch block) 01/28/2012     Priority: Medium     Received CRT-D;  msec       Pneumothorax, left 01/28/2012     Priority: Medium     Observed following CRT-D implant; small. Observe and repeat CXR       Heart failure, chronic systolic (H) 01/28/2012     Priority: Medium     Stable; NYHA classII       Cardiomyopathy, dilated, nonischemic (H) 01/28/2012     Priority: Medium     Mild CAD; EF 15-20%       CKD (chronic kidney disease) stage 3, GFR 30-59 ml/min 01/28/2012     Priority: Medium     Dyslipidemia 01/28/2012     Priority: Medium     On Pravavastatin       Aortic valve stenosis, severe 07/14/2011     Priority: Medium     Chronic systolic heart failure (H) 07/14/2011     Priority: Medium     Bicuspid aortic valve 07/14/2011     Priority: Medium     S/P aortic valve replacement 07/14/2011     Priority: Medium     Smoking hx 07/14/2011     Priority: Medium     Rotator cuff (capsule) sprain 02/17/2009     Priority: Medium     Other postprocedural status(V45.89) 02/17/2009     Priority: Medium   ,     Current Outpatient Medications   Medication Sig Dispense Refill     allopurinol (ZYLOPRIM) 100 MG tablet TAKE 1 TABLET(100 MG) BY MOUTH EVERY EVENING 30 tablet 0     levETIRAcetam (KEPPRA) 500 MG tablet Take 1 tablet (500 mg) by mouth 2 times daily Start with 250 mg twice daily for 2 weeks, then 500 mg twice daily. 60 tablet 11     losartan (COZAAR) 25 MG tablet Take 0.5 tablets (12.5 mg) by mouth daily 45 tablet 3      metoprolol succinate ER (TOPROL-XL) 50 MG 24 hr tablet Take 1 tablet (50 mg) by mouth daily 90 tablet 3     Multiple Vitamins-Minerals (MULTIVITAMIN ADULTS 50+) TABS        oxyCODONE-acetaminophen (PERCOCET) 5-325 MG tablet Take 1 tablet by mouth every 6 hours as needed for severe pain 12 tablet 0     oxyCODONE-acetaminophen (PERCOCET) 5-325 MG tablet Take 1 tablet by mouth every 6 hours as needed for severe pain 12 tablet 0     PROVIDER DOSED MANAGED WARFARIN, COUMADIN, OUTPATIENT Per coumadin clinic       simvastatin (ZOCOR) 40 MG tablet Take 1 tablet (40 mg) by mouth At Bedtime 90 tablet 0     VITAMIN D, CHOLECALCIFEROL, PO Take 1,000 Units by mouth daily       warfarin ANTICOAGULANT (COUMADIN) 5 MG tablet Take 10mg Fri; 7.5mg all other days or as directed by the Anticoagulation Clinic 145 tablet 0   ,     Allergies   Allergen Reactions     Lisinopril Cough   .    Patient is   an established patient of this clinic.  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 Tx with Dapto/RIFx 8 weeks.     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 Father 68        bypass, carotid      Prostate Cancer Father 70     Heart Disease Father      Cancer Mother 92        stomach, colon     Hypertension Mother      Retinal detachment Mother      Cancer Brother 64        lung cancer, petroleum     Cancer Brother      Glaucoma No family hx of      Macular Degeneration No family  hx of      Strabismus No family hx of      Glasses (<9 y/o) No family hx of      Social History     Socioeconomic History     Marital status:      Spouse name: None     Number of children: None     Years of education: None     Highest education level: None   Occupational History     None   Social Needs     Financial resource strain: None     Food insecurity     Worry: None     Inability: None     Transportation needs     Medical: None     Non-medical: None   Tobacco Use     Smoking status: Former Smoker     Packs/day: 1.00     Years: 20.00     Pack years: 20.00     Types: Cigarettes     Quit date: 1989     Years since quittin.5     Smokeless tobacco: Never Used   Substance and Sexual Activity     Alcohol use: Yes     Alcohol/week: 2.0 standard drinks     Drug use: No     Sexual activity: Yes     Partners: Female     Birth control/protection: None     Comment:    Lifestyle     Physical activity     Days per week: None     Minutes per session: None     Stress: None   Relationships     Social connections     Talks on phone: None     Gets together: None     Attends Judaism service: None     Active member of club or organization: None     Attends meetings of clubs or organizations: None     Relationship status: None     Intimate partner violence     Fear of current or ex partner: None     Emotionally abused: None     Physically abused: None     Forced sexual activity: None   Other Topics Concern     Parent/sibling w/ CABG, MI or angioplasty before 65F 55M? No   Social History Narrative     since 1982 2 children 4 grandchildren.    Carpet sales:  Semi retired. Athlete in school, Golf, fish, yardwork   .           Review of Systems:   ROS            Physical Exam:     Vitals:    21 1410   BP: 126/76   BP Location: Right arm   Patient Position: Sitting   Cuff Size: Adult Regular   Pulse: 79   Resp: 16   SpO2: 98%   Weight: 78.1 kg (172 lb 3.2 oz)     Body mass index is 24.24  kg/m .  Vitals were reviewed and were normal     Physical Exam  Constitutional: healthy, alert and no distress  Head: Normocephalic. No masses, lesions, tenderness or abnormalities  Neck: Neck supple. No adenopathy. Thyroid symmetric, normal size,, Carotids without bruits.  ENT: ENT exam normal, no neck nodes or sinus tenderness  Cardiovascular: positive findings: holosystolic low pitched blowing murmur aortic area  Respiratory: Good diaphragmatic excursion. Lung fields clear upon auscultation.  Gastrointestinal: Abdomen soft, non-tender. BS normal. No masses, organomegaly  : Deferred  Musculoskeletal: Extremities normal- no gross deformities noted, gait normal and normal muscle tone  Skin: No suspicious lesions or rashes  Neurologic: Gait normal. Sensation grossly WNL.  Psychiatric: Mentation appears normal and affect normal/bright.  Hematologic/Lymphatic/Immunologic: Normal cervical lymph nodes.      Results:      Results from the last 24 hoursNo results found for this or any previous visit (from the past 24 hour(s)).    Assessment and Plan     Brooks was seen today for physical.    Diagnoses and all orders for this visit:    Nocturia  -     Adult Urology Referral; Future  -     Erythrocyte sedimentation rate; Future  -     CRP inflammation; Future  -     CBC with platelets differential; Future    Screening for skin cancer  -     ADULT DERMATOLOGY REFERRAL  -     Erythrocyte sedimentation rate; Future  -     CRP inflammation; Future  -     CBC with platelets differential; Future    Cold intolerance  -     TSH with free T4 reflex; Future  -     Erythrocyte sedimentation rate; Future  -     CRP inflammation; Future  -     CBC with platelets differential; Future    Other fatigue  -     Erythrocyte sedimentation rate; Future  -     CRP inflammation; Future  -     CBC with platelets differential; Future    1. Dermatology referral. Skin check. History of basal cell carcinoma, face.  2. Urology referral. Nocturia and per  pt request.  3. Labs: INR, CBC w/ diff, ESR, CRP, TSH w/free T4 reflex.  4. Immunizations. COVID-19 Moderna 2 of 2 completed. Due for Zoster immunization- discussed this with patient to f/u with insurance or go to [facility] if not covered as it will be cheaper, written in discharge instructions- f/u   5. Seizure history. Followed by Neurology, Dr. Pierce Westbrook. Last visit via video on 11/23/2020- Increased Keppra to 500mg BID, Recommend Neuropsych testing and to return in 3 months. Patient has not undergone Neuropsych testing or returned to Neurology- discussed with patient, he states he will follow up as needed but does not think it is necessary at this time.  6. Paroxysmal Afib, s/p aortic valve replacement, hx endocarditis of prosthetic valve, ICD in place. Followed closely by Cardiology, Dr. Mckenzie. Stable and remains on same medications. He has his INR checked, on coumadin for afib.    Follow up in 6 months.    There are no discontinued medications.     Options for treatment and follow-up care were reviewed with the patient. Brooks SEGURA Melina  engaged in the decision making process and verbalized understanding of the options discussed and agreed with the final plan.    30 minutes spent on the date of the encounter doing chart review, history and exam, documentation and further activities as noted above  Kiki Sandoval DNP Student, June 24, 2021 3:32 PM     Staff note. I personally reviewed Brooks's past medical records. I interviewed him and conducted a physical examination. I agree with the above assessment and plan.    BALAJI Mays MD

## 2021-06-27 ENCOUNTER — PRE VISIT (OUTPATIENT)
Dept: UROLOGY | Facility: CLINIC | Age: 76
End: 2021-06-27

## 2021-06-29 ENCOUNTER — PRE VISIT (OUTPATIENT)
Dept: UROLOGY | Facility: CLINIC | Age: 76
End: 2021-06-29

## 2021-06-29 ENCOUNTER — TELEPHONE (OUTPATIENT)
Dept: CARDIOLOGY | Facility: CLINIC | Age: 76
End: 2021-06-29

## 2021-07-09 ENCOUNTER — VIRTUAL VISIT (OUTPATIENT)
Dept: UROLOGY | Facility: CLINIC | Age: 76
End: 2021-07-09
Attending: INTERNAL MEDICINE
Payer: MEDICARE

## 2021-07-09 VITALS — HEIGHT: 70 IN | BODY MASS INDEX: 24.62 KG/M2 | WEIGHT: 172 LBS

## 2021-07-09 DIAGNOSIS — R35.1 NOCTURIA: ICD-10-CM

## 2021-07-09 PROCEDURE — 99213 OFFICE O/P EST LOW 20 MIN: CPT | Mod: 95 | Performed by: NURSE PRACTITIONER

## 2021-07-09 RX ORDER — TAMSULOSIN HYDROCHLORIDE 0.4 MG/1
0.4 CAPSULE ORAL DAILY
Qty: 30 CAPSULE | Refills: 3 | Status: SHIPPED | OUTPATIENT
Start: 2021-07-09 | End: 2021-09-14

## 2021-07-09 ASSESSMENT — PAIN SCALES - GENERAL: PAINLEVEL: NO PAIN (0)

## 2021-07-09 ASSESSMENT — MIFFLIN-ST. JEOR: SCORE: 1516.44

## 2021-07-09 NOTE — PROGRESS NOTES
Brooks is a 76 year old who is being evaluated via a billable video visit.      Video Visit Technology for this patient: Sally Video Visit- Sent invite to patient's phone    How would you like to obtain your AVS? Mail a copy  If the video visit is dropped, the invitation should be resent by: Text to cell phone: [phone]  Will anyone else be joining your video visit? No    Video Start Time: 3:05 PM  Video-Visit Details    Type of service:  Video Visit    Video End Time: 3:20 PM    Originating Location (pt. Location): Home    Distant Location (provider location):  [facility] [place]     Platform used for Video Visit: Sally        Brooks Summers complains of   Chief Complaint   Patient presents with     Nocturia         Assessment/Plan:  76 year old with a complex cardiac history who has been experiencing worsening nocturia for the past few months. Mild prostate enlargement per cystoscopy in 2018. Suspect there may be some undiagnosed sleep apnea but he is not concerned about this currently.   -Will trial Flomax 0.4 mg daily.   -Follow up with me in 2-3 months to discuss symptoms. If lack of improvement, may then consider referral to the sleep clinic +/- pursuing further evaluation with urodynamics, considering his health history.     Shelbi Reid, CNP  Department of Urology      Subjective:   76 year old male with a complex cardiac history, including cardiomyopathy s/p ICD placement, seizures, and skin cancer who presents via virtual visit for nocturia consult. He has been experiencing nocturia x2-3 for the past few months. No issue with daytime symptoms. Stream is normal and he seems to empty his bladder well with each void.     He was previously evaluated by Dr. Mora in 2018 for hematuria, with cystoscopy showing mild prostate enlargement.     He denies any known sleep apnea. He does have difficulty staying asleep at night and wakes early in the morning. He does snore, per his  wife. Has had this evaluated in the past, but it has been several years. No obvious apnea. He does dose off frequently during the day. No issues with lower extremity edema.       Objective:     Exam:   Patient is a 76 year old male   General Appearance: Well groomed, hygenic  Respiratory: No cough, no respiratory distress or labored breathing  Musculoskeletal: Grossly normal with no gross deficits  Skin: No discoloration or apparent rashes  Neurologic: No tremors  Psychiatric: Alert and oriented  Further examination is deferred due to the nature of our visit.

## 2021-07-09 NOTE — PATIENT INSTRUCTIONS
UROLOGY CLINIC VISIT PATIENT INSTRUCTIONS    -Start Flomax 0.4 mg daily.  -Follow up with me in 2-3 months via virtual visit to discuss symptoms.     If you have any issues, questions or concerns in the meantime, do not hesitate to contact us at [phone] or via [employer].     It was a pleasure meeting with you today.  Thank you for allowing me and my team the privilege of caring for you today.  YOU are the reason we are here, and I truly hope we provided you with the excellent service you deserve.  Please let us know if there is anything else we can do for you so that we can be sure you are leaving completely satisfied with your care experience.    Shelbi Reid, CNP

## 2021-07-09 NOTE — NURSING NOTE
"Chief Complaint   Patient presents with     Nocturia       Height 1.778 m (5' 10\"), weight 78 kg (172 lb). Body mass index is 24.68 kg/m .    Patient Active Problem List   Diagnosis     Rotator cuff (capsule) sprain     Other postprocedural status(V45.89)     Aortic valve stenosis, severe     Chronic systolic heart failure (H)     Bicuspid aortic valve     S/P aortic valve replacement     Smoking hx     LBBB (left bundle branch block)     Pneumothorax, left     Heart failure, chronic systolic (H)     Cardiomyopathy, dilated, nonischemic (H)     CKD (chronic kidney disease) stage 3, GFR 30-59 ml/min     Dyslipidemia     Automatic implantable cardioverter-defibrillator in situ- [employer] [place] [employer], Bi-Ventricular- INT dependent     Endocarditis     S/P AVR     Need for prophylactic antibiotic     Hyperlipidemia with target LDL less than 100     Finger injury     Paroxysmal atrial fibrillation (H)     Long term current use of anticoagulant therapy     Trigger ring finger of left hand     Nocturnal hypoxemia     Neoplasm of uncertain behavior of skin     AK (actinic keratosis)     History of nonmelanoma skin cancer     Seizure (H)     Cardiomyopathy (H)     ICD (implantable cardioverter-defibrillator) battery depletion       Allergies   Allergen Reactions     Lisinopril Cough       Current Outpatient Medications   Medication Sig Dispense Refill     allopurinol (ZYLOPRIM) 100 MG tablet TAKE 1 TABLET(100 MG) BY MOUTH EVERY EVENING 30 tablet 0     levETIRAcetam (KEPPRA) 500 MG tablet Take 1 tablet (500 mg) by mouth 2 times daily Start with 250 mg twice daily for 2 weeks, then 500 mg twice daily. 60 tablet 11     losartan (COZAAR) 25 MG tablet Take 0.5 tablets (12.5 mg) by mouth daily 45 tablet 3     metoprolol succinate ER (TOPROL-XL) 50 MG 24 hr tablet Take 1 tablet (50 mg) by mouth daily 90 tablet 3     Multiple Vitamins-Minerals (MULTIVITAMIN ADULTS 50+) TABS        PROVIDER DOSED MANAGED WARFARIN, COUMADIN, OUTPATIENT Per " coumadin clinic       simvastatin (ZOCOR) 40 MG tablet Take 1 tablet (40 mg) by mouth At Bedtime 90 tablet 0     VITAMIN D, CHOLECALCIFEROL, PO Take 1,000 Units by mouth daily       warfarin ANTICOAGULANT (COUMADIN) 5 MG tablet Take 10mg Fri; 7.5mg all other days or as directed by the Anticoagulation Clinic 145 tablet 0     oxyCODONE-acetaminophen (PERCOCET) 5-325 MG tablet Take 1 tablet by mouth every 6 hours as needed for severe pain (Patient not taking: Reported on 2021) 12 tablet 0     oxyCODONE-acetaminophen (PERCOCET) 5-325 MG tablet Take 1 tablet by mouth every 6 hours as needed for severe pain (Patient not taking: Reported on 2021) 12 tablet 0       Social History     Tobacco Use     Smoking status: Former Smoker     Packs/day: 1.00     Years: 20.00     Pack years: 20.00     Types: Cigarettes     Quit date: 1989     Years since quittin.5     Smokeless tobacco: Never Used   Substance Use Topics     Alcohol use: Yes     Alcohol/week: 2.0 standard drinks     Drug use: No       Moshe STEVEN  2021  2:39 PM

## 2021-07-09 NOTE — LETTER
7/9/2021       RE: Brooks Summers  [address]  [place] [id number]     Dear Colleague,    Thank you for referring your patient, Brooks Summers, to the [facility] UR[facility] [place] at [facility]. Please see a copy of my visit note below.    Brooks is a 76 year old who is being evaluated via a billable video visit.      Video Visit Technology for this patient: [email or web address] Video Visit- Sent invite to patient's phone    How would you like to obtain your AVS? Mail a copy  If the video visit is dropped, the invitation should be resent by: Text to cell phone: [phone]  Will anyone else be joining your video visit? No    Video Start Time: 3:05 PM  Video-Visit Details    Type of service:  Video Visit    Video End Time: 3:20 PM    Originating Location (pt. Location): Home    Distant Location (provider location):  [facility] [place]     Platform used for Video Visit: Alex        Brooks Summers complains of   Chief Complaint   Patient presents with     Nocturia         Assessment/Plan:  76 year old with a complex cardiac history who has been experiencing worsening nocturia for the past few months. Mild prostate enlargement per cystoscopy in 2018. Suspect there may be some undiagnosed sleep apnea but he is not concerned about this currently.   -Will trial Flomax 0.4 mg daily.   -Follow up with me in 2-3 months to discuss symptoms. If lack of improvement, may then consider referral to the sleep clinic +/- pursuing further evaluation with urodynamics, considering his health history.     Shelbi Reid, CNP  Department of Urology      Subjective:   76 year old male with a complex cardiac history, including cardiomyopathy s/p ICD placement, seizures, and skin cancer who presents via virtual visit for nocturia consult. He has been experiencing nocturia x2-3 for the past few months. No issue with daytime symptoms. Stream is normal and he  seems to empty his bladder well with each void.     He was previously evaluated by Dr. Mora in 2018 for hematuria, with cystoscopy showing mild prostate enlargement.     He denies any known sleep apnea. He does have difficulty staying asleep at night and wakes early in the morning. He does snore, per his wife. Has had this evaluated in the past, but it has been several years. No obvious apnea. He does dose off frequently during the day. No issues with lower extremity edema.       Objective:     Exam:   Patient is a 76 year old male   General Appearance: Well groomed, hygenic  Respiratory: No cough, no respiratory distress or labored breathing  Musculoskeletal: Grossly normal with no gross deficits  Skin: No discoloration or apparent rashes  Neurologic: No tremors  Psychiatric: Alert and oriented  Further examination is deferred due to the nature of our visit.

## 2021-07-12 ENCOUNTER — TELEPHONE (OUTPATIENT)
Dept: UROLOGY | Facility: CLINIC | Age: 76
End: 2021-07-12

## 2021-07-20 DIAGNOSIS — Z95.2 S/P AORTIC VALVE REPLACEMENT: ICD-10-CM

## 2021-07-20 DIAGNOSIS — Z95.2 S/P AVR (AORTIC VALVE REPLACEMENT): ICD-10-CM

## 2021-07-20 DIAGNOSIS — I48.0 PAROXYSMAL ATRIAL FIBRILLATION (H): ICD-10-CM

## 2021-07-20 DIAGNOSIS — Z79.01 LONG TERM CURRENT USE OF ANTICOAGULANT THERAPY: ICD-10-CM

## 2021-07-20 DIAGNOSIS — M10.00 IDIOPATHIC GOUT, UNSPECIFIED CHRONICITY, UNSPECIFIED SITE: ICD-10-CM

## 2021-07-22 RX ORDER — WARFARIN SODIUM 5 MG/1
TABLET ORAL
Qty: 145 TABLET | Refills: 0 | Status: SHIPPED | OUTPATIENT
Start: 2021-07-22 | End: 2021-10-13

## 2021-07-22 NOTE — TELEPHONE ENCOUNTER
ALLOPURINOL 100MG TABLETS      Last Written Prescription Date:  6-16-21  Last Fill Quantity: 30,   # refills: 0  Last Office Visit : 6-24-21  Future Office visit:  12-23-21    Routing refill request to provider for review/approval because:  Overdue labs: ALT, Uric acid,  FYI to CMA  Previous 90 day, 30 day mauricio MAYEN        [facility]:  WARFARIN SOD 5MG TABLETS (PEACH)

## 2021-07-22 NOTE — TELEPHONE ENCOUNTER
[facility] Call Center    Phone Message    May a detailed message be left on voicemail: yes     Reason for Call: Medication Refill Request    Has the patient contacted the pharmacy for the refill? Yes   Name of medication being requested: warfarin ANTICOAGULANT (COUMADIN) 5 MG tablet &   allopurinol (ZYLOPRIM) 100 MG tablet    Provider who prescribed the medication: Dr. Mays  Pharmacy: [id number] [address] AT [facility]    Date medication is needed: Patient is out of the Warfarin. - Need it tonight. Patient has 3 days left for the allopurinol. Please call patient if there's any questions. Thank you.          Action Taken: Message routed to:  [facility] ([facility]    Travel Screening: Not Applicable

## 2021-07-23 DIAGNOSIS — M10.00 IDIOPATHIC GOUT, UNSPECIFIED CHRONICITY, UNSPECIFIED SITE: ICD-10-CM

## 2021-07-23 RX ORDER — ALLOPURINOL 100 MG/1
100 TABLET ORAL DAILY
Qty: 30 TABLET | Refills: 3 | Status: SHIPPED | OUTPATIENT
Start: 2021-07-23 | End: 2021-09-15

## 2021-07-27 RX ORDER — ALLOPURINOL 100 MG/1
TABLET ORAL
Qty: 90 TABLET | OUTPATIENT
Start: 2021-07-27

## 2021-08-02 ENCOUNTER — ANCILLARY PROCEDURE (OUTPATIENT)
Dept: CARDIOLOGY | Facility: CLINIC | Age: 76
End: 2021-08-02
Attending: INTERNAL MEDICINE
Payer: MEDICARE

## 2021-08-02 DIAGNOSIS — Z95.810 BIVENTRICULAR IMPLANTABLE CARDIOVERTER-DEFIBRILLATOR (ICD) IN SITU: ICD-10-CM

## 2021-08-02 DIAGNOSIS — Z95.2 S/P AVR: ICD-10-CM

## 2021-08-02 DIAGNOSIS — I33.0 ACUTE BACTERIAL ENDOCARDITIS: ICD-10-CM

## 2021-08-02 LAB — LVEF ECHO: NORMAL

## 2021-08-02 PROCEDURE — 93306 TTE W/DOPPLER COMPLETE: CPT | Performed by: STUDENT IN AN ORGANIZED HEALTH CARE EDUCATION/TRAINING PROGRAM

## 2021-08-03 ENCOUNTER — TELEPHONE (OUTPATIENT)
Dept: ANTICOAGULATION | Facility: CLINIC | Age: 76
End: 2021-08-03

## 2021-08-03 ENCOUNTER — ANTICOAGULATION THERAPY VISIT (OUTPATIENT)
Dept: ANTICOAGULATION | Facility: CLINIC | Age: 76
End: 2021-08-03

## 2021-08-03 ENCOUNTER — LAB (OUTPATIENT)
Dept: LAB | Facility: CLINIC | Age: 76
End: 2021-08-03
Payer: MEDICARE

## 2021-08-03 DIAGNOSIS — Z79.01 ANTICOAGULATED ON COUMADIN: Primary | ICD-10-CM

## 2021-08-03 DIAGNOSIS — Z79.01 LONG TERM CURRENT USE OF ANTICOAGULANT THERAPY: ICD-10-CM

## 2021-08-03 DIAGNOSIS — I48.0 PAROXYSMAL ATRIAL FIBRILLATION (H): ICD-10-CM

## 2021-08-03 DIAGNOSIS — I48.0 PAROXYSMAL ATRIAL FIBRILLATION (H): Primary | ICD-10-CM

## 2021-08-03 DIAGNOSIS — Z95.2 S/P AVR (AORTIC VALVE REPLACEMENT): ICD-10-CM

## 2021-08-03 DIAGNOSIS — Z95.2 S/P AORTIC VALVE REPLACEMENT: Primary | ICD-10-CM

## 2021-08-03 LAB — INR BLD: 1.9 (ref 0.9–1.1)

## 2021-08-03 PROCEDURE — 85610 PROTHROMBIN TIME: CPT

## 2021-08-03 PROCEDURE — 36416 COLLJ CAPILLARY BLOOD SPEC: CPT

## 2021-08-03 NOTE — TELEPHONE ENCOUNTER
ANTICOAGULATION MANAGEMENT      Brooks Summers due for annual renewal of referral to anticoagulation monitoring. Order pended for your review and signature.      ANTICOAGULATION SUMMARY      Warfarin indication(s)     Atrial fibrillation    Heart valve present?  NO       Current goal range   INR: 2.0-3.0     Goal appropriate for indication? Yes, INR 2-3 appropriate for hx of DVT, PE, hypercoagulable state, Afib, LVAD, or bileaflet AVR without risk factors     Current duration of therapy Indefinite/long term therapy   Time in Therapeutic Range (TTR)  (Goal > 60%) 79.2%       Office visit with referring provider's group within last year yes on 6/24/21       Lo Worley RN

## 2021-08-03 NOTE — PROGRESS NOTES
ANTICOAGULATION MANAGEMENT     Brooks Summers 76 year old male is on warfarin with subtherapeutic INR result. (Goal INR 2.0-3.0)    Recent labs: (last 7 days)     08/03/21  0833   INR 1.9*       ASSESSMENT     Source(s): Chart Review and Patient/Caregiver Call       Warfarin doses taken: Warfarin taken as instructed    Diet: Increased greens/vitamin K in diet; plans to resume previous intake    New illness, injury, or hospitalization: No    Medication/supplement changes: None noted    Signs or symptoms of bleeding or clotting: No    Previous INR: Therapeutic last 2(+) visits    Additional findings: None     PLAN     Recommended plan for temporary change(s) affecting INR     Dosing Instructions: Booster dose then continue your current warfarin dose with next INR in 2 weeks       Summary  As of 8/3/2021    Full warfarin instructions:  8/3: 10 mg; Otherwise 10 mg every Fri; 7.5 mg all other days   Next INR check:  8/17/2021             Telephone call with Brooks who verbalizes understanding and agrees to plan    Lab visit scheduled    Education provided: Please call back if any changes to your diet, medications or how you've been taking warfarin, Monitoring for clotting signs and symptoms, When to seek medical attention/emergency care and Contact [phone]  with any changes, questions or concerns.     Plan made per [facility] anticoagulation protocol    Lo Worley RN  Anticoagulation Clinic  8/3/2021    _______________________________________________________________________     Anticoagulation Episode Summary     Current INR goal:  2.0-3.0   TTR:  73.4 % (1 y)   Target end date:  Indefinite   Send INR reminders to:  JONATHAN MURILLO    Indications    S/P aortic valve replacement [Z95.2]  Paroxysmal atrial fibrillation (H) [I48.0]  Long term current use of anticoagulant therapy [Z79.01]           Comments:   * warfarin 5 mg tabs. Aortic 21 mm Johnson Perimount Magna Tissue Valve.          Anticoagulation Care  Providers     Provider Role Specialty Phone number    Edin Mays MD Referring Internal Medicine [phone]        Anticoagulation Management    Unable to reach Brooks today.    Today's INR result of 1.9 is subtherapeutic (goal INR of 2.0-3.0).  Result received from: Clinic Lab    Follow up required to confirm warfarin dose taken and assess for changes    Left message to take a booster dose of warfarin,  10 mg tonight.      Anticoagulation clinic to follow up    Lo Worley RN

## 2021-08-04 DIAGNOSIS — E78.5 HYPERLIPIDEMIA LDL GOAL <100: ICD-10-CM

## 2021-08-05 PROBLEM — Z79.01 ANTICOAGULATED ON COUMADIN: Status: ACTIVE | Noted: 2021-08-05

## 2021-08-07 NOTE — TELEPHONE ENCOUNTER
SIMVASTATIN 40MG TABLETS      Last Written Prescription Date:  5/5/21  Last Fill Quantity: 90,   # refills: 0  Last Office Visit : 6/11/21  Future Office visit:  9/15/21    Routing refill request to provider for review/approval because:  Overdue for LDL  Lab Test 01/31/20  0943   LDL 76   Nothing more recent in care everywhere nor media  No future orders in que    Received mauricio refill with last fill  30 day pended, routed to cardiology

## 2021-08-10 DIAGNOSIS — E78.5 HYPERLIPIDEMIA LDL GOAL <100: Primary | ICD-10-CM

## 2021-08-10 RX ORDER — SIMVASTATIN 40 MG
40 TABLET ORAL AT BEDTIME
Qty: 90 TABLET | Refills: 0 | Status: SHIPPED | OUTPATIENT
Start: 2021-08-10 | End: 2021-11-15

## 2021-08-11 DIAGNOSIS — G40.909 RECURRENT SEIZURES (H): ICD-10-CM

## 2021-08-13 RX ORDER — LEVETIRACETAM 500 MG/1
500 TABLET ORAL 2 TIMES DAILY
Qty: 60 TABLET | Refills: 0 | Status: SHIPPED | OUTPATIENT
Start: 2021-08-13 | End: 2021-09-02

## 2021-08-13 NOTE — TELEPHONE ENCOUNTER
LC[facility]: 11/23/2020  [facility]  Scheduling has been notified to contact the pt for appointment.  RF 30 day  Creatinine   Date Value Ref Range Status   03/10/2021 1.03 0.66 - 1.25 mg/dL Final   Filling per [facility] medication refill protocols - seizure medications.  Not all labs required.

## 2021-08-17 ENCOUNTER — LAB (OUTPATIENT)
Dept: LAB | Facility: CLINIC | Age: 76
End: 2021-08-17
Payer: MEDICARE

## 2021-08-17 ENCOUNTER — ANTICOAGULATION THERAPY VISIT (OUTPATIENT)
Dept: ANTICOAGULATION | Facility: CLINIC | Age: 76
End: 2021-08-17

## 2021-08-17 DIAGNOSIS — Z95.2 S/P AVR (AORTIC VALVE REPLACEMENT): ICD-10-CM

## 2021-08-17 DIAGNOSIS — I48.0 PAROXYSMAL ATRIAL FIBRILLATION (H): ICD-10-CM

## 2021-08-17 DIAGNOSIS — Z79.01 LONG TERM CURRENT USE OF ANTICOAGULANT THERAPY: ICD-10-CM

## 2021-08-17 DIAGNOSIS — Z95.2 S/P AORTIC VALVE REPLACEMENT: Primary | ICD-10-CM

## 2021-08-17 DIAGNOSIS — Z79.01 ANTICOAGULATED ON COUMADIN: ICD-10-CM

## 2021-08-17 LAB — INR BLD: 2.1 (ref 0.9–1.1)

## 2021-08-17 PROCEDURE — 85610 PROTHROMBIN TIME: CPT

## 2021-08-17 PROCEDURE — 36416 COLLJ CAPILLARY BLOOD SPEC: CPT

## 2021-08-17 NOTE — PROGRESS NOTES
ANTICOAGULATION MANAGEMENT     Brooks Summers 76 year old male is on warfarin with therapeutic INR result. (Goal INR 2.0-3.0)    Recent labs: (last 7 days)     08/17/21  0811   INR 2.1*       ASSESSMENT     Source(s): Chart Review       Previous INR: Subtherapeutic    Additional findings: None     PLAN     Recommended plan for no diet, medication or health factor changes affecting INR     Dosing Instructions: Continue your current warfarin dose with next INR in 3 weeks       Summary  As of 8/17/2021    Full warfarin instructions:  10 mg every Fri; 7.5 mg all other days   Next INR check:  9/7/2021             Detailed voice message left for Brooks with dosing instructions and follow up date.     Contact [phone]  to schedule and with any changes, questions or concerns.     Education provided: Please call back if any changes to your diet, medications or how you've been taking warfarin and Contact [phone]  with any changes, questions or concerns.     Plan made per [facility] anticoagulation protocol    Gina Medina RN  Anticoagulation Clinic  8/17/2021    _______________________________________________________________________     Anticoagulation Episode Summary     Current INR goal:  2.0-3.0   TTR:  71.5 % (1 y)   Target end date:  Indefinite   Send INR reminders to:  [facility]    Indications    S/P aortic valve replacement [Z95.2]  Paroxysmal atrial fibrillation (H) [I48.0]  Long term current use of anticoagulant therapy [Z79.01]  Anticoagulated on Coumadin [Z79.01]           Comments:   * warfarin 5 mg tabs. Aortic 21 mm Johnson Perimount Magna Tissue Valve.          Anticoagulation Care Providers     Provider Role Specialty Phone number    Edin Mays MD Referring Internal Medicine [phone]

## 2021-08-19 ENCOUNTER — TELEPHONE (OUTPATIENT)
Dept: NEUROLOGY | Facility: CLINIC | Age: 76
End: 2021-08-19

## 2021-08-19 NOTE — TELEPHONE ENCOUNTER
LVM to schedule return appt w Dr. Westbrook, rx refill request, last seen 11/23/20, provided clinic number for call back.

## 2021-08-20 ENCOUNTER — ANCILLARY PROCEDURE (OUTPATIENT)
Dept: CARDIOLOGY | Facility: CLINIC | Age: 76
End: 2021-08-20
Attending: INTERNAL MEDICINE
Payer: MEDICARE

## 2021-08-20 ENCOUNTER — MYC MEDICAL ADVICE (OUTPATIENT)
Dept: CARDIOLOGY | Facility: CLINIC | Age: 76
End: 2021-08-20

## 2021-08-20 DIAGNOSIS — I42.9 CARDIOMYOPATHY (H): ICD-10-CM

## 2021-08-20 LAB
MDC_IDC_LEAD_IMPLANT_DT: NORMAL
MDC_IDC_LEAD_LOCATION: NORMAL
MDC_IDC_LEAD_LOCATION_DETAIL_1: NORMAL
MDC_IDC_LEAD_MFG: NORMAL
MDC_IDC_LEAD_MODEL: NORMAL
MDC_IDC_LEAD_POLARITY_TYPE: NORMAL
MDC_IDC_LEAD_SERIAL: NORMAL
MDC_IDC_LEAD_SPECIAL_FUNCTION: NORMAL
MDC_IDC_MSMT_BATTERY_DTM: NORMAL
MDC_IDC_MSMT_BATTERY_REMAINING_LONGEVITY: 78 MO
MDC_IDC_MSMT_BATTERY_RRT_TRIGGER: NORMAL
MDC_IDC_MSMT_CAP_CHARGE_ENERGY: 40 J
MDC_IDC_MSMT_CAP_CHARGE_TIME: 0 S
MDC_IDC_MSMT_CAP_CHARGE_TYPE: NORMAL
MDC_IDC_PG_IMPLANT_DTM: NORMAL
MDC_IDC_PG_MFG: NORMAL
MDC_IDC_PG_MODEL: NORMAL
MDC_IDC_PG_SERIAL: NORMAL
MDC_IDC_PG_TYPE: NORMAL
MDC_IDC_SESS_CLINIC_NAME: NORMAL
MDC_IDC_SESS_DTM: NORMAL
MDC_IDC_SESS_TYPE: NORMAL
MDC_IDC_SET_BRADY_AT_MODE_SWITCH_RATE: 171 {BEATS}/MIN
MDC_IDC_SET_BRADY_LOWRATE: 60 {BEATS}/MIN
MDC_IDC_SET_BRADY_MAX_SENSOR_RATE: 120 {BEATS}/MIN
MDC_IDC_SET_BRADY_MAX_TRACKING_RATE: 130 {BEATS}/MIN
MDC_IDC_SET_BRADY_MODE: NORMAL
MDC_IDC_SET_BRADY_NIGHT_RATE: 50 {BEATS}/MIN
MDC_IDC_SET_BRADY_PAV_DELAY_LOW: 150 MS
MDC_IDC_SET_BRADY_SAV_DELAY_LOW: 110 MS
MDC_IDC_SET_CRT_LVRV_DELAY: 0 MS
MDC_IDC_SET_CRT_PACED_CHAMBERS: NORMAL
MDC_IDC_SET_LEADCHNL_LV_PACING_AMPLITUDE: 2.75 V
MDC_IDC_SET_LEADCHNL_LV_PACING_ANODE_ELECTRODE_1: NORMAL
MDC_IDC_SET_LEADCHNL_LV_PACING_ANODE_LOCATION_1: NORMAL
MDC_IDC_SET_LEADCHNL_LV_PACING_CAPTURE_MODE: NORMAL
MDC_IDC_SET_LEADCHNL_LV_PACING_CATHODE_ELECTRODE_1: NORMAL
MDC_IDC_SET_LEADCHNL_LV_PACING_CATHODE_LOCATION_1: NORMAL
MDC_IDC_SET_LEADCHNL_LV_PACING_POLARITY: NORMAL
MDC_IDC_SET_LEADCHNL_LV_PACING_PULSEWIDTH: 1 MS
MDC_IDC_SET_LEADCHNL_RA_PACING_AMPLITUDE: 1.5 V
MDC_IDC_SET_LEADCHNL_RA_PACING_ANODE_ELECTRODE_1: NORMAL
MDC_IDC_SET_LEADCHNL_RA_PACING_ANODE_LOCATION_1: NORMAL
MDC_IDC_SET_LEADCHNL_RA_PACING_CAPTURE_MODE: NORMAL
MDC_IDC_SET_LEADCHNL_RA_PACING_CATHODE_ELECTRODE_1: NORMAL
MDC_IDC_SET_LEADCHNL_RA_PACING_CATHODE_LOCATION_1: NORMAL
MDC_IDC_SET_LEADCHNL_RA_PACING_POLARITY: NORMAL
MDC_IDC_SET_LEADCHNL_RA_PACING_PULSEWIDTH: 0.4 MS
MDC_IDC_SET_LEADCHNL_RA_SENSING_ANODE_ELECTRODE_1: NORMAL
MDC_IDC_SET_LEADCHNL_RA_SENSING_ANODE_LOCATION_1: NORMAL
MDC_IDC_SET_LEADCHNL_RA_SENSING_CATHODE_ELECTRODE_1: NORMAL
MDC_IDC_SET_LEADCHNL_RA_SENSING_CATHODE_LOCATION_1: NORMAL
MDC_IDC_SET_LEADCHNL_RA_SENSING_POLARITY: NORMAL
MDC_IDC_SET_LEADCHNL_RA_SENSING_SENSITIVITY: 0.3 MV
MDC_IDC_SET_LEADCHNL_RV_PACING_AMPLITUDE: 2 V
MDC_IDC_SET_LEADCHNL_RV_PACING_ANODE_ELECTRODE_1: NORMAL
MDC_IDC_SET_LEADCHNL_RV_PACING_ANODE_LOCATION_1: NORMAL
MDC_IDC_SET_LEADCHNL_RV_PACING_CAPTURE_MODE: NORMAL
MDC_IDC_SET_LEADCHNL_RV_PACING_CATHODE_ELECTRODE_1: NORMAL
MDC_IDC_SET_LEADCHNL_RV_PACING_CATHODE_LOCATION_1: NORMAL
MDC_IDC_SET_LEADCHNL_RV_PACING_POLARITY: NORMAL
MDC_IDC_SET_LEADCHNL_RV_PACING_PULSEWIDTH: 0.4 MS
MDC_IDC_SET_LEADCHNL_RV_SENSING_ANODE_ELECTRODE_1: NORMAL
MDC_IDC_SET_LEADCHNL_RV_SENSING_ANODE_LOCATION_1: NORMAL
MDC_IDC_SET_LEADCHNL_RV_SENSING_CATHODE_ELECTRODE_1: NORMAL
MDC_IDC_SET_LEADCHNL_RV_SENSING_CATHODE_LOCATION_1: NORMAL
MDC_IDC_SET_LEADCHNL_RV_SENSING_POLARITY: NORMAL
MDC_IDC_SET_LEADCHNL_RV_SENSING_SENSITIVITY: 0.3 MV
MDC_IDC_SET_ZONE_DETECTION_BEATS_DENOMINATOR: 40 {BEATS}
MDC_IDC_SET_ZONE_DETECTION_BEATS_NUMERATOR: 30 {BEATS}
MDC_IDC_SET_ZONE_DETECTION_INTERVAL: 320 MS
MDC_IDC_SET_ZONE_DETECTION_INTERVAL: 350 MS
MDC_IDC_SET_ZONE_DETECTION_INTERVAL: 360 MS
MDC_IDC_SET_ZONE_DETECTION_INTERVAL: 420 MS
MDC_IDC_SET_ZONE_TYPE: NORMAL
MDC_IDC_STAT_AT_DTM_END: NORMAL
MDC_IDC_STAT_AT_DTM_START: NORMAL
MDC_IDC_STAT_BRADY_DTM_END: NORMAL
MDC_IDC_STAT_BRADY_DTM_START: NORMAL
MDC_IDC_STAT_CRT_DTM_END: NORMAL
MDC_IDC_STAT_CRT_DTM_START: NORMAL
MDC_IDC_STAT_EPISODE_RECENT_COUNT: 0
MDC_IDC_STAT_EPISODE_RECENT_COUNT_DTM_END: NORMAL
MDC_IDC_STAT_EPISODE_RECENT_COUNT_DTM_START: NORMAL
MDC_IDC_STAT_EPISODE_TOTAL_COUNT: 0
MDC_IDC_STAT_EPISODE_TOTAL_COUNT_DTM_END: NORMAL
MDC_IDC_STAT_EPISODE_TOTAL_COUNT_DTM_START: NORMAL
MDC_IDC_STAT_EPISODE_TYPE: NORMAL
MDC_IDC_STAT_TACHYTHERAPY_ATP_DELIVERED_RECENT: 0
MDC_IDC_STAT_TACHYTHERAPY_ATP_DELIVERED_TOTAL: 0
MDC_IDC_STAT_TACHYTHERAPY_RECENT_DTM_END: NORMAL
MDC_IDC_STAT_TACHYTHERAPY_RECENT_DTM_START: NORMAL
MDC_IDC_STAT_TACHYTHERAPY_SHOCKS_ABORTED_RECENT: 0
MDC_IDC_STAT_TACHYTHERAPY_SHOCKS_ABORTED_TOTAL: 0
MDC_IDC_STAT_TACHYTHERAPY_TOTAL_DTM_END: NORMAL
MDC_IDC_STAT_TACHYTHERAPY_TOTAL_DTM_START: NORMAL

## 2021-08-20 PROCEDURE — 93296 REM INTERROG EVL PM/IDS: CPT

## 2021-08-20 PROCEDURE — 93295 DEV INTERROG REMOTE 1/2/MLT: CPT | Performed by: INTERNAL MEDICINE

## 2021-09-02 ENCOUNTER — VIRTUAL VISIT (OUTPATIENT)
Dept: NEUROLOGY | Facility: CLINIC | Age: 76
End: 2021-09-02
Payer: MEDICARE

## 2021-09-02 DIAGNOSIS — G40.909 RECURRENT SEIZURES (H): ICD-10-CM

## 2021-09-02 RX ORDER — LEVETIRACETAM 500 MG/1
500 TABLET ORAL 2 TIMES DAILY
Qty: 60 TABLET | Refills: 11 | Status: SHIPPED | OUTPATIENT
Start: 2021-09-02 | End: 2022-09-20

## 2021-09-02 NOTE — LETTER
2021     RE: Brooks Summers  : 1945   MRN: [id number]      Dear Colleague,    Thank you for referring your patient, Brooks Summers, to the [facility] at [facility]. Please see a copy of my visit note below.    Called patient, no answer, left voicemail message to call back for rooming process.  Cait Chavarria CMA      Brooks is a 76 year old who is being evaluated via a billable video visit.      How would you like to obtain your AVS? MyChart and Mail   If the video visit is dropped, the invitation should be resent by: Text to cell phone: [phone]  Will anyone else be joining your video visit? No      Video Start Time: 2:37 PM    Video-Visit Details    Type of service:  Video Visit    Video End Time:2:59 PM    Originating Location (pt. Location): Home    Distant Location (provider location):  [facility]     Platform used for Video Visit: Sally    CHIEF COMPLAINT:  Follow up for new onset seizures.      HISTORY OF PRESENT ILLNESS:  Video call for follow up. His wife was also with him during the video conference. This patient is a 76-year-old male with a history of bicuspid valve, severe stenosis, status post AVR with prosthetic valve endocarditis, then mitral valve repair and pacemaker placed in , on Coumadin, paroxysmal AFib, chronic thrombocytopenia, gout.      He presented with new-onset seizure in May 2020 and another seizure on 8/10/2020. The seizure was GTC with tongue biting then post ictal confusion. No incontinence. The seizures likely provoked by recent stress.   Since his last visit about 10 months ago, he has not had any seizure. He started Keppra 250 mg bid.  He denies any side effects of Keppra.     He is complaining of memory issues, mainly short term memory. Long term memory is good.    He states he has what is seems to be restless leg syndrome for long time.  She states that his short memory is not as good as before. He  is asking questions repeatedly and forget what they talks about. His mother had dementia. She passed away at her 90s.        Current Outpatient Medications   Medication Sig Dispense Refill     allopurinol (ZYLOPRIM) 100 MG tablet Take 1 tablet (100 mg) by mouth daily Labs due for refills. 30 tablet 3     levETIRAcetam (KEPPRA) 500 MG tablet Take 1 tablet (500 mg) by mouth 2 times daily For additional refills, please schedule a follow-up appointment 60 tablet 0     losartan (COZAAR) 25 MG tablet Take 0.5 tablets (12.5 mg) by mouth daily 45 tablet 3     metoprolol succinate ER (TOPROL-XL) 50 MG 24 hr tablet Take 1 tablet (50 mg) by mouth daily 90 tablet 3     Multiple Vitamins-Minerals (MULTIVITAMIN ADULTS 50+) TABS        PROVIDER DOSED MANAGED WARFARIN, COUMADIN, OUTPATIENT Per coumadin clinic       simvastatin (ZOCOR) 40 MG tablet Take 1 tablet (40 mg) by mouth At Bedtime - for further refills, call to schedule a lab appointment for blood draw (lipid profile, 12 hour fasting required) : [phone] 90 tablet 0     tamsulosin (FLOMAX) 0.4 MG capsule Take 1 capsule (0.4 mg) by mouth daily 30 capsule 3     VITAMIN D, CHOLECALCIFEROL, PO Take 1,000 Units by mouth daily       warfarin ANTICOAGULANT (COUMADIN) 5 MG tablet TAKE 2 TABLETS BY MOUTH ON FRIDAY, TAKE 1 AND 1/2 TABLETS ON ALL OTHER DAYS OR AS DIRECTED 145 tablet 0         PHYSICAL EXAMINATION:    Alert and oriented x3.  Speech fluent, appropriate.  EOMI, No facial weakness. No arm drift     REVIEW OF SYSTEMS: 8-point review of systems is essentially negative except what is mentioned in the HPI.      IMPRESSION:      1. New onset seizure, etiology unclear at this time.  He presented with new-onset seizure in May 2020 and another seizure on 8/10/2020. The seizure was GTC with tongue biting then post ictal confusion. No incontinence. The seizures likely provoked by recent stress.   Since his last visit about 10 months ago, he has not had any seizure. He started  Keppra 250 mg bid.  He denies any side effects of Keppra.     2. Memory loss. Mainly short term memory.    PLAN:   1.  Continue Keppra to 500 mg bid. Refilled.  2.  Neuropsychological test for assessment of the memory.  3.  Return to clinic in 3 months.     32 min total time was spent on the day of this visit.      21 min was spent on face to face time  5 min was spent on preparation of visit to review charts and labs, ordering medications and tests  6 min was spent on documentation of clinical information    Again, thank you for allowing me to participate in the care of your patient.      Sincerely,    Pierce Westbrook MD

## 2021-09-02 NOTE — PROGRESS NOTES
CHIEF COMPLAINT:  Follow up for new onset seizures.      HISTORY OF PRESENT ILLNESS:  Video call for follow up. His wife was also with him during the video conference. This patient is a 76-year-old male with a history of bicuspid valve, severe stenosis, status post AVR with prosthetic valve endocarditis, then mitral valve repair and pacemaker placed in 2013, on Coumadin, paroxysmal AFib, chronic thrombocytopenia, gout.      He presented with new-onset seizure in May 2020 and another seizure on 8/10/2020. The seizure was GTC with tongue biting then post ictal confusion. No incontinence. The seizures likely provoked by recent stress.   Since his last visit about 10 months ago, he has not had any seizure. He started Keppra 250 mg bid.  He denies any side effects of Keppra.     He is complaining of memory issues, mainly short term memory. Long term memory is good.    He states he has what is seems to be restless leg syndrome for long time.  She states that his short memory is not as good as before. He is asking questions repeatedly and forget what they talks about. His mother had dementia. She passed away at her 90s.        Current Outpatient Medications   Medication Sig Dispense Refill     allopurinol (ZYLOPRIM) 100 MG tablet Take 1 tablet (100 mg) by mouth daily Labs due for refills. 30 tablet 3     levETIRAcetam (KEPPRA) 500 MG tablet Take 1 tablet (500 mg) by mouth 2 times daily For additional refills, please schedule a follow-up appointment 60 tablet 0     losartan (COZAAR) 25 MG tablet Take 0.5 tablets (12.5 mg) by mouth daily 45 tablet 3     metoprolol succinate ER (TOPROL-XL) 50 MG 24 hr tablet Take 1 tablet (50 mg) by mouth daily 90 tablet 3     Multiple Vitamins-Minerals (MULTIVITAMIN ADULTS 50+) TABS        PROVIDER DOSED MANAGED WARFARIN, COUMADIN, OUTPATIENT Per coumadin clinic       simvastatin (ZOCOR) 40 MG tablet Take 1 tablet (40 mg) by mouth At Bedtime - for further refills, call to schedule a lab  appointment for blood draw (lipid profile, 12 hour fasting required) : [phone] 90 tablet 0     tamsulosin (FLOMAX) 0.4 MG capsule Take 1 capsule (0.4 mg) by mouth daily 30 capsule 3     VITAMIN D, CHOLECALCIFEROL, PO Take 1,000 Units by mouth daily       warfarin ANTICOAGULANT (COUMADIN) 5 MG tablet TAKE 2 TABLETS BY MOUTH ON FRIDAY, TAKE 1 AND 1/2 TABLETS ON ALL OTHER DAYS OR AS DIRECTED 145 tablet 0         PHYSICAL EXAMINATION:    Alert and oriented x3.  Speech fluent, appropriate.  EOMI, No facial weakness. No arm drift     REVIEW OF SYSTEMS: 8-point review of systems is essentially negative except what is mentioned in the HPI.      IMPRESSION:      1. New onset seizure, etiology unclear at this time.  He presented with new-onset seizure in May 2020 and another seizure on 8/10/2020. The seizure was GTC with tongue biting then post ictal confusion. No incontinence. The seizures likely provoked by recent stress.   Since his last visit about 10 months ago, he has not had any seizure. He started Keppra 250 mg bid.  He denies any side effects of Keppra.     2. Memory loss. Mainly short term memory.    PLAN:   1.  Continue Keppra to 500 mg bid. Refilled.  2.  Neuropsychological test for assessment of the memory.  3.  Return to clinic in 3 months.       32 min total time was spent on the day of this visit.      21 min was spent on face to face time  5 min was spent on preparation of visit to review charts and labs, ordering medications and tests  6 min was spent on documentation of clinical information

## 2021-09-02 NOTE — PROGRESS NOTES
Called patient, no answer, left voicemail message to call back for rooming process.  Cait Chavarria, [facility]

## 2021-09-02 NOTE — PROGRESS NOTES
Brooks is a 76 year old who is being evaluated via a billable video visit.      How would you like to obtain your AVS? MyChart and Mail   If the video visit is dropped, the invitation should be resent by: Text to cell phone: [phone]  Will anyone else be joining your video visit? No      Video Start Time: 2:37 PM    Video-Visit Details    Type of service:  Video Visit    Video End Time:2:59 PM    Originating Location (pt. Location): Home    Distant Location (provider location):  [facility]     Platform used for Video Visit: Sally

## 2021-09-03 ENCOUNTER — TELEPHONE (OUTPATIENT)
Dept: NEUROLOGY | Facility: CLINIC | Age: 76
End: 2021-09-03

## 2021-09-03 NOTE — TELEPHONE ENCOUNTER
MARIM for check out to schedule return appt w Dr. Westbrook, follow up in 3 months, virtual, provided clinic number for call back.

## 2021-09-07 ENCOUNTER — LAB (OUTPATIENT)
Dept: LAB | Facility: CLINIC | Age: 76
End: 2021-09-07
Payer: MEDICARE

## 2021-09-07 ENCOUNTER — ANTICOAGULATION THERAPY VISIT (OUTPATIENT)
Dept: ANTICOAGULATION | Facility: CLINIC | Age: 76
End: 2021-09-07

## 2021-09-07 DIAGNOSIS — Z79.01 LONG TERM CURRENT USE OF ANTICOAGULANT THERAPY: ICD-10-CM

## 2021-09-07 DIAGNOSIS — Z79.01 ANTICOAGULATED ON COUMADIN: ICD-10-CM

## 2021-09-07 DIAGNOSIS — Z95.2 S/P AORTIC VALVE REPLACEMENT: Primary | ICD-10-CM

## 2021-09-07 DIAGNOSIS — I48.0 PAROXYSMAL ATRIAL FIBRILLATION (H): ICD-10-CM

## 2021-09-07 LAB — INR BLD: 3.2 (ref 0.9–1.1)

## 2021-09-07 PROCEDURE — 85610 PROTHROMBIN TIME: CPT

## 2021-09-07 PROCEDURE — 36416 COLLJ CAPILLARY BLOOD SPEC: CPT

## 2021-09-07 NOTE — PROGRESS NOTES
ANTICOAGULATION MANAGEMENT     Brooks Summers 76 year old male is on warfarin with supratherapeutic INR result. (Goal INR 2.0-3.0)    Recent labs: (last 7 days)     09/07/21  0827   INR 3.2*       ASSESSMENT     Source(s): Patient/Caregiver Call         Previous INR: Therapeutic last visit; previously outside of goal range       PLAN         Dosing Instructions: Continue your current warfarin dose with next INR in 2 weeks       Summary  As of 9/7/2021    Full warfarin instructions:  10 mg every Fri; 7.5 mg all other days   Next INR check:  9/21/2021             Detailed voice message left for Brooks with dosing instructions and follow up date.     Contact [phone]  to schedule and with any changes, questions or concerns.     Education provided: Please call back if any changes to your diet, medications or how you've been taking warfarin, Monitoring for bleeding signs and symptoms, When to seek medical attention/emergency care and Contact [phone]  with any changes, questions or concerns.     Plan made per [facility] anticoagulation protocol    Lo Worley RN  Anticoagulation Clinic  9/7/2021    _______________________________________________________________________     Anticoagulation Episode Summary     Current INR goal:  2.0-3.0   TTR:  70.5 % (1 y)   Target end date:  Indefinite   Send INR reminders to:  JONATHAN MURILLO    Indications    S/P aortic valve replacement [Z95.2]  Paroxysmal atrial fibrillation (H) [I48.0]  Long term current use of anticoagulant therapy [Z79.01]  Anticoagulated on Coumadin [Z79.01]           Comments:   * warfarin 5 mg tabs. Aortic 21 mm Johnson Perimount Magna Tissue Valve.          Anticoagulation Care Providers     Provider Role Specialty Phone number    Edin Mays MD Referring Internal Medicine [phone]

## 2021-09-12 DIAGNOSIS — M10.00 IDIOPATHIC GOUT, UNSPECIFIED CHRONICITY, UNSPECIFIED SITE: ICD-10-CM

## 2021-09-12 DIAGNOSIS — G40.909 RECURRENT SEIZURES (H): ICD-10-CM

## 2021-09-14 DIAGNOSIS — R35.1 NOCTURIA: ICD-10-CM

## 2021-09-14 RX ORDER — TAMSULOSIN HYDROCHLORIDE 0.4 MG/1
0.4 CAPSULE ORAL DAILY
Qty: 30 CAPSULE | Refills: 2 | Status: SHIPPED | OUTPATIENT
Start: 2021-09-14 | End: 2021-12-07

## 2021-09-14 NOTE — TELEPHONE ENCOUNTER
7/9/2021  [facility] [place]   Plan reutrn 2-3 months. Scheduling has been notified to contact the pt for appointment.

## 2021-09-15 RX ORDER — ALLOPURINOL 100 MG/1
100 TABLET ORAL DAILY
Qty: 90 TABLET | Refills: 0 | Status: SHIPPED | OUTPATIENT
Start: 2021-09-15 | End: 2022-02-07

## 2021-09-15 RX ORDER — LEVETIRACETAM 500 MG/1
TABLET ORAL
Qty: 180 TABLET | OUTPATIENT
Start: 2021-09-15

## 2021-09-15 NOTE — TELEPHONE ENCOUNTER
LEVETIRACETAM 500MG TABLETS    levETIRAcetam (KEPPRA) 500 MG tablet 60 tablet 11 9/2/2021  No   Sig - Route: Take 1 tablet (500 mg) by mouth 2 times daily For additional refills, please schedule a follow-up appointment - Oral   Sent to pharmacy as: levETIRAcetam 500 MG Oral Tablet (KEPPRA)   Class: E-Prescribe   Order: [id number]   E-Prescribing Status: Receipt confirmed by pharmacy (9/2/2021  3:17 PM CDT)       Printout Tracking    External Result Report     Medication Administration Instructions    For additional refills, please schedule a follow-up appointment     Pharmacy    [id number] - [facility] [address] AT [facility]

## 2021-09-15 NOTE — TELEPHONE ENCOUNTER
ALLOPURINOL 100MG TABLETS  Last Written Prescription Date:  7/23/2021  Last Fill Quantity: 30,   # refills: 3  Last Office Visit : 6/24/2021  Future Office visit:  None    Routing refill request to provider for review/approval because:  Second Request     Over due Uric Acid Level and ALT Lab  Refer to clinic for review     Recent Labs   Lab Test 05/10/20  0654   ALT 43         Has Uric Acid on file in past 12 months and value is less than 6        Recent Labs   Lab Test 10/21/13  1622   URIC 6.4       Becca Velazquez RN  Central Triage Red Flags/Med Refills

## 2021-09-21 ENCOUNTER — LAB (OUTPATIENT)
Dept: LAB | Facility: CLINIC | Age: 76
End: 2021-09-21
Payer: MEDICARE

## 2021-09-21 ENCOUNTER — ANTICOAGULATION THERAPY VISIT (OUTPATIENT)
Dept: ANTICOAGULATION | Facility: CLINIC | Age: 76
End: 2021-09-21

## 2021-09-21 DIAGNOSIS — I48.0 PAROXYSMAL ATRIAL FIBRILLATION (H): ICD-10-CM

## 2021-09-21 DIAGNOSIS — Z79.01 LONG TERM CURRENT USE OF ANTICOAGULANT THERAPY: ICD-10-CM

## 2021-09-21 DIAGNOSIS — Z95.2 S/P AORTIC VALVE REPLACEMENT: Primary | ICD-10-CM

## 2021-09-21 DIAGNOSIS — Z79.01 ANTICOAGULATED ON COUMADIN: ICD-10-CM

## 2021-09-21 LAB — INR BLD: 2.2 (ref 0.9–1.1)

## 2021-09-21 PROCEDURE — 36415 COLL VENOUS BLD VENIPUNCTURE: CPT

## 2021-09-21 PROCEDURE — 85610 PROTHROMBIN TIME: CPT

## 2021-09-21 NOTE — PROGRESS NOTES
ANTICOAGULATION MANAGEMENT     Brooks Summers 76 year old male is on warfarin with therapeutic INR result. (Goal INR 2.0-3.0)    Recent labs: (last 7 days)     09/21/21  0815   INR 2.2*       ASSESSMENT     Source(s): Chart Review and Patient/Caregiver Call       Warfarin doses taken: Warfarin taken as instructed    Diet: No new diet changes identified    New illness, injury, or hospitalization: No    Medication/supplement changes: None noted    Signs or symptoms of bleeding or clotting: No    Previous INR: Supratherapeutic    Additional findings: None     PLAN     Recommended plan for no diet, medication or health factor changes affecting INR     Dosing Instructions: Continue your current warfarin dose with next INR in 3 weeks       Summary  As of 9/21/2021    Full warfarin instructions:  10 mg every Fri; 7.5 mg all other days   Next INR check:  10/12/2021             Telephone call with Brooks who verbalizes understanding and agrees to plan    Lab visit scheduled    Education provided: Please call back if any changes to your diet, medications or how you've been taking warfarin and Contact [phone]  with any changes, questions or concerns.     Plan made per [facility] anticoagulation protocol    Mel Kong RN  Anticoagulation Clinic  9/21/2021    _______________________________________________________________________     Anticoagulation Episode Summary     Current INR goal:  2.0-3.0   TTR:  69.7 % (1 y)   Target end date:  Indefinite   Send INR reminders to:  JONATHAN MURILLO    Indications    S/P aortic valve replacement [Z95.2]  Paroxysmal atrial fibrillation (H) [I48.0]  Long term current use of anticoagulant therapy [Z79.01]  Anticoagulated on Coumadin [Z79.01]           Comments:   * warfarin 5 mg tabs. Aortic 21 mm Johnson Perimount Magna Tissue Valve.          Anticoagulation Care Providers     Provider Role Specialty Phone number    Edin Mays MD Referring Internal Medicine [phone]

## 2021-09-26 ENCOUNTER — HEALTH MAINTENANCE LETTER (OUTPATIENT)
Age: 76
End: 2021-09-26

## 2021-09-27 ENCOUNTER — MYC MEDICAL ADVICE (OUTPATIENT)
Dept: INTERNAL MEDICINE | Facility: CLINIC | Age: 76
End: 2021-09-27

## 2021-09-30 ENCOUNTER — OFFICE VISIT (OUTPATIENT)
Dept: DERMATOLOGY | Facility: CLINIC | Age: 76
End: 2021-09-30
Payer: MEDICARE

## 2021-09-30 DIAGNOSIS — L82.1 SEBORRHEIC KERATOSES: ICD-10-CM

## 2021-09-30 DIAGNOSIS — D18.01 CHERRY ANGIOMA: ICD-10-CM

## 2021-09-30 DIAGNOSIS — L81.4 SOLAR LENTIGO: ICD-10-CM

## 2021-09-30 DIAGNOSIS — Z12.83 SKIN EXAM, SCREENING FOR CANCER: Primary | ICD-10-CM

## 2021-09-30 DIAGNOSIS — L57.0 AK (ACTINIC KERATOSIS): ICD-10-CM

## 2021-09-30 DIAGNOSIS — L82.0 INFLAMED SEBORRHEIC KERATOSIS: ICD-10-CM

## 2021-09-30 DIAGNOSIS — L57.8 PHOTOAGING OF SKIN: ICD-10-CM

## 2021-09-30 DIAGNOSIS — D22.9 MULTIPLE BENIGN NEVI: ICD-10-CM

## 2021-09-30 PROCEDURE — 99212 OFFICE O/P EST SF 10 MIN: CPT | Mod: 25

## 2021-09-30 PROCEDURE — 17000 DESTRUCT PREMALG LESION: CPT | Mod: GC

## 2021-09-30 PROCEDURE — 17003 DESTRUCT PREMALG LES 2-14: CPT | Mod: GC

## 2021-09-30 ASSESSMENT — PAIN SCALES - GENERAL: PAINLEVEL: NO PAIN (0)

## 2021-09-30 NOTE — PROGRESS NOTES
[facility] Dermatology Note  Encounter Date: Sep 30, 2021  Office Visit     Dermatology Problem List:  UBSE 9/30/21  1. 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 AK    ____________________________________________    Assessment & Plan:   # AKs  - LN2 as below    # Benign skin findings  - Physical exam demonstrating benign skin findings as below.  - Seborrheic keratoses  - Cherry hemangiomas  - Solar lentingos   - Benign nevi  - Reassurance provided.  - ABCDEs of melanoma handout provided.  - Advised patient to return to clinic for any new or concerning lesions.    Procedures Performed:   - Cryotherapy procedure note, location(s): scalp, face, and temple. After verbal consent and discussion of risks and benefits including, but not limited to, dyspigmentation/scar, blister, and pain, 7 lesion(s) was(were) treated with 1-2 mm freeze border for 1-2 cycles with liquid nitrogen. Post cryotherapy instructions were provided.    Follow-up: 1 year, prn for new or changing lesions    Staff and Resident:     Edvin Manning MD  PGY-2 Dermatology  Pager: [phone]    ____________________________________________    CC: Skin Check (pt states he is here for a skin check, spot on right side of forhead)    HPI:  Mr. Brooks Summers is a(n) 76 year old male who presents today as a return patient for lesion(s) of concern and skin check, only wants upper body.  - Lesion(s) of concern on right temple, present for past 6 months, not growing or bleeding or tender, gets itchy/irritated  - Okay about sunscreen use  - Nothing bleeding, growing, or tender  - Patient is otherwise feeling well, without additional skin concerns.    Labs Reviewed:  N/A    Physical Exam:  Vitals: There were no vitals taken for this visit.  SKIN: Waist-up skin, which includes the head/face, neck, both arms, chest,  back, abdomen, digits and/or nails was examined.\  - Right temple with 1.5cm keratotic, mamillated appearing, stuck on plaque c/w SK  - Erythematous papule with overyling adherent scale on the face and scalp.   - Waxy stuck on tan to brown papules on the trunk and extremities.   - Dome shaped bright red papules on the trunk and extremities.   - There are fine lines and dyspigmentation on sun exposed areas of the face and chest.  - Scattered brown macules on sun exposed areas.  - No other lesions of concern on areas examined.     Medications:  Current Outpatient Medications   Medication     allopurinol (ZYLOPRIM) 100 MG tablet     levETIRAcetam (KEPPRA) 500 MG tablet     losartan (COZAAR) 25 MG tablet     metoprolol succinate ER (TOPROL-XL) 50 MG 24 hr tablet     Multiple Vitamins-Minerals (MULTIVITAMIN ADULTS 50+) TABS     PROVIDER DOSED MANAGED WARFARIN, COUMADIN, OUTPATIENT     simvastatin (ZOCOR) 40 MG tablet     tamsulosin (FLOMAX) 0.4 MG capsule     VITAMIN D, CHOLECALCIFEROL, PO     warfarin ANTICOAGULANT (COUMADIN) 5 MG tablet     No current facility-administered medications for this visit.      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, Bi-Ventricular- INT dependent     Endocarditis     S/P AVR     Need for prophylactic antibiotic     Hyperlipidemia with target LDL less than 100     Finger injury     Paroxysmal atrial fibrillation (H)     Long term current use of anticoagulant  therapy     Trigger ring finger of left hand     Nocturnal hypoxemia     Neoplasm of uncertain behavior of skin     AK (actinic keratosis)     History of nonmelanoma skin cancer     Seizure (H)     Cardiomyopathy (H)     ICD (implantable cardioverter-defibrillator) battery depletion     Anticoagulated on Coumadin     Past Medical History:   Diagnosis Date     BCC (basal cell carcinoma), face 5/16/2013     Bicuspid aortic valve      Chronic systolic heart failure (H)      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 Tx with Dapto/RIFx 8 weeks.     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

## 2021-09-30 NOTE — PROGRESS NOTES
I talked with and examined Brooks Summers and I agree with the assessment and the plan. I was present for the entire procedure. COLBY Galaviz MD.

## 2021-09-30 NOTE — PATIENT INSTRUCTIONS
Patient Education     Checking for Skin Cancer  You can find cancer early by checking your skin each month. There are 3 kinds of skin cancer. They are melanoma, basal cell carcinoma, and squamous cell carcinoma. Doing monthly skin checks is the best way to find new marks or skin changes. Follow the instructions below for checking your skin.   The ABCDEs of checking moles for melanoma   Check your moles or growths for signs of melanoma using ABCDE:     Asymmetry: the sides of the mole or growth don t match    Border: the edges are ragged, notched, or blurred    Color: the color within the mole or growth varies    Diameter: the mole or growth is larger than 6 mm (size of a pencil eraser)    Evolving: the size, shape, or color of the mole or growth is changing (evolving is not shown in the images below)    Checking for other types of skin cancer  Basal cell carcinoma or squamous cell carcinoma have symptoms such as:       A spot or mole that looks different from all other marks on your skin    Changes in how an area feels, such as itching, tenderness, or pain    Changes in the skin's surface, such as oozing, bleeding, or scaliness    A sore that does not heal    New swelling or redness beyond the border of a mole    Who s at risk?  Anyone can get skin cancer. But you are at greater risk if you have:     Fair skin, light-colored hair, or light-colored eyes    Many moles or abnormal moles on your skin    A history of sunburns from sunlight or tanning beds    A family history of skin cancer    A history of exposure to radiation or chemicals    A weakened immune system  If you have had skin cancer in the past, you are at risk for recurring skin cancer.   How to check your skin  Do your monthly skin checkups in front of a full-length mirror. Check all parts of your body, including your:     Head (ears, face, neck, and scalp)    Torso (front, back, and sides)    Arms (tops, undersides, upper, and lower armpits)    Hands  (palms, backs, and fingers, including under the nails)    Buttocks and genitals    Legs (front, back, and sides)    Feet (tops, soles, toes, including under the nails, and between toes)  If you have a lot of moles, take digital photos of them each month. Make sure to take photos both up close and from a distance. These can help you see if any moles change over time.   Most skin changes are not cancer. But if you see any changes in your skin, call your doctor right away. Only he or she can diagnose a problem. If you have skin cancer, seeing your doctor can be the first step toward getting the treatment that could save your life.   [employer] last reviewed this educational content on 4/1/2019 2000-2020 [employer]. [address] [place] [address]. All rights reserved. This information is not intended as a substitute for professional medical care. Always follow your healthcare professional's instructions.       When should I call my doctor?    If you are worsening or not improving, please, contact us or seek urgent care as noted below.     Who should I call with questions (adults)?    [facility] (adult and pediatric): [phone]    [facility] (adult): [phone]    For urgent needs outside of business hours call the [facility] at [phone] and ask for the dermatology resident on call to be paged    If this is a medical emergency and you are unable to reach an ER, Call [phone]    Who should I call with questions (pediatric)?  [facility]- Pediatric Dermatology  Dr. Antoinette Adams, Dr. Jarrett Garcia, Dr. Martina Loyd, EDWARD Menard, Dr. Esperanza Tom, Dr. Marlena Wilks & Dr. Edin Talamantes  Non-urgent nurse triage line; [phone]- Gabby and Estephania NOLEN Care Coordinatormaxi Godoy (/Complex ) [phone]    If you need a prescription refill, please contact your  pharmacy. Refills are approved or denied by our Physicians during normal business hours, Monday through Fridays  Per office policy, refills will not be granted if you have not been seen within the past year (or sooner depending on your child's condition)    Scheduling Information:  Pediatric Appointment Scheduling and Call Center [phone]  Radiology Scheduling- [phone]  Sedation Unit Scheduling- [phone]  [place] Scheduling- [facility] [phone]; Pediatric Dermatology [phone]  Main  Services: [phone]  C[place] Rican: [phone]  Kyrgyz: [phone]  Hmong/Palauan/Sanjay: [phone]  Preadmission Nursing Department Fax Number: [phone] (Fax all pre-operative paperwork to this number)    For urgent matters arising during evenings, weekends, or holidays that cannot wait for normal business hours please call [phone] and ask for the dermatology resident on call to be paged.

## 2021-10-08 DIAGNOSIS — Z79.01 LONG TERM CURRENT USE OF ANTICOAGULANT THERAPY: ICD-10-CM

## 2021-10-08 DIAGNOSIS — M10.00 IDIOPATHIC GOUT, UNSPECIFIED CHRONICITY, UNSPECIFIED SITE: ICD-10-CM

## 2021-10-08 DIAGNOSIS — Z95.2 S/P AVR (AORTIC VALVE REPLACEMENT): ICD-10-CM

## 2021-10-08 DIAGNOSIS — I48.0 PAROXYSMAL ATRIAL FIBRILLATION (H): ICD-10-CM

## 2021-10-08 DIAGNOSIS — Z95.2 S/P AORTIC VALVE REPLACEMENT: ICD-10-CM

## 2021-10-12 ENCOUNTER — ANTICOAGULATION THERAPY VISIT (OUTPATIENT)
Dept: ANTICOAGULATION | Facility: CLINIC | Age: 76
End: 2021-10-12

## 2021-10-12 ENCOUNTER — LAB (OUTPATIENT)
Dept: LAB | Facility: CLINIC | Age: 76
End: 2021-10-12
Payer: MEDICARE

## 2021-10-12 DIAGNOSIS — I48.0 PAROXYSMAL ATRIAL FIBRILLATION (H): ICD-10-CM

## 2021-10-12 DIAGNOSIS — Z79.01 LONG TERM CURRENT USE OF ANTICOAGULANT THERAPY: ICD-10-CM

## 2021-10-12 DIAGNOSIS — Z79.01 ANTICOAGULATED ON COUMADIN: ICD-10-CM

## 2021-10-12 DIAGNOSIS — Z95.2 S/P AORTIC VALVE REPLACEMENT: Primary | ICD-10-CM

## 2021-10-12 LAB — INR BLD: 2.3 (ref 0.9–1.1)

## 2021-10-12 PROCEDURE — 36415 COLL VENOUS BLD VENIPUNCTURE: CPT

## 2021-10-12 PROCEDURE — 85610 PROTHROMBIN TIME: CPT

## 2021-10-12 NOTE — PROGRESS NOTES
ANTICOAGULATION MANAGEMENT     Brooks Summers 76 year old male is on warfarin with therapeutic INR result. (Goal INR 2.0-3.0)    Recent labs: (last 7 days)     10/12/21  0817   INR 2.3*       ASSESSMENT     Source(s): Chart Review and Patient/Caregiver Call       Warfarin doses taken: Warfarin taken as instructed    Diet: No new diet changes identified    New illness, injury, or hospitalization: No    Medication/supplement changes: None noted    Signs or symptoms of bleeding or clotting: No    Previous INR: Therapeutic last visit; previously outside of goal range    Additional findings: None     PLAN     Recommended plan for no diet, medication or health factor changes affecting INR     Dosing Instructions: Continue your current warfarin dose with next INR in 4 weeks       Summary  As of 10/12/2021    Full warfarin instructions:  10 mg every Fri; 7.5 mg all other days   Next INR check:  10/19/2021             Telephone call with Brooks who verbalizes understanding and agrees to plan    Lab visit scheduled    Education provided: Please call back if any changes to your diet, medications or how you've been taking warfarin and Contact [phone]  with any changes, questions or concerns.     Plan made per [facility] anticoagulation protocol    Mel Kong RN  Anticoagulation Clinic  10/12/2021    _______________________________________________________________________     Anticoagulation Episode Summary     Current INR goal:  2.0-3.0   TTR:  69.7 % (1 y)   Target end date:  Indefinite   Send INR reminders to:  JONATHAN MURILLO    Indications    S/P aortic valve replacement [Z95.2]  Paroxysmal atrial fibrillation (H) [I48.0]  Long term current use of anticoagulant therapy [Z79.01]  Anticoagulated on Coumadin [Z79.01]           Comments:   * warfarin 5 mg tabs. Aortic 21 mm Johnson Perimount Magna Tissue Valve.          Anticoagulation Care Providers     Provider Role Specialty Phone number    Edin Mays MD Referring  Internal Medicine [phone]

## 2021-10-13 RX ORDER — WARFARIN SODIUM 5 MG/1
TABLET ORAL
Qty: 145 TABLET | Refills: 0 | Status: SHIPPED | OUTPATIENT
Start: 2021-10-13 | End: 2022-01-06

## 2021-10-13 RX ORDER — ALLOPURINOL 100 MG/1
TABLET ORAL
Qty: 90 TABLET | Refills: 0 | OUTPATIENT
Start: 2021-10-13

## 2021-10-13 NOTE — TELEPHONE ENCOUNTER
WARFARIN SOD 5MG TABLETS (PEACH)      Last Written Prescription Date:  7/22/21  Last Fill Quantity: 145,   # refills: 0  Last Office Visit : 6/24/21  Future Office visit:  1/3/22    Routing refill request to provider for review/approval because:  Drug not on the [facility], [facility] or [facility] refill protocol or controlled substance  Thank-you, Kristy GALVAN RN Medication Refill Team

## 2021-11-02 DIAGNOSIS — E78.5 HYPERLIPIDEMIA LDL GOAL <100: ICD-10-CM

## 2021-11-05 NOTE — TELEPHONE ENCOUNTER
SIMVASTATIN 40MG TABLETS      Last Written Prescription Date:  8-10-20  Last Fill Quantity: 90,   # refills: 0  Last Office Visit : 6-11-21  Future Office visit:  none    Routing refill request to provider for review/approval because:  Overdue  Lab: LDL   ( order in epic)   previous 90 day mauricio

## 2021-11-09 ENCOUNTER — LAB (OUTPATIENT)
Dept: LAB | Facility: CLINIC | Age: 76
End: 2021-11-09
Payer: MEDICARE

## 2021-11-09 ENCOUNTER — ANTICOAGULATION THERAPY VISIT (OUTPATIENT)
Dept: ANTICOAGULATION | Facility: CLINIC | Age: 76
End: 2021-11-09

## 2021-11-09 DIAGNOSIS — Z95.2 S/P AORTIC VALVE REPLACEMENT: Primary | ICD-10-CM

## 2021-11-09 DIAGNOSIS — Z79.01 LONG TERM CURRENT USE OF ANTICOAGULANT THERAPY: ICD-10-CM

## 2021-11-09 DIAGNOSIS — Z79.01 ANTICOAGULATED ON COUMADIN: ICD-10-CM

## 2021-11-09 DIAGNOSIS — I48.0 PAROXYSMAL ATRIAL FIBRILLATION (H): ICD-10-CM

## 2021-11-09 LAB — INR BLD: 2.4 (ref 0.9–1.1)

## 2021-11-09 PROCEDURE — 85610 PROTHROMBIN TIME: CPT

## 2021-11-09 PROCEDURE — 36416 COLLJ CAPILLARY BLOOD SPEC: CPT

## 2021-11-09 NOTE — PROGRESS NOTES
ANTICOAGULATION MANAGEMENT     Brooks Summers 76 year old male is on warfarin with therapeutic INR result. (Goal INR 2.0-3.0)    Recent labs: (last 7 days)     11/09/21  0820   INR 2.4*       ASSESSMENT     Source(s): Chart Review and Patient/Caregiver Call       Warfarin doses taken: Warfarin taken as instructed    Diet: No new diet changes identified    New illness, injury, or hospitalization: No    Medication/supplement changes: None noted    Signs or symptoms of bleeding or clotting: No    Previous INR: Therapeutic last 2(+) visits    Additional findings: None     PLAN     Recommended plan for no diet, medication or health factor changes affecting INR     Dosing Instructions: Continue your current warfarin dose with next INR in 6 weeks       Summary  As of 11/9/2021    Full warfarin instructions:  10 mg every Fri; 7.5 mg all other days   Next INR check:  12/21/2021             Telephone call with Brooks who verbalizes understanding and agrees to plan    Lab visit scheduled    Education provided: Please call back if any changes to your diet, medications or how you've been taking warfarin and Contact [phone]  with any changes, questions or concerns.     Plan made per [facility] anticoagulation protocol    Mel Kong RN  Anticoagulation Clinic  11/9/2021    _______________________________________________________________________     Anticoagulation Episode Summary     Current INR goal:  2.0-3.0   TTR:  69.7 % (1 y)   Target end date:  Indefinite   Send INR reminders to:  JONATHAN MURILLO    Indications    S/P aortic valve replacement [Z95.2]  Paroxysmal atrial fibrillation (H) [I48.0]  Long term current use of anticoagulant therapy [Z79.01]  Anticoagulated on Coumadin [Z79.01]           Comments:   * warfarin 5 mg tabs. Aortic 21 mm Johnson Perimount Magna Tissue Valve.          Anticoagulation Care Providers     Provider Role Specialty Phone number    Edin Mays MD Referring Internal Medicine [phone]

## 2021-11-09 NOTE — TELEPHONE ENCOUNTER
See previous RX SIG.  Left patient VM that I'm trying to reach him to discuss.  Needs fasting lipids, was previously given 90 days mauricio fill.

## 2021-11-09 NOTE — PROGRESS NOTES
Anticoagulation Management    Unable to reach Brooks today.    Today's INR result of 2.4 is therapeutic (goal INR of 2.0-3.0).  Result received from: Clinic Lab    Follow up required to confirm warfarin dose taken and assess for changes    left message to call me back today before 5 pm.      Anticoagulation clinic to follow up    Mel Kong RN

## 2021-11-12 NOTE — PROGRESS NOTES
"HPI:    Pt. Comes in for follow up  today.  He has h/o prosthetic aortic valve endocarditis (see detailed ID note from Dr. Bravo 3/13). He also has h/o atrial fibrillation on coumadin. He is s/p ICD for prior low EF now normal. He o/w is doing fine and denies any new HEENT, cardiopulmonary, abdominal, , lymphatic, endocrine, vascular, systemic sxs. He was seen 11/3/2017 by Dr. Castro Cardiology and again by Dr. Mckenzie this week 12/4/2018.  He was seen by Dr. Tim, Hematology 8/19/15 for low platelets.  No neck or back pain. He remains active. No balance complaints.       PE:    Vitals noted gen nad cooperative alert, HEENT, ears normal oropharynx clear no exudate, neck supple nl rom no adenopathy, LCTA, B, RRR, S1,S2, murmur present, abdomen, nd ,nt, no masses, Cervical neck w/o tenderness. Lower back w/o tenderness or mass. Neurological exam B UE/LE/proximal/distal is normal and symmetric for sensation, reflexes, and strength.          A/P:    1. He will continue to follow in cardiology for h/o AVR and remains on coumadin for afib.   Seen by Dr. Mckenzie 12/4/2018  2. Colonoscopy done 11/19/2018  3. Seen in Dermatology 11/2017  4. He saw Dr. Tim, Hematology 8/15 for low platelets and will follow.   5. Seen by Dr. Mora Urology 4/2017 for BPH/hematuria.   PSA done 3/2018. Seen again by  Dr. Mora 10/12/2018  6. Up to date on vaccinations (influenza 12/4/2018) and recommend he check with insurance for new Shingles vaccination  7. He had EMG and saw Dr. nIgram, Neurology 8/17/2018 for \"electric\" extremity complaints.       Total time spent 25 minutes.  More than 50% of the time spent with Mr. Summers on counseling / coordinating his care                            " Spiritual Plan of Care    Pt Name: Kim Girard  Pt : 1947  Date: 2021    Visit Type: In person    Referral Source: Interdisciplinary team    Reason for Visit: Trigger    Visited With: Patient, Family/Friend    Length of Visit: 30 minutes    Requires Follow-up: No    Spiritual Care Consult Needed: Spiritual Care eval completed    Spiritual Care Visit Preference: Both    Taxonomy:    · Intended Effects: Build relationship of care and support, Convey a calming presence, Demonstrate caring and concern  · Methods: Offer support, Encourage story-telling  · Interventions: Active listening, Ask guided questions      Patient Affect at Time of Visit: Alert, Bright, Pleasant  Patient Assessment: Coping , At peace, Relies on selvin, Support system  Patient  Intervention: Affirmation, Empathic Listening      Family/Friend Name:   Family/Friend Affect at Time of Visit: Alert, Cooperative, Pleasant  Family/Friend Assessment: Unable to assess  Family/Friend  Intervention: Affirmation, Empathic Listening    Spiritual Plan of Care: No plan for follow up at this time    Deisy Choi and Clemente visited with PT and PT's . Both seemed well and were looking forward to spending tonight at home.    Patient Reported Outcome:  Coping techniques strengthened

## 2021-11-15 ENCOUNTER — LAB (OUTPATIENT)
Dept: LAB | Facility: CLINIC | Age: 76
End: 2021-11-15
Payer: MEDICARE

## 2021-11-15 DIAGNOSIS — E78.00 HIGH BLOOD CHOLESTEROL: ICD-10-CM

## 2021-11-15 DIAGNOSIS — E78.5 HYPERLIPIDEMIA LDL GOAL <100: ICD-10-CM

## 2021-11-15 DIAGNOSIS — E78.00 HIGH BLOOD CHOLESTEROL: Primary | ICD-10-CM

## 2021-11-15 LAB
ALBUMIN SERPL-MCNC: 4.2 G/DL (ref 3.4–5)
ALP SERPL-CCNC: 69 U/L (ref 40–150)
ALT SERPL W P-5'-P-CCNC: 45 U/L (ref 0–70)
ANION GAP SERPL CALCULATED.3IONS-SCNC: 6 MMOL/L (ref 3–14)
AST SERPL W P-5'-P-CCNC: 26 U/L (ref 0–45)
BASOPHILS # BLD AUTO: 0 10E3/UL (ref 0–0.2)
BASOPHILS NFR BLD AUTO: 0 %
BILIRUB SERPL-MCNC: 0.8 MG/DL (ref 0.2–1.3)
BUN SERPL-MCNC: 16 MG/DL (ref 7–30)
CALCIUM SERPL-MCNC: 9.5 MG/DL (ref 8.5–10.1)
CHLORIDE BLD-SCNC: 110 MMOL/L (ref 94–109)
CO2 SERPL-SCNC: 29 MMOL/L (ref 20–32)
CREAT SERPL-MCNC: 0.9 MG/DL (ref 0.66–1.25)
EOSINOPHIL # BLD AUTO: 0.1 10E3/UL (ref 0–0.7)
EOSINOPHIL NFR BLD AUTO: 1 %
ERYTHROCYTE [DISTWIDTH] IN BLOOD BY AUTOMATED COUNT: 13.7 % (ref 10–15)
GFR SERPL CREATININE-BSD FRML MDRD: 83 ML/MIN/1.73M2
GLUCOSE BLD-MCNC: 102 MG/DL (ref 70–99)
HCT VFR BLD AUTO: 43.5 % (ref 40–53)
HGB BLD-MCNC: 14.4 G/DL (ref 13.3–17.7)
IMM GRANULOCYTES # BLD: 0 10E3/UL
IMM GRANULOCYTES NFR BLD: 1 %
LYMPHOCYTES # BLD AUTO: 1.6 10E3/UL (ref 0.8–5.3)
LYMPHOCYTES NFR BLD AUTO: 36 %
MCH RBC QN AUTO: 32.8 PG (ref 26.5–33)
MCHC RBC AUTO-ENTMCNC: 33.1 G/DL (ref 31.5–36.5)
MCV RBC AUTO: 99 FL (ref 78–100)
MONOCYTES # BLD AUTO: 0.6 10E3/UL (ref 0–1.3)
MONOCYTES NFR BLD AUTO: 13 %
NEUTROPHILS # BLD AUTO: 2.3 10E3/UL (ref 1.6–8.3)
NEUTROPHILS NFR BLD AUTO: 49 %
NRBC # BLD AUTO: 0 10E3/UL
NRBC BLD AUTO-RTO: 0 /100
PLATELET # BLD AUTO: 81 10E3/UL (ref 150–450)
POTASSIUM BLD-SCNC: 4.8 MMOL/L (ref 3.4–5.3)
PROT SERPL-MCNC: 7.4 G/DL (ref 6.8–8.8)
RBC # BLD AUTO: 4.39 10E6/UL (ref 4.4–5.9)
SODIUM SERPL-SCNC: 145 MMOL/L (ref 133–144)
WBC # BLD AUTO: 4.6 10E3/UL (ref 4–11)

## 2021-11-15 PROCEDURE — 85025 COMPLETE CBC W/AUTO DIFF WBC: CPT | Performed by: PATHOLOGY

## 2021-11-15 PROCEDURE — 80053 COMPREHEN METABOLIC PANEL: CPT | Performed by: PATHOLOGY

## 2021-11-15 PROCEDURE — 36415 COLL VENOUS BLD VENIPUNCTURE: CPT | Performed by: PATHOLOGY

## 2021-11-15 RX ORDER — SIMVASTATIN 40 MG
40 TABLET ORAL AT BEDTIME
Qty: 90 TABLET | Refills: 0 | Status: SHIPPED | OUTPATIENT
Start: 2021-11-15 | End: 2022-02-16

## 2021-11-17 RX ORDER — SIMVASTATIN 40 MG
40 TABLET ORAL AT BEDTIME
Qty: 30 TABLET | OUTPATIENT
Start: 2021-11-17

## 2021-11-24 ENCOUNTER — OFFICE VISIT (OUTPATIENT)
Dept: NEUROLOGY | Facility: CLINIC | Age: 76
End: 2021-11-24
Attending: PSYCHIATRY & NEUROLOGY
Payer: MEDICARE

## 2021-11-24 ENCOUNTER — ANCILLARY PROCEDURE (OUTPATIENT)
Dept: CARDIOLOGY | Facility: CLINIC | Age: 76
End: 2021-11-24
Attending: INTERNAL MEDICINE
Payer: MEDICARE

## 2021-11-24 DIAGNOSIS — I42.9 CARDIOMYOPATHY (H): ICD-10-CM

## 2021-11-24 DIAGNOSIS — F06.8 OTHER SPECIFIED MENTAL DISORDERS DUE TO KNOWN PHYSIOLOGICAL CONDITION: ICD-10-CM

## 2021-11-24 DIAGNOSIS — R41.3 MEMORY LOSS: ICD-10-CM

## 2021-11-24 DIAGNOSIS — F33.0 MAJOR DEPRESSIVE DISORDER, RECURRENT EPISODE, MILD (H): ICD-10-CM

## 2021-11-24 DIAGNOSIS — F41.9 ANXIETY: ICD-10-CM

## 2021-11-24 DIAGNOSIS — G40.919 INTRACTABLE SEIZURE DISORDER (H): Primary | ICD-10-CM

## 2021-11-24 PROCEDURE — 93295 DEV INTERROG REMOTE 1/2/MLT: CPT | Performed by: INTERNAL MEDICINE

## 2021-11-24 PROCEDURE — 93296 REM INTERROG EVL PM/IDS: CPT

## 2021-11-24 NOTE — PROGRESS NOTES
Patient was seen for neuropsychological evaluation at the request of Dr. Pierce Westbrook, for the purposes of diagnostic clarification and treatment planning.  1 hrs 32 min of test administration and scoring were provided by this writer, Cuca Chau.  Please see Dr. Esvin Doss's report for a full interpretation of the findings.

## 2021-11-24 NOTE — LETTER
"2021     RE: Brooks Summers  : 1945   MRN: [id number]      Dear Colleague,    Thank you for referring your patient, Brooks Summers, to the [facility] at [facility]. Please see a copy of my visit note below.    Patient was seen for neuropsychological evaluation at the request of Dr. Pierce Westbrook, for the purposes of diagnostic clarification and treatment planning.  1 hrs 32 min of test administration and scoring were provided by this writer, Cuca Chau.  Please see Dr. Esvin Doss's report for a full interpretation of the findings.      Name: Brooks Summers  MR#: [id number]  YOB: 1945  Date of Exam: 2021    NEUROPSYCHOLOGICAL EVALUATION    IDENTIFYING INFORMATION  Brooks Summers is a 76 year old year old, right handed, retired , with 15 years of formal education. He was accompanied to the evaluation by his wife, Pinky.      BACKGROUND INFORMATION / INTERVIEW FINDINGS    Records indicate that Mr. Summers's medical history includes implantable cardioverter-defibrillator in situ, cardiomyopathy, skin neoplasm of uncertain behavior, actinic keratosis, history of nonmelanoma skin cancer, nocturnal hypoxemia, trigger finger of the left hand, paroxysmal atrial fibrillation, hyperlipidemia, status post aortic valve replacement, endocarditis, left bundle branch block, pneumothorax on the left, chronic systolic heart failure, nonischemic dilated cardiomyopathy, chronic kidney disease stage III, dyslipidemia, bicuspid aortic valve, history of smoking, and rotator cuff capsule sprain.  Additionally, he suffered a first onset generalized tonic-clonic seizure in May, 2020.  He bit his tongue and had postictal confusion.  He had another seizure on August 10, 2020.  A CT scan of his head on May 10, 2020 documented \"1.  No acute intracranial pathology. 2.  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 3.  Periventricular hypodensities, nonspecific " "which may represent sequelae of chronic small vessel ischemic disease, infectious/inflammatory process, versus demyelinating disease.\"  Concerns have been expressed about his cognition, and in particular with memory.  The current evaluation was requested by Dr. Pierce Westbrook, in this context.    On interview, Mr. Summers and his wife confirmed the above history.  His wife stated that in the time leading up to his seizures, they were about to sell their home.  He was under stress.  She stated that he was neurologically normal up until this point.  She stated that the seizures each lasted a few minutes in duration.  The patient noted that since then, he has not had any seizures that he is aware of.  He indicated that he does have instances in which he has a tingling sensation in his legs that he likened to neuropathy.  He also noted that his fingers have stiffened in the last couple of years.    Regarding cognition, Mr. Summers reported that his thinking skills were normal up until August, 2020.  He stated that he has always had a hard time with remembering names, and this issue may now be a little bit worse.  He otherwise denied cognitive concerns, even when presented with a list of specific cognitive domains.  The patient's wife reported that he had a step downward in his thinking in August, 2020.  She indicated that his thinking has since been largely stable, but with perhaps a little bit of decline.  She reported that he misplaces items, forgets conversations, and his attention span is shorter.  She stated that he likes to read, and he is able to tell her what he has been reading.  She noted that he sometimes has memory issues.  She denied changes in his personality.    With respect to mental health, Mr. Summers reported that he has had some changes in his lifestyle that have impacted his mood.  He stated that he retired, and he and his wife moved into an apartment.  He reported that he sometimes wonders what he is going " to do to occupy his day.  He noted that he gets stressed.  He denied prior mental health diagnoses or treatments.  He denied prior psychiatric hospitalization, hallucinations, symptoms consistent with severe mental illness, suicidal ideation, or past suicide attempts.  His wife noted that he gets stressed out and she thinks that he struggles a bit with his mood.  She stated that when he does get stressed, he develops a rash on his back.    With respect to other medical background, Mr. Summers reported that he played football when he was younger.  He stated that he was struck on the head in one game, and was taken out of the game.  He otherwise denied a history of head injury.  He denied prior known stroke.  He stated that he had heart surgeries that were several hours in duration in  and .  Regarding sleep, he averages 6 or 7 hours of sleep at night.  He naps during the day.  His wife stated that he used to snore, but no longer does.  He had a sleep study and was not diagnosed with sleep apnea.  He slept normally the night before the exam.  They denied symptoms consistent with REM behavior disorder.  He stated that he has stiffness in his fingers, which he attributed to arthritis.  He otherwise denied pain.  He reported that he exercises regularly and walks.  He also golfs.  Per records, his current medications include allopurinol, levetiracetam, losartan, metoprolol, multivitamin, simvastatin, tamsulosin, vitamin D, and warfarin.  He stated that he consumes 2 alcoholic drinks per day, but otherwise denied substance use.  He denied past problematic substance use.    Regarding family neurologic history, the patient reported that his mother had dementia, and she  at age 93.  He also noted that there is heart disease on both sides of his family.    Mr. Summers and his wife are living in an independent apartment.  They moved out of their home 2 years ago.  He manages his own basic daily activities and his own  medications.  His wife has always been responsible for their finances.  They share meal preparation responsibilities.  He drives.    By way of background, Mr. Summers and his wife were  in 1982.  This is his second marriage.  He has 2 sons, both of whom live locally.  He also has 4 grandchildren.  Regarding educational background, he graduated from high school with average grades.  He completed nearly 4 years of college course work at [employer], and studied history and education. He did not earn a college degree. He also completed some work-related classes.  He served in the [employer] from 1967 to 1969, but did not see active combat.  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  He reported that his job required a lot of mathematical calculations.  He retired 2 years ago.    BEHAVIORAL OBSERVATIONS  Mr. Summers was polite and cooperative with the exam.  He engaged in limited spontaneous conversation with the examiner.  His speech was notable for mild dysfluency. His comprehension was normal. His thought processes were notable for mild distractibility, mild impersistence, mild carelessness, and mild slowing. His mood was mildly depressed and anxious with congruent affect. His effort was rated as adequate to good, as he was noted to be hesitant to guess on some measures. The current results are felt to be a broadly accurate depiction of his cognitive functioning.      RESULTS OF EXAM  His performances on standardized measures of neuropsychological functioning were as follows:    He was fully oriented to time, place, and various aspects of personal information.  Performance on a measure of single word reading was average.  He obtained passing scores on stand-alone and embedded metrics of cognitive performance validity.  Auditory attention for digits was average.  Learning of words in a list format was  average.  Delayed recall of list words was low average.  Percent retention of list words was low average.  Delayed recognition of list words was borderline impaired, with 3 false positive errors.  Learning of simple geometric shapes and their spatial locations was borderline impaired.  Delayed recall of the shapes and their locations was low average.  Percent retention of the shapes was normal.  Delayed recognition of the shapes was borderline impaired.  Visuospatial judgments for variably oriented lines were superior.  Visual problem-solving with blocks was average.  His drawing of a complicated geometric figure was normal, in spite of a hurried approach.  Comprehension of phrases and short stories was average.  Verbal associative fluency was low average.  Animal fluency was low average.  Naming to confrontation was average.  Verbal abstract reasoning was average.  Speeded visual sequencing under focused attention was average.  A similar measure with a divided attention component was average.  Speeded word reading was average.  Speeded color naming was average.  Speeded inhibition of an overlearned response was performed in the high average to superior range.  Speeded visual motor coding was high average.    He endorsed items consistent with mild symptoms of depression, and mild symptoms of anxiety on self-report measures.    IMPRESSIONS  Mr. Summers demonstrated mild weaknesses that raise the question of bilateral mesial temporal region dysfunction.  The etiology is not clear.  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  However, another explanation would be that these weaknesses are attributable to his seizures.  Additionally, he is reporting mild symptoms of depression and anxiety on self-report measures, which could be contributing as well.  In this exam, mild weaknesses were identified in verbal and nonverbal anterograde  memory.  There are also subtle weaknesses in verbal fluency.  The remainder of his cognitive abilities were normal and performed in keeping with his average range cognitive baseline.  He has strengths in cognitive speed and aspects of executive functioning.  He also has a strength in basic visuospatial judgments.  As alluded to above, he is reporting mild symptoms of depression and anxiety.  It may well be the case that these mental health factors are contributing to some degree of variability and his performances in this exam, as well as to his concerns about cognitive dysfunction in day-to-day life.    RECOMMENDATIONS  Preliminary results and recommendations were provided to the patient and his wife on the date of the evaluation, and all questions were answered.     1.  Continued treatment of his seizures is indicated.    2.  If medically indicated, consideration might be given to treatment of his mental health.    3.  Along similar lines, referral for psychotherapy services could be considered.  One possible referral option would be [facility], with locations throughout the [facility] area.  They can be reached by calling [phone].    4.  His memory could benefit from routine use of a memory notebook or other assistive device.    5. Follow-up neuropsychological evaluation is recommended in 1 year in order to assess and update recommendations as appropriate.  The current results can be used as a baseline at that time.    Esvin Doss, Ph.D., L.P., ABPP  Board Certified in Clinical Neuropsychology   / Licensed Psychologist [id number]    Time spent: One unit (45 minutes) psychiatric diagnostic interviewincluding interview, clinical assessment of thinking, reasoning, and judgment by licensed and board-certified neuropsychologist (CPT 77232). One unit (60 minutes) neuropsychological testing evaluation by licensed and board-certified neuropsychologist, including integration of patient  data, interpretation of standardized test results and clinical data, clinical decision-making, treatment planning, report, and interactive feedback to the patient, first hour (CPT 07740). One units (40 minutes) of neuropsychological testing evaluation by licensed and board-certified neuropsychologist, including integration of patient data, interpretation of standardized test results and clinical data, clinical decision-making, treatment planning, report, and interactive feedback to the patient, subsequent hours (CPT 43120). One unit (30 minutes) of psychological and neuropsychological test administration and scoring by technician, first 30 minutes (CPT 11616). Two units (62 minutes) psychological or neuropsychological test administration and scoring by technician, subsequent 30 minutes (CPT 21958). Diagnoses: G40.919, R41.3, F06.8, F33.0, F41.9.

## 2021-11-29 NOTE — PROGRESS NOTES
"Name: Brooks Summers  MR#: [id number]  YOB: 1945  Date of Exam: Nov 24, 2021    NEUROPSYCHOLOGICAL EVALUATION    IDENTIFYING INFORMATION  Brooks Summers is a 76 year old year old, right handed, retired , with 15 years of formal education. He was accompanied to the evaluation by his wife, Pinky.      BACKGROUND INFORMATION / INTERVIEW FINDINGS    Records indicate that Mr. Summers's medical history includes implantable cardioverter-defibrillator in situ, cardiomyopathy, skin neoplasm of uncertain behavior, actinic keratosis, history of nonmelanoma skin cancer, nocturnal hypoxemia, trigger finger of the left hand, paroxysmal atrial fibrillation, hyperlipidemia, status post aortic valve replacement, endocarditis, left bundle branch block, pneumothorax on the left, chronic systolic heart failure, nonischemic dilated cardiomyopathy, chronic kidney disease stage III, dyslipidemia, bicuspid aortic valve, history of smoking, and rotator cuff capsule sprain.  Additionally, he suffered a first onset generalized tonic-clonic seizure in May, 2020.  He bit his tongue and had postictal confusion.  He had another seizure on August 10, 2020.  A CT scan of his head on May 10, 2020 documented \"1.  No acute intracranial pathology. 2.  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 3.  Periventricular hypodensities, nonspecific which may represent sequelae of chronic small vessel ischemic disease, infectious/inflammatory process, versus demyelinating disease.\"  Concerns have been expressed about his cognition, and in particular with memory.  The current evaluation was requested by Dr. Pierce Westbrook, in this context.    On interview, Mr. Summers and his wife confirmed the above history.  His wife stated that in the time leading up to his seizures, they were about to sell their home.  He was under stress.  She stated that he was neurologically normal up until this point.  She stated that the seizures each lasted a few minutes in " duration.  The patient noted that since then, he has not had any seizures that he is aware of.  He indicated that he does have instances in which he has a tingling sensation in his legs that he likened to neuropathy.  He also noted that his fingers have stiffened in the last couple of years.    Regarding cognition, Mr. Summers reported that his thinking skills were normal up until August, 2020.  He stated that he has always had a hard time with remembering names, and this issue may now be a little bit worse.  He otherwise denied cognitive concerns, even when presented with a list of specific cognitive domains.  The patient's wife reported that he had a step downward in his thinking in August, 2020.  She indicated that his thinking has since been largely stable, but with perhaps a little bit of decline.  She reported that he misplaces items, forgets conversations, and his attention span is shorter.  She stated that he likes to read, and he is able to tell her what he has been reading.  She noted that he sometimes has memory issues.  She denied changes in his personality.    With respect to mental health, Mr. Summers reported that he has had some changes in his lifestyle that have impacted his mood.  He stated that he retired, and he and his wife moved into an apartment.  He reported that he sometimes wonders what he is going to do to occupy his day.  He noted that he gets stressed.  He denied prior mental health diagnoses or treatments.  He denied prior psychiatric hospitalization, hallucinations, symptoms consistent with severe mental illness, suicidal ideation, or past suicide attempts.  His wife noted that he gets stressed out and she thinks that he struggles a bit with his mood.  She stated that when he does get stressed, he develops a rash on his back.    With respect to other medical background, Mr. Summers reported that he played football when he was younger.  He stated that he was struck on the head in one game,  and was taken out of the game.  He otherwise denied a history of head injury.  He denied prior known stroke.  He stated that he had heart surgeries that were several hours in duration in  and .  Regarding sleep, he averages 6 or 7 hours of sleep at night.  He naps during the day.  His wife stated that he used to snore, but no longer does.  He had a sleep study and was not diagnosed with sleep apnea.  He slept normally the night before the exam.  They denied symptoms consistent with REM behavior disorder.  He stated that he has stiffness in his fingers, which he attributed to arthritis.  He otherwise denied pain.  He reported that he exercises regularly and walks.  He also golfs.  Per records, his current medications include allopurinol, levetiracetam, losartan, metoprolol, multivitamin, simvastatin, tamsulosin, vitamin D, and warfarin.  He stated that he consumes 2 alcoholic drinks per day, but otherwise denied substance use.  He denied past problematic substance use.    Regarding family neurologic history, the patient reported that his mother had dementia, and she  at age 93.  He also noted that there is heart disease on both sides of his family.    Mr. Summers and his wife are living in an independent apartment.  They moved out of their home 2 years ago.  He manages his own basic daily activities and his own medications.  His wife has always been responsible for their finances.  They share meal preparation responsibilities.  He drives.    By way of background, Mr. Summers and his wife were  in .  This is his second marriage.  He has 2 sons, both of whom live locally.  He also has 4 grandchildren.  Regarding educational background, he graduated from high school with average grades.  He completed nearly 4 years of college course work at [employer], and studied history and education. He did not earn a college degree. He also completed some work-related classes.  He served in the  [employer] from 1967 to 1969, but did not see active combat.  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  He reported that his job required a lot of mathematical calculations.  He retired 2 years ago.    BEHAVIORAL OBSERVATIONS  Mr. Summers was polite and cooperative with the exam.  He engaged in limited spontaneous conversation with the examiner.  His speech was notable for mild dysfluency. His comprehension was normal. His thought processes were notable for mild distractibility, mild impersistence, mild carelessness, and mild slowing. His mood was mildly depressed and anxious with congruent affect. His effort was rated as adequate to good, as he was noted to be hesitant to guess on some measures. The current results are felt to be a broadly accurate depiction of his cognitive functioning.      RESULTS OF EXAM  His performances on standardized measures of neuropsychological functioning were as follows:    He was fully oriented to time, place, and various aspects of personal information.  Performance on a measure of single word reading was average.  He obtained passing scores on stand-alone and embedded metrics of cognitive performance validity.  Auditory attention for digits was average.  Learning of words in a list format was average.  Delayed recall of list words was low average.  Percent retention of list words was low average.  Delayed recognition of list words was borderline impaired, with 3 false positive errors.  Learning of simple geometric shapes and their spatial locations was borderline impaired.  Delayed recall of the shapes and their locations was low average.  Percent retention of the shapes was normal.  Delayed recognition of the shapes was borderline impaired.  Visuospatial judgments for variably oriented lines were superior.  Visual problem-solving with blocks was average.  His drawing of a complicated  geometric figure was normal, in spite of a hurried approach.  Comprehension of phrases and short stories was average.  Verbal associative fluency was low average.  Animal fluency was low average.  Naming to confrontation was average.  Verbal abstract reasoning was average.  Speeded visual sequencing under focused attention was average.  A similar measure with a divided attention component was average.  Speeded word reading was average.  Speeded color naming was average.  Speeded inhibition of an overlearned response was performed in the high average to superior range.  Speeded visual motor coding was high average.    He endorsed items consistent with mild symptoms of depression, and mild symptoms of anxiety on self-report measures.    IMPRESSIONS  Mr. Summers demonstrated mild weaknesses that raise the question of bilateral mesial temporal region dysfunction.  The etiology is not clear.  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  However, another explanation would be that these weaknesses are attributable to his seizures.  Additionally, he is reporting mild symptoms of depression and anxiety on self-report measures, which could be contributing as well.  In this exam, mild weaknesses were identified in verbal and nonverbal anterograde memory.  There are also subtle weaknesses in verbal fluency.  The remainder of his cognitive abilities were normal and performed in keeping with his average range cognitive baseline.  He has strengths in cognitive speed and aspects of executive functioning.  He also has a strength in basic visuospatial judgments.  As alluded to above, he is reporting mild symptoms of depression and anxiety.  It may well be the case that these mental health factors are contributing to some degree of variability and his performances in this exam, as well as to his concerns about cognitive dysfunction in day-to-day  life.    RECOMMENDATIONS  Preliminary results and recommendations were provided to the patient and his wife on the date of the evaluation, and all questions were answered.     1.  Continued treatment of his seizures is indicated.    2.  If medically indicated, consideration might be given to treatment of his mental health.    3.  Along similar lines, referral for psychotherapy services could be considered.  One possible referral option would be [facility], with locations throughout the [facility] area.  They can be reached by calling [phone].    4.  His memory could benefit from routine use of a memory notebook or other assistive device.    5. Follow-up neuropsychological evaluation is recommended in 1 year in order to assess and update recommendations as appropriate.  The current results can be used as a baseline at that time.    Esvin Doss, Ph.D., L.P., ABPP  Board Certified in Clinical Neuropsychology   / Licensed Psychologist [id number]    Time spent: One unit (45 minutes) psychiatric diagnostic interviewincluding interview, clinical assessment of thinking, reasoning, and judgment by licensed and board-certified neuropsychologist (CPT 16675). One unit (60 minutes) neuropsychological testing evaluation by licensed and board-certified neuropsychologist, including integration of patient data, interpretation of standardized test results and clinical data, clinical decision-making, treatment planning, report, and interactive feedback to the patient, first hour (CPT 49643). One units (40 minutes) of neuropsychological testing evaluation by licensed and board-certified neuropsychologist, including integration of patient data, interpretation of standardized test results and clinical data, clinical decision-making, treatment planning, report, and interactive feedback to the patient, subsequent hours (CPT 07100). One unit (30 minutes) of psychological and neuropsychological test  administration and scoring by technician, first 30 minutes (CPT 45496). Two units (62 minutes) psychological or neuropsychological test administration and scoring by technician, subsequent 30 minutes (CPT 91697). Diagnoses: G40.919, R41.3, F06.8, F33.0, F41.9.

## 2021-11-29 NOTE — PROGRESS NOTES
Name: Brooks Summers MRN: [id number]  : 1945 MORELOS: 2021  Staff: STEPHANIE Tech: MARITZA Age: 76  Sex: Male Hand: Right Educ: 13-15  Vision: 20/30 ?with correction / ?without correction    ORIENTATION     Time  -0     Place       Personal info         WAIS-IV     Raw SSa     Similarities  18 8     Block Design  29 10     Digit Span  23 10      Coding  61 13    BRYAN-O    Raw  T %ile     Copy    33.0     >16     Time to Copy  141        WRAT5   SS      Word Reading  99     COWAT (FAS)   Raw: 24   T: 37    Animals   Raw:  12  T-score:  38    BOSTON NAMING TEST   Raw: 52   %ile 44-56    COMPLEX IDEATIONAL MATERIAL   Raw:  T: 44    TRAILS  Raw  Err %ile    A 47  0 40   B 134  1 47-53    STROOP Raw %ile   Word 76 40   Color  51 47-67       Color/Word  30 73-93    NORTH Short   Raw: 15/15 %ile: 100    SHAKEEL  Raw:  11  Interpretation: Mild    GDS   Raw:  10  Interpretation: Mild      HVLT-R Form 1     Trial 1 2 3 Total      6 7 7  20    Raw T     Total Learning (1-3) 20 44     Delayed Recall  5 38     Percent Retention 71 43     Recognition Hits/FP 10/3 32      BVMT-R Form 1     Trial 1 2 3 Total      3 5 4  12    Raw T / %ile     Total Learning (1-3) 12 36     Delayed Recall  5 39     Percent Retention 100 >16th     Recognition Hits/FP 3/0 3rd-5th    DCT E-score: 12

## 2021-11-30 ENCOUNTER — PRE VISIT (OUTPATIENT)
Dept: UROLOGY | Facility: CLINIC | Age: 76
End: 2021-11-30
Payer: COMMERCIAL

## 2021-11-30 NOTE — TELEPHONE ENCOUNTER
Reason for visit: symptom check      Dx/Hx/Sx: nocturia     Records/imaging/labs/orders: in epic     At Rooming: standard

## 2021-12-02 ENCOUNTER — OFFICE VISIT (OUTPATIENT)
Dept: OPHTHALMOLOGY | Facility: CLINIC | Age: 76
End: 2021-12-02
Attending: OPHTHALMOLOGY
Payer: MEDICARE

## 2021-12-02 DIAGNOSIS — H25.13 NUCLEAR SENILE CATARACT OF BOTH EYES: ICD-10-CM

## 2021-12-02 DIAGNOSIS — H52.13 HIGH MYOPIA, BILATERAL: ICD-10-CM

## 2021-12-02 DIAGNOSIS — H40.003 GLAUCOMA SUSPECT OF BOTH EYES: Primary | ICD-10-CM

## 2021-12-02 DIAGNOSIS — H18.611 STABLE KERATOCONUS OF RIGHT EYE: ICD-10-CM

## 2021-12-02 PROCEDURE — 92133 CPTRZD OPH DX IMG PST SGM ON: CPT | Mod: 26 | Performed by: OPHTHALMOLOGY

## 2021-12-02 PROCEDURE — 92134 CPTRZ OPH DX IMG PST SGM RTA: CPT | Performed by: OPHTHALMOLOGY

## 2021-12-02 PROCEDURE — 92133 CPTRZD OPH DX IMG PST SGM ON: CPT | Performed by: OPHTHALMOLOGY

## 2021-12-02 PROCEDURE — G0463 HOSPITAL OUTPT CLINIC VISIT: HCPCS

## 2021-12-02 PROCEDURE — 92025 CPTRIZED CORNEAL TOPOGRAPHY: CPT | Performed by: OPHTHALMOLOGY

## 2021-12-02 PROCEDURE — 99203 OFFICE O/P NEW LOW 30 MIN: CPT | Performed by: OPHTHALMOLOGY

## 2021-12-02 ASSESSMENT — REFRACTION_WEARINGRX
OD_SPHERE: -8.50
OD_ADD: +2.50
OS_AXIS: 143
OD_AXIS: 021
OD_CYLINDER: +2.25
OS_CYLINDER: +0.75
OS_SPHERE: -9.75

## 2021-12-02 ASSESSMENT — TONOMETRY
OS_IOP_MMHG: 14
OD_IOP_MMHG: 17
IOP_METHOD: TONOPEN

## 2021-12-02 ASSESSMENT — CONF VISUAL FIELD
OS_NORMAL: 1
METHOD: COUNTING FINGERS
OD_NORMAL: 1

## 2021-12-02 ASSESSMENT — CUP TO DISC RATIO
OS_RATIO: 0.6
OD_RATIO: 0.6

## 2021-12-02 ASSESSMENT — SLIT LAMP EXAM - LIDS
COMMENTS: DMC
COMMENTS: DMC

## 2021-12-02 ASSESSMENT — VISUAL ACUITY
METHOD: SNELLEN - LINEAR
OS_CC: 20/20
OD_CC: 20/40
OS_CC+: -2

## 2021-12-02 ASSESSMENT — EXTERNAL EXAM - RIGHT EYE: OD_EXAM: NORMAL

## 2021-12-02 ASSESSMENT — PACHYMETRY
OS_CT(UM): 558
OD_CT(UM): 535

## 2021-12-02 ASSESSMENT — EXTERNAL EXAM - LEFT EYE: OS_EXAM: NORMAL

## 2021-12-02 NOTE — NURSING NOTE
Chief Complaints and History of Present Illnesses   Patient presents with     Corneal Evaluation     KCN     Chief Complaint(s) and History of Present Illness(es)     Corneal Evaluation     Laterality: both eyes    Course: stable    Associated symptoms: floaters.  Negative for eye pain, flashes and headache    Treatments tried: artificial tears    Pain scale: 0/10    Comments: KCN              Comments     C/o occ cloudy vision, using AT's , seems to help with the blurriness    Belkys Acevedo COT 1:01 PM December 2, 2021

## 2021-12-02 NOTE — PROGRESS NOTES
CC: KCN follow up    Referring Provider: Self-referred      HPI:    Brooks Summers 76 year old White male with a history of right eye mild KCN here for a follow up visit (directed by his wife).  He notes he is comfortable with his current vision.      POHx:  Last eye exam: ~2016  KCN right eye     Prior eye surgery/laser: never    CTL wearer: last about 2016    Glasses: Yes    Family Hx of eye disease: none    Gtts Currenly Taking:  Refresh preserved tears about once a day each eye     Allergies:  Lisinopril (coughing)    A/P:    # KCN right eye, mild: Unlikely to progress due to age and natural cxl.  Decrease in vision appears to be due to irregular astigmatism.  Disc possible better VA with CL - pt not interested.    # Cataracts:  Not visually significant.  Monitor.    # Enlarged c/d each eye: No family history, IOP ok, pachy average overall (slightly thin right eye but this is due to KCN).  RNFL borderline, may be due to myopic degeneration.  Recommend VF and further glaucoma work up.      # High myopia:  Pt with overall good vision.  OCT Mac shows no foveal pathology each eye.        PLAN:  F/U for cornea in 1  - 2 years. - call sooner with any problems        Mike Winston MD    Attending Physician Attestation:  Complete documentation of historical and exam elements from today's encounter can be found in the full encounter summary report (not reduplicated in this progress note).  I personally obtained the chief complaint(s) and history of present illness.  I confirmed and edited as necessary the review of systems, past medical/surgical history, family history, social history, and examination findings as documented by others; and I examined the patient myself.  I personally reviewed the relevant tests, images, and reports as documented above.  I formulated and edited as necessary the assessment and plan and discussed the findings and management plan with the patient and family. - Mike Winston MD

## 2021-12-06 NOTE — PROGRESS NOTES
Chief Complaint:   Nocturia          History of Present Illness:    Brooks Summers is a 76 year old male with a complex cardiac history, including cardiomyopathy s/p ICD placement, seizures, and skin cancer who presents for follow up. I last saw him virtually on 7/9/21, at which time he reported nocturia x2-3. No issue with daytime sx. Stream was normal. Prior cystoscopy had shown a mildly-enlarged prostate. No known hx of sleep apnea, but he did report some difficulty staying asleep, and he was noted to snore, per his wife. I therefore suspected some undiagnosed sleep apnea, but started him a trial of Flomax to see how he responded.     Today, he states that he continues to have nocturia despite the Flomax.          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 Tx with Dapto/RIFx 8 weeks.     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     Paroxysmal A-fib (H)     Post-op 7/14/2011, 1/26/13     Rotator Cuff (Capsule) Sprain and Strain      S/P aortic valve replacement     7/14/2011, re-do 1/25/13 due to endocarditis     Smoking hx      Thrombocytopenia (H)             Past Surgical History:     Past Surgical History:   Procedure Laterality Date     ANESTHESIA CARDIOVERSION  7/18/2011    Procedure:ANESTHESIA CARDIOVERSION; Cardioversion; Surgeon:GENERIC ANESTHESIA PROVIDER; Location:[facility] OR     COLONOSCOPY       COLONOSCOPY N/A 11/19/2018    Procedure: COLONOSCOPY;  Surgeon: Herman Mcginnis MD;  Location: [facility] GI     CORONARY ANGIOGRAPHY ADULT ORDER       CYSTOSCOPY, BIOPSY URETHRA N/A 4/3/2017    Procedure: CYSTOSCOPY, BIOPSY URETHRA;  Surgeon: Marlena Mora MD;  Location: [facility] OR     ENDOSCOPY  "UPPER, COLONOSCOPY, COMBINED       EP ICD GENERATOR CHANGE DUAL N/A 3/10/2021    Procedure: EP ICD GENERATOR CHANGE DUAL;  Surgeon: Mekhi Be MD;  Location:  HEART CARDIAC CATH LAB     HC REMOV & REPLACE IMPLT DEFIB PULSE GEN MULT LEAD  12/3/2015          HEMORRHOID SURGERY       IMPLANT AUTOMATIC IMPLANTABLE CARDIOVERTER DEFIBRILLATOR       MOHS MICROGRAPHIC PROCEDURE       ORTHOPEDIC SURGERY      shoulder,right     REDO STERNOTOMY REPLACE VALVE AORTIC  1/25/2013    Procedure: REDO STERNOTOMY REPLACE VALVE AORTIC;  Redo Sternotomy, Redo Aortic Valve Replacement on pump oxygenator. Intraoperative Transesophageal Echocardiogram;  Surgeon: Stephanie Hampton MD;  Location:  OR     REPAIR VALVE MITRAL  2013     REPLACE VALVE AORTIC  7/14/2011    Procedure:REPLACE VALVE AORTIC; Median Sternotomy, Bioprosthesis,  Aortic Valve replacement on pump with oxygenator. Intra-operative Transesophogeal Echocardiogram.; Surgeon:STEPHANIE HAMPTON; Location: OR     teeth extractions       TRANSPLANT              Medications     Current Outpatient Medications   Medication     allopurinol (ZYLOPRIM) 100 MG tablet     levETIRAcetam (KEPPRA) 500 MG tablet     losartan (COZAAR) 25 MG tablet     metoprolol succinate ER (TOPROL-XL) 50 MG 24 hr tablet     Multiple Vitamins-Minerals (MULTIVITAMIN ADULTS 50+) TABS     PROVIDER DOSED MANAGED WARFARIN, COUMADIN, OUTPATIENT     simvastatin (ZOCOR) 40 MG tablet     tamsulosin (FLOMAX) 0.4 MG capsule     VITAMIN D, CHOLECALCIFEROL, PO     warfarin ANTICOAGULANT (COUMADIN) 5 MG tablet     No current facility-administered medications for this visit.            Allergies:   Lisinopril         Review of Systems:  From intake questionnaire   Negative 14 system review except as noted on HPI, nurse's note.         Physical Exam:   Patient is a 76 year old  male   Vitals: Blood pressure 115/70, pulse 67, height 1.778 m (5' 10\"), weight 78.5 kg (173 lb).  General Appearance Adult: Alert, no acute " distress, oriented  Lungs: no respiratory distress, or pursed lip breathing  Heart: No obvious jugular venous distension present  Abdomen: soft, nontender, no organomegaly or masses, Body mass index is 24.82 kg/m .  : BIMAL anodular, symmetric; mildly-enlarged     Uroflow and Post-Void Residual by Bladder Scan     Residual urine: 125 mL      Labs and Pathology:    I personally reviewed all applicable laboratory data and went over findings with patient  Significant for:    CBC RESULTS:  Recent Labs   Lab Test 11/15/21  1446 06/24/21  1533 03/10/21  0657 05/11/20  0544   WBC 4.6 4.0 4.7 5.5   HGB 14.4 13.7 14.0 13.4   PLT 81* 79* 92* 93*        BMP RESULTS:  Recent Labs   Lab Test 11/15/21  1446 03/10/21  0657 05/11/20  0544 05/10/20  0654 01/31/20  0943   * 143 142 140 138   POTASSIUM 4.8 4.8 4.0 3.9 4.2   CHLORIDE 110* 112* 111* 110* 108   CO2 29 28 25 26 27   ANIONGAP 6 3 6 4 3   * 109* 87 107* 98   BUN 16 14 18 13 14   CR 0.90 1.03 0.97 0.94 0.97   GFRESTIMATED 83 70 76 79 76   GFRESTBLACK  --  81 88 >90 88   MARTIN 9.5 9.3 8.6 8.9 9.0       UA RESULTS:   Recent Labs   Lab Test 05/10/20  1200 04/26/19  1200 08/10/18  1445   SG 1.017 1.019 1.019   URINEPH 7.0 5.0 7.0   NITRITE Negative Negative Negative   RBCU 6* 4* 5*   WBCU 1 <1 <1       PSA RESULTS  PSA   Date Value Ref Range Status   04/26/2019 2.83 0 - 4 ug/L Final     Comment:     Assay Method:  Chemiluminescence using Siemens Vista analyzer   03/27/2018 2.78 0 - 4 ug/L Final     Comment:     Assay Method:  Chemiluminescence using Siemens Vista analyzer   11/28/2016 2.13 0 - 4 ug/L Final     Comment:     Assay Method:  Chemiluminescence using Siemens Vista analyzer   07/23/2015 1.68 0 - 4 ug/L Final   10/21/2013 1.90 0 - 4 ug/L Final     Comment:     PSA results are about 7% lower than our prior method due to a methodology   change   on August 30, 2011.            Assessment and Plan:     Assessment: 76 year old male with a complex cardiac history,  including cardiomyopathy s/p ICD placement who continues to experience bothersome nocturia despite Flomax. BIMAL suggests a mildly-enlarged prostate. Random bladder scan 125 mL as patient had no urge to void; last void about 1.5 hours prior to scan, so demonstrating overall good bladder emptying. At this time, I do suspect there may be some sleep issues, possibly undiagnosed sleep apnea contributing. However, think it would be worth a trial of an OAB med to see how he responds to this. He would prefer this over referral to the sleep clinic at this time.     Plan:  -Stop Flomax.   -Start Sanctura 20 mg at bedtime.  -Follow up with me in 3 months via virtual visit to discuss symptoms.      Shelbi Reid, CNP  Department of Urology

## 2021-12-07 ENCOUNTER — OFFICE VISIT (OUTPATIENT)
Dept: UROLOGY | Facility: CLINIC | Age: 76
End: 2021-12-07
Payer: MEDICARE

## 2021-12-07 VITALS
BODY MASS INDEX: 24.77 KG/M2 | HEART RATE: 67 BPM | WEIGHT: 173 LBS | DIASTOLIC BLOOD PRESSURE: 70 MMHG | SYSTOLIC BLOOD PRESSURE: 115 MMHG | HEIGHT: 70 IN

## 2021-12-07 DIAGNOSIS — R35.1 NOCTURIA: ICD-10-CM

## 2021-12-07 PROCEDURE — 99214 OFFICE O/P EST MOD 30 MIN: CPT | Performed by: NURSE PRACTITIONER

## 2021-12-07 RX ORDER — TROSPIUM CHLORIDE 20 MG/1
20 TABLET, FILM COATED ORAL AT BEDTIME
Qty: 30 TABLET | Refills: 3 | Status: ON HOLD | OUTPATIENT
Start: 2021-12-07 | End: 2022-11-24

## 2021-12-07 ASSESSMENT — MIFFLIN-ST. JEOR: SCORE: 1520.97

## 2021-12-07 ASSESSMENT — PAIN SCALES - GENERAL: PAINLEVEL: NO PAIN (0)

## 2021-12-07 NOTE — NURSING NOTE
"Chief Complaint   Patient presents with     Follow Up     symptom check            Blood pressure 115/70, pulse 67, height 1.778 m (5' 10\"), weight 78.5 kg (173 lb). Body mass index is 24.82 kg/m .    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, Bi-Ventricular- INT dependent     Endocarditis     S/P AVR     Need for prophylactic antibiotic     Hyperlipidemia with target LDL less than 100     Finger injury     Paroxysmal atrial fibrillation (H)     Long term current use of anticoagulant therapy     Trigger ring finger of left hand     Nocturnal hypoxemia     Neoplasm of uncertain behavior of skin     AK (actinic keratosis)     History of nonmelanoma skin cancer     Seizure (H)     Cardiomyopathy (H)     ICD (implantable cardioverter-defibrillator) battery depletion     Anticoagulated on Coumadin       Allergies   Allergen Reactions     Lisinopril Cough       Current Outpatient Medications   Medication Sig Dispense Refill     allopurinol (ZYLOPRIM) 100 MG tablet Take 1 tablet (100 mg) by mouth daily 90 tablet 0     levETIRAcetam (KEPPRA) 500 MG tablet Take 1 tablet (500 mg) by mouth 2 times daily For additional refills, please schedule a follow-up appointment 60 tablet 11     losartan (COZAAR) 25 MG tablet Take 0.5 tablets (12.5 mg) by mouth daily 45 tablet 3     metoprolol succinate ER (TOPROL-XL) 50 MG 24 hr tablet Take 1 tablet (50 mg) by mouth daily 90 tablet 3     Multiple Vitamins-Minerals (MULTIVITAMIN " ADULTS 50+) TABS        PROVIDER DOSED MANAGED WARFARIN, COUMADIN, OUTPATIENT Per coumadin clinic       simvastatin (ZOCOR) 40 MG tablet Take 1 tablet (40 mg) by mouth At Bedtime - for further refills, call to schedule a lab appointment for blood draw (lipid profile, 12 hour fasting required) : [phone] 90 tablet 0     tamsulosin (FLOMAX) 0.4 MG capsule Take 1 capsule (0.4 mg) by mouth daily 30 capsule 2     VITAMIN D, CHOLECALCIFEROL, PO Take 1,000 Units by mouth daily       warfarin ANTICOAGULANT (COUMADIN) 5 MG tablet TAKE 2 TABLETS BY MOUTH ON FRIDAY, AND 1&1/2 TABLETS ON ALL OTHER DAYS OR AS DIRECTED 145 tablet 0       Social History     Tobacco Use     Smoking status: Former Smoker     Packs/day: 1.00     Years: 20.00     Pack years: 20.00     Types: Cigarettes     Quit date: 1989     Years since quittin.9     Smokeless tobacco: Never Used   Substance Use Topics     Alcohol use: Yes     Alcohol/week: 2.0 standard drinks     Drug use: No       Maryan Mcbride  2021  3:43 PM

## 2021-12-07 NOTE — PATIENT INSTRUCTIONS
UROLOGY CLINIC VISIT PATIENT INSTRUCTIONS    -Stop the tamsulosin.  -Start the Sanctura medication once before bed.  -Follow up with me in 3 months via virtual visit to discuss symptoms.     If you have any issues, questions or concerns in the meantime, do not hesitate to contact us at [phone] or via [employer].       Shelbi Reid, CNP  Department of Urology

## 2021-12-07 NOTE — LETTER
12/7/2021       RE: Brooks Summers  [address]  [address]  [place] [address]     Dear Colleague,    Thank you for referring your patient, Brooks Summers, to the [facility] [place] at [facility]. Please see a copy of my visit note below.            Chief Complaint:   Nocturia          History of Present Illness:    Brooks Summers is a 76 year old male with a complex cardiac history, including cardiomyopathy s/p ICD placement, seizures, and skin cancer who presents for follow up. I last saw him virtually on 7/9/21, at which time he reported nocturia x2-3. No issue with daytime sx. Stream was normal. Prior cystoscopy had shown a mildly-enlarged prostate. No known hx of sleep apnea, but he did report some difficulty staying asleep, and he was noted to snore, per his wife. I therefore suspected some undiagnosed sleep apnea, but started him a trial of Flomax to see how he responded.     Today, he states that he continues to have nocturia despite the Flomax.          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 Tx with Dapto/RIFx 8 weeks.     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     Paroxysmal A-fib (H)     Post-op 7/14/2011, 1/26/13     Rotator Cuff (Capsule) Sprain and Strain      S/P aortic valve replacement     7/14/2011, re-do 1/25/13 due to endocarditis     Smoking hx      Thrombocytopenia (H)             Past Surgical History:     Past Surgical History:   Procedure Laterality Date     ANESTHESIA CARDIOVERSION  7/18/2011    Procedure:ANESTHESIA CARDIOVERSION; Cardioversion; Surgeon:GENERIC ANESTHESIA PROVIDER; Location: OR      COLONOSCOPY       COLONOSCOPY N/A 11/19/2018    Procedure: COLONOSCOPY;  Surgeon: Herman Mcginnis MD;  Location:  GI     CORONARY ANGIOGRAPHY ADULT ORDER       CYSTOSCOPY, BIOPSY URETHRA N/A 4/3/2017    Procedure: CYSTOSCOPY, BIOPSY URETHRA;  Surgeon: Marlena Mora MD;  Location:  OR     ENDOSCOPY UPPER, COLONOSCOPY, COMBINED       EP ICD GENERATOR CHANGE DUAL N/A 3/10/2021    Procedure: EP ICD GENERATOR CHANGE DUAL;  Surgeon: Mekhi Be MD;  Location:  HEART CARDIAC CATH LAB     HC REMOV & REPLACE IMPLT DEFIB PULSE GEN MULT LEAD  12/3/2015          HEMORRHOID SURGERY       IMPLANT AUTOMATIC IMPLANTABLE CARDIOVERTER DEFIBRILLATOR       MOHS MICROGRAPHIC PROCEDURE       ORTHOPEDIC SURGERY      shoulder,right     REDO STERNOTOMY REPLACE VALVE AORTIC  1/25/2013    Procedure: REDO STERNOTOMY REPLACE VALVE AORTIC;  Redo Sternotomy, Redo Aortic Valve Replacement on pump oxygenator. Intraoperative Transesophageal Echocardiogram;  Surgeon: Navin Hampton MD;  Location:  OR     REPAIR VALVE MITRAL  2013     REPLACE VALVE AORTIC  7/14/2011    Procedure:REPLACE VALVE AORTIC; Median Sternotomy, Bioprosthesis,  Aortic Valve replacement on pump with oxygenator. Intra-operative Transesophogeal Echocardiogram.; Surgeon:NAVIN HAMPTON; Location: OR     teeth extractions       TRANSPLANT              Medications     Current Outpatient Medications   Medication     allopurinol (ZYLOPRIM) 100 MG tablet     levETIRAcetam (KEPPRA) 500 MG tablet     losartan (COZAAR) 25 MG tablet     metoprolol succinate ER (TOPROL-XL) 50 MG 24 hr tablet     Multiple Vitamins-Minerals (MULTIVITAMIN ADULTS 50+) TABS     PROVIDER DOSED MANAGED WARFARIN, COUMADIN, OUTPATIENT     simvastatin (ZOCOR) 40 MG tablet     tamsulosin (FLOMAX) 0.4 MG capsule     VITAMIN D, CHOLECALCIFEROL, PO     warfarin ANTICOAGULANT (COUMADIN) 5 MG tablet     No current facility-administered medications for this visit.            Allergies:  "  Lisinopril         Review of Systems:  From intake questionnaire   Negative 14 system review except as noted on HPI, nurse's note.         Physical Exam:   Patient is a 76 year old  male   Vitals: Blood pressure 115/70, pulse 67, height 1.778 m (5' 10\"), weight 78.5 kg (173 lb).  General Appearance Adult: Alert, no acute distress, oriented  Lungs: no respiratory distress, or pursed lip breathing  Heart: No obvious jugular venous distension present  Abdomen: soft, nontender, no organomegaly or masses, Body mass index is 24.82 kg/m .  : BIMAL anodular, symmetric; mildly-enlarged     Uroflow and Post-Void Residual by Bladder Scan     Residual urine: 125 mL      Labs and Pathology:    I personally reviewed all applicable laboratory data and went over findings with patient  Significant for:    CBC RESULTS:  Recent Labs   Lab Test 11/15/21  1446 06/24/21  1533 03/10/21  0657 05/11/20  0544   WBC 4.6 4.0 4.7 5.5   HGB 14.4 13.7 14.0 13.4   PLT 81* 79* 92* 93*        BMP RESULTS:  Recent Labs   Lab Test 11/15/21  1446 03/10/21  0657 05/11/20  0544 05/10/20  0654 01/31/20  0943   * 143 142 140 138   POTASSIUM 4.8 4.8 4.0 3.9 4.2   CHLORIDE 110* 112* 111* 110* 108   CO2 29 28 25 26 27   ANIONGAP 6 3 6 4 3   * 109* 87 107* 98   BUN 16 14 18 13 14   CR 0.90 1.03 0.97 0.94 0.97   GFRESTIMATED 83 70 76 79 76   GFRESTBLACK  --  81 88 >90 88   MARTIN 9.5 9.3 8.6 8.9 9.0       UA RESULTS:   Recent Labs   Lab Test 05/10/20  1200 04/26/19  1200 08/10/18  1445   SG 1.017 1.019 1.019   URINEPH 7.0 5.0 7.0   NITRITE Negative Negative Negative   RBCU 6* 4* 5*   WBCU 1 <1 <1       PSA RESULTS  PSA   Date Value Ref Range Status   04/26/2019 2.83 0 - 4 ug/L Final     Comment:     Assay Method:  Chemiluminescence using Siemens Vista analyzer   03/27/2018 2.78 0 - 4 ug/L Final     Comment:     Assay Method:  Chemiluminescence using Siemens Vista analyzer   11/28/2016 2.13 0 - 4 ug/L Final     Comment:     Assay Method:  " Chemiluminescence using Siemens Vista analyzer   07/23/2015 1.68 0 - 4 ug/L Final   10/21/2013 1.90 0 - 4 ug/L Final     Comment:     PSA results are about 7% lower than our prior method due to a methodology   change   on August 30, 2011.            Assessment and Plan:     Assessment: 76 year old male with a complex cardiac history, including cardiomyopathy s/p ICD placement who continues to experience bothersome nocturia despite Flomax. BIMAL suggests a mildly-enlarged prostate. Random bladder scan 125 mL as patient had no urge to void; last void about 1.5 hours prior to scan, so demonstrating overall good bladder emptying. At this time, I do suspect there may be some sleep issues, possibly undiagnosed sleep apnea contributing. However, think it would be worth a trial of an OAB med to see how he responds to this. He would prefer this over referral to the sleep clinic at this time.     Plan:  -Stop Flomax.   -Start Sanctura 20 mg at bedtime.  -Follow up with me in 3 months via virtual visit to discuss symptoms.      Shelbi Reid, CNP  Department of Urology

## 2021-12-08 ASSESSMENT — PATIENT HEALTH QUESTIONNAIRE - PHQ9: SUM OF ALL RESPONSES TO PHQ QUESTIONS 1-9: 0

## 2021-12-10 ENCOUNTER — TELEPHONE (OUTPATIENT)
Dept: UROLOGY | Facility: CLINIC | Age: 76
End: 2021-12-10
Payer: COMMERCIAL

## 2021-12-10 DIAGNOSIS — R35.1 NOCTURIA: Primary | ICD-10-CM

## 2021-12-10 RX ORDER — OXYBUTYNIN CHLORIDE 10 MG/1
10 TABLET, EXTENDED RELEASE ORAL DAILY
Qty: 30 TABLET | Refills: 4 | Status: SHIPPED | OUTPATIENT
Start: 2021-12-10 | End: 2022-03-08

## 2021-12-10 NOTE — TELEPHONE ENCOUNTER
RX sent to pharmacy per pharmacy request.  Katelyn Eisenberg LPN  Urology Clinic Service   [facility]

## 2021-12-10 NOTE — TELEPHONE ENCOUNTER
----- Message from Shelbi Reid CNP sent at 12/10/2021 11:43 AM CST -----  Regarding: RE: Trospium not covered  We can prescribe oxybutynin XL 10 mg daily instead. Thanks!  ----- Message -----  From: Katelyn Eisenberg LPN  Sent: 12/9/2021  11:18 AM CST  To: Shelbi Reid CNP  Subject: Trospium not covered                             Tarun Mario,    Patient's insurance does not cover trospium. They are requesting a change to one of the following:  Oxybutynin ER, Toviaz, Myrbetriq.    Katelyn Eisenberg LPN  Urology Clinic Service   [facility]

## 2021-12-14 ENCOUNTER — TELEPHONE (OUTPATIENT)
Dept: UROLOGY | Facility: CLINIC | Age: 76
End: 2021-12-14
Payer: COMMERCIAL

## 2021-12-21 ENCOUNTER — LAB (OUTPATIENT)
Dept: LAB | Facility: CLINIC | Age: 76
End: 2021-12-21
Payer: MEDICARE

## 2021-12-21 ENCOUNTER — ANTICOAGULATION THERAPY VISIT (OUTPATIENT)
Dept: ANTICOAGULATION | Facility: CLINIC | Age: 76
End: 2021-12-21

## 2021-12-21 DIAGNOSIS — Z95.2 S/P AORTIC VALVE REPLACEMENT: Primary | ICD-10-CM

## 2021-12-21 DIAGNOSIS — Z79.01 LONG TERM CURRENT USE OF ANTICOAGULANT THERAPY: ICD-10-CM

## 2021-12-21 DIAGNOSIS — I48.0 PAROXYSMAL ATRIAL FIBRILLATION (H): ICD-10-CM

## 2021-12-21 DIAGNOSIS — Z79.01 ANTICOAGULATED ON COUMADIN: ICD-10-CM

## 2021-12-21 LAB — INR BLD: 2.4 (ref 0.9–1.1)

## 2021-12-21 PROCEDURE — 85610 PROTHROMBIN TIME: CPT

## 2021-12-21 PROCEDURE — 36416 COLLJ CAPILLARY BLOOD SPEC: CPT

## 2021-12-21 NOTE — PROGRESS NOTES
Anticoagulation Management    Unable to reach Brooks today.    Today's INR result of 2.4 is therapeutic (goal INR of 2.0-3.0).  Result received from: Clinic Lab    Follow up required to confirm warfarin dose taken and assess for changes    [facility]      Anticoagulation clinic to follow up    Aniya Montano RN  [phone]

## 2021-12-21 NOTE — PROGRESS NOTES
ANTICOAGULATION MANAGEMENT     Brooks Summers 76 year old male is on warfarin with therapeutic INR result. (Goal INR 2.0-3.0)    Recent labs: (last 7 days)     12/21/21  0817   INR 2.4*       ASSESSMENT     Source(s): Chart Review and Patient/Caregiver Call       Warfarin doses taken: Warfarin taken as instructed    Diet: No new diet changes identified    New illness, injury, or hospitalization: No    Medication/supplement changes: oxybutinin    Signs or symptoms of bleeding or clotting: No    Previous INR: Therapeutic last 2(+) visits    Additional findings: None     PLAN     Recommended plan for no diet, medication or health factor changes affecting INR     Dosing Instructions: Continue your current warfarin dose with next INR in 6 weeks       Summary  As of 12/21/2021    Full warfarin instructions:  10 mg every Fri; 7.5 mg all other days   Next INR check:  2/1/2022             Telephone call with Brooks who verbalizes understanding and agrees to plan    Lab visit scheduled    Education provided: None required    Plan made per [facility] anticoagulation protocol    Aniya Montano RN  Anticoagulation Clinic  12/21/2021    _______________________________________________________________________     Anticoagulation Episode Summary     Current INR goal:  2.0-3.0   TTR:  69.7 % (1 y)   Target end date:  Indefinite   Send INR reminders to:  JONATHAN MURILLO    Indications    S/P aortic valve replacement [Z95.2]  Paroxysmal atrial fibrillation (H) [I48.0]  Long term current use of anticoagulant therapy [Z79.01]  Anticoagulated on Coumadin [Z79.01]           Comments:   * warfarin 5 mg tabs. Aortic 21 mm Johnson Perimount Magna Tissue Valve.          Anticoagulation Care Providers     Provider Role Specialty Phone number    Edin Mays MD Referring Internal Medicine [phone]

## 2021-12-22 ENCOUNTER — VIRTUAL VISIT (OUTPATIENT)
Dept: NEUROLOGY | Facility: CLINIC | Age: 76
End: 2021-12-22
Payer: MEDICARE

## 2021-12-22 DIAGNOSIS — F32.A DEPRESSION, UNSPECIFIED DEPRESSION TYPE: Primary | ICD-10-CM

## 2021-12-22 PROCEDURE — 99214 OFFICE O/P EST MOD 30 MIN: CPT | Mod: 95 | Performed by: PSYCHIATRY & NEUROLOGY

## 2021-12-22 NOTE — PATIENT INSTRUCTIONS
Times of Days am pm        Medication Tablet Size Number of Tablets/Capsules Total Daily Dosage    Keppra 500 1 1       1000 mg                                                                                                                                   Carry this with you at all times.  CONTINUE TAKING YOUR OTHER MEDICATIONS AS PREVIOUSLY DIRECTED.      * * *Do not store medications in the bathroom.  Keep medications away from children!* * *       Plan:  Psychiatry referral for depression

## 2021-12-22 NOTE — PROGRESS NOTES
"CHIEF COMPLAINT:  Follow up for new onset seizures.      HISTORY OF PRESENT ILLNESS:  Video call for follow up. His wife was also with him during the video conference. This patient is a 76-year-old male with a history of bicuspid valve, severe stenosis, status post AVR with prosthetic valve endocarditis, then mitral valve repair and pacemaker placed in 2013, on Coumadin, paroxysmal AFib, chronic thrombocytopenia, gout.      He presented with new-onset seizure in May 2020 and another seizure on 8/10/2020. The seizure was GTC with tongue biting then post ictal confusion. No incontinence. The seizures likely provoked by recent stress.   Since his last visit about 3 months ago, he has not had any seizure. Hi slast seizure was in Aug. 2020. He is currently on keppra 500 mg bid.  He denies any side effects of Keppra.     He is complaining of memory issues, mainly short term memory. Long term memory is good. He had neuropsych testing with Dr. Doss. Dr. Doss had concluded the following:  \"Mr. Summers demonstrated mild weaknesses that raise the question of bilateral mesial temporal region dysfunction.  The etiology is not clear.  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  However, another explanation would be that these weaknesses are attributable to his seizures.  Additionally, he is reporting mild symptoms of depression and anxiety on self-report measures, which could be contributing as well.  In this exam, mild weaknesses were identified in verbal and nonverbal anterograde memory.  There are also subtle weaknesses in verbal fluency.  The remainder of his cognitive abilities were normal and performed in keeping with his average range cognitive baseline.  He has strengths in cognitive speed and aspects of executive functioning.  He also has a strength in basic visuospatial judgments.  As alluded to above, he is reporting mild " "symptoms of depression and anxiety.  It may well be the case that these mental health factors are contributing to some degree of variability and his performances in this exam, as well as to his concerns about cognitive dysfunction in day-to-day life.\"    He states he has what is seems to be restless leg syndrome for long time.  She states that his short memory is not as good as before. He is asking questions repeatedly and forget what they talks about. His mother had dementia. She passed away at her 90s.        Current Outpatient Medications   Medication Sig Dispense Refill     allopurinol (ZYLOPRIM) 100 MG tablet Take 1 tablet (100 mg) by mouth daily 90 tablet 0     levETIRAcetam (KEPPRA) 500 MG tablet Take 1 tablet (500 mg) by mouth 2 times daily For additional refills, please schedule a follow-up appointment 60 tablet 11     losartan (COZAAR) 25 MG tablet Take 0.5 tablets (12.5 mg) by mouth daily 45 tablet 3     metoprolol succinate ER (TOPROL-XL) 50 MG 24 hr tablet Take 1 tablet (50 mg) by mouth daily 90 tablet 3     Multiple Vitamins-Minerals (MULTIVITAMIN ADULTS 50+) TABS        oxybutynin ER (DITROPAN-XL) 10 MG 24 hr tablet Take 1 tablet (10 mg) by mouth daily 30 tablet 4     PROVIDER DOSED MANAGED WARFARIN, COUMADIN, OUTPATIENT Per coumadin clinic       simvastatin (ZOCOR) 40 MG tablet Take 1 tablet (40 mg) by mouth At Bedtime - for further refills, call to schedule a lab appointment for blood draw (lipid profile, 12 hour fasting required) : [phone] 90 tablet 0     trospium (SANCTURA) 20 MG tablet Take 1 tablet (20 mg) by mouth At Bedtime 30 tablet 3     VITAMIN D, CHOLECALCIFEROL, PO Take 1,000 Units by mouth daily       warfarin ANTICOAGULANT (COUMADIN) 5 MG tablet TAKE 2 TABLETS BY MOUTH ON FRIDAY, AND 1&1/2 TABLETS ON ALL OTHER DAYS OR AS DIRECTED 145 tablet 0         PHYSICAL EXAMINATION:    Alert and oriented x3.  Speech fluent, appropriate.  No facial weakness. Hearing grossly normal.     REVIEW " OF SYSTEMS: 8-point review of systems is essentially negative except what is mentioned in the HPI.      IMPRESSION:      1. New onset seizure, etiology unclear at this time.  He presented with new-onset seizure in May 2020 and another seizure on 8/10/2020. The seizure was GTC with tongue biting then post ictal confusion. No incontinence. The seizures likely provoked by recent stress.   Since his last visit about 10 months ago, he has not had any seizure. He started Keppra 250 mg bid.  He denies any side effects of Keppra.     2. Memory loss. Etiology unclear at this time. Patient was encouraged to maintain active physically and mentally.    3. Depression and anxiety: Referral to mental health.    PLAN:   1.  Continue Keppra to 500 mg bid. Refilled.  2.  Mental health referral.  3.  Return to clinic in 6 months.      31 min total time was spent on the day of this visit.      17 min was spent on face to face time  7 min was spent on preparation of visit to review charts and labs, ordering medications and tests  7 min was spent on documentation of clinical information

## 2021-12-22 NOTE — PROGRESS NOTES
Brooks is a 76 year old who is being evaluated via a billable video visit.      How would you like to obtain your AVS? [employer]  If the video visit is dropped, the invitation should be resent by: Text to cell phone: [phone]  Will anyone else be joining your video visit? No      Video Start Time: 2:47 PM  Video-Visit Details    Type of service:  Video Visit    Video End Time:3:04 PM    Originating Location (pt. Location): Home    Distant Location (provider location):  [facility] NEUROLOGY [facility]     Platform used for Video Visit: [employer]

## 2021-12-22 NOTE — LETTER
"12/22/2021       RE: Brooks Summers  [address]  [address]  [place] [address]     Dear Colleague,    Thank you for referring your patient, Brooks Summers, to the [facility] NEUROLOGY [facility]IN[facility] [place] at [facility]. Please see a copy of my visit note below.    CHIEF COMPLAINT:  Follow up for new onset seizures.      HISTORY OF PRESENT ILLNESS:  Video call for follow up. His wife was also with him during the video conference. This patient is a 76-year-old male with a history of bicuspid valve, severe stenosis, status post AVR with prosthetic valve endocarditis, then mitral valve repair and pacemaker placed in 2013, on Coumadin, paroxysmal AFib, chronic thrombocytopenia, gout.      He presented with new-onset seizure in May 2020 and another seizure on 8/10/2020. The seizure was GTC with tongue biting then post ictal confusion. No incontinence. The seizures likely provoked by recent stress.   Since his last visit about 3 months ago, he has not had any seizure. Hi slast seizure was in Aug. 2020. He is currently on keppra 500 mg bid.  He denies any side effects of Keppra.     He is complaining of memory issues, mainly short term memory. Long term memory is good. He had neuropsych testing with Dr. Doss. Dr. Doss had concluded the following:  \"Mr. Summers demonstrated mild weaknesses that raise the question of bilateral mesial temporal region dysfunction.  The etiology is not clear.  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  However, another explanation would be that these weaknesses are attributable to his seizures.  Additionally, he is reporting mild symptoms of depression and anxiety on self-report measures, which could be contributing as well.  In this exam, mild weaknesses were identified in verbal and nonverbal anterograde memory.  There are also subtle weaknesses " "in verbal fluency.  The remainder of his cognitive abilities were normal and performed in keeping with his average range cognitive baseline.  He has strengths in cognitive speed and aspects of executive functioning.  He also has a strength in basic visuospatial judgments.  As alluded to above, he is reporting mild symptoms of depression and anxiety.  It may well be the case that these mental health factors are contributing to some degree of variability and his performances in this exam, as well as to his concerns about cognitive dysfunction in day-to-day life.\"    He states he has what is seems to be restless leg syndrome for long time.  She states that his short memory is not as good as before. He is asking questions repeatedly and forget what they talks about. His mother had dementia. She passed away at her 90s.        Current Outpatient Medications   Medication Sig Dispense Refill     allopurinol (ZYLOPRIM) 100 MG tablet Take 1 tablet (100 mg) by mouth daily 90 tablet 0     levETIRAcetam (KEPPRA) 500 MG tablet Take 1 tablet (500 mg) by mouth 2 times daily For additional refills, please schedule a follow-up appointment 60 tablet 11     losartan (COZAAR) 25 MG tablet Take 0.5 tablets (12.5 mg) by mouth daily 45 tablet 3     metoprolol succinate ER (TOPROL-XL) 50 MG 24 hr tablet Take 1 tablet (50 mg) by mouth daily 90 tablet 3     Multiple Vitamins-Minerals (MULTIVITAMIN ADULTS 50+) TABS        oxybutynin ER (DITROPAN-XL) 10 MG 24 hr tablet Take 1 tablet (10 mg) by mouth daily 30 tablet 4     PROVIDER DOSED MANAGED WARFARIN, COUMADIN, OUTPATIENT Per coumadin clinic       simvastatin (ZOCOR) 40 MG tablet Take 1 tablet (40 mg) by mouth At Bedtime - for further refills, call to schedule a lab appointment for blood draw (lipid profile, 12 hour fasting required) : [phone] 90 tablet 0     trospium (SANCTURA) 20 MG tablet Take 1 tablet (20 mg) by mouth At Bedtime 30 tablet 3     VITAMIN D, CHOLECALCIFEROL, PO Take " 1,000 Units by mouth daily       warfarin ANTICOAGULANT (COUMADIN) 5 MG tablet TAKE 2 TABLETS BY MOUTH ON FRIDAY, AND 1&1/2 TABLETS ON ALL OTHER DAYS OR AS DIRECTED 145 tablet 0         PHYSICAL EXAMINATION:    Alert and oriented x3.  Speech fluent, appropriate.  No facial weakness. Hearing grossly normal.     REVIEW OF SYSTEMS: 8-point review of systems is essentially negative except what is mentioned in the HPI.      IMPRESSION:      1. New onset seizure, etiology unclear at this time.  He presented with new-onset seizure in May 2020 and another seizure on 8/10/2020. The seizure was GTC with tongue biting then post ictal confusion. No incontinence. The seizures likely provoked by recent stress.   Since his last visit about 10 months ago, he has not had any seizure. He started Keppra 250 mg bid.  He denies any side effects of Keppra.     2. Memory loss. Etiology unclear at this time. Patient was encouraged to maintain active physically and mentally.    3. Depression and anxiety: Referral to mental health.    PLAN:   1.  Continue Keppra to 500 mg bid. Refilled.  2.  Mental health referral.  3.  Return to clinic in 6 months.      31 min total time was spent on the day of this visit.      17 min was spent on face to face time  7 min was spent on preparation of visit to review charts and labs, ordering medications and tests  7 min was spent on documentation of clinical information      Pierce Westbrook MD

## 2021-12-24 LAB
MDC_IDC_LEAD_IMPLANT_DT: NORMAL
MDC_IDC_LEAD_LOCATION: NORMAL
MDC_IDC_LEAD_LOCATION_DETAIL_1: NORMAL
MDC_IDC_LEAD_MFG: NORMAL
MDC_IDC_LEAD_MODEL: NORMAL
MDC_IDC_LEAD_POLARITY_TYPE: NORMAL
MDC_IDC_LEAD_SERIAL: NORMAL
MDC_IDC_LEAD_SPECIAL_FUNCTION: NORMAL
MDC_IDC_MSMT_BATTERY_DTM: NORMAL
MDC_IDC_MSMT_BATTERY_REMAINING_LONGEVITY: 46 MO
MDC_IDC_MSMT_BATTERY_RRT_TRIGGER: NORMAL
MDC_IDC_MSMT_BATTERY_VOLTAGE: 2.95 V
MDC_IDC_MSMT_CAP_CHARGE_DTM: NORMAL
MDC_IDC_MSMT_CAP_CHARGE_ENERGY: NORMAL
MDC_IDC_MSMT_CAP_CHARGE_TIME: 4.1 S
MDC_IDC_MSMT_CAP_CHARGE_TYPE: NORMAL
MDC_IDC_MSMT_LEADCHNL_LV_IMPEDANCE_VALUE: 342 OHM
MDC_IDC_MSMT_LEADCHNL_LV_IMPEDANCE_VALUE: 399 OHM
MDC_IDC_MSMT_LEADCHNL_LV_IMPEDANCE_VALUE: 684 OHM
MDC_IDC_MSMT_LEADCHNL_LV_PACING_THRESHOLD_AMPLITUDE: 1.88 V
MDC_IDC_MSMT_LEADCHNL_LV_PACING_THRESHOLD_PULSEWIDTH: 1 MS
MDC_IDC_MSMT_LEADCHNL_RA_IMPEDANCE_VALUE: 494 OHM
MDC_IDC_MSMT_LEADCHNL_RA_PACING_THRESHOLD_AMPLITUDE: 0.62 V
MDC_IDC_MSMT_LEADCHNL_RA_PACING_THRESHOLD_PULSEWIDTH: 0.4 MS
MDC_IDC_MSMT_LEADCHNL_RA_SENSING_INTR_AMPL: 1.6 MV
MDC_IDC_MSMT_LEADCHNL_RV_IMPEDANCE_VALUE: 361 OHM
MDC_IDC_MSMT_LEADCHNL_RV_IMPEDANCE_VALUE: 437 OHM
MDC_IDC_MSMT_LEADCHNL_RV_PACING_THRESHOLD_AMPLITUDE: 0.5 V
MDC_IDC_MSMT_LEADCHNL_RV_PACING_THRESHOLD_PULSEWIDTH: 0.4 MS
MDC_IDC_PG_IMPLANT_DTM: NORMAL
MDC_IDC_PG_MFG: NORMAL
MDC_IDC_PG_MODEL: NORMAL
MDC_IDC_PG_SERIAL: NORMAL
MDC_IDC_PG_TYPE: NORMAL
MDC_IDC_SESS_CLINIC_NAME: NORMAL
MDC_IDC_SESS_DTM: NORMAL
MDC_IDC_SESS_TYPE: NORMAL
MDC_IDC_SET_BRADY_AT_MODE_SWITCH_RATE: 171 {BEATS}/MIN
MDC_IDC_SET_BRADY_LOWRATE: 60 {BEATS}/MIN
MDC_IDC_SET_BRADY_MAX_SENSOR_RATE: 120 {BEATS}/MIN
MDC_IDC_SET_BRADY_MAX_TRACKING_RATE: 130 {BEATS}/MIN
MDC_IDC_SET_BRADY_MODE: NORMAL
MDC_IDC_SET_BRADY_NIGHT_RATE: 50 {BEATS}/MIN
MDC_IDC_SET_BRADY_PAV_DELAY_LOW: 170 MS
MDC_IDC_SET_BRADY_SAV_DELAY_LOW: 90 MS
MDC_IDC_SET_CRT_LVRV_DELAY: 0 MS
MDC_IDC_SET_CRT_PACED_CHAMBERS: NORMAL
MDC_IDC_SET_LEADCHNL_LV_PACING_AMPLITUDE: 3.75 V
MDC_IDC_SET_LEADCHNL_LV_PACING_ANODE_ELECTRODE_1: NORMAL
MDC_IDC_SET_LEADCHNL_LV_PACING_ANODE_LOCATION_1: NORMAL
MDC_IDC_SET_LEADCHNL_LV_PACING_CAPTURE_MODE: NORMAL
MDC_IDC_SET_LEADCHNL_LV_PACING_CATHODE_ELECTRODE_1: NORMAL
MDC_IDC_SET_LEADCHNL_LV_PACING_CATHODE_LOCATION_1: NORMAL
MDC_IDC_SET_LEADCHNL_LV_PACING_POLARITY: NORMAL
MDC_IDC_SET_LEADCHNL_LV_PACING_PULSEWIDTH: 1 MS
MDC_IDC_SET_LEADCHNL_RA_PACING_AMPLITUDE: 1.5 V
MDC_IDC_SET_LEADCHNL_RA_PACING_ANODE_ELECTRODE_1: NORMAL
MDC_IDC_SET_LEADCHNL_RA_PACING_ANODE_LOCATION_1: NORMAL
MDC_IDC_SET_LEADCHNL_RA_PACING_CAPTURE_MODE: NORMAL
MDC_IDC_SET_LEADCHNL_RA_PACING_CATHODE_ELECTRODE_1: NORMAL
MDC_IDC_SET_LEADCHNL_RA_PACING_CATHODE_LOCATION_1: NORMAL
MDC_IDC_SET_LEADCHNL_RA_PACING_POLARITY: NORMAL
MDC_IDC_SET_LEADCHNL_RA_PACING_PULSEWIDTH: 0.4 MS
MDC_IDC_SET_LEADCHNL_RA_SENSING_ANODE_ELECTRODE_1: NORMAL
MDC_IDC_SET_LEADCHNL_RA_SENSING_ANODE_LOCATION_1: NORMAL
MDC_IDC_SET_LEADCHNL_RA_SENSING_CATHODE_ELECTRODE_1: NORMAL
MDC_IDC_SET_LEADCHNL_RA_SENSING_CATHODE_LOCATION_1: NORMAL
MDC_IDC_SET_LEADCHNL_RA_SENSING_POLARITY: NORMAL
MDC_IDC_SET_LEADCHNL_RA_SENSING_SENSITIVITY: 0.3 MV
MDC_IDC_SET_LEADCHNL_RV_PACING_AMPLITUDE: 2 V
MDC_IDC_SET_LEADCHNL_RV_PACING_ANODE_ELECTRODE_1: NORMAL
MDC_IDC_SET_LEADCHNL_RV_PACING_ANODE_LOCATION_1: NORMAL
MDC_IDC_SET_LEADCHNL_RV_PACING_CAPTURE_MODE: NORMAL
MDC_IDC_SET_LEADCHNL_RV_PACING_CATHODE_ELECTRODE_1: NORMAL
MDC_IDC_SET_LEADCHNL_RV_PACING_CATHODE_LOCATION_1: NORMAL
MDC_IDC_SET_LEADCHNL_RV_PACING_POLARITY: NORMAL
MDC_IDC_SET_LEADCHNL_RV_PACING_PULSEWIDTH: 0.4 MS
MDC_IDC_SET_LEADCHNL_RV_SENSING_ANODE_ELECTRODE_1: NORMAL
MDC_IDC_SET_LEADCHNL_RV_SENSING_ANODE_LOCATION_1: NORMAL
MDC_IDC_SET_LEADCHNL_RV_SENSING_CATHODE_ELECTRODE_1: NORMAL
MDC_IDC_SET_LEADCHNL_RV_SENSING_CATHODE_LOCATION_1: NORMAL
MDC_IDC_SET_LEADCHNL_RV_SENSING_POLARITY: NORMAL
MDC_IDC_SET_LEADCHNL_RV_SENSING_SENSITIVITY: 0.3 MV
MDC_IDC_SET_ZONE_DETECTION_BEATS_DENOMINATOR: 40 {BEATS}
MDC_IDC_SET_ZONE_DETECTION_BEATS_NUMERATOR: 30 {BEATS}
MDC_IDC_SET_ZONE_DETECTION_INTERVAL: 320 MS
MDC_IDC_SET_ZONE_DETECTION_INTERVAL: 350 MS
MDC_IDC_SET_ZONE_DETECTION_INTERVAL: 360 MS
MDC_IDC_SET_ZONE_DETECTION_INTERVAL: 420 MS
MDC_IDC_SET_ZONE_TYPE: NORMAL
MDC_IDC_STAT_AT_BURDEN_PERCENT: 0 %
MDC_IDC_STAT_AT_DTM_END: NORMAL
MDC_IDC_STAT_AT_DTM_START: NORMAL
MDC_IDC_STAT_BRADY_DTM_END: NORMAL
MDC_IDC_STAT_BRADY_DTM_START: NORMAL
MDC_IDC_STAT_BRADY_RV_PERCENT_PACED: 99.72 %
MDC_IDC_STAT_CRT_DTM_END: NORMAL
MDC_IDC_STAT_CRT_DTM_START: NORMAL
MDC_IDC_STAT_CRT_LV_PERCENT_PACED: 99.69 %
MDC_IDC_STAT_CRT_PERCENT_PACED: 99.69 %
MDC_IDC_STAT_EPISODE_RECENT_COUNT: 0
MDC_IDC_STAT_EPISODE_RECENT_COUNT_DTM_END: NORMAL
MDC_IDC_STAT_EPISODE_RECENT_COUNT_DTM_START: NORMAL
MDC_IDC_STAT_EPISODE_TOTAL_COUNT: 0
MDC_IDC_STAT_EPISODE_TOTAL_COUNT_DTM_END: NORMAL
MDC_IDC_STAT_EPISODE_TOTAL_COUNT_DTM_START: NORMAL
MDC_IDC_STAT_EPISODE_TYPE: NORMAL
MDC_IDC_STAT_TACHYTHERAPY_ATP_DELIVERED_RECENT: 0
MDC_IDC_STAT_TACHYTHERAPY_ATP_DELIVERED_TOTAL: 0
MDC_IDC_STAT_TACHYTHERAPY_RECENT_DTM_END: NORMAL
MDC_IDC_STAT_TACHYTHERAPY_RECENT_DTM_START: NORMAL
MDC_IDC_STAT_TACHYTHERAPY_SHOCKS_ABORTED_RECENT: 0
MDC_IDC_STAT_TACHYTHERAPY_SHOCKS_ABORTED_TOTAL: 0
MDC_IDC_STAT_TACHYTHERAPY_TOTAL_DTM_END: NORMAL
MDC_IDC_STAT_TACHYTHERAPY_TOTAL_DTM_START: NORMAL

## 2022-01-02 NOTE — PROGRESS NOTES
HPI: Mr. Summers is a 75 yo M with PMH of endocarditis, BCC of the face and thrombocytopenia who comes here for a regular check-up. The patient reports that his memory is overall stable. He does have intermittent difficulty orienting to place, and impaired short-term recall. He follows with neurology and mental health. The patient had cryotherapy for BCC carcinoma of the right temple, but is concerned because the skin has not healed yet. The patient's urinary habits unchanged, he goes to the bathroom 2-3 times every day and 2-3 times at night. Denies pain or burning with urination. Happy with the level of control he currently has. Has 1 BM every day, solid brown, no blood or melena.  Reports tingling in his bilateral lower extremities, starting about a year ago, improving with activity, the patient reports that it is not progressing. Does not cause functional impairment at this point.    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 Tx with Dapto/RIFx 8 weeks.     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     Paroxysmal A-fib (H)     Post-op 7/14/2011, 1/26/13     Rotator Cuff (Capsule) Sprain and Strain      S/P aortic valve replacement     7/14/2011, re-do 1/25/13 due to endocarditis     Smoking hx      Thrombocytopenia (H)      Past Surgical History:   Procedure Laterality Date     ANESTHESIA CARDIOVERSION  7/18/2011    Procedure:ANESTHESIA CARDIOVERSION; Cardioversion; Surgeon:GENERIC ANESTHESIA PROVIDER; Location:[facility] OR     COLONOSCOPY       COLONOSCOPY N/A 11/19/2018    Procedure: COLONOSCOPY;  Surgeon: Herman Mcginnis MD;  Location: [facility]     CORONARY ANGIOGRAPHY ADULT ORDER       CYSTOSCOPY, BIOPSY URETHRA N/A 4/3/2017     Procedure: CYSTOSCOPY, BIOPSY URETHRA;  Surgeon: Marlena Mora MD;  Location:  OR     ENDOSCOPY UPPER, COLONOSCOPY, COMBINED       EP ICD GENERATOR CHANGE DUAL N/A 3/10/2021    Procedure: EP ICD GENERATOR CHANGE DUAL;  Surgeon: Mekhi Be MD;  Location:  HEART CARDIAC CATH LAB     HC REMOV & REPLACE IMPLT DEFIB PULSE GEN MULT LEAD  12/3/2015          HEMORRHOID SURGERY       IMPLANT AUTOMATIC IMPLANTABLE CARDIOVERTER DEFIBRILLATOR       MOHS MICROGRAPHIC PROCEDURE       ORTHOPEDIC SURGERY      shoulder,right     REDO STERNOTOMY REPLACE VALVE AORTIC  1/25/2013    Procedure: REDO STERNOTOMY REPLACE VALVE AORTIC;  Redo Sternotomy, Redo Aortic Valve Replacement on pump oxygenator. Intraoperative Transesophageal Echocardiogram;  Surgeon: Navin Hampton MD;  Location:  OR     REPAIR VALVE MITRAL  2013     REPLACE VALVE AORTIC  7/14/2011    Procedure:REPLACE VALVE AORTIC; Median Sternotomy, Bioprosthesis,  Aortic Valve replacement on pump with oxygenator. Intra-operative Transesophogeal Echocardiogram.; Surgeon:NAVIN HAMPTON; Location: OR     teeth extractions       TRANSPLANT         PE:    Vitals noted, gen, nad, cooperative, alert    Gen: AAOX4, nad, appears comfortable  HEENT: PERRLA, EOMI, nasal nares clear, OP mucosa clear, external auditory canal clean, no lymphadenopathy  Lungs: CTAB, no crackles, rales or wheezing  CV: S1, S2, rrr, no mrg  Abd: Soft, non-tender, non-distended, bs (+)  Neuro: AAOX4, strength 5/5 on all four experiments, cranial nerves II-XII intact    A/P:    1. Immunizations; Moderna COVID vaccine x 3. Influenza vaccine this year? Tdap 4/12/2013  2. Seizure disorder on Keppra; seen Neurology by Dr. Westbrook 12/22/2021. Will make appointment with Dr. Menendez from mental health regarding memory loss. Encouraged him to discuss tingling with neurology during his next appointment.  3. Increased lipids on Atorvastatin; lipids checked 1/31/2020 TG's 76, HDL 52 and LDL 76. Ordered  future by Dr. Mckenzie Cardiology 8/10/2021  4. HTN; on Metoprolol and Losartan; Electrolytes and Creatinine 11/15/2021  5. Urology follow up with Ms. Reid 3/8/2022. Last visit 12/7/2021 for nocturia  6. Gout on Allopurinol  7. Neuropsychiatry testing 11/21/2021  8. Dermatology; Skin check from dr. Galaviz 9/30/2021. Referral sent for follow-up appointment with dermatology to assess the healing of the right temporal region.  9. Colonoscopy; 11/19/2018  10. Afib on Coumadin, s/p aortic valve replacement and h/o endocarditis. ICD in place. See Cardiology note from Ms. Jon Merrill 6/11/2021  11. Low platelets; see Telephone note from Dr. Tim 6/26/2021. Platelets 81 11/15/2021. Will defer from rechecking today.   12. Patient got the flu shot today. Inquired about the shingles vaccine. Explained to him that he is eligible but it is questionable whether insurance covers it. Instructed him to reach out to them, would be more cost-effective to go get the shot from [facility] or [facility].        30 minutes spent on the date of the encounter doing chart review, history and exam, documentation and further activities as noted above    Follow-up with Dr. Mays in 6 months.    Patient seen and plan of care discussed with Dr. Davon Corcoran MD  Internal Medicine, PGY-1  [facility]  [phone]    Staff note; I personally reviewed Mr. Summers's past medical records. I interviewed him and conducted a physical examination. I agree with the above assessment an plan.    BALAJI Mays MD

## 2022-01-03 ENCOUNTER — OFFICE VISIT (OUTPATIENT)
Dept: INTERNAL MEDICINE | Facility: CLINIC | Age: 77
End: 2022-01-03
Payer: MEDICARE

## 2022-01-03 VITALS
WEIGHT: 173 LBS | HEIGHT: 70 IN | DIASTOLIC BLOOD PRESSURE: 75 MMHG | SYSTOLIC BLOOD PRESSURE: 126 MMHG | HEART RATE: 73 BPM | OXYGEN SATURATION: 98 % | BODY MASS INDEX: 24.77 KG/M2

## 2022-01-03 DIAGNOSIS — C44.310 BCC (BASAL CELL CARCINOMA), FACE: ICD-10-CM

## 2022-01-03 DIAGNOSIS — Z23 ENCOUNTER FOR IMMUNIZATION: Primary | ICD-10-CM

## 2022-01-03 PROCEDURE — G0008 ADMIN INFLUENZA VIRUS VAC: HCPCS | Performed by: INTERNAL MEDICINE

## 2022-01-03 PROCEDURE — 90662 IIV NO PRSV INCREASED AG IM: CPT | Performed by: INTERNAL MEDICINE

## 2022-01-03 PROCEDURE — 99214 OFFICE O/P EST MOD 30 MIN: CPT | Mod: 25 | Performed by: INTERNAL MEDICINE

## 2022-01-03 ASSESSMENT — MIFFLIN-ST. JEOR: SCORE: 1520.97

## 2022-01-03 ASSESSMENT — PAIN SCALES - GENERAL: PAINLEVEL: NO PAIN (0)

## 2022-01-06 DIAGNOSIS — Z79.01 LONG TERM CURRENT USE OF ANTICOAGULANT THERAPY: ICD-10-CM

## 2022-01-06 DIAGNOSIS — I48.0 PAROXYSMAL ATRIAL FIBRILLATION (H): ICD-10-CM

## 2022-01-06 DIAGNOSIS — Z95.2 S/P AORTIC VALVE REPLACEMENT: ICD-10-CM

## 2022-01-06 DIAGNOSIS — Z95.2 S/P AVR (AORTIC VALVE REPLACEMENT): ICD-10-CM

## 2022-01-06 RX ORDER — WARFARIN SODIUM 5 MG/1
TABLET ORAL
Qty: 145 TABLET | Refills: 1 | Status: SHIPPED | OUTPATIENT
Start: 2022-01-06 | End: 2022-07-05

## 2022-01-19 DIAGNOSIS — H40.003 GLAUCOMA SUSPECT OF BOTH EYES: Primary | ICD-10-CM

## 2022-02-01 ENCOUNTER — ANTICOAGULATION THERAPY VISIT (OUTPATIENT)
Dept: ANTICOAGULATION | Facility: CLINIC | Age: 77
End: 2022-02-01

## 2022-02-01 ENCOUNTER — LAB (OUTPATIENT)
Dept: LAB | Facility: CLINIC | Age: 77
End: 2022-02-01
Payer: MEDICARE

## 2022-02-01 DIAGNOSIS — Z79.01 ANTICOAGULATED ON COUMADIN: ICD-10-CM

## 2022-02-01 DIAGNOSIS — Z79.01 LONG TERM CURRENT USE OF ANTICOAGULANT THERAPY: ICD-10-CM

## 2022-02-01 DIAGNOSIS — Z95.2 S/P AORTIC VALVE REPLACEMENT: Primary | ICD-10-CM

## 2022-02-01 DIAGNOSIS — I48.0 PAROXYSMAL ATRIAL FIBRILLATION (H): ICD-10-CM

## 2022-02-01 LAB — INR BLD: 3.1 (ref 0.9–1.1)

## 2022-02-01 PROCEDURE — 36416 COLLJ CAPILLARY BLOOD SPEC: CPT

## 2022-02-01 PROCEDURE — 85610 PROTHROMBIN TIME: CPT

## 2022-02-01 NOTE — PROGRESS NOTES
ANTICOAGULATION MANAGEMENT     Brooks Summers 76 year old male is on warfarin with supratherapeutic INR result. (Goal INR 2.0-3.0)    Recent labs: (last 7 days)     02/01/22  0827   INR 3.1*       ASSESSMENT     Source(s): Chart Review and Patient/Caregiver Call       Warfarin doses taken: Warfarin taken as instructed    Diet: No new diet changes identified    New illness, injury, or hospitalization: No    Medication/supplement changes: None noted    Signs or symptoms of bleeding or clotting: No    Previous INR: Therapeutic last 2(+) visits    Additional findings: None     PLAN     Recommended plan for no diet, medication or health factor changes affecting INR     Dosing Instructions: Continue your current warfarin dose with next INR in 2 weeks       Summary  As of 2/1/2022    Full warfarin instructions:  10 mg every Fri; 7.5 mg all other days   Next INR check:  2/15/2022             Telephone call with Brooks who verbalizes understanding and agrees to plan    Lab visit scheduled    Education provided: Goal range and significance of current result and Contact [phone]  with any changes, questions or concerns.     Plan made per [facility] anticoagulation protocol    Selena Bates, RN  Anticoagulation Clinic  2/1/2022    _______________________________________________________________________     Anticoagulation Episode Summary     Current INR goal:  2.0-3.0   TTR:  72.5 % (1 y)   Target end date:  Indefinite   Send INR reminders to:  JONATHAN MURILLO    Indications    S/P aortic valve replacement [Z95.2]  Paroxysmal atrial fibrillation (H) [I48.0]  Long term current use of anticoagulant therapy [Z79.01]  Anticoagulated on Coumadin [Z79.01]           Comments:   * warfarin 5 mg tabs. Aortic 21 mm Johnson Perimount Magna Tissue Valve.          Anticoagulation Care Providers     Provider Role Specialty Phone number    Edin Mays MD Referring Internal Medicine [phone]

## 2022-02-03 DIAGNOSIS — M10.00 IDIOPATHIC GOUT, UNSPECIFIED CHRONICITY, UNSPECIFIED SITE: ICD-10-CM

## 2022-02-07 NOTE — TELEPHONE ENCOUNTER
allopurinol (ZYLOPRIM) 100 MG tablet    Take 1 tablet (100 mg) by mouth daily     Last Written Prescription Date:  9/15/21  Last Fill Quantity: 90,   # refills: 0  Last Office Visit : 1/3/22  Future Office visit:  none    Routing refill request to provider for review/approval because:  Overdue uric acid. 90 day previous mauricio.     Lab Test 11/15/21  1446   CR 0.90   CREAT  --

## 2022-02-08 RX ORDER — ALLOPURINOL 100 MG/1
100 TABLET ORAL DAILY
Qty: 90 TABLET | Refills: 0 | Status: SHIPPED | OUTPATIENT
Start: 2022-02-08 | End: 2022-05-10

## 2022-02-11 DIAGNOSIS — E78.5 HYPERLIPIDEMIA LDL GOAL <100: ICD-10-CM

## 2022-02-15 ENCOUNTER — ANTICOAGULATION THERAPY VISIT (OUTPATIENT)
Dept: INTERNAL MEDICINE | Facility: CLINIC | Age: 77
End: 2022-02-15

## 2022-02-15 ENCOUNTER — LAB (OUTPATIENT)
Dept: LAB | Facility: CLINIC | Age: 77
End: 2022-02-15
Payer: MEDICARE

## 2022-02-15 DIAGNOSIS — Z79.01 ANTICOAGULATED ON COUMADIN: ICD-10-CM

## 2022-02-15 DIAGNOSIS — Z95.2 S/P AORTIC VALVE REPLACEMENT: Primary | ICD-10-CM

## 2022-02-15 DIAGNOSIS — I48.0 PAROXYSMAL ATRIAL FIBRILLATION (H): ICD-10-CM

## 2022-02-15 DIAGNOSIS — Z79.01 LONG TERM CURRENT USE OF ANTICOAGULANT THERAPY: ICD-10-CM

## 2022-02-15 LAB — INR BLD: 2.4 (ref 0.9–1.1)

## 2022-02-15 PROCEDURE — 36415 COLL VENOUS BLD VENIPUNCTURE: CPT

## 2022-02-15 PROCEDURE — 85610 PROTHROMBIN TIME: CPT

## 2022-02-15 NOTE — PROGRESS NOTES
ANTICOAGULATION MANAGEMENT     Brooks Summers 76 year old male is on warfarin with therapeutic INR result. (Goal INR 2.0-3.0)    Recent labs: (last 7 days)     02/15/22  0814   INR 2.4*       ASSESSMENT     Source(s): Chart Review and Patient/Caregiver Call       Warfarin doses taken: Warfarin taken as instructed    Diet: No new diet changes identified    New illness, injury, or hospitalization: No    Medication/supplement changes: None noted    Signs or symptoms of bleeding or clotting: No    Previous INR: Supratherapeutic    Additional findings: None     PLAN     Recommended plan for no diet, medication or health factor changes affecting INR     Dosing Instructions: Continue your current warfarin dose with next INR in 4 weeks       Summary  As of 2/15/2022    Full warfarin instructions:  10 mg every Fri; 7.5 mg all other days   Next INR check:  3/8/2022             Telephone call with Brooks who verbalizes understanding and agrees to plan    Lab visit scheduled    Education provided: Please call back if any changes to your diet, medications or how you've been taking warfarin    Plan made per [facility] anticoagulation protocol    Little Almonte RN  Anticoagulation Clinic  2/15/2022    _______________________________________________________________________     Anticoagulation Episode Summary     Current INR goal:  2.0-3.0   TTR:  75.8 % (1 y)   Target end date:  Indefinite   Send INR reminders to:  JONATHAN MURILLO    Indications    S/P aortic valve replacement [Z95.2]  Paroxysmal atrial fibrillation (H) [I48.0]  Long term current use of anticoagulant therapy [Z79.01]  Anticoagulated on Coumadin [Z79.01]           Comments:   * warfarin 5 mg tabs. Aortic 21 mm Johnson Perimount Magna Tissue Valve.          Anticoagulation Care Providers     Provider Role Specialty Phone number    Edin Mays MD Referring Internal Medicine [phone]

## 2022-02-15 NOTE — PROGRESS NOTES
Anticoagulation Management    Unable to reach pt today.    Today's INR result of 2.4 is therapeutic (goal INR of 2.0-3.0).  Result received from: Clinic Lab    Follow up required to confirm warfarin dose taken and assess for changes    VM left to call [facility] back. Will need tyo try again later.       Anticoagulation clinic to follow up    Little Almonte RN

## 2022-02-16 RX ORDER — SIMVASTATIN 40 MG
40 TABLET ORAL AT BEDTIME
Qty: 30 TABLET | Refills: 0 | Status: SHIPPED | OUTPATIENT
Start: 2022-02-16 | End: 2022-03-23

## 2022-02-16 NOTE — TELEPHONE ENCOUNTER
SIMVASTATIN 40MG TABLETS   Take 1 tablet (40 mg) by mouth At Bedtime - for further refills, call to schedule a lab appointment for blood draw (lipid profile, 12 hour fasting required)   Last Written Prescription Date:  11/15/21  Last Fill Quantity: 90,   # refills: 0  Last Office Visit : 1/3/22  Future Office visit:  None     30 day mauricio refill sent to the pharmacy - including instructions for patient to call the clinic and schedule a lab appointment.      01/31/20  0943    LDL 76

## 2022-02-23 ENCOUNTER — PRE VISIT (OUTPATIENT)
Dept: UROLOGY | Facility: CLINIC | Age: 77
End: 2022-02-23
Payer: MEDICARE

## 2022-02-23 NOTE — TELEPHONE ENCOUNTER
Reason for visit: symptom check      Dx/Hx/Sx: nocturia     Records/imaging/labs/orders: in epic     At Rooming: video visit

## 2022-03-02 ENCOUNTER — ANCILLARY PROCEDURE (OUTPATIENT)
Dept: CARDIOLOGY | Facility: CLINIC | Age: 77
End: 2022-03-02
Attending: INTERNAL MEDICINE
Payer: MEDICARE

## 2022-03-02 DIAGNOSIS — I42.9 CARDIOMYOPATHY (H): ICD-10-CM

## 2022-03-02 PROCEDURE — 93296 REM INTERROG EVL PM/IDS: CPT

## 2022-03-02 PROCEDURE — 93295 DEV INTERROG REMOTE 1/2/MLT: CPT | Performed by: INTERNAL MEDICINE

## 2022-03-08 ENCOUNTER — VIRTUAL VISIT (OUTPATIENT)
Dept: UROLOGY | Facility: CLINIC | Age: 77
End: 2022-03-08
Payer: MEDICARE

## 2022-03-08 DIAGNOSIS — R35.1 NOCTURIA: ICD-10-CM

## 2022-03-08 PROCEDURE — 99213 OFFICE O/P EST LOW 20 MIN: CPT | Mod: 95 | Performed by: NURSE PRACTITIONER

## 2022-03-08 RX ORDER — OXYBUTYNIN CHLORIDE 10 MG/1
10 TABLET, EXTENDED RELEASE ORAL DAILY
Qty: 30 TABLET | Refills: 4 | Status: SHIPPED | OUTPATIENT
Start: 2022-03-08 | End: 2022-03-08

## 2022-03-08 RX ORDER — OXYBUTYNIN CHLORIDE 10 MG/1
10 TABLET, EXTENDED RELEASE ORAL DAILY
Qty: 90 TABLET | Refills: 3 | Status: SHIPPED | OUTPATIENT
Start: 2022-03-08 | End: 2023-05-22

## 2022-03-08 NOTE — LETTER
3/8/2022       RE: Brooks Summers  [address]  [address]  [place] [address]     Dear Colleague,    Thank you for referring your patient, Brooks Summers, to the [facility] UROLOGY CLIN[facility] [place] at [facility]. Please see a copy of my visit note below.    Brooks is a 76 year old who is being evaluated via a billable video visit.      How would you like to obtain your AVS? Mail a copy  If the video visit is dropped, the invitation should be resent by: Text to cell phone: [phone]  Will anyone else be joining your video visit? No    Video Start Time: 1:07 PM  Video-Visit Details    Type of service:  Video Visit    Video End Time: 1:18 PM    Originating Location (pt. Location): Home    Distant Location (provider location):  [facility] UROLOGY CLIN[facility] [place]     Platform used for Video Visit: Sally       Brooks Summers complains of   Chief Complaint   Patient presents with     Follow Up       Assessment/Plan:  76 year old male with improved nocturia on oxybutynin XL 10 mg. Is interested in continuing this. We discussed that there is room to increase this dose if he is interested, but he would like to continue the same dose.   -Continue oxybutynin XL 10 mg daily. Refills sent to pharmacy.   -Follow up with me in 6 months to revisit symptoms. Ok to push out to one year if he continues to do well with no concerns.     Shelbi Reid, CNP  Department of Urology      Subjective:   76 year old male with a complex cardiac history, including cardiomyopathy s/p ICD placement, seizures, and skin cancer who presents for follow up. He has had issues with isolated nocturia with no daytime symptoms. Prior cystoscopy has shown a mildly-enlarged prostate. No known hx of sleep apnea, but does have some issues staying asleep. Flomax trialed and at our last visit he had not noticed much of a difference in his nocturia. This was therefore stopped and Sanctura  initiated, switched to oxybutynin due to issues with coverage.     Today, he states that his urinary symptoms have improved with the oxybutynin. He is able to generate a strong stream and empty his bladder completely. He is voiding much less often and is getting up only 1-2 times overnight. Has no concerns today.       Objective:     Exam:   Patient is a 76 year old male   No vital signs obtained due to virtual visit.  General Appearance: Well groomed, hygenic  Respiratory: No cough, no respiratory distress or labored breathing  Musculoskeletal: Grossly normal with no gross deficits  Skin: No discoloration or apparent rashes  Neurologic: No tremors  Psychiatric: Alert and oriented  Further examination is deferred due to the nature of our visit.

## 2022-03-08 NOTE — PATIENT INSTRUCTIONS
UROLOGY CLINIC VISIT PATIENT INSTRUCTIONS    -Follow up with me in 6 months to revisit symptoms. It is ok to push this visit out to one year if you continue to do well and have no concerns.     If you have any issues, questions or concerns in the meantime, do not hesitate to contact us at [phone] or via Wejo.       Shelbi Reid, CNP  Department of Urology

## 2022-03-08 NOTE — PROGRESS NOTES
Brooks is a 76 year old who is being evaluated via a billable video visit.      How would you like to obtain your AVS? Mail a copy  If the video visit is dropped, the invitation should be resent by: Text to cell phone: [phone]  Will anyone else be joining your video visit? No    Video Start Time: 1:07 PM  Video-Visit Details    Type of service:  Video Visit    Video End Time: 1:18 PM    Originating Location (pt. Location): Home    Distant Location (provider location):  [facility] UROLOGY CLINIC [place]     Platform used for Video Visit: Sally [place] complains of   Chief Complaint   Patient presents with     Follow Up       Assessment/Plan:  76 year old male with improved nocturia on oxybutynin XL 10 mg. Is interested in continuing this. We discussed that there is room to increase this dose if he is interested, but he would like to continue the same dose.   -Continue oxybutynin XL 10 mg daily. Refills sent to pharmacy.   -Follow up with me in 6 months to revisit symptoms. Ok to push out to one year if he continues to do well with no concerns.     Shelbi Reid, CNP  Department of Urology      Subjective:   76 year old male with a complex cardiac history, including cardiomyopathy s/p ICD placement, seizures, and skin cancer who presents for follow up. He has had issues with isolated nocturia with no daytime symptoms. Prior cystoscopy has shown a mildly-enlarged prostate. No known hx of sleep apnea, but does have some issues staying asleep. Flomax trialed and at our last visit he had not noticed much of a difference in his nocturia. This was therefore stopped and Sanctura initiated, switched to oxybutynin due to issues with coverage.     Today, he states that his urinary symptoms have improved with the oxybutynin. He is able to generate a strong stream and empty his bladder completely. He is voiding much less often and is getting up only 1-2 times overnight. Has no concerns today.        Objective:     Exam:   Patient is a 76 year old male   No vital signs obtained due to virtual visit.  General Appearance: Well groomed, hygenic  Respiratory: No cough, no respiratory distress or labored breathing  Musculoskeletal: Grossly normal with no gross deficits  Skin: No discoloration or apparent rashes  Neurologic: No tremors  Psychiatric: Alert and oriented  Further examination is deferred due to the nature of our visit.

## 2022-03-09 DIAGNOSIS — I10 BENIGN ESSENTIAL HYPERTENSION: ICD-10-CM

## 2022-03-11 ENCOUNTER — TELEPHONE (OUTPATIENT)
Dept: CARDIOLOGY | Facility: CLINIC | Age: 77
End: 2022-03-11
Payer: MEDICARE

## 2022-03-11 RX ORDER — LOSARTAN POTASSIUM 25 MG/1
12.5 TABLET ORAL DAILY
Qty: 45 TABLET | Refills: 0 | Status: SHIPPED | OUTPATIENT
Start: 2022-03-11 | End: 2022-06-09

## 2022-03-11 NOTE — TELEPHONE ENCOUNTER
Attempted to reach patient to schedule his cardiology follow up. Please schedule with Dr. Salgado with fasting labs and an echocardiogram prior.   Cardio Team

## 2022-03-15 ENCOUNTER — ANTICOAGULATION THERAPY VISIT (OUTPATIENT)
Dept: ANTICOAGULATION | Facility: CLINIC | Age: 77
End: 2022-03-15

## 2022-03-15 ENCOUNTER — LAB (OUTPATIENT)
Dept: LAB | Facility: CLINIC | Age: 77
End: 2022-03-15
Payer: MEDICARE

## 2022-03-15 DIAGNOSIS — Z95.2 S/P AORTIC VALVE REPLACEMENT: Primary | ICD-10-CM

## 2022-03-15 DIAGNOSIS — Z79.01 ANTICOAGULATED ON COUMADIN: ICD-10-CM

## 2022-03-15 DIAGNOSIS — I48.0 PAROXYSMAL ATRIAL FIBRILLATION (H): ICD-10-CM

## 2022-03-15 DIAGNOSIS — Z79.01 LONG TERM CURRENT USE OF ANTICOAGULANT THERAPY: ICD-10-CM

## 2022-03-15 LAB — INR BLD: 2 (ref 0.9–1.1)

## 2022-03-15 PROCEDURE — 85610 PROTHROMBIN TIME: CPT

## 2022-03-15 PROCEDURE — 36416 COLLJ CAPILLARY BLOOD SPEC: CPT

## 2022-03-15 NOTE — PROGRESS NOTES
ANTICOAGULATION MANAGEMENT     Brooks Summers 77 year old male is on warfarin with therapeutic INR result. (Goal INR 2.0-3.0)    Recent labs: (last 7 days)     03/15/22  0832   INR 2.0*       ASSESSMENT       Source(s): Chart Review and Patient/Caregiver Call       Warfarin doses taken: Warfarin taken as instructed    Diet: No new diet changes identified    New illness, injury, or hospitalization: No    Medication/supplement changes: None noted    Signs or symptoms of bleeding or clotting: No    Previous INR: Therapeutic last 2(+) visits    Additional findings: None       PLAN     Recommended plan for no diet, medication or health factor changes affecting INR     Dosing Instructions: Continue your current warfarin dose with next INR in 4 weeks       Summary  As of 3/15/2022    Full warfarin instructions:  10 mg every Fri; 7.5 mg all other days   Next INR check:  4/12/2022             Telephone call with Brooks who verbalizes understanding and agrees to plan    Lab visit scheduled    Education provided: Monitoring for bleeding signs and symptoms and Monitoring for clotting signs and symptoms    Plan made per [facility] anticoagulation protocol    Selena Bates RN  Anticoagulation Clinic  3/15/2022    _______________________________________________________________________     Anticoagulation Episode Summary     Current INR goal:  2.0-3.0   TTR:  81.3 % (1 y)   Target end date:  Indefinite   Send INR reminders to:  JONATHAN MURILLO    Indications    S/P aortic valve replacement [Z95.2]  Paroxysmal atrial fibrillation (H) [I48.0]  Long term current use of anticoagulant therapy [Z79.01]  Anticoagulated on Coumadin [Z79.01]           Comments:   * warfarin 5 mg tabs. Aortic 21 mm Johnson Perimount Magna Tissue Valve.          Anticoagulation Care Providers     Provider Role Specialty Phone number    Edin Mays MD Referring Internal Medicine [phone]

## 2022-03-18 DIAGNOSIS — E78.5 HYPERLIPIDEMIA LDL GOAL <100: ICD-10-CM

## 2022-03-23 NOTE — TELEPHONE ENCOUNTER
SIMVASTATIN 40MG TABLETS  Last Written Prescription Date:   2/16/2022  Last Fill Quantity: 30,   # refills: 0  Last Office Visit :  1/3/2022  Future Office visit:  None    Routing refill request to provider for review/approval because:  Second Request  Over due LIPID Profile  Refer to clinic for review     Recent Labs   Lab Test 01/31/20  0943   LDL 76       Becca Velazquez RN  Central Triage Red Flags/Med Refills    Refill request.  Juliana Flores RN 8:32 AM on 3/24/2022.

## 2022-03-24 RX ORDER — SIMVASTATIN 40 MG
40 TABLET ORAL AT BEDTIME
Qty: 90 TABLET | Refills: 0 | Status: SHIPPED | OUTPATIENT
Start: 2022-03-24 | End: 2022-06-27

## 2022-03-30 ENCOUNTER — VIRTUAL VISIT (OUTPATIENT)
Dept: NEUROLOGY | Facility: CLINIC | Age: 77
End: 2022-03-30
Payer: MEDICARE

## 2022-03-30 DIAGNOSIS — F32.A DEPRESSION, UNSPECIFIED DEPRESSION TYPE: Primary | ICD-10-CM

## 2022-03-30 PROCEDURE — 99213 OFFICE O/P EST LOW 20 MIN: CPT | Mod: 95 | Performed by: PSYCHIATRY & NEUROLOGY

## 2022-03-30 NOTE — PROGRESS NOTES
Brooks is a 77 year old who is being evaluated via a billable video visit.      How would you like to obtain your AVS? Mail a copy  If the video visit is dropped, the invitation should be resent by: Send to e-mail at: [email or web address]  Will anyone else be joining your video visit? No      Video Start Time: 3:55 PM  Video-Visit Details    Type of service:  Video Visit    Video End Time: 4:07 PM    Originating Location (pt. Location): Home    Distant Location (provider location):  [facility] NEUROLOGY [facility]     Platform used for Video Visit: [employer]

## 2022-03-30 NOTE — LETTER
"3/30/2022       RE: Brooks Summers  [address]  [place] [address]     Dear Colleague,    Thank you for referring your patient, Brooks Summers, to the [facility] NEUROLOGY CLINIC [place] [facility]. Please see a copy of my visit note below.      CHIEF COMPLAINT:  Follow up for new onset seizures.      HISTORY OF PRESENT ILLNESS:  Video call for follow up. His wife was also with him during the video conference. This patient is a 77-year-old male with a history of bicuspid valve, severe stenosis, status post AVR with prosthetic valve endocarditis, then mitral valve repair and pacemaker placed in 2013, on Coumadin, paroxysmal AFib, chronic thrombocytopenia, gout.      Since his last visit about 3 months ago, he has not had any seizure. His last seizure was in Aug. 2020. He is currently on keppra 500 mg bid.  He denies any side effects of Keppra. He is complaint with the medications.    Her wife thinks that he is depressed, and would like him to see a mental healthcare provider. He cancelled his last appointment with his psychiatrist.    He presented with new-onset seizure in May 2020 and another seizure on 8/10/2020. The seizure was GTC with tongue biting then post ictal confusion. No incontinence. The seizures likely provoked by recent stress.     He is complaining of memory issues, mainly short term memory. Long term memory is good. He had neuropsych testing with Dr. Doss. Dr. Doss had concluded the following:  \"Mr. Summers demonstrated mild weaknesses that raise the question of bilateral mesial temporal region dysfunction.  The etiology is not clear.  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  However, another explanation would be that these weaknesses are attributable to his seizures.  Additionally, he is reporting mild symptoms of depression and " "anxiety on self-report measures, which could be contributing as well.  In this exam, mild weaknesses were identified in verbal and nonverbal anterograde memory.  There are also subtle weaknesses in verbal fluency.  The remainder of his cognitive abilities were normal and performed in keeping with his average range cognitive baseline.  He has strengths in cognitive speed and aspects of executive functioning.  He also has a strength in basic visuospatial judgments.  As alluded to above, he is reporting mild symptoms of depression and anxiety.  It may well be the case that these mental health factors are contributing to some degree of variability and his performances in this exam, as well as to his concerns about cognitive dysfunction in day-to-day life.\"    He states he has what is seems to be restless leg syndrome for long time.  She states that his short memory is not as good as before. He is asking questions repeatedly and forget what they talks about. His mother had dementia. She passed away at her 90s.        Current Outpatient Medications   Medication Sig Dispense Refill     allopurinol (ZYLOPRIM) 100 MG tablet Take 1 tablet (100 mg) by mouth daily 90 tablet 0     levETIRAcetam (KEPPRA) 500 MG tablet Take 1 tablet (500 mg) by mouth 2 times daily For additional refills, please schedule a follow-up appointment 60 tablet 11     losartan (COZAAR) 25 MG tablet Take 0.5 tablets (12.5 mg) by mouth daily 45 tablet 0     metoprolol succinate ER (TOPROL-XL) 50 MG 24 hr tablet Take 1 tablet (50 mg) by mouth daily 90 tablet 3     Multiple Vitamins-Minerals (MULTIVITAMIN ADULTS 50+) TABS        oxybutynin ER (DITROPAN-XL) 10 MG 24 hr tablet Take 1 tablet (10 mg) by mouth daily 90 tablet 3     PROVIDER DOSED MANAGED WARFARIN, COUMADIN, OUTPATIENT Per coumadin clinic       simvastatin (ZOCOR) 40 MG tablet Take 1 tablet (40 mg) by mouth At Bedtime 90 tablet 0     trospium (SANCTURA) 20 MG tablet Take 1 tablet (20 mg) by mouth " At Bedtime 30 tablet 3     VITAMIN D, CHOLECALCIFEROL, PO Take 1,000 Units by mouth daily       warfarin ANTICOAGULANT (COUMADIN) 5 MG tablet TAKE 2 TABLETS BY MOUTH ON FRIDAY, AND 1&1/2 TABLETS ON ALL OTHER DAYS OR AS DIRECTED 145 tablet 1         PHYSICAL EXAMINATION:    Alert and oriented x3.  Speech fluent, appropriate.  No facial weakness. Hearing grossly normal.     REVIEW OF SYSTEMS: 8-point review of systems is essentially negative except what is mentioned in the HPI.      IMPRESSION:      1. New onset seizure, etiology unclear at this time.  He presented with new-onset seizure in May 2020 and another seizure on 8/10/2020. The seizure was GTC with tongue biting then post ictal confusion. No incontinence. The seizures likely provoked by recent stress.   Since his last visit about 3 months ago, he has not had any seizure. His last seizure was in Aug. 2020. He is currently on keppra 500 mg bid.  He denies any side effects of Keppra. He is complaint with the medications.    2. Memory loss. Etiology unclear at this time. Patient was encouraged to maintain active physically and mentally.    3. Depression and anxiety: Her wife thinks that he is depressed, and would like him to see a mental healthcare provider.    PLAN:   1.  Continue Keppra to 500 mg bid. Refilled.  2.  Mental health referral.  3.  Return to clinic in 6 months.      22 min total time was spent on the day of this visit.      12 min was spent on face to face time  5 min was spent on preparation of visit to review charts and labs, ordering medications and tests  5 min was spent on documentation of clinical information      Pierce Westbrook MD

## 2022-03-30 NOTE — PROGRESS NOTES
"CHIEF COMPLAINT:  Follow up for new onset seizures.      HISTORY OF PRESENT ILLNESS:  Video call for follow up. His wife was also with him during the video conference. This patient is a 77-year-old male with a history of bicuspid valve, severe stenosis, status post AVR with prosthetic valve endocarditis, then mitral valve repair and pacemaker placed in 2013, on Coumadin, paroxysmal AFib, chronic thrombocytopenia, gout.      Since his last visit about 3 months ago, he has not had any seizure. His last seizure was in Aug. 2020. He is currently on keppra 500 mg bid.  He denies any side effects of Keppra. He is complaint with the medications.    Her wife thinks that he is depressed, and would like him to see a mental healthcare provider. He cancelled his last appointment with his psychiatrist.    He presented with new-onset seizure in May 2020 and another seizure on 8/10/2020. The seizure was GTC with tongue biting then post ictal confusion. No incontinence. The seizures likely provoked by recent stress.     He is complaining of memory issues, mainly short term memory. Long term memory is good. He had neuropsych testing with Dr. Doss. Dr. Doss had concluded the following:  \"Mr. Summers demonstrated mild weaknesses that raise the question of bilateral mesial temporal region dysfunction.  The etiology is not clear.  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  However, another explanation would be that these weaknesses are attributable to his seizures.  Additionally, he is reporting mild symptoms of depression and anxiety on self-report measures, which could be contributing as well.  In this exam, mild weaknesses were identified in verbal and nonverbal anterograde memory.  There are also subtle weaknesses in verbal fluency.  The remainder of his cognitive abilities were normal and performed in keeping with his average range " "cognitive baseline.  He has strengths in cognitive speed and aspects of executive functioning.  He also has a strength in basic visuospatial judgments.  As alluded to above, he is reporting mild symptoms of depression and anxiety.  It may well be the case that these mental health factors are contributing to some degree of variability and his performances in this exam, as well as to his concerns about cognitive dysfunction in day-to-day life.\"    He states he has what is seems to be restless leg syndrome for long time.  She states that his short memory is not as good as before. He is asking questions repeatedly and forget what they talks about. His mother had dementia. She passed away at her 90s.        Current Outpatient Medications   Medication Sig Dispense Refill     allopurinol (ZYLOPRIM) 100 MG tablet Take 1 tablet (100 mg) by mouth daily 90 tablet 0     levETIRAcetam (KEPPRA) 500 MG tablet Take 1 tablet (500 mg) by mouth 2 times daily For additional refills, please schedule a follow-up appointment 60 tablet 11     losartan (COZAAR) 25 MG tablet Take 0.5 tablets (12.5 mg) by mouth daily 45 tablet 0     metoprolol succinate ER (TOPROL-XL) 50 MG 24 hr tablet Take 1 tablet (50 mg) by mouth daily 90 tablet 3     Multiple Vitamins-Minerals (MULTIVITAMIN ADULTS 50+) TABS        oxybutynin ER (DITROPAN-XL) 10 MG 24 hr tablet Take 1 tablet (10 mg) by mouth daily 90 tablet 3     PROVIDER DOSED MANAGED WARFARIN, COUMADIN, OUTPATIENT Per coumadin clinic       simvastatin (ZOCOR) 40 MG tablet Take 1 tablet (40 mg) by mouth At Bedtime 90 tablet 0     trospium (SANCTURA) 20 MG tablet Take 1 tablet (20 mg) by mouth At Bedtime 30 tablet 3     VITAMIN D, CHOLECALCIFEROL, PO Take 1,000 Units by mouth daily       warfarin ANTICOAGULANT (COUMADIN) 5 MG tablet TAKE 2 TABLETS BY MOUTH ON FRIDAY, AND 1&1/2 TABLETS ON ALL OTHER DAYS OR AS DIRECTED 145 tablet 1         PHYSICAL EXAMINATION:    Alert and oriented x3.  Speech fluent, " appropriate.  No facial weakness. Hearing grossly normal.     REVIEW OF SYSTEMS: 8-point review of systems is essentially negative except what is mentioned in the HPI.      IMPRESSION:      1. New onset seizure, etiology unclear at this time.  He presented with new-onset seizure in May 2020 and another seizure on 8/10/2020. The seizure was GTC with tongue biting then post ictal confusion. No incontinence. The seizures likely provoked by recent stress.   Since his last visit about 3 months ago, he has not had any seizure. His last seizure was in Aug. 2020. He is currently on keppra 500 mg bid.  He denies any side effects of Keppra. He is complaint with the medications.    2. Memory loss. Etiology unclear at this time. Patient was encouraged to maintain active physically and mentally.    3. Depression and anxiety: Her wife thinks that he is depressed, and would like him to see a mental healthcare provider.    PLAN:   1.  Continue Keppra to 500 mg bid. Refilled.  2.  Mental health referral.  3.  Return to clinic in 6 months.      22 min total time was spent on the day of this visit.      12 min was spent on face to face time  5 min was spent on preparation of visit to review charts and labs, ordering medications and tests  5 min was spent on documentation of clinical information

## 2022-04-12 ENCOUNTER — LAB (OUTPATIENT)
Dept: LAB | Facility: CLINIC | Age: 77
End: 2022-04-12
Payer: MEDICARE

## 2022-04-12 ENCOUNTER — ANTICOAGULATION THERAPY VISIT (OUTPATIENT)
Dept: ANTICOAGULATION | Facility: CLINIC | Age: 77
End: 2022-04-12

## 2022-04-12 DIAGNOSIS — I48.0 PAROXYSMAL ATRIAL FIBRILLATION (H): ICD-10-CM

## 2022-04-12 DIAGNOSIS — Z79.01 ANTICOAGULATED ON COUMADIN: ICD-10-CM

## 2022-04-12 DIAGNOSIS — Z95.2 S/P AORTIC VALVE REPLACEMENT: Primary | ICD-10-CM

## 2022-04-12 DIAGNOSIS — Z79.01 LONG TERM CURRENT USE OF ANTICOAGULANT THERAPY: ICD-10-CM

## 2022-04-12 LAB — INR BLD: 1.9 (ref 0.9–1.1)

## 2022-04-12 PROCEDURE — 85610 PROTHROMBIN TIME: CPT

## 2022-04-12 PROCEDURE — 36416 COLLJ CAPILLARY BLOOD SPEC: CPT

## 2022-04-12 NOTE — PROGRESS NOTES
ANTICOAGULATION MANAGEMENT     Brooks Summers 77 year old male is on warfarin with subtherapeutic INR result. (Goal INR 2.0-3.0)    Recent labs: (last 7 days)     04/12/22  0831   INR 1.9*       ASSESSMENT       Source(s): Chart Review and Patient/Caregiver Call       Warfarin doses taken: Warfarin taken as instructed    Diet: Increased greens/vitamin K in diet; plans to resume previous intake    New illness, injury, or hospitalization: No    Medication/supplement changes: None noted    Signs or symptoms of bleeding or clotting: No    Previous INR: Therapeutic last 2(+) visits    Additional findings: None       PLAN     Recommended plan for temporary change(s) affecting INR     Dosing Instructions: booster dose then continue your current warfarin dose with next INR in 2 weeks       Summary  As of 4/12/2022    Full warfarin instructions:  4/12: 10 mg; Otherwise 10 mg every Fri; 7.5 mg all other days   Next INR check:  4/26/2022             Telephone call with Brooks who verbalizes understanding and agrees to plan    Lab visit scheduled    Education provided: Importance of consistent vitamin K intake, Monitoring for bleeding signs and symptoms, Monitoring for clotting signs and symptoms and Contact [phone]  with any changes, questions or concerns.     Plan made per [facility] anticoagulation protocol    Selena Bates RN  Anticoagulation Clinic  4/12/2022    _______________________________________________________________________     Anticoagulation Episode Summary     Current INR goal:  2.0-3.0   TTR:  79.0 % (1 y)   Target end date:  Indefinite   Send INR reminders to:  JONATHAN MURILLO    Indications    S/P aortic valve replacement [Z95.2]  Paroxysmal atrial fibrillation (H) [I48.0]  Long term current use of anticoagulant therapy [Z79.01]  Anticoagulated on Coumadin [Z79.01]           Comments:   * warfarin 5 mg tabs. Aortic 21 mm Johnson Perimount Magna Tissue Valve.          Anticoagulation Care Providers      Provider Role Specialty Phone number    Edin Mays MD Referring Internal Medicine [phone]

## 2022-04-12 NOTE — PROGRESS NOTES
[facility] Dermatology Note  Encounter Date: Apr 14, 2022  Office Visit     Dermatology Problem List:  UBSE 04/14/22   1. 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 AK    ____________________________________________    Assessment & Plan:   # Benign skin findings  - Physical exam demonstrating benign skin findings as below.  - Seborrheic keratoses  - Cherry hemangiomas  - Solar lentingos   - Benign nevi  - Reassurance provided.  - ABCDEs of melanoma handout provided.  - Sunscreen advissed  - Advised patient to return to clinic for any new or concerning lesions.      Procedures Performed:   None    Follow-up: 1 year, prn for new or changing lesions, advised to call for if he has concerns  on lower extremities     Staff and Resident:     Edvin Manning MD  PGY-2 Dermatology  Pager: [phone]    ____________________________________________    CC: Skin Check (States cryotherapy helped and he feels better)    HPI:  Mr. Brooks Summers is a(n) 77 year old male who presents today as a return patient for skin concern    Patient was seen in clinic by myself on 9/10/2021.  At that time an upper body skin exam that was notable for lesion on his right temple that was consistent with a seborrheic keratosis that was treated with liquid nitrogen.  Also had some actinic keratoses treated on his face and scalp.    - Red spots on trunk would like to get examined  - Nothing bleeding, growing, or tender  - SK resolved on temple treated with LN2  - Patient is otherwise feeling well, without additional skin concerns.    Labs Reviewed:  N/A    Physical Exam:  Vitals: There were no vitals taken for this visit.  SKIN: Waist-up skin, which includes the head/face, neck, both arms, chest, back, abdomen, digits and/or nails was examined.  - Waxy stuck on tan to brown papules on the trunk and  extremities.   - Dome shaped bright red papules on the trunk and extremities.   - There are fine lines and dyspigmentation on sun exposed areas of the face and chest.  - Scattered brown macules on sun exposed areas.  - Light to dark brown symmetric macules and papules with normal pigment networks on dermoscopy trunk and extremities   - Prior sites of skin cancer examined without concerning features  - No other lesions of concern on areas examined.     Medications:  Current Outpatient Medications   Medication     allopurinol (ZYLOPRIM) 100 MG tablet     levETIRAcetam (KEPPRA) 500 MG tablet     losartan (COZAAR) 25 MG tablet     metoprolol succinate ER (TOPROL-XL) 50 MG 24 hr tablet     Multiple Vitamins-Minerals (MULTIVITAMIN ADULTS 50+) TABS     oxybutynin ER (DITROPAN-XL) 10 MG 24 hr tablet     PROVIDER DOSED MANAGED WARFARIN, COUMADIN, OUTPATIENT     simvastatin (ZOCOR) 40 MG tablet     trospium (SANCTURA) 20 MG tablet     VITAMIN D, CHOLECALCIFEROL, PO     warfarin ANTICOAGULANT (COUMADIN) 5 MG tablet     No current facility-administered medications for this visit.      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, Bi-Ventricular- INT dependent     Endocarditis     S/P AVR     Need for prophylactic antibiotic     Hyperlipidemia with target LDL less than 100     Finger injury     Paroxysmal atrial fibrillation (H)     Long term current use of anticoagulant therapy     Trigger ring finger  of left hand     Nocturnal hypoxemia     Neoplasm of uncertain behavior of skin     AK (actinic keratosis)     History of nonmelanoma skin cancer     Seizure (H)     Cardiomyopathy (H)     ICD (implantable cardioverter-defibrillator) battery depletion     Anticoagulated on Coumadin     Past Medical History:   Diagnosis Date     BCC (basal cell carcinoma), face 5/16/2013     Bicuspid aortic valve      Chronic systolic heart failure (H)      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 Tx with Dapto/RIFx 8 weeks.     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

## 2022-04-14 ENCOUNTER — OFFICE VISIT (OUTPATIENT)
Dept: DERMATOLOGY | Facility: CLINIC | Age: 77
End: 2022-04-14
Attending: INTERNAL MEDICINE
Payer: MEDICARE

## 2022-04-14 DIAGNOSIS — L81.4 SOLAR LENTIGO: ICD-10-CM

## 2022-04-14 DIAGNOSIS — D22.9 MULTIPLE BENIGN NEVI: ICD-10-CM

## 2022-04-14 DIAGNOSIS — D18.01 CHERRY ANGIOMA: ICD-10-CM

## 2022-04-14 DIAGNOSIS — L82.1 SEBORRHEIC KERATOSES: ICD-10-CM

## 2022-04-14 DIAGNOSIS — L57.8 PHOTOAGING OF SKIN: Primary | ICD-10-CM

## 2022-04-14 PROCEDURE — 99213 OFFICE O/P EST LOW 20 MIN: CPT | Mod: GC

## 2022-04-14 ASSESSMENT — PAIN SCALES - GENERAL: PAINLEVEL: NO PAIN (0)

## 2022-04-14 NOTE — LETTER
4/14/2022       RE: Brooks Summers  [address] [address]  [address]  [place] [address]     Dear Colleague,    Thank you for referring your patient, Brooks Summers, to the [facility] DERMATOLOGY CLINIC [place] at [facility]. Please see a copy of my visit note below.    [facility] Dermatology Note  Encounter Date: Apr 14, 2022  Office Visit     Dermatology Problem List:  UBSE 04/14/22   1. 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 AK    ____________________________________________    Assessment & Plan:   # Benign skin findings  - Physical exam demonstrating benign skin findings as below.  - Seborrheic keratoses  - Cherry hemangiomas  - Solar lentingos   - Benign nevi  - Reassurance provided.  - ABCDEs of melanoma handout provided.  - Sunscreen advissed  - Advised patient to return to clinic for any new or concerning lesions.      Procedures Performed:   None    Follow-up: 1 year, prn for new or changing lesions, advised to call for if he has concerns  on lower extremities     Staff and Resident:     Edvin Manning MD  PGY-2 Dermatology  Pager: [phone]    ____________________________________________    CC: Skin Check (States cryotherapy helped and he feels better)    HPI:  Mr. Brooks Summers is a(n) 77 year old male who presents today as a return patient for skin concern    Patient was seen in clinic by myself on 9/10/2021.  At that time an upper body skin exam that was notable for lesion on his right temple that was consistent with a seborrheic keratosis that was treated with liquid nitrogen.  Also had some actinic keratoses treated on his face and scalp.    - Red spots on trunk would like to get examined  - Nothing bleeding, growing, or tender  - SK resolved on temple treated with LN2  - Patient is otherwise  feeling well, without additional skin concerns.    Labs Reviewed:  N/A    Physical Exam:  Vitals: There were no vitals taken for this visit.  SKIN: Waist-up skin, which includes the head/face, neck, both arms, chest, back, abdomen, digits and/or nails was examined.  - Waxy stuck on tan to brown papules on the trunk and extremities.   - Dome shaped bright red papules on the trunk and extremities.   - There are fine lines and dyspigmentation on sun exposed areas of the face and chest.  - Scattered brown macules on sun exposed areas.  - Light to dark brown symmetric macules and papules with normal pigment networks on dermoscopy trunk and extremities   - Prior sites of skin cancer examined without concerning features  - No other lesions of concern on areas examined.     Medications:  Current Outpatient Medications   Medication     allopurinol (ZYLOPRIM) 100 MG tablet     levETIRAcetam (KEPPRA) 500 MG tablet     losartan (COZAAR) 25 MG tablet     metoprolol succinate ER (TOPROL-XL) 50 MG 24 hr tablet     Multiple Vitamins-Minerals (MULTIVITAMIN ADULTS 50+) TABS     oxybutynin ER (DITROPAN-XL) 10 MG 24 hr tablet     PROVIDER DOSED MANAGED WARFARIN, COUMADIN, OUTPATIENT     simvastatin (ZOCOR) 40 MG tablet     trospium (SANCTURA) 20 MG tablet     VITAMIN D, CHOLECALCIFEROL, PO     warfarin ANTICOAGULANT (COUMADIN) 5 MG tablet     No current facility-administered medications for this visit.      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, Bi-Ventricular- INT dependent     Endocarditis     S/P AVR     Need for prophylactic antibiotic     Hyperlipidemia with target LDL less than 100     Finger injury     Paroxysmal atrial fibrillation (H)     Long term current use of anticoagulant therapy     Trigger ring finger of left hand     Nocturnal hypoxemia     Neoplasm of uncertain behavior of skin     AK (actinic keratosis)     History of nonmelanoma skin cancer     Seizure (H)     Cardiomyopathy (H)     ICD (implantable cardioverter-defibrillator) battery depletion     Anticoagulated on Coumadin     Past Medical History:   Diagnosis Date     BCC (basal cell carcinoma), face 5/16/2013     Bicuspid aortic valve      Chronic systolic heart failure (H)      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 Tx with Dapto/RIFx 8 weeks.     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    I talked with and examined Brooks Summers and I agree with the assessment and the plan. COLBY Galaviz MD.

## 2022-04-14 NOTE — PROGRESS NOTES
I talked with and examined Brooks Summers and I agree with the assessment and the plan. COLBY Galaviz MD.

## 2022-04-14 NOTE — NURSING NOTE
Dermatology Rooming Note    Brooks Summers's goals for this visit include:   Chief Complaint   Patient presents with     Skin Check     States cryotherapy helped and he feels better     Nicole Smith, Visit Facilitator

## 2022-04-21 LAB
MDC_IDC_LEAD_IMPLANT_DT: NORMAL
MDC_IDC_LEAD_LOCATION: NORMAL
MDC_IDC_LEAD_LOCATION_DETAIL_1: NORMAL
MDC_IDC_LEAD_MFG: NORMAL
MDC_IDC_LEAD_MODEL: NORMAL
MDC_IDC_LEAD_POLARITY_TYPE: NORMAL
MDC_IDC_LEAD_SERIAL: NORMAL
MDC_IDC_LEAD_SPECIAL_FUNCTION: NORMAL
MDC_IDC_MSMT_BATTERY_DTM: NORMAL
MDC_IDC_MSMT_BATTERY_REMAINING_LONGEVITY: 55 MO
MDC_IDC_MSMT_BATTERY_RRT_TRIGGER: NORMAL
MDC_IDC_MSMT_BATTERY_VOLTAGE: 2.97 V
MDC_IDC_MSMT_CAP_CHARGE_DTM: NORMAL
MDC_IDC_MSMT_CAP_CHARGE_ENERGY: NORMAL
MDC_IDC_MSMT_CAP_CHARGE_TIME: 4 S
MDC_IDC_MSMT_CAP_CHARGE_TYPE: NORMAL
MDC_IDC_MSMT_LEADCHNL_LV_IMPEDANCE_VALUE: 380 OHM
MDC_IDC_MSMT_LEADCHNL_LV_PACING_THRESHOLD_AMPLITUDE: 1.88 V
MDC_IDC_MSMT_LEADCHNL_LV_PACING_THRESHOLD_PULSEWIDTH: 1 MS
MDC_IDC_MSMT_LEADCHNL_RA_IMPEDANCE_VALUE: 475 OHM
MDC_IDC_MSMT_LEADCHNL_RA_PACING_THRESHOLD_AMPLITUDE: 0.5 V
MDC_IDC_MSMT_LEADCHNL_RA_PACING_THRESHOLD_PULSEWIDTH: 0.4 MS
MDC_IDC_MSMT_LEADCHNL_RA_SENSING_INTR_AMPL: 1.1 MV
MDC_IDC_MSMT_LEADCHNL_RV_IMPEDANCE_VALUE: 456 OHM
MDC_IDC_MSMT_LEADCHNL_RV_PACING_THRESHOLD_AMPLITUDE: 0.5 V
MDC_IDC_MSMT_LEADCHNL_RV_PACING_THRESHOLD_PULSEWIDTH: 0.4 MS
MDC_IDC_PG_IMPLANT_DTM: NORMAL
MDC_IDC_PG_MFG: NORMAL
MDC_IDC_PG_MODEL: NORMAL
MDC_IDC_PG_SERIAL: NORMAL
MDC_IDC_PG_TYPE: NORMAL
MDC_IDC_SESS_CLINIC_NAME: NORMAL
MDC_IDC_SESS_DTM: NORMAL
MDC_IDC_SESS_TYPE: NORMAL
MDC_IDC_SET_BRADY_AT_MODE_SWITCH_RATE: 171 {BEATS}/MIN
MDC_IDC_SET_BRADY_LOWRATE: 60 {BEATS}/MIN
MDC_IDC_SET_BRADY_MAX_SENSOR_RATE: 120 {BEATS}/MIN
MDC_IDC_SET_BRADY_MAX_TRACKING_RATE: 130 {BEATS}/MIN
MDC_IDC_SET_BRADY_MODE: NORMAL
MDC_IDC_SET_BRADY_NIGHT_RATE: 50 {BEATS}/MIN
MDC_IDC_SET_BRADY_PAV_DELAY_LOW: 170 MS
MDC_IDC_SET_BRADY_SAV_DELAY_LOW: 90 MS
MDC_IDC_SET_CRT_LVRV_DELAY: 0 MS
MDC_IDC_SET_CRT_PACED_CHAMBERS: NORMAL
MDC_IDC_SET_LEADCHNL_LV_PACING_AMPLITUDE: 3 V
MDC_IDC_SET_LEADCHNL_LV_PACING_ANODE_ELECTRODE_1: NORMAL
MDC_IDC_SET_LEADCHNL_LV_PACING_ANODE_LOCATION_1: NORMAL
MDC_IDC_SET_LEADCHNL_LV_PACING_CAPTURE_MODE: NORMAL
MDC_IDC_SET_LEADCHNL_LV_PACING_CATHODE_ELECTRODE_1: NORMAL
MDC_IDC_SET_LEADCHNL_LV_PACING_CATHODE_LOCATION_1: NORMAL
MDC_IDC_SET_LEADCHNL_LV_PACING_POLARITY: NORMAL
MDC_IDC_SET_LEADCHNL_LV_PACING_PULSEWIDTH: 1 MS
MDC_IDC_SET_LEADCHNL_RA_PACING_AMPLITUDE: 1.5 V
MDC_IDC_SET_LEADCHNL_RA_PACING_ANODE_ELECTRODE_1: NORMAL
MDC_IDC_SET_LEADCHNL_RA_PACING_ANODE_LOCATION_1: NORMAL
MDC_IDC_SET_LEADCHNL_RA_PACING_CAPTURE_MODE: NORMAL
MDC_IDC_SET_LEADCHNL_RA_PACING_CATHODE_ELECTRODE_1: NORMAL
MDC_IDC_SET_LEADCHNL_RA_PACING_CATHODE_LOCATION_1: NORMAL
MDC_IDC_SET_LEADCHNL_RA_PACING_POLARITY: NORMAL
MDC_IDC_SET_LEADCHNL_RA_PACING_PULSEWIDTH: 0.4 MS
MDC_IDC_SET_LEADCHNL_RA_SENSING_ANODE_ELECTRODE_1: NORMAL
MDC_IDC_SET_LEADCHNL_RA_SENSING_ANODE_LOCATION_1: NORMAL
MDC_IDC_SET_LEADCHNL_RA_SENSING_CATHODE_ELECTRODE_1: NORMAL
MDC_IDC_SET_LEADCHNL_RA_SENSING_CATHODE_LOCATION_1: NORMAL
MDC_IDC_SET_LEADCHNL_RA_SENSING_POLARITY: NORMAL
MDC_IDC_SET_LEADCHNL_RA_SENSING_SENSITIVITY: 0.3 MV
MDC_IDC_SET_LEADCHNL_RV_PACING_AMPLITUDE: 2 V
MDC_IDC_SET_LEADCHNL_RV_PACING_ANODE_ELECTRODE_1: NORMAL
MDC_IDC_SET_LEADCHNL_RV_PACING_ANODE_LOCATION_1: NORMAL
MDC_IDC_SET_LEADCHNL_RV_PACING_CAPTURE_MODE: NORMAL
MDC_IDC_SET_LEADCHNL_RV_PACING_CATHODE_ELECTRODE_1: NORMAL
MDC_IDC_SET_LEADCHNL_RV_PACING_CATHODE_LOCATION_1: NORMAL
MDC_IDC_SET_LEADCHNL_RV_PACING_POLARITY: NORMAL
MDC_IDC_SET_LEADCHNL_RV_PACING_PULSEWIDTH: 0.4 MS
MDC_IDC_SET_LEADCHNL_RV_SENSING_ANODE_ELECTRODE_1: NORMAL
MDC_IDC_SET_LEADCHNL_RV_SENSING_ANODE_LOCATION_1: NORMAL
MDC_IDC_SET_LEADCHNL_RV_SENSING_CATHODE_ELECTRODE_1: NORMAL
MDC_IDC_SET_LEADCHNL_RV_SENSING_CATHODE_LOCATION_1: NORMAL
MDC_IDC_SET_LEADCHNL_RV_SENSING_POLARITY: NORMAL
MDC_IDC_SET_LEADCHNL_RV_SENSING_SENSITIVITY: 0.3 MV
MDC_IDC_SET_ZONE_DETECTION_BEATS_DENOMINATOR: 40 {BEATS}
MDC_IDC_SET_ZONE_DETECTION_BEATS_NUMERATOR: 30 {BEATS}
MDC_IDC_SET_ZONE_DETECTION_INTERVAL: 320 MS
MDC_IDC_SET_ZONE_DETECTION_INTERVAL: 350 MS
MDC_IDC_SET_ZONE_DETECTION_INTERVAL: 360 MS
MDC_IDC_SET_ZONE_DETECTION_INTERVAL: 420 MS
MDC_IDC_SET_ZONE_TYPE: NORMAL
MDC_IDC_STAT_AT_BURDEN_PERCENT: 0 %
MDC_IDC_STAT_AT_DTM_END: NORMAL
MDC_IDC_STAT_AT_DTM_START: NORMAL
MDC_IDC_STAT_BRADY_DTM_END: NORMAL
MDC_IDC_STAT_BRADY_DTM_START: NORMAL
MDC_IDC_STAT_BRADY_RA_PERCENT_PACED: 35.9 %
MDC_IDC_STAT_BRADY_RV_PERCENT_PACED: 99.62 %
MDC_IDC_STAT_CRT_DTM_END: NORMAL
MDC_IDC_STAT_CRT_DTM_START: NORMAL
MDC_IDC_STAT_CRT_LV_PERCENT_PACED: 99.59 %
MDC_IDC_STAT_CRT_PERCENT_PACED: 99.59 %
MDC_IDC_STAT_EPISODE_RECENT_COUNT: 0
MDC_IDC_STAT_EPISODE_RECENT_COUNT_DTM_END: NORMAL
MDC_IDC_STAT_EPISODE_RECENT_COUNT_DTM_START: NORMAL
MDC_IDC_STAT_EPISODE_TOTAL_COUNT: 0
MDC_IDC_STAT_EPISODE_TOTAL_COUNT_DTM_END: NORMAL
MDC_IDC_STAT_EPISODE_TOTAL_COUNT_DTM_START: NORMAL
MDC_IDC_STAT_EPISODE_TYPE: NORMAL
MDC_IDC_STAT_TACHYTHERAPY_ATP_DELIVERED_RECENT: 0
MDC_IDC_STAT_TACHYTHERAPY_ATP_DELIVERED_TOTAL: 0
MDC_IDC_STAT_TACHYTHERAPY_RECENT_DTM_END: NORMAL
MDC_IDC_STAT_TACHYTHERAPY_RECENT_DTM_START: NORMAL
MDC_IDC_STAT_TACHYTHERAPY_SHOCKS_ABORTED_RECENT: 0
MDC_IDC_STAT_TACHYTHERAPY_SHOCKS_ABORTED_TOTAL: 0
MDC_IDC_STAT_TACHYTHERAPY_TOTAL_DTM_END: NORMAL
MDC_IDC_STAT_TACHYTHERAPY_TOTAL_DTM_START: NORMAL

## 2022-04-26 ENCOUNTER — ANTICOAGULATION THERAPY VISIT (OUTPATIENT)
Dept: ANTICOAGULATION | Facility: CLINIC | Age: 77
End: 2022-04-26

## 2022-04-26 ENCOUNTER — LAB (OUTPATIENT)
Dept: LAB | Facility: CLINIC | Age: 77
End: 2022-04-26
Payer: MEDICARE

## 2022-04-26 DIAGNOSIS — Z95.2 S/P AORTIC VALVE REPLACEMENT: Primary | ICD-10-CM

## 2022-04-26 DIAGNOSIS — I48.0 PAROXYSMAL ATRIAL FIBRILLATION (H): ICD-10-CM

## 2022-04-26 DIAGNOSIS — Z79.01 ANTICOAGULATED ON COUMADIN: ICD-10-CM

## 2022-04-26 DIAGNOSIS — Z79.01 LONG TERM CURRENT USE OF ANTICOAGULANT THERAPY: ICD-10-CM

## 2022-04-26 LAB — INR BLD: 2.1 (ref 0.9–1.1)

## 2022-04-26 PROCEDURE — 85610 PROTHROMBIN TIME: CPT

## 2022-04-26 PROCEDURE — 36416 COLLJ CAPILLARY BLOOD SPEC: CPT

## 2022-04-26 NOTE — PROGRESS NOTES
ANTICOAGULATION MANAGEMENT     Brooks Summers 77 year old male is on warfarin with therapeutic INR result. (Goal INR 2.0-3.0)    Recent labs: (last 7 days)     04/26/22  0835   INR 2.1*       ASSESSMENT       Source(s): Chart Review and Patient/Caregiver Call       Warfarin doses taken: Warfarin taken as instructed    Diet: No new diet changes identified    New illness, injury, or hospitalization: No    Medication/supplement changes: None noted    Signs or symptoms of bleeding or clotting: No    Previous INR: Subtherapeutic    Additional findings: None       PLAN     Recommended plan for no diet, medication or health factor changes affecting INR     Dosing Instructions: continue your current warfarin dose with next INR in 4 weeks       Summary  As of 4/26/2022    Full warfarin instructions:  10 mg every Fri; 7.5 mg all other days   Next INR check:  5/24/2022             Telephone call with Brooks who verbalizes understanding and agrees to plan    Lab visit scheduled    Education provided: Goal range and significance of current result    Plan made per [facility] anticoagulation protocol    Beth Mijares RN  Anticoagulation Clinic  4/26/2022    _______________________________________________________________________     Anticoagulation Episode Summary     Current INR goal:  2.0-3.0   TTR:  78.8 % (1 y)   Target end date:  Indefinite   Send INR reminders to:  JONATHAN MURILLO    Indications    S/P aortic valve replacement [Z95.2]  Paroxysmal atrial fibrillation (H) [I48.0]  Long term current use of anticoagulant therapy [Z79.01]  Anticoagulated on Coumadin [Z79.01]           Comments:   * warfarin 5 mg tabs. Aortic 21 mm Johnson Perimount Magna Tissue Valve.          Anticoagulation Care Providers     Provider Role Specialty Phone number    Edin Mays MD Referring Internal Medicine [phone]

## 2022-04-26 NOTE — PROGRESS NOTES
ANTICOAGULATION MANAGEMENT     Brooks Summers 77 year old male is on warfarin with therapeutic INR result. (Goal INR 2.0-3.0)    Recent labs: (last 7 days)     04/26/22  0835   INR 2.1*       ASSESSMENT       Source(s): Chart Review    Previous INR was Subtherapeutic    Medication, diet, health changes since last INR chart reviewed; none identified           PLAN     Recommended plan for no diet, medication or health factor changes affecting INR     Dosing Instructions: continue your current warfarin dose with next INR in 4 weeks       Summary  As of 4/26/2022    Full warfarin instructions:  10 mg every Fri; 7.5 mg all other days   Next INR check:  5/24/2022             Detailed voice message left for Brooks with dosing instructions and follow up date.     Contact [phone]  to schedule and with any changes, questions or concerns.     Education provided: Please call back if any changes to your diet, medications or how you've been taking warfarin and Goal range and significance of current result    Plan made per [facility] anticoagulation protocol    Beth Mijares RN  Anticoagulation Clinic  4/26/2022    _______________________________________________________________________     Anticoagulation Episode Summary     Current INR goal:  2.0-3.0   TTR:  78.8 % (1 y)   Target end date:  Indefinite   Send INR reminders to:  JONATHAN MURILLO    Indications    S/P aortic valve replacement [Z95.2]  Paroxysmal atrial fibrillation (H) [I48.0]  Long term current use of anticoagulant therapy [Z79.01]  Anticoagulated on Coumadin [Z79.01]           Comments:   * warfarin 5 mg tabs. Aortic 21 mm Johnson Perimount Magna Tissue Valve.          Anticoagulation Care Providers     Provider Role Specialty Phone number    Edin Mays MD Referring Internal Medicine [phone]

## 2022-05-04 DIAGNOSIS — I10 BENIGN ESSENTIAL HYPERTENSION: ICD-10-CM

## 2022-05-06 RX ORDER — METOPROLOL SUCCINATE 50 MG/1
50 TABLET, EXTENDED RELEASE ORAL DAILY
Qty: 90 TABLET | Refills: 0 | Status: SHIPPED | OUTPATIENT
Start: 2022-05-06 | End: 2022-08-08

## 2022-05-06 NOTE — TELEPHONE ENCOUNTER
Last Clinic Visit: 6/11/2021  [facility] Radha GOMEZ 6/7/22

## 2022-05-07 ENCOUNTER — HEALTH MAINTENANCE LETTER (OUTPATIENT)
Age: 77
End: 2022-05-07

## 2022-05-07 DIAGNOSIS — M10.00 IDIOPATHIC GOUT, UNSPECIFIED CHRONICITY, UNSPECIFIED SITE: ICD-10-CM

## 2022-05-10 RX ORDER — ALLOPURINOL 100 MG/1
TABLET ORAL
Qty: 90 TABLET | Refills: 0 | Status: SHIPPED | OUTPATIENT
Start: 2022-05-10 | End: 2022-08-12

## 2022-05-10 NOTE — TELEPHONE ENCOUNTER
NO Uric Acid level recorded in EPIC - yet refilled many times? please follow up per refill team.  RX to pt's pharmacy.    Juliana Flores RN  5/10/2022 12:24 PM

## 2022-05-24 ENCOUNTER — LAB (OUTPATIENT)
Dept: LAB | Facility: CLINIC | Age: 77
End: 2022-05-24
Payer: MEDICARE

## 2022-05-24 ENCOUNTER — ANTICOAGULATION THERAPY VISIT (OUTPATIENT)
Dept: ANTICOAGULATION | Facility: CLINIC | Age: 77
End: 2022-05-24

## 2022-05-24 DIAGNOSIS — Z95.2 S/P AORTIC VALVE REPLACEMENT: Primary | ICD-10-CM

## 2022-05-24 DIAGNOSIS — Z79.01 ANTICOAGULATED ON COUMADIN: ICD-10-CM

## 2022-05-24 DIAGNOSIS — Z79.01 LONG TERM CURRENT USE OF ANTICOAGULANT THERAPY: ICD-10-CM

## 2022-05-24 DIAGNOSIS — I48.0 PAROXYSMAL ATRIAL FIBRILLATION (H): ICD-10-CM

## 2022-05-24 LAB — INR BLD: 2.3 (ref 0.9–1.1)

## 2022-05-24 PROCEDURE — 36416 COLLJ CAPILLARY BLOOD SPEC: CPT

## 2022-05-24 PROCEDURE — 85610 PROTHROMBIN TIME: CPT

## 2022-05-24 NOTE — PROGRESS NOTES
ANTICOAGULATION MANAGEMENT     Brooks Summers 77 year old male is on warfarin with therapeutic INR result. (Goal INR 2.0-3.0)    Recent labs: (last 7 days)     05/24/22  0820   INR 2.3*       ASSESSMENT       Source(s): Chart Review and Patient/Caregiver Call       Warfarin doses taken: Reviewed in chart    Diet: did not talk to brooks    New illness, injury, or hospitalization: No per chart review    Medication/supplement changes: None noted    Signs or symptoms of bleeding or clotting: No    Previous INR: Therapeutic last visit; previously outside of goal range    Additional findings: None     PLAN     Unable to reach Brooks today.    Left message to continue current dose of warfarin 7.5 mg tonight. Request call back for assessment.    Follow up required to confirm warfarin dose taken and assess for changes    Ashley Norris RN  Anticoagulation Clinic  5/24/2022

## 2022-06-01 ENCOUNTER — ANCILLARY PROCEDURE (OUTPATIENT)
Dept: CARDIOLOGY | Facility: CLINIC | Age: 77
End: 2022-06-01
Attending: INTERNAL MEDICINE
Payer: MEDICARE

## 2022-06-01 VITALS — DIASTOLIC BLOOD PRESSURE: 80 MMHG | SYSTOLIC BLOOD PRESSURE: 132 MMHG

## 2022-06-01 DIAGNOSIS — I33.0 ACUTE BACTERIAL ENDOCARDITIS: ICD-10-CM

## 2022-06-01 DIAGNOSIS — Z95.2 S/P AVR: ICD-10-CM

## 2022-06-01 DIAGNOSIS — Z95.810 BIVENTRICULAR IMPLANTABLE CARDIOVERTER-DEFIBRILLATOR (ICD) IN SITU: ICD-10-CM

## 2022-06-01 DIAGNOSIS — I48.0 PAROXYSMAL ATRIAL FIBRILLATION (H): ICD-10-CM

## 2022-06-01 LAB — LVEF ECHO: NORMAL

## 2022-06-01 PROCEDURE — 93306 TTE W/DOPPLER COMPLETE: CPT | Performed by: INTERNAL MEDICINE

## 2022-06-07 ENCOUNTER — OFFICE VISIT (OUTPATIENT)
Dept: CARDIOLOGY | Facility: CLINIC | Age: 77
End: 2022-06-07
Attending: INTERNAL MEDICINE
Payer: MEDICARE

## 2022-06-07 VITALS
HEIGHT: 71 IN | DIASTOLIC BLOOD PRESSURE: 69 MMHG | HEART RATE: 67 BPM | SYSTOLIC BLOOD PRESSURE: 115 MMHG | BODY MASS INDEX: 23.72 KG/M2 | OXYGEN SATURATION: 97 % | WEIGHT: 169.4 LBS

## 2022-06-07 DIAGNOSIS — I71.20 THORACIC AORTIC ANEURYSM WITHOUT RUPTURE (H): ICD-10-CM

## 2022-06-07 DIAGNOSIS — Z95.2 S/P AVR: Primary | ICD-10-CM

## 2022-06-07 PROCEDURE — 99214 OFFICE O/P EST MOD 30 MIN: CPT | Performed by: INTERNAL MEDICINE

## 2022-06-07 PROCEDURE — G0463 HOSPITAL OUTPT CLINIC VISIT: HCPCS

## 2022-06-07 ASSESSMENT — PAIN SCALES - GENERAL: PAINLEVEL: NO PAIN (0)

## 2022-06-07 NOTE — PROGRESS NOTES
SUBJECTIVE:  Brooks Summers is a 77 year old male who presents for follow-up.  Status post AVR.     S/P Aortic valve replacement (23 mm Bogdan Johnson PERIMOUNT Magna tissue valve). On 7/14/11. 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  This involved aortic subvalvular debridement, AVR with 21 mm Johnson Perimount magna valve and pericardial patch repair of a large perforation on the anterior mitral leaflet.  Postprocedure patient did extremely well.  Patient also needed biventricular pacing with defibrillator for low LV function.  Subsequently his LV function improved to normal.   Today patient had no cardiac complaints.  Overall he is doing very well.    Patient Active Problem List    Diagnosis Date Noted     Anticoagulated on Coumadin 08/05/2021     Priority: Medium     ICD (implantable cardioverter-defibrillator) battery depletion 02/13/2021     Priority: Medium     Added automatically from request for surgery [id number]       Cardiomyopathy (H) 01/18/2021     Priority: Medium     Added automatically from request for surgery [id number]       Seizure (H) 05/10/2020     Priority: Medium     Neoplasm of uncertain behavior of skin 06/08/2019     Priority: Medium     AK (actinic keratosis) 06/08/2019     Priority: Medium     History of nonmelanoma skin cancer 06/08/2019     Priority: Medium     Nocturnal hypoxemia 10/15/2018     Priority: Medium     Trigger ring finger of left hand 11/16/2015     Priority: Medium     Paroxysmal atrial fibrillation (H) 02/25/2015     Priority: Medium     Long term current use of anticoagulant therapy 02/25/2015     Priority: Medium     Problem list name updated by automated process. Provider to review       Finger injury 10/08/2014     Priority: Medium     Hyperlipidemia with target LDL less than 100 10/21/2013     Priority:  Medium     Diagnosis updated by automated process. Provider to review and confirm.       Need for prophylactic antibiotic 04/12/2013     Priority: Medium     Aortic valve and past endocarditis        S/P AVR 01/25/2013     Priority: Medium     Endocarditis 01/21/2013     Priority: Medium     Cultures negative but 16S rRNA PCR showed Staph capitis.  Treated with Dapto and RIF x 8 weeks.       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 Provider to review       LBBB (left bundle branch block) 01/28/2012     Priority: Medium     Received CRT-D;  msec       Pneumothorax, left 01/28/2012     Priority: Medium     Observed following CRT-D implant; small. Observe and repeat CXR       Heart failure, chronic systolic (H) 01/28/2012     Priority: Medium     Stable; NYHA classII       Cardiomyopathy, dilated, nonischemic (H) 01/28/2012     Priority: Medium     Mild CAD; EF 15-20%       CKD (chronic kidney disease) stage 3, GFR 30-59 ml/min (H) 01/28/2012     Priority: Medium     Dyslipidemia 01/28/2012     Priority: Medium     On Pravavastatin       Aortic valve stenosis, severe 07/14/2011     Priority: Medium     Chronic systolic heart failure (H) 07/14/2011     Priority: Medium     Bicuspid aortic valve 07/14/2011     Priority: Medium     S/P aortic valve replacement 07/14/2011     Priority: Medium     Smoking hx 07/14/2011     Priority: Medium     Rotator cuff (capsule) sprain 02/17/2009     Priority: Medium     Other postprocedural status(V45.89) 02/17/2009     Priority: Medium    .  Current Outpatient Medications   Medication Sig     allopurinol (ZYLOPRIM) 100 MG tablet TAKE 1 TABLET(100 MG) BY MOUTH DAILY     levETIRAcetam (KEPPRA) 500 MG tablet Take 1 tablet (500 mg) by mouth 2 times daily For additional refills, please schedule a follow-up appointment     losartan (COZAAR) 25  MG tablet Take 0.5 tablets (12.5 mg) by mouth daily     metoprolol succinate ER (TOPROL XL) 50 MG 24 hr tablet Take 1 tablet (50 mg) by mouth daily     Multiple Vitamins-Minerals (MULTIVITAMIN ADULTS 50+) TABS      oxybutynin ER (DITROPAN-XL) 10 MG 24 hr tablet Take 1 tablet (10 mg) by mouth daily     PROVIDER DOSED MANAGED WARFARIN, COUMADIN, OUTPATIENT Per coumadin clinic     simvastatin (ZOCOR) 40 MG tablet Take 1 tablet (40 mg) by mouth At Bedtime     trospium (SANCTURA) 20 MG tablet Take 1 tablet (20 mg) by mouth At Bedtime     VITAMIN D, CHOLECALCIFEROL, PO Take 1,000 Units by mouth daily     warfarin ANTICOAGULANT (COUMADIN) 5 MG tablet TAKE 2 TABLETS BY MOUTH ON FRIDAY, AND 1&1/2 TABLETS ON ALL OTHER DAYS OR AS DIRECTED     No current facility-administered medications for this visit.     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 Tx with Dapto/RIFx 8 weeks.     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     Paroxysmal A-fib (H)     Post-op 7/14/2011, 1/26/13     Rotator Cuff (Capsule) Sprain and Strain      S/P aortic valve replacement     7/14/2011, re-do 1/25/13 due to endocarditis     Smoking hx      Thrombocytopenia (H)      Past Surgical History:   Procedure Laterality Date     ANESTHESIA CARDIOVERSION  7/18/2011    Procedure:ANESTHESIA CARDIOVERSION; Cardioversion; Surgeon:GENERIC ANESTHESIA PROVIDER; Location:[facility] OR     COLONOSCOPY       COLONOSCOPY N/A 11/19/2018    Procedure: COLONOSCOPY;  Surgeon: Herman Mcginnis MD;  Location: [facility]     CORONARY ANGIOGRAPHY ADULT ORDER       CYSTOSCOPY, BIOPSY URETHRA N/A 4/3/2017    Procedure: CYSTOSCOPY, BIOPSY URETHRA;  Surgeon: Marlena Mora MD;  Location: [facility]      ENDOSCOPY UPPER, COLONOSCOPY, COMBINED       EP ICD GENERATOR REPLACEMENT DUAL N/A 3/10/2021    Procedure: EP ICD GENERATOR CHANGE DUAL;  Surgeon: Mekhi Be MD;  Location:  HEART CARDIAC CATH LAB     HC REMOV & REPLACE IMPLT DEFIB PULSE GEN MULT LEAD  12/3/2015          HEMORRHOID SURGERY       IMPLANT AUTOMATIC IMPLANTABLE CARDIOVERTER DEFIBRILLATOR       MOHS MICROGRAPHIC PROCEDURE       ORTHOPEDIC SURGERY      shoulder,right     REDO STERNOTOMY REPLACE VALVE AORTIC  2013    Procedure: REDO STERNOTOMY REPLACE VALVE AORTIC;  Redo Sternotomy, Redo Aortic Valve Replacement on pump oxygenator. Intraoperative Transesophageal Echocardiogram;  Surgeon: Stephanie Hampton MD;  Location:  OR     REPAIR VALVE MITRAL  2013     REPLACE VALVE AORTIC  2011    Procedure:REPLACE VALVE AORTIC; Median Sternotomy, Bioprosthesis,  Aortic Valve replacement on pump with oxygenator. Intra-operative Transesophogeal Echocardiogram.; Surgeon:STEPHANIE HAMPTON; Location: OR     teeth extractions       TRANSPLANT       Allergies   Allergen Reactions     Lisinopril Cough     Social History     Socioeconomic History     Marital status:      Spouse name: Not on file     Number of children: Not on file     Years of education: Not on file     Highest education level: Not on file   Occupational History     Not on file   Tobacco Use     Smoking status: Former Smoker     Packs/day: 1.00     Years: 20.00     Pack years: 20.00     Types: Cigarettes     Quit date: 1989     Years since quittin.4     Smokeless tobacco: Never Used   Substance and Sexual Activity     Alcohol use: Yes     Alcohol/week: 2.0 standard drinks     Drug use: No     Sexual activity: Yes     Partners: Female     Birth control/protection: None     Comment:    Other Topics Concern     Parent/sibling w/ CABG, MI or angioplasty before 65F 55M? No   Social History Narrative     since 1982 2 children 4 grandchildren.    Carpet sales:   "Semi retired. Athlete in school, Golf, fish, yardwork     Social Determinants of Health     Financial Resource Strain: Not on file   Food Insecurity: Not on file   Transportation Needs: Not on file   Physical Activity: Not on file   Stress: Not on file   Social Connections: Not on file   Intimate Partner Violence: Not on file   Housing Stability: Not on file     Family History   Problem Relation Age of Onset     C.A.D. Father 68        bypass, carotid      Prostate Cancer Father 70     Heart Disease Father      Cancer Mother 92        stomach, colon     Hypertension Mother      Retinal detachment Mother      Cancer Brother 64        lung cancer, petroleum     Cancer Brother      Glaucoma No family hx of      Macular Degeneration No family hx of      Strabismus No family hx of      Glasses (<9 y/o) No family hx of           REVIEW OF SYSTEMS:  General: negative, fever, chills, night sweats  Skin: negative, acne, rash and scaling  Eyes: negative, double vision, eye pain and photophobia  Ears/Nose/Throat: negative, nasal congestion and purulent rhinorrhea  Respiratory: No dyspnea on exertion, No cough, No hemoptysis and negative  Cardiovascular: negative, palpitations, tachycardia, irregular heart beat, chest pain, exertional chest pain or pressure, paroxysmal nocturnal dyspnea, dyspnea on exertion and orthopnea       OBJECTIVE:  Blood pressure 115/69, pulse 67, height 1.794 m (5' 10.63\"), weight 76.8 kg (169 lb 6.4 oz), SpO2 97 %.  General Appearance: healthy, alert, active and no distress  Head: Normocephalic. No masses, lesions, tenderness or abnormalities  Eyes: conjuctiva clear, PERRL, EOM intact  Ears: External ears normal. Canals clear. TM's normal.  Nose: Nares normal  Mouth: normal  Neck: Supple, no cervical adenopathy, no thyromegaly  Lungs: clear to auscultation  Cardiac: regular rate and rhythm, normal S1 and S2, ESM.       ASSESSMENT/PLAN:  Patient here for yearly follow-up.  Status post aortic valve " replacement in 2013.  Patient had initial aortic valve replacement in 2011 for bicuspid aortic valve with severe aortic stenosis.  In 2013 patient developed coagulase-negative staph sepsis and endocarditis.  This led to extensive aortic surgery including subaortic debridement mitral valve perforation repair and AVR with 21 mm Johnson magna bioprosthetic valve.  Postoperative course was uneventful.  Patient had no complications.  Currently patient is doing very well.  Had no cardiac complaints today.  Reviewed recent echocardiogram and result discussed with patient.  Normal biventricular function.  Normal prosthetic function.  Mean gradient 7 mmHg.  Trace AI.  Ascending aorta unchanged at 4.4 cm.  Aortic index is 22 mm/m   As patient is doing very well he is advised to continue his current medications.  Return to Clinic in 1 year with a repeat echocardiogram.  Total visit duration 30 minutes.  This included face-to-face interview, physical exam, chart review, review of echocardiograms and documentation.

## 2022-06-07 NOTE — NURSING NOTE
Chief Complaint   Patient presents with     Follow Up     1 year return      Vitals were taken and medications reconciled.    Doug Francisco, EMT  1:30 PM

## 2022-06-07 NOTE — PATIENT INSTRUCTIONS
Repeat echocardiogram in one year, see Dr. Mckenzie afterwards for review.   You'll get a scheduling reminder in advance.

## 2022-06-07 NOTE — LETTER
6/7/2022      RE: Brooks Summers  [address]  [address]  [place] [address]       Dear Colleague,    Thank you for the opportunity to participate in the care of your patient, Brooks Summers, at the [facility] [place] [facility]. Please see a copy of my visit note below.       SUBJECTIVE:  Brooks Summers is a 77 year old male who presents for follow-up.  Status post AVR.     S/P Aortic valve replacement (23 mm Bogdan Johnson PERIMOUNT Magna tissue valve). On 7/14/11. 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  This involved aortic subvalvular debridement, AVR with 21 mm Johnson Perimount magna valve and pericardial patch repair of a large perforation on the anterior mitral leaflet.  Postprocedure patient did extremely well.  Patient also needed biventricular pacing with defibrillator for low LV function.  Subsequently his LV function improved to normal.   Today patient had no cardiac complaints.  Overall he is doing very well.    Patient Active Problem List    Diagnosis Date Noted     Anticoagulated on Coumadin 08/05/2021     Priority: Medium     ICD (implantable cardioverter-defibrillator) battery depletion 02/13/2021     Priority: Medium     Added automatically from request for surgery [id number]       Cardiomyopathy (H) 01/18/2021     Priority: Medium     Added automatically from request for surgery [id number]       Seizure (H) 05/10/2020     Priority: Medium     Neoplasm of uncertain behavior of skin 06/08/2019     Priority: Medium     AK (actinic keratosis) 06/08/2019     Priority: Medium     History of nonmelanoma skin cancer 06/08/2019     Priority: Medium     Nocturnal hypoxemia 10/15/2018     Priority: Medium     Trigger ring finger of left hand 11/16/2015     Priority: Medium      Paroxysmal atrial fibrillation (H) 02/25/2015     Priority: Medium     Long term current use of anticoagulant therapy 02/25/2015     Priority: Medium     Problem list name updated by automated process. Provider to review       Finger injury 10/08/2014     Priority: Medium     Hyperlipidemia with target LDL less than 100 10/21/2013     Priority: Medium     Diagnosis updated by automated process. Provider to review and confirm.       Need for prophylactic antibiotic 04/12/2013     Priority: Medium     Aortic valve and past endocarditis        S/P AVR 01/25/2013     Priority: Medium     Endocarditis 01/21/2013     Priority: Medium     Cultures negative but 16S rRNA PCR showed Staph capitis.  Treated with Dapto and RIF x 8 weeks.       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 Provider to review       LBBB (left bundle branch block) 01/28/2012     Priority: Medium     Received CRT-D;  msec       Pneumothorax, left 01/28/2012     Priority: Medium     Observed following CRT-D implant; small. Observe and repeat CXR       Heart failure, chronic systolic (H) 01/28/2012     Priority: Medium     Stable; NYHA classII       Cardiomyopathy, dilated, nonischemic (H) 01/28/2012     Priority: Medium     Mild CAD; EF 15-20%       CKD (chronic kidney disease) stage 3, GFR 30-59 ml/min (H) 01/28/2012     Priority: Medium     Dyslipidemia 01/28/2012     Priority: Medium     On Pravavastatin       Aortic valve stenosis, severe 07/14/2011     Priority: Medium     Chronic systolic heart failure (H) 07/14/2011     Priority: Medium     Bicuspid aortic valve 07/14/2011     Priority: Medium     S/P aortic valve replacement 07/14/2011     Priority: Medium     Smoking hx 07/14/2011     Priority: Medium     Rotator cuff (capsule) sprain 02/17/2009     Priority: Medium     Other postprocedural  status(V45.89) 02/17/2009     Priority: Medium    .  Current Outpatient Medications   Medication Sig     allopurinol (ZYLOPRIM) 100 MG tablet TAKE 1 TABLET(100 MG) BY MOUTH DAILY     levETIRAcetam (KEPPRA) 500 MG tablet Take 1 tablet (500 mg) by mouth 2 times daily For additional refills, please schedule a follow-up appointment     losartan (COZAAR) 25 MG tablet Take 0.5 tablets (12.5 mg) by mouth daily     metoprolol succinate ER (TOPROL XL) 50 MG 24 hr tablet Take 1 tablet (50 mg) by mouth daily     Multiple Vitamins-Minerals (MULTIVITAMIN ADULTS 50+) TABS      oxybutynin ER (DITROPAN-XL) 10 MG 24 hr tablet Take 1 tablet (10 mg) by mouth daily     PROVIDER DOSED MANAGED WARFARIN, COUMADIN, OUTPATIENT Per coumadin clinic     simvastatin (ZOCOR) 40 MG tablet Take 1 tablet (40 mg) by mouth At Bedtime     trospium (SANCTURA) 20 MG tablet Take 1 tablet (20 mg) by mouth At Bedtime     VITAMIN D, CHOLECALCIFEROL, PO Take 1,000 Units by mouth daily     warfarin ANTICOAGULANT (COUMADIN) 5 MG tablet TAKE 2 TABLETS BY MOUTH ON FRIDAY, AND 1&1/2 TABLETS ON ALL OTHER DAYS OR AS DIRECTED     No current facility-administered medications for this visit.     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 Tx with Dapto/RIFx 8 weeks.     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     Paroxysmal A-fib (H)     Post-op 7/14/2011, 1/26/13     Rotator Cuff (Capsule) Sprain and Strain      S/P aortic valve replacement     7/14/2011, re-do 1/25/13 due to endocarditis     Smoking hx      Thrombocytopenia (H)      Past Surgical History:   Procedure Laterality Date     ANESTHESIA CARDIOVERSION  7/18/2011    Procedure:ANESTHESIA CARDIOVERSION; Cardioversion;  Surgeon:GENERIC ANESTHESIA PROVIDER; Location:[facility] OR     COLONOSCOPY       COLONOSCOPY N/A 2018    Procedure: COLONOSCOPY;  Surgeon: Herman Mcginnis MD;  Location:  GI     CORONARY ANGIOGRAPHY ADULT ORDER       CYSTOSCOPY, BIOPSY URETHRA N/A 4/3/2017    Procedure: CYSTOSCOPY, BIOPSY URETHRA;  Surgeon: Marlena Mora MD;  Location:  OR     ENDOSCOPY UPPER, COLONOSCOPY, COMBINED       EP ICD GENERATOR REPLACEMENT DUAL N/A 3/10/2021    Procedure: EP ICD GENERATOR CHANGE DUAL;  Surgeon: Mekhi Be MD;  Location:  HEART CARDIAC CATH LAB     HC REMOV & REPLACE IMPLT DEFIB PULSE GEN MULT LEAD  12/3/2015          HEMORRHOID SURGERY       IMPLANT AUTOMATIC IMPLANTABLE CARDIOVERTER DEFIBRILLATOR       MOHS MICROGRAPHIC PROCEDURE       ORTHOPEDIC SURGERY      shoulder,right     REDO STERNOTOMY REPLACE VALVE AORTIC  2013    Procedure: REDO STERNOTOMY REPLACE VALVE AORTIC;  Redo Sternotomy, Redo Aortic Valve Replacement on pump oxygenator. Intraoperative Transesophageal Echocardiogram;  Surgeon: Navin Hampton MD;  Location:  OR     REPAIR VALVE MITRAL       REPLACE VALVE AORTIC  2011    Procedure:REPLACE VALVE AORTIC; Median Sternotomy, Bioprosthesis,  Aortic Valve replacement on pump with oxygenator. Intra-operative Transesophogeal Echocardiogram.; Surgeon:NAVIN HAMPTON; Location: OR     teeth extractions       TRANSPLANT       Allergies   Allergen Reactions     Lisinopril Cough     Social History     Socioeconomic History     Marital status:      Spouse name: Not on file     Number of children: Not on file     Years of education: Not on file     Highest education level: Not on file   Occupational History     Not on file   Tobacco Use     Smoking status: Former Smoker     Packs/day: 1.00     Years: 20.00     Pack years: 20.00     Types: Cigarettes     Quit date: 1989     Years since quittin.4     Smokeless tobacco: Never Used   Substance and Sexual Activity  "    Alcohol use: Yes     Alcohol/week: 2.0 standard drinks     Drug use: No     Sexual activity: Yes     Partners: Female     Birth control/protection: None     Comment:    Other Topics Concern     Parent/sibling w/ CABG, MI or angioplasty before 65F 55M? No   Social History Narrative     since 2/1982 2 children 4 grandchildren.    Carpet sales:  Semi retired. Athlete in school, Golf, fish, yardwork     Social Determinants of Health     Financial Resource Strain: Not on file   Food Insecurity: Not on file   Transportation Needs: Not on file   Physical Activity: Not on file   Stress: Not on file   Social Connections: Not on file   Intimate Partner Violence: Not on file   Housing Stability: Not on file     Family History   Problem Relation Age of Onset     C.A.D. Father 68        bypass, carotid      Prostate Cancer Father 70     Heart Disease Father      Cancer Mother 92        stomach, colon     Hypertension Mother      Retinal detachment Mother      Cancer Brother 64        lung cancer, petroleum     Cancer Brother      Glaucoma No family hx of      Macular Degeneration No family hx of      Strabismus No family hx of      Glasses (<7 y/o) No family hx of           REVIEW OF SYSTEMS:  General: negative, fever, chills, night sweats  Skin: negative, acne, rash and scaling  Eyes: negative, double vision, eye pain and photophobia  Ears/Nose/Throat: negative, nasal congestion and purulent rhinorrhea  Respiratory: No dyspnea on exertion, No cough, No hemoptysis and negative  Cardiovascular: negative, palpitations, tachycardia, irregular heart beat, chest pain, exertional chest pain or pressure, paroxysmal nocturnal dyspnea, dyspnea on exertion and orthopnea       OBJECTIVE:  Blood pressure 115/69, pulse 67, height 1.794 m (5' 10.63\"), weight 76.8 kg (169 lb 6.4 oz), SpO2 97 %.  General Appearance: healthy, alert, active and no distress  Head: Normocephalic. No masses, lesions, tenderness or " abnormalities  Eyes: conjuctiva clear, PERRL, EOM intact  Ears: External ears normal. Canals clear. TM's normal.  Nose: Nares normal  Mouth: normal  Neck: Supple, no cervical adenopathy, no thyromegaly  Lungs: clear to auscultation  Cardiac: regular rate and rhythm, normal S1 and S2, ESM.       ASSESSMENT/PLAN:  Patient here for yearly follow-up.  Status post aortic valve replacement in 2013.  Patient had initial aortic valve replacement in 2011 for bicuspid aortic valve with severe aortic stenosis.  In 2013 patient developed coagulase-negative staph sepsis and endocarditis.  This led to extensive aortic surgery including subaortic debridement mitral valve perforation repair and AVR with 21 mm Johnson magna bioprosthetic valve.  Postoperative course was uneventful.  Patient had no complications.  Currently patient is doing very well.  Had no cardiac complaints today.  Reviewed recent echocardiogram and result discussed with patient.  Normal biventricular function.  Normal prosthetic function.  Mean gradient 7 mmHg.  Trace AI.  Ascending aorta unchanged at 4.4 cm.  Aortic index is 22 mm/m   As patient is doing very well he is advised to continue his current medications.  Return to Clinic in 1 year with a repeat echocardiogram.  Total visit duration 30 minutes.  This included face-to-face interview, physical exam, chart review, review of echocardiograms and documentation.      Please do not hesitate to contact me if you have any questions/concerns.     Sincerely,     TED Salgado MD

## 2022-06-08 ENCOUNTER — ANCILLARY PROCEDURE (OUTPATIENT)
Dept: CARDIOLOGY | Facility: CLINIC | Age: 77
End: 2022-06-08
Attending: INTERNAL MEDICINE
Payer: MEDICARE

## 2022-06-08 DIAGNOSIS — I10 BENIGN ESSENTIAL HYPERTENSION: ICD-10-CM

## 2022-06-08 DIAGNOSIS — I42.9 CARDIOMYOPATHY (H): ICD-10-CM

## 2022-06-08 PROCEDURE — 93295 DEV INTERROG REMOTE 1/2/MLT: CPT | Performed by: INTERNAL MEDICINE

## 2022-06-08 PROCEDURE — 93296 REM INTERROG EVL PM/IDS: CPT

## 2022-06-09 RX ORDER — LOSARTAN POTASSIUM 25 MG/1
12.5 TABLET ORAL DAILY
Qty: 45 TABLET | Refills: 3 | Status: SHIPPED | OUTPATIENT
Start: 2022-06-09 | End: 2023-06-27

## 2022-06-09 NOTE — TELEPHONE ENCOUNTER
6/7/2022 LVD Dr Salgado. RTC one year  [facility]

## 2022-06-18 LAB
MDC_IDC_EPISODE_DTM: NORMAL
MDC_IDC_EPISODE_DURATION: 1 S
MDC_IDC_EPISODE_ID: 1
MDC_IDC_EPISODE_TYPE: NORMAL
MDC_IDC_LEAD_IMPLANT_DT: NORMAL
MDC_IDC_LEAD_LOCATION: NORMAL
MDC_IDC_LEAD_LOCATION_DETAIL_1: NORMAL
MDC_IDC_LEAD_MFG: NORMAL
MDC_IDC_LEAD_MODEL: NORMAL
MDC_IDC_LEAD_POLARITY_TYPE: NORMAL
MDC_IDC_LEAD_SERIAL: NORMAL
MDC_IDC_LEAD_SPECIAL_FUNCTION: NORMAL
MDC_IDC_MSMT_BATTERY_DTM: NORMAL
MDC_IDC_MSMT_BATTERY_REMAINING_LONGEVITY: 49 MO
MDC_IDC_MSMT_BATTERY_RRT_TRIGGER: NORMAL
MDC_IDC_MSMT_BATTERY_VOLTAGE: 2.96 V
MDC_IDC_MSMT_CAP_CHARGE_DTM: NORMAL
MDC_IDC_MSMT_CAP_CHARGE_ENERGY: NORMAL
MDC_IDC_MSMT_CAP_CHARGE_TIME: 4 S
MDC_IDC_MSMT_CAP_CHARGE_TYPE: NORMAL
MDC_IDC_MSMT_LEADCHNL_LV_IMPEDANCE_VALUE: 323 OHM
MDC_IDC_MSMT_LEADCHNL_LV_IMPEDANCE_VALUE: 399 OHM
MDC_IDC_MSMT_LEADCHNL_LV_IMPEDANCE_VALUE: 665 OHM
MDC_IDC_MSMT_LEADCHNL_LV_PACING_THRESHOLD_AMPLITUDE: 2.12 V
MDC_IDC_MSMT_LEADCHNL_LV_PACING_THRESHOLD_PULSEWIDTH: 1 MS
MDC_IDC_MSMT_LEADCHNL_RA_IMPEDANCE_VALUE: 456 OHM
MDC_IDC_MSMT_LEADCHNL_RA_PACING_THRESHOLD_AMPLITUDE: 0.62 V
MDC_IDC_MSMT_LEADCHNL_RA_PACING_THRESHOLD_PULSEWIDTH: 0.4 MS
MDC_IDC_MSMT_LEADCHNL_RA_SENSING_INTR_AMPL: 1.5 MV
MDC_IDC_MSMT_LEADCHNL_RV_IMPEDANCE_VALUE: 342 OHM
MDC_IDC_MSMT_LEADCHNL_RV_IMPEDANCE_VALUE: 456 OHM
MDC_IDC_MSMT_LEADCHNL_RV_PACING_THRESHOLD_AMPLITUDE: 0.62 V
MDC_IDC_MSMT_LEADCHNL_RV_PACING_THRESHOLD_PULSEWIDTH: 0.4 MS
MDC_IDC_MSMT_LEADCHNL_RV_SENSING_INTR_AMPL: 15.4 MV
MDC_IDC_PG_IMPLANT_DTM: NORMAL
MDC_IDC_PG_MFG: NORMAL
MDC_IDC_PG_MODEL: NORMAL
MDC_IDC_PG_SERIAL: NORMAL
MDC_IDC_PG_TYPE: NORMAL
MDC_IDC_SESS_CLINIC_NAME: NORMAL
MDC_IDC_SESS_DTM: NORMAL
MDC_IDC_SESS_TYPE: NORMAL
MDC_IDC_SET_BRADY_AT_MODE_SWITCH_RATE: 171 {BEATS}/MIN
MDC_IDC_SET_BRADY_LOWRATE: 60 {BEATS}/MIN
MDC_IDC_SET_BRADY_MAX_SENSOR_RATE: 120 {BEATS}/MIN
MDC_IDC_SET_BRADY_MAX_TRACKING_RATE: 130 {BEATS}/MIN
MDC_IDC_SET_BRADY_MODE: NORMAL
MDC_IDC_SET_BRADY_NIGHT_RATE: 50 {BEATS}/MIN
MDC_IDC_SET_BRADY_PAV_DELAY_LOW: 170 MS
MDC_IDC_SET_BRADY_SAV_DELAY_LOW: 100 MS
MDC_IDC_SET_CRT_LVRV_DELAY: 0 MS
MDC_IDC_SET_CRT_PACED_CHAMBERS: NORMAL
MDC_IDC_SET_LEADCHNL_LV_PACING_AMPLITUDE: 3.25 V
MDC_IDC_SET_LEADCHNL_LV_PACING_ANODE_ELECTRODE_1: NORMAL
MDC_IDC_SET_LEADCHNL_LV_PACING_ANODE_LOCATION_1: NORMAL
MDC_IDC_SET_LEADCHNL_LV_PACING_CAPTURE_MODE: NORMAL
MDC_IDC_SET_LEADCHNL_LV_PACING_CATHODE_ELECTRODE_1: NORMAL
MDC_IDC_SET_LEADCHNL_LV_PACING_CATHODE_LOCATION_1: NORMAL
MDC_IDC_SET_LEADCHNL_LV_PACING_POLARITY: NORMAL
MDC_IDC_SET_LEADCHNL_LV_PACING_PULSEWIDTH: 1 MS
MDC_IDC_SET_LEADCHNL_RA_PACING_AMPLITUDE: 1.5 V
MDC_IDC_SET_LEADCHNL_RA_PACING_ANODE_ELECTRODE_1: NORMAL
MDC_IDC_SET_LEADCHNL_RA_PACING_ANODE_LOCATION_1: NORMAL
MDC_IDC_SET_LEADCHNL_RA_PACING_CAPTURE_MODE: NORMAL
MDC_IDC_SET_LEADCHNL_RA_PACING_CATHODE_ELECTRODE_1: NORMAL
MDC_IDC_SET_LEADCHNL_RA_PACING_CATHODE_LOCATION_1: NORMAL
MDC_IDC_SET_LEADCHNL_RA_PACING_POLARITY: NORMAL
MDC_IDC_SET_LEADCHNL_RA_PACING_PULSEWIDTH: 0.4 MS
MDC_IDC_SET_LEADCHNL_RA_SENSING_ANODE_ELECTRODE_1: NORMAL
MDC_IDC_SET_LEADCHNL_RA_SENSING_ANODE_LOCATION_1: NORMAL
MDC_IDC_SET_LEADCHNL_RA_SENSING_CATHODE_ELECTRODE_1: NORMAL
MDC_IDC_SET_LEADCHNL_RA_SENSING_CATHODE_LOCATION_1: NORMAL
MDC_IDC_SET_LEADCHNL_RA_SENSING_POLARITY: NORMAL
MDC_IDC_SET_LEADCHNL_RA_SENSING_SENSITIVITY: 0.3 MV
MDC_IDC_SET_LEADCHNL_RV_PACING_AMPLITUDE: 2 V
MDC_IDC_SET_LEADCHNL_RV_PACING_ANODE_ELECTRODE_1: NORMAL
MDC_IDC_SET_LEADCHNL_RV_PACING_ANODE_LOCATION_1: NORMAL
MDC_IDC_SET_LEADCHNL_RV_PACING_CAPTURE_MODE: NORMAL
MDC_IDC_SET_LEADCHNL_RV_PACING_CATHODE_ELECTRODE_1: NORMAL
MDC_IDC_SET_LEADCHNL_RV_PACING_CATHODE_LOCATION_1: NORMAL
MDC_IDC_SET_LEADCHNL_RV_PACING_POLARITY: NORMAL
MDC_IDC_SET_LEADCHNL_RV_PACING_PULSEWIDTH: 0.4 MS
MDC_IDC_SET_LEADCHNL_RV_SENSING_ANODE_ELECTRODE_1: NORMAL
MDC_IDC_SET_LEADCHNL_RV_SENSING_ANODE_LOCATION_1: NORMAL
MDC_IDC_SET_LEADCHNL_RV_SENSING_CATHODE_ELECTRODE_1: NORMAL
MDC_IDC_SET_LEADCHNL_RV_SENSING_CATHODE_LOCATION_1: NORMAL
MDC_IDC_SET_LEADCHNL_RV_SENSING_POLARITY: NORMAL
MDC_IDC_SET_LEADCHNL_RV_SENSING_SENSITIVITY: 0.3 MV
MDC_IDC_SET_ZONE_DETECTION_BEATS_DENOMINATOR: 40 {BEATS}
MDC_IDC_SET_ZONE_DETECTION_BEATS_NUMERATOR: 30 {BEATS}
MDC_IDC_SET_ZONE_DETECTION_INTERVAL: 320 MS
MDC_IDC_SET_ZONE_DETECTION_INTERVAL: 350 MS
MDC_IDC_SET_ZONE_DETECTION_INTERVAL: 360 MS
MDC_IDC_SET_ZONE_DETECTION_INTERVAL: 420 MS
MDC_IDC_SET_ZONE_TYPE: NORMAL
MDC_IDC_STAT_AT_BURDEN_PERCENT: 0.1 %
MDC_IDC_STAT_AT_DTM_END: NORMAL
MDC_IDC_STAT_AT_DTM_START: NORMAL
MDC_IDC_STAT_BRADY_DTM_END: NORMAL
MDC_IDC_STAT_BRADY_DTM_START: NORMAL
MDC_IDC_STAT_BRADY_RV_PERCENT_PACED: 98.73 %
MDC_IDC_STAT_CRT_DTM_END: NORMAL
MDC_IDC_STAT_CRT_DTM_START: NORMAL
MDC_IDC_STAT_CRT_LV_PERCENT_PACED: 98.71 %
MDC_IDC_STAT_CRT_PERCENT_PACED: 98.71 %
MDC_IDC_STAT_EPISODE_RECENT_COUNT: 0
MDC_IDC_STAT_EPISODE_RECENT_COUNT: 1
MDC_IDC_STAT_EPISODE_RECENT_COUNT_DTM_END: NORMAL
MDC_IDC_STAT_EPISODE_RECENT_COUNT_DTM_START: NORMAL
MDC_IDC_STAT_EPISODE_TOTAL_COUNT: 0
MDC_IDC_STAT_EPISODE_TOTAL_COUNT: 1
MDC_IDC_STAT_EPISODE_TOTAL_COUNT_DTM_END: NORMAL
MDC_IDC_STAT_EPISODE_TOTAL_COUNT_DTM_START: NORMAL
MDC_IDC_STAT_EPISODE_TYPE: NORMAL
MDC_IDC_STAT_TACHYTHERAPY_ATP_DELIVERED_RECENT: 0
MDC_IDC_STAT_TACHYTHERAPY_ATP_DELIVERED_TOTAL: 0
MDC_IDC_STAT_TACHYTHERAPY_RECENT_DTM_END: NORMAL
MDC_IDC_STAT_TACHYTHERAPY_RECENT_DTM_START: NORMAL
MDC_IDC_STAT_TACHYTHERAPY_SHOCKS_ABORTED_RECENT: 0
MDC_IDC_STAT_TACHYTHERAPY_SHOCKS_ABORTED_TOTAL: 0
MDC_IDC_STAT_TACHYTHERAPY_TOTAL_DTM_END: NORMAL
MDC_IDC_STAT_TACHYTHERAPY_TOTAL_DTM_START: NORMAL

## 2022-06-21 ENCOUNTER — LAB (OUTPATIENT)
Dept: LAB | Facility: CLINIC | Age: 77
End: 2022-06-21
Payer: MEDICARE

## 2022-06-21 ENCOUNTER — ANTICOAGULATION THERAPY VISIT (OUTPATIENT)
Dept: ANTICOAGULATION | Facility: CLINIC | Age: 77
End: 2022-06-21

## 2022-06-21 DIAGNOSIS — Z79.01 ANTICOAGULATED ON COUMADIN: ICD-10-CM

## 2022-06-21 DIAGNOSIS — I48.0 PAROXYSMAL ATRIAL FIBRILLATION (H): ICD-10-CM

## 2022-06-21 DIAGNOSIS — Z95.2 S/P AORTIC VALVE REPLACEMENT: Primary | ICD-10-CM

## 2022-06-21 DIAGNOSIS — Z79.01 LONG TERM CURRENT USE OF ANTICOAGULANT THERAPY: ICD-10-CM

## 2022-06-21 LAB — INR BLD: 2.6 (ref 0.9–1.1)

## 2022-06-21 PROCEDURE — 36416 COLLJ CAPILLARY BLOOD SPEC: CPT

## 2022-06-21 PROCEDURE — 85610 PROTHROMBIN TIME: CPT

## 2022-06-21 NOTE — PROGRESS NOTES
ANTICOAGULATION MANAGEMENT     Brooks Summers 77 year old male is on warfarin with therapeutic INR result. (Goal INR 2.0-3.0)    Recent labs: (last 7 days)     06/21/22  0814   INR 2.6*       ASSESSMENT       Source(s): Chart Review    Previous INR was Therapeutic last 2(+) visits    Medication, diet, health changes since last INR chart reviewed; none identified           PLAN     Recommended plan for no diet, medication or health factor changes affecting INR     Dosing Instructions: continue your current warfarin dose with next INR in 4 weeks       Summary  As of 6/21/2022    Full warfarin instructions:  10 mg every Fri; 7.5 mg all other days   Next INR check:  7/19/2022             Detailed voice message left for Brooks with dosing instructions and follow up date.     Contact [phone]  to schedule and with any changes, questions or concerns.     Education provided: Contact [phone]  with any changes, questions or concerns.     Plan made per [facility] anticoagulation protocol    Selena Bates RN  Anticoagulation Clinic  6/21/2022    _______________________________________________________________________     Anticoagulation Episode Summary     Current INR goal:  2.0-3.0   TTR:  78.8 % (1 y)   Target end date:  Indefinite   Send INR reminders to:  JONATHAN MURILLO    Indications    S/P aortic valve replacement [Z95.2]  Paroxysmal atrial fibrillation (H) [I48.0]  Long term current use of anticoagulant therapy [Z79.01]  Anticoagulated on Coumadin [Z79.01]           Comments:   * warfarin 5 mg tabs. Aortic 21 mm Johnson Perimount Magna Tissue Valve.          Anticoagulation Care Providers     Provider Role Specialty Phone number    Edin Mays MD Referring Internal Medicine [phone]

## 2022-06-22 DIAGNOSIS — E78.5 HYPERLIPIDEMIA LDL GOAL <100: ICD-10-CM

## 2022-06-27 RX ORDER — SIMVASTATIN 40 MG
TABLET ORAL
Qty: 90 TABLET | Refills: 0 | Status: SHIPPED | OUTPATIENT
Start: 2022-06-27 | End: 2022-09-27

## 2022-06-27 NOTE — TELEPHONE ENCOUNTER
SIMVASTATIN 40MG TABLETS      Last Written Prescription Date:  3-24-22  Last Fill Quantity: 90,   # refills: 0  Last Office Visit : 1-3-22  Future Office visit:  none    Routing refill request to provider for review/approval because:  Overdue lab: LDL   previous 90 day mauricio MAYEN

## 2022-06-28 ENCOUNTER — ANCILLARY PROCEDURE (OUTPATIENT)
Dept: CARDIOLOGY | Facility: CLINIC | Age: 77
End: 2022-06-28
Attending: INTERNAL MEDICINE
Payer: MEDICARE

## 2022-06-28 DIAGNOSIS — Z95.810 S/P IMPLANTATION OF AUTOMATIC CARDIOVERTER/DEFIBRILLATOR (AICD): ICD-10-CM

## 2022-06-28 DIAGNOSIS — I42.9 CARDIOMYOPATHY (H): ICD-10-CM

## 2022-06-28 LAB
MDC_IDC_LEAD_IMPLANT_DT: NORMAL
MDC_IDC_LEAD_LOCATION: NORMAL
MDC_IDC_LEAD_LOCATION_DETAIL_1: NORMAL
MDC_IDC_LEAD_MFG: NORMAL
MDC_IDC_LEAD_MODEL: NORMAL
MDC_IDC_LEAD_POLARITY_TYPE: NORMAL
MDC_IDC_LEAD_SERIAL: NORMAL
MDC_IDC_LEAD_SPECIAL_FUNCTION: NORMAL
MDC_IDC_MSMT_BATTERY_DTM: NORMAL
MDC_IDC_MSMT_BATTERY_REMAINING_LONGEVITY: 55 MO
MDC_IDC_MSMT_BATTERY_RRT_TRIGGER: NORMAL
MDC_IDC_MSMT_BATTERY_STATUS: NORMAL
MDC_IDC_MSMT_BATTERY_VOLTAGE: 2.96 V
MDC_IDC_MSMT_CAP_CHARGE_DTM: NORMAL
MDC_IDC_MSMT_CAP_CHARGE_ENERGY: 18 J
MDC_IDC_MSMT_CAP_CHARGE_TIME: 4 S
MDC_IDC_MSMT_CAP_CHARGE_TYPE: NORMAL
MDC_IDC_MSMT_LEADCHNL_LV_IMPEDANCE_VALUE: 380 OHM
MDC_IDC_MSMT_LEADCHNL_LV_PACING_THRESHOLD_AMPLITUDE: 2 V
MDC_IDC_MSMT_LEADCHNL_LV_PACING_THRESHOLD_PULSEWIDTH: 1 MS
MDC_IDC_MSMT_LEADCHNL_RA_IMPEDANCE_VALUE: 475 OHM
MDC_IDC_MSMT_LEADCHNL_RA_PACING_THRESHOLD_AMPLITUDE: 0.75 V
MDC_IDC_MSMT_LEADCHNL_RA_PACING_THRESHOLD_PULSEWIDTH: 0.4 MS
MDC_IDC_MSMT_LEADCHNL_RA_SENSING_INTR_AMPL: 1.4 MV
MDC_IDC_MSMT_LEADCHNL_RV_IMPEDANCE_VALUE: 532 OHM
MDC_IDC_MSMT_LEADCHNL_RV_PACING_THRESHOLD_AMPLITUDE: 0.75 V
MDC_IDC_MSMT_LEADCHNL_RV_PACING_THRESHOLD_PULSEWIDTH: 0.4 MS
MDC_IDC_MSMT_LEADCHNL_RV_SENSING_INTR_AMPL: 20 MV
MDC_IDC_PG_IMPLANT_DTM: NORMAL
MDC_IDC_PG_MFG: NORMAL
MDC_IDC_PG_MODEL: NORMAL
MDC_IDC_PG_SERIAL: NORMAL
MDC_IDC_PG_TYPE: NORMAL
MDC_IDC_SESS_CLINIC_NAME: NORMAL
MDC_IDC_SESS_DTM: NORMAL
MDC_IDC_SESS_TYPE: NORMAL
MDC_IDC_SET_BRADY_AT_MODE_SWITCH_RATE: 171 {BEATS}/MIN
MDC_IDC_SET_BRADY_LOWRATE: 60 {BEATS}/MIN
MDC_IDC_SET_BRADY_MAX_SENSOR_RATE: 120 {BEATS}/MIN
MDC_IDC_SET_BRADY_MAX_TRACKING_RATE: 130 {BEATS}/MIN
MDC_IDC_SET_BRADY_MODE: NORMAL
MDC_IDC_SET_BRADY_NIGHT_RATE: 50 {BEATS}/MIN
MDC_IDC_SET_BRADY_PAV_DELAY_LOW: 170 MS
MDC_IDC_SET_BRADY_SAV_DELAY_LOW: 100 MS
MDC_IDC_SET_CRT_LVRV_DELAY: 0 MS
MDC_IDC_SET_CRT_PACED_CHAMBERS: NORMAL
MDC_IDC_SET_LEADCHNL_LV_PACING_AMPLITUDE: 2.75 V
MDC_IDC_SET_LEADCHNL_LV_PACING_ANODE_ELECTRODE_1: NORMAL
MDC_IDC_SET_LEADCHNL_LV_PACING_ANODE_LOCATION_1: NORMAL
MDC_IDC_SET_LEADCHNL_LV_PACING_CAPTURE_MODE: NORMAL
MDC_IDC_SET_LEADCHNL_LV_PACING_CATHODE_ELECTRODE_1: NORMAL
MDC_IDC_SET_LEADCHNL_LV_PACING_CATHODE_LOCATION_1: NORMAL
MDC_IDC_SET_LEADCHNL_LV_PACING_POLARITY: NORMAL
MDC_IDC_SET_LEADCHNL_LV_PACING_PULSEWIDTH: 1 MS
MDC_IDC_SET_LEADCHNL_RA_PACING_AMPLITUDE: 1.5 V
MDC_IDC_SET_LEADCHNL_RA_PACING_ANODE_ELECTRODE_1: NORMAL
MDC_IDC_SET_LEADCHNL_RA_PACING_ANODE_LOCATION_1: NORMAL
MDC_IDC_SET_LEADCHNL_RA_PACING_CAPTURE_MODE: NORMAL
MDC_IDC_SET_LEADCHNL_RA_PACING_CATHODE_ELECTRODE_1: NORMAL
MDC_IDC_SET_LEADCHNL_RA_PACING_CATHODE_LOCATION_1: NORMAL
MDC_IDC_SET_LEADCHNL_RA_PACING_POLARITY: NORMAL
MDC_IDC_SET_LEADCHNL_RA_PACING_PULSEWIDTH: 0.4 MS
MDC_IDC_SET_LEADCHNL_RA_SENSING_ANODE_ELECTRODE_1: NORMAL
MDC_IDC_SET_LEADCHNL_RA_SENSING_ANODE_LOCATION_1: NORMAL
MDC_IDC_SET_LEADCHNL_RA_SENSING_CATHODE_ELECTRODE_1: NORMAL
MDC_IDC_SET_LEADCHNL_RA_SENSING_CATHODE_LOCATION_1: NORMAL
MDC_IDC_SET_LEADCHNL_RA_SENSING_POLARITY: NORMAL
MDC_IDC_SET_LEADCHNL_RA_SENSING_SENSITIVITY: 0.3 MV
MDC_IDC_SET_LEADCHNL_RV_PACING_AMPLITUDE: 2 V
MDC_IDC_SET_LEADCHNL_RV_PACING_ANODE_ELECTRODE_1: NORMAL
MDC_IDC_SET_LEADCHNL_RV_PACING_ANODE_LOCATION_1: NORMAL
MDC_IDC_SET_LEADCHNL_RV_PACING_CAPTURE_MODE: NORMAL
MDC_IDC_SET_LEADCHNL_RV_PACING_CATHODE_ELECTRODE_1: NORMAL
MDC_IDC_SET_LEADCHNL_RV_PACING_CATHODE_LOCATION_1: NORMAL
MDC_IDC_SET_LEADCHNL_RV_PACING_POLARITY: NORMAL
MDC_IDC_SET_LEADCHNL_RV_PACING_PULSEWIDTH: 0.4 MS
MDC_IDC_SET_LEADCHNL_RV_SENSING_ANODE_ELECTRODE_1: NORMAL
MDC_IDC_SET_LEADCHNL_RV_SENSING_ANODE_LOCATION_1: NORMAL
MDC_IDC_SET_LEADCHNL_RV_SENSING_CATHODE_ELECTRODE_1: NORMAL
MDC_IDC_SET_LEADCHNL_RV_SENSING_CATHODE_LOCATION_1: NORMAL
MDC_IDC_SET_LEADCHNL_RV_SENSING_POLARITY: NORMAL
MDC_IDC_SET_LEADCHNL_RV_SENSING_SENSITIVITY: 0.3 MV
MDC_IDC_SET_ZONE_DETECTION_BEATS_DENOMINATOR: 40 {BEATS}
MDC_IDC_SET_ZONE_DETECTION_BEATS_NUMERATOR: 30 {BEATS}
MDC_IDC_SET_ZONE_DETECTION_INTERVAL: 320 MS
MDC_IDC_SET_ZONE_DETECTION_INTERVAL: 350 MS
MDC_IDC_SET_ZONE_DETECTION_INTERVAL: 360 MS
MDC_IDC_SET_ZONE_DETECTION_INTERVAL: 420 MS
MDC_IDC_SET_ZONE_TYPE: NORMAL
MDC_IDC_STAT_AT_DTM_END: NORMAL
MDC_IDC_STAT_AT_DTM_START: NORMAL
MDC_IDC_STAT_BRADY_DTM_END: NORMAL
MDC_IDC_STAT_BRADY_DTM_START: NORMAL
MDC_IDC_STAT_BRADY_RA_PERCENT_PACED: 35.1 %
MDC_IDC_STAT_BRADY_RV_PERCENT_PACED: 99.3 %
MDC_IDC_STAT_CRT_DTM_END: NORMAL
MDC_IDC_STAT_CRT_DTM_START: NORMAL
MDC_IDC_STAT_CRT_LV_PERCENT_PACED: 99.3 %
MDC_IDC_STAT_CRT_PERCENT_PACED: 100 %
MDC_IDC_STAT_EPISODE_RECENT_COUNT: 0
MDC_IDC_STAT_EPISODE_RECENT_COUNT_DTM_END: NORMAL
MDC_IDC_STAT_EPISODE_RECENT_COUNT_DTM_START: NORMAL
MDC_IDC_STAT_EPISODE_TOTAL_COUNT: 0
MDC_IDC_STAT_EPISODE_TOTAL_COUNT: 1
MDC_IDC_STAT_EPISODE_TOTAL_COUNT_DTM_END: NORMAL
MDC_IDC_STAT_EPISODE_TOTAL_COUNT_DTM_START: NORMAL
MDC_IDC_STAT_EPISODE_TYPE: NORMAL
MDC_IDC_STAT_TACHYTHERAPY_ATP_DELIVERED_RECENT: 0
MDC_IDC_STAT_TACHYTHERAPY_ATP_DELIVERED_TOTAL: 0
MDC_IDC_STAT_TACHYTHERAPY_RECENT_DTM_END: NORMAL
MDC_IDC_STAT_TACHYTHERAPY_RECENT_DTM_START: NORMAL
MDC_IDC_STAT_TACHYTHERAPY_SHOCKS_ABORTED_RECENT: 0
MDC_IDC_STAT_TACHYTHERAPY_SHOCKS_ABORTED_TOTAL: 0
MDC_IDC_STAT_TACHYTHERAPY_SHOCKS_DELIVERED_RECENT: 0
MDC_IDC_STAT_TACHYTHERAPY_SHOCKS_DELIVERED_TOTAL: 0
MDC_IDC_STAT_TACHYTHERAPY_TOTAL_DTM_END: NORMAL
MDC_IDC_STAT_TACHYTHERAPY_TOTAL_DTM_START: NORMAL

## 2022-06-28 PROCEDURE — 93284 PRGRMG EVAL IMPLANTABLE DFB: CPT | Performed by: INTERNAL MEDICINE

## 2022-06-28 NOTE — PATIENT INSTRUCTIONS
It was a pleasure to see you in clinic today.  Please do not hesitate to call with any questions or concerns. You will be scheduled for a remote transmission every 3 months. Your next remote transmission is scheduled for 9/13/22.  We look forward to seeing you in clinic at your next device check in 12 months (with next Cardiology appointment).    ANIA AcostaN, RN  Electrophysiology Nurse Clinician  [facility]    During Business Hours Please Call:  [phone]  After Hours Please Call:  [phone] - select option #4 and ask for job code [phone]

## 2022-07-05 DIAGNOSIS — Z79.01 LONG TERM CURRENT USE OF ANTICOAGULANT THERAPY: ICD-10-CM

## 2022-07-05 DIAGNOSIS — I48.0 PAROXYSMAL ATRIAL FIBRILLATION (H): ICD-10-CM

## 2022-07-05 DIAGNOSIS — Z95.2 S/P AVR (AORTIC VALVE REPLACEMENT): ICD-10-CM

## 2022-07-05 DIAGNOSIS — Z95.2 S/P AORTIC VALVE REPLACEMENT: ICD-10-CM

## 2022-07-05 RX ORDER — WARFARIN SODIUM 5 MG/1
TABLET ORAL
Qty: 145 TABLET | Refills: 1 | Status: SHIPPED | OUTPATIENT
Start: 2022-07-05 | End: 2023-02-09

## 2022-07-05 NOTE — TELEPHONE ENCOUNTER
ANTICOAGULATION MANAGEMENT:  Medication Refill    Anticoagulation Summary  As of 6/21/2022    Warfarin maintenance plan:  10 mg (5 mg x 2) every Fri; 7.5 mg (5 mg x 1.5) all other days   Next INR check:  7/19/2022   Target end date:  Indefinite    Indications    S/P aortic valve replacement [Z95.2]  Paroxysmal atrial fibrillation (H) [I48.0]  Long term current use of anticoagulant therapy [Z79.01]  Anticoagulated on Coumadin [Z79.01]             Anticoagulation Care Providers     Provider Role Specialty Phone number    Edin Mays MD Referring Internal Medicine [phone]          Visit with referring provider/group within last year: Yes    [facility] referral signed within last year: Yes    Brooks meets all criteria for refill (current [facility] referral, office visit with referring provider/group in last year, lab monitoring up to date or not exceeding 2 weeks overdue). Rx instructions and quantity match patient's current dosing plan. Warfarin 90 day supply with 1 refill granted per [facility] protocol     Beth Mijares RN  Anticoagulation Clinic

## 2022-07-14 DIAGNOSIS — I48.0 PAROXYSMAL ATRIAL FIBRILLATION (H): ICD-10-CM

## 2022-07-14 DIAGNOSIS — Z79.01 LONG TERM CURRENT USE OF ANTICOAGULANT THERAPY: ICD-10-CM

## 2022-07-14 DIAGNOSIS — Z95.2 S/P AVR: ICD-10-CM

## 2022-07-14 DIAGNOSIS — Z95.2 S/P AORTIC VALVE REPLACEMENT: Primary | ICD-10-CM

## 2022-07-19 ENCOUNTER — TELEPHONE (OUTPATIENT)
Dept: ANTICOAGULATION | Facility: CLINIC | Age: 77
End: 2022-07-19

## 2022-07-19 ENCOUNTER — LAB (OUTPATIENT)
Dept: LAB | Facility: CLINIC | Age: 77
End: 2022-07-19
Payer: MEDICARE

## 2022-07-19 ENCOUNTER — ANTICOAGULATION THERAPY VISIT (OUTPATIENT)
Dept: ANTICOAGULATION | Facility: CLINIC | Age: 77
End: 2022-07-19

## 2022-07-19 DIAGNOSIS — Z95.2 S/P AVR: ICD-10-CM

## 2022-07-19 DIAGNOSIS — Z79.01 ANTICOAGULATED: Primary | ICD-10-CM

## 2022-07-19 DIAGNOSIS — Z79.01 ANTICOAGULATED ON COUMADIN: ICD-10-CM

## 2022-07-19 DIAGNOSIS — Z79.01 LONG TERM CURRENT USE OF ANTICOAGULANT THERAPY: ICD-10-CM

## 2022-07-19 DIAGNOSIS — Z95.2 S/P AORTIC VALVE REPLACEMENT: Primary | ICD-10-CM

## 2022-07-19 DIAGNOSIS — E78.5 HYPERLIPIDEMIA LDL GOAL <100: ICD-10-CM

## 2022-07-19 DIAGNOSIS — I48.0 PAROXYSMAL ATRIAL FIBRILLATION (H): ICD-10-CM

## 2022-07-19 DIAGNOSIS — Z95.2 S/P AORTIC VALVE REPLACEMENT: ICD-10-CM

## 2022-07-19 LAB
CHOLEST SERPL-MCNC: 129 MG/DL
FASTING STATUS PATIENT QL REPORTED: YES
HDLC SERPL-MCNC: 38 MG/DL
INR BLD: 3.5 (ref 0.9–1.1)
LDLC SERPL CALC-MCNC: 65 MG/DL
NONHDLC SERPL-MCNC: 91 MG/DL
TRIGL SERPL-MCNC: 130 MG/DL

## 2022-07-19 PROCEDURE — 36415 COLL VENOUS BLD VENIPUNCTURE: CPT

## 2022-07-19 PROCEDURE — 36416 COLLJ CAPILLARY BLOOD SPEC: CPT

## 2022-07-19 PROCEDURE — 80061 LIPID PANEL: CPT

## 2022-07-19 PROCEDURE — 85610 PROTHROMBIN TIME: CPT

## 2022-07-19 NOTE — PROGRESS NOTES
ANTICOAGULATION MANAGEMENT     Brooks Summers 77 year old male is on warfarin with supratherapeutic INR result. (Goal INR 2.0-3.0)    Recent labs: (last 7 days)     07/19/22  0837   INR 3.5*       ASSESSMENT       Source(s): Chart Review and Patient/Caregiver Call       Warfarin doses taken: Warfarin taken as instructed    Diet: No new diet changes identified    New illness, injury, or hospitalization: No    Medication/supplement changes: Tylenol for neuropathy    Signs or symptoms of bleeding or clotting: No    Previous INR: Therapeutic last 2(+) visits    Additional findings: None       PLAN     Recommended plan for no diet, medication or health factor changes affecting INR     Dosing Instructions: hold dose then continue your current warfarin dose with next INR in 2 weeks       Summary  As of 7/19/2022    Full warfarin instructions:  7/19: Hold; Otherwise 10 mg every Fri; 7.5 mg all other days   Next INR check:               Telephone call with Brooks who verbalizes understanding and agrees to plan    Lab visit scheduled    Education provided: Please call back if any changes to your diet, medications or how you've been taking warfarin    Plan made per [facility] anticoagulation protocol    Ashley Norris, RN  Anticoagulation Clinic  7/19/2022    _______________________________________________________________________     Anticoagulation Episode Summary     Current INR goal:  2.0-3.0   TTR:  76.0 % (1 y)   Target end date:  Indefinite   Send INR reminders to:  JONATHAN MURILLO    Indications    S/P aortic valve replacement [Z95.2]  Paroxysmal atrial fibrillation (H) [I48.0]  Long term current use of anticoagulant therapy [Z79.01]  Anticoagulated on Coumadin [Z79.01]           Comments:   * warfarin 5 mg tabs. Aortic 21 mm Johnson Perimount Magna Tissue Valve.          Anticoagulation Care Providers     Provider Role Specialty Phone number    Edin Mays MD Referring Internal Medicine [phone]

## 2022-07-19 NOTE — TELEPHONE ENCOUNTER
ANTICOAGULATION CLINIC REFERRAL RENEWAL REQUEST       An annual renewal order is required for all patients referred to [facility].?  Please review and sign the pended referral order for Brooks Summers.       ANTICOAGULATION SUMMARY      Warfarin indication(s)   Atrial Fibrillation    Mechanical heart valve present?  NO       Current goal range   INR: 2.0-3.0     Goal appropriate for indication? Goal INR 2-3, standard for indication(s) above     Time in Therapeutic Range (TTR)  (Goal > 60%) 76%       Office visit with referring provider's group within last year yes on 1/2022       Ashley Norris, RN  [facility]

## 2022-07-28 ENCOUNTER — VIRTUAL VISIT (OUTPATIENT)
Dept: PSYCHIATRY | Facility: CLINIC | Age: 77
End: 2022-07-28
Payer: MEDICARE

## 2022-07-28 ENCOUNTER — VIRTUAL VISIT (OUTPATIENT)
Dept: BEHAVIORAL HEALTH | Facility: CLINIC | Age: 77
End: 2022-07-28
Payer: MEDICARE

## 2022-07-28 DIAGNOSIS — F41.1 GAD (GENERALIZED ANXIETY DISORDER): Primary | ICD-10-CM

## 2022-07-28 DIAGNOSIS — F41.1 GENERALIZED ANXIETY DISORDER: Primary | ICD-10-CM

## 2022-07-28 DIAGNOSIS — G31.84 MILD COGNITIVE IMPAIRMENT: ICD-10-CM

## 2022-07-28 PROCEDURE — 99215 OFFICE O/P EST HI 40 MIN: CPT | Mod: 95 | Performed by: NURSE PRACTITIONER

## 2022-07-28 PROCEDURE — 90791 PSYCH DIAGNOSTIC EVALUATION: CPT | Mod: 52 | Performed by: SOCIAL WORKER

## 2022-07-28 RX ORDER — SERTRALINE HYDROCHLORIDE 25 MG/1
25 TABLET, FILM COATED ORAL DAILY
Qty: 30 TABLET | Refills: 0 | Status: ON HOLD | OUTPATIENT
Start: 2022-07-28 | End: 2022-11-24

## 2022-07-28 ASSESSMENT — ANXIETY QUESTIONNAIRES
GAD7 TOTAL SCORE: 7
5. BEING SO RESTLESS THAT IT IS HARD TO SIT STILL: MORE THAN HALF THE DAYS
2. NOT BEING ABLE TO STOP OR CONTROL WORRYING: NOT AT ALL
IF YOU CHECKED OFF ANY PROBLEMS ON THIS QUESTIONNAIRE, HOW DIFFICULT HAVE THESE PROBLEMS MADE IT FOR YOU TO DO YOUR WORK, TAKE CARE OF THINGS AT HOME, OR GET ALONG WITH OTHER PEOPLE: SOMEWHAT DIFFICULT
7. FEELING AFRAID AS IF SOMETHING AWFUL MIGHT HAPPEN: SEVERAL DAYS
3. WORRYING TOO MUCH ABOUT DIFFERENT THINGS: MORE THAN HALF THE DAYS
1. FEELING NERVOUS, ANXIOUS, OR ON EDGE: SEVERAL DAYS
6. BECOMING EASILY ANNOYED OR IRRITABLE: NOT AT ALL
GAD7 TOTAL SCORE: 7

## 2022-07-28 ASSESSMENT — COLUMBIA-SUICIDE SEVERITY RATING SCALE - C-SSRS
2. HAVE YOU ACTUALLY HAD ANY THOUGHTS OF KILLING YOURSELF?: NO
TOTAL  NUMBER OF INTERRUPTED ATTEMPTS LIFETIME: NO
TOTAL  NUMBER OF ABORTED OR SELF INTERRUPTED ATTEMPTS LIFETIME: NO
ATTEMPT LIFETIME: NO
6. HAVE YOU EVER DONE ANYTHING, STARTED TO DO ANYTHING, OR PREPARED TO DO ANYTHING TO END YOUR LIFE?: NO
1. HAVE YOU WISHED YOU WERE DEAD OR WISHED YOU COULD GO TO SLEEP AND NOT WAKE UP?: NO

## 2022-07-28 ASSESSMENT — PATIENT HEALTH QUESTIONNAIRE - PHQ9: 5. POOR APPETITE OR OVEREATING: SEVERAL DAYS

## 2022-07-28 NOTE — PROGRESS NOTES
"Collaborative Care Psychiatry Service  Provider Name: Mabel Rodríguez [facility]      PATIENT'S NAME: Brooks Summers  PREFERRED NAME: Brooks  PREFERRED PRONOUNS:    MRN:   [id number]  :   1945   ACCT. NUMBER: [id number]  DATE OF SERVICE: 22  START TIME: 830am  END TIME: 851am    BRIEF ADULT DIAGNOSTIC ASSESSMENT    Telemedicine Visit: The patient's condition can be safely assessed and treated via synchronous audio and visual telemedicine encounter.      Reason for Telemedicine Visit: Services only offered telehealth    Originating Site (Patient Location): Patient's home    Distant Site (Provider Location): Provider Remote Setting- Home Office    Consent:  The patient/guardian has verbally consented to: the potential risks and benefits of telemedicine (video visit) versus in person care; bill my insurance or make self-payment for services provided; and responsibility for payment of non-covered services.     Mode of Communication:  Video Conference via [employer]    As the provider I attest to compliance with applicable laws and regulations related to telemedicine.    Identifying Information:  Patient is a 77 year old, .  The pronoun use throughout this assessment reflects the patient's chosen pronoun.  Patient was referred for an assessment by primary care provider.  Patient attended the session with wife, Lucero.     Chief Complaint:   The reason for seeking services at this time is: \"Dr. Hernandez wanted him to see someone. Anxiety\"   The problem(s) began 3 years ago when they had to move into an apartment. Patient has not attempted to resolve these concerns in the past.    Does the client have any condition that is currently presenting as a potential to harm themselves or others (severe withdrawal, serious medical condition, severe emotional/behavioral problem)? No.  Proceed with assessment.     Stress: worries about the future, wife's health  Goals: \"feel less stress\"  Most important: anxiety, " doesn't want medication but Lucero thinks he would benefit    Review of Symptoms per patient report:  Depression: No symptoms  Cleo:  No Symptoms  Psychosis: No Symptoms  Anxiety: Excessive worry, Nervousness, Sleep disturbance, Ruminations and Poor concentration  Panic:  No symptoms  Post Traumatic Stress Disorder:  No Symptoms   Eating Disorder: No Symptoms  ADD / ADHD:  No symptoms  Conduct Disorder: No symptoms  Autism Spectrum Disorder: No symptoms  Obsessive Compulsive Disorder: No Symptoms    Sleep: trouble staying asleep in part due to wife's health    Caffeine: unremarkable  Tobacco: No    Current alcohol use: 2 beers in the afternoon  Current drug use: No    Rating Scales:  PHQ-9:   [facility] Follow-up to PHQ 12/7/2021   PHQ-9 9. Suicide Ideation past 2 weeks Not at all      GAD7:    MIC-7 SCORE 7/28/2022   Total Score 7     CGI:  First:  No data recorded  Most recent:  No data recorded    WHODAS: No flowsheet data found.     CAGE:    CAGE-AID Flowsheet 4/12/2013 8/20/2014   Have you ever felt you should Cut down on your drinking or drug use? 0 0   Have people Annoyed you by criticizing your drinking or drug use? 0 0   Have you ever felt bad or Guilty about your drinking or drug use? 0 0   Have you ever had a drink or used drugs first thing in the morning to steady your nerves or to get rid of a hangover? 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 Tx with Dapto/RIFx 8 weeks.     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  Psychiatric Hospitalizations: None.  Patient denies a history of civil commitment. Currently, patient is not receiving other mental health services.  These include none.     Patient does report a history of head injury / trauma / cognitive impairment / seizures.  Seizures, last one was 2 years ago. Forgetfulness. Two heart surgeries including valve replacement and getting a pacemaker.     Current Medications:  Current Outpatient Medications   Medication Sig Dispense Refill     allopurinol (ZYLOPRIM) 100 MG tablet TAKE 1 TABLET(100 MG) BY MOUTH DAILY 90 tablet 0     levETIRAcetam (KEPPRA) 500 MG tablet Take 1 tablet (500 mg) by mouth 2 times daily For additional refills, please schedule a follow-up appointment 60 tablet 11     losartan (COZAAR) 25 MG tablet Take 0.5 tablets (12.5 mg) by mouth daily 45 tablet 3     metoprolol succinate ER (TOPROL XL) 50 MG 24 hr tablet Take 1 tablet (50 mg) by mouth daily 90 tablet 0     Multiple Vitamins-Minerals (MULTIVITAMIN ADULTS 50+) TABS        oxybutynin ER (DITROPAN-XL) 10 MG 24 hr tablet Take 1 tablet (10 mg) by mouth daily 90 tablet 3     PROVIDER DOSED MANAGED WARFARIN, COUMADIN, OUTPATIENT Per coumadin clinic       simvastatin (ZOCOR) 40 MG tablet TAKE 1 TABLET(40 MG) BY MOUTH AT BEDTIME 90 tablet 0     trospium (SANCTURA) 20 MG tablet Take 1 tablet (20 mg) by mouth At Bedtime 30 tablet 3     VITAMIN D, CHOLECALCIFEROL, PO Take 1,000 Units by mouth daily       warfarin ANTICOAGULANT (COUMADIN) 5 MG tablet TAKE 2 TABLET BY MOUTH ON FRIDAY, AND 1  1/2 TABLET ON ALL OTHER DAYS OR AS DIRECTED 145 tablet 1     sertraline (ZOLOFT) 25 MG tablet Take 1 tablet (25 mg) by mouth daily 1/2 tab daily for one week then increase to 1 tablet thereafter 30 tablet 0        Allergies:  Allergies   Allergen Reactions     Lisinopril  "Cough       Family Psychiatric History:  Patient did report a family history of mental health concerns.     Family History     Problem (# of Occurrences) Relation (Name,Age of Onset)    Anxiety Disorder (2) Sister, Brother    C.A.D. (1) Father (rhonda chaidez, 68): bypass, carotid     Cancer (3) Mother (mother, 92): stomach, colon, Brother (64): lung cancer, petroleum, Brother (brother)    Depression (2) Sister, Brother    Heart Disease (1) Father (rhonda chaidez)    Hypertension (1) Mother (mother)    Prostate Cancer (1) Father (rhonda chaidez, 70)    Retinal detachment (1) Mother (mother)       Negative family history of: Glaucoma, Macular Degeneration, Strabismus, Glasses (<7 y/o)          Social/Family History:  Patient reported they grew up in [place], [place].  They were raised by biological parents. Pt has 7 siblings. Patient reported that   childhood was hard working, small town, sports.  Patient denies experiencing childhood abuse/neglect. Patient described their current relationships with family of origin as good.      The patient has been  2 times and has 2 children.   described the relationship with   spouse as, \"very good.\" Patient reported having some good friends.     Cultural influences and impact on patient's life structure, values, norms, and healthcare: Racial or Ethnic Self-Identification white, Immigration History and Status: born in the , citizen, Level of Acculturation: good, Time Orientation: US 12 hour clock CT, Social Orientation: unable to assess, Verbal / Non-verbal Communication Style: unremarkable, Locus of Control: unable to assess, Spiritual Beliefs: Confucianist, Health Beliefs and the endorsement of OR engagement in Culturally Specific Healing Practices: none and Cultural Bias none. Patient identified their preferred language to be English. Patient reported they does not need the assistance of an  or other support involved in treatment.       Educational/Occupational " History:  Patient reported   highest education level was college graduate. The patient did serve in the . Pt was in the [employer] from [id number] Patient is currently retired.       Social History     Socioeconomic History     Marital status:      Spouse name: Not on file     Number of children: Not on file     Years of education: Not on file     Highest education level: Not on file   Occupational History     Not on file   Tobacco Use     Smoking status: Former Smoker     Packs/day: 1.00     Years: 20.00     Pack years: 20.00     Types: Cigarettes     Quit date: 1989     Years since quittin.5     Smokeless tobacco: Never Used   Substance and Sexual Activity     Alcohol use: Yes     Alcohol/week: 2.0 standard drinks     Drug use: No     Sexual activity: Yes     Partners: Female     Birth control/protection: None     Comment:    Other Topics Concern     Parent/sibling w/ CABG, MI or angioplasty before 65F 55M? No   Social History Narrative     since 1982 2 children 4 grandchildren.    Carpet sales:  Semi retired. Athlete in school, Golf, fish, yardwork     Social Determinants of Health     Financial Resource Strain: Not on file   Food Insecurity: Not on file   Transportation Needs: Not on file   Physical Activity: Not on file   Stress: Not on file   Social Connections: Not on file   Intimate Partner Violence: Not on file   Housing Stability: Not on file       Patient reported that they have not been involved with the legal system.   Patient denies being on probation / parole / under the jurisdiction of the court.    Current Mental Status Exam:   Appearance:   Appropriate    Eye Contact:   Good   Psychomotor:   Normal   Attitude / Demeanor:  Cooperative   Speech      Rate / Production:  Normal/ Responsive      Volume:   Normal  volume      Language:   intact  Mood:    Anxious   Affect:    Appropriate    Thought Content:  Clear   Thought Process:  Coherent  Logical        Associations:  No loosening of associations  Insight:    Good   Judgment:   Intact   Orientation:   All  Attention/concentration: Good      Safety Assessment:   Current Safety Concerns:  [place] Suicide Severity Rating Scale (Lifetime/Recent)  [place] Suicide Severity Rating (Lifetime/Recent) 7/28/2022   1. Wish to be Dead (Lifetime) 0   2. Non-Specific Active Suicidal Thoughts (Lifetime) 0   Actual Attempt (Lifetime) 0   Has subject engaged in non-suicidal self-injurious behavior? (Lifetime) 0   Interrupted Attempts (Lifetime) 0   Aborted or Self-Interrupted Attempt (Lifetime) 0   Preparatory Acts or Behavior (Lifetime) 0   Calculated C-SSRS Risk Score (Lifetime/Recent) No Risk Indicated     Patient denies current homicidal ideation and behaviors.  Patient denies current self-injurious ideation and behaviors.    Patient denied risk behaviors associated with substance use.  Patient denies any high risk behaviors associated with mental health symptoms.  Patient reports the following current concerns for their personal safety: None.  Patient reports there no firearms in the house. There are no firearms in the home..     History of Safety Concerns:  Patient denied a history of homicidal ideation.     Patient denied a history of personal safety concerns.    Patient denied a history of assaultive behaviors.    Patient denied a history of sexual assault behaviors.     Patient denied a history of risk behaviors associated with substance use.  Patient denies any history of high risk behaviors associated with mental health symptoms.  Patient reports the following protective factors: positive relationships positive social network and positive family connections, forward/future oriented thinking, safe and stable environment, living with other people and positive social skills    Risk Plan:  See Preliminary Treatment Plan for Safety and Risk Management Plan    Diagnosis:  Diagnostic Criteria:   Generalized Anxiety  Disorder  A. Excessive anxiety and worry about a number of events or activities (such as work or school performance).   B. The person finds it difficult to control the worry.  C. Select 3 or more symptoms (required for diagnosis). Only one item is required in children.   - Restlessness or feeling keyed up or on edge.    - Difficulty concentrating or mind going blank.    - Sleep disturbance (difficulty falling or staying asleep, or restless unsatisfying sleep).   D. The focus of the anxiety and worry is not confined to features of an Axis I disorder.  E. The anxiety, worry, or physical symptoms cause clinically significant distress or impairment in social, occupational, or other important areas of functioning.   F. The disturbance is not due to the direct physiological effects of a substance (e.g., a drug of abuse, a medication) or a general medical condition (e.g., hyperthyroidism) and does not occur exclusively during a Mood Disorder, a Psychotic Disorder, or a Pervasive Developmental Disorder.    Diagnoses:  1. Generalized anxiety disorder        Patient's Strengths and Limitations:  Patient identified the following strengths or resources that will help them succeed in treatment: friends / good social support, family support, insight, intelligence and motivation. Things that may interfere with the patient's success in treatment include: none identified.     Recommendations:     1. Plan for Safety and Risk Management:Recommended that patient call 911 or go to the local ED should there be a change in any of these risk factors..  Report to child / adult protection services was NA.      2. Resources/Service Plan:       services are not indicated.     Modifications to assist communication are not indicated.     Additional disability accommodations are not indicated.      3. Collaboration:  Collaboration / coordination of treatment will be initiated with the following support professionals: Collaborated with  Yanni Rivero, MSN, APRN, CNP, [facility], [place] [facility].      4.  Referrals:   The following referral(s) will be initiated: Outpatient [facility].       Staff Name/Credentials:  Mabel Rodríguez [facility]    July 28, 2022

## 2022-07-28 NOTE — PROGRESS NOTES
PSYCHIATRIC DIAGNOSTIC ASSESSMENT      Name:  Brooks Summers  : 1945    Brooks is a 77 year old who is being evaluated via a billable video visit.      How would you like to obtain your AVS? MyChart  If the video visit is dropped, the invitation should be resent by: Text to cell phone: [phone]  Will anyone else be joining your video visit? No       Telemedicine Visit: The patient's condition can be safely assessed and treated via synchronous audio and visual telemedicine encounter.      Reason for Telemedicine Visit: COVID 19 pandemic and the social and physical recommendations by the CDC and [facility].      Originating Site (Patient Location): Patient's home    Distant Site (Provider Location): Provider Remote Setting    Consent:  The patient/guardian has verbally consented to: the potential risks and benefits of telemedicine (video visit or phone) versus in person care; bill my insurance or make self-payment for services provided; and responsibility for payment of non-covered services.     Mode of Communication:  Amwell video platform     As the provider I attest to compliance with applicable laws and regulations related to telemedicine.    IDENTIFICATION   Referred by: Pierce Westbrook, [facility] NEUROLOGY  PCP [facility]      Patient Care Team:  Edin Mays MD as PCP - General (Internal Medicine)  Marlena Mora MD as MD (Urology)  Pierce Westbrook MD as Assigned Neuroscience Provider  Shelbi Reid CNP as Nurse Practitioner (Urology)  Malena Kline, RN as Specialty Care Coordinator (Urology)  Edvin Manning MD as Resident (Student in organized health care education/training program)  TED Salgado MD as MD (Cardiovascular Disease)  Edin Mays MD as Assigned PCP  Mike Winston MD as MD (Ophthalmology)  Esvin Doss, PhD LP as Assigned Behavioral Health Provider  Shelbi Reid CNP as  "Assigned Surgical Provider  Edin Mays MD as Referring Physician (Internal Medicine)  TED Salgado MD as Assigned Heart and Vascular Provider  Therapist: none at present     History was provided by patient  who were  good historian(s).    Patient attended the session alone    RECORDS AVAILABLE FOR REVIEW: EHR records through [employer] .  In addition, reviewed the assessment today completed by Mabel Rodríguez [facility], Behavioral Health Consultant                                                 CHIEF COMPLAINT   Patient is a 77 year old,  White Choose not to answer male  who presents for initial psychiatric evaluation. Referred by   their Primary Care Provider: Edin Mays MD to the [facility] ([facility]) for evaluation of anxiety.  Our psychiatry providers act as a specialty service for Primary Care Providers in the [place] [facility] who seek to optimize medications for unstable patients.  Once medications have been optimized, our providers discharge the patient back to the referring Primary Care Provider for ongoing medication management.  This type of system allows our providers to serve a high volume of patients.      PCP:  Edin Mays MD internal medicine    HISTORY OF PRESENT ILLNESS   Per [facility], Mabel Rodríguez, during today's team-based visit:  \"The reason for seeking services at this time is: \"Dr. Hernandez wanted him to see someone. Anxiety\"   The problem(s) began 3 years ago when they had to move into an apartment. Patient has not attempted to resolve these concerns in the past.  Does the client have any condition that is currently presenting as a potential to harm themselves or others (severe withdrawal, serious medical condition, severe emotional/behavioral problem)? No.  Proceed with assessment.   Stress: worries about the future, wife's health  Goals: \"feel less stress\"  Most important: anxiety, doesn't want medication but Lucero thinks he would " "benefit\"    Reports past diagnosis of new-onset seizure in May 2020 and another seizure on 8/10/2020. The seizure was GTC with tongue biting then post ictal confusion. No incontinence. The seizures likely provoked by recent stress per neurology.  He had Neuropsychiatry testing 11/21/2021 with Dr. Doss as he was complaining of memory issues, mainly short term memory. Long term memory is good.      Dr. Doss had concluded the following:  \"Mr. Summers demonstrated mild weaknesses that raise the question of bilateral mesial temporal region dysfunction.  The etiology is not clear.  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  However, another explanation would be that these weaknesses are attributable to his seizures.  Additionally, he is reporting mild symptoms of depression and anxiety on self-report measures, which could be contributing as well.  In this exam, mild weaknesses were identified in verbal and nonverbal anterograde memory.  There are also subtle weaknesses in verbal fluency.  The remainder of his cognitive abilities were normal and performed in keeping with his average range cognitive baseline.  He has strengths in cognitive speed and aspects of executive functioning.  He also has a strength in basic visuospatial judgments.  As alluded to above, he is reporting mild symptoms of depression and anxiety.  It may well be the case that these mental health factors are contributing to some degree of variability and his performances in this exam, as well as to his concerns about cognitive dysfunction in day-to-day life.\"     He states he has what is seems to be restless leg syndrome for long time.  She states that his short memory is not as good as before. He is asking questions repeatedly and forget what they talks about. Neurology initially made the referral.  He has a history of bicuspid valve, severe stenosis, status " post AVR with prosthetic valve endocarditis, then mitral valve repair and pacemaker placed in , on Coumadin, paroxysmal AFib, chronic thrombocytopenia, gout.  No recent seizures endorsed.  His last seizure was in Aug. 2020. He is currently on keppra 500 mg bid.        Per wife:  Losing some of his short term memory, needs directions, constantly cleaning.  His son was depressed at one time and psychiatric admission.  Now son has a children.  Brooks does need something to calm a little bit.  Reports numb hands and feet.  Always feeling cold.      PSYCHIATRIC HISTORY:   Previous psychiatry: denies   Previous therapist: denies   History of Psychiatric Hospitalizations:   - Inpatient: denies   - IOP/PHP/Day treatment: denies   History of Suicidal Ideation: denies   History of Suicide Attempts:  Denies     History of Self-injurious Behavior: denies   History of Violence/Aggression: denies   History of Commitment? d  Electroconvulsive Therapy (ECT) or Transcranial Magnetic Stimulation (TMS):  Denies   PharmacogenomicTesting (such as GeneSight): denies     PSYCHIATRIC REVIEW OF SYSTEMS:   Sleep: wakes up early and feels related to working over 50 years 10-12 years.  Endorses trouble staying asleep in part due to wife's health         Depression:    No symptoms  Cleo:        No Symptoms  Psychosis:    No Symptoms  Anxiety:    Excessive worry, Nervousness, Sleep disturbance, Ruminations and Poor concentration.   Worrying about finances, wife's health, and who would care for her if he .  Wife diagnosed with interstitial lung disease.   Panic:        No symptoms  Post Traumatic Stress Disorder:  No Symptoms   Eating Disorder:    No Symptoms  ADD / ADHD:        No symptoms  Conduct Disorder:    No symptoms  Autism Spectrum Disorder:    No symptoms  Obsessive Compulsive Disorder:    No Symptoms  Diet: No Restrictions  Exercise: minimal due to medical condition      FAMILY, MEDICAL, SURGICAL HISTORY REVIEWED.  MEDICATION  HAVE BEEN REVIEWED AND ARE CURRENT TO THE BEST OF MY KNOWLEDGE AND ABILITY.    retired    MEDICATIONS                                                                                                Current Outpatient Medications   Medication Sig     allopurinol (ZYLOPRIM) 100 MG tablet TAKE 1 TABLET(100 MG) BY MOUTH DAILY     levETIRAcetam (KEPPRA) 500 MG tablet Take 1 tablet (500 mg) by mouth 2 times daily For additional refills, please schedule a follow-up appointment     losartan (COZAAR) 25 MG tablet Take 0.5 tablets (12.5 mg) by mouth daily     metoprolol succinate ER (TOPROL XL) 50 MG 24 hr tablet Take 1 tablet (50 mg) by mouth daily     Multiple Vitamins-Minerals (MULTIVITAMIN ADULTS 50+) TABS      oxybutynin ER (DITROPAN-XL) 10 MG 24 hr tablet Take 1 tablet (10 mg) by mouth daily     simvastatin (ZOCOR) 40 MG tablet TAKE 1 TABLET(40 MG) BY MOUTH AT BEDTIME     trospium (SANCTURA) 20 MG tablet Take 1 tablet (20 mg) by mouth At Bedtime     VITAMIN D, CHOLECALCIFEROL, PO Take 1,000 Units by mouth daily     warfarin ANTICOAGULANT (COUMADIN) 5 MG tablet TAKE 2 TABLET BY MOUTH ON FRIDAY, AND 1  1/2 TABLET ON ALL OTHER DAYS OR AS DIRECTED     PROVIDER DOSED MANAGED WARFARIN, COUMADIN, OUTPATIENT Per coumadin clinic     No current facility-administered medications for this visit.       DRUG MONITORING:  [place] Prescription Monitoring Program evaluating controlled substances in the last year in [place]:  [place] Pres[place] review was not needed today..    CURRENT MEDICATION SIDE EFFECTS REPORTED:  Denies     NOTES ABOUT CURRENT PSYCHOTROPIC MEDICATIONS:   none    PAST PSYCHOTROPIC MEDICATIONS:  Gabapentin for neuropathy briefly    VITALS   There were no vitals taken for this visit.     BP Readings from Last 1 Encounters:   06/07/22 115/69     Pulse Readings from Last 1 Encounters:   06/07/22 67     Wt Readings from Last 1 Encounters:   06/07/22 76.8 kg (169 lb 6.4 oz)     Ht Readings from  "Last 1 Encounters:   06/07/22 1.794 m (5' 10.63\")     Estimated body mass index is 23.87 kg/m  as calculated from the following:    Height as of 6/7/22: 1.794 m (5' 10.63\").    Weight as of 6/7/22: 76.8 kg (169 lb 6.4 oz).      PERTINENT HISTORY     Patient Active Problem List   Diagnosis     Rotator cuff (capsule) sprain     Other postprocedural status(V45.89)     Aortic valve stenosis, severe     Chronic systolic heart failure (H)     Bicuspid aortic valve     S/P aortic valve replacement     Smoking hx     LBBB (left bundle branch block)     Pneumothorax, left     Heart failure, chronic systolic (H)     Cardiomyopathy, dilated, nonischemic (H)     CKD (chronic kidney disease) stage 3, GFR 30-59 ml/min (H)     Dyslipidemia     Automatic implantable cardioverter-defibrillator in situ- [email or web address], Bi-Ventricular- INT dependent     Endocarditis     S/P AVR     Need for prophylactic antibiotic     Hyperlipidemia with target LDL less than 100     Finger injury     Paroxysmal atrial fibrillation (H)     Long term current use of anticoagulant therapy     Trigger ring finger of left hand     Nocturnal hypoxemia     Neoplasm of uncertain behavior of skin     AK (actinic keratosis)     History of nonmelanoma skin cancer     Seizure (H)     Cardiomyopathy (H)     ICD (implantable cardioverter-defibrillator) battery depletion     Anticoagulated on Coumadin     Anticoagulated        Seizures or Head Injury: Denies history of head injury.   Past Surgical History:   Procedure Laterality Date     ANESTHESIA CARDIOVERSION  7/18/2011    Procedure:ANESTHESIA CARDIOVERSION; Cardioversion; Surgeon:GENERIC ANESTHESIA PROVIDER; Location:[facility] OR     COLONOSCOPY       COLONOSCOPY N/A 11/19/2018    Procedure: COLONOSCOPY;  Surgeon: Herman Mcginnis MD;  Location: [facility]     CORONARY ANGIOGRAPHY ADULT ORDER       CYSTOSCOPY, BIOPSY URETHRA N/A 4/3/2017    Procedure: CYSTOSCOPY, BIOPSY URETHRA;  Surgeon: Marlena Mora MD;  " Location:  OR     ENDOSCOPY UPPER, COLONOSCOPY, COMBINED       EP ICD GENERATOR REPLACEMENT DUAL N/A 3/10/2021    Procedure: EP ICD GENERATOR CHANGE DUAL;  Surgeon: Mekhi Be MD;  Location:  HEART CARDIAC CATH LAB     HC REMOV & REPLACE IMPLT DEFIB PULSE GEN MULT LEAD  12/3/2015          HEMORRHOID SURGERY       IMPLANT AUTOMATIC IMPLANTABLE CARDIOVERTER DEFIBRILLATOR       MOHS MICROGRAPHIC PROCEDURE       ORTHOPEDIC SURGERY      shoulder,right     REDO STERNOTOMY REPLACE VALVE AORTIC  2013    Procedure: REDO STERNOTOMY REPLACE VALVE AORTIC;  Redo Sternotomy, Redo Aortic Valve Replacement on pump oxygenator. Intraoperative Transesophageal Echocardiogram;  Surgeon: Stephanie Hampton MD;  Location:  OR     REPAIR VALVE MITRAL  2013     REPLACE VALVE AORTIC  2011    Procedure:REPLACE VALVE AORTIC; Median Sternotomy, Bioprosthesis,  Aortic Valve replacement on pump with oxygenator. Intra-operative Transesophogeal Echocardiogram.; Surgeon:STEPHANIE HAMPTON; Location: OR     teeth extractions       TRANSPLANT          SOCIAL HISTORY  Patient reported they grew up in [place], [place].  They were raised by biological parents. Pt has 7 siblings. Patient reported that   childhood was hard working, small town, sports.  Patient denies experiencing childhood abuse/neglect     Relationship status:    Children: 2 children  Highest education level was college graduate.    Service: [employer] from [id number]    Employment status: retired    Trauma history: Denies  ACES (Adverse Childhood Experiences):  None.  Grew up in an intact home with all basic needs being met       LEGAL:  Denies     SUBSTANCE USE HISTORY  Social History     Tobacco Use     Smoking status: Former Smoker     Packs/day: 1.00     Years: 20.00     Pack years: 20.00     Types: Cigarettes     Quit date: 1989     Years since quittin.5     Smokeless tobacco: Never Used   Substance Use Topics     Alcohol use: Yes     Alcohol/week:  2.0 standard drinks       Caffeine:  Unremarkable   Current substance use: denies   Past use alcohol/substance use: denies      Chemical dependency history: Patient has not received chemical dependency treatment in the past       Family History   Problem Relation Age of Onset     Cancer Mother 92        stomach, colon     Hypertension Mother      Retinal detachment Mother      C.A.D. Father 68        bypass, carotid      Prostate Cancer Father 70     Heart Disease Father      Depression Sister      Anxiety Disorder Sister      Depression Brother      Anxiety Disorder Brother      Cancer Brother 64        lung cancer, petroleum     Cancer Brother      Glaucoma No family hx of      Macular Degeneration No family hx of      Strabismus No family hx of      Glasses (<7 y/o) No family hx of         PERTINENT FAMILY PSYCHIATRIC HISTORY NOTES  His mother had dementia. She passed away at her 90s.   +family hx of anxiety and depression  Son with depression  Siblings: sister and brother, unknown medication   Substance use history in family:  Denies    Based on the clinical interview, there  are not indications of drug or alcohol abuse.      LABS & IMAGING                                                                                                                   Recent Labs   Lab Test 11/15/21  1446 06/24/21  1533   WBC 4.6 4.0   HGB 14.4 13.7   HCT 43.5 43.2   MCV 99 101*   PLT 81* 79*   ANEU  --  2.1     Recent Labs   Lab Test 11/15/21  1446 03/10/21  0657 05/11/20  0544   *   < > 142   POTASSIUM 4.8   < > 4.0   CHLORIDE 110*   < > 111*   CO2 29   < > 25   *   < > 87   MARTIN 9.5   < > 8.6   MAG  --   --  2.4*   BUN 16   < > 18   CR 0.90   < > 0.97   GFRESTIMATED 83   < > 76   ALBUMIN 4.2  --   --    PROTTOTAL 7.4  --   --    AST 26  --   --    ALT 45  --   --    ALKPHOS 69  --   --    BILITOTAL 0.8  --   --     < > = values in this interval not displayed.     Recent Labs   Lab Test 07/19/22  0837   CHOL 129    LDL 65   HDL 38*   TRIG 130     Recent Labs   Lab Test 21  1533   TSH 1.52     No results found for: WAH152, KTBC545, VFVC35NJTAS, VITD3, D2VIT, D3VIT, DTOT, [id number], [id number], [id number], [id number], [id number], [id number]     ALLERGY & IMMUNIZATIONS       Allergies   Allergen Reactions     Lisinopril Cough           MEDICAL REVIEW OF SYSTEMS:   Ten system review was completed with pertinent positives noted above    MENTAL STATUS EXAM:   General/Constitutional:  Appearance:   awake, alert, adequately groomed, appeared stated age and no apparent distress  Attitude:    cooperative   Eye Contact:  good  Musculoskeletal:  Psychomotor Behavior:  no evidence of tardive dyskinesia, dystonia, or tics from the head up  Psychiatric:  Speech:  clear, coherent, regular rate, rhythm, and volume,   No pressure speech noted.  Associations:  no loose associations  Thought Process:   logical, linear and goal oriented  Thought Content:    No evidence of suicidal ideation or homicidal ideation, no evidence of psychotic thought, no auditory hallucinations present and no visual hallucinations present  Mood:  anxious  Affect:  full range/stable (normal variation of emotions during exam) and was congruent to speech content.  Insight:  fair  Judgment:  fair, adequate for safety  Impulse Control:  intact  Neurological:  Oriented to:  person, place, time, and situation  Attention Span and Concentration:  normal    Language: intact     Recent and Remote Memory:  Intact to interview. Not formally assessed. No amnesia.    Fund of Knowledge: appropriate       SAFETY   Feels safe in home: yes    Suicidal ideation: Denies  History of suicide attempts:  No   Hx of impulsivity: No   Hope for the future: present    Hx of Command hallucinations or current psychosis:  Denies   History of Self-injurious behaviors: denies   Family member  by suicide:  Not discussed     SAFETY ASSESSMENT:   Based on all available evidence including the  factors cited above, overall Risk for harm is low and is appropriate for outpatient level of care.   Recommended that patient call 911 or go to the local ED should there be a change in any of these risk factors.           LANGUAGE OR COMMUNICATION BARRIERS   Are there language or communication issues or need for modification in treatment? No   Are there ethnic, cultural or Muslim factors that may be relevant for therapy? No  Client identified their preferred language to be fluent English in conversational context  Does the client need the assistance of an  or other support involved in therapy? No    DSM 5 DIAGNOSIS:   300.02 (F41.1) Generalized Anxiety Disorder    MEETS CRITERIA PER DSM 5 AS FOLLOWS:   Meets criteria for DSM 5 MIC as follows:   A. Excessive anxiety and worry, occurring more days than not for at least 6 months, concerning a number of events;   B. The individual finds it difficult to control the worry;   C. The anxiety and worry are associated with at least three of the following six symptoms (only one item required in children):     Restlessness, feeling keyed up or on edge.     Being easily fatigued     Difficulty concentrating     Irritability     Muscle tension     Sleep disturbance   D. The anxiety, worry or physical symptoms cause clinically significant distress or impairment in important areas of functioning;   E. The disturbance is not due to the physiological effects of a substance or medical condition; F. The disturbance is not better explained by another medical disorder (American Psychiatric Association, 2013).      MEDICAL COMORBIDITY IMPACTING CLINICAL PICTURE: Seizures and Geriatric.    Known issue that I take into account for their medical decisions, no current exacerbations or new concerns      ASSESSMENT AND PLAN    Brooks Sumemrs is a 77 year old White Choose not to answer male presenting for psychiatric evaluation and medication management through the [facility].  Information is obtained from patient and available records.  Reports history of anxiety and seizures with new cognitive weakness endorsed.  Family history of dementia.  Denies prior psychiatric hospitalizations. No history of suicidal thoughts or attempts. No history of self-injurious behaviors. Genetically loaded for  depression, anxiety and dementia.. Grew up in an intact home with all basic needs being met.       ASSESSMENT AND PLAN      Problem List Items Addressed This Visit        Behavioral     Complicated clinical presentation impacted by physical comorbidities and involving cognitive weakness.  He has had neuropsychiatric testing which indicated depression and anxiety may be playing into the clinical picture.  Has never been on psychotropic medications.  Will trial low dose sertraline titrated to 25 mg and follow-up in 6 weeks           MIC (generalized anxiety disorder) - Primary    Relevant Medications    sertraline (ZOLOFT) 25 MG tablet       Other    Mild cognitive impairment         CONSULTS/REFERRALS:   consider therapy support  Coordinate care with therapist as needed    MEDICAL:   None at this time  Coordinate care with PCP (Edin Mays) as needed  Follow up with primary care provider as planned or for acute medical concerns.    PSYCHOEDUCATION:  Medication side effects and alternatives reviewed. Health promotion activities recommended and reviewed today. All questions addressed. Education and counseling completed regarding risks and benefits of medications and psychotherapy options.  Consent provided by patient/guardian  Call the psychiatric nurse line with medication questions or concerns at [phone].  Celenohart may be used to communicate with your provider, but this is not intended to be used for emergencies.  Medlineplus.gov is information for patients.  It is run by the National Library of Medicine and it contains information about all disorders, diseases and  all medications.      COMMUNITY RESOURCES:    CRISIS NUMBERS: Provided in AVS 2022  National Suicide Prevention Lifeline: 8-121-680-TALK (545-862-7963)  [employer]/resources for a list of additional resources (SOS)            [facility] - [phone]   Urgent Care Adult Mental [facility]-[phone] mobile unit/  crisis line  [facility] -[phone]   COPE  [place] Mobile Team -[phone] (adults)/ [phone] (child)  Poison Control Center - 1-850.168.2586    OR  go to nearest ER  Crisis Text Line for any crisis  send this-   To: 565463   [facility]  [phone]  National Suicide Prevention Lifeline: 626.583.3156 (TTY: 176.756.1385). Call anytime for help.  (www.suicidepreventionlifeline.org)  National [place] on Mental Illness (www.rad.org): 895-719-0003 or 536-976-6051.   Mental Health Association (www.mentalhealth.org): 880.214.1886 or 212-652-4884.  [place] Crisis Text Line: Text [place] to 477793  Suicide LifeLine Chat: suicideProximic.org/chat    ADMINISTRATIVE BILLIN min spent on the date of the encounter in chart review, patient visit, review of tests, documentation, care coordination, and/or discussion with other providers about the issues documented above.    Video/Phone Start Time:  0855  Video/Phone End Time:  9:42 AM    Greater than 50% of time was spent in counseling and coordination of care regarding above diagnoses and treatment plan.     Patient Status:  Our psychiatry providers act as a specialty service for Primary Care Providers in the [facility] that seek to optimize medications for unstable patients.  Once medications have been optimized, our providers discharge the patient back to the referring Primary Care Provider for ongoing medication management.  This type of system allows our providers to serve a high volume of patients. At this time  Patient will continue to be seen for  ongoing consultation and stabilization.    Signed:   Yanni Rivero, MSN, APRN, FMHNP-[facility] ([facility])   Chart documentation done in part with Dragon Voice Recognition software.  Although reviewed after completion, some word and grammatical errors may remain.

## 2022-08-02 ENCOUNTER — ANTICOAGULATION THERAPY VISIT (OUTPATIENT)
Dept: ANTICOAGULATION | Facility: CLINIC | Age: 77
End: 2022-08-02

## 2022-08-02 ENCOUNTER — LAB (OUTPATIENT)
Dept: LAB | Facility: CLINIC | Age: 77
End: 2022-08-02
Payer: MEDICARE

## 2022-08-02 DIAGNOSIS — I48.0 PAROXYSMAL ATRIAL FIBRILLATION (H): ICD-10-CM

## 2022-08-02 DIAGNOSIS — Z79.01 LONG TERM CURRENT USE OF ANTICOAGULANT THERAPY: ICD-10-CM

## 2022-08-02 DIAGNOSIS — Z95.2 S/P AVR: ICD-10-CM

## 2022-08-02 DIAGNOSIS — Z79.01 ANTICOAGULATED ON COUMADIN: ICD-10-CM

## 2022-08-02 DIAGNOSIS — Z95.2 S/P AORTIC VALVE REPLACEMENT: Primary | ICD-10-CM

## 2022-08-02 DIAGNOSIS — Z95.2 S/P AORTIC VALVE REPLACEMENT: ICD-10-CM

## 2022-08-02 DIAGNOSIS — Z79.01 ANTICOAGULATED: ICD-10-CM

## 2022-08-02 LAB — INR BLD: 3.4 (ref 0.9–1.1)

## 2022-08-02 PROCEDURE — 36416 COLLJ CAPILLARY BLOOD SPEC: CPT

## 2022-08-02 PROCEDURE — 85610 PROTHROMBIN TIME: CPT

## 2022-08-02 NOTE — PROGRESS NOTES
ANTICOAGULATION MANAGEMENT     Brooks Summers 77 year old male is on warfarin with supratherapeutic INR result. (Goal INR 2.0-3.0)    Recent labs: (last 7 days)     08/02/22  0823   INR 3.4*       ASSESSMENT       Source(s): Chart Review and Patient/Caregiver Call       Warfarin doses taken: Warfarin taken as instructed    Diet: No new diet changes identified    New illness, injury, or hospitalization: No    Medication/supplement changes: zoloft prescibed 7/28, however pt has not started it yet. Plans to start it in the next few days.     Signs or symptoms of bleeding or clotting: No    Previous INR: Supratherapeutic    Additional findings: None       PLAN     Recommended plan for ongoing change(s) affecting INR     Dosing Instructions: decrease your warfarin dose (9.% change) with next INR in 1 week       Summary  As of 8/2/2022    Full warfarin instructions:  8/2: 5 mg; Otherwise 5 mg every Tue; 7.5 mg all other days   Next INR check:  8/9/2022             Telephone call with Brooks who verbalizes understanding and agrees to plan    Lab visit scheduled    Education provided: Please call back if any changes to your diet, medications or how you've been taking warfarin, Goal range and significance of current result, Potential interaction between warfarin and zoloft and Monitoring for bleeding signs and symptoms    Plan made per [facility] anticoagulation protocol    Little Almonte RN  Anticoagulation Clinic  8/2/2022    _______________________________________________________________________     Anticoagulation Episode Summary     Current INR goal:  2.0-3.0   TTR:  76.0 % (1 y)   Target end date:  Indefinite   Send INR reminders to:  JONATHAN MURILLO    Indications    S/P aortic valve replacement [Z95.2]  Paroxysmal atrial fibrillation (H) [I48.0]  Long term current use of anticoagulant therapy [Z79.01]  Anticoagulated on Coumadin [Z79.01]  Anticoagulated [Z79.01]           Comments:   * warfarin 5 mg tabs. Aortic 21 mm  Johnson Perimount Magna Tissue Valve.          Anticoagulation Care Providers     Provider Role Specialty Phone number    Edin Mays MD Referring Internal Medicine [phone]

## 2022-08-03 ENCOUNTER — TELEPHONE (OUTPATIENT)
Dept: BEHAVIORAL HEALTH | Facility: CLINIC | Age: 77
End: 2022-08-03

## 2022-08-03 NOTE — TELEPHONE ENCOUNTER
Spoke with pt to remind them about their video appt on Friday 8/05/22 at 1 pm, that will be connect through via [employer].

## 2022-08-04 DIAGNOSIS — I10 BENIGN ESSENTIAL HYPERTENSION: ICD-10-CM

## 2022-08-05 ENCOUNTER — VIRTUAL VISIT (OUTPATIENT)
Dept: PSYCHOLOGY | Facility: CLINIC | Age: 77
End: 2022-08-05
Payer: MEDICARE

## 2022-08-05 DIAGNOSIS — F41.1 GENERALIZED ANXIETY DISORDER: ICD-10-CM

## 2022-08-05 PROCEDURE — 90791 PSYCH DIAGNOSTIC EVALUATION: CPT | Mod: 95 | Performed by: COUNSELOR

## 2022-08-05 NOTE — PROGRESS NOTES
"    [facility]  Provider Name:  Esperanza Milsl    Credentials:  [facility]    PATIENT'S NAME: Brooks Summers  PREFERRED NAME: Brooks  PRONOUNS:  he/him     MRN: [id number]  : 1945  ADDRESS: [address] [place] [address]  [address] [facility]  [place] [address]  ACCT. NUMBER:  [id number]  DATE OF SERVICE: 22  START TIME: 1:00pm  END TIME: 2:00pm  PREFERRED PHONE: [phone]  May we leave a program related message: Yes  SERVICE MODALITY:  Video Visit:      Provider verified identity through the following two step process.  Patient provided:  Patient     Telemedicine Visit: The patient's condition can be safely assessed and treated via synchronous audio and visual telemedicine encounter.      Reason for Telemedicine Visit: Services only offered telehealth    Originating Site (Patient Location): Patient's home    Distant Site (Provider Location): Provider Remote Setting- Home Office    Consent:  The patient/guardian has verbally consented to: the potential risks and benefits of telemedicine (video visit) versus in person care; bill my insurance or make self-payment for services provided; and responsibility for payment of non-covered services.     Patient would like the video invitation sent by:  My Chart    Mode of Communication:  Video Conference via Amwell    As the provider I attest to compliance with applicable laws and regulations related to telemedicine.    UNIVERSAL ADULT Mental Health DIAGNOSTIC ASSESSMENT    Identifying Information:  Patient is a 77 year old,    individual.    Patient was referred for an assessment by neuro doc, Dr Hernandez .  Patient attended the session with wife.    Chief Complaint:   The reason for seeking services at this time is: \"the client retired with his wife 3/4 years ago. Wife developed a lung problem in [place].  She was on a lung transplant list at the [facility]. They had to sell their home they had been living in for 30 years and move into an apt building. The client can't do steps " "anymore. She's too old for the lung transplant and she goes to the  for treatment. The client and his wife are confined to their apartment because of her illness. The client has had two open heart surgeries and has a pace maker. They have a lot of relatives where he grew up and they can't visit and it's hard to have their 4 grandkids over. The client golfs once a week and takes walks. Her doctor doesn't want her to go many places because of her infections.     Patient has attempted to resolve these concerns in the past through doctor visits.    Social/Family History:  Patient reported they grew up in [place], [place].  They were raised by biological parents. Pt has 7 siblings. Patient reported that   childhood was hard working, small town, sports.  Patient denies experiencing childhood abuse/neglect. Patient described their current relationships with family of origin as good.       The patient has been  2 times and has 2 children.  described the relationship with spouse as, \"very good.\" Patient reported having some good friends.      Cultural influences and impact on patient's life structure, values, norms, and healthcare: Racial or Ethnic Self-Identification white, Immigration History and Status: born in the , citizen, Level of Acculturation: good, Time Orientation: US 12 hour clock CT, Social Orientation: unable to assess, Verbal / Non-verbal Communication Style: unremarkable, Locus of Control: unable to assess, Spiritual Beliefs: Mandaeism, Health Beliefs and the endorsement of OR engagement in Culturally Specific Healing Practices: none and Cultural Bias none. Patient identified their preferred language to be English. Patient reported they does not need the assistance of an  or other support involved in treatment.     Patient reported had no significant delays in developmental tasks.   Patient's highest education level was  [employer] business degree  .  Patient identified the following learning " problems: none reported.  Modifications will not be used to assist communication in therapy.  Patient reports they are  able to understand written materials.  The client grew up in a [place] town up [place], had to move to [facility] for two years then [place] for his sisters health. 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 Didn't go to [place]. 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     Patient reported the following relationship history: been  since . They met in [place], he was on a business trip. She had a son who came with her from [place]. They have two boys- each from a previous relationship.  Both were / before each other.  Patient identified their sexual orientation as  heterosexual.   The client's siblings are (2 sisters who live in [place], his twin sister who lives in [place], and a brother in [place]. His oldest brother is . His wife's family is in [facility], [place].  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    Patient reported that they   have not been involved with the legal system. Patient   report being under probation/ parole/ jurisdiction. They are not under any current court jurisdiction. .    Patient's Strengths and Limitations:  Patient identified the following strengths or resources that will help them succeed in treatment: commitment to health and well being, friends / good social support, family support, insight, intelligence, motivation, sense of humor, strong social skills and work ethic. Things that may interfere with the patient's success in treatment include: physical health concerns.     Assessments:  The following assessments were completed by patient for this visit:  PHQ9:   PHQ-9 SCORE 2021   PHQ-9 Total Score 0     GAD7:   MIC-7 SCORE 2022   Total Score 7     PROMIS 10-Global Health (all questions and answers displayed):   PROMIS 10 2022   In general, would you say  your health is: 3   In general, would you say your quality of life is: 3   In general, how would you rate your physical health? 4   In general, how would you rate your mental health, including your mood and your ability to think? 2   In general, how would you rate your satisfaction with your social activities and relationships? 3   In general, please rate how well you carry out your usual social activities and roles. (This includes activities at home, at work and in your community, and responsibilities as a parent, child, spouse, employee, friend, etc.) 4   To what extent are you able to carry out your everyday physical activities such as walking, climbing stairs, carrying groceries, or moving a chair? 5   In the past 7 days, how often have you been bothered by emotional problems such as feeling anxious, depressed, or irritable? 3   In the past 7 days, how would you rate your fatigue on average? 2   In the past 7 days, how would you rate your pain on average, where 0 means no pain, and 10 means worst imaginable pain? 0   Global Mental Health Score 11   Global Physical Health Score 18   PROMIS TOTAL - SUBSCORES 29   Some recent data might be hidden       Personal and Family Medical History:  Patient  does report a family history of mental health concerns.  Patient reports family history includes Anxiety Disorder in his brother and sister; C.A.D. (age of onset: 68) in his father; Cancer in his brother; Cancer (age of onset: 64) in his brother; Cancer (age of onset: 92) in his mother; Depression in his brother and sister; Heart Disease in his father; Hypertension in his mother; Prostate Cancer (age of onset: 70) in his father; Retinal detachment in his mother. Client's son has some mental health issues.     Patient does not report Mental Health Diagnosis or Treatment.      Patient has had a physical exam to rule out medical causes for current symptoms.  Date of last physical exam was within the past year. Client was  encouraged to follow up with PCP if symptoms were to develop. The patient has a [facility] Primary Care Provider, who is named Edin Mays.  Patient reports the following current medical concerns: heart issues, cognitive issues.  Patient denies any issues with pain.   There are not significant appetite / nutritional concerns / weight changes.   Patient does not report a history of head injury / trauma / cognitive impairment.      Please see medications section. Client takes medications as prescribed.     Medication Adherence:  Patient reports  .  taking prescribed medications as prescribed.    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 Tx with Dapto/RIFx 8 weeks.     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     Paroxysmal A-fib (H)     Post-op 7/14/2011, 1/26/13     Rotator Cuff (Capsule) Sprain and Strain      S/P aortic valve replacement     7/14/2011, re-do 1/25/13 due to endocarditis     Smoking hx      Thrombocytopenia (H)          Current Mental Status Exam:   Appearance:  Appropriate    Eye Contact:  Good   Psychomotor:  Normal       Gait / station:  no problem  Attitude / Demeanor: Cooperative   Speech      Rate / Production: Normal/ Responsive      Volume:  Normal  volume      Language:  intact and no problems  Mood:   Normal  Affect:   Appropriate    Thought Content: Clear   Thought Process: Coherent  Logical       Associations: No loosening of associations  Insight:   Good   Judgment:  Intact   Orientation:  All  Attention/concentration: Good      Substance Use:  Patient did not report a family history of substance use  concerns; see medical history section for details.  Patient has not received chemical dependency treatment in the past.  Patient has not ever been to detox.      Patient is not currently receiving any chemical dependency treatment. Patient reported the following problems as a result of their substance use:  none.    Patient reports using alcohol 1 times per day and has 1 beers and light beers at a time. Patient first started drinking at age young.  Patient reported date of last use was yesterday.  Patient reports heaviest use was when I was younger.  Patient denies using tobacco.  Patient denies using cannabis.  Patient reports using caffeine 1 times per day and drinks 1 at a time. Patient started using caffeine at age young.  Patient reports using/abusing the following substance(s). Patient reported no other substance use.     Substance Use: No symptoms    Based on the negative CAGE score and clinical interview there  are not indications of drug or alcohol abuse.      Significant Losses / Trauma / Abuse / Neglect Issues:   Patient   did serve in the .  There are indications or report of significant loss, trauma, abuse or neglect issues related to: 1957- maternal grandparents were killed in a car accident. His parents were in a car accident in the 90s and were injured. His dad had several heart surgeries. The client lost several friends in the [place] war. .  Concerns for possible neglect are not present.     Safety Assessment:   Patient denies current homicidal ideation and behaviors.  Patient denies current self-injurious ideation and behaviors.    Patient denied risk behaviors associated with substance use.  Patient denies any high risk behaviors associated with mental health symptoms.  Patient reports the following current concerns for their personal safety: None.  Patient reports there   no firearms in the house.        History of Safety Concerns:  Patient denied a history of homicidal ideation.      Patient denied a history of personal safety concerns.    Patient denied a history of assaultive behaviors.    Patient denied a history of sexual assault behaviors.     Patient denied a history of risk behaviors associated with substance use.  Patient denies any history of high risk behaviors associated with mental health symptoms.  Patient reports the following protective factors:      Risk Plan:  See Recommendations for Safety and Risk Management Plan    Review of Symptoms per patient report:  Depression: Change in energy level, Feelings of helplessness and Irritability  Cleo:  No Symptoms  Psychosis: No Symptoms  Anxiety: Nervousness, Psychomotor agitation and Irritability  Panic:  No symptoms  Post Traumatic Stress Disorder:  No Symptoms   Eating Disorder: No Symptoms  ADD / ADHD:  No symptoms  Conduct Disorder: No symptoms  Autism Spectrum Disorder: No symptoms  Obsessive Compulsive Disorder: No Symptoms        Diagnostic Criteria:   Unspecified Anxiety Disorder , Symptoms characteristic of an anxiety disorder that caused clinically significant distress or impairment in social, occupational, or other important areas of functioning predominate but do not meet the full criteria for any of the disorders of the anxiety disorders diagnostic class.      Functional Status:  Patient reports the following functional impairments:  management of the household and or completion of tasks.     Nonprogrammatic care:  Patient is requesting basic services to address current mental health concerns.    Clinical Summary:  1. Reason for assessment: wife is concerned about memory issues and the client potentially feeling down/anxious.  2. Psychosocial, Cultural and Contextual Factors: both the client and his wife have health issues  .  3. Principal DSM5 Diagnoses  (Sustained by DSM5 Criteria Listed Above):   300.02 (F41.1) Generalized Anxiety Disorder.  4. Other Diagnoses that is relevant to services: health related diagnoses  5.  Provisional Diagnosis: none  6. Prognosis: Expect Improvement.  7. Likely consequences of symptoms if not treated: decline in depression.  8. Client strengths include:  caring, educated, empathetic, goal-focused, good listener, has a previous history of therapy, insightful, intelligent, motivated, open to learning, open to suggestions / feedback, support of family, friends and providers, supportive, wants to learn, willing to ask questions, willing to relate to others and work history .     Recommendations:     1. Plan for Safety and Risk Management:   Safety and Risk: Recommended that patient call 911 or go to the local ED should there be a change in any of these risk factors..          Report to child / adult protection services was NA.     2. Patient's identified no relevant cultural issues for therapy.     3. Initial Treatment will focus on:    Adjustment Difficulties related to: health concerns.     4. Resources/Service Plan:    services are not indicated.   Modifications to assist communication are not indicated.   Additional disability accommodations are not indicated.      5. Collaboration:   Collaboration / coordination of treatment will be initiated with the following  support professionals: none at this time.      6.  Referrals:   The following referral(s) will be initiated: none needed. Next Scheduled Appointment: December.     A Release of Information has been obtained for the following: none needed.     Emergency Contact  was obtained.     7. CRUZ:    CRUZ:  Discussed the general effects of drugs and alcohol on health and well-being. Provider gave patient printed information about the effects of chemical use on their health and well being.   8. Records:   These were reviewed at time of assessment.   Information in this assessment was obtained from the medical record and  provided by patient and family who is a good historian.    Patient will have open access to their mental health medical  record.        Provider Name/ Credentials:  Esperanza Mills MA,LPCC  August 5, 2022

## 2022-08-08 DIAGNOSIS — I10 BENIGN ESSENTIAL HYPERTENSION: ICD-10-CM

## 2022-08-08 DIAGNOSIS — M10.00 IDIOPATHIC GOUT, UNSPECIFIED CHRONICITY, UNSPECIFIED SITE: ICD-10-CM

## 2022-08-08 RX ORDER — METOPROLOL SUCCINATE 50 MG/1
50 TABLET, EXTENDED RELEASE ORAL DAILY
Qty: 90 TABLET | Refills: 3 | Status: SHIPPED | OUTPATIENT
Start: 2022-08-08 | End: 2023-08-11

## 2022-08-08 NOTE — TELEPHONE ENCOUNTER
METOPROLOL ER SUCCINATE 50MG TABS  Last Written Prescription Date:   5/6/2022  Last Fill Quantity: 90,   # refills: 0  Last Office Visit : 6/7/2022  Future Office visit:  None  90 Tabs, 3 refills sent to desiree Velazquez RN  Central Triage Red Flags/Med Refills

## 2022-08-09 ENCOUNTER — LAB (OUTPATIENT)
Dept: LAB | Facility: CLINIC | Age: 77
End: 2022-08-09
Payer: MEDICARE

## 2022-08-09 ENCOUNTER — ANTICOAGULATION THERAPY VISIT (OUTPATIENT)
Dept: ANTICOAGULATION | Facility: CLINIC | Age: 77
End: 2022-08-09

## 2022-08-09 DIAGNOSIS — Z79.01 ANTICOAGULATED: ICD-10-CM

## 2022-08-09 DIAGNOSIS — Z79.01 ANTICOAGULATED ON COUMADIN: ICD-10-CM

## 2022-08-09 DIAGNOSIS — Z79.01 LONG TERM CURRENT USE OF ANTICOAGULANT THERAPY: ICD-10-CM

## 2022-08-09 DIAGNOSIS — Z95.2 S/P AORTIC VALVE REPLACEMENT: Primary | ICD-10-CM

## 2022-08-09 DIAGNOSIS — I48.0 PAROXYSMAL ATRIAL FIBRILLATION (H): ICD-10-CM

## 2022-08-09 LAB — INR BLD: 2.3 (ref 0.9–1.1)

## 2022-08-09 PROCEDURE — 85610 PROTHROMBIN TIME: CPT

## 2022-08-09 PROCEDURE — 36416 COLLJ CAPILLARY BLOOD SPEC: CPT

## 2022-08-09 NOTE — PROGRESS NOTES
ANTICOAGULATION MANAGEMENT     Brooks Summers 77 year old male is on warfarin with therapeutic INR result. (Goal INR 2.0-3.0)    Recent labs: (last 7 days)     08/09/22  0827   INR 2.3*       ASSESSMENT       Source(s): Chart Review and Patient/Caregiver Call       Warfarin doses taken: Warfarin taken as instructed    Diet: No new diet changes identified    New illness, injury, or hospitalization: No    Medication/supplement changes: None noted    Signs or symptoms of bleeding or clotting: No    Previous INR: Therapeutic last 2(+) visits    Additional findings: None       PLAN     Recommended plan for no diet, medication or health factor changes affecting INR     Dosing Instructions: Continue your current warfarin dose with next INR in 2 weeks       Summary  As of 8/9/2022    Full warfarin instructions:  5 mg every Tue; 7.5 mg all other days   Next INR check:  8/23/2022             Telephone call with Brooks who verbalizes understanding and agrees to plan    Lab visit scheduled    Education provided: Please call back if any changes to your diet, medications or how you've been taking warfarin    Plan made per [facility] anticoagulation protocol    Ashley Norris RN  Anticoagulation Clinic  8/9/2022    _______________________________________________________________________     Anticoagulation Episode Summary     Current INR goal:  2.0-3.0   TTR:  77.3 % (1 y)   Target end date:  Indefinite   Send INR reminders to:  JONATHAN MURILLO    Indications    S/P aortic valve replacement [Z95.2]  Paroxysmal atrial fibrillation (H) [I48.0]  Long term current use of anticoagulant therapy [Z79.01]  Anticoagulated on Coumadin [Z79.01]  Anticoagulated [Z79.01]           Comments:   * warfarin 5 mg tabs. Aortic 21 mm Johnson Perimount Magna Tissue Valve.          Anticoagulation Care Providers     Provider Role Specialty Phone number    Edin Mays MD Referring Internal Medicine [phone]

## 2022-08-11 RX ORDER — METOPROLOL SUCCINATE 50 MG/1
TABLET, EXTENDED RELEASE ORAL
Qty: 90 TABLET | Refills: 0 | OUTPATIENT
Start: 2022-08-11

## 2022-08-12 RX ORDER — ALLOPURINOL 100 MG/1
100 TABLET ORAL DAILY
Qty: 90 TABLET | Refills: 0 | Status: SHIPPED | OUTPATIENT
Start: 2022-08-12 | End: 2022-11-28

## 2022-08-24 PROBLEM — F41.1 GAD (GENERALIZED ANXIETY DISORDER): Status: ACTIVE | Noted: 2022-08-24

## 2022-08-24 PROBLEM — G31.84 MILD COGNITIVE IMPAIRMENT: Status: ACTIVE | Noted: 2022-08-24

## 2022-08-24 NOTE — ASSESSMENT & PLAN NOTE
Complicated clinical presentation impacted by physical comorbidities and involving cognitive weakness.  He has had neuropsychiatric testing which indicated depression and anxiety may be playing into the clinical picture.  Has never been on psychotropic medications.  Will trial low dose sertraline titrated to 25 mg and follow-up in 6 weeks

## 2022-08-29 ENCOUNTER — ANCILLARY PROCEDURE (OUTPATIENT)
Dept: CARDIOLOGY | Facility: CLINIC | Age: 77
End: 2022-08-29
Attending: INTERNAL MEDICINE
Payer: MEDICARE

## 2022-08-29 DIAGNOSIS — I42.9 CARDIOMYOPATHY (H): ICD-10-CM

## 2022-08-29 DIAGNOSIS — Z95.810 S/P IMPLANTATION OF AUTOMATIC CARDIOVERTER/DEFIBRILLATOR (AICD): ICD-10-CM

## 2022-08-29 PROCEDURE — 93284 PRGRMG EVAL IMPLANTABLE DFB: CPT | Performed by: INTERNAL MEDICINE

## 2022-08-29 NOTE — PATIENT INSTRUCTIONS
Your next automatic remote ICD check is scheduled for 12/6/2022.  We will plan too see you back in clinic in 1 year.    It was a pleasure to see you in clinic today    Kailee Gramajo RN    Electrophysiology Nurse Clinician  [facility]    During Business Hours Please Call:  [phone]  After Hours Please Call:  [phone] - select option #4 and ask for job code [phone]

## 2022-08-30 ENCOUNTER — ANTICOAGULATION THERAPY VISIT (OUTPATIENT)
Dept: ANTICOAGULATION | Facility: CLINIC | Age: 77
End: 2022-08-30

## 2022-08-30 ENCOUNTER — LAB (OUTPATIENT)
Dept: LAB | Facility: CLINIC | Age: 77
End: 2022-08-30
Payer: MEDICARE

## 2022-08-30 DIAGNOSIS — Z79.01 LONG TERM CURRENT USE OF ANTICOAGULANT THERAPY: ICD-10-CM

## 2022-08-30 DIAGNOSIS — Z95.2 S/P AORTIC VALVE REPLACEMENT: Primary | ICD-10-CM

## 2022-08-30 DIAGNOSIS — Z79.01 ANTICOAGULATED: ICD-10-CM

## 2022-08-30 DIAGNOSIS — I48.0 PAROXYSMAL ATRIAL FIBRILLATION (H): ICD-10-CM

## 2022-08-30 DIAGNOSIS — Z79.01 ANTICOAGULATED ON COUMADIN: ICD-10-CM

## 2022-08-30 LAB — INR BLD: 1.9 (ref 0.9–1.1)

## 2022-08-30 PROCEDURE — 36416 COLLJ CAPILLARY BLOOD SPEC: CPT

## 2022-08-30 PROCEDURE — 85610 PROTHROMBIN TIME: CPT

## 2022-08-30 NOTE — PROGRESS NOTES
ANTICOAGULATION MANAGEMENT     Brokos Summers 77 year old male is on warfarin with subtherapeutic INR result. (Goal INR 2.0-3.0)    Recent labs: (last 7 days)     08/30/22  0830   INR 1.9*       ASSESSMENT       Source(s): Chart Review and Patient/Caregiver Call       Warfarin doses taken: Warfarin taken as instructed    Diet: No new diet changes identified  Unsure if less greens then usual.    New illness, injury, or hospitalization: No    Medication/supplement changes: None noted    Signs or symptoms of bleeding or clotting: No    Previous INR: Therapeutic last visit; previously outside of goal range    Additional findings: None       PLAN     Recommended plan for no diet, medication or health factor changes affecting INR     Dosing Instructions: Continue your current warfarin dose with next INR in 2 weeks       Summary  As of 8/30/2022    Full warfarin instructions:  8/30: 7.5 mg; Otherwise 5 mg every Tue; 7.5 mg all other days   Next INR check:               Telephone call with Brooks who verbalizes understanding and agrees to plan    Lab visit scheduled    Education provided: Please call back if any changes to your diet, medications or how you've been taking warfarin and Monitoring for clotting signs and symptoms    Plan made per [facility] anticoagulation protocol    Ashley Norris RN  Anticoagulation Clinic  8/30/2022    _______________________________________________________________________     Anticoagulation Episode Summary     Current INR goal:  2.0-3.0   TTR:  76.2 % (1 y)   Target end date:  Indefinite   Send INR reminders to:  JONATHAN MURILLO    Indications    S/P aortic valve replacement [Z95.2]  Paroxysmal atrial fibrillation (H) [I48.0]  Long term current use of anticoagulant therapy [Z79.01]  Anticoagulated on Coumadin [Z79.01]  Anticoagulated [Z79.01]           Comments:   * warfarin 5 mg tabs. Aortic 21 mm Johnson Perimount Magna Tissue Valve.          Anticoagulation Care Providers     Provider Role  Specialty Phone number    Edin Mays MD Referring Internal Medicine [phone]

## 2022-08-31 LAB
MDC_IDC_LEAD_IMPLANT_DT: NORMAL
MDC_IDC_LEAD_LOCATION: NORMAL
MDC_IDC_LEAD_LOCATION_DETAIL_1: NORMAL
MDC_IDC_LEAD_MFG: NORMAL
MDC_IDC_LEAD_MODEL: NORMAL
MDC_IDC_LEAD_POLARITY_TYPE: NORMAL
MDC_IDC_LEAD_SERIAL: NORMAL
MDC_IDC_LEAD_SPECIAL_FUNCTION: NORMAL
MDC_IDC_MSMT_BATTERY_DTM: NORMAL
MDC_IDC_MSMT_BATTERY_REMAINING_LONGEVITY: 46 MO
MDC_IDC_MSMT_BATTERY_RRT_TRIGGER: NORMAL
MDC_IDC_MSMT_BATTERY_STATUS: NORMAL
MDC_IDC_MSMT_BATTERY_VOLTAGE: 2.95 V
MDC_IDC_MSMT_CAP_CHARGE_DTM: NORMAL
MDC_IDC_MSMT_CAP_CHARGE_ENERGY: 18 J
MDC_IDC_MSMT_CAP_CHARGE_TIME: 4.1 S
MDC_IDC_MSMT_CAP_CHARGE_TYPE: NORMAL
MDC_IDC_MSMT_LEADCHNL_LV_IMPEDANCE_VALUE: 323 OHM
MDC_IDC_MSMT_LEADCHNL_LV_IMPEDANCE_VALUE: 399 OHM
MDC_IDC_MSMT_LEADCHNL_LV_IMPEDANCE_VALUE: 665 OHM
MDC_IDC_MSMT_LEADCHNL_LV_PACING_THRESHOLD_AMPLITUDE: 2 V
MDC_IDC_MSMT_LEADCHNL_LV_PACING_THRESHOLD_PULSEWIDTH: 1 MS
MDC_IDC_MSMT_LEADCHNL_RA_IMPEDANCE_VALUE: 456 OHM
MDC_IDC_MSMT_LEADCHNL_RA_PACING_THRESHOLD_AMPLITUDE: 0.5 V
MDC_IDC_MSMT_LEADCHNL_RA_PACING_THRESHOLD_PULSEWIDTH: 0.4 MS
MDC_IDC_MSMT_LEADCHNL_RA_SENSING_INTR_AMPL: 1.6 MV
MDC_IDC_MSMT_LEADCHNL_RV_IMPEDANCE_VALUE: 399 OHM
MDC_IDC_MSMT_LEADCHNL_RV_IMPEDANCE_VALUE: 513 OHM
MDC_IDC_MSMT_LEADCHNL_RV_PACING_THRESHOLD_AMPLITUDE: 0.5 V
MDC_IDC_MSMT_LEADCHNL_RV_PACING_THRESHOLD_PULSEWIDTH: 0.4 MS
MDC_IDC_MSMT_LEADCHNL_RV_SENSING_INTR_AMPL: 18.4 MV
MDC_IDC_PG_IMPLANT_DTM: NORMAL
MDC_IDC_PG_MFG: NORMAL
MDC_IDC_PG_MODEL: NORMAL
MDC_IDC_PG_SERIAL: NORMAL
MDC_IDC_PG_TYPE: NORMAL
MDC_IDC_SESS_CLINIC_NAME: NORMAL
MDC_IDC_SESS_DTM: NORMAL
MDC_IDC_SESS_TYPE: NORMAL
MDC_IDC_SET_BRADY_AT_MODE_SWITCH_RATE: 171 {BEATS}/MIN
MDC_IDC_SET_BRADY_LOWRATE: 60 {BEATS}/MIN
MDC_IDC_SET_BRADY_MAX_SENSOR_RATE: 120 {BEATS}/MIN
MDC_IDC_SET_BRADY_MAX_TRACKING_RATE: 130 {BEATS}/MIN
MDC_IDC_SET_BRADY_MODE: NORMAL
MDC_IDC_SET_BRADY_NIGHT_RATE: 50 {BEATS}/MIN
MDC_IDC_SET_BRADY_PAV_DELAY_LOW: 170 MS
MDC_IDC_SET_BRADY_SAV_DELAY_LOW: 100 MS
MDC_IDC_SET_CRT_LVRV_DELAY: 0 MS
MDC_IDC_SET_CRT_PACED_CHAMBERS: NORMAL
MDC_IDC_SET_LEADCHNL_LV_PACING_AMPLITUDE: 3 V
MDC_IDC_SET_LEADCHNL_LV_PACING_ANODE_ELECTRODE_1: NORMAL
MDC_IDC_SET_LEADCHNL_LV_PACING_ANODE_LOCATION_1: NORMAL
MDC_IDC_SET_LEADCHNL_LV_PACING_CAPTURE_MODE: NORMAL
MDC_IDC_SET_LEADCHNL_LV_PACING_CATHODE_ELECTRODE_1: NORMAL
MDC_IDC_SET_LEADCHNL_LV_PACING_CATHODE_LOCATION_1: NORMAL
MDC_IDC_SET_LEADCHNL_LV_PACING_POLARITY: NORMAL
MDC_IDC_SET_LEADCHNL_LV_PACING_PULSEWIDTH: 1 MS
MDC_IDC_SET_LEADCHNL_RA_PACING_AMPLITUDE: 1.5 V
MDC_IDC_SET_LEADCHNL_RA_PACING_ANODE_ELECTRODE_1: NORMAL
MDC_IDC_SET_LEADCHNL_RA_PACING_ANODE_LOCATION_1: NORMAL
MDC_IDC_SET_LEADCHNL_RA_PACING_CAPTURE_MODE: NORMAL
MDC_IDC_SET_LEADCHNL_RA_PACING_CATHODE_ELECTRODE_1: NORMAL
MDC_IDC_SET_LEADCHNL_RA_PACING_CATHODE_LOCATION_1: NORMAL
MDC_IDC_SET_LEADCHNL_RA_PACING_POLARITY: NORMAL
MDC_IDC_SET_LEADCHNL_RA_PACING_PULSEWIDTH: 0.4 MS
MDC_IDC_SET_LEADCHNL_RA_SENSING_ANODE_ELECTRODE_1: NORMAL
MDC_IDC_SET_LEADCHNL_RA_SENSING_ANODE_LOCATION_1: NORMAL
MDC_IDC_SET_LEADCHNL_RA_SENSING_CATHODE_ELECTRODE_1: NORMAL
MDC_IDC_SET_LEADCHNL_RA_SENSING_CATHODE_LOCATION_1: NORMAL
MDC_IDC_SET_LEADCHNL_RA_SENSING_POLARITY: NORMAL
MDC_IDC_SET_LEADCHNL_RA_SENSING_SENSITIVITY: 0.3 MV
MDC_IDC_SET_LEADCHNL_RV_PACING_AMPLITUDE: 2 V
MDC_IDC_SET_LEADCHNL_RV_PACING_ANODE_ELECTRODE_1: NORMAL
MDC_IDC_SET_LEADCHNL_RV_PACING_ANODE_LOCATION_1: NORMAL
MDC_IDC_SET_LEADCHNL_RV_PACING_CAPTURE_MODE: NORMAL
MDC_IDC_SET_LEADCHNL_RV_PACING_CATHODE_ELECTRODE_1: NORMAL
MDC_IDC_SET_LEADCHNL_RV_PACING_CATHODE_LOCATION_1: NORMAL
MDC_IDC_SET_LEADCHNL_RV_PACING_POLARITY: NORMAL
MDC_IDC_SET_LEADCHNL_RV_PACING_PULSEWIDTH: 0.4 MS
MDC_IDC_SET_LEADCHNL_RV_SENSING_ANODE_ELECTRODE_1: NORMAL
MDC_IDC_SET_LEADCHNL_RV_SENSING_ANODE_LOCATION_1: NORMAL
MDC_IDC_SET_LEADCHNL_RV_SENSING_CATHODE_ELECTRODE_1: NORMAL
MDC_IDC_SET_LEADCHNL_RV_SENSING_CATHODE_LOCATION_1: NORMAL
MDC_IDC_SET_LEADCHNL_RV_SENSING_POLARITY: NORMAL
MDC_IDC_SET_LEADCHNL_RV_SENSING_SENSITIVITY: 0.3 MV
MDC_IDC_SET_ZONE_DETECTION_BEATS_DENOMINATOR: 40 {BEATS}
MDC_IDC_SET_ZONE_DETECTION_BEATS_NUMERATOR: 30 {BEATS}
MDC_IDC_SET_ZONE_DETECTION_INTERVAL: 320 MS
MDC_IDC_SET_ZONE_DETECTION_INTERVAL: 350 MS
MDC_IDC_SET_ZONE_DETECTION_INTERVAL: 360 MS
MDC_IDC_SET_ZONE_DETECTION_INTERVAL: 420 MS
MDC_IDC_SET_ZONE_TYPE: NORMAL
MDC_IDC_STAT_AT_BURDEN_PERCENT: 0 %
MDC_IDC_STAT_AT_DTM_END: NORMAL
MDC_IDC_STAT_AT_DTM_START: NORMAL
MDC_IDC_STAT_BRADY_AP_VP_PERCENT: 36.77 %
MDC_IDC_STAT_BRADY_AP_VS_PERCENT: 0.16 %
MDC_IDC_STAT_BRADY_AS_VP_PERCENT: 59.24 %
MDC_IDC_STAT_BRADY_AS_VS_PERCENT: 3.84 %
MDC_IDC_STAT_BRADY_DTM_END: NORMAL
MDC_IDC_STAT_BRADY_DTM_START: NORMAL
MDC_IDC_STAT_BRADY_RA_PERCENT_PACED: 39.84 %
MDC_IDC_STAT_BRADY_RV_PERCENT_PACED: 96.01 %
MDC_IDC_STAT_CRT_DTM_END: NORMAL
MDC_IDC_STAT_CRT_DTM_START: NORMAL
MDC_IDC_STAT_CRT_LV_PERCENT_PACED: 95.98 %
MDC_IDC_STAT_CRT_PERCENT_PACED: 95.98 %
MDC_IDC_STAT_EPISODE_RECENT_COUNT: 0
MDC_IDC_STAT_EPISODE_RECENT_COUNT_DTM_END: NORMAL
MDC_IDC_STAT_EPISODE_RECENT_COUNT_DTM_START: NORMAL
MDC_IDC_STAT_EPISODE_TOTAL_COUNT: 0
MDC_IDC_STAT_EPISODE_TOTAL_COUNT: 1
MDC_IDC_STAT_EPISODE_TOTAL_COUNT_DTM_END: NORMAL
MDC_IDC_STAT_EPISODE_TOTAL_COUNT_DTM_START: NORMAL
MDC_IDC_STAT_EPISODE_TYPE: NORMAL
MDC_IDC_STAT_TACHYTHERAPY_ATP_DELIVERED_RECENT: 0
MDC_IDC_STAT_TACHYTHERAPY_ATP_DELIVERED_TOTAL: 0
MDC_IDC_STAT_TACHYTHERAPY_RECENT_DTM_END: NORMAL
MDC_IDC_STAT_TACHYTHERAPY_RECENT_DTM_START: NORMAL
MDC_IDC_STAT_TACHYTHERAPY_SHOCKS_ABORTED_RECENT: 0
MDC_IDC_STAT_TACHYTHERAPY_SHOCKS_ABORTED_TOTAL: 0
MDC_IDC_STAT_TACHYTHERAPY_SHOCKS_DELIVERED_RECENT: 0
MDC_IDC_STAT_TACHYTHERAPY_SHOCKS_DELIVERED_TOTAL: 0
MDC_IDC_STAT_TACHYTHERAPY_TOTAL_DTM_END: NORMAL
MDC_IDC_STAT_TACHYTHERAPY_TOTAL_DTM_START: NORMAL

## 2022-09-12 ENCOUNTER — LAB (OUTPATIENT)
Dept: LAB | Facility: CLINIC | Age: 77
End: 2022-09-12
Payer: MEDICARE

## 2022-09-12 ENCOUNTER — ANTICOAGULATION THERAPY VISIT (OUTPATIENT)
Dept: ANTICOAGULATION | Facility: CLINIC | Age: 77
End: 2022-09-12

## 2022-09-12 DIAGNOSIS — Z95.2 S/P AORTIC VALVE REPLACEMENT: Primary | ICD-10-CM

## 2022-09-12 DIAGNOSIS — Z79.01 ANTICOAGULATED ON COUMADIN: ICD-10-CM

## 2022-09-12 DIAGNOSIS — Z79.01 ANTICOAGULATED: ICD-10-CM

## 2022-09-12 DIAGNOSIS — Z79.01 LONG TERM CURRENT USE OF ANTICOAGULANT THERAPY: ICD-10-CM

## 2022-09-12 DIAGNOSIS — I48.0 PAROXYSMAL ATRIAL FIBRILLATION (H): ICD-10-CM

## 2022-09-12 LAB — INR BLD: 2.5 (ref 0.9–1.1)

## 2022-09-12 PROCEDURE — 85610 PROTHROMBIN TIME: CPT

## 2022-09-12 PROCEDURE — 36415 COLL VENOUS BLD VENIPUNCTURE: CPT

## 2022-09-12 NOTE — PROGRESS NOTES
ANTICOAGULATION MANAGEMENT     Brooks Summers 77 year old male is on warfarin with therapeutic INR result. (Goal INR 2.0-3.0)    Recent labs: (last 7 days)     09/12/22  0816   INR 2.5*       ASSESSMENT       Source(s): Chart Review    Previous INR was Subtherapeutic    Medication, diet, health changes since last INR chart reviewed; none identified           PLAN     Recommended plan for no diet, medication or health factor changes affecting INR     Dosing Instructions: Continue your current warfarin dose with next INR in 3 weeks       Summary  As of 9/12/2022    Full warfarin instructions:  5 mg every Tue; 7.5 mg all other days   Next INR check:  10/3/2022             Detailed voice message left for Brooks with dosing instructions and follow up date.     Contact [phone]  to schedule and with any changes, questions or concerns.     Education provided: Contact [phone]  with any changes, questions or concerns.     Plan made per [facility] anticoagulation protocol    Selena Bates RN  Anticoagulation Clinic  9/12/2022    _______________________________________________________________________     Anticoagulation Episode Summary     Current INR goal:  2.0-3.0   TTR:  77.4 % (1 y)   Target end date:  Indefinite   Send INR reminders to:  JONATHAN MURILLO    Indications    S/P aortic valve replacement [Z95.2]  Paroxysmal atrial fibrillation (H) [I48.0]  Long term current use of anticoagulant therapy [Z79.01]  Anticoagulated on Coumadin [Z79.01]  Anticoagulated [Z79.01]           Comments:   * warfarin 5 mg tabs. Aortic 21 mm Johnson Perimount Magna Tissue Valve.          Anticoagulation Care Providers     Provider Role Specialty Phone number    Edin Mays MD Referring Internal Medicine [phone]

## 2022-09-16 DIAGNOSIS — G40.909 RECURRENT SEIZURES (H): ICD-10-CM

## 2022-09-20 RX ORDER — LEVETIRACETAM 500 MG/1
500 TABLET ORAL 2 TIMES DAILY
Qty: 180 TABLET | Refills: 1 | Status: SHIPPED | OUTPATIENT
Start: 2022-09-20 | End: 2023-03-28

## 2022-09-20 NOTE — TELEPHONE ENCOUNTER
levETIRAcetam (KEPPRA) 500 MG tablet 60 tablet 11 9/2/2021         Last Written Prescription Date:  9-2-2021  Last Fill Quantity: 60,   # refills: 11  Last Office Visit : 3-  Future Office visit:  None    Creatinine   Date Value Ref Range Status   11/15/2021 0.90 0.66 - 1.25 mg/dL Final   03/10/2021 1.03 0.66 - 1.25 mg/dL Final         Kathleen M Doege RN

## 2022-09-22 NOTE — LETTER
9/30/2021       RE: Brooks Summers  [address]  [address]  [place] [address]     Dear Colleague,    Thank you for referring your patient, Brooks Summers, to the [facility] [place] at [facility]. Please see a copy of my visit note below.    [facility] Dermatology Note  Encounter Date: Sep 30, 2021  Office Visit     Dermatology Problem List:  UBSE 9/30/21  1. 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 AK    ____________________________________________    Assessment & Plan:   # AKs  - LN2 as below    # Benign skin findings  - Physical exam demonstrating benign skin findings as below.  - Seborrheic keratoses  - Cherry hemangiomas  - Solar lentingos   - Benign nevi  - Reassurance provided.  - ABCDEs of melanoma handout provided.  - Advised patient to return to clinic for any new or concerning lesions.    Procedures Performed:   - Cryotherapy procedure note, location(s): scalp, face, and temple. After verbal consent and discussion of risks and benefits including, but not limited to, dyspigmentation/scar, blister, and pain, 7 lesion(s) was(were) treated with 1-2 mm freeze border for 1-2 cycles with liquid nitrogen. Post cryotherapy instructions were provided.    Follow-up: 1 year, prn for new or changing lesions    Staff and Resident:     Edvin Manning MD  PGY-2 Dermatology  Pager: [phone]    ____________________________________________    CC: Skin Check (pt states he is here for a skin check, spot on right side of forhead)    HPI:  Mr. Brooks Summers is a(n) 76 year old male who presents today as a return patient for lesion(s) of concern and skin check, only wants upper body.  - Lesion(s) of concern on right temple, present for past 6 months, not growing or bleeding or tender, gets  itchy/irritated  - Okay about sunscreen use  - Nothing bleeding, growing, or tender  - Patient is otherwise feeling well, without additional skin concerns.    Labs Reviewed:  N/A    Physical Exam:  Vitals: There were no vitals taken for this visit.  SKIN: Waist-up skin, which includes the head/face, neck, both arms, chest, back, abdomen, digits and/or nails was examined.\  - Right temple with 1.5cm keratotic, mamillated appearing, stuck on plaque c/w SK  - Erythematous papule with overyling adherent scale on the face and scalp.   - Waxy stuck on tan to brown papules on the trunk and extremities.   - Dome shaped bright red papules on the trunk and extremities.   - There are fine lines and dyspigmentation on sun exposed areas of the face and chest.  - Scattered brown macules on sun exposed areas.  - No other lesions of concern on areas examined.     Medications:  Current Outpatient Medications   Medication     allopurinol (ZYLOPRIM) 100 MG tablet     levETIRAcetam (KEPPRA) 500 MG tablet     losartan (COZAAR) 25 MG tablet     metoprolol succinate ER (TOPROL-XL) 50 MG 24 hr tablet     Multiple Vitamins-Minerals (MULTIVITAMIN ADULTS 50+) TABS     PROVIDER DOSED MANAGED WARFARIN, COUMADIN, OUTPATIENT     simvastatin (ZOCOR) 40 MG tablet     tamsulosin (FLOMAX) 0.4 MG capsule     VITAMIN D, CHOLECALCIFEROL, PO     warfarin ANTICOAGULANT (COUMADIN) 5 MG tablet     No current facility-administered medications for this visit.      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, Bi-Ventricular- INT dependent     Endocarditis     S/P AVR     Need for prophylactic antibiotic     Hyperlipidemia with target LDL less than 100     Finger injury     Paroxysmal atrial fibrillation (H)     Long term current use of anticoagulant therapy     Trigger ring finger of left hand     Nocturnal hypoxemia     Neoplasm of uncertain behavior of skin     AK (actinic keratosis)     History of nonmelanoma skin cancer     Seizure (H)     Cardiomyopathy (H)     ICD (implantable cardioverter-defibrillator) battery depletion     Anticoagulated on Coumadin     Past Medical History:   Diagnosis Date     BCC (basal cell carcinoma), face 5/16/2013     Bicuspid aortic valve      Chronic systolic heart failure (H)      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 Tx with Dapto/RIFx 8 weeks.     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    I talked with and examined Brooks Summers and I agree with the assessment and the plan. I was present for the entire procedure. COLBY Galaviz MD.       [Negative] : Endocrine

## 2022-09-24 DIAGNOSIS — E78.5 HYPERLIPIDEMIA LDL GOAL <100: ICD-10-CM

## 2022-09-27 RX ORDER — SIMVASTATIN 40 MG
40 TABLET ORAL AT BEDTIME
Qty: 90 TABLET | Refills: 1 | Status: SHIPPED | OUTPATIENT
Start: 2022-09-27 | End: 2023-03-27

## 2022-09-27 NOTE — TELEPHONE ENCOUNTER
Last Clinic Visit: 1/3/2022 [facility] Internal Medicine [place]    LDL Cholesterol Calculated   Date Value Ref Range Status   07/19/2022 65 <=100 mg/dL Final   01/31/2020 76 <100 mg/dL Final     Comment:     Desirable:       <100 mg/dl

## 2022-10-05 ENCOUNTER — LAB (OUTPATIENT)
Dept: LAB | Facility: CLINIC | Age: 77
End: 2022-10-05
Payer: MEDICARE

## 2022-10-05 ENCOUNTER — ANTICOAGULATION THERAPY VISIT (OUTPATIENT)
Dept: ANTICOAGULATION | Facility: CLINIC | Age: 77
End: 2022-10-05

## 2022-10-05 DIAGNOSIS — Z79.01 LONG TERM CURRENT USE OF ANTICOAGULANT THERAPY: ICD-10-CM

## 2022-10-05 DIAGNOSIS — Z79.01 ANTICOAGULATED ON COUMADIN: ICD-10-CM

## 2022-10-05 DIAGNOSIS — Z95.2 S/P AORTIC VALVE REPLACEMENT: Primary | ICD-10-CM

## 2022-10-05 DIAGNOSIS — Z79.01 ANTICOAGULATED: ICD-10-CM

## 2022-10-05 DIAGNOSIS — I48.0 PAROXYSMAL ATRIAL FIBRILLATION (H): ICD-10-CM

## 2022-10-05 LAB — INR BLD: 2.4 (ref 0.9–1.1)

## 2022-10-05 PROCEDURE — 36416 COLLJ CAPILLARY BLOOD SPEC: CPT

## 2022-10-05 PROCEDURE — 85610 PROTHROMBIN TIME: CPT

## 2022-10-05 NOTE — PROGRESS NOTES
ANTICOAGULATION MANAGEMENT     Brooks Summers 77 year old male is on warfarin with therapeutic INR result. (Goal INR 2.0-3.0)    Recent labs: (last 7 days)     10/05/22  0825   INR 2.4*       ASSESSMENT       Source(s): Chart Review    Previous INR was Therapeutic last visit; previously outside of goal range    Medication, diet, health changes since last INR chart reviewed; none identified           PLAN     Recommended plan for no diet, medication or health factor changes affecting INR     Dosing Instructions: Continue your current warfarin dose with next INR in 4 weeks       Summary  As of 10/5/2022    Full warfarin instructions:  5 mg every Tue; 7.5 mg all other days   Next INR check:  11/2/2022             Detailed voice message left for Brooks with dosing instructions and follow up date.     Contact [phone]  to schedule and with any changes, questions or concerns.     Education provided: Please call back if any changes to your diet, medications or how you've been taking warfarin, Goal range and significance of current result, Importance of therapeutic range, Importance of following up at instructed interval and Contact [phone]  with any changes, questions or concerns.     Plan made per [facility] anticoagulation protocol    Ailyn Castle RN  Anticoagulation Clinic  10/5/2022    _______________________________________________________________________     Anticoagulation Episode Summary     Current INR goal:  2.0-3.0   TTR:  77.4 % (1 y)   Target end date:  Indefinite   Send INR reminders to:  JONATHAN MURILLO    Indications    S/P aortic valve replacement [Z95.2]  Paroxysmal atrial fibrillation (H) [I48.0]  Long term current use of anticoagulant therapy [Z79.01]  Anticoagulated on Coumadin [Z79.01]  Anticoagulated [Z79.01]           Comments:   * warfarin 5 mg tabs. Aortic 21 mm Johnson Perimount Magna Tissue Valve.          Anticoagulation Care Providers     Provider Role Specialty Phone number    Edin Mays  MD BAUDILIO [facility] Internal Medicine [phone]

## 2022-11-02 ENCOUNTER — ANTICOAGULATION THERAPY VISIT (OUTPATIENT)
Dept: ANTICOAGULATION | Facility: CLINIC | Age: 77
End: 2022-11-02

## 2022-11-02 ENCOUNTER — LAB (OUTPATIENT)
Dept: LAB | Facility: CLINIC | Age: 77
End: 2022-11-02
Payer: MEDICARE

## 2022-11-02 DIAGNOSIS — Z79.01 ANTICOAGULATED: ICD-10-CM

## 2022-11-02 DIAGNOSIS — Z79.01 LONG TERM CURRENT USE OF ANTICOAGULANT THERAPY: ICD-10-CM

## 2022-11-02 DIAGNOSIS — Z95.2 S/P AORTIC VALVE REPLACEMENT: Primary | ICD-10-CM

## 2022-11-02 DIAGNOSIS — Z79.01 ANTICOAGULATED ON COUMADIN: ICD-10-CM

## 2022-11-02 DIAGNOSIS — I48.0 PAROXYSMAL ATRIAL FIBRILLATION (H): ICD-10-CM

## 2022-11-02 LAB — INR BLD: 2 (ref 0.9–1.1)

## 2022-11-02 PROCEDURE — 36416 COLLJ CAPILLARY BLOOD SPEC: CPT

## 2022-11-02 PROCEDURE — 85610 PROTHROMBIN TIME: CPT

## 2022-11-02 NOTE — PROGRESS NOTES
ANTICOAGULATION MANAGEMENT     Brooks Summers 77 year old male is on warfarin with therapeutic INR result. (Goal INR 2.0-3.0)    Recent labs: (last 7 days)     11/02/22  0833   INR 2.0*       ASSESSMENT       Source(s): Chart Review    Previous INR was Therapeutic last 2(+) visits    Medication, diet, health changes since last INR chart reviewed; none identified           PLAN     Recommended plan for no diet, medication or health factor changes affecting INR     Dosing Instructions: Continue your current warfarin dose with next INR in 5 weeks       Summary  As of 11/2/2022    Full warfarin instructions:  5 mg every Tue; 7.5 mg all other days; Starting 11/2/2022   Next INR check:  12/7/2022             Detailed voice message left for Brooks with dosing instructions and follow up date.     Contact [phone]  to schedule and with any changes, questions or concerns.     Education provided:     Contact [phone]  with any changes, questions or concerns.     Plan made per [facility] anticoagulation protocol    Selena Bates RN  Anticoagulation Clinic  11/2/2022    _______________________________________________________________________     Anticoagulation Episode Summary     Current INR goal:  2.0-3.0   TTR:  77.4 % (1 y)   Target end date:  Indefinite   Send INR reminders to:  JONATHAN MURILLO    Indications    S/P aortic valve replacement [Z95.2]  Paroxysmal atrial fibrillation (H) [I48.0]  Long term current use of anticoagulant therapy [Z79.01]  Anticoagulated on Coumadin [Z79.01]  Anticoagulated [Z79.01]           Comments:   * warfarin 5 mg tabs. Aortic 21 mm Johnson Perimount Magna Tissue Valve.          Anticoagulation Care Providers     Provider Role Specialty Phone number    Edin Mays MD Referring Internal Medicine [phone]

## 2022-11-21 ENCOUNTER — TELEPHONE (OUTPATIENT)
Dept: FAMILY MEDICINE | Facility: CLINIC | Age: 77
End: 2022-11-21

## 2022-11-21 DIAGNOSIS — M10.00 IDIOPATHIC GOUT, UNSPECIFIED CHRONICITY, UNSPECIFIED SITE: ICD-10-CM

## 2022-11-22 ENCOUNTER — OFFICE VISIT (OUTPATIENT)
Dept: FAMILY MEDICINE | Facility: CLINIC | Age: 77
End: 2022-11-22
Payer: MEDICARE

## 2022-11-22 ENCOUNTER — TELEPHONE (OUTPATIENT)
Dept: INTERNAL MEDICINE | Facility: CLINIC | Age: 77
End: 2022-11-22

## 2022-11-22 VITALS
RESPIRATION RATE: 20 BRPM | OXYGEN SATURATION: 96 % | WEIGHT: 166.2 LBS | BODY MASS INDEX: 23.79 KG/M2 | HEIGHT: 70 IN | HEART RATE: 102 BPM | SYSTOLIC BLOOD PRESSURE: 114 MMHG | TEMPERATURE: 97.7 F | DIASTOLIC BLOOD PRESSURE: 70 MMHG

## 2022-11-22 DIAGNOSIS — R10.32 LEFT GROIN PAIN: ICD-10-CM

## 2022-11-22 DIAGNOSIS — K40.30 INCARCERATED INGUINAL HERNIA, UNILATERAL: Primary | ICD-10-CM

## 2022-11-22 PROCEDURE — 99213 OFFICE O/P EST LOW 20 MIN: CPT | Performed by: PHYSICIAN ASSISTANT

## 2022-11-22 ASSESSMENT — ENCOUNTER SYMPTOMS
ABDOMINAL PAIN: 0
NAUSEA: 0
HEMATURIA: 0
FEVER: 0
NERVOUS/ANXIOUS: 0
CHILLS: 0
VOMITING: 0
NUMBNESS: 0
PARESTHESIAS: 0
WEAKNESS: 0
HEMATOCHEZIA: 0
DIARRHEA: 0
SHORTNESS OF BREATH: 0
DYSURIA: 0

## 2022-11-22 NOTE — TELEPHONE ENCOUNTER
Spoke to the patient tor relay note from provider that he saw this AM in [place]. Advised the patient that he should go to the ER to get this further evaluated. Pt was hesitant, as he is his wife's main caregiver and can not leave her alone . Advised patient that he should find someone to sit with wife and go to ED to have hernia further evaluated, as was advised by provider he saw this AM. Patient stated verbal understanding and would make arrangements. Nola Salazar LPN 11/22/2022 11:21 AM

## 2022-11-22 NOTE — PATIENT INSTRUCTIONS
Your exam is very concerning for hernia that has become stuck in the left groin.  For further evaluation and treatment you need to go directly to the emergency department.  Please reach out with any questions or concerns.

## 2022-11-22 NOTE — PROGRESS NOTES
Assessment & Plan     Left groin pain  Incarcerated inguinal hernia, unilateral  Patient is a 77-year-old male with past medical history significant for cardiomyopathy, implanted cardiac defibrillator, s/p aortic valve replacement, chronic systolic heart failure, and paroxysmal atrial fibrillation-anticoagulated on warfarin, who presents to clinic due to left groin pain and swelling ongoing for 1 week.  Patient notes left groin pain and swelling started after moving heavy pots from deck to garage.  Vital signs significant for mild tachycardia.  Patient is afebrile.  Physical exam significant for large mass of left groin suspicious for incarcerated inguinal hernia.  Low suspicion for aneurysm as area is nonpulsatile.  Low suspicion for hematoma as no ecchymosis present.  As area cannot be compressed or reduced, recommended emergency department evaluation for further management.  Patient is agreeable and will proceed to emergency department.       See Patient Instructions    Return today (on 11/22/2022) for Emergency department evaluation.    BRYNN Davis [facility] [place] Guy is a 77 year old, presenting for the following health issues:  Musculoskeletal Problem      HPI     Muscle tear?  Onset: 1 week ago patient was moving heavy pots from deck to garage and the next day the left side of the groin started hurting. No pain while moving pots. Pain located to left groin and goes to left back. Left groin with swelling. No redness or bruising. Area is tender to touch and pain is worse when lying down or sitting up as well as walking. At rest pain is still present.     Description:   Location: left groin area    Progression of Symptoms: same    Accompanying Signs & Symptoms:  Other symptoms: swelling    Precipitating factors:   Trauma or overuse: no   Therapies Tried and outcome: ice        Review of Systems   Constitutional: Negative for chills and fever.   Respiratory: Negative  "for shortness of breath.    Cardiovascular: Negative for chest pain.   Gastrointestinal: Negative for abdominal pain, diarrhea, hematochezia, nausea and vomiting.   Genitourinary: Negative for dysuria and hematuria.   Skin: Negative for rash.   Neurological: Negative for weakness, numbness and paresthesias.   Psychiatric/Behavioral: The patient is not nervous/anxious.             Objective    /70   Pulse 102   Temp 97.7  F (36.5  C) (Tympanic)   Resp 20   Ht 1.79 m (5' 10.47\")   Wt 75.4 kg (166 lb 3.2 oz)   SpO2 96%   BMI 23.53 kg/m    Body mass index is 23.53 kg/m .  Physical Exam  Vitals and nursing note reviewed.   Constitutional:       General: He is not in acute distress.     Appearance: Normal appearance.   HENT:      Head: Normocephalic and atraumatic.   Eyes:      Extraocular Movements: Extraocular movements intact.      Pupils: Pupils are equal, round, and reactive to light.   Cardiovascular:      Rate and Rhythm: Normal rate and regular rhythm.      Heart sounds: Normal heart sounds.   Pulmonary:      Effort: Pulmonary effort is normal.      Breath sounds: Normal breath sounds.   Abdominal:      General: Bowel sounds are normal.      Palpations: Abdomen is soft.      Tenderness: There is no abdominal tenderness. There is no guarding or rebound.   Genitourinary:     Comments: Left groin with large approximately 3 cm x 4 cm, firm, slightly erythematous, nonpulsatile, tender mass that is not compressible. Tenderness increases with increased abdominal pressure. No inguinal lymphadenopathy bilaterally.    Musculoskeletal:         General: Normal range of motion.      Cervical back: Normal range of motion.   Skin:     General: Skin is warm and dry.   Neurological:      General: No focal deficit present.      Mental Status: He is alert.   Psychiatric:         Mood and Affect: Mood normal.         Behavior: Behavior normal.                          "

## 2022-11-22 NOTE — LETTER
My Depression Action Plan  Name: Brooks Summers   Date of Birth 1945  Date: 11/22/2022    My doctor: Edin Mays   My clinic: [facility] [place]  [address]  [place] [address] [phone]          GREEN    ZONE   Good Control    What it looks like:     Things are going generally well. You have normal ups and downs. You may even feel depressed from time to time, but bad moods usually last less than a day.   What you need to do:  1. Continue to care for yourself (see self care plan)  2. Check your depression survival kit and update it as needed  3. Follow your physician s recommendations including any medication.  4. Do not stop taking medication unless you consult with your physician first.           YELLOW         ZONE Getting Worse    What it looks like:     Depression is starting to interfere with your life.     It may be hard to get out of bed; you may be starting to isolate yourself from others.    Symptoms of depression are starting to last most all day and this has happened for several days.     You may have suicidal thoughts but they are not constant.   What you need to do:     1. Call your care team. Your response to treatment will improve if you keep your care team informed of your progress. Yellow periods are signs an adjustment may need to be made.     2. Continue your self-care.  Just get dressed and ready for the day.  Don't give yourself time to talk yourself out of it.    3. Talk to someone in your support network.    4. Open up your Depression Self-Care Plan/Wellness Kit.           RED    ZONE Medical Alert - Get Help    What it looks like:     Depression is seriously interfering with your life.     You may experience these or other symptoms: You can t get out of bed most days, can t work or engage in other necessary activities, you have trouble taking care of basic hygiene, or basic responsibilities, thoughts of suicide or death that will not  go away, self-injurious behavior.     What you need to do:  1. Call your care team and request a same-day appointment. If they are not available (weekends or after hours) call your local crisis line, emergency room or 911.          Depression Self-Care Plan / Wellness Kit    Many people find that medication and therapy are helpful treatments for managing depression. In addition, making small changes to your everyday life can help to boost your mood and improve your wellbeing. Below are some tips for you to consider. Be sure to talk with your medical provider and/or behavioral health consultant if your symptoms are worsening or not improving.     Sleep   Sleep hygiene  means all of the habits that support good, restful sleep. It includes maintaining a consistent bedtime and wake time, using your bedroom only for sleeping or sex, and keeping the bedroom dark and free of distractions like a computer, smartphone, or television.     Develop a Healthy Routine  Maintain good hygiene. Get out of bed in the morning, make your bed, brush your teeth, take a shower, and get dressed. Don t spend too much time viewing media that makes you feel stressed. Find time to relax each day.    Exercise  Get some form of exercise every day. This will help reduce pain and release endorphins, the  feel good  chemicals in your brain. It can be as simple as just going for a walk or doing some gardening, anything that will get you moving.      Diet  Strive to eat healthy foods, including fruits and vegetables. Drink plenty of water. Avoid excessive sugar, caffeine, alcohol, and other mood-altering substances.     Stay Connected with Others  Stay in touch with friends and family members.    Manage Your Mood  Try deep breathing, massage therapy, biofeedback, or meditation. Take part in fun activities when you can. Try to find something to smile about each day.     Psychotherapy  Be open to working with a therapist if your provider recommends it.      Medication  Be sure to take your medication as prescribed. Most anti-depressants need to be taken every day. It usually takes several weeks for medications to work. Not all medicines work for all people. It is important to follow-up with your provider to make sure you have a treatment plan that is working for you. Do not stop your medication abruptly without first discussing it with your provider.    Crisis Resources   These hotlines are for both adults and children. They and are open 24 hours a day, 7 days a week unless noted otherwise.      National Suicide Prevention Lifeline   988 or 9-817-884-DZFU (8420)      Crisis Text Line    www.crisistextline.org  Text HOME to 963594 from anywhere in the [place], anytime, about any type of crisis. A live, trained crisis counselor will receive the text and respond quickly.      [place] Lifeline for LGBTQ Youth  A national crisis intervention and suicide lifeline for LGBTQ youth under 25. Provides a safe place to talk without judgement. Call 1-251.776.2055; text START to 585223 or visit www.thetrevorproject.org to talk to a trained counselor.      For [facility] crisis numbers, visit the [facility] website at:  https://[place].gov/dhs/people-we-serve/adults/health-care/mental-health/resources/crisis-contacts.jsp

## 2022-11-22 NOTE — TELEPHONE ENCOUNTER
Health Call Center    Phone Message    May a detailed message be left on voicemail: yes     Reason for Call: Other: Patient injured himself while lifting and got himself a hernia. He is wanting to know what the next steps should be for this. He is thinking he will need surgery.  please give him a call back as soon as possible   Appointment was offered but soonest appointment would be in January. Patient is also wondering if he can move things around to be seen, or a recommendation on another clinic he can go to in [place].     Action Taken: Message routed to:  [facility] pcp    Travel Screening: Not Applicable

## 2022-11-23 ENCOUNTER — APPOINTMENT (OUTPATIENT)
Dept: ULTRASOUND IMAGING | Facility: CLINIC | Age: 77
End: 2022-11-23
Payer: MEDICARE

## 2022-11-23 ENCOUNTER — HOSPITAL ENCOUNTER (OUTPATIENT)
Facility: CLINIC | Age: 77
Setting detail: OBSERVATION
Discharge: HOME | End: 2022-11-24
Attending: EMERGENCY MEDICINE | Admitting: STUDENT IN AN ORGANIZED HEALTH CARE EDUCATION/TRAINING PROGRAM
Payer: MEDICARE

## 2022-11-23 ENCOUNTER — APPOINTMENT (OUTPATIENT)
Dept: CT IMAGING | Facility: CLINIC | Age: 77
End: 2022-11-23
Attending: EMERGENCY MEDICINE
Payer: MEDICARE

## 2022-11-23 ENCOUNTER — APPOINTMENT (OUTPATIENT)
Dept: CARDIOLOGY | Facility: CLINIC | Age: 77
End: 2022-11-23
Attending: STUDENT IN AN ORGANIZED HEALTH CARE EDUCATION/TRAINING PROGRAM
Payer: MEDICARE

## 2022-11-23 DIAGNOSIS — K57.32 DIVERTICULITIS OF COLON: ICD-10-CM

## 2022-11-23 DIAGNOSIS — K40.90 INGUINAL HERNIA WITHOUT OBSTRUCTION OR GANGRENE, RECURRENCE NOT SPECIFIED, UNSPECIFIED LATERALITY: Primary | ICD-10-CM

## 2022-11-23 DIAGNOSIS — Z79.01 ANTICOAGULATED: ICD-10-CM

## 2022-11-23 DIAGNOSIS — I48.0 PAROXYSMAL ATRIAL FIBRILLATION (H): ICD-10-CM

## 2022-11-23 DIAGNOSIS — Z20.822 CONTACT WITH AND (SUSPECTED) EXPOSURE TO COVID-19: ICD-10-CM

## 2022-11-23 DIAGNOSIS — K40.90 UNILATERAL INGUINAL HERNIA WITHOUT OBSTRUCTION OR GANGRENE, RECURRENCE NOT SPECIFIED: ICD-10-CM

## 2022-11-23 DIAGNOSIS — Z79.01 ANTICOAGULATED ON COUMADIN: ICD-10-CM

## 2022-11-23 LAB
ABO/RH(D): ABNORMAL
ANION GAP SERPL CALCULATED.3IONS-SCNC: 10 MMOL/L (ref 7–15)
ANTIBODY ID: NORMAL
ANTIBODY SCREEN: POSITIVE
APTT PPP: 42 SECONDS (ref 22–38)
ATRIAL RATE - MUSE: 75 BPM
BASOPHILS # BLD AUTO: 0 10E3/UL (ref 0–0.2)
BASOPHILS NFR BLD AUTO: 0 %
BUN SERPL-MCNC: 16.8 MG/DL (ref 8–23)
CALCIUM SERPL-MCNC: 9.3 MG/DL (ref 8.8–10.2)
CHLORIDE SERPL-SCNC: 106 MMOL/L (ref 98–107)
CREAT SERPL-MCNC: 0.87 MG/DL (ref 0.67–1.17)
DEPRECATED HCO3 PLAS-SCNC: 25 MMOL/L (ref 22–29)
DIASTOLIC BLOOD PRESSURE - MUSE: NORMAL MMHG
EOSINOPHIL # BLD AUTO: 0.1 10E3/UL (ref 0–0.7)
EOSINOPHIL NFR BLD AUTO: 1 %
ERYTHROCYTE [DISTWIDTH] IN BLOOD BY AUTOMATED COUNT: 13.5 % (ref 10–15)
FIBRINOGEN PPP-MCNC: 555 MG/DL (ref 170–490)
GFR SERPL CREATININE-BSD FRML MDRD: 89 ML/MIN/1.73M2
GLUCOSE SERPL-MCNC: 158 MG/DL (ref 70–99)
HCT VFR BLD AUTO: 41.6 % (ref 40–53)
HGB BLD-MCNC: 14 G/DL (ref 13.3–17.7)
HOLD SPECIMEN: NORMAL
IMM GRANULOCYTES # BLD: 0 10E3/UL
IMM GRANULOCYTES NFR BLD: 1 %
INR PPP: 1.57 (ref 0.85–1.15)
INR PPP: 3.36 (ref 0.85–1.15)
INR PPP: 3.53 (ref 0.85–1.15)
INTERPRETATION ECG - MUSE: NORMAL
LACTATE SERPL-SCNC: 0.8 MMOL/L (ref 0.7–2)
LVEF ECHO: NORMAL
LYMPHOCYTES # BLD AUTO: 1.3 10E3/UL (ref 0.8–5.3)
LYMPHOCYTES NFR BLD AUTO: 29 %
M AG RBC QL: NEGATIVE
MCH RBC QN AUTO: 33.3 PG (ref 26.5–33)
MCHC RBC AUTO-ENTMCNC: 33.7 G/DL (ref 31.5–36.5)
MCV RBC AUTO: 99 FL (ref 78–100)
MONOCYTES # BLD AUTO: 0.7 10E3/UL (ref 0–1.3)
MONOCYTES NFR BLD AUTO: 16 %
NEUTROPHILS # BLD AUTO: 2.4 10E3/UL (ref 1.6–8.3)
NEUTROPHILS NFR BLD AUTO: 53 %
NRBC # BLD AUTO: 0 10E3/UL
NRBC BLD AUTO-RTO: 0 /100
P AXIS - MUSE: 37 DEGREES
PLATELET # BLD AUTO: 85 10E3/UL (ref 150–450)
POTASSIUM SERPL-SCNC: 4.1 MMOL/L (ref 3.4–5.3)
PR INTERVAL - MUSE: NORMAL MS
QRS DURATION - MUSE: 134 MS
QT - MUSE: 444 MS
QTC - MUSE: 495 MS
R AXIS - MUSE: 195 DEGREES
RBC # BLD AUTO: 4.2 10E6/UL (ref 4.4–5.9)
SARS-COV-2 RNA RESP QL NAA+PROBE: NEGATIVE
SODIUM SERPL-SCNC: 141 MMOL/L (ref 136–145)
SPECIMEN EXPIRATION DATE: ABNORMAL
SPECIMEN EXPIRATION DATE: NORMAL
SPECIMEN EXPIRATION DATE: NORMAL
SYSTOLIC BLOOD PRESSURE - MUSE: NORMAL MMHG
T AXIS - MUSE: 25 DEGREES
VENTRICULAR RATE- MUSE: 75 BPM
WBC # BLD AUTO: 4.5 10E3/UL (ref 4–11)

## 2022-11-23 PROCEDURE — 86905 BLOOD TYPING RBC ANTIGENS: CPT | Performed by: EMERGENCY MEDICINE

## 2022-11-23 PROCEDURE — 250N000011 HC RX IP 250 OP 636: Performed by: EMERGENCY MEDICINE

## 2022-11-23 PROCEDURE — 85610 PROTHROMBIN TIME: CPT | Mod: 91

## 2022-11-23 PROCEDURE — 76705 ECHO EXAM OF ABDOMEN: CPT

## 2022-11-23 PROCEDURE — 93010 ELECTROCARDIOGRAM REPORT: CPT | Performed by: EMERGENCY MEDICINE

## 2022-11-23 PROCEDURE — 85384 FIBRINOGEN ACTIVITY: CPT | Performed by: STUDENT IN AN ORGANIZED HEALTH CARE EDUCATION/TRAINING PROGRAM

## 2022-11-23 PROCEDURE — G1010 CDSM STANSON: HCPCS

## 2022-11-23 PROCEDURE — 250N000013 HC RX MED GY IP 250 OP 250 PS 637: Performed by: STUDENT IN AN ORGANIZED HEALTH CARE EDUCATION/TRAINING PROGRAM

## 2022-11-23 PROCEDURE — U0005 INFEC AGEN DETEC AMPLI PROBE: HCPCS | Performed by: EMERGENCY MEDICINE

## 2022-11-23 PROCEDURE — G0378 HOSPITAL OBSERVATION PER HR: HCPCS

## 2022-11-23 PROCEDURE — 999N000208 ECHOCARDIOGRAM COMPLETE

## 2022-11-23 PROCEDURE — 93306 TTE W/DOPPLER COMPLETE: CPT | Mod: 26 | Performed by: INTERNAL MEDICINE

## 2022-11-23 PROCEDURE — 258N000003 HC RX IP 258 OP 636

## 2022-11-23 PROCEDURE — G1010 CDSM STANSON: HCPCS | Mod: GC | Performed by: RADIOLOGY

## 2022-11-23 PROCEDURE — 36415 COLL VENOUS BLD VENIPUNCTURE: CPT | Performed by: EMERGENCY MEDICINE

## 2022-11-23 PROCEDURE — 85730 THROMBOPLASTIN TIME PARTIAL: CPT | Performed by: STUDENT IN AN ORGANIZED HEALTH CARE EDUCATION/TRAINING PROGRAM

## 2022-11-23 PROCEDURE — 96365 THER/PROPH/DIAG IV INF INIT: CPT | Mod: 59 | Performed by: EMERGENCY MEDICINE

## 2022-11-23 PROCEDURE — 99204 OFFICE O/P NEW MOD 45 MIN: CPT | Mod: GC | Performed by: STUDENT IN AN ORGANIZED HEALTH CARE EDUCATION/TRAINING PROGRAM

## 2022-11-23 PROCEDURE — C9803 HOPD COVID-19 SPEC COLLECT: HCPCS | Performed by: EMERGENCY MEDICINE

## 2022-11-23 PROCEDURE — 255N000002 HC RX 255 OP 636: Performed by: INTERNAL MEDICINE

## 2022-11-23 PROCEDURE — 85610 PROTHROMBIN TIME: CPT | Performed by: EMERGENCY MEDICINE

## 2022-11-23 PROCEDURE — 86901 BLOOD TYPING SEROLOGIC RH(D): CPT | Performed by: EMERGENCY MEDICINE

## 2022-11-23 PROCEDURE — 36415 COLL VENOUS BLD VENIPUNCTURE: CPT | Performed by: STUDENT IN AN ORGANIZED HEALTH CARE EDUCATION/TRAINING PROGRAM

## 2022-11-23 PROCEDURE — 86850 RBC ANTIBODY SCREEN: CPT | Performed by: EMERGENCY MEDICINE

## 2022-11-23 PROCEDURE — 99285 EMERGENCY DEPT VISIT HI MDM: CPT | Mod: 25 | Performed by: EMERGENCY MEDICINE

## 2022-11-23 PROCEDURE — 250N000011 HC RX IP 250 OP 636

## 2022-11-23 PROCEDURE — 74177 CT ABD & PELVIS W/CONTRAST: CPT | Mod: MG

## 2022-11-23 PROCEDURE — 80048 BASIC METABOLIC PNL TOTAL CA: CPT | Performed by: EMERGENCY MEDICINE

## 2022-11-23 PROCEDURE — 86870 RBC ANTIBODY IDENTIFICATION: CPT | Performed by: EMERGENCY MEDICINE

## 2022-11-23 PROCEDURE — 74177 CT ABD & PELVIS W/CONTRAST: CPT | Mod: 26 | Performed by: RADIOLOGY

## 2022-11-23 PROCEDURE — 96375 TX/PRO/DX INJ NEW DRUG ADDON: CPT | Performed by: EMERGENCY MEDICINE

## 2022-11-23 PROCEDURE — 83605 ASSAY OF LACTIC ACID: CPT | Performed by: STUDENT IN AN ORGANIZED HEALTH CARE EDUCATION/TRAINING PROGRAM

## 2022-11-23 PROCEDURE — 250N000013 HC RX MED GY IP 250 OP 250 PS 637

## 2022-11-23 PROCEDURE — 76705 ECHO EXAM OF ABDOMEN: CPT | Mod: 26 | Performed by: RADIOLOGY

## 2022-11-23 PROCEDURE — 85025 COMPLETE CBC W/AUTO DIFF WBC: CPT | Performed by: EMERGENCY MEDICINE

## 2022-11-23 PROCEDURE — 93005 ELECTROCARDIOGRAM TRACING: CPT | Performed by: EMERGENCY MEDICINE

## 2022-11-23 PROCEDURE — 85610 PROTHROMBIN TIME: CPT | Mod: 91 | Performed by: STUDENT IN AN ORGANIZED HEALTH CARE EDUCATION/TRAINING PROGRAM

## 2022-11-23 PROCEDURE — 250N000015 HC RX FACTOR IP MED 636 OP 636: Performed by: STUDENT IN AN ORGANIZED HEALTH CARE EDUCATION/TRAINING PROGRAM

## 2022-11-23 PROCEDURE — 36415 COLL VENOUS BLD VENIPUNCTURE: CPT

## 2022-11-23 PROCEDURE — 99220 PR INITIAL OBSERVATION CARE,LEVEL III: CPT | Mod: 25 | Performed by: INTERNAL MEDICINE

## 2022-11-23 RX ORDER — HYDROMORPHONE HYDROCHLORIDE 1 MG/ML
0.5 INJECTION, SOLUTION INTRAMUSCULAR; INTRAVENOUS; SUBCUTANEOUS ONCE
Status: COMPLETED | OUTPATIENT
Start: 2022-11-23 | End: 2022-11-23

## 2022-11-23 RX ORDER — METRONIDAZOLE 500 MG/1
500 TABLET ORAL 3 TIMES DAILY
Status: DISCONTINUED | OUTPATIENT
Start: 2022-11-23 | End: 2022-11-23

## 2022-11-23 RX ORDER — IOPAMIDOL 755 MG/ML
95 INJECTION, SOLUTION INTRAVASCULAR ONCE
Status: COMPLETED | OUTPATIENT
Start: 2022-11-23 | End: 2022-11-23

## 2022-11-23 RX ORDER — ACETAMINOPHEN 325 MG/1
975 TABLET ORAL ONCE
Status: CANCELLED | OUTPATIENT
Start: 2022-11-23 | End: 2022-11-23

## 2022-11-23 RX ORDER — CEFAZOLIN SODIUM 2 G/100ML
2 INJECTION, SOLUTION INTRAVENOUS
Status: CANCELLED | OUTPATIENT
Start: 2022-11-23

## 2022-11-23 RX ORDER — SODIUM CHLORIDE, SODIUM LACTATE, POTASSIUM CHLORIDE, CALCIUM CHLORIDE 600; 310; 30; 20 MG/100ML; MG/100ML; MG/100ML; MG/100ML
INJECTION, SOLUTION INTRAVENOUS CONTINUOUS
Status: DISCONTINUED | OUTPATIENT
Start: 2022-11-23 | End: 2022-11-24

## 2022-11-23 RX ORDER — METOPROLOL SUCCINATE 50 MG/1
50 TABLET, EXTENDED RELEASE ORAL DAILY
Status: DISCONTINUED | OUTPATIENT
Start: 2022-11-24 | End: 2022-11-23

## 2022-11-23 RX ORDER — SERTRALINE HYDROCHLORIDE 25 MG/1
25 TABLET, FILM COATED ORAL DAILY
Status: DISCONTINUED | OUTPATIENT
Start: 2022-11-23 | End: 2022-11-23

## 2022-11-23 RX ORDER — ACETAMINOPHEN 325 MG/1
650 TABLET ORAL EVERY 4 HOURS PRN
Status: DISCONTINUED | OUTPATIENT
Start: 2022-11-23 | End: 2022-11-24 | Stop reason: HOSPADM

## 2022-11-23 RX ORDER — METOPROLOL TARTRATE 25 MG/1
25 TABLET, FILM COATED ORAL 2 TIMES DAILY
Status: DISCONTINUED | OUTPATIENT
Start: 2022-11-24 | End: 2022-11-24 | Stop reason: HOSPADM

## 2022-11-23 RX ORDER — SIMVASTATIN 40 MG
40 TABLET ORAL AT BEDTIME
Status: DISCONTINUED | OUTPATIENT
Start: 2022-11-23 | End: 2022-11-24 | Stop reason: HOSPADM

## 2022-11-23 RX ORDER — CEFAZOLIN SODIUM 2 G/100ML
2 INJECTION, SOLUTION INTRAVENOUS SEE ADMIN INSTRUCTIONS
Status: CANCELLED | OUTPATIENT
Start: 2022-11-23

## 2022-11-23 RX ORDER — LIDOCAINE 40 MG/G
CREAM TOPICAL
Status: DISCONTINUED | OUTPATIENT
Start: 2022-11-23 | End: 2022-11-24 | Stop reason: HOSPADM

## 2022-11-23 RX ORDER — PIPERACILLIN SODIUM, TAZOBACTAM SODIUM 3; .375 G/15ML; G/15ML
3.38 INJECTION, POWDER, LYOPHILIZED, FOR SOLUTION INTRAVENOUS EVERY 6 HOURS
Status: DISCONTINUED | OUTPATIENT
Start: 2022-11-23 | End: 2022-11-24 | Stop reason: HOSPADM

## 2022-11-23 RX ORDER — OXYBUTYNIN CHLORIDE 10 MG/1
10 TABLET, EXTENDED RELEASE ORAL DAILY
Status: DISCONTINUED | OUTPATIENT
Start: 2022-11-24 | End: 2022-11-24 | Stop reason: HOSPADM

## 2022-11-23 RX ADMIN — AMOXICILLIN AND CLAVULANATE POTASSIUM 1 TABLET: 875; 125 TABLET, FILM COATED ORAL at 16:34

## 2022-11-23 RX ADMIN — PROTHROMBIN, COAGULATION FACTOR VII HUMAN, COAGULATION FACTOR IX HUMAN, COAGULATION FACTOR X HUMAN, PROTEIN C, PROTEIN S HUMAN, AND WATER 1682 UNITS: KIT at 16:40

## 2022-11-23 RX ADMIN — HUMAN ALBUMIN MICROSPHERES AND PERFLUTREN 5 ML: 10; .22 INJECTION, SOLUTION INTRAVENOUS at 13:19

## 2022-11-23 RX ADMIN — IOPAMIDOL 95 ML: 755 INJECTION, SOLUTION INTRAVENOUS at 11:37

## 2022-11-23 RX ADMIN — SIMVASTATIN 40 MG: 40 TABLET, FILM COATED ORAL at 22:16

## 2022-11-23 RX ADMIN — PIPERACILLIN AND TAZOBACTAM 3.38 G: 3; .375 INJECTION, POWDER, LYOPHILIZED, FOR SOLUTION INTRAVENOUS at 18:27

## 2022-11-23 RX ADMIN — SODIUM CHLORIDE, POTASSIUM CHLORIDE, SODIUM LACTATE AND CALCIUM CHLORIDE: 600; 310; 30; 20 INJECTION, SOLUTION INTRAVENOUS at 22:16

## 2022-11-23 RX ADMIN — HYDROMORPHONE HYDROCHLORIDE 0.5 MG: 1 INJECTION, SOLUTION INTRAMUSCULAR; INTRAVENOUS; SUBCUTANEOUS at 10:55

## 2022-11-23 RX ADMIN — METRONIDAZOLE 500 MG: 500 TABLET ORAL at 16:34

## 2022-11-23 ASSESSMENT — ACTIVITIES OF DAILY LIVING (ADL)
ADLS_ACUITY_SCORE: 37

## 2022-11-23 NOTE — CONSULTS
Cardiology Inpatient Consultation  November 23, 2022    Reason for Consult:  A cardiology consult was requested by Dr. Ragland from the surgery service to provide clinical guidance regarding pre-op evaluation.    Assessment and Recommendation:  Brooks Summers is a 77 year old male with a history of severe aortic stenosis s/p bioprosthetic valve replacement on 07/14/2011 c/b strep endocarditis and redone on 01/25/2013, severe LV dysfunction and LBBB s/p CRT-D placement in 2012, chronic systolic heart failure (EF 55-60% as of 06/01/2022), atrial fibrillation on warfarin, CKD stage 3 and HLD who is under consideration for incarcerated inguinal hernia repair. Vitals are stable and he has not had chest pain, shortness of breath or other cardiac symptoms.     1. Preoperative evaluation: The patient's RCRI score is 2, which correlates to a class III risk and 10.1% chance of death, MI or cardiac arrest in 30 days. Although he has a much improved EF s/p CRT-D placement, lack of cardiac symptoms, no current signs or symptoms of heart failure, he does remain at high risk for cardiac complications from this procedure. He is able to walk up stairs without difficulty and both walk and play 18 holes of golf at one time, he can tolerate METs >4. Notably, patient's inguinal repair is an urgent procedure, given the possibility of incarceration.    - If patient requires surgery, note that patient is high risk given his cardiac history and elevated INR.    - No further testing, including stress testing or echocardiogram, currently indicated.    - No further interventions for optimization recommended at this time    Patient seen and discussed with Dr. Birmingham, who agrees with above plan.    Thank you for consulting the cardiovascular services at the [facility]. Please do not hesitate to call us with any questions.     Gabino Potts  MS4  [facility]    I, Ananth De MD, saw this patient with  the Med Student and agree with the findings and plan of care as  documented in the above note. I personally reviewed vital signs, labs, images (including EKG, echocardiogram, chest xray, telemetry) .     HPI:   Brooks Summers is a 77 year old male with a history of severe aortic stenosis s/p bioprosthetic valve replacement on 07/14/2011 c/b strep endocarditis and redone on 01/25/2013, severe LV dysfunction and LBBB s/p CRT-D placement in 2012, chronic systolic heart failure (EF 55-60% as of 06/01/2022), atrial fibrillation on warfarin, CKD stage 3 and HLD who was admitted after he had sudden onset groin pain after lifting a heavy pot of frozen dirt. He had a CT of his abdomen that showed a loop of sigmoid colon extending into the left inguinal canal with adjacent fat stranding. He is being considered for possible inguinal hernia repair due to concerns for incarceration.     Brooks denies having any cardiac symptoms, including chest pain, palpitations or shortness of breath. He used to do yard work often at his old home, can ascend a flight of stairs without difficulty, and he can walk and golf an 18 hold golf course without difficulty.     Review of Systems:    Complete review of systems was performed and negative except per HPI.    PMH:  Past Medical History:   Diagnosis Date     BCC (basal cell carcinoma), face 5/16/2013     Bicuspid aortic valve      Chronic systolic heart failure (H)      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 Tx with Dapto/RIFx 8 weeks.     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     Paroxysmal A-fib (H)     Post-op 7/14/2011, 1/26/13     Rotator Cuff (Capsule) Sprain and Strain      S/P aortic valve replacement     7/14/2011, re-do 1/25/13 due to endocarditis     Smoking hx      Thrombocytopenia (H)      Active Problems:  Patient Active Problem List    Diagnosis  Date Noted     MIC (generalized anxiety disorder) 08/24/2022     Priority: Medium     Mild cognitive impairment 08/24/2022     Priority: Medium     Anticoagulated 07/19/2022     Priority: Medium     Anticoagulated on Coumadin 08/05/2021     Priority: Medium     ICD (implantable cardioverter-defibrillator) battery depletion 02/13/2021     Priority: Medium     Added automatically from request for surgery [id number]       Cardiomyopathy (H) 01/18/2021     Priority: Medium     Added automatically from request for surgery [id number]       Seizure (H) 05/10/2020     Priority: Medium     Neoplasm of uncertain behavior of skin 06/08/2019     Priority: Medium     AK (actinic keratosis) 06/08/2019     Priority: Medium     History of nonmelanoma skin cancer 06/08/2019     Priority: Medium     Nocturnal hypoxemia 10/15/2018     Priority: Medium     Trigger ring finger of left hand 11/16/2015     Priority: Medium     Paroxysmal atrial fibrillation (H) 02/25/2015     Priority: Medium     Long term current use of anticoagulant therapy 02/25/2015     Priority: Medium     Problem list name updated by automated process. Provider to review       Finger injury 10/08/2014     Priority: Medium     Hyperlipidemia with target LDL less than 100 10/21/2013     Priority: Medium     Diagnosis updated by automated process. Provider to review and confirm.       Need for prophylactic antibiotic 04/12/2013     Priority: Medium     Aortic valve and past endocarditis        S/P AVR 01/25/2013     Priority: Medium     Endocarditis 01/21/2013     Priority: Medium     Cultures negative but 16S rRNA PCR showed Staph capitis.  Treated with Dapto and RIF x 8 weeks.       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 Provider to review       LBBB (left bundle branch block) 01/28/2012     Priority: Medium     Received CRT-D; QRS  186 msec       Pneumothorax, left 2012     Priority: Medium     Observed following CRT-D implant; small. Observe and repeat CXR       Heart failure, chronic systolic (H) 2012     Priority: Medium     Stable; NYHA classII       Cardiomyopathy, dilated, nonischemic (H) 2012     Priority: Medium     Mild CAD; EF 15-20%       CKD (chronic kidney disease) stage 3, GFR 30-59 ml/min (H) 2012     Priority: Medium     Dyslipidemia 2012     Priority: Medium     On Pravavastatin       Aortic valve stenosis, severe 2011     Priority: Medium     Chronic systolic heart failure (H) 2011     Priority: Medium     Bicuspid aortic valve 2011     Priority: Medium     S/P aortic valve replacement 2011     Priority: Medium     Smoking hx 2011     Priority: Medium     Rotator cuff (capsule) sprain 2009     Priority: Medium     Other postprocedural status(V45.89) 2009     Priority: Medium     Social History:  Social History     Tobacco Use     Smoking status: Former     Packs/day: 1.00     Years: 20.00     Pack years: 20.00     Types: Cigarettes     Quit date: 1989     Years since quittin.9     Smokeless tobacco: Never   Vaping Use     Vaping Use: Never used   Substance Use Topics     Alcohol use: Yes     Alcohol/week: 2.0 standard drinks     Drug use: No     Family History:  Family History   Problem Relation Age of Onset     Cancer Mother 92        stomach, colon     Hypertension Mother      Retinal detachment Mother      C.A.D. Father 68        bypass, carotid      Prostate Cancer Father 70     Heart Disease Father      Depression Sister      Anxiety Disorder Sister      Depression Brother      Anxiety Disorder Brother      Cancer Brother 64        lung cancer, petroleum     Cancer Brother      Glaucoma No family hx of      Macular Degeneration No family hx of      Strabismus No family hx of      Glasses (<7 y/o) No family hx of         Medications:  Current Facility-Administered Medications   Medication     acetaminophen (TYLENOL) tablet 650 mg     amoxicillin-clavulanate (AUGMENTIN) 875-125 MG per tablet 1 tablet     lidocaine (LMX4) cream     lidocaine 1 % 0.1-1 mL     [START ON 11/24/2022] losartan (COZAAR) half-tab 12.5 mg     [START ON 11/24/2022] metoprolol succinate ER (TOPROL XL) 24 hr tablet 50 mg     metroNIDAZOLE (FLAGYL) tablet 500 mg     [START ON 11/24/2022] oxybutynin ER (DITROPAN XL) 24 hr tablet 10 mg     prothrombin 4 factor complex concentrate (KCENTRA) infusion 1,883 Units     sertraline (ZOLOFT) tablet 25 mg     simvastatin (ZOCOR) tablet 40 mg     sodium chloride (PF) 0.9% PF flush 3 mL     sodium chloride (PF) 0.9% PF flush 3 mL     Current Outpatient Medications   Medication     allopurinol (ZYLOPRIM) 100 MG tablet     levETIRAcetam (KEPPRA) 500 MG tablet     losartan (COZAAR) 25 MG tablet     metoprolol succinate ER (TOPROL XL) 50 MG 24 hr tablet     Multiple Vitamins-Minerals (MULTIVITAMIN ADULTS 50+) TABS     oxybutynin ER (DITROPAN-XL) 10 MG 24 hr tablet     PROVIDER DOSED MANAGED WARFARIN, COUMADIN, OUTPATIENT     sertraline (ZOLOFT) 25 MG tablet     simvastatin (ZOCOR) 40 MG tablet     trospium (SANCTURA) 20 MG tablet     VITAMIN D, CHOLECALCIFEROL, PO     warfarin ANTICOAGULANT (COUMADIN) 5 MG tablet       Physical Exam:  Temp:  [97.6  F (36.4  C)] 97.6  F (36.4  C)  Pulse:  [72-75] 72  Resp:  [16] 16  BP: (127-149)/(80-90) 127/80  SpO2:  [96 %-97 %] 97 %  No intake or output data in the 24 hours ending 11/23/22 1304  GEN: pleasant, no acute distress  HEENT: no icterus  CV: RRR, normal s1/s2, no murmurs/rubs/s3/s4, no heave.   CHEST: clear to ausculation bilaterally, no rales or wheezing  ABD: soft, non-tender, normal active bowel sounds  EXTR: No clubbing, cyanosis or edema.   NEURO: alert oriented, speech fluent/appropriate, motor grossly nonfocal      Diagnostics:  All labs and imaging were reviewed, of  note:    CMP  Recent Labs   Lab 11/23/22  0957      POTASSIUM 4.1   CHLORIDE 106   CO2 25   ANIONGAP 10   *   BUN 16.8   CR 0.87   GFRESTIMATED 89   MARTIN 9.3     CBC  Recent Labs   Lab 11/23/22  0957   WBC 4.5   RBC 4.20*   HGB 14.0   HCT 41.6   MCV 99   MCH 33.3*   MCHC 33.7   RDW 13.5   PLT 85*     INR  Recent Labs   Lab 11/23/22  1120 11/23/22  0957   INR 3.53* 3.36*     Arterial Blood GasNo lab results found in last 7 days.    Lab Results   Component Value Date    TROPI 0.017 05/10/2020       EKG: Rate 75, ventricular paced rhythm with frequent PVCs. No ST or T wave ischemic changes.     Transthoracic echocardiogram:   Left ventricular size is normal.  The visual ejection fraction is 45-50%.  Right ventricular function, chamber size, wall motion, and thickness are  normal.  Status post 23 mm Bogdan Johnson [employer] bioprosthetic aortic  valve replacement in 2011. Mean AV gradient normal at 11mmHg. Trace AI.  Ascending aorta 4.4 cm.  The inferior vena cava is normal.  No pericardial effusion is present.  Aortic size and valve function unchanged. Mildly decreased LV function when  compared to previous study. The inferior wallmotion abnormality is not new.

## 2022-11-23 NOTE — CONSULTS
EGS Surgery Consult  2022    Brooks Summers  : 1945    Date of Service: 2022 10:17 AM    Assessment and Plan:  Brooks Summers is a 77 year old male with a history of CHF (ECHO 55-60% 22) s/p AVR , pAfib on warfarin s/p ICD who presented to the ED with an incarcerated left inguinal hernia. Likely been incarcerated for 5 days. No obstructive symptoms. Not systemically ill. Hemodynamically normal. No leukocytosis. Unable to reduce hernia at bedside. Awaiting CT imaging to evaluate for incarcerated bowel which would require urgent surgical intervention. Will initiate cardiac workup and consult cardiology in the meantime to assess pre operative risk.     - awaiting CT imaging to evaluate for incarcerated bowel which would require urgent surgical intervention  - stat ECHO and EKG  - appreciate cardiology input on pre operative clearance  - remainder of plan pending CT    Patient and plan discussed with Dr. Mitchell Coyle MD  General Surgery Resident  --------------------------------------------------------------------  History of Present Illness:    Brooks Summers is a 77 year old male with a history notable for CHF (ECHO 55-60% 22) s/p AVR , pAfib on warfarin s/p ICD who presented to the ED for evaluation of a painful bulge. He says that about one week ago, he lifted a heavy box of dirt and 2 days later noted an area of swelling and tenderness in his left groin. Since that time, the swelling and tenderness have persisted. He has been taking Tylenol once a day for this. At rest, he has very minimal discomfort but notes increased pain with coughing and straining. He denies fevers, chills, nausea, vomiting, constipation, obstipation. No prior abdominal surgeries. He did not have a known hernia prior to this.    Past Medical History:  CHF  pAfib   ICD implant  BCC  Gout    Past Surgical History  Past Surgical History:   Procedure Laterality Date     ANESTHESIA CARDIOVERSION   7/18/2011    Procedure:ANESTHESIA CARDIOVERSION; Cardioversion; Surgeon:GENERIC ANESTHESIA PROVIDER; Location:[facility] OR     COLONOSCOPY       COLONOSCOPY N/A 11/19/2018    Procedure: COLONOSCOPY;  Surgeon: Herman Mcginnis MD;  Location:  GI     CORONARY ANGIOGRAPHY ADULT ORDER       CYSTOSCOPY, BIOPSY URETHRA N/A 4/3/2017    Procedure: CYSTOSCOPY, BIOPSY URETHRA;  Surgeon: Marlena Mora MD;  Location:  OR     ENDOSCOPY UPPER, COLONOSCOPY, COMBINED       EP ICD GENERATOR REPLACEMENT DUAL N/A 3/10/2021    Procedure: EP ICD GENERATOR CHANGE DUAL;  Surgeon: Mekhi Be MD;  Location:  HEART CARDIAC CATH LAB     HC REMOV & REPLACE IMPLT DEFIB PULSE GEN MULT LEAD  12/3/2015          HEMORRHOID SURGERY       IMPLANT AUTOMATIC IMPLANTABLE CARDIOVERTER DEFIBRILLATOR       MOHS MICROGRAPHIC PROCEDURE       ORTHOPEDIC SURGERY      shoulder,right     REDO STERNOTOMY REPLACE VALVE AORTIC  1/25/2013    Procedure: REDO STERNOTOMY REPLACE VALVE AORTIC;  Redo Sternotomy, Redo Aortic Valve Replacement on pump oxygenator. Intraoperative Transesophageal Echocardiogram;  Surgeon: Navin Hampton MD;  Location:  OR     REPAIR VALVE MITRAL  2013     REPLACE VALVE AORTIC  7/14/2011    Procedure:REPLACE VALVE AORTIC; Median Sternotomy, Bioprosthesis,  Aortic Valve replacement on pump with oxygenator. Intra-operative Transesophogeal Echocardiogram.; Surgeon:NAVIN HAMPTON; Location: OR     teeth extractions       TRANSPLANT         Family History:  Family History   Problem Relation Age of Onset     Cancer Mother 92        stomach, colon     Hypertension Mother      Retinal detachment Mother      C.A.D. Father 68        bypass, carotid      Prostate Cancer Father 70     Heart Disease Father      Depression Sister      Anxiety Disorder Sister      Depression Brother      Anxiety Disorder Brother      Cancer Brother 64        lung cancer, petroleum     Cancer Brother      Glaucoma No family hx of      Macular  "Degeneration No family hx of      Strabismus No family hx of      Glasses (<7 y/o) No family hx of        Social History:  Former smoker, quit 1989  Occasional alcohol use  No IVDU    Medications:  No current facility-administered medications for this encounter.     Current Outpatient Medications   Medication     allopurinol (ZYLOPRIM) 100 MG tablet     levETIRAcetam (KEPPRA) 500 MG tablet     losartan (COZAAR) 25 MG tablet     metoprolol succinate ER (TOPROL XL) 50 MG 24 hr tablet     Multiple Vitamins-Minerals (MULTIVITAMIN ADULTS 50+) TABS     oxybutynin ER (DITROPAN-XL) 10 MG 24 hr tablet     PROVIDER DOSED MANAGED WARFARIN, COUMADIN, OUTPATIENT     sertraline (ZOLOFT) 25 MG tablet     simvastatin (ZOCOR) 40 MG tablet     trospium (SANCTURA) 20 MG tablet     VITAMIN D, CHOLECALCIFEROL, PO     warfarin ANTICOAGULANT (COUMADIN) 5 MG tablet       Allergies:  Lisinopril    Review of Symptoms:  A 10 point review of symptoms has been conducted and is negative except for that mentioned in the above HPI.    Physical Exam:  BP (!) 149/90   Pulse 75   Temp 97.6  F (36.4  C) (Oral)   Resp 16   Ht 1.778 m (5' 10\")   Wt 75.3 kg (166 lb)   SpO2 97%   BMI 23.82 kg/m     Gen:    Reclining in bed in NAD, A&OX3  HEENT: Normocephalic and atraumatic  CV:  RRR by radial pulse  Pulm:  Non-labored breathing on room air  Abd:  Soft, non-distended, mild tenderness LLQ, remainder of abdomen nontender. Left groin with obvious bulge which is mildly tender, no overlying skin changes, palpable cough impulse, no hernia right groin  Ext:  Warm and well perfused, no obvious deformities    Labs:  All labs reviewed, notable for:  WBC 4.5  Hgb 14.0  INR 3.5    Imaging:  CT A/P: Pending          "

## 2022-11-23 NOTE — SIGNIFICANT EVENT
Discussed at length with pt, his current options and our concerns. Pt voiced understanding, wished to proceed with an operation. I impressed upon him his higher risks for all risks mentioned to him (cardiac, neuro, abdominal, etc), he voiced understanding and wished to proceed with an operation.

## 2022-11-23 NOTE — ED PROVIDER NOTES
[facility] EMERGENCY DEPARTMENT ([facility])  11/23/22      History     Chief Complaint   Patient presents with     Hernia     Pt seen yesterday for possible incarcerated inguinal hernia and instructed to go directly to the ER. Pt presents today. Left groin pain for 1 week. No hx of hernias.      HPI  Brooks Summers is a 77 year old male with past medical history significant for cardiomyopathy, chronic systolic heart failure (EF 55-60%, echo 06/01/2022), paroxysmal atrial fibrillation anticoagulated on warfarin, ICD in place, hyperlipidemia, CAD, CKD, s/p aortic valve replacement who presents to the ED for left groin pain.  Per chart review, patient was seen at [facility] yesterday 11/22/2022 with a 1 week history of left groin pain and swelling that started after moving heavy pots.  Patient was advised to proceed to the ED due to concern for incarcerated inguinal hernia.  He reports the swelling and discomfort have persisted.  He denies nausea or vomiting and reports that he has been having normal bowel movements.  He denies abdominal pain.  No pain with urination or blood in his urine.    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 Tx with Dapto/RIFx 8 weeks.     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     Paroxysmal A-fib (H)     Post-op 7/14/2011, 1/26/13     Rotator Cuff (Capsule) Sprain and Strain      S/P aortic valve replacement     7/14/2011, re-do 1/25/13 due to endocarditis     Smoking hx      Thrombocytopenia (H)      Past Surgical History:   Procedure Laterality Date     ANESTHESIA CARDIOVERSION  7/18/2011    Procedure:ANESTHESIA CARDIOVERSION; Cardioversion;  Surgeon:GENERIC ANESTHESIA PROVIDER; Location:[facility] OR     COLONOSCOPY       COLONOSCOPY N/A 11/19/2018    Procedure: COLONOSCOPY;  Surgeon: Herman Mcginnis MD;  Location:  GI     CORONARY ANGIOGRAPHY ADULT ORDER       CYSTOSCOPY, BIOPSY URETHRA N/A 4/3/2017    Procedure: CYSTOSCOPY, BIOPSY URETHRA;  Surgeon: Marlena Mora MD;  Location: [facility] OR     ENDOSCOPY UPPER, COLONOSCOPY, COMBINED       EP ICD GENERATOR REPLACEMENT DUAL N/A 3/10/2021    Procedure: EP ICD GENERATOR CHANGE DUAL;  Surgeon: Mekhi Be MD;  Location:  HEART CARDIAC CATH LAB     HC REMOV & REPLACE IMPLT DEFIB PULSE GEN MULT LEAD  12/3/2015          HEMORRHOID SURGERY       IMPLANT AUTOMATIC IMPLANTABLE CARDIOVERTER DEFIBRILLATOR       MOHS MICROGRAPHIC PROCEDURE       ORTHOPEDIC SURGERY      shoulder,right     REDO STERNOTOMY REPLACE VALVE AORTIC  1/25/2013    Procedure: REDO STERNOTOMY REPLACE VALVE AORTIC;  Redo Sternotomy, Redo Aortic Valve Replacement on pump oxygenator. Intraoperative Transesophageal Echocardiogram;  Surgeon: Navin Hampton MD;  Location: [facility] OR     REPAIR VALVE MITRAL  2013     REPLACE VALVE AORTIC  7/14/2011    Procedure:REPLACE VALVE AORTIC; Median Sternotomy, Bioprosthesis,  Aortic Valve replacement on pump with oxygenator. Intra-operative Transesophogeal Echocardiogram.; Surgeon:NAVIN HAMPTON; Location:[facility] OR     teeth extractions       TRANSPLANT       allopurinol (ZYLOPRIM) 100 MG tablet  levETIRAcetam (KEPPRA) 500 MG tablet  losartan (COZAAR) 25 MG tablet  metoprolol succinate ER (TOPROL XL) 50 MG 24 hr tablet  Multiple Vitamins-Minerals (MULTIVITAMIN ADULTS 50+) TABS  oxybutynin ER (DITROPAN-XL) 10 MG 24 hr tablet  PROVIDER DOSED MANAGED WARFARIN, COUMADIN, OUTPATIENT  sertraline (ZOLOFT) 25 MG tablet  simvastatin (ZOCOR) 40 MG tablet  trospium (SANCTURA) 20 MG tablet  VITAMIN D, CHOLECALCIFEROL, PO  warfarin ANTICOAGULANT (COUMADIN) 5 MG tablet      Allergies   Allergen Reactions     Lisinopril  "Cough     Family History  Family History   Problem Relation Age of Onset     Cancer Mother 92        stomach, colon     Hypertension Mother      Retinal detachment Mother      C.A.D. Father 68        bypass, carotid      Prostate Cancer Father 70     Heart Disease Father      Depression Sister      Anxiety Disorder Sister      Depression Brother      Anxiety Disorder Brother      Cancer Brother 64        lung cancer, petroleum     Cancer Brother      Glaucoma No family hx of      Macular Degeneration No family hx of      Strabismus No family hx of      Glasses (<7 y/o) No family hx of      Social History   Social History     Tobacco Use     Smoking status: Former     Packs/day: 1.00     Years: 20.00     Pack years: 20.00     Types: Cigarettes     Quit date: 1989     Years since quittin.9     Smokeless tobacco: Never   Vaping Use     Vaping Use: Never used   Substance Use Topics     Alcohol use: Yes     Alcohol/week: 2.0 standard drinks     Drug use: No      Past medical history, past surgical history, medications, allergies, family history, and social history were reviewed with the patient. No additional pertinent items.       Review of Systems  A complete review of systems was performed with pertinent positives and negatives noted in the HPI, and all other systems negative.    Physical Exam   BP: (!) 149/90  Pulse: 75  Temp: 97.6  F (36.4  C)  Resp: 16  Height: 177.8 cm (5' 10\")  Weight: 75.3 kg (166 lb)  SpO2: 97 %  Physical Exam  Vitals and nursing note reviewed.   Constitutional:       General: He is not in acute distress.     Appearance: He is well-developed. He is not ill-appearing, toxic-appearing or diaphoretic.   HENT:      Head: Normocephalic and atraumatic.      Mouth/Throat:      Lips: Pink.      Mouth: Mucous membranes are moist.      Pharynx: Oropharynx is clear. No oropharyngeal exudate.   Eyes:      General: Lids are normal. No scleral icterus.     Extraocular Movements: Extraocular " movements intact.      Right eye: No nystagmus.      Left eye: No nystagmus.      Conjunctiva/sclera: Conjunctivae normal.      Pupils: Pupils are equal, round, and reactive to light.   Neck:      Thyroid: No thyromegaly.      Vascular: No JVD.      Trachea: No tracheal deviation.   Cardiovascular:      Rate and Rhythm: Normal rate and regular rhythm.      Pulses: Normal pulses.      Heart sounds: Normal heart sounds. No murmur heard.    No friction rub. No gallop.   Pulmonary:      Effort: Pulmonary effort is normal. No respiratory distress.      Breath sounds: Normal breath sounds.   Abdominal:      General: Bowel sounds are normal. There is no distension.      Palpations: Abdomen is soft. There is no mass.      Tenderness: There is no abdominal tenderness. There is no guarding or rebound.      Hernia: A hernia is present. Hernia is present in the left inguinal area.   Musculoskeletal:         General: No tenderness. Normal range of motion.      Cervical back: Normal range of motion and neck supple. No erythema or rigidity.      Right lower leg: No edema.      Left lower leg: No edema.   Lymphadenopathy:      Cervical: No cervical adenopathy.   Skin:     General: Skin is warm and dry.      Capillary Refill: Capillary refill takes less than 2 seconds.      Coloration: Skin is not pale.      Findings: No erythema or rash.   Neurological:      Mental Status: He is alert and oriented to person, place, and time.      Cranial Nerves: No cranial nerve deficit.      Sensory: No sensory deficit.      Motor: Motor function is intact.   Psychiatric:         Mood and Affect: Mood and affect normal.         Speech: Speech normal.         Behavior: Behavior normal.         ED Course      Procedures                     Results for orders placed or performed during the hospital encounter of 11/23/22   CT Abdomen Pelvis w Contrast     Status: None    Narrative    EXAMINATION: CT ABDOMEN PELVIS W CONTRAST, 11/23/2022 11:51  AM    TECHNIQUE:  Helical CT images from the lung bases through the  symphysis pubis were obtained with IV contrast. Contrast dose:  iopamidol (ISOVUE-370) solution 95 mL    COMPARISON: 8/17/2018    HISTORY: Left inguinal pain and mass    FINDINGS:    Liver: Normal parenchymal attenuation without focal mass.  Biliary system: Gallbladder is within normal limits. No intrahepatic  or extrahepatic biliary ductal dilatation.  Pancreas: No focal mass or dilation of the main pancreatic duct.  Stomach: Within normal limits.  Spleen: Within normal limits.  Adrenal glands: Within normal limits.  Kidneys: No focal mass, hydronephrosis, or stone. Stable simple cysts  in the left kidney.  Bladder: Within normal limits.  Reproductive organs: Enlarged prostate.  Colon: Extensive colonic diverticulosis with a loop of sigmoid colon  extending into the left inguinal canal with adjacent fat stranding.  Appendix: Within normal limits.  Small Bowel: Within normal limits.  Lymph nodes: No intra-abdominal or pelvic lymphadenopathy.  Vasculature: Atherosclerotic calcifications of the abdominal aorta and  its branches without aneurysm. The main portal vein and superior  mesenteric vein are patent.  Peritoneum: No intraabdominal free fluid or air.     Lung bases: Heart size is normal. No pericardial effusion. Partially  visualized cardiac pacer leads. No pneumothorax or pleural effusion.  Increased scarring in the lung bases bilaterally. Bibasilar  atelectasis. Increased size of the 4 mm solid subpleural nodule in the  lingula. No focal consolidation.    Bones: No suspicious osseous lesion. Bilateral L5 pars defects with  trace anterolisthesis of L5 on S1. Degenerative changes of the  thoracolumbar spine and SI joints. Partially visualized sternotomy  wires.    Soft tissues: Small fat-containing umbilical hernia. Fat-containing  right inguinal hernia. Left inguinal hernia containing fat and sigmoid  colon.      Impression    IMPRESSION:   1.  Diverticulitis of a segment of sigmoid colon that is contained  within a left inguinal hernia. No evidence of bowel obstruction or  ischemia.  2. Increased size of a 4 mm solid subpleural nodule in the lingula.  Recommend attention on follow-up.  3. Bilateral L5 pars defects with trace anterolisthesis of L5 on S1.    I have personally reviewed the examination and initial interpretation  and I agree with the findings.    RAQUEL BLOOD MD         SYSTEM ID:  [id number]   US Abdomen Limited     Status: None    Narrative    EXAMINATION: Soft tissue ultrasound of the left lower quadrant and  left groin 11/23/2022 2:09 PM     COMPARISON: CT abdomen and pelvis same day.    HISTORY: 77-year-old presenting with incarcerated left inguinal hernia  for the last 5 days. Looking for presence of bowel in the hernia sac  following reduction.    TECHNIQUE: Clinical area of interest was scanned in the standard  fashion with specialized ultrasound transducer(s) using both gray  scale and limited color/spectral Doppler techniques.    FINDINGS:  There is a left inguinal hernia containing both fat and loops of bowel  which does not appear to significantly change in size with Valsalva  maneuver.      Impression    IMPRESSION: Loops of bowel remain in the left inguinal hernia sac  status post reduction.     I have personally reviewed the examination and initial interpretation  and I agree with the findings.    ILYA CASE MD         SYSTEM ID:  [id number]   [place] Draw     Status: None    Narrative    The following orders were created for panel order [place] Draw.  Procedure                               Abnormality         Status                     ---------                               -----------         ------                     Extra Blue Top Tube[[id number]]                              Final result               Extra Red Top Tube[[id number]]                               Final result               Extra Green Top  (Lithium...[[id number]]                      Final result               Extra Purple Top Tube[[id number]]                            Final result                 Please view results for these tests on the individual orders.   Extra Blue Top Tube     Status: None   Result Value Ref Range    Hold Specimen JIC    Extra Red Top Tube     Status: None   Result Value Ref Range    Hold Specimen JIC    Extra Green Top (Lithium Heparin) Tube     Status: None   Result Value Ref Range    Hold Specimen JIC    Extra Purple Top Tube     Status: None   Result Value Ref Range    Hold Specimen JIC    Basic metabolic panel     Status: Abnormal   Result Value Ref Range    Sodium 141 136 - 145 mmol/L    Potassium 4.1 3.4 - 5.3 mmol/L    Chloride 106 98 - 107 mmol/L    Carbon Dioxide (CO2) 25 22 - 29 mmol/L    Anion Gap 10 7 - 15 mmol/L    Urea Nitrogen 16.8 8.0 - 23.0 mg/dL    Creatinine 0.87 0.67 - 1.17 mg/dL    Calcium 9.3 8.8 - 10.2 mg/dL    Glucose 158 (H) 70 - 99 mg/dL    GFR Estimate 89 >60 mL/min/1.73m2   INR     Status: Abnormal   Result Value Ref Range    INR 3.36 (H) 0.85 - 1.15   CBC with platelets and differential     Status: Abnormal   Result Value Ref Range    WBC Count 4.5 4.0 - 11.0 10e3/uL    RBC Count 4.20 (L) 4.40 - 5.90 10e6/uL    Hemoglobin 14.0 13.3 - 17.7 g/dL    Hematocrit 41.6 40.0 - 53.0 %    MCV 99 78 - 100 fL    MCH 33.3 (H) 26.5 - 33.0 pg    MCHC 33.7 31.5 - 36.5 g/dL    RDW 13.5 10.0 - 15.0 %    Platelet Count 85 (L) 150 - 450 10e3/uL    % Neutrophils 53 %    % Lymphocytes 29 %    % Monocytes 16 %    % Eosinophils 1 %    % Basophils 0 %    % Immature Granulocytes 1 %    NRBCs per 100 WBC 0 <1 /100    Absolute Neutrophils 2.4 1.6 - 8.3 10e3/uL    Absolute Lymphocytes 1.3 0.8 - 5.3 10e3/uL    Absolute Monocytes 0.7 0.0 - 1.3 10e3/uL    Absolute Eosinophils 0.1 0.0 - 0.7 10e3/uL    Absolute Basophils 0.0 0.0 - 0.2 10e3/uL    Absolute Immature Granulocytes 0.0 <=0.4 10e3/uL    Absolute NRBCs 0.0 10e3/uL   INR      Status: Abnormal   Result Value Ref Range    INR 3.53 (H) 0.85 - 1.15   Partial thromboplastin time     Status: Abnormal   Result Value Ref Range    aPTT 42 (H) 22 - 38 Seconds   Fibrinogen activity     Status: Abnormal   Result Value Ref Range    Fibrinogen Activity 555 (H) 170 - 490 mg/dL   Lactic acid whole blood     Status: Normal   Result Value Ref Range    Lactic Acid 0.8 0.7 - 2.0 mmol/L   EKG 12-lead, tracing only     Status: None   Result Value Ref Range    Systolic Blood Pressure  mmHg    Diastolic Blood Pressure  mmHg    Ventricular Rate 75 BPM    Atrial Rate 75 BPM    GA Interval  ms    QRS Duration 134 ms     ms    QTc 495 ms    P Axis 37 degrees    R AXIS 195 degrees    T Axis 25 degrees    Interpretation ECG       Ventricular-paced rhythm with frequent Premature ventricular complexes  Abnormal ECG  Unconfirmed report - interpretation of this ECG is computer generated - see medical record for final interpretation  Confirmed by - EMERGENCY ROOM, PHYSICIAN ([id number]),  CARMEN MAST ([id number]) on 2022 12:15:54 PM     Echocardiogram Complete     Status: None   Result Value Ref Range    LVEF  45-50%     Narrative    [id number]  [id number]  [id number]  [id number]^SERAFIN^MARISA     [facility],[place]  Echocardiography Laboratory  [address] [place] [address]     Name: DAR CRUZ  MRN: [id number]  : 1945  Study Date: 2022 01:00 PM  Age: 77 yrs  Gender: Male  Patient Location: [facility]  Reason For Study: CHF, Abn EKG  Ordering Physician: MARISA BETANCOURT  Performed By: Sheree Altman RDCS     BSA: 1.9 m2  Height: 70 in  Weight: 166 lb  HR: 81  BP: 127/80 mmHg  ______________________________________________________________________________  Procedure  Complete Portable Echo Adult. Contrast Optison. Optison (NDC #6736-2589-09)  given intravenously. Patient was given 5 ml mixture of 3 ml Optison and 6 ml  saline. 4 ml  wasted.  ______________________________________________________________________________  Interpretation Summary  Left ventricular size is normal.  The visual ejection fraction is 45-50%.  Right ventricular function, chamber size, wall motion, and thickness are  normal.  Status post 23 mm Bogdan Johnson Perimount Magna bioprosthetic aortic  valve replacement in 2011. Mean AV gradient normal at 11mmHg. Trace AI.  Ascending aorta 4.4 cm.  The inferior vena cava is normal.  No pericardial effusion is present.  Aortic size and valve function unchanged. Mildly decreased LV function when  compared to previous study. The inferior wallmotion abnormality is not new.  ______________________________________________________________________________  Left Ventricle  Left ventricular size is normal. Borderline concentric wall thickening  consistent with left ventricular hypertrophy is present. Diastolic function  not assessed due to arrhythmia. The visual ejection fraction is 45-50%.  Inferior wall akinesis is present.     Right Ventricle  Right ventricular function, chamber size, wall motion, and thickness are  normal. A pacemaker lead is noted in the right ventricle.     Atria  The right atria appears normal. Moderate to severe left atrial enlargement is  present.     Mitral Valve  Mild mitral annular calcification is present. Trace to mild mitral  insufficiency is present.     Aortic Valve  Status post 23 mm Bogdan Johnson Perimount Magna bioprosthetic aortic  valve replacement in 2011. Mean AV gradient normal at 11mmHg. Trace AI.     Tricuspid Valve  The tricuspid valve is normal. Trace to mild tricuspid insufficiency is  present. Pulmonary artery systolic pressure cannot be assessed.     Pulmonic Valve  The pulmonic valve is normal.     Vessels  The pulmonary artery and bifurcation cannot be assessed. The inferior vena  cava is normal. Ascending aorta 4.4 cm.     Pericardium  No pericardial effusion is present.      Compared to Previous Study  Aortic size and valve function unchanged. Mildly decreased LV function when  compared to previous study. The inferior wallmotion abnormality is not new.  ______________________________________________________________________________  MMode/2D Measurements & Calculations     IVSd: 1.0 cm  LVIDd: 5.2 cm  LVIDs: 4.1 cm  LVPWd: 1.1 cm  FS: 21.3 %  LV mass(C)d: 211.5 grams  LV mass(C)dI: 109.7 grams/m2  asc Aorta Diam: 4.4 cm  LVOT diam: 2.2 cm  LVOT area: 3.9 cm2  LA Volume (BP): 92.9 ml  LA Volume Index (BP): 48.1 ml/m2  RWT: 0.44     Doppler Measurements & Calculations  MV E max adebayo: 85.4 cm/sec  MV A max adebayo: 77.0 cm/sec  MV E/A: 1.1  MV dec slope: 433.2 cm/sec2  MV dec time: 0.20 sec  Ao V2 max: 232.8 cm/sec  Ao max P.7 mmHg  Ao V2 mean: 158.2 cm/sec  Ao mean P.4 mmHg  Ao V2 VTI: 49.9 cm  MCKENNA(I,D): 1.6 cm2  MCKENNA(V,D): 1.5 cm2  Ao acc time: 0.09 sec  LV V1 max PG: 3.3 mmHg  LV V1 max: 90.1 cm/sec  LV V1 VTI: 20.5 cm  SV(LVOT): 80.5 ml  SI(LVOT): 41.8 ml/m2  AV Adebayo Ratio (DI): 0.39  MCKENNA Index (cm2/m2): 0.84  E/E' avg: 10.3  Lateral E/e': 11.1  Medial E/e': 9.6     ______________________________________________________________________________  Report approved by: Joy Muller 2022 02:03 PM         CBC with platelets differential     Status: Abnormal    Narrative    The following orders were created for panel order CBC with platelets differential.  Procedure                               Abnormality         Status                     ---------                               -----------         ------                     CBC with platelets and d...[[id number]]  Abnormal            Final result                 Please view results for these tests on the individual orders.     Medications   losartan (COZAAR) half-tab 12.5 mg (has no administration in time range)   metoprolol succinate ER (TOPROL XL) 24 hr tablet 50 mg (has no administration in time range)   oxybutynin ER  (DITROPAN XL) 24 hr tablet 10 mg (has no administration in time range)   sertraline (ZOLOFT) tablet 25 mg (has no administration in time range)   simvastatin (ZOCOR) tablet 40 mg (has no administration in time range)   lidocaine 1 % 0.1-1 mL (has no administration in time range)   lidocaine (LMX4) cream (has no administration in time range)   sodium chloride (PF) 0.9% PF flush 3 mL (3 mLs Intracatheter Given 11/23/22 1647)   sodium chloride (PF) 0.9% PF flush 3 mL (has no administration in time range)   acetaminophen (TYLENOL) tablet 650 mg (has no administration in time range)   piperacillin-tazobactam (ZOSYN) 3.375 g vial to attach to  mL bag (has no administration in time range)   lactated ringers infusion (has no administration in time range)   HYDROmorphone (PF) (DILAUDID) injection 0.5 mg (0.5 mg Intravenous Given 11/23/22 1055)   iopamidol (ISOVUE-370) solution 95 mL (95 mLs Intravenous Given 11/23/22 1137)   sodium chloride (PF) 0.9% PF flush 76 mL (76 mLs Intravenous Given 11/23/22 1137)   perflutren diluted 1mL to 2mL with saline (OPTISON) diluted injection 5 mL (5 mLs Intravenous Given 11/23/22 1319)   prothrombin 4 factor complex concentrate (KCENTRA) infusion 1,682 Units (1,682 Units Intravenous Given 11/23/22 1640)        Assessments & Plan (with Medical Decision Making)     This patient presented to the emergency department with an incarcerated left inguinal hernia.  I was unable to reduce this at bedside.  I spoke with general surgery and CT scan was ordered which did demonstrate inguinal hernia with some sigmoid colon contained within the hernia.  General surgery was able to get a partial reduction, but ultrasound demonstrated that some bowel was still contained within the hernia.  At this point, we are awaiting surgery recommendations.  Patient was signed out to the oncoming physician.    I have reviewed the nursing notes. I have reviewed the findings, diagnosis, plan and need for follow up  with the patient.    New Prescriptions    No medications on file       Final diagnoses:   Inguinal hernia without obstruction or gangrene, recurrence not specified, unspecified laterality   Unilateral inguinal hernia without obstruction or gangrene, recurrence not specified       --  Kiko GONZALEZ [facility] EMERGENCY DEPARTMENT  11/23/2022     Kiko Hagen MD  11/23/22 [id number]

## 2022-11-24 VITALS
HEIGHT: 70 IN | RESPIRATION RATE: 16 BRPM | WEIGHT: 163.1 LBS | OXYGEN SATURATION: 97 % | HEART RATE: 68 BPM | SYSTOLIC BLOOD PRESSURE: 130 MMHG | DIASTOLIC BLOOD PRESSURE: 76 MMHG | TEMPERATURE: 98 F | BODY MASS INDEX: 23.35 KG/M2

## 2022-11-24 LAB
ANION GAP SERPL CALCULATED.3IONS-SCNC: 12 MMOL/L (ref 7–15)
BUN SERPL-MCNC: 17.4 MG/DL (ref 8–23)
CALCIUM SERPL-MCNC: 8.9 MG/DL (ref 8.8–10.2)
CHLORIDE SERPL-SCNC: 105 MMOL/L (ref 98–107)
CREAT SERPL-MCNC: 0.93 MG/DL (ref 0.67–1.17)
DEPRECATED HCO3 PLAS-SCNC: 23 MMOL/L (ref 22–29)
GFR SERPL CREATININE-BSD FRML MDRD: 85 ML/MIN/1.73M2
GLUCOSE SERPL-MCNC: 82 MG/DL (ref 70–99)
INR PPP: 1.83 (ref 0.85–1.15)
LACTATE SERPL-SCNC: 0.7 MMOL/L (ref 0.7–2)
MAGNESIUM SERPL-MCNC: 2.2 MG/DL (ref 1.7–2.3)
PHOSPHATE SERPL-MCNC: 2.5 MG/DL (ref 2.5–4.5)
POTASSIUM SERPL-SCNC: 3.9 MMOL/L (ref 3.4–5.3)
SODIUM SERPL-SCNC: 140 MMOL/L (ref 136–145)

## 2022-11-24 PROCEDURE — G0378 HOSPITAL OBSERVATION PER HR: HCPCS

## 2022-11-24 PROCEDURE — 96376 TX/PRO/DX INJ SAME DRUG ADON: CPT

## 2022-11-24 PROCEDURE — 83605 ASSAY OF LACTIC ACID: CPT | Performed by: STUDENT IN AN ORGANIZED HEALTH CARE EDUCATION/TRAINING PROGRAM

## 2022-11-24 PROCEDURE — 84100 ASSAY OF PHOSPHORUS: CPT | Performed by: SURGERY

## 2022-11-24 PROCEDURE — 85610 PROTHROMBIN TIME: CPT

## 2022-11-24 PROCEDURE — 250N000013 HC RX MED GY IP 250 OP 250 PS 637

## 2022-11-24 PROCEDURE — 36415 COLL VENOUS BLD VENIPUNCTURE: CPT

## 2022-11-24 PROCEDURE — 250N000013 HC RX MED GY IP 250 OP 250 PS 637: Performed by: SURGERY

## 2022-11-24 PROCEDURE — 80048 BASIC METABOLIC PNL TOTAL CA: CPT | Performed by: SURGERY

## 2022-11-24 PROCEDURE — 36415 COLL VENOUS BLD VENIPUNCTURE: CPT | Performed by: STUDENT IN AN ORGANIZED HEALTH CARE EDUCATION/TRAINING PROGRAM

## 2022-11-24 PROCEDURE — 36415 COLL VENOUS BLD VENIPUNCTURE: CPT | Performed by: SURGERY

## 2022-11-24 PROCEDURE — 83735 ASSAY OF MAGNESIUM: CPT | Performed by: SURGERY

## 2022-11-24 PROCEDURE — 250N000011 HC RX IP 250 OP 636

## 2022-11-24 RX ORDER — ENOXAPARIN SODIUM 100 MG/ML
1 INJECTION SUBCUTANEOUS EVERY 12 HOURS
Status: DISCONTINUED | OUTPATIENT
Start: 2022-11-24 | End: 2022-11-24 | Stop reason: HOSPADM

## 2022-11-24 RX ORDER — METRONIDAZOLE 500 MG/1
500 TABLET ORAL 3 TIMES DAILY
Qty: 26 TABLET | Refills: 0 | Status: SHIPPED | OUTPATIENT
Start: 2022-11-24 | End: 2023-02-15

## 2022-11-24 RX ORDER — ENOXAPARIN SODIUM 100 MG/ML
1 INJECTION SUBCUTANEOUS 2 TIMES DAILY
Qty: 11.2 ML | Refills: 0 | Status: SHIPPED | OUTPATIENT
Start: 2022-11-24 | End: 2023-11-09

## 2022-11-24 RX ORDER — METRONIDAZOLE 500 MG/1
500 TABLET ORAL 3 TIMES DAILY
Qty: 26 TABLET | Refills: 0 | Status: SHIPPED | OUTPATIENT
Start: 2022-11-24 | End: 2022-11-24

## 2022-11-24 RX ORDER — ENOXAPARIN SODIUM 100 MG/ML
1 INJECTION SUBCUTANEOUS 2 TIMES DAILY
Qty: 11.2 ML | Refills: 0 | Status: SHIPPED | OUTPATIENT
Start: 2022-11-24 | End: 2022-11-24

## 2022-11-24 RX ORDER — AMOXICILLIN AND CLAVULANATE POTASSIUM 562.5; 437.5; 62.5 MG/1; MG/1; MG/1
2 TABLET, MULTILAYER, EXTENDED RELEASE ORAL 2 TIMES DAILY
Qty: 34 TABLET | Refills: 0 | Status: SHIPPED | OUTPATIENT
Start: 2022-11-24 | End: 2022-11-24

## 2022-11-24 RX ADMIN — OXYBUTYNIN CHLORIDE 10 MG: 10 TABLET, EXTENDED RELEASE ORAL at 08:24

## 2022-11-24 RX ADMIN — METOPROLOL TARTRATE 25 MG: 25 TABLET, FILM COATED ORAL at 08:23

## 2022-11-24 RX ADMIN — PIPERACILLIN AND TAZOBACTAM 3.38 G: 3; .375 INJECTION, POWDER, LYOPHILIZED, FOR SOLUTION INTRAVENOUS at 01:00

## 2022-11-24 RX ADMIN — PIPERACILLIN AND TAZOBACTAM 3.38 G: 3; .375 INJECTION, POWDER, LYOPHILIZED, FOR SOLUTION INTRAVENOUS at 08:18

## 2022-11-24 ASSESSMENT — ACTIVITIES OF DAILY LIVING (ADL)
ADLS_ACUITY_SCORE: 37

## 2022-11-24 NOTE — PROGRESS NOTES
Given prolonged non op period due to being on divert, pt was discussed with additional colleagues.   Given lack of systemic signs, normal wbc, no left shift (chronic thrombocytopenia), improvement in symptoms (pain), lack of systemic symptoms (nausea, vomiting, lack of flatus), from a consensus standpoint, we agreed that it is reasonable given his high risk and willingness to defer surgery to offer that as an option again.   I had an extensive discuss with the patient regarding these findings and offered non op mgt.   I also discussed w/ him and his wife on the phone.   He and his wife also stated that they would like to try non opt mgt for now and are hesitant to pursue surgery.     With this in mind, we elected to continue with antibioitics given signs of potential for diverticulitis. Transition to oral antibiotics in the morning, and give him a diet, if he tolerates everything, we will discharge him by afternoon.  If he worsens at any point, we will operate on him.   Pt voiced understanding and wished to proceed with the current non op plan.

## 2022-11-24 NOTE — PLAN OF CARE
Admitted pt from ED at 2200, cared for pt until 0700.  Pt denied pain and N/V.  IV abx administered, IVF running in between abx.  Pt reports hernia is not currently bothering him, it is notably raised and firm compared to surrounding tissue.  Continue POC    Obs goal: Reduction in L groin swelling- Not met

## 2022-11-24 NOTE — DISCHARGE INSTRUCTIONS
ANTIBIOTICS  -Please take augmentin, 2000mg twice a day, for a total of 10 days including the day you spent in the hospital. Take your first dose of augmentin the evening of 11/24, and continue taking it twice a day, once in the morning and once in the evening, until 12/2.  -Please take flagyl 500mg three times a day. Take your first dose the afternoon of 11/24, the next dose the evening of 11/24, and then three times a day morning, afternoon, and evening until 12/2.    ANTICOAGULATION  Your warfarin was held and reversed while inpatient. Please resume taking your warfarin as originally prescribed by your Anticoagulation Clinic. You will be bridged with lovenox in the meantime, which is a subcutaneous injection. Inject 74mg lovenox twice a day until you can be seen by your anticoagulation clinic, ideally Monday 11/28. Your information was faxed to this clinic and they were made aware you should be seen, but please call them at your earliest convenience to ensure an appointment for INR check and completion of lovenox bridge ideally Monday 11/28.    FOLLOW-UP  - Please follow up in 1-2 weeks with the general surgery clinic. If you have not heard from us regarding this appointment or have questions, please call the General Surgery Clinic at [phone].   - Call your primary care provider to touch base regarding your recent admission.    - Call or return sooner than your regularly scheduled visit if you develop any of the following:    - fever >101.5,    - uncontrolled pain,    - uncontrolled nausea or vomiting,    - as well as increased redness, swelling, or drainage from your wound.   -  A nurse from the General Surgery Clinic will contact you 24 hours, or next business day, after your discharge from the hospital. If you have questions or to schedule a follow up appointment please call the General Surgery Clinic at [phone].  Call [phone] and ask to speak with the Surgery resident on call if you are having  troubles in the evenings, at night, or on the weekend.  -  If you are receiving home care please inform your home care nurse of our contact number.

## 2022-11-24 NOTE — DISCHARGE SUMMARY
[facility]  Discharge Summary  General Surgery     Brooks Summers MRN# [id number]   YOB: 1945 Age: 77 year old     Date of Admission:  11/23/2022  Date of Discharge::  11/24/2022  Admitting Physician:  Jackie Medrano MD  Discharge Physician:  Jackie Medrano MD  Primary Care Physician:        Edin Mays          Admission Diagnoses:   Incarcerated inguinal hernia [K40.30]  Unilateral inguinal hernia without obstruction or gangrene, recurrence not specified [K40.90]  Inguinal hernia without obstruction or gangrene, recurrence not specified, unspecified laterality [K40.90]  Chronic hearth failure  Bicuspid aortic valve  Paroxysmal atrial fibrillation  S/p aortic valve replacement  Smoking hx          Discharge Diagnosis:   Inguinal hernia  Unilateral inguinal hernia without obstruction or gangrene, recurrence not specified [K40.90]  Inguinal hernia without obstruction or gangrene, recurrence not specified, unspecified laterality [K40.90]  Chronic hearth failure  Bicuspid aortic valve  Paroxysmal atrial fibrillation  S/p aortic valve replacement  Smoking hx  Diverticulitis           Procedures:   None          Consultations:   CARDIOLOGY GENERAL ADULT IP CONSULT          Brief History of Illness:   Brooks Summers is a 77 year old male with a history notable for CHF (ECHO 55-60% 6/1/22) s/p AVR , pAfib on warfarin s/p ICD who presented to the ED for evaluation of a painful bulge. He reports swelling and tenderness in the left groin for about a week after lifting a heavy box of dirt. Since that time, the swelling and tenderness had persisted. Improved with tylenol, worsened with cough and straining. No fevers, chills, nausea, vomiting, constipation, obstipation. No prior abdominal surgeries. He did not have a known hernia prior to this.            Hospital Course:   Patient was admitted to observation unit. CT revealed diverticulitis of a segment of sigmoid colon contianed within a  left inguinal hernia, no evidence of bowel obstruction or ischemia. Partial left inguinal hernia was felt at bedside and thought to be reducible. Ultrasound revealed loops of bowel remained in left inguinal hernia. Discussed with patient operative vs non operative management. Given his significant cardiac risk as well as lack of systemic symptoms, normal wbc, and improved pain, decision was made for conservative management. Zosyn was started for diverticulitis, transitioned to flagyl and augmentin for PO antibiotics. His warfarin was originally held and reversed in preparation for surgery, it was restarted with a heparin bridge. On the day of discharge, he was tolerating a low residue diet, his pain was well controlled with oral pain medications, he was voiding spontaneously, and ambulating independently. Patient with follow up with the general surgery clinic in the next 1-2 weeks.           Imaging Studies:     Results for orders placed or performed during the hospital encounter of 11/23/22   CT Abdomen Pelvis w Contrast    Narrative    EXAMINATION: CT ABDOMEN PELVIS W CONTRAST, 11/23/2022 11:51 AM    TECHNIQUE:  Helical CT images from the lung bases through the  symphysis pubis were obtained with IV contrast. Contrast dose:  iopamidol (ISOVUE-370) solution 95 mL    COMPARISON: 8/17/2018    HISTORY: Left inguinal pain and mass    FINDINGS:    Liver: Normal parenchymal attenuation without focal mass.  Biliary system: Gallbladder is within normal limits. No intrahepatic  or extrahepatic biliary ductal dilatation.  Pancreas: No focal mass or dilation of the main pancreatic duct.  Stomach: Within normal limits.  Spleen: Within normal limits.  Adrenal glands: Within normal limits.  Kidneys: No focal mass, hydronephrosis, or stone. Stable simple cysts  in the left kidney.  Bladder: Within normal limits.  Reproductive organs: Enlarged prostate.  Colon: Extensive colonic diverticulosis with a loop of sigmoid  colon  extending into the left inguinal canal with adjacent fat stranding.  Appendix: Within normal limits.  Small Bowel: Within normal limits.  Lymph nodes: No intra-abdominal or pelvic lymphadenopathy.  Vasculature: Atherosclerotic calcifications of the abdominal aorta and  its branches without aneurysm. The main portal vein and superior  mesenteric vein are patent.  Peritoneum: No intraabdominal free fluid or air.     Lung bases: Heart size is normal. No pericardial effusion. Partially  visualized cardiac pacer leads. No pneumothorax or pleural effusion.  Increased scarring in the lung bases bilaterally. Bibasilar  atelectasis. Increased size of the 4 mm solid subpleural nodule in the  lingula. No focal consolidation.    Bones: No suspicious osseous lesion. Bilateral L5 pars defects with  trace anterolisthesis of L5 on S1. Degenerative changes of the  thoracolumbar spine and SI joints. Partially visualized sternotomy  wires.    Soft tissues: Small fat-containing umbilical hernia. Fat-containing  right inguinal hernia. Left inguinal hernia containing fat and sigmoid  colon.      Impression    IMPRESSION:   1. Diverticulitis of a segment of sigmoid colon that is contained  within a left inguinal hernia. No evidence of bowel obstruction or  ischemia.  2. Increased size of a 4 mm solid subpleural nodule in the lingula.  Recommend attention on follow-up.  3. Bilateral L5 pars defects with trace anterolisthesis of L5 on S1.    I have personally reviewed the examination and initial interpretation  and I agree with the findings.    RAQUEL BLOOD MD         SYSTEM ID:  [id number]   US Abdomen Limited    Narrative    EXAMINATION: Soft tissue ultrasound of the left lower quadrant and  left groin 11/23/2022 2:09 PM     COMPARISON: CT abdomen and pelvis same day.    HISTORY: 77-year-old presenting with incarcerated left inguinal hernia  for the last 5 days. Looking for presence of bowel in the hernia sac  following  reduction.    TECHNIQUE: Clinical area of interest was scanned in the standard  fashion with specialized ultrasound transducer(s) using both gray  scale and limited color/spectral Doppler techniques.    FINDINGS:  There is a left inguinal hernia containing both fat and loops of bowel  which does not appear to significantly change in size with Valsalva  maneuver.      Impression    IMPRESSION: Loops of bowel remain in the left inguinal hernia sac  status post reduction.     I have personally reviewed the examination and initial interpretation  and I agree with the findings.    ILYA CASE MD         SYSTEM ID:  [id number]   Echocardiogram Complete     Value    LVEF  45-50%    Narrative    [id number]  [id number]  [id number]  [id number]^SERAFIN^MARISA     [facility]  Echocardiography Laboratory  [address] [place] [address]     Name: DAR CRUZ  MRN: [id number]  : 1945  Study Date: 2022 01:00 PM  Age: 77 yrs  Gender: Male  Patient Location: [facility]  Reason For Study: CHF, Abn EKG  Ordering Physician: MARISA BETANCOURT  Performed By: Sheree Altman RDCS     BSA: 1.9 m2  Height: 70 in  Weight: 166 lb  HR: 81  BP: 127/80 mmHg  ______________________________________________________________________________  Procedure  Complete Portable Echo Adult. Contrast Optison. Optison (NDC #0556-1261-58)  given intravenously. Patient was given 5 ml mixture of 3 ml Optison and 6 ml  saline. 4 ml wasted.  ______________________________________________________________________________  Interpretation Summary  Left ventricular size is normal.  The visual ejection fraction is 45-50%.  Right ventricular function, chamber size, wall motion, and thickness are  normal.  Status post 23 mm Bogdan Johnson Perimount Magna bioprosthetic aortic  valve replacement in . Mean AV gradient normal at 11mmHg. Trace AI.  Ascending aorta 4.4 cm.  The inferior vena cava is normal.  No pericardial  effusion is present.  Aortic size and valve function unchanged. Mildly decreased LV function when  compared to previous study. The inferior wallmotion abnormality is not new.  ______________________________________________________________________________  Left Ventricle  Left ventricular size is normal. Borderline concentric wall thickening  consistent with left ventricular hypertrophy is present. Diastolic function  not assessed due to arrhythmia. The visual ejection fraction is 45-50%.  Inferior wall akinesis is present.     Right Ventricle  Right ventricular function, chamber size, wall motion, and thickness are  normal. A pacemaker lead is noted in the right ventricle.     Atria  The right atria appears normal. Moderate to severe left atrial enlargement is  present.     Mitral Valve  Mild mitral annular calcification is present. Trace to mild mitral  insufficiency is present.     Aortic Valve  Status post 23 mm Bogdan Johnson Perimount Magna bioprosthetic aortic  valve replacement in 2011. Mean AV gradient normal at 11mmHg. Trace AI.     Tricuspid Valve  The tricuspid valve is normal. Trace to mild tricuspid insufficiency is  present. Pulmonary artery systolic pressure cannot be assessed.     Pulmonic Valve  The pulmonic valve is normal.     Vessels  The pulmonary artery and bifurcation cannot be assessed. The inferior vena  cava is normal. Ascending aorta 4.4 cm.     Pericardium  No pericardial effusion is present.     Compared to Previous Study  Aortic size and valve function unchanged. Mildly decreased LV function when  compared to previous study. The inferior wallmotion abnormality is not new.  ______________________________________________________________________________  MMode/2D Measurements & Calculations     IVSd: 1.0 cm  LVIDd: 5.2 cm  LVIDs: 4.1 cm  LVPWd: 1.1 cm  FS: 21.3 %  LV mass(C)d: 211.5 grams  LV mass(C)dI: 109.7 grams/m2  asc Aorta Diam: 4.4 cm  LVOT diam: 2.2 cm  LVOT area: 3.9 cm2  LA  Volume (BP): 92.9 ml  LA Volume Index (BP): 48.1 ml/m2  RWT: 0.44     Doppler Measurements & Calculations  MV E max adebayo: 85.4 cm/sec  MV A max adebayo: 77.0 cm/sec  MV E/A: 1.1  MV dec slope: 433.2 cm/sec2  MV dec time: 0.20 sec  Ao V2 max: 232.8 cm/sec  Ao max P.7 mmHg  Ao V2 mean: 158.2 cm/sec  Ao mean P.4 mmHg  Ao V2 VTI: 49.9 cm  MCKENNA(I,D): 1.6 cm2  MCKENNA(V,D): 1.5 cm2  Ao acc time: 0.09 sec  LV V1 max PG: 3.3 mmHg  LV V1 max: 90.1 cm/sec  LV V1 VTI: 20.5 cm  SV(LVOT): 80.5 ml  SI(LVOT): 41.8 ml/m2  AV Adebayo Ratio (DI): 0.39  MCKENNA Index (cm2/m2): 0.84  E/E' avg: 10.3  Lateral E/e': 11.1  Medial E/e': 9.6     ______________________________________________________________________________  Report approved by: Joy Muller 2022 02:03 PM           *Note: Due to a large number of results and/or encounters for the requested time period, some results have not been displayed. A complete set of results can be found in Results Review.              Medications Prior to Admission:     Medications Prior to Admission   Medication Sig Dispense Refill Last Dose     allopurinol (ZYLOPRIM) 100 MG tablet Take 1 tablet (100 mg) by mouth daily 90 tablet 0 2022 at night     levETIRAcetam (KEPPRA) 500 MG tablet Take 1 tablet (500 mg) by mouth 2 times daily 180 tablet 1 2022 at night     losartan (COZAAR) 25 MG tablet Take 0.5 tablets (12.5 mg) by mouth daily 45 tablet 3 2022 at night     metoprolol succinate ER (TOPROL XL) 50 MG 24 hr tablet Take 1 tablet (50 mg) by mouth daily 90 tablet 3 2022 at am     Multiple Vitamins-Minerals (MULTIVITAMIN ADULTS 50+) TABS    2022 at am     oxybutynin ER (DITROPAN-XL) 10 MG 24 hr tablet Take 1 tablet (10 mg) by mouth daily 90 tablet 3 2022 at am     PROVIDER DOSED MANAGED WARFARIN, COUMADIN, OUTPATIENT Per coumadin clinic   2022     simvastatin (ZOCOR) 40 MG tablet Take 1 tablet (40 mg) by mouth At Bedtime 90 tablet 1 2022 at night      VITAMIN D, CHOLECALCIFEROL, PO Take 1,000 Units by mouth daily   11/23/2022 at am     sertraline (ZOLOFT) 25 MG tablet Take 1 tablet (25 mg) by mouth daily 1/2 tab daily for one week then increase to 1 tablet thereafter (Patient not taking: Reported on 11/23/2022) 30 tablet 0 Not Taking     trospium (SANCTURA) 20 MG tablet Take 1 tablet (20 mg) by mouth At Bedtime (Patient not taking: Reported on 11/23/2022) 30 tablet 3 Not Taking     warfarin ANTICOAGULANT (COUMADIN) 5 MG tablet TAKE 2 TABLET BY MOUTH ON FRIDAY, AND 1  1/2 TABLET ON ALL OTHER DAYS OR AS DIRECTED 145 tablet 1               Discharge Medications:     Current Discharge Medication List      CONTINUE these medications which have NOT CHANGED    Details   allopurinol (ZYLOPRIM) 100 MG tablet Take 1 tablet (100 mg) by mouth daily  Qty: 90 tablet, Refills: 0    Associated Diagnoses: Idiopathic gout, unspecified chronicity, unspecified site      levETIRAcetam (KEPPRA) 500 MG tablet Take 1 tablet (500 mg) by mouth 2 times daily  Qty: 180 tablet, Refills: 1    Associated Diagnoses: Recurrent seizures (H)      losartan (COZAAR) 25 MG tablet Take 0.5 tablets (12.5 mg) by mouth daily  Qty: 45 tablet, Refills: 3    Associated Diagnoses: Benign essential hypertension      metoprolol succinate ER (TOPROL XL) 50 MG 24 hr tablet Take 1 tablet (50 mg) by mouth daily  Qty: 90 tablet, Refills: 3    Associated Diagnoses: Benign essential hypertension      Multiple Vitamins-Minerals (MULTIVITAMIN ADULTS 50+) TABS     Associated Diagnoses: Flu vaccine need; Aortic valve stenosis, severe; Chronic systolic heart failure (H); S/P aortic valve replacement      oxybutynin ER (DITROPAN-XL) 10 MG 24 hr tablet Take 1 tablet (10 mg) by mouth daily  Qty: 90 tablet, Refills: 3    Associated Diagnoses: Nocturia      PROVIDER DOSED MANAGED WARFARIN, COUMADIN, OUTPATIENT Per coumadin clinic      simvastatin (ZOCOR) 40 MG tablet Take 1 tablet (40 mg) by mouth At Bedtime  Qty: 90 tablet,  Refills: 1    Associated Diagnoses: Hyperlipidemia LDL goal <100      VITAMIN D, CHOLECALCIFEROL, PO Take 1,000 Units by mouth daily    Associated Diagnoses: Flu vaccine need; Aortic valve stenosis, severe; Chronic systolic heart failure (H); S/P aortic valve replacement      sertraline (ZOLOFT) 25 MG tablet Take 1 tablet (25 mg) by mouth daily 1/2 tab daily for one week then increase to 1 tablet thereafter  Qty: 30 tablet, Refills: 0    Associated Diagnoses: MIC (generalized anxiety disorder)      trospium (SANCTURA) 20 MG tablet Take 1 tablet (20 mg) by mouth At Bedtime  Qty: 30 tablet, Refills: 3    Associated Diagnoses: Nocturia      warfarin ANTICOAGULANT (COUMADIN) 5 MG tablet TAKE 2 TABLET BY MOUTH ON FRIDAY, AND 1  1/2 TABLET ON ALL OTHER DAYS OR AS DIRECTED  Qty: 145 tablet, Refills: 1    Associated Diagnoses: S/P AVR (aortic valve replacement); Paroxysmal atrial fibrillation (H); Long term current use of anticoagulant therapy; S/P aortic valve replacement                     Medications Discontinued or Adjusted During This Hospitalization:   Anticoagulation: Warfarin was held and reversed 11/23 in case of surgery. Warfarin restarted on 11/24 at home dose. Lovenox bridge recommended, started 11/24 at 1mg/kg BID. Patient needs to have INR checked no later than Monday 11/28 to ensure in therapeutic range and discontinue lovenox.    Antibiotics:   -Please take augmentin, 2000mg twice a day, for a total of 10 days including the day you spent in the hospital. Take your first dose of augmentin the evening of 11/24, and continue taking it twice a day, once in the morning and once in the evening, until 12/2.  -Please take flagyl 500mg three times a day. Take your first dose the afternoon of 11/24, the next dose the evening of 11/24, and then three times a day morning, afternoon, and evening until 12/2.           Antibiotics Prescribed at Discharge:   Augmentin and flagyl, Duration: total of 10 days, 9 days at home  including day of discharge   -Augmentin: 2g BID, starting evening of 11/24-12/2   -Flagyl: 500mg TID, starting afternoon of 11/24-12/2           Day of Discharge Physical Exam:   Temp:  [96.8  F (36  C)-98  F (36.7  C)] 98  F (36.7  C)  Pulse:  [60-75] 68  Resp:  [16] 16  BP: (116-149)/(63-90) 130/76  SpO2:  [96 %-100 %] 97 %    General: awake, alert, no acute distress, laying comfortably in bed   CV: warm, well perfused   Pulm: breathing comfortably on room air   Abdomen: soft, non-distended, appropriately tender, no rebound or guarding;   Extremities: no edema, moving all extremities spontaneously and without apparent deficit            Final Pathology Result:   N/A           Discharge Instructions and Follow-Up:   Anticoagulation: Warfarin was held and reversed 11/23 in case of surgery. Warfarin restarted on 11/24 at home dose. Lovenox bridge recommended, started 11/24 at 1mg/kg BID. Patient needs to have INR at Anticoagulation Clinic checked no later than Monday 11/28 to ensure in therapeutic range and discontinue lovenox.    -Patient should be seen in general surgery clinic for follow up in left inguinal hernia    -Follow up with PCP for discharge follow up.             Home Health Care:     Not needed           Discharge Disposition:     Discharged to home      Condition at discharge: Satisfactory    Patient was seen, examined, and discussed on day of discharge with chief resident, who discussed with staff surgeon.    Julianne Islas MD PGY-1

## 2022-11-24 NOTE — PLAN OF CARE
Discharge  D: Orders for discharge and outpatient medications written.   I: Home medications and return to clinic schedule reviewed with patient. Lovenox teaching and educational material provided to the patient. Patient states his spouse is an RN and will administer injections.  Discharge instructions and parameters for calling Health Care Provider reviewed with teach back. PIV removed. Patient left at 1:45 PM.    A: Patient verbalized understanding and was ready for discharge.   P: Patient instructed to  medications in Pharmacy. Follow up as scheduled.

## 2022-11-24 NOTE — PROGRESS NOTES
"Transition Planning Update    D:  DD Team called and wanted \"to make sure\" that patient has a follow up appoint for his anticoagulation management with his primary provider.  Patient is followed by [facility]. Maynard at the [facility] therefore, a message was left with the Patient Monitoring Clinic at the above clinic setting at phone number :  Edin Mays [phone] [address] [place] [id number]     A/P:  Patient per MD will discharge with his spouse today and will follow up on Monday for and INR lab check.  Per MD, patient will be bridging back to coumadin and will discharge on heparin.  The Patient Monitoring Clinic was updated about this plan via voice mail.  Patient will follow up in clinic as designated in discharge orders.    DAYLIN Sommers.S.N., R.N., P.H.N..  Care Coordinator  Today only  Pager: [phone]      "

## 2022-11-24 NOTE — PROGRESS NOTES
Length discussions held.  Pt has only been improving clinically since being admitted.  Wishes to go home.  Discussed w/ his wife personally, she voiced understanding and stated that this is also what she would want.     Discharge today.

## 2022-11-25 ENCOUNTER — TELEPHONE (OUTPATIENT)
Dept: ANTICOAGULATION | Facility: CLINIC | Age: 77
End: 2022-11-25

## 2022-11-25 ENCOUNTER — DOCUMENTATION ONLY (OUTPATIENT)
Dept: ANTICOAGULATION | Facility: CLINIC | Age: 77
End: 2022-11-25

## 2022-11-25 DIAGNOSIS — I48.0 PAROXYSMAL ATRIAL FIBRILLATION (H): ICD-10-CM

## 2022-11-25 DIAGNOSIS — Z79.01 ANTICOAGULATED: ICD-10-CM

## 2022-11-25 DIAGNOSIS — Z79.01 ANTICOAGULATED ON COUMADIN: ICD-10-CM

## 2022-11-25 DIAGNOSIS — Z79.01 LONG TERM CURRENT USE OF ANTICOAGULANT THERAPY: ICD-10-CM

## 2022-11-25 DIAGNOSIS — Z95.2 S/P AORTIC VALVE REPLACEMENT: Primary | ICD-10-CM

## 2022-11-25 NOTE — TELEPHONE ENCOUNTER
11/24/22     Received voicemail from [facility] inpatient Care Coordinator that patient is to be discharged on Heparin bridging.  Next INR draw scheduled for Monday 11/28/22.    Routed message to Anticoag pool Adithya to follow up with patient.    Chuck Mirza RN

## 2022-11-25 NOTE — TELEPHONE ENCOUNTER
REFERRAL INFORMATION:    Referring Provider:      Referring Clinic:      Reason for Visit/Diagnosis: Discuss hernia repair, Hospital discharge        FUTURE VISIT INFORMATION:    Appointment Date: 12/2/2022    Appointment Time: 9:30 AM      NOTES RECORD STATUS  DETAILS   OFFICE NOTE from Referring Provider N/A    OFFICE NOTE from Other Specialists Internal 11/22/2022 Office visit with Katelyn Coyne PA-C ([facility])      HOSPITAL DISCHARGE SUMMARY/ ED VISITS  Internal 11/23/2022 ([facility])    OPERATIVE REPORT N/A    ENDOSCOPY (EGD)  N/A    PERTINENT LABS Internal    PATHOLOGY REPORTS (RELATED) N/A    IMAGING (CT, MRI, US, XR)  Internal US Abdomen: 11/23/2022  CT Abdomen Pelvis: 11/23/2022

## 2022-11-25 NOTE — TELEPHONE ENCOUNTER
ANTICOAGULATION  MANAGEMENT     Interacting Medication Review    Interacting medication(s): Metronidazole (Flagyl) with warfarin.    Duration: 8 days     Indication: Diverticulitis    New medication?: Yes, interaction may increase INR and risk of bleeding. With Metronidazole (oral/IV), [facility] protocol Appendix G recommends empiric reduction of warfarin dose in therapeutic/supratherapeutic patients.        PLAN     Continue your current warfarin dose with next INR in 3 days              Telephone call with Brooks who verbalizes understanding and agrees to plan    New recheck date updated on anticoagulation calendar     Plan made per [facility] anticoagulation protocol    Ailyn Castle RN  Anticoagulation Clinic

## 2022-11-25 NOTE — PROGRESS NOTES
ANTICOAGULATION  MANAGEMENT: Discharge Review    Brooks Summers chart reviewed for anticoagulation continuity of care    Hospital Admission on 11/23/2022 for diverticulitis of a segment of sigmoid colon contianed within a left inguinal hernia, no evidence of bowel obstruction or ischemia..    Discharge disposition: Home    Results:    Recent labs: (last 7 days)     11/23/22  0957 11/23/22  1120 11/23/22  1932 11/24/22  1100   INR 3.36* 3.53* 1.57* 1.83*     Anticoagulation inpatient management:     held warfarin due to using lovenox     Anticoagulation discharge instructions:     Warfarin dosing: home regimen continued   Bridging: bridging with enoxaparin (Lovenox)   INR goal change: No      Medication changes affecting anticoagulation: Yes: Augmentin for 12 days an Flagyl for 8 days      Additional factors affecting anticoagulation: No     PLAN     Agree with discharge plan for follow up on 11/28/2022    Recommended follow up is scheduled    Anticoagulation Calendar updated    Ailyn Castle RN

## 2022-11-28 ENCOUNTER — LAB (OUTPATIENT)
Dept: LAB | Facility: CLINIC | Age: 77
End: 2022-11-28
Payer: MEDICARE

## 2022-11-28 ENCOUNTER — PATIENT OUTREACH (OUTPATIENT)
Dept: SURGERY | Facility: CLINIC | Age: 77
End: 2022-11-28

## 2022-11-28 ENCOUNTER — ANTICOAGULATION THERAPY VISIT (OUTPATIENT)
Dept: ANTICOAGULATION | Facility: CLINIC | Age: 77
End: 2022-11-28

## 2022-11-28 DIAGNOSIS — Z95.2 S/P AORTIC VALVE REPLACEMENT: Primary | ICD-10-CM

## 2022-11-28 DIAGNOSIS — Z79.01 ANTICOAGULATED ON COUMADIN: ICD-10-CM

## 2022-11-28 DIAGNOSIS — Z79.01 ANTICOAGULATED: ICD-10-CM

## 2022-11-28 DIAGNOSIS — I48.0 PAROXYSMAL ATRIAL FIBRILLATION (H): ICD-10-CM

## 2022-11-28 DIAGNOSIS — Z79.01 LONG TERM CURRENT USE OF ANTICOAGULANT THERAPY: ICD-10-CM

## 2022-11-28 LAB — INR BLD: 4.3 (ref 0.9–1.1)

## 2022-11-28 PROCEDURE — 36416 COLLJ CAPILLARY BLOOD SPEC: CPT

## 2022-11-28 PROCEDURE — 85610 PROTHROMBIN TIME: CPT

## 2022-11-28 RX ORDER — ALLOPURINOL 100 MG/1
100 TABLET ORAL DAILY
Qty: 90 TABLET | Refills: 0 | Status: SHIPPED | OUTPATIENT
Start: 2022-11-28 | End: 2023-02-23

## 2022-11-28 NOTE — TELEPHONE ENCOUNTER
allopurinol (ZYLOPRIM) 100 MG tablet          Gout Agents Protocol Failed 11/28/2022 12:08 PM   Protocol Details  ALT on file in past 12 months        Recent Labs   Lab Test 11/15/21  1446   ALT 45       Has Uric Acid on file in past 12 months and value is less than 6  No lab results found.    90 day mauricio refill sent to pharmacy

## 2022-11-28 NOTE — PROGRESS NOTES
ANTICOAGULATION MANAGEMENT     Brooks Summers 77 year old male is on warfarin with supratherapeutic INR result. (Goal INR 2.0-3.0)    Recent labs: (last 7 days)     11/28/22  1211   INR 4.3*       ASSESSMENT       Source(s): Chart Review and Patient/Caregiver Call       Warfarin doses taken: Warfarin taken as instructed    Diet: No new diet changes identified    New illness, injury, or hospitalization: Yes: 11/23 diverticulitis of a segment of sigmoid colon contained within a left inguinal hernia    Medication/supplement changes: Flagyl for 8 days and Augmentin for 12 days    Signs or symptoms of bleeding or clotting: No    Previous INR: Subtherapeutic    Additional findings: Bridging with Enoxaparin until INR >= 2.3 or INR >= 2.0 x 2 ([facility] protocol goal INR 2-3 new start)     PLAN     Recommended plan for temporary change(s) affecting INR     Dosing Instructions: hold dose then decrease your warfarin dose (25% change) with next INR in 3 days       Discontinue Lovenox    Summary  As of 11/28/2022    Full warfarin instructions:  11/28: Hold; 11/30: 5 mg; 12/1: 5 mg; Otherwise 5 mg every Tue; 7.5 mg all other days; Starting 11/28/2022   Next INR check:  12/2/2022             Telephone call with Brooks who verbalizes understanding and agrees to plan    pt will see if INR can be drawn at provider appt on 12/2    Education provided:     Please call back if any changes to your diet, medications or how you've been taking warfarin    Symptom monitoring: monitoring for bleeding signs and symptoms    Plan made with [facility] Pharmacist Jess Escamilla RN  Anticoagulation Clinic  11/28/2022    _______________________________________________________________________     Anticoagulation Episode Summary     Current INR goal:  2.0-3.0   TTR:  75.1 % (1 y)   Target end date:  Indefinite   Send INR reminders to:  JONATHAN MURILLO    Indications    S/P aortic valve replacement [Z95.2]  Paroxysmal atrial fibrillation (H)  [I48.0]  Long term current use of anticoagulant therapy [Z79.01]  Anticoagulated on Coumadin [Z79.01]  Anticoagulated [Z79.01]           Comments:  Aortic 21 mm Johnson Perimount Magna Tissue Valve.         Anticoagulation Care Providers     Provider Role Specialty Phone number    Edin Mays MD Referring Internal Medicine [phone]

## 2022-11-28 NOTE — PROGRESS NOTES
RN Post-Op/Post-Discharge Care Coordination Note    Mr. Brooks Summers is a 77 year old male who was admitted for incarcerated left inguinal hernia, managed conservatively. Discharged on 11/24. Spoke with significant other.    Support  Patient able to care for self independently     Health Status  Nausea/Vomiting: Patient denies nausea/vomiting.  Eating/drinking: Patient is able to eat and drink without any complaints.  Bowel habits: Patient reports having a normal bowel movement.  Fevers/chills: Patient denies any fever or chills.  Pain: denies pain  New Medications: Augmentin, Flagyl, Lovenox. Has appointment scheduled to check INR today to see if Lovenox can be stopped.    Activity/Restrictions  No restrictions    Forms/Letters  No    All of his questions were answered. He will call this office if he has any further questions and/or concerns.      Appointment scheduled with Dr. Phillip on 12/2 at 0930 for evaluation of left inguinal hernia.    Whom and When to Call  Patient acknowledges understanding of how to manage any medication changes and when to seek medical care.     Patient advised that if after hour medical concerns arise to please call [phone] and choose option 4 to speak to the physician on call.

## 2022-12-02 ENCOUNTER — OFFICE VISIT (OUTPATIENT)
Dept: SURGERY | Facility: CLINIC | Age: 77
End: 2022-12-02
Payer: MEDICARE

## 2022-12-02 ENCOUNTER — LAB (OUTPATIENT)
Dept: LAB | Facility: CLINIC | Age: 77
End: 2022-12-02
Payer: MEDICARE

## 2022-12-02 ENCOUNTER — PRE VISIT (OUTPATIENT)
Dept: SURGERY | Facility: CLINIC | Age: 77
End: 2022-12-02

## 2022-12-02 ENCOUNTER — ANTICOAGULATION THERAPY VISIT (OUTPATIENT)
Dept: ANTICOAGULATION | Facility: CLINIC | Age: 77
End: 2022-12-02

## 2022-12-02 VITALS
HEART RATE: 75 BPM | OXYGEN SATURATION: 97 % | DIASTOLIC BLOOD PRESSURE: 77 MMHG | HEIGHT: 70 IN | WEIGHT: 167.3 LBS | SYSTOLIC BLOOD PRESSURE: 121 MMHG | BODY MASS INDEX: 23.95 KG/M2

## 2022-12-02 DIAGNOSIS — Z79.01 ANTICOAGULATED ON COUMADIN: ICD-10-CM

## 2022-12-02 DIAGNOSIS — Z79.01 ANTICOAGULATED: ICD-10-CM

## 2022-12-02 DIAGNOSIS — K40.90 NON-RECURRENT UNILATERAL INGUINAL HERNIA WITHOUT OBSTRUCTION OR GANGRENE: ICD-10-CM

## 2022-12-02 DIAGNOSIS — Z95.2 S/P AORTIC VALVE REPLACEMENT: Primary | ICD-10-CM

## 2022-12-02 DIAGNOSIS — I48.0 PAROXYSMAL ATRIAL FIBRILLATION (H): ICD-10-CM

## 2022-12-02 DIAGNOSIS — I48.0 PAROXYSMAL ATRIAL FIBRILLATION (H): Primary | ICD-10-CM

## 2022-12-02 DIAGNOSIS — Z79.01 LONG TERM CURRENT USE OF ANTICOAGULANT THERAPY: ICD-10-CM

## 2022-12-02 LAB — INR BLD: 2.7 (ref 0.9–1.1)

## 2022-12-02 PROCEDURE — 99203 OFFICE O/P NEW LOW 30 MIN: CPT | Performed by: SURGERY

## 2022-12-02 PROCEDURE — 36416 COLLJ CAPILLARY BLOOD SPEC: CPT | Performed by: PATHOLOGY

## 2022-12-02 PROCEDURE — 85610 PROTHROMBIN TIME: CPT | Performed by: PATHOLOGY

## 2022-12-02 ASSESSMENT — ENCOUNTER SYMPTOMS
BLOOD IN STOOL: 0
NAUSEA: 0
BOWEL INCONTINENCE: 0
HEARTBURN: 0
BLOATING: 0
RECTAL PAIN: 0
CONSTIPATION: 0
ABDOMINAL PAIN: 0
JAUNDICE: 0
DIARRHEA: 0
VOMITING: 0

## 2022-12-02 ASSESSMENT — PAIN SCALES - GENERAL: PAINLEVEL: NO PAIN (0)

## 2022-12-02 NOTE — PROGRESS NOTES
ANTICOAGULATION MANAGEMENT     Brooks Summers 77 year old male is on warfarin with therapeutic INR result. (Goal INR 2.0-3.0)    Recent labs: (last 7 days)     12/02/22  1028   INR 2.7*       ASSESSMENT       Source(s): Chart Review and Patient/Caregiver Call       Warfarin doses taken: Warfarin taken as instructed    Diet: No new diet changes identified    New illness, injury, or hospitalization: Yes: recent hospital stay and new antibiotics        Medication/supplement changes: Augmentin until 12/6 subsequent INRs may increased. Closer INR monitoring recommended.    flagyl 4 more doses left subsequent INRs may increased. Closer INR monitoring recommended.    Signs or symptoms of bleeding or clotting: No    Previous INR: Supratherapeutic    Additional findings: will need to have surgery again in early January       PLAN     Recommended plan for temporary change(s) affecting INR     Dosing Instructions: dose adjustments for today thru 12/6 while on antibiotic. will have inr done on Monday with next INR in 3 days       Summary  As of 12/2/2022    Full warfarin instructions:  12/2: 5 mg; 12/3: 5 mg; 12/4: 5 mg; 12/5: 5 mg; Otherwise 5 mg every Tue; 7.5 mg all other days; Starting 12/2/2022   Next INR check:  12/5/2022             Telephone call with Brooks who verbalizes understanding and agrees to plan    Lab visit scheduled    Education provided:     Goal range and lab monitoring: goal range and significance of current result, Importance of therapeutic range and Importance of following up at instructed interval    Interaction IS anticipated between warfarin and flagyl    Plan made with [facility] Pharmacist Jess Castle, RN  Anticoagulation Clinic  12/2/2022    _______________________________________________________________________     Anticoagulation Episode Summary     Current INR goal:  2.0-3.0   TTR:  74.3 % (1 y)   Target end date:  Indefinite   Send INR reminders to:  JONATHAN MURILLO    Indications     S/P aortic valve replacement [Z95.2]  Paroxysmal atrial fibrillation (H) [I48.0]  Long term current use of anticoagulant therapy [Z79.01]  Anticoagulated on Coumadin [Z79.01]  Anticoagulated [Z79.01]           Comments:  Aortic 21 mm Johnson Perimount Magna Tissue Valve.         Anticoagulation Care Providers     Provider Role Specialty Phone number    Edin Mays MD Referring Internal Medicine [phone]

## 2022-12-02 NOTE — PATIENT INSTRUCTIONS
You met with Dr. Edin Phillip.      Today's visit instructions:    Please use a truss to help with discomfort from your hernia. You only need to wear this during waking hours. You can purchase these from [employer] or any medical supply store.     Please go downstairs to the 1st floor to have your INR checked.     Complete your course of antibiotics until they are completely finished.        If you have questions please contact Lashay RN or Ciara RN during regular clinic hours, Monday through Friday 7:30 AM - 4:00 PM, or you can contact us via FAZUA at anytime.       If you have urgent needs after-hours, weekends, or holidays please call the hospital at [phone] and ask to speak with our on-call General Surgery Team.    Appointment [phone]  Nurse Advice (Lashay or Ciara): [phone]   Surgery Scheduler (Kofi or Elisabet): [phone]  Fax: [phone]

## 2022-12-02 NOTE — LETTER
Date:December 2, 2022      Provider requested that no letter be sent. Do not send.       [facility]

## 2022-12-02 NOTE — PROGRESS NOTES
"I saw Brooks Summers today in clinic (with his wife on the phone) in follow-up of his recent admission for an incarcerated inguinal hernia.  Presented with groin pain.  CT scan showed colon within the hernia with some inflammation/fluid around it (read as diverticulitis).  He was hesitant to have surgery given his cardiac history, but was preparing for this (INR reversed for OR, consented for OR).  Eventually decided against having surgery and was observed overnight and sent home.    Since his discharge, he has been well.  Does not have pain at the site, doesn't really feel a bulge there. Wants to avoid surgery if able to.  No prior abdominal surgery- did have aortic valve replacement x2, mitral valve repair, known history of CHF and pAfib.  On warfarin.  Is active and independent.    PE:  /77 (BP Location: Left arm, Patient Position: Sitting, Cuff Size: Adult Regular)   Pulse 75   Ht 1.778 m (5' 10\")   Wt 75.9 kg (167 lb 4.8 oz)   SpO2 97%   BMI 24.01 kg/m    A+Ox3, NAD  RRR  No resp distress or wheezing  Abd is soft. Bruising along left abdomen (lovenox site?)  Left inguinal hernia palpable, mildly tender to palpation, reducible. No palpable right inguinal hernia.  Small, asymptoimatic umbilical hernia    I reviewed his CT scan.    A/P:  History of incarcerated, bowel containing left inguinal hernia.  I am happy he has done well since then without concerns for strangulation.  We discussed hernia repair (open, under local/MAC) and its risks/benefits.  He is understandably hesitant to have surgery given his heart history and use of warfarin.  We discussed the risks of not having the surgery and then requiring a much larger operation for necrotic bowel in an emergent setting.  I believe he and his wife understand these risks.    Plan that we agreed on- hernia truss (patient to obtain).  Follow-up in 6 weeks for symptom check (phone call).  He will call us with any concerns (card given) and return to the ED " if symptoms recur (return precautions given).      Answers for HPI/ROS submitted by the patient on 12/2/2022  General Symptoms: No  Skin Symptoms: No  HENT Symptoms: No  EYE SYMPTOMS: No  HEART SYMPTOMS: No  LUNG SYMPTOMS: No  INTESTINAL SYMPTOMS: Yes  URINARY SYMPTOMS: No  REPRODUCTIVE SYMPTOMS: No  SKELETAL SYMPTOMS: No  BLOOD SYMPTOMS: No  NERVOUS SYSTEM SYMPTOMS: No  MENTAL HEALTH SYMPTOMS: No  Heart burn or indigestion: No  Nausea: No  Vomiting: No  Abdominal pain: No  Bloating: No  Constipation: No  Diarrhea: No  Blood in stool: No  Black stools: No  Rectal or Anal pain: No  Fecal incontinence: No  Yellowing of skin or eyes: No  Vomit with blood: No  Change in stools: No

## 2022-12-02 NOTE — LETTER
"12/2/2022       RE: Brooks Summers  [address]  [place] [address]     Dear Colleague,    Thank you for referring your patient, Brooks Summers, to the [facility] GENERAL SUR[facility]ERY [facility] [place] [facility]. Please see a copy of my visit note below.    I saw Brooks Summers today in clinic (with his wife on the phone) in follow-up of his recent admission for an incarcerated inguinal hernia.  Presented with groin pain.  CT scan showed colon within the hernia with some inflammation/fluid around it (read as diverticulitis).  He was hesitant to have surgery given his cardiac history, but was preparing for this (INR reversed for OR, consented for OR).  Eventually decided against having surgery and was observed overnight and sent home.    Since his discharge, he has been well.  Does not have pain at the site, doesn't really feel a bulge there. Wants to avoid surgery if able to.  No prior abdominal surgery- did have aortic valve replacement x2, mitral valve repair, known history of CHF and pAfib.  On warfarin.  Is active and independent.    PE:  /77 (BP Location: Left arm, Patient Position: Sitting, Cuff Size: Adult Regular)   Pulse 75   Ht 1.778 m (5' 10\")   Wt 75.9 kg (167 lb 4.8 oz)   SpO2 97%   BMI 24.01 kg/m    A+Ox3, NAD  RRR  No resp distress or wheezing  Abd is soft. Bruising along left abdomen (lovenox site?)  Left inguinal hernia palpable, mildly tender to palpation, reducible. No palpable right inguinal hernia.  Small, asymptoimatic umbilical hernia    I reviewed his CT scan.    A/P:  History of incarcerated, bowel containing left inguinal hernia.  I am happy he has done well since then without concerns for strangulation.  We discussed hernia repair (open, under local/MAC) and its risks/benefits.  He is understandably hesitant to have surgery given his heart history and use of warfarin.  We discussed the risks of not having the surgery " and then requiring a much larger operation for necrotic bowel in an emergent setting.  I believe he and his wife understand these risks.    Plan that we agreed on- hernia truss (patient to obtain).  Follow-up in 6 weeks for symptom check (phone call).  He will call us with any concerns (card given) and return to the ED if symptoms recur (return precautions given).      Answers for HPI/ROS submitted by the patient on 12/2/2022  General Symptoms: No  Skin Symptoms: No  HENT Symptoms: No  EYE SYMPTOMS: No  HEART SYMPTOMS: No  LUNG SYMPTOMS: No  INTESTINAL SYMPTOMS: Yes  URINARY SYMPTOMS: No  REPRODUCTIVE SYMPTOMS: No  SKELETAL SYMPTOMS: No  BLOOD SYMPTOMS: No  NERVOUS SYSTEM SYMPTOMS: No  MENTAL HEALTH SYMPTOMS: No  Heart burn or indigestion: No  Nausea: No  Vomiting: No  Abdominal pain: No  Bloating: No  Constipation: No  Diarrhea: No  Blood in stool: No  Black stools: No  Rectal or Anal pain: No  Fecal incontinence: No  Yellowing of skin or eyes: No  Vomit with blood: No  Change in stools: No          Again, thank you for allowing me to participate in the care of your patient.      Sincerely,    Edin Phillip MD

## 2022-12-02 NOTE — NURSING NOTE
"Chief Complaint   Patient presents with     New Patient     Discuss hernia repair       Vitals:    12/02/22 0905   BP: 121/77   BP Location: Left arm   Patient Position: Sitting   Cuff Size: Adult Regular   Pulse: 75   SpO2: 97%   Weight: 75.9 kg (167 lb 4.8 oz)   Height: 1.778 m (5' 10\")       Body mass index is 24.01 kg/m .                          Bobby Sanchez, EMT    "

## 2022-12-05 ENCOUNTER — ANTICOAGULATION THERAPY VISIT (OUTPATIENT)
Dept: ANTICOAGULATION | Facility: CLINIC | Age: 77
End: 2022-12-05

## 2022-12-05 ENCOUNTER — ANCILLARY PROCEDURE (OUTPATIENT)
Dept: CARDIOLOGY | Facility: CLINIC | Age: 77
End: 2022-12-05
Attending: INTERNAL MEDICINE
Payer: MEDICARE

## 2022-12-05 ENCOUNTER — LAB (OUTPATIENT)
Dept: LAB | Facility: CLINIC | Age: 77
End: 2022-12-05
Payer: MEDICARE

## 2022-12-05 DIAGNOSIS — Z79.01 LONG TERM CURRENT USE OF ANTICOAGULANT THERAPY: ICD-10-CM

## 2022-12-05 DIAGNOSIS — Z79.01 ANTICOAGULATED: ICD-10-CM

## 2022-12-05 DIAGNOSIS — I42.9 CARDIOMYOPATHY (H): ICD-10-CM

## 2022-12-05 DIAGNOSIS — Z79.01 ANTICOAGULATED ON COUMADIN: ICD-10-CM

## 2022-12-05 DIAGNOSIS — Z95.2 S/P AORTIC VALVE REPLACEMENT: Primary | ICD-10-CM

## 2022-12-05 DIAGNOSIS — I48.0 PAROXYSMAL ATRIAL FIBRILLATION (H): ICD-10-CM

## 2022-12-05 LAB — INR BLD: 2.2 (ref 0.9–1.1)

## 2022-12-05 PROCEDURE — 36416 COLLJ CAPILLARY BLOOD SPEC: CPT

## 2022-12-05 PROCEDURE — 85610 PROTHROMBIN TIME: CPT

## 2022-12-05 PROCEDURE — 93295 DEV INTERROG REMOTE 1/2/MLT: CPT | Performed by: INTERNAL MEDICINE

## 2022-12-05 PROCEDURE — 93296 REM INTERROG EVL PM/IDS: CPT

## 2022-12-05 NOTE — PROGRESS NOTES
ANTICOAGULATION MANAGEMENT     Brooks Summers 77 year old male is on warfarin with therapeutic INR result. (Goal INR 2.0-3.0)    Recent labs: (last 7 days)     12/05/22  1219   INR 2.2*       ASSESSMENT       Source(s): Chart Review and Patient/Caregiver Call       Warfarin doses taken: Warfarin taken as instructed    Diet: No new diet changes identified    New illness, injury, or hospitalization: No    Medication/supplement changes: finishes flagyl and augmentin tomorrow, 12/6    Signs or symptoms of bleeding or clotting: No    Previous INR: Therapeutic last visit; previously outside of goal range    Additional findings: None       PLAN     Recommended plan for temporary change(s) affecting INR     Dosing Instructions: partial hold then continue your current warfarin dose with next INR in 1 week       Summary  As of 12/5/2022    Full warfarin instructions:  12/5: 5 mg; Otherwise 5 mg every Tue; 7.5 mg all other days; Starting 12/5/2022   Next INR check:  12/13/2022             Telephone call with Brooks who verbalizes understanding and agrees to plan    Lab visit scheduled    Education provided:     Contact [phone]  with any changes, questions or concerns.     Plan made per [facility] anticoagulation protocol    Selena Bates RN  Anticoagulation Clinic  12/5/2022    _______________________________________________________________________     Anticoagulation Episode Summary     Current INR goal:  2.0-3.0   TTR:  74.3 % (1 y)   Target end date:  Indefinite   Send INR reminders to:  JONATHAN MURILLO    Indications    S/P aortic valve replacement [Z95.2]  Paroxysmal atrial fibrillation (H) [I48.0]  Long term current use of anticoagulant therapy [Z79.01]  Anticoagulated on Coumadin [Z79.01]  Anticoagulated [Z79.01]           Comments:  Aortic 21 mm Johnson Perimount Magna Tissue Valve.         Anticoagulation Care Providers     Provider Role Specialty Phone number    Edin Mays MD Referring Internal Medicine  [phone]

## 2022-12-07 LAB
MDC_IDC_LEAD_IMPLANT_DT: NORMAL
MDC_IDC_LEAD_LOCATION: NORMAL
MDC_IDC_LEAD_LOCATION_DETAIL_1: NORMAL
MDC_IDC_LEAD_MFG: NORMAL
MDC_IDC_LEAD_MODEL: NORMAL
MDC_IDC_LEAD_POLARITY_TYPE: NORMAL
MDC_IDC_LEAD_SERIAL: NORMAL
MDC_IDC_LEAD_SPECIAL_FUNCTION: NORMAL
MDC_IDC_MSMT_BATTERY_DTM: NORMAL
MDC_IDC_MSMT_BATTERY_REMAINING_LONGEVITY: 34 MO
MDC_IDC_MSMT_BATTERY_RRT_TRIGGER: NORMAL
MDC_IDC_MSMT_BATTERY_STATUS: NORMAL
MDC_IDC_MSMT_BATTERY_VOLTAGE: 2.95 V
MDC_IDC_MSMT_CAP_CHARGE_DTM: NORMAL
MDC_IDC_MSMT_CAP_CHARGE_ENERGY: 18 J
MDC_IDC_MSMT_CAP_CHARGE_TIME: 4.1 S
MDC_IDC_MSMT_CAP_CHARGE_TYPE: NORMAL
MDC_IDC_MSMT_LEADCHNL_LV_IMPEDANCE_VALUE: 342 OHM
MDC_IDC_MSMT_LEADCHNL_LV_PACING_THRESHOLD_AMPLITUDE: 1.88 V
MDC_IDC_MSMT_LEADCHNL_LV_PACING_THRESHOLD_PULSEWIDTH: 1 MS
MDC_IDC_MSMT_LEADCHNL_RA_IMPEDANCE_VALUE: 399 OHM
MDC_IDC_MSMT_LEADCHNL_RA_PACING_THRESHOLD_AMPLITUDE: 0.62 V
MDC_IDC_MSMT_LEADCHNL_RA_PACING_THRESHOLD_PULSEWIDTH: 0.4 MS
MDC_IDC_MSMT_LEADCHNL_RA_SENSING_INTR_AMPL: 1.5 MV
MDC_IDC_MSMT_LEADCHNL_RV_IMPEDANCE_VALUE: 437 OHM
MDC_IDC_MSMT_LEADCHNL_RV_PACING_THRESHOLD_AMPLITUDE: 0.5 V
MDC_IDC_MSMT_LEADCHNL_RV_PACING_THRESHOLD_PULSEWIDTH: 0.4 MS
MDC_IDC_MSMT_LEADCHNL_RV_SENSING_INTR_AMPL: 16.4 MV
MDC_IDC_PG_IMPLANT_DTM: NORMAL
MDC_IDC_PG_MFG: NORMAL
MDC_IDC_PG_MODEL: NORMAL
MDC_IDC_PG_SERIAL: NORMAL
MDC_IDC_PG_TYPE: NORMAL
MDC_IDC_SESS_CLINIC_NAME: NORMAL
MDC_IDC_SESS_DTM: NORMAL
MDC_IDC_SESS_TYPE: NORMAL
MDC_IDC_SET_BRADY_AT_MODE_SWITCH_RATE: 171 {BEATS}/MIN
MDC_IDC_SET_BRADY_LOWRATE: 60 {BEATS}/MIN
MDC_IDC_SET_BRADY_MAX_SENSOR_RATE: 120 {BEATS}/MIN
MDC_IDC_SET_BRADY_MAX_TRACKING_RATE: 130 {BEATS}/MIN
MDC_IDC_SET_BRADY_MODE: NORMAL
MDC_IDC_SET_BRADY_NIGHT_RATE: 50 {BEATS}/MIN
MDC_IDC_SET_BRADY_PAV_DELAY_LOW: 170 MS
MDC_IDC_SET_BRADY_SAV_DELAY_LOW: 100 MS
MDC_IDC_SET_CRT_LVRV_DELAY: 0 MS
MDC_IDC_SET_CRT_PACED_CHAMBERS: NORMAL
MDC_IDC_SET_LEADCHNL_LV_PACING_AMPLITUDE: 3.25 V
MDC_IDC_SET_LEADCHNL_LV_PACING_ANODE_ELECTRODE_1: NORMAL
MDC_IDC_SET_LEADCHNL_LV_PACING_ANODE_LOCATION_1: NORMAL
MDC_IDC_SET_LEADCHNL_LV_PACING_CAPTURE_MODE: NORMAL
MDC_IDC_SET_LEADCHNL_LV_PACING_CATHODE_ELECTRODE_1: NORMAL
MDC_IDC_SET_LEADCHNL_LV_PACING_CATHODE_LOCATION_1: NORMAL
MDC_IDC_SET_LEADCHNL_LV_PACING_POLARITY: NORMAL
MDC_IDC_SET_LEADCHNL_LV_PACING_PULSEWIDTH: 1 MS
MDC_IDC_SET_LEADCHNL_RA_PACING_AMPLITUDE: 1.5 V
MDC_IDC_SET_LEADCHNL_RA_PACING_ANODE_ELECTRODE_1: NORMAL
MDC_IDC_SET_LEADCHNL_RA_PACING_ANODE_LOCATION_1: NORMAL
MDC_IDC_SET_LEADCHNL_RA_PACING_CAPTURE_MODE: NORMAL
MDC_IDC_SET_LEADCHNL_RA_PACING_CATHODE_ELECTRODE_1: NORMAL
MDC_IDC_SET_LEADCHNL_RA_PACING_CATHODE_LOCATION_1: NORMAL
MDC_IDC_SET_LEADCHNL_RA_PACING_POLARITY: NORMAL
MDC_IDC_SET_LEADCHNL_RA_PACING_PULSEWIDTH: 0.4 MS
MDC_IDC_SET_LEADCHNL_RA_SENSING_ANODE_ELECTRODE_1: NORMAL
MDC_IDC_SET_LEADCHNL_RA_SENSING_ANODE_LOCATION_1: NORMAL
MDC_IDC_SET_LEADCHNL_RA_SENSING_CATHODE_ELECTRODE_1: NORMAL
MDC_IDC_SET_LEADCHNL_RA_SENSING_CATHODE_LOCATION_1: NORMAL
MDC_IDC_SET_LEADCHNL_RA_SENSING_POLARITY: NORMAL
MDC_IDC_SET_LEADCHNL_RA_SENSING_SENSITIVITY: 0.3 MV
MDC_IDC_SET_LEADCHNL_RV_PACING_AMPLITUDE: 2 V
MDC_IDC_SET_LEADCHNL_RV_PACING_ANODE_ELECTRODE_1: NORMAL
MDC_IDC_SET_LEADCHNL_RV_PACING_ANODE_LOCATION_1: NORMAL
MDC_IDC_SET_LEADCHNL_RV_PACING_CAPTURE_MODE: NORMAL
MDC_IDC_SET_LEADCHNL_RV_PACING_CATHODE_ELECTRODE_1: NORMAL
MDC_IDC_SET_LEADCHNL_RV_PACING_CATHODE_LOCATION_1: NORMAL
MDC_IDC_SET_LEADCHNL_RV_PACING_POLARITY: NORMAL
MDC_IDC_SET_LEADCHNL_RV_PACING_PULSEWIDTH: 0.4 MS
MDC_IDC_SET_LEADCHNL_RV_SENSING_ANODE_ELECTRODE_1: NORMAL
MDC_IDC_SET_LEADCHNL_RV_SENSING_ANODE_LOCATION_1: NORMAL
MDC_IDC_SET_LEADCHNL_RV_SENSING_CATHODE_ELECTRODE_1: NORMAL
MDC_IDC_SET_LEADCHNL_RV_SENSING_CATHODE_LOCATION_1: NORMAL
MDC_IDC_SET_LEADCHNL_RV_SENSING_POLARITY: NORMAL
MDC_IDC_SET_LEADCHNL_RV_SENSING_SENSITIVITY: 0.3 MV
MDC_IDC_SET_ZONE_DETECTION_BEATS_DENOMINATOR: 40 {BEATS}
MDC_IDC_SET_ZONE_DETECTION_BEATS_NUMERATOR: 30 {BEATS}
MDC_IDC_SET_ZONE_DETECTION_INTERVAL: 320 MS
MDC_IDC_SET_ZONE_DETECTION_INTERVAL: 350 MS
MDC_IDC_SET_ZONE_DETECTION_INTERVAL: 360 MS
MDC_IDC_SET_ZONE_DETECTION_INTERVAL: 420 MS
MDC_IDC_SET_ZONE_TYPE: NORMAL
MDC_IDC_STAT_AT_BURDEN_PERCENT: 0 %
MDC_IDC_STAT_AT_DTM_END: NORMAL
MDC_IDC_STAT_AT_DTM_START: NORMAL
MDC_IDC_STAT_BRADY_AP_VP_PERCENT: 34.85 %
MDC_IDC_STAT_BRADY_AP_VS_PERCENT: 0.15 %
MDC_IDC_STAT_BRADY_AS_VP_PERCENT: 61.04 %
MDC_IDC_STAT_BRADY_AS_VS_PERCENT: 3.97 %
MDC_IDC_STAT_BRADY_DTM_END: NORMAL
MDC_IDC_STAT_BRADY_DTM_START: NORMAL
MDC_IDC_STAT_BRADY_RA_PERCENT_PACED: 37.9 %
MDC_IDC_STAT_BRADY_RV_PERCENT_PACED: 95.88 %
MDC_IDC_STAT_CRT_DTM_END: NORMAL
MDC_IDC_STAT_CRT_DTM_START: NORMAL
MDC_IDC_STAT_CRT_LV_PERCENT_PACED: 95.86 %
MDC_IDC_STAT_CRT_PERCENT_PACED: 95.86 %
MDC_IDC_STAT_EPISODE_RECENT_COUNT: 0
MDC_IDC_STAT_EPISODE_RECENT_COUNT_DTM_END: NORMAL
MDC_IDC_STAT_EPISODE_RECENT_COUNT_DTM_START: NORMAL
MDC_IDC_STAT_EPISODE_TOTAL_COUNT: 0
MDC_IDC_STAT_EPISODE_TOTAL_COUNT: 1
MDC_IDC_STAT_EPISODE_TOTAL_COUNT_DTM_END: NORMAL
MDC_IDC_STAT_EPISODE_TOTAL_COUNT_DTM_START: NORMAL
MDC_IDC_STAT_EPISODE_TYPE: NORMAL
MDC_IDC_STAT_TACHYTHERAPY_ATP_DELIVERED_RECENT: 0
MDC_IDC_STAT_TACHYTHERAPY_ATP_DELIVERED_TOTAL: 0
MDC_IDC_STAT_TACHYTHERAPY_RECENT_DTM_END: NORMAL
MDC_IDC_STAT_TACHYTHERAPY_RECENT_DTM_START: NORMAL
MDC_IDC_STAT_TACHYTHERAPY_SHOCKS_ABORTED_RECENT: 0
MDC_IDC_STAT_TACHYTHERAPY_SHOCKS_ABORTED_TOTAL: 0
MDC_IDC_STAT_TACHYTHERAPY_TOTAL_DTM_END: NORMAL
MDC_IDC_STAT_TACHYTHERAPY_TOTAL_DTM_START: NORMAL

## 2022-12-13 ENCOUNTER — ANTICOAGULATION THERAPY VISIT (OUTPATIENT)
Dept: ANTICOAGULATION | Facility: CLINIC | Age: 77
End: 2022-12-13

## 2022-12-13 ENCOUNTER — LAB (OUTPATIENT)
Dept: LAB | Facility: CLINIC | Age: 77
End: 2022-12-13
Payer: MEDICARE

## 2022-12-13 DIAGNOSIS — Z79.01 ANTICOAGULATED: ICD-10-CM

## 2022-12-13 DIAGNOSIS — Z79.01 ANTICOAGULATED ON COUMADIN: ICD-10-CM

## 2022-12-13 DIAGNOSIS — Z79.01 LONG TERM CURRENT USE OF ANTICOAGULANT THERAPY: ICD-10-CM

## 2022-12-13 DIAGNOSIS — I48.0 PAROXYSMAL ATRIAL FIBRILLATION (H): ICD-10-CM

## 2022-12-13 DIAGNOSIS — Z95.2 S/P AORTIC VALVE REPLACEMENT: Primary | ICD-10-CM

## 2022-12-13 LAB — INR BLD: 1.8 (ref 0.9–1.1)

## 2022-12-13 PROCEDURE — 85610 PROTHROMBIN TIME: CPT

## 2022-12-13 PROCEDURE — 36416 COLLJ CAPILLARY BLOOD SPEC: CPT

## 2022-12-13 NOTE — PROGRESS NOTES
ANTICOAGULATION MANAGEMENT     Brooks Summers 77 year old male is on warfarin with subtherapeutic INR result. (Goal INR 2.0-3.0)    Recent labs: (last 7 days)     12/13/22  0815   INR 1.8*       ASSESSMENT       Source(s): Chart Review and Patient/Caregiver Call       Warfarin doses taken: Warfarin taken as instructed    Diet: Increased greens/vitamin K in diet; plans to resume previous intake    New illness, injury, or hospitalization: No    Medication/supplement changes: None noted    Signs or symptoms of bleeding or clotting: No    Previous INR: Therapeutic last 2(+) visits    Additional findings: patient was on a round of flagyl and augmentin that he completed last week, he is feeling better, had increased greens this past week, will boost and then recheck next week, if still low may need to increase dose        PLAN     Recommended plan for temporary change(s) affecting INR     Dosing Instructions: booster dose then continue your current warfarin dose with next INR in 10 days       Summary  As of 12/13/2022    Full warfarin instructions:  12/13: 7.5 mg; Otherwise 5 mg every Tue; 7.5 mg all other days; Starting 12/13/2022   Next INR check:  12/22/2022             Telephone call with  Lucero  who verbalizes understanding and agrees to plan    Lab visit scheduled    Education provided:     Goal range and lab monitoring: goal range and significance of current result    Dietary considerations: importance of consistent vitamin K intake    Contact [phone]  with any changes, questions or concerns.     Plan made per [facility] anticoagulation protocol    Selena Bates RN  Anticoagulation Clinic  12/13/2022    _______________________________________________________________________     Anticoagulation Episode Summary     Current INR goal:  2.0-3.0   TTR:  73.2 % (1 y)   Target end date:  Indefinite   Send INR reminders to:  JONATHAN MURILLO    Indications    S/P aortic valve replacement [Z95.2]  Paroxysmal atrial  fibrillation (H) [I48.0]  Long term current use of anticoagulant therapy [Z79.01]  Anticoagulated on Coumadin [Z79.01]  Anticoagulated [Z79.01]           Comments:  Aortic 21 mm Johnson Perimount Magna Tissue Valve.         Anticoagulation Care Providers     Provider Role Specialty Phone number    Edin Mays MD Referring Internal Medicine [phone]

## 2022-12-16 ENCOUNTER — VIRTUAL VISIT (OUTPATIENT)
Dept: PSYCHOLOGY | Facility: CLINIC | Age: 77
End: 2022-12-16
Payer: MEDICARE

## 2022-12-16 DIAGNOSIS — F41.1 GENERALIZED ANXIETY DISORDER: Primary | ICD-10-CM

## 2022-12-16 PROCEDURE — 90834 PSYTX W PT 45 MINUTES: CPT | Mod: 95 | Performed by: COUNSELOR

## 2022-12-16 NOTE — PROGRESS NOTES
[facility]                                     Progress Note    Patient Name: Brooks Summers  Date: 22         Service Type: Individual      Session Start Time: 1:20pm  Session End Time: 2:00pm     Session Length: 40    Session #: 2    Attendees: Client and Spouse / Significant Other    Service Modality:  Video Visit:      Provider verified identity through the following two step process.  Patient provided:  Patient     Telemedicine Visit: The patient's condition can be safely assessed and treated via synchronous audio and visual telemedicine encounter.      Reason for Telemedicine Visit: Services only offered telehealth    Originating Site (Patient Location): Patient's home    Distant Site (Provider Location): Provider Remote Setting- Home Office    Consent:  The patient/guardian has verbally consented to: the potential risks and benefits of telemedicine (video visit) versus in person care; bill my insurance or make self-payment for services provided; and responsibility for payment of non-covered services.     Patient would like the video invitation sent by:  Send to e-mail at: [email or web address]    Mode of Communication:  Video Conference via [employer]well    Distant Location (Provider):  Off-site    As the provider I attest to compliance with applicable laws and regulations related to telemedicine.    DATA  Interactive Complexity: No  Crisis: No        Progress Since Last Session (Related to Symptoms / Goals / Homework):   Symptoms: No change .    Homework: Completed in session      Episode of Care Goals: Minimal progress - ACTION (Actively working towards change); Intervened by reinforcing change plan / affirming steps taken     Current / Ongoing Stressors and Concerns:   The client had a hernia before . He had to spend the night in the hospital. He will have to have surgery in Feb.      Treatment Objective(s) Addressed in This Session:   use thought-stopping strategy daily  to reduce intrusive thoughts       Intervention:   Mostly checked in with the client and his wife.  Both are doing well.     Assessments completed prior to visit:  The following assessments were completed by patient for this visit:  PHQ9:   PHQ-9 SCORE 12/7/2021   PHQ-9 Total Score 0     GAD7:   MIC-7 SCORE 7/28/2022   Total Score 7     PROMIS 10-Global Health (all questions and answers displayed):   PROMIS 10 7/28/2022   In general, would you say your health is: 3   In general, would you say your quality of life is: 3   In general, how would you rate your physical health? 4   In general, how would you rate your mental health, including your mood and your ability to think? 2   In general, how would you rate your satisfaction with your social activities and relationships? 3   In general, please rate how well you carry out your usual social activities and roles. (This includes activities at home, at work and in your community, and responsibilities as a parent, child, spouse, employee, friend, etc.) 4   To what extent are you able to carry out your everyday physical activities such as walking, climbing stairs, carrying groceries, or moving a chair? 5   In the past 7 days, how often have you been bothered by emotional problems such as feeling anxious, depressed, or irritable? 3   In the past 7 days, how would you rate your fatigue on average? 2   In the past 7 days, how would you rate your pain on average, where 0 means no pain, and 10 means worst imaginable pain? 0   Global Mental Health Score 11   Global Physical Health Score 18   PROMIS TOTAL - SUBSCORES 29   Some recent data might be hidden         ASSESSMENT: Current Emotional / Mental Status (status of significant symptoms):   Risk status (Self / Other harm or suicidal ideation)   Patient denies current fears or concerns for personal safety.   Patient denies current or recent suicidal ideation or behaviors.   Patient denies current or recent homicidal ideation or  behaviors.   Patient denies current or recent self injurious behavior or ideation.   Patient denies other safety concerns.   Patient reports there has been no change in risk factors since their last session.     Patient reports there has been no change in protective factors since their last session.     Recommended that patient call 911 or go to the local ED should there be a change in any of these risk factors.     Appearance:   Appropriate    Eye Contact:   Good    Psychomotor Behavior: Normal    Attitude:   Cooperative    Orientation:   All   Speech    Rate / Production: Normal/ Responsive    Volume:  Normal    Mood:    Normal   Affect:    Appropriate    Thought Content:  Clear    Thought Form:  Coherent  Logical    Insight:    Good      Medication Review:   No current psychiatric medications prescribed     Medication Compliance:   NA     Changes in Health Issues:   None reported     Chemical Use Review:   Substance Use: Chemical use reviewed, no active concerns identified      Tobacco Use: No current tobacco use.      Diagnosis:  1. Generalized anxiety disorder        Collateral Reports Completed:   Not Applicable    PLAN: (Patient Tasks / Therapist Tasks / Other)  Client will schedule future appointments if needed.        [place], [facility]                                                         ______________________________________________________________________    Individual Treatment Plan    Patient's Name: Brooks Summers  YOB: 1945    Date of Creation: 12/16/22  Date Treatment Plan Last Reviewed/Revised: 12/16/22    DSM5 Diagnoses: 300.02 (F41.1) Generalized Anxiety Disorder  Psychosocial / Contextual Factors: many medical issues  PROMIS (reviewed every 90 days):     Referral / Collaboration:  Referral to another professional/service is not indicated at this time..    Anticipated number of session for this episode of care: 6-9 sessions  Anticipation frequency of session: Every 2-3  months  Anticipated Duration of each session: 38-52 minutes  Treatment plan will be reviewed in 90 days or when goals have been changed.       MeasurableTreatment Goal(s) related to diagnosis / functional impairment(s)  Goal 1: Patient will check in about mental health themes     Objective #A (Patient Action)    Patient will use thought-stopping strategy daily to reduce intrusive thoughts.  Status: New - Date: 12/16/22     Intervention(s)  Therapist will teach emotional regulation skills. distress tolerance skills, interpersonal effectiveness skills, emotion regluation skills, mindfulness skills, radical acceptance. Therapist will teach client how to ID body cues for anxiety, anxiety reduction techniques, how to ID triggers for depression and anxiety- decrease reactivity/eliminate, lifestyle changes to reduce depression and anxiety, communication skills, explore cognitive beliefs and help client to develop healthy cognitive patterns and beliefs.    Objective #B  Patient will identify three distraction and diversion activities and use those activities to decrease level of anxiety  .  Status: New - Date: 12/16/22     Intervention(s)  Therapist will teach emotional regulation skills. distress tolerance skills, interpersonal effectiveness skills, emotion regluation skills, mindfulness skills, radical acceptance. Therapist will teach client how to ID body cues for anxiety, anxiety reduction techniques, how to ID triggers for depression and anxiety- decrease reactivity/eliminate, lifestyle changes to reduce depression and anxiety, communication skills, explore cognitive beliefs and help client to develop healthy cognitive patterns and beliefs.          Patient has reviewed and agreed to the above plan.      VENANCIO Cummings  December 16, 2022

## 2022-12-27 ENCOUNTER — TELEPHONE (OUTPATIENT)
Dept: DERMATOLOGY | Facility: CLINIC | Age: 77
End: 2022-12-27

## 2022-12-27 NOTE — TELEPHONE ENCOUNTER
Pt is aware his 4-20-23 with  is cancelled. Pt wife requested that I send  a message as he had no more openings and pt only wants to see . Msg was sent to advise

## 2023-01-02 ENCOUNTER — ANTICOAGULATION THERAPY VISIT (OUTPATIENT)
Dept: ANTICOAGULATION | Facility: CLINIC | Age: 78
End: 2023-01-02

## 2023-01-02 ENCOUNTER — LAB (OUTPATIENT)
Dept: LAB | Facility: CLINIC | Age: 78
End: 2023-01-02
Payer: MEDICARE

## 2023-01-02 DIAGNOSIS — I48.0 PAROXYSMAL ATRIAL FIBRILLATION (H): ICD-10-CM

## 2023-01-02 DIAGNOSIS — Z95.2 S/P AORTIC VALVE REPLACEMENT: Primary | ICD-10-CM

## 2023-01-02 DIAGNOSIS — Z79.01 ANTICOAGULATED ON COUMADIN: ICD-10-CM

## 2023-01-02 DIAGNOSIS — Z79.01 ANTICOAGULATED: ICD-10-CM

## 2023-01-02 DIAGNOSIS — Z79.01 LONG TERM CURRENT USE OF ANTICOAGULANT THERAPY: ICD-10-CM

## 2023-01-02 LAB — INR BLD: 2.5 (ref 0.9–1.1)

## 2023-01-02 PROCEDURE — 36416 COLLJ CAPILLARY BLOOD SPEC: CPT

## 2023-01-02 PROCEDURE — 85610 PROTHROMBIN TIME: CPT

## 2023-01-02 NOTE — PROGRESS NOTES
ANTICOAGULATION MANAGEMENT     Brooks Summers 77 year old male is on warfarin with therapeutic INR result. (Goal INR 2.0-3.0)    Recent labs: (last 7 days)     01/02/23  0837   INR 2.5*       ASSESSMENT       Source(s): Chart Review    Previous INR was Subtherapeutic    Medication, diet, health changes since last INR chart reviewed; none identified           PLAN     Recommended plan for no diet, medication or health factor changes affecting INR     Dosing Instructions: Continue your current warfarin dose with next INR in 4 weeks       Summary  As of 1/2/2023    Full warfarin instructions:  5 mg every Tue; 7.5 mg all other days   Next INR check:  1/9/2023             Detailed voice message left for Brooks with dosing instructions and follow up date.         Education provided:     Please call back if any changes to your diet, medications or how you've been taking warfarin    Plan made per [facility] anticoagulation protocol    Ashley Norris RN  Anticoagulation Clinic  1/2/2023    _______________________________________________________________________     Anticoagulation Episode Summary     Current INR goal:  2.0-3.0   TTR:  71.7 % (1 y)   Target end date:  Indefinite   Send INR reminders to:  JONATHAN MURILLO    Indications    S/P aortic valve replacement [Z95.2]  Paroxysmal atrial fibrillation (H) [I48.0]  Long term current use of anticoagulant therapy [Z79.01]  Anticoagulated on Coumadin [Z79.01]  Anticoagulated [Z79.01]           Comments:  Aortic 21 mm Johnson Perimount Magna Tissue Valve.         Anticoagulation Care Providers     Provider Role Specialty Phone number    Edin Mays MD Referring Internal Medicine [phone]

## 2023-01-04 ENCOUNTER — TELEPHONE (OUTPATIENT)
Dept: DERMATOLOGY | Facility: CLINIC | Age: 78
End: 2023-01-04

## 2023-01-04 NOTE — TELEPHONE ENCOUNTER
Called patient to get him rescheduled to [facility]FAEL[facility] slot in April with Dr. Macario. Patient declined stated he would like to keep his May appointment.

## 2023-01-24 ASSESSMENT — PAIN SCALES - GENERAL: PAINLEVEL: NO PAIN (0)

## 2023-01-24 NOTE — PROGRESS NOTES
Brooks is a 77 year old who is being evaluated via a billable telephone visit.      What phone number would you like to be contacted at? [phone]  How would you like to obtain your AVS? Monalisa    Distant Location (provider location):  On-site  Phone call duration: 11 minutes    During this telephone visit the patient is located in [place], patient verifies this as the location during the entirety of this visit.       I spoke with Brooks Summers and his wife in follow-up of his inguinal hernia.  It had been incarcerated previously and he was almost taken urgently to the OR for this.    My last visit with him he was hesitant to undergo surgery.    Since then he has not had any issues with the hernia, though it still protrudes at time. Minimal pain, no obstructive symptoms.      PE:  A+O, pleasant  No resp distress or wheezing  Fluent speech.    A/P:  Left inguinal hernia with recent history of incarceration.  We again discussed the role of surgery and my concern that the hernia could get stuck out again. I recommended an open hernia repair with minimal anesthesia.  He is now agreeable to this.  Risks and benefits discussed.    We will arrange outpatient surgery (at [facility] given his heart history).  Preop anesthesia Appt  Will need to hold his warfarin- ideally for 5 days preoperatively

## 2023-01-25 ENCOUNTER — VIRTUAL VISIT (OUTPATIENT)
Dept: SURGERY | Facility: CLINIC | Age: 78
End: 2023-01-25
Payer: MEDICARE

## 2023-01-25 ENCOUNTER — PATIENT OUTREACH (OUTPATIENT)
Dept: SURGERY | Facility: CLINIC | Age: 78
End: 2023-01-25

## 2023-01-25 VITALS — WEIGHT: 165 LBS | HEIGHT: 70 IN | BODY MASS INDEX: 23.62 KG/M2

## 2023-01-25 DIAGNOSIS — K40.90 NON-RECURRENT UNILATERAL INGUINAL HERNIA WITHOUT OBSTRUCTION OR GANGRENE: Primary | ICD-10-CM

## 2023-01-25 PROCEDURE — 99442 PR PHYSICIAN TELEPHONE EVALUATION 11-20 MIN: CPT | Mod: 95 | Performed by: SURGERY

## 2023-01-25 RX ORDER — CEFAZOLIN SODIUM 2 G/50ML
2 SOLUTION INTRAVENOUS
Status: CANCELLED | OUTPATIENT
Start: 2023-01-25

## 2023-01-25 RX ORDER — CEFAZOLIN SODIUM 2 G/50ML
2 SOLUTION INTRAVENOUS SEE ADMIN INSTRUCTIONS
Status: CANCELLED | OUTPATIENT
Start: 2023-01-25

## 2023-01-25 NOTE — LETTER
1/25/2023       RE: Brooks Summers  [address]  [place] [address]     Dear Colleague,    Thank you for referring your patient, Brooks Summers, to the [facility] [place] [facility]. Please see a copy of my visit note below.      I spoke with Brooks Summers and his wife in follow-up of his inguinal hernia.  It had been incarcerated previously and he was almost taken urgently to the OR for this.    My last visit with him he was hesitant to undergo surgery.    Since then he has not had any issues with the hernia, though it still protrudes at time. Minimal pain, no obstructive symptoms.      PE:  A+O, pleasant  No resp distress or wheezing  Fluent speech.    A/P:  Left inguinal hernia with recent history of incarceration.  We again discussed the role of surgery and my concern that the hernia could get stuck out again. I recommended an open hernia repair with minimal anesthesia.  He is now agreeable to this.  Risks and benefits discussed.    We will arrange outpatient surgery (at [facility] given his heart history).  Preop anesthesia Appt  Will need to hold his warfarin- ideally for 5 days preoperatively      Sincerely,  Edin Phillip MD

## 2023-01-25 NOTE — PROGRESS NOTES
Called patient to review surgery instructions. Left message with patients wife. Will attempt to call patient back later this afternoon. Also sent them out via mail.    ADDENDUM  01/25/23  2:46 PM  Attempted to call patient back. Wife advised to call the phone number [phone] (home number listed in chart). Called and LVM indicating to return call to review surgery instructions.     Pre and Post op Patient Education/Teaching Flowsheet  Relevant Diagnosis:  Inguinal Hernia (K40.90)  Teaching Topic:  Pre and post op teaching  Person(s) Involved in teaching:  Patient     Motivation Level:  Asks Questions:  Yes  Eager to Learn:  Yes  Cooperative:  Yes  Receptive (willing/able to accept information):  Yes  Any cultural factors/[place]ai beliefs that may influence understanding or compliance?  No    Patient/caregiver/family demonstrates understanding of the following:  Reason for the appointment, diagnosis, and treatment plan:  Yes  Patient demonstrates understanding of the following:  Pre-op bowel prep:  N/A  Post-op pain management recommendations (medications, ice compress, binder/athletic supporter (if applicable), etc.:  Yes  Inguinal hernia patients:  Post-op urinary retention- discussed signs/symptoms and visit to ER for James catheter placement and to stay in place for at least 48 hours:  Yes  Restrictions:  Yes  Medications to take the day of surgery:  Per PCP  Blood thinner medications discussed and when to stop (if applicable):  Yes, was advised to notify his INR clinic about upcoming surgery.   Wound care:  Yes  Diabetes medication management (if applicable):  Per PCP  Which situations necessitate calling provider and whom to contact:  Discussed how to contact the hospital, nurse, and clinic scheduling staff if necessary      Date and time of surgery:  TBD  Location of surgery: [address] [place] [address]  History and Physical and any other testing necessary prior to surgery:  Yes  Required time line for  completion of History and Physical and any pre-op testing:  Yes  Discuss need for someone to drive patient home and stay with them for 24 hours:  Yes  Pre-op showering/scrub information with Surgical Scrub:  Yes  NPO Guidelines:  NPO per Anesthesia Guidelines    Infection Prevention: Patient demonstrates understanding of the following:  Patient instructed on hand hygiene:  Yes  Surgical procedure site care will be taught and will be reviewed at the time of discharge  Signs and symptoms of infection taught:  Yes  Wound care reviewed and will be taught at the time of discharge  Central venous catheter care will be taught at the time of discharge (if applicable)    Post-op follow-up:  Instructional materials used/given/mailed:  Surgical logistics, post op teaching sheet, and surgical scrub

## 2023-01-25 NOTE — PATIENT INSTRUCTIONS
You met with Dr. Edin Phillip.      Today's visit instructions:    Reminder: Surgery Requirements  Your surgery will be at [address] [place] [address]  You will need to arrive 2 hours early.  You will need someone to drive you home (over 18 years old) and stay with you for 24 hours after the procedure.  You will need a preop physical with PAC (pre-anesthesia care) within 30 days of surgery- closer is always better.  Stop any blood thinners, vitamins, minerals, or herbal supplements 5 days before surgery.  If you are taking a prescribed blood thinner please let us know for specific instructions.  Fasting- a nurse from [facility] will call you 1-2 days before surgery to confirm your procedure and tell you when to stop eating and drinking.   Wash with the soap (Antibacterial, Dial Complete Foam, Hibiclense) the night before surgery and morning of surgery. See instructions in the Surgery Packet.  If you would like a procedure estimate please call Cost of Care at [phone].       If you have questions please contact Lashay RN or Ciara RN during regular clinic hours, Monday through Friday 7:30 AM - 4:00 PM, or you can contact us via [employer] at anytime.       If you have urgent needs after-hours, weekends, or holidays please call the hospital at [phone] and ask to speak with our on-call General Surgery Team.    Appointment [phone]  Nurse Advice (Lashay or Ciara): [phone]   Surgery Scheduler (Elisabet): [phone]  Fax: [phone]    After Your Open Inguinal Hernia Repair         Incision care   You may take a shower the day after surgery. Carefully wash your incision with soap and water. Do not submerge yourself in water (bath, whirlpool, hot tub, pool, lake) for 14 days after surgery.   Remove the bandage the day after surgery, but leave the medical tape (Steri-Strips) or glue in place. These will loosen and fall off on their own 1-2 weeks after surgery.    Always wash your hands before  touching your incisions or removing bandages.   It is not unusual to form a collection of fluid or blood under your incision that may feel firm or squishy- it can take several weeks to months for your body to reabsorb it.  At times, it may even drain.  If that should happen keep the area clean with soap, water,  and cover with a clean gauze dressing. You can change this daily or as needed.  It is not unusual to develop swelling and/or bruising of the scrotum and penis and it can take several weeks or a month to improve.      Other medicines   Wait to start aspirin or blood thinners until the day after surgery. You can take any other regular medicines at your normal time the day after surgery.   Your pain medicine may cause constipation (hard, dry stools). To help with this, take the stool softener your doctor gave you or an over-the-counter stool softener or laxative. You can stop taking this when you are no longer taking pain medicine and your bowel movements are back to normal.      For pain or discomfort   Take the narcotic pain medicine your doctor gave you as needed and as instructed on the bottle. If you prefer to use over-the-counter medication, use acetaminophen (Tylenol) or ibuprofen (Advil, Motrin) as instructed on the box. Do not take Tylenol if it is in your narcotic pain medication.    Use an ice pack on your groin for 20 minutes at a time as needed for the first 24 hours. Be sure to protect your skin by putting a cloth between the ice pack and your skin.   After 24 hours you can switch to heat for 20 minutes as needed. Be sure to protect your skin by putting a cloth between the heat pack and your skin.    It is normal to feel a lump in your groin after surgery. This lump may take up to 8 weeks to go away.      Activities   No driving until you feel it s safe to do so. Don t drive while taking narcotic pain medicine.   Don t lift anything heavier than 20 pounds for 3 weeks after surgery.      Special  equipment   Male Patients:  We recommend that you wear scrotal support for the first 3 days after surgery; however, you can wear it longer if you wish.  We suggest using an athletic supporter (Jock Strap), snug briefs, or Spandex biker shorts.     Diet   You can eat your regular meals after surgery.      When to call the doctor   Call your doctor if you have:   A fever above 101 F (38.3 C) (taken under the tongue), or a fever or chills lasting more than a day.   Redness at the incision site.   Any fluid or blood draining from the incision, especially if it smells bad.    Severe pain that doesn t improve with pain medicine.      We will call you 2 to 4 days after surgery to review this handout, answer questions and help arrange after-surgery care. If you have questions or concerns, please call [phone] during regular office hours. If you need to call after business hours, call [phone] and ask to page the surgeon on-call.

## 2023-01-25 NOTE — NURSING NOTE
"Chief Complaint   Patient presents with     RECHECK     One month follow up       Vitals:    01/24/23 1409   Weight: 74.8 kg (165 lb)   Height: 1.778 m (5' 10\")       Body mass index is 23.68 kg/m .                          Bobby Sacnhez, EMT    "

## 2023-01-29 NOTE — PROGRESS NOTES
HPI:    Overall doing well. He continues to exercise w/o problems. His wife has significant lung disease. No additional HEENT, cardiopulmonary, abdominal, , neurological, systemic, psychiatric, lymphatic, endocrine, vascular complaints. He was seen by Dr. Phillip, General Surgery for L sided inguinal hernia and recommended surgery. He will go ahead and schedule this.     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 Tx with Dapto/RIFx 8 weeks.     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     Paroxysmal A-fib (H)     Post-op 7/14/2011, 1/26/13     Rotator Cuff (Capsule) Sprain and Strain      S/P aortic valve replacement     7/14/2011, re-do 1/25/13 due to endocarditis     Smoking hx      Thrombocytopenia (H)      Past Surgical History:   Procedure Laterality Date     ANESTHESIA CARDIOVERSION  7/18/2011    Procedure:ANESTHESIA CARDIOVERSION; Cardioversion; Surgeon:GENERIC ANESTHESIA PROVIDER; Location: OR     COLONOSCOPY       COLONOSCOPY N/A 11/19/2018    Procedure: COLONOSCOPY;  Surgeon: Herman Mcginnis MD;  Location:  GI     CORONARY ANGIOGRAPHY ADULT ORDER       CYSTOSCOPY, BIOPSY URETHRA N/A 4/3/2017    Procedure: CYSTOSCOPY, BIOPSY URETHRA;  Surgeon: Marlena Mora MD;  Location:  OR     ENDOSCOPY UPPER, COLONOSCOPY, COMBINED       EP ICD GENERATOR REPLACEMENT DUAL N/A 3/10/2021    Procedure: EP ICD GENERATOR CHANGE DUAL;  Surgeon: Mekhi Be MD;  Location:  HEART CARDIAC CATH LAB     HC REMOV & REPLACE IMPLT DEFIB PULSE GEN MULT LEAD  12/3/2015          HEMORRHOID SURGERY       IMPLANT AUTOMATIC IMPLANTABLE CARDIOVERTER DEFIBRILLATOR       MOHS MICROGRAPHIC PROCEDURE       ORTHOPEDIC SURGERY       shoulder,right     REDO STERNOTOMY REPLACE VALVE AORTIC  1/25/2013    Procedure: REDO STERNOTOMY REPLACE VALVE AORTIC;  Redo Sternotomy, Redo Aortic Valve Replacement on pump oxygenator. Intraoperative Transesophageal Echocardiogram;  Surgeon: Stephanie Hampton MD;  Location: [facility] OR     REPAIR VALVE MITRAL  2013     REPLACE VALVE AORTIC  7/14/2011    Procedure:REPLACE VALVE AORTIC; Median Sternotomy, Bioprosthesis,  Aortic Valve replacement on pump with oxygenator. Intra-operative Transesophogeal Echocardiogram.; Surgeon:STEPHANIE HAMPTON; Location:[facility] OR     teeth extractions       TRANSPLANT       PE:    Vitlas noted, gen, nad, cooperative, alert, neck supple nl rom, lungs with good air movement, L upper chest with cardiac device, RRR, S1, S2, no MRG, abdomen, no acute finding, Grossly normal neurological exam.     A/P:    1. Immunizations; Moderna x 2, Pfizer COVID x 2. Td/Tdap 4/12/2023. Influenza vaccine; declines.  Pneumococcal 23 done 4/12/2013. Prenvar 13 done 7/23/2015. Check with insurance regarding Shingrix.   2. Dermatology next visit 5/6/2023 (last visit 4/14/2022).   3. See Dr. Phillip's general surgery notes from 12/2/2022 and 1/25/2023 follow up L inguinal hernia issues for h/o incarceration. It was recommended for open repair. See discharge summary from the hospital on 11/24/2022  4. See Dr. Mckenzie's Cardiology note from 6/7/2022 regarding s/p Aortic valve replacement, Bi-ventricular pacing with defibrillator for low LV function. He is on warfarin for afib.   5. Allopurinol for gout. Ordered future uric acid.   6. Keppra for seizures; see Dr. Westbrook's Neurology note from 3/30/2022  7. On Losartan and Metoprolol for HTN. Ordered future electrolytes and creatinine on 1/29/2023.   8. Seen in Urology 3/8/2022 for nocturia and is on oxybutynin.       Total time spent today with this patient including chart review, exam time with patient and documentation : 30 minutes.  This time was exclusive of the EKG interpretation

## 2023-01-30 ENCOUNTER — LAB (OUTPATIENT)
Dept: LAB | Facility: CLINIC | Age: 78
End: 2023-01-30
Payer: MEDICARE

## 2023-01-30 ENCOUNTER — ANTICOAGULATION THERAPY VISIT (OUTPATIENT)
Dept: ANTICOAGULATION | Facility: CLINIC | Age: 78
End: 2023-01-30

## 2023-01-30 ENCOUNTER — OFFICE VISIT (OUTPATIENT)
Dept: INTERNAL MEDICINE | Facility: CLINIC | Age: 78
End: 2023-01-30
Payer: MEDICARE

## 2023-01-30 VITALS
WEIGHT: 167.6 LBS | HEART RATE: 63 BPM | HEIGHT: 70 IN | DIASTOLIC BLOOD PRESSURE: 69 MMHG | OXYGEN SATURATION: 99 % | BODY MASS INDEX: 23.99 KG/M2 | SYSTOLIC BLOOD PRESSURE: 121 MMHG

## 2023-01-30 DIAGNOSIS — Z79.01 LONG TERM CURRENT USE OF ANTICOAGULANT THERAPY: ICD-10-CM

## 2023-01-30 DIAGNOSIS — Z79.01 ANTICOAGULATED: ICD-10-CM

## 2023-01-30 DIAGNOSIS — Z95.2 S/P AORTIC VALVE REPLACEMENT: Primary | ICD-10-CM

## 2023-01-30 DIAGNOSIS — I48.0 PAROXYSMAL ATRIAL FIBRILLATION (H): ICD-10-CM

## 2023-01-30 DIAGNOSIS — I10 BENIGN ESSENTIAL HYPERTENSION: ICD-10-CM

## 2023-01-30 DIAGNOSIS — Z79.01 ANTICOAGULATED ON COUMADIN: ICD-10-CM

## 2023-01-30 DIAGNOSIS — I10 BENIGN ESSENTIAL HYPERTENSION: Primary | ICD-10-CM

## 2023-01-30 LAB
ALBUMIN SERPL BCG-MCNC: 4.5 G/DL (ref 3.5–5.2)
ALP SERPL-CCNC: 56 U/L (ref 40–129)
ALT SERPL W P-5'-P-CCNC: 33 U/L (ref 10–50)
ANION GAP SERPL CALCULATED.3IONS-SCNC: 8 MMOL/L (ref 7–15)
AST SERPL W P-5'-P-CCNC: 34 U/L (ref 10–50)
BILIRUB SERPL-MCNC: 1 MG/DL
BUN SERPL-MCNC: 11.2 MG/DL (ref 8–23)
CALCIUM SERPL-MCNC: 9.4 MG/DL (ref 8.8–10.2)
CHLORIDE SERPL-SCNC: 107 MMOL/L (ref 98–107)
CHOLEST SERPL-MCNC: 132 MG/DL
CREAT SERPL-MCNC: 0.88 MG/DL (ref 0.67–1.17)
DEPRECATED HCO3 PLAS-SCNC: 28 MMOL/L (ref 22–29)
ERYTHROCYTE [DISTWIDTH] IN BLOOD BY AUTOMATED COUNT: 13.8 % (ref 10–15)
GFR SERPL CREATININE-BSD FRML MDRD: 89 ML/MIN/1.73M2
GLUCOSE SERPL-MCNC: 98 MG/DL (ref 70–99)
HCT VFR BLD AUTO: 42.1 % (ref 40–53)
HDLC SERPL-MCNC: 48 MG/DL
HGB BLD-MCNC: 13.6 G/DL (ref 13.3–17.7)
INR BLD: 1.4 (ref 0.9–1.1)
LDLC SERPL CALC-MCNC: 63 MG/DL
MCH RBC QN AUTO: 32.9 PG (ref 26.5–33)
MCHC RBC AUTO-ENTMCNC: 32.3 G/DL (ref 31.5–36.5)
MCV RBC AUTO: 102 FL (ref 78–100)
NONHDLC SERPL-MCNC: 84 MG/DL
PLATELET # BLD AUTO: 68 10E3/UL (ref 150–450)
POTASSIUM SERPL-SCNC: 4.5 MMOL/L (ref 3.4–5.3)
PROT SERPL-MCNC: 6.8 G/DL (ref 6.4–8.3)
RBC # BLD AUTO: 4.14 10E6/UL (ref 4.4–5.9)
SODIUM SERPL-SCNC: 143 MMOL/L (ref 136–145)
TRIGL SERPL-MCNC: 103 MG/DL
URATE SERPL-MCNC: 5.3 MG/DL (ref 3.4–7)
WBC # BLD AUTO: 4 10E3/UL (ref 4–11)

## 2023-01-30 PROCEDURE — 36415 COLL VENOUS BLD VENIPUNCTURE: CPT | Performed by: PATHOLOGY

## 2023-01-30 PROCEDURE — 85610 PROTHROMBIN TIME: CPT

## 2023-01-30 PROCEDURE — 99214 OFFICE O/P EST MOD 30 MIN: CPT | Performed by: INTERNAL MEDICINE

## 2023-01-30 PROCEDURE — 84550 ASSAY OF BLOOD/URIC ACID: CPT | Performed by: INTERNAL MEDICINE

## 2023-01-30 PROCEDURE — 36416 COLLJ CAPILLARY BLOOD SPEC: CPT

## 2023-01-30 PROCEDURE — 85027 COMPLETE CBC AUTOMATED: CPT | Performed by: PATHOLOGY

## 2023-01-30 PROCEDURE — 80061 LIPID PANEL: CPT | Performed by: PATHOLOGY

## 2023-01-30 PROCEDURE — 80053 COMPREHEN METABOLIC PANEL: CPT | Performed by: PATHOLOGY

## 2023-01-30 ASSESSMENT — PATIENT HEALTH QUESTIONNAIRE - PHQ9
5. POOR APPETITE OR OVEREATING: NOT AT ALL
SUM OF ALL RESPONSES TO PHQ QUESTIONS 1-9: 0

## 2023-01-30 ASSESSMENT — ANXIETY QUESTIONNAIRES

## 2023-01-30 NOTE — NURSING NOTE
"Brooks Summers is a 77 year old male patient that presents today in clinic for the following:    Chief Complaint   Patient presents with     Follow Up     Pt here for follow up and would like to discuss future hernia surgery and has trouble urinating since hernia happened before Thanksgiving 2022.     The patient's allergies and medications were reviewed as noted. A set of vitals were recorded as noted without incident: /69 (BP Location: Right arm, Patient Position: Sitting, Cuff Size: Adult Regular)   Pulse 63   Ht 1.778 m (5' 10\")   Wt 76 kg (167 lb 9.6 oz)   SpO2 99%   BMI 24.05 kg/m  . The patient does not have any other questions for the provider.    Luciana Zhu, EMT at 2:56 PM on 1/30/2023  "

## 2023-01-30 NOTE — PROGRESS NOTES
ANTICOAGULATION MANAGEMENT     Brooks Summers 77 year old male is on warfarin with subtherapeutic INR result. (Goal INR 2.0-3.0)    Recent labs: (last 7 days)     01/30/23  0736   INR 1.4*       ASSESSMENT       Source(s): Chart Review and Patient/Caregiver Call       Warfarin doses taken: Warfarin taken as instructed    Diet: Increased greens/vitamin K in diet; plans to resume previous intake    New illness, injury, or hospitalization: No    Medication/supplement changes: None noted    Signs or symptoms of bleeding or clotting: No    Previous INR: Therapeutic last visit; previously outside of goal range    Additional findings: wife reports that he had more salads than he typically does, she said that they will cut back to previous amount        PLAN     Recommended plan for temporary change(s) affecting INR     Dosing Instructions: booster dose then continue your current warfarin dose with next INR in 1 week       Summary  As of 1/30/2023    Full warfarin instructions:  1/30: 10 mg; Otherwise 5 mg every Tue; 7.5 mg all other days   Next INR check:  2/6/2023             Telephone call with  Lucero  who verbalizes understanding and agrees to plan    Lab visit scheduled    Education provided:     Goal range and lab monitoring: goal range and significance of current result    Dietary considerations: importance of consistent vitamin K intake    Contact [phone]  with any changes, questions or concerns.     Plan made per [facility] anticoagulation protocol    Selena Bates RN  Anticoagulation Clinic  1/30/2023    _______________________________________________________________________     Anticoagulation Episode Summary     Current INR goal:  2.0-3.0   TTR:  68.5 % (1 y)   Target end date:  Indefinite   Send INR reminders to:  JONATHAN MURILLO    Indications    S/P aortic valve replacement [Z95.2]  Paroxysmal atrial fibrillation (H) [I48.0]  Long term current use of anticoagulant therapy [Z79.01]  Anticoagulated on  Coumadin [Z79.01]  Anticoagulated [Z79.01]           Comments:  Aortic 21 mm Johnson Perimount Magna Tissue Valve.         Anticoagulation Care Providers     Provider Role Specialty Phone number    Edin Mays MD [facility] Internal Medicine [phone]

## 2023-01-31 ENCOUNTER — TELEPHONE (OUTPATIENT)
Dept: SURGERY | Facility: CLINIC | Age: 78
End: 2023-01-31
Payer: MEDICARE

## 2023-01-31 NOTE — TELEPHONE ENCOUNTER
Patient is scheduled for surgery with Dr. Phillip    Spoke with: Brooks    Date of Surgery: 2/23/2023    Location: [place]    Informed patient they will need an adult  Yes    Pre op with Provider n/a    H&P: Scheduled with Dr. Mays asked the patient that he be the one to do his Pre Op instead of PAC. This writer will contact Dr. Phillip to make sure that is ok.    Pre-procedure COVID-19 Test: n/a    Additional imaging/appointments: n/a    Surgery packet: To be sent in the mail     Additional comments: n/a

## 2023-02-01 ENCOUNTER — TELEPHONE (OUTPATIENT)
Dept: SURGERY | Facility: CLINIC | Age: 78
End: 2023-02-01
Payer: MEDICARE

## 2023-02-01 NOTE — TELEPHONE ENCOUNTER
This writer attempted to call the patient to schedule his PAC appointment since Dr. Phillip did want the patient to see PAC for the patient's Pre Op exam instead of having the patient's PCP complete it before surgery date of surgery on 2/23.

## 2023-02-02 NOTE — TELEPHONE ENCOUNTER
FUTURE VISIT INFORMATION        SURGERY INFORMATION:    Date: 23    [facility] or    Surgeon:  Edin Phillip MD    Anesthesia Type:  MAC with Local    Procedure: HERNIORRHAPHY, INGUINAL, OPEN    Consult: virtual visit 23     RECORDS REQUESTED FROM:         Primary Care Provider: Edin Mays MD- [facility]     Most recent EKG+ Tracin22     Most recent ECHO: 22

## 2023-02-02 NOTE — TELEPHONE ENCOUNTER
FUTURE VISIT INFORMATION      SURGERY INFORMATION:    Date: 23    [facility] or    Surgeon:  Edin Phillip MD    Anesthesia Type:  MAC with Local    Procedure: HERNIORRHAPHY, INGUINAL, OPEN    Consult: virtual visit 23    RECORDS REQUESTED FROM:       Primary Care Provider: Edin Mays MD- [facility]    Most recent EKG+ Tracin22    Most recent ECHO: 22

## 2023-02-06 ENCOUNTER — LAB (OUTPATIENT)
Dept: LAB | Facility: CLINIC | Age: 78
End: 2023-02-06
Payer: MEDICARE

## 2023-02-06 ENCOUNTER — ANTICOAGULATION THERAPY VISIT (OUTPATIENT)
Dept: ANTICOAGULATION | Facility: CLINIC | Age: 78
End: 2023-02-06

## 2023-02-06 ENCOUNTER — TELEPHONE (OUTPATIENT)
Dept: ANTICOAGULATION | Facility: CLINIC | Age: 78
End: 2023-02-06

## 2023-02-06 DIAGNOSIS — Z79.01 ANTICOAGULATED ON COUMADIN: ICD-10-CM

## 2023-02-06 DIAGNOSIS — Z79.01 ANTICOAGULATED: ICD-10-CM

## 2023-02-06 DIAGNOSIS — Z95.2 S/P AORTIC VALVE REPLACEMENT: Primary | ICD-10-CM

## 2023-02-06 DIAGNOSIS — Z79.01 LONG TERM CURRENT USE OF ANTICOAGULANT THERAPY: ICD-10-CM

## 2023-02-06 DIAGNOSIS — I48.0 PAROXYSMAL ATRIAL FIBRILLATION (H): Primary | ICD-10-CM

## 2023-02-06 DIAGNOSIS — I48.0 PAROXYSMAL ATRIAL FIBRILLATION (H): ICD-10-CM

## 2023-02-06 LAB — INR BLD: 2 (ref 0.9–1.1)

## 2023-02-06 PROCEDURE — 36416 COLLJ CAPILLARY BLOOD SPEC: CPT

## 2023-02-06 PROCEDURE — 85610 PROTHROMBIN TIME: CPT

## 2023-02-06 NOTE — TELEPHONE ENCOUNTER
Hernia surgery planned 2/23 sending to [facility] for hold plan  Tucker Courtney Rn  [facility]

## 2023-02-06 NOTE — PROGRESS NOTES
ANTICOAGULATION MANAGEMENT     Brooks Summers 77 year old male is on warfarin with therapeutic INR result. (Goal INR 2.0-3.0)    Recent labs: (last 7 days)     02/06/23  0852   INR 2.0*       ASSESSMENT       Source(s): Chart Review and Patient/Caregiver Call       Warfarin doses taken: Warfarin taken as instructed    Diet: No new diet changes identified    New illness, injury, or hospitalization: No    Medication/supplement changes: None noted    Signs or symptoms of bleeding or clotting: No    Previous INR: Subtherapeutic    Additional findings: Upcoming surgery/procedure Hernia 2/23       PLAN     Recommended plan for no diet, medication or health factor changes affecting INR     Dosing Instructions: Continue your current warfarin dose with next INR in 1 week       Summary  As of 2/6/2023    Full warfarin instructions:  5 mg every Tue; 7.5 mg all other days   Next INR check:  2/20/2023             Telephone call with Brooks who verbalizes understanding and agrees to plan    Lab visit scheduled    Education provided:     Please call back if any changes to your diet, medications or how you've been taking warfarin    Plan made per [facility] anticoagulation protocol    Ashley Norris RN  Anticoagulation Clinic  2/6/2023    _______________________________________________________________________     Anticoagulation Episode Summary     Current INR goal:  2.0-3.0   TTR:  67.7 % (1 y)   Target end date:  Indefinite   Send INR reminders to:  JONATHAN MURILLO    Indications    S/P aortic valve replacement [Z95.2]  Paroxysmal atrial fibrillation (H) [I48.0]  Long term current use of anticoagulant therapy [Z79.01]  Anticoagulated on Coumadin [Z79.01]  Anticoagulated [Z79.01]           Comments:  Aortic 21 mm Johnson Perimount Magna Tissue Valve.         Anticoagulation Care Providers     Provider Role Specialty Phone number    Edin Mays MD Referring Internal Medicine [phone]

## 2023-02-08 NOTE — TELEPHONE ENCOUNTER
RASHARD-PROCEDURAL ANTICOAGULATION  MANAGEMENT    ASSESSMENT     Warfarin interruption plan for hernia therapy on 2023.    Indication for Anticoagulation: Atrial Fibrillation      [id number] = 4 (CHF, Hypertension and Age >= 75)    Caprini Score (Procedure specific Risk of post-op VTE in surgical patients): 5-8      Bioprosthetic AVR placed          Rashard-Procedure Risk stratification for thromboembolism: low ( Chest guidelines and  ACC periprocedure pathway for NVAF Expert Consensus)    AFIB & Bioprosthetic Valve or Annuloplasty Rin AHA/ACC Management of Valvular Heart disease guidelines suggest bioprosthetic heart valves or annuloplasty rings receiving anticoagulation for atrial fibrillation  reasonable to consider need for bridging on the basis of the [id number] score weighed against the risk of bleeding.  NVAF:  ACC periprocedure pathway for NVAF advises NO bridge for low risk stratification ([id number] score <=4 and no prior hx of stroke, TIA or systemic embolism)  VTE Prophylaxis, Post-Surgical:  Chest guidelines recommend use of Caprini Score to guide post-surgical VTE prophylaxis. For Caprini Score 5-8: enoxaparin or heparin prophylaxis recommended for 7-10 days (or until therapeutic on warfarin).     RECOMMENDATION       Pre-Procedure:  o Hold warfarin for 5 days, until after procedure startin2023   o No pre-procedure bridge      Post-Procedure:  o Resume warfarin dose if okay with provider doing procedure on night of procedure,  PM: 7.5mg  o Start enoxaparin (Lovenox) 80 mg subq Q 12 hrs (1 mg/kg Q 12 hrs for CrCl >= 60 ml/min and BMI <= 40 kg/m2) ~ 24-48 hours post procedure when okay with provider doing procedure. Continue until INR >= 2.0  o Recheck INR ~5 days after resuming warfarin         Plan routed to referring provider for approval  ?   Jess Kline RPH    SUBJECTIVE/OBJECTIVE     Brooks Summers, a 77 year old male    Goal INR Range:  "2.0-3.0     Patient bridged in past: Yes: with 11/2022 hospitalization    Wt Readings from Last 3 Encounters:   01/30/23 76 kg (167 lb 9.6 oz)   01/24/23 74.8 kg (165 lb)   12/02/22 75.9 kg (167 lb 4.8 oz)      Ideal body weight: 73 kg (160 lb 15 oz)  Adjusted ideal body weight: 74.2 kg (163 lb 9.6 oz)     Estimated body mass index is 24.05 kg/m  as calculated from the following:    Height as of 1/30/23: 1.778 m (5' 10\").    Weight as of 1/30/23: 76 kg (167 lb 9.6 oz).    Lab Results   Component Value Date    INR 2.0 (H) 02/06/2023    INR 1.4 (H) 01/30/2023    INR 2.5 (H) 01/02/2023     Lab Results   Component Value Date    HGB 13.6 01/30/2023    HGB 13.7 06/24/2021    HCT 42.1 01/30/2023    HCT 43.2 06/24/2021    PLT 68 01/30/2023    PLT 79 06/24/2021     Lab Results   Component Value Date    CR 0.88 01/30/2023    CR 0.93 11/24/2022    CR 0.87 11/23/2022     Estimated Creatinine Clearance: 75.6 mL/min (based on SCr of 0.88 mg/dL).  "

## 2023-02-09 DIAGNOSIS — Z95.2 S/P AVR (AORTIC VALVE REPLACEMENT): ICD-10-CM

## 2023-02-09 DIAGNOSIS — I48.0 PAROXYSMAL ATRIAL FIBRILLATION (H): ICD-10-CM

## 2023-02-09 DIAGNOSIS — Z95.2 S/P AORTIC VALVE REPLACEMENT: ICD-10-CM

## 2023-02-09 DIAGNOSIS — Z79.01 LONG TERM CURRENT USE OF ANTICOAGULANT THERAPY: ICD-10-CM

## 2023-02-09 RX ORDER — WARFARIN SODIUM 5 MG/1
TABLET ORAL
Qty: 125 TABLET | Refills: 1 | Status: SHIPPED | OUTPATIENT
Start: 2023-02-09 | End: 2023-08-14

## 2023-02-09 NOTE — TELEPHONE ENCOUNTER
ANTICOAGULATION MANAGEMENT:  Medication Refill    Anticoagulation Summary  As of 2/6/2023    Warfarin maintenance plan:  5 mg (5 mg x 1) every Tue; 7.5 mg (5 mg x 1.5) all other days   Next INR check:  2/20/2023   Target end date:  Indefinite    Indications    S/P aortic valve replacement [Z95.2]  Paroxysmal atrial fibrillation (H) [I48.0]  Long term current use of anticoagulant therapy [Z79.01]  Anticoagulated on Coumadin [Z79.01]  Anticoagulated [Z79.01]             Anticoagulation Care Providers     Provider Role Specialty Phone number    Edin Mays MD Referring Internal Medicine [phone]          Visit with referring provider/group within last year: Yes    [facility] referral signed within last year: Yes    Brooks meets all criteria for refill (current [facility] referral, office visit with referring provider/group in last year, lab monitoring up to date or not exceeding 2 weeks overdue). Rx instructions and quantity supplied updated to match patient's current dosing plan. Warfarin 90 day supply with 1 refill granted per [facility] protocol     Beth Mijares RN  Anticoagulation Clinic

## 2023-02-13 ENCOUNTER — ANTICOAGULATION THERAPY VISIT (OUTPATIENT)
Dept: ANTICOAGULATION | Facility: CLINIC | Age: 78
End: 2023-02-13

## 2023-02-13 ENCOUNTER — LAB (OUTPATIENT)
Dept: LAB | Facility: CLINIC | Age: 78
End: 2023-02-13
Payer: MEDICARE

## 2023-02-13 DIAGNOSIS — Z79.01 ANTICOAGULATED: ICD-10-CM

## 2023-02-13 DIAGNOSIS — I48.0 PAROXYSMAL ATRIAL FIBRILLATION (H): ICD-10-CM

## 2023-02-13 DIAGNOSIS — Z79.01 LONG TERM CURRENT USE OF ANTICOAGULANT THERAPY: ICD-10-CM

## 2023-02-13 DIAGNOSIS — Z95.2 S/P AORTIC VALVE REPLACEMENT: Primary | ICD-10-CM

## 2023-02-13 DIAGNOSIS — Z79.01 ANTICOAGULATED ON COUMADIN: ICD-10-CM

## 2023-02-13 LAB — INR BLD: 2.1 (ref 0.9–1.1)

## 2023-02-13 PROCEDURE — 36415 COLL VENOUS BLD VENIPUNCTURE: CPT

## 2023-02-13 PROCEDURE — 85610 PROTHROMBIN TIME: CPT

## 2023-02-13 RX ORDER — ENOXAPARIN SODIUM 100 MG/ML
1 INJECTION SUBCUTANEOUS EVERY 12 HOURS
Qty: 11.2 ML | Refills: 1 | Status: SHIPPED | OUTPATIENT
Start: 2023-02-13 | End: 2023-11-09

## 2023-02-13 NOTE — PROGRESS NOTES
ANTICOAGULATION MANAGEMENT     Brooks Summers 77 year old male is on warfarin with therapeutic INR result. (Goal INR 2.0-3.0)    Recent labs: (last 7 days)     23  1256   INR 2.1*       ASSESSMENT       Source(s): Chart Review and Patient/Caregiver Call       Warfarin doses taken: Warfarin taken as instructed    Diet: No new diet changes identified    New illness, injury, or hospitalization: No    Medication/supplement changes: None noted    Signs or symptoms of bleeding or clotting: No    Previous INR: Therapeutic last visit; previously outside of goal range    Additional findings: Upcoming surgery/procedure Hernia repair  and Procedure plan below:    Pre-Procedure:  ? Hold warfarin for 5 days, until after procedure startin2023   ? No pre-procedure bridge       Post-Procedure:  ? Resume warfarin dose if okay with provider doing procedure on night of procedure,  PM: 7.5mg  ? Start enoxaparin (Lovenox) 80 mg subq Q 12 hrs (1 mg/kg Q 12 hrs for CrCl >= 60 ml/min and BMI <= 40 kg/m2) ~ 24-48 hours post procedure when okay with provider doing procedure. Continue until INR >= 2.0  ? Recheck INR ~5 days after resuming warfarin      PLAN     Recommended plan for no diet, medication or health factor changes affecting INR     Dosing Instructions: See calendar for procedure plan with next INR in 2 weeks       Summary  As of 2023    Full warfarin instructions:  : Hold; : Hold; : Hold; : Hold; : Hold; Otherwise 5 mg every Tue; 7.5 mg all other days   Next INR check:  2023             Telephone call with  Spouse who verbalizes understanding and agrees to plan and who agrees to plan and repeated back plan correctly    Lab visit scheduled    Education provided:     Please call back if any changes to your diet, medications or how you've been taking warfarin    Plan made per [facility] anticoagulation protocol    Shira Mirza RN  Anticoagulation  Clinic  2/13/2023    _______________________________________________________________________     Anticoagulation Episode Summary     Current INR goal:  2.0-3.0   TTR:  67.7 % (1 y)   Target end date:  Indefinite   Send INR reminders to:  JONATHAN MURILLO    Indications    S/P aortic valve replacement [Z95.2]  Paroxysmal atrial fibrillation (H) [I48.0]  Long term current use of anticoagulant therapy [Z79.01]  Anticoagulated on Coumadin [Z79.01]  Anticoagulated [Z79.01]           Comments:  Aortic 21 mm Johnson Perimount Magna Tissue Valve.         Anticoagulation Care Providers     Provider Role Specialty Phone number    Edin Mays MD Referring Internal Medicine [phone]

## 2023-02-13 NOTE — TELEPHONE ENCOUNTER
Pt informed of plan below and Lovenox ordered. Patient verbalized understanding and agrees with plan of care. Pt had no further questions or concerns at this time.     Shira Mirza RN, BSN  [facility] Anticoagulation Team

## 2023-02-15 LAB
ABO/RH(D): NORMAL
ANTIBODY SCREEN: NEGATIVE
SPECIMEN EXPIRATION DATE: NORMAL

## 2023-02-15 RX ORDER — BENZOCAINE/MENTHOL 6 MG-10 MG
LOZENGE MUCOUS MEMBRANE DAILY PRN
COMMUNITY
End: 2024-06-02

## 2023-02-15 NOTE — PHARMACY - PREOPERATIVE ASSESSMENT CENTER
Anticoagulation Note - Preoperative Assessment Center (PAC) Pharmacist     Patient was interviewed on February 15, 2023 as a part of PAC clinic appointment. The purpose of this note is to document the perioperative anticoagulation plan outlined by the providers caring for Brooks Summers.     Current Regimen  Anticoagulation Regimen as of February 15, 2023: warfarin - take 5 mg every Tue; 7.5 mg all other days of the week. Takes in the evening.   Indication: afib s/p bioprosthetic aortic valve replacement   Prescriber:  Dr. Mays (managed by [facility] A/C clinic)   Expected Duration of therapy: indefinite   INR Goal: 2-3  Creatinine   Date Value Ref Range Status   01/30/2023 0.88 0.67 - 1.17 mg/dL Final   03/10/2021 1.03 0.66 - 1.25 mg/dL Final       Perioperative plan  Brooks Summers is scheduled for HERNIORRHAPHY, INGUINAL, OPEN on 2/23/23 with Dr. Phillip and the perioperative anticoagulation plan outlined by patient's anticoagulation clinic is outlined in their notes from 2/6/23.  Pre op plan to hold warfarin x5 days (last dose on 2/17/23) with no pre op bridging, will plan for postoperative bridging - see A/C clinic note for details.      Resumption of anticoagulation after procedure will be based on surgery team assessment of bleeding risks and complications.  This plan may require re-assessment and modification by his primary team in the perioperative setting depending on patients clinical situation.        Dean Michael [facility]  February 15, 2023  11:39 AM

## 2023-02-15 NOTE — PROGRESS NOTES
Preoperative Assessment Center Medication History Note    Medication history completed on February 15, 2023 by this writer. See Epic admission navigator for prior to admission medications. Operating room staff will still need to confirm medications and last dose information on day of surgery.     Medication history interview sources  Patient interview: Yes  Care Everywhere records: No  Surescripts pharmacy refill records: Yes  Other (if applicable):     Changes made to PTA medication list  Added: hydrocortisone cream prn,   Deleted: flagyl (completed in November for diverticulitis),   Changed: none    Additional medication history information (including reliability of information, actions taken by pharmacist):    -- No recent (within 30 days) course of antibiotics  -- No recent (within 30 days) course of systemic steroids  -- Reports being on blood thinning medications - warfarin    -- Declines being on any other prescription or over-the-counter medications    Prior to Admission medications    Medication Sig Last Dose Taking? Auth Provider Long Term End Date   allopurinol (ZYLOPRIM) 100 MG tablet Take 1 tablet (100 mg) by mouth daily Taking Yes Edin Mays MD     hydrocortisone (CORTAID) 1 % external cream Apply topically daily as needed Taking Yes Unknown, Entered By History     levETIRAcetam (KEPPRA) 500 MG tablet Take 1 tablet (500 mg) by mouth 2 times daily Taking Yes Pierce Westbrook MD Yes    losartan (COZAAR) 25 MG tablet Take 0.5 tablets (12.5 mg) by mouth daily Taking Yes TED Salgado MD Yes    metoprolol succinate ER (TOPROL XL) 50 MG 24 hr tablet Take 1 tablet (50 mg) by mouth daily Taking Yes TED Salgado MD Yes    Multiple Vitamins-Minerals (MULTIVITAMIN ADULTS 50+) TABS Take 1 tablet by mouth daily Taking Yes Reported, Patient     oxybutynin ER (DITROPAN-XL) 10 MG 24 hr tablet Take 1 tablet (10 mg) by mouth daily Taking Yes Shelbi Reid, CNP     simvastatin (ZOCOR) 40 MG tablet  Take 1 tablet (40 mg) by mouth At Bedtime Taking Yes Edin Mays MD Yes    VITAMIN D, CHOLECALCIFEROL, PO Take 1,000 Units by mouth daily Taking Yes Reported, Patient     warfarin ANTICOAGULANT (COUMADIN) 5 MG tablet Take 5 mg on every Tuesday on 7.5 mg all other days of the week or as directed by INR Clinic Taking Yes Edin Mays MD     enoxaparin ANTICOAGULANT (LOVENOX) 80 MG/0.8ML syringe Inject 0.8 mLs (80 mg) Subcutaneous every 12 hours  Patient not taking: Reported on 2/15/2023 Not Taking  Edin Mays MD     enoxaparin ANTICOAGULANT (LOVENOX) 80 MG/0.8ML syringe Inject 0.7 mLs (70 mg) Subcutaneous 2 times daily  Patient not taking: Reported on 2/15/2023 Not Taking  Jackie Medrano MD     PROVIDER DOSED MANAGED WARFARIN, COUMADIN, OUTPATIENT Per coumadin clinic   Reported, Patient            Medication history completed by: Dean [facility]

## 2023-02-16 ENCOUNTER — ANESTHESIA EVENT (OUTPATIENT)
Dept: SURGERY | Facility: CLINIC | Age: 78
End: 2023-02-16
Payer: MEDICARE

## 2023-02-16 ENCOUNTER — OFFICE VISIT (OUTPATIENT)
Dept: SURGERY | Facility: CLINIC | Age: 78
End: 2023-02-16
Payer: MEDICARE

## 2023-02-16 ENCOUNTER — PRE VISIT (OUTPATIENT)
Dept: SURGERY | Facility: CLINIC | Age: 78
End: 2023-02-16

## 2023-02-16 ENCOUNTER — LAB (OUTPATIENT)
Dept: LAB | Facility: CLINIC | Age: 78
End: 2023-02-16
Payer: MEDICARE

## 2023-02-16 VITALS
HEIGHT: 70 IN | BODY MASS INDEX: 24.09 KG/M2 | DIASTOLIC BLOOD PRESSURE: 84 MMHG | OXYGEN SATURATION: 98 % | WEIGHT: 168.3 LBS | TEMPERATURE: 97.7 F | RESPIRATION RATE: 16 BRPM | HEART RATE: 77 BPM | SYSTOLIC BLOOD PRESSURE: 135 MMHG

## 2023-02-16 DIAGNOSIS — K40.90 NON-RECURRENT UNILATERAL INGUINAL HERNIA WITHOUT OBSTRUCTION OR GANGRENE: ICD-10-CM

## 2023-02-16 DIAGNOSIS — Z01.818 PREOP EXAMINATION: ICD-10-CM

## 2023-02-16 DIAGNOSIS — Z01.818 PREOP EXAMINATION: Primary | ICD-10-CM

## 2023-02-16 LAB
ERYTHROCYTE [DISTWIDTH] IN BLOOD BY AUTOMATED COUNT: 14 % (ref 10–15)
HCT VFR BLD AUTO: 43.2 % (ref 40–53)
HGB BLD-MCNC: 14.1 G/DL (ref 13.3–17.7)
MCH RBC QN AUTO: 33.2 PG (ref 26.5–33)
MCHC RBC AUTO-ENTMCNC: 32.6 G/DL (ref 31.5–36.5)
MCV RBC AUTO: 102 FL (ref 78–100)
PLATELET # BLD AUTO: 72 10E3/UL (ref 150–450)
RBC # BLD AUTO: 4.25 10E6/UL (ref 4.4–5.9)
WBC # BLD AUTO: 5.3 10E3/UL (ref 4–11)

## 2023-02-16 PROCEDURE — 36415 COLL VENOUS BLD VENIPUNCTURE: CPT | Performed by: PATHOLOGY

## 2023-02-16 PROCEDURE — 85027 COMPLETE CBC AUTOMATED: CPT | Performed by: PATHOLOGY

## 2023-02-16 PROCEDURE — 99215 OFFICE O/P EST HI 40 MIN: CPT | Performed by: NURSE PRACTITIONER

## 2023-02-16 PROCEDURE — 86901 BLOOD TYPING SEROLOGIC RH(D): CPT | Performed by: NURSE PRACTITIONER

## 2023-02-16 ASSESSMENT — ENCOUNTER SYMPTOMS
DYSRHYTHMIAS: 1
SEIZURES: 1

## 2023-02-16 ASSESSMENT — PAIN SCALES - GENERAL: PAINLEVEL: NO PAIN (0)

## 2023-02-16 ASSESSMENT — LIFESTYLE VARIABLES: TOBACCO_USE: 1

## 2023-02-16 ASSESSMENT — COPD QUESTIONNAIRES: COPD: 0

## 2023-02-16 NOTE — H&P
Pre-Operative H & P     CC:  Preoperative exam to assess for increased cardiopulmonary risk while undergoing surgery and anesthesia.    Date of Encounter: 2/16/2023  Primary Care Physician:  Edin Mays     Reason for visit:   Encounter Diagnoses   Name Primary?     Preop examination Yes     Non-recurrent unilateral inguinal hernia without obstruction or gangrene        HPI  Brooks Summers is a 77 year old male who presents for pre-operative H & P in preparation for  Procedure Information     Case: [id number] Date/Time: 02/23/23 0730    Procedure: HERNIORRHAPHY, INGUINAL, OPEN (Left: Groin)    Anesthesia type: MAC with Local    Diagnosis: Non-recurrent unilateral inguinal hernia without obstruction or gangrene [K40.90]    Pre-op diagnosis: Non-recurrent unilateral inguinal hernia without obstruction or gangrene [K40.90]    Location:  OR  /  OR    Providers: Edin Phillip MD          Brooks Summers is a 77 year old male with a history of severe aortic stenosis s/p bioprosthetic valve replacement on 07/14/2011 c/b strep endocarditis and redone on 01/25/2013, severe LV dysfunction and LBBB s/p CRT-D placement in 2012, chronic systolic heart failure, atrial fibrillation on warfarin, CKD stage 3, HLD, former smoker, thrombocytopenia, seizures, MIC, nocturia, and BCC of face who has a left inguinal hernia. He first noticed swelling and tenderness in his left groin for about a week after lifting a heavy box of dirt in early November.  These symptoms persisted and on 11/23/22 the patient presented to the ED and was found to have diverticulitis of a segment of sigmoid colon contained within a left inguinal hernia, no evidence of bowel obstruction or ischemia. Given his significant cardiac history and lack of systemic symptoms at that time, he was conservatively managed with antibiotics with the plan to follow up with general surgery clinic in 1-2 weeks. The patient consulted with Dr. Phillip on 12/2/22 and  wishes to proceed with the surgery listed above.      History is obtained from the patient and chart review    Hx of abnormal bleeding or anti-platelet use: None.      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 Tx with Dapto/RIFx 8 weeks.     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     Mild cognitive impairment 8/24/2022     Paroxysmal A-fib (H)     Post-op 7/14/2011, 1/26/13     Paroxysmal atrial fibrillation (H) 2/25/2015     Rotator Cuff (Capsule) Sprain and Strain      S/P aortic valve replacement     7/14/2011, re-do 1/25/13 due to endocarditis     Seizure (H) 5/10/2020     Smoking hx      Thrombocytopenia (H)        Past Surgical History  Past Surgical History:   Procedure Laterality Date     ANESTHESIA CARDIOVERSION  7/18/2011    Procedure:ANESTHESIA CARDIOVERSION; Cardioversion; Surgeon:GENERIC ANESTHESIA PROVIDER; Location: OR     COLONOSCOPY       COLONOSCOPY N/A 11/19/2018    Procedure: COLONOSCOPY;  Surgeon: Herman Mcginnis MD;  Location:  GI     CORONARY ANGIOGRAPHY ADULT ORDER       CYSTOSCOPY, BIOPSY URETHRA N/A 4/3/2017    Procedure: CYSTOSCOPY, BIOPSY URETHRA;  Surgeon: Marlena Mora MD;  Location:  OR     ENDOSCOPY UPPER, COLONOSCOPY, COMBINED       EP ICD GENERATOR REPLACEMENT DUAL N/A 3/10/2021    Procedure: EP ICD GENERATOR CHANGE DUAL;  Surgeon: Mekhi Be MD;  Location:  HEART CARDIAC CATH LAB     HC REMOV & REPLACE IMPLT DEFIB PULSE GEN MULT LEAD  12/3/2015          HEMORRHOID SURGERY       IMPLANT AUTOMATIC IMPLANTABLE CARDIOVERTER DEFIBRILLATOR       MOHS  MICROGRAPHIC PROCEDURE       ORTHOPEDIC SURGERY      shoulder,right     REDO STERNOTOMY REPLACE VALVE AORTIC  1/25/2013    Procedure: REDO STERNOTOMY REPLACE VALVE AORTIC;  Redo Sternotomy, Redo Aortic Valve Replacement on pump oxygenator. Intraoperative Transesophageal Echocardiogram;  Surgeon: Stephanie Hampton MD;  Location: [facility] OR     REPAIR VALVE MITRAL  2013     REPLACE VALVE AORTIC  7/14/2011    Procedure:REPLACE VALVE AORTIC; Median Sternotomy, Bioprosthesis,  Aortic Valve replacement on pump with oxygenator. Intra-operative Transesophogeal Echocardiogram.; Surgeon:STEPHANIE HAMPTON; Location:[facility]     teeth extractions       TRANSPLANT         Prior to Admission Medications  Current Outpatient Medications   Medication Sig Dispense Refill     allopurinol (ZYLOPRIM) 100 MG tablet Take 1 tablet (100 mg) by mouth daily 90 tablet 0     hydrocortisone (CORTAID) 1 % external cream Apply topically daily as needed       levETIRAcetam (KEPPRA) 500 MG tablet Take 1 tablet (500 mg) by mouth 2 times daily 180 tablet 1     losartan (COZAAR) 25 MG tablet Take 0.5 tablets (12.5 mg) by mouth daily 45 tablet 3     metoprolol succinate ER (TOPROL XL) 50 MG 24 hr tablet Take 1 tablet (50 mg) by mouth daily 90 tablet 3     Multiple Vitamins-Minerals (MULTIVITAMIN ADULTS 50+) TABS Take 1 tablet by mouth daily       oxybutynin ER (DITROPAN-XL) 10 MG 24 hr tablet Take 1 tablet (10 mg) by mouth daily 90 tablet 3     simvastatin (ZOCOR) 40 MG tablet Take 1 tablet (40 mg) by mouth At Bedtime 90 tablet 1     VITAMIN D, CHOLECALCIFEROL, PO Take 1,000 Units by mouth daily       warfarin ANTICOAGULANT (COUMADIN) 5 MG tablet Take 5 mg on every Tuesday on 7.5 mg all other days of the week or as directed by INR Clinic 125 tablet 1     enoxaparin ANTICOAGULANT (LOVENOX) 80 MG/0.8ML syringe Inject 0.8 mLs (80 mg) Subcutaneous every 12 hours (Patient not taking: Reported on 2/15/2023) 11.2 mL 1     enoxaparin ANTICOAGULANT (LOVENOX) 80 MG/0.8ML syringe  Inject 0.7 mLs (70 mg) Subcutaneous 2 times daily (Patient not taking: Reported on 2/15/2023) 11.2 mL 0     PROVIDER DOSED MANAGED WARFARIN, COUMADIN, OUTPATIENT Per coumadin clinic         Allergies  Allergies   Allergen Reactions     Blood Transfusion Related (Informational Only) Unknown     Patient has a history of a clinically significant antibody against RBC antigens.  A delay in compatible RBCs may occur.      Lisinopril Cough       Social History  Social History     Socioeconomic History     Marital status:      Spouse name: Not on file     Number of children: Not on file     Years of education: Not on file     Highest education level: Not on file   Occupational History     Not on file   Tobacco Use     Smoking status: Former     Packs/day: 1.00     Years: 20.00     Pack years: 20.00     Types: Cigarettes     Quit date: 1989     Years since quittin.1     Smokeless tobacco: Never   Vaping Use     Vaping Use: Never used   Substance and Sexual Activity     Alcohol use: Yes     Comment: rare     Drug use: No     Sexual activity: Yes     Partners: Female     Birth control/protection: None     Comment:    Other Topics Concern     Parent/sibling w/ CABG, MI or angioplasty before 65F 55M? No   Social History Narrative     since 1982 2 children 4 grandchildren.    Carpet sales:  Semi retired. Athlete in school, Golf, fish, yardwork     Social Determinants of Health     Financial Resource Strain: Not on file   Food Insecurity: Not on file   Transportation Needs: Not on file   Physical Activity: Not on file   Stress: Not on file   Social Connections: Not on file   Intimate Partner Violence: Not on file   Housing Stability: Not on file       Family History  Family History   Problem Relation Age of Onset     Cancer Mother 92        stomach, colon     Hypertension Mother      Retinal detachment Mother      C.A.D. Father 68        bypass, carotid      Prostate Cancer Father 70     Heart  Disease Father      Depression Sister      Anxiety Disorder Sister      Depression Brother      Anxiety Disorder Brother      Cancer Brother 64        lung cancer, petroleum     Cancer Brother      Glaucoma No family hx of      Macular Degeneration No family hx of      Strabismus No family hx of      Glasses (<9 y/o) No family hx of      Anesthesia Reaction No family hx of      Bleeding Disorder No family hx of      Venous thrombosis No family hx of        Review of Systems  The complete review of systems is negative other than noted in the HPI or here.   Anesthesia Evaluation   Pt has had prior anesthetic. Type: General.    No history of anesthetic complications       ROS/MED HX  ENT/Pulmonary:     (+) tobacco use, Past use,  (-) asthma, COPD, sleep apnea, RUBY risk factors and recent URI   Neurologic: Comment: Mild cognitive impairment.    (+) seizures, last seizure: 8/2020, features: GTC with tongue biting then post-ictal confusion,  (-) no CVA and no TIA   Cardiovascular:     (+) -Peripheral Vascular Disease----CHF pacemaker, Reason placed: A. Fib, CHF. type: CRT-D, ICD dysrhythmias, a-fib, valvular problems/murmurs Hx of bicuspid aortic valve s/p prosthetic AVR in 2011, and redo in 2013. Previous cardiac testing   Echo: Date: 11/23/22 Results:  See H&P  Stress Test: Date: Results:    ECG Reviewed: Date: 11/23/22 Results:  See H&P  Cath: Date: Results:   (-) hypertension and dyslipidemia   METS/Exercise Tolerance: >4 METS Comment: Works out daily at home, able to walk long distances, and cares for his wife without any CP, chest tightness, or SOB.   Hematologic: Comments: Chronic thrombocytopenia  On chronic warfarin for A. Fib/ prosthetic aortic valve   (-) history of blood clots, anemia and history of blood transfusion   Musculoskeletal: Comment: Hx of gout - on allopurinol     Intermittent neuropathy of hands and feet       GI/Hepatic: Comment: Left inguinal hernia    Hx of hemorrhoids     (-) GERD, inflamatory  "bowel disease, hepatitis and liver disease   Renal/Genitourinary:     (+) renal disease,  (-) nephrolithiasis and BPH   Endo:    (-) Type II DM, thyroid disease and chronic steroid usage   Psychiatric/Substance Use:     (+) psychiatric history anxiety     Infectious Disease:    (-) Recent Fever, MRSA, VRE, HIV and TB   Malignancy:  - neg malignancy ROS  (-) malignancy   Other:            /84 (BP Location: Right arm, Patient Position: Sitting, Cuff Size: Adult Regular)   Pulse 77   Temp 97.7  F (36.5  C) (Oral)   Resp 16   Ht 1.778 m (5' 10\")   Wt 76.3 kg (168 lb 4.8 oz)   SpO2 98%   BMI 24.15 kg/m      Physical Exam   Constitutional: Awake, alert, cooperative, no apparent distress, and appears stated age.  Eyes: Pupils equal, round and reactive to light, extra ocular muscles intact, sclera clear, conjunctiva normal.  HENT: Normocephalic, oral pharynx with moist mucus membranes, good dentition. No goiter appreciated.   Respiratory: Clear to auscultation bilaterally, no crackles or wheezing.  Cardiovascular: Regular rate and rhythm, normal S1 and S2, and no murmur noted.  Carotids +2, no bruits. No edema. Palpable pulses to radial and PT arteries.   GI: Normal bowel sounds, soft, non-distended, non-tender, no masses palpated, no hepatosplenomegaly.   Lymph/Hematologic: No cervical lymphadenopathy and no supraclavicular lymphadenopathy.  Genitourinary:  Deferred.   Skin: Warm and dry.  No rashes at anticipated surgical site.   Musculoskeletal: Full ROM of neck. There is no redness, warmth, or swelling of the joints. Gross motor strength is normal.    Neurologic: Awake, alert, oriented to name, place and time. Cranial nerves II-XII are grossly intact. Gait is normal.   Neuropsychiatric: Calm, cooperative. Normal affect.     Prior Labs/Diagnostic Studies   All labs and imaging personally reviewed     EKG/ stress test     12/7/22: Cardiac Device Interrogation  Device: ConnectFu [id number] Cobalt HF CRT-D " MRI  Normal Device Function   Mode: DDDR /120 bpm  AP: 37.9%  BVP: 95.9%  VSR Pace: 0.4%  Presenting EGM: AS-BVP @ 71 bpm  Thoracic Impedance: Near reference line.   Short V-V intervals: 0  Lead Trends Appear Stable.   Estimated battery longevity to RRT = 2.9 years. Battery voltage = 2.95 V.   Atrial Arrhythmia: Per HR histograms, total time in AT/AF = 14 sec. EGM unavailable for review.  AF [place]: <0.1%  Anticoagulant: warfarin  Ventricular Arrhythmia: 1 ventricular sensing episode recorded. Stored marker channel shows V>A @ 107 bpm lasting 6 sec.   Pt Notified of Transmission Results: Results Letter Sent    11/23/22: Echocardiogram  Interpretation Summary  Left ventricular size is normal.  The visual ejection fraction is 45-50%.  Right ventricular function, chamber size, wall motion, and thickness are  normal.  Status post 23 mm Bogdan Johnson Perimount Magna bioprosthetic aortic  valve replacement in 2011. Mean AV gradient normal at 11mmHg. Trace AI.  Ascending aorta 4.4 cm.  The inferior vena cava is normal.  No pericardial effusion is present.  Aortic size and valve function unchanged. Mildly decreased LV function when  compared to previous study. The inferior wallmotion abnormality is not new.    8/2/21: Echocardiogram  Interpretation Summary  Status post 23 mm Bogdan Johnson Perimount Magna bioprosthetic aortic  valve replacement in 2011.  Global and regional left ventricular function is normal with an EF of 55-60%.  Global right ventricular function is normal. The right ventricle is normal  size.  The bioprosthetic valve is well-seated. Leaflet opening is not clearly  visualized. There is no valvular regurgitation. There is mild paravalvular  regurgitation. The Vmax is 2.5 m/s, the mean gradient is 13 mmHg, and the AT  is 80 ms.  There is mild dilation of the ascending aorta (4.3 cm, indexed value 2.15  cm/m2).  This study was compared with the study from 02/10/2020. There are no  significant  changes in the aortic valve hemodynamics, degree of aortic  regurgitation, aortic calibers, or biventricular function.    11/23/22: EKG  Ventricular-paced rhythm with frequent Premature ventricular complexes       Outside records reviewed from: Care Everywhere      LAB/DIAGNOSTIC STUDIES TODAY:  T&S, CBC    Component      Latest Ref Rng & Units 1/30/2023 2/13/2023 2/16/2023   Sodium      136 - 145 mmol/L 143     Potassium      3.4 - 5.3 mmol/L 4.5     Chloride      98 - 107 mmol/L 107     Carbon Dioxide (CO2)      22 - 29 mmol/L 28     Anion Gap      7 - 15 mmol/L 8     Urea Nitrogen      8.0 - 23.0 mg/dL 11.2     Creatinine      0.67 - 1.17 mg/dL 0.88     Calcium      8.8 - 10.2 mg/dL 9.4     Glucose      70 - 99 mg/dL 98     Alkaline Phosphatase      40 - 129 U/L 56     AST      10 - 50 U/L 34     ALT      10 - 50 U/L 33     Protein Total      6.4 - 8.3 g/dL 6.8     Albumin      3.5 - 5.2 g/dL 4.5     Bilirubin Total      <=1.2 mg/dL 1.0     GFR Estimate      >60 mL/min/1.73m2 89     WBC      4.0 - 11.0 10e3/uL   5.3   RBC Count      4.40 - 5.90 10e6/uL   4.25 (L)   Hemoglobin      13.3 - 17.7 g/dL   14.1   Hematocrit      40.0 - 53.0 %   43.2   MCV      78 - 100 fL   102 (H)   MCH      26.5 - 33.0 pg   33.2 (H)   MCHC      31.5 - 36.5 g/dL   32.6   RDW      10.0 - 15.0 %   14.0   Platelet Count      150 - 450 10e3/uL   72 (L)   INR Point of Care      0.9 - 1.1  2.1 (H)        Assessment      Brooks Summers is a 77 year old male seen as a PAC referral for risk assessment and optimization for anesthesia.    Plan/Recommendations  Pt will be optimized for the proposed procedure.  See below for details on the assessment, risk, and preoperative recommendations    NEUROLOGY  - History of TIA and CVA  -History of seizures. Last on 8/2020. On levetiracetam. Follows with Dr. Westbrook of neurology (last on 3/2022) and she feels they are provoked by stress. Seizure was GTC with tongue biting and then post-ictal  confusion.  Continue levetiracetam on DOS  -Post Op delirium risk factors:  Age    ENT  - No current airway concerns.  Will need to be reassessed day of surgery.  Mallampati: III  TM: > 3    CARDIAC  - No history of CAD and Hypertension   -Hyperlipidemia  Continue with simvastatin on DOS  - CHF. With CRT-D placed in 2012, last interrogation on 12/7/22 showed BVP = 96%, 1 ventricular sensing episode recorded. No recent CHF exacerbations. Pt denies CP, SOB, or LE edema. Followed by cardiology, last on 6/7/22 with plan to follow-up with in 1 year with an echo. Last echo on on 11/23/22 showed EF of 45-50% with mildly decreased LV function.   Continue losartan and metoprolol on DOS  -Hx of bicuspid aortic valve s/p prosthetic AVR in 2011 c/b endocarditis and redo in 2013. On warfarin.   - Afib. On coumadin, will recheck INR on DOS.   Per Pharm-D, 5 day hold of coumadin (last dose on 2/17) with no preop bridging. Plan is for post-op bridging with Lovenox after surgery.   [id number] score:  3  - METS (Metabolic Equivalents)  Patient performs 4 or more METS exercise without symptoms            Total Score: 0      RCRI-Low risk: Class 2 0.9% complication rate            Total Score: 1    RCRI: CHF        PULMONARY  RUBY Low Risk            Total Score: 2    RUBY: Over 50 ys old    RUBY: Male      - Denies asthma or inhaler use  - Tobacco History      History   Smoking Status     Former     Packs/day: 1.00     Years: 20.00     Types: Cigarettes     Quit date: 12/31/1989   Smokeless Tobacco     Never       GI  PONV Medium Risk  Total Score: 2           1 AN PONV: Patient is not a current smoker    1 AN PONV: Intended Post Op Opioids      -Left inguinal hernia - surgery as above  -Pt reports recent constipation and passing of bright red blood with stools last week. Over the past 2 days, bowel movements have been normal and he has had no bleeding. Denies any weight loss or abdominal pain. Last colonoscopy in 2018. Pt does have a  "history of hemorrhoids. Checked hgb today and is 14.0. Instructed pt to follow-up with his PCP for further management.     /RENAL  - Baseline Creatinine  0.88     ENDOCRINE    - BMI: Estimated body mass index is 24.15 kg/m  as calculated from the following:    Height as of this encounter: 1.778 m (5' 10\").    Weight as of this encounter: 76.3 kg (168 lb 4.8 oz).  Healthy Weight (BMI 18.5-24.9)  - No history of Diabetes Mellitus    HEME  VTE Low Risk 0.5%            Total Score: 3    VTE: Greater than 59 yrs old    VTE: Male      - Coagulopathy second to Warfarin (Coumadin, Jantoven). See cardiology section above regarding holding instructions prior to surgery.   -Chronic thrombocytopenia since 2011 with plt range  over the past 2 years. Saw hematologist in 2015 who felt he had mild autoimmune thrombocytopenia with no follow-up needed. Today plts are 72. Disucssed with Dr. Goff and she felt platelet count today is stable for surgery.     PSYCH  - MIC. Pt's wife has required continuous oxygen for the past 4 years and is mostly home-bound. He is her caregiver. Pt reports generalized anxiety about her status. Not on any medication. Feels current anxiety symptoms are stable.     Different anesthesia methods/types have been discussed with the patient, but they are aware that the final plan will be decided by the assigned anesthesia provider on the date of service.  Patient was discussed with Dr. Goff    The patient is optimized for their procedure. AVS with information on surgery time/arrival time, meds and NPO status given by nursing staff. No further diagnostic testing indicated.      On the day of service:     Prep time: 25 minutes  Visit time: 20 minutes  Documentation time: 15 minutes  ------------------------------------------  Total time: 60 minutes      SUKH Balderas CNP (supervised by SUKH Ware CNP)  Preoperative Assessment Center  [facility]  Clinic and Surgery " [place]  Phone: [phone]  Fax: [phone]

## 2023-02-17 NOTE — RESULT ENCOUNTER NOTE
Tarun Guy,    Your test results are attached.  Your labs are stable and good for surgery.         Nicole Kline DNP, RN, ANP-C

## 2023-02-20 ENCOUNTER — PATIENT OUTREACH (OUTPATIENT)
Dept: SURGERY | Facility: CLINIC | Age: 78
End: 2023-02-20
Payer: MEDICARE

## 2023-02-20 DIAGNOSIS — Z79.01 LONG TERM CURRENT USE OF ANTICOAGULANT THERAPY: Primary | ICD-10-CM

## 2023-02-20 NOTE — LETTER
2023      TO: Brooks Summers  [address]  [facility] [address]         SURGERY PACKET            Your surgery is scheduled:    Date: 2023  ________________________________    Time: 10:50 AM  ________________________________    Please arrive at:  8:50 AM  ______________________    Surgeon's Name: Dr. Edin Phillip  _______________________  Adult  required upon discharge      Pre-Op Physical Fax Numbers:  [facility] Fax:  [phone] / Phone:  [phone]        Your surgery is located at:      [facility], [address])      [place] [address]      [phone]      [email or web address]  Nurse Line: [phone]  [phone]     *Times are tentative and may change. You can expect a call from the pre-admission nurses to confirm arrival and surgery start time if the times should change.    Lab appointment:  2023 at 11:45 AM ([facility])    Please hold your Coumadin 5 days prior to surgery.              Before Your Surgery  For Patients and Visitors at [facility]  As you get ready for surgery, you may have a lot of questions.  This brochure will help you know what to expect before and after surgery.  You and your family are the most important members of your health care team.  You will need to take an active role in your care.    Be sure to ask questions and learn all that you can about your surgery.  If you have any safety concerns, we urge you to tell a nurse as soon as possible.   This brochure is for information only.  It does not replace the advice of your doctor.  Always follow your doctor's advice.    Please tell us if you need a .    GETTING READY FOR SURGERY  Always follow your surgeon's instructions.  If you don't, your surgery could be canceled.  Please use the following checklist.    Within 30 Days of  Surgery:    Have a pre-surgery physical exam with your family doctor or partner.    If you use a [facility], all of your information from the pre-op physical will be in the [employer] computer system.    Ask the doctor to send all of your results to the hospital before the surgery.  The doctor also may ask you to bring the results with you on the day of surgery or you can fax them to [facility] Fax:  [phone] / Phone:  [phone].    Tell the doctor if:    You are allergic to latex or rubber  (Latex and rubber gloves are often used in medical care).    You are taking any medicines (including aspirin), vitamins (Vitamin E, Fish Oil, Omegas) or herbal products.  You will need to stop taking some medicines before surgery.    You have any medical problems (allergies, diabetes or heart disease, for example).    You have a pacemaker or an AICD (automatic implanted cardiac defibrillator).  If you do, please bring the ID card with you on the day of surgery.    You are a smoker.  People who smoke have a higher risk of infection after surgery.  Ask your doctor how you can quit smoking.    Within 7 days of Surgery:    Pre-register with the hospital.  Please use the hospital's phone number, [phone]. Or, to register online, go to [email or web address].      Prior to your surgical procedure, a nurse will be contacting you to obtain a health history [facility] Fax:  [phone] / Phone:  [phone].  Additionally, someone from the Admissions Department will also contact you for preregistration ([phone]).      Call your insurance company.  Ask if you need pre-approval for your surgery.  If you do not have insurance, please let us know.      Arrange for someone to drive you home after surgery.  If you will have same-day surgery, you may not drive or take public transportation home by yourself.    Arrange for someone to stay with you for 24 hours after you go home.  This person must be a responsible  adult (ie- Family member or friend).    The Day Before Surgery:     Call your surgeon if there are any changes in your health.  This includes signs of a cold or flu (such as a sore throat, runny nose, cough, rash or fever).    Do not smoke, drink alcohol or take over-the-counter medicine (unless your surgeon tells you to) for 24 hours before and after surgery.    If you take prescribed drugs:  You may need to stop them until after the surgery.  Follow your doctor's orders.  You may resume Aspirin and/or blood thinners after your surgery as directed by your physician/surgeon.    No solid food after midnight.  Clear liquids only after midnight until 2 hours before your surgery. Consume nothing orally for 2 hours before your surgery.  You need to have an empty stomach before surgery.  This will make the surgery as safe as possible.  If you don't follow your doctor's orders, your surgery could be changed to another date.    A nurse will call you within a few days of surgery to go over these and other instructions.    If you do not hear from them, please call them at [facility] Fax:  [phone] / Phone:  [phone]    The Day of Surgery:    Take a shower or bath the night before and the morning of surgery.  Use antiseptic soap or the soap your surgeon gave you.  Gently clean the skin.  Do not shave or scrub your surgery site.    Please remove deodorant, cologne, scented lotion, makeup, nail polish and jewelry (including rings and body piercings).  If you wear artificial nails, please remove at least one nail before coming to the hospital.    Wear clean, loose clothing to the hospital.    Bring these items to the hospital:  1. Your insurance card.  2. Money for parking and co-pays (for medicines or the surgery), if needed.   3. A list of all the medicines you take.  Include vitamins, minerals, herbs and over-the-counter drugs.  Note any drug allergies.  4. A copy of your advance health care directive, if you have  one.  This tells us what treatment you would want -- and who would make health care decisions -- if you could no longer speak for yourself.  You may request this form in advance or download it from [email or web address].  5. A case for any glasses, contact lenses, hearing aids or dentures.  6. Your inhaler or CPAP machine, if you use these at home.    Leave extra cash, jewelry and other valuables at home.    When You Arrive:  When you get to the hospital, you will:    Check in.  If you are under age 18, you must be with a parent or legal guardian.    Sign consent forms, if you haven't already.  These forms state that you know the risks and benefits of surgery.  When you sign the forms, you give us permission to do the surgery.  Do not sign them unless you understand what will happen during and after your surgery.  If you have any questions about your surgery, ask to speak with your doctor before you sign the forms.  If you don't understand the answers, ask again.    Receive a copy of the Patients Bill of Rights.  If you do not receive a copy, please ask for one.    Change into hospital clothes.  Your belongings will be placed in a bag.  We will return them to you after surgery.    Meet with the anesthesia provider.  He or she will tell you what kind of anesthesia (medicine) will be used to keep you comfortable during surgery.    Remember: It's okay to remind doctors and nurses to wash their hands before touching you.     In most cases, your surgeon will use a marker to write his or her initials on the surgery site.  This ensures that the exact site is operated on.    For safety reasons, we will ask you the same questions many times.  For example, we may ask your name and birth date over and over again.    Friends and family may be able to stay with you until it's time for surgery.  If so, while you're in surgery, they will be in the waiting area.  Please note that cell phones are not allowed in some patient care  "areas.    If you have questions about what will happen in the operating room, talk to your care team.    After Surgery:    We will move you to a recovery room where we will watch you closely.  If you have any pain or discomfort, tell your nurse.  He or she will try to make you comfortable.      If you are staying overnight we will move you to your hospital room after you are awake.    If you are going home we will move you to another room.  Friends and family may be able to join you.  The length of time you spend in recovery depends on the type of medicine you received, your medical condition, and the type of surgery you had.    Dealing with pain:  A nurse will check your comfort level often during your stay.  He or she will work with you to manage your pain.  Remember:    All pain is real.  There are many ways to control pain.  We can help you decide what works best for you.    Ask for pain medicine when you need it.  Don't try to \"tough it out\" -- this can make you feel worse.  Always take your medicine as ordered.    Medicine doesn't work the same for everyone.  If your medicine isn't working tell your nurse.  There may be other medicines or treatments we can try.    Going Home:  We will let you know when you're ready to leave the hospital.  Before you leave, we will tell you how to care for yourself at home and prevent infections.  If you do not understand something, please say so.  We will answer any questions you have.  We will then help you get ready to leave.    You must have an adult with you for the first 24 hours after you leave the hospital. Take it easy when you get home.  You will need some time to recover -- you may be more tired than you realize at first.  Rest and relax for at least the first 24 hours at home.  You'll feel better and heal faster if you take good care of yourself.                      Pre-Operative Surgery Scrub    Purpose:   The skin harbors a large variety of bacteria, both " infectious and noninfectious.  Showering with an antiseptic soap prior to an invasive procedure will decrease the number of transient and resident (good and bad) bacteria that is normally found on the skin.    Procedure:      Shower or bathe with 1/2 of the bottle of antiseptic soap the evening before and 1/2 of the bottle the morning of surgery (bathing the day of the procedure is most important).       Apply the soap at full strength (use the entire bottle).  Gently clean the skin, rinse, and dry with a clean towel that is freshly laundered (out of the dryer) and then put on clean clothing that is freshly laundered.        We have given you information regarding pre-op showering.  We recommend that patients wash with an antiseptic soap prior to surgery.  This cleanser will be given to you at the clinic or mailed to your home.  It is advised that you liberally wash the specific area surgery area the night before, and again in the morning before the surgery.  Do not apply lotion afterward.  We would like to keep the skin as clean as possible.    Thank you for following these important instructions.      You have been scheduled for surgery and we would like to give you some information that will assist in helping get the best possible outcome.      Before Surgery:   If for any reason you decide not to have the surgery, please contact our office.  We can easily cancel or reschedule the procedure. Please call the  at [phone].      Any pain related to the surgery that occurs before the surgery needs to be reported and managed by your primary care or referring doctor.      Please keep in mind that the time of surgery is subject to change.  Make sure you have nothing to eat or drink after midnight.  If your surgery is later in the afternoon, this recommendation might change, but not until the day before surgery after the actual time of the surgery has been established.    After Surgery:  When you are  discharged from the recovery room, the nurses will review instructions with you and your caregiver.      Please wash your hands every time you touch the wound or change bandages or dressings.      Do not submerge the wound in water.  You may not use a bathtub or hot tub until the wound is closed.  The wait time frame is generally 2-3 weeks but any open area can be a source of incoming bacteria, so it is better to be on the safe side and avoid the tub until your wound is fully healed.      You may take a shower 24 hours after surgery.  Double check with your surgeon if it is ok for water to run over a wound, whether it has been sutured, stapled, glued or is open.  You may gently wash the wound using the antiseptic soap provided for your pre-surgery showering (do not use a washcloth).  Any mild soap will work as well.      Many surgical wounds will have small white strips of tape on them called Steri Strips.  Do not remove these.  The edges will curl and fall off within 7-10 days with normal showering.      If you are going home with sutures (stitches) or staples, you must return to the clinic to have them taken out, usually within 1-2 weeks.      Signs and symptoms of infection include:  1. Fever, temperature over 101.5 ' F  2. Redness  3. Swelling  4. Increasing pain  5. Green or yellow drainage which may or may not have a foul odor.    Symptoms of infection need to be reported to your surgery office. Please call the nurse at [phone].     Preparing for Your Procedure During the COVID-19 Pandemic    Thank you for choosing [facility] for your health care needs. This is a very challenging  time for everyone. The World Health Organization and the [facility] have declared the  COVID-19 virus a pandemic.    Our goal is to keep you and our team here at [facility] safe and healthy. We ve taken  several steps to make this happen. For example:    We screen our staff and care teams for COVID-19.     Everyone at [facility] must wear a mask.    We are limiting hospital and clinic visitors.    Before your surgery or procedure  All patients must get tested for the COVID-19 virus before any surgery or procedure.     After the test, please stay at home and stay out of contact with other people. It will be harder for you  to recover if you get COVID-19 before your surgery.    So, please follow all current safety guidelines, including:    Limit trips outside your home.    Limit the number of people you see.    Always wear a mask outside your home.    Use social distancing. (Stay 6 feet away from others whenever you can.)    Wash your hands often.    If your test shows you have COVID-19  If your test is positive, we ll let you know. A positive test means that you have the virus.  It s likely that we ll have to postpone your surgery or procedure. Your doctor will discuss this with you.  After that, we ll let you know what to do and when you can reschedule.  We may have to cancel your surgery or procedure on short notice for other reasons, too.    If your test shows you DON T have COVID-19  Even if your test is negative, you may still get COVID-19. It s rare but, sometimes, the test result  is wrong. You could also catch the virus after taking the test.  There s a very small chance that you could catch COVID-19 in the hospital or surgery center.  [facility] has taken many steps to prevent this from happening.    Day of your surgery or procedure    Please come wearing a mask or other face covering.    When you arrive, we ll ask you some questions to find out if you have any signs or symptoms of COVID-19.    One consistent visitor is allowed for adult inpatients, outpatients, and Emergency Department patients.       Adult surgical patients can have one visitor, only before surgery.      Two consistent visitors are allowed for pediatric patients in all areas (this guidance is now extended to outpatients).      We ll share your after-care instructions with you and anyone else you name.    Please call your care team, hospital or surgery center if you have any questions. We thank you for your understanding and for choosing [facility] for your care.      If you or your  are deaf or hard of hearing, or prefer a language other than English, please let us know.  We have many free services, including interpreters and other aids to help you communicate. You may ask for help  through any member of your care team or by calling Language Services at [phone], option 2.
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
## 2023-02-21 DIAGNOSIS — M10.00 IDIOPATHIC GOUT, UNSPECIFIED CHRONICITY, UNSPECIFIED SITE: ICD-10-CM

## 2023-02-23 RX ORDER — ALLOPURINOL 100 MG/1
TABLET ORAL
Qty: 90 TABLET | Refills: 0 | Status: SHIPPED | OUTPATIENT
Start: 2023-02-23 | End: 2023-07-12

## 2023-02-25 DIAGNOSIS — M10.00 IDIOPATHIC GOUT, UNSPECIFIED CHRONICITY, UNSPECIFIED SITE: ICD-10-CM

## 2023-02-28 RX ORDER — ALLOPURINOL 100 MG/1
TABLET ORAL
Qty: 90 TABLET | Refills: 0 | OUTPATIENT
Start: 2023-02-28

## 2023-03-06 LAB
ABO/RH(D): NORMAL
ANTIBODY SCREEN: NEGATIVE
SPECIMEN EXPIRATION DATE: NORMAL

## 2023-03-07 ENCOUNTER — LAB (OUTPATIENT)
Dept: LAB | Facility: CLINIC | Age: 78
End: 2023-03-07
Payer: MEDICARE

## 2023-03-07 DIAGNOSIS — Z79.01 LONG TERM CURRENT USE OF ANTICOAGULANT THERAPY: ICD-10-CM

## 2023-03-07 PROCEDURE — 36415 COLL VENOUS BLD VENIPUNCTURE: CPT

## 2023-03-07 PROCEDURE — 86900 BLOOD TYPING SEROLOGIC ABO: CPT

## 2023-03-07 PROCEDURE — 86901 BLOOD TYPING SEROLOGIC RH(D): CPT

## 2023-03-07 PROCEDURE — 86850 RBC ANTIBODY SCREEN: CPT

## 2023-03-08 ENCOUNTER — PREP FOR PROCEDURE (OUTPATIENT)
Dept: SURGERY | Facility: CLINIC | Age: 78
End: 2023-03-08
Payer: MEDICARE

## 2023-03-08 NOTE — PROGRESS NOTES
10:01 AM    Hernia repair was rescheduled to 3/9/23.    Lo Worley RN, BSN, PHN  [facility]   [phone]

## 2023-03-08 NOTE — PROGRESS NOTES
Called patient and verified he is ok with Dr. Bergeron performing open LIH tomorrow. Patient states this is ok with him. Case mod placed.

## 2023-03-09 ENCOUNTER — ANESTHESIA (OUTPATIENT)
Dept: SURGERY | Facility: CLINIC | Age: 78
End: 2023-03-09
Payer: MEDICARE

## 2023-03-09 ENCOUNTER — TELEPHONE (OUTPATIENT)
Dept: ANTICOAGULATION | Facility: CLINIC | Age: 78
End: 2023-03-09

## 2023-03-09 ENCOUNTER — ANCILLARY PROCEDURE (OUTPATIENT)
Dept: CARDIOLOGY | Facility: CLINIC | Age: 78
End: 2023-03-09
Attending: INTERNAL MEDICINE
Payer: MEDICARE

## 2023-03-09 ENCOUNTER — HOSPITAL ENCOUNTER (OUTPATIENT)
Facility: CLINIC | Age: 78
Discharge: HOME | End: 2023-03-09
Attending: SURGERY | Admitting: SURGERY
Payer: MEDICARE

## 2023-03-09 VITALS
DIASTOLIC BLOOD PRESSURE: 71 MMHG | HEART RATE: 57 BPM | WEIGHT: 168.21 LBS | SYSTOLIC BLOOD PRESSURE: 119 MMHG | BODY MASS INDEX: 24.08 KG/M2 | HEIGHT: 70 IN | OXYGEN SATURATION: 98 % | TEMPERATURE: 97.7 F | RESPIRATION RATE: 18 BRPM

## 2023-03-09 DIAGNOSIS — I42.9 CARDIOMYOPATHY (H): Primary | ICD-10-CM

## 2023-03-09 DIAGNOSIS — I42.9 CARDIOMYOPATHY (H): ICD-10-CM

## 2023-03-09 DIAGNOSIS — Z79.01 ANTICOAGULATED: ICD-10-CM

## 2023-03-09 DIAGNOSIS — I48.0 PAROXYSMAL ATRIAL FIBRILLATION (H): ICD-10-CM

## 2023-03-09 DIAGNOSIS — Z79.01 ANTICOAGULATED ON COUMADIN: ICD-10-CM

## 2023-03-09 DIAGNOSIS — Z45.02 ENCOUNTER FOR ADJUSTMENT AND MANAGEMENT OF AUTOMATIC IMPLANTABLE CARDIAC DEFIBRILLATOR: ICD-10-CM

## 2023-03-09 DIAGNOSIS — Z79.01 LONG TERM CURRENT USE OF ANTICOAGULANT THERAPY: ICD-10-CM

## 2023-03-09 DIAGNOSIS — Z95.2 S/P AORTIC VALVE REPLACEMENT: Primary | ICD-10-CM

## 2023-03-09 DIAGNOSIS — K40.90 NON-RECURRENT UNILATERAL INGUINAL HERNIA WITHOUT OBSTRUCTION OR GANGRENE: Primary | ICD-10-CM

## 2023-03-09 LAB
HOLD SPECIMEN: NORMAL
INR PPP: 1.23 (ref 0.85–1.15)
MDC_IDC_LEAD_IMPLANT_DT: NORMAL
MDC_IDC_LEAD_LOCATION: NORMAL
MDC_IDC_LEAD_LOCATION_DETAIL_1: NORMAL
MDC_IDC_LEAD_MFG: NORMAL
MDC_IDC_LEAD_MODEL: NORMAL
MDC_IDC_LEAD_POLARITY_TYPE: NORMAL
MDC_IDC_LEAD_SERIAL: NORMAL
MDC_IDC_LEAD_SPECIAL_FUNCTION: NORMAL
MDC_IDC_MSMT_BATTERY_DTM: NORMAL
MDC_IDC_MSMT_BATTERY_REMAINING_LONGEVITY: 30 MO
MDC_IDC_MSMT_BATTERY_RRT_TRIGGER: NORMAL
MDC_IDC_MSMT_BATTERY_VOLTAGE: 2.94 V
MDC_IDC_MSMT_CAP_CHARGE_DTM: NORMAL
MDC_IDC_MSMT_CAP_CHARGE_ENERGY: 18 J
MDC_IDC_MSMT_CAP_CHARGE_TIME: 4.1 S
MDC_IDC_MSMT_CAP_CHARGE_TYPE: NORMAL
MDC_IDC_MSMT_LEADCHNL_LV_IMPEDANCE_VALUE: 323 OHM
MDC_IDC_MSMT_LEADCHNL_LV_IMPEDANCE_VALUE: 361 OHM
MDC_IDC_MSMT_LEADCHNL_LV_IMPEDANCE_VALUE: 646 OHM
MDC_IDC_MSMT_LEADCHNL_LV_PACING_THRESHOLD_AMPLITUDE: 2.25 V
MDC_IDC_MSMT_LEADCHNL_LV_PACING_THRESHOLD_PULSEWIDTH: 1 MS
MDC_IDC_MSMT_LEADCHNL_RA_IMPEDANCE_VALUE: 456 OHM
MDC_IDC_MSMT_LEADCHNL_RA_PACING_THRESHOLD_AMPLITUDE: 0.75 V
MDC_IDC_MSMT_LEADCHNL_RA_PACING_THRESHOLD_PULSEWIDTH: 0.4 MS
MDC_IDC_MSMT_LEADCHNL_RA_SENSING_INTR_AMPL: 1.6 MV
MDC_IDC_MSMT_LEADCHNL_RV_IMPEDANCE_VALUE: 380 OHM
MDC_IDC_MSMT_LEADCHNL_RV_IMPEDANCE_VALUE: 513 OHM
MDC_IDC_MSMT_LEADCHNL_RV_PACING_THRESHOLD_AMPLITUDE: 0.5 V
MDC_IDC_MSMT_LEADCHNL_RV_PACING_THRESHOLD_PULSEWIDTH: 0.4 MS
MDC_IDC_MSMT_LEADCHNL_RV_SENSING_INTR_AMPL: 17.6 MV
MDC_IDC_PG_IMPLANT_DTM: NORMAL
MDC_IDC_PG_MFG: NORMAL
MDC_IDC_PG_MODEL: NORMAL
MDC_IDC_PG_SERIAL: NORMAL
MDC_IDC_PG_TYPE: NORMAL
MDC_IDC_SESS_CLINIC_NAME: NORMAL
MDC_IDC_SESS_DTM: NORMAL
MDC_IDC_SESS_TYPE: NORMAL
MDC_IDC_SET_BRADY_AT_MODE_SWITCH_RATE: 171 {BEATS}/MIN
MDC_IDC_SET_BRADY_LOWRATE: 60 {BEATS}/MIN
MDC_IDC_SET_BRADY_MAX_SENSOR_RATE: 120 {BEATS}/MIN
MDC_IDC_SET_BRADY_MAX_TRACKING_RATE: 130 {BEATS}/MIN
MDC_IDC_SET_BRADY_MODE: NORMAL
MDC_IDC_SET_BRADY_NIGHT_RATE: 50 {BEATS}/MIN
MDC_IDC_SET_BRADY_PAV_DELAY_LOW: 170 MS
MDC_IDC_SET_BRADY_SAV_DELAY_LOW: 100 MS
MDC_IDC_SET_CRT_LVRV_DELAY: 0 MS
MDC_IDC_SET_CRT_PACED_CHAMBERS: NORMAL
MDC_IDC_SET_LEADCHNL_LV_PACING_AMPLITUDE: 3 V
MDC_IDC_SET_LEADCHNL_LV_PACING_ANODE_ELECTRODE_1: NORMAL
MDC_IDC_SET_LEADCHNL_LV_PACING_ANODE_LOCATION_1: NORMAL
MDC_IDC_SET_LEADCHNL_LV_PACING_CAPTURE_MODE: NORMAL
MDC_IDC_SET_LEADCHNL_LV_PACING_CATHODE_ELECTRODE_1: NORMAL
MDC_IDC_SET_LEADCHNL_LV_PACING_CATHODE_LOCATION_1: NORMAL
MDC_IDC_SET_LEADCHNL_LV_PACING_POLARITY: NORMAL
MDC_IDC_SET_LEADCHNL_LV_PACING_PULSEWIDTH: 1 MS
MDC_IDC_SET_LEADCHNL_RA_PACING_AMPLITUDE: 1.5 V
MDC_IDC_SET_LEADCHNL_RA_PACING_ANODE_ELECTRODE_1: NORMAL
MDC_IDC_SET_LEADCHNL_RA_PACING_ANODE_LOCATION_1: NORMAL
MDC_IDC_SET_LEADCHNL_RA_PACING_CAPTURE_MODE: NORMAL
MDC_IDC_SET_LEADCHNL_RA_PACING_CATHODE_ELECTRODE_1: NORMAL
MDC_IDC_SET_LEADCHNL_RA_PACING_CATHODE_LOCATION_1: NORMAL
MDC_IDC_SET_LEADCHNL_RA_PACING_POLARITY: NORMAL
MDC_IDC_SET_LEADCHNL_RA_PACING_PULSEWIDTH: 0.4 MS
MDC_IDC_SET_LEADCHNL_RA_SENSING_ANODE_ELECTRODE_1: NORMAL
MDC_IDC_SET_LEADCHNL_RA_SENSING_ANODE_LOCATION_1: NORMAL
MDC_IDC_SET_LEADCHNL_RA_SENSING_CATHODE_ELECTRODE_1: NORMAL
MDC_IDC_SET_LEADCHNL_RA_SENSING_CATHODE_LOCATION_1: NORMAL
MDC_IDC_SET_LEADCHNL_RA_SENSING_POLARITY: NORMAL
MDC_IDC_SET_LEADCHNL_RA_SENSING_SENSITIVITY: 0.3 MV
MDC_IDC_SET_LEADCHNL_RV_PACING_AMPLITUDE: 2 V
MDC_IDC_SET_LEADCHNL_RV_PACING_ANODE_ELECTRODE_1: NORMAL
MDC_IDC_SET_LEADCHNL_RV_PACING_ANODE_LOCATION_1: NORMAL
MDC_IDC_SET_LEADCHNL_RV_PACING_CAPTURE_MODE: NORMAL
MDC_IDC_SET_LEADCHNL_RV_PACING_CATHODE_ELECTRODE_1: NORMAL
MDC_IDC_SET_LEADCHNL_RV_PACING_CATHODE_LOCATION_1: NORMAL
MDC_IDC_SET_LEADCHNL_RV_PACING_POLARITY: NORMAL
MDC_IDC_SET_LEADCHNL_RV_PACING_PULSEWIDTH: 0.4 MS
MDC_IDC_SET_LEADCHNL_RV_SENSING_ANODE_ELECTRODE_1: NORMAL
MDC_IDC_SET_LEADCHNL_RV_SENSING_ANODE_LOCATION_1: NORMAL
MDC_IDC_SET_LEADCHNL_RV_SENSING_CATHODE_ELECTRODE_1: NORMAL
MDC_IDC_SET_LEADCHNL_RV_SENSING_CATHODE_LOCATION_1: NORMAL
MDC_IDC_SET_LEADCHNL_RV_SENSING_POLARITY: NORMAL
MDC_IDC_SET_LEADCHNL_RV_SENSING_SENSITIVITY: 0.3 MV
MDC_IDC_SET_ZONE_DETECTION_BEATS_DENOMINATOR: 40 {BEATS}
MDC_IDC_SET_ZONE_DETECTION_BEATS_NUMERATOR: 30 {BEATS}
MDC_IDC_SET_ZONE_DETECTION_INTERVAL: 320 MS
MDC_IDC_SET_ZONE_DETECTION_INTERVAL: 350 MS
MDC_IDC_SET_ZONE_DETECTION_INTERVAL: 360 MS
MDC_IDC_SET_ZONE_DETECTION_INTERVAL: 420 MS
MDC_IDC_SET_ZONE_TYPE: NORMAL
MDC_IDC_STAT_AT_BURDEN_PERCENT: 0 %
MDC_IDC_STAT_AT_DTM_END: NORMAL
MDC_IDC_STAT_AT_DTM_START: NORMAL
MDC_IDC_STAT_BRADY_AP_VP_PERCENT: 36.06 %
MDC_IDC_STAT_BRADY_AP_VS_PERCENT: 0.17 %
MDC_IDC_STAT_BRADY_AS_VP_PERCENT: 59.63 %
MDC_IDC_STAT_BRADY_AS_VS_PERCENT: 4.15 %
MDC_IDC_STAT_BRADY_DTM_END: NORMAL
MDC_IDC_STAT_BRADY_DTM_START: NORMAL
MDC_IDC_STAT_BRADY_RA_PERCENT_PACED: 39.21 %
MDC_IDC_STAT_BRADY_RV_PERCENT_PACED: 95.69 %
MDC_IDC_STAT_CRT_DTM_END: NORMAL
MDC_IDC_STAT_CRT_DTM_START: NORMAL
MDC_IDC_STAT_CRT_LV_PERCENT_PACED: 95.66 %
MDC_IDC_STAT_CRT_PERCENT_PACED: 95.66 %
MDC_IDC_STAT_EPISODE_RECENT_COUNT: 0
MDC_IDC_STAT_EPISODE_RECENT_COUNT_DTM_END: NORMAL
MDC_IDC_STAT_EPISODE_RECENT_COUNT_DTM_START: NORMAL
MDC_IDC_STAT_EPISODE_TOTAL_COUNT: 0
MDC_IDC_STAT_EPISODE_TOTAL_COUNT: 1
MDC_IDC_STAT_EPISODE_TOTAL_COUNT_DTM_END: NORMAL
MDC_IDC_STAT_EPISODE_TOTAL_COUNT_DTM_START: NORMAL
MDC_IDC_STAT_EPISODE_TYPE: NORMAL
MDC_IDC_STAT_TACHYTHERAPY_ATP_DELIVERED_RECENT: 0
MDC_IDC_STAT_TACHYTHERAPY_ATP_DELIVERED_TOTAL: 0
MDC_IDC_STAT_TACHYTHERAPY_RECENT_DTM_END: NORMAL
MDC_IDC_STAT_TACHYTHERAPY_RECENT_DTM_START: NORMAL
MDC_IDC_STAT_TACHYTHERAPY_SHOCKS_ABORTED_RECENT: 0
MDC_IDC_STAT_TACHYTHERAPY_SHOCKS_ABORTED_TOTAL: 0
MDC_IDC_STAT_TACHYTHERAPY_SHOCKS_DELIVERED_RECENT: 0
MDC_IDC_STAT_TACHYTHERAPY_SHOCKS_DELIVERED_TOTAL: 0
MDC_IDC_STAT_TACHYTHERAPY_TOTAL_DTM_END: NORMAL
MDC_IDC_STAT_TACHYTHERAPY_TOTAL_DTM_START: NORMAL

## 2023-03-09 PROCEDURE — 250N000013 HC RX MED GY IP 250 OP 250 PS 637: Performed by: STUDENT IN AN ORGANIZED HEALTH CARE EDUCATION/TRAINING PROGRAM

## 2023-03-09 PROCEDURE — 36415 COLL VENOUS BLD VENIPUNCTURE: CPT | Performed by: NURSE PRACTITIONER

## 2023-03-09 PROCEDURE — 250N000009 HC RX 250: Performed by: ANESTHESIOLOGY

## 2023-03-09 PROCEDURE — 93287 PERI-PX DEVICE EVAL & PRGR: CPT | Mod: 26 | Performed by: INTERNAL MEDICINE

## 2023-03-09 PROCEDURE — 250N000011 HC RX IP 250 OP 636: Performed by: ANESTHESIOLOGY

## 2023-03-09 PROCEDURE — 49505 PRP I/HERN INIT REDUC >5 YR: CPT | Mod: GC | Performed by: STUDENT IN AN ORGANIZED HEALTH CARE EDUCATION/TRAINING PROGRAM

## 2023-03-09 PROCEDURE — 710N000012 HC RECOVERY PHASE 2, PER MINUTE: Performed by: SURGERY

## 2023-03-09 PROCEDURE — 88302 TISSUE EXAM BY PATHOLOGIST: CPT | Mod: TC | Performed by: SURGERY

## 2023-03-09 PROCEDURE — 360N000075 HC SURGERY LEVEL 2, PER MIN: Performed by: SURGERY

## 2023-03-09 PROCEDURE — 272N000001 HC OR GENERAL SUPPLY STERILE: Performed by: SURGERY

## 2023-03-09 PROCEDURE — 258N000003 HC RX IP 258 OP 636: Performed by: ANESTHESIOLOGY

## 2023-03-09 PROCEDURE — 250N000011 HC RX IP 250 OP 636: Performed by: SURGERY

## 2023-03-09 PROCEDURE — 370N000017 HC ANESTHESIA TECHNICAL FEE, PER MIN: Performed by: SURGERY

## 2023-03-09 PROCEDURE — 88302 TISSUE EXAM BY PATHOLOGIST: CPT | Mod: 26 | Performed by: STUDENT IN AN ORGANIZED HEALTH CARE EDUCATION/TRAINING PROGRAM

## 2023-03-09 PROCEDURE — 85610 PROTHROMBIN TIME: CPT | Performed by: NURSE PRACTITIONER

## 2023-03-09 PROCEDURE — 93287 PERI-PX DEVICE EVAL & PRGR: CPT

## 2023-03-09 PROCEDURE — 999N000141 HC STATISTIC PRE-PROCEDURE NURSING ASSESSMENT: Performed by: SURGERY

## 2023-03-09 PROCEDURE — C1781 MESH (IMPLANTABLE): HCPCS | Performed by: SURGERY

## 2023-03-09 PROCEDURE — 250N000009 HC RX 250: Performed by: SURGERY

## 2023-03-09 DEVICE — POLYPROPYLENE NON-ABSORBABLE SYNTHETIC SURGICAL MESH
Type: IMPLANTABLE DEVICE | Site: GROIN | Status: FUNCTIONAL
Brand: PROLENE

## 2023-03-09 RX ORDER — KETOROLAC TROMETHAMINE 30 MG/ML
15 INJECTION, SOLUTION INTRAMUSCULAR; INTRAVENOUS
Status: COMPLETED | OUTPATIENT
Start: 2023-03-09 | End: 2023-03-09

## 2023-03-09 RX ORDER — SODIUM CHLORIDE, SODIUM LACTATE, POTASSIUM CHLORIDE, CALCIUM CHLORIDE 600; 310; 30; 20 MG/100ML; MG/100ML; MG/100ML; MG/100ML
INJECTION, SOLUTION INTRAVENOUS CONTINUOUS
Status: DISCONTINUED | OUTPATIENT
Start: 2023-03-09 | End: 2023-03-09 | Stop reason: HOSPADM

## 2023-03-09 RX ORDER — PROPOFOL 10 MG/ML
INJECTION, EMULSION INTRAVENOUS PRN
Status: DISCONTINUED | OUTPATIENT
Start: 2023-03-09 | End: 2023-03-09

## 2023-03-09 RX ORDER — HYDROMORPHONE HCL IN WATER/PF 6 MG/30 ML
0.2 PATIENT CONTROLLED ANALGESIA SYRINGE INTRAVENOUS EVERY 5 MIN PRN
Status: DISCONTINUED | OUTPATIENT
Start: 2023-03-09 | End: 2023-03-09 | Stop reason: HOSPADM

## 2023-03-09 RX ORDER — LIDOCAINE 40 MG/G
CREAM TOPICAL
Status: DISCONTINUED | OUTPATIENT
Start: 2023-03-09 | End: 2023-03-09 | Stop reason: HOSPADM

## 2023-03-09 RX ORDER — ONDANSETRON 2 MG/ML
4 INJECTION INTRAMUSCULAR; INTRAVENOUS EVERY 30 MIN PRN
Status: DISCONTINUED | OUTPATIENT
Start: 2023-03-09 | End: 2023-03-09 | Stop reason: HOSPADM

## 2023-03-09 RX ORDER — LIDOCAINE HYDROCHLORIDE 20 MG/ML
INJECTION, SOLUTION INFILTRATION; PERINEURAL PRN
Status: DISCONTINUED | OUTPATIENT
Start: 2023-03-09 | End: 2023-03-09

## 2023-03-09 RX ORDER — FENTANYL CITRATE 50 UG/ML
25 INJECTION, SOLUTION INTRAMUSCULAR; INTRAVENOUS EVERY 5 MIN PRN
Status: DISCONTINUED | OUTPATIENT
Start: 2023-03-09 | End: 2023-03-09 | Stop reason: HOSPADM

## 2023-03-09 RX ORDER — FENTANYL CITRATE 50 UG/ML
50 INJECTION, SOLUTION INTRAMUSCULAR; INTRAVENOUS EVERY 5 MIN PRN
Status: DISCONTINUED | OUTPATIENT
Start: 2023-03-09 | End: 2023-03-09 | Stop reason: HOSPADM

## 2023-03-09 RX ORDER — SODIUM CHLORIDE, SODIUM LACTATE, POTASSIUM CHLORIDE, CALCIUM CHLORIDE 600; 310; 30; 20 MG/100ML; MG/100ML; MG/100ML; MG/100ML
INJECTION, SOLUTION INTRAVENOUS CONTINUOUS PRN
Status: DISCONTINUED | OUTPATIENT
Start: 2023-03-09 | End: 2023-03-09

## 2023-03-09 RX ORDER — LABETALOL HYDROCHLORIDE 5 MG/ML
10 INJECTION, SOLUTION INTRAVENOUS
Status: DISCONTINUED | OUTPATIENT
Start: 2023-03-09 | End: 2023-03-09 | Stop reason: HOSPADM

## 2023-03-09 RX ORDER — HYDRALAZINE HYDROCHLORIDE 20 MG/ML
2.5-5 INJECTION INTRAMUSCULAR; INTRAVENOUS EVERY 10 MIN PRN
Status: DISCONTINUED | OUTPATIENT
Start: 2023-03-09 | End: 2023-03-09 | Stop reason: HOSPADM

## 2023-03-09 RX ORDER — ACETAMINOPHEN 325 MG/1
650 TABLET ORAL
Status: DISCONTINUED | OUTPATIENT
Start: 2023-03-09 | End: 2023-03-09 | Stop reason: HOSPADM

## 2023-03-09 RX ORDER — ONDANSETRON 4 MG/1
4 TABLET, ORALLY DISINTEGRATING ORAL EVERY 30 MIN PRN
Status: DISCONTINUED | OUTPATIENT
Start: 2023-03-09 | End: 2023-03-09 | Stop reason: HOSPADM

## 2023-03-09 RX ORDER — DEXAMETHASONE SODIUM PHOSPHATE 4 MG/ML
INJECTION, SOLUTION INTRA-ARTICULAR; INTRALESIONAL; INTRAMUSCULAR; INTRAVENOUS; SOFT TISSUE PRN
Status: DISCONTINUED | OUTPATIENT
Start: 2023-03-09 | End: 2023-03-09

## 2023-03-09 RX ORDER — FENTANYL CITRATE 50 UG/ML
INJECTION, SOLUTION INTRAMUSCULAR; INTRAVENOUS PRN
Status: DISCONTINUED | OUTPATIENT
Start: 2023-03-09 | End: 2023-03-09

## 2023-03-09 RX ORDER — OXYCODONE HYDROCHLORIDE 5 MG/1
5-10 TABLET ORAL EVERY 4 HOURS PRN
Qty: 10 TABLET | Refills: 0 | Status: CANCELLED | OUTPATIENT
Start: 2023-03-09

## 2023-03-09 RX ORDER — CEFAZOLIN SODIUM/WATER 2 G/20 ML
2 SYRINGE (ML) INTRAVENOUS SEE ADMIN INSTRUCTIONS
Status: DISCONTINUED | OUTPATIENT
Start: 2023-03-09 | End: 2023-03-09 | Stop reason: HOSPADM

## 2023-03-09 RX ORDER — OXYCODONE HYDROCHLORIDE 5 MG/1
5 TABLET ORAL EVERY 6 HOURS PRN
Qty: 10 TABLET | Refills: 0 | Status: SHIPPED | OUTPATIENT
Start: 2023-03-09 | End: 2023-03-12

## 2023-03-09 RX ORDER — HYDROMORPHONE HCL IN WATER/PF 6 MG/30 ML
0.4 PATIENT CONTROLLED ANALGESIA SYRINGE INTRAVENOUS EVERY 5 MIN PRN
Status: DISCONTINUED | OUTPATIENT
Start: 2023-03-09 | End: 2023-03-09 | Stop reason: HOSPADM

## 2023-03-09 RX ORDER — ONDANSETRON 2 MG/ML
INJECTION INTRAMUSCULAR; INTRAVENOUS PRN
Status: DISCONTINUED | OUTPATIENT
Start: 2023-03-09 | End: 2023-03-09

## 2023-03-09 RX ORDER — ACETAMINOPHEN 325 MG/1
650 TABLET ORAL EVERY 4 HOURS PRN
Qty: 50 TABLET | Refills: 0 | Status: SHIPPED | OUTPATIENT
Start: 2023-03-09

## 2023-03-09 RX ORDER — OXYCODONE HYDROCHLORIDE 5 MG/1
5 TABLET ORAL
Status: COMPLETED | OUTPATIENT
Start: 2023-03-09 | End: 2023-03-09

## 2023-03-09 RX ORDER — PROPOFOL 10 MG/ML
INJECTION, EMULSION INTRAVENOUS CONTINUOUS PRN
Status: DISCONTINUED | OUTPATIENT
Start: 2023-03-09 | End: 2023-03-09

## 2023-03-09 RX ORDER — CEFAZOLIN SODIUM/WATER 2 G/20 ML
2 SYRINGE (ML) INTRAVENOUS
Status: DISCONTINUED | OUTPATIENT
Start: 2023-03-09 | End: 2023-03-09 | Stop reason: HOSPADM

## 2023-03-09 RX ADMIN — PROPOFOL 20 MG: 10 INJECTION, EMULSION INTRAVENOUS at 11:27

## 2023-03-09 RX ADMIN — PROPOFOL 30 MG: 10 INJECTION, EMULSION INTRAVENOUS at 11:05

## 2023-03-09 RX ADMIN — SODIUM CHLORIDE, POTASSIUM CHLORIDE, SODIUM LACTATE AND CALCIUM CHLORIDE: 600; 310; 30; 20 INJECTION, SOLUTION INTRAVENOUS at 11:04

## 2023-03-09 RX ADMIN — KETOROLAC TROMETHAMINE 15 MG: 30 INJECTION, SOLUTION INTRAMUSCULAR; INTRAVENOUS at 13:41

## 2023-03-09 RX ADMIN — PROPOFOL 20 MG: 10 INJECTION, EMULSION INTRAVENOUS at 11:12

## 2023-03-09 RX ADMIN — ONDANSETRON 4 MG: 2 INJECTION INTRAMUSCULAR; INTRAVENOUS at 12:47

## 2023-03-09 RX ADMIN — DEXAMETHASONE SODIUM PHOSPHATE 4 MG: 4 INJECTION, SOLUTION INTRA-ARTICULAR; INTRALESIONAL; INTRAMUSCULAR; INTRAVENOUS; SOFT TISSUE at 11:13

## 2023-03-09 RX ADMIN — OXYCODONE HYDROCHLORIDE 5 MG: 5 TABLET ORAL at 13:44

## 2023-03-09 RX ADMIN — LIDOCAINE HYDROCHLORIDE 40 MG: 20 INJECTION, SOLUTION INFILTRATION; PERINEURAL at 11:05

## 2023-03-09 RX ADMIN — PROPOFOL 150 MCG/KG/MIN: 10 INJECTION, EMULSION INTRAVENOUS at 11:05

## 2023-03-09 RX ADMIN — Medication 2 G: at 11:10

## 2023-03-09 RX ADMIN — FENTANYL CITRATE 25 MCG: 50 INJECTION, SOLUTION INTRAMUSCULAR; INTRAVENOUS at 11:27

## 2023-03-09 RX ADMIN — SODIUM CHLORIDE, POTASSIUM CHLORIDE, SODIUM LACTATE AND CALCIUM CHLORIDE: 600; 310; 30; 20 INJECTION, SOLUTION INTRAVENOUS at 13:44

## 2023-03-09 ASSESSMENT — ENCOUNTER SYMPTOMS
SEIZURES: 1
DYSRHYTHMIAS: 1

## 2023-03-09 ASSESSMENT — ACTIVITIES OF DAILY LIVING (ADL)
ADLS_ACUITY_SCORE: 37

## 2023-03-09 ASSESSMENT — LIFESTYLE VARIABLES: TOBACCO_USE: 1

## 2023-03-09 NOTE — ANESTHESIA PREPROCEDURE EVALUATION
Anesthesia Pre-Procedure Evaluation    Patient: Brooks Summers   MRN: [id number] : 1945        Procedure : Procedure(s):  HERNIORRHAPHY, Left INGUINAL, OPEN          Past Medical History:   Diagnosis Date     Anticoagulated on Coumadin 2021     BCC (basal cell carcinoma), face 2013     Bicuspid aortic valve      Chronic systolic heart failure (H)      Endocarditis of prosthetic valve (H) 2013    Cultures negative, 16S rRNA PCR showed Staph capitis (a CoNS). Tx with Dapto/RIFx 8 weeks.     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    RE>>LE     Mild cognitive impairment 2022     Paroxysmal A-fib (H)     Post-op 2011, 13     Paroxysmal atrial fibrillation (H) 2015     Rotator Cuff (Capsule) Sprain and Strain      S/P aortic valve replacement     2011, re-do 13 due to endocarditis     Seizure (H) 5/10/2020     Smoking hx      Thrombocytopenia (H)       Past Surgical History:   Procedure Laterality Date     ANESTHESIA CARDIOVERSION  2011    Procedure:ANESTHESIA CARDIOVERSION; Cardioversion; Surgeon:GENERIC ANESTHESIA PROVIDER; Location:[facility]     COLONOSCOPY       COLONOSCOPY N/A 2018    Procedure: COLONOSCOPY;  Surgeon: Herman Mcginnis MD;  Location:  GI     CORONARY ANGIOGRAPHY ADULT ORDER       CYSTOSCOPY, BIOPSY URETHRA N/A 4/3/2017    Procedure: CYSTOSCOPY, BIOPSY URETHRA;  Surgeon: Marlena Mora MD;  Location:  OR     ENDOSCOPY UPPER, COLONOSCOPY, COMBINED       EP ICD GENERATOR REPLACEMENT DUAL N/A 3/10/2021    Procedure: EP ICD GENERATOR CHANGE DUAL;  Surgeon: Mekhi Be MD;  Location:  HEART CARDIAC CATH LAB     HC REMOV & REPLACE IMPLT DEFIB PULSE GEN MULT LEAD  12/3/2015          HEMORRHOID SURGERY       IMPLANT AUTOMATIC IMPLANTABLE CARDIOVERTER DEFIBRILLATOR       MOHS MICROGRAPHIC PROCEDURE       ORTHOPEDIC SURGERY       shoulder,right     REDO STERNOTOMY REPLACE VALVE AORTIC  2013    Procedure: REDO STERNOTOMY REPLACE VALVE AORTIC;  Redo Sternotomy, Redo Aortic Valve Replacement on pump oxygenator. Intraoperative Transesophageal Echocardiogram;  Surgeon: Stephanie Hampton MD;  Location:  OR     REPAIR VALVE MITRAL  2013     REPLACE VALVE AORTIC  2011    Procedure:REPLACE VALVE AORTIC; Median Sternotomy, Bioprosthesis,  Aortic Valve replacement on pump with oxygenator. Intra-operative Transesophogeal Echocardiogram.; Surgeon:STEPHANIE HAMPTON; Location: OR     teeth extractions       TRANSPLANT        Allergies   Allergen Reactions     Blood Transfusion Related (Informational Only) Unknown     Patient has a history of a clinically significant antibody against RBC antigens.  A delay in compatible RBCs may occur.      Lisinopril Cough      Social History     Tobacco Use     Smoking status: Former     Packs/day: 1.00     Years: 20.00     Pack years: 20.00     Types: Cigarettes     Quit date: 1989     Years since quittin.2     Smokeless tobacco: Never   Substance Use Topics     Alcohol use: Yes     Comment: rare      Wt Readings from Last 1 Encounters:   23 76.3 kg (168 lb 3.4 oz)        Anesthesia Evaluation            ROS/MED HX  ENT/Pulmonary:     (+) tobacco use, Past use,     Neurologic:     (+) seizures,     Cardiovascular: Comment:   S/P aortic valve replacement    2011, re-do 13 due to endocarditis      22  Interpretation Summary:    Left ventricular size is normal.  The visual ejection fraction is 45-50%.  Right ventricular function, chamber size, wall motion, and thickness are  normal.  Status post 23 mm Bogdan Johnson [facility] bioprosthetic aortic  valve replacement in . Mean AV gradient normal at 11mmHg. Trace AI.  Ascending aorta 4.4 cm.  The inferior vena cava is normal.  No pericardial effusion is present.  Aortic size and valve function unchanged. Mildly decreased LV  function when  compared to previous study. The inferior wallmotion abnormality is not new.    (+) -----dysrhythmias, a-fib, valvular problems/murmurs Bicuspid aortic valve.     METS/Exercise Tolerance:     Hematologic: Comments: Lab Test        02/16/23 02/13/23 02/06/23 01/30/23 01/30/23 11/23/22 11/23/22                       1343          1256          0852          1435          0736          1120          0957          WBC          5.3           --           --          4.0           --           --          4.5           HGB          14.1          --           --          13.6          --           --          14.0          MCV          102*          --           --          102*          --           --          99            PLT          72*           --           --          68*           --           --          85*           INR           --          2.1*         2.0*          --          1.4*           < >        3.36*          < > = values in this interval not displayed.                  Lab Test        01/30/23 11/24/22 11/23/22                       1435          0538          0957          NA           143          140          141           POTASSIUM    4.5          3.9          4.1           CHLORIDE     107          105          106           CO2          28           23           25            BUN          11.2         17.4         16.8          CR           0.88         0.93         0.87          ANIONGAP     8            12           10            MARTIN          9.4          8.9          9.3           GLC          98           82           158*                Musculoskeletal: Comment: Non-recurrent unilateral inguinal hernia without obstruction or gangrene      GI/Hepatic:       Renal/Genitourinary:     (+) renal disease,     Endo:  - neg endo ROS     Psychiatric/Substance Use:     (+) psychiatric history anxiety     Infectious Disease:  - neg infectious disease  ROS     Malignancy:  - neg malignancy ROS     Other:            Physical Exam    Airway        Mallampati: II   TM distance: > 3 FB   Neck ROM: full   Mouth opening: > 3 cm    Respiratory Devices and Support         Dental       (+) Minor Abnormalities - some fillings, tiny chips      Cardiovascular   cardiovascular exam normal          Pulmonary   pulmonary exam normal                OUTSIDE LABS:  CBC:   Lab Results   Component Value Date    WBC 5.3 02/16/2023    WBC 4.0 01/30/2023    HGB 14.1 02/16/2023    HGB 13.6 01/30/2023    HCT 43.2 02/16/2023    HCT 42.1 01/30/2023    PLT 72 (L) 02/16/2023    PLT 68 (L) 01/30/2023     BMP:   Lab Results   Component Value Date     01/30/2023     11/24/2022    POTASSIUM 4.5 01/30/2023    POTASSIUM 3.9 11/24/2022    CHLORIDE 107 01/30/2023    CHLORIDE 105 11/24/2022    CO2 28 01/30/2023    CO2 23 11/24/2022    BUN 11.2 01/30/2023    BUN 17.4 11/24/2022    CR 0.88 01/30/2023    CR 0.93 11/24/2022    GLC 98 01/30/2023    GLC 82 11/24/2022     COAGS:   Lab Results   Component Value Date    PTT 42 (H) 11/23/2022    INR 2.1 (H) 02/13/2023    FIBR 555 (H) 11/23/2022     POC:   Lab Results   Component Value Date    BGM 82 02/01/2013     HEPATIC:   Lab Results   Component Value Date    ALBUMIN 4.5 01/30/2023    PROTTOTAL 6.8 01/30/2023    ALT 33 01/30/2023    AST 34 01/30/2023    ALKPHOS 56 01/30/2023    BILITOTAL 1.0 01/30/2023     OTHER:   Lab Results   Component Value Date    PH 7.37 01/26/2013    LACT 0.7 11/24/2022    A1C 5.3 01/26/2013    MARTIN 9.4 01/30/2023    PHOS 2.5 11/24/2022    MAG 2.2 11/24/2022    LIPASE 183 05/10/2020    TSH 1.52 06/24/2021    CRP <2.9 06/24/2021    SED 5 06/24/2021       Anesthesia Plan    ASA Status:  3      Anesthesia Type: MAC.     - Reason for MAC: immobility needed   Induction: Propofol.   Maintenance: TIVA.        Consents    Anesthesia Plan(s) and associated risks, benefits, and realistic alternatives discussed. Questions answered and  patient/representative(s) expressed understanding.    - Discussed:     - Discussed with:  Patient      - Extended Intubation/Ventilatory Support Discussed: No.      - Patient is DNR/DNI Status: No    Use of blood products discussed: Yes.     - Discussed with: Patient.     - Consented: consented to blood products            Reason for refusal: other.     Postoperative Care    Pain management: IV analgesics.   PONV prophylaxis: Ondansetron (or other 5HT-3), Dexamethasone or Solumedrol     Comments:                Melvin Lloyd MD

## 2023-03-09 NOTE — ANESTHESIA CARE TRANSFER NOTE
Patient: Brooks Summers    Procedure: Procedure(s):  HERNIORRHAPHY, Left INGUINAL, OPEN       Diagnosis: Non-recurrent unilateral inguinal hernia without obstruction or gangrene [K40.90]  Diagnosis Additional Information: No value filed.    Anesthesia Type:   MAC     Note:    Oropharynx: oropharynx clear of all foreign objects and spontaneously breathing  Level of Consciousness: awake  Oxygen Supplementation: room air    Independent Airway: airway patency satisfactory and stable  Dentition: dentition unchanged  Vital Signs Stable: post-procedure vital signs reviewed and stable  Report to RN Given: handoff report given  Patient transferred to: Phase II    Handoff Report: Identifed the Patient, Identified the Reponsible Provider, Reviewed the pertinent medical history, Discussed the surgical course, Reviewed Intra-OP anesthesia mangement and issues during anesthesia, Set expectations for post-procedure period and Allowed opportunity for questions and acknowledgement of understanding      Vitals:  Vitals Value Taken Time   /71 03/09/23 1310   Temp     Pulse 58 03/09/23 1310   Resp     SpO2 94 % 03/09/23 1314   Vitals shown include unvalidated device data.    Electronically Signed By: SUKH Velazco CRNA  March 9, 2023  1:15 PM

## 2023-03-09 NOTE — TELEPHONE ENCOUNTER
Pt returned call, understood instructions for warfarin/lovenox. INR appt made for 3/14.     Kiana Moore, ANIAN, RN- [facility] RN

## 2023-03-09 NOTE — TELEPHONE ENCOUNTER
ANTICOAGULATION  MANAGEMENT: Discharge Review    Brooks Summers chart reviewed for anticoagulation continuity of care    Outpatient surgery/procedure on 3/9/23 for hernia repair.    Discharge disposition: Home    Results:    Recent labs: (last 7 days)     03/09/23  0917   INR 1.23*     Anticoagulation inpatient management:     not applicable     Anticoagulation discharge instructions:     Warfarin dosing: home regimen continued   Bridging: bridging with enoxaparin (Lovenox)   INR goal change: No      Medication changes affecting anticoagulation: Possibly- roxicodone, tylenol    Additional factors affecting anticoagulation: No     PLAN     No adjustment to anticoagulation plan needed  Recommend to check INR on Tues 3/14    Left a detailed message for pt    Anticoagulation Calendar updated    Kiana Moore RN    I can be reached for return calls today at [phone] (internal staff only), [facility]. Please do not give this number out to patients or cold transfer. Thank you!

## 2023-03-09 NOTE — OP NOTE
"[facility]    Operative Note    Pre-operative diagnosis: Non-recurrent unilateral inguinal hernia without obstruction or gangrene [K40.90]   Post-operative diagnosis Same   Procedure: Procedure(s):  HERNIORRHAPHY, Left INGUINAL, OPEN   Surgeon: Surgeon(s) and Role:     * Emery Bergeron MD - Primary     * Edna Nugent MD - Fellow - Assisting     * Cinthya Hoover MD - Fellow - Assisting   Anesthesia: MAC with Local    Estimated blood loss: 5 ml   Drains: None   Specimens: ID Type Source Tests Collected by Time Destination   1 : with lipoma and cord Tissue Hernia Sac, Inguinal, Left SURGICAL PATHOLOGY EXAM Emery Bergeron MD 3/9/2023 11:43 AM       Findings: Fat containing indirect and direct inguinal hernias.    Complications: None.   Implants:      Implant Name Type Inv. Item Serial No.  Lot No. LRB No. Used Action   MESH PROLENE 6X6\" [facility] - [id number] Mesh MESH PROLENE 6X6\" [facility]   [employer] [facility]- [id number] Left 1 Implanted      OPERATIVE INDICATIONS:  Brooks Summers is a 77 year old male with left inguinal hernia with recent history of incarceration. After understanding the risks and benefits of proceeding with surgery, the patient has an indication for open left inguinal hernia repair with mesh and consented to undergo surgery. I reviewed the risks of surgery with Brooks Summers. These include, but are not limited to, death, myocardial infarction, pneumonia, urinary tract infection, deep venous thrombosis with or without pulmonary embolus, abdominal infection from bowel injury or abscess, bowel obstruction, wound infection, and bleeding.    OPERATIVE DETAILS:  The patient was brought to the operating room and prepared in a routine fashion.  A timeout was performed prior to surgery and documented by the nursing team.    Under the benefits of monitored anesthesia control, the patient was positioned supine. A field block was produced by " raising skin wheals along the proposed skin incision, along a vertical line lateral to it, and along a horizontal line superior to it. Additional local anesthesia was injected during the procedure under the external oblique aponeurosis, at the internal ring, and as needed.    The skin crease incision was made with a knife ~1 fingerbreadth above and parallel to the inguinal ligament.  The incision was carried down to the aponeurosis of the external oblique, which was cleared bluntly and the external ring was exposed. An incision was made in the midportion of the external oblique aponeurosis in the direction of its fibers. The ilioinguinal nerve was identified and sacrificed with 3-0 silk suture. Flaps of the external oblique were developed superiorly and inferiorly.    The cord was identified. It was gently dissected free at the pubic tubercle and encircled with a Penrose drain. Attention was directed to the anteromedial aspect of the cord, where an indirect hernia sac was identified. The sac was carefully dissected free of the cord down to the level of the internal ring. The vas and testicular vessels were identified and protected from harm. The sac was opened and found to have a piece of scarred incarcerated fat. Redundant sac was excised and submitted to pathology. The stump of the sac was suture ligated with 2-0 silk. It was checked for hemostasis and allowed to retract into the abdomen. The lipoma of the cord was also dissected free and submitted with the specimen.     Attention then turned to the floor of the canal, which appeared to be grossly weakened without a well-defined defect or sac. Prolene mesh was used to fashion a mesh plug and patch to repair the inguinal floor. The mesh plug was secured to the inguinal floor with 2-0 Prolene suture at the ends and interrupted 3-0 Vicryl suture in between. The mesh patch was placed into the correct position starting at the pubic tubercle, running parallel to the  inguinal ligament and encircling the cord at the internal ring. The mesh was secured with interrupted 2-0 prolene at the pubic tubercle, running 2-0 prolene along the shelving edge of the inguinal ligament, and interrupted 2-0 prolene along the conjoined tendon. Interrupted 2-0 prolene was also used to secure the two pieces of mesh together to create a lyla-internal ring.     Hemostasis was again checked. The Penrose drain was removed. The external oblique aponeurosis was closed with a running suture of 3-0 Vicryl. Harjeet's fascia was closed with interrupted 3-0 Vicryl. The skin was closed with a subcuticular stitch of 4-0 Monocryl. Skin glue was applied.    The patient tolerated the procedure well and was taken to the postanesthesia care unit in stable condition.    Dr. Bergeron was present for all critical components of the operation and all needle and sponge counts were correct x2 at the end of the procedure.    Edna Schulz MD  General Surgery PGY-7

## 2023-03-09 NOTE — ANESTHESIA POSTPROCEDURE EVALUATION
Patient: Brooks Summers    Procedure: Procedure(s):  HERNIORRHAPHY, Left INGUINAL, OPEN       Anesthesia Type:  MAC    Note:  Disposition: Outpatient   Postop Pain Control: Uneventful            Sign Out: Well controlled pain   PONV: No   Neuro/Psych: Uneventful            Sign Out: Acceptable/Baseline neuro status   Airway/Respiratory: Uneventful            Sign Out: Acceptable/Baseline resp. status   CV/Hemodynamics: Uneventful            Sign Out: Acceptable CV status; No obvious hypovolemia; No obvious fluid overload   Other NRE: NONE   DID A NON-ROUTINE EVENT OCCUR? No           Last vitals:  Vitals Value Taken Time   /69 03/09/23 1330   Temp 36.5  C (97.7  F) 03/09/23 1302   Pulse 57 03/09/23 1330   Resp 18 03/09/23 1330   SpO2 97 % 03/09/23 1341   Vitals shown include unvalidated device data.    Electronically Signed By: Melvin Lloyd MD  March 9, 2023  1:43 PM

## 2023-03-09 NOTE — OR NURSING
Brooks Summers (MRN [id number]) is in preop 3C scheduled for hernia repair today. Patient does have a pacemaker. Please advise on settings for surgery, thank you  Preop RN Gayle [phone]    Response from Leigh NOLEN:    USE CAUTERY ABOVE UMBILLICUS. Device RN will come to patient bedside.

## 2023-03-09 NOTE — DISCHARGE INSTRUCTIONS
[facility], [place]  Same-Day Surgery   Adult Discharge Orders & Instructions     For 24 hours after surgery    Get plenty of rest.  A responsible adult must stay with you for at least 24 hours after you leave the hospital.   Do not drive or use heavy equipment.  If you have weakness or tingling, don't drive or use heavy equipment until this feeling goes away.  Do not drink alcohol.  Avoid strenuous or risky activities.  Ask for help when climbing stairs.   You may feel lightheaded.  IF so, sit for a few minutes before standing.  Have someone help you get up.   If you have nausea (feel sick to your stomach): Drink only clear liquids such as apple juice, ginger ale, broth or 7-Up.  Rest may also help.  Be sure to drink enough fluids.  Move to a regular diet as you feel able.  You may have a slight fever. Call the doctor if your fever is over 100 F (37.7 C) (taken under the tongue) or lasts longer than 24 hours.  You may have a dry mouth, a sore throat, muscle aches or trouble sleeping.  These should go away after 24 hours.  Do not make important or legal decisions.   Call your doctor for any of the followin.  Signs of infection (fever, growing tenderness at the surgery site, a large amount of drainage or bleeding, severe pain, foul-smelling drainage, redness, swelling).    2. It has been over 8 to 10 hours since surgery and you are still not able to urinate (pass water).    3.  Headache for over 24 hours.    4.  Numbness, tingling or weakness the day after surgery (if you had spinal anesthesia).  To contact a doctor, call Dr. Bergeron at General surgery clinic at [phone] or:    '   [phone] and ask for the resident on call for   ______________________________________________ (answered 24 hours a day)  '   Emergency Department:    [facility]: [phone]       (TTY for hearing impaired: [phone])    [facility]: [phone]       (TTY for hearing impaired:  [phone])

## 2023-03-10 NOTE — OP NOTE
Procedure Date: 03/09/2023    PREOPERATIVE DIAGNOSIS:  Left inguinal hernia, reducible.    POSTOPERATIVE DIAGNOSIS:  Left inguinal hernia, reducible.    PROCEDURE:  Open left inguinal hernia repair with mesh.    STAFF SURGEON:  Emery Bergeron MD    RESIDENT SURGEON:  Edna Nugent MD    ASSISTANT RESIDENT SURGEON:  Cinthya Hoover MD    CLINICAL HISTORY:  Brooks Summers is a 77-year-old man who presents with a left inguinal hernia that has been incarcerated in the past.  The patient currently has a painless hernia that is symptomatic.  The patient understands the risks and benefits of hernia repair including the potential for bleeding, infection, need for second surgery, injury to the nerves and blood vessels of the left groin.  He also understands hernia recurrence is possible and removal of mesh.  The patient also understands the potential for anesthesia risks including stroke, myocardial infarction, pulmonary emboli, and even death. The patient understands these risks and wished to proceed.    DESCRIPTION OF PROCEDURE:  Brooks Summers was taken to the main operating room under monitored anesthesia care.  The patient was prepped and draped in sterile fashion.  A 50:50 mixture of 1% lidocaine and 0.25% Marcaine was instilled in the area of the left groin and the left groin incision was made with a #10 blade, taken down to the level of the subcutaneous tissue with electrocautery.  The fibers of the external oblique were opened exposing the inguinal canal.  The hernia sac was present within the cord structures of the left.  This was identified, dissected free from the cord and the sac was opened.  The contents appeared to be necrotic fat.  This was removed with the sac.  The sac was excised and oversewn to achieve high ligation.  A direct hernia was also noted and because of this we determined to use a plug and patch repair.  A plug of polypropylene mesh was created into a cone using 2-0 Prolene suture.  This plug  was used to close the direct hernia and this was sutured to the shelving edge of the inguinal canal as well as to the conjoined tendon.  The plug was then placed in this closed off the direct hernia.  Overlying this, a patch of polypropylene mesh was sutured to starting at the pubic tubercle, running along the shelving edge of the inguinal ligament and to multiple places on the conjoined tendon.  This patch was then arranged such that the cord structures had adequate room for venous and arterial supply.  The patch was then sutured to itself using 2-0 Prolene suture.  This was in excellent position.  The fibers of the external oblique were then reapproximated with running 3-0 Vicryl suture.  The subcutaneous tissue was approximated at the Harjeet's fascial level with 3-0 Vicryl suture intermittently and then the subcutaneous tissue was irrigated with sterile normal saline, hemostasis achieved with electrocautery and the skin edges were then reapproximated with running subcutaneous, 4-0 Monocryl suture and skin sealant.  The patient tolerated the procedure well.  Needle and sponge counts were correct x 2 and the patient was returned to postanesthesia recovery in good condition.    Emery Bergeron MD        D: 2023   T: 2023   MT: [id number]    Name:     DAR CRUZ  MRN:      [id number]        Account:        [id number]   :      1945           Procedure Date: 2023     Document: [id number]

## 2023-03-11 ENCOUNTER — TELEPHONE (OUTPATIENT)
Dept: INTERNAL MEDICINE | Facility: CLINIC | Age: 78
End: 2023-03-11
Payer: MEDICARE

## 2023-03-11 NOTE — TELEPHONE ENCOUNTER
Medication Question or Refill    Contacts       Type Contact Phone/Fax    03/11/2023 01:26 PM CST Phone (Incoming) Lucero Summers (Emergency Contact) [phone]          What medication are you calling about (include dose and sig)?: enoxaparin ANTICOAGULANT (LOVENOX) 80 MG/0.8ML syringe    Preferred Pharmacy:   [id number] [address]  [facility] & [address] [place] [address]  Phone: [phone] Fax: [phone]      Controlled Substance Agreement on file:   CSA -- Patient Level:    CSA: None found at the patient level.       Who prescribed the medication?: Edin Mays MD    Do you need a refill? Yes    When did you use the medication last? Was taken this morning, Pt spouse states they have one for tonight & that is all    Patient offered an appointment? No - INR scheduled on 3/14/23    Do you have any questions or concerns?  No      Could we send this information to you in [facility] or would you prefer to receive a phone call?:   Patient would prefer a phone call   Okay to leave a detailed message?: Yes at Cell number on file:    Telephone Information:   Mobile [phone]   Mobile Not on file.

## 2023-03-13 ENCOUNTER — PATIENT OUTREACH (OUTPATIENT)
Dept: SURGERY | Facility: CLINIC | Age: 78
End: 2023-03-13
Payer: MEDICARE

## 2023-03-13 NOTE — TELEPHONE ENCOUNTER
Spoke with pt and he did have a refill on his Lovenox so has enough to get him through his INR recheck tomorrow. Will determine if he needs more after that INR. Patient verbalized understanding and agrees with plan of care. Pt had no further questions or concerns at this time. Shira Mirza RN, BSN  [facility] Anticoagulation Team

## 2023-03-13 NOTE — PROGRESS NOTES
RN Post-Op/Post-Discharge Care Coordination Note    Mr. Brooks Summers is a 77 year old male who underwent open inguinal hernia repair on 3/9 with  Dr. Emery Bergeron.  Spoke with Patient.    Support  Patient able to care for self independently     Health Status  Nausea/Vomiting: Patient denies nausea/vomiting.  Eating/drinking: Patient is able to eat and drink without any complaints.  Bowel habits: Patient reports no bowel movement since surgery. Is passing gas. Has taken OTC Ex-Lax without relief. Wife is going to administer a Fleet's Enema today. Advised to try OTC Senna or Miralax per package instructions to help regulate his bowels.   Fevers/chills: Patient denies any fever or chills.  Incisions: Patient denies any signs and symptoms of infection.  Wound closure: Skin Sealant  Pain: tolerable with the use of just Tylenol and ice packs. Advised to use heat to his swollen/bruised groin area for 20min intervals protecting skin from injury. Also advised to elevate his lower body on pillows for 20min 3x/day.  New Medications: Tylenol, oxycodone      Activity/Restrictions  No lifting in excess of 15-20 pounds for 3-4 weeks    Equipment  None    Pathology reviewed with patient:  N/A    Forms/Letters  No    All of his questions were answered including reviewing restrictions and wound care. He will call this office if he has any further questions and/or concerns.      In lieu of a post-op clinic patient that patient would like to be contacted in approximately 7-10 days via telephone.    A copy of this note was routed to the primary surgeon.      Whom and When to Call  Patient acknowledges understanding of how to manage any medication changes and  when to seek medical care.     Patient advised that if after hour medical concerns arise to please call [phone] and choose option 4 to speak to the physician on call.

## 2023-03-14 ENCOUNTER — TELEPHONE (OUTPATIENT)
Dept: INTERNAL MEDICINE | Facility: CLINIC | Age: 78
End: 2023-03-14

## 2023-03-14 ENCOUNTER — ANTICOAGULATION THERAPY VISIT (OUTPATIENT)
Dept: ANTICOAGULATION | Facility: CLINIC | Age: 78
End: 2023-03-14

## 2023-03-14 ENCOUNTER — LAB (OUTPATIENT)
Dept: LAB | Facility: CLINIC | Age: 78
End: 2023-03-14
Payer: MEDICARE

## 2023-03-14 DIAGNOSIS — Z95.2 S/P AORTIC VALVE REPLACEMENT: Primary | ICD-10-CM

## 2023-03-14 DIAGNOSIS — I48.0 PAROXYSMAL ATRIAL FIBRILLATION (H): ICD-10-CM

## 2023-03-14 DIAGNOSIS — Z79.01 LONG TERM CURRENT USE OF ANTICOAGULANT THERAPY: ICD-10-CM

## 2023-03-14 DIAGNOSIS — Z79.01 ANTICOAGULATED: ICD-10-CM

## 2023-03-14 DIAGNOSIS — Z79.01 ANTICOAGULATED ON COUMADIN: ICD-10-CM

## 2023-03-14 LAB — INR BLD: 1.2 (ref 0.9–1.1)

## 2023-03-14 PROCEDURE — 36416 COLLJ CAPILLARY BLOOD SPEC: CPT

## 2023-03-14 PROCEDURE — 85610 PROTHROMBIN TIME: CPT

## 2023-03-14 NOTE — TELEPHONE ENCOUNTER
Spoke to patient documented in kimberley visit.  Kimberley Courtney Rn  [facility]

## 2023-03-14 NOTE — TELEPHONE ENCOUNTER
General Call      Reason for Call:  Returning call from inr nurse    What are your questions or concerns:  inr results    Date of last appointment with provider: 03/14/2023    Could we send this information to you in [employer] or would you prefer to receive a phone call?:   Patient would prefer a phone call   Okay to leave a detailed message?: Yes at Cell number on file:    Telephone Information:   Mobile [phone]   Mobile Not on file.

## 2023-03-14 NOTE — PROGRESS NOTES
ANTICOAGULATION MANAGEMENT     Brooks Summers 77 year old male is on warfarin with subtherapeutic INR result. (Goal INR 2.0-3.0)    Recent labs: (last 7 days)     03/14/23  1049   INR 1.2*       ASSESSMENT       Source(s): Chart Review and Patient/Caregiver Call       Warfarin doses taken: Missed dose(s) may be affecting INR    Diet: No new diet changes identified    New illness, injury, or hospitalization: Yes: had surgery hernia repair    Medication/supplement changes: None noted    Signs or symptoms of bleeding or clotting: No    Previous INR: Subtherapeutic    Additional findings: Bridging with Enoxaparin until INR >= 2.0         PLAN     Recommended plan for temporary change(s) affecting INR     Dosing Instructions: Continue your current warfarin dose Continue bridging with Enoxaparin with next INR in 3 days       Summary  As of 3/14/2023    Full warfarin instructions:  3/14: 10 mg; Otherwise 5 mg every Tue; 7.5 mg all other days   Next INR check:  3/17/2023             Telephone call with Brooks who verbalizes understanding and agrees to plan    Lab visit scheduled    Education provided:     Please call back if any changes to your diet, medications or how you've been taking warfarin    Lovenox/Heparin education provided: prescribed dose and frequency     Plan made per [facility] anticoagulation protocol    Ashley Norris RN  Anticoagulation Clinic  3/14/2023    _______________________________________________________________________     Anticoagulation Episode Summary     Current INR goal:  2.0-3.0   TTR:  60.6 % (1 y)   Target end date:  Indefinite   Send INR reminders to:  JONATHAN MURILLO    Indications    S/P aortic valve replacement [Z95.2]  Paroxysmal atrial fibrillation (H) [I48.0]  Long term current use of anticoagulant therapy [Z79.01]  Anticoagulated on Coumadin [Z79.01]  Anticoagulated [Z79.01]           Comments:  Aortic 21 mm Johnson Perimount Magna Tissue Valve.         Anticoagulation Care Providers      Provider Role Specialty Phone number    Edin Mays MD Referring Internal Medicine [phone]

## 2023-03-17 ENCOUNTER — ANTICOAGULATION THERAPY VISIT (OUTPATIENT)
Dept: ANTICOAGULATION | Facility: CLINIC | Age: 78
End: 2023-03-17

## 2023-03-17 ENCOUNTER — LAB (OUTPATIENT)
Dept: LAB | Facility: CLINIC | Age: 78
End: 2023-03-17
Payer: MEDICARE

## 2023-03-17 DIAGNOSIS — Z79.01 ANTICOAGULATED: ICD-10-CM

## 2023-03-17 DIAGNOSIS — Z79.01 ANTICOAGULATED ON COUMADIN: ICD-10-CM

## 2023-03-17 DIAGNOSIS — Z95.2 S/P AORTIC VALVE REPLACEMENT: Primary | ICD-10-CM

## 2023-03-17 DIAGNOSIS — I48.0 PAROXYSMAL ATRIAL FIBRILLATION (H): ICD-10-CM

## 2023-03-17 DIAGNOSIS — Z79.01 LONG TERM CURRENT USE OF ANTICOAGULANT THERAPY: ICD-10-CM

## 2023-03-17 LAB — INR BLD: 1.7 (ref 0.9–1.1)

## 2023-03-17 PROCEDURE — 36416 COLLJ CAPILLARY BLOOD SPEC: CPT

## 2023-03-17 PROCEDURE — 85610 PROTHROMBIN TIME: CPT

## 2023-03-17 NOTE — PROGRESS NOTES
ANTICOAGULATION MANAGEMENT     Brooks Summers 78 year old male is on warfarin with subtherapeutic INR result. (Goal INR 2.0-3.0)    Recent labs: (last 7 days)     03/17/23  0858   INR 1.7*       ASSESSMENT       Source(s): Chart Review and Patient/Caregiver Call       Warfarin doses taken: Warfarin taken as instructed recently held for hernia surgery resumed 3/14    Diet: No new diet changes identified    New illness, injury, or hospitalization: Yes: above    Medication/supplement changes: None noted    Signs or symptoms of bleeding or clotting: No    Previous INR: Subtherapeutic    Additional findings: Bridging with Enoxaparin until INR >= 2.0         PLAN     Recommended plan for no diet, medication or health factor changes affecting INR     Dosing Instructions: booster dose then continue your current warfarin dose with next INR in 3 days   Continue lovenox    Summary  As of 3/17/2023    Full warfarin instructions:  3/17: 10 mg; Otherwise 5 mg every Tue; 7.5 mg all other days   Next INR check:  3/20/2023             Telephone call with Brooks who verbalizes understanding and agrees to plan    Lab visit scheduled 3/20    Education provided:     Contact [phone] with any changes, questions or concerns.     Plan made per [facility] anticoagulation protocol    Mela Bui RN  Anticoagulation Clinic  3/17/2023    _______________________________________________________________________     Anticoagulation Episode Summary     Current INR goal:  2.0-3.0   TTR:  60.2 % (1 y)   Target end date:  Indefinite   Send INR reminders to:  JONATHAN MURILLO    Indications    S/P aortic valve replacement [Z95.2]  Paroxysmal atrial fibrillation (H) [I48.0]  Long term current use of anticoagulant therapy [Z79.01]  Anticoagulated on Coumadin [Z79.01]  Anticoagulated [Z79.01]           Comments:  Aortic 21 mm Johnson Perimount Magna Tissue Valve.         Anticoagulation Care Providers     Provider Role Specialty Phone number    Davon  Edin ASTUDILLO MD [facility] Internal Medicine [phone]

## 2023-03-20 ENCOUNTER — ANTICOAGULATION THERAPY VISIT (OUTPATIENT)
Dept: ANTICOAGULATION | Facility: CLINIC | Age: 78
End: 2023-03-20

## 2023-03-20 ENCOUNTER — PATIENT OUTREACH (OUTPATIENT)
Dept: SURGERY | Facility: CLINIC | Age: 78
End: 2023-03-20

## 2023-03-20 ENCOUNTER — LAB (OUTPATIENT)
Dept: LAB | Facility: CLINIC | Age: 78
End: 2023-03-20
Payer: MEDICARE

## 2023-03-20 DIAGNOSIS — Z79.01 ANTICOAGULATED: ICD-10-CM

## 2023-03-20 DIAGNOSIS — Z95.2 S/P AORTIC VALVE REPLACEMENT: Primary | ICD-10-CM

## 2023-03-20 DIAGNOSIS — I48.0 PAROXYSMAL ATRIAL FIBRILLATION (H): ICD-10-CM

## 2023-03-20 DIAGNOSIS — Z79.01 LONG TERM CURRENT USE OF ANTICOAGULANT THERAPY: ICD-10-CM

## 2023-03-20 DIAGNOSIS — Z79.01 ANTICOAGULATED ON COUMADIN: ICD-10-CM

## 2023-03-20 LAB — INR BLD: 2.7 (ref 0.9–1.1)

## 2023-03-20 PROCEDURE — 36416 COLLJ CAPILLARY BLOOD SPEC: CPT

## 2023-03-20 PROCEDURE — 85610 PROTHROMBIN TIME: CPT

## 2023-03-20 NOTE — PROGRESS NOTES
03/20/23    9:57 AM      Brooks Summers is a patient of Dr. Emery Bergeron that recently underwent a surgical procedure.  The patient was contacted via telephone approximately 7-10 days ago for a status update and post-op teaching.  In lieu of a clinic visit, the patient requested to be contacted at a later date by an RN for an assessment.    Attempted to contact patient via telephone for a status update.  LM on  to call office.    ADDENDUM  03/21/23  9:21 AM    Attempted to contact patient x 2 for 7-10 day telephone call/status update.  LM on  to call office-contact information provided.    Pathology Report:  Final Diagnosis   LEFT INGUINAL HERNIA, REPAIR, EXCISION:  -Consistent with hernia sac with reactive changes and chronic inflammation  - Negative for dysplasia or malignancy

## 2023-03-20 NOTE — PROGRESS NOTES
ANTICOAGULATION MANAGEMENT     Brooks Summers 78 year old male is on warfarin with therapeutic INR result. (Goal INR 2.0-3.0)    Recent labs: (last 7 days)     03/20/23  1201   INR 2.7*       ASSESSMENT       Source(s): Chart Review and Patient/Caregiver Call       Warfarin doses taken: Warfarin taken as instructed    Diet: No new diet changes identified    New illness, injury, or hospitalization: Yes: s/p hernia repair, states he is feeling much better, a little more active, some bruising in the surgical site     Medication/supplement changes: None noted    Signs or symptoms of bleeding or clotting: No    Previous INR: Subtherapeutic    Additional findings: discussed s/s of infection  no signs of infection at this time    Advised to stop lovenox at this time.     PLAN     Recommended plan for no diet, medication or health factor changes affecting INR     Dosing Instructions: Continue your current warfarin dose with next INR in 2 weeks       Summary  As of 3/20/2023    Full warfarin instructions:  5 mg every Tue; 7.5 mg all other days   Next INR check:  4/3/2023             Telephone call with Brooks who verbalizes understanding and agrees to plan    Lab visit scheduled    Education provided:     Symptom monitoring: when to seek medical attention/emergency care    Contact [phone]  with any changes, questions or concerns.     Plan made per [facility] anticoagulation protocol    Selena Bates RN  Anticoagulation Clinic  3/20/2023    _______________________________________________________________________     Anticoagulation Episode Summary     Current INR goal:  2.0-3.0   TTR:  60.8 % (1 y)   Target end date:  Indefinite   Send INR reminders to:  JONATHAN MURILLO    Indications    S/P aortic valve replacement [Z95.2]  Paroxysmal atrial fibrillation (H) [I48.0]  Long term current use of anticoagulant therapy [Z79.01]  Anticoagulated on Coumadin [Z79.01]  Anticoagulated [Z79.01]           Comments:  Aortic 21 mm Johnson  Perimount Magna Tissue Valve.         Anticoagulation Care Providers     Provider Role Specialty Phone number    Edin Mays MD Referring Internal Medicine [phone]

## 2023-03-21 LAB
PATH REPORT.COMMENTS IMP SPEC: NORMAL
PATH REPORT.COMMENTS IMP SPEC: NORMAL
PATH REPORT.FINAL DX SPEC: NORMAL
PATH REPORT.GROSS SPEC: NORMAL
PATH REPORT.MICROSCOPIC SPEC OTHER STN: NORMAL
PATH REPORT.RELEVANT HX SPEC: NORMAL
PHOTO IMAGE: NORMAL

## 2023-03-21 NOTE — PROGRESS NOTES
"Brooks Summers was contacted several days post procedure via telephone for a status update and elected to have a telephone follow-up call approximately 7-10 days after original contact in lieu of a clinic visit with Dr. Emery Bergeron. He continues to do well and the skin glue has not peeled off. The patients wound is C/D/I.  He is afebrile, pain free, and miladis po; the patient is slowly resuming his normal activity. Discussed restrictions including no lifting in excess of 15 pounds for 2 more weeks. He states he has a \"bulge\" underneath his incision. No redness, warmth, or purulent drainage. Sounds to be a seroma. Advised to use heat applications for 20min intervals, 3-4x/day, protecting skin from injury.     Pathology was reviewed with the patient: Yes    All of Brooks Summers question's were answered and  he would like to return to the clinic on a PRN basis. The patient is aware that  he may schedule a post op appointment at any time.    A copy of this note was routed to the patients surgeon.            "

## 2023-03-22 ENCOUNTER — PATIENT OUTREACH (OUTPATIENT)
Dept: SURGERY | Facility: CLINIC | Age: 78
End: 2023-03-22
Payer: MEDICARE

## 2023-03-22 NOTE — PROGRESS NOTES
Called patient at the request of Dr. Bergeron. Asked the patient to schedule an appointment to see Dr. Bergeron in 4-6 weeks if the swelling under his incision doesn't resolve or worsens. Patient is describing ecchymosis of the area. No open wound, fever.    Patient will contact this office for an appointment, if needed.

## 2023-03-24 DIAGNOSIS — E78.5 HYPERLIPIDEMIA LDL GOAL <100: ICD-10-CM

## 2023-03-24 DIAGNOSIS — G40.909 RECURRENT SEIZURES (H): ICD-10-CM

## 2023-03-27 RX ORDER — SIMVASTATIN 40 MG
40 TABLET ORAL AT BEDTIME
Qty: 90 TABLET | Refills: 3 | Status: SHIPPED | OUTPATIENT
Start: 2023-03-27 | End: 2024-04-29

## 2023-03-28 RX ORDER — LEVETIRACETAM 500 MG/1
500 TABLET ORAL 2 TIMES DAILY
Qty: 180 TABLET | Refills: 0 | Status: SHIPPED | OUTPATIENT
Start: 2023-03-28 | End: 2023-07-07

## 2023-03-28 NOTE — TELEPHONE ENCOUNTER
"   levETIRAcetam (KEPPRA) 500 MG tablet  Last Written Prescription Date:  9/20/22  Last Fill Quantity: 180,   # refills: 1  Last Office Visit : 3/30/22  Future Office visit:  None    \"   Return to clinic in 6 months.\"    Scheduling has been notified to contact the pt for appointment.    \" Continue Keppra to 500 mg bid.\"         Creatinine   Date Value Ref Range Status   01/30/2023 0.88 0.67 - 1.17 mg/dL Final   03/10/2021 1.03 0.66 - 1.25 mg/dL Final               "

## 2023-03-29 ENCOUNTER — PATIENT OUTREACH (OUTPATIENT)
Dept: SURGERY | Facility: CLINIC | Age: 78
End: 2023-03-29

## 2023-03-29 NOTE — PROGRESS NOTES
Brooks Summers is a patient of Dr. Gavin that underwent open LIH on 3/9.  He is calling the clinic at this time with concerns regarding his wound. He states that a small area medial incision has opened and is draining watery maroon fluid. No purulence, no redness of the skin (slightly pink). No pain and no fevers. He has been as active as allowed with his restrictions. He continues to have swelling in the area, suggestive of a hematoma.      Suggested that he keep the area clean and dry, cover with a gauze dressing daily and PRN.  Patient will visit with Dr. Frazier tomorrow, 3/30, at 12 PM for an evaluation.  All of the patients questions were answered.

## 2023-03-30 ENCOUNTER — OFFICE VISIT (OUTPATIENT)
Dept: SURGERY | Facility: CLINIC | Age: 78
End: 2023-03-30
Payer: MEDICARE

## 2023-03-30 VITALS
WEIGHT: 162.4 LBS | HEART RATE: 88 BPM | BODY MASS INDEX: 23.25 KG/M2 | OXYGEN SATURATION: 97 % | HEIGHT: 70 IN | DIASTOLIC BLOOD PRESSURE: 72 MMHG | SYSTOLIC BLOOD PRESSURE: 125 MMHG | TEMPERATURE: 97.5 F

## 2023-03-30 DIAGNOSIS — Z98.890 POSTOPERATIVE STATE: Primary | ICD-10-CM

## 2023-03-30 PROCEDURE — 99024 POSTOP FOLLOW-UP VISIT: CPT | Performed by: SURGERY

## 2023-03-30 ASSESSMENT — PAIN SCALES - GENERAL: PAINLEVEL: NO PAIN (0)

## 2023-03-30 NOTE — PROGRESS NOTES
Brooks Summers is status post:  3/9/2023  Herniorrhaphy inguinal (Left) Procedure: HERNIORRHAPHY, Left INGUINAL, OPEN;  Surgeon: Emery Bergeron MD;  Location: [facility] OR  Surgery without complications.  Post-op course has developed some drainage from wound without other constitutional symptoms.  Incisions examined, no signs of infection, hematoma with serous drainage, no fluctuance.  Incision dressed with dry guaze, instructed to change as needed.  Follow up with Dr Bergeron.  All of the patients questions were answered.

## 2023-03-30 NOTE — LETTER
3/30/2023       RE: Brooks Summers  [id number] [address]  [place] [address]     Dear Colleague,    Thank you for referring your patient, Brooks Summers, to the [facility] [place] at [facility]. Please see a copy of my visit note below.    Brooks Summers is status post:  3/9/2023  Herniorrhaphy inguinal (Left) Procedure: HERNIORRHAPHY, Left INGUINAL, OPEN;  Surgeon: Emery Bergeron MD;  Location: [facility] OR  Surgery without complications.  Post-op course has developed some drainage from wound without other constitutional symptoms.  Incisions examined, no signs of infection, hematoma with serous drainage, no fluctuance.  Incision dressed with dry guaze, instructed to change as needed.  Follow up with Dr Bergeron.  All of the patients questions were answered.          Again, thank you for allowing me to participate in the care of your patient.      Sincerely,    Aubrey Frazier MD

## 2023-03-30 NOTE — NURSING NOTE
"Chief Complaint   Patient presents with     Post-op Visit     Wound concerns       Vitals:    03/30/23 1131   BP: 125/72   BP Location: Left arm   Patient Position: Sitting   Cuff Size: Adult Large   Pulse: 88   SpO2: 97%   Weight: 162 lb 6.4 oz   Height: 5' 10\"       Body mass index is 23.3 kg/m .     Yobany Berman, EMT- P    "

## 2023-04-03 ENCOUNTER — ANTICOAGULATION THERAPY VISIT (OUTPATIENT)
Dept: ANTICOAGULATION | Facility: CLINIC | Age: 78
End: 2023-04-03

## 2023-04-03 ENCOUNTER — LAB (OUTPATIENT)
Dept: LAB | Facility: CLINIC | Age: 78
End: 2023-04-03
Payer: MEDICARE

## 2023-04-03 DIAGNOSIS — I48.0 PAROXYSMAL ATRIAL FIBRILLATION (H): ICD-10-CM

## 2023-04-03 DIAGNOSIS — Z95.2 S/P AORTIC VALVE REPLACEMENT: Primary | ICD-10-CM

## 2023-04-03 DIAGNOSIS — Z79.01 ANTICOAGULATED: ICD-10-CM

## 2023-04-03 DIAGNOSIS — Z79.01 LONG TERM CURRENT USE OF ANTICOAGULANT THERAPY: ICD-10-CM

## 2023-04-03 DIAGNOSIS — Z79.01 ANTICOAGULATED ON COUMADIN: ICD-10-CM

## 2023-04-03 LAB — INR BLD: 2.8 (ref 0.9–1.1)

## 2023-04-03 PROCEDURE — 36416 COLLJ CAPILLARY BLOOD SPEC: CPT

## 2023-04-03 PROCEDURE — 85610 PROTHROMBIN TIME: CPT

## 2023-04-03 NOTE — PROGRESS NOTES
ANTICOAGULATION MANAGEMENT     Brooks Summers 78 year old male is on warfarin with therapeutic INR result. (Goal INR 2.0-3.0)    Recent labs: (last 7 days)     04/03/23  1055   INR 2.8*       ASSESSMENT       Source(s): Chart Review and Patient/Caregiver Call       Warfarin doses taken: Warfarin taken as instructed    Diet: No new diet changes identified    New illness, injury, or hospitalization: No    Medication/supplement changes: None noted    Signs or symptoms of bleeding or clotting: No    Previous INR: Therapeutic last visit; previously outside of goal range    Additional findings: None         PLAN     Recommended plan for no diet, medication or health factor changes affecting INR     Dosing Instructions: Continue your current warfarin dose with next INR in 3 weeks       Summary  As of 4/3/2023    Full warfarin instructions:  5 mg every Tue; 7.5 mg all other days   Next INR check:  4/25/2023             Telephone call with Brooks who verbalizes understanding and agrees to plan and who agrees to plan and repeated back plan correctly    Lab visit scheduled    Education provided:     None required    Plan made per [facility] anticoagulation protocol    Kiana Moore RN  Anticoagulation Clinic  4/3/2023    _______________________________________________________________________     Anticoagulation Episode Summary     Current INR goal:  2.0-3.0   TTR:  64.6 % (1 y)   Target end date:  Indefinite   Send INR reminders to:  JONATHAN MURILLO    Indications    S/P aortic valve replacement [Z95.2]  Paroxysmal atrial fibrillation (H) [I48.0]  Long term current use of anticoagulant therapy [Z79.01]  Anticoagulated on Coumadin [Z79.01]  Anticoagulated [Z79.01]           Comments:  Aortic 21 mm Johnson Perimount Magna Tissue Valve.         Anticoagulation Care Providers     Provider Role Specialty Phone number    Edin Mays MD Referring Internal Medicine [phone]

## 2023-04-10 ENCOUNTER — PRE VISIT (OUTPATIENT)
Dept: SURGERY | Facility: CLINIC | Age: 78
End: 2023-04-10
Payer: MEDICARE

## 2023-04-10 NOTE — TELEPHONE ENCOUNTER
Patient Telephone Reminder Call    Date of call:  04/10/23  Phone numbers:  Cell number on file:    Telephone Information:   Mobile [phone]   Mobile Not on file.       Reached patient/confirmed appointment:  Yes  Appointment with:   Dr. Emery Bergeron  Reason for visit:  Hematoma follow up  Remind patient that this visit is a consultation only, NO procedure:  Yes  Can this visit be changed to a video visit:  No

## 2023-04-11 ENCOUNTER — OFFICE VISIT (OUTPATIENT)
Dept: SURGERY | Facility: CLINIC | Age: 78
End: 2023-04-11
Payer: MEDICARE

## 2023-04-11 VITALS
DIASTOLIC BLOOD PRESSURE: 75 MMHG | WEIGHT: 163.8 LBS | HEART RATE: 85 BPM | BODY MASS INDEX: 23.45 KG/M2 | OXYGEN SATURATION: 99 % | SYSTOLIC BLOOD PRESSURE: 127 MMHG | HEIGHT: 70 IN

## 2023-04-11 DIAGNOSIS — T14.8XXA HEMATOMA OF SKIN: ICD-10-CM

## 2023-04-11 DIAGNOSIS — G89.18 POSTOPERATIVE PAIN: Primary | ICD-10-CM

## 2023-04-11 PROCEDURE — 99024 POSTOP FOLLOW-UP VISIT: CPT | Performed by: SURGERY

## 2023-04-11 ASSESSMENT — PAIN SCALES - GENERAL: PAINLEVEL: NO PAIN (0)

## 2023-04-11 NOTE — LETTER
4/11/2023       RE: Brooks Summers  [address]  [facility] [place] [address]     Dear Colleague,    Thank you for referring your patient, Brooks Summers, to the [facility] [place] [facility]. Please see a copy of my visit note below.    Surgery Clinic Staff:    HISTORY OF PRESENT ILLNESS:  Brooks Summers is a 78-year-old man status post open inguinal hernia repair with mesh.  The patient developed a hematoma after reinitiating his anticoagulation.  Old blood and fluid expressed from the wound when the patient bumped into a conter top at home; there is no current drainage from the incision.  The patient has no fever or chills.       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 Tx with Dapto/RIFx 8 weeks.    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    RE>>LE    Mild cognitive impairment 8/24/2022    Paroxysmal A-fib (H)     Post-op 7/14/2011, 1/26/13    Paroxysmal atrial fibrillation (H) 2/25/2015    Rotator Cuff (Capsule) Sprain and Strain     S/P aortic valve replacement     7/14/2011, re-do 1/25/13 due to endocarditis    Seizure (H) 5/10/2020    Smoking hx     Thrombocytopenia (H)         PAST SURGICAL HISTORY:  Past Surgical History:   Procedure Laterality Date    ANESTHESIA CARDIOVERSION  7/18/2011    Procedure:ANESTHESIA CARDIOVERSION; Cardioversion; Surgeon:GENERIC ANESTHESIA PROVIDER; Location:[facility] OR    COLONOSCOPY      COLONOSCOPY N/A 11/19/2018    Procedure: COLONOSCOPY;  Surgeon: Herman Mcginnis MD;  Location:  GI    CORONARY ANGIOGRAPHY ADULT ORDER       CYSTOSCOPY, BIOPSY URETHRA N/A 4/3/2017    Procedure: CYSTOSCOPY, BIOPSY URETHRA;  Surgeon: Marlena Mora MD;  Location: [facility] OR    ENDOSCOPY UPPER, COLONOSCOPY, COMBINED      EP ICD GENERATOR REPLACEMENT DUAL N/A 3/10/2021    Procedure: EP ICD GENERATOR CHANGE DUAL;  Surgeon: Mekhi Be MD;  Location:  HEART CARDIAC CATH LAB    HC REMOV & REPLACE IMPLT DEFIB PULSE GEN MULT LEAD  12/3/2015         HEMORRHOID SURGERY      HERNIORRHAPHY INGUINAL Left 3/9/2023    Procedure: HERNIORRHAPHY, Left INGUINAL, OPEN;  Surgeon: Emery Bergeron MD;  Location:  OR    IMPLANT AUTOMATIC IMPLANTABLE CARDIOVERTER DEFIBRILLATOR      MOHS MICROGRAPHIC PROCEDURE      ORTHOPEDIC SURGERY      shoulder,right    REDO STERNOTOMY REPLACE VALVE AORTIC  1/25/2013    Procedure: REDO STERNOTOMY REPLACE VALVE AORTIC;  Redo Sternotomy, Redo Aortic Valve Replacement on pump oxygenator. Intraoperative Transesophageal Echocardiogram;  Surgeon: Navin Hampton MD;  [facility]: [facility] OR    REPAIR VALVE MITRAL  2013    REPLACE VALVE AORTIC  7/14/2011    Procedure:REPLACE VALVE AORTIC; Median Sternotomy, Bioprosthesis,  Aortic Valve replacement on pump with oxygenator. Intra-operative Transesophogeal Echocardiogram.; Surgeon:NAVIN HAMPTON; Location: OR    teeth extractions      TRANSPLANT          MEDICATIONS:  Current Outpatient Medications   Medication    acetaminophen (TYLENOL) 325 MG tablet    allopurinol (ZYLOPRIM) 100 MG tablet    enoxaparin ANTICOAGULANT (LOVENOX) 80 MG/0.8ML syringe    enoxaparin ANTICOAGULANT (LOVENOX) 80 MG/0.8ML syringe    hydrocortisone (CORTAID) 1 % external cream    levETIRAcetam (KEPPRA) 500 MG tablet    losartan (COZAAR) 25 MG tablet    metoprolol succinate ER (TOPROL XL) 50 MG 24 hr tablet    Multiple Vitamins-Minerals (MULTIVITAMIN ADULTS 50+) TABS    oxybutynin ER (DITROPAN-XL) 10 MG 24 hr tablet    PROVIDER DOSED MANAGED WARFARIN, COUMADIN, OUTPATIENT    simvastatin (ZOCOR) 40 MG tablet     VITAMIN D, CHOLECALCIFEROL, PO    warfarin ANTICOAGULANT (COUMADIN) 5 MG tablet     No current facility-administered medications for this visit.        ALLERGIES:  Allergies   Allergen Reactions    Blood Transfusion Related (Informational Only) Unknown     Patient has a history of a clinically significant antibody against RBC antigens.  A delay in compatible RBCs may occur.     Lisinopril Cough        SOCIAL HISTORY:  Social History     Socioeconomic History    Marital status:      Spouse name: None    Number of children: None    Years of education: None    Highest education level: None   Tobacco Use    Smoking status: Former     Packs/day: 1.00     Years: 20.00     Pack years: 20.00     Types: Cigarettes     Quit date: 1989     Years since quittin.3    Smokeless tobacco: Never   Vaping Use    Vaping status: Never Used   Substance and Sexual Activity    Alcohol use: Yes     Comment: rare    Drug use: No    Sexual activity: Yes     Partners: Female     Birth control/protection: None     Comment:    Other Topics Concern    Parent/sibling w/ CABG, MI or angioplasty before 65F 55M? No   Social History Narrative     since 1982 2 children 4 grandchildren.    Carpet sales:  Semi retired. Athlete in school, Golf, fish, yardwork       FAMILY HISTORY:  Family History   Problem Relation Age of Onset    Cancer Mother 92        stomach, colon    Hypertension Mother     Retinal detachment Mother     C.A.D. Father 68        bypass, carotid     Prostate Cancer Father 70    Heart Disease Father     Depression Sister     Anxiety Disorder Sister     Depression Brother     Anxiety Disorder Brother     Cancer Brother 64        lung cancer, petroleum    Cancer Brother     Glaucoma No family hx of     Macular Degeneration No family hx of     Strabismus No family hx of     Glasses (<7 y/o) No family hx of     Anesthesia Reaction No family hx of     Bleeding Disorder No family hx of     Venous thrombosis No  "family hx of         PHYSICAL EXAM:  Vital Signs: /75 (BP Location: Left arm, Patient Position: Sitting, Cuff Size: Adult Regular)   Pulse 85   Ht 1.778 m (5' 10\")   Wt 74.3 kg (163 lb 12.8 oz)   SpO2 99%   BMI 23.50 kg/m    GEN: Brooks Summers was examined in the standing position.  He appears completely non toxi and in N.A.D.  ABD: There is no evidence of hernia recurrence.The area of the hernia repair demonstrates induration and some mild edema of the incision.  There is no drainage from the incision.  There is a fullness and marked induration consistent with a resolving hematoma.  There is some ecchymosis but no erythema.  The hernia does not appear to have recurred.  The patient's extremities are warm and well perfused.    IMPRESSION:  Brooks Summers is a 78-year-old male well doing well following hernia surgery other than having a subcutaneous hematoma associated with being on warfarin and having an elevated INR.  The patient will continue to follow the hematoma.  I have recommended he consider using heat rather than ice.  The patient will follow up in approximately 1 month should he have difficulty resolving hematoma.  He will call or return should he develop purulent drainage.  The patient understands what to look for and is provided with additional numbers to call on a regular basis so that he can reach physicians in between his clinic visits should a problem arise.          Again, thank you for allowing me to participate in the care of your patient.      Sincerely,    Emery Bergeron MD      "

## 2023-04-11 NOTE — PATIENT INSTRUCTIONS
You met with Dr. Emery Bergeron.      Today's visit instructions:    Please return to see Dr. Bergeron in  as scheduled. Please use heat for 20min intervals, stop using ice.        If you have questions please contact Lashay RN or Ciara RN during regular clinic hours, Monday through Friday 7:30 AM - 4:00 PM, or you can contact us via [employer] at anytime.       If you have urgent needs after-hours, weekends, or holidays please call the hospital at [phone] and ask to speak with our on-call General Surgery Team.    Appointment [phone]  Nurse Advice (Lashay or Ciara): [phone]   Surgery Scheduler (Elisabet): [phone]  Fax: [phone]

## 2023-04-11 NOTE — NURSING NOTE
"Chief Complaint   Patient presents with     Post-op Visit       Vitals:    04/11/23 1444   BP: 127/75   BP Location: Left arm   Patient Position: Sitting   Cuff Size: Adult Regular   Pulse: 85   SpO2: 99%   Weight: 163 lb 12.8 oz   Height: 5' 10\"       Body mass index is 23.5 kg/m .    Gina Sanchez CMA    "

## 2023-04-11 NOTE — PROGRESS NOTES
Surgery Clinic Staff:    HISTORY OF PRESENT ILLNESS:  Brooks Summers is a 78-year-old man status post open inguinal hernia repair with mesh.  The patient developed a hematoma after reinitiating his anticoagulation.  Old blood and fluid expressed from the wound when the patient bumped into a conter top at home; there is no current drainage from the incision.  The patient has no fever or chills.       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 Tx with Dapto/RIFx 8 weeks.    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    RE>>LE    Mild cognitive impairment 8/24/2022    Paroxysmal A-fib (H)     Post-op 7/14/2011, 1/26/13    Paroxysmal atrial fibrillation (H) 2/25/2015    Rotator Cuff (Capsule) Sprain and Strain     S/P aortic valve replacement     7/14/2011, re-do 1/25/13 due to endocarditis    Seizure (H) 5/10/2020    Smoking hx     Thrombocytopenia (H)         PAST SURGICAL HISTORY:  Past Surgical History:   Procedure Laterality Date    ANESTHESIA CARDIOVERSION  7/18/2011    Procedure:ANESTHESIA CARDIOVERSION; Cardioversion; Surgeon:GENERIC ANESTHESIA PROVIDER; Location:[facility] OR    COLONOSCOPY      COLONOSCOPY N/A 11/19/2018    Procedure: COLONOSCOPY;  Surgeon: Herman Mcginnis MD;  Location: [facility] GI    CORONARY ANGIOGRAPHY ADULT ORDER      CYSTOSCOPY, BIOPSY URETHRA N/A 4/3/2017    Procedure: CYSTOSCOPY, BIOPSY URETHRA;  Surgeon: Marlena Mora MD;  Location: [facility] OR    ENDOSCOPY UPPER, COLONOSCOPY, COMBINED      EP ICD GENERATOR REPLACEMENT DUAL N/A 3/10/2021    Procedure: EP ICD GENERATOR CHANGE DUAL;  Surgeon: Mekhi Be MD;   Location:  HEART CARDIAC CATH LAB    HC REMOV & REPLACE IMPLT DEFIB PULSE GEN MULT LEAD  12/3/2015         HEMORRHOID SURGERY      HERNIORRHAPHY INGUINAL Left 3/9/2023    Procedure: HERNIORRHAPHY, Left INGUINAL, OPEN;  Surgeon: Emery Bergeron MD;  Location:  OR    IMPLANT AUTOMATIC IMPLANTABLE CARDIOVERTER DEFIBRILLATOR      MOHS MICROGRAPHIC PROCEDURE      ORTHOPEDIC SURGERY      shoulder,right    REDO STERNOTOMY REPLACE VALVE AORTIC  1/25/2013    Procedure: REDO STERNOTOMY REPLACE VALVE AORTIC;  Redo Sternotomy, Redo Aortic Valve Replacement on pump oxygenator. Intraoperative Transesophageal Echocardiogram;  Surgeon: Stephanie Hampton MD;  Location: [facility] OR    REPAIR VALVE MITRAL  2013    REPLACE VALVE AORTIC  7/14/2011    Procedure:REPLACE VALVE AORTIC; Median Sternotomy, Bioprosthesis,  Aortic Valve replacement on pump with oxygenator. Intra-operative Transesophogeal Echocardiogram.; Surgeon:STEPHANIE HAMPTON; Location: OR    teeth extractions      TRANSPLANT          MEDICATIONS:  Current Outpatient Medications   Medication    acetaminophen (TYLENOL) 325 MG tablet    allopurinol (ZYLOPRIM) 100 MG tablet    enoxaparin ANTICOAGULANT (LOVENOX) 80 MG/0.8ML syringe    enoxaparin ANTICOAGULANT (LOVENOX) 80 MG/0.8ML syringe    hydrocortisone (CORTAID) 1 % external cream    levETIRAcetam (KEPPRA) 500 MG tablet    losartan (COZAAR) 25 MG tablet    metoprolol succinate ER (TOPROL XL) 50 MG 24 hr tablet    Multiple Vitamins-Minerals (MULTIVITAMIN ADULTS 50+) TABS    oxybutynin ER (DITROPAN-XL) 10 MG 24 hr tablet    PROVIDER DOSED MANAGED WARFARIN, COUMADIN, OUTPATIENT    simvastatin (ZOCOR) 40 MG tablet    VITAMIN D, CHOLECALCIFEROL, PO    warfarin ANTICOAGULANT (COUMADIN) 5 MG tablet     No current facility-administered medications for this visit.        ALLERGIES:  Allergies   Allergen Reactions    Blood Transfusion Related (Informational Only) Unknown     Patient has a history of a clinically significant antibody  "against RBC antigens.  A delay in compatible RBCs may occur.     Lisinopril Cough        SOCIAL HISTORY:  Social History     Socioeconomic History    Marital status:      Spouse name: None    Number of children: None    Years of education: None    Highest education level: None   Tobacco Use    Smoking status: Former     Packs/day: 1.00     Years: 20.00     Pack years: 20.00     Types: Cigarettes     Quit date: 1989     Years since quittin.3    Smokeless tobacco: Never   Vaping Use    Vaping status: Never Used   Substance and Sexual Activity    Alcohol use: Yes     Comment: rare    Drug use: No    Sexual activity: Yes     Partners: Female     Birth control/protection: None     Comment:    Other Topics Concern    Parent/sibling w/ CABG, MI or angioplasty before 65F 55M? No   Social History Narrative     since 1982 2 children 4 grandchildren.    Carpet sales:  Semi retired. Athlete in school, Golf, fish, yardwork       FAMILY HISTORY:  Family History   Problem Relation Age of Onset    Cancer Mother 92        stomach, colon    Hypertension Mother     Retinal detachment Mother     C.A.D. Father 68        bypass, carotid     Prostate Cancer Father 70    Heart Disease Father     Depression Sister     Anxiety Disorder Sister     Depression Brother     Anxiety Disorder Brother     Cancer Brother 64        lung cancer, petroleum    Cancer Brother     Glaucoma No family hx of     Macular Degeneration No family hx of     Strabismus No family hx of     Glasses (<7 y/o) No family hx of     Anesthesia Reaction No family hx of     Bleeding Disorder No family hx of     Venous thrombosis No family hx of         PHYSICAL EXAM:  Vital Signs: /75 (BP Location: Left arm, Patient Position: Sitting, Cuff Size: Adult Regular)   Pulse 85   Ht 1.778 m (5' 10\")   Wt 74.3 kg (163 lb 12.8 oz)   SpO2 99%   BMI 23.50 kg/m    GEN: Brooks Summers was examined in the standing position.  He appears completely " non toxi and in N.A.D.  ABD: There is no evidence of hernia recurrence.The area of the hernia repair demonstrates induration and some mild edema of the incision.  There is no drainage from the incision.  There is a fullness and marked induration consistent with a resolving hematoma.  There is some ecchymosis but no erythema.  The hernia does not appear to have recurred.  The patient's extremities are warm and well perfused.    IMPRESSION:  Brooks Summers is a 78-year-old male well doing well following hernia surgery other than having a subcutaneous hematoma associated with being on warfarin and having an elevated INR.  The patient will continue to follow the hematoma.  I have recommended he consider using heat rather than ice.  The patient will follow up in approximately 1 month should he have difficulty resolving hematoma.  He will call or return should he develop purulent drainage.  The patient understands what to look for and is provided with additional numbers to call on a regular basis so that he can reach physicians in between his clinic visits should a problem arise.

## 2023-04-23 ENCOUNTER — HEALTH MAINTENANCE LETTER (OUTPATIENT)
Age: 78
End: 2023-04-23

## 2023-04-25 ENCOUNTER — LAB (OUTPATIENT)
Dept: LAB | Facility: CLINIC | Age: 78
End: 2023-04-25
Payer: MEDICARE

## 2023-04-25 ENCOUNTER — ANTICOAGULATION THERAPY VISIT (OUTPATIENT)
Dept: ANTICOAGULATION | Facility: CLINIC | Age: 78
End: 2023-04-25

## 2023-04-25 DIAGNOSIS — Z79.01 LONG TERM CURRENT USE OF ANTICOAGULANT THERAPY: ICD-10-CM

## 2023-04-25 DIAGNOSIS — Z79.01 ANTICOAGULATED ON COUMADIN: ICD-10-CM

## 2023-04-25 DIAGNOSIS — Z95.2 S/P AORTIC VALVE REPLACEMENT: Primary | ICD-10-CM

## 2023-04-25 DIAGNOSIS — Z79.01 ANTICOAGULATED: ICD-10-CM

## 2023-04-25 DIAGNOSIS — I48.0 PAROXYSMAL ATRIAL FIBRILLATION (H): ICD-10-CM

## 2023-04-25 LAB — INR BLD: 2.5 (ref 0.9–1.1)

## 2023-04-25 PROCEDURE — 36416 COLLJ CAPILLARY BLOOD SPEC: CPT

## 2023-04-25 PROCEDURE — 85610 PROTHROMBIN TIME: CPT

## 2023-04-25 NOTE — PROGRESS NOTES
ANTICOAGULATION MANAGEMENT     Brooks Summers 78 year old male is on warfarin with therapeutic INR result. (Goal INR 2.0-3.0)    Recent labs: (last 7 days)     04/25/23  1023   INR 2.5*       ASSESSMENT       Source(s): Chart Review and Patient/Caregiver Call       Warfarin doses taken: Warfarin taken as instructed    Diet: No new diet changes identified    Medication/supplement changes: None noted    New illness, injury, or hospitalization: No    Signs or symptoms of bleeding or clotting: No    Previous result: Therapeutic last 2(+) visits    Additional findings: None         PLAN     Recommended plan for no diet, medication or health factor changes affecting INR     Dosing Instructions: Continue your current warfarin dose with next INR in 4 weeks       Summary  As of 4/25/2023    Full warfarin instructions:  5 mg every Tue; 7.5 mg all other days   Next INR check:  5/23/2023             Telephone call with  Lucero who verbalizes understanding and agrees to plan    Lab visit scheduled    Education provided:     Contact [phone]  with any changes, questions or concerns.     Plan made per [facility] anticoagulation protocol    Selena Bates RN  Anticoagulation Clinic  4/25/2023    _______________________________________________________________________     Anticoagulation Episode Summary     Current INR goal:  2.0-3.0   TTR:  69.0 % (1 y)   Target end date:  Indefinite   Send INR reminders to:  JONATHAN MURILLO    Indications    S/P aortic valve replacement [Z95.2]  Paroxysmal atrial fibrillation (H) [I48.0]  Long term current use of anticoagulant therapy [Z79.01]  Anticoagulated on Coumadin [Z79.01]  Anticoagulated [Z79.01]           Comments:  Aortic 21 mm Johnson Perimount Magna Tissue Valve.         Anticoagulation Care Providers     Provider Role Specialty Phone number    Edin Mays MD Referring Internal Medicine [phone]

## 2023-05-04 ENCOUNTER — OFFICE VISIT (OUTPATIENT)
Dept: DERMATOLOGY | Facility: CLINIC | Age: 78
End: 2023-05-04
Payer: MEDICARE

## 2023-05-04 ENCOUNTER — TELEPHONE (OUTPATIENT)
Dept: DERMATOLOGY | Facility: CLINIC | Age: 78
End: 2023-05-04

## 2023-05-04 DIAGNOSIS — D22.9 MULTIPLE BENIGN NEVI: ICD-10-CM

## 2023-05-04 DIAGNOSIS — L81.4 SOLAR LENTIGO: ICD-10-CM

## 2023-05-04 DIAGNOSIS — L57.0 AK (ACTINIC KERATOSIS): Primary | ICD-10-CM

## 2023-05-04 DIAGNOSIS — D48.5 NEOPLASM OF UNCERTAIN BEHAVIOR OF SKIN: ICD-10-CM

## 2023-05-04 DIAGNOSIS — L57.8 PHOTOAGING OF SKIN: ICD-10-CM

## 2023-05-04 DIAGNOSIS — L82.1 SEBORRHEIC KERATOSES: ICD-10-CM

## 2023-05-04 DIAGNOSIS — D18.01 CHERRY ANGIOMA: ICD-10-CM

## 2023-05-04 PROCEDURE — 11103 TANGNTL BX SKIN EA SEP/ADDL: CPT | Mod: GC | Performed by: DERMATOLOGY

## 2023-05-04 PROCEDURE — 17003 DESTRUCT PREMALG LES 2-14: CPT | Mod: XS | Performed by: DERMATOLOGY

## 2023-05-04 PROCEDURE — 88305 TISSUE EXAM BY PATHOLOGIST: CPT | Mod: TC | Performed by: DERMATOLOGY

## 2023-05-04 PROCEDURE — 17000 DESTRUCT PREMALG LESION: CPT | Mod: XS | Performed by: DERMATOLOGY

## 2023-05-04 PROCEDURE — 99213 OFFICE O/P EST LOW 20 MIN: CPT | Mod: 25 | Performed by: DERMATOLOGY

## 2023-05-04 PROCEDURE — 11102 TANGNTL BX SKIN SINGLE LES: CPT | Mod: GC | Performed by: DERMATOLOGY

## 2023-05-04 PROCEDURE — 88305 TISSUE EXAM BY PATHOLOGIST: CPT | Mod: 26 | Performed by: DERMATOLOGY

## 2023-05-04 RX ORDER — FLUOROURACIL 50 MG/G
CREAM TOPICAL 2 TIMES DAILY
Qty: 40 G | Refills: 1 | Status: SHIPPED | OUTPATIENT
Start: 2023-05-04

## 2023-05-04 RX ORDER — CALCIPOTRIENE 50 UG/G
CREAM TOPICAL 2 TIMES DAILY
Qty: 60 G | Refills: 1 | Status: SHIPPED | OUTPATIENT
Start: 2023-05-04

## 2023-05-04 ASSESSMENT — PAIN SCALES - GENERAL: PAINLEVEL: NO PAIN (0)

## 2023-05-04 NOTE — TELEPHONE ENCOUNTER
PRIOR AUTHORIZATION DENIED    Medication: calcipotriene (DOVONOX) 0.005 % external cream - EPA DENIED    Denial Date: 5/4/2023    Denial Rational:       Appeal Information:

## 2023-05-04 NOTE — NURSING NOTE
Lidocaine-epinephrine 1-1:733385 % injection   1.5mL once for one use, starting 5/4/2023 ending 5/4/2023,  2mL disp, R-0, injection  Injected by Dr. Manning

## 2023-05-04 NOTE — PROGRESS NOTES
[facility] Dermatology Note  Encounter Date: May 4, 2023  Office Visit     Dermatology Problem List:  FBSE 05/04/23   0. Neoplasm of uncertain behavior s/p shave bx 05/04/23   - left nasal ala c/f BCC   - left forearm c/f AK vs SCCis vs SCC  - right jawline c/f IDN vs BCC  1. 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 AK    ____________________________________________    Assessment & Plan:   # Neoplasm of uncertain behavior s/p shave bx 05/04/23   - left nasal ala c/f BCC   - left forearm c/f AK vs SCCis vs SCC  - right jawline c/f IDN vs BCC    # AKs and background of actinic damage  - Initiate Efudex 5% cream and Dovonex 0.005% cream applied in equal parts to the twice daily for 4-7 days to scalp, ears, forehead, cheeks  - If Dovonex not covered, recommend Efudex BID 3-4 weeks   - Expectations of redness, blistering and scabbing as well as time course discussed  - Keep away from children/pets  - Wash hands after application  - LN2 today as below x3    # Benign skin findings  - Physical exam demonstrating benign skin findings as below.  - Seborrheic keratoses  - Cherry hemangiomas  - Solar lentingos   - Benign nevi  - Reassurance provided.  - ABCDEs of melanoma handout provided.  - Sunscreen advissed  - Advised patient to return to clinic for any new or concerning lesions.      Procedures Performed:   Cryotherapy procedure note  - location: forehead and right helix  - number of lesions treated: 3  After verbal consent and discussion of risks and benefits including but no limited to dyspigmentation/scar, blister, and pain, lesions treated with 1-2mm freeze border for 2 cycles with liquid nitrogen, post cryotherapy instructions provided    Shave biopsy procedure note  - location(s): left nasal ala, left forearm, right jawline  After discussion of benefits and  risks including but not limited to bleeding/bruising, pain/swelling, infection, scar, incomplete removal, nerve damage/numbness, recurrence, and non-diagnostic biopsy, written consent, verbal consent and photographs obtained, time-out performed, area cleaned with alcohol, 1% lidocaine injected to obtain anesthesia, shave biopsy performed, hemostasis achieved with drysol, Vaseline and bandage applied, post-care instructions provided      Follow-up: 6 months, prn for new or changing lesions, advised to call for if he has concerns  on lower extremities     Staff and Resident:     Edvin Manning MD  PGY-2 Dermatology  Pager: [phone]    I have personally examined this patient and agree with the resident doctor's documentation and plan of care. I have reviewed and amended the resident's note above. The documentation accurately reflects my clinical observations, diagnoses, treatment and follow-up plans. I was present for key portions of the procedure.       Martina Loyd MD  Dermatology Staff    ____________________________________________    CC: Skin Check (Brooks is here for a skin check and has spots of concern on his chest and back. )    HPI:  Mr. Brooks Summers is a(n) 78 year old male who presents today as a return patient for skin concern    Patient was last seen by myself on 4/14/2022 and at that time had a benign skin exam.  He returns today stating that he has a lesion concern on his central chest.  Is not bleeding, tender, or growing.  He has noticed other sandpapery like lesions on his scalp and his forehead.  He has never done field therapy in the past before he tries to wear sunscreen when outside.  He plays golf on a regular basis and plays about 9 holes.  He has no other skin concerns at this time. Patient is otherwise feeling well, without additional skin concerns.    Labs Reviewed:  N/A    Physical Exam:  Vitals: There were no vitals taken for this visit.  SKIN: TBSE  - Left nasal nahomy with 3 mm pearly pink  papule with arborizing vessels noted on dermoscopy   - Right jawline with 3 mm pink papule with vessels noted on dermoscopy  - Left forearm with 4 mm keratotic erythematous scaly papule  - Background of actinic damage on the forehead, face, ears, with few gritty pink papules on the forehead, temples, and helices  - Waxy stuck on tan to brown papules on the trunk and extremities.   - Dome shaped bright red papules on the trunk and extremities.   - There are fine lines and dyspigmentation on sun exposed areas of the face and chest.  - Scattered brown macules on sun exposed areas.  - Light to dark brown symmetric macules and papules with normal pigment networks on dermoscopy trunk and extremities   - Prior sites of skin cancer examined without concerning features  - No other lesions of concern on areas examined.     Medications:  Current Outpatient Medications   Medication     acetaminophen (TYLENOL) 325 MG tablet     allopurinol (ZYLOPRIM) 100 MG tablet     enoxaparin ANTICOAGULANT (LOVENOX) 80 MG/0.8ML syringe     enoxaparin ANTICOAGULANT (LOVENOX) 80 MG/0.8ML syringe     hydrocortisone (CORTAID) 1 % external cream     levETIRAcetam (KEPPRA) 500 MG tablet     losartan (COZAAR) 25 MG tablet     metoprolol succinate ER (TOPROL XL) 50 MG 24 hr tablet     Multiple Vitamins-Minerals (MULTIVITAMIN ADULTS 50+) TABS     oxybutynin ER (DITROPAN-XL) 10 MG 24 hr tablet     PROVIDER DOSED MANAGED WARFARIN, COUMADIN, OUTPATIENT     simvastatin (ZOCOR) 40 MG tablet     VITAMIN D, CHOLECALCIFEROL, PO     warfarin ANTICOAGULANT (COUMADIN) 5 MG tablet     No current facility-administered medications for this visit.      Past Medical History:   Patient Active Problem List   Diagnosis     Rotator cuff (capsule) sprain     Other postprocedural status(V45.89)     Aortic valve stenosis, severe     Chronic systolic heart failure (H)     Bicuspid aortic valve     S/P aortic valve replacement     Smoking hx     LBBB (left bundle branch  block)     Pneumothorax, left     Heart failure, chronic systolic (H)     Cardiomyopathy, dilated, nonischemic (H)     CKD (chronic kidney disease) stage 3, GFR 30-59 ml/min (H)     Dyslipidemia     Automatic implantable cardioverter-defibrillator in situ- [employer], Bi-Ventricular- INT dependent     Endocarditis     S/P AVR     Need for prophylactic antibiotic     Hyperlipidemia with target LDL less than 100     Finger injury     Paroxysmal atrial fibrillation (H)     Long term current use of anticoagulant therapy     Trigger ring finger of left hand     Nocturnal hypoxemia     Neoplasm of uncertain behavior of skin     AK (actinic keratosis)     History of nonmelanoma skin cancer     Seizure (H)     Cardiomyopathy (H)     ICD (implantable cardioverter-defibrillator) battery depletion     Anticoagulated on Coumadin     Anticoagulated     MIC (generalized anxiety disorder)     Mild cognitive impairment     Past Medical History:   Diagnosis Date     Anticoagulated on Coumadin 8/5/2021     BCC (basal cell carcinoma), face 5/16/2013     Bicuspid aortic valve      Chronic systolic heart failure (H)      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 Tx with Dapto/RIFx 8 weeks.     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

## 2023-05-04 NOTE — LETTER
5/4/2023       RE: Brooks Summers  [address]  [place] [address]     Dear Colleague,    Thank you for referring your patient, Brooks Summers, to the [facility] DERMATOLOGY [facility]INIC [place] at [facility]. Please see a copy of my visit note below.    [facility] Dermatology Note  Encounter Date: May 4, 2023  Office Visit     Dermatology Problem List:  FBSE 05/04/23   0. Neoplasm of uncertain behavior s/p shave bx 05/04/23   - left nasal ala c/f BCC   - left forearm c/f AK vs SCCis vs SCC  - right jawline c/f IDN vs BCC  1. Hx of NMSC:  - BCC, R back s/p [facility] 7/2019  - SCCIS, R jawline s/p [facility] 7/2019  - BCC, L malar cheek, s/p 11/17  - BCC, R nasal ala, s/p 11/17  - BCC, dorsal nose, s/p Mohs 05/2013  2. AK    ____________________________________________    Assessment & Plan:   # Neoplasm of uncertain behavior s/p shave bx 05/04/23   - left nasal ala c/f BCC   - left forearm c/f AK vs SCCis vs SCC  - right jawline c/f IDN vs BCC    # AKs and background of actinic damage  - Initiate Efudex 5% cream and Dovonex 0.005% cream applied in equal parts to the twice daily for 4-7 days to scalp, ears, forehead, cheeks  - If Dovonex not covered, recommend Efudex BID 3-4 weeks   - Expectations of redness, blistering and scabbing as well as time course discussed  - Keep away from children/pets  - Wash hands after application  - LN2 today as below x3    # Benign skin findings  - Physical exam demonstrating benign skin findings as below.  - Seborrheic keratoses  - Cherry hemangiomas  - Solar lentingos   - Benign nevi  - Reassurance provided.  - ABCDEs of melanoma handout provided.  - Sunscreen advissed  - Advised patient to return to clinic for any new or concerning lesions.      Procedures Performed:   Cryotherapy procedure note  - location: forehead and right helix  - number of lesions treated: 3  After verbal consent and discussion of risks and  benefits including but no limited to dyspigmentation/scar, blister, and pain, lesions treated with 1-2mm freeze border for 2 cycles with liquid nitrogen, post cryotherapy instructions provided    Shave biopsy procedure note  - location(s): left nasal ala, left forearm, right jawline  After discussion of benefits and risks including but not limited to bleeding/bruising, pain/swelling, infection, scar, incomplete removal, nerve damage/numbness, recurrence, and non-diagnostic biopsy, written consent, verbal consent and photographs obtained, time-out performed, area cleaned with alcohol, 1% lidocaine injected to obtain anesthesia, shave biopsy performed, hemostasis achieved with drysol, Vaseline and bandage applied, post-care instructions provided      Follow-up: 6 months, prn for new or changing lesions, advised to call for if he has concerns  on lower extremities     Staff and Resident:     Edvin Manning MD  PGY-2 Dermatology  Pager: [phone]    I have personally examined this patient and agree with the resident doctor's documentation and plan of care. I have reviewed and amended the resident's note above. The documentation accurately reflects my clinical observations, diagnoses, treatment and follow-up plans. I was present for key portions of the procedure.       Martina Loyd MD  Dermatology Staff    ____________________________________________    CC: Skin Check (Brooks is here for a skin check and has spots of concern on his chest and back. )    HPI:  Mr. Brooks Summers is a(n) 78 year old male who presents today as a return patient for skin concern    Patient was last seen by myself on 4/14/2022 and at that time had a benign skin exam.  He returns today stating that he has a lesion concern on his central chest.  Is not bleeding, tender, or growing.  He has noticed other sandpapery like lesions on his scalp and his forehead.  He has never done field therapy in the past before he tries to wear sunscreen when outside.   He plays golf on a regular basis and plays about 9 holes.  He has no other skin concerns at this time. Patient is otherwise feeling well, without additional skin concerns.    Labs Reviewed:  N/A    Physical Exam:  Vitals: There were no vitals taken for this visit.  SKIN: TBSE  - Left nasal nahomy with 3 mm pearly pink papule with arborizing vessels noted on dermoscopy   - Right jawline with 3 mm pink papule with vessels noted on dermoscopy  - Left forearm with 4 mm keratotic erythematous scaly papule  - Background of actinic damage on the forehead, face, ears, with few gritty pink papules on the forehead, temples, and helices  - Waxy stuck on tan to brown papules on the trunk and extremities.   - Dome shaped bright red papules on the trunk and extremities.   - There are fine lines and dyspigmentation on sun exposed areas of the face and chest.  - Scattered brown macules on sun exposed areas.  - Light to dark brown symmetric macules and papules with normal pigment networks on dermoscopy trunk and extremities   - Prior sites of skin cancer examined without concerning features  - No other lesions of concern on areas examined.     Medications:  Current Outpatient Medications   Medication    acetaminophen (TYLENOL) 325 MG tablet    allopurinol (ZYLOPRIM) 100 MG tablet    enoxaparin ANTICOAGULANT (LOVENOX) 80 MG/0.8ML syringe    enoxaparin ANTICOAGULANT (LOVENOX) 80 MG/0.8ML syringe    hydrocortisone (CORTAID) 1 % external cream    levETIRAcetam (KEPPRA) 500 MG tablet    losartan (COZAAR) 25 MG tablet    metoprolol succinate ER (TOPROL XL) 50 MG 24 hr tablet    Multiple Vitamins-Minerals (MULTIVITAMIN ADULTS 50+) TABS    oxybutynin ER (DITROPAN-XL) 10 MG 24 hr tablet    PROVIDER DOSED MANAGED WARFARIN, COUMADIN, OUTPATIENT    simvastatin (ZOCOR) 40 MG tablet    VITAMIN D, CHOLECALCIFEROL, PO    warfarin ANTICOAGULANT (COUMADIN) 5 MG tablet     No current facility-administered medications for this visit.      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[employer], Bi-Ventricular- INT dependent    Endocarditis    S/P AVR    Need for prophylactic antibiotic    Hyperlipidemia with target LDL less than 100    Finger injury    Paroxysmal atrial fibrillation (H)    Long term current use of anticoagulant therapy    Trigger ring finger of left hand    Nocturnal hypoxemia    Neoplasm of uncertain behavior of skin    AK (actinic keratosis)    History of nonmelanoma skin cancer    Seizure (H)    Cardiomyopathy (H)    ICD (implantable cardioverter-defibrillator) battery depletion    Anticoagulated on Coumadin    Anticoagulated    MIC (generalized anxiety disorder)    Mild cognitive impairment     Past Medical History:   Diagnosis Date    Anticoagulated on Coumadin 8/5/2021    BCC (basal cell carcinoma), face 5/16/2013    Bicuspid aortic valve     Chronic systolic heart failure (H)     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 Tx with Dapto/RIFx 8 weeks.    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

## 2023-05-04 NOTE — NURSING NOTE
Dermatology Rooming Note    Brooks Summers's goals for this visit include:   Chief Complaint   Patient presents with     Skin Check     Brooks is here for a skin check and has spots of concern on his chest and back.      Nitin Delgado, EMT

## 2023-05-04 NOTE — PATIENT INSTRUCTIONS
For the face and scalp, these are low-risk precancers called actinic keratoses. We will treat them with an anti-cancer cream.  Please start Efudex and calcipotriene mixed equally together. Apply twice daily to above areas for 4-10 days or until skin gets red. Stop applying when skin gets red.  Skin will get red and crusty (like hamburger).  Please keep Efudex away from pets and children. Wash hands thoroughly after application.   See handout below for more information about Efudex.    If medications are more than ~$65, please contact me for a compounded version through [facility].  Alternatively can also use Efudex alone.     If you want to use Efudex alone, apply twice daily for 3-4 weeks. Stop when significant irritation occurs.        Efudex Treatment  Today, you are being prescribed Fluorouracil (Efudex) a topical cream used for the treatment of Actinic Keratosis (AKs).  The medication is working to eliminate the unhealthy cells.  This treatment may be unattractive and somewhat uncomfortable.  You may experience some mild discomfort while being treated.  You will want to stop any other creams such as glycolic acid products, retin A, Tazorac, etc. to the area. You may use bland makeup/cover-up as long as it doesn't sting or cause you discomfort.  Apply the cream at night as your physician recommends. Use a cotton-tipped applicator, or use gloves if applying it with your fingertips. If applied with unprotected fingertips, it is important to wash your hands well after you apply this medicine.   Keep this medication away from pets.  We recommend avoiding excessive sun exposure to the treated area  You may use moisturizing creams over bothersome areas such as Vanicream or Cetaphil cream if the reaction becomes too bothersome. Please, call the clinic if this occurs.   Potential Side Effects  Your treated areas may be unsightly during therapy.  This will improve slowly following the discontinuation of therapy.    During the first week of application, mild inflammation may occur.   During the following weeks, redness, and swelling may occur with some crusting and burning.   Lesions resolve as the skin exfoliates.   Over 1 to 2 weeks, new skin grows into the treatment area.  Keep this medication away from pets  Specific side effects that usually do not require medical attention (report to your doctor or health care professional if they continue or are bothersome) include:  Red or dark-colored skin   Mild erosion (loss of upper layer of skin)   Mild eye irritation including burning, itching, sensitivity, stinging, or watering   Increased sensitivity of the skin to sun and ultraviolet light   Pain and burning of the affected area   Dryness, scaling or swelling of the affected area   Skin rash, itching of the affected area   Tenderness   If you have severe pain, please, call the clinic immediately and indicate that you have pain.  Ask for a call from the RN.     Who should I call with questions?  [facility]: [phone]  [facility]: [phone]  For urgent needs outside of business hours call the [facility] at [phone] and ask for the dermatology resident on call  Wound Care After a Biopsy    What is a skin biopsy?  A skin biopsy allows the doctor to examine a very small piece of tissue under the microscope to determine the diagnosis and the best treatment for the skin condition. A local anesthetic (numbing medicine) is injected with a very small needle into the skin area to be tested. A small piece of skin is taken from the area. Sometimes a suture (stitch) is used.     What are the risks of a skin biopsy?  I will experience scar, bleeding, swelling, pain, crusting and redness. I may experience incomplete removal or recurrence. Risks of this procedure are excessive bleeding, bruising, infection, nerve damage, numbness, thick (hypertrophic or keloidal)  scar and non-diagnostic biopsy.    How should I care for my wound for the first 24 hours?  Keep the wound dry and covered for 24 hours  If it bleeds, hold direct pressure on the area for 15 minutes. If bleeding does not stop, call us or go to the emergency room  Avoid strenuous exercise the first 1-2 days or as your doctor instructs you    How should I care for the wound after 24 hours?  After 24 hours, remove the bandage  You may bathe or shower as normal  If you had a scalp biopsy, you can shampoo as usual and can use shower water to clean the biopsy site daily  Clean the wound once a day with gentle soap and water  Do not scrub, be gentle  Apply white petroleum/Vaseline after cleaning the wound with a cotton swab or a clean finger, and keep the site covered with a Bandaid /bandage. Bandages are not necessary with a scalp biopsy  If you are unable to cover the site with a Bandaid /bandage, re-apply ointment 2-3 times a day to keep the site moist. Moisture will help with healing  Avoid strenuous activity for first 1-2 days  Avoid lakes, rivers, pools, and oceans until the stitches are removed or the site is healed    How do I clean my wound?  Wash hands thoroughly with soap or use hand  before all wound care  Clean the wound with gentle soap and water  Apply white petroleum/Vaseline  to wound after it is clean  Replace the Bandaid /bandage to keep the wound covered for the first few days or as instructed by your doctor  If you had a scalp biopsy, warm shower water to the area on a daily basis should suffice    What should I use to clean my wound?   Cotton-tipped applicators (Qtips )  White petroleum jelly (Vaseline ). Use a clean new container and use Q-tips to apply.  Bandaids  as needed  Gentle soap     How should I care for my wound long term?  Do not get your wound dirty  Keep up with wound care for one week or until the area is healed.  A small scab will form and fall off by itself when the area is  completely healed. The area will be red and will become pink in color as it heals. Sun protection is very important for how your scar will turn out. Sunscreen with an SPF 30 or greater is recommended once the area is healed.  You should have some soreness but it should be mild and slowly go away over several days. Talk to your doctor about using tylenol for pain,    When should I call my doctor?  If you have increased:   Pain or swelling  Pus or drainage (clear or slightly yellow drainage is ok)  Temperature over 100F  Spreading redness or warmth around wound    When will I hear about my results?  The biopsy results can take 2 weeks to come back.  Your results will automatically release to [facility] before your provider has even reviewed them.  The clinic will call you with the results, send you a [facility] message, or have you schedule a follow-up clinic or phone time to discuss the results.  Contact our clinics if you do not hear from us in 2 weeks.    Who should I call with questions?  [facility]: [phone]  [facility]: [phone]  For urgent needs outside of business hours call the [facility] at [phone] and ask for the dermatology resident on call

## 2023-05-05 DIAGNOSIS — C44.311 BASAL CELL CARCINOMA OF NOSE: Primary | ICD-10-CM

## 2023-05-05 LAB
PATH REPORT.COMMENTS IMP SPEC: ABNORMAL
PATH REPORT.COMMENTS IMP SPEC: YES
PATH REPORT.FINAL DX SPEC: ABNORMAL
PATH REPORT.GROSS SPEC: ABNORMAL
PATH REPORT.MICROSCOPIC SPEC OTHER STN: ABNORMAL
PATH REPORT.RELEVANT HX SPEC: ABNORMAL

## 2023-05-05 NOTE — TELEPHONE ENCOUNTER
Per Office Visit from 5.4.23    - If Dovonex not covered, recommend Efudex BID 3-4 weeks     Writer sent patient a [employer] message to follow up and let him know plan of action due to Dovonox PA denial.    Felicia CASH RN

## 2023-05-09 ENCOUNTER — TELEPHONE (OUTPATIENT)
Dept: DERMATOLOGY | Facility: CLINIC | Age: 78
End: 2023-05-09
Payer: MEDICARE

## 2023-05-09 NOTE — TELEPHONE ENCOUNTER
Prior Authorization Retail Medication Request    Medication/Dose: calcipotriene (DOVONOX) 0.005 % external cream  ICD code (if different than what is on RX):  AK (actinic keratosis) [L57.0]  - Primary   Previously Tried and Failed:  See Chart  Rationale:      Insurance Name:  Medicare  Insurance ID:  [id number]

## 2023-05-10 ENCOUNTER — TELEPHONE (OUTPATIENT)
Dept: DERMATOLOGY | Facility: CLINIC | Age: 78
End: 2023-05-10
Payer: MEDICARE

## 2023-05-10 NOTE — TELEPHONE ENCOUNTER
Prior Authorization Retail Medication Request    Medication/Dose: calcipotriene (DOVONOX) 0.005 % external cream  ICD code (if different than what is on RX):  AK (actinic keratosis) [L57.0]  - Primary   Previously Tried and Failed:  See chart  Rationale:      Insurance Name:  Medicare  Insurance ID:  [id number]

## 2023-05-11 ENCOUNTER — TELEPHONE (OUTPATIENT)
Dept: DERMATOLOGY | Facility: CLINIC | Age: 78
End: 2023-05-11
Payer: MEDICARE

## 2023-05-11 NOTE — TELEPHONE ENCOUNTER
Via phone patient is aware his 8-17-23 appointment with  in [facility] was rescheduled to 11-16-23 per Provider

## 2023-05-15 ENCOUNTER — TELEPHONE (OUTPATIENT)
Dept: DERMATOLOGY | Facility: CLINIC | Age: 78
End: 2023-05-15
Payer: MEDICARE

## 2023-05-15 NOTE — TELEPHONE ENCOUNTER
Left patient a voicemail to schedule a consult & mohs for BCC nose with Dr. Story. Provided my direct phone number.    Shweta Aguilar on 5/15/2023 at 8:45 AM

## 2023-05-15 NOTE — TELEPHONE ENCOUNTER
Called patient to schedule surgery with Dr. Story    Date of Surgery: 07/13    Surgery type: mohs    Consult scheduled: Yes    Has patient had mohs with us before? Yes    Additional comments: jose Aguilar on 5/15/2023 at 9:55 AM

## 2023-05-16 NOTE — TELEPHONE ENCOUNTER
FUTURE VISIT INFORMATION      FUTURE VISIT INFORMATION:    Date: 7.13.23    Time: 9:30    Location: [facility]  REFERRAL INFORMATION:    Referring provider:  Nigel    Referring providers clinic:  Derm    Reason for visit/diagnosis  BCC nose    RECORDS REQUESTED FROM:       Clinic name Comments Records Status Imaging Status   Derm 5.4.23  Path # [id number] Epic Epic

## 2023-05-18 DIAGNOSIS — R35.1 NOCTURIA: ICD-10-CM

## 2023-05-22 RX ORDER — OXYBUTYNIN CHLORIDE 10 MG/1
10 TABLET, EXTENDED RELEASE ORAL DAILY
Qty: 30 TABLET | Refills: 0 | Status: SHIPPED | OUTPATIENT
Start: 2023-05-22 | End: 2023-07-20

## 2023-05-22 NOTE — TELEPHONE ENCOUNTER
oxybutynin ER (DITROPAN-XL) 10 MG 24 hr tablet 90 tablet 3 3/8/2022         Last Written Prescription Date:  3-8-2022  Last Fill Quantity: 90,   # refills: 3  Last Office Visit : 3-8-2022  Future Office visit:  none    *OVER A YEAR SINCE LAST CLINIC APPOINTMENT  *NO SCHEDULED APPOINTMENT  *Scheduling has been notified to contact the pt for appointment.  *30 day mauricio refill sent to the pharmacy - including instructions for patient to call the clinic and schedule an appointment.        Kathleen M Doege RN

## 2023-05-23 ENCOUNTER — OFFICE VISIT (OUTPATIENT)
Dept: CARDIOLOGY | Facility: CLINIC | Age: 78
End: 2023-05-23
Attending: INTERNAL MEDICINE
Payer: MEDICARE

## 2023-05-23 VITALS
SYSTOLIC BLOOD PRESSURE: 107 MMHG | HEIGHT: 70 IN | OXYGEN SATURATION: 97 % | DIASTOLIC BLOOD PRESSURE: 64 MMHG | BODY MASS INDEX: 23.16 KG/M2 | WEIGHT: 161.8 LBS | HEART RATE: 101 BPM

## 2023-05-23 DIAGNOSIS — Z95.2 S/P AVR: ICD-10-CM

## 2023-05-23 DIAGNOSIS — I33.0 ACUTE BACTERIAL ENDOCARDITIS: Primary | ICD-10-CM

## 2023-05-23 DIAGNOSIS — I42.8 NONISCHEMIC CARDIOMYOPATHY (H): ICD-10-CM

## 2023-05-23 DIAGNOSIS — Z95.810 BIVENTRICULAR IMPLANTABLE CARDIOVERTER-DEFIBRILLATOR (ICD) IN SITU: ICD-10-CM

## 2023-05-23 LAB — LVEF ECHO: NORMAL

## 2023-05-23 PROCEDURE — 99214 OFFICE O/P EST MOD 30 MIN: CPT | Mod: 24 | Performed by: INTERNAL MEDICINE

## 2023-05-23 PROCEDURE — G0463 HOSPITAL OUTPT CLINIC VISIT: HCPCS | Performed by: INTERNAL MEDICINE

## 2023-05-23 PROCEDURE — 93306 TTE W/DOPPLER COMPLETE: CPT | Performed by: INTERNAL MEDICINE

## 2023-05-23 ASSESSMENT — PAIN SCALES - GENERAL: PAINLEVEL: NO PAIN (0)

## 2023-05-23 NOTE — PROGRESS NOTES
SUBJECTIVE:  Brooks Summers is a 78 year old male who presents for yearly follow-up.   S/P Aortic valve replacement (23 mm Bogdan Johnson PERIMOUNT Magna tissue valve). On 7/14/11. 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  This involved aortic subvalvular debridement, AVR with 21 mm Johnson Perimount magna valve and pericardial patch repair of a large perforation on the anterior mitral leaflet.  Postprocedure patient did extremely well.  Patient also needed biventricular pacing with defibrillator for low LV function.  Subsequently his LV function improved to normal.  Today patient had no cardiac complaints.  He is doing very well from cardiac standpoint.  Unfortunately his wife developed interstitial lung disease secondary to rheumatoid arthritis and she is unable to walk more than 10 feet and also is on continuous home oxygen.  This is upsetting patient a lot.  Patient Active Problem List    Diagnosis Date Noted     MIC (generalized anxiety disorder) 08/24/2022     Priority: Medium     Mild cognitive impairment 08/24/2022     Priority: Medium     Anticoagulated 07/19/2022     Priority: Medium     Anticoagulated on Coumadin 08/05/2021     Priority: Medium     ICD (implantable cardioverter-defibrillator) battery depletion 02/13/2021     Priority: Medium     Added automatically from request for surgery [id number]       Cardiomyopathy (H) 01/18/2021     Priority: Medium     Added automatically from request for surgery [id number]       Seizure (H) 05/10/2020     Priority: Medium     Neoplasm of uncertain behavior of skin 06/08/2019     Priority: Medium     AK (actinic keratosis) 06/08/2019     Priority: Medium     History of nonmelanoma skin cancer 06/08/2019     Priority: Medium     Nocturnal hypoxemia 10/15/2018     Priority: Medium     Trigger ring finger of  left hand 11/16/2015     Priority: Medium     Paroxysmal atrial fibrillation (H) 02/25/2015     Priority: Medium     Long term current use of anticoagulant therapy 02/25/2015     Priority: Medium     Problem list name updated by automated process. Provider to review       Finger injury 10/08/2014     Priority: Medium     Hyperlipidemia with target LDL less than 100 10/21/2013     Priority: Medium     Diagnosis updated by automated process. Provider to review and confirm.       Need for prophylactic antibiotic 04/12/2013     Priority: Medium     Aortic valve and past endocarditis        S/P AVR 01/25/2013     Priority: Medium     Endocarditis 01/21/2013     Priority: Medium     Cultures negative but 16S rRNA PCR showed Staph capitis.  Treated with Dapto and RIF x 8 weeks.       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 Provider to review       LBBB (left bundle branch block) 01/28/2012     Priority: Medium     Received CRT-D;  msec       Pneumothorax, left 01/28/2012     Priority: Medium     Observed following CRT-D implant; small. Observe and repeat CXR       Heart failure, chronic systolic (H) 01/28/2012     Priority: Medium     Stable; NYHA classII       Cardiomyopathy, dilated, nonischemic (H) 01/28/2012     Priority: Medium     Mild CAD; EF 15-20%       CKD (chronic kidney disease) stage 3, GFR 30-59 ml/min (H) 01/28/2012     Priority: Medium     Dyslipidemia 01/28/2012     Priority: Medium     On Pravavastatin       Aortic valve stenosis, severe 07/14/2011     Priority: Medium     Chronic systolic heart failure (H) 07/14/2011     Priority: Medium     Bicuspid aortic valve 07/14/2011     Priority: Medium     S/P aortic valve replacement 07/14/2011     Priority: Medium     Smoking hx 07/14/2011     Priority: Medium     Rotator cuff (capsule) sprain 02/17/2009      Priority: Medium     Other postprocedural status(V45.89) 02/17/2009     Priority: Medium    .  Current Outpatient Medications   Medication Sig     acetaminophen (TYLENOL) 325 MG tablet Take 2 tablets (650 mg) by mouth every 4 hours as needed for mild pain     allopurinol (ZYLOPRIM) 100 MG tablet TAKE 1 TABLET(100 MG) BY MOUTH DAILY     calcipotriene (DOVONOX) 0.005 % external cream Apply topically 2 times daily     enoxaparin ANTICOAGULANT (LOVENOX) 80 MG/0.8ML syringe Inject 0.8 mLs (80 mg) Subcutaneous every 12 hours     enoxaparin ANTICOAGULANT (LOVENOX) 80 MG/0.8ML syringe Inject 0.7 mLs (70 mg) Subcutaneous 2 times daily     fluorouracil (EFUDEX) 5 % external cream Apply topically 2 times daily     hydrocortisone (CORTAID) 1 % external cream Apply topically daily as needed     levETIRAcetam (KEPPRA) 500 MG tablet Take 1 tablet (500 mg) by mouth 2 times daily Call clinic to schedule follow up appointment.     losartan (COZAAR) 25 MG tablet Take 0.5 tablets (12.5 mg) by mouth daily     metoprolol succinate ER (TOPROL XL) 50 MG 24 hr tablet Take 1 tablet (50 mg) by mouth daily     Multiple Vitamins-Minerals (MULTIVITAMIN ADULTS 50+) TABS Take 1 tablet by mouth daily     oxybutynin ER (DITROPAN XL) 10 MG 24 hr tablet Take 1 tablet (10 mg) by mouth daily Call clinic to schedule follow up appointment.**NEEDED FOR REFILLS**     PROVIDER DOSED MANAGED WARFARIN, COUMADIN, OUTPATIENT Per coumadin clinic     simvastatin (ZOCOR) 40 MG tablet Take 1 tablet (40 mg) by mouth At Bedtime     VITAMIN D, CHOLECALCIFEROL, PO Take 1,000 Units by mouth daily     warfarin ANTICOAGULANT (COUMADIN) 5 MG tablet Take 5 mg on every Tuesday on 7.5 mg all other days of the week or as directed by INR Clinic     No current facility-administered medications for this visit.     Past Medical History:   Diagnosis Date     Anticoagulated on Coumadin 8/5/2021     BCC (basal cell carcinoma), face 5/16/2013     Bicuspid aortic valve      Chronic  systolic heart failure (H)      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 Tx with Dapto/RIFx 8 weeks.     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     Mild cognitive impairment 8/24/2022     Paroxysmal A-fib (H)     Post-op 7/14/2011, 1/26/13     Paroxysmal atrial fibrillation (H) 2/25/2015     Rotator Cuff (Capsule) Sprain and Strain      S/P aortic valve replacement     7/14/2011, re-do 1/25/13 due to endocarditis     Seizure (H) 5/10/2020     Smoking hx      Thrombocytopenia (H)      Past Surgical History:   Procedure Laterality Date     ANESTHESIA CARDIOVERSION  7/18/2011    Procedure:ANESTHESIA CARDIOVERSION; Cardioversion; Surgeon:GENERIC ANESTHESIA PROVIDER; Location:[facility] OR     COLONOSCOPY       COLONOSCOPY N/A 11/19/2018    Procedure: COLONOSCOPY;  Surgeon: Herman Mcginnis MD;  Location:  GI     CORONARY ANGIOGRAPHY ADULT ORDER       CYSTOSCOPY, BIOPSY URETHRA N/A 4/3/2017    Procedure: CYSTOSCOPY, BIOPSY URETHRA;  Surgeon: Marlena Mora MD;  Location: [facility] OR     ENDOSCOPY UPPER, COLONOSCOPY, COMBINED       EP ICD GENERATOR REPLACEMENT DUAL N/A 3/10/2021    Procedure: EP ICD GENERATOR CHANGE DUAL;  Surgeon: Mekhi Be MD;  Location:  HEART CARDIAC CATH LAB     HC REMOV & REPLACE IMPLT DEFIB PULSE GEN MULT LEAD  12/3/2015          HEMORRHOID SURGERY       HERNIORRHAPHY INGUINAL Left 3/9/2023    Procedure: HERNIORRHAPHY, Left INGUINAL, OPEN;  Surgeon: Emery Bergeron MD;  Location:  OR     IMPLANT AUTOMATIC IMPLANTABLE CARDIOVERTER DEFIBRILLATOR       MOHS MICROGRAPHIC PROCEDURE       ORTHOPEDIC SURGERY      shoulder,right     REDO STERNOTOMY REPLACE VALVE AORTIC  1/25/2013    Procedure: REDO STERNOTOMY REPLACE VALVE AORTIC;  Redo Sternotomy, Redo Aortic Valve Replacement on pump oxygenator.  Intraoperative Transesophageal Echocardiogram;  Surgeon: Stephanie Hampton MD;  Location: [facility] OR     REPAIR VALVE MITRAL  2013     REPLACE VALVE AORTIC  2011    Procedure:REPLACE VALVE AORTIC; Median Sternotomy, Bioprosthesis,  Aortic Valve replacement on pump with oxygenator. Intra-operative Transesophogeal Echocardiogram.; Surgeon:STEPHANIE HAMPTON; Location:[facility] OR     teeth extractions       TRANSPLANT       Allergies   Allergen Reactions     Blood Transfusion Related (Informational Only) Unknown     Patient has a history of a clinically significant antibody against RBC antigens.  A delay in compatible RBCs may occur.      Lisinopril Cough     Social History     Socioeconomic History     Marital status:      Spouse name: Not on file     Number of children: Not on file     Years of education: Not on file     Highest education level: Not on file   Occupational History     Not on file   Tobacco Use     Smoking status: Former     Packs/day: 1.00     Years: 20.00     Pack years: 20.00     Types: Cigarettes     Quit date: 1989     Years since quittin.4     Smokeless tobacco: Never   Vaping Use     Vaping status: Never Used   Substance and Sexual Activity     Alcohol use: Yes     Comment: rare     Drug use: No     Sexual activity: Yes     Partners: Female     Birth control/protection: None     Comment:    Other Topics Concern     Parent/sibling w/ CABG, MI or angioplasty before 65F 55M? No   Social History Narrative     since 1982 2 children 4 grandchildren.    Carpet sales:  Semi retired. Athlete in school, Golf, fish, yardwork     Social Determinants of Health     Financial Resource Strain: Not on file   Food Insecurity: Not on file   Transportation Needs: Not on file   Physical Activity: Not on file   Stress: Not on file   Social Connections: Not on file   Intimate Partner Violence: Not on file   Housing Stability: Not on file     Family History   Problem Relation Age of Onset     Cancer  "Mother 92        stomach, colon     Hypertension Mother      Retinal detachment Mother      C.A.D. Father 68        bypass, carotid      Prostate Cancer Father 70     Heart Disease Father      Depression Sister      Anxiety Disorder Sister      Depression Brother      Anxiety Disorder Brother      Cancer Brother 64        lung cancer, petroleum     Cancer Brother      Glaucoma No family hx of      Macular Degeneration No family hx of      Strabismus No family hx of      Glasses (<7 y/o) No family hx of      Anesthesia Reaction No family hx of      Bleeding Disorder No family hx of      Venous thrombosis No family hx of           REVIEW OF SYSTEMS:  General: negative, fever, chills, night sweats  Skin: negative, acne, rash and scaling  Eyes: negative, double vision, eye pain and photophobia  Ears/Nose/Throat: negative, nasal congestion and purulent rhinorrhea  Respiratory: No dyspnea on exertion, No cough, No hemoptysis and negative  Cardiovascular: negative, palpitations, tachycardia, irregular heart beat, chest pain, exertional chest pain or pressure, paroxysmal nocturnal dyspnea, dyspnea on exertion, orthopnea, lower extremity edema, syncope or near-syncope and cyanosis       OBJECTIVE:  Height 1.778 m (5' 10\"), weight 73.4 kg (161 lb 12.8 oz).  General Appearance: healthy, alert, active and no distress  Head: Normocephalic. No masses, lesions, tenderness or abnormalities  Eyes: conjuctiva clear, PERRL, EOM intact  Ears: External ears normal. Canals clear. TM's normal.  Nose: Nares normal  Mouth: normal  Neck: Supple, no cervical adenopathy, no thyromegaly  Lungs: clear to auscultation  Cardiac: regular rate and rhythm, normal S1 and S2, ESM.         ASSESSMENT/PLAN:  Patient here for follow-up.  Patient had bioprosthetic AVR in 2011 for bicuspid aortic valve with severe aortic stenosis.  Subsequently patient had a bout of Staph aureus sepsis and endocarditis of the prosthetic valve.  He had repeat AVR with 21 mm " Johnson magna valve, pericardial patch closure of a large perforation of anterior mitral leaflet and aortic root debridement.  After this patient did extremely well.  Prior to the first surgery patient had a severe LV dysfunction requiring AVR as well as BiV pacing and ICD placement.  Subsequently his LV function improved to normal.  Today patient had no cardiac complaints.  Overall he is doing very well.  Currently patient is in a lot of stress due to his wife developing interstitial lung disease secondary to rheumatoid arthritis.  She is unable to walk more than 10 feet and is on continuous home oxygen.  Reviewed today's echocardiogram and result discussed with patient.  Normal biventricular function.  Normal prosthetic function with a mean gradient of 10 mmHg.  No paravalvular aortic regurgitation.  Trace AI.  Overall valve function stable.  Current cardiac medications Zocor 40 mg daily, Toprol-XL 50 mg daily and losartan 25 mg daily.  Patient is also on warfarin for paroxysmal atrial fibrillation.  Patient is advised to continue his current medications.  Total visit duration 30 minutes.  This included face-to-face interview, physical exam, chart review, review of echocardiograms and documentation.

## 2023-05-23 NOTE — PATIENT INSTRUCTIONS
In one year, complete an echocardiogram then follow up with Dr. Mckenzie afterwards.   You will receive a scheduling reminder in advance.

## 2023-05-23 NOTE — LETTER
5/23/2023      RE: Brooks Summers  [address]  [place] [address]       Dear Colleague,    Thank you for the opportunity to participate in the care of your patient, Brooks Summers, at the [facility] [place] at [facility]. Please see a copy of my visit note below.       SUBJECTIVE:  Brooks Summers is a 78 year old male who presents for yearly follow-up.   S/P Aortic valve replacement (23 mm Bogdan Johnson PERIMOUNT Magna tissue valve). On 7/14/11. 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  This involved aortic subvalvular debridement, AVR with 21 mm Johnson Perimount magna valve and pericardial patch repair of a large perforation on the anterior mitral leaflet.  Postprocedure patient did extremely well.  Patient also needed biventricular pacing with defibrillator for low LV function.  Subsequently his LV function improved to normal.  Today patient had no cardiac complaints.  He is doing very well from cardiac standpoint.  Unfortunately his wife developed interstitial lung disease secondary to rheumatoid arthritis and she is unable to walk more than 10 feet and also is on continuous home oxygen.  This is upsetting patient a lot.  Patient Active Problem List    Diagnosis Date Noted    MIC (generalized anxiety disorder) 08/24/2022     Priority: Medium    Mild cognitive impairment 08/24/2022     Priority: Medium    Anticoagulated 07/19/2022     Priority: Medium    Anticoagulated on Coumadin 08/05/2021     Priority: Medium    ICD (implantable cardioverter-defibrillator) battery depletion 02/13/2021     Priority: Medium     Added automatically from request for surgery [id number]      Cardiomyopathy (H) 01/18/2021     Priority: Medium     Added automatically from request for surgery [id number]       Seizure (H) 05/10/2020     Priority: Medium    Neoplasm of uncertain behavior of skin 06/08/2019     Priority: Medium    AK (actinic keratosis) 06/08/2019     Priority: Medium    History of nonmelanoma skin cancer 06/08/2019     Priority: Medium    Nocturnal hypoxemia 10/15/2018     Priority: Medium    Trigger ring finger of left hand 11/16/2015     Priority: Medium    Paroxysmal atrial fibrillation (H) 02/25/2015     Priority: Medium    Long term current use of anticoagulant therapy 02/25/2015     Priority: Medium     Problem list name updated by automated process. Provider to review      Finger injury 10/08/2014     Priority: Medium    Hyperlipidemia with target LDL less than 100 10/21/2013     Priority: Medium     Diagnosis updated by automated process. Provider to review and confirm.      Need for prophylactic antibiotic 04/12/2013     Priority: Medium     Aortic valve and past endocarditis       S/P AVR 01/25/2013     Priority: Medium    Endocarditis 01/21/2013     Priority: Medium     Cultures negative but 16S rRNA PCR showed Staph capitis.  Treated with Dapto and RIF x 8 weeks.      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 Provider to review      LBBB (left bundle branch block) 01/28/2012     Priority: Medium     Received CRT-D;  msec      Pneumothorax, left 01/28/2012     Priority: Medium     Observed following CRT-D implant; small. Observe and repeat CXR      Heart failure, chronic systolic (H) 01/28/2012     Priority: Medium     Stable; NYHA classII      Cardiomyopathy, dilated, nonischemic (H) 01/28/2012     Priority: Medium     Mild CAD; EF 15-20%      CKD (chronic kidney disease) stage 3, GFR 30-59 ml/min (H) 01/28/2012     Priority: Medium    Dyslipidemia 01/28/2012     Priority: Medium     On Pravavastatin      Aortic valve stenosis, severe 07/14/2011      Priority: Medium    Chronic systolic heart failure (H) 07/14/2011     Priority: Medium    Bicuspid aortic valve 07/14/2011     Priority: Medium    S/P aortic valve replacement 07/14/2011     Priority: Medium    Smoking hx 07/14/2011     Priority: Medium    Rotator cuff (capsule) sprain 02/17/2009     Priority: Medium    Other postprocedural status(V45.89) 02/17/2009     Priority: Medium    .  Current Outpatient Medications   Medication Sig    acetaminophen (TYLENOL) 325 MG tablet Take 2 tablets (650 mg) by mouth every 4 hours as needed for mild pain    allopurinol (ZYLOPRIM) 100 MG tablet TAKE 1 TABLET(100 MG) BY MOUTH DAILY    calcipotriene (DOVONOX) 0.005 % external cream Apply topically 2 times daily    enoxaparin ANTICOAGULANT (LOVENOX) 80 MG/0.8ML syringe Inject 0.8 mLs (80 mg) Subcutaneous every 12 hours    enoxaparin ANTICOAGULANT (LOVENOX) 80 MG/0.8ML syringe Inject 0.7 mLs (70 mg) Subcutaneous 2 times daily    fluorouracil (EFUDEX) 5 % external cream Apply topically 2 times daily    hydrocortisone (CORTAID) 1 % external cream Apply topically daily as needed    levETIRAcetam (KEPPRA) 500 MG tablet Take 1 tablet (500 mg) by mouth 2 times daily Call clinic to schedule follow up appointment.    losartan (COZAAR) 25 MG tablet Take 0.5 tablets (12.5 mg) by mouth daily    metoprolol succinate ER (TOPROL XL) 50 MG 24 hr tablet Take 1 tablet (50 mg) by mouth daily    Multiple Vitamins-Minerals (MULTIVITAMIN ADULTS 50+) TABS Take 1 tablet by mouth daily    oxybutynin ER (DITROPAN XL) 10 MG 24 hr tablet Take 1 tablet (10 mg) by mouth daily Call clinic to schedule follow up appointment.**NEEDED FOR REFILLS**    PROVIDER DOSED MANAGED WARFARIN, COUMADIN, OUTPATIENT Per coumadin clinic    simvastatin (ZOCOR) 40 MG tablet Take 1 tablet (40 mg) by mouth At Bedtime    VITAMIN D, CHOLECALCIFEROL, PO Take 1,000 Units by mouth daily    warfarin ANTICOAGULANT (COUMADIN) 5 MG tablet Take 5 mg on every Tuesday on 7.5 mg all  other days of the week or as directed by INR Clinic     No current facility-administered medications for this visit.     Past Medical History:   Diagnosis Date    Anticoagulated on Coumadin 8/5/2021    BCC (basal cell carcinoma), face 5/16/2013    Bicuspid aortic valve     Chronic systolic heart failure (H)     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 Tx with Dapto/RIFx 8 weeks.    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    RE>>LE    Mild cognitive impairment 8/24/2022    Paroxysmal A-fib (H)     Post-op 7/14/2011, 1/26/13    Paroxysmal atrial fibrillation (H) 2/25/2015    Rotator Cuff (Capsule) Sprain and Strain     S/P aortic valve replacement     7/14/2011, re-do 1/25/13 due to endocarditis    Seizure (H) 5/10/2020    Smoking hx     Thrombocytopenia (H)      Past Surgical History:   Procedure Laterality Date    ANESTHESIA CARDIOVERSION  7/18/2011    Procedure:ANESTHESIA CARDIOVERSION; Cardioversion; Surgeon:GENERIC ANESTHESIA PROVIDER; Location:[facility]    COLONOSCOPY      COLONOSCOPY N/A 11/19/2018    Procedure: COLONOSCOPY;  Surgeon: Herman Mcginnis MD;  Location:  GI    CORONARY ANGIOGRAPHY ADULT ORDER      CYSTOSCOPY, BIOPSY URETHRA N/A 4/3/2017    Procedure: CYSTOSCOPY, BIOPSY URETHRA;  Surgeon: Marlena Mora MD;  Location:  OR    ENDOSCOPY UPPER, COLONOSCOPY, COMBINED      EP ICD GENERATOR REPLACEMENT DUAL N/A 3/10/2021    Procedure: EP ICD GENERATOR CHANGE DUAL;  Surgeon: Mekhi Be MD;  Location:  HEART CARDIAC CATH LAB    HC REMOV & REPLACE IMPLT DEFIB PULSE GEN MULT LEAD  12/3/2015         HEMORRHOID SURGERY      HERNIORRHAPHY INGUINAL Left 3/9/2023    Procedure: HERNIORRHAPHY, Left INGUINAL, OPEN;  Surgeon: Emery Bergeron MD;  Location:  OR    IMPLANT AUTOMATIC IMPLANTABLE CARDIOVERTER DEFIBRILLATOR       MOHS MICROGRAPHIC PROCEDURE      ORTHOPEDIC SURGERY      shoulder,right    REDO STERNOTOMY REPLACE VALVE AORTIC  2013    Procedure: REDO STERNOTOMY REPLACE VALVE AORTIC;  Redo Sternotomy, Redo Aortic Valve Replacement on pump oxygenator. Intraoperative Transesophageal Echocardiogram;  Surgeon: Stephanie Hampton MD;  [facility]    REPAIR VALVE MITRAL  2013    REPLACE VALVE AORTIC  2011    Procedure:REPLACE VALVE AORTIC; Median Sternotomy, Bioprosthesis,  Aortic Valve replacement on pump with oxygenator. Intra-operative Transesophogeal Echocardiogram.; Surgeon:STEPHANIE HAMPTON; Location:[facility] OR    teeth extractions      TRANSPLANT       Allergies   Allergen Reactions    Blood Transfusion Related (Informational Only) Unknown     Patient has a history of a clinically significant antibody against RBC antigens.  A delay in compatible RBCs may occur.     Lisinopril Cough     Social History     Socioeconomic History    Marital status:      Spouse name: Not on file    Number of children: Not on file    Years of education: Not on file    Highest education level: Not on file   Occupational History    Not on file   Tobacco Use    Smoking status: Former     Packs/day: 1.00     Years: 20.00     Pack years: 20.00     Types: Cigarettes     Quit date: 1989     Years since quittin.4    Smokeless tobacco: Never   Vaping Use    Vaping status: Never Used   Substance and Sexual Activity    Alcohol use: Yes     Comment: rare    Drug use: No    Sexual activity: Yes     Partners: Female     Birth control/protection: None     Comment:    Other Topics Concern    Parent/sibling w/ CABG, MI or angioplasty before 65F 55M? No   Social History Narrative     since 1982 2 children 4 grandchildren.    Carpet sales:  Semi retired. Athlete in school, Golf, fish, yardwork     Social Determinants of Health     Financial Resource Strain: Not on file   Food Insecurity: Not on file   Transportation Needs: Not on  "file   Physical Activity: Not on file   Stress: Not on file   Social Connections: Not on file   Intimate Partner Violence: Not on file   Housing Stability: Not on file     Family History   Problem Relation Age of Onset    Cancer Mother 92        stomach, colon    Hypertension Mother     Retinal detachment Mother     C.A.D. Father 68        bypass, carotid     Prostate Cancer Father 70    Heart Disease Father     Depression Sister     Anxiety Disorder Sister     Depression Brother     Anxiety Disorder Brother     Cancer Brother 64        lung cancer, petroleum    Cancer Brother     Glaucoma No family hx of     Macular Degeneration No family hx of     Strabismus No family hx of     Glasses (<7 y/o) No family hx of     Anesthesia Reaction No family hx of     Bleeding Disorder No family hx of     Venous thrombosis No family hx of           REVIEW OF SYSTEMS:  General: negative, fever, chills, night sweats  Skin: negative, acne, rash and scaling  Eyes: negative, double vision, eye pain and photophobia  Ears/Nose/Throat: negative, nasal congestion and purulent rhinorrhea  Respiratory: No dyspnea on exertion, No cough, No hemoptysis and negative  Cardiovascular: negative, palpitations, tachycardia, irregular heart beat, chest pain, exertional chest pain or pressure, paroxysmal nocturnal dyspnea, dyspnea on exertion, orthopnea, lower extremity edema, syncope or near-syncope and cyanosis       OBJECTIVE:  Height 1.778 m (5' 10\"), weight 73.4 kg (161 lb 12.8 oz).  General Appearance: healthy, alert, active and no distress  Head: Normocephalic. No masses, lesions, tenderness or abnormalities  Eyes: conjuctiva clear, PERRL, EOM intact  Ears: External ears normal. Canals clear. TM's normal.  Nose: Nares normal  Mouth: normal  Neck: Supple, no cervical adenopathy, no thyromegaly  Lungs: clear to auscultation  Cardiac: regular rate and rhythm, normal S1 and S2, ESM.         ASSESSMENT/PLAN:  Patient here for follow-up.  Patient " had bioprosthetic AVR in 2011 for bicuspid aortic valve with severe aortic stenosis.  Subsequently patient had a bout of Staph aureus sepsis and endocarditis of the prosthetic valve.  He had repeat AVR with 21 mm Johnson magna valve, pericardial patch closure of a large perforation of anterior mitral leaflet and aortic root debridement.  After this patient did extremely well.  Prior to the first surgery patient had a severe LV dysfunction requiring AVR as well as BiV pacing and ICD placement.  Subsequently his LV function improved to normal.  Today patient had no cardiac complaints.  Overall he is doing very well.  Currently patient is in a lot of stress due to his wife developing interstitial lung disease secondary to rheumatoid arthritis.  She is unable to walk more than 10 feet and is on continuous home oxygen.  Reviewed today's echocardiogram and result discussed with patient.  Normal biventricular function.  Normal prosthetic function with a mean gradient of 10 mmHg.  No paravalvular aortic regurgitation.  Trace AI.  Overall valve function stable.  Current cardiac medications Zocor 40 mg daily, Toprol-XL 50 mg daily and losartan 25 mg daily.  Patient is also on warfarin for paroxysmal atrial fibrillation.  Patient is advised to continue his current medications.  Total visit duration 30 minutes.  This included face-to-face interview, physical exam, chart review, review of echocardiograms and documentation.      Please do not hesitate to contact me if you have any questions/concerns.     Sincerely,     TED Salgado MD

## 2023-05-25 ENCOUNTER — ANTICOAGULATION THERAPY VISIT (OUTPATIENT)
Dept: ANTICOAGULATION | Facility: CLINIC | Age: 78
End: 2023-05-25

## 2023-05-25 ENCOUNTER — LAB (OUTPATIENT)
Dept: LAB | Facility: CLINIC | Age: 78
End: 2023-05-25
Payer: MEDICARE

## 2023-05-25 DIAGNOSIS — Z79.01 ANTICOAGULATED: ICD-10-CM

## 2023-05-25 DIAGNOSIS — I48.0 PAROXYSMAL ATRIAL FIBRILLATION (H): ICD-10-CM

## 2023-05-25 DIAGNOSIS — Z79.01 ANTICOAGULATED ON COUMADIN: ICD-10-CM

## 2023-05-25 DIAGNOSIS — Z95.2 S/P AORTIC VALVE REPLACEMENT: Primary | ICD-10-CM

## 2023-05-25 DIAGNOSIS — Z79.01 LONG TERM CURRENT USE OF ANTICOAGULANT THERAPY: ICD-10-CM

## 2023-05-25 LAB — INR BLD: 2.5 (ref 0.9–1.1)

## 2023-05-25 PROCEDURE — 85610 PROTHROMBIN TIME: CPT

## 2023-05-25 PROCEDURE — 36416 COLLJ CAPILLARY BLOOD SPEC: CPT

## 2023-05-25 NOTE — PROGRESS NOTES
ANTICOAGULATION MANAGEMENT     Brooks Summers 78 year old male is on warfarin with therapeutic INR result. (Goal INR 2.0-3.0)    Recent labs: (last 7 days)     05/25/23  1050   INR 2.5*       ASSESSMENT       Source(s): Chart Review and Patient/Caregiver Call       Warfarin doses taken: Warfarin taken as instructed    Diet: No new diet changes identified    Medication/supplement changes: None noted    New illness, injury, or hospitalization: No    Signs or symptoms of bleeding or clotting: No    Previous result: Therapeutic last 2(+) visits    Additional findings: None         PLAN     Recommended plan for no diet, medication or health factor changes affecting INR     Dosing Instructions: Continue your current warfarin dose with next INR in 4 weeks       Summary  As of 5/25/2023    Full warfarin instructions:  5 mg every Tue; 7.5 mg all other days   Next INR check:  6/22/2023             Telephone call with Brooks who verbalizes understanding and agrees to plan and who agrees to plan and repeated back plan correctly    Lab visit scheduled    Education provided:     None required    Plan made per [facility] anticoagulation protocol    Kiana Moore RN  Anticoagulation Clinic  5/25/2023    _______________________________________________________________________     Anticoagulation Episode Summary     Current INR goal:  2.0-3.0   TTR:  69.0 % (1 y)   Target end date:  Indefinite   Send INR reminders to:  JONATHAN MURILLO    Indications    S/P aortic valve replacement [Z95.2]  Paroxysmal atrial fibrillation (H) [I48.0]  Long term current use of anticoagulant therapy [Z79.01]  Anticoagulated on Coumadin [Z79.01]  Anticoagulated [Z79.01]           Comments:  Aortic 21 mm Johnson Perimount Magna Tissue Valve.         Anticoagulation Care Providers     Provider Role Specialty Phone number    Edin Mays MD Referring Internal Medicine [phone]

## 2023-05-27 ENCOUNTER — TELEPHONE (OUTPATIENT)
Dept: UROLOGY | Facility: CLINIC | Age: 78
End: 2023-05-27
Payer: MEDICARE

## 2023-05-27 NOTE — TELEPHONE ENCOUNTER
Left a detailed VM that pt is requesting a refill on a medication prescribed by Shelbi Reid. Needs to be seen once a year for refills. Asked pt to call the clinic to get scheduled for the next avail video or in person with Shelbi. Once pt has appt refill of med can be sent. Clinic number left.

## 2023-06-05 ENCOUNTER — OFFICE VISIT (OUTPATIENT)
Dept: URGENT CARE | Facility: URGENT CARE | Age: 78
End: 2023-06-05
Payer: MEDICARE

## 2023-06-05 VITALS
DIASTOLIC BLOOD PRESSURE: 73 MMHG | HEART RATE: 82 BPM | RESPIRATION RATE: 16 BRPM | SYSTOLIC BLOOD PRESSURE: 124 MMHG | TEMPERATURE: 97.8 F | BODY MASS INDEX: 23.53 KG/M2 | OXYGEN SATURATION: 97 % | WEIGHT: 164 LBS

## 2023-06-05 DIAGNOSIS — S61.211A LACERATION OF LEFT INDEX FINGER WITHOUT FOREIGN BODY WITHOUT DAMAGE TO NAIL, INITIAL ENCOUNTER: Primary | ICD-10-CM

## 2023-06-05 PROCEDURE — 99212 OFFICE O/P EST SF 10 MIN: CPT | Performed by: FAMILY MEDICINE

## 2023-06-05 RX ORDER — CEPHALEXIN 500 MG/1
500 CAPSULE ORAL 3 TIMES DAILY
Qty: 21 CAPSULE | Refills: 0 | Status: SHIPPED | OUTPATIENT
Start: 2023-06-05 | End: 2024-03-28

## 2023-06-05 NOTE — PATIENT INSTRUCTIONS
Take prescribed medication as directed.      OK to gently wash in 2 days.    Re dress with Telfa and Roller Meghane.

## 2023-06-05 NOTE — PROGRESS NOTES
(P47.058S) Laceration of left index finger without foreign body without damage to nail, initial encounter  (primary encounter diagnosis)  Comment:     Wound cleaned up and Steri-Strips applied as well as dressing.  See below.    Plan: cephALEXin (KEFLEX) 500 MG capsule        He could leave this bandage for couple days.  Okay to remove and wash after that and redress in a similar fashion.  He has an opportunity to have his surgeon recheck it next week and should do that.  Recheck for worsening tenderness, swelling, drainage.      CHIEF COMPLAINT    Check laceration of left index finger.      HISTORY    He accidentally cut his finger with a knife about 3 days ago.  He has cleaned it up periodically and applied iodine and put Band-Aids in place.  Wound tends to bleed occasionally.    Patient is on warfarin for heart condition -aortic valve.      REVIEW OF SYSTEMS    No fever.  No breathing problems.  No chest pains.  No edema.  No weakness.      EXAM  /73   Pulse 82   Temp 97.8  F (36.6  C) (Tympanic)   Resp 16   Wt 74.4 kg (164 lb)   SpO2 97%   BMI 23.53 kg/m      Exam shows a 3 cm laceration on the dorsal lateral aspect of left index right near the MCP joint.  No active bleeding currently.  Laceration gaps apart 2 to 3 mm.    No swelling or purulent drainage or redness suggestive of cellulitis developing.      This wound was cleaned up well with peroxide and saline.  Reapproximated with quarter inch Steri-Strips.  Dressed with Telfa, roller gauze.  Patient advised.

## 2023-06-12 ENCOUNTER — ANCILLARY PROCEDURE (OUTPATIENT)
Dept: CARDIOLOGY | Facility: CLINIC | Age: 78
End: 2023-06-12
Attending: INTERNAL MEDICINE
Payer: MEDICARE

## 2023-06-12 PROCEDURE — 93295 DEV INTERROG REMOTE 1/2/MLT: CPT | Performed by: INTERNAL MEDICINE

## 2023-06-12 PROCEDURE — 93296 REM INTERROG EVL PM/IDS: CPT

## 2023-06-14 ENCOUNTER — OFFICE VISIT (OUTPATIENT)
Dept: SURGERY | Facility: CLINIC | Age: 78
End: 2023-06-14
Payer: MEDICARE

## 2023-06-14 VITALS
OXYGEN SATURATION: 97 % | SYSTOLIC BLOOD PRESSURE: 123 MMHG | HEART RATE: 68 BPM | DIASTOLIC BLOOD PRESSURE: 73 MMHG | WEIGHT: 167.2 LBS | BODY MASS INDEX: 23.94 KG/M2 | HEIGHT: 70 IN

## 2023-06-14 DIAGNOSIS — Z98.890 POSTOPERATIVE STATE: Primary | ICD-10-CM

## 2023-06-14 PROCEDURE — 99024 POSTOP FOLLOW-UP VISIT: CPT | Performed by: SURGERY

## 2023-06-14 ASSESSMENT — PAIN SCALES - GENERAL: PAINLEVEL: NO PAIN (0)

## 2023-06-14 NOTE — PATIENT INSTRUCTIONS
You met with Dr. Emery Bergeron.      Today's visit instructions:    Return to the Surgery Clinic on an as needed basis.        If you have questions please contact Lashay RN or Ciara RN during regular clinic hours, Monday through Friday 7:30 AM - 4:00 PM, or you can contact us via [facility] at anytime.       If you have urgent needs after-hours, weekends, or holidays please call the hospital at [phone] and ask to speak with our on-call General Surgery Team.    Appointment [phone]  Nurse Advice (Lashay or Ciara): [phone]   Surgery Scheduler (Elisabet): [phone]  Fax: [phone]

## 2023-06-14 NOTE — LETTER
6/14/2023       RE: Brooks Summers  [address]  [place] [address]     Dear Colleague,    Thank you for referring your patient, Brooks Summers, to the [facility] GENERAL SURGERY CLINIC [place] at [facility]. Please see a copy of my visit note below.    General Surgery Clinic:    The patient is a 78-year-old male well known to the surgery service. The patient underwent a left inguinal hernia repair but he did develop a hematoma making it impossible to determine if the repair was intact at the time of his earlier clinic visit. The patient presents now to confirm that the repair is intact. The patient has had no difficulties is continues to golf and do other activities without symptoms or pain. There's no evidence of hernia recurrence.    GEN: Examination of patients in no apparent distress.   ABD: The abdomen is soft and non-tender in the area of the left groin. The incision demonstrates hyperpigmentation of the scar. However, there is no hematoma remaining, and there is no hernia recurrence. The patient was examined voice of the groin as well as through the scrotum, and again no evidence of hernia recurrence.    Impression: The patient has done very well. The hematoma has resolved. The patient's hernia is intact. No further surgical consultation is required.      Emery Bergeron MD

## 2023-06-15 NOTE — PROGRESS NOTES
General Surgery Clinic:    The patient is a 78-year-old male well known to the surgery service. The patient underwent a left inguinal hernia repair but he did develop a hematoma making it impossible to determine if the repair was intact at the time of his earlier clinic visit. The patient presents now to confirm that the repair is intact. The patient has had no difficulties is continues to golf and do other activities without symptoms or pain. There's no evidence of hernia recurrence.    GEN: Examination of patients in no apparent distress.   ABD: The abdomen is soft and non-tender in the area of the left groin. The incision demonstrates hyperpigmentation of the scar. However, there is no hematoma remaining, and there is no hernia recurrence. The patient was examined voice of the groin as well as through the scrotum, and again no evidence of hernia recurrence.    Impression: The patient has done very well. The hematoma has resolved. The patient's hernia is intact. No further surgical consultation is required.

## 2023-06-22 ENCOUNTER — ANTICOAGULATION THERAPY VISIT (OUTPATIENT)
Dept: ANTICOAGULATION | Facility: CLINIC | Age: 78
End: 2023-06-22

## 2023-06-22 ENCOUNTER — LAB (OUTPATIENT)
Dept: LAB | Facility: CLINIC | Age: 78
End: 2023-06-22
Payer: MEDICARE

## 2023-06-22 ENCOUNTER — DOCUMENTATION ONLY (OUTPATIENT)
Dept: ANTICOAGULATION | Facility: CLINIC | Age: 78
End: 2023-06-22

## 2023-06-22 DIAGNOSIS — I48.0 PAROXYSMAL ATRIAL FIBRILLATION (H): ICD-10-CM

## 2023-06-22 DIAGNOSIS — Z79.01 LONG TERM CURRENT USE OF ANTICOAGULANT THERAPY: ICD-10-CM

## 2023-06-22 DIAGNOSIS — Z95.2 S/P AORTIC VALVE REPLACEMENT: Primary | ICD-10-CM

## 2023-06-22 DIAGNOSIS — Z79.01 ANTICOAGULATED: ICD-10-CM

## 2023-06-22 DIAGNOSIS — Z79.01 ANTICOAGULATED ON COUMADIN: ICD-10-CM

## 2023-06-22 LAB — INR BLD: 3.2 (ref 0.9–1.1)

## 2023-06-22 PROCEDURE — 36416 COLLJ CAPILLARY BLOOD SPEC: CPT

## 2023-06-22 PROCEDURE — 85610 PROTHROMBIN TIME: CPT

## 2023-06-22 NOTE — PROGRESS NOTES
ANTICOAGULATION MANAGEMENT     Brooks Summers 78 year old male is on warfarin with supratherapeutic INR result. (Goal INR 2.0-3.0)    Recent labs: (last 7 days)     06/22/23  1112   INR 3.2*       ASSESSMENT       Source(s): Chart Review and Patient/Caregiver Call       Warfarin doses taken: Warfarin taken as instructed    Diet: No new diet changes identified    Medication/supplement changes: None noted    New illness, injury, or hospitalization: No    Signs or symptoms of bleeding or clotting: No    Previous result: Therapeutic last 2(+) visits    Additional findings: None         PLAN     Recommended plan for no diet, medication or health factor changes affecting INR     Dosing Instructions: Continue your current warfarin dose with next INR in 2 weeks       Summary  As of 6/22/2023    Full warfarin instructions:  5 mg every Tue; 7.5 mg all other days   Next INR check:  7/6/2023             Telephone call with Brooks who verbalizes understanding and agrees to plan    Lab visit scheduled    Education provided:     Goal range and lab monitoring: goal range and significance of current result    Contact [phone]  with any changes, questions or concerns.     Plan made per [facility] anticoagulation protocol    Selena Bates RN  Anticoagulation Clinic  6/22/2023    _______________________________________________________________________     Anticoagulation Episode Summary     Current INR goal:  2.0-3.0   TTR:  66.9 % (1 y)   Target end date:  Indefinite   Send INR reminders to:  JONATHAN MURILLO    Indications    S/P aortic valve replacement [Z95.2]  Paroxysmal atrial fibrillation (H) [I48.0]  Long term current use of anticoagulant therapy [Z79.01]  Anticoagulated on Coumadin [Z79.01]  Anticoagulated [Z79.01]           Comments:  Aortic 21 mm Johnson Perimount Magna Tissue Valve.         Anticoagulation Care Providers     Provider Role Specialty Phone number    Edin Mays MD Referring Internal Medicine [phone]

## 2023-06-22 NOTE — PROGRESS NOTES
ANTICOAGULATION CLINIC REFERRAL RENEWAL REQUEST       An annual renewal order is required for all patients referred to [facility] Anticoagulation Clinic.?  Please review and sign the pended referral order for Brooks Summers.       ANTICOAGULATION SUMMARY      Warfarin indication(s)   Atrial Fibrillation and Mechanical AVR    Mechanical heart valve present?  Mechanical  AVR-Bileaflet       Current goal range   INR: 2.0-3.0     Goal appropriate for indication? Goal INR 2-3, standard for indication(s) above     Time in Therapeutic Range (TTR)  (Goal > 60%) 66%       Office visit with referring provider's group within last year yes on 1/30/23       Selena Bates, RN  [facility] Anticoagulation Clinic

## 2023-06-23 DIAGNOSIS — I10 BENIGN ESSENTIAL HYPERTENSION: ICD-10-CM

## 2023-06-27 ENCOUNTER — VIRTUAL VISIT (OUTPATIENT)
Dept: DERMATOLOGY | Facility: CLINIC | Age: 78
End: 2023-06-27
Payer: MEDICARE

## 2023-06-27 DIAGNOSIS — C44.311 BASAL CELL CARCINOMA (BCC) OF LEFT ALA NASI: Primary | ICD-10-CM

## 2023-06-27 PROCEDURE — 99441 PR PHYSICIAN TELEPHONE EVALUATION 5-10 MIN: CPT | Mod: 95 | Performed by: DERMATOLOGY

## 2023-06-27 RX ORDER — LOSARTAN POTASSIUM 25 MG/1
TABLET ORAL
Qty: 45 TABLET | Refills: 3 | Status: SHIPPED | OUTPATIENT
Start: 2023-06-27 | End: 2024-08-05

## 2023-06-27 NOTE — LETTER
6/27/2023         RE: Brooks Summers  [address]  [place] [address]        Dear Colleague,    Thank you for referring your patient, Brooks Summers, to the [facility]. Please see a copy of my visit note below.    [facility] Dermatology Note  Encounter Date: Jun 27, 2023  Store-and-Forward and Telephone ([phone] ). Location of teledermatologist: [facility].  Start time: 2. End time: 2:10.    Dermatology Problem List:  1. BCC nasal tip    ____________________________________________    Assessment & Plan:     Basal nasal tip  - discussed r/b/alt to Mohs  - patient wishes to proceed  - likely flap repair  - discussed r/b of this.  - Mohs scheduled  Procedures Performed:    None    Follow-up: for surgery    Staff:     Erasmo Story MD    ____________________________________________    CC: RECHECK (BCC nose)    HPI:  Mr. Brooks Summers is a(n) 78 year old male who presents today as a return patient for consultation for BCC on L anterior ala    Patient is otherwise feeling well, without additional skin concerns.    Labs Reviewed:  Path 5/4/2023 - BCC     Physical Exam:  Vitals: There were no vitals taken for this visit.  SKIN: Teledermatology photos were reviewed; image quality and interpretability: acceptable. Image date: 5/4/23.  - pearly papule on L anterior ala  - No other lesions of concern on areas examined.     Medications:  Current Outpatient Medications   Medication     acetaminophen (TYLENOL) 325 MG tablet     allopurinol (ZYLOPRIM) 100 MG tablet     calcipotriene (DOVONOX) 0.005 % external cream     cephALEXin (KEFLEX) 500 MG capsule     enoxaparin ANTICOAGULANT (LOVENOX) 80 MG/0.8ML syringe     enoxaparin ANTICOAGULANT (LOVENOX) 80 MG/0.8ML syringe     fluorouracil (EFUDEX) 5 % external cream     hydrocortisone (CORTAID) 1 % external cream     levETIRAcetam (KEPPRA) 500 MG tablet     losartan (COZAAR) 25 MG tablet      metoprolol succinate ER (TOPROL XL) 50 MG 24 hr tablet     Multiple Vitamins-Minerals (MULTIVITAMIN ADULTS 50+) TABS     oxybutynin ER (DITROPAN XL) 10 MG 24 hr tablet     PROVIDER DOSED MANAGED WARFARIN, COUMADIN, OUTPATIENT     simvastatin (ZOCOR) 40 MG tablet     VITAMIN D, CHOLECALCIFEROL, PO     warfarin ANTICOAGULANT (COUMADIN) 5 MG tablet     No current facility-administered medications for this visit.      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[employer], Bi-Ventricular- INT dependent     Endocarditis     S/P AVR     Need for prophylactic antibiotic     Hyperlipidemia with target LDL less than 100     Finger injury     Paroxysmal atrial fibrillation (H)     Long term current use of anticoagulant therapy     Trigger ring finger of left hand     Nocturnal hypoxemia     Neoplasm of uncertain behavior of skin     AK (actinic keratosis)     History of nonmelanoma skin cancer     Seizure (H)     Cardiomyopathy (H)     ICD (implantable cardioverter-defibrillator) battery depletion     Anticoagulated on Coumadin     Anticoagulated     MIC (generalized anxiety disorder)     Mild cognitive impairment     Past Medical History:   Diagnosis Date     Anticoagulated on Coumadin 8/5/2021     BCC (basal cell carcinoma), face 5/16/2013     Bicuspid aortic valve      Chronic systolic heart failure (H)      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 Tx with Dapto/RIFx 8 weeks.     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 on close of this encounter.      Again, thank you for allowing me to participate in the care of your patient.        Sincerely,        Erasmo Story MD

## 2023-06-27 NOTE — PROGRESS NOTES
[facility] Dermatology Note  Encounter Date: Jun 27, 2023  Store-and-Forward and Telephone ([phone] ). Location of teledermatologist: [facility].  Start time: 2. End time: 2:10.    Dermatology Problem List:  1. BCC nasal tip    ____________________________________________    Assessment & Plan:     Basal nasal tip  - discussed r/b/alt to Mohs  - patient wishes to proceed  - likely flap repair  - discussed r/b of this.  - Mohs scheduled  Procedures Performed:    None    Follow-up: for surgery    Staff:     Erasmo Story MD    ____________________________________________    CC: RECHECK (BCC nose)    HPI:  Mr. Brooks Summers is a(n) 78 year old male who presents today as a return patient for consultation for BCC on L anterior ala    Patient is otherwise feeling well, without additional skin concerns.    Labs Reviewed:  Path 5/4/2023 - BCC     Physical Exam:  Vitals: There were no vitals taken for this visit.  SKIN: Teledermatology photos were reviewed; image quality and interpretability: acceptable. Image date: 5/4/23.  - pearly papule on L anterior ala  - No other lesions of concern on areas examined.     Medications:  Current Outpatient Medications   Medication     acetaminophen (TYLENOL) 325 MG tablet     allopurinol (ZYLOPRIM) 100 MG tablet     calcipotriene (DOVONOX) 0.005 % external cream     cephALEXin (KEFLEX) 500 MG capsule     enoxaparin ANTICOAGULANT (LOVENOX) 80 MG/0.8ML syringe     enoxaparin ANTICOAGULANT (LOVENOX) 80 MG/0.8ML syringe     fluorouracil (EFUDEX) 5 % external cream     hydrocortisone (CORTAID) 1 % external cream     levETIRAcetam (KEPPRA) 500 MG tablet     losartan (COZAAR) 25 MG tablet     metoprolol succinate ER (TOPROL XL) 50 MG 24 hr tablet     Multiple Vitamins-Minerals (MULTIVITAMIN ADULTS 50+) TABS     oxybutynin ER (DITROPAN XL) 10 MG 24 hr tablet     PROVIDER DOSED MANAGED WARFARIN, COUMADIN, OUTPATIENT     simvastatin (ZOCOR) 40 MG  tablet     VITAMIN D, CHOLECALCIFEROL, PO     warfarin ANTICOAGULANT (COUMADIN) 5 MG tablet     No current facility-administered medications for this visit.      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[employer], Bi-Ventricular- INT dependent     Endocarditis     S/P AVR     Need for prophylactic antibiotic     Hyperlipidemia with target LDL less than 100     Finger injury     Paroxysmal atrial fibrillation (H)     Long term current use of anticoagulant therapy     Trigger ring finger of left hand     Nocturnal hypoxemia     Neoplasm of uncertain behavior of skin     AK (actinic keratosis)     History of nonmelanoma skin cancer     Seizure (H)     Cardiomyopathy (H)     ICD (implantable cardioverter-defibrillator) battery depletion     Anticoagulated on Coumadin     Anticoagulated     MIC (generalized anxiety disorder)     Mild cognitive impairment     Past Medical History:   Diagnosis Date     Anticoagulated on Coumadin 8/5/2021     BCC (basal cell carcinoma), face 5/16/2013     Bicuspid aortic valve      Chronic systolic heart failure (H)      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 Tx with Dapto/RIFx 8 weeks.     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 on close of this encounter.

## 2023-06-27 NOTE — TELEPHONE ENCOUNTER
5/23/2023  [facility] TED Montiel MD  Cardiovascular Disease     Labs 1/3/23

## 2023-06-27 NOTE — NURSING NOTE
Chief Complaint   Patient presents with     RECHECK     BCC nose     Teledermatology Nurse Call Patients:     Are you in the [facility] at the time of the encounter? yes    Today's visit will be billed to you and your insurance.    A teledermatology visit is not as thorough as an in-person visit and the quality of the photograph sent may not be of the same quality as that taken by the dermatology clinic.    Shelby Kocher

## 2023-06-27 NOTE — PATIENT INSTRUCTIONS
[facility] Dermatology Visit    Thank you for allowing us to participate in your care. Your findings, instructions and follow-up plan are as follows:         When should I call my doctor?  If you are worsening or not improving, please, contact us or seek urgent care as noted below.     Who should I call with questions (adults)?  [facility] (adult and pediatric): [phone]  [facility] (adult): [phone]  For urgent needs outside of business hours call the [facility] at [phone] and ask for the dermatology resident on call  If this is a medical emergency and you are unable to reach an ER, Call 911    Who should I call with questions (pediatric)?  [facility]- Pediatric Dermatology  Dr. Antoinette Adams, Dr. Jarrett Garcia, Dr. Martina Loyd, Alice Shay, PA  Dr. Esperanza Tom, Dr. Marlena Wilks & Dr. Edin Talamantes  Non Urgent  Nurse Triage Line; [phone]- Gabby and Estephania RN Care Coordinators   Jayne (/Complex ) [phone]    If you need a prescription refill, please contact your pharmacy. Refills are approved or denied by our physicians during normal business hours, Monday through Fridays  Per office policy, refills will not be granted if you have not been seen within the past year (or sooner depending on your child's condition).    Scheduling Information:  Pediatric Appointment Scheduling and Call Center [phone]  Radiology Scheduling- [phone]  Sedation Unit Scheduling- [phone]  [place] Scheduling- General [phone]; Pediatric Dermatology [phone]  Main  Services: [phone]  Costa Rican: [phone]  Guamanian: [phone]  Hmong/Joss/Botswanan: [phone]  Preadmission Nursing Department Fax Number: [phone] (fax all pre-operative paperwork to this number)    For urgent matters arising during evenings, weekends, or  holidays that cannot wait for normal business hours please call [phone] and ask for the dermatology resident on call to be paged.

## 2023-07-03 ENCOUNTER — TELEPHONE (OUTPATIENT)
Dept: UROLOGY | Facility: CLINIC | Age: 78
End: 2023-07-03
Payer: MEDICARE

## 2023-07-05 DIAGNOSIS — G40.909 RECURRENT SEIZURES (H): ICD-10-CM

## 2023-07-05 RX ORDER — LEVETIRACETAM 500 MG/1
500 TABLET ORAL 2 TIMES DAILY
Refills: 0 | Status: CANCELLED | OUTPATIENT
Start: 2023-07-05

## 2023-07-05 NOTE — TELEPHONE ENCOUNTER
levETIRAcetam (KEPPRA) 500 MG tablet      Last Written Prescription Date:  3/28/23  Last Fill Quantity: 180,   # refills: 0  Last Office Visit : 3/30/22  Future Office visit:  0    Routing refill request to provider for review/approval because:  Overdue for follow-up   Has been given mauricio refills and scheduling has attempted to call for follow-up

## 2023-07-06 ENCOUNTER — NURSE TRIAGE (OUTPATIENT)
Dept: NURSING | Facility: CLINIC | Age: 78
End: 2023-07-06

## 2023-07-06 NOTE — TELEPHONE ENCOUNTER
Pt of Dr Westbrook,  History of seizures. First seizure in May of 2020 per office notes.     Pt called because his wife told him that he had a seizure this morning at 3am, lasting about 50 seconds. Pts wife was present during the call and stated that it was a generalized seizure and that he went to sleep after and woke on his own this morning. Pt's wife states his mentation is intact, but pt was having difficulty answer questions - kept asking his wife for verification. Couldn't give his address, had to ask his wife. When asked about medications, he first said that he wasn't taking any seizure meds. Upon review of med list, pt answered yes when asked if he was still taking the Keppra. Last ordered 3/28/23. No unilateral weakness or slurred speech. No headache, other symptoms per pt report.     Per , pt had refused to go to the ED. Pt instructed not to drive, wife agreed to this plan. Message sent to Neurology for follow up, also routed to pt's PCP         Reason for Disposition    Patient wants to be seen    Additional Information    Negative: First seizure ever    Negative: Seizure lasts > 5 minutes    Negative: Two or more seizures and stays confused between seizures    Negative: Bluish (or gray) lips or face    Negative: Head injury caused the seizure    Negative: Pregnant or postpartum (from 0 to 6 weeks after delivery)    Negative: Known poisoning or overdose    Negative: Seizure in a swimming pool    Negative: Diabetes mellitus  (Exception: Now alert, acting normal, and has normal blood glucose [e.g., > 70 mg/dL or 3.9 mmol/L].)    Negative: Unresponsive (can't be awakened) after the seizure stops and persists > 5 minutes    Negative: Acting confused (e.g., disoriented, slurred speech) after the seizure stops and persists > 30 minutes    Negative: Sounds like a life-threatening emergency to the triager    Negative: SEVERE headache  (Note: Most people have a headache after a seizure.)    Negative: Second  "seizure occurs on the same day    Negative: Fever > 100.4 F (38.0 C)    Negative: Substance use (drug use) or unhealthy alcohol use, known suspected    Negative: Wants to sleep after the seizure and persists much longer than usual    Negative: Patient sounds very sick or weak to the triager    Negative: Not on or ran out of seizure medicine (anticonvulsant)    Negative: Stopped taking seizure medicine (anticonvulsant)    Negative: Epileptic seizures occur frequently (several per week)    Answer Assessment - Initial Assessment Questions  1. ONSET: \"How long did the seizure last?\" (e.g., seconds, minutes)       50 seconds    2. CONTENT: \"Describe what happened during the seizure. Did the body become stiff? Was there any jerking?\"       Generalized, jerking motion    3. CIRCUMSTANCE: \"What was the person doing when the seizure began?\"       Sleeping    4. MENTAL STATUS: \"Does the person know who they are, who you are, and where they are?\"       Name,  accurate, Wife denies any decreased mentation. Pt had difficulty answering questions about his meds.     5. PRIOR SEIZURES: \"Has the person had a seizure (convulsion) before?\" If Yes, ask: \"When was the last time?\" and \"What happened last time?\"      Seizures started in     6. EPILEPSY: \"Does the person have epilepsy?\" Note[facility]      No diagnosis of epilepsy    7. MEDICINES: \"Does the person take anticonvulsant medications?\" (e.g., Yes, No; compliance, any recent changes)     At first said he didn't take any seizure meds, but when reviewing med list he said that he does take the keppra.     8. INJURY: \"Did the person hurt himself during the seizure?\" (e.g., head, tongue)      None    9. OTHER SYMPTOMS: \"Are there any other symptoms?\" (e.g., fever, headache)      None    10. PREGNANCY: \"Is there any chance you are pregnant?\" \"When was your last menstrual period?\"        n/a    Protocols used: [id number]-[id number]      "

## 2023-07-07 DIAGNOSIS — S61.211A LACERATION OF LEFT INDEX FINGER WITHOUT FOREIGN BODY WITHOUT DAMAGE TO NAIL, INITIAL ENCOUNTER: ICD-10-CM

## 2023-07-07 DIAGNOSIS — G40.909 RECURRENT SEIZURES (H): ICD-10-CM

## 2023-07-07 DIAGNOSIS — M10.00 IDIOPATHIC GOUT, UNSPECIFIED CHRONICITY, UNSPECIFIED SITE: ICD-10-CM

## 2023-07-07 RX ORDER — LEVETIRACETAM 500 MG/1
500 TABLET ORAL 2 TIMES DAILY
Qty: 60 TABLET | Refills: 11 | Status: SHIPPED | OUTPATIENT
Start: 2023-07-07 | End: 2024-03-28

## 2023-07-07 RX ORDER — CEPHALEXIN 500 MG/1
500 CAPSULE ORAL 3 TIMES DAILY
Qty: 21 CAPSULE | Refills: 0 | OUTPATIENT
Start: 2023-07-07

## 2023-07-07 NOTE — TELEPHONE ENCOUNTER
"Call placed, spoke with Lucero (wife)  She reports Brooks has had seizures two mornings in a row.  Both of the seizures have been convulsions per wife.  Patient has refused to go to the ED for evaluation.  No injuries apparent.    Patient has not been taking his levetiracetam, he last took a dose \"about a week ago\".  When asked he reported that the bottle ran out and it said no refills, so he stopped taking it.  Patient is overdue for a follow up with , and wants to make an appointment  Discussed with scheduling, next available appointment with any provider July 19th, next available with  October 10th.  Will recommend patient takes next available any provider.  Transferred to scheduling  Prescription cued, and routed to MD for review  "

## 2023-07-12 ENCOUNTER — LAB (OUTPATIENT)
Dept: LAB | Facility: CLINIC | Age: 78
End: 2023-07-12
Payer: MEDICARE

## 2023-07-12 ENCOUNTER — ANTICOAGULATION THERAPY VISIT (OUTPATIENT)
Dept: ANTICOAGULATION | Facility: CLINIC | Age: 78
End: 2023-07-12

## 2023-07-12 DIAGNOSIS — I48.0 PAROXYSMAL ATRIAL FIBRILLATION (H): ICD-10-CM

## 2023-07-12 DIAGNOSIS — Z79.01 LONG TERM CURRENT USE OF ANTICOAGULANT THERAPY: ICD-10-CM

## 2023-07-12 DIAGNOSIS — Z79.01 ANTICOAGULATED: ICD-10-CM

## 2023-07-12 DIAGNOSIS — Z79.01 ANTICOAGULATED ON COUMADIN: ICD-10-CM

## 2023-07-12 DIAGNOSIS — Z95.2 S/P AORTIC VALVE REPLACEMENT: ICD-10-CM

## 2023-07-12 DIAGNOSIS — Z95.2 S/P AORTIC VALVE REPLACEMENT: Primary | ICD-10-CM

## 2023-07-12 LAB — INR BLD: 1.6 (ref 0.9–1.1)

## 2023-07-12 PROCEDURE — 85610 PROTHROMBIN TIME: CPT

## 2023-07-12 PROCEDURE — 36416 COLLJ CAPILLARY BLOOD SPEC: CPT

## 2023-07-12 RX ORDER — ALLOPURINOL 100 MG/1
TABLET ORAL
Qty: 90 TABLET | Refills: 1 | Status: SHIPPED | OUTPATIENT
Start: 2023-07-12 | End: 2023-10-23

## 2023-07-12 NOTE — PROGRESS NOTES
ANTICOAGULATION MANAGEMENT     Brooks Summers 78 year old male is on warfarin with subtherapeutic INR result. (Goal INR 2.0-3.0)    Recent labs: (last 7 days)     07/12/23  1450   INR 1.6*       ASSESSMENT       Source(s): Chart Review and Patient/Caregiver Call       Warfarin doses taken: Warfarin taken as instructed    Diet: No new diet changes identified    Medication/supplement changes: patient ran out of allopurinol x one week, has picked up and plans to restart again today    New illness, injury, or hospitalization: No    Signs or symptoms of bleeding or clotting: No    Previous result: Supratherapeutic    Additional findings: None         PLAN     Recommended plan for temporary change(s) affecting INR     Dosing Instructions: booster dose then continue your current warfarin dose with next INR in 10 days       Summary  As of 7/12/2023    Full warfarin instructions:  7/12: 10 mg; Otherwise 5 mg every Tue; 7.5 mg all other days   Next INR check:  7/26/2023             Telephone call with  Lucero who verbalizes understanding and agrees to plan    Lab visit scheduled    Education provided:     Goal range and lab monitoring: goal range and significance of current result    Contact [phone]  with any changes, questions or concerns.     Plan made per [facility] anticoagulation protocol    Selena Bates RN  Anticoagulation Clinic  7/12/2023    _______________________________________________________________________     Anticoagulation Episode Summary     Current INR goal:  2.0-3.0   TTR:  67.1 % (1 y)   Target end date:  Indefinite   Send INR reminders to:  JONATHAN MURILLO    Indications    S/P aortic valve replacement [Z95.2]  Paroxysmal atrial fibrillation (H) [I48.0]  Long term current use of anticoagulant therapy [Z79.01]  Anticoagulated on Coumadin [Z79.01]  Anticoagulated [Z79.01]           Comments:  Aortic 21 mm Johnson Perimount Magna Tissue Valve.         Anticoagulation Care Providers     Provider Role  Specialty Phone number    Edin Mays MD Referring Internal Medicine [phone]

## 2023-07-12 NOTE — TELEPHONE ENCOUNTER
ALLOPURINOL 100MG TABLETS    Office Visit    1/30/2023  [facility] Edin Hill MD  Internal Medicine

## 2023-07-13 ENCOUNTER — OFFICE VISIT (OUTPATIENT)
Dept: DERMATOLOGY | Facility: CLINIC | Age: 78
End: 2023-07-13
Payer: MEDICARE

## 2023-07-13 ENCOUNTER — PRE VISIT (OUTPATIENT)
Dept: DERMATOLOGY | Facility: CLINIC | Age: 78
End: 2023-07-13

## 2023-07-13 VITALS — HEART RATE: 98 BPM | DIASTOLIC BLOOD PRESSURE: 81 MMHG | SYSTOLIC BLOOD PRESSURE: 133 MMHG

## 2023-07-13 DIAGNOSIS — C44.311 BASAL CELL CARCINOMA OF NOSE: ICD-10-CM

## 2023-07-13 PROCEDURE — 17311 MOHS 1 STAGE H/N/HF/G: CPT | Mod: GC | Performed by: DERMATOLOGY

## 2023-07-13 PROCEDURE — 14061 TIS TRNFR E/N/E/L10.1-30SQCM: CPT | Mod: GC | Performed by: DERMATOLOGY

## 2023-07-13 ASSESSMENT — PAIN SCALES - GENERAL: PAINLEVEL: NO PAIN (0)

## 2023-07-13 NOTE — LETTER
7/13/2023       RE: Brooks Summers  [address]  [place] [address]     Dear Colleague,    Thank you for referring your patient, Brooks Summers, to the [facility] DERMATOLOGIC SURGERY CLINIC [place] [facility]. Please see a copy of my visit note below.    [facility] Mohs Surgery Procedure Note    Case #: 1  Date of Service:  Jul 13, 2023  Surgery: Mohs micrographic surgery (MMS)  Staff surgeon: Erasmo Story MD  Fellow surgeon: Little Xiao MD  Resident surgeon: none  Nurse: Nicole Perry CMA; Felicia Burgess RN    Tumor Type: BCC  Location: left nasal ala  Derm-Path Accession #: [id number]    Mohs Accession #:   Pre-Op Size: 0.8 cm x 0.6 cm  Final Defect Size: 1.0 cm x 0.8 cm  Number of Mohs stages: 1  Level of Defect: Fascia  Local anesthetic: 11 mL 1% lidocaine with epinephrine  Repair Type: spiral rotation flap   Repair Size: 4.0 cm x 3.1 cm  Suture Material: 5-0 Monocryl; 5-0 fast absorbing gut    Procedure:    Stage I  We discussed the principles of treatment and most likely complications including scarring, bleeding, infection, swelling, pain, crusting, nerve damage, large wound,  incomplete excision, wound dehiscence,  nerve damage, recurrence, and a second procedure may be recommended to obtain the best cosmetic or functional result.    Informed consent was obtained and the patient underwent the procedure as follows:  The patient was placed supine on the operating table.  The cancer was identified, outlined with a marker, and verified by the patient.  The entire surgical field was prepped with chlorhexidine.  The surgical site was anesthetized using lidocaine with epinephrine.    The area of clinically apparent tumor was not debulked. The layer of tissue was then surgically excised using a #15 blade and was then transferred onto a specimen sheet maintaining the orientation of the specimen. Hemostasis was obtained  using electrocoagulation. The wound site was then covered with a dressing while the tissue samples were processed for examination.    The excised tissue was transported to the Mohs histology laboratory maintaining the tissue orientation.  The tissue specimen was relaxed so that the entire surgical margin was in a a single horizontal plane for sectioning and inked for precise mapping.  A precise reference map was drawn to reflect the sectioning of the specimen, colored inking of the margins, and orientation on the patient.  The tissue was processed using horizontal sectioning of the base and continuous peripheral margins.  The histopathologic sections were reviewed in conjunction with the reference map.    Total blocks: 1  Total slides: 2    There were no cancer cells visualized on examination, therefore Mohs surgery was complete.    PROCEDURE:  SPIRAL ROTATION FLAP    Using a marker, the rotation flap was planned from the left nasal dorsum and sidewall.  The defect and surrounding area was infiltrated with 1% lidocaine with epinephrine.  The defect was then cleansed and prepped with chlorhexidine and draped with sterile drapes.   The wound edges were debeveled and the wound was undermined bluntly in all directions. The rotation flap was incised sharply to the level of fat.  The flap was undermined from all surrounding tissue.  Hemostasis was obtained using electrocoagulation. The flap was then rotated into the primary defect secured with buried vertical mattress sutures.  The inferior tip of the flap was rotated 90 degrees to re-create the alar crease. The epidermis was then carefully approximated using 5-0 sutures throughout the length of the flap.  Careful attention was given to even approximation of the wound edges.      The wound was cleansed with saline and an ointment was applied.  A sterile non-adherent pressure dressing was placed.  The patient left the operating suite in stable condition. Wound care was  reviewed verbally and in writing.     Suture removal: N/A (all dissolving sutures)    F/U with dermatology surgery: PRN per patient preference     Little Xiao MD  Micrographic Surgery and Dermatologic Oncology (MSDO) Fellow, PGY-5        Attestation signed by Erasmo Story MD at 7/18/2023 10:59 AM:  Attending Attestation  I attest that the Scribe recorded the interview and exam that I personally performed.  I have reviewed the note and edited it as necessary.    Erasmo Story M.D.  Professor  Director of Dermatologic Surgery  Department of Dermatology  [facility]

## 2023-07-13 NOTE — PATIENT INSTRUCTIONS

## 2023-07-13 NOTE — PROGRESS NOTES
[facility] Mohs Surgery Procedure Note    Case #: 1  Date of Service:  Jul 13, 2023  Surgery: Mohs micrographic surgery (MMS)  Staff surgeon: Erasmo Story MD  Fellow surgeon: Little Xiao MD  Resident surgeon: none  Nurse: Nicole Peryr CMA; Felicia Burgess RN    Tumor Type: BCC  Location: left nasal ala  Derm-Path Accession #: [id number]    Mohs Accession #:   Pre-Op Size: 0.8 cm x 0.6 cm  Final Defect Size: 1.0 cm x 0.8 cm  Number of Mohs stages: 1  Level of Defect: Fascia  Local anesthetic: 11 mL 1% lidocaine with epinephrine  Repair Type: spiral rotation flap   Repair Size: 4.0 cm x 3.1 cm  Suture Material: 5-0 Monocryl; 5-0 fast absorbing gut    Procedure:    Stage I  We discussed the principles of treatment and most likely complications including scarring, bleeding, infection, swelling, pain, crusting, nerve damage, large wound,  incomplete excision, wound dehiscence,  nerve damage, recurrence, and a second procedure may be recommended to obtain the best cosmetic or functional result.    Informed consent was obtained and the patient underwent the procedure as follows:  The patient was placed supine on the operating table.  The cancer was identified, outlined with a marker, and verified by the patient.  The entire surgical field was prepped with chlorhexidine.  The surgical site was anesthetized using lidocaine with epinephrine.    The area of clinically apparent tumor was not debulked. The layer of tissue was then surgically excised using a #15 blade and was then transferred onto a specimen sheet maintaining the orientation of the specimen. Hemostasis was obtained using electrocoagulation. The wound site was then covered with a dressing while the tissue samples were processed for examination.    The excised tissue was transported to the Mohs histology laboratory maintaining the tissue orientation.  The tissue specimen was relaxed so that the entire surgical margin was in a a single  horizontal plane for sectioning and inked for precise mapping.  A precise reference map was drawn to reflect the sectioning of the specimen, colored inking of the margins, and orientation on the patient.  The tissue was processed using horizontal sectioning of the base and continuous peripheral margins.  The histopathologic sections were reviewed in conjunction with the reference map.    Total blocks: 1  Total slides: 2    There were no cancer cells visualized on examination, therefore Mohs surgery was complete.    PROCEDURE:  SPIRAL ROTATION FLAP    Using a marker, the rotation flap was planned from the left nasal dorsum and sidewall.  The defect and surrounding area was infiltrated with 1% lidocaine with epinephrine.  The defect was then cleansed and prepped with chlorhexidine and draped with sterile drapes.   The wound edges were debeveled and the wound was undermined bluntly in all directions. The rotation flap was incised sharply to the level of fat.  The flap was undermined from all surrounding tissue.  Hemostasis was obtained using electrocoagulation. The flap was then rotated into the primary defect secured with buried vertical mattress sutures.  The inferior tip of the flap was rotated 90 degrees to re-create the alar crease. The epidermis was then carefully approximated using 5-0 sutures throughout the length of the flap.  Careful attention was given to even approximation of the wound edges.      The wound was cleansed with saline and an ointment was applied.  A sterile non-adherent pressure dressing was placed.  The patient left the operating suite in stable condition. Wound care was reviewed verbally and in writing.     Suture removal: N/A (all dissolving sutures)    F/U with dermatology surgery: PRN per patient preference     Little Xiao MD  Micrographic Surgery and Dermatologic Oncology (MSDO) Fellow, PGY-5

## 2023-07-13 NOTE — NURSING NOTE
Chief Complaint   Patient presents with     Derm Problem     BCC nose     Mercedes ACEVEDO, RN-BSN  Dermatology Surgery  [phone]

## 2023-07-18 ENCOUNTER — TRANSFERRED RECORDS (OUTPATIENT)
Dept: UROLOGY | Facility: CLINIC | Age: 78
End: 2023-07-18
Payer: MEDICARE

## 2023-07-18 DIAGNOSIS — R35.1 NOCTURIA: ICD-10-CM

## 2023-07-20 RX ORDER — OXYBUTYNIN CHLORIDE 10 MG/1
10 TABLET, EXTENDED RELEASE ORAL DAILY
Qty: 30 TABLET | Refills: 0 | Status: SHIPPED | OUTPATIENT
Start: 2023-07-20 | End: 2023-08-19

## 2023-07-20 NOTE — TELEPHONE ENCOUNTER
oxybutynin ER (DITROPAN XL) 10 MG 24 hr tablet  Last Written Prescription Date:  5/22/2023  Last Fill Quantity: 30,   # refills: 0  Last Office Visit :  3/8/2022  Future Office visit:  None    Routing refill request to provider for review/approval because:  Second Request  Over due office visit  Please call Pt to schedule    Muscarinic Antagonists (Urinary Incontinence Agents) Failed 07/19/2023 03:29 PM   Protocol Details  Recent (12 mo) or future (30 days) visit within the authorizing provider's specialty       Becca Velazquez RN  Central Triage Red Flags/Med Refills

## 2023-07-24 ENCOUNTER — LAB (OUTPATIENT)
Dept: LAB | Facility: CLINIC | Age: 78
End: 2023-07-24
Payer: MEDICARE

## 2023-07-24 ENCOUNTER — ANTICOAGULATION THERAPY VISIT (OUTPATIENT)
Dept: ANTICOAGULATION | Facility: CLINIC | Age: 78
End: 2023-07-24

## 2023-07-24 DIAGNOSIS — Z95.2 S/P AORTIC VALVE REPLACEMENT: Primary | ICD-10-CM

## 2023-07-24 DIAGNOSIS — Z79.01 ANTICOAGULATED ON COUMADIN: ICD-10-CM

## 2023-07-24 DIAGNOSIS — Z95.2 S/P AORTIC VALVE REPLACEMENT: ICD-10-CM

## 2023-07-24 DIAGNOSIS — Z79.01 ANTICOAGULATED: ICD-10-CM

## 2023-07-24 DIAGNOSIS — I48.0 PAROXYSMAL ATRIAL FIBRILLATION (H): ICD-10-CM

## 2023-07-24 DIAGNOSIS — Z79.01 LONG TERM CURRENT USE OF ANTICOAGULANT THERAPY: ICD-10-CM

## 2023-07-24 LAB — INR BLD: 1.7 (ref 0.9–1.1)

## 2023-07-24 PROCEDURE — 85610 PROTHROMBIN TIME: CPT

## 2023-07-24 PROCEDURE — 36416 COLLJ CAPILLARY BLOOD SPEC: CPT

## 2023-07-24 NOTE — PROGRESS NOTES
ANTICOAGULATION MANAGEMENT     Brooks Summers 78 year old male is on warfarin with subtherapeutic INR result. (Goal INR 2.0-3.0)    Recent labs: (last 7 days)     07/24/23  1340   INR 1.7*       ASSESSMENT     Source(s): Chart Review and Patient/Caregiver Call     Warfarin doses taken: Warfarin taken as instructed  Diet: No new diet changes identified  Medication/supplement changes: None noted  New illness, injury, or hospitalization: No  Signs or symptoms of bleeding or clotting: No  Previous result: Subtherapeutic  Additional findings: None       PLAN     Recommended plan for no diet, medication or health factor changes affecting INR     Dosing Instructions: Increase your warfarin dose (5% change) with next INR in 2 weeks       Summary  As of 7/24/2023      Full warfarin instructions:  7.5 mg every day   Next INR check:  8/7/2023               Telephone call with Lucero who verbalizes understanding and agrees to plan    Lab visit scheduled    Education provided:   None required    Plan made per [facility] anticoagulation protocol    Selena Bates RN  Anticoagulation Clinic  7/24/2023    _______________________________________________________________________     Anticoagulation Episode Summary       Current INR goal:  2.0-3.0   TTR:  67.1 % (1 y)   Target end date:  Indefinite   Send INR reminders to:  JONATHAN MURILLO    Indications    S/P aortic valve replacement [Z95.2]  Paroxysmal atrial fibrillation (H) [I48.0]  Long term current use of anticoagulant therapy [Z79.01]  Anticoagulated on Coumadin [Z79.01]  Anticoagulated [Z79.01]             Comments:  Aortic 21 mm Johnson Perimount Magna Tissue Valve.             Anticoagulation Care Providers       Provider Role Specialty Phone number    Edin Mays MD Referring Internal Medicine [phone]

## 2023-08-07 ENCOUNTER — LAB (OUTPATIENT)
Dept: LAB | Facility: CLINIC | Age: 78
End: 2023-08-07
Payer: MEDICARE

## 2023-08-07 ENCOUNTER — ANTICOAGULATION THERAPY VISIT (OUTPATIENT)
Dept: ANTICOAGULATION | Facility: CLINIC | Age: 78
End: 2023-08-07

## 2023-08-07 DIAGNOSIS — Z79.01 ANTICOAGULATED ON COUMADIN: ICD-10-CM

## 2023-08-07 DIAGNOSIS — I10 BENIGN ESSENTIAL HYPERTENSION: Primary | ICD-10-CM

## 2023-08-07 DIAGNOSIS — I48.0 PAROXYSMAL ATRIAL FIBRILLATION (H): ICD-10-CM

## 2023-08-07 DIAGNOSIS — Z79.01 LONG TERM CURRENT USE OF ANTICOAGULANT THERAPY: ICD-10-CM

## 2023-08-07 DIAGNOSIS — Z95.2 S/P AORTIC VALVE REPLACEMENT: ICD-10-CM

## 2023-08-07 DIAGNOSIS — Z95.2 S/P AORTIC VALVE REPLACEMENT: Primary | ICD-10-CM

## 2023-08-07 DIAGNOSIS — Z79.01 ANTICOAGULATED: ICD-10-CM

## 2023-08-07 LAB — INR BLD: 2.4 (ref 0.9–1.1)

## 2023-08-07 PROCEDURE — 85610 PROTHROMBIN TIME: CPT

## 2023-08-07 PROCEDURE — 36416 COLLJ CAPILLARY BLOOD SPEC: CPT

## 2023-08-07 NOTE — PROGRESS NOTES
ANTICOAGULATION MANAGEMENT     Brooks Summers 78 year old male is on warfarin with therapeutic INR result. (Goal INR 2.0-3.0)    Recent labs: (last 7 days)     08/07/23  1336   INR 2.4*       ASSESSMENT     Source(s): Chart Review and Patient/Caregiver Call     Warfarin doses taken: Warfarin taken as instructed  Diet: No new diet changes identified  Medication/supplement changes: None noted  New illness, injury, or hospitalization: No  Signs or symptoms of bleeding or clotting: No  Previous result: Subtherapeutic  Additional findings: None       PLAN     Recommended plan for no diet, medication or health factor changes affecting INR     Dosing Instructions: Continue your current warfarin dose with next INR in 2 weeks       Summary  As of 8/7/2023      Full warfarin instructions:  7.5 mg every day   Next INR check:  8/21/2023               Telephone call with Brooks who verbalizes understanding and agrees to plan and who agrees to plan and repeated back plan correctly    Lab visit scheduled    Education provided:   Please call back if any changes to your diet, medications or how you've been taking warfarin    Plan made per [facility] anticoagulation protocol    Shira Mirza RN  Anticoagulation Clinic  8/7/2023    _______________________________________________________________________     Anticoagulation Episode Summary       Current INR goal:  2.0-3.0   TTR:  68.7 % (1 y)   Target end date:  Indefinite   Send INR reminders to:  JONATHAN MURILLO    Indications    S/P aortic valve replacement [Z95.2]  Paroxysmal atrial fibrillation (H) [I48.0]  Long term current use of anticoagulant therapy [Z79.01]  Anticoagulated on Coumadin [Z79.01]  Anticoagulated [Z79.01]             Comments:  Aortic 21 mm Johnson Perimount Magna Tissue Valve.             Anticoagulation Care Providers       Provider Role Specialty Phone number    Edin Mays MD Referring Internal Medicine [phone]

## 2023-08-11 RX ORDER — METOPROLOL SUCCINATE 50 MG/1
50 TABLET, EXTENDED RELEASE ORAL DAILY
Qty: 90 TABLET | Refills: 3 | Status: SHIPPED | OUTPATIENT
Start: 2023-08-11 | End: 2024-08-14

## 2023-08-13 DIAGNOSIS — I48.0 PAROXYSMAL ATRIAL FIBRILLATION (H): ICD-10-CM

## 2023-08-13 DIAGNOSIS — Z95.2 S/P AVR (AORTIC VALVE REPLACEMENT): ICD-10-CM

## 2023-08-13 DIAGNOSIS — Z95.2 S/P AORTIC VALVE REPLACEMENT: ICD-10-CM

## 2023-08-13 DIAGNOSIS — Z79.01 LONG TERM CURRENT USE OF ANTICOAGULANT THERAPY: ICD-10-CM

## 2023-08-14 RX ORDER — WARFARIN SODIUM 5 MG/1
TABLET ORAL
Qty: 130 TABLET | Refills: 1 | Status: SHIPPED | OUTPATIENT
Start: 2023-08-14 | End: 2024-03-07

## 2023-08-14 NOTE — TELEPHONE ENCOUNTER
ANTICOAGULATION MANAGEMENT:  Medication Refill    Anticoagulation Summary  As of 8/7/2023      Warfarin maintenance plan:  7.5 mg (5 mg x 1.5) every day   Next INR check:  8/21/2023   Target end date:  Indefinite    Indications    S/P aortic valve replacement [Z95.2]  Paroxysmal atrial fibrillation (H) [I48.0]  Long term current use of anticoagulant therapy [Z79.01]  Anticoagulated on Coumadin [Z79.01]  Anticoagulated [Z79.01]                 Anticoagulation Care Providers       Provider Role Specialty Phone number    Edin Mays MD Referring Internal Medicine [phone]            Refill Criteria    Visit with referring provider/group: Meets criteria: office visit within referring provider group in the last 1 year on 1/30/23    [facility] referral signed last signed: 06/22/2023; within last year: Yes    Lab monitoring not exceeding 2 weeks overdue: Yes    Brooks meets all criteria for refill. Rx instructions and quantity supplied updated to match patient's current dosing plan. Warfarin 90 day supply with 1 refill granted per [facility] protocol     Lo CALVILLO RN  Anticoagulation Clinic

## 2023-08-21 ENCOUNTER — ANTICOAGULATION THERAPY VISIT (OUTPATIENT)
Dept: ANTICOAGULATION | Facility: CLINIC | Age: 78
End: 2023-08-21

## 2023-08-21 ENCOUNTER — LAB (OUTPATIENT)
Dept: LAB | Facility: CLINIC | Age: 78
End: 2023-08-21
Payer: MEDICARE

## 2023-08-21 DIAGNOSIS — Z95.2 S/P AORTIC VALVE REPLACEMENT: ICD-10-CM

## 2023-08-21 DIAGNOSIS — I48.0 PAROXYSMAL ATRIAL FIBRILLATION (H): ICD-10-CM

## 2023-08-21 DIAGNOSIS — Z79.01 ANTICOAGULATED: ICD-10-CM

## 2023-08-21 DIAGNOSIS — Z95.2 S/P AORTIC VALVE REPLACEMENT: Primary | ICD-10-CM

## 2023-08-21 DIAGNOSIS — Z79.01 LONG TERM CURRENT USE OF ANTICOAGULANT THERAPY: ICD-10-CM

## 2023-08-21 DIAGNOSIS — Z79.01 ANTICOAGULATED ON COUMADIN: ICD-10-CM

## 2023-08-21 LAB — INR BLD: 2.7 (ref 0.9–1.1)

## 2023-08-21 PROCEDURE — 85610 PROTHROMBIN TIME: CPT

## 2023-08-21 PROCEDURE — 36416 COLLJ CAPILLARY BLOOD SPEC: CPT

## 2023-08-21 NOTE — PROGRESS NOTES
ANTICOAGULATION MANAGEMENT     Brooks Summers 78 year old male is on warfarin with therapeutic INR result. (Goal INR 2.0-3.0)    Recent labs: (last 7 days)     08/21/23  1105   INR 2.7*       ASSESSMENT     Source(s): Chart Review and Patient/Caregiver Call     Warfarin doses taken: Warfarin taken as instructed  Diet: No new diet changes identified  Medication/supplement changes: None noted  New illness, injury, or hospitalization: No  Signs or symptoms of bleeding or clotting: No  Previous result: Therapeutic last visit; previously outside of goal range  Additional findings: None       PLAN     Recommended plan for no diet, medication or health factor changes affecting INR     Dosing Instructions: Continue your current warfarin dose with next INR in 4 weeks       Summary  As of 8/21/2023      Full warfarin instructions:  7.5 mg every day   Next INR check:  9/18/2023               Telephone call with Brooks who verbalizes understanding and agrees to plan    Lab visit scheduled    Education provided:   Please call back if any changes to your diet, medications or how you've been taking warfarin    Plan made per [facility] anticoagulation protocol    Ashley Norris RN  Anticoagulation Clinic  8/21/2023    _______________________________________________________________________     Anticoagulation Episode Summary       Current INR goal:  2.0-3.0   TTR:  68.7 % (1 y)   Target end date:  Indefinite   Send INR reminders to:  JONATHAN MURILLO    Indications    S/P aortic valve replacement [Z95.2]  Paroxysmal atrial fibrillation (H) [I48.0]  Long term current use of anticoagulant therapy [Z79.01]  Anticoagulated on Coumadin [Z79.01]  Anticoagulated [Z79.01]             Comments:  Aortic 21 mm Johnson Perimount Magna Tissue Valve.             Anticoagulation Care Providers       Provider Role Specialty Phone number    Edin Mays MD Referring Internal Medicine [phone]

## 2023-08-22 ENCOUNTER — TELEPHONE (OUTPATIENT)
Dept: UROLOGY | Facility: CLINIC | Age: 78
End: 2023-08-22
Payer: MEDICARE

## 2023-09-15 ENCOUNTER — ANCILLARY PROCEDURE (OUTPATIENT)
Dept: CARDIOLOGY | Facility: CLINIC | Age: 78
End: 2023-09-15
Attending: INTERNAL MEDICINE
Payer: MEDICARE

## 2023-09-15 DIAGNOSIS — I42.9 CARDIOMYOPATHY (H): ICD-10-CM

## 2023-09-15 PROCEDURE — 93295 DEV INTERROG REMOTE 1/2/MLT: CPT | Performed by: INTERNAL MEDICINE

## 2023-09-15 PROCEDURE — 93296 REM INTERROG EVL PM/IDS: CPT

## 2023-09-21 LAB
MDC_IDC_EPISODE_DTM: NORMAL
MDC_IDC_EPISODE_DURATION: 1 S
MDC_IDC_EPISODE_ID: 3
MDC_IDC_EPISODE_TYPE: NORMAL
MDC_IDC_LEAD_IMPLANT_DT: NORMAL
MDC_IDC_LEAD_LOCATION: NORMAL
MDC_IDC_LEAD_LOCATION_DETAIL_1: NORMAL
MDC_IDC_LEAD_MFG: NORMAL
MDC_IDC_LEAD_MODEL: NORMAL
MDC_IDC_LEAD_POLARITY_TYPE: NORMAL
MDC_IDC_LEAD_SERIAL: NORMAL
MDC_IDC_LEAD_SPECIAL_FUNCTION: NORMAL
MDC_IDC_MSMT_BATTERY_DTM: NORMAL
MDC_IDC_MSMT_BATTERY_REMAINING_LONGEVITY: 18 MO
MDC_IDC_MSMT_BATTERY_RRT_TRIGGER: NORMAL
MDC_IDC_MSMT_BATTERY_VOLTAGE: 2.92 V
MDC_IDC_MSMT_CAP_CHARGE_DTM: NORMAL
MDC_IDC_MSMT_CAP_CHARGE_ENERGY: 18 J
MDC_IDC_MSMT_CAP_CHARGE_TIME: 4.2 S
MDC_IDC_MSMT_CAP_CHARGE_TYPE: NORMAL
MDC_IDC_MSMT_LEADCHNL_LV_IMPEDANCE_VALUE: 323 OHM
MDC_IDC_MSMT_LEADCHNL_LV_IMPEDANCE_VALUE: 399 OHM
MDC_IDC_MSMT_LEADCHNL_LV_IMPEDANCE_VALUE: 665 OHM
MDC_IDC_MSMT_LEADCHNL_LV_PACING_THRESHOLD_AMPLITUDE: 2.25 V
MDC_IDC_MSMT_LEADCHNL_LV_PACING_THRESHOLD_PULSEWIDTH: 1 MS
MDC_IDC_MSMT_LEADCHNL_RA_IMPEDANCE_VALUE: 418 OHM
MDC_IDC_MSMT_LEADCHNL_RA_PACING_THRESHOLD_AMPLITUDE: 0.62 V
MDC_IDC_MSMT_LEADCHNL_RA_PACING_THRESHOLD_PULSEWIDTH: 0.4 MS
MDC_IDC_MSMT_LEADCHNL_RA_SENSING_INTR_AMPL: 1.8 MV
MDC_IDC_MSMT_LEADCHNL_RV_IMPEDANCE_VALUE: 380 OHM
MDC_IDC_MSMT_LEADCHNL_RV_IMPEDANCE_VALUE: 475 OHM
MDC_IDC_MSMT_LEADCHNL_RV_PACING_THRESHOLD_AMPLITUDE: 0.5 V
MDC_IDC_MSMT_LEADCHNL_RV_PACING_THRESHOLD_PULSEWIDTH: 0.4 MS
MDC_IDC_MSMT_LEADCHNL_RV_SENSING_INTR_AMPL: 15.6 MV
MDC_IDC_PG_IMPLANT_DTM: NORMAL
MDC_IDC_PG_MFG: NORMAL
MDC_IDC_PG_MODEL: NORMAL
MDC_IDC_PG_SERIAL: NORMAL
MDC_IDC_PG_TYPE: NORMAL
MDC_IDC_SESS_CLINIC_NAME: NORMAL
MDC_IDC_SESS_DTM: NORMAL
MDC_IDC_SESS_TYPE: NORMAL
MDC_IDC_SET_BRADY_AT_MODE_SWITCH_RATE: 171 {BEATS}/MIN
MDC_IDC_SET_BRADY_LOWRATE: 60 {BEATS}/MIN
MDC_IDC_SET_BRADY_MAX_SENSOR_RATE: 120 {BEATS}/MIN
MDC_IDC_SET_BRADY_MAX_TRACKING_RATE: 130 {BEATS}/MIN
MDC_IDC_SET_BRADY_MODE: NORMAL
MDC_IDC_SET_BRADY_NIGHT_RATE: 50 {BEATS}/MIN
MDC_IDC_SET_BRADY_PAV_DELAY_LOW: 170 MS
MDC_IDC_SET_BRADY_SAV_DELAY_LOW: 110 MS
MDC_IDC_SET_CRT_LVRV_DELAY: 0 MS
MDC_IDC_SET_CRT_PACED_CHAMBERS: NORMAL
MDC_IDC_SET_LEADCHNL_LV_PACING_AMPLITUDE: 3.25 V
MDC_IDC_SET_LEADCHNL_LV_PACING_ANODE_ELECTRODE_1: NORMAL
MDC_IDC_SET_LEADCHNL_LV_PACING_ANODE_LOCATION_1: NORMAL
MDC_IDC_SET_LEADCHNL_LV_PACING_CAPTURE_MODE: NORMAL
MDC_IDC_SET_LEADCHNL_LV_PACING_CATHODE_ELECTRODE_1: NORMAL
MDC_IDC_SET_LEADCHNL_LV_PACING_CATHODE_LOCATION_1: NORMAL
MDC_IDC_SET_LEADCHNL_LV_PACING_POLARITY: NORMAL
MDC_IDC_SET_LEADCHNL_LV_PACING_PULSEWIDTH: 1 MS
MDC_IDC_SET_LEADCHNL_RA_PACING_AMPLITUDE: 1.5 V
MDC_IDC_SET_LEADCHNL_RA_PACING_ANODE_ELECTRODE_1: NORMAL
MDC_IDC_SET_LEADCHNL_RA_PACING_ANODE_LOCATION_1: NORMAL
MDC_IDC_SET_LEADCHNL_RA_PACING_CAPTURE_MODE: NORMAL
MDC_IDC_SET_LEADCHNL_RA_PACING_CATHODE_ELECTRODE_1: NORMAL
MDC_IDC_SET_LEADCHNL_RA_PACING_CATHODE_LOCATION_1: NORMAL
MDC_IDC_SET_LEADCHNL_RA_PACING_POLARITY: NORMAL
MDC_IDC_SET_LEADCHNL_RA_PACING_PULSEWIDTH: 0.4 MS
MDC_IDC_SET_LEADCHNL_RA_SENSING_ANODE_ELECTRODE_1: NORMAL
MDC_IDC_SET_LEADCHNL_RA_SENSING_ANODE_LOCATION_1: NORMAL
MDC_IDC_SET_LEADCHNL_RA_SENSING_CATHODE_ELECTRODE_1: NORMAL
MDC_IDC_SET_LEADCHNL_RA_SENSING_CATHODE_LOCATION_1: NORMAL
MDC_IDC_SET_LEADCHNL_RA_SENSING_POLARITY: NORMAL
MDC_IDC_SET_LEADCHNL_RA_SENSING_SENSITIVITY: 0.3 MV
MDC_IDC_SET_LEADCHNL_RV_PACING_AMPLITUDE: 2 V
MDC_IDC_SET_LEADCHNL_RV_PACING_ANODE_ELECTRODE_1: NORMAL
MDC_IDC_SET_LEADCHNL_RV_PACING_ANODE_LOCATION_1: NORMAL
MDC_IDC_SET_LEADCHNL_RV_PACING_CAPTURE_MODE: NORMAL
MDC_IDC_SET_LEADCHNL_RV_PACING_CATHODE_ELECTRODE_1: NORMAL
MDC_IDC_SET_LEADCHNL_RV_PACING_CATHODE_LOCATION_1: NORMAL
MDC_IDC_SET_LEADCHNL_RV_PACING_POLARITY: NORMAL
MDC_IDC_SET_LEADCHNL_RV_PACING_PULSEWIDTH: 0.4 MS
MDC_IDC_SET_LEADCHNL_RV_SENSING_ANODE_ELECTRODE_1: NORMAL
MDC_IDC_SET_LEADCHNL_RV_SENSING_ANODE_LOCATION_1: NORMAL
MDC_IDC_SET_LEADCHNL_RV_SENSING_CATHODE_ELECTRODE_1: NORMAL
MDC_IDC_SET_LEADCHNL_RV_SENSING_CATHODE_LOCATION_1: NORMAL
MDC_IDC_SET_LEADCHNL_RV_SENSING_POLARITY: NORMAL
MDC_IDC_SET_LEADCHNL_RV_SENSING_SENSITIVITY: 0.3 MV
MDC_IDC_SET_ZONE_DETECTION_BEATS_DENOMINATOR: 40 {BEATS}
MDC_IDC_SET_ZONE_DETECTION_BEATS_NUMERATOR: 30 {BEATS}
MDC_IDC_SET_ZONE_DETECTION_INTERVAL: 320 MS
MDC_IDC_SET_ZONE_DETECTION_INTERVAL: 350 MS
MDC_IDC_SET_ZONE_DETECTION_INTERVAL: 360 MS
MDC_IDC_SET_ZONE_DETECTION_INTERVAL: 420 MS
MDC_IDC_SET_ZONE_TYPE: NORMAL
MDC_IDC_STAT_AT_BURDEN_PERCENT: 0 %
MDC_IDC_STAT_AT_DTM_END: NORMAL
MDC_IDC_STAT_AT_DTM_START: NORMAL
MDC_IDC_STAT_BRADY_DTM_END: NORMAL
MDC_IDC_STAT_BRADY_DTM_START: NORMAL
MDC_IDC_STAT_BRADY_RV_PERCENT_PACED: 94.37 %
MDC_IDC_STAT_CRT_DTM_END: NORMAL
MDC_IDC_STAT_CRT_DTM_START: NORMAL
MDC_IDC_STAT_CRT_LV_PERCENT_PACED: 94.35 %
MDC_IDC_STAT_CRT_PERCENT_PACED: 94.35 %
MDC_IDC_STAT_EPISODE_RECENT_COUNT: 0
MDC_IDC_STAT_EPISODE_RECENT_COUNT: 1
MDC_IDC_STAT_EPISODE_RECENT_COUNT_DTM_END: NORMAL
MDC_IDC_STAT_EPISODE_RECENT_COUNT_DTM_START: NORMAL
MDC_IDC_STAT_EPISODE_TOTAL_COUNT: 0
MDC_IDC_STAT_EPISODE_TOTAL_COUNT: 1
MDC_IDC_STAT_EPISODE_TOTAL_COUNT: 2
MDC_IDC_STAT_EPISODE_TOTAL_COUNT_DTM_END: NORMAL
MDC_IDC_STAT_EPISODE_TOTAL_COUNT_DTM_START: NORMAL
MDC_IDC_STAT_EPISODE_TYPE: NORMAL
MDC_IDC_STAT_TACHYTHERAPY_ATP_DELIVERED_RECENT: 0
MDC_IDC_STAT_TACHYTHERAPY_ATP_DELIVERED_TOTAL: 0
MDC_IDC_STAT_TACHYTHERAPY_RECENT_DTM_END: NORMAL
MDC_IDC_STAT_TACHYTHERAPY_RECENT_DTM_START: NORMAL
MDC_IDC_STAT_TACHYTHERAPY_SHOCKS_ABORTED_RECENT: 0
MDC_IDC_STAT_TACHYTHERAPY_SHOCKS_ABORTED_TOTAL: 0
MDC_IDC_STAT_TACHYTHERAPY_SHOCKS_DELIVERED_RECENT: 0
MDC_IDC_STAT_TACHYTHERAPY_SHOCKS_DELIVERED_TOTAL: 0
MDC_IDC_STAT_TACHYTHERAPY_TOTAL_DTM_END: NORMAL
MDC_IDC_STAT_TACHYTHERAPY_TOTAL_DTM_START: NORMAL

## 2023-10-03 ENCOUNTER — LAB (OUTPATIENT)
Dept: LAB | Facility: CLINIC | Age: 78
End: 2023-10-03
Payer: MEDICARE

## 2023-10-03 ENCOUNTER — ANTICOAGULATION THERAPY VISIT (OUTPATIENT)
Dept: ANTICOAGULATION | Facility: CLINIC | Age: 78
End: 2023-10-03

## 2023-10-03 DIAGNOSIS — Z79.01 ANTICOAGULATED: ICD-10-CM

## 2023-10-03 DIAGNOSIS — Z95.2 S/P AORTIC VALVE REPLACEMENT: ICD-10-CM

## 2023-10-03 DIAGNOSIS — Z95.2 S/P AORTIC VALVE REPLACEMENT: Primary | ICD-10-CM

## 2023-10-03 DIAGNOSIS — I48.0 PAROXYSMAL ATRIAL FIBRILLATION (H): ICD-10-CM

## 2023-10-03 DIAGNOSIS — Z79.01 LONG TERM CURRENT USE OF ANTICOAGULANT THERAPY: ICD-10-CM

## 2023-10-03 DIAGNOSIS — Z79.01 ANTICOAGULATED ON COUMADIN: ICD-10-CM

## 2023-10-03 LAB — INR BLD: 1.7 (ref 0.9–1.1)

## 2023-10-03 PROCEDURE — 85610 PROTHROMBIN TIME: CPT

## 2023-10-03 PROCEDURE — 36416 COLLJ CAPILLARY BLOOD SPEC: CPT

## 2023-10-03 NOTE — PROGRESS NOTES
ANTICOAGULATION MANAGEMENT     Brooks Summers 78 year old male is on warfarin with subtherapeutic INR result. (Goal INR 2.0-3.0)    Recent labs: (last 7 days)     10/03/23  1411   INR 1.7*       ASSESSMENT     Source(s): Chart Review and Patient/Caregiver Call     Warfarin doses taken: Warfarin taken as instructed  Diet: No new diet changes identified  Medication/supplement changes: None noted  New illness, injury, or hospitalization: No  Signs or symptoms of bleeding or clotting: No  Previous result: Therapeutic last 2(+) visits  Additional findings: None       PLAN     Recommended plan for no diet, medication or health factor changes affecting INR     Dosing Instructions: Continue your current warfarin dose with next INR in 2 weeks       Summary  As of 10/3/2023      Full warfarin instructions:  7.5 mg every day   Next INR check:  10/17/2023               Telephone call with Brooks who verbalizes understanding and agrees to plan and who agrees to plan and repeated back plan correctly    Lab visit scheduled    Education provided:   Symptom monitoring: monitoring for clotting signs and symptoms, monitoring for stroke signs and symptoms, and when to seek medical attention/emergency care    Plan made per [facility] anticoagulation protocol    Kiana Moore RN  Anticoagulation Clinic  10/3/2023    _______________________________________________________________________     Anticoagulation Episode Summary       Current INR goal:  2.0-3.0   TTR:  67.2 % (1 y)   Target end date:  Indefinite   Send INR reminders to:  JONATHAN MURILLO    Indications    S/P aortic valve replacement [Z95.2]  Paroxysmal atrial fibrillation (H) [I48.0]  Long term current use of anticoagulant therapy [Z79.01]  Anticoagulated on Coumadin [Z79.01]  Anticoagulated [Z79.01]             Comments:  Aortic 21 mm Johnson Perimount Magna Tissue Valve.             Anticoagulation Care Providers       Provider Role Specialty Phone number    Edin Mays MD  [facility] Internal Medicine [phone]

## 2023-10-17 ENCOUNTER — LAB (OUTPATIENT)
Dept: LAB | Facility: CLINIC | Age: 78
End: 2023-10-17
Payer: MEDICARE

## 2023-10-17 ENCOUNTER — ANTICOAGULATION THERAPY VISIT (OUTPATIENT)
Dept: ANTICOAGULATION | Facility: CLINIC | Age: 78
End: 2023-10-17

## 2023-10-17 DIAGNOSIS — I48.0 PAROXYSMAL ATRIAL FIBRILLATION (H): ICD-10-CM

## 2023-10-17 DIAGNOSIS — Z79.01 ANTICOAGULATED: ICD-10-CM

## 2023-10-17 DIAGNOSIS — Z79.01 LONG TERM CURRENT USE OF ANTICOAGULANT THERAPY: ICD-10-CM

## 2023-10-17 DIAGNOSIS — Z79.01 ANTICOAGULATED ON COUMADIN: ICD-10-CM

## 2023-10-17 DIAGNOSIS — Z95.2 S/P AORTIC VALVE REPLACEMENT: Primary | ICD-10-CM

## 2023-10-17 DIAGNOSIS — Z95.2 S/P AORTIC VALVE REPLACEMENT: ICD-10-CM

## 2023-10-17 LAB — INR BLD: 1.4 (ref 0.9–1.1)

## 2023-10-17 PROCEDURE — 36416 COLLJ CAPILLARY BLOOD SPEC: CPT

## 2023-10-17 PROCEDURE — 85610 PROTHROMBIN TIME: CPT

## 2023-10-17 NOTE — PROGRESS NOTES
ANTICOAGULATION MANAGEMENT     Brooks Summers 78 year old male is on warfarin with subtherapeutic INR result. (Goal INR 2.0-3.0)    Recent labs: (last 7 days)     10/17/23  1120   INR 1.4*       ASSESSMENT     Source(s): Chart Review  Previous INR was Subtherapeutic  Medication, diet, health changes since last INR chart reviewed; none identified         PLAN     Unable to reach pt today.    Left message to take a booster dose of warfarin,  12.5 mg tonight. Request call back for assessment.    Follow up required to confirm warfarin dose taken and assess for changes    Kiana Moore RN  Anticoagulation Clinic  10/17/2023

## 2023-10-18 NOTE — PROGRESS NOTES
Attempted to reach Brooks today, able to leave voice mail at listed home number, request call back for assessment

## 2023-10-19 NOTE — PROGRESS NOTES
ANTICOAGULATION MANAGEMENT     Brooks Summers 78 year old male is on warfarin with subtherapeutic INR result. (Goal INR 2.0-3.0)    Recent labs: (last 7 days)     10/17/23  1120   INR 1.4*       ASSESSMENT     Source(s): Chart Review and Patient/Caregiver Call     Warfarin doses taken: Less warfarin taken than planned which may be affecting INR- pt reports he's been taking 5 mg Tues, and 7.5 mg all other days (old dose)  Diet:  Not eating well, no change in greens. Stress with spouse health may be affecting INR and/or diet  Medication/supplement changes: None noted  New illness, injury, or hospitalization: No  Signs or symptoms of bleeding or clotting: No  Previous result: Subtherapeutic  Additional findings: None       PLAN     Recommended plan for temporary change(s) and ongoing change(s) affecting INR     Dosing Instructions: booster dose then Increase your warfarin dose (~14% change) from what pt was taking, with next INR in 8 days       Summary  As of 10/17/2023      Full warfarin instructions:  10/17: 5 mg; 10/19: 12.5 mg; Otherwise 10 mg every Mon, Fri; 7.5 mg all other days   Next INR check:  10/27/2023               Telephone call with Brooks who verbalizes understanding and agrees to plan and who agrees to plan and repeated back plan correctly    Lab visit scheduled    Education provided:   Taking warfarin: Importance of taking warfarin as instructed  Symptom monitoring: monitoring for clotting signs and symptoms, monitoring for stroke signs and symptoms, and when to seek medical attention/emergency care    Plan made per [facility] anticoagulation protocol    Kiana Moore RN  Anticoagulation Clinic  10/19/2023    _______________________________________________________________________     Anticoagulation Episode Summary       Current INR goal:  2.0-3.0   TTR:  63.2% (1 y)   Target end date:  Indefinite   Send INR reminders to:  JONATHAN MURILLO    Indications    S/P aortic valve replacement [Z95.2]  Paroxysmal  atrial fibrillation (H) [I48.0]  Long term current use of anticoagulant therapy [Z79.01]  Anticoagulated on Coumadin [Z79.01]  Anticoagulated [Z79.01]             Comments:  Aortic 21 mm Johnson Perimount Magna Tissue Valve.             Anticoagulation Care Providers       Provider Role Specialty Phone number    Edin Mays MD Referring Internal Medicine [phone]

## 2023-10-21 DIAGNOSIS — M10.00 IDIOPATHIC GOUT, UNSPECIFIED CHRONICITY, UNSPECIFIED SITE: ICD-10-CM

## 2023-10-24 RX ORDER — ALLOPURINOL 100 MG/1
100 TABLET ORAL DAILY
Qty: 90 TABLET | Refills: 0 | Status: SHIPPED | OUTPATIENT
Start: 2023-10-24 | End: 2024-04-29

## 2023-10-24 NOTE — TELEPHONE ENCOUNTER
allopurinol (ZYLOPRIM) 100 MG tablet 90 tablet 1 7/12/2023  Last Office Visit : 1/30/2023   Future Office visit:  0

## 2023-10-30 ENCOUNTER — LAB (OUTPATIENT)
Dept: LAB | Facility: CLINIC | Age: 78
End: 2023-10-30
Payer: MEDICARE

## 2023-10-30 ENCOUNTER — TELEPHONE (OUTPATIENT)
Dept: ANTICOAGULATION | Facility: CLINIC | Age: 78
End: 2023-10-30
Payer: MEDICARE

## 2023-10-30 ENCOUNTER — ANTICOAGULATION THERAPY VISIT (OUTPATIENT)
Dept: ANTICOAGULATION | Facility: CLINIC | Age: 78
End: 2023-10-30

## 2023-10-30 DIAGNOSIS — Z95.2 S/P AORTIC VALVE REPLACEMENT: ICD-10-CM

## 2023-10-30 DIAGNOSIS — Z95.2 S/P AORTIC VALVE REPLACEMENT: Primary | ICD-10-CM

## 2023-10-30 DIAGNOSIS — I48.0 PAROXYSMAL ATRIAL FIBRILLATION (H): ICD-10-CM

## 2023-10-30 DIAGNOSIS — Z79.01 ANTICOAGULATED: ICD-10-CM

## 2023-10-30 DIAGNOSIS — Z79.01 ANTICOAGULATED ON COUMADIN: ICD-10-CM

## 2023-10-30 DIAGNOSIS — Z79.01 LONG TERM CURRENT USE OF ANTICOAGULANT THERAPY: ICD-10-CM

## 2023-10-30 LAB — INR BLD: 5.7 (ref 0.9–1.1)

## 2023-10-30 PROCEDURE — 85610 PROTHROMBIN TIME: CPT

## 2023-10-30 PROCEDURE — 36416 COLLJ CAPILLARY BLOOD SPEC: CPT

## 2023-10-30 NOTE — TELEPHONE ENCOUNTER
ANTICOAGULATION     Brooks Summers is overdue for an INR check.      Left message for patient to call and schedule lab appointment as soon as possible. If returning call, please schedule.     Kiana Moore RN

## 2023-10-30 NOTE — PROGRESS NOTES
ANTICOAGULATION MANAGEMENT     Brooks Summers 78 year old male is on warfarin with supratherapeutic INR result. (Goal INR 2.0-3.0)    Recent labs: (last 7 days)     10/30/23  1331   INR 5.7*       ASSESSMENT     Source(s): Chart Review  Previous INR was Supratherapeutic  Medication, diet, health changes since last INR chart reviewed; none identified         PLAN     Unable to reach Brooks today.    Left message to hold warfarin tonight. Request call back for assessment.    Follow up required to confirm warfarin dose taken and assess for changes and discuss out of range result     Shira Mirza RN  Anticoagulation Clinic  10/30/2023

## 2023-10-31 NOTE — PROGRESS NOTES
ANTICOAGULATION MANAGEMENT     Brooks Summers 78 year old male is on warfarin with supratherapeutic INR result. (Goal INR 2.0-3.0)    Recent labs: (last 7 days)     10/30/23  1331   INR 5.7*       ASSESSMENT     Source(s): Chart Review and Patient/Caregiver Call     Warfarin doses taken: Warfarin taken as instructed  Diet: No new diet changes identified  Medication/supplement changes: None noted  New illness, injury, or hospitalization: No  Signs or symptoms of bleeding or clotting: No  Previous result: Subtherapeutic  Additional findings: None       PLAN     Recommended plan for no diet, medication or health factor changes affecting INR     Dosing Instructions: hold 2 doses then continue your current warfarin dose with next INR in 1 day       Summary  As of 10/30/2023      Full warfarin instructions:  10/30: Hold; 10/31: Hold; Otherwise 5 mg every Fri; 7.5 mg all other days   Next INR check:  11/1/2023               Telephone call with Brooks who verbalizes understanding and agrees to plan    Lab visit scheduled    Education provided:   Please call back if any changes to your diet, medications or how you've been taking warfarin  Symptom monitoring: monitoring for bleeding signs and symptoms and when to seek medical attention/emergency care    Plan made per [facility] anticoagulation protocol    Ashley Norris RN  Anticoagulation Clinic  10/31/2023    _______________________________________________________________________     Anticoagulation Episode Summary       Current INR goal:  2.0-3.0   TTR:  60.5% (1 y)   Target end date:  Indefinite   Send INR reminders to:  JONATHAN MURILLO    Indications    S/P aortic valve replacement [Z95.2]  Paroxysmal atrial fibrillation (H) [I48.0]  Long term current use of anticoagulant therapy [Z79.01]  Anticoagulated on Coumadin [Z79.01]  Anticoagulated [Z79.01]             Comments:  Aortic 21 mm Johnson Perimount Magna Tissue Valve.             Anticoagulation Care Providers        Provider Role Specialty Phone number    Edin Mays MD Referring Internal Medicine [phone]

## 2023-11-01 ENCOUNTER — ANTICOAGULATION THERAPY VISIT (OUTPATIENT)
Dept: ANTICOAGULATION | Facility: CLINIC | Age: 78
End: 2023-11-01

## 2023-11-01 ENCOUNTER — LAB (OUTPATIENT)
Dept: LAB | Facility: CLINIC | Age: 78
End: 2023-11-01
Payer: MEDICARE

## 2023-11-01 DIAGNOSIS — Z95.2 S/P AORTIC VALVE REPLACEMENT: Primary | ICD-10-CM

## 2023-11-01 DIAGNOSIS — Z79.01 ANTICOAGULATED: ICD-10-CM

## 2023-11-01 DIAGNOSIS — Z95.2 S/P AORTIC VALVE REPLACEMENT: ICD-10-CM

## 2023-11-01 DIAGNOSIS — I48.0 PAROXYSMAL ATRIAL FIBRILLATION (H): ICD-10-CM

## 2023-11-01 DIAGNOSIS — Z79.01 ANTICOAGULATED ON COUMADIN: ICD-10-CM

## 2023-11-01 DIAGNOSIS — Z79.01 LONG TERM CURRENT USE OF ANTICOAGULANT THERAPY: ICD-10-CM

## 2023-11-01 LAB — INR BLD: 4.2 (ref 0.9–1.1)

## 2023-11-01 PROCEDURE — 85610 PROTHROMBIN TIME: CPT

## 2023-11-01 PROCEDURE — 36416 COLLJ CAPILLARY BLOOD SPEC: CPT

## 2023-11-01 NOTE — PROGRESS NOTES
ANTICOAGULATION MANAGEMENT     Brooks Summers 78 year old male is on warfarin with supratherapeutic INR result. (Goal INR 2.0-3.0)    Recent labs: (last 7 days)     11/01/23  0854   INR 4.2*       ASSESSMENT     Source(s): Chart Review and Patient/Caregiver Call     Warfarin doses taken: Held for INR 5.7  recently which may be affecting INR  Diet: No new diet changes identified  Medication/supplement changes: None noted  New illness, injury, or hospitalization: No  Signs or symptoms of bleeding or clotting: No  Previous result: Supratherapeutic  Additional findings: None       PLAN     Recommended plan for temporary change(s) and ongoing change(s) affecting INR     Dosing Instructions: hold dose then decrease your warfarin dose (10% change) with next INR in 8 days       Summary  As of 11/1/2023      Full warfarin instructions:  11/1: Hold; Otherwise 5 mg every Mon, Wed, Fri; 7.5 mg all other days   Next INR check:                 Telephone call with Brooks who verbalizes understanding and agrees to plan and who agrees to plan and repeated back plan correctly    Lab visit scheduled    Education provided:   Symptom monitoring: monitoring for bleeding signs and symptoms and when to seek medical attention/emergency care    Plan made per [facility] anticoagulation protocol    Kiana Moore RN  Anticoagulation Clinic  11/1/2023    _______________________________________________________________________     Anticoagulation Episode Summary       Current INR goal:  2.0-3.0   TTR:  60.1% (1 y)   Target end date:  Indefinite   Send INR reminders to:  JONATHAN MURILLO    Indications    S/P aortic valve replacement [Z95.2]  Paroxysmal atrial fibrillation (H) [I48.0]  Long term current use of anticoagulant therapy [Z79.01]  Anticoagulated on Coumadin [Z79.01]  Anticoagulated [Z79.01]             Comments:  Aortic 21 mm Johnson Perimount Magna Tissue Valve.             Anticoagulation Care Providers       Provider Role Specialty Phone number     Edin Mays MD [facility] Internal Medicine [phone]

## 2023-11-06 ENCOUNTER — ANTICOAGULATION THERAPY VISIT (OUTPATIENT)
Dept: ANTICOAGULATION | Facility: CLINIC | Age: 78
End: 2023-11-06

## 2023-11-06 ENCOUNTER — OFFICE VISIT (OUTPATIENT)
Dept: FAMILY MEDICINE | Facility: CLINIC | Age: 78
End: 2023-11-06
Payer: MEDICARE

## 2023-11-06 VITALS
DIASTOLIC BLOOD PRESSURE: 74 MMHG | WEIGHT: 165.2 LBS | SYSTOLIC BLOOD PRESSURE: 128 MMHG | TEMPERATURE: 97.5 F | BODY MASS INDEX: 23.65 KG/M2 | HEART RATE: 90 BPM | OXYGEN SATURATION: 98 % | RESPIRATION RATE: 16 BRPM | HEIGHT: 70 IN

## 2023-11-06 DIAGNOSIS — L03.011 CELLULITIS OF FINGER OF RIGHT HAND: Primary | ICD-10-CM

## 2023-11-06 DIAGNOSIS — S69.91XA INJURY OF FINGER OF RIGHT HAND, INITIAL ENCOUNTER: ICD-10-CM

## 2023-11-06 DIAGNOSIS — Z79.01 ANTICOAGULATED ON COUMADIN: ICD-10-CM

## 2023-11-06 DIAGNOSIS — Z79.01 LONG TERM CURRENT USE OF ANTICOAGULANT THERAPY: ICD-10-CM

## 2023-11-06 DIAGNOSIS — Z95.2 S/P AORTIC VALVE REPLACEMENT: Primary | ICD-10-CM

## 2023-11-06 DIAGNOSIS — Z79.01 ANTICOAGULATED: ICD-10-CM

## 2023-11-06 DIAGNOSIS — I48.0 PAROXYSMAL ATRIAL FIBRILLATION (H): ICD-10-CM

## 2023-11-06 DIAGNOSIS — Z23 NEED FOR TDAP VACCINATION: ICD-10-CM

## 2023-11-06 LAB — INR BLD: 2.2 (ref 0.9–1.1)

## 2023-11-06 PROCEDURE — 85610 PROTHROMBIN TIME: CPT | Performed by: PHYSICIAN ASSISTANT

## 2023-11-06 PROCEDURE — 36416 COLLJ CAPILLARY BLOOD SPEC: CPT | Performed by: PHYSICIAN ASSISTANT

## 2023-11-06 PROCEDURE — 99214 OFFICE O/P EST MOD 30 MIN: CPT | Performed by: PHYSICIAN ASSISTANT

## 2023-11-06 RX ORDER — CEFADROXIL 500 MG/1
1000 CAPSULE ORAL 2 TIMES DAILY
Qty: 40 CAPSULE | Refills: 0 | Status: SHIPPED | OUTPATIENT
Start: 2023-11-06 | End: 2023-11-16

## 2023-11-06 RX ORDER — RESPIRATORY SYNCYTIAL VIRUS VACCINE 120MCG/0.5
0.5 KIT INTRAMUSCULAR ONCE
Qty: 1 EACH | Refills: 0 | Status: CANCELLED | OUTPATIENT
Start: 2023-11-06 | End: 2023-11-06

## 2023-11-06 ASSESSMENT — PAIN SCALES - GENERAL: PAINLEVEL: NO PAIN (1)

## 2023-11-06 ASSESSMENT — PATIENT HEALTH QUESTIONNAIRE - PHQ9
SUM OF ALL RESPONSES TO PHQ QUESTIONS 1-9: 0
SUM OF ALL RESPONSES TO PHQ QUESTIONS 1-9: 0
10. IF YOU CHECKED OFF ANY PROBLEMS, HOW DIFFICULT HAVE THESE PROBLEMS MADE IT FOR YOU TO DO YOUR WORK, TAKE CARE OF THINGS AT HOME, OR GET ALONG WITH OTHER PEOPLE: NOT DIFFICULT AT ALL

## 2023-11-06 NOTE — PROGRESS NOTES
ANTICOAGULATION MANAGEMENT     Brooks Summers 78 year old male is on warfarin with therapeutic INR result. (Goal INR 2.0-3.0)    Recent labs: (last 7 days)     11/06/23  1113   INR 2.2*       ASSESSMENT     Source(s): Chart Review  Previous INR was Supratherapeutic  Medication, diet, health changes since last INR chart reviewed; none identified  Starting Duricef today for a finger infection/cellulitis.  Has a follow up appt already scheduled for 11/9/23       PLAN     Recommended plan for temporary change(s) affecting INR     Dosing Instructions: Continue your current warfarin dose with next INR in 3 days       Summary  As of 11/6/2023      Full warfarin instructions:  5 mg every Mon, Wed, Fri; 7.5 mg all other days   Next INR check:  11/9/2023               Detailed voice message left for Brooks with dosing instructions and follow up date.     Lab visit scheduled    Education provided:   Contact [phone]  with any changes, questions or concerns.     Plan made per [facility] anticoagulation protocol    Selena Bates RN  Anticoagulation Clinic  11/6/2023    _______________________________________________________________________     Anticoagulation Episode Summary       Current INR goal:  2.0-3.0   TTR:  59.3% (1 y)   Target end date:  Indefinite   Send INR reminders to:  JONATHAN MURILLO    Indications    S/P aortic valve replacement [Z95.2]  Paroxysmal atrial fibrillation (H) [I48.0]  Long term current use of anticoagulant therapy [Z79.01]  Anticoagulated on Coumadin [Z79.01]  Anticoagulated [Z79.01]             Comments:  Aortic 21 mm Johnson Perimount Magna Tissue Valve.             Anticoagulation Care Providers       Provider Role Specialty Phone number    Edin Mays MD Referring Internal Medicine [phone]

## 2023-11-06 NOTE — PROGRESS NOTES
Assessment & Plan   Problem List Items Addressed This Visit          Circulatory    Paroxysmal atrial fibrillation (H)    Relevant Orders    INR point of care (Completed)       Musculoskeletal and Integumentary    Finger injury    Relevant Medications    Tdap, tetanus-diptheria-acell pertussis, (BOOSTRIX) 5-2.5-18.5 LF-MCG/0.5 DIMAS injection     Other Visit Diagnoses       Cellulitis of finger of right hand    -  Primary    Relevant Medications    cefadroxil (DURICEF) 500 MG capsule    Need for Tdap vaccination               It is my impression based on the historical events and the physical exam that this is cellulitis of his index finger with scant lymphangitis. Could also be early tenosynovitis, but patient declined transfer to the ER despite understanding the risks of this decision including disability and death. He instead opts to begin antibiotics and go to the ER if worsening/changing. I did make follow up appt with me for 3 days from now.  I do not believe the patient has underlying osteomyelitis, septic joint, abscess, fracture, or necrotizing fasciitis. Appears well and non-toxic and I have low suspicion for systemic illness. Cefadroxil and return precautions provided. Inr therapeutic today, but we'll have to monitor this carefully going forward on the cefadroxil.    Complete history and physical exam as below. Afebrile with normal vital signs.    DDx and Dx discussed with and explained to the pt to their satisfaction.  All questions were answered at this time. Pt expressed understanding of and agreement with this dx, tx, and plan. No further workup warranted and standard medication warnings given. I have given the patient a list of pertinent indications for re-evaluation. Will go to the Emergency Department if he changes his mind about my recommendation, symptoms worsen or new concerning symptoms arise. Patient left in no apparent distress.     Ordering of each unique test  Prescription drug  "management  36 minutes spent by me on the date of the encounter doing chart review, history and exam, documentation and further activities per the note     See Patient Instructions    EDWARD To [facility] CHIDI Guy is a 78 year old, presenting for the following health issues:  No chief complaint on file.    History of Present Illness       Reason for visit:  Right index finger infection  Symptom onset:  3-7 days ago  Symptoms include:  Swelling of index finger, painful and tender, throbbing  Symptom intensity:  Mild  Symptom progression:  Worsening  Had these symptoms before:  No  What makes it worse:  Bumping finger  What makes it better:  Holding it upwards so that finger doesn't throb    He eats 0-1 servings of fruits and vegetables daily.He consumes 1 sweetened beverage(s) daily.He exercises with enough effort to increase his heart rate 10 to 19 minutes per day.  He exercises with enough effort to increase his heart rate 7 days per week.   He is taking medications regularly.     Right index finger began after accidentally puncturing it with his wife's diabetes BG lancet a week ago. Then developed redness, warmth, and pain. No fevers, chills,numbness/tingling. Right handed and retired.    Review of Systems   Constitutional, HEENT, cardiovascular, pulmonary, gi and gu systems are negative, except as otherwise noted.      Objective    /74   Pulse 90   Temp 97.5  F (36.4  C) (Temporal)   Resp 16   Ht 1.778 m (5' 10\")   Wt 74.9 kg (165 lb 3.2 oz)   SpO2 98%   BMI 23.70 kg/m    Body mass index is 23.7 kg/m .  Physical Exam  Vitals and nursing note reviewed.   Constitutional:       General: He is not in acute distress.     Appearance: Normal appearance. He is not diaphoretic.   HENT:      Head: Normocephalic and atraumatic.      Nose: Nose normal.   Eyes:      Conjunctiva/sclera: Conjunctivae normal.   Pulmonary:      Effort: Pulmonary effort is normal. No " respiratory distress.   Musculoskeletal:      Comments: RUE: index finger is erythematous, warm and tender with streaking of erythema into the thenar region dorsally. Middle phalanx has a healed puncture wound along the radial aspect. Mild fusiform edema and tenderness along the volar aspect of the digits. No pain with PROM of digit, though the ROM is limited in the joints. Distal CMS intact. Remainder of hand and fingers non-tender.    Skin:     General: Skin is dry.      Coloration: Skin is not jaundiced or pale.   Neurological:      General: No focal deficit present.      Mental Status: He is alert. Mental status is at baseline.   Psychiatric:         Mood and Affect: Mood normal.         Behavior: Behavior normal.        Photos:         Results for orders placed or performed in visit on 11/06/23   INR point of care     Status: Abnormal   Result Value Ref Range    INR 2.2 (H) 0.9 - 1.1    Narrative    This test is intended for monitoring Coumadin therapy. Results are not accurate in patients with prolonged INR due to factor deficiency.

## 2023-11-09 ENCOUNTER — ANTICOAGULATION THERAPY VISIT (OUTPATIENT)
Dept: ANTICOAGULATION | Facility: CLINIC | Age: 78
End: 2023-11-09

## 2023-11-09 ENCOUNTER — LAB (OUTPATIENT)
Dept: LAB | Facility: CLINIC | Age: 78
End: 2023-11-09
Payer: MEDICARE

## 2023-11-09 ENCOUNTER — TELEPHONE (OUTPATIENT)
Dept: INTERNAL MEDICINE | Facility: CLINIC | Age: 78
End: 2023-11-09

## 2023-11-09 ENCOUNTER — OFFICE VISIT (OUTPATIENT)
Dept: FAMILY MEDICINE | Facility: CLINIC | Age: 78
End: 2023-11-09
Payer: MEDICARE

## 2023-11-09 VITALS
HEART RATE: 88 BPM | SYSTOLIC BLOOD PRESSURE: 110 MMHG | BODY MASS INDEX: 23.45 KG/M2 | DIASTOLIC BLOOD PRESSURE: 68 MMHG | TEMPERATURE: 97.5 F | OXYGEN SATURATION: 97 % | RESPIRATION RATE: 16 BRPM | WEIGHT: 163.8 LBS | HEIGHT: 70 IN

## 2023-11-09 DIAGNOSIS — Z79.01 LONG TERM CURRENT USE OF ANTICOAGULANT THERAPY: ICD-10-CM

## 2023-11-09 DIAGNOSIS — Z79.01 ANTICOAGULATED ON COUMADIN: ICD-10-CM

## 2023-11-09 DIAGNOSIS — Z95.2 S/P AORTIC VALVE REPLACEMENT: Primary | ICD-10-CM

## 2023-11-09 DIAGNOSIS — L03.011 CELLULITIS OF FINGER OF RIGHT HAND: Primary | ICD-10-CM

## 2023-11-09 DIAGNOSIS — I48.0 PAROXYSMAL ATRIAL FIBRILLATION (H): ICD-10-CM

## 2023-11-09 DIAGNOSIS — Z95.2 S/P AORTIC VALVE REPLACEMENT: ICD-10-CM

## 2023-11-09 DIAGNOSIS — Z79.01 ANTICOAGULATED: ICD-10-CM

## 2023-11-09 LAB — INR BLD: 2 (ref 0.9–1.1)

## 2023-11-09 PROCEDURE — 36416 COLLJ CAPILLARY BLOOD SPEC: CPT

## 2023-11-09 PROCEDURE — 85610 PROTHROMBIN TIME: CPT

## 2023-11-09 PROCEDURE — 99213 OFFICE O/P EST LOW 20 MIN: CPT | Performed by: PHYSICIAN ASSISTANT

## 2023-11-09 RX ORDER — RESPIRATORY SYNCYTIAL VIRUS VACCINE 120MCG/0.5
0.5 KIT INTRAMUSCULAR ONCE
Qty: 1 EACH | Refills: 0 | Status: CANCELLED | OUTPATIENT
Start: 2023-11-09 | End: 2023-11-09

## 2023-11-09 NOTE — PROGRESS NOTES
Assessment & Plan   Problem List Items Addressed This Visit          Other    Long term current use of anticoagulant therapy     Other Visit Diagnoses       Cellulitis of finger of right hand    -  Primary           It is my impression based on the historical events and the physical exam that this is resolving cellulitis of his index finger on the right. less likely tenosynovitis. He is much improved compared to 3 days ago.  I do not believe the patient has underlying osteomyelitis, septic joint, abscess, fracture, or necrotizing fasciitis. Appears well and non-toxic and I have low suspicion for systemic illness. INR in range. Will finish course of cefadroxil and follow up prn.     Complete history and physical exam as below. Afebrile with normal vital signs.     DDx and Dx discussed with and explained to the pt to their satisfaction.  All questions were answered at this time. Pt expressed understanding of and agreement with this dx, tx, and plan. No further workup warranted and standard medication warnings given. I have given the patient a list of pertinent indications for re-evaluation. Will go to the Emergency Department if he changes his mind about my recommendation, symptoms worsen or new concerning symptoms arise. Patient left in no apparent distress.      See Patient Instructions    EDWARD To  [facility] CHIDI Guy is a 78 year old, presenting for the following health issues:  RECHECK        11/9/2023     9:59 AM   Additional Questions   Roomed by FLOYD   Accompanied by DIXON         11/9/2023     9:59 AM   Patient Reported Additional Medications   Patient reports taking the following new medications None per patient       HPI     Following up on: finger  Last visit this was discussed: 11/6/23 with Jonathan Potter PA-C   Progression of Symptoms:  improving  Signs & Symptoms: swelling is better, 2 nicks and some tenderness    Review of Systems   Constitutional, HEENT,  "cardiovascular, pulmonary, gi and gu systems are negative, except as otherwise noted.      Objective    /68   Pulse 88   Temp 97.5  F (36.4  C) (Temporal)   Resp 16   Ht 1.778 m (5' 10\")   Wt 74.3 kg (163 lb 12.8 oz)   SpO2 97%   BMI 23.50 kg/m    Body mass index is 23.5 kg/m .  Physical Exam  Vitals and nursing note reviewed.   Constitutional:       General: He is not in acute distress.     Appearance: Normal appearance. He is not diaphoretic.   HENT:      Head: Normocephalic and atraumatic.      Nose: Nose normal.   Eyes:      Conjunctiva/sclera: Conjunctivae normal.   Pulmonary:      Effort: Pulmonary effort is normal. No respiratory distress.   Musculoskeletal:      Comments: RUE: index finger no longer has erythema, warmth or edema. There remains a healing ~1mm diameter wound that is tender along the medial phalanx. Distal CMS intact. Remainder of hand non-tender. Normal ROM in joints of the digit.    Skin:     General: Skin is dry.      Coloration: Skin is not jaundiced or pale.   Neurological:      General: No focal deficit present.      Mental Status: He is alert. Mental status is at baseline.   Psychiatric:         Mood and Affect: Mood normal.         Behavior: Behavior normal.          Photo:   Results for orders placed or performed in visit on 11/09/23   INR point of care     Status: Abnormal   Result Value Ref Range    INR 2.0 (H) 0.9 - 1.1    Narrative    This test is intended for monitoring Coumadin therapy. Results are not accurate in patients with prolonged INR due to factor deficiency.                   "

## 2023-11-09 NOTE — PROGRESS NOTES
ANTICOAGULATION MANAGEMENT     Brooks Summers 78 year old male is on warfarin with therapeutic INR result. (Goal INR 2.0-3.0)    Recent labs: (last 7 days)     11/09/23  1020   INR 2.0*       ASSESSMENT     Source(s): Chart Review and Patient/Caregiver Call     Warfarin doses taken: Warfarin taken as instructed  Diet: No new diet changes identified  Medication/supplement changes:  Duricef 10 day course (dates: 11/6/23-11/16/23) not expected to affect INR, but may increase risk of bleeding  New illness, injury, or hospitalization: Yes: cellulitis in his finger, is healing per PCP   Signs or symptoms of bleeding or clotting: No  Previous result: Therapeutic last visit; previously outside of goal range  Additional findings:  advised to recheck in one week, pt will come back in ~10 days        PLAN     Recommended plan for temporary change(s) affecting INR     Dosing Instructions: Continue your current warfarin dose with next INR in 10 days       Summary  As of 11/9/2023      Full warfarin instructions:  5 mg every Mon, Wed, Fri; 7.5 mg all other days   Next INR check:  11/20/2023               Telephone call with Brooks who verbalizes understanding and agrees to plan    Lab visit scheduled    Education provided:   Contact [phone]  with any changes, questions or concerns.     Plan made per [facility] anticoagulation protocol    Selena Bates RN  Anticoagulation Clinic  11/9/2023    _______________________________________________________________________     Anticoagulation Episode Summary       Current INR goal:  2.0-3.0   TTR:  59.3% (1 y)   Target end date:  Indefinite   Send INR reminders to:  JONATHAN MURILLO    Indications    S/P aortic valve replacement [Z95.2]  Paroxysmal atrial fibrillation (H) [I48.0]  Long term current use of anticoagulant therapy [Z79.01]  Anticoagulated on Coumadin [Z79.01]  Anticoagulated [Z79.01]             Comments:  Aortic 21 mm Johnson Perimount Magna Tissue Valve.              Anticoagulation Care Providers       Provider Role Specialty Phone number    Edin Mays MD Referring Internal Medicine [phone]

## 2023-11-09 NOTE — TELEPHONE ENCOUNTER
Patient Returning Call    Reason for call:  INR    Information relayed to patient:  N/A    Patient has additional questions:  No      Okay to leave a detailed message?: Yes at Home number on file [phone] (home)

## 2023-11-20 ENCOUNTER — LAB (OUTPATIENT)
Dept: LAB | Facility: CLINIC | Age: 78
End: 2023-11-20
Payer: MEDICARE

## 2023-11-20 ENCOUNTER — ANTICOAGULATION THERAPY VISIT (OUTPATIENT)
Dept: ANTICOAGULATION | Facility: CLINIC | Age: 78
End: 2023-11-20

## 2023-11-20 DIAGNOSIS — Z95.2 S/P AORTIC VALVE REPLACEMENT: Primary | ICD-10-CM

## 2023-11-20 DIAGNOSIS — Z95.2 S/P AORTIC VALVE REPLACEMENT: ICD-10-CM

## 2023-11-20 DIAGNOSIS — I48.0 PAROXYSMAL ATRIAL FIBRILLATION (H): ICD-10-CM

## 2023-11-20 DIAGNOSIS — Z79.01 ANTICOAGULATED ON COUMADIN: ICD-10-CM

## 2023-11-20 DIAGNOSIS — Z79.01 ANTICOAGULATED: ICD-10-CM

## 2023-11-20 DIAGNOSIS — Z79.01 LONG TERM CURRENT USE OF ANTICOAGULANT THERAPY: ICD-10-CM

## 2023-11-20 LAB — INR BLD: 3.1 (ref 0.9–1.1)

## 2023-11-20 PROCEDURE — 36416 COLLJ CAPILLARY BLOOD SPEC: CPT

## 2023-11-20 PROCEDURE — 85610 PROTHROMBIN TIME: CPT

## 2023-11-20 NOTE — PROGRESS NOTES
ANTICOAGULATION MANAGEMENT     Brooks Summers 78 year old male is on warfarin with supratherapeutic INR result. (Goal INR 2.0-3.0)    Recent labs: (last 7 days)     11/20/23  1326   INR 3.1*       ASSESSMENT     Source(s): Chart Review and Patient/Caregiver Call     Warfarin doses taken: Warfarin taken as instructed  Diet: No new diet changes identified  Medication/supplement changes:  finished his antibiotic on 11/17/23 finished 11/17/23 which may be increasing INR today  New illness, injury, or hospitalization: No  Signs or symptoms of bleeding or clotting: No  Previous result: Therapeutic last 2(+) visits  Additional findings:  cellulitis in his finger has resolved        PLAN     Recommended plan for temporary change(s) affecting INR     Dosing Instructions: partial hold then continue your current warfarin dose with next INR in 2 weeks       Summary  As of 11/20/2023      Full warfarin instructions:  11/20: 2.5 mg; Otherwise 5 mg every Mon, Wed, Fri; 7.5 mg all other days   Next INR check:  12/4/2023               Telephone call with Brooks who verbalizes understanding and agrees to plan    Lab visit scheduled    Education provided:   Contact [phone]  with any changes, questions or concerns.     Plan made per [facility] anticoagulation protocol    Selena Bates RN  Anticoagulation Clinic  11/20/2023    _______________________________________________________________________     Anticoagulation Episode Summary       Current INR goal:  2.0-3.0   TTR:  59.6% (1 y)   Target end date:  Indefinite   Send INR reminders to:  JONATHAN MURILLO    Indications    S/P aortic valve replacement [Z95.2]  Paroxysmal atrial fibrillation (H) [I48.0]  Long term current use of anticoagulant therapy [Z79.01]  Anticoagulated on Coumadin [Z79.01]  Anticoagulated [Z79.01]             Comments:  Aortic 21 mm Johnson Perimount Magna Tissue Valve.             Anticoagulation Care Providers       Provider Role Specialty Phone number     Edin Mays MD [facility] Internal Medicine [phone]

## 2023-12-04 ENCOUNTER — ANTICOAGULATION THERAPY VISIT (OUTPATIENT)
Dept: ANTICOAGULATION | Facility: CLINIC | Age: 78
End: 2023-12-04

## 2023-12-04 ENCOUNTER — LAB (OUTPATIENT)
Dept: LAB | Facility: CLINIC | Age: 78
End: 2023-12-04
Payer: MEDICARE

## 2023-12-04 DIAGNOSIS — Z79.01 ANTICOAGULATED: ICD-10-CM

## 2023-12-04 DIAGNOSIS — Z79.01 LONG TERM CURRENT USE OF ANTICOAGULANT THERAPY: ICD-10-CM

## 2023-12-04 DIAGNOSIS — I48.0 PAROXYSMAL ATRIAL FIBRILLATION (H): ICD-10-CM

## 2023-12-04 DIAGNOSIS — Z79.01 ANTICOAGULATED ON COUMADIN: ICD-10-CM

## 2023-12-04 DIAGNOSIS — Z95.2 S/P AORTIC VALVE REPLACEMENT: Primary | ICD-10-CM

## 2023-12-04 DIAGNOSIS — Z95.2 S/P AORTIC VALVE REPLACEMENT: ICD-10-CM

## 2023-12-04 LAB — INR BLD: 2.5 (ref 0.9–1.1)

## 2023-12-04 PROCEDURE — 85610 PROTHROMBIN TIME: CPT

## 2023-12-04 PROCEDURE — 36416 COLLJ CAPILLARY BLOOD SPEC: CPT

## 2023-12-04 NOTE — PROGRESS NOTES
ANTICOAGULATION MANAGEMENT     Brooks Summers 78 year old male is on warfarin with therapeutic INR result. (Goal INR 2.0-3.0)    Recent labs: (last 7 days)     12/04/23  1334   INR 2.5*       ASSESSMENT     Source(s): Chart Review  Previous INR was Supratherapeutic  Medication, diet, health changes since last INR chart reviewed; none identified         PLAN     Recommended plan for no diet, medication or health factor changes affecting INR     Dosing Instructions: Continue your current warfarin dose with next INR in 3 weeks       Summary  As of 12/4/2023      Full warfarin instructions:  5 mg every Mon, Wed, Fri; 7.5 mg all other days   Next INR check:  12/25/2023               Detailed voice message left for Brooks with dosing instructions and follow up date.     Lab visit scheduled    Education provided:   Please call back if any changes to your diet, medications or how you've been taking warfarin    Plan made per [facility] anticoagulation protocol    Ashley Norris RN  Anticoagulation Clinic  12/4/2023    _______________________________________________________________________     Anticoagulation Episode Summary       Current INR goal:  2.0-3.0   TTR:  61.6% (1 y)   Target end date:  Indefinite   Send INR reminders to:  JONATHAN MURILLO    Indications    S/P aortic valve replacement [Z95.2]  Paroxysmal atrial fibrillation (H) [I48.0]  Long term current use of anticoagulant therapy [Z79.01]  Anticoagulated on Coumadin [Z79.01]  Anticoagulated [Z79.01]             Comments:  Aortic 21 mm Johnson Perimount Magna Tissue Valve.             Anticoagulation Care Providers       Provider Role Specialty Phone number    Edin Mays MD Referring Internal Medicine [phone]

## 2023-12-18 ENCOUNTER — ANCILLARY PROCEDURE (OUTPATIENT)
Dept: CARDIOLOGY | Facility: CLINIC | Age: 78
End: 2023-12-18
Attending: INTERNAL MEDICINE
Payer: MEDICARE

## 2023-12-18 DIAGNOSIS — I42.9 CARDIOMYOPATHY (H): ICD-10-CM

## 2023-12-18 PROCEDURE — 93296 REM INTERROG EVL PM/IDS: CPT

## 2023-12-18 PROCEDURE — 93295 DEV INTERROG REMOTE 1/2/MLT: CPT | Performed by: INTERNAL MEDICINE

## 2023-12-27 ENCOUNTER — ANTICOAGULATION THERAPY VISIT (OUTPATIENT)
Dept: ANTICOAGULATION | Facility: CLINIC | Age: 78
End: 2023-12-27

## 2023-12-27 ENCOUNTER — LAB (OUTPATIENT)
Dept: LAB | Facility: CLINIC | Age: 78
End: 2023-12-27
Payer: MEDICARE

## 2023-12-27 DIAGNOSIS — I48.0 PAROXYSMAL ATRIAL FIBRILLATION (H): ICD-10-CM

## 2023-12-27 DIAGNOSIS — Z95.2 S/P AORTIC VALVE REPLACEMENT: Primary | ICD-10-CM

## 2023-12-27 DIAGNOSIS — Z79.01 ANTICOAGULATED ON COUMADIN: ICD-10-CM

## 2023-12-27 DIAGNOSIS — Z79.01 LONG TERM CURRENT USE OF ANTICOAGULANT THERAPY: ICD-10-CM

## 2023-12-27 DIAGNOSIS — Z79.01 ANTICOAGULATED: ICD-10-CM

## 2023-12-27 DIAGNOSIS — Z95.2 S/P AORTIC VALVE REPLACEMENT: ICD-10-CM

## 2023-12-27 LAB — INR BLD: 2.9 (ref 0.9–1.1)

## 2023-12-27 PROCEDURE — 36416 COLLJ CAPILLARY BLOOD SPEC: CPT

## 2023-12-27 PROCEDURE — 85610 PROTHROMBIN TIME: CPT

## 2023-12-27 NOTE — PROGRESS NOTES
ANTICOAGULATION MANAGEMENT     Brooks Summers 78 year old male is on warfarin with therapeutic INR result. (Goal INR 2.0-3.0)    Recent labs: (last 7 days)     12/27/23  1123   INR 2.9*       ASSESSMENT     Source(s): Chart Review  Previous INR was Therapeutic last 2(+) visits  Medication, diet, health changes since last INR chart reviewed; none identified         PLAN     Recommended plan for no diet, medication or health factor changes affecting INR     Dosing Instructions: Continue your current warfarin dose with next INR in 4 weeks       Summary  As of 12/27/2023      Full warfarin instructions:  5 mg every Mon, Wed, Fri; 7.5 mg all other days   Next INR check:  1/24/2024               Detailed voice message left for Brooks with dosing instructions and follow up date.     Lab visit scheduled    Education provided:   Contact [phone]  with any changes, questions or concerns.     Plan made per [facility] anticoagulation protocol    Selena Bates RN  Anticoagulation Clinic  12/27/2023    _______________________________________________________________________     Anticoagulation Episode Summary       Current INR goal:  2.0-3.0   TTR:  64.2% (1 y)   Target end date:  Indefinite   Send INR reminders to:  JONATHAN MURILLO    Indications    S/P aortic valve replacement [Z95.2]  Paroxysmal atrial fibrillation (H) [I48.0]  Long term current use of anticoagulant therapy [Z79.01]  Anticoagulated on Coumadin [Z79.01]  Anticoagulated [Z79.01]             Comments:  Aortic 21 mm Johnson Perimount Magna Tissue Valve.             Anticoagulation Care Providers       Provider Role Specialty Phone number    Edin Mays MD Referring Internal Medicine [phone]

## 2024-01-23 LAB
MDC_IDC_EPISODE_DTM: NORMAL
MDC_IDC_EPISODE_DURATION: 7 S
MDC_IDC_EPISODE_ID: 4
MDC_IDC_EPISODE_TYPE: NORMAL
MDC_IDC_LEAD_CONNECTION_STATUS: NORMAL
MDC_IDC_LEAD_IMPLANT_DT: NORMAL
MDC_IDC_LEAD_LOCATION: NORMAL
MDC_IDC_LEAD_LOCATION_DETAIL_1: NORMAL
MDC_IDC_LEAD_MFG: NORMAL
MDC_IDC_LEAD_MODEL: NORMAL
MDC_IDC_LEAD_POLARITY_TYPE: NORMAL
MDC_IDC_LEAD_SERIAL: NORMAL
MDC_IDC_LEAD_SPECIAL_FUNCTION: NORMAL
MDC_IDC_MSMT_BATTERY_DTM: NORMAL
MDC_IDC_MSMT_BATTERY_REMAINING_LONGEVITY: 13 MO
MDC_IDC_MSMT_BATTERY_RRT_TRIGGER: NORMAL
MDC_IDC_MSMT_BATTERY_VOLTAGE: 2.91 V
MDC_IDC_MSMT_CAP_CHARGE_DTM: NORMAL
MDC_IDC_MSMT_CAP_CHARGE_ENERGY: 18 J
MDC_IDC_MSMT_CAP_CHARGE_TIME: 4.3 S
MDC_IDC_MSMT_CAP_CHARGE_TYPE: NORMAL
MDC_IDC_MSMT_LEADCHNL_LV_IMPEDANCE_VALUE: 323 OHM
MDC_IDC_MSMT_LEADCHNL_LV_IMPEDANCE_VALUE: 380 OHM
MDC_IDC_MSMT_LEADCHNL_LV_IMPEDANCE_VALUE: 665 OHM
MDC_IDC_MSMT_LEADCHNL_LV_PACING_THRESHOLD_AMPLITUDE: 2 V
MDC_IDC_MSMT_LEADCHNL_LV_PACING_THRESHOLD_PULSEWIDTH: 1 MS
MDC_IDC_MSMT_LEADCHNL_RA_IMPEDANCE_VALUE: 437 OHM
MDC_IDC_MSMT_LEADCHNL_RA_PACING_THRESHOLD_AMPLITUDE: 0.62 V
MDC_IDC_MSMT_LEADCHNL_RA_PACING_THRESHOLD_PULSEWIDTH: 0.4 MS
MDC_IDC_MSMT_LEADCHNL_RA_SENSING_INTR_AMPL: 1.4 MV
MDC_IDC_MSMT_LEADCHNL_RV_IMPEDANCE_VALUE: 361 OHM
MDC_IDC_MSMT_LEADCHNL_RV_IMPEDANCE_VALUE: 456 OHM
MDC_IDC_MSMT_LEADCHNL_RV_PACING_THRESHOLD_AMPLITUDE: 0.38 V
MDC_IDC_MSMT_LEADCHNL_RV_PACING_THRESHOLD_PULSEWIDTH: 0.4 MS
MDC_IDC_MSMT_LEADCHNL_RV_SENSING_INTR_AMPL: 17.3 MV
MDC_IDC_PG_IMPLANT_DTM: NORMAL
MDC_IDC_PG_MFG: NORMAL
MDC_IDC_PG_MODEL: NORMAL
MDC_IDC_PG_SERIAL: NORMAL
MDC_IDC_PG_TYPE: NORMAL
MDC_IDC_SESS_CLINIC_NAME: NORMAL
MDC_IDC_SESS_DTM: NORMAL
MDC_IDC_SESS_TYPE: NORMAL
MDC_IDC_SET_BRADY_AT_MODE_SWITCH_RATE: 171 {BEATS}/MIN
MDC_IDC_SET_BRADY_LOWRATE: 60 {BEATS}/MIN
MDC_IDC_SET_BRADY_MAX_SENSOR_RATE: 120 {BEATS}/MIN
MDC_IDC_SET_BRADY_MAX_TRACKING_RATE: 130 {BEATS}/MIN
MDC_IDC_SET_BRADY_MODE: NORMAL
MDC_IDC_SET_BRADY_NIGHT_RATE: 50 {BEATS}/MIN
MDC_IDC_SET_BRADY_PAV_DELAY_LOW: 170 MS
MDC_IDC_SET_BRADY_SAV_DELAY_LOW: 110 MS
MDC_IDC_SET_CRT_LVRV_DELAY: 0 MS
MDC_IDC_SET_CRT_PACED_CHAMBERS: NORMAL
MDC_IDC_SET_LEADCHNL_LV_PACING_AMPLITUDE: 3.5 V
MDC_IDC_SET_LEADCHNL_LV_PACING_ANODE_ELECTRODE_1: NORMAL
MDC_IDC_SET_LEADCHNL_LV_PACING_ANODE_LOCATION_1: NORMAL
MDC_IDC_SET_LEADCHNL_LV_PACING_CAPTURE_MODE: NORMAL
MDC_IDC_SET_LEADCHNL_LV_PACING_CATHODE_ELECTRODE_1: NORMAL
MDC_IDC_SET_LEADCHNL_LV_PACING_CATHODE_LOCATION_1: NORMAL
MDC_IDC_SET_LEADCHNL_LV_PACING_POLARITY: NORMAL
MDC_IDC_SET_LEADCHNL_LV_PACING_PULSEWIDTH: 1 MS
MDC_IDC_SET_LEADCHNL_RA_PACING_AMPLITUDE: 1.5 V
MDC_IDC_SET_LEADCHNL_RA_PACING_ANODE_ELECTRODE_1: NORMAL
MDC_IDC_SET_LEADCHNL_RA_PACING_ANODE_LOCATION_1: NORMAL
MDC_IDC_SET_LEADCHNL_RA_PACING_CAPTURE_MODE: NORMAL
MDC_IDC_SET_LEADCHNL_RA_PACING_CATHODE_ELECTRODE_1: NORMAL
MDC_IDC_SET_LEADCHNL_RA_PACING_CATHODE_LOCATION_1: NORMAL
MDC_IDC_SET_LEADCHNL_RA_PACING_POLARITY: NORMAL
MDC_IDC_SET_LEADCHNL_RA_PACING_PULSEWIDTH: 0.4 MS
MDC_IDC_SET_LEADCHNL_RA_SENSING_ANODE_ELECTRODE_1: NORMAL
MDC_IDC_SET_LEADCHNL_RA_SENSING_ANODE_LOCATION_1: NORMAL
MDC_IDC_SET_LEADCHNL_RA_SENSING_CATHODE_ELECTRODE_1: NORMAL
MDC_IDC_SET_LEADCHNL_RA_SENSING_CATHODE_LOCATION_1: NORMAL
MDC_IDC_SET_LEADCHNL_RA_SENSING_POLARITY: NORMAL
MDC_IDC_SET_LEADCHNL_RA_SENSING_SENSITIVITY: 0.3 MV
MDC_IDC_SET_LEADCHNL_RV_PACING_AMPLITUDE: 2 V
MDC_IDC_SET_LEADCHNL_RV_PACING_ANODE_ELECTRODE_1: NORMAL
MDC_IDC_SET_LEADCHNL_RV_PACING_ANODE_LOCATION_1: NORMAL
MDC_IDC_SET_LEADCHNL_RV_PACING_CAPTURE_MODE: NORMAL
MDC_IDC_SET_LEADCHNL_RV_PACING_CATHODE_ELECTRODE_1: NORMAL
MDC_IDC_SET_LEADCHNL_RV_PACING_CATHODE_LOCATION_1: NORMAL
MDC_IDC_SET_LEADCHNL_RV_PACING_POLARITY: NORMAL
MDC_IDC_SET_LEADCHNL_RV_PACING_PULSEWIDTH: 0.4 MS
MDC_IDC_SET_LEADCHNL_RV_SENSING_ANODE_ELECTRODE_1: NORMAL
MDC_IDC_SET_LEADCHNL_RV_SENSING_ANODE_LOCATION_1: NORMAL
MDC_IDC_SET_LEADCHNL_RV_SENSING_CATHODE_ELECTRODE_1: NORMAL
MDC_IDC_SET_LEADCHNL_RV_SENSING_CATHODE_LOCATION_1: NORMAL
MDC_IDC_SET_LEADCHNL_RV_SENSING_POLARITY: NORMAL
MDC_IDC_SET_LEADCHNL_RV_SENSING_SENSITIVITY: 0.3 MV
MDC_IDC_SET_ZONE_DETECTION_BEATS_DENOMINATOR: 16 {BEATS}
MDC_IDC_SET_ZONE_DETECTION_BEATS_DENOMINATOR: 32 {BEATS}
MDC_IDC_SET_ZONE_DETECTION_BEATS_DENOMINATOR: 40 {BEATS}
MDC_IDC_SET_ZONE_DETECTION_BEATS_NUMERATOR: 16 {BEATS}
MDC_IDC_SET_ZONE_DETECTION_BEATS_NUMERATOR: 30 {BEATS}
MDC_IDC_SET_ZONE_DETECTION_BEATS_NUMERATOR: 32 {BEATS}
MDC_IDC_SET_ZONE_DETECTION_INTERVAL: 320 MS
MDC_IDC_SET_ZONE_DETECTION_INTERVAL: 350 MS
MDC_IDC_SET_ZONE_DETECTION_INTERVAL: 360 MS
MDC_IDC_SET_ZONE_DETECTION_INTERVAL: 420 MS
MDC_IDC_SET_ZONE_STATUS: NORMAL
MDC_IDC_SET_ZONE_TYPE: NORMAL
MDC_IDC_SET_ZONE_VENDOR_TYPE: NORMAL
MDC_IDC_STAT_AT_BURDEN_PERCENT: 0.1 %
MDC_IDC_STAT_AT_DTM_END: NORMAL
MDC_IDC_STAT_AT_DTM_START: NORMAL
MDC_IDC_STAT_BRADY_DTM_END: NORMAL
MDC_IDC_STAT_BRADY_DTM_START: NORMAL
MDC_IDC_STAT_BRADY_RV_PERCENT_PACED: 94.73 %
MDC_IDC_STAT_CRT_DTM_END: NORMAL
MDC_IDC_STAT_CRT_DTM_START: NORMAL
MDC_IDC_STAT_CRT_LV_PERCENT_PACED: 94.7 %
MDC_IDC_STAT_CRT_PERCENT_PACED: 94.7 %
MDC_IDC_STAT_EPISODE_RECENT_COUNT: 0
MDC_IDC_STAT_EPISODE_RECENT_COUNT: 1
MDC_IDC_STAT_EPISODE_RECENT_COUNT_DTM_END: NORMAL
MDC_IDC_STAT_EPISODE_RECENT_COUNT_DTM_START: NORMAL
MDC_IDC_STAT_EPISODE_TOTAL_COUNT: 0
MDC_IDC_STAT_EPISODE_TOTAL_COUNT: 1
MDC_IDC_STAT_EPISODE_TOTAL_COUNT: 3
MDC_IDC_STAT_EPISODE_TOTAL_COUNT_DTM_END: NORMAL
MDC_IDC_STAT_EPISODE_TOTAL_COUNT_DTM_START: NORMAL
MDC_IDC_STAT_EPISODE_TYPE: NORMAL
MDC_IDC_STAT_TACHYTHERAPY_ATP_DELIVERED_RECENT: 0
MDC_IDC_STAT_TACHYTHERAPY_ATP_DELIVERED_TOTAL: 0
MDC_IDC_STAT_TACHYTHERAPY_RECENT_DTM_END: NORMAL
MDC_IDC_STAT_TACHYTHERAPY_RECENT_DTM_START: NORMAL
MDC_IDC_STAT_TACHYTHERAPY_SHOCKS_ABORTED_RECENT: 0
MDC_IDC_STAT_TACHYTHERAPY_SHOCKS_ABORTED_TOTAL: 0
MDC_IDC_STAT_TACHYTHERAPY_SHOCKS_DELIVERED_RECENT: 0
MDC_IDC_STAT_TACHYTHERAPY_SHOCKS_DELIVERED_TOTAL: 0
MDC_IDC_STAT_TACHYTHERAPY_TOTAL_DTM_END: NORMAL
MDC_IDC_STAT_TACHYTHERAPY_TOTAL_DTM_START: NORMAL

## 2024-01-24 ENCOUNTER — ANTICOAGULATION THERAPY VISIT (OUTPATIENT)
Dept: ANTICOAGULATION | Facility: CLINIC | Age: 79
End: 2024-01-24

## 2024-01-24 ENCOUNTER — LAB (OUTPATIENT)
Dept: LAB | Facility: CLINIC | Age: 79
End: 2024-01-24
Payer: MEDICARE

## 2024-01-24 DIAGNOSIS — Z95.2 S/P AORTIC VALVE REPLACEMENT: ICD-10-CM

## 2024-01-24 DIAGNOSIS — Z95.2 S/P AORTIC VALVE REPLACEMENT: Primary | ICD-10-CM

## 2024-01-24 DIAGNOSIS — Z79.01 ANTICOAGULATED: ICD-10-CM

## 2024-01-24 DIAGNOSIS — I48.0 PAROXYSMAL ATRIAL FIBRILLATION (H): ICD-10-CM

## 2024-01-24 DIAGNOSIS — Z79.01 ANTICOAGULATED ON COUMADIN: ICD-10-CM

## 2024-01-24 DIAGNOSIS — Z79.01 LONG TERM CURRENT USE OF ANTICOAGULANT THERAPY: ICD-10-CM

## 2024-01-24 LAB — INR BLD: 2 (ref 0.9–1.1)

## 2024-01-24 PROCEDURE — 36416 COLLJ CAPILLARY BLOOD SPEC: CPT

## 2024-01-24 PROCEDURE — 85610 PROTHROMBIN TIME: CPT

## 2024-01-24 NOTE — PROGRESS NOTES
ANTICOAGULATION MANAGEMENT     Brooks Summers 78 year old male is on warfarin with therapeutic INR result. (Goal INR 2.0-3.0)    Recent labs: (last 7 days)     01/24/24  1322   INR 2.0*       ASSESSMENT     Source(s): Chart Review  Previous INR was Therapeutic last 2(+) visits  Medication, diet, health changes since last INR chart reviewed; none identified         PLAN     Recommended plan for no diet, medication or health factor changes affecting INR     Dosing Instructions: Continue your current warfarin dose with next INR in 4 weeks       Summary  As of 1/24/2024      Full warfarin instructions:  5 mg every Mon, Wed, Fri; 7.5 mg all other days   Next INR check:  3/6/2024               Detailed voice message left for Brooks with dosing instructions and follow up date.     Contact [phone]  to schedule and with any changes, questions or concerns.     Education provided:   Please call back if any changes to your diet, medications or how you've been taking warfarin    Plan made per [facility] anticoagulation protocol    Kiana Moore RN  Anticoagulation Clinic  1/24/2024    _______________________________________________________________________     Anticoagulation Episode Summary       Current INR goal:  2.0-3.0   TTR:  66.6% (1 y)   Target end date:  Indefinite   Send INR reminders to:  JONATHAN MURILLO    Indications    S/P aortic valve replacement [Z95.2]  Paroxysmal atrial fibrillation (H) [I48.0]  Long term current use of anticoagulant therapy [Z79.01]  Anticoagulated on Coumadin [Z79.01]  Anticoagulated [Z79.01]             Comments:  Aortic 21 mm Johnson Perimount Magna Tissue Valve.             Anticoagulation Care Providers       Provider Role Specialty Phone number    Edin Mays MD Referring Internal Medicine [phone]

## 2024-02-20 ENCOUNTER — TELEPHONE (OUTPATIENT)
Dept: CARDIOLOGY | Facility: CLINIC | Age: 79
End: 2024-02-20
Payer: MEDICARE

## 2024-03-04 ENCOUNTER — TELEPHONE (OUTPATIENT)
Dept: CARDIOLOGY | Facility: CLINIC | Age: 79
End: 2024-03-04
Payer: MEDICARE

## 2024-03-04 NOTE — TELEPHONE ENCOUNTER
Left Voicemail (2nd Attempt) for the patient to call back and schedule the following:    Appointment type: RETURN CARDIOLOGY  Provider: TASNEEM  Return date: 06/06/24  Specialty phone number: [phone] OPT 1  Additional appointment(s) needed: ECHO, DEVICE CHECK  Additonal Notes: N/A

## 2024-03-06 ENCOUNTER — LAB (OUTPATIENT)
Dept: LAB | Facility: CLINIC | Age: 79
End: 2024-03-06
Payer: MEDICARE

## 2024-03-06 ENCOUNTER — ANTICOAGULATION THERAPY VISIT (OUTPATIENT)
Dept: ANTICOAGULATION | Facility: CLINIC | Age: 79
End: 2024-03-06

## 2024-03-06 ENCOUNTER — TELEPHONE (OUTPATIENT)
Dept: INTERNAL MEDICINE | Facility: CLINIC | Age: 79
End: 2024-03-06

## 2024-03-06 DIAGNOSIS — I48.0 PAROXYSMAL ATRIAL FIBRILLATION (H): ICD-10-CM

## 2024-03-06 DIAGNOSIS — Z79.01 ANTICOAGULATED: ICD-10-CM

## 2024-03-06 DIAGNOSIS — Z95.2 S/P AORTIC VALVE REPLACEMENT: ICD-10-CM

## 2024-03-06 DIAGNOSIS — Z95.2 S/P AVR (AORTIC VALVE REPLACEMENT): ICD-10-CM

## 2024-03-06 DIAGNOSIS — Z79.01 LONG TERM CURRENT USE OF ANTICOAGULANT THERAPY: ICD-10-CM

## 2024-03-06 DIAGNOSIS — Z95.2 S/P AORTIC VALVE REPLACEMENT: Primary | ICD-10-CM

## 2024-03-06 DIAGNOSIS — Z79.01 ANTICOAGULATED ON COUMADIN: ICD-10-CM

## 2024-03-06 LAB — INR BLD: 3.5 (ref 0.9–1.1)

## 2024-03-06 PROCEDURE — 36416 COLLJ CAPILLARY BLOOD SPEC: CPT

## 2024-03-06 PROCEDURE — 85610 PROTHROMBIN TIME: CPT

## 2024-03-06 NOTE — TELEPHONE ENCOUNTER
RX has not been refilled. Waiting to touch base with pt regarding elevated INR from 3/6. After discussing assessment/dosing with pt, then refill can be sent  Little Almonte RN

## 2024-03-06 NOTE — TELEPHONE ENCOUNTER
General Call    Contacts         Type Contact Phone/Fax    03/06/2024 03:05 PM CST Phone (Incoming) Brooks Summers (Self) [phone] (H)          Reason for Call:  CALL BACK INR NURSE BENJI    What are your questions or concerns:  SEE ABOVE    Date of last appointment with provider: 3/6/24    Okay to leave a detailed message?: Yes at Home number on file [phone] (home)

## 2024-03-06 NOTE — PROGRESS NOTES
ANTICOAGULATION MANAGEMENT     Brooks Summers 78 year old male is on warfarin with supratherapeutic INR result. (Goal INR 2.0-3.0)    Recent labs: (last 7 days)     03/06/24  1327   INR 3.5*       ASSESSMENT            PLAN     Unable to reach Brooks today.    Left message to hold warfarin tonight. Request call back for assessment.    Follow up required to confirm warfarin dose taken and assess for changes, confirm warfarin dose taken, discuss out of range result , discuss dosing instructions and confirm understanding of instructions, and needs RX filled as well    Ailyn Castle, RN  Anticoagulation Clinic  3/6/2024

## 2024-03-07 ENCOUNTER — TELEPHONE (OUTPATIENT)
Dept: INTERNAL MEDICINE | Facility: CLINIC | Age: 79
End: 2024-03-07
Payer: MEDICARE

## 2024-03-07 RX ORDER — WARFARIN SODIUM 5 MG/1
TABLET ORAL
Qty: 105 TABLET | Refills: 1 | Status: SHIPPED | OUTPATIENT
Start: 2024-03-07 | End: 2024-08-26

## 2024-03-07 NOTE — TELEPHONE ENCOUNTER
ANTICOAGULATION MANAGEMENT:  Medication Refill    Anticoagulation Summary  As of 3/6/2024      Warfarin maintenance plan:  7.5 mg (5 mg x 1.5) every Tue, Thu, Sat; 5 mg (5 mg x 1) all other days   Next INR check:  3/20/2024   Target end date:  Indefinite    Indications    S/P aortic valve replacement [Z95.2]  Paroxysmal atrial fibrillation (H) [I48.0]  Long term current use of anticoagulant therapy [Z79.01]  Anticoagulated on Coumadin [Z79.01]  Anticoagulated [Z79.01]                 Anticoagulation Care Providers       Provider Role Specialty Phone number    Edin Mays MD Referring Internal Medicine [phone]            Refill Criteria    Visit with referring provider/group: Meets criteria: office visit within referring provider group in the last 1 year on 11/9/23    [facility] referral last signed: 06/22/2023; within last year: Yes    Lab monitoring not exceeding 2 weeks overdue: Yes    Brooks meets all criteria for refill. Rx instructions and quantity supplied updated to match patient's current dosing plan. Warfarin 90 day supply with 1 refill granted per [facility] protocol     Selena Bates, RN  Anticoagulation Clinic

## 2024-03-07 NOTE — PROGRESS NOTES
ANTICOAGULATION MANAGEMENT     Brooks Summers 78 year old male is on warfarin with supratherapeutic INR result. (Goal INR 2.0-3.0)    Recent labs: (last 7 days)     03/06/24  1327   INR 3.5*       ASSESSMENT     Source(s): Chart Review and Patient/Caregiver Call     Warfarin doses taken: Warfarin taken as instructed  Diet:  states he isn't eating as much meat anymore, decreased protein in his diet   Medication/supplement changes: None noted  New illness, injury, or hospitalization: No  Signs or symptoms of bleeding or clotting: No  Previous result: Therapeutic last 2(+) visits  Additional findings: None       PLAN     Recommended plan for ongoing change(s) affecting INR     Dosing Instructions: partial hold then decrease your warfarin dose (5% change) with next INR in 2 weeks       Summary  As of 3/6/2024      Full warfarin instructions:  3/7: 5 mg; Otherwise 7.5 mg every Tue, Thu, Sat; 5 mg all other days   Next INR check:  3/20/2024               Telephone call with Brooks who verbalizes understanding and agrees to plan    Lab visit scheduled    Education provided:   Goal range and lab monitoring: goal range and significance of current result  Contact [phone]  with any changes, questions or concerns.     Plan made per [facility] anticoagulation protocol    Selena Bates RN  Anticoagulation Clinic  3/7/2024    _______________________________________________________________________     Anticoagulation Episode Summary       Current INR goal:  2.0-3.0   TTR:  71.8% (1 y)   Target end date:  Indefinite   Send INR reminders to:  JONATHAN MURILLO    Indications    S/P aortic valve replacement [Z95.2]  Paroxysmal atrial fibrillation (H) [I48.0]  Long term current use of anticoagulant therapy [Z79.01]  Anticoagulated on Coumadin [Z79.01]  Anticoagulated [Z79.01]             Comments:  Aortic 21 mm Johnson Perimount Magna Tissue Valve.             Anticoagulation Care Providers       Provider Role Specialty Phone number     Edin Mays MD [facility] Internal Medicine [phone]

## 2024-03-07 NOTE — TELEPHONE ENCOUNTER
See [facility] encounter.    Selena Bates RN  [facility] Anticoagulation [facility]

## 2024-03-07 NOTE — TELEPHONE ENCOUNTER
Patient Returning Call    Reason for call:  Brooks is returning a call Selena from INR and would like to get a call back.    Information relayed to patient:  The INR team will give you a call back when they receive this message    Patient has additional questions:  No      Okay to leave a detailed message?: No at Home number on file [phone] (home)

## 2024-03-22 ENCOUNTER — ANCILLARY PROCEDURE (OUTPATIENT)
Dept: CARDIOLOGY | Facility: CLINIC | Age: 79
End: 2024-03-22
Attending: INTERNAL MEDICINE
Payer: MEDICARE

## 2024-03-22 DIAGNOSIS — I42.9 CARDIOMYOPATHY (H): ICD-10-CM

## 2024-03-22 PROCEDURE — 93296 REM INTERROG EVL PM/IDS: CPT

## 2024-03-22 PROCEDURE — 93295 DEV INTERROG REMOTE 1/2/MLT: CPT | Performed by: INTERNAL MEDICINE

## 2024-03-27 ENCOUNTER — TELEPHONE (OUTPATIENT)
Dept: ANTICOAGULATION | Facility: CLINIC | Age: 79
End: 2024-03-27
Payer: MEDICARE

## 2024-03-27 NOTE — TELEPHONE ENCOUNTER
ANTICOAGULATION     Brooks Summers is overdue for an INR check.     Spoke with Brooks and scheduled lab appointment on 4/3/24    Kailee Vasquez RN

## 2024-03-28 ENCOUNTER — OFFICE VISIT (OUTPATIENT)
Dept: FAMILY MEDICINE | Facility: CLINIC | Age: 79
End: 2024-03-28
Payer: MEDICARE

## 2024-03-28 VITALS
TEMPERATURE: 97.6 F | HEART RATE: 97 BPM | HEIGHT: 70 IN | WEIGHT: 168.6 LBS | SYSTOLIC BLOOD PRESSURE: 108 MMHG | DIASTOLIC BLOOD PRESSURE: 70 MMHG | BODY MASS INDEX: 24.14 KG/M2 | RESPIRATION RATE: 16 BRPM | OXYGEN SATURATION: 97 %

## 2024-03-28 DIAGNOSIS — B02.8 HERPES ZOSTER WITH OTHER COMPLICATION: Primary | ICD-10-CM

## 2024-03-28 PROCEDURE — 99213 OFFICE O/P EST LOW 20 MIN: CPT | Performed by: PHYSICIAN ASSISTANT

## 2024-03-28 RX ORDER — RESPIRATORY SYNCYTIAL VIRUS VACCINE 120MCG/0.5
0.5 KIT INTRAMUSCULAR ONCE
Qty: 1 EACH | Refills: 0 | Status: CANCELLED | OUTPATIENT
Start: 2024-03-28 | End: 2024-03-28

## 2024-03-28 RX ORDER — VALACYCLOVIR HYDROCHLORIDE 1 G/1
1000 TABLET, FILM COATED ORAL 3 TIMES DAILY
Qty: 21 TABLET | Refills: 0 | Status: SHIPPED | OUTPATIENT
Start: 2024-03-28 | End: 2024-05-21

## 2024-03-28 ASSESSMENT — PAIN SCALES - GENERAL: PAINLEVEL: NO PAIN (0)

## 2024-03-28 NOTE — PROGRESS NOTES
Assessment & Plan     Herpes zoster with other complication  Discussed with patient that his rash is consistent with shingles. I did consider that on top of shingles was there cellulitis or tendosynovitis or gout. Low suspicion based on exam and hx of the illness.  I recommend treatment with Valtrex. His kidney function last year was normal so no dose adjustment needed at this time. Side effects of medication discussed. Also encourage patient to keep this covered with long sleeves as we discussed the contagious nature of shingles. Symptoms should improve over the next week. If symptoms worsen or change then follow up sooner. Patient agree's with this plan and has no further questions  Consulted patient treatment plan with Vika Pizano DNP  - valACYclovir (VALTREX) 1000 mg tablet; Take 1 tablet (1,000 mg) by mouth 3 times daily for 7 days              Paige Guy is a 79 year old, presenting for the following health issues:  Musculoskeletal Problem    History of Present Illness       Reason for visit:  Swelling and stiffness in the left wrist and hand. Rash eruption on left forearm; not painful or itchy.  Symptom onset:  3-7 days ago  Symptom intensity:  Severe  Symptom progression:  Staying the same  Had these symptoms before:  No    He eats 0-1 servings of fruits and vegetables daily.He consumes 1 sweetened beverage(s) daily.He exercises with enough effort to increase his heart rate 10 to 19 minutes per day.  He exercises with enough effort to increase his heart rate 7 days per week.   He is taking medications regularly.   Left arm swelling - started 1 week ago with Achy muscles, then a rash started to spread on his arm and into his hand. His hand is swollen and stiff. Is having numbness and tingling. No weakness. No pain. NO fevers. NO fatigue. Has history of cellulitis and shingles. Has not done anything at home for symptoms          Review of Systems  Constitutional, HEENT, cardiovascular, pulmonary,  "gi and gu systems are negative, except as otherwise noted.      Objective    /70   Pulse 97   Temp 97.6  F (36.4  C) (Oral)   Resp 16   Ht 1.778 m (5' 10\")   Wt 76.5 kg (168 lb 9.6 oz)   SpO2 97%   BMI 24.19 kg/m    Body mass index is 24.19 kg/m .  Physical Exam   GENERAL: alert and no distress  SKIN: left arm rash with vesicular lesions along upper left arm, swelling of hand and digits, stiffness, able to  with 5/5 strength, radial pulse 2+,sensation intact, capillary refill intact. See pictures below  PSYCH: mentation appears normal, affect normal/bright                  Signed Electronically by: EDWARD Jj    "

## 2024-04-02 ENCOUNTER — OFFICE VISIT (OUTPATIENT)
Dept: FAMILY MEDICINE | Facility: CLINIC | Age: 79
End: 2024-04-02
Payer: MEDICARE

## 2024-04-02 VITALS
DIASTOLIC BLOOD PRESSURE: 84 MMHG | SYSTOLIC BLOOD PRESSURE: 133 MMHG | OXYGEN SATURATION: 95 % | HEART RATE: 94 BPM | WEIGHT: 165.8 LBS | BODY MASS INDEX: 23.74 KG/M2 | TEMPERATURE: 97.8 F | RESPIRATION RATE: 16 BRPM | HEIGHT: 70 IN

## 2024-04-02 DIAGNOSIS — B02.8 HERPES ZOSTER WITH OTHER COMPLICATION: Primary | ICD-10-CM

## 2024-04-02 DIAGNOSIS — M79.2 NERVE PAIN: ICD-10-CM

## 2024-04-02 PROCEDURE — 87798 DETECT AGENT NOS DNA AMP: CPT | Performed by: PHYSICIAN ASSISTANT

## 2024-04-02 PROCEDURE — 99213 OFFICE O/P EST LOW 20 MIN: CPT | Performed by: PHYSICIAN ASSISTANT

## 2024-04-02 RX ORDER — RESPIRATORY SYNCYTIAL VIRUS VACCINE 120MCG/0.5
0.5 KIT INTRAMUSCULAR ONCE
Qty: 1 EACH | Refills: 0 | Status: CANCELLED | OUTPATIENT
Start: 2024-04-02 | End: 2024-04-02

## 2024-04-02 RX ORDER — GABAPENTIN 100 MG/1
CAPSULE ORAL
Qty: 30 CAPSULE | Refills: 1 | Status: SHIPPED | OUTPATIENT
Start: 2024-04-02 | End: 2024-05-17

## 2024-04-02 ASSESSMENT — PAIN SCALES - GENERAL: PAINLEVEL: NO PAIN (0)

## 2024-04-02 NOTE — PROGRESS NOTES
"  Assessment & Plan     Herpes zoster with other complication  Patient's symptoms have been improving but slowly. I did swab today to confirm diagnosis. Results pending. Its reassuring no new lesion and now they are crusting over. However he is now having more post hepatic neuralgia. I recommend starting Gabapentin to help with this. He does have a appointment with Dermatology in 2-3 weeks. I recommend rechecking rash then to ensure it has healed. Follow up in clinic or with PCP sooner if symptoms change or worsen. Patient agree's with this plan and has no further questions  - gabapentin (NEURONTIN) 100 MG capsule; Take 1-2 tablets at night for nerve pain  - Varicella Zoster DNA PCR CSF or Skin Swab; Future  - Varicella Zoster DNA PCR CSF or Skin Swab    Nerve pain  See plan above  - gabapentin (NEURONTIN) 100 MG capsule; Take 1-2 tablets at night for nerve pain              Paige Guy is a 79 year old, presenting for the following health issues:  Shingles (/)    HPI     Rash  Description  Location: Left forearm, wrist. Hand.   Character: Shingles   Itching or pain: Off and on itching and slight pain. Stiffness of shoulder and arm.    Progression of Symptoms:  the rash and swelling has improved but pain and tingling has persistent   Therapies tried and outcome: Valtrex with slight improvement        Review of Systems  Constitutional, HEENT, cardiovascular, pulmonary, gi and gu systems are negative, except as otherwise noted.      Objective    /84   Pulse 94   Temp 97.8  F (36.6  C) (Oral)   Resp 16   Ht 1.778 m (5' 10\")   Wt 75.2 kg (165 lb 12.8 oz)   SpO2 95%   BMI 23.79 kg/m    Body mass index is 23.79 kg/m .  Physical Exam   GENERAL: alert and no distress  SKIN: left arm - mild swelling of left hand, lesions are crusted over, mild redness, 1 pustule, radial pulse 2 + , sensation intact, full ROM of left arm but stiffness of shoulder  PSYCH: mentation appears normal, affect " normal/bright                  Signed Electronically by: Kailee Mata PA

## 2024-04-03 ENCOUNTER — LAB (OUTPATIENT)
Dept: LAB | Facility: CLINIC | Age: 79
End: 2024-04-03
Payer: MEDICARE

## 2024-04-03 ENCOUNTER — ANTICOAGULATION THERAPY VISIT (OUTPATIENT)
Dept: ANTICOAGULATION | Facility: CLINIC | Age: 79
End: 2024-04-03

## 2024-04-03 ENCOUNTER — TELEPHONE (OUTPATIENT)
Dept: FAMILY MEDICINE | Facility: CLINIC | Age: 79
End: 2024-04-03

## 2024-04-03 DIAGNOSIS — Z79.01 ANTICOAGULATED ON COUMADIN: ICD-10-CM

## 2024-04-03 DIAGNOSIS — Z95.2 S/P AORTIC VALVE REPLACEMENT: Primary | ICD-10-CM

## 2024-04-03 DIAGNOSIS — Z79.01 ANTICOAGULATED: ICD-10-CM

## 2024-04-03 DIAGNOSIS — B02.8 HERPES ZOSTER WITH OTHER COMPLICATION: Primary | ICD-10-CM

## 2024-04-03 DIAGNOSIS — Z79.01 LONG TERM CURRENT USE OF ANTICOAGULANT THERAPY: ICD-10-CM

## 2024-04-03 DIAGNOSIS — Z95.2 S/P AORTIC VALVE REPLACEMENT: ICD-10-CM

## 2024-04-03 DIAGNOSIS — I48.0 PAROXYSMAL ATRIAL FIBRILLATION (H): ICD-10-CM

## 2024-04-03 LAB
INR BLD: 1.7 (ref 0.9–1.1)
VZV DNA SPEC QL NAA+PROBE: DETECTED

## 2024-04-03 PROCEDURE — 85610 PROTHROMBIN TIME: CPT

## 2024-04-03 PROCEDURE — 36416 COLLJ CAPILLARY BLOOD SPEC: CPT

## 2024-04-03 RX ORDER — VALACYCLOVIR HYDROCHLORIDE 1 G/1
1000 TABLET, FILM COATED ORAL 3 TIMES DAILY
Qty: 21 TABLET | Refills: 0 | Status: SHIPPED | OUTPATIENT
Start: 2024-04-03 | End: 2024-04-18

## 2024-04-03 NOTE — TELEPHONE ENCOUNTER
Called and left message for patient to return call to clinic    CALLUM Lutz    Triage Nurse  [facility]        ----- Message from EDWARD Jj sent at 4/3/2024 12:34 PM CDT -----  Please inform Brooks that the culture did confirm that the rash is shingles.   Due  to the extent of the illness I would recommend 1 more week of Valtrex. I have sent in another weeks worth to pharmacy.     Thank you,  Kailee Mata PA-C

## 2024-04-03 NOTE — TELEPHONE ENCOUNTER
Patient returned call to clinic, writer relayed provider's message as written, notes that he will  the prescription. Writer discussed to please call back with any further questions or concerns.    Thanks,  ALYSON Beverly RN  [facility]

## 2024-04-03 NOTE — PROGRESS NOTES
ANTICOAGULATION MANAGEMENT     Brooks Summers 79 year old male is on warfarin with subtherapeutic INR result. (Goal INR 2.0-3.0)    Recent labs: (last 7 days)     04/03/24  1410   INR 1.7*       ASSESSMENT     Source(s): Chart Review and Patient/Caregiver Call     Warfarin doses taken: Missed dose(s) may be affecting INR he states that he thinks he has missed a dose,unsure when.    Diet: No new diet changes identified  Medication/supplement changes:  Gabapentin  started on 4/2/24 No interaction anticipated  New illness, injury, or hospitalization: Yes: shingles on his left arm, areas are healing, some post herpetic neuralgia noted   Signs or symptoms of bleeding or clotting: No  Previous result: Supratherapeutic  Additional findings: None       PLAN     Recommended plan for temporary change(s) affecting INR     Dosing Instructions: booster dose then continue your current warfarin dose with next INR in 2 weeks       Summary  As of 4/3/2024      Full warfarin instructions:  4/3: 7.5 mg; Otherwise 7.5 mg every Tue, Thu, Sat; 5 mg all other days   Next INR check:  4/17/2024               Telephone call with Brooks who verbalizes understanding and agrees to plan    Lab visit scheduled    Education provided:   Goal range and lab monitoring: goal range and significance of current result  Contact [phone]  with any changes, questions or concerns.     Plan made per [facility] anticoagulation protocol    Selena Bates RN  Anticoagulation Clinic  4/3/2024    _______________________________________________________________________     Anticoagulation Episode Summary       Current INR goal:  2.0-3.0   TTR:  71.3% (1 y)   Target end date:  Indefinite   Send INR reminders to:  JONATHAN MURILLO    Indications    S/P aortic valve replacement [Z95.2]  Paroxysmal atrial fibrillation (H) [I48.0]  Long term current use of anticoagulant therapy [Z79.01]  Anticoagulated on Coumadin [Z79.01]  Anticoagulated [Z79.01]             Comments:   Aortic 21 mm Johnson Perimount Magna Tissue Valve.             Anticoagulation Care Providers       Provider Role Specialty Phone number    Edin Mays MD Referring Internal Medicine [phone]

## 2024-04-09 LAB
MDC_IDC_LEAD_CONNECTION_STATUS: NORMAL
MDC_IDC_LEAD_IMPLANT_DT: NORMAL
MDC_IDC_LEAD_LOCATION: NORMAL
MDC_IDC_LEAD_LOCATION_DETAIL_1: NORMAL
MDC_IDC_LEAD_MFG: NORMAL
MDC_IDC_LEAD_MODEL: NORMAL
MDC_IDC_LEAD_POLARITY_TYPE: NORMAL
MDC_IDC_LEAD_SERIAL: NORMAL
MDC_IDC_LEAD_SPECIAL_FUNCTION: NORMAL
MDC_IDC_MSMT_BATTERY_DTM: NORMAL
MDC_IDC_MSMT_BATTERY_REMAINING_LONGEVITY: 12 MO
MDC_IDC_MSMT_BATTERY_RRT_TRIGGER: NORMAL
MDC_IDC_MSMT_BATTERY_STATUS: NORMAL
MDC_IDC_MSMT_BATTERY_VOLTAGE: 2.91 V
MDC_IDC_MSMT_CAP_CHARGE_DTM: NORMAL
MDC_IDC_MSMT_CAP_CHARGE_ENERGY: 18 J
MDC_IDC_MSMT_CAP_CHARGE_TIME: 4.3 S
MDC_IDC_MSMT_CAP_CHARGE_TYPE: NORMAL
MDC_IDC_MSMT_LEADCHNL_LV_IMPEDANCE_VALUE: 323 OHM
MDC_IDC_MSMT_LEADCHNL_LV_IMPEDANCE_VALUE: 399 OHM
MDC_IDC_MSMT_LEADCHNL_LV_IMPEDANCE_VALUE: 684 OHM
MDC_IDC_MSMT_LEADCHNL_LV_PACING_THRESHOLD_AMPLITUDE: 2.12 V
MDC_IDC_MSMT_LEADCHNL_LV_PACING_THRESHOLD_PULSEWIDTH: 1 MS
MDC_IDC_MSMT_LEADCHNL_RA_IMPEDANCE_VALUE: 418 OHM
MDC_IDC_MSMT_LEADCHNL_RA_PACING_THRESHOLD_AMPLITUDE: 0.62 V
MDC_IDC_MSMT_LEADCHNL_RA_PACING_THRESHOLD_PULSEWIDTH: 0.4 MS
MDC_IDC_MSMT_LEADCHNL_RA_SENSING_INTR_AMPL: 1.3 MV
MDC_IDC_MSMT_LEADCHNL_RV_IMPEDANCE_VALUE: 418 OHM
MDC_IDC_MSMT_LEADCHNL_RV_IMPEDANCE_VALUE: 494 OHM
MDC_IDC_MSMT_LEADCHNL_RV_PACING_THRESHOLD_AMPLITUDE: 0.5 V
MDC_IDC_MSMT_LEADCHNL_RV_PACING_THRESHOLD_PULSEWIDTH: 0.4 MS
MDC_IDC_MSMT_LEADCHNL_RV_SENSING_INTR_AMPL: 1.9 MV
MDC_IDC_PG_IMPLANT_DTM: NORMAL
MDC_IDC_PG_MFG: NORMAL
MDC_IDC_PG_MODEL: NORMAL
MDC_IDC_PG_SERIAL: NORMAL
MDC_IDC_PG_TYPE: NORMAL
MDC_IDC_SESS_CLINIC_NAME: NORMAL
MDC_IDC_SESS_DTM: NORMAL
MDC_IDC_SESS_TYPE: NORMAL
MDC_IDC_SET_BRADY_AT_MODE_SWITCH_RATE: 171 {BEATS}/MIN
MDC_IDC_SET_BRADY_LOWRATE: 60 {BEATS}/MIN
MDC_IDC_SET_BRADY_MAX_SENSOR_RATE: 120 {BEATS}/MIN
MDC_IDC_SET_BRADY_MAX_TRACKING_RATE: 130 {BEATS}/MIN
MDC_IDC_SET_BRADY_MODE: NORMAL
MDC_IDC_SET_BRADY_NIGHT_RATE: 50 {BEATS}/MIN
MDC_IDC_SET_BRADY_PAV_DELAY_LOW: 170 MS
MDC_IDC_SET_BRADY_SAV_DELAY_LOW: 110 MS
MDC_IDC_SET_CRT_LVRV_DELAY: 0 MS
MDC_IDC_SET_CRT_PACED_CHAMBERS: NORMAL
MDC_IDC_SET_LEADCHNL_LV_PACING_AMPLITUDE: 3.25 V
MDC_IDC_SET_LEADCHNL_LV_PACING_ANODE_ELECTRODE_1: NORMAL
MDC_IDC_SET_LEADCHNL_LV_PACING_ANODE_LOCATION_1: NORMAL
MDC_IDC_SET_LEADCHNL_LV_PACING_CAPTURE_MODE: NORMAL
MDC_IDC_SET_LEADCHNL_LV_PACING_CATHODE_ELECTRODE_1: NORMAL
MDC_IDC_SET_LEADCHNL_LV_PACING_CATHODE_LOCATION_1: NORMAL
MDC_IDC_SET_LEADCHNL_LV_PACING_POLARITY: NORMAL
MDC_IDC_SET_LEADCHNL_LV_PACING_PULSEWIDTH: 1 MS
MDC_IDC_SET_LEADCHNL_RA_PACING_AMPLITUDE: 1.5 V
MDC_IDC_SET_LEADCHNL_RA_PACING_ANODE_ELECTRODE_1: NORMAL
MDC_IDC_SET_LEADCHNL_RA_PACING_ANODE_LOCATION_1: NORMAL
MDC_IDC_SET_LEADCHNL_RA_PACING_CAPTURE_MODE: NORMAL
MDC_IDC_SET_LEADCHNL_RA_PACING_CATHODE_ELECTRODE_1: NORMAL
MDC_IDC_SET_LEADCHNL_RA_PACING_CATHODE_LOCATION_1: NORMAL
MDC_IDC_SET_LEADCHNL_RA_PACING_POLARITY: NORMAL
MDC_IDC_SET_LEADCHNL_RA_PACING_PULSEWIDTH: 0.4 MS
MDC_IDC_SET_LEADCHNL_RA_SENSING_ANODE_ELECTRODE_1: NORMAL
MDC_IDC_SET_LEADCHNL_RA_SENSING_ANODE_LOCATION_1: NORMAL
MDC_IDC_SET_LEADCHNL_RA_SENSING_CATHODE_ELECTRODE_1: NORMAL
MDC_IDC_SET_LEADCHNL_RA_SENSING_CATHODE_LOCATION_1: NORMAL
MDC_IDC_SET_LEADCHNL_RA_SENSING_POLARITY: NORMAL
MDC_IDC_SET_LEADCHNL_RA_SENSING_SENSITIVITY: 0.3 MV
MDC_IDC_SET_LEADCHNL_RV_PACING_AMPLITUDE: 2 V
MDC_IDC_SET_LEADCHNL_RV_PACING_ANODE_ELECTRODE_1: NORMAL
MDC_IDC_SET_LEADCHNL_RV_PACING_ANODE_LOCATION_1: NORMAL
MDC_IDC_SET_LEADCHNL_RV_PACING_CAPTURE_MODE: NORMAL
MDC_IDC_SET_LEADCHNL_RV_PACING_CATHODE_ELECTRODE_1: NORMAL
MDC_IDC_SET_LEADCHNL_RV_PACING_CATHODE_LOCATION_1: NORMAL
MDC_IDC_SET_LEADCHNL_RV_PACING_POLARITY: NORMAL
MDC_IDC_SET_LEADCHNL_RV_PACING_PULSEWIDTH: 0.4 MS
MDC_IDC_SET_LEADCHNL_RV_SENSING_ANODE_ELECTRODE_1: NORMAL
MDC_IDC_SET_LEADCHNL_RV_SENSING_ANODE_LOCATION_1: NORMAL
MDC_IDC_SET_LEADCHNL_RV_SENSING_CATHODE_ELECTRODE_1: NORMAL
MDC_IDC_SET_LEADCHNL_RV_SENSING_CATHODE_LOCATION_1: NORMAL
MDC_IDC_SET_LEADCHNL_RV_SENSING_POLARITY: NORMAL
MDC_IDC_SET_LEADCHNL_RV_SENSING_SENSITIVITY: 0.3 MV
MDC_IDC_SET_ZONE_DETECTION_BEATS_DENOMINATOR: 16 {BEATS}
MDC_IDC_SET_ZONE_DETECTION_BEATS_DENOMINATOR: 32 {BEATS}
MDC_IDC_SET_ZONE_DETECTION_BEATS_DENOMINATOR: 40 {BEATS}
MDC_IDC_SET_ZONE_DETECTION_BEATS_NUMERATOR: 16 {BEATS}
MDC_IDC_SET_ZONE_DETECTION_BEATS_NUMERATOR: 30 {BEATS}
MDC_IDC_SET_ZONE_DETECTION_BEATS_NUMERATOR: 32 {BEATS}
MDC_IDC_SET_ZONE_DETECTION_INTERVAL: 320 MS
MDC_IDC_SET_ZONE_DETECTION_INTERVAL: 350 MS
MDC_IDC_SET_ZONE_DETECTION_INTERVAL: 360 MS
MDC_IDC_SET_ZONE_DETECTION_INTERVAL: 420 MS
MDC_IDC_SET_ZONE_STATUS: NORMAL
MDC_IDC_SET_ZONE_TYPE: NORMAL
MDC_IDC_SET_ZONE_VENDOR_TYPE: NORMAL
MDC_IDC_STAT_AT_BURDEN_PERCENT: 0.1 %
MDC_IDC_STAT_AT_DTM_END: NORMAL
MDC_IDC_STAT_AT_DTM_START: NORMAL
MDC_IDC_STAT_BRADY_DTM_END: NORMAL
MDC_IDC_STAT_BRADY_DTM_START: NORMAL
MDC_IDC_STAT_BRADY_RV_PERCENT_PACED: 94.04 %
MDC_IDC_STAT_CRT_DTM_END: NORMAL
MDC_IDC_STAT_CRT_DTM_START: NORMAL
MDC_IDC_STAT_CRT_LV_PERCENT_PACED: 94.01 %
MDC_IDC_STAT_CRT_PERCENT_PACED: 94.01 %
MDC_IDC_STAT_EPISODE_RECENT_COUNT: 0
MDC_IDC_STAT_EPISODE_RECENT_COUNT_DTM_END: NORMAL
MDC_IDC_STAT_EPISODE_RECENT_COUNT_DTM_START: NORMAL
MDC_IDC_STAT_EPISODE_TOTAL_COUNT: 0
MDC_IDC_STAT_EPISODE_TOTAL_COUNT: 1
MDC_IDC_STAT_EPISODE_TOTAL_COUNT: 3
MDC_IDC_STAT_EPISODE_TOTAL_COUNT_DTM_END: NORMAL
MDC_IDC_STAT_EPISODE_TOTAL_COUNT_DTM_START: NORMAL
MDC_IDC_STAT_EPISODE_TYPE: NORMAL
MDC_IDC_STAT_TACHYTHERAPY_ATP_DELIVERED_RECENT: 0
MDC_IDC_STAT_TACHYTHERAPY_ATP_DELIVERED_TOTAL: 0
MDC_IDC_STAT_TACHYTHERAPY_RECENT_DTM_END: NORMAL
MDC_IDC_STAT_TACHYTHERAPY_RECENT_DTM_START: NORMAL
MDC_IDC_STAT_TACHYTHERAPY_SHOCKS_ABORTED_RECENT: 0
MDC_IDC_STAT_TACHYTHERAPY_SHOCKS_ABORTED_TOTAL: 0
MDC_IDC_STAT_TACHYTHERAPY_SHOCKS_DELIVERED_RECENT: 0
MDC_IDC_STAT_TACHYTHERAPY_SHOCKS_DELIVERED_TOTAL: 0
MDC_IDC_STAT_TACHYTHERAPY_TOTAL_DTM_END: NORMAL
MDC_IDC_STAT_TACHYTHERAPY_TOTAL_DTM_START: NORMAL

## 2024-04-17 ENCOUNTER — ANTICOAGULATION THERAPY VISIT (OUTPATIENT)
Dept: ANTICOAGULATION | Facility: CLINIC | Age: 79
End: 2024-04-17

## 2024-04-17 ENCOUNTER — LAB (OUTPATIENT)
Dept: LAB | Facility: CLINIC | Age: 79
End: 2024-04-17
Payer: MEDICARE

## 2024-04-17 DIAGNOSIS — Z95.2 S/P AORTIC VALVE REPLACEMENT: ICD-10-CM

## 2024-04-17 DIAGNOSIS — B02.8 HERPES ZOSTER WITH OTHER COMPLICATION: ICD-10-CM

## 2024-04-17 DIAGNOSIS — I48.0 PAROXYSMAL ATRIAL FIBRILLATION (H): ICD-10-CM

## 2024-04-17 DIAGNOSIS — Z79.01 ANTICOAGULATED: ICD-10-CM

## 2024-04-17 DIAGNOSIS — Z95.2 S/P AORTIC VALVE REPLACEMENT: Primary | ICD-10-CM

## 2024-04-17 DIAGNOSIS — Z79.01 ANTICOAGULATED ON COUMADIN: ICD-10-CM

## 2024-04-17 DIAGNOSIS — Z79.01 LONG TERM CURRENT USE OF ANTICOAGULANT THERAPY: ICD-10-CM

## 2024-04-17 LAB — INR BLD: 4.1 (ref 0.9–1.1)

## 2024-04-17 PROCEDURE — 85610 PROTHROMBIN TIME: CPT

## 2024-04-17 PROCEDURE — 36416 COLLJ CAPILLARY BLOOD SPEC: CPT

## 2024-04-17 NOTE — PROGRESS NOTES
ANTICOAGULATION MANAGEMENT     Brooks Summers 79 year old male is on warfarin with supratherapeutic INR result. (Goal INR 2.0-3.0)    Recent labs: (last 7 days)     04/17/24  1404   INR 4.1*       ASSESSMENT     Source(s): Chart Review and Patient/Caregiver Call     Warfarin doses taken: Warfarin taken as instructed  Diet: Decreased greens/vitamin K in diet; plans to resume previous intake  Medication/supplement changes: None noted  New illness, injury, or hospitalization: No-still has some issues  Signs or symptoms of bleeding or clotting: No  Previous result: Subtherapeutic  Additional findings:  He has had a tough couple weeks helping care for his wife. They do now have [employer] back to help. This will help and his diet should be getting back on  track.       PLAN     Recommended plan for temporary change(s) affecting INR     Dosing Instructions: hold dose then continue your current warfarin dose with next INR in 7-10 days       Summary  As of 4/17/2024      Full warfarin instructions:  4/17: Hold; Otherwise 7.5 mg every Tue, Thu, Sat; 5 mg all other days   Next INR check:  4/26/2024               Telephone call with Brooks who agrees to plan and repeated back plan correctly    Lab visit scheduled    Education provided:   Please call back if any changes to your diet, medications or how you've been taking warfarin  Goal range and lab monitoring: goal range and significance of current result and Importance of therapeutic range  Symptom monitoring: monitoring for bleeding signs and symptoms    Plan made per [facility] anticoagulation protocol    Cinthia Bullock, RN  Anticoagulation Clinic  4/17/2024    _______________________________________________________________________     Anticoagulation Episode Summary       Current INR goal:  2.0-3.0   TTR:  69.0% (1 y)   Target end date:  Indefinite   Send INR reminders to:  JONATHAN MURILLO    Indications    S/P aortic valve replacement [Z95.2]  Paroxysmal atrial fibrillation (H)  [I48.0]  Long term current use of anticoagulant therapy [Z79.01]  Anticoagulated on Coumadin [Z79.01]  Anticoagulated [Z79.01]             Comments:  Aortic 21 mm Johnson Perimount Magna Tissue Valve.             Anticoagulation Care Providers       Provider Role Specialty Phone number    Edin Mays MD Referring Internal Medicine [phone]

## 2024-04-18 RX ORDER — VALACYCLOVIR HYDROCHLORIDE 1 G/1
TABLET, FILM COATED ORAL
Qty: 21 TABLET | Refills: 0 | Status: SHIPPED | OUTPATIENT
Start: 2024-04-18 | End: 2024-04-26

## 2024-04-23 ENCOUNTER — OFFICE VISIT (OUTPATIENT)
Dept: DERMATOLOGY | Facility: CLINIC | Age: 79
End: 2024-04-23
Payer: MEDICARE

## 2024-04-23 DIAGNOSIS — L82.1 SEBORRHEIC KERATOSES: ICD-10-CM

## 2024-04-23 DIAGNOSIS — L57.0 ACTINIC KERATOSIS: ICD-10-CM

## 2024-04-23 DIAGNOSIS — L73.8 SEBACEOUS HYPERPLASIA: ICD-10-CM

## 2024-04-23 DIAGNOSIS — D49.2 NEOPLASM OF UNSPECIFIED BEHAVIOR OF BONE, SOFT TISSUE, AND SKIN: Primary | ICD-10-CM

## 2024-04-23 DIAGNOSIS — D23.5 DILATED PORE OF WINER OF BACK: ICD-10-CM

## 2024-04-23 DIAGNOSIS — D22.9 MULTIPLE BENIGN NEVI: ICD-10-CM

## 2024-04-23 DIAGNOSIS — B02.29 OTHER POSTHERPETIC NERVOUS SYSTEM INVOLVEMENT: ICD-10-CM

## 2024-04-23 DIAGNOSIS — Z85.828 HISTORY OF NONMELANOMA SKIN CANCER: ICD-10-CM

## 2024-04-23 DIAGNOSIS — L81.4 LENTIGINES: ICD-10-CM

## 2024-04-23 DIAGNOSIS — D18.01 CHERRY ANGIOMA: ICD-10-CM

## 2024-04-23 PROCEDURE — 88305 TISSUE EXAM BY PATHOLOGIST: CPT | Mod: TC | Performed by: DERMATOLOGY

## 2024-04-23 PROCEDURE — 99213 OFFICE O/P EST LOW 20 MIN: CPT | Mod: 25 | Performed by: DERMATOLOGY

## 2024-04-23 PROCEDURE — 17004 DESTROY PREMAL LESIONS 15/>: CPT | Mod: GC | Performed by: DERMATOLOGY

## 2024-04-23 PROCEDURE — 11102 TANGNTL BX SKIN SINGLE LES: CPT | Mod: XS | Performed by: DERMATOLOGY

## 2024-04-23 ASSESSMENT — PAIN SCALES - GENERAL: PAINLEVEL: NO PAIN (0)

## 2024-04-23 NOTE — NURSING NOTE
Lidocaine-epinephrine 1-1:981673 % injection   0.3mL once for one use, starting 4/23/2024 ending 4/23/2024,  2mL disp, R-0, injection  Injected by Fatuma Bolden CMA

## 2024-04-23 NOTE — PROGRESS NOTES
[facility] Dermatology Note  Encounter Date: Apr 23, 2024  Office Visit     Dermatology Problem List:  0. Neoplasm of unspecified behavior   - dorsal nasal tip, s/p shave biopsy 04/23/24, r/o BCC  1. 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 Aks - LN  - left forearm, s/p shave bx 05/04/23    - consider field in future  3. Intradermal melanocytic nevus  - right jawline, s/p shave bx 05/04/23   4. Zoster with post-herpetic neuralgia, C7 dermatome  - gabapentin from PCP  5. Dilated pore, R lower back  ____________________________________________    Assessment & Plan:    # Neoplasm of unspecified behavior   - dorsal nasal tip, s/p shave biopsy 04/23/24, r/o BCC    # Herpes zoster, resolving - C7 dermatome  # Postherpetic neuralgia  Patient counseled regarding etiology, natural history included expectation of prolonged course of symptoms, treatments available. Typically gabapentin is used for up to 12 weeks though occasionally treatment beyond that time can be helpful.  - Continue valtrex and gabapentin from PCP  - Future: consider prolonged course of gabapentin vs topical capsaicin vs topical amitriptyline/ketamine    # Actinic keratoses  Chronic uncomplicated. Etiology and treatment options reviewed.  - location(s): L temple and forehead (x5), R temple and forehead (x5), nose (x2), scalp (x5), R dorsal hand/arm (x3), L dorsal hand/arm (x2)   - number: 22  - cryotherapy performed (see procedure note)  - Diligent sun protection with daily SPF30+ advised   - Would likely benefit from field therapy in future    # Inflamed dilated pore, R lower back  Patient counseled regarding benign etiology, natural history. Declined specific treatments today other than removal of central plug of pore.  - Central plug removed    # Benign skin  findings include seborrheic keratosis, lentigines, benign melanocytic nevi, cherry angiomas, and sebaceous hyperplasia  Chronic uncomplicated.  - Counseled patient regarding etiology, natural history, and benign nature of lesions. No treatment is necessary at this time unless the lesions change or become symptomatic. Will monitor for any clinical changes  - ABCD's of melanoma and signs of possible non-melanoma skin cancer were reviewed with patient        Procedures Performed:   - Shave biopsy procedure note, location(s): as above. After discussion of benefits and risks including but not limited to bleeding, infection, scar, incomplete removal, recurrence, and non-diagnostic biopsy, written consent and photographs were obtained. The area was cleaned with isopropyl alcohol. 0.5mL of 1% lidocaine with epinephrine was injected to obtain adequate anesthesia of lesion(s). Shave biopsy at site(s) performed. Hemostasis was achieved with aluminium chloride. Petrolatum ointment and a sterile dressing were applied. The patient tolerated the procedure and no complications were noted. The patient was provided with verbal and written post care instructions.   - Cryotherapy procedure note, location(s): as above. After verbal consent and discussion of risks and benefits including, but not limited to, dyspigmentation/scar, blister, and pain, 22 lesion(s) was(were) treated with 1-2 mm freeze border for 1-2 cycles with liquid nitrogen. Post cryotherapy instructions were provided.    Follow-up: 6 months in person    Staff and Scribe and Resident:    Marcelo Haywood MD  Medicine/Dermatology PGY-3  Page via "[employer]  Pager: [phone]      Staff Physician Comments:   I saw and evaluated the patient with the resident and I agree with the assessment and plan.  I was present for the key portions of the above major procedure and examination. I have made edits if needed.    Manish Matute MD  Staff Dermatologist and  "Dermatopathologist  , Department of Dermatology   ____________________________________________    CC: Skin Check (Brooks is here today for a skin check. He states \" I have shingles that started 4-5 weeks ago.\")    HPI:  Mr. Brooks Summers is a(n) 79 year old male who presents today as a return patient for skin check. S/p [facility] 7/2023 in interim.    States he developed shingles in the last 4-5 wks, has already seen his PCP and been prescribed gabapentin and antivirals. Swab was positive for zoster of a representative lesion on 4/2/24, photos in chart reviewed. Located at LUE extending from upper back to his hand. Still gets some electric-shock like pains. Thinks the gabapentin is helping at current dosing. All the spots are crusted over at this time. Otherwise no new, tender, non-healing, burning, bleeding, tingling, pruritic lesions. Denies fevers, chills, unintended weight loss, night sweats, energy changes, new headaches or vision changes. No other concerns today.      Labs Reviewed:  Reviewed from 5/4/23  Final Diagnosis  A. Left nasal ala:  - Basal cell carcinoma, nodular type   B. Right jawline:  - Intradermal melanocytic nevus   C. Left forearm:  - Most consistent with hypertrophic actinic keratosis, ulcerated    Physical Exam:  Vitals: There were no vitals taken for this visit.  SKIN: Total skin excluding the undergarment areas was performed. The exam included the head/face, neck, both arms, chest, back, abdomen, both legs, digits and/or nails.   - 3mm pearlescent papule with fine arborizing telangiectasias on dorsal nasal tip  - Crusted papules on erythematous pink base observed extending along L-sided roughly C7 dermatome from midline of back to tip of fingers  - Clogged and dilated pore with surrounding ill-defined erythema on R lower back  - On the L temple and forehead (x5), R temple and forehead (x5), nose (x2), scalp (x5), R dorsal hand/arm (x3), L dorsal hand/arm (x2) there are " non-tender gritty pink papules with adherent angulated overlying scale.  - On the trunk, face, and extremities, there are flesh-colored to light brown, waxy, stuck on papules and plaques.  - On the trunk, face, and extremities, there are multiple regular brown pigmented macules and papules with uniform and unconcerning pigment networks on dermoscopy.  - On the trunk, face, and extremities with accentuation in sun-exposed areas, there are light brown macules with uniform appearance.  - On trunk, face, and extremities, there are dome-shaped firm red papules.  - On the face there are oily yellow umbilicated papules with yellow clods and crown vessels on dermoscopy  - Previous sites of skin cancer noted above were examined.  No evidence for recurrence by inspection or palpation.  - No other lesions of concern on areas examined.     Medications:  Current Outpatient Medications   Medication Sig Dispense Refill    acetaminophen (TYLENOL) 325 MG tablet Take 2 tablets (650 mg) by mouth every 4 hours as needed for mild pain 50 tablet 0    allopurinol (ZYLOPRIM) 100 MG tablet Take 1 tablet (100 mg) by mouth daily 90 tablet 0    calcipotriene (DOVONOX) 0.005 % external cream Apply topically 2 times daily 60 g 1    fluorouracil (EFUDEX) 5 % external cream Apply topically 2 times daily 40 g 1    gabapentin (NEURONTIN) 100 MG capsule Take 1-2 tablets at night for nerve pain 30 capsule 1    hydrocortisone (CORTAID) 1 % external cream Apply topically daily as needed      losartan (COZAAR) 25 MG tablet TAKE 1/2 TABLET(12.5 MG) BY MOUTH DAILY 45 tablet 3    metoprolol succinate ER (TOPROL XL) 50 MG 24 hr tablet Take 1 tablet (50 mg) by mouth daily 90 tablet 3    Multiple Vitamins-Minerals (MULTIVITAMIN ADULTS 50+) TABS Take 1 tablet by mouth daily      PROVIDER DOSED MANAGED WARFARIN, COUMADIN, OUTPATIENT Per coumadin clinic      simvastatin (ZOCOR) 40 MG tablet Take 1 tablet (40 mg) by mouth At Bedtime 90 tablet 3    valACYclovir  (VALTREX) 1000 mg tablet TAKE 1 TABLET(1000 MG) BY MOUTH THREE TIMES DAILY FOR 7 DAYS 21 tablet 0    VITAMIN D, CHOLECALCIFEROL, PO Take 1,000 Units by mouth daily      warfarin ANTICOAGULANT (COUMADIN) 5 MG tablet Take 1.5 tabs on Tues/Thurs/Sat and 1 tablet all other days,  OR AS DIRECTED BY INR CLINIC 105 tablet 1    valACYclovir (VALTREX) 1000 mg tablet Take 1 tablet (1,000 mg) by mouth 3 times daily for 7 days 21 tablet 0     No current facility-administered medications for this visit.      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Paymotronic, Bi-Ventricular- INT dependent    Endocarditis    S/P AVR    Need for prophylactic antibiotic    Hyperlipidemia with target LDL less than 100    Finger injury    Paroxysmal atrial fibrillation (H)    Long term current use of anticoagulant therapy    Trigger ring finger of left hand    Nocturnal hypoxemia    Neoplasm of uncertain behavior of skin    AK (actinic keratosis)    History of nonmelanoma skin cancer    Seizure (H)    Cardiomyopathy (H)    ICD (implantable cardioverter-defibrillator) battery depletion    Anticoagulated on Coumadin    Anticoagulated    MIC (generalized anxiety disorder)    Mild cognitive impairment     Past Medical History:   Diagnosis Date    Anticoagulated on Coumadin 8/5/2021    BCC (basal cell carcinoma), face 5/16/2013    Bicuspid aortic valve     Chronic systolic heart failure (H)     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 Tx with Dapto/RIFx 8 weeks.    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

## 2024-04-23 NOTE — PATIENT INSTRUCTIONS
Wound Care After a Biopsy    What is a skin biopsy?  A skin biopsy allows the doctor to examine a very small piece of tissue under the microscope to determine the diagnosis and the best treatment for the skin condition. A local anesthetic (numbing medicine) is injected with a very small needle into the skin area to be tested. A small piece of skin is taken from the area. Sometimes a suture (stitch) is used.     What are the risks of a skin biopsy?  I will experience scar, bleeding, swelling, pain, crusting and redness. I may experience incomplete removal or recurrence. Risks of this procedure are excessive bleeding, bruising, infection, nerve damage, numbness, thick (hypertrophic or keloidal) scar and non-diagnostic biopsy.    How should I care for my wound for the first 24 hours?  Keep the wound dry and covered for 24 hours  If it bleeds, hold direct pressure on the area for 15 minutes. If bleeding does not stop, call us or go to the emergency room  Avoid strenuous exercise the first 1-2 days or as your doctor instructs you    How should I care for the wound after 24 hours?  After 24 hours, remove the bandage  You may bathe or shower as normal  If you had a scalp biopsy, you can shampoo as usual and can use shower water to clean the biopsy site daily  Clean the wound once a day with gentle soap and water  Do not scrub, be gentle  Apply white petroleum/Vaseline after cleaning the wound with a cotton swab or a clean finger, and keep the site covered with a Bandaid /bandage. Bandages are not necessary with a scalp biopsy  If you are unable to cover the site with a Bandaid /bandage, re-apply ointment 2-3 times a day to keep the site moist. Moisture will help with healing  Avoid strenuous activity for first 1-2 days  Avoid lakes, rivers, pools, and oceans until the stitches are removed or the site is healed    How do I clean my wound?  Wash hands thoroughly with soap or use hand  before all wound  care  Clean the wound with gentle soap and water  Apply white petroleum/Vaseline  to wound after it is clean  Replace the Bandaid /bandage to keep the wound covered for the first few days or as instructed by your doctor  If you had a scalp biopsy, warm shower water to the area on a daily basis should suffice    What should I use to clean my wound?   Cotton-tipped applicators (Qtips )  White petroleum jelly (Vaseline ). Use a clean new container and use Q-tips to apply.  Bandaids  as needed  Gentle soap     How should I care for my wound long term?  Do not get your wound dirty  Keep up with wound care for one week or until the area is healed.  A small scab will form and fall off by itself when the area is completely healed. The area will be red and will become pink in color as it heals. Sun protection is very important for how your scar will turn out. Sunscreen with an SPF 30 or greater is recommended once the area is healed.  You should have some soreness but it should be mild and slowly go away over several days. Talk to your doctor about using tylenol for pain,    When should I call my doctor?  If you have increased:   Pain or swelling  Pus or drainage (clear or slightly yellow drainage is ok)  Temperature over 100F  Spreading redness or warmth around wound    When will I hear about my results?  The biopsy results can take 2 weeks to come back.  Your results will automatically release to [facility] before your provider has even reviewed them.  The clinic will call you with the results, send you a [facility] message, or have you schedule a follow-up clinic or phone time to discuss the results.  Contact our clinics if you do not hear from us in 2 weeks.    Who should I call with questions?  [facility]: [phone]  [facility]: [phone]  For urgent needs outside of business hours call the [facility] at [phone] and ask for the dermatology  resident on call      Cryotherapy    What is it?  Use of a very cold liquid, such as liquid nitrogen, to freeze and destroy abnormal skin cells that need to be removed    What should I expect?  Tenderness and redness  A small blister that might grow and fill with dark purple blood. There may be crusting.  More than one treatment may be needed if the lesions do not go away.    How do I care for the treated area?  Gently wash the area with your hands when bathing.  Use a thin layer of Vaseline to help with healing. You may use a Band-Aid.   The area should heal within 7-10 days and may leave behind a pink or lighter color.   Do not use an antibiotic or Neosporin ointment.   You may take acetaminophen (Tylenol) for pain.     Call your doctor if you have:  Severe pain  Signs of infection (warmth, redness, cloudy yellow drainage, and or a bad smell)  Questions or concerns    Who should I call with questions?      [facility]: [phone]      [facility]: [phone]      For urgent needs outside of business hours call the [facility] at [phone] and ask for the dermatology resident on call        Checking for Skin Cancer  You can help find cancer early by checking your skin each month. There are 3 main kinds of skin cancer: melanoma, basal cell carcinoma, and squamous cell carcinoma. Doing monthly skin checks is the best way to find new marks, sores, or skin changes. Follow these instructions for checking your skin.   The ABCDEs of checking moles for melanoma   Check your moles or growths for signs of melanoma using ABCDE:   Asymmetry: The sides of the mole or growth don t match.  Border: The edges are ragged, notched, or blurred.  Color: The color within the mole or growth varies. It could be black, brown, tan, white, or shades of red, gray, or blue.  Diameter: The mole or growth is larger than   inch or 6 mm (size of a pencil eraser).  Evolving:  The size, shape, texture, or color of the mole or growth is changing.     ABCDE's of moles on light skin.        ABCDE's of moles on dark skin may be harder to identify.     Checking for other types of skin cancer  Basal cell carcinoma or squamous cell carcinoma cause symptoms like:     A spot or mole that looks different from all other marks on your skin  Changes in how an area feels, such as itching, tenderness, or pain  Changes in the skin's surface, such as oozing, bleeding, or scaliness  A sore that doesn't heal  New swelling, redness, or spread of color beyond the border of a mole    Who s at risk?  Anyone of any skin color can get skin cancer. But you're at greater risk if you have:   Fair skin that freckles easily and burns instead of tanning  Light-colored or red hair  Light-colored eyes  Many moles or abnormal moles on your skin  A long history of unprotected exposure to sunlight or tanning beds  A history of many blistering sunburns as a child or teen  A family history of skin cancer  Been exposed to radiation or chemicals  A weakened immune system  Been exposed to arsenic  If you've had skin cancer in the past, you're at high risk of having it again.   How to check your skin  Do your monthly skin checkups in front of a full-length mirror. Use a room with good lighting so it's easier to see. Use a hand mirror to look at hard-to-see places like your buttocks and back. You can also have a trusted friend or family member help you with these checks. Check every part of your body, including your:   Head (ears, face, neck, and scalp)  Torso (front, back, sides, and under breasts)  Arms (tops, undersides, and armpits)  Hands (palms, backs, and fingers, including under the nails)  Lower back, buttocks, and genitals  Legs (front, back, and sides)  Feet (tops, soles, toes, including under the nails, and between toes)  Watch for new spots on your skin or a spot that's changing in color, shape, size.   If you have a  lot of moles, take digital photos of them each month. Make sure to take photos both up close and from a distance. These can help you see if any moles change over time.   Know your skin  Most skin changes aren't cancer. But if you see any changes in your skin, call your healthcare provider right away. Only they can tell you if a change is a problem. If you have skin cancer, seeing your provider can be the first step to getting the treatment that could save your life.   Kathleen last reviewed this educational content on 10/1/2021    9410-8447 [employer]. All rights reserved. This information is not intended as a substitute for professional medical care. Always follow your healthcare professional's instructions.

## 2024-04-23 NOTE — NURSING NOTE
"Dermatology Rooming Note    Brooks Summers's goals for this visit include:   Chief Complaint   Patient presents with    Skin Check     Brooks is here today for a skin check. He states \" I have shingles that started 4-5 weeks ago.\"      TAINA Carey   "

## 2024-04-23 NOTE — LETTER
4/23/2024       RE: Brooks Summers  [address]  [place] [address]     Dear Colleague,    Thank you for referring your patient, Brooks Summers, to the [facility] [place] at [facility]. Please see a copy of my visit note below.    [facility] Dermatology Note  Encounter Date: Apr 23, 2024  Office Visit     Dermatology Problem List:  0. Neoplasm of unspecified behavior   - dorsal nasal tip, s/p shave biopsy 04/23/24, r/o BCC  1. 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 Aks - LN  - left forearm, s/p shave bx 05/04/23    - consider field in future  3. Intradermal melanocytic nevus  - right jawline, s/p shave bx 05/04/23   4. Zoster with post-herpetic neuralgia, C7 dermatome  - gabapentin from PCP  5. Dilated pore, R lower back  ____________________________________________    Assessment & Plan:    # Neoplasm of unspecified behavior   - dorsal nasal tip, s/p shave biopsy 04/23/24, r/o BCC    # Herpes zoster, resolving - C7 dermatome  # Postherpetic neuralgia  Patient counseled regarding etiology, natural history included expectation of prolonged course of symptoms, treatments available. Typically gabapentin is used for up to 12 weeks though occasionally treatment beyond that time can be helpful.  - Continue valtrex and gabapentin from PCP  - Future: consider prolonged course of gabapentin vs topical capsaicin vs topical amitriptyline/ketamine    # Actinic keratoses  Chronic uncomplicated. Etiology and treatment options reviewed.  - location(s): L temple and forehead (x5), R temple and forehead (x5), nose (x2), scalp (x5), R dorsal hand/arm (x3), L dorsal hand/arm (x2)   - number: 22  - cryotherapy performed (see procedure note)  - Diligent  sun protection with daily SPF30+ advised   - Would likely benefit from field therapy in future    # Inflamed dilated pore, R lower back  Patient counseled regarding benign etiology, natural history. Declined specific treatments today other than removal of central plug of pore.  - Central plug removed    # Benign skin findings include seborrheic keratosis, lentigines, benign melanocytic nevi, cherry angiomas, and sebaceous hyperplasia  Chronic uncomplicated.  - Counseled patient regarding etiology, natural history, and benign nature of lesions. No treatment is necessary at this time unless the lesions change or become symptomatic. Will monitor for any clinical changes  - ABCD's of melanoma and signs of possible non-melanoma skin cancer were reviewed with patient        Procedures Performed:   - Shave biopsy procedure note, location(s): as above. After discussion of benefits and risks including but not limited to bleeding, infection, scar, incomplete removal, recurrence, and non-diagnostic biopsy, written consent and photographs were obtained. The area was cleaned with isopropyl alcohol. 0.5mL of 1% lidocaine with epinephrine was injected to obtain adequate anesthesia of lesion(s). Shave biopsy at site(s) performed. Hemostasis was achieved with aluminium chloride. Petrolatum ointment and a sterile dressing were applied. The patient tolerated the procedure and no complications were noted. The patient was provided with verbal and written post care instructions.   - Cryotherapy procedure note, location(s): as above. After verbal consent and discussion of risks and benefits including, but not limited to, dyspigmentation/scar, blister, and pain, 22 lesion(s) was(were) treated with 1-2 mm freeze border for 1-2 cycles with liquid nitrogen. Post cryotherapy instructions were provided.    Follow-up: 6 months in person    Staff and Scribe and Resident:    Marcelo Haywood MD  Medicine/Dermatology PGY-3  Page via [employer]   "Pager: [phone]      Staff Physician Comments:   I saw and evaluated the patient with the resident and I agree with the assessment and plan.  I was present for the key portions of the above major procedure and examination. I have made edits if needed.    Manish Matute MD  Staff Dermatologist and Dermatopathologist  , Department of Dermatology   ____________________________________________    CC: Skin Check (Brooks is here today for a skin check. He states \" I have shingles that started 4-5 weeks ago.\")    HPI:  Mr. Brooks Summers is a(n) 79 year old male who presents today as a return patient for skin check. S/p [facility] 7/2023 in interim.    States he developed shingles in the last 4-5 wks, has already seen his PCP and been prescribed gabapentin and antivirals. Swab was positive for zoster of a representative lesion on 4/2/24, photos in chart reviewed. Located at LUE extending from upper back to his hand. Still gets some electric-shock like pains. Thinks the gabapentin is helping at current dosing. All the spots are crusted over at this time. Otherwise no new, tender, non-healing, burning, bleeding, tingling, pruritic lesions. Denies fevers, chills, unintended weight loss, night sweats, energy changes, new headaches or vision changes. No other concerns today.      Labs Reviewed:  Reviewed from 5/4/23  Final Diagnosis  A. Left nasal ala:  - Basal cell carcinoma, nodular type   B. Right jawline:  - Intradermal melanocytic nevus   C. Left forearm:  - Most consistent with hypertrophic actinic keratosis, ulcerated    Physical Exam:  Vitals: There were no vitals taken for this visit.  SKIN: Total skin excluding the undergarment areas was performed. The exam included the head/face, neck, both arms, chest, back, abdomen, both legs, digits and/or nails.   - 3mm pearlescent papule with fine arborizing telangiectasias on dorsal nasal tip  - Crusted papules on erythematous pink base observed extending along L-sided " roughly C7 dermatome from midline of back to tip of fingers  - Clogged and dilated pore with surrounding ill-defined erythema on R lower back  - On the L temple and forehead (x5), R temple and forehead (x5), nose (x2), scalp (x5), R dorsal hand/arm (x3), L dorsal hand/arm (x2) there are non-tender gritty pink papules with adherent angulated overlying scale.  - On the trunk, face, and extremities, there are flesh-colored to light brown, waxy, stuck on papules and plaques.  - On the trunk, face, and extremities, there are multiple regular brown pigmented macules and papules with uniform and unconcerning pigment networks on dermoscopy.  - On the trunk, face, and extremities with accentuation in sun-exposed areas, there are light brown macules with uniform appearance.  - On trunk, face, and extremities, there are dome-shaped firm red papules.  - On the face there are oily yellow umbilicated papules with yellow clods and crown vessels on dermoscopy  - Previous sites of skin cancer noted above were examined.  No evidence for recurrence by inspection or palpation.  - No other lesions of concern on areas examined.     Medications:  Current Outpatient Medications   Medication Sig Dispense Refill    acetaminophen (TYLENOL) 325 MG tablet Take 2 tablets (650 mg) by mouth every 4 hours as needed for mild pain 50 tablet 0    allopurinol (ZYLOPRIM) 100 MG tablet Take 1 tablet (100 mg) by mouth daily 90 tablet 0    calcipotriene (DOVONOX) 0.005 % external cream Apply topically 2 times daily 60 g 1    fluorouracil (EFUDEX) 5 % external cream Apply topically 2 times daily 40 g 1    gabapentin (NEURONTIN) 100 MG capsule Take 1-2 tablets at night for nerve pain 30 capsule 1    hydrocortisone (CORTAID) 1 % external cream Apply topically daily as needed      losartan (COZAAR) 25 MG tablet TAKE 1/2 TABLET(12.5 MG) BY MOUTH DAILY 45 tablet 3    metoprolol succinate ER (TOPROL XL) 50 MG 24 hr tablet Take 1 tablet (50 mg) by mouth daily 90  tablet 3    Multiple Vitamins-Minerals (MULTIVITAMIN ADULTS 50+) TABS Take 1 tablet by mouth daily      PROVIDER DOSED MANAGED WARFARIN, COUMADIN, OUTPATIENT Per coumadin clinic      simvastatin (ZOCOR) 40 MG tablet Take 1 tablet (40 mg) by mouth At Bedtime 90 tablet 3    valACYclovir (VALTREX) 1000 mg tablet TAKE 1 TABLET(1000 MG) BY MOUTH THREE TIMES DAILY FOR 7 DAYS 21 tablet 0    VITAMIN D, CHOLECALCIFEROL, PO Take 1,000 Units by mouth daily      warfarin ANTICOAGULANT (COUMADIN) 5 MG tablet Take 1.5 tabs on Tues/Thurs/Sat and 1 tablet all other days,  OR AS DIRECTED BY INR CLINIC 105 tablet 1    valACYclovir (VALTREX) 1000 mg tablet Take 1 tablet (1,000 mg) by mouth 3 times daily for 7 days 21 tablet 0     No current facility-administered medications for this visit.      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[employer], Bi-Ventricular- INT dependent    Endocarditis    S/P AVR    Need for prophylactic antibiotic    Hyperlipidemia with target LDL less than 100    Finger injury    Paroxysmal atrial fibrillation (H)    Long term current use of anticoagulant therapy    Trigger ring finger of left hand    Nocturnal hypoxemia    Neoplasm of uncertain behavior of skin    AK (actinic keratosis)    History of nonmelanoma skin cancer    Seizure (H)    Cardiomyopathy (H)    ICD (implantable cardioverter-defibrillator) battery depletion    Anticoagulated on Coumadin    Anticoagulated    MIC (generalized anxiety  disorder)    Mild cognitive impairment     Past Medical History:   Diagnosis Date    Anticoagulated on Coumadin 8/5/2021    BCC (basal cell carcinoma), face 5/16/2013    Bicuspid aortic valve     Chronic systolic heart failure (H)     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 Tx with Dapto/RIFx 8 weeks.    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

## 2024-04-24 LAB
PATH REPORT.COMMENTS IMP SPEC: ABNORMAL
PATH REPORT.COMMENTS IMP SPEC: ABNORMAL
PATH REPORT.COMMENTS IMP SPEC: YES
PATH REPORT.FINAL DX SPEC: ABNORMAL
PATH REPORT.GROSS SPEC: ABNORMAL
PATH REPORT.MICROSCOPIC SPEC OTHER STN: ABNORMAL
PATH REPORT.RELEVANT HX SPEC: ABNORMAL

## 2024-04-26 ENCOUNTER — LAB (OUTPATIENT)
Dept: LAB | Facility: CLINIC | Age: 79
End: 2024-04-26
Payer: MEDICARE

## 2024-04-26 ENCOUNTER — ANTICOAGULATION THERAPY VISIT (OUTPATIENT)
Dept: ANTICOAGULATION | Facility: CLINIC | Age: 79
End: 2024-04-26

## 2024-04-26 ENCOUNTER — OFFICE VISIT (OUTPATIENT)
Dept: FAMILY MEDICINE | Facility: CLINIC | Age: 79
End: 2024-04-26
Payer: MEDICARE

## 2024-04-26 VITALS
RESPIRATION RATE: 16 BRPM | DIASTOLIC BLOOD PRESSURE: 82 MMHG | SYSTOLIC BLOOD PRESSURE: 126 MMHG | HEART RATE: 82 BPM | WEIGHT: 165 LBS | TEMPERATURE: 97.8 F | HEIGHT: 70 IN | OXYGEN SATURATION: 98 % | BODY MASS INDEX: 23.62 KG/M2

## 2024-04-26 DIAGNOSIS — I48.0 PAROXYSMAL ATRIAL FIBRILLATION (H): ICD-10-CM

## 2024-04-26 DIAGNOSIS — B02.29 POST HERPETIC NEURALGIA: ICD-10-CM

## 2024-04-26 DIAGNOSIS — Z95.2 S/P AORTIC VALVE REPLACEMENT: Primary | ICD-10-CM

## 2024-04-26 DIAGNOSIS — Z79.01 ANTICOAGULATED: ICD-10-CM

## 2024-04-26 DIAGNOSIS — B02.8 HERPES ZOSTER WITH OTHER COMPLICATION: Primary | ICD-10-CM

## 2024-04-26 DIAGNOSIS — B02.8 HERPES ZOSTER WITH OTHER COMPLICATION: ICD-10-CM

## 2024-04-26 DIAGNOSIS — Z95.2 S/P AORTIC VALVE REPLACEMENT: ICD-10-CM

## 2024-04-26 DIAGNOSIS — Z79.01 LONG TERM CURRENT USE OF ANTICOAGULANT THERAPY: ICD-10-CM

## 2024-04-26 DIAGNOSIS — Q23.81 BICUSPID AORTIC VALVE: ICD-10-CM

## 2024-04-26 DIAGNOSIS — Z79.01 ANTICOAGULATED ON COUMADIN: ICD-10-CM

## 2024-04-26 LAB
INR BLD: 2.8 (ref 0.9–1.1)
INR PPP: 2.81 (ref 0.85–1.15)

## 2024-04-26 PROCEDURE — 80048 BASIC METABOLIC PNL TOTAL CA: CPT

## 2024-04-26 PROCEDURE — 99213 OFFICE O/P EST LOW 20 MIN: CPT | Performed by: PHYSICIAN ASSISTANT

## 2024-04-26 PROCEDURE — 36415 COLL VENOUS BLD VENIPUNCTURE: CPT

## 2024-04-26 PROCEDURE — 85610 PROTHROMBIN TIME: CPT

## 2024-04-26 RX ORDER — CAPSAICIN 0.025 %
1 CREAM (GRAM) TOPICAL 3 TIMES DAILY PRN
Qty: 60 G | Refills: 0 | Status: SHIPPED | OUTPATIENT
Start: 2024-04-26 | End: 2024-06-17

## 2024-04-26 RX ORDER — RESPIRATORY SYNCYTIAL VIRUS VACCINE 120MCG/0.5
0.5 KIT INTRAMUSCULAR ONCE
Qty: 1 EACH | Refills: 0 | Status: CANCELLED | OUTPATIENT
Start: 2024-04-26 | End: 2024-04-26

## 2024-04-26 RX ORDER — VALACYCLOVIR HYDROCHLORIDE 1 G/1
1000 TABLET, FILM COATED ORAL 3 TIMES DAILY
Qty: 21 TABLET | Refills: 0 | Status: SHIPPED | OUTPATIENT
Start: 2024-04-26 | End: 2024-05-21

## 2024-04-26 ASSESSMENT — PAIN SCALES - GENERAL: PAINLEVEL: NO PAIN (0)

## 2024-04-26 NOTE — PROGRESS NOTES
ANTICOAGULATION MANAGEMENT     Brooks Summers 79 year old male is on warfarin with therapeutic INR result. (Goal INR 2.0-3.0)    Recent labs: (last 7 days)     04/26/24  1414   INR 2.8*       ASSESSMENT     Source(s): Chart Review and Patient/Caregiver Call     Warfarin doses taken: Warfarin taken as instructed  Diet: No new diet changes identified  Medication/supplement changes:  Another round of valtrex for Herpes zoster on hand, some swelling. Prescribed topical creams   New illness, injury, or hospitalization: No  Signs or symptoms of bleeding or clotting: No  Previous result: Supratherapeutic  Additional findings: None       PLAN     Recommended plan for no diet, medication or health factor changes affecting INR     Dosing Instructions: Continue your current warfarin dose with next INR in 1 week       Summary  As of 4/26/2024      Full warfarin instructions:  7.5 mg every Tue, Thu, Sat; 5 mg all other days   Next INR check:  5/3/2024               Telephone call with Brooks who verbalizes understanding and agrees to plan and who agrees to plan and repeated back plan correctly    Lab visit scheduled    Education provided:   None required    Plan made per [facility] anticoagulation protocol    Kiana Moore, RN  Anticoagulation Clinic  4/26/2024    _______________________________________________________________________     Anticoagulation Episode Summary       Current INR goal:  2.0-3.0   TTR:  66.9% (1 y)   Target end date:  Indefinite   Send INR reminders to:  JONATHAN MURILLO    Indications    S/P aortic valve replacement [Z95.2]  Paroxysmal atrial fibrillation (H) [I48.0]  Long term current use of anticoagulant therapy [Z79.01]  Anticoagulated on Coumadin [Z79.01]  Anticoagulated [Z79.01]             Comments:  Aortic 21 mm Johnson Perimount Magna Tissue Valve.             Anticoagulation Care Providers       Provider Role Specialty Phone number    Edin Mays MD Referring Internal Medicine [phone]

## 2024-04-26 NOTE — PROGRESS NOTES
Assessment & Plan   Problem List Items Addressed This Visit          Circulatory    Bicuspid aortic valve    Relevant Orders    INR (Completed)       Urinary    CKD (chronic kidney disease) stage 3, GFR 30-59 ml/min (H)     Other Visit Diagnoses       Herpes zoster with other complication    -  Primary    Relevant Medications    capsaicin (ZOSTRIX) 0.025 % external cream    valACYclovir (VALTREX) 1000 mg tablet    diclofenac (VOLTAREN) 1 % topical gel    Other Relevant Orders    Basic metabolic panel  (Ca, Cl, CO2, Creat, Gluc, K, Na, BUN) (Completed)    Post herpetic neuralgia        Relevant Medications    capsaicin (ZOSTRIX) 0.025 % external cream    diclofenac (VOLTAREN) 1 % topical gel    Other Relevant Orders    Basic metabolic panel  (Ca, Cl, CO2, Creat, Gluc, K, Na, BUN) (Completed)           Resolving zoster of left C7 dermatome with healing crusts and pain. Encouraged him to increase gabapentin to 200mg at bedtime as he has been using only 100mg. Will add the above topicals and provide another week of Valtrex at his request, though we discussed the risks/benefits of further antivirals at this point. Bmp and INR reassuring. Follow up with primary or myself if not continuing to improve in the next 2 weeks.    Complete history and physical exam as below. Afebrile with normal vital signs.    DDx and Dx discussed with and explained to the pt to their satisfaction.  All questions were answered at this time. Pt expressed understanding of and agreement with this dx, tx, and plan. No further workup warranted and standard medication warnings given. I have given the patient a list of pertinent indications for re-evaluation. Will go to the Emergency Department if symptoms worsen or new concerning symptoms arise. Patient left in no apparent distress.     Ordering of each unique test  Prescription drug management     See Patient Instructions      Paige Guy is a 79 year old, presenting for the following health  "issues:  Derm Problem        4/26/2024     1:29 PM   Additional Questions   Roomed by Radha Anderson CMA   Accompanied by N/A         4/26/2024     1:29 PM   Patient Reported Additional Medications   Patient reports taking the following new medications No new medications     HPI     Patient is coming in today with achy/discomfort on the left arm for 1 month to 1 and a half month.  Symptoms also include stiffness in the ring, middle, pointer finger of left hand.        Review of Systems  Constitutional, HEENT, cardiovascular, pulmonary, gi and gu systems are negative, except as otherwise noted.      Objective    /82   Pulse 82   Temp 97.8  F (36.6  C) (Temporal)   Resp 16   Ht 1.778 m (5' 10\")   Wt 74.8 kg (165 lb)   SpO2 98%   BMI 23.68 kg/m    Body mass index is 23.68 kg/m .  Physical Exam  Vitals and nursing note reviewed.   Constitutional:       General: He is not in acute distress.     Appearance: Normal appearance. He is not diaphoretic.   HENT:      Head: Normocephalic and atraumatic.      Nose: Nose normal.   Eyes:      Conjunctiva/sclera: Conjunctivae normal.   Pulmonary:      Effort: Pulmonary effort is normal. No respiratory distress.   Musculoskeletal:      Comments: Crusted rash to left C7 dermatome consistent with Zoster.  No overlying signs of trauma or secondary infection. Distal CMS intact. Remainder of limb non-tender.        Skin:     General: Skin is dry.      Coloration: Skin is not jaundiced or pale.   Neurological:      General: No focal deficit present.      Mental Status: He is alert. Mental status is at baseline.   Psychiatric:         Mood and Affect: Mood normal.         Behavior: Behavior normal.        Results for orders placed or performed in visit on 04/26/24   INR point of care     Status: Abnormal   Result Value Ref Range    INR 2.8 (H) 0.9 - 1.1    Narrative    This test is intended for monitoring Coumadin therapy. Results are not accurate in patients with prolonged " INR due to factor deficiency.   Basic metabolic panel  (Ca, Cl, CO2, Creat, Gluc, K, Na, BUN)     Status: Abnormal   Result Value Ref Range    Sodium 140 135 - 145 mmol/L    Potassium 3.9 3.4 - 5.3 mmol/L    Chloride 104 98 - 107 mmol/L    Carbon Dioxide (CO2) 27 22 - 29 mmol/L    Anion Gap 9 7 - 15 mmol/L    Urea Nitrogen 11.5 8.0 - 23.0 mg/dL    Creatinine 0.88 0.67 - 1.17 mg/dL    GFR Estimate 87 >60 mL/min/1.73m2    Calcium 9.3 8.8 - 10.2 mg/dL    Glucose 121 (H) 70 - 99 mg/dL   INR     Status: Abnormal   Result Value Ref Range    INR 2.81 (H) 0.85 - 1.15           Signed Electronically by: EDWARD To

## 2024-04-26 NOTE — PATIENT INSTRUCTIONS
Tammie Guy,    Thank you for allowing [facility] to manage your care.    This is likely the beginning of postherpetic neuralgia.  Try the medications as prescribed to help your pain.    If you develop worsening/changing symptoms at any time, please be seen in clinic/urgent care or call 911/go to the emergency department for evaluation.    I ordered some lab work. Please go to the laboratory to get your studies.    I sent your prescriptions to your pharmacy. Increase your gabapentin to 200mg at night.     Please allow 1-2 business days for our office to contact you in regards to your laboratory/radiological studies.  If not done so, I encourage you to login into [employer] ([email or web address] to review your results as well.     If you have any questions or concerns, please feel free to call us at [phone]    Sincerely,    Jontahan Potter PA-C    Did you know?      You can schedule a video visit for follow-up appointments as well as future appointments for certain conditions.  Please see the below link.     [email or web address]    If you have not already done so,  I encourage you to sign up for [employer] ([email or web address]).  This will allow you to review your results, securely communicate with a provider, and schedule virtual visits as well.

## 2024-04-27 LAB
ANION GAP SERPL CALCULATED.3IONS-SCNC: 9 MMOL/L (ref 7–15)
BUN SERPL-MCNC: 11.5 MG/DL (ref 8–23)
CALCIUM SERPL-MCNC: 9.3 MG/DL (ref 8.8–10.2)
CHLORIDE SERPL-SCNC: 104 MMOL/L (ref 98–107)
CREAT SERPL-MCNC: 0.88 MG/DL (ref 0.67–1.17)
DEPRECATED HCO3 PLAS-SCNC: 27 MMOL/L (ref 22–29)
EGFRCR SERPLBLD CKD-EPI 2021: 87 ML/MIN/1.73M2
GLUCOSE SERPL-MCNC: 121 MG/DL (ref 70–99)
POTASSIUM SERPL-SCNC: 3.9 MMOL/L (ref 3.4–5.3)
SODIUM SERPL-SCNC: 140 MMOL/L (ref 135–145)

## 2024-04-29 DIAGNOSIS — M10.00 IDIOPATHIC GOUT, UNSPECIFIED CHRONICITY, UNSPECIFIED SITE: ICD-10-CM

## 2024-04-29 DIAGNOSIS — E78.5 HYPERLIPIDEMIA LDL GOAL <100: ICD-10-CM

## 2024-04-29 NOTE — TELEPHONE ENCOUNTER
allopurinol (ZYLOPRIM) 100 MG tablet 90 tablet 0 10/24/2023 -- No   Sig - Route: Take 1 tablet (100 mg) by mouth daily     simvastatin (ZOCOR) 40 MG tablet 90 tablet 3 3/27/2023 -- No   Sig - Route: Take 1 tablet (40 mg) by mouth At Bedtime     ----------------------  Last Office Visit : 1/30/2023  [facility] [place]     Edin Mays MD     Future Office visit:  0  ----------------------    Refill decision:  Routing refill request to provider for review/approval because:  Overdue for annual visit.   Simvastatin - LDL overdue  Allopurinol - gap in refills, overdue ALT, Uric Acid, CBC    LDL Cholesterol Calculated   Date Value Ref Range Status   01/30/2023 63 <=100 mg/dL Final   01/31/2020 76 <100 mg/dL Final     Comment:     Desirable:       <100 mg/dl     Lab Results   Component Value Date    ALT 33 01/30/2023    ALT 43 05/10/2020     Uric Acid   Date Value Ref Range Status   01/30/2023 5.3 3.4 - 7.0 mg/dL Final   10/21/2013 6.4 3.5 - 8.5 mg/dL Final     CBC RESULTS:   Recent Labs   Lab Test 02/16/23  1343   WBC 5.3   RBC 4.25*   HGB 14.1   HCT 43.2   *   MCH 33.2*   MCHC 32.6   RDW 14.0   PLT 72*         Pass/Fail Protocol Criteria:  Gout Agents Protocol Jcnuss5404/29/2024 04:20 PM   Protocol Details CBC on file in past 12 months    ALT on file in past 12 months    Has Uric Acid on file in past 12 months and value is less than 6    Recent (12 mo) or future (90 days) visit within the authorizing provider's specialty    Medication is active on med list    Medication indicated for associated diagnosis    Has GFR on file in past 12 months and most recent value is normal    Patient is age 18 or older    Normal serum creatinine on file in the past 12 months     Antihyperlipidemic agents [id number]/29/2024 04:20 PM   Protocol Details LDL on file in the past 12 months    Recent (12 mo) or future (90 days) visit within the authorizing provider's specialty    Medication is active  on med list    Patient is age 18 years or older

## 2024-04-30 ENCOUNTER — TELEPHONE (OUTPATIENT)
Dept: DERMATOLOGY | Facility: CLINIC | Age: 79
End: 2024-04-30
Payer: MEDICARE

## 2024-04-30 DIAGNOSIS — C44.91 BCC (BASAL CELL CARCINOMA): Primary | ICD-10-CM

## 2024-04-30 NOTE — TELEPHONE ENCOUNTER
Manish Matute MD  4/29/2024  3:37 PM CDT       Please let patient know the biopsy showed a basal cell carcinoma, make Mohs referral for BCC, and ensure a 6 month follow up visit with me for screening. Thanks!

## 2024-04-30 NOTE — TELEPHONE ENCOUNTER
Left Voicemail (1st Attempt) for the patient to call back and schedule the following:    Appointment type: EPP  Provider: PCP  Return date: soonest avail

## 2024-05-01 ENCOUNTER — PATIENT OUTREACH (OUTPATIENT)
Dept: FAMILY MEDICINE | Facility: CLINIC | Age: 79
End: 2024-05-01
Payer: MEDICARE

## 2024-05-01 ENCOUNTER — TELEPHONE (OUTPATIENT)
Dept: DERMATOLOGY | Facility: CLINIC | Age: 79
End: 2024-05-01
Payer: MEDICARE

## 2024-05-01 NOTE — LETTER
May 1, 2024      Brooks Summers  [id number]   [place] [address]      Your healthcare team cares about your health. To provide you with the best care, we have reviewed your chart and based on our findings, we see that you are due to:     PREVENTATIVE VISIT: Annual Medicare Wellness:Schedule an Annual Medicare Wellness Exam. Please call your [facility] clinic to set up your appointment.    If you have already completed these items, please contact the clinic via phone or Mychart so your care team can review and update your records.  Thank you for choosing [facility] for your healthcare needs. For any questions, concerns, or to schedule an appointment please contact the clinic.     Healthy Regards,    Your [facility] Care Team

## 2024-05-01 NOTE — TELEPHONE ENCOUNTER
Patient Quality Outreach    Patient is due for the following:   Depression  -  PHQ-9 needed  Physical Annual Wellness Visit      Topic Date Due    Zoster (Shingles) Vaccine (1 of 2) Never done    COVID-19 Vaccine (5 - 2023-24 season) 09/01/2023       Next Steps:   Schedule a Annual Wellness Visit    Type of outreach:    Sent letter.      Questions for provider review:               [email or web address], MEDICAL ASSISTANT   Closing Encounter

## 2024-05-03 ENCOUNTER — ANTICOAGULATION THERAPY VISIT (OUTPATIENT)
Dept: ANTICOAGULATION | Facility: CLINIC | Age: 79
End: 2024-05-03

## 2024-05-03 ENCOUNTER — LAB (OUTPATIENT)
Dept: LAB | Facility: CLINIC | Age: 79
End: 2024-05-03
Payer: MEDICARE

## 2024-05-03 DIAGNOSIS — Z79.01 ANTICOAGULATED: ICD-10-CM

## 2024-05-03 DIAGNOSIS — Z95.2 S/P AORTIC VALVE REPLACEMENT: Primary | ICD-10-CM

## 2024-05-03 DIAGNOSIS — I48.0 PAROXYSMAL ATRIAL FIBRILLATION (H): ICD-10-CM

## 2024-05-03 DIAGNOSIS — Z95.2 S/P AORTIC VALVE REPLACEMENT: ICD-10-CM

## 2024-05-03 DIAGNOSIS — Z79.01 LONG TERM CURRENT USE OF ANTICOAGULANT THERAPY: ICD-10-CM

## 2024-05-03 DIAGNOSIS — Z79.01 ANTICOAGULATED ON COUMADIN: ICD-10-CM

## 2024-05-03 LAB — INR BLD: 2.8 (ref 0.9–1.1)

## 2024-05-03 PROCEDURE — 85610 PROTHROMBIN TIME: CPT

## 2024-05-03 PROCEDURE — 36416 COLLJ CAPILLARY BLOOD SPEC: CPT

## 2024-05-03 RX ORDER — ALLOPURINOL 100 MG/1
100 TABLET ORAL DAILY
Qty: 90 TABLET | Refills: 0 | Status: SHIPPED | OUTPATIENT
Start: 2024-05-03 | End: 2024-08-05

## 2024-05-03 RX ORDER — SIMVASTATIN 40 MG
40 TABLET ORAL AT BEDTIME
Qty: 90 TABLET | Refills: 0 | Status: SHIPPED | OUTPATIENT
Start: 2024-05-03 | End: 2024-08-05

## 2024-05-03 NOTE — PROGRESS NOTES
ANTICOAGULATION MANAGEMENT     Brooks Summers 79 year old male is on warfarin with therapeutic INR result. (Goal INR 2.0-3.0)    Recent labs: (last 7 days)     05/03/24  1303   INR 2.8*       ASSESSMENT     Source(s): Chart Review  Previous INR was Therapeutic last visit; previously outside of goal range  Medication, diet, health changes since last INR chart reviewed; none identified         PLAN     Recommended plan for no diet, medication or health factor changes affecting INR     Dosing Instructions: Continue your current warfarin dose with next INR in 2 weeks       Summary  As of 5/3/2024      Full warfarin instructions:  7.5 mg every Tue, Thu, Sat; 5 mg all other days   Next INR check:  5/17/2024               Detailed voice message left for Brooks with dosing instructions and follow up date.     Contact [phone]  to schedule and with any changes, questions or concerns.     Education provided:   Please call back if any changes to your diet, medications or how you've been taking warfarin    Plan made per [facility] anticoagulation protocol    Shira Mirza RN  Anticoagulation Clinic  5/3/2024    _______________________________________________________________________     Anticoagulation Episode Summary       Current INR goal:  2.0-3.0   TTR:  66.9% (1 y)   Target end date:  Indefinite   Send INR reminders to:  JONATHAN MURILLO    Indications    S/P aortic valve replacement [Z95.2]  Paroxysmal atrial fibrillation (H) [I48.0]  Long term current use of anticoagulant therapy [Z79.01]  Anticoagulated on Coumadin [Z79.01]  Anticoagulated [Z79.01]             Comments:  Aortic 21 mm Johnson Perimount Magna Tissue Valve.             Anticoagulation Care Providers       Provider Role Specialty Phone number    Edin Mays MD Referring Internal Medicine [phone]

## 2024-05-07 ENCOUNTER — TELEPHONE (OUTPATIENT)
Dept: INTERNAL MEDICINE | Facility: CLINIC | Age: 79
End: 2024-05-07
Payer: MEDICARE

## 2024-05-07 NOTE — TELEPHONE ENCOUNTER
.Reason for Call: INR follow up    Detailed comments: Patient had INR last Friday 5-3-24 and did not get a call back from nurse yet. Please call.     Phone Number Patient can be reached at: Home number on file [phone] (home)    Best Time: any    Can we leave a detailed message on this number? No    Call taken on 5/7/2024 at 9:19 AM by Genoveva Malik

## 2024-05-10 ENCOUNTER — ANCILLARY PROCEDURE (OUTPATIENT)
Dept: CARDIOLOGY | Facility: CLINIC | Age: 79
End: 2024-05-10
Attending: INTERNAL MEDICINE
Payer: MEDICARE

## 2024-05-10 DIAGNOSIS — I42.9 CARDIOMYOPATHY (H): ICD-10-CM

## 2024-05-14 LAB
MDC_IDC_EPISODE_DTM: NORMAL
MDC_IDC_EPISODE_DURATION: 452 S
MDC_IDC_EPISODE_ID: 5
MDC_IDC_EPISODE_TYPE: NORMAL
MDC_IDC_LEAD_CONNECTION_STATUS: NORMAL
MDC_IDC_LEAD_IMPLANT_DT: NORMAL
MDC_IDC_LEAD_LOCATION: NORMAL
MDC_IDC_LEAD_LOCATION_DETAIL_1: NORMAL
MDC_IDC_LEAD_MFG: NORMAL
MDC_IDC_LEAD_MODEL: NORMAL
MDC_IDC_LEAD_POLARITY_TYPE: NORMAL
MDC_IDC_LEAD_SERIAL: NORMAL
MDC_IDC_LEAD_SPECIAL_FUNCTION: NORMAL
MDC_IDC_MSMT_BATTERY_DTM: NORMAL
MDC_IDC_MSMT_BATTERY_REMAINING_LONGEVITY: 11 MO
MDC_IDC_MSMT_BATTERY_RRT_TRIGGER: NORMAL
MDC_IDC_MSMT_BATTERY_VOLTAGE: 2.89 V
MDC_IDC_MSMT_CAP_CHARGE_DTM: NORMAL
MDC_IDC_MSMT_CAP_CHARGE_ENERGY: 18 J
MDC_IDC_MSMT_CAP_CHARGE_TIME: 4.3 S
MDC_IDC_MSMT_CAP_CHARGE_TYPE: NORMAL
MDC_IDC_MSMT_LEADCHNL_LV_IMPEDANCE_VALUE: 323 OHM
MDC_IDC_MSMT_LEADCHNL_LV_IMPEDANCE_VALUE: 380 OHM
MDC_IDC_MSMT_LEADCHNL_LV_IMPEDANCE_VALUE: 646 OHM
MDC_IDC_MSMT_LEADCHNL_LV_PACING_THRESHOLD_AMPLITUDE: 2.12 V
MDC_IDC_MSMT_LEADCHNL_LV_PACING_THRESHOLD_PULSEWIDTH: 1 MS
MDC_IDC_MSMT_LEADCHNL_RA_IMPEDANCE_VALUE: 418 OHM
MDC_IDC_MSMT_LEADCHNL_RA_PACING_THRESHOLD_AMPLITUDE: 0.62 V
MDC_IDC_MSMT_LEADCHNL_RA_PACING_THRESHOLD_PULSEWIDTH: 0.4 MS
MDC_IDC_MSMT_LEADCHNL_RA_SENSING_INTR_AMPL: 1.4 MV
MDC_IDC_MSMT_LEADCHNL_RV_IMPEDANCE_VALUE: 399 OHM
MDC_IDC_MSMT_LEADCHNL_RV_IMPEDANCE_VALUE: 494 OHM
MDC_IDC_MSMT_LEADCHNL_RV_PACING_THRESHOLD_AMPLITUDE: 0.5 V
MDC_IDC_MSMT_LEADCHNL_RV_PACING_THRESHOLD_PULSEWIDTH: 0.4 MS
MDC_IDC_MSMT_LEADCHNL_RV_SENSING_INTR_AMPL: 12.9 MV
MDC_IDC_PG_IMPLANT_DTM: NORMAL
MDC_IDC_PG_MFG: NORMAL
MDC_IDC_PG_MODEL: NORMAL
MDC_IDC_PG_SERIAL: NORMAL
MDC_IDC_PG_TYPE: NORMAL
MDC_IDC_SESS_CLINIC_NAME: NORMAL
MDC_IDC_SESS_DTM: NORMAL
MDC_IDC_SESS_TYPE: NORMAL
MDC_IDC_SET_BRADY_AT_MODE_SWITCH_RATE: 171 {BEATS}/MIN
MDC_IDC_SET_BRADY_LOWRATE: 60 {BEATS}/MIN
MDC_IDC_SET_BRADY_MAX_SENSOR_RATE: 120 {BEATS}/MIN
MDC_IDC_SET_BRADY_MAX_TRACKING_RATE: 130 {BEATS}/MIN
MDC_IDC_SET_BRADY_MODE: NORMAL
MDC_IDC_SET_BRADY_NIGHT_RATE: 50 {BEATS}/MIN
MDC_IDC_SET_BRADY_PAV_DELAY_LOW: 170 MS
MDC_IDC_SET_BRADY_SAV_DELAY_LOW: 90 MS
MDC_IDC_SET_CRT_LVRV_DELAY: 0 MS
MDC_IDC_SET_CRT_PACED_CHAMBERS: NORMAL
MDC_IDC_SET_LEADCHNL_LV_PACING_AMPLITUDE: 3.5 V
MDC_IDC_SET_LEADCHNL_LV_PACING_ANODE_ELECTRODE_1: NORMAL
MDC_IDC_SET_LEADCHNL_LV_PACING_ANODE_LOCATION_1: NORMAL
MDC_IDC_SET_LEADCHNL_LV_PACING_CAPTURE_MODE: NORMAL
MDC_IDC_SET_LEADCHNL_LV_PACING_CATHODE_ELECTRODE_1: NORMAL
MDC_IDC_SET_LEADCHNL_LV_PACING_CATHODE_LOCATION_1: NORMAL
MDC_IDC_SET_LEADCHNL_LV_PACING_POLARITY: NORMAL
MDC_IDC_SET_LEADCHNL_LV_PACING_PULSEWIDTH: 1 MS
MDC_IDC_SET_LEADCHNL_RA_PACING_AMPLITUDE: 1.5 V
MDC_IDC_SET_LEADCHNL_RA_PACING_ANODE_ELECTRODE_1: NORMAL
MDC_IDC_SET_LEADCHNL_RA_PACING_ANODE_LOCATION_1: NORMAL
MDC_IDC_SET_LEADCHNL_RA_PACING_CAPTURE_MODE: NORMAL
MDC_IDC_SET_LEADCHNL_RA_PACING_CATHODE_ELECTRODE_1: NORMAL
MDC_IDC_SET_LEADCHNL_RA_PACING_CATHODE_LOCATION_1: NORMAL
MDC_IDC_SET_LEADCHNL_RA_PACING_POLARITY: NORMAL
MDC_IDC_SET_LEADCHNL_RA_PACING_PULSEWIDTH: 0.4 MS
MDC_IDC_SET_LEADCHNL_RA_SENSING_ANODE_ELECTRODE_1: NORMAL
MDC_IDC_SET_LEADCHNL_RA_SENSING_ANODE_LOCATION_1: NORMAL
MDC_IDC_SET_LEADCHNL_RA_SENSING_CATHODE_ELECTRODE_1: NORMAL
MDC_IDC_SET_LEADCHNL_RA_SENSING_CATHODE_LOCATION_1: NORMAL
MDC_IDC_SET_LEADCHNL_RA_SENSING_POLARITY: NORMAL
MDC_IDC_SET_LEADCHNL_RA_SENSING_SENSITIVITY: 0.3 MV
MDC_IDC_SET_LEADCHNL_RV_PACING_AMPLITUDE: 2 V
MDC_IDC_SET_LEADCHNL_RV_PACING_ANODE_ELECTRODE_1: NORMAL
MDC_IDC_SET_LEADCHNL_RV_PACING_ANODE_LOCATION_1: NORMAL
MDC_IDC_SET_LEADCHNL_RV_PACING_CAPTURE_MODE: NORMAL
MDC_IDC_SET_LEADCHNL_RV_PACING_CATHODE_ELECTRODE_1: NORMAL
MDC_IDC_SET_LEADCHNL_RV_PACING_CATHODE_LOCATION_1: NORMAL
MDC_IDC_SET_LEADCHNL_RV_PACING_POLARITY: NORMAL
MDC_IDC_SET_LEADCHNL_RV_PACING_PULSEWIDTH: 0.4 MS
MDC_IDC_SET_LEADCHNL_RV_SENSING_ANODE_ELECTRODE_1: NORMAL
MDC_IDC_SET_LEADCHNL_RV_SENSING_ANODE_LOCATION_1: NORMAL
MDC_IDC_SET_LEADCHNL_RV_SENSING_CATHODE_ELECTRODE_1: NORMAL
MDC_IDC_SET_LEADCHNL_RV_SENSING_CATHODE_LOCATION_1: NORMAL
MDC_IDC_SET_LEADCHNL_RV_SENSING_POLARITY: NORMAL
MDC_IDC_SET_LEADCHNL_RV_SENSING_SENSITIVITY: 0.3 MV
MDC_IDC_SET_ZONE_DETECTION_BEATS_DENOMINATOR: 16 {BEATS}
MDC_IDC_SET_ZONE_DETECTION_BEATS_DENOMINATOR: 32 {BEATS}
MDC_IDC_SET_ZONE_DETECTION_BEATS_DENOMINATOR: 40 {BEATS}
MDC_IDC_SET_ZONE_DETECTION_BEATS_NUMERATOR: 16 {BEATS}
MDC_IDC_SET_ZONE_DETECTION_BEATS_NUMERATOR: 30 {BEATS}
MDC_IDC_SET_ZONE_DETECTION_BEATS_NUMERATOR: 32 {BEATS}
MDC_IDC_SET_ZONE_DETECTION_INTERVAL: 320 MS
MDC_IDC_SET_ZONE_DETECTION_INTERVAL: 350 MS
MDC_IDC_SET_ZONE_DETECTION_INTERVAL: 360 MS
MDC_IDC_SET_ZONE_DETECTION_INTERVAL: 420 MS
MDC_IDC_SET_ZONE_STATUS: NORMAL
MDC_IDC_SET_ZONE_TYPE: NORMAL
MDC_IDC_SET_ZONE_VENDOR_TYPE: NORMAL
MDC_IDC_STAT_AT_BURDEN_PERCENT: 0.02 %
MDC_IDC_STAT_AT_DTM_END: NORMAL
MDC_IDC_STAT_AT_DTM_START: NORMAL
MDC_IDC_STAT_BRADY_AP_VP_PERCENT: 25.08 %
MDC_IDC_STAT_BRADY_AP_VS_PERCENT: 0.34 %
MDC_IDC_STAT_BRADY_AS_VP_PERCENT: 69.62 %
MDC_IDC_STAT_BRADY_AS_VS_PERCENT: 4.97 %
MDC_IDC_STAT_BRADY_DTM_END: NORMAL
MDC_IDC_STAT_BRADY_DTM_START: NORMAL
MDC_IDC_STAT_BRADY_RA_PERCENT_PACED: 27.83 %
MDC_IDC_STAT_BRADY_RV_PERCENT_PACED: 94.69 %
MDC_IDC_STAT_CRT_DTM_END: NORMAL
MDC_IDC_STAT_CRT_DTM_START: NORMAL
MDC_IDC_STAT_CRT_LV_PERCENT_PACED: 94.66 %
MDC_IDC_STAT_CRT_PERCENT_PACED: 94.66 %
MDC_IDC_STAT_EPISODE_RECENT_COUNT: 0
MDC_IDC_STAT_EPISODE_RECENT_COUNT: 2
MDC_IDC_STAT_EPISODE_RECENT_COUNT: 2
MDC_IDC_STAT_EPISODE_RECENT_COUNT_DTM_END: NORMAL
MDC_IDC_STAT_EPISODE_RECENT_COUNT_DTM_START: NORMAL
MDC_IDC_STAT_EPISODE_TOTAL_COUNT: 0
MDC_IDC_STAT_EPISODE_TOTAL_COUNT: 2
MDC_IDC_STAT_EPISODE_TOTAL_COUNT: 3
MDC_IDC_STAT_EPISODE_TOTAL_COUNT_DTM_END: NORMAL
MDC_IDC_STAT_EPISODE_TOTAL_COUNT_DTM_START: NORMAL
MDC_IDC_STAT_EPISODE_TYPE: NORMAL
MDC_IDC_STAT_TACHYTHERAPY_ATP_DELIVERED_RECENT: 0
MDC_IDC_STAT_TACHYTHERAPY_ATP_DELIVERED_TOTAL: 0
MDC_IDC_STAT_TACHYTHERAPY_RECENT_DTM_END: NORMAL
MDC_IDC_STAT_TACHYTHERAPY_RECENT_DTM_START: NORMAL
MDC_IDC_STAT_TACHYTHERAPY_SHOCKS_ABORTED_RECENT: 0
MDC_IDC_STAT_TACHYTHERAPY_SHOCKS_ABORTED_TOTAL: 0
MDC_IDC_STAT_TACHYTHERAPY_SHOCKS_DELIVERED_RECENT: 0
MDC_IDC_STAT_TACHYTHERAPY_SHOCKS_DELIVERED_TOTAL: 0
MDC_IDC_STAT_TACHYTHERAPY_TOTAL_DTM_END: NORMAL
MDC_IDC_STAT_TACHYTHERAPY_TOTAL_DTM_START: NORMAL

## 2024-05-17 ENCOUNTER — ANTICOAGULATION THERAPY VISIT (OUTPATIENT)
Dept: ANTICOAGULATION | Facility: CLINIC | Age: 79
End: 2024-05-17

## 2024-05-17 ENCOUNTER — TELEPHONE (OUTPATIENT)
Dept: ANTICOAGULATION | Facility: CLINIC | Age: 79
End: 2024-05-17

## 2024-05-17 ENCOUNTER — LAB (OUTPATIENT)
Dept: LAB | Facility: CLINIC | Age: 79
End: 2024-05-17
Payer: MEDICARE

## 2024-05-17 DIAGNOSIS — B02.8 HERPES ZOSTER WITH OTHER COMPLICATION: ICD-10-CM

## 2024-05-17 DIAGNOSIS — Z95.2 S/P AORTIC VALVE REPLACEMENT: Primary | ICD-10-CM

## 2024-05-17 DIAGNOSIS — Z79.01 ANTICOAGULATED: ICD-10-CM

## 2024-05-17 DIAGNOSIS — I48.0 PAROXYSMAL ATRIAL FIBRILLATION (H): ICD-10-CM

## 2024-05-17 DIAGNOSIS — Z79.01 LONG TERM CURRENT USE OF ANTICOAGULANT THERAPY: ICD-10-CM

## 2024-05-17 DIAGNOSIS — Z79.01 ANTICOAGULATED ON COUMADIN: ICD-10-CM

## 2024-05-17 DIAGNOSIS — Z95.2 S/P AORTIC VALVE REPLACEMENT: ICD-10-CM

## 2024-05-17 DIAGNOSIS — M79.2 NERVE PAIN: ICD-10-CM

## 2024-05-17 LAB — INR BLD: 2 (ref 0.9–1.1)

## 2024-05-17 PROCEDURE — 85610 PROTHROMBIN TIME: CPT

## 2024-05-17 PROCEDURE — 36415 COLL VENOUS BLD VENIPUNCTURE: CPT

## 2024-05-17 RX ORDER — GABAPENTIN 100 MG/1
CAPSULE ORAL
Qty: 30 CAPSULE | Refills: 1 | Status: SHIPPED | OUTPATIENT
Start: 2024-05-17 | End: 2024-06-21

## 2024-05-17 NOTE — PROGRESS NOTES
ANTICOAGULATION MANAGEMENT     Brooks Summers 79 year old male is on warfarin with therapeutic INR result. (Goal INR 2.0-3.0)    Recent labs: (last 7 days)     05/17/24  1304   INR 2.0*       ASSESSMENT     Source(s): Chart Review and Patient/Caregiver Call     Warfarin doses taken: Warfarin taken as instructed  Diet: No new diet changes identified  Medication/supplement changes: None noted  New illness, injury, or hospitalization: No  Signs or symptoms of bleeding or clotting: No  Previous result: Therapeutic last visit; previously outside of goal range  Additional findings: None       PLAN     Recommended plan for no diet, medication or health factor changes affecting INR     Dosing Instructions: Continue your current warfarin dose with next INR in 3 weeks       Summary  As of 5/17/2024      Full warfarin instructions:  7.5 mg every Tue, Thu, Sat; 5 mg all other days   Next INR check:  6/7/2024               Telephone call with Brooks who verbalizes understanding and agrees to plan    Lab visit scheduled    Education provided:   Please call back if any changes to your diet, medications or how you've been taking warfarin    Plan made per [facility] anticoagulation protocol    Ashley Norris RN  Anticoagulation Clinic  5/17/2024    _______________________________________________________________________     Anticoagulation Episode Summary       Current INR goal:  2.0-3.0   TTR:  66.9% (1 y)   Target end date:  Indefinite   Send INR reminders to:  JONATHAN MURILLO    Indications    S/P aortic valve replacement [Z95.2]  Paroxysmal atrial fibrillation (H) [I48.0]  Long term current use of anticoagulant therapy [Z79.01]  Anticoagulated on Coumadin [Z79.01]  Anticoagulated [Z79.01]             Comments:  Aortic 21 mm Johnosn Perimount Magna Tissue Valve.             Anticoagulation Care Providers       Provider Role Specialty Phone number    Edin Mays MD Referring Internal Medicine [phone]

## 2024-05-17 NOTE — TELEPHONE ENCOUNTER
ANTICOAGULATION CLINIC REFERRAL RENEWAL REQUEST       An annual renewal order is required for all patients referred to [facility].?  Please review and sign the pended referral order for Brooks Summers.       ANTICOAGULATION SUMMARY      Warfarin indication(s)   Aortic valve    Mechanical heart valve present?  yes       Current goal range   INR: 2.0-3.0     Goal appropriate for indication? Goal INR 2-3, standard for indication(s) above     Time in Therapeutic Range (TTR)  (Goal > 60%) 66%       Office visit with referring provider's group within last year yes on 11/9/2023       Ashley Norris, RN  [facility]

## 2024-05-20 DIAGNOSIS — B02.8 HERPES ZOSTER WITH OTHER COMPLICATION: ICD-10-CM

## 2024-05-21 RX ORDER — VALACYCLOVIR HYDROCHLORIDE 1 G/1
1000 TABLET, FILM COATED ORAL 3 TIMES DAILY
Qty: 21 TABLET | Refills: 0 | Status: SHIPPED | OUTPATIENT
Start: 2024-05-21 | End: 2024-06-03

## 2024-05-29 ENCOUNTER — OFFICE VISIT (OUTPATIENT)
Dept: FAMILY MEDICINE | Facility: CLINIC | Age: 79
End: 2024-05-29
Payer: MEDICARE

## 2024-05-29 VITALS
WEIGHT: 150.8 LBS | TEMPERATURE: 97.5 F | HEART RATE: 82 BPM | SYSTOLIC BLOOD PRESSURE: 120 MMHG | HEIGHT: 70 IN | BODY MASS INDEX: 21.59 KG/M2 | RESPIRATION RATE: 18 BRPM | DIASTOLIC BLOOD PRESSURE: 68 MMHG | OXYGEN SATURATION: 97 %

## 2024-05-29 DIAGNOSIS — T30.0 BURN: ICD-10-CM

## 2024-05-29 DIAGNOSIS — R56.9 SEIZURE (H): Primary | ICD-10-CM

## 2024-05-29 PROCEDURE — 99214 OFFICE O/P EST MOD 30 MIN: CPT | Performed by: PHYSICIAN ASSISTANT

## 2024-05-29 PROCEDURE — G2211 COMPLEX E/M VISIT ADD ON: HCPCS | Performed by: PHYSICIAN ASSISTANT

## 2024-05-29 RX ORDER — LEVETIRACETAM 500 MG/1
500 TABLET ORAL 2 TIMES DAILY
COMMUNITY
Start: 2024-05-11 | End: 2024-06-14

## 2024-05-29 RX ORDER — RESPIRATORY SYNCYTIAL VIRUS VACCINE 120MCG/0.5
0.5 KIT INTRAMUSCULAR ONCE
Qty: 1 EACH | Refills: 0 | Status: CANCELLED | OUTPATIENT
Start: 2024-05-29 | End: 2024-05-29

## 2024-05-29 ASSESSMENT — PAIN SCALES - GENERAL: PAINLEVEL: NO PAIN (0)

## 2024-05-29 NOTE — PATIENT INSTRUCTIONS
Tammie Guy,    Thank you for allowing [facility] to manage your care.    Continue to not drive until cleared by neurology.    Change bandages to your hand and wash with soap once daily. You may let it breath over night.    If you develop worsening/changing symptoms at any time, please be seen in clinic/urgent care or call 911/go to the emergency department for evaluation.    I made a referral to neurology. They will be calling in approximately 1 week to set up your appointment.  If you do not hear from them, please call the specialty number on your after visit summary.  [facility] and [facility] are also options if [place] neurology cannot see you in a timely fashion.    If you have any questions or concerns, please feel free to call us at [phone]    Sincerely,    Jonathan Potter PA-C    Did you know?      You can schedule a video visit for follow-up appointments as well as future appointments for certain conditions.  Please see the below link.     [email or web address]    If you have not already done so,  I encourage you to sign up for [email or web address] ([email or web address]).  This will allow you to review your results, securely communicate with a provider, and schedule virtual visits as well.
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
## 2024-06-03 ENCOUNTER — MYC REFILL (OUTPATIENT)
Dept: FAMILY MEDICINE | Facility: CLINIC | Age: 79
End: 2024-06-03
Payer: MEDICARE

## 2024-06-03 DIAGNOSIS — B02.8 HERPES ZOSTER WITH OTHER COMPLICATION: ICD-10-CM

## 2024-06-04 ENCOUNTER — PATIENT OUTREACH (OUTPATIENT)
Dept: FAMILY MEDICINE | Facility: CLINIC | Age: 79
End: 2024-06-04
Payer: MEDICARE

## 2024-06-04 RX ORDER — VALACYCLOVIR HYDROCHLORIDE 1 G/1
1000 TABLET, FILM COATED ORAL 3 TIMES DAILY
Qty: 21 TABLET | Refills: 3 | Status: SHIPPED | OUTPATIENT
Start: 2024-06-04

## 2024-06-04 NOTE — TELEPHONE ENCOUNTER
Patient Quality Outreach    Patient is due for the following:   Diabetes -  Microalbumin  Depression  -  PHQ-9 needed  Physical Annual Wellness Visit      Topic Date Due    Zoster (Shingles) Vaccine (1 of 2) Never done    COVID-19 Vaccine (5 - 2023-24 season) 09/01/2023       Next Steps:   Schedule a Annual Wellness Visit    Type of outreach:    Sent [employer] message.      Questions for provider review:    None         [email or web address], MEDICAL ASSISTANT   Closing Encounter

## 2024-06-04 NOTE — TELEPHONE ENCOUNTER
[employer] message sent to patient.     Thanks,  CALLUM Hermosillo  [facility]

## 2024-06-14 ENCOUNTER — LAB (OUTPATIENT)
Dept: LAB | Facility: CLINIC | Age: 79
End: 2024-06-14
Payer: MEDICARE

## 2024-06-14 ENCOUNTER — DOCUMENTATION ONLY (OUTPATIENT)
Dept: ANTICOAGULATION | Facility: CLINIC | Age: 79
End: 2024-06-14

## 2024-06-14 ENCOUNTER — OFFICE VISIT (OUTPATIENT)
Dept: FAMILY MEDICINE | Facility: CLINIC | Age: 79
End: 2024-06-14
Payer: MEDICARE

## 2024-06-14 ENCOUNTER — ANTICOAGULATION THERAPY VISIT (OUTPATIENT)
Dept: ANTICOAGULATION | Facility: CLINIC | Age: 79
End: 2024-06-14

## 2024-06-14 VITALS
DIASTOLIC BLOOD PRESSURE: 72 MMHG | BODY MASS INDEX: 22.84 KG/M2 | SYSTOLIC BLOOD PRESSURE: 118 MMHG | TEMPERATURE: 97.9 F | OXYGEN SATURATION: 96 % | RESPIRATION RATE: 18 BRPM | HEART RATE: 79 BPM | HEIGHT: 69 IN | WEIGHT: 154.2 LBS

## 2024-06-14 DIAGNOSIS — I48.0 PAROXYSMAL ATRIAL FIBRILLATION (H): ICD-10-CM

## 2024-06-14 DIAGNOSIS — Z95.2 S/P AORTIC VALVE REPLACEMENT: Primary | ICD-10-CM

## 2024-06-14 DIAGNOSIS — Z79.01 ANTICOAGULATED: ICD-10-CM

## 2024-06-14 DIAGNOSIS — Z79.01 ANTICOAGULATED ON COUMADIN: ICD-10-CM

## 2024-06-14 DIAGNOSIS — R56.9 SEIZURE (H): Primary | ICD-10-CM

## 2024-06-14 DIAGNOSIS — Z95.2 S/P AORTIC VALVE REPLACEMENT: ICD-10-CM

## 2024-06-14 DIAGNOSIS — T23.161D SUPERFICIAL BURN OF BACK OF RIGHT HAND, SUBSEQUENT ENCOUNTER: ICD-10-CM

## 2024-06-14 DIAGNOSIS — Z79.01 LONG TERM CURRENT USE OF ANTICOAGULANT THERAPY: ICD-10-CM

## 2024-06-14 LAB — INR BLD: 2.8 (ref 0.9–1.1)

## 2024-06-14 PROCEDURE — 99214 OFFICE O/P EST MOD 30 MIN: CPT | Performed by: PHYSICIAN ASSISTANT

## 2024-06-14 PROCEDURE — 36416 COLLJ CAPILLARY BLOOD SPEC: CPT

## 2024-06-14 PROCEDURE — 85610 PROTHROMBIN TIME: CPT

## 2024-06-14 RX ORDER — LEVETIRACETAM 500 MG/1
500 TABLET ORAL 2 TIMES DAILY
Qty: 90 TABLET | Refills: 0 | Status: SHIPPED | OUTPATIENT
Start: 2024-06-14

## 2024-06-14 RX ORDER — RESPIRATORY SYNCYTIAL VIRUS VACCINE 120MCG/0.5
0.5 KIT INTRAMUSCULAR ONCE
Qty: 1 EACH | Refills: 0 | Status: CANCELLED | OUTPATIENT
Start: 2024-06-14 | End: 2024-06-14

## 2024-06-14 ASSESSMENT — PAIN SCALES - GENERAL: PAINLEVEL: NO PAIN (0)

## 2024-06-14 NOTE — PROGRESS NOTES
ANTICOAGULATION MANAGEMENT     Brooks Summers 79 year old male is on warfarin with therapeutic INR result. (Goal INR 2.0-3.0)    Recent labs: (last 7 days)     06/14/24  1339   INR 2.8*       ASSESSMENT     Source(s): Chart Review  Previous INR was Therapeutic last 2(+) visits  Medication, diet, health changes since last INR chart reviewed; none identified         PLAN     Recommended plan for no diet, medication or health factor changes affecting INR     Dosing Instructions: Continue your current warfarin dose with next INR in 4 weeks       Summary  As of 6/14/2024      Full warfarin instructions:  7.5 mg every Tue, Thu, Sat; 5 mg all other days   Next INR check:  7/12/2024               Detailed voice message left for Brooks with dosing instructions and follow up date.     Contact [phone]  to schedule and with any changes, questions or concerns.     Education provided:   Please call back if any changes to your diet, medications or how you've been taking warfarin    Plan made per [facility] anticoagulation protocol    Shira Mirza RN  Anticoagulation Clinic  6/14/2024    _______________________________________________________________________     Anticoagulation Episode Summary       Current INR goal:  2.0-3.0   TTR:  67.1% (1 y)   Target end date:  Indefinite   Send INR reminders to:  JONATHAN MURILLO    Indications    S/P aortic valve replacement [Z95.2]  Paroxysmal atrial fibrillation (H) [I48.0]  Long term current use of anticoagulant therapy [Z79.01]  Anticoagulated on Coumadin [Z79.01]  Anticoagulated [Z79.01]             Comments:  Aortic 21 mm Johnson Perimount Magna Tissue Valve.             Anticoagulation Care Providers       Provider Role Specialty Phone number    Edin Mays MD Referring Internal Medicine [phone]

## 2024-06-14 NOTE — PROGRESS NOTES
ANTICOAGULATION CLINIC REFERRAL RENEWAL REQUEST       An annual renewal order is required for all patients referred to [facility].?  Please review and sign the pended referral order for Brooks Summers.       ANTICOAGULATION SUMMARY      Warfarin indication(s)   Atrial Fibrillation and Mechanical AVR    Mechanical heart valve present?  Mechanical  AVR-Bileaflet       Current goal range   INR: 2.0-3.0     Goal appropriate for indication? Goal INR 2-3, standard for indication(s) above     Time in Therapeutic Range (TTR)  (Goal > 60%) 67.1%       Office visit with referring provider's group within last year yes on 6/14/24       Shira Mirza RN  [facility]

## 2024-06-14 NOTE — PROGRESS NOTES
Assessment & Plan   Problem List Items Addressed This Visit          Nervous and Auditory    Seizure (H) - Primary    Relevant Medications    levETIRAcetam (KEPPRA) 500 MG tablet     Other Visit Diagnoses       Superficial burn of back of right hand, subsequent encounter               Right hand burn is healing well without signs of infection or contracture. Refilled Keppra. Tolerating well. Post herpetic neuralgia is resolving. Discussed referral to neurology at length and other non-[facility] clinics as an option for sooner follow up to establish care and discuss return to driving. Primary care referral also placed to establish care.    Complete history and physical exam as below. Afebrile with normal vital signs. All questions were answered at this time. Pt expressed understanding of and agreement with this dx, tx, and plan. No further workup warranted and standard medication warnings given. I have given the patient a list of pertinent indications for re-evaluation. Patient left with his daughter in no apparent distress.     Prescription drug management  31 minutes spent by me on the date of the encounter doing chart review, history and exam, documentation and further activities per the note     See Patient Instructions      Paige Guy is a 79 year old, presenting for the following health issues:  RECHECK and Shingles        6/14/2024     2:21 PM   Additional Questions   Roomed by Radha Anderson CMA   Accompanied by Daughter in law         6/14/2024     2:21 PM   Patient Reported Additional Medications   Patient reports taking the following new medications No new medications     HPI     Patient is coming in today for a recheck of the burn and a possible new referral to a different neurologist (due to extremely long hold times and being told they were closed when they did get through).  He feels the burn is getting much better, no signs of pus or inflammation.     He is still having shooting electrical pains  "to the fingers on the left arm and stiffness (from shingles).  Can get numb and tingling.    He would like to drive again (another reason for neurologist)      Review of Systems  Constitutional, neuro, derm, cardiovascular, pulmonary, gi and gu systems are negative, except as otherwise noted.      Objective    /72   Pulse 79   Temp 97.9  F (36.6  C) (Temporal)   Resp 18   Ht 1.763 m (5' 9.41\")   Wt 69.9 kg (154 lb 3.2 oz)   SpO2 96%   BMI 22.50 kg/m    Body mass index is 22.5 kg/m .  Physical Exam  Vitals and nursing note reviewed.   Constitutional:       General: He is not in acute distress.     Appearance: Normal appearance. He is not diaphoretic.   HENT:      Head: Normocephalic and atraumatic.      Nose: Nose normal.   Eyes:      Conjunctiva/sclera: Conjunctivae normal.   Pulmonary:      Effort: Pulmonary effort is normal. No respiratory distress.   Skin:     General: Skin is dry.      Coloration: Skin is not jaundiced or pale.      Comments: RUE: healing 1st and 2nd degree burns to the dorsum of the hand with no other overlying signs of trauma or infection. Not circumferential.   Neurological:      General: No focal deficit present.      Mental Status: He is alert. Mental status is at baseline.   Psychiatric:         Mood and Affect: Mood normal.         Behavior: Behavior normal.            Signed Electronically by: EDWARD To    "

## 2024-06-16 NOTE — PATIENT INSTRUCTIONS
Tammie Guy,     Thank you for allowing [facility] to manage your care.     Continue to not drive until cleared by neurology.     I sent refills to your pharmacy.    Change bandages to your hand and wash with soap once daily. You may let it breath over night.     If you develop worsening/changing symptoms at any time, please be seen in clinic/urgent care or call 911/go to the emergency department for evaluation.     I made a referral to neurology. They will be calling in approximately 1 week to set up your appointment.  If you do not hear from them, please call the specialty number on your after visit summary.  [facility] and [facility] are also options if [place] neurology cannot see you in a timely fashion.     If you have any questions or concerns, please feel free to call us at [phone]     Sincerely,     Jonathan Potter PA-C     Did you know?       You can schedule a video visit for follow-up appointments as well as future appointments for certain conditions.  Please see the below link.      [email or web address]     If you have not already done so,  I encourage you to sign up for Mychart ([email or web address]).  This will allow you to review your results, securely communicate with a provider, and schedule virtual visits as well.

## 2024-06-17 DIAGNOSIS — B02.8 HERPES ZOSTER WITH OTHER COMPLICATION: ICD-10-CM

## 2024-06-17 DIAGNOSIS — B02.29 POST HERPETIC NEURALGIA: ICD-10-CM

## 2024-06-18 RX ORDER — CAPSAICIN 0.025 %
1 CREAM (GRAM) TOPICAL 3 TIMES DAILY PRN
Qty: 60 G | Refills: 0 | Status: SHIPPED | OUTPATIENT
Start: 2024-06-18

## 2024-06-21 DIAGNOSIS — B02.8 HERPES ZOSTER WITH OTHER COMPLICATION: ICD-10-CM

## 2024-06-21 DIAGNOSIS — M79.2 NERVE PAIN: ICD-10-CM

## 2024-06-21 RX ORDER — GABAPENTIN 100 MG/1
CAPSULE ORAL
Qty: 30 CAPSULE | Refills: 1 | Status: SHIPPED | OUTPATIENT
Start: 2024-06-21

## 2024-06-25 ENCOUNTER — ANCILLARY PROCEDURE (OUTPATIENT)
Dept: CARDIOLOGY | Facility: CLINIC | Age: 79
End: 2024-06-25
Attending: INTERNAL MEDICINE
Payer: MEDICARE

## 2024-06-25 DIAGNOSIS — I42.9 CARDIOMYOPATHY (H): ICD-10-CM

## 2024-06-25 PROCEDURE — 93295 DEV INTERROG REMOTE 1/2/MLT: CPT | Performed by: INTERNAL MEDICINE

## 2024-06-25 PROCEDURE — 93296 REM INTERROG EVL PM/IDS: CPT

## 2024-06-29 ENCOUNTER — HEALTH MAINTENANCE LETTER (OUTPATIENT)
Age: 79
End: 2024-06-29

## 2024-07-05 LAB
MDC_IDC_EPISODE_DTM: NORMAL
MDC_IDC_EPISODE_DURATION: 1 S
MDC_IDC_EPISODE_ID: 6
MDC_IDC_EPISODE_TYPE: NORMAL
MDC_IDC_LEAD_CONNECTION_STATUS: NORMAL
MDC_IDC_LEAD_IMPLANT_DT: NORMAL
MDC_IDC_LEAD_LOCATION: NORMAL
MDC_IDC_LEAD_LOCATION_DETAIL_1: NORMAL
MDC_IDC_LEAD_MFG: NORMAL
MDC_IDC_LEAD_MODEL: NORMAL
MDC_IDC_LEAD_POLARITY_TYPE: NORMAL
MDC_IDC_LEAD_SERIAL: NORMAL
MDC_IDC_LEAD_SPECIAL_FUNCTION: NORMAL
MDC_IDC_MSMT_BATTERY_DTM: NORMAL
MDC_IDC_MSMT_BATTERY_REMAINING_LONGEVITY: 9 MO
MDC_IDC_MSMT_BATTERY_RRT_TRIGGER: NORMAL
MDC_IDC_MSMT_BATTERY_STATUS: NORMAL
MDC_IDC_MSMT_BATTERY_VOLTAGE: 2.88 V
MDC_IDC_MSMT_CAP_CHARGE_DTM: NORMAL
MDC_IDC_MSMT_CAP_CHARGE_ENERGY: 18 J
MDC_IDC_MSMT_CAP_CHARGE_TIME: 4.4 S
MDC_IDC_MSMT_CAP_CHARGE_TYPE: NORMAL
MDC_IDC_MSMT_LEADCHNL_LV_IMPEDANCE_VALUE: 304 OHM
MDC_IDC_MSMT_LEADCHNL_LV_IMPEDANCE_VALUE: 361 OHM
MDC_IDC_MSMT_LEADCHNL_LV_IMPEDANCE_VALUE: 608 OHM
MDC_IDC_MSMT_LEADCHNL_LV_PACING_THRESHOLD_AMPLITUDE: 1.88 V
MDC_IDC_MSMT_LEADCHNL_LV_PACING_THRESHOLD_PULSEWIDTH: 1 MS
MDC_IDC_MSMT_LEADCHNL_RA_IMPEDANCE_VALUE: 437 OHM
MDC_IDC_MSMT_LEADCHNL_RA_PACING_THRESHOLD_AMPLITUDE: 0.62 V
MDC_IDC_MSMT_LEADCHNL_RA_PACING_THRESHOLD_PULSEWIDTH: 0.4 MS
MDC_IDC_MSMT_LEADCHNL_RA_SENSING_INTR_AMPL: 1.3 MV
MDC_IDC_MSMT_LEADCHNL_RV_IMPEDANCE_VALUE: 361 OHM
MDC_IDC_MSMT_LEADCHNL_RV_IMPEDANCE_VALUE: 437 OHM
MDC_IDC_MSMT_LEADCHNL_RV_PACING_THRESHOLD_AMPLITUDE: 0.5 V
MDC_IDC_MSMT_LEADCHNL_RV_PACING_THRESHOLD_PULSEWIDTH: 0.4 MS
MDC_IDC_MSMT_LEADCHNL_RV_SENSING_INTR_AMPL: 1.4 MV
MDC_IDC_PG_IMPLANT_DTM: NORMAL
MDC_IDC_PG_MFG: NORMAL
MDC_IDC_PG_MODEL: NORMAL
MDC_IDC_PG_SERIAL: NORMAL
MDC_IDC_PG_TYPE: NORMAL
MDC_IDC_SESS_CLINIC_NAME: NORMAL
MDC_IDC_SESS_DTM: NORMAL
MDC_IDC_SESS_TYPE: NORMAL
MDC_IDC_SET_BRADY_AT_MODE_SWITCH_RATE: 171 {BEATS}/MIN
MDC_IDC_SET_BRADY_LOWRATE: 60 {BEATS}/MIN
MDC_IDC_SET_BRADY_MAX_SENSOR_RATE: 120 {BEATS}/MIN
MDC_IDC_SET_BRADY_MAX_TRACKING_RATE: 130 {BEATS}/MIN
MDC_IDC_SET_BRADY_MODE: NORMAL
MDC_IDC_SET_BRADY_NIGHT_RATE: 50 {BEATS}/MIN
MDC_IDC_SET_BRADY_PAV_DELAY_LOW: 170 MS
MDC_IDC_SET_BRADY_SAV_DELAY_LOW: 100 MS
MDC_IDC_SET_CRT_LVRV_DELAY: 0 MS
MDC_IDC_SET_CRT_PACED_CHAMBERS: NORMAL
MDC_IDC_SET_LEADCHNL_LV_PACING_AMPLITUDE: 4 V
MDC_IDC_SET_LEADCHNL_LV_PACING_ANODE_ELECTRODE_1: NORMAL
MDC_IDC_SET_LEADCHNL_LV_PACING_ANODE_LOCATION_1: NORMAL
MDC_IDC_SET_LEADCHNL_LV_PACING_CAPTURE_MODE: NORMAL
MDC_IDC_SET_LEADCHNL_LV_PACING_CATHODE_ELECTRODE_1: NORMAL
MDC_IDC_SET_LEADCHNL_LV_PACING_CATHODE_LOCATION_1: NORMAL
MDC_IDC_SET_LEADCHNL_LV_PACING_POLARITY: NORMAL
MDC_IDC_SET_LEADCHNL_LV_PACING_PULSEWIDTH: 1 MS
MDC_IDC_SET_LEADCHNL_RA_PACING_AMPLITUDE: 1.5 V
MDC_IDC_SET_LEADCHNL_RA_PACING_ANODE_ELECTRODE_1: NORMAL
MDC_IDC_SET_LEADCHNL_RA_PACING_ANODE_LOCATION_1: NORMAL
MDC_IDC_SET_LEADCHNL_RA_PACING_CAPTURE_MODE: NORMAL
MDC_IDC_SET_LEADCHNL_RA_PACING_CATHODE_ELECTRODE_1: NORMAL
MDC_IDC_SET_LEADCHNL_RA_PACING_CATHODE_LOCATION_1: NORMAL
MDC_IDC_SET_LEADCHNL_RA_PACING_POLARITY: NORMAL
MDC_IDC_SET_LEADCHNL_RA_PACING_PULSEWIDTH: 0.4 MS
MDC_IDC_SET_LEADCHNL_RA_SENSING_ANODE_ELECTRODE_1: NORMAL
MDC_IDC_SET_LEADCHNL_RA_SENSING_ANODE_LOCATION_1: NORMAL
MDC_IDC_SET_LEADCHNL_RA_SENSING_CATHODE_ELECTRODE_1: NORMAL
MDC_IDC_SET_LEADCHNL_RA_SENSING_CATHODE_LOCATION_1: NORMAL
MDC_IDC_SET_LEADCHNL_RA_SENSING_POLARITY: NORMAL
MDC_IDC_SET_LEADCHNL_RA_SENSING_SENSITIVITY: 0.3 MV
MDC_IDC_SET_LEADCHNL_RV_PACING_AMPLITUDE: 2 V
MDC_IDC_SET_LEADCHNL_RV_PACING_ANODE_ELECTRODE_1: NORMAL
MDC_IDC_SET_LEADCHNL_RV_PACING_ANODE_LOCATION_1: NORMAL
MDC_IDC_SET_LEADCHNL_RV_PACING_CAPTURE_MODE: NORMAL
MDC_IDC_SET_LEADCHNL_RV_PACING_CATHODE_ELECTRODE_1: NORMAL
MDC_IDC_SET_LEADCHNL_RV_PACING_CATHODE_LOCATION_1: NORMAL
MDC_IDC_SET_LEADCHNL_RV_PACING_POLARITY: NORMAL
MDC_IDC_SET_LEADCHNL_RV_PACING_PULSEWIDTH: 0.4 MS
MDC_IDC_SET_LEADCHNL_RV_SENSING_ANODE_ELECTRODE_1: NORMAL
MDC_IDC_SET_LEADCHNL_RV_SENSING_ANODE_LOCATION_1: NORMAL
MDC_IDC_SET_LEADCHNL_RV_SENSING_CATHODE_ELECTRODE_1: NORMAL
MDC_IDC_SET_LEADCHNL_RV_SENSING_CATHODE_LOCATION_1: NORMAL
MDC_IDC_SET_LEADCHNL_RV_SENSING_POLARITY: NORMAL
MDC_IDC_SET_LEADCHNL_RV_SENSING_SENSITIVITY: 0.3 MV
MDC_IDC_SET_ZONE_DETECTION_BEATS_DENOMINATOR: 16 {BEATS}
MDC_IDC_SET_ZONE_DETECTION_BEATS_DENOMINATOR: 32 {BEATS}
MDC_IDC_SET_ZONE_DETECTION_BEATS_DENOMINATOR: 40 {BEATS}
MDC_IDC_SET_ZONE_DETECTION_BEATS_NUMERATOR: 16 {BEATS}
MDC_IDC_SET_ZONE_DETECTION_BEATS_NUMERATOR: 30 {BEATS}
MDC_IDC_SET_ZONE_DETECTION_BEATS_NUMERATOR: 32 {BEATS}
MDC_IDC_SET_ZONE_DETECTION_INTERVAL: 320 MS
MDC_IDC_SET_ZONE_DETECTION_INTERVAL: 350 MS
MDC_IDC_SET_ZONE_DETECTION_INTERVAL: 360 MS
MDC_IDC_SET_ZONE_DETECTION_INTERVAL: 420 MS
MDC_IDC_SET_ZONE_STATUS: NORMAL
MDC_IDC_SET_ZONE_TYPE: NORMAL
MDC_IDC_SET_ZONE_VENDOR_TYPE: NORMAL
MDC_IDC_STAT_AT_BURDEN_PERCENT: 0.03 %
MDC_IDC_STAT_AT_DTM_END: NORMAL
MDC_IDC_STAT_AT_DTM_START: NORMAL
MDC_IDC_STAT_BRADY_AP_VP_PERCENT: 15.96 %
MDC_IDC_STAT_BRADY_AP_VS_PERCENT: 0.58 %
MDC_IDC_STAT_BRADY_AS_VP_PERCENT: 78.15 %
MDC_IDC_STAT_BRADY_AS_VS_PERCENT: 5.31 %
MDC_IDC_STAT_BRADY_DTM_END: NORMAL
MDC_IDC_STAT_BRADY_DTM_START: NORMAL
MDC_IDC_STAT_BRADY_RA_PERCENT_PACED: 17.84 %
MDC_IDC_STAT_BRADY_RV_PERCENT_PACED: 94.11 %
MDC_IDC_STAT_CRT_DTM_END: NORMAL
MDC_IDC_STAT_CRT_DTM_START: NORMAL
MDC_IDC_STAT_CRT_LV_PERCENT_PACED: 94.09 %
MDC_IDC_STAT_CRT_PERCENT_PACED: 94.09 %
MDC_IDC_STAT_EPISODE_RECENT_COUNT: 0
MDC_IDC_STAT_EPISODE_RECENT_COUNT: 1
MDC_IDC_STAT_EPISODE_RECENT_COUNT_DTM_END: NORMAL
MDC_IDC_STAT_EPISODE_RECENT_COUNT_DTM_START: NORMAL
MDC_IDC_STAT_EPISODE_TOTAL_COUNT: 0
MDC_IDC_STAT_EPISODE_TOTAL_COUNT: 2
MDC_IDC_STAT_EPISODE_TOTAL_COUNT: 4
MDC_IDC_STAT_EPISODE_TOTAL_COUNT_DTM_END: NORMAL
MDC_IDC_STAT_EPISODE_TOTAL_COUNT_DTM_START: NORMAL
MDC_IDC_STAT_EPISODE_TYPE: NORMAL
MDC_IDC_STAT_TACHYTHERAPY_ATP_DELIVERED_RECENT: 0
MDC_IDC_STAT_TACHYTHERAPY_ATP_DELIVERED_TOTAL: 0
MDC_IDC_STAT_TACHYTHERAPY_RECENT_DTM_END: NORMAL
MDC_IDC_STAT_TACHYTHERAPY_RECENT_DTM_START: NORMAL
MDC_IDC_STAT_TACHYTHERAPY_SHOCKS_ABORTED_RECENT: 0
MDC_IDC_STAT_TACHYTHERAPY_SHOCKS_ABORTED_TOTAL: 0
MDC_IDC_STAT_TACHYTHERAPY_SHOCKS_DELIVERED_RECENT: 0
MDC_IDC_STAT_TACHYTHERAPY_SHOCKS_DELIVERED_TOTAL: 0
MDC_IDC_STAT_TACHYTHERAPY_TOTAL_DTM_END: NORMAL
MDC_IDC_STAT_TACHYTHERAPY_TOTAL_DTM_START: NORMAL

## 2024-07-10 ENCOUNTER — OFFICE VISIT (OUTPATIENT)
Dept: FAMILY MEDICINE | Facility: CLINIC | Age: 79
End: 2024-07-10
Payer: MEDICARE

## 2024-07-10 VITALS
BODY MASS INDEX: 23.4 KG/M2 | TEMPERATURE: 97.6 F | SYSTOLIC BLOOD PRESSURE: 120 MMHG | DIASTOLIC BLOOD PRESSURE: 68 MMHG | RESPIRATION RATE: 16 BRPM | OXYGEN SATURATION: 97 % | WEIGHT: 158 LBS | HEART RATE: 89 BPM | HEIGHT: 69 IN

## 2024-07-10 DIAGNOSIS — Z85.828 HISTORY OF BASAL CELL CANCER: ICD-10-CM

## 2024-07-10 DIAGNOSIS — I71.21 ANEURYSM OF ASCENDING AORTA WITHOUT RUPTURE (H): ICD-10-CM

## 2024-07-10 DIAGNOSIS — B02.29 POST HERPETIC NEURALGIA: ICD-10-CM

## 2024-07-10 DIAGNOSIS — M1A.09X0 IDIOPATHIC CHRONIC GOUT OF MULTIPLE SITES WITHOUT TOPHUS: ICD-10-CM

## 2024-07-10 DIAGNOSIS — E78.5 HYPERLIPIDEMIA WITH TARGET LDL LESS THAN 100: ICD-10-CM

## 2024-07-10 DIAGNOSIS — I50.22 CHRONIC SYSTOLIC HEART FAILURE (H): ICD-10-CM

## 2024-07-10 DIAGNOSIS — F33.0 MAJOR DEPRESSIVE DISORDER, RECURRENT EPISODE, MILD (H): ICD-10-CM

## 2024-07-10 DIAGNOSIS — N18.2 CKD (CHRONIC KIDNEY DISEASE) STAGE 2, GFR 60-89 ML/MIN: Primary | ICD-10-CM

## 2024-07-10 DIAGNOSIS — I42.8 NONISCHEMIC CARDIOMYOPATHY (H): ICD-10-CM

## 2024-07-10 DIAGNOSIS — I48.0 PAROXYSMAL ATRIAL FIBRILLATION (H): ICD-10-CM

## 2024-07-10 DIAGNOSIS — R56.9 SEIZURE (H): ICD-10-CM

## 2024-07-10 PROBLEM — D69.6 THROMBOCYTOPENIA, UNSPECIFIED (H): Status: ACTIVE | Noted: 2024-07-10

## 2024-07-10 PROBLEM — R91.1 LUNG NODULE: Status: ACTIVE | Noted: 2024-07-10

## 2024-07-10 PROBLEM — R01.1 HEART MURMUR: Status: ACTIVE | Noted: 2024-07-10

## 2024-07-10 PROBLEM — Z82.49 FAMILY HISTORY OF EARLY CAD: Status: ACTIVE | Noted: 2024-07-10

## 2024-07-10 PROBLEM — I25.10 CORONARY ATHEROSCLEROSIS: Status: ACTIVE | Noted: 2024-07-10

## 2024-07-10 PROBLEM — Z91.199 NON COMPLIANCE WITH MEDICAL TREATMENT: Status: ACTIVE | Noted: 2024-05-10

## 2024-07-10 PROCEDURE — 99215 OFFICE O/P EST HI 40 MIN: CPT | Performed by: STUDENT IN AN ORGANIZED HEALTH CARE EDUCATION/TRAINING PROGRAM

## 2024-07-10 RX ORDER — RESPIRATORY SYNCYTIAL VIRUS VACCINE 120MCG/0.5
0.5 KIT INTRAMUSCULAR ONCE
Qty: 1 EACH | Refills: 0 | Status: CANCELLED | OUTPATIENT
Start: 2024-07-10 | End: 2024-07-10

## 2024-07-10 ASSESSMENT — PATIENT HEALTH QUESTIONNAIRE - PHQ9
10. IF YOU CHECKED OFF ANY PROBLEMS, HOW DIFFICULT HAVE THESE PROBLEMS MADE IT FOR YOU TO DO YOUR WORK, TAKE CARE OF THINGS AT HOME, OR GET ALONG WITH OTHER PEOPLE: NOT DIFFICULT AT ALL
SUM OF ALL RESPONSES TO PHQ QUESTIONS 1-9: 0
SUM OF ALL RESPONSES TO PHQ QUESTIONS 1-9: 0

## 2024-07-10 NOTE — PATIENT INSTRUCTIONS
Sebastein Guy,    Thank you for allowing [facility] to manage your care.    I ordered some blood work, please go to the laboratory to get your laboratory studies.    .     For your convenience, test results are released as soon as they are available  Please allow 1-2 business days for me to send you a comment about your results.  If not done so, I encourage you to login into [employer] ([email or web address] to review your results in real time.     If you have any questions or concerns, please feel free to call us at [phone].    Sincerely,    Dr. Servin    Did you know?      You can schedule a video visit for follow-up appointments as well as future appointments for certain conditions.  Please see the below link.     [email or web address]    If you have not already done so,  I encourage you to sign up for [employer] ([email or web address]).  This will allow you to review your results, securely communicate with a provider, and schedule virtual visits as well.

## 2024-07-10 NOTE — PROGRESS NOTES
Assessment & Plan     Major depressive disorder, recurrent episode, mild (H24)  Stable.  CKD (chronic kidney disease) stage 2, GFR 60-89 ml/min  Stable, avoid nephrotoxins  - Albumin Random Urine Quantitative with Creat Ratio; Future  - REVIEW OF HEALTH MAINTENANCE PROTOCOL ORDERS  - ALT; Future  - CBC with Platelets; Future    Aneurysm of ascending aorta without rupture (H24)  stable    Hyperlipidemia with target LDL less than 100  Stable on Zocor  - ALT; Future  - Lipid panel reflex to direct LDL Non-fasting; Future    Chronic heart failure, unspecified heart failure type (H)  Prior to the first surgery patient had a severe LV dysfunction requiring AVR as well as BiV pacing and ICD placement. Heart  is now stable ,patient appears euvolemic. Echo from 2023 is unremarkable. Valve function is stable. managed by cardiology .   On Toprol-XL 50 mg daily and losartan 25 mg daily.   Nonischemic cardiomyopathy (H)  Paroxysmal atrial fibrillation (H)  As above    Seizure (H)  Stable, on Kepra    History of basal cell cancer  Managed by Dermatology    Idiopathic gout, unspecified chronicity, unspecified site  No concern today. Continue Allopurinol.    History of Herpes Zoster  Recommend vaccine. He will contact his pharmacy.  Post Herpetic Neuralgia  Improving on Gabapentin           41minutes spent by me on the date of the encounter doing chart review, history and exam, documentation and further activities per the note    Subjective   Brooks is a 79 year old, presenting for the following health issues:  Establish Care        6/14/2024     2:21 PM   Additional Questions   Roomed by Radha Anderson CMA   Accompanied by Daughter in law     History of Present Illness       Reason for visit:  Establish Care Only / Just to Meet You    He eats 2-3 servings of fruits and vegetables daily.He consumes 1 sweetened beverage(s) daily.He exercises with enough effort to increase his heart rate 60 or more minutes per day.  He exercises  "with enough effort to increase his heart rate 7 days per week.   He is taking medications regularly.           The patient has a history of bicuspid aortic valve with severe aortic stenosis, for which he underwent aortic valve replacement. He also has atrial fibrillation. He is currently being treated with warfarin. Additionally, he required biventricular pacing with a defibrillator due to low left ventricular function, which subsequently improved to normal. He also has hyperlipidemia and is on a statin. His medical history includes a seizure disorder for which he takes Keppra.  He has gout for which he takes madication  He recently had shingles, and his numbness has been improving. He also has a history of basal cell carcinoma and regularly sees a dermatologist for follow-up.    His wife has interstitial lung disease and requires continuous home oxygen. She is unable to walk more than 10 feet. The patient assists in caring for her.  Review of Systems  Constitutional, HEENT, cardiovascular, pulmonary, gi and gu systems are negative, except as otherwise noted.      Objective    /68 (BP Location: Right arm, Patient Position: Sitting, Cuff Size: Adult Regular)   Pulse 89   Temp 97.6  F (36.4  C) (Oral)   Resp 16   Ht 1.753 m (5' 9\")   Wt 71.7 kg (158 lb)   SpO2 97%   BMI 23.33 kg/m    Body mass index is 23.33 kg/m .  Physical Exam   GENERAL: alert and no distress  RESP: lungs clear to auscultation - no rales, rhonchi or wheezes  CV: irregularly irregular, no peripheral edema  MS: no gross musculoskeletal defects noted, no edema        Signed Electronically by: Sheryl Servin MD    "

## 2024-07-12 ENCOUNTER — ANTICOAGULATION THERAPY VISIT (OUTPATIENT)
Dept: ANTICOAGULATION | Facility: CLINIC | Age: 79
End: 2024-07-12

## 2024-07-12 ENCOUNTER — LAB (OUTPATIENT)
Dept: LAB | Facility: CLINIC | Age: 79
End: 2024-07-12
Payer: MEDICARE

## 2024-07-12 DIAGNOSIS — Z79.01 ANTICOAGULATED: ICD-10-CM

## 2024-07-12 DIAGNOSIS — I48.0 PAROXYSMAL ATRIAL FIBRILLATION (H): ICD-10-CM

## 2024-07-12 DIAGNOSIS — Z95.2 S/P AORTIC VALVE REPLACEMENT: Primary | ICD-10-CM

## 2024-07-12 DIAGNOSIS — Z79.01 ANTICOAGULATED ON COUMADIN: ICD-10-CM

## 2024-07-12 DIAGNOSIS — Z79.01 LONG TERM CURRENT USE OF ANTICOAGULANT THERAPY: ICD-10-CM

## 2024-07-12 DIAGNOSIS — Z95.2 S/P AORTIC VALVE REPLACEMENT: ICD-10-CM

## 2024-07-12 LAB — INR BLD: 2.3 (ref 0.9–1.1)

## 2024-07-12 PROCEDURE — 36416 COLLJ CAPILLARY BLOOD SPEC: CPT

## 2024-07-12 PROCEDURE — 85610 PROTHROMBIN TIME: CPT

## 2024-07-12 NOTE — PROGRESS NOTES
ANTICOAGULATION MANAGEMENT     Brooks Summers 79 year old male is on warfarin with therapeutic INR result. (Goal INR 2.0-3.0)    Recent labs: (last 7 days)     07/12/24  1307   INR 2.3*       ASSESSMENT     Source(s): Chart Review  Previous INR was Therapeutic last 2(+) visits  Medication, diet, health changes since last INR chart reviewed; none identified         PLAN     Recommended plan for no diet, medication or health factor changes affecting INR     Dosing Instructions: Continue your current warfarin dose with next INR in 4 weeks       Summary  As of 7/12/2024      Full warfarin instructions:  7.5 mg every Tue, Thu, Sat; 5 mg all other days   Next INR check:  8/9/2024               Detailed voice message left for Brooks with dosing instructions and follow up date.     Contact [phone] to schedule and with any changes, questions or concerns.     Education provided: Please call back if any changes to your diet, medications or how you've been taking warfarin    Plan made per [facility] anticoagulation protocol    Shira Mirza RN  Anticoagulation Clinic  7/12/2024    _______________________________________________________________________     Anticoagulation Episode Summary       Current INR goal:  2.0-3.0   TTR:  71.3% (1 y)   Target end date:  Indefinite   Send INR reminders to:  JONATHAN MURILLO    Indications    S/P aortic valve replacement [Z95.2]  Paroxysmal atrial fibrillation (H) [I48.0]  Long term current use of anticoagulant therapy [Z79.01]  Anticoagulated on Coumadin [Z79.01]  Anticoagulated [Z79.01]             Comments:  Aortic 21 mm Johnson Perimount Magna Tissue Valve.             Anticoagulation Care Providers       Provider Role Specialty Phone number    Edin Mays MD Referring Internal Medicine [phone]

## 2024-07-30 DIAGNOSIS — I10 BENIGN ESSENTIAL HYPERTENSION: ICD-10-CM

## 2024-08-05 DIAGNOSIS — M10.00 IDIOPATHIC GOUT, UNSPECIFIED CHRONICITY, UNSPECIFIED SITE: ICD-10-CM

## 2024-08-05 DIAGNOSIS — E78.5 HYPERLIPIDEMIA LDL GOAL <100: ICD-10-CM

## 2024-08-05 RX ORDER — LOSARTAN POTASSIUM 25 MG/1
12.5 TABLET ORAL DAILY
Qty: 45 TABLET | Refills: 0 | Status: SHIPPED | OUTPATIENT
Start: 2024-08-05

## 2024-08-05 RX ORDER — SIMVASTATIN 40 MG
40 TABLET ORAL AT BEDTIME
Qty: 90 TABLET | Refills: 0 | Status: SHIPPED | OUTPATIENT
Start: 2024-08-05

## 2024-08-05 RX ORDER — ALLOPURINOL 100 MG/1
100 TABLET ORAL DAILY
Qty: 90 TABLET | Refills: 0 | Status: SHIPPED | OUTPATIENT
Start: 2024-08-05

## 2024-08-05 NOTE — TELEPHONE ENCOUNTER
Problem: Respiratory Impairment - Respiratory Therapy 253  Intervention: Inhaled medication delivery  Note: Intervention Status  Not done  Intervention: Respiratory support devices  Note: Intervention Status  Done       LOSARTAN 25MG TABLETS 90 day mauricio sent to pharmacy   Detail Level: Detailed Depth Of Biopsy: dermis Was A Bandage Applied: Yes Size Of Lesion In Cm: 0 Biopsy Type: H and E Biopsy Method: double edge Personna blade Anesthesia Type: 1% lidocaine with epinephrine Anesthesia Volume In Cc: 0.5 Hemostasis: Terry's Wound Care: Polysporin ointment Dressing: bandage Destruction After The Procedure: No Type Of Destruction Used: Curettage Curettage Text: The wound bed was treated with curettage after the biopsy was performed. Cryotherapy Text: The wound bed was treated with cryotherapy after the biopsy was performed. Electrodesiccation Text: The wound bed was treated with electrodesiccation after the biopsy was performed. Electrodesiccation And Curettage Text: The wound bed was treated with electrodesiccation and curettage after the biopsy was performed. Silver Nitrate Text: The wound bed was treated with silver nitrate after the biopsy was performed. Lab: 6 Lab Facility: 3 Consent: Written consent was obtained and risks were reviewed including but not limited to scarring, infection, bleeding, scabbing, incomplete removal, nerve damage and allergy to anesthesia. Post-Care Instructions: I reviewed with the patient in detail post-care instructions. Patient is to keep the biopsy site dry overnight, and then apply polysporin twice daily until healed. Notification Instructions: Patient will be notified of biopsy results. However, patient instructed to call the office if not contacted within 2 weeks. Billing Type: Third-Party Bill Information: Selecting Yes will display possible errors in your note based on the variables you have selected. This validation is only offered as a suggestion for you. PLEASE NOTE THAT THE VALIDATION TEXT WILL BE REMOVED WHEN YOU FINALIZE YOUR NOTE. IF YOU WANT TO FAX A PRELIMINARY NOTE YOU WILL NEED TO TOGGLE THIS TO 'NO' IF YOU DO NOT WANT IT IN YOUR FAXED NOTE.

## 2024-08-08 DIAGNOSIS — E78.5 HYPERLIPIDEMIA LDL GOAL <100: ICD-10-CM

## 2024-08-08 DIAGNOSIS — M10.00 IDIOPATHIC GOUT, UNSPECIFIED CHRONICITY, UNSPECIFIED SITE: ICD-10-CM

## 2024-08-09 ENCOUNTER — ANTICOAGULATION THERAPY VISIT (OUTPATIENT)
Dept: ANTICOAGULATION | Facility: CLINIC | Age: 79
End: 2024-08-09

## 2024-08-09 ENCOUNTER — LAB (OUTPATIENT)
Dept: LAB | Facility: CLINIC | Age: 79
End: 2024-08-09
Payer: MEDICARE

## 2024-08-09 DIAGNOSIS — Z79.01 LONG TERM CURRENT USE OF ANTICOAGULANT THERAPY: ICD-10-CM

## 2024-08-09 DIAGNOSIS — I48.0 PAROXYSMAL ATRIAL FIBRILLATION (H): ICD-10-CM

## 2024-08-09 DIAGNOSIS — Z79.01 ANTICOAGULATED: ICD-10-CM

## 2024-08-09 DIAGNOSIS — Z95.2 S/P AORTIC VALVE REPLACEMENT: Primary | ICD-10-CM

## 2024-08-09 DIAGNOSIS — Z79.01 ANTICOAGULATED ON COUMADIN: ICD-10-CM

## 2024-08-09 DIAGNOSIS — Z95.2 S/P AORTIC VALVE REPLACEMENT: ICD-10-CM

## 2024-08-09 LAB — INR BLD: 3 (ref 0.9–1.1)

## 2024-08-09 PROCEDURE — 85610 PROTHROMBIN TIME: CPT

## 2024-08-09 PROCEDURE — 36416 COLLJ CAPILLARY BLOOD SPEC: CPT

## 2024-08-09 NOTE — PROGRESS NOTES
ANTICOAGULATION MANAGEMENT     Brooks Summers 79 year old male is on warfarin with therapeutic INR result. (Goal INR 2.0-3.0)    Recent labs: (last 7 days)     08/09/24  1303   INR 3.0*       ASSESSMENT     Source(s): Chart Review and Patient/Caregiver Call     Warfarin doses taken: Warfarin taken as instructed  Diet: No new diet changes identified  Medication/supplement changes: None noted  New illness, injury, or hospitalization: No  Signs or symptoms of bleeding or clotting: No  Previous result: Therapeutic last 2(+) visits  Additional findings: None       PLAN     Recommended plan for no diet, medication or health factor changes affecting INR     Dosing Instructions: Continue your current warfarin dose with next INR in 5 weeks       Summary  As of 8/9/2024      Full warfarin instructions:  7.5 mg every Tue, Thu, Sat; 5 mg all other days   Next INR check:  9/13/2024               Telephone call with Brooks who verbalizes understanding and agrees to plan and who agrees to plan and repeated back plan correctly    Lab visit scheduled    Education provided: Contact [phone] with any changes, questions or concerns.     Plan made per [facility] anticoagulation protocol    Gina Medina RN  Anticoagulation Clinic  8/9/2024    _______________________________________________________________________     Anticoagulation Episode Summary       Current INR goal:  2.0-3.0   TTR:  76.1% (1 y)   Target end date:  Indefinite   Send INR reminders to:  JONATHAN MURILLO    Indications    S/P aortic valve replacement [Z95.2]  Paroxysmal atrial fibrillation (H) [I48.0]  Long term current use of anticoagulant therapy [Z79.01]  Anticoagulated on Coumadin [Z79.01]  Anticoagulated [Z79.01]             Comments:  Aortic 21 mm Johnson Perimount Magna Tissue Valve.             Anticoagulation Care Providers       Provider Role Specialty Phone number    Edin Mays MD Referring Internal Medicine [phone]

## 2024-08-10 DIAGNOSIS — I10 BENIGN ESSENTIAL HYPERTENSION: ICD-10-CM

## 2024-08-13 RX ORDER — SIMVASTATIN 40 MG
40 TABLET ORAL AT BEDTIME
Qty: 90 TABLET | Refills: 0 | OUTPATIENT
Start: 2024-08-13

## 2024-08-13 RX ORDER — ALLOPURINOL 100 MG/1
TABLET ORAL
Qty: 90 TABLET | Refills: 0 | OUTPATIENT
Start: 2024-08-13

## 2024-08-14 RX ORDER — METOPROLOL SUCCINATE 50 MG/1
50 TABLET, EXTENDED RELEASE ORAL DAILY
Qty: 90 TABLET | Refills: 0 | Status: SHIPPED | OUTPATIENT
Start: 2024-08-14

## 2024-08-14 NOTE — TELEPHONE ENCOUNTER
Last Clinic Visit: 5/23/2023 [facility] Visit: none    Appointment due: Erna refill of metoprolol succinate ER (TOPROL XL) 50 MG 24 hr tablet was sent for 90 day supply and FYI message sent to Cardiology clinic.

## 2024-08-26 DIAGNOSIS — Z95.2 S/P AORTIC VALVE REPLACEMENT: ICD-10-CM

## 2024-08-26 DIAGNOSIS — Z95.2 S/P AVR (AORTIC VALVE REPLACEMENT): ICD-10-CM

## 2024-08-26 DIAGNOSIS — Z79.01 LONG TERM CURRENT USE OF ANTICOAGULANT THERAPY: ICD-10-CM

## 2024-08-26 DIAGNOSIS — I48.0 PAROXYSMAL ATRIAL FIBRILLATION (H): ICD-10-CM

## 2024-08-26 RX ORDER — WARFARIN SODIUM 5 MG/1
TABLET ORAL
Qty: 105 TABLET | Refills: 1 | Status: SHIPPED | OUTPATIENT
Start: 2024-08-26

## 2024-08-26 NOTE — TELEPHONE ENCOUNTER
ANTICOAGULATION MANAGEMENT:  Medication Refill    Anticoagulation Summary  As of 8/9/2024      Warfarin maintenance plan:  7.5 mg (5 mg x 1.5) every Tue, Thu, Sat; 5 mg (5 mg x 1) all other days   Next INR check:  9/13/2024   Target end date:  Indefinite    Indications    S/P aortic valve replacement [Z95.2]  Paroxysmal atrial fibrillation (H) [I48.0]  Long term current use of anticoagulant therapy [Z79.01]  Anticoagulated on Coumadin [Z79.01]  Anticoagulated [Z79.01]                 Anticoagulation Care Providers       Provider Role Specialty Phone number    Edin Mays MD Referring Internal Medicine [phone]            Refill Criteria    Visit with referring provider/group: Meets criteria: visit within referring provider group in the last 15 months on 7/10/24    [facility] referral last signed: 06/15/2024; within last year: Yes    Lab monitoring not exceeding 2 weeks overdue: Yes    Brooks meets all criteria for refill. Rx instructions and quantity match patient's current dosing plan. Warfarin 90 day supply with 1 refill granted per [facility] protocol     Selena Bates, RN  Anticoagulation Clinic

## 2024-09-09 NOTE — TELEPHONE ENCOUNTER
Brooks asked to reschedule Mohs until the beginning of November.     Shweta Aguliar, Complex  6/18/2024 9:24 AM    
Called patient to schedule surgery with Dr. Story    Date of Surgery: 06/24    Surgery type: Mohs    Consult scheduled: No    Has patient had mohs with us before? Yes    Additional comments: jose Aguilar on 5/8/2024 at 11:35 AM    
Left patient a voicemail to schedule a consult & mohs for Dorsal nasal tip, MOHS, BCC  with Dr. Story. Provided my direct phone number.    Shweta Aguilar on 5/1/2024 at 2:25 PM    
Rescheduled due to Dr. Story being out of office.     Shweta Aguilar, Complex  9/9/2024 9:18 AM    
Dr. Raymundo

## 2024-09-12 ENCOUNTER — ANTICOAGULATION THERAPY VISIT (OUTPATIENT)
Dept: ANTICOAGULATION | Facility: CLINIC | Age: 79
End: 2024-09-12

## 2024-09-12 ENCOUNTER — LAB (OUTPATIENT)
Dept: LAB | Facility: CLINIC | Age: 79
End: 2024-09-12
Payer: MEDICARE

## 2024-09-12 DIAGNOSIS — Z79.01 ANTICOAGULATED: ICD-10-CM

## 2024-09-12 DIAGNOSIS — Z95.2 S/P AORTIC VALVE REPLACEMENT: ICD-10-CM

## 2024-09-12 DIAGNOSIS — Z95.2 S/P AORTIC VALVE REPLACEMENT: Primary | ICD-10-CM

## 2024-09-12 DIAGNOSIS — Z79.01 LONG TERM CURRENT USE OF ANTICOAGULANT THERAPY: ICD-10-CM

## 2024-09-12 DIAGNOSIS — I48.0 PAROXYSMAL ATRIAL FIBRILLATION (H): ICD-10-CM

## 2024-09-12 DIAGNOSIS — Z79.01 ANTICOAGULATED ON COUMADIN: ICD-10-CM

## 2024-09-12 LAB — INR BLD: 2.6 (ref 0.9–1.1)

## 2024-09-12 PROCEDURE — 36416 COLLJ CAPILLARY BLOOD SPEC: CPT

## 2024-09-12 PROCEDURE — 85610 PROTHROMBIN TIME: CPT

## 2024-09-12 NOTE — PROGRESS NOTES
ANTICOAGULATION MANAGEMENT     Brooks Summers 79 year old male is on warfarin with therapeutic INR result. (Goal INR 2.0-3.0)    Recent labs: (last 7 days)     09/12/24  1235   INR 2.6*       ASSESSMENT     Source(s): Chart Review and Patient/Caregiver Call     Warfarin doses taken: Warfarin taken as instructed  Diet: No new diet changes identified  Medication/supplement changes: None noted  New illness, injury, or hospitalization: No  Signs or symptoms of bleeding or clotting: No  Previous result: Therapeutic last 2(+) visits  Additional findings: None       PLAN     Recommended plan for no diet, medication or health factor changes affecting INR     Dosing Instructions: Continue your current warfarin dose with next INR in 5 weeks       Summary  As of 9/12/2024      Full warfarin instructions:  7.5 mg every Tue, Thu, Sat; 5 mg all other days   Next INR check:  10/17/2024               Telephone call with Brooks who verbalizes understanding and agrees to plan and who agrees to plan and repeated back plan correctly    Lab visit scheduled    Education provided: Contact [phone] with any changes, questions or concerns.     Plan made per [facility] anticoagulation protocol    Clara Freeman RN  9/12/2024  Anticoagulation Clinic  [employer] for routing messages: p [facility] patient phone line: [phone]        _______________________________________________________________________     Anticoagulation Episode Summary       Current INR goal:  2.0-3.0   TTR:  76.1% (1 y)   Target end date:  Indefinite   Send INR reminders to:  [facility]    Indications    S/P aortic valve replacement [Z95.2]  Paroxysmal atrial fibrillation (H) [I48.0]  Long term current use of anticoagulant therapy [Z79.01]  Anticoagulated on Coumadin [Z79.01]  Anticoagulated [Z79.01]             Comments:  Aortic 21 mm Johnson Perimount Magna Tissue Valve.             Anticoagulation Care Providers       Provider Role Specialty Phone  number    Edin Mays MD [facility] Internal Medicine [phone]

## 2024-09-27 ENCOUNTER — ANCILLARY PROCEDURE (OUTPATIENT)
Dept: CARDIOLOGY | Facility: CLINIC | Age: 79
End: 2024-09-27
Attending: INTERNAL MEDICINE
Payer: MEDICARE

## 2024-09-27 DIAGNOSIS — I42.9 CARDIOMYOPATHY (H): ICD-10-CM

## 2024-09-27 PROCEDURE — 93295 DEV INTERROG REMOTE 1/2/MLT: CPT | Performed by: INTERNAL MEDICINE

## 2024-09-27 PROCEDURE — 93296 REM INTERROG EVL PM/IDS: CPT

## 2024-10-02 NOTE — TELEPHONE ENCOUNTER
FUTURE VISIT INFORMATION      FUTURE VISIT INFORMATION:  Date: 11/18/2024  Time: 9:15AM   Location: Dr. Erasmo Story  REFERRAL INFORMATION:  Referring provider:  Manish Matute MD   Referring providers clinic:  [facility]  Reason for visit/diagnosis  BCC (basal cell carcinoma)    RECORDS REQUESTED FROM:       Clinic name Comments Records Status Imaging Status   Internal biopsy  4/23/2024   Dorsal nasal tip:  - Basal cell carcinoma, nodular type - (see description)     Internal biopsy  5/4/2023   A. Left nasal ala:  - Basal cell carcinoma, nodular type - (see description)  B. Right jawline:  - Intradermal melanocytic nevus - (see description)  C. Left forearm:  - Most consistent with hypertrophic actinic keratosis, ulcerated     Internal office notes  4/23/2024 Progress note

## 2024-10-21 LAB
MDC_IDC_LEAD_CONNECTION_STATUS: NORMAL
MDC_IDC_LEAD_IMPLANT_DT: NORMAL
MDC_IDC_LEAD_LOCATION: NORMAL
MDC_IDC_LEAD_LOCATION_DETAIL_1: NORMAL
MDC_IDC_LEAD_MFG: NORMAL
MDC_IDC_LEAD_MODEL: NORMAL
MDC_IDC_LEAD_POLARITY_TYPE: NORMAL
MDC_IDC_LEAD_SERIAL: NORMAL
MDC_IDC_LEAD_SPECIAL_FUNCTION: NORMAL
MDC_IDC_MSMT_BATTERY_DTM: NORMAL
MDC_IDC_MSMT_BATTERY_REMAINING_LONGEVITY: 7 MO
MDC_IDC_MSMT_BATTERY_RRT_TRIGGER: NORMAL
MDC_IDC_MSMT_BATTERY_STATUS: NORMAL
MDC_IDC_MSMT_BATTERY_VOLTAGE: 2.88 V
MDC_IDC_MSMT_CAP_CHARGE_DTM: NORMAL
MDC_IDC_MSMT_CAP_CHARGE_ENERGY: 18 J
MDC_IDC_MSMT_CAP_CHARGE_TIME: 4.4 S
MDC_IDC_MSMT_CAP_CHARGE_TYPE: NORMAL
MDC_IDC_MSMT_LEADCHNL_LV_IMPEDANCE_VALUE: 323 OHM
MDC_IDC_MSMT_LEADCHNL_LV_IMPEDANCE_VALUE: 361 OHM
MDC_IDC_MSMT_LEADCHNL_LV_IMPEDANCE_VALUE: 646 OHM
MDC_IDC_MSMT_LEADCHNL_LV_PACING_THRESHOLD_AMPLITUDE: 2.38 V
MDC_IDC_MSMT_LEADCHNL_LV_PACING_THRESHOLD_PULSEWIDTH: 1 MS
MDC_IDC_MSMT_LEADCHNL_RA_IMPEDANCE_VALUE: 418 OHM
MDC_IDC_MSMT_LEADCHNL_RA_PACING_THRESHOLD_AMPLITUDE: 0.5 V
MDC_IDC_MSMT_LEADCHNL_RA_PACING_THRESHOLD_PULSEWIDTH: 0.4 MS
MDC_IDC_MSMT_LEADCHNL_RA_SENSING_INTR_AMPL: 1.6 MV
MDC_IDC_MSMT_LEADCHNL_RV_IMPEDANCE_VALUE: 399 OHM
MDC_IDC_MSMT_LEADCHNL_RV_IMPEDANCE_VALUE: 456 OHM
MDC_IDC_MSMT_LEADCHNL_RV_PACING_THRESHOLD_AMPLITUDE: 0.5 V
MDC_IDC_MSMT_LEADCHNL_RV_PACING_THRESHOLD_PULSEWIDTH: 0.4 MS
MDC_IDC_PG_IMPLANT_DTM: NORMAL
MDC_IDC_PG_MFG: NORMAL
MDC_IDC_PG_MODEL: NORMAL
MDC_IDC_PG_SERIAL: NORMAL
MDC_IDC_PG_TYPE: NORMAL
MDC_IDC_SESS_CLINIC_NAME: NORMAL
MDC_IDC_SESS_DTM: NORMAL
MDC_IDC_SESS_TYPE: NORMAL
MDC_IDC_SET_BRADY_AT_MODE_SWITCH_RATE: 171 {BEATS}/MIN
MDC_IDC_SET_BRADY_LOWRATE: 60 {BEATS}/MIN
MDC_IDC_SET_BRADY_MAX_SENSOR_RATE: 120 {BEATS}/MIN
MDC_IDC_SET_BRADY_MAX_TRACKING_RATE: 130 {BEATS}/MIN
MDC_IDC_SET_BRADY_MODE: NORMAL
MDC_IDC_SET_BRADY_NIGHT_RATE: 50 {BEATS}/MIN
MDC_IDC_SET_BRADY_PAV_DELAY_LOW: 170 MS
MDC_IDC_SET_BRADY_SAV_DELAY_LOW: 100 MS
MDC_IDC_SET_CRT_LVRV_DELAY: 0 MS
MDC_IDC_SET_CRT_PACED_CHAMBERS: NORMAL
MDC_IDC_SET_LEADCHNL_LV_PACING_AMPLITUDE: 3.5 V
MDC_IDC_SET_LEADCHNL_LV_PACING_ANODE_ELECTRODE_1: NORMAL
MDC_IDC_SET_LEADCHNL_LV_PACING_ANODE_LOCATION_1: NORMAL
MDC_IDC_SET_LEADCHNL_LV_PACING_CAPTURE_MODE: NORMAL
MDC_IDC_SET_LEADCHNL_LV_PACING_CATHODE_ELECTRODE_1: NORMAL
MDC_IDC_SET_LEADCHNL_LV_PACING_CATHODE_LOCATION_1: NORMAL
MDC_IDC_SET_LEADCHNL_LV_PACING_POLARITY: NORMAL
MDC_IDC_SET_LEADCHNL_LV_PACING_PULSEWIDTH: 1 MS
MDC_IDC_SET_LEADCHNL_RA_PACING_AMPLITUDE: 1.5 V
MDC_IDC_SET_LEADCHNL_RA_PACING_ANODE_ELECTRODE_1: NORMAL
MDC_IDC_SET_LEADCHNL_RA_PACING_ANODE_LOCATION_1: NORMAL
MDC_IDC_SET_LEADCHNL_RA_PACING_CAPTURE_MODE: NORMAL
MDC_IDC_SET_LEADCHNL_RA_PACING_CATHODE_ELECTRODE_1: NORMAL
MDC_IDC_SET_LEADCHNL_RA_PACING_CATHODE_LOCATION_1: NORMAL
MDC_IDC_SET_LEADCHNL_RA_PACING_POLARITY: NORMAL
MDC_IDC_SET_LEADCHNL_RA_PACING_PULSEWIDTH: 0.4 MS
MDC_IDC_SET_LEADCHNL_RA_SENSING_ANODE_ELECTRODE_1: NORMAL
MDC_IDC_SET_LEADCHNL_RA_SENSING_ANODE_LOCATION_1: NORMAL
MDC_IDC_SET_LEADCHNL_RA_SENSING_CATHODE_ELECTRODE_1: NORMAL
MDC_IDC_SET_LEADCHNL_RA_SENSING_CATHODE_LOCATION_1: NORMAL
MDC_IDC_SET_LEADCHNL_RA_SENSING_POLARITY: NORMAL
MDC_IDC_SET_LEADCHNL_RA_SENSING_SENSITIVITY: 0.3 MV
MDC_IDC_SET_LEADCHNL_RV_PACING_AMPLITUDE: 2 V
MDC_IDC_SET_LEADCHNL_RV_PACING_ANODE_ELECTRODE_1: NORMAL
MDC_IDC_SET_LEADCHNL_RV_PACING_ANODE_LOCATION_1: NORMAL
MDC_IDC_SET_LEADCHNL_RV_PACING_CAPTURE_MODE: NORMAL
MDC_IDC_SET_LEADCHNL_RV_PACING_CATHODE_ELECTRODE_1: NORMAL
MDC_IDC_SET_LEADCHNL_RV_PACING_CATHODE_LOCATION_1: NORMAL
MDC_IDC_SET_LEADCHNL_RV_PACING_POLARITY: NORMAL
MDC_IDC_SET_LEADCHNL_RV_PACING_PULSEWIDTH: 0.4 MS
MDC_IDC_SET_LEADCHNL_RV_SENSING_ANODE_ELECTRODE_1: NORMAL
MDC_IDC_SET_LEADCHNL_RV_SENSING_ANODE_LOCATION_1: NORMAL
MDC_IDC_SET_LEADCHNL_RV_SENSING_CATHODE_ELECTRODE_1: NORMAL
MDC_IDC_SET_LEADCHNL_RV_SENSING_CATHODE_LOCATION_1: NORMAL
MDC_IDC_SET_LEADCHNL_RV_SENSING_POLARITY: NORMAL
MDC_IDC_SET_LEADCHNL_RV_SENSING_SENSITIVITY: 0.3 MV
MDC_IDC_SET_ZONE_DETECTION_BEATS_DENOMINATOR: 16 {BEATS}
MDC_IDC_SET_ZONE_DETECTION_BEATS_DENOMINATOR: 32 {BEATS}
MDC_IDC_SET_ZONE_DETECTION_BEATS_DENOMINATOR: 40 {BEATS}
MDC_IDC_SET_ZONE_DETECTION_BEATS_NUMERATOR: 16 {BEATS}
MDC_IDC_SET_ZONE_DETECTION_BEATS_NUMERATOR: 30 {BEATS}
MDC_IDC_SET_ZONE_DETECTION_BEATS_NUMERATOR: 32 {BEATS}
MDC_IDC_SET_ZONE_DETECTION_INTERVAL: 320 MS
MDC_IDC_SET_ZONE_DETECTION_INTERVAL: 350 MS
MDC_IDC_SET_ZONE_DETECTION_INTERVAL: 360 MS
MDC_IDC_SET_ZONE_DETECTION_INTERVAL: 420 MS
MDC_IDC_SET_ZONE_STATUS: NORMAL
MDC_IDC_SET_ZONE_TYPE: NORMAL
MDC_IDC_SET_ZONE_VENDOR_TYPE: NORMAL
MDC_IDC_STAT_AT_BURDEN_PERCENT: 0.01 %
MDC_IDC_STAT_AT_DTM_END: NORMAL
MDC_IDC_STAT_AT_DTM_START: NORMAL
MDC_IDC_STAT_BRADY_DTM_END: NORMAL
MDC_IDC_STAT_BRADY_DTM_START: NORMAL
MDC_IDC_STAT_BRADY_RV_PERCENT_PACED: 93.77 %
MDC_IDC_STAT_CRT_DTM_END: NORMAL
MDC_IDC_STAT_CRT_DTM_START: NORMAL
MDC_IDC_STAT_CRT_LV_PERCENT_PACED: 93.74 %
MDC_IDC_STAT_CRT_PERCENT_PACED: 93.74 %
MDC_IDC_STAT_EPISODE_RECENT_COUNT: 0
MDC_IDC_STAT_EPISODE_RECENT_COUNT_DTM_END: NORMAL
MDC_IDC_STAT_EPISODE_RECENT_COUNT_DTM_START: NORMAL
MDC_IDC_STAT_EPISODE_TOTAL_COUNT: 0
MDC_IDC_STAT_EPISODE_TOTAL_COUNT: 2
MDC_IDC_STAT_EPISODE_TOTAL_COUNT: 4
MDC_IDC_STAT_EPISODE_TOTAL_COUNT_DTM_END: NORMAL
MDC_IDC_STAT_EPISODE_TOTAL_COUNT_DTM_START: NORMAL
MDC_IDC_STAT_EPISODE_TYPE: NORMAL
MDC_IDC_STAT_TACHYTHERAPY_ATP_DELIVERED_RECENT: 0
MDC_IDC_STAT_TACHYTHERAPY_ATP_DELIVERED_TOTAL: 0
MDC_IDC_STAT_TACHYTHERAPY_RECENT_DTM_END: NORMAL
MDC_IDC_STAT_TACHYTHERAPY_RECENT_DTM_START: NORMAL
MDC_IDC_STAT_TACHYTHERAPY_SHOCKS_ABORTED_RECENT: 0
MDC_IDC_STAT_TACHYTHERAPY_SHOCKS_ABORTED_TOTAL: 0
MDC_IDC_STAT_TACHYTHERAPY_SHOCKS_DELIVERED_RECENT: 0
MDC_IDC_STAT_TACHYTHERAPY_SHOCKS_DELIVERED_TOTAL: 0
MDC_IDC_STAT_TACHYTHERAPY_TOTAL_DTM_END: NORMAL
MDC_IDC_STAT_TACHYTHERAPY_TOTAL_DTM_START: NORMAL

## 2024-10-25 ENCOUNTER — MYC MEDICAL ADVICE (OUTPATIENT)
Dept: ANTICOAGULATION | Facility: CLINIC | Age: 79
End: 2024-10-25
Payer: MEDICARE

## 2024-10-29 ENCOUNTER — ANTICOAGULATION THERAPY VISIT (OUTPATIENT)
Dept: ANTICOAGULATION | Facility: CLINIC | Age: 79
End: 2024-10-29

## 2024-10-29 ENCOUNTER — LAB (OUTPATIENT)
Dept: LAB | Facility: CLINIC | Age: 79
End: 2024-10-29
Payer: MEDICARE

## 2024-10-29 DIAGNOSIS — Z95.2 S/P AORTIC VALVE REPLACEMENT: Primary | ICD-10-CM

## 2024-10-29 DIAGNOSIS — Z79.01 ANTICOAGULATED: ICD-10-CM

## 2024-10-29 DIAGNOSIS — I48.0 PAROXYSMAL ATRIAL FIBRILLATION (H): ICD-10-CM

## 2024-10-29 DIAGNOSIS — Z79.01 ANTICOAGULATED ON COUMADIN: ICD-10-CM

## 2024-10-29 DIAGNOSIS — Z79.01 LONG TERM CURRENT USE OF ANTICOAGULANT THERAPY: ICD-10-CM

## 2024-10-29 DIAGNOSIS — Z95.2 S/P AORTIC VALVE REPLACEMENT: ICD-10-CM

## 2024-10-29 LAB — INR BLD: 3 (ref 0.9–1.1)

## 2024-10-29 PROCEDURE — 36416 COLLJ CAPILLARY BLOOD SPEC: CPT

## 2024-10-29 PROCEDURE — 85610 PROTHROMBIN TIME: CPT

## 2024-10-29 NOTE — PROGRESS NOTES
ANTICOAGULATION MANAGEMENT     Brooks Summers 79 year old male is on warfarin with therapeutic INR result. (Goal INR 2.0-3.0)    Recent labs: (last 7 days)     10/29/24  0909   INR 3.0*       ASSESSMENT     Source(s): Chart Review and Patient/Caregiver Call     Warfarin doses taken: Warfarin taken as instructed  Diet: No new diet changes identified  Medication/supplement changes: None noted  New illness, injury, or hospitalization: No  Signs or symptoms of bleeding or clotting: No  Previous result: Therapeutic last 2(+) visits  Additional findings:  Patient wife passed away this morning.       PLAN     Recommended plan for no diet, medication or health factor changes affecting INR     Dosing Instructions: Continue your current warfarin dose with next INR in 6 weeks       Summary  As of 10/29/2024      Full warfarin instructions:  7.5 mg every Tue, Thu, Sat; 5 mg all other days   Next INR check:  12/10/2024               Telephone call with Brooks who verbalizes understanding and agrees to plan  Sent [facility] message with dosing and follow up instructions    Contact [phone] to schedule and with any changes, questions or concerns.     Education provided: Please call back if any changes to your diet, medications or how you've been taking warfarin    Plan made per [facility] anticoagulation protocol    Chuck Mirza, RN  10/29/2024  Anticoagulation Clinic  [employer] for routing messages: p [facility] patient phone line: [phone]        _______________________________________________________________________     Anticoagulation Episode Summary       Current INR goal:  2.0-3.0   TTR:  86.0% (1 y)   Target end date:  Indefinite   Send INR reminders to:  [facility]    Indications    S/P aortic valve replacement [Z95.2]  Paroxysmal atrial fibrillation (H) [I48.0]  Long term current use of anticoagulant therapy [Z79.01]  Anticoagulated on Coumadin [Z79.01]  Anticoagulated [Z79.01]             Comments:   Aortic 21 mm Johnson Perimount Magna Tissue Valve.             Anticoagulation Care Providers       Provider Role Specialty Phone number    Edin Mays MD Referring Internal Medicine [phone]

## 2024-11-06 ENCOUNTER — TELEPHONE (OUTPATIENT)
Dept: DERMATOLOGY | Facility: CLINIC | Age: 79
End: 2024-11-06
Payer: MEDICARE

## 2024-11-06 NOTE — TELEPHONE ENCOUNTER
Pt called in about his cancelled appointments. Brooks asked to start with his skin check and go from there.     Shweta Aguilar, Complex  11/6/2024 1:57 PM

## 2024-11-08 ENCOUNTER — PATIENT OUTREACH (OUTPATIENT)
Dept: INTERNAL MEDICINE | Facility: CLINIC | Age: 79
End: 2024-11-08
Payer: MEDICARE

## 2024-11-08 NOTE — LETTER
November 8, 2024      Brooks Summers  [address]   [place] [address]      Your healthcare team cares about your health. To provide you with the best care, we have reviewed your chart and based on our findings, we see that you are due to:     PREVENTATIVE VISIT: Annual Medicare Wellness:Schedule an Annual Medicare Wellness Exam. Please call your [facility] [place] clinic to set up your appointment.    If you have already completed these items, please contact the clinic via phone or Mychart so your care team can review and update your records.  Thank you for choosing [facility] for your healthcare needs. For any questions, concerns, or to schedule an appointment please contact the clinic.     Healthy Regards,    Your [facility] Care Team

## 2024-11-08 NOTE — TELEPHONE ENCOUNTER
Patient Quality Outreach    Patient is due for the following:   Physical Annual Wellness Visit    Next Steps:   Schedule a Annual Wellness Visit    Type of outreach:    Sent letter.      Questions for provider review:    None           ARNIE, MEDICAL ASSISTANT

## 2024-11-11 DIAGNOSIS — I10 BENIGN ESSENTIAL HYPERTENSION: ICD-10-CM

## 2024-11-12 ENCOUNTER — OFFICE VISIT (OUTPATIENT)
Dept: DERMATOLOGY | Facility: CLINIC | Age: 79
End: 2024-11-12
Payer: MEDICARE

## 2024-11-12 DIAGNOSIS — L57.0 ACTINIC KERATOSES: ICD-10-CM

## 2024-11-12 DIAGNOSIS — D49.2 NEOPLASM OF UNSPECIFIED BEHAVIOR OF BONE, SOFT TISSUE, AND SKIN: ICD-10-CM

## 2024-11-12 DIAGNOSIS — D22.9 MULTIPLE NEVI: ICD-10-CM

## 2024-11-12 DIAGNOSIS — L82.1 SEBORRHEIC KERATOSES: ICD-10-CM

## 2024-11-12 DIAGNOSIS — Z12.83 ENCOUNTER FOR SCREENING FOR MALIGNANT NEOPLASM OF SKIN: Primary | ICD-10-CM

## 2024-11-12 DIAGNOSIS — L82.0 INFLAMED SEBORRHEIC KERATOSIS: ICD-10-CM

## 2024-11-12 DIAGNOSIS — C44.311 BASAL CELL CARCINOMA OF NASAL TIP: ICD-10-CM

## 2024-11-12 DIAGNOSIS — E78.5 HYPERLIPIDEMIA LDL GOAL <100: ICD-10-CM

## 2024-11-12 DIAGNOSIS — M10.00 IDIOPATHIC GOUT, UNSPECIFIED CHRONICITY, UNSPECIFIED SITE: ICD-10-CM

## 2024-11-12 DIAGNOSIS — L81.4 LENTIGINES: ICD-10-CM

## 2024-11-12 DIAGNOSIS — Z85.828 HISTORY OF NONMELANOMA SKIN CANCER: ICD-10-CM

## 2024-11-12 DIAGNOSIS — D18.01 CHERRY ANGIOMA: ICD-10-CM

## 2024-11-12 PROCEDURE — 88305 TISSUE EXAM BY PATHOLOGIST: CPT | Mod: 26 | Performed by: PATHOLOGY

## 2024-11-12 PROCEDURE — 88305 TISSUE EXAM BY PATHOLOGIST: CPT | Mod: TC | Performed by: PHYSICIAN ASSISTANT

## 2024-11-12 RX ORDER — ALLOPURINOL 100 MG/1
100 TABLET ORAL DAILY
Qty: 90 TABLET | Refills: 0 | Status: SHIPPED | OUTPATIENT
Start: 2024-11-12

## 2024-11-12 RX ORDER — SIMVASTATIN 40 MG
40 TABLET ORAL AT BEDTIME
Qty: 90 TABLET | Refills: 0 | Status: SHIPPED | OUTPATIENT
Start: 2024-11-12

## 2024-11-12 ASSESSMENT — PAIN SCALES - GENERAL: PAINLEVEL_OUTOF10: NO PAIN (0)

## 2024-11-12 NOTE — LETTER
11/12/2024       RE: Brooks Summers  [id number]  [place] [address]     Dear Colleague,    Thank you for referring your patient, Brooks Summers, to the [facility] DERMATOLOGY [facility]INIC [place] at [facility]. Please see a copy of my visit note below.    [facility] D[facility]at[facility] Note  Encounter Date: Nov 12, 2024  Office Visit     Reviewed patients past medical history and pertinent chart review prior to patients visit today.     Dermatology Problem List:  # NUB, left cheek, shave biopsy 11/12/2024     1. 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 Aks - LN  - left forearm, s/p shave bx 05/04/23    - consider field in future  3. Intradermal melanocytic nevus  - right jawline, s/p shave bx 05/04/23   4. Zoster with post-herpetic neuralgia, C7 dermatome  - gabapentin from PCP  5. Dilated pore, R lower back    ____________________________________________    Assessment & Plan:     # Nodular BCC, dorsal nasal tip, bx 04/23/2024  - Order for Mohs placed.     # Neoplasm of uncertain behavior:  left cheek  DDx includes HAK vs SCC. Shave biopsy today.    Procedure Note: Biopsy by shave technique  The risks and benefits of the procedure were described to the patient. These include but are not limited to bleeding, infection, scar, incomplete removal, and non-diagnostic biopsy. Verbal informed consent was obtained. The above site(s) was cleansed with an alcohol pad and injected with 1% lidocaine with epinephrine. Once anesthesia was obtained, a biopsy(ies) was performed with Gilette blade. The tissue(s) was placed in a labeled container(s) with formalin and sent to pathology. Hemostasis was achieved with aluminum chloride.  Vaseline and a bandage were applied to the wound(s). The patient tolerated the procedure well and was given post biopsy care instructions.    #Actinic keratosis x11  - We discussed the precancerous nature of the skin lesions.  I recommended liquid nitrogen cryotherapy and the patient was agreeable.      Cryotherapy procedure note, location(s): scalp x2, forehead x2, left cheek x2, right cheek x3, left hand x1, right hand x1 After verbal consent and discussion of risks and benefits including, but not limited to, dyspigmentation/scar, blister, and pain, the lesion(s) was(were) treated with 1-2 mm freeze border for 1-2 cycles with liquid nitrogen. Post cryotherapy instructions were provided.    # Inflamed seborrheic keratoses x2  The benign nature of the skin lesion(s) was discussed with the patient.  Due to the inflamed and irritated nature of the lesions I recommend cryotherapy and the patient was agreeable.      Cryotherapy procedure note, location(s): left upper back x2 After verbal consent and discussion of risks and benefits including, but not limited to, dyspigmentation/scar, blister, and pain, the lesion(s) was(were) treated with 1-2 mm freeze border for 1-2 cycles with liquid nitrogen. Post cryotherapy instructions were provided.    # Personal history of nonmelanoma skin cancer  - No signs of recurrence. Continued observation recommended.   - Sun protection: Counseled SPF 30+ sunscreen, UPF clothing, sun avoidance, tanning bed avoidance.    # Multiple nevi, trunk and extremities  # Solar lentigines  - No concerning features on dermoscopy. We discussed the importance of self exams at home. ABCDE criteria and importance of photoprotection reviewed.     # Cherry angiomas  # Seborrheic keratoses  - We discussed the benign nature of the skin lesions. No treatment required. Continued observation recommended. Follow up with any concerns.      Follow-up:  6 months for follow up full body skin exam, as needed for new  or changing lesions or new concerns    All risks, benefits and alternatives were discussed with patient.  Patient is in agreement and understands the assessment and plan.  All questions were answered.  Katelyn Martínez PA-C  [facility] Dermatology    ____________________________________________    CC: Skin Check (Brooks is here today for a skin check on the upper top of the body)    HPI:  Mr. Brooks Summers is a(n) 79 year old male who presents today as a return patient for a full body skin cancer screening. The patient has a history of nonmelanoma skin cancer. Today, the patient reports itchy lesions on the left upper back. No other cutaneous concerns. The patient reports trying to be diligent with photoprotection.      Physical Exam:  Vitals: There were no vitals taken for this visit.   SKIN: Waist up skin excluding the genitalia areas was performed. The exam included the scalp, face, neck, bilateral arms, chest, back, abdomen.   - Fagan II.  - The left upper back x2 demonstrates waxy papule(s) with an erythematous base, consistent with inflamed seborrheic keratoses.   - Pink macule(s) with gritty scale involving the scalp x2, forehead x2, left cheek x2, right cheek x3, left hand x1, right hand x1, consistent with actinic keratosis.   - Involving the left cheek is a 0.7 x 0.9 cm pink plaque with adherent scale.   - Multiple tan/brown macules and papules scattered throughout exam, consistent with benign nevi. No concerning features on dermoscopy.   - Scattered tan, homogenous macules scattered on sun exposed skin, consistent with solar lentigines.   - Scattered waxy, stuck on appearing papules and patches, consistent with seborrheic keratoses.  - Several 1-2 mm red dome shaped symmetric papules, consistent with cherry angiomas.     - No other lesions of concern on areas examined.     Medications:  Current Outpatient Medications   Medication Sig Dispense Refill     acetaminophen (TYLENOL) 325 MG tablet Take  2 tablets (650 mg) by mouth every 4 hours as needed for mild pain 50 tablet 0     allopurinol (ZYLOPRIM) 100 MG tablet Take 1 tablet (100 mg) by mouth daily. 90 tablet 0     calcipotriene (DOVONOX) 0.005 % external cream Apply topically 2 times daily 60 g 1     capsaicin (ZOSTRIX) 0.025 % external cream Apply 1 g topically 3 times daily as needed (left arm pain) 60 g 0     diclofenac (VOLTAREN) 1 % topical gel Apply 2 g topically 4 times daily 50 g 1     fluorouracil (EFUDEX) 5 % external cream Apply topically 2 times daily (Patient not taking: Reported on 5/29/2024) 40 g 1     gabapentin (NEURONTIN) 100 MG capsule TAKE 1 TO 2 CAPSULES BY MOUTH AT NIGHT FOR NERVE PAIN 30 capsule 1     levETIRAcetam (KEPPRA) 500 MG tablet Take 1 tablet (500 mg) by mouth 2 times daily 90 tablet 0     losartan (COZAAR) 25 MG tablet Take 0.5 tablets (12.5 mg) by mouth daily 45 tablet 0     metoprolol succinate ER (TOPROL XL) 50 MG 24 hr tablet Take 1 tablet (50 mg) by mouth daily *call for appointment [phone] option #1 - 1 year follow up visit was due around 5/23/2024 90 tablet 0     Multiple Vitamins-Minerals (MULTIVITAMIN ADULTS 50+) TABS Take 1 tablet by mouth daily       PROVIDER DOSED MANAGED WARFARIN, COUMADIN, OUTPATIENT Per coumadin clinic       simvastatin (ZOCOR) 40 MG tablet Take 1 tablet (40 mg) by mouth at bedtime. 90 tablet 0     valACYclovir (VALTREX) 1000 mg tablet Take 1 tablet (1,000 mg) by mouth 3 times daily 21 tablet 3     VITAMIN D, CHOLECALCIFEROL, PO Take 1,000 Units by mouth daily       warfarin ANTICOAGULANT (COUMADIN) 5 MG tablet Take 1.5 tabs on Tues/Thurs/Sat and 1 tablet all other days,  OR AS DIRECTED BY INR CLINIC 105 tablet 1     No current facility-administered medications for this visit.      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[employer], Bi-Ventricular- INT dependent     Endocarditis     S/P AVR     Need for prophylactic antibiotic     Hyperlipidemia with target LDL less than 100     Finger injury     Paroxysmal atrial fibrillation (H)     Long term current use of anticoagulant therapy     Trigger ring finger of left hand     Nocturnal hypoxemia     Neoplasm of uncertain behavior of skin     AK (actinic keratosis)     History of nonmelanoma skin cancer     Seizure (H)     Cardiomyopathy (H)     ICD (implantable cardioverter-defibrillator) battery depletion     Anticoagulated on Coumadin     Anticoagulated     MIC (generalized anxiety disorder)     Mild cognitive impairment     Coronary atherosclerosis     Family history of early CAD     Heart murmur     Lung nodule     Non compliance with medical treatment     Major depressive disorder, recurrent episode, mild (H)     Aneurysm of ascending aorta without rupture (H)     Thrombocytopenia, unspecified (H)     Past Medical History:   Diagnosis Date     Anticoagulated on Coumadin 8/5/2021     BCC (basal cell carcinoma), face 5/16/2013     Bicuspid aortic valve      Chronic systolic heart failure (H)      Coronary atherosclerosis 7/10/2024     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 Tx with Dapto/RIFx 8 weeks.     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 on close of this encounter.      Again, thank you for allowing me to participate in the care of your patient.      Sincerely,    Katelyn Martínez PA-C

## 2024-11-12 NOTE — NURSING NOTE
Lidocaine-epinephrine 1-1:193228 % injection   0.5mL once for one use, starting 11/12/2024 ending 11/12/2024,  2mL disp, R-0, injection  Injected by Gabriela NELSON CMA

## 2024-11-12 NOTE — TELEPHONE ENCOUNTER
simvastatin (ZOCOR) 40 MG tablet     Last Written Prescription Date:  8/5/24  Last Fill Quantity: 90,   # refills: 0        allopurinol (ZYLOPRIM) 100 MG tablet      Last Written Prescription Date:  8/5/24  Last Fill Quantity: 90,   # refills: 0  Last Office Visit : 1/30/23  Future Office visit:  3/12/25 [place]    Routing refill request to provider for review/approval because:  Labs and appt due. Has appt scheduled 3/12/25 with Dr Mays for annual visit, has had multiple appts At [facility] last on 7/10/24. No Dr Mays since 1/30/23.NO URIC ACID FOUND   Simvastatin - LDL overdue ( 1/30/23)  Allopurinol -    Labs done at [facility] 5/10/24:LFT and CBC   LFT =31        WBC  4.3 - 10.8 K/uL 4.4   RBC  4.60 - 6.20 M/uL 3.75 Low    Hemoglobin  14.0 - 18.0 gm/dL 13.1 Low    Hematocrit  40.0 - 54.0 % 38.3 Low    MCV  80 - 100 fL 102 High    MCH  27 - 33 pg 35 High    MCHC  33 - 36 gm/dL 34   RDW  11.5 - 14.5 % 16.0 High    Platelet Count  150 - 400 K/UL 88 Low

## 2024-11-12 NOTE — PROGRESS NOTES
[facility] Dermatology Note  Encounter Date: Nov 12, 2024  Office Visit     Reviewed patients past medical history and pertinent chart review prior to patients visit today.     Dermatology Problem List:  # NUB, left cheek, shave biopsy 11/12/2024     1. 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 Aks - LN  - left forearm, s/p shave bx 05/04/23    - consider field in future  3. Intradermal melanocytic nevus  - right jawline, s/p shave bx 05/04/23   4. Zoster with post-herpetic neuralgia, C7 dermatome  - gabapentin from PCP  5. Dilated pore, R lower back    ____________________________________________    Assessment & Plan:     # Nodular BCC, dorsal nasal tip, bx 04/23/2024  - Order for Mohs placed.     # Neoplasm of uncertain behavior:  left cheek  DDx includes HAK vs SCC. Shave biopsy today.    Procedure Note: Biopsy by shave technique  The risks and benefits of the procedure were described to the patient. These include but are not limited to bleeding, infection, scar, incomplete removal, and non-diagnostic biopsy. Verbal informed consent was obtained. The above site(s) was cleansed with an alcohol pad and injected with 1% lidocaine with epinephrine. Once anesthesia was obtained, a biopsy(ies) was performed with Gilette blade. The tissue(s) was placed in a labeled container(s) with formalin and sent to pathology. Hemostasis was achieved with aluminum chloride. Vaseline and a bandage were applied to the wound(s). The patient tolerated the procedure well and was given post biopsy care instructions.    #Actinic keratosis x11  - We discussed the precancerous nature of the skin lesions.  I recommended liquid nitrogen cryotherapy and the patient was agreeable.      Cryotherapy  procedure note, location(s): scalp x2, forehead x2, left cheek x2, right cheek x3, left hand x1, right hand x1 After verbal consent and discussion of risks and benefits including, but not limited to, dyspigmentation/scar, blister, and pain, the lesion(s) was(were) treated with 1-2 mm freeze border for 1-2 cycles with liquid nitrogen. Post cryotherapy instructions were provided.    # Inflamed seborrheic keratoses x2  The benign nature of the skin lesion(s) was discussed with the patient.  Due to the inflamed and irritated nature of the lesions I recommend cryotherapy and the patient was agreeable.      Cryotherapy procedure note, location(s): left upper back x2 After verbal consent and discussion of risks and benefits including, but not limited to, dyspigmentation/scar, blister, and pain, the lesion(s) was(were) treated with 1-2 mm freeze border for 1-2 cycles with liquid nitrogen. Post cryotherapy instructions were provided.    # Personal history of nonmelanoma skin cancer  - No signs of recurrence. Continued observation recommended.   - Sun protection: Counseled SPF 30+ sunscreen, UPF clothing, sun avoidance, tanning bed avoidance.    # Multiple nevi, trunk and extremities  # Solar lentigines  - No concerning features on dermoscopy. We discussed the importance of self exams at home. ABCDE criteria and importance of photoprotection reviewed.     # Cherry angiomas  # Seborrheic keratoses  - We discussed the benign nature of the skin lesions. No treatment required. Continued observation recommended. Follow up with any concerns.      Follow-up:  6 months for follow up full body skin exam, as needed for new or changing lesions or new concerns    All risks, benefits and alternatives were discussed with patient.  Patient is in agreement and understands the assessment and plan.  All questions were answered.  Katelyn Martínez PA-C  [facility] Dermatology    ____________________________________________    CC: Skin  Check (Brooks is here today for a skin check on the upper top of the body)    HPI:  Mr. Brooks Summers is a(n) 79 year old male who presents today as a return patient for a full body skin cancer screening. The patient has a history of nonmelanoma skin cancer. Today, the patient reports itchy lesions on the left upper back. No other cutaneous concerns. The patient reports trying to be diligent with photoprotection.      Physical Exam:  Vitals: There were no vitals taken for this visit.   SKIN: Waist up skin excluding the genitalia areas was performed. The exam included the scalp, face, neck, bilateral arms, chest, back, abdomen.   - Fagan II.  - The left upper back x2 demonstrates waxy papule(s) with an erythematous base, consistent with inflamed seborrheic keratoses.   - Pink macule(s) with gritty scale involving the scalp x2, forehead x2, left cheek x2, right cheek x3, left hand x1, right hand x1, consistent with actinic keratosis.   - Involving the left cheek is a 0.7 x 0.9 cm pink plaque with adherent scale.   - Multiple tan/brown macules and papules scattered throughout exam, consistent with benign nevi. No concerning features on dermoscopy.   - Scattered tan, homogenous macules scattered on sun exposed skin, consistent with solar lentigines.   - Scattered waxy, stuck on appearing papules and patches, consistent with seborrheic keratoses.  - Several 1-2 mm red dome shaped symmetric papules, consistent with cherry angiomas.     - No other lesions of concern on areas examined.     Medications:  Current Outpatient Medications   Medication Sig Dispense Refill    acetaminophen (TYLENOL) 325 MG tablet Take 2 tablets (650 mg) by mouth every 4 hours as needed for mild pain 50 tablet 0    allopurinol (ZYLOPRIM) 100 MG tablet Take 1 tablet (100 mg) by mouth daily. 90 tablet 0    calcipotriene (DOVONOX) 0.005 % external cream Apply topically 2 times daily 60 g 1    capsaicin (ZOSTRIX) 0.025 % external cream Apply 1 g  topically 3 times daily as needed (left arm pain) 60 g 0    diclofenac (VOLTAREN) 1 % topical gel Apply 2 g topically 4 times daily 50 g 1    fluorouracil (EFUDEX) 5 % external cream Apply topically 2 times daily (Patient not taking: Reported on 5/29/2024) 40 g 1    gabapentin (NEURONTIN) 100 MG capsule TAKE 1 TO 2 CAPSULES BY MOUTH AT NIGHT FOR NERVE PAIN 30 capsule 1    levETIRAcetam (KEPPRA) 500 MG tablet Take 1 tablet (500 mg) by mouth 2 times daily 90 tablet 0    losartan (COZAAR) 25 MG tablet Take 0.5 tablets (12.5 mg) by mouth daily 45 tablet 0    metoprolol succinate ER (TOPROL XL) 50 MG 24 hr tablet Take 1 tablet (50 mg) by mouth daily *call for appointment [phone] option #1 - 1 year follow up visit was due around 5/23/2024 90 tablet 0    Multiple Vitamins-Minerals (MULTIVITAMIN ADULTS 50+) TABS Take 1 tablet by mouth daily      PROVIDER DOSED MANAGED WARFARIN, COUMADIN, OUTPATIENT Per coumadin clinic      simvastatin (ZOCOR) 40 MG tablet Take 1 tablet (40 mg) by mouth at bedtime. 90 tablet 0    valACYclovir (VALTREX) 1000 mg tablet Take 1 tablet (1,000 mg) by mouth 3 times daily 21 tablet 3    VITAMIN D, CHOLECALCIFEROL, PO Take 1,000 Units by mouth daily      warfarin ANTICOAGULANT (COUMADIN) 5 MG tablet Take 1.5 tabs on Tues/Thurs/Sat and 1 tablet all other days,  OR AS DIRECTED BY INR CLINIC 105 tablet 1     No current facility-administered medications for this visit.      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[employer], Bi-Ventricular- INT dependent     Endocarditis    S/P AVR    Need for prophylactic antibiotic    Hyperlipidemia with target LDL less than 100    Finger injury    Paroxysmal atrial fibrillation (H)    Long term current use of anticoagulant therapy    Trigger ring finger of left hand    Nocturnal hypoxemia    Neoplasm of uncertain behavior of skin    AK (actinic keratosis)    History of nonmelanoma skin cancer    Seizure (H)    Cardiomyopathy (H)    ICD (implantable cardioverter-defibrillator) battery depletion    Anticoagulated on Coumadin    Anticoagulated    MIC (generalized anxiety disorder)    Mild cognitive impairment    Coronary atherosclerosis    Family history of early CAD    Heart murmur    Lung nodule    Non compliance with medical treatment    Major depressive disorder, recurrent episode, mild (H)    Aneurysm of ascending aorta without rupture (H)    Thrombocytopenia, unspecified (H)     Past Medical History:   Diagnosis Date    Anticoagulated on Coumadin 8/5/2021    BCC (basal cell carcinoma), face 5/16/2013    Bicuspid aortic valve     Chronic systolic heart failure (H)     Coronary atherosclerosis 7/10/2024    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 Tx with Dapto/RIFx 8 weeks.    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 on close of this encounter.

## 2024-11-12 NOTE — PATIENT INSTRUCTIONS
Wound Care After a Biopsy    What is a skin biopsy?  A skin biopsy allows the doctor to examine a very small piece of tissue under the microscope to determine the diagnosis and the best treatment for the skin condition. A local anesthetic (numbing medicine) is injected with a very small needle into the skin area to be tested. A small piece of skin is taken from the area. Sometimes a suture (stitch) is used.     What are the risks of a skin biopsy?  I will experience scar, bleeding, swelling, pain, crusting and redness. I may experience incomplete removal or recurrence. Risks of this procedure are excessive bleeding, bruising, infection, nerve damage, numbness, thick (hypertrophic or keloidal) scar and non-diagnostic biopsy.    How should I care for my wound for the first 24 hours?  Keep the wound dry and covered for 24 hours  If it bleeds, hold direct pressure on the area for 15 minutes. If bleeding does not stop, call us or go to the emergency room  Avoid strenuous exercise the first 1-2 days or as your doctor instructs you    How should I care for the wound after 24 hours?  After 24 hours, remove the bandage  You may bathe or shower as normal  If you had a scalp biopsy, you can shampoo as usual and can use shower water to clean the biopsy site daily  Clean the wound once a day with gentle soap and water  Do not scrub, be gentle  Apply white petroleum/Vaseline after cleaning the wound with a cotton swab or a clean finger, and keep the site covered with a Bandaid /bandage. Bandages are not necessary with a scalp biopsy  If you are unable to cover the site with a Bandaid /bandage, re-apply ointment 2-3 times a day to keep the site moist. Moisture will help with healing  Avoid strenuous activity for first 1-2 days  Avoid lakes, rivers, pools, and oceans until the stitches are removed or the site is healed    How do I clean my wound?  Wash hands thoroughly with soap or use hand  before all wound care  Clean  the wound with gentle soap and water  Apply white petroleum/Vaseline  to wound after it is clean  Replace the Bandaid /bandage to keep the wound covered for the first few days or as instructed by your doctor  If you had a scalp biopsy, warm shower water to the area on a daily basis should suffice    What should I use to clean my wound?   Cotton-tipped applicators (Qtips )  White petroleum jelly (Vaseline ). Use a clean new container and use Q-tips to apply.  Bandaids  as needed  Gentle soap     How should I care for my wound long term?  Do not get your wound dirty  Keep up with wound care for one week or until the area is healed.  If you have stitches, stitches need to be removed in 14 days. You may return to our clinic for this or you may have it done locally at your doctor s office.  A small scab will form and fall off by itself when the area is completely healed. The area will be red and will become pink in color as it heals. Sun protection is very important for how your scar will turn out. Sunscreen with an SPF 30 or greater is recommended once the area is healed.  You should have some soreness but it should be mild and slowly go away over several days. Talk to your doctor about using tylenol for pain,    When should I call my doctor?  If you have increased:   Pain or swelling  Pus or drainage (clear or slightly yellow drainage is ok)  Temperature over 100F  Spreading redness or warmth around wound    When will I hear about my results?  The biopsy results can take 2 weeks to come back.  Your results will automatically release to [employer] before your provider has even reviewed them.  The clinic will call you with the results, send you a [employer] message, or have you schedule a follow-up clinic or phone time to discuss the results.  Contact our clinics if you do not hear from us in 2 weeks.    Who should I call with questions?  [facility]: [phone]  [facility]  [facility]: [phone]  For urgent needs outside of business hours call the [facility] at [phone] and ask for the dermatology resident on call

## 2024-11-12 NOTE — NURSING NOTE
Dermatology Rooming Note    Brooks Summers's goals for this visit include:   Chief Complaint   Patient presents with    Skin Check     Brooks is here today for a skin check on the upper top of the body     Gabriela NELSON CMA

## 2024-11-13 LAB
PATH REPORT.COMMENTS IMP SPEC: NORMAL
PATH REPORT.FINAL DX SPEC: NORMAL
PATH REPORT.GROSS SPEC: NORMAL
PATH REPORT.MICROSCOPIC SPEC OTHER STN: NORMAL
PATH REPORT.RELEVANT HX SPEC: NORMAL

## 2024-11-14 ENCOUNTER — TELEPHONE (OUTPATIENT)
Dept: DERMATOLOGY | Facility: CLINIC | Age: 79
End: 2024-11-14
Payer: MEDICARE

## 2024-11-14 NOTE — TELEPHONE ENCOUNTER
losartan (COZAAR) 25 MG tablet 45 tablet 0 8/5/2024     metoprolol succinate ER (TOPROL XL) 50 MG 24 hr tablet 90 tablet 0 8/14/2024     Last Office Visit : 5/23/23  Future Office visit:  0    Refill pended and routed to the provider for review/determination due to   Overdue for follow-up  already given 90-day mauricio refill

## 2024-11-15 RX ORDER — LOSARTAN POTASSIUM 25 MG/1
12.5 TABLET ORAL DAILY
Qty: 45 TABLET | Refills: 0 | OUTPATIENT
Start: 2024-11-15

## 2024-11-15 RX ORDER — METOPROLOL SUCCINATE 50 MG/1
TABLET, EXTENDED RELEASE ORAL
Qty: 90 TABLET | Refills: 0 | OUTPATIENT
Start: 2024-11-15

## 2024-11-18 ENCOUNTER — PRE VISIT (OUTPATIENT)
Dept: DERMATOLOGY | Facility: CLINIC | Age: 79
End: 2024-11-18

## 2024-11-27 ENCOUNTER — MYC REFILL (OUTPATIENT)
Dept: CARDIOLOGY | Facility: CLINIC | Age: 79
End: 2024-11-27
Payer: MEDICARE

## 2024-11-27 DIAGNOSIS — I10 BENIGN ESSENTIAL HYPERTENSION: ICD-10-CM

## 2024-11-27 RX ORDER — LOSARTAN POTASSIUM 25 MG/1
12.5 TABLET ORAL DAILY
Qty: 45 TABLET | Refills: 3 | Status: SHIPPED | OUTPATIENT
Start: 2024-11-27

## 2024-12-15 NOTE — TELEPHONE ENCOUNTER
Called patient to schedule surgery with Dr. Story    Date of Surgery: 01/13    Surgery type: mohs    Consult scheduled: No    Has patient had mohs with us before? Yes    Additional comments: also scheduled 6 mo ODALYS Aguilar on 11/14/2024 at 11:08 AM     36.6

## 2024-12-16 ENCOUNTER — ANTICOAGULATION THERAPY VISIT (OUTPATIENT)
Dept: ANTICOAGULATION | Facility: CLINIC | Age: 79
End: 2024-12-16

## 2024-12-16 ENCOUNTER — LAB (OUTPATIENT)
Dept: LAB | Facility: CLINIC | Age: 79
End: 2024-12-16
Payer: MEDICARE

## 2024-12-16 DIAGNOSIS — Z95.2 S/P AORTIC VALVE REPLACEMENT: Primary | ICD-10-CM

## 2024-12-16 DIAGNOSIS — I48.0 PAROXYSMAL ATRIAL FIBRILLATION (H): ICD-10-CM

## 2024-12-16 DIAGNOSIS — Z79.01 ANTICOAGULATED: ICD-10-CM

## 2024-12-16 DIAGNOSIS — Z95.2 S/P AORTIC VALVE REPLACEMENT: ICD-10-CM

## 2024-12-16 DIAGNOSIS — Z79.01 ANTICOAGULATED ON COUMADIN: ICD-10-CM

## 2024-12-16 DIAGNOSIS — Z79.01 LONG TERM CURRENT USE OF ANTICOAGULANT THERAPY: ICD-10-CM

## 2024-12-16 LAB — INR BLD: 2.9 (ref 0.9–1.1)

## 2024-12-16 PROCEDURE — 85610 PROTHROMBIN TIME: CPT

## 2024-12-16 PROCEDURE — 36416 COLLJ CAPILLARY BLOOD SPEC: CPT

## 2024-12-16 NOTE — PROGRESS NOTES
ANTICOAGULATION MANAGEMENT     Brooks Summers 79 year old male is on warfarin with therapeutic INR result. (Goal INR 2.0-3.0)    Recent labs: (last 7 days)     12/16/24  1307   INR 2.9*       ASSESSMENT     Source(s): Chart Review and Patient/Caregiver Call     Warfarin doses taken: Warfarin taken as instructed  Diet: No new diet changes identified  Medication/supplement changes: None noted  New illness, injury, or hospitalization: No  Signs or symptoms of bleeding or clotting: No  Previous result: Therapeutic last 2(+) visits  Additional findings: Upcoming surgery/procedure MOHS procedure on 1/13/24. Brooks will contact dermatology to discuss his warfarin and aware to call back if any adjustments/holds are needed or if they require an INR prior to the procedure       PLAN     Recommended plan for no diet, medication or health factor changes affecting INR     Dosing Instructions: Continue your current warfarin dose with next INR in 6 weeks unless dermatology requires sooner    Summary  As of 12/16/2024      Full warfarin instructions:  7.5 mg every Tue, Thu, Sat; 5 mg all other days   Next INR check:  1/27/2025               Telephone call with Brooks who verbalizes understanding and agrees to plan    Contact [phone] to schedule and with any changes, questions or concerns.     Education provided: Contact [phone] with any changes, questions or concerns.     Plan made per [facility] anticoagulation protocol    Traci Lindsay RN  12/16/2024  Anticoagulation Clinic  [employer] [email or web address]) for routing messages: p [facility] patient phone line: [phone]        _______________________________________________________________________     Anticoagulation Episode Summary       Current INR goal:  2.0-3.0   TTR:  88.4% (1 y)   Target end date:  Indefinite   Send INR reminders to:  [facility]    Indications    S/P aortic valve replacement [Z95.2]  Paroxysmal atrial fibrillation (H) [I48.0]  Long term current use of  anticoagulant therapy [Z79.01]  Anticoagulated on Coumadin [Z79.01]  Anticoagulated [Z79.01]             Comments:  Aortic 21 mm Johnson Perimount Magna Tissue Valve.             Anticoagulation Care Providers       Provider Role Specialty Phone number    Edin Mays MD Referring Internal Medicine [phone]

## 2025-01-08 ENCOUNTER — ANCILLARY PROCEDURE (OUTPATIENT)
Dept: CARDIOLOGY | Facility: CLINIC | Age: 80
End: 2025-01-08
Attending: INTERNAL MEDICINE
Payer: MEDICARE

## 2025-01-08 DIAGNOSIS — I42.9 CARDIOMYOPATHY (H): ICD-10-CM

## 2025-01-08 PROCEDURE — 93296 REM INTERROG EVL PM/IDS: CPT

## 2025-01-08 PROCEDURE — 93295 DEV INTERROG REMOTE 1/2/MLT: CPT | Performed by: INTERNAL MEDICINE

## 2025-01-14 DIAGNOSIS — M79.2 NERVE PAIN: ICD-10-CM

## 2025-01-14 DIAGNOSIS — B02.8 HERPES ZOSTER WITH OTHER COMPLICATION: ICD-10-CM

## 2025-01-14 RX ORDER — GABAPENTIN 100 MG/1
CAPSULE ORAL
Qty: 30 CAPSULE | Refills: 1 | Status: SHIPPED | OUTPATIENT
Start: 2025-01-14

## 2025-01-14 NOTE — TELEPHONE ENCOUNTER
gabapentin (NEURONTIN) 100 MG capsule       TAKE 1 TO 2 CAPSULES BY MOUTH AT NIGHT FOR NERVE PAIN   Last Written Prescription Date:  6/21/24  Last Fill Quantity: 30,   # refills: 1   Last Office Visit : 1/30/23  Future Office visit:  3/12/25 Davon    Routing refill request to provider for review/approval because:   Refill team is not authorized to refill Gabapentin.  Non-Protocol

## 2025-01-14 NOTE — TELEPHONE ENCOUNTER
Controlled substance refill request notes    Refill request received for: Gabapentin 100 Mg Capsule  [place]  data reviewed 01/14/25:  Medication last refill: 30 tab, 15 day supply, filled/sold to patient on 11/27/2024  Pended order: Gabapentin 100 Mg Capsule, 30 tab, 15 day supply, no delay in fill date     Primary care provider: Edin Mays  Last office visit this department: 1/30/2023  Last virtual visit this department: Visit date not found  Next appointment with PCP: 03/12/2025     Refill request forwarded to provider for review.     Katelyn NELSON LPN  [facility]

## 2025-01-19 LAB
MDC_IDC_LEAD_CONNECTION_STATUS: NORMAL
MDC_IDC_LEAD_IMPLANT_DT: NORMAL
MDC_IDC_LEAD_LOCATION: NORMAL
MDC_IDC_LEAD_LOCATION_DETAIL_1: NORMAL
MDC_IDC_LEAD_MFG: NORMAL
MDC_IDC_LEAD_MODEL: NORMAL
MDC_IDC_LEAD_POLARITY_TYPE: NORMAL
MDC_IDC_LEAD_SERIAL: NORMAL
MDC_IDC_LEAD_SPECIAL_FUNCTION: NORMAL
MDC_IDC_MSMT_BATTERY_DTM: NORMAL
MDC_IDC_MSMT_BATTERY_REMAINING_LONGEVITY: 3 MO
MDC_IDC_MSMT_BATTERY_RRT_TRIGGER: NORMAL
MDC_IDC_MSMT_BATTERY_STATUS: NORMAL
MDC_IDC_MSMT_BATTERY_VOLTAGE: 2.85 V
MDC_IDC_MSMT_CAP_CHARGE_DTM: NORMAL
MDC_IDC_MSMT_CAP_CHARGE_ENERGY: 18 J
MDC_IDC_MSMT_CAP_CHARGE_TIME: 4.5 S
MDC_IDC_MSMT_CAP_CHARGE_TYPE: NORMAL
MDC_IDC_MSMT_LEADCHNL_LV_IMPEDANCE_VALUE: 304 OHM
MDC_IDC_MSMT_LEADCHNL_LV_IMPEDANCE_VALUE: 361 OHM
MDC_IDC_MSMT_LEADCHNL_LV_IMPEDANCE_VALUE: 646 OHM
MDC_IDC_MSMT_LEADCHNL_LV_PACING_THRESHOLD_AMPLITUDE: 2 V
MDC_IDC_MSMT_LEADCHNL_LV_PACING_THRESHOLD_PULSEWIDTH: 1 MS
MDC_IDC_MSMT_LEADCHNL_RA_IMPEDANCE_VALUE: 418 OHM
MDC_IDC_MSMT_LEADCHNL_RA_PACING_THRESHOLD_AMPLITUDE: 0.62 V
MDC_IDC_MSMT_LEADCHNL_RA_PACING_THRESHOLD_PULSEWIDTH: 0.4 MS
MDC_IDC_MSMT_LEADCHNL_RA_SENSING_INTR_AMPL: 1.8 MV
MDC_IDC_MSMT_LEADCHNL_RV_IMPEDANCE_VALUE: 399 OHM
MDC_IDC_MSMT_LEADCHNL_RV_IMPEDANCE_VALUE: 475 OHM
MDC_IDC_MSMT_LEADCHNL_RV_PACING_THRESHOLD_AMPLITUDE: 0.5 V
MDC_IDC_MSMT_LEADCHNL_RV_PACING_THRESHOLD_PULSEWIDTH: 0.4 MS
MDC_IDC_MSMT_LEADCHNL_RV_SENSING_INTR_AMPL: 20.5 MV
MDC_IDC_PG_IMPLANT_DTM: NORMAL
MDC_IDC_PG_MFG: NORMAL
MDC_IDC_PG_MODEL: NORMAL
MDC_IDC_PG_SERIAL: NORMAL
MDC_IDC_PG_TYPE: NORMAL
MDC_IDC_SESS_CLINIC_NAME: NORMAL
MDC_IDC_SESS_DTM: NORMAL
MDC_IDC_SESS_TYPE: NORMAL
MDC_IDC_SET_BRADY_AT_MODE_SWITCH_RATE: 171 {BEATS}/MIN
MDC_IDC_SET_BRADY_LOWRATE: 60 {BEATS}/MIN
MDC_IDC_SET_BRADY_MAX_SENSOR_RATE: 120 {BEATS}/MIN
MDC_IDC_SET_BRADY_MAX_TRACKING_RATE: 130 {BEATS}/MIN
MDC_IDC_SET_BRADY_MODE: NORMAL
MDC_IDC_SET_BRADY_NIGHT_RATE: 50 {BEATS}/MIN
MDC_IDC_SET_BRADY_PAV_DELAY_LOW: 170 MS
MDC_IDC_SET_BRADY_SAV_DELAY_LOW: 100 MS
MDC_IDC_SET_CRT_LVRV_DELAY: 0 MS
MDC_IDC_SET_CRT_PACED_CHAMBERS: NORMAL
MDC_IDC_SET_LEADCHNL_LV_PACING_AMPLITUDE: 3 V
MDC_IDC_SET_LEADCHNL_LV_PACING_ANODE_ELECTRODE_1: NORMAL
MDC_IDC_SET_LEADCHNL_LV_PACING_ANODE_LOCATION_1: NORMAL
MDC_IDC_SET_LEADCHNL_LV_PACING_CAPTURE_MODE: NORMAL
MDC_IDC_SET_LEADCHNL_LV_PACING_CATHODE_ELECTRODE_1: NORMAL
MDC_IDC_SET_LEADCHNL_LV_PACING_CATHODE_LOCATION_1: NORMAL
MDC_IDC_SET_LEADCHNL_LV_PACING_POLARITY: NORMAL
MDC_IDC_SET_LEADCHNL_LV_PACING_PULSEWIDTH: 1 MS
MDC_IDC_SET_LEADCHNL_RA_PACING_AMPLITUDE: 1.5 V
MDC_IDC_SET_LEADCHNL_RA_PACING_ANODE_ELECTRODE_1: NORMAL
MDC_IDC_SET_LEADCHNL_RA_PACING_ANODE_LOCATION_1: NORMAL
MDC_IDC_SET_LEADCHNL_RA_PACING_CAPTURE_MODE: NORMAL
MDC_IDC_SET_LEADCHNL_RA_PACING_CATHODE_ELECTRODE_1: NORMAL
MDC_IDC_SET_LEADCHNL_RA_PACING_CATHODE_LOCATION_1: NORMAL
MDC_IDC_SET_LEADCHNL_RA_PACING_POLARITY: NORMAL
MDC_IDC_SET_LEADCHNL_RA_PACING_PULSEWIDTH: 0.4 MS
MDC_IDC_SET_LEADCHNL_RA_SENSING_ANODE_ELECTRODE_1: NORMAL
MDC_IDC_SET_LEADCHNL_RA_SENSING_ANODE_LOCATION_1: NORMAL
MDC_IDC_SET_LEADCHNL_RA_SENSING_CATHODE_ELECTRODE_1: NORMAL
MDC_IDC_SET_LEADCHNL_RA_SENSING_CATHODE_LOCATION_1: NORMAL
MDC_IDC_SET_LEADCHNL_RA_SENSING_POLARITY: NORMAL
MDC_IDC_SET_LEADCHNL_RA_SENSING_SENSITIVITY: 0.3 MV
MDC_IDC_SET_LEADCHNL_RV_PACING_AMPLITUDE: 2 V
MDC_IDC_SET_LEADCHNL_RV_PACING_ANODE_ELECTRODE_1: NORMAL
MDC_IDC_SET_LEADCHNL_RV_PACING_ANODE_LOCATION_1: NORMAL
MDC_IDC_SET_LEADCHNL_RV_PACING_CAPTURE_MODE: NORMAL
MDC_IDC_SET_LEADCHNL_RV_PACING_CATHODE_ELECTRODE_1: NORMAL
MDC_IDC_SET_LEADCHNL_RV_PACING_CATHODE_LOCATION_1: NORMAL
MDC_IDC_SET_LEADCHNL_RV_PACING_POLARITY: NORMAL
MDC_IDC_SET_LEADCHNL_RV_PACING_PULSEWIDTH: 0.4 MS
MDC_IDC_SET_LEADCHNL_RV_SENSING_ANODE_ELECTRODE_1: NORMAL
MDC_IDC_SET_LEADCHNL_RV_SENSING_ANODE_LOCATION_1: NORMAL
MDC_IDC_SET_LEADCHNL_RV_SENSING_CATHODE_ELECTRODE_1: NORMAL
MDC_IDC_SET_LEADCHNL_RV_SENSING_CATHODE_LOCATION_1: NORMAL
MDC_IDC_SET_LEADCHNL_RV_SENSING_POLARITY: NORMAL
MDC_IDC_SET_LEADCHNL_RV_SENSING_SENSITIVITY: 0.3 MV
MDC_IDC_SET_ZONE_DETECTION_BEATS_DENOMINATOR: 16 {BEATS}
MDC_IDC_SET_ZONE_DETECTION_BEATS_DENOMINATOR: 32 {BEATS}
MDC_IDC_SET_ZONE_DETECTION_BEATS_DENOMINATOR: 40 {BEATS}
MDC_IDC_SET_ZONE_DETECTION_BEATS_NUMERATOR: 16 {BEATS}
MDC_IDC_SET_ZONE_DETECTION_BEATS_NUMERATOR: 30 {BEATS}
MDC_IDC_SET_ZONE_DETECTION_BEATS_NUMERATOR: 32 {BEATS}
MDC_IDC_SET_ZONE_DETECTION_INTERVAL: 320 MS
MDC_IDC_SET_ZONE_DETECTION_INTERVAL: 350 MS
MDC_IDC_SET_ZONE_DETECTION_INTERVAL: 360 MS
MDC_IDC_SET_ZONE_DETECTION_INTERVAL: 420 MS
MDC_IDC_SET_ZONE_STATUS: NORMAL
MDC_IDC_SET_ZONE_TYPE: NORMAL
MDC_IDC_SET_ZONE_VENDOR_TYPE: NORMAL
MDC_IDC_STAT_AT_BURDEN_PERCENT: 0.01 %
MDC_IDC_STAT_AT_DTM_END: NORMAL
MDC_IDC_STAT_AT_DTM_START: NORMAL
MDC_IDC_STAT_BRADY_DTM_END: NORMAL
MDC_IDC_STAT_BRADY_DTM_START: NORMAL
MDC_IDC_STAT_BRADY_RA_PERCENT_PACED: 22.3 %
MDC_IDC_STAT_BRADY_RV_PERCENT_PACED: 95.96 %
MDC_IDC_STAT_CRT_DTM_END: NORMAL
MDC_IDC_STAT_CRT_DTM_START: NORMAL
MDC_IDC_STAT_CRT_LV_PERCENT_PACED: 95.93 %
MDC_IDC_STAT_CRT_PERCENT_PACED: 95.93 %
MDC_IDC_STAT_EPISODE_RECENT_COUNT: 0
MDC_IDC_STAT_EPISODE_RECENT_COUNT_DTM_END: NORMAL
MDC_IDC_STAT_EPISODE_RECENT_COUNT_DTM_START: NORMAL
MDC_IDC_STAT_EPISODE_TOTAL_COUNT: 0
MDC_IDC_STAT_EPISODE_TOTAL_COUNT: 2
MDC_IDC_STAT_EPISODE_TOTAL_COUNT: 4
MDC_IDC_STAT_EPISODE_TOTAL_COUNT_DTM_END: NORMAL
MDC_IDC_STAT_EPISODE_TOTAL_COUNT_DTM_START: NORMAL
MDC_IDC_STAT_EPISODE_TYPE: NORMAL
MDC_IDC_STAT_TACHYTHERAPY_ATP_DELIVERED_RECENT: 0
MDC_IDC_STAT_TACHYTHERAPY_ATP_DELIVERED_TOTAL: 0
MDC_IDC_STAT_TACHYTHERAPY_RECENT_DTM_END: NORMAL
MDC_IDC_STAT_TACHYTHERAPY_RECENT_DTM_START: NORMAL
MDC_IDC_STAT_TACHYTHERAPY_SHOCKS_ABORTED_RECENT: 0
MDC_IDC_STAT_TACHYTHERAPY_SHOCKS_ABORTED_TOTAL: 0
MDC_IDC_STAT_TACHYTHERAPY_SHOCKS_DELIVERED_RECENT: 0
MDC_IDC_STAT_TACHYTHERAPY_SHOCKS_DELIVERED_TOTAL: 0
MDC_IDC_STAT_TACHYTHERAPY_TOTAL_DTM_END: NORMAL
MDC_IDC_STAT_TACHYTHERAPY_TOTAL_DTM_START: NORMAL

## 2025-01-20 ENCOUNTER — TELEPHONE (OUTPATIENT)
Dept: DERMATOLOGY | Facility: CLINIC | Age: 80
End: 2025-01-20
Payer: MEDICARE

## 2025-01-20 NOTE — TELEPHONE ENCOUNTER
[facility] Call Center    Phone Message    May a detailed message be left on voicemail: yes     Reason for Call: Appointment Intake    Referring Provider Name: NA  Diagnosis and/or Symptoms: mohs procedure  Pt recently loss (she passed away) his wife (Nov.) pt has been busy with all the arrangements. Pt would like to see if he is able to get the mohs done prior to March when he is returning his wife to [place]. Please call pt back to discuss and schedule. Thanks     Action Taken: Message routed to:  [facility] Derm    Travel Screening: Not Applicable     Date of Service:

## 2025-01-20 NOTE — TELEPHONE ENCOUNTER
Called patient to schedule surgery with Dr. Story    Date of Surgery: 02/20    Surgery type: Mohs    Consult scheduled: Not Applicable    Has patient had mohs with us before? Yes    Additional comments: would like sooner.       Shweta Aguilar on 1/20/2025 at 3:58 PM

## 2025-01-27 ENCOUNTER — ANTICOAGULATION THERAPY VISIT (OUTPATIENT)
Dept: ANTICOAGULATION | Facility: CLINIC | Age: 80
End: 2025-01-27

## 2025-01-27 ENCOUNTER — LAB (OUTPATIENT)
Dept: LAB | Facility: CLINIC | Age: 80
End: 2025-01-27
Payer: MEDICARE

## 2025-01-27 DIAGNOSIS — D69.6 THROMBOCYTOPENIA, UNSPECIFIED: ICD-10-CM

## 2025-01-27 DIAGNOSIS — N18.2 CKD (CHRONIC KIDNEY DISEASE) STAGE 2, GFR 60-89 ML/MIN: ICD-10-CM

## 2025-01-27 DIAGNOSIS — I48.0 PAROXYSMAL ATRIAL FIBRILLATION (H): ICD-10-CM

## 2025-01-27 DIAGNOSIS — Z95.2 S/P AORTIC VALVE REPLACEMENT: Primary | ICD-10-CM

## 2025-01-27 DIAGNOSIS — Z79.01 ANTICOAGULATED ON COUMADIN: ICD-10-CM

## 2025-01-27 DIAGNOSIS — Z95.2 S/P AORTIC VALVE REPLACEMENT: ICD-10-CM

## 2025-01-27 DIAGNOSIS — E78.5 HYPERLIPIDEMIA WITH TARGET LDL LESS THAN 100: Primary | ICD-10-CM

## 2025-01-27 DIAGNOSIS — I25.10 CORONARY ATHEROSCLEROSIS: ICD-10-CM

## 2025-01-27 DIAGNOSIS — M10.00 IDIOPATHIC GOUT, UNSPECIFIED CHRONICITY, UNSPECIFIED SITE: ICD-10-CM

## 2025-01-27 DIAGNOSIS — Z79.01 ANTICOAGULATED: ICD-10-CM

## 2025-01-27 DIAGNOSIS — Z79.01 LONG TERM CURRENT USE OF ANTICOAGULANT THERAPY: ICD-10-CM

## 2025-01-27 LAB — INR BLD: 2 (ref 0.9–1.1)

## 2025-01-27 PROCEDURE — 85610 PROTHROMBIN TIME: CPT

## 2025-01-27 PROCEDURE — 36416 COLLJ CAPILLARY BLOOD SPEC: CPT

## 2025-01-27 NOTE — PROGRESS NOTES
ANTICOAGULATION MANAGEMENT     Brooks Summers 79 year old male is on warfarin with therapeutic INR result. (Goal INR 2.0-3.0)    Recent labs: (last 7 days)     01/27/25  1311   INR 2.0*       ASSESSMENT     Source(s): Chart Review  Previous INR was Therapeutic last 2(+) visits  Medication, diet, health changes since last INR chart reviewed; none identified  Patient is scheduled for a MOHS procedure on 2/20/25. No interruption of Warfarin recommended.  LVM for patient to call [facility] back.  Need to assess for missed dose(s).      1/28/25  Spoke to Brooks.  He listened to writer's [employer] message yesterday.  Denies any missed doses.  He made an appt for the next INR on 2/13/25.         On March 4th  Brooks with be traveling 2 weeks to [facility] to celebrate his wife's life with her family.    Then March 12th he's traveling to [place], [place] (unsure of duration).  Brooks's and his twin sisters' birthday is March 15th and he will be with her and his brother in law in [place].         PLAN     Recommended plan for no diet, medication or health factor changes affecting INR     Dosing Instructions: Continue your current warfarin dose with next INR in 2 weeks       Summary  As of 1/27/2025      Full warfarin instructions:  7.5 mg every Tue, Thu, Sat; 5 mg all other days   Next INR check:  2/13/2025               Telephone call with Brooks who verbalizes understanding and agrees to plan  Sent [employer] message with dosing and follow up instructions    Lab visit scheduled    Education provided: None required    Plan made per [facility] anticoagulation protocol    Chuck Mirza, RN  1/28/2025  Anticoagulation Clinic  [facility] for routing messages: p [facility] patient phone line: [phone]        _______________________________________________________________________     Anticoagulation Episode Summary       Current INR goal:  2.0-3.0   TTR:  88.4% (1 y)   Target end date:  Indefinite   Send INR reminders to:  [facility]     Indications    S/P aortic valve replacement [Z95.2]  Paroxysmal atrial fibrillation (H) [I48.0]  Long term current use of anticoagulant therapy [Z79.01]  Anticoagulated on Coumadin [Z79.01]  Anticoagulated [Z79.01]             Comments:  Aortic 21 mm Johnson Perimount Magna Tissue Valve.             Anticoagulation Care Providers       Provider Role Specialty Phone number    Edin Mays MD Referring Internal Medicine [phone]                PLAN     1/27/25 Unable to reach Brooks today.    Left message to continue current dose of warfarin 5 mg tonight. Request call back for assessment.    Follow up required to confirm warfarin dose taken and assess for changes and assess for changes     Chuck Mirza, RN  1/27/2025  Anticoagulation Clinic  [facility] for routing messages: madelaine TINAJERO [facility]  [facility] patient phone line: [phone]

## 2025-01-30 DIAGNOSIS — I10 BENIGN ESSENTIAL HYPERTENSION: ICD-10-CM

## 2025-02-04 NOTE — TELEPHONE ENCOUNTER
Last Written Prescription:  metoprolol succinate ER (TOPROL XL) 50 MG 24 hr tablet 60 tablet 0 12/6/2024 -- No   Sig - Route: Take 1 tablet (50 mg) by mouth daily. *call for appointment [phone] option #1 - 1 year follow up visit was due around 5/23/2024 - Oral     ----------------------  Last Visit Date: 5/23/2023  [facility]      Future Visit Date: 0  ----------------------          Refill decision: Medication unable to be refilled by RN due to:       [x]    Pt not seen within past 12 months, No FOV or FOV exceeds timeframe per protocol     []    Compliance - lapse in therapy/gap in refills; No Shows; Cancellations    []    Verification - order discrepancy, clarification needed, Sig modification needed    []    Controlled medication    []    Medication not included in refill protocol policy    []    Abnormal labs/test:    []    Overdue labs/test:      []    Medication not active on Pt's med list    []    Drug interaction Warning    []    Medication - Last script is Reported/Historical/Transitional    []    Advanced refill request    []    Review Needed: New med; Med adjusted within <= 30 days; Safety Alert; Lab monitoring required    []    Other:     Request from pharmacy:  Requested Prescriptions   Pending Prescriptions Disp Refills    metoprolol succinate ER (TOPROL XL) 50 MG 24 hr tablet [Pharmacy Med Name: METOPROLOL ER SUCCINATE 50MG TABS] 60 tablet 0     Sig: TAKE 1 TABLET(50 MG) BY MOUTH DAILY. FOLLOW UP VISIT WAS DUE AROUND 5/23/2024       Beta-Blockers Protocol Failed - 2/4/2025  3:00 PM        Failed - Recent (12 mo) or future (90 days) visit within the authorizing provider's specialty     The patient must have completed an in-person or virtual visit within the past 12 months or has a future visit scheduled within the next 90 days with the authorizing provider s specialty.  Urgent care and e-visits do not qualify as an office visit for this protocol.          Passed - Most  recent blood pressure under 140/90 in past 12 months     BP Readings from Last 3 Encounters:   07/10/24 120/68   06/14/24 118/72   05/29/24 120/68       No data recorded            Passed - Patient is age 6 or older        Passed - Medication is active on med list        Passed - Medication indicated for associated diagnosis     Medication is associated with one or more of the following diagnoses:     Hypertension (HTN)   Atrial fibrillation/flutter   Angina   ASCVD   Migraine   Heart Failure   Tremor   Anxiety   Ocular hypertension   Glaucoma   PTSD   Obstructive hypertrophic cardiomyopathy   History of myocarditis   Palpitations   POTS (postural orthostatic tachycardia syndrome)   SVT (supraventricular tachycardia)   Hyperthyroidism   Tachycardia   Acute non-st segment elevation myocardial infarction   Subsequent non-st segment elevation myocardial infarction   Ischemic myocardial dysfunction

## 2025-02-05 ENCOUNTER — TELEPHONE (OUTPATIENT)
Dept: CARDIOLOGY | Facility: CLINIC | Age: 80
End: 2025-02-05
Payer: MEDICARE

## 2025-02-05 DIAGNOSIS — Z95.2 S/P AVR: ICD-10-CM

## 2025-02-05 DIAGNOSIS — Z95.810 BIVENTRICULAR IMPLANTABLE CARDIOVERTER-DEFIBRILLATOR (ICD) IN SITU: ICD-10-CM

## 2025-02-05 DIAGNOSIS — I42.9 CARDIOMYOPATHY (H): Primary | ICD-10-CM

## 2025-02-05 RX ORDER — METOPROLOL SUCCINATE 50 MG/1
50 TABLET, EXTENDED RELEASE ORAL DAILY
Qty: 90 TABLET | Refills: 0 | Status: SHIPPED | OUTPATIENT
Start: 2025-02-05

## 2025-02-05 NOTE — TELEPHONE ENCOUNTER
Patient confirmed scheduled appointment:  Date: 06/05/25  Time: 1:30pm   Visit type: rtn cardio   Provider: colton   Location:    Testing/imaging: echo   Additional notes: n/a

## 2025-02-13 ENCOUNTER — TELEPHONE (OUTPATIENT)
Dept: DERMATOLOGY | Facility: CLINIC | Age: 80
End: 2025-02-13

## 2025-02-13 ENCOUNTER — ANTICOAGULATION THERAPY VISIT (OUTPATIENT)
Dept: ANTICOAGULATION | Facility: CLINIC | Age: 80
End: 2025-02-13

## 2025-02-13 ENCOUNTER — LAB (OUTPATIENT)
Dept: LAB | Facility: CLINIC | Age: 80
End: 2025-02-13
Payer: MEDICARE

## 2025-02-13 DIAGNOSIS — Z95.2 S/P AORTIC VALVE REPLACEMENT: Primary | ICD-10-CM

## 2025-02-13 DIAGNOSIS — Z95.2 S/P AORTIC VALVE REPLACEMENT: ICD-10-CM

## 2025-02-13 DIAGNOSIS — I48.0 PAROXYSMAL ATRIAL FIBRILLATION (H): ICD-10-CM

## 2025-02-13 DIAGNOSIS — Z79.01 ANTICOAGULATED ON COUMADIN: ICD-10-CM

## 2025-02-13 DIAGNOSIS — Z79.01 ANTICOAGULATED: ICD-10-CM

## 2025-02-13 DIAGNOSIS — Z79.01 LONG TERM CURRENT USE OF ANTICOAGULANT THERAPY: ICD-10-CM

## 2025-02-13 LAB — INR BLD: 2.1 (ref 0.9–1.1)

## 2025-02-13 NOTE — PROGRESS NOTES
ANTICOAGULATION MANAGEMENT     Brooks Summers 79 year old male is on warfarin with therapeutic INR result. (Goal INR 2.0-3.0)    Recent labs: (last 7 days)     02/13/25  1311   INR 2.1*       ASSESSMENT     Source(s): Chart Review and Patient/Caregiver Call     Warfarin doses taken: Warfarin taken as instructed  Diet: No new diet changes identified  Medication/supplement changes: None noted  New illness, injury, or hospitalization: No  Signs or symptoms of bleeding or clotting: No  Previous result: Therapeutic last 2(+) visits  Additional findings:  Brooks will be [place] to [place] his wife's ashes near her family.       PLAN     Recommended plan for no diet, medication or health factor changes affecting INR     Dosing Instructions: Continue your current warfarin dose with next INR in 6 weeks       Summary  As of 2/13/2025      Full warfarin instructions:  7.5 mg every Tue, Thu, Sat; 5 mg all other days   Next INR check:  4/1/2025               Telephone call with Brooks who agrees to plan and repeated back plan correctly    Lab visit scheduled    Education provided: Goal range and lab monitoring: goal range and significance of current result and Importance of following up at instructed interval  Contact [phone] with any changes, questions or concerns.     Plan made per [facility] anticoagulation protocol    TRINI TOMAS RN  2/13/2025  Anticoagulation Clinic  [employer] for routing messages: p [facility] patient phone line: [phone]        _______________________________________________________________________     Anticoagulation Episode Summary       Current INR goal:  2.0-3.0   TTR:  88.4% (1 y)   Target end date:  Indefinite   Send INR reminders to:  [facility]    Indications    S/P aortic valve replacement [Z95.2]  Paroxysmal atrial fibrillation (H) [I48.0]  Long term current use of anticoagulant therapy [Z79.01]  Anticoagulated on Coumadin [Z79.01]  Anticoagulated [Z79.01]              Comments:  Aortic 21 mm Johnson Perimount Magna Tissue Valve.             Anticoagulation Care Providers       Provider Role Specialty Phone number    Edin Mays MD Referring Internal Medicine [phone]

## 2025-02-13 NOTE — TELEPHONE ENCOUNTER
[facility] Call Center    Phone Message    May a detailed message be left on voicemail: no    Reason for Call: Pt needs to reschedule w/Dr. Story. He is leaving out of town for 1 month on 2/17/25. I did cancel that appt. Thank you    Action Taken: Message routed to:  [facility] Derm    Travel Screening: Not Applicable     Date of Service:

## 2025-02-18 ENCOUNTER — TELEPHONE (OUTPATIENT)
Dept: CARDIOLOGY | Facility: CLINIC | Age: 80
End: 2025-02-18
Payer: MEDICARE

## 2025-02-18 DIAGNOSIS — E78.5 HYPERLIPIDEMIA LDL GOAL <100: ICD-10-CM

## 2025-02-18 DIAGNOSIS — M10.00 IDIOPATHIC GOUT, UNSPECIFIED CHRONICITY, UNSPECIFIED SITE: ICD-10-CM

## 2025-02-18 DIAGNOSIS — M79.2 NERVE PAIN: ICD-10-CM

## 2025-02-18 DIAGNOSIS — B02.8 HERPES ZOSTER WITH OTHER COMPLICATION: ICD-10-CM

## 2025-02-18 NOTE — TELEPHONE ENCOUNTER
Date medication is needed: 02/20/2025  Last Written Prescription:    losartan (COZAAR) 25 MG tablet 45 tablet 3 11/27/2024 -- No   Sig - Route: Take 0.5 tablets (12.5 mg) by mouth daily. - Oral     [id number] - [place] [id number] [facility]  AT [facility]     MyChart sent to patient.

## 2025-02-18 NOTE — TELEPHONE ENCOUNTER
[facility] Call Center    Phone Message    May a detailed message be left on voicemail: yes     Reason for Call: Medication Refill Request    Has the patient contacted the pharmacy for the refill? Yes   Name of medication being requested: losartan (COZAAR) 25 MG tablet   Provider who prescribed the medication: Dr. Mckenzie  Pharmacy: [facility] #[id number] [address] AT [facility]    Date medication is needed: 02/20/2025       Action Taken: Other: cardiology    Travel Screening: Not Applicable  Thank you!  Special[facility] Center       Date of Service:

## 2025-02-18 NOTE — TELEPHONE ENCOUNTER
[facility] Call Center    Phone Message    May a detailed message be left on voicemail: yes     Reason for Call: Medication Refill Request    Has the patient contacted the pharmacy for the refill? Yes   Name of medication being requested: warfarin ANTICOAGULANT (COUMADIN) 5 MG tablet [20800] (Order [id number] /allopurinol (ZYLOPRIM) 100 MG tablet [864] (Order [id number] / simvastatin (ZOCOR) 40 MG tablet [86720] (Order [id number] gabapentin (NEURONTIN) 100 MG capsule [6545] (Order [id number])     Provider who prescribed the medication: [facility]  Pharmacy: [facility] #[id number] [address]  AT [facility]   Date medication is needed: asap     Action Taken: Message routed to:  [facility] pcc    Travel Screening: Not Applicable     Date of Service:

## 2025-02-19 DIAGNOSIS — I48.0 PAROXYSMAL ATRIAL FIBRILLATION (H): ICD-10-CM

## 2025-02-19 DIAGNOSIS — Z95.2 S/P AORTIC VALVE REPLACEMENT: ICD-10-CM

## 2025-02-19 DIAGNOSIS — Z79.01 LONG TERM CURRENT USE OF ANTICOAGULANT THERAPY: ICD-10-CM

## 2025-02-19 DIAGNOSIS — Z95.2 S/P AVR (AORTIC VALVE REPLACEMENT): ICD-10-CM

## 2025-02-19 RX ORDER — WARFARIN SODIUM 5 MG/1
TABLET ORAL
Qty: 105 TABLET | Refills: 1 | Status: SHIPPED | OUTPATIENT
Start: 2025-02-19

## 2025-02-19 NOTE — TELEPHONE ENCOUNTER
warfarin ANTICOAGULANT (COUMADIN) 5 MG tablet - pended and sent to [facility] in alternate encounter 2/19/25 per protocol.        Last Written Prescription:     allopurinol (ZYLOPRIM) 100 MG tablet 90 tablet 0 11/12/2024 -- No   Sig - Route: Take 1 tablet (100 mg) by mouth daily. - Oral     simvastatin (ZOCOR) 40 MG tablet 90 tablet 0 11/12/2024 -- No   Sig - Route: Take 1 tablet (40 mg) by mouth at bedtime. - Oral     gabapentin (NEURONTIN) 100 MG capsule 30 capsule 1 1/14/2025 -- No   Sig: TAKE 1 TO 2 CAPSULES BY MOUTH AT NIGHT FOR NERVE PAIN     ----------------------  Last Visit Date: 1/30/23( also saw Anthony Bacon/ Adithya 7/10/24)  Future Visit Date: 3/20/25  ----------------------       [x]  Refill decision: Medication unable to be refilled by RN due to: Overdue labs/test:    Refill team is not authorized to refill Gabapentin.  Allopurinol:  Uric acid due  Has not been seen in 2 years> is scheduled for 3/20/25 with Dr Mays          Request from pharmacy:  Requested Prescriptions   Pending Prescriptions Disp Refills    allopurinol (ZYLOPRIM) 100 MG tablet 90 tablet 0     Sig: Take 1 tablet (100 mg) by mouth daily.       Gout Agents Protocol Failed - 2/19/2025  4:20 PM         CBC done 5/11/24 [facility]  WBC  4.3 - 10.8 K/uL 4.4   RBC  4.60 - 6.20 M/uL 3.75 Low    Hemoglobin  14.0 - 18.0 gm/dL 13.1 Low    Hematocrit  40.0 - 54.0 % 38.3 Low    MCV  80 - 100 fL 102 High    MCH  27 - 33 pg 35 High    MCHC  33 - 36 gm/dL 34   RDW  11.5 - 14.5 % 16.0 High    Platelet Count  150 - 400 K/UL 88 Low                Done 5/10/24 at [facility]/ Care everywhere      ALT  7 - 40 U/L 31           Failed - Has Uric Acid on file in past 12 months and value is less than 6     Recent Labs   Lab Test 01/30/23  1435   URIC 5.3     If level is 6mg/dL or greater, ok to refill one time and refer to provider.           Failed - Recent (12 mo) or future (90 days) visit within the authorizing provider's specialty      The patient must have completed an in-person or virtual visit within the past 12 months or has a future visit scheduled within the next 90 days with the authorizing provider s specialty.  Urgent care and e-visits do not qualify as an office visit for this protocol.          Passed - Medication is active on med list and the sig matches. RN to manually verify dose and sig if red X/fail.     If the protocol passes (green check), you do not need to verify med dose and sig.    A prescription matches if they are the same clinical intention.    For Example: once daily and every morning are the same.    For all fails (red x), verify dose and sig.    If the refill does match what is on file, the RN can still proceed to approve the refill request.     If they do not match, route to the appropriate provider.             Passed - Medication indicated for associated diagnosis     One of the following medications: colchicine is prescribed for one or more of the following conditions:     Amyloidosis   ulcer of mouth   Bechet's syndrome   Pericarditis   Peyronie's disease, psoriasis          Passed - Has GFR on file in past 12 months and most recent value is normal        Passed - Patient is age 18 or older     Refill protocol is for patient's aged 18 years and older            gabapentin (NEURONTIN) 100 MG capsule 30 capsule 1     Sig: TAKE 1 TO 2 CAPSULES BY MOUTH AT NIGHT FOR NERVE PAIN       There is no refill protocol information for this order       simvastatin (ZOCOR) 40 MG tablet 90 tablet 0     Sig: Take 1 tablet (40 mg) by mouth at bedtime.       Antihyperlipidemic agents Failed - 2/19/2025  4:20 PM        Failed - LDL on file in the past 12 months   LDL Cholesterol Calculated   Date Value Ref Range Status   01/30/2023 63 <=100 mg/dL Final   01/31/2020 76 <100 mg/dL Final     Comment:     Desirable:       <100 mg/dl         Failed - Recent (12 mo) or future (90 days) visit within the authorizing provider's specialty      The patient must have completed an in-person or virtual visit within the past 12 months or has a future visit scheduled within the next 90 days with the authorizing provider s specialty.  Urgent care and e-visits do not qualify as an office visit for this protocol.          Passed - Medication is active on med list and the sig matches. RN to manually verify dose and sig if red X/fail.     If the protocol passes (green check), you do not need to verify med dose and sig.    A prescription matches if they are the same clinical intention.    For Example: once daily and every morning are the same.    For all fails (red x), verify dose and sig.    If the refill does match what is on file, the RN can still proceed to approve the refill request.     If they do not match, route to the appropriate provider.             Passed - Patient is age 18 years or older

## 2025-02-20 RX ORDER — ALLOPURINOL 100 MG/1
100 TABLET ORAL DAILY
Qty: 30 TABLET | Refills: 0 | Status: SHIPPED | OUTPATIENT
Start: 2025-02-20

## 2025-02-20 RX ORDER — SIMVASTATIN 40 MG
40 TABLET ORAL AT BEDTIME
Qty: 30 TABLET | Refills: 0 | Status: SHIPPED | OUTPATIENT
Start: 2025-02-20

## 2025-02-20 RX ORDER — GABAPENTIN 100 MG/1
CAPSULE ORAL
Qty: 30 CAPSULE | Refills: 0 | Status: SHIPPED | OUTPATIENT
Start: 2025-02-20

## 2025-04-01 DIAGNOSIS — M79.2 NERVE PAIN: ICD-10-CM

## 2025-04-01 DIAGNOSIS — B02.8 HERPES ZOSTER WITH OTHER COMPLICATION: ICD-10-CM

## 2025-04-01 DIAGNOSIS — E78.5 HYPERLIPIDEMIA LDL GOAL <100: ICD-10-CM

## 2025-04-01 DIAGNOSIS — M10.00 IDIOPATHIC GOUT, UNSPECIFIED CHRONICITY, UNSPECIFIED SITE: ICD-10-CM

## 2025-04-01 NOTE — TELEPHONE ENCOUNTER
Last Written Prescription:  allopurinol (ZYLOPRIM) 100 MG tablet  100 mg, DAILY           Summary: Take 1 tablet (100 mg) by mouth daily., Disp-30 tablet, R-0, E-Prescribe  Dose, Route, Frequency: 100 mg, Oral, DAILYStart: 02/20/2025Ord/Sold: 02/20/2025 (O)Ordered On: 02/20/2025Pharmacy: [email or web address] #[id number] [place] [id number] [facility]: Taking: Long-term: Med Note:              Patient Sig: Take 1 tablet (100 mg) by mouth daily.  Ordering Department: [facility]  Authorized By: Edin Mays MD  Dispense: 30 tablet  Refills: 0 ordered         Gout Agents Protocol Mdeust0804/01/2025 09:44 AM   Protocol Details CBC on file in past 12 months    ALT on file in past 12 months    Has Uric Acid on file in past 12 months and value is less than 6    Recent (12 mo) or future (90 days) visit within the authorizing provider's specialty      Last CBC 2/16/23, Last ALT, Uric Acid  1/30/23    gabapentin (NEURONTIN) 100 MG capsule             Summary: TAKE 1 TO 2 CAPSULES BY MOUTH AT NIGHT FOR NERVE PAIN, Disp-30 capsule, R-0, E-Prescribe  Start: 02/20/2025Ord/Sold: 02/20/2025 (O)Ordered On: 02/20/2025Pharmacy: [facility] #[id number] [place] [id number] [facility] Associated: Taking: Long-term: Med Note:              Patient Sig: TAKE 1 TO 2 CAPSULES BY MOUTH AT NIGHT FOR NERVE PAIN  Ordering Department: [facility]  Authorized By: Edin Mays MD  Dispense: 30 capsule  Refills: 0 ordered       simvastatin (ZOCOR) 40 MG tablet  40 mg, AT BEDTIME           Summary: Take 1 tablet (40 mg) by mouth at bedtime., Disp-30 tablet, R-0, E-Prescribe  Dose, Route, Frequency: 40 mg, Oral, AT BEDTIMEStart: 02/20/2025Ord/Sold: 02/20/2025 (O)Ordered On: 02/20/2025Pharmacy: [email or web address] #[id number] - [place] [id number] [facility]  [facility]  Taking: Long-term: Med Note:              Patient Sig: Take 1 tablet (40 mg) by mouth at bedtime.  [facility]ing Department[facility]  Authorized By: Edin Mays MD  Dispense: 30 tablet  Refills: 0 ordered       Antihyperlipidemic agents Ceaxty9004/01/2025 09:44 AM   Protocol Details LDL on file in the past 12 months    Recent (12 mo) or future (90 days) visit within the authorizing provider's specialty     Recent Labs   Lab Test 01/30/23  1435 07/19/22  0837   CHOL 132 129   HDL 48 38*   LDL 63 65   TRIG 103 130      ----------------------  Last Visit Date: 1/30/23  Future Visit Date: 5/6/25  ----------------------      []  Refill decision: Medication refilled per  Medication Refill in Ambulatory Care  policy.     []  Supervision: no future appointment scheduled. Scheduling has been notified to contact the pt for appointment.      [x]  Refill decision: Medication unable to be refilled by RN due to:     [x]    Pt not seen within past 12 months;  No FOV;  or FOV exceeds timeframe per protocol requirements  []    Compliance - lapse in therapy/gap in refills; No Shows; Cancellations  []    Verification - order discrepancy, clarification needed, Sig modification needed  []    Controlled medication  [x]    Medication not included in refill protocol policy  []    Abnormal labs/test:  [x]    Overdue labs/test:    []    Medication not active on Pt's med list  []    Drug interaction Warning  []    Medication - Last script is Reported/Historical/Transitional  []    Advanced refill request  []    Review Needed: New med; Med adjusted within <= 30 days; Safety Alert; Lab monitoring required  []    Other:     Request from pharmacy:  Requested Prescriptions   Pending Prescriptions Disp Refills    simvastatin (ZOCOR) 40 MG tablet 30 tablet 0     Sig: Take 1 tablet (40 mg) by mouth at bedtime.       Antihyperlipidemic agents Failed - 4/1/2025  9:44 AM        Failed - LDL on file in the past 12 months        Failed -  Recent (12 mo) or future (90 days) visit within the authorizing provider's specialty     The patient must have completed an in-person or virtual visit within the past 12 months or has a future visit scheduled within the next 90 days with the authorizing provider s specialty.  Urgent care and e-visits do not qualify as an office visit for this protocol.          Passed - Medication is active on med list and the sig matches. RN to manually verify dose and sig if red X/fail.     If the protocol passes (green check), you do not need to verify med dose and sig.    A prescription matches if they are the same clinical intention.    For Example: once daily and every morning are the same.    The protocol can not identify upper and lower case letters as matching and will fail.     For Example: Take 1 tablet (50 mg) by mouth daily     TAKE 1 TABLET (50 MG) BY MOUTH DAILY    For all fails (red x), verify dose and sig.    If the refill does match what is on file, the RN can still proceed to approve the refill request.       If they do not match, route to the appropriate provider.             Passed - Patient is age 18 years or older          gabapentin (NEURONTIN) 100 MG capsule 30 capsule 0     Sig: TAKE 1 TO 2 CAPSULES BY MOUTH AT NIGHT FOR NERVE PAIN       There is no refill protocol information for this order       allopurinol (ZYLOPRIM) 100 MG tablet 30 tablet 0     Sig: Take 1 tablet (100 mg) by mouth daily.       Gout Agents Protocol Failed - 4/1/2025  9:44 AM        Failed - CBC on file in past 12 months     Recent Labs   Lab Test 02/16/23  1343   WBC 5.3   RBC 4.25*   HGB 14.1   HCT 43.2   PLT 72*                 Failed - ALT on file in past 12 months     Recent Labs   Lab Test 01/30/23  1435   ALT 33             Failed - Has Uric Acid on file in past 12 months and value is less than 6     Recent Labs   Lab Test 01/30/23  1435   URIC 5.3     If level is 6mg/dL or greater, ok to refill one time and refer to provider.            Failed - Recent (12 mo) or future (90 days) visit within the authorizing provider's specialty     The patient must have completed an in-person or virtual visit within the past 12 months or has a future visit scheduled within the next 90 days with the authorizing provider s specialty.  Urgent care and e-visits do not qualify as an office visit for this protocol.          Passed - Medication is active on med list and the sig matches. RN to manually verify dose and sig if red X/fail.     If the protocol passes (green check), you do not need to verify med dose and sig.    A prescription matches if they are the same clinical intention.    For Example: once daily and every morning are the same.    The protocol can not identify upper and lower case letters as matching and will fail.     For Example: Take 1 tablet (50 mg) by mouth daily     TAKE 1 TABLET (50 MG) BY MOUTH DAILY    For all fails (red x), verify dose and sig.    If the refill does match what is on file, the RN can still proceed to approve the refill request.       If they do not match, route to the appropriate provider.             Passed - Medication indicated for associated diagnosis     One of the following medications: colchicine is prescribed for one or more of the following conditions:     Amyloidosis   ulcer of mouth   Bechet's syndrome   Pericarditis   Peyronie's disease, psoriasis          Passed - Has GFR on file in past 12 months and most recent value is normal        Passed - Patient is age 18 or older     Refill protocol is for patient's aged 18 years and older                 Tasneem B, RN  [facility]/Red Flag Triage & Med Refill Team

## 2025-04-02 RX ORDER — SIMVASTATIN 40 MG
40 TABLET ORAL AT BEDTIME
Qty: 30 TABLET | Refills: 1 | Status: SHIPPED | OUTPATIENT
Start: 2025-04-02

## 2025-04-02 RX ORDER — GABAPENTIN 100 MG/1
CAPSULE ORAL
Qty: 30 CAPSULE | Refills: 1 | Status: SHIPPED | OUTPATIENT
Start: 2025-04-02

## 2025-04-02 RX ORDER — ALLOPURINOL 100 MG/1
100 TABLET ORAL DAILY
Qty: 30 TABLET | Refills: 1 | Status: SHIPPED | OUTPATIENT
Start: 2025-04-02

## 2025-04-03 ENCOUNTER — PATIENT OUTREACH (OUTPATIENT)
Dept: INTERNAL MEDICINE | Facility: CLINIC | Age: 80
End: 2025-04-03

## 2025-04-03 NOTE — TELEPHONE ENCOUNTER
Patient Quality Outreach    Patient is due for the following:   Physical Annual Wellness Visit    Action(s) Taken:   Schedule a Annual Wellness Visit    Type of outreach:    Sent [employer] message.    Questions for provider review:    None         ARNIE, MEDICAL ASSISTANT

## 2025-04-09 ENCOUNTER — ANCILLARY PROCEDURE (OUTPATIENT)
Dept: CARDIOLOGY | Facility: CLINIC | Age: 80
End: 2025-04-09
Attending: INTERNAL MEDICINE
Payer: MEDICARE

## 2025-04-09 DIAGNOSIS — I42.9 CARDIOMYOPATHY (H): ICD-10-CM

## 2025-04-09 PROCEDURE — 93295 DEV INTERROG REMOTE 1/2/MLT: CPT | Performed by: INTERNAL MEDICINE

## 2025-04-09 PROCEDURE — 93296 REM INTERROG EVL PM/IDS: CPT

## 2025-04-10 ENCOUNTER — ANTICOAGULATION THERAPY VISIT (OUTPATIENT)
Dept: ANTICOAGULATION | Facility: CLINIC | Age: 80
End: 2025-04-10

## 2025-04-10 ENCOUNTER — LAB (OUTPATIENT)
Dept: LAB | Facility: CLINIC | Age: 80
End: 2025-04-10
Payer: MEDICARE

## 2025-04-10 DIAGNOSIS — Z95.2 S/P AORTIC VALVE REPLACEMENT: ICD-10-CM

## 2025-04-10 DIAGNOSIS — Z79.01 LONG TERM CURRENT USE OF ANTICOAGULANT THERAPY: ICD-10-CM

## 2025-04-10 DIAGNOSIS — Z79.01 ANTICOAGULATED ON COUMADIN: ICD-10-CM

## 2025-04-10 DIAGNOSIS — Z95.2 S/P AORTIC VALVE REPLACEMENT: Primary | ICD-10-CM

## 2025-04-10 DIAGNOSIS — Z79.01 ANTICOAGULATED: ICD-10-CM

## 2025-04-10 DIAGNOSIS — I48.0 PAROXYSMAL ATRIAL FIBRILLATION (H): ICD-10-CM

## 2025-04-10 LAB
INR BLD: 2.7 (ref 0.9–1.1)
MDC_IDC_EPISODE_DTM: NORMAL
MDC_IDC_EPISODE_DURATION: 3 S
MDC_IDC_EPISODE_ID: 7
MDC_IDC_EPISODE_TYPE: NORMAL
MDC_IDC_LEAD_CONNECTION_STATUS: NORMAL
MDC_IDC_LEAD_IMPLANT_DT: NORMAL
MDC_IDC_LEAD_LOCATION: NORMAL
MDC_IDC_LEAD_LOCATION_DETAIL_1: NORMAL
MDC_IDC_LEAD_MFG: NORMAL
MDC_IDC_LEAD_MODEL: NORMAL
MDC_IDC_LEAD_POLARITY_TYPE: NORMAL
MDC_IDC_LEAD_SERIAL: NORMAL
MDC_IDC_LEAD_SPECIAL_FUNCTION: NORMAL
MDC_IDC_MSMT_BATTERY_DTM: NORMAL
MDC_IDC_MSMT_BATTERY_REMAINING_LONGEVITY: 1 MO
MDC_IDC_MSMT_BATTERY_RRT_TRIGGER: NORMAL
MDC_IDC_MSMT_BATTERY_VOLTAGE: 2.81 V
MDC_IDC_MSMT_CAP_CHARGE_DTM: NORMAL
MDC_IDC_MSMT_CAP_CHARGE_ENERGY: 18 J
MDC_IDC_MSMT_CAP_CHARGE_TIME: 4.8 S
MDC_IDC_MSMT_CAP_CHARGE_TYPE: NORMAL
MDC_IDC_MSMT_LEADCHNL_LV_IMPEDANCE_VALUE: 304 OHM
MDC_IDC_MSMT_LEADCHNL_LV_IMPEDANCE_VALUE: 361 OHM
MDC_IDC_MSMT_LEADCHNL_LV_IMPEDANCE_VALUE: 627 OHM
MDC_IDC_MSMT_LEADCHNL_LV_PACING_THRESHOLD_AMPLITUDE: 2.5 V
MDC_IDC_MSMT_LEADCHNL_LV_PACING_THRESHOLD_PULSEWIDTH: 1 MS
MDC_IDC_MSMT_LEADCHNL_RA_IMPEDANCE_VALUE: 399 OHM
MDC_IDC_MSMT_LEADCHNL_RA_PACING_THRESHOLD_AMPLITUDE: 0.5 V
MDC_IDC_MSMT_LEADCHNL_RA_PACING_THRESHOLD_PULSEWIDTH: 0.4 MS
MDC_IDC_MSMT_LEADCHNL_RA_SENSING_INTR_AMPL: 1.5 MV
MDC_IDC_MSMT_LEADCHNL_RV_IMPEDANCE_VALUE: 361 OHM
MDC_IDC_MSMT_LEADCHNL_RV_IMPEDANCE_VALUE: 437 OHM
MDC_IDC_MSMT_LEADCHNL_RV_PACING_THRESHOLD_AMPLITUDE: 0.5 V
MDC_IDC_MSMT_LEADCHNL_RV_PACING_THRESHOLD_PULSEWIDTH: 0.4 MS
MDC_IDC_MSMT_LEADCHNL_RV_SENSING_INTR_AMPL: 17.9 MV
MDC_IDC_PG_IMPLANT_DTM: NORMAL
MDC_IDC_PG_MFG: NORMAL
MDC_IDC_PG_MODEL: NORMAL
MDC_IDC_PG_SERIAL: NORMAL
MDC_IDC_PG_TYPE: NORMAL
MDC_IDC_SESS_CLINIC_NAME: NORMAL
MDC_IDC_SESS_DTM: NORMAL
MDC_IDC_SESS_TYPE: NORMAL
MDC_IDC_SET_BRADY_AT_MODE_SWITCH_RATE: 171 {BEATS}/MIN
MDC_IDC_SET_BRADY_LOWRATE: 60 {BEATS}/MIN
MDC_IDC_SET_BRADY_MAX_SENSOR_RATE: 120 {BEATS}/MIN
MDC_IDC_SET_BRADY_MAX_TRACKING_RATE: 130 {BEATS}/MIN
MDC_IDC_SET_BRADY_MODE: NORMAL
MDC_IDC_SET_BRADY_NIGHT_RATE: 50 {BEATS}/MIN
MDC_IDC_SET_BRADY_PAV_DELAY_LOW: 170 MS
MDC_IDC_SET_BRADY_SAV_DELAY_LOW: 80 MS
MDC_IDC_SET_CRT_LVRV_DELAY: 0 MS
MDC_IDC_SET_CRT_PACED_CHAMBERS: NORMAL
MDC_IDC_SET_LEADCHNL_LV_PACING_AMPLITUDE: 3.5 V
MDC_IDC_SET_LEADCHNL_LV_PACING_ANODE_ELECTRODE_1: NORMAL
MDC_IDC_SET_LEADCHNL_LV_PACING_ANODE_LOCATION_1: NORMAL
MDC_IDC_SET_LEADCHNL_LV_PACING_CAPTURE_MODE: NORMAL
MDC_IDC_SET_LEADCHNL_LV_PACING_CATHODE_ELECTRODE_1: NORMAL
MDC_IDC_SET_LEADCHNL_LV_PACING_CATHODE_LOCATION_1: NORMAL
MDC_IDC_SET_LEADCHNL_LV_PACING_POLARITY: NORMAL
MDC_IDC_SET_LEADCHNL_LV_PACING_PULSEWIDTH: 1 MS
MDC_IDC_SET_LEADCHNL_RA_PACING_AMPLITUDE: 1.5 V
MDC_IDC_SET_LEADCHNL_RA_PACING_ANODE_ELECTRODE_1: NORMAL
MDC_IDC_SET_LEADCHNL_RA_PACING_ANODE_LOCATION_1: NORMAL
MDC_IDC_SET_LEADCHNL_RA_PACING_CAPTURE_MODE: NORMAL
MDC_IDC_SET_LEADCHNL_RA_PACING_CATHODE_ELECTRODE_1: NORMAL
MDC_IDC_SET_LEADCHNL_RA_PACING_CATHODE_LOCATION_1: NORMAL
MDC_IDC_SET_LEADCHNL_RA_PACING_POLARITY: NORMAL
MDC_IDC_SET_LEADCHNL_RA_PACING_PULSEWIDTH: 0.4 MS
MDC_IDC_SET_LEADCHNL_RA_SENSING_ANODE_ELECTRODE_1: NORMAL
MDC_IDC_SET_LEADCHNL_RA_SENSING_ANODE_LOCATION_1: NORMAL
MDC_IDC_SET_LEADCHNL_RA_SENSING_CATHODE_ELECTRODE_1: NORMAL
MDC_IDC_SET_LEADCHNL_RA_SENSING_CATHODE_LOCATION_1: NORMAL
MDC_IDC_SET_LEADCHNL_RA_SENSING_POLARITY: NORMAL
MDC_IDC_SET_LEADCHNL_RA_SENSING_SENSITIVITY: 0.3 MV
MDC_IDC_SET_LEADCHNL_RV_PACING_AMPLITUDE: 2 V
MDC_IDC_SET_LEADCHNL_RV_PACING_ANODE_ELECTRODE_1: NORMAL
MDC_IDC_SET_LEADCHNL_RV_PACING_ANODE_LOCATION_1: NORMAL
MDC_IDC_SET_LEADCHNL_RV_PACING_CAPTURE_MODE: NORMAL
MDC_IDC_SET_LEADCHNL_RV_PACING_CATHODE_ELECTRODE_1: NORMAL
MDC_IDC_SET_LEADCHNL_RV_PACING_CATHODE_LOCATION_1: NORMAL
MDC_IDC_SET_LEADCHNL_RV_PACING_POLARITY: NORMAL
MDC_IDC_SET_LEADCHNL_RV_PACING_PULSEWIDTH: 0.4 MS
MDC_IDC_SET_LEADCHNL_RV_SENSING_ANODE_ELECTRODE_1: NORMAL
MDC_IDC_SET_LEADCHNL_RV_SENSING_ANODE_LOCATION_1: NORMAL
MDC_IDC_SET_LEADCHNL_RV_SENSING_CATHODE_ELECTRODE_1: NORMAL
MDC_IDC_SET_LEADCHNL_RV_SENSING_CATHODE_LOCATION_1: NORMAL
MDC_IDC_SET_LEADCHNL_RV_SENSING_POLARITY: NORMAL
MDC_IDC_SET_LEADCHNL_RV_SENSING_SENSITIVITY: 0.3 MV
MDC_IDC_SET_ZONE_DETECTION_BEATS_DENOMINATOR: 16 {BEATS}
MDC_IDC_SET_ZONE_DETECTION_BEATS_DENOMINATOR: 32 {BEATS}
MDC_IDC_SET_ZONE_DETECTION_BEATS_DENOMINATOR: 40 {BEATS}
MDC_IDC_SET_ZONE_DETECTION_BEATS_NUMERATOR: 16 {BEATS}
MDC_IDC_SET_ZONE_DETECTION_BEATS_NUMERATOR: 30 {BEATS}
MDC_IDC_SET_ZONE_DETECTION_BEATS_NUMERATOR: 32 {BEATS}
MDC_IDC_SET_ZONE_DETECTION_INTERVAL: 320 MS
MDC_IDC_SET_ZONE_DETECTION_INTERVAL: 350 MS
MDC_IDC_SET_ZONE_DETECTION_INTERVAL: 360 MS
MDC_IDC_SET_ZONE_DETECTION_INTERVAL: 420 MS
MDC_IDC_SET_ZONE_STATUS: NORMAL
MDC_IDC_SET_ZONE_TYPE: NORMAL
MDC_IDC_SET_ZONE_VENDOR_TYPE: NORMAL
MDC_IDC_STAT_AT_BURDEN_PERCENT: 0.01 %
MDC_IDC_STAT_AT_DTM_END: NORMAL
MDC_IDC_STAT_AT_DTM_START: NORMAL
MDC_IDC_STAT_BRADY_DTM_END: NORMAL
MDC_IDC_STAT_BRADY_DTM_START: NORMAL
MDC_IDC_STAT_BRADY_RV_PERCENT_PACED: 95.53 %
MDC_IDC_STAT_CRT_DTM_END: NORMAL
MDC_IDC_STAT_CRT_DTM_START: NORMAL
MDC_IDC_STAT_CRT_LV_PERCENT_PACED: 95.51 %
MDC_IDC_STAT_CRT_PERCENT_PACED: 95.51 %
MDC_IDC_STAT_EPISODE_RECENT_COUNT: 0
MDC_IDC_STAT_EPISODE_RECENT_COUNT: 1
MDC_IDC_STAT_EPISODE_RECENT_COUNT_DTM_END: NORMAL
MDC_IDC_STAT_EPISODE_RECENT_COUNT_DTM_START: NORMAL
MDC_IDC_STAT_EPISODE_TOTAL_COUNT: 0
MDC_IDC_STAT_EPISODE_TOTAL_COUNT: 2
MDC_IDC_STAT_EPISODE_TOTAL_COUNT: 5
MDC_IDC_STAT_EPISODE_TOTAL_COUNT_DTM_END: NORMAL
MDC_IDC_STAT_EPISODE_TOTAL_COUNT_DTM_START: NORMAL
MDC_IDC_STAT_EPISODE_TYPE: NORMAL
MDC_IDC_STAT_TACHYTHERAPY_ATP_DELIVERED_RECENT: 0
MDC_IDC_STAT_TACHYTHERAPY_ATP_DELIVERED_TOTAL: 0
MDC_IDC_STAT_TACHYTHERAPY_RECENT_DTM_END: NORMAL
MDC_IDC_STAT_TACHYTHERAPY_RECENT_DTM_START: NORMAL
MDC_IDC_STAT_TACHYTHERAPY_SHOCKS_ABORTED_RECENT: 0
MDC_IDC_STAT_TACHYTHERAPY_SHOCKS_ABORTED_TOTAL: 0
MDC_IDC_STAT_TACHYTHERAPY_SHOCKS_DELIVERED_RECENT: 0
MDC_IDC_STAT_TACHYTHERAPY_SHOCKS_DELIVERED_TOTAL: 0
MDC_IDC_STAT_TACHYTHERAPY_TOTAL_DTM_END: NORMAL
MDC_IDC_STAT_TACHYTHERAPY_TOTAL_DTM_START: NORMAL

## 2025-04-12 LAB
MDC_IDC_EPISODE_DTM: NORMAL
MDC_IDC_EPISODE_DURATION: 3 S
MDC_IDC_EPISODE_ID: 7
MDC_IDC_EPISODE_TYPE: NORMAL
MDC_IDC_LEAD_CONNECTION_STATUS: NORMAL
MDC_IDC_LEAD_IMPLANT_DT: NORMAL
MDC_IDC_LEAD_LOCATION: NORMAL
MDC_IDC_LEAD_LOCATION_DETAIL_1: NORMAL
MDC_IDC_LEAD_MFG: NORMAL
MDC_IDC_LEAD_MODEL: NORMAL
MDC_IDC_LEAD_POLARITY_TYPE: NORMAL
MDC_IDC_LEAD_SERIAL: NORMAL
MDC_IDC_LEAD_SPECIAL_FUNCTION: NORMAL
MDC_IDC_MSMT_BATTERY_DTM: NORMAL
MDC_IDC_MSMT_BATTERY_REMAINING_LONGEVITY: 1 MO
MDC_IDC_MSMT_BATTERY_RRT_TRIGGER: NORMAL
MDC_IDC_MSMT_BATTERY_VOLTAGE: 2.81 V
MDC_IDC_MSMT_CAP_CHARGE_DTM: NORMAL
MDC_IDC_MSMT_CAP_CHARGE_ENERGY: 18 J
MDC_IDC_MSMT_CAP_CHARGE_TIME: 4.8 S
MDC_IDC_MSMT_CAP_CHARGE_TYPE: NORMAL
MDC_IDC_MSMT_LEADCHNL_LV_IMPEDANCE_VALUE: 304 OHM
MDC_IDC_MSMT_LEADCHNL_LV_IMPEDANCE_VALUE: 361 OHM
MDC_IDC_MSMT_LEADCHNL_LV_IMPEDANCE_VALUE: 627 OHM
MDC_IDC_MSMT_LEADCHNL_LV_PACING_THRESHOLD_AMPLITUDE: 2.5 V
MDC_IDC_MSMT_LEADCHNL_LV_PACING_THRESHOLD_PULSEWIDTH: 1 MS
MDC_IDC_MSMT_LEADCHNL_RA_IMPEDANCE_VALUE: 399 OHM
MDC_IDC_MSMT_LEADCHNL_RA_PACING_THRESHOLD_AMPLITUDE: 0.5 V
MDC_IDC_MSMT_LEADCHNL_RA_PACING_THRESHOLD_PULSEWIDTH: 0.4 MS
MDC_IDC_MSMT_LEADCHNL_RA_SENSING_INTR_AMPL: 1.5 MV
MDC_IDC_MSMT_LEADCHNL_RV_IMPEDANCE_VALUE: 361 OHM
MDC_IDC_MSMT_LEADCHNL_RV_IMPEDANCE_VALUE: 437 OHM
MDC_IDC_MSMT_LEADCHNL_RV_PACING_THRESHOLD_AMPLITUDE: 0.5 V
MDC_IDC_MSMT_LEADCHNL_RV_PACING_THRESHOLD_PULSEWIDTH: 0.4 MS
MDC_IDC_MSMT_LEADCHNL_RV_SENSING_INTR_AMPL: 17.9 MV
MDC_IDC_PG_IMPLANT_DTM: NORMAL
MDC_IDC_PG_MFG: NORMAL
MDC_IDC_PG_MODEL: NORMAL
MDC_IDC_PG_SERIAL: NORMAL
MDC_IDC_PG_TYPE: NORMAL
MDC_IDC_SESS_CLINIC_NAME: NORMAL
MDC_IDC_SESS_DTM: NORMAL
MDC_IDC_SESS_TYPE: NORMAL
MDC_IDC_SET_BRADY_AT_MODE_SWITCH_RATE: 171 {BEATS}/MIN
MDC_IDC_SET_BRADY_LOWRATE: 60 {BEATS}/MIN
MDC_IDC_SET_BRADY_MAX_SENSOR_RATE: 120 {BEATS}/MIN
MDC_IDC_SET_BRADY_MAX_TRACKING_RATE: 130 {BEATS}/MIN
MDC_IDC_SET_BRADY_MODE: NORMAL
MDC_IDC_SET_BRADY_NIGHT_RATE: 50 {BEATS}/MIN
MDC_IDC_SET_BRADY_PAV_DELAY_LOW: 170 MS
MDC_IDC_SET_BRADY_SAV_DELAY_LOW: 80 MS
MDC_IDC_SET_CRT_LVRV_DELAY: 0 MS
MDC_IDC_SET_CRT_PACED_CHAMBERS: NORMAL
MDC_IDC_SET_LEADCHNL_LV_PACING_AMPLITUDE: 3.5 V
MDC_IDC_SET_LEADCHNL_LV_PACING_ANODE_ELECTRODE_1: NORMAL
MDC_IDC_SET_LEADCHNL_LV_PACING_ANODE_LOCATION_1: NORMAL
MDC_IDC_SET_LEADCHNL_LV_PACING_CAPTURE_MODE: NORMAL
MDC_IDC_SET_LEADCHNL_LV_PACING_CATHODE_ELECTRODE_1: NORMAL
MDC_IDC_SET_LEADCHNL_LV_PACING_CATHODE_LOCATION_1: NORMAL
MDC_IDC_SET_LEADCHNL_LV_PACING_POLARITY: NORMAL
MDC_IDC_SET_LEADCHNL_LV_PACING_PULSEWIDTH: 1 MS
MDC_IDC_SET_LEADCHNL_RA_PACING_AMPLITUDE: 1.5 V
MDC_IDC_SET_LEADCHNL_RA_PACING_ANODE_ELECTRODE_1: NORMAL
MDC_IDC_SET_LEADCHNL_RA_PACING_ANODE_LOCATION_1: NORMAL
MDC_IDC_SET_LEADCHNL_RA_PACING_CAPTURE_MODE: NORMAL
MDC_IDC_SET_LEADCHNL_RA_PACING_CATHODE_ELECTRODE_1: NORMAL
MDC_IDC_SET_LEADCHNL_RA_PACING_CATHODE_LOCATION_1: NORMAL
MDC_IDC_SET_LEADCHNL_RA_PACING_POLARITY: NORMAL
MDC_IDC_SET_LEADCHNL_RA_PACING_PULSEWIDTH: 0.4 MS
MDC_IDC_SET_LEADCHNL_RA_SENSING_ANODE_ELECTRODE_1: NORMAL
MDC_IDC_SET_LEADCHNL_RA_SENSING_ANODE_LOCATION_1: NORMAL
MDC_IDC_SET_LEADCHNL_RA_SENSING_CATHODE_ELECTRODE_1: NORMAL
MDC_IDC_SET_LEADCHNL_RA_SENSING_CATHODE_LOCATION_1: NORMAL
MDC_IDC_SET_LEADCHNL_RA_SENSING_POLARITY: NORMAL
MDC_IDC_SET_LEADCHNL_RA_SENSING_SENSITIVITY: 0.3 MV
MDC_IDC_SET_LEADCHNL_RV_PACING_AMPLITUDE: 2 V
MDC_IDC_SET_LEADCHNL_RV_PACING_ANODE_ELECTRODE_1: NORMAL
MDC_IDC_SET_LEADCHNL_RV_PACING_ANODE_LOCATION_1: NORMAL
MDC_IDC_SET_LEADCHNL_RV_PACING_CAPTURE_MODE: NORMAL
MDC_IDC_SET_LEADCHNL_RV_PACING_CATHODE_ELECTRODE_1: NORMAL
MDC_IDC_SET_LEADCHNL_RV_PACING_CATHODE_LOCATION_1: NORMAL
MDC_IDC_SET_LEADCHNL_RV_PACING_POLARITY: NORMAL
MDC_IDC_SET_LEADCHNL_RV_PACING_PULSEWIDTH: 0.4 MS
MDC_IDC_SET_LEADCHNL_RV_SENSING_ANODE_ELECTRODE_1: NORMAL
MDC_IDC_SET_LEADCHNL_RV_SENSING_ANODE_LOCATION_1: NORMAL
MDC_IDC_SET_LEADCHNL_RV_SENSING_CATHODE_ELECTRODE_1: NORMAL
MDC_IDC_SET_LEADCHNL_RV_SENSING_CATHODE_LOCATION_1: NORMAL
MDC_IDC_SET_LEADCHNL_RV_SENSING_POLARITY: NORMAL
MDC_IDC_SET_LEADCHNL_RV_SENSING_SENSITIVITY: 0.3 MV
MDC_IDC_SET_ZONE_DETECTION_BEATS_DENOMINATOR: 16 {BEATS}
MDC_IDC_SET_ZONE_DETECTION_BEATS_DENOMINATOR: 32 {BEATS}
MDC_IDC_SET_ZONE_DETECTION_BEATS_DENOMINATOR: 40 {BEATS}
MDC_IDC_SET_ZONE_DETECTION_BEATS_NUMERATOR: 16 {BEATS}
MDC_IDC_SET_ZONE_DETECTION_BEATS_NUMERATOR: 30 {BEATS}
MDC_IDC_SET_ZONE_DETECTION_BEATS_NUMERATOR: 32 {BEATS}
MDC_IDC_SET_ZONE_DETECTION_INTERVAL: 320 MS
MDC_IDC_SET_ZONE_DETECTION_INTERVAL: 350 MS
MDC_IDC_SET_ZONE_DETECTION_INTERVAL: 360 MS
MDC_IDC_SET_ZONE_DETECTION_INTERVAL: 420 MS
MDC_IDC_SET_ZONE_STATUS: NORMAL
MDC_IDC_SET_ZONE_TYPE: NORMAL
MDC_IDC_SET_ZONE_VENDOR_TYPE: NORMAL
MDC_IDC_STAT_AT_BURDEN_PERCENT: 0.01 %
MDC_IDC_STAT_AT_DTM_END: NORMAL
MDC_IDC_STAT_AT_DTM_START: NORMAL
MDC_IDC_STAT_BRADY_DTM_END: NORMAL
MDC_IDC_STAT_BRADY_DTM_START: NORMAL
MDC_IDC_STAT_BRADY_RV_PERCENT_PACED: 95.53 %
MDC_IDC_STAT_CRT_DTM_END: NORMAL
MDC_IDC_STAT_CRT_DTM_START: NORMAL
MDC_IDC_STAT_CRT_LV_PERCENT_PACED: 95.51 %
MDC_IDC_STAT_CRT_PERCENT_PACED: 95.51 %
MDC_IDC_STAT_EPISODE_RECENT_COUNT: 0
MDC_IDC_STAT_EPISODE_RECENT_COUNT: 1
MDC_IDC_STAT_EPISODE_RECENT_COUNT_DTM_END: NORMAL
MDC_IDC_STAT_EPISODE_RECENT_COUNT_DTM_START: NORMAL
MDC_IDC_STAT_EPISODE_TOTAL_COUNT: 0
MDC_IDC_STAT_EPISODE_TOTAL_COUNT: 2
MDC_IDC_STAT_EPISODE_TOTAL_COUNT: 5
MDC_IDC_STAT_EPISODE_TOTAL_COUNT_DTM_END: NORMAL
MDC_IDC_STAT_EPISODE_TOTAL_COUNT_DTM_START: NORMAL
MDC_IDC_STAT_EPISODE_TYPE: NORMAL
MDC_IDC_STAT_TACHYTHERAPY_ATP_DELIVERED_RECENT: 0
MDC_IDC_STAT_TACHYTHERAPY_ATP_DELIVERED_TOTAL: 0
MDC_IDC_STAT_TACHYTHERAPY_RECENT_DTM_END: NORMAL
MDC_IDC_STAT_TACHYTHERAPY_RECENT_DTM_START: NORMAL
MDC_IDC_STAT_TACHYTHERAPY_SHOCKS_ABORTED_RECENT: 0
MDC_IDC_STAT_TACHYTHERAPY_SHOCKS_ABORTED_TOTAL: 0
MDC_IDC_STAT_TACHYTHERAPY_SHOCKS_DELIVERED_RECENT: 0
MDC_IDC_STAT_TACHYTHERAPY_SHOCKS_DELIVERED_TOTAL: 0
MDC_IDC_STAT_TACHYTHERAPY_TOTAL_DTM_END: NORMAL
MDC_IDC_STAT_TACHYTHERAPY_TOTAL_DTM_START: NORMAL

## 2025-04-16 ENCOUNTER — ANCILLARY PROCEDURE (OUTPATIENT)
Dept: CARDIOLOGY | Facility: CLINIC | Age: 80
End: 2025-04-16
Attending: INTERNAL MEDICINE
Payer: MEDICARE

## 2025-04-16 DIAGNOSIS — Z95.2 S/P AVR: ICD-10-CM

## 2025-04-16 DIAGNOSIS — I42.9 CARDIOMYOPATHY (H): ICD-10-CM

## 2025-04-16 DIAGNOSIS — Z95.810 BIVENTRICULAR IMPLANTABLE CARDIOVERTER-DEFIBRILLATOR (ICD) IN SITU: ICD-10-CM

## 2025-04-16 LAB — LVEF ECHO: NORMAL

## 2025-04-16 PROCEDURE — 93306 TTE W/DOPPLER COMPLETE: CPT | Performed by: INTERNAL MEDICINE

## 2025-04-17 ENCOUNTER — OFFICE VISIT (OUTPATIENT)
Dept: CARDIOLOGY | Facility: CLINIC | Age: 80
End: 2025-04-17
Payer: MEDICARE

## 2025-04-17 VITALS
SYSTOLIC BLOOD PRESSURE: 110 MMHG | HEART RATE: 75 BPM | DIASTOLIC BLOOD PRESSURE: 70 MMHG | OXYGEN SATURATION: 97 % | BODY MASS INDEX: 24.07 KG/M2 | WEIGHT: 163 LBS

## 2025-04-17 DIAGNOSIS — Z95.2 S/P AVR: Primary | ICD-10-CM

## 2025-04-17 DIAGNOSIS — I10 BENIGN ESSENTIAL HYPERTENSION: ICD-10-CM

## 2025-04-17 NOTE — PROGRESS NOTES
SUBJECTIVE:  Brooks Summers is a 80 year old male who presents for follow-up.S/P Aortic valve replacement (23 mm Bogdan Johnson PERIMOUNT Magna tissue valve). On 7/14/11. 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  This involved aortic subvalvular debridement, AVR with 21 mm Johnson Perimount magna valve and pericardial patch repair of a large perforation on the anterior mitral leaflet.  Postprocedure patient did extremely well.  Patient also needed biventricular pacing with defibrillator for low LV function.  Subsequently his LV function improved to normal.  Today patient had no cardiac complaints.  He is doing very well from cardiac standpoint.  Unfortunately his wife developed interstitial lung disease secondary to rheumatoid arthritis and she is unable to walk more than 10 feet and also is on continuous home oxygen.  As patient was taking care of her missed last year's appointment.  Unfortunately she passed away in October of last year.  10 days later his father-in-law also passed away at the age of 105 years.       Patient Active Problem List    Diagnosis Date Noted    Coronary atherosclerosis 07/10/2024     Priority: Medium    Family history of early CAD 07/10/2024     Priority: Medium    Heart murmur 07/10/2024     Priority: Medium    Lung nodule 07/10/2024     Priority: Medium    Major depressive disorder, recurrent episode, mild 07/10/2024     Priority: Medium    Aneurysm of ascending aorta without rupture 07/10/2024     Priority: Medium    Thrombocytopenia, unspecified 07/10/2024     Priority: Medium    Non compliance with medical treatment 05/10/2024     Priority: Medium    MIC (generalized anxiety disorder) 08/24/2022     Priority: Medium    Mild cognitive impairment 08/24/2022     Priority: Medium    Anticoagulated 07/19/2022     Priority:  Medium    Anticoagulated on Coumadin 08/05/2021     Priority: Medium    ICD (implantable cardioverter-defibrillator) battery depletion 02/13/2021     Priority: Medium     Added automatically from request for surgery [id number]      Cardiomyopathy (H) 01/18/2021     Priority: Medium     Added automatically from request for surgery [id number]      Seizure (H) 05/10/2020     Priority: Medium    Neoplasm of uncertain behavior of skin 06/08/2019     Priority: Medium    AK (actinic keratosis) 06/08/2019     Priority: Medium    History of nonmelanoma skin cancer 06/08/2019     Priority: Medium    Nocturnal hypoxemia 10/15/2018     Priority: Medium    Trigger ring finger of left hand 11/16/2015     Priority: Medium    Paroxysmal atrial fibrillation (H) 02/25/2015     Priority: Medium    Long term current use of anticoagulant therapy 02/25/2015     Priority: Medium     Problem list name updated by automated process. Provider to review      Finger injury 10/08/2014     Priority: Medium    Hyperlipidemia with target LDL less than 100 10/21/2013     Priority: Medium     Diagnosis updated by automated process. Provider to review and confirm.      Need for prophylactic antibiotic 04/12/2013     Priority: Medium     Aortic valve and past endocarditis       S/P AVR 01/25/2013     Priority: Medium    Endocarditis 01/21/2013     Priority: Medium     Cultures negative but 16S rRNA PCR showed Staph capitis.  Treated with Dapto and RIF x 8 weeks.      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 Provider to review      LBBB (left bundle branch block) 01/28/2012     Priority: Medium     Received CRT-D;  msec      Pneumothorax, left 01/28/2012     Priority: Medium     Observed following CRT-D implant; small. Observe and repeat CXR      Heart failure, chronic systolic (H) 01/28/2012     Priority: Medium      Stable; NYHA classII      Cardiomyopathy, dilated, nonischemic (H) 01/28/2012     Priority: Medium     Mild CAD; EF 15-20%      Dyslipidemia 01/28/2012     Priority: Medium     On Pravavastatin      Aortic valve stenosis, severe 07/14/2011     Priority: Medium    Chronic systolic heart failure (H) 07/14/2011     Priority: Medium    Bicuspid aortic valve 07/14/2011     Priority: Medium    S/P aortic valve replacement 07/14/2011     Priority: Medium    Smoking hx 07/14/2011     Priority: Medium    Rotator cuff (capsule) sprain 02/17/2009     Priority: Medium    Other postprocedural status(V45.89) 02/17/2009     Priority: Medium    .  Current Outpatient Medications   Medication Sig Dispense Refill    acetaminophen (TYLENOL) 325 MG tablet Take 2 tablets (650 mg) by mouth every 4 hours as needed for mild pain 50 tablet 0    allopurinol (ZYLOPRIM) 100 MG tablet Take 1 tablet (100 mg) by mouth daily. 30 tablet 1    calcipotriene (DOVONOX) 0.005 % external cream Apply topically 2 times daily 60 g 1    capsaicin (ZOSTRIX) 0.025 % external cream Apply 1 g topically 3 times daily as needed (left arm pain) 60 g 0    diclofenac (VOLTAREN) 1 % topical gel Apply 2 g topically 4 times daily 50 g 1    gabapentin (NEURONTIN) 100 MG capsule TAKE 1 TO 2 CAPSULES BY MOUTH AT NIGHT FOR NERVE PAIN 30 capsule 1    levETIRAcetam (KEPPRA) 500 MG tablet Take 1 tablet (500 mg) by mouth 2 times daily 90 tablet 0    losartan (COZAAR) 25 MG tablet Take 0.5 tablets (12.5 mg) by mouth daily. 45 tablet 3    metoprolol succinate ER (TOPROL XL) 50 MG 24 hr tablet Take 1 tablet (50 mg) by mouth daily. *call for appointment [phone] option #1 - 1 year follow up visit was due around 5/23/2024 90 tablet 0    Multiple Vitamins-Minerals (MULTIVITAMIN ADULTS 50+) TABS Take 1 tablet by mouth daily      simvastatin (ZOCOR) 40 MG tablet Take 1 tablet (40 mg) by mouth at bedtime. 30 tablet 1    valACYclovir (VALTREX) 1000 mg tablet Take 1 tablet (1,000  mg) by mouth 3 times daily 21 tablet 3    VITAMIN D, CHOLECALCIFEROL, PO Take 1,000 Units by mouth daily      warfarin ANTICOAGULANT (COUMADIN) 5 MG tablet Take 1.5 tabs on Tues/Thurs/Sat and 1 tablet all other days,  OR AS DIRECTED BY INR CLINIC 105 tablet 1    fluorouracil (EFUDEX) 5 % external cream Apply topically 2 times daily (Patient not taking: Reported on 5/29/2024) 40 g 1    PROVIDER DOSED MANAGED WARFARIN, COUMADIN, OUTPATIENT Per coumadin clinic       No current facility-administered medications for this visit.     Past Medical History:   Diagnosis Date    Anticoagulated on Coumadin 8/5/2021    BCC (basal cell carcinoma), face 5/16/2013    Bicuspid aortic valve     Chronic systolic heart failure (H)     Coronary atherosclerosis 7/10/2024    Endocarditis of prosthetic valve Jan 2013    Cultures negative, 16S rRNA PCR showed Staph capitis (a CoNS). Tx with Dapto/RIFx 8 weeks.    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    RE>>LE    Mild cognitive impairment 8/24/2022    Paroxysmal A-fib (H)     Post-op 7/14/2011, 1/26/13    Paroxysmal atrial fibrillation (H) 2/25/2015    Rotator Cuff (Capsule) Sprain and Strain     S/P aortic valve replacement     7/14/2011, re-do 1/25/13 due to endocarditis    Seizure (H) 5/10/2020    Smoking hx     Thrombocytopenia      Past Surgical History:   Procedure Laterality Date    ANESTHESIA CARDIOVERSION  7/18/2011    Procedure:ANESTHESIA CARDIOVERSION; Cardioversion; Surgeon:GENERIC ANESTHESIA PROVIDER; Location:[facility] OR    COLONOSCOPY      COLONOSCOPY N/A 11/19/2018    Procedure: COLONOSCOPY;  Surgeon: Herman Mcginnis MD;  Location: [facility]    CORONARY ANGIOGRAPHY ADULT ORDER      CYSTOSCOPY, BIOPSY URETHRA N/A 4/3/2017    Procedure: CYSTOSCOPY, BIOPSY URETHRA;  Surgeon: Marlena Mora MD;  Location: [facility] OR    ENDOSCOPY UPPER, COLONOSCOPY, COMBINED      EP ICD GENERATOR  REPLACEMENT DUAL N/A 3/10/2021    Procedure: EP ICD GENERATOR CHANGE DUAL;  Surgeon: Mekhi Be MD;  Location:  [facility] CATH [facility]    HC REMOV & REPLACE IMPLT DEFIB PULSE GEN MULT LEAD  12/3/2015         HEMORRHOID SURGERY      HERNIORRHAPHY INGUINAL Left 3/9/2023    Procedure: HERNIORRHAPHY, Left INGUINAL, OPEN;  Surgeon: Emery Bergeron MD;  Location:  OR    IMPLANT AUTOMATIC IMPLANTABLE CARDIOVERTER DEFIBRILLATOR      MOHS MICROGRAPHIC PROCEDURE      ORTHOPEDIC SURGERY      shoulder,right    REDO STERNOTOMY REPLACE VALVE AORTIC  2013    Procedure: REDO STERNOTOMY REPLACE VALVE AORTIC;  Redo Sternotomy, Redo Aortic Valve Replacement on pump oxygenator. Intraoperative Transesophageal Echocardiogram;  Surgeon: Stephanie Hampton MD;  Location:  OR    REPAIR VALVE MITRAL      REPLACE VALVE AORTIC  2011    Procedure:REPLACE VALVE AORTIC; Median Sternotomy, Bioprosthesis,  Aortic Valve replacement on pump with oxygenator. Intra-operative Transesophogeal Echocardiogram.; Surgeon:STEPHANIE HAMPTON; Location: OR    teeth extractions      TRANSPLANT       Allergies   Allergen Reactions    Blood Transfusion Related (Informational Only) Unknown     Patient has a history of a clinically significant antibody against RBC antigens.  A delay in compatible RBCs may occur.     Lisinopril Cough     Social History     Socioeconomic History    Marital status:      Spouse name: Not on file    Number of children: Not on file    Years of education: Not on file    Highest education level: Not on file   Occupational History    Not on file   Tobacco Use    Smoking status: Former     Current packs/day: 0.00     Average packs/day: 1 pack/day for 20.0 years (20.0 ttl pk-yrs)     Types: Cigarettes     Start date: 1969     Quit date: 1989     Years since quittin.3     Passive exposure: Never    Smokeless tobacco: Never   Vaping Use    Vaping status: Never Used   Substance and Sexual Activity     Alcohol use: Yes     Comment: rare    Drug use: No    Sexual activity: Not Currently     Partners: Female     Birth control/protection: None     Comment:    Other Topics Concern    Parent/sibling w/ CABG, MI or angioplasty before 65F 55M? No   Social History Narrative     since 2/1982 2 children 4 grandchildren.    Carpet sales:  Semi retired. Athlete in school, Golf, fish, yardwork     Social Drivers of Health     Financial Resource Strain: Low Risk  (11/6/2023)    Financial Resource Strain     Within the past 12 months, have you or your family members you live with been unable to get utilities (heat, electricity) when it was really needed?: No   Food Insecurity: No Food Insecurity (5/10/2024)    Received from [facility]     Hunger Vital Sign     Worried About Running Out of Food in the Last Year: Never true     Ran Out of Food in the Last Year: Never true   Transportation Needs: No Transportation Needs (5/10/2024)    Received from [facility]     PRAPARE - Transportation     Lack of Transportation (Medical): No     Lack of Transportation (Non-Medical): No   Physical Activity: Not on file   Stress: Not on file   Social Connections: Not on file   Interpersonal Safety: Low Risk  (7/10/2024)    Interpersonal Safety     Do you feel physically and emotionally safe where you currently live?: Yes     Within the past 12 months, have you been hit, slapped, kicked or otherwise physically hurt by someone?: No     Within the past 12 months, have you been humiliated or emotionally abused in other ways by your partner or ex-partner?: No   Housing Stability: Low Risk  (5/10/2024)    Received from [facility]     Housing Stability Vital Sign     Unable to Pay for Housing in the Last Year: No     Number of Places Lived in the Last Year: 1     Unstable Housing in the Last Year: No     Family History   Problem Relation Age of Onset    Cancer Mother 92        stomach, colon     Hypertension Mother     Retinal detachment Mother     C.A.D. Father 68        bypass, carotid     Prostate Cancer Father 70    Heart Disease Father     Depression Brother     Anxiety Disorder Brother     Cancer Brother 64        lung cancer, petroleum    Cancer Brother     Depression Sister     Anxiety Disorder Sister     Glaucoma No family hx of     Macular Degeneration No family hx of     Strabismus No family hx of     Glasses (<9 y/o) No family hx of     Anesthesia Reaction No family hx of     Bleeding Disorder No family hx of     Venous thrombosis No family hx of     Melanoma No family hx of     Skin Cancer No family hx of           REVIEW OF SYSTEMS:  General: negative, fever, chills, night sweats  Skin: negative, acne, rash, and scaling  Eyes: negative, double vision, eye pain, and photophobia  Ears/Nose/Throat: negative, nasal congestion, and purulent rhinorrhea  Respiratory: No dyspnea on exertion, No cough, No hemoptysis, and negative  Cardiovascular: negative, palpitations, tachycardia, irregular heart beat, chest pain, exertional chest pain or pressure, paroxysmal nocturnal dyspnea, dyspnea on exertion, and orthopnea.       OBJECTIVE:  Blood pressure 110/70, pulse 75, weight 73.9 kg (163 lb), SpO2 97%.  General Appearance: healthy, alert, active, and no distress  Head: Normocephalic. No masses, lesions, tenderness or abnormalities  Eyes: conjuctiva clear, PERRL, EOM intact  Ears: External ears normal. Canals clear. TM's normal.  Nose: Nares normal  Mouth: normal  Neck: Supple, no cervical adenopathy, no thyromegaly  Lungs: clear to auscultation  Cardiac: regular rate and rhythm, normal S1 and S2, no ESM>       ASSESSMENT/PLAN:  Patient here for follow-up.  Status post bioprosthetic AVR in 2011.  Bacterial endocarditis of prosthetic valve and redo aortic valve replacement with a bioprosthetic valve, subaortic debridement, pericardial patch closure of perforation of aorto mitral curtain in 2013.  Patient  made an excellent recovery.  He had significant LV dysfunction requiring BiV pacing.  With this and valve surgery his LV function returned to normal.  Currently patient is on warfarin for paroxysmal atrial fibrillation.  Today patient had no cardiac complaints.  He is doing extremely well from cardiac standpoint.  Recent echocardiogram reviewed normal biventricular function.  Normal prosthetic function with a mean gradient of 6 mmHg.  Ascending aorta 4.4 cm.  Patient pacemaker check reviewed.  A-paced 13% of the time and BiV paced 95.5% of the time.  A-fib burden less than 0.1%.  Results were discussed with patient.  He is advised to continue his current medications and return to clinic in 1 year for a follow-up with a repeat echocardiogram.  Total visit duration 30 minutes.  This included face-to-face interview, physical exam, chart review, review of prior echocardiograms, pacemaker check and documentation.

## 2025-04-17 NOTE — NURSING NOTE
"Chief Complaint   Patient presents with    RECHECK     Return general cardiology for Annual       Initial /70 (BP Location: Left arm, Patient Position: Chair, Cuff Size: Adult Regular)   Pulse 75   Wt 73.9 kg (163 lb)   SpO2 97%   BMI 24.07 kg/m   Estimated body mass index is 24.07 kg/m  as calculated from the following:    Height as of 7/10/24: 1.753 m (5' 9\").    Weight as of this encounter: 73.9 kg (163 lb)..  BP completed using cuff size: regular    TA[employer]    "

## 2025-04-17 NOTE — LETTER
4/17/2025      RE: Brooks Summers  [address]  [place] [address]       Dear Colleague,    Thank you for the opportunity to participate in the care of your patient, Brooks Summers, at the [facility]. Please see a copy of my visit note below.       SUBJECTIVE:  Brokos Summers is a 80 year old male who presents for follow-up.S/P Aortic valve replacement (23 mm Bogdan Johnson PERIMOUNT Magna tissue valve). On 7/14/11. 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  This involved aortic subvalvular debridement, AVR with 21 mm Johnson Perimount magna valve and pericardial patch repair of a large perforation on the anterior mitral leaflet.  Postprocedure patient did extremely well.  Patient also needed biventricular pacing with defibrillator for low LV function.  Subsequently his LV function improved to normal.  Today patient had no cardiac complaints.  He is doing very well from cardiac standpoint.  Unfortunately his wife developed interstitial lung disease secondary to rheumatoid arthritis and she is unable to walk more than 10 feet and also is on continuous home oxygen.  As patient was taking care of her missed last year's appointment.  Unfortunately she passed away in October of last year.  10 days later his father-in-law also passed away at the age of 105 years.       Patient Active Problem List    Diagnosis Date Noted     Coronary atherosclerosis 07/10/2024     Priority: Medium     Family history of early CAD 07/10/2024     Priority: Medium     Heart murmur 07/10/2024     Priority: Medium     Lung nodule 07/10/2024     Priority: Medium     Major depressive disorder, recurrent episode, mild 07/10/2024     Priority: Medium     Aneurysm of ascending aorta without  rupture 07/10/2024     Priority: Medium     Thrombocytopenia, unspecified 07/10/2024     Priority: Medium     Non compliance with medical treatment 05/10/2024     Priority: Medium     MIC (generalized anxiety disorder) 08/24/2022     Priority: Medium     Mild cognitive impairment 08/24/2022     Priority: Medium     Anticoagulated 07/19/2022     Priority: Medium     Anticoagulated on Coumadin 08/05/2021     Priority: Medium     ICD (implantable cardioverter-defibrillator) battery depletion 02/13/2021     Priority: Medium     Added automatically from request for surgery [id number]       Cardiomyopathy (H) 01/18/2021     Priority: Medium     Added automatically from request for surgery [id number]       Seizure (H) 05/10/2020     Priority: Medium     Neoplasm of uncertain behavior of skin 06/08/2019     Priority: Medium     AK (actinic keratosis) 06/08/2019     Priority: Medium     History of nonmelanoma skin cancer 06/08/2019     Priority: Medium     Nocturnal hypoxemia 10/15/2018     Priority: Medium     Trigger ring finger of left hand 11/16/2015     Priority: Medium     Paroxysmal atrial fibrillation (H) 02/25/2015     Priority: Medium     Long term current use of anticoagulant therapy 02/25/2015     Priority: Medium     Problem list name updated by automated process. Provider to review       Finger injury 10/08/2014     Priority: Medium     Hyperlipidemia with target LDL less than 100 10/21/2013     Priority: Medium     Diagnosis updated by automated process. Provider to review and confirm.       Need for prophylactic antibiotic 04/12/2013     Priority: Medium     Aortic valve and past endocarditis        S/P AVR 01/25/2013     Priority: Medium     Endocarditis 01/21/2013     Priority: Medium     Cultures negative but 16S rRNA PCR showed Staph capitis.  Treated with Dapto and RIF x 8 weeks.       Automatic implantable cardioverter-defibrillator in situ- [employer]tronic, Bi-Ventricular- INT dependent 07/06/2012     Priority:  Medium     Problem list name updated by automated process. Provider to review       LBBB (left bundle branch block) 01/28/2012     Priority: Medium     Received CRT-D;  msec       Pneumothorax, left 01/28/2012     Priority: Medium     Observed following CRT-D implant; small. Observe and repeat CXR       Heart failure, chronic systolic (H) 01/28/2012     Priority: Medium     Stable; NYHA classII       Cardiomyopathy, dilated, nonischemic (H) 01/28/2012     Priority: Medium     Mild CAD; EF 15-20%       Dyslipidemia 01/28/2012     Priority: Medium     On Pravavastatin       Aortic valve stenosis, severe 07/14/2011     Priority: Medium     Chronic systolic heart failure (H) 07/14/2011     Priority: Medium     Bicuspid aortic valve 07/14/2011     Priority: Medium     S/P aortic valve replacement 07/14/2011     Priority: Medium     Smoking hx 07/14/2011     Priority: Medium     Rotator cuff (capsule) sprain 02/17/2009     Priority: Medium     Other postprocedural status(V45.89) 02/17/2009     Priority: Medium    .  Current Outpatient Medications   Medication Sig Dispense Refill     acetaminophen (TYLENOL) 325 MG tablet Take 2 tablets (650 mg) by mouth every 4 hours as needed for mild pain 50 tablet 0     allopurinol (ZYLOPRIM) 100 MG tablet Take 1 tablet (100 mg) by mouth daily. 30 tablet 1     calcipotriene (DOVONOX) 0.005 % external cream Apply topically 2 times daily 60 g 1     capsaicin (ZOSTRIX) 0.025 % external cream Apply 1 g topically 3 times daily as needed (left arm pain) 60 g 0     diclofenac (VOLTAREN) 1 % topical gel Apply 2 g topically 4 times daily 50 g 1     gabapentin (NEURONTIN) 100 MG capsule TAKE 1 TO 2 CAPSULES BY MOUTH AT NIGHT FOR NERVE PAIN 30 capsule 1     levETIRAcetam (KEPPRA) 500 MG tablet Take 1 tablet (500 mg) by mouth 2 times daily 90 tablet 0     losartan (COZAAR) 25 MG tablet Take 0.5 tablets (12.5 mg) by mouth daily. 45 tablet 3     metoprolol succinate ER (TOPROL XL) 50 MG 24 hr  tablet Take 1 tablet (50 mg) by mouth daily. *call for appointment [phone] option #1 - 1 year follow up visit was due around 5/23/2024 90 tablet 0     Multiple Vitamins-Minerals (MULTIVITAMIN ADULTS 50+) TABS Take 1 tablet by mouth daily       simvastatin (ZOCOR) 40 MG tablet Take 1 tablet (40 mg) by mouth at bedtime. 30 tablet 1     valACYclovir (VALTREX) 1000 mg tablet Take 1 tablet (1,000 mg) by mouth 3 times daily 21 tablet 3     VITAMIN D, CHOLECALCIFEROL, PO Take 1,000 Units by mouth daily       warfarin ANTICOAGULANT (COUMADIN) 5 MG tablet Take 1.5 tabs on Tues/Thurs/Sat and 1 tablet all other days,  OR AS DIRECTED BY INR CLINIC 105 tablet 1     fluorouracil (EFUDEX) 5 % external cream Apply topically 2 times daily (Patient not taking: Reported on 5/29/2024) 40 g 1     PROVIDER DOSED MANAGED WARFARIN, COUMADIN, OUTPATIENT Per coumadin clinic       No current facility-administered medications for this visit.     Past Medical History:   Diagnosis Date     Anticoagulated on Coumadin 8/5/2021     BCC (basal cell carcinoma), face 5/16/2013     Bicuspid aortic valve      Chronic systolic heart failure (H)      Coronary atherosclerosis 7/10/2024     Endocarditis of prosthetic valve Jan 2013    Cultures negative, 16S rRNA PCR showed Staph capitis (a CoNS). Tx with Dapto/RIFx 8 weeks.     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     Mild cognitive impairment 8/24/2022     Paroxysmal A-fib (H)     Post-op 7/14/2011, 1/26/13     Paroxysmal atrial fibrillation (H) 2/25/2015     Rotator Cuff (Capsule) Sprain and Strain      S/P aortic valve replacement     7/14/2011, re-do 1/25/13 due to endocarditis     Seizure (H) 5/10/2020     Smoking hx      Thrombocytopenia      Past Surgical History:   Procedure Laterality Date     ANESTHESIA CARDIOVERSION  7/18/2011    Procedure:ANESTHESIA CARDIOVERSION; Cardioversion;  Surgeon:GENERIC ANESTHESIA PROVIDER; Location:[facility] OR     COLONOSCOPY       COLONOSCOPY N/A 11/19/2018    Procedure: COLONOSCOPY;  Surgeon: Herman Mcginnis MD;  Location:  GI     CORONARY ANGIOGRAPHY ADULT ORDER       CYSTOSCOPY, BIOPSY URETHRA N/A 4/3/2017    Procedure: CYSTOSCOPY, BIOPSY URETHRA;  Surgeon: Marlena Mora MD;  Location:  OR     ENDOSCOPY UPPER, COLONOSCOPY, COMBINED       EP ICD GENERATOR REPLACEMENT DUAL N/A 3/10/2021    Procedure: EP ICD GENERATOR CHANGE DUAL;  Surgeon: Mekhi Be MD;  Location:  HEART CARDIAC CATH LAB     HC REMOV & REPLACE IMPLT DEFIB PULSE GEN MULT LEAD  12/3/2015          HEMORRHOID SURGERY       HERNIORRHAPHY INGUINAL Left 3/9/2023    Procedure: HERNIORRHAPHY, Left INGUINAL, OPEN;  Surgeon: Eemry Bergeron MD;  Location:  OR     IMPLANT AUTOMATIC IMPLANTABLE CARDIOVERTER DEFIBRILLATOR       MOHS MICROGRAPHIC PROCEDURE       ORTHOPEDIC SURGERY      shoulder,right     REDO STERNOTOMY REPLACE VALVE AORTIC  1/25/2013    Procedure: REDO STERNOTOMY REPLACE VALVE AORTIC;  Redo Sternotomy, Redo Aortic Valve Replacement on pump oxygenator. Intraoperative Transesophageal Echocardiogram;  Surgeon: Navin Hampton MD;  Location:  OR     REPAIR VALVE MITRAL  2013     REPLACE VALVE AORTIC  7/14/2011    Procedure:REPLACE VALVE AORTIC; Median Sternotomy, Bioprosthesis,  Aortic Valve replacement on pump with oxygenator. Intra-operative Transesophogeal Echocardiogram.; Surgeon:NAVIN HAMPTON; Location: OR     teeth extractions       TRANSPLANT       Allergies   Allergen Reactions     Blood Transfusion Related (Informational Only) Unknown     Patient has a history of a clinically significant antibody against RBC antigens.  A delay in compatible RBCs may occur.      Lisinopril Cough     Social History     Socioeconomic History     Marital status:      Spouse name: Not on file     Number of children: Not on file     Years of education: Not on file      Highest education level: Not on file   Occupational History     Not on file   Tobacco Use     Smoking status: Former     Current packs/day: 0.00     Average packs/day: 1 pack/day for 20.0 years (20.0 ttl pk-yrs)     Types: Cigarettes     Start date: 1969     Quit date: 1989     Years since quittin.3     Passive exposure: Never     Smokeless tobacco: Never   Vaping Use     Vaping status: Never Used   Substance and Sexual Activity     Alcohol use: Yes     Comment: rare     Drug use: No     Sexual activity: Not Currently     Partners: Female     Birth control/protection: None     Comment:    Other Topics Concern     Parent/sibling w/ CABG, MI or angioplasty before 65F 55M? No   Social History Narrative     since 1982 2 children 4 grandchildren.    Carpet sales:  Semi retired. Athlete in school, Golf, fish, yardwork     Social Drivers of Health     Financial Resource Strain: Low Risk  (2023)    Financial Resource Strain      Within the past 12 months, have you or your family members you live with been unable to get utilities (heat, electricity) when it was really needed?: No   Food Insecurity: No Food Insecurity (5/10/2024)    Received from [facility]     Hunger Vital Sign      Worried About Running Out of Food in the Last Year: Never true      Ran Out of Food in the Last Year: Never true   Transportation Needs: No Transportation Needs (5/10/2024)    Received from [facility] - Transportation      Lack of Transportation (Medical): No      Lack of Transportation (Non-Medical): No   Physical Activity: Not on file   Stress: Not on file   Social Connections: Not on file   Interpersonal Safety: Low Risk  (7/10/2024)    Interpersonal Safety      Do you feel physically and emotionally safe where you currently live?: Yes      Within the past 12 months, have you been hit, slapped, kicked or otherwise physically hurt by someone?: No      Within the past 12  months, have you been humiliated or emotionally abused in other ways by your partner or ex-partner?: No   Housing Stability: Low Risk  (5/10/2024)    Received from [facility]     Housing Stability Vital Sign      Unable to Pay for Housing in the Last Year: No      Number of Places Lived in the Last Year: 1      Unstable Housing in the Last Year: No     Family History   Problem Relation Age of Onset     Cancer Mother 92        stomach, colon     Hypertension Mother      Retinal detachment Mother      C.A.D. Father 68        bypass, carotid      Prostate Cancer Father 70     Heart Disease Father      Depression Brother      Anxiety Disorder Brother      Cancer Brother 64        lung cancer, petroleum     Cancer Brother      Depression Sister      Anxiety Disorder Sister      Glaucoma No family hx of      Macular Degeneration No family hx of      Strabismus No family hx of      Glasses (<9 y/o) No family hx of      Anesthesia Reaction No family hx of      Bleeding Disorder No family hx of      Venous thrombosis No family hx of      Melanoma No family hx of      Skin Cancer No family hx of           REVIEW OF SYSTEMS:  General: negative, fever, chills, night sweats  Skin: negative, acne, rash, and scaling  Eyes: negative, double vision, eye pain, and photophobia  Ears/Nose/Throat: negative, nasal congestion, and purulent rhinorrhea  Respiratory: No dyspnea on exertion, No cough, No hemoptysis, and negative  Cardiovascular: negative, palpitations, tachycardia, irregular heart beat, chest pain, exertional chest pain or pressure, paroxysmal nocturnal dyspnea, dyspnea on exertion, and orthopnea.       OBJECTIVE:  Blood pressure 110/70, pulse 75, weight 73.9 kg (163 lb), SpO2 97%.  General Appearance: healthy, alert, active, and no distress  Head: Normocephalic. No masses, lesions, tenderness or abnormalities  Eyes: conjuctiva clear, PERRL, EOM intact  Ears: External ears normal. Canals clear. TM's normal.  Nose:  Nares normal  Mouth: normal  Neck: Supple, no cervical adenopathy, no thyromegaly  Lungs: clear to auscultation  Cardiac: regular rate and rhythm, normal S1 and S2, no ESM>       ASSESSMENT/PLAN:  Patient here for follow-up.  Status post bioprosthetic AVR in 2011.  Bacterial endocarditis of prosthetic valve and redo aortic valve replacement with a bioprosthetic valve, subaortic debridement, pericardial patch closure of perforation of aorto mitral curtain in 2013.  Patient made an excellent recovery.  He had significant LV dysfunction requiring BiV pacing.  With this and valve surgery his LV function returned to normal.  Currently patient is on warfarin for paroxysmal atrial fibrillation.  Today patient had no cardiac complaints.  He is doing extremely well from cardiac standpoint.  Recent echocardiogram reviewed normal biventricular function.  Normal prosthetic function with a mean gradient of 6 mmHg.  Ascending aorta 4.4 cm.  Patient pacemaker check reviewed.  A-paced 13% of the time and BiV paced 95.5% of the time.  A-fib burden less than 0.1%.  Results were discussed with patient.  He is advised to continue his current medications and return to clinic in 1 year for a follow-up with a repeat echocardiogram.  Total visit duration 30 minutes.  This included face-to-face interview, physical exam, chart review, review of prior echocardiograms, pacemaker check and documentation.      Please do not hesitate to contact me if you have any questions/concerns.     Sincerely,     TED Salgado MD

## 2025-04-17 NOTE — PATIENT INSTRUCTIONS
Thank you for coming to the [facility] [place]; please note the following instructions:    1. Dr. Mckenzie recommends to follow up in 1 year with an echo prior.  The cardiology team will contact you to schedule when the time gets closer.          If you have any questions regarding your visit please contact your care team:     Cardiology  Telephone Number   Selena HOFFMAN., RN  Elaine MILLAN, RN  Nicole HAYWARD, RN  Di SMITH, RMA  Lashay ACEVEDO, TAINA NELSON, [place]  Sadie MILLAN, FAYE Toney., [phone] (option 1)   For scheduling appts:     [phone] (select option 1)       For the Device Clinic (Pacemakers and ICD's)  RN's :  Shahnaz Palencia   During business hours: [phone]    *After business hours:  [phone] (select option 4)      VIRTUAL VISITS: If you have had a virtual visit and have testing needing to be scheduled, please call [phone]. Otherwise please allow 24-48 hours for staff to reach out to assist in scheduling.     Normal test result notifications will be released via Check I'm Here or mailed within 7 business days.  All other test results, will be communicated via telephone once reviewed by your cardiologist.    If you need a medication refill please contact your pharmacy.  Please allow 3 business days for your refill to be completed.    As always, thank you for trusting us with your health care needs!

## 2025-05-12 ENCOUNTER — ANCILLARY PROCEDURE (OUTPATIENT)
Dept: CARDIOLOGY | Facility: CLINIC | Age: 80
End: 2025-05-12
Attending: INTERNAL MEDICINE
Payer: MEDICARE

## 2025-05-12 ENCOUNTER — CARE COORDINATION (OUTPATIENT)
Dept: CARDIOLOGY | Facility: CLINIC | Age: 80
End: 2025-05-12
Payer: MEDICARE

## 2025-05-12 DIAGNOSIS — Z45.02 BIVENTRICULAR IMPLANTABLE CARDIOVERTER-DEFIBRILLATOR (ICD) AT END OF DEVICE LIFE: ICD-10-CM

## 2025-05-12 DIAGNOSIS — Z95.810 BIVENTRICULAR IMPLANTABLE CARDIOVERTER-DEFIBRILLATOR (ICD) IN SITU: Primary | ICD-10-CM

## 2025-05-12 DIAGNOSIS — I42.9 CARDIOMYOPATHY (H): ICD-10-CM

## 2025-05-12 LAB
MDC_IDC_LEAD_CONNECTION_STATUS: NORMAL
MDC_IDC_LEAD_IMPLANT_DT: NORMAL
MDC_IDC_LEAD_LOCATION: NORMAL
MDC_IDC_LEAD_LOCATION_DETAIL_1: NORMAL
MDC_IDC_LEAD_MFG: NORMAL
MDC_IDC_LEAD_MODEL: NORMAL
MDC_IDC_LEAD_POLARITY_TYPE: NORMAL
MDC_IDC_LEAD_SERIAL: NORMAL
MDC_IDC_LEAD_SPECIAL_FUNCTION: NORMAL
MDC_IDC_MSMT_BATTERY_DTM: NORMAL
MDC_IDC_MSMT_BATTERY_REMAINING_LONGEVITY: NORMAL
MDC_IDC_MSMT_BATTERY_RRT_TRIGGER: NORMAL
MDC_IDC_MSMT_BATTERY_STATUS: NORMAL
MDC_IDC_MSMT_BATTERY_VOLTAGE: 2.79 V
MDC_IDC_MSMT_CAP_CHARGE_DTM: NORMAL
MDC_IDC_MSMT_CAP_CHARGE_ENERGY: 18 J
MDC_IDC_MSMT_CAP_CHARGE_TIME: 4.8 S
MDC_IDC_MSMT_CAP_CHARGE_TYPE: NORMAL
MDC_IDC_MSMT_LEADCHNL_LV_IMPEDANCE_VALUE: 304 OHM
MDC_IDC_MSMT_LEADCHNL_LV_IMPEDANCE_VALUE: 399 OHM
MDC_IDC_MSMT_LEADCHNL_LV_IMPEDANCE_VALUE: 665 OHM
MDC_IDC_MSMT_LEADCHNL_LV_PACING_THRESHOLD_AMPLITUDE: 2.38 V
MDC_IDC_MSMT_LEADCHNL_LV_PACING_THRESHOLD_PULSEWIDTH: 1 MS
MDC_IDC_MSMT_LEADCHNL_RA_IMPEDANCE_VALUE: 399 OHM
MDC_IDC_MSMT_LEADCHNL_RA_PACING_THRESHOLD_AMPLITUDE: 0.5 V
MDC_IDC_MSMT_LEADCHNL_RA_PACING_THRESHOLD_PULSEWIDTH: 0.4 MS
MDC_IDC_MSMT_LEADCHNL_RA_SENSING_INTR_AMPL: 1.3 MV
MDC_IDC_MSMT_LEADCHNL_RV_IMPEDANCE_VALUE: 399 OHM
MDC_IDC_MSMT_LEADCHNL_RV_IMPEDANCE_VALUE: 456 OHM
MDC_IDC_MSMT_LEADCHNL_RV_PACING_THRESHOLD_AMPLITUDE: 0.5 V
MDC_IDC_MSMT_LEADCHNL_RV_PACING_THRESHOLD_PULSEWIDTH: 0.4 MS
MDC_IDC_MSMT_LEADCHNL_RV_SENSING_INTR_AMPL: 17.9 MV
MDC_IDC_PG_IMPLANT_DTM: NORMAL
MDC_IDC_PG_MFG: NORMAL
MDC_IDC_PG_MODEL: NORMAL
MDC_IDC_PG_SERIAL: NORMAL
MDC_IDC_PG_TYPE: NORMAL
MDC_IDC_SESS_CLINIC_NAME: NORMAL
MDC_IDC_SESS_DTM: NORMAL
MDC_IDC_SESS_TYPE: NORMAL
MDC_IDC_SET_BRADY_AT_MODE_SWITCH_RATE: 171 {BEATS}/MIN
MDC_IDC_SET_BRADY_LOWRATE: 60 {BEATS}/MIN
MDC_IDC_SET_BRADY_MAX_SENSOR_RATE: 120 {BEATS}/MIN
MDC_IDC_SET_BRADY_MAX_TRACKING_RATE: 130 {BEATS}/MIN
MDC_IDC_SET_BRADY_MODE: NORMAL
MDC_IDC_SET_BRADY_NIGHT_RATE: 50 {BEATS}/MIN
MDC_IDC_SET_BRADY_PAV_DELAY_LOW: 170 MS
MDC_IDC_SET_BRADY_SAV_DELAY_LOW: 100 MS
MDC_IDC_SET_CRT_LVRV_DELAY: 0 MS
MDC_IDC_SET_CRT_PACED_CHAMBERS: NORMAL
MDC_IDC_SET_LEADCHNL_LV_PACING_AMPLITUDE: 4 V
MDC_IDC_SET_LEADCHNL_LV_PACING_ANODE_ELECTRODE_1: NORMAL
MDC_IDC_SET_LEADCHNL_LV_PACING_ANODE_LOCATION_1: NORMAL
MDC_IDC_SET_LEADCHNL_LV_PACING_CAPTURE_MODE: NORMAL
MDC_IDC_SET_LEADCHNL_LV_PACING_CATHODE_ELECTRODE_1: NORMAL
MDC_IDC_SET_LEADCHNL_LV_PACING_CATHODE_LOCATION_1: NORMAL
MDC_IDC_SET_LEADCHNL_LV_PACING_POLARITY: NORMAL
MDC_IDC_SET_LEADCHNL_LV_PACING_PULSEWIDTH: 1 MS
MDC_IDC_SET_LEADCHNL_RA_PACING_AMPLITUDE: 1.5 V
MDC_IDC_SET_LEADCHNL_RA_PACING_ANODE_ELECTRODE_1: NORMAL
MDC_IDC_SET_LEADCHNL_RA_PACING_ANODE_LOCATION_1: NORMAL
MDC_IDC_SET_LEADCHNL_RA_PACING_CAPTURE_MODE: NORMAL
MDC_IDC_SET_LEADCHNL_RA_PACING_CATHODE_ELECTRODE_1: NORMAL
MDC_IDC_SET_LEADCHNL_RA_PACING_CATHODE_LOCATION_1: NORMAL
MDC_IDC_SET_LEADCHNL_RA_PACING_POLARITY: NORMAL
MDC_IDC_SET_LEADCHNL_RA_PACING_PULSEWIDTH: 0.4 MS
MDC_IDC_SET_LEADCHNL_RA_SENSING_ANODE_ELECTRODE_1: NORMAL
MDC_IDC_SET_LEADCHNL_RA_SENSING_ANODE_LOCATION_1: NORMAL
MDC_IDC_SET_LEADCHNL_RA_SENSING_CATHODE_ELECTRODE_1: NORMAL
MDC_IDC_SET_LEADCHNL_RA_SENSING_CATHODE_LOCATION_1: NORMAL
MDC_IDC_SET_LEADCHNL_RA_SENSING_POLARITY: NORMAL
MDC_IDC_SET_LEADCHNL_RA_SENSING_SENSITIVITY: 0.3 MV
MDC_IDC_SET_LEADCHNL_RV_PACING_AMPLITUDE: 2 V
MDC_IDC_SET_LEADCHNL_RV_PACING_ANODE_ELECTRODE_1: NORMAL
MDC_IDC_SET_LEADCHNL_RV_PACING_ANODE_LOCATION_1: NORMAL
MDC_IDC_SET_LEADCHNL_RV_PACING_CAPTURE_MODE: NORMAL
MDC_IDC_SET_LEADCHNL_RV_PACING_CATHODE_ELECTRODE_1: NORMAL
MDC_IDC_SET_LEADCHNL_RV_PACING_CATHODE_LOCATION_1: NORMAL
MDC_IDC_SET_LEADCHNL_RV_PACING_POLARITY: NORMAL
MDC_IDC_SET_LEADCHNL_RV_PACING_PULSEWIDTH: 0.4 MS
MDC_IDC_SET_LEADCHNL_RV_SENSING_ANODE_ELECTRODE_1: NORMAL
MDC_IDC_SET_LEADCHNL_RV_SENSING_ANODE_LOCATION_1: NORMAL
MDC_IDC_SET_LEADCHNL_RV_SENSING_CATHODE_ELECTRODE_1: NORMAL
MDC_IDC_SET_LEADCHNL_RV_SENSING_CATHODE_LOCATION_1: NORMAL
MDC_IDC_SET_LEADCHNL_RV_SENSING_POLARITY: NORMAL
MDC_IDC_SET_LEADCHNL_RV_SENSING_SENSITIVITY: 0.3 MV
MDC_IDC_SET_ZONE_DETECTION_BEATS_DENOMINATOR: 16 {BEATS}
MDC_IDC_SET_ZONE_DETECTION_BEATS_DENOMINATOR: 32 {BEATS}
MDC_IDC_SET_ZONE_DETECTION_BEATS_DENOMINATOR: 40 {BEATS}
MDC_IDC_SET_ZONE_DETECTION_BEATS_NUMERATOR: 16 {BEATS}
MDC_IDC_SET_ZONE_DETECTION_BEATS_NUMERATOR: 30 {BEATS}
MDC_IDC_SET_ZONE_DETECTION_BEATS_NUMERATOR: 32 {BEATS}
MDC_IDC_SET_ZONE_DETECTION_INTERVAL: 320 MS
MDC_IDC_SET_ZONE_DETECTION_INTERVAL: 350 MS
MDC_IDC_SET_ZONE_DETECTION_INTERVAL: 360 MS
MDC_IDC_SET_ZONE_DETECTION_INTERVAL: 420 MS
MDC_IDC_SET_ZONE_STATUS: NORMAL
MDC_IDC_SET_ZONE_TYPE: NORMAL
MDC_IDC_SET_ZONE_VENDOR_TYPE: NORMAL
MDC_IDC_STAT_AT_BURDEN_PERCENT: 0.04 %
MDC_IDC_STAT_AT_DTM_END: NORMAL
MDC_IDC_STAT_AT_DTM_START: NORMAL
MDC_IDC_STAT_BRADY_DTM_END: NORMAL
MDC_IDC_STAT_BRADY_DTM_START: NORMAL
MDC_IDC_STAT_BRADY_RV_PERCENT_PACED: 95.56 %
MDC_IDC_STAT_CRT_DTM_END: NORMAL
MDC_IDC_STAT_CRT_DTM_START: NORMAL
MDC_IDC_STAT_CRT_LV_PERCENT_PACED: 95.53 %
MDC_IDC_STAT_CRT_PERCENT_PACED: 95.53 %
MDC_IDC_STAT_EPISODE_RECENT_COUNT: 0
MDC_IDC_STAT_EPISODE_RECENT_COUNT_DTM_END: NORMAL
MDC_IDC_STAT_EPISODE_RECENT_COUNT_DTM_START: NORMAL
MDC_IDC_STAT_EPISODE_TOTAL_COUNT: 0
MDC_IDC_STAT_EPISODE_TOTAL_COUNT: 2
MDC_IDC_STAT_EPISODE_TOTAL_COUNT: 5
MDC_IDC_STAT_EPISODE_TOTAL_COUNT_DTM_END: NORMAL
MDC_IDC_STAT_EPISODE_TOTAL_COUNT_DTM_START: NORMAL
MDC_IDC_STAT_EPISODE_TYPE: NORMAL
MDC_IDC_STAT_TACHYTHERAPY_ATP_DELIVERED_RECENT: 0
MDC_IDC_STAT_TACHYTHERAPY_ATP_DELIVERED_TOTAL: 0
MDC_IDC_STAT_TACHYTHERAPY_RECENT_DTM_END: NORMAL
MDC_IDC_STAT_TACHYTHERAPY_RECENT_DTM_START: NORMAL
MDC_IDC_STAT_TACHYTHERAPY_SHOCKS_ABORTED_RECENT: 0
MDC_IDC_STAT_TACHYTHERAPY_SHOCKS_ABORTED_TOTAL: 0
MDC_IDC_STAT_TACHYTHERAPY_SHOCKS_DELIVERED_RECENT: 0
MDC_IDC_STAT_TACHYTHERAPY_SHOCKS_DELIVERED_TOTAL: 0
MDC_IDC_STAT_TACHYTHERAPY_TOTAL_DTM_END: NORMAL
MDC_IDC_STAT_TACHYTHERAPY_TOTAL_DTM_START: NORMAL

## 2025-05-12 PROCEDURE — 99207 CARDIAC DEVICE CHECK - REMOTE: CPT | Performed by: INTERNAL MEDICINE

## 2025-05-12 RX ORDER — SODIUM CHLORIDE 9 MG/ML
INJECTION, SOLUTION INTRAVENOUS CONTINUOUS
OUTPATIENT
Start: 2025-05-12

## 2025-05-12 RX ORDER — LIDOCAINE 40 MG/G
CREAM TOPICAL
OUTPATIENT
Start: 2025-05-12

## 2025-05-12 RX ORDER — CEFAZOLIN SODIUM 2 G/50ML
2 SOLUTION INTRAVENOUS
OUTPATIENT
Start: 2025-05-12

## 2025-05-12 NOTE — LETTER
May 12, 2025      TO: Brooks Summers  [address]  [place] [address]         Dear Brooks,    You are scheduled for an ICD generator change, at [facility] [place]. The hospital is located at [address] [place] [address] on the [place] bank of the campus.  If you need to cancel this procedure, please call [phone].     Visitor Policy: Two visitors.    Date:__August 5, 2025___  Time: ___TBD_- will call once it's confirmed___To the Gold Waiting Room at the [facility]    1. Please review the attached instructions on showering before your procedure at the end of this letter.  2. Your history and physical will be completed by our advanced practice provider when you arrive.  3. Do not eat for 8 hours prior to arrival, you can drink water up until 2 hours prior.  4. Medications to continue:  - Anticoagulant (coumadin).  - Take all meds as prescribed, except for those noted below.  5. Medications to hold: None  6. You will likely discharge the same day and need a .    Post-Procedure Instructions  Wound Care  Keep your incision (surgery wound) dry for 3 days.  After 3 days, you may remove the outer bandage.  Keep the strips of tape on.  They will be removed at your clinic visit.  Check for signs of infection each day.  These include increased redness, swelling, drainage or a fever over 101 F (38.3 C).  Call us immediately if you see any of   these signs.  If there are no signs of infection, you may shower in 3 days.  Do not submerge the incision (in a bath tub, hot tub, or swimming pool) until fully healed.  Pain  You may have mild to moderate pain for 3 to 5 days.  Take acetaminophen (Tylenol) or ibuprofen (Advil) for the pain.  Call us if the pain is severe or lasts more than 5 days.  Activity  You should slowly go back to your normal activities after 24 hours.  Healing will take 4 to 6 weeks.  No driving for 24 hours  For 3 days, do not raise your affected arm above your  shoulder  Avoid climbing a ladder alone.  It is best to stay within 4 feet of the ground.  Avoid anything that may cause rough contact or a hard hit to your chest.  This includes football, hockey, and other contact sports.  Follow Up Appointments Date & Time   7-10 day incision check with [place] [facility]  August 11, 2025 at 10am at [facility]   3 month visit with device check & provider November 26, 2025 11am device and 11:40 Ashwini Ruvalcaba PA-C     If you have further questions, please utilize [employer] to contact us.   If your question concerns the above instructions, contact:  Hany Dumont RN   Electrophysiology Nurse Coordinator.  [phone]    If your question concerns the schedule/appointment times, contact:  [facility]   [phone]    Device Clinic (Pacemakers, Defibrillators, Loop Recorders)  [phone]        Showering Before Surgery   Your surgeon has asked you to take 2 showers before surgery.  Why is this important?  It is normal for bacteria (germs) to be on your skin. The skin protects us from these germs. When you have surgery, we cut the skin. Sometimes germs get into the cuts and cause infection (illness caused by germs). By following the instructions below and using special soap, you will lower the number of germs on your skin. This decreases your chance of infection.  Special soap  Buy or get 8 ounces of antiseptic surgical soap called 4% CHG. Common name brands of this soap are Hibiclens and Exidine.   You can find it at your local pharmacy, clinic or retail store. If you have trouble, ask your pharmacist to help you find the right substitute.   A note about shaving:  Do not shave within 12 inches of your incision (surgical cut) area for at least 3 days before surgery. Shaving can make small cuts in the skin. This puts you at a higher risk of infection.  Items you will need for each shower:   1 newly washed towel   4 ounces of one of the above soaps  Follow these  instructions:  The evening before surgery   1. Wash your hair and body with your regular shampoo and soap. Make sure you rinse the shampoo and soap from your hair and body.   2. Using clean hands, apply about 2 ounces of soap gently on your skin from the neck to your toes. Use on your groin area last. Do not use this soap on your face or head. If you get any soap in your eyes, ears or mouth, rinse right away.   3. Repeat step 2. It is very important to let the soap stay on your skin for at least 1 minute.   4. Rinse well and dry off using a clean towel.If you feel any tingling, itching or other irritation, rinse right away. It is normal to feel some coolness on the skin after using the antiseptic soap. Your skin may feel a bit dry after the shower, but do not use any lotions, creams or moisturizers. Do not use hair spray or other products in your hair.  5. Dress in freshly washed clothes or pajamas. Use fresh pillowcases and sheets on your bed.  The morning of surgery  1. Wash your hair and body with your regular shampoo and soap. Make sure you rinse the shampoo and soap from your hair and body.   2. Using clean hands, apply about 2 ounces of soap gently on your skin from the neck to your toes. Use on your groin area last. Do not use this soap on your face or head. If you get any soap in your eyes, ears or mouth, rinse right away.   3. Repeat step 2. It is very important to let the soap stay on your skin for at least 1 minute.   4. Rinse well and dry off using a clean towel.If you feel any tingling, itching or other irritation, rinse right away. It is normal to feel some coolness on the skin after using the antiseptic soap. Your skin may feel a bit dry after the shower, but do not use any lotions, creams or moisturizers. Do not use hair spray or other products in your hair.  5. Dress in clean clothes.  If you have any questions about showering or an allergy to CHG soap, please call the Preadmissions Nursing  Department at the hospital where you are having your surgery.  [facility]: [phone]  [facility]: [phone]  [facility]: [phone] or [phone]  [facility]: [phone].  [facility]: [phone]  [facility]: [phone]  [facility] ([place]): [phone]  [facility] ([facility]): [phone]  This phone number will be answered between the hours of 8:00 a.m. and 6:30 p.m. Monday through Friday.

## 2025-05-12 NOTE — PROGRESS NOTES
Entered in CRT-D gen change orders as pt has reached RRT.  Chelly will call to set this up.    Hany Dumont, ANIAN RN  Cardiology Nurse Coordinator

## 2025-05-13 ENCOUNTER — TELEPHONE (OUTPATIENT)
Dept: CARDIOLOGY | Facility: CLINIC | Age: 80
End: 2025-05-13

## 2025-05-13 NOTE — TELEPHONE ENCOUNTER
Ep  contacted the patient to book his generator change. Patient insisted that it be Aug.    EP  will ask the FK team if they can do a wound check 8/11 or 8/12 per patient request.     Procedure Scheduled:   GEN CHANGE    Provider: CYNTHIA    Date of procedure:  8/5/25    Time of Arrival:    TBD - PT WANTS AFTER 10A    Confirmed Mychart with patient  YES    Team notified      Chelly England  Periop Electrophysiology   [phone]

## 2025-05-13 NOTE — TELEPHONE ENCOUNTER
----- Message from Mark HOU sent at 5/12/2025  2:07 PM CDT -----  Regarding: RE: Gen change  Anyone can do it.-Hany  ----- Message -----  From: Chaparrita England  Sent: 5/12/2025  12:53 PM CDT  To: Mark Dumont RN  Subject: RE: Gen change                                   Can this be anyone doing it?  ----- Message -----  From: Mark Dumont, RN  Sent: 5/12/2025  12:42 PM CDT  To: Chaparrita England  Subject: Gen change                                       Chelly,Please call this patient to set up a CRT-D gen change.  He is at [facility] The orders are in and I started a letter. It needs to be placed within the next 12 weeks.  Please let me know when the patient is scheduled. Thanks.-Hany

## 2025-05-14 NOTE — PROGRESS NOTES
ANTICOAGULATION MANAGEMENT     Brooks Summers 80 year old male is on warfarin with therapeutic INR result. (Goal INR 2.0-3.0)    Recent labs: (last 7 days)     04/10/25  1030   INR 2.7*       ASSESSMENT     Source(s): Chart Review     Warfarin doses taken: Reviewed in chart  Diet: No new diet changes identified  Medication/supplement changes: None noted  New illness, injury, or hospitalization: No  Signs or symptoms of bleeding or clotting: No  Previous result: Therapeutic last 2(+) visits  Additional findings: None       PLAN     Recommended plan for no diet, medication or health factor changes affecting INR     Dosing Instructions: Continue your current warfarin dose with next INR in 6 weeks       Summary  As of 4/10/2025      Full warfarin instructions:  7.5 mg every Tue, Thu, Sat; 5 mg all other days   Next INR check:  5/22/2025               Telephone call with Brooks who verbalizes understanding and agrees to plan and who agrees to plan and repeated back plan correctly    Patient offered & declined to schedule next visit    Education provided: Taking warfarin: Importance of taking warfarin as instructed  Goal range and lab monitoring: goal range and significance of current result, Importance of therapeutic range, and Importance of following up at instructed interval    Plan made per [facility] anticoagulation protocol    Tawana Cee, RN  4/10/2025  Anticoagulation Clinic  [employer] for routing messages: p [facility] patient phone line: [phone]        _______________________________________________________________________     Anticoagulation Episode Summary       Current INR goal:  2.0-3.0   TTR:  96.2% (1 y)   Target end date:  Indefinite   Send INR reminders to:  [facility]    Indications    S/P aortic valve replacement [Z95.2]  Paroxysmal atrial fibrillation (H) [I48.0]  Long term current use of anticoagulant therapy [Z79.01]  Anticoagulated on Coumadin [Z79.01]  Anticoagulated  permacath [Z79.01]             Comments:  Aortic 21 mm Johnson Perimount Magna Tissue Valve.             Anticoagulation Care Providers       Provider Role Specialty Phone number    Edin Mays MD Referring Internal Medicine [phone]

## 2025-05-27 ENCOUNTER — RESULTS FOLLOW-UP (OUTPATIENT)
Dept: ANTICOAGULATION | Facility: CLINIC | Age: 80
End: 2025-05-27

## 2025-05-27 ENCOUNTER — LAB (OUTPATIENT)
Dept: LAB | Facility: CLINIC | Age: 80
End: 2025-05-27
Payer: MEDICARE

## 2025-05-27 ENCOUNTER — DOCUMENTATION ONLY (OUTPATIENT)
Dept: ANTICOAGULATION | Facility: CLINIC | Age: 80
End: 2025-05-27

## 2025-05-27 ENCOUNTER — ANTICOAGULATION THERAPY VISIT (OUTPATIENT)
Dept: ANTICOAGULATION | Facility: CLINIC | Age: 80
End: 2025-05-27

## 2025-05-27 DIAGNOSIS — Z79.01 ANTICOAGULATED: ICD-10-CM

## 2025-05-27 DIAGNOSIS — Z95.2 S/P AORTIC VALVE REPLACEMENT: ICD-10-CM

## 2025-05-27 DIAGNOSIS — I48.0 PAROXYSMAL ATRIAL FIBRILLATION (H): ICD-10-CM

## 2025-05-27 DIAGNOSIS — I48.0 PAROXYSMAL ATRIAL FIBRILLATION (H): Primary | ICD-10-CM

## 2025-05-27 DIAGNOSIS — Z79.01 ANTICOAGULATED ON COUMADIN: ICD-10-CM

## 2025-05-27 DIAGNOSIS — Z95.2 S/P AVR: ICD-10-CM

## 2025-05-27 DIAGNOSIS — Z79.01 LONG TERM CURRENT USE OF ANTICOAGULANT THERAPY: ICD-10-CM

## 2025-05-27 DIAGNOSIS — Z95.2 S/P AORTIC VALVE REPLACEMENT: Primary | ICD-10-CM

## 2025-05-27 LAB — INR BLD: 2.8 (ref 0.9–1.1)

## 2025-05-27 PROCEDURE — 85610 PROTHROMBIN TIME: CPT

## 2025-05-27 PROCEDURE — 36415 COLL VENOUS BLD VENIPUNCTURE: CPT

## 2025-05-27 NOTE — PROGRESS NOTES
ANTICOAGULATION MANAGEMENT     Brooks Summers 80 year old male is on warfarin with therapeutic INR result. (Goal INR 2.0-3.0)    Recent labs: (last 7 days)     05/27/25  0848   INR 2.8*       ASSESSMENT     Source(s): Chart Review  Previous INR was Therapeutic last 2(+) visits  Medication, diet, health changes since last INR chart reviewed; none identified  ICD generator change on 8/5/25, per [facility] protocol no hold advised.        PLAN     Recommended plan for no diet, medication or health factor changes affecting INR     Dosing Instructions: Continue your current warfarin dose with next INR in 6 weeks       Summary  As of 5/27/2025      Full warfarin instructions:  7.5 mg every Tue, Thu, Sat; 5 mg all other days   Next INR check:  7/8/2025               Detailed voice message left for Brooks with dosing instructions and follow up date.     Contact [phone] to schedule and with any changes, questions or concerns.     Education provided: Please call back if any changes to your diet, medications or how you've been taking warfarin    Plan made per [facility] anticoagulation protocol    Amauri Hayes RN  5/27/2025  Anticoagulation Clinic  [employer] for routing messages: p [facility] patient phone line: [phone]        _______________________________________________________________________     Anticoagulation Episode Summary       Current INR goal:  2.0-3.0   TTR:  100.0% (1 y)   Target end date:  Indefinite   Send INR reminders to:  [facility]    Indications    S/P aortic valve replacement [Z95.2]  Paroxysmal atrial fibrillation (H) [I48.0]  Long term current use of anticoagulant therapy [Z79.01]  Anticoagulated on Coumadin [Z79.01]  Anticoagulated [Z79.01]             Comments:  Aortic 21 mm Johnson Perimount Magna Tissue Valve.             Anticoagulation Care Providers       Provider Role Specialty Phone number    Edin Mays MD Referring Internal Medicine [phone]

## 2025-05-27 NOTE — PROGRESS NOTES
Pt returned call to [facility], confirmed no changes. Scheduled next INR appointment in 6 weeks.     Amauri Hayes RN

## 2025-05-27 NOTE — PROGRESS NOTES
ANTICOAGULATION CLINIC REFERRAL RENEWAL REQUEST       An annual renewal order is required for all patients referred to [facility].?  Please review and sign the pended referral order for Brooks Summers.       ANTICOAGULATION SUMMARY      Warfarin indication(s)   Atrial Fibrillation and Mechanical MVR    Mechanical heart valve present?  Mechanical  AVR-Bileaflet       Current goal range   INR: 2.0-3.0     Goal appropriate for indication? Goal INR 2-3, standard for indication(s) above     Time in Therapeutic Range (TTR)  (Goal > 60%) 100%       Office visit with referring provider's group within last year yes on 5/6/25       Amauri Hayes RN  [facility]

## 2025-06-02 ENCOUNTER — PATIENT OUTREACH (OUTPATIENT)
Dept: FAMILY MEDICINE | Facility: CLINIC | Age: 80
End: 2025-06-02

## 2025-06-02 ENCOUNTER — OFFICE VISIT (OUTPATIENT)
Dept: FAMILY MEDICINE | Facility: CLINIC | Age: 80
End: 2025-06-02
Payer: MEDICARE

## 2025-06-02 VITALS
TEMPERATURE: 97.5 F | SYSTOLIC BLOOD PRESSURE: 104 MMHG | BODY MASS INDEX: 24.73 KG/M2 | HEIGHT: 69 IN | HEART RATE: 72 BPM | WEIGHT: 167 LBS | RESPIRATION RATE: 16 BRPM | DIASTOLIC BLOOD PRESSURE: 64 MMHG | OXYGEN SATURATION: 96 %

## 2025-06-02 DIAGNOSIS — S61.209A OPEN WOUND OF FINGER, INITIAL ENCOUNTER: Primary | ICD-10-CM

## 2025-06-02 PROCEDURE — 99213 OFFICE O/P EST LOW 20 MIN: CPT | Performed by: PHYSICIAN ASSISTANT

## 2025-06-02 PROCEDURE — 1125F AMNT PAIN NOTED PAIN PRSNT: CPT | Performed by: PHYSICIAN ASSISTANT

## 2025-06-02 PROCEDURE — 3074F SYST BP LT 130 MM HG: CPT | Performed by: PHYSICIAN ASSISTANT

## 2025-06-02 PROCEDURE — 3078F DIAST BP <80 MM HG: CPT | Performed by: PHYSICIAN ASSISTANT

## 2025-06-02 RX ORDER — MUPIROCIN 20 MG/G
OINTMENT TOPICAL 3 TIMES DAILY
Qty: 30 G | Refills: 0 | Status: SHIPPED | OUTPATIENT
Start: 2025-06-02

## 2025-06-02 ASSESSMENT — PAIN SCALES - GENERAL: PAINLEVEL_OUTOF10: MILD PAIN (2)

## 2025-06-02 NOTE — PATIENT INSTRUCTIONS
Tammie Guy,    Thank you for allowing [facility] to manage your care.    This is likely a healing hematoma. Use the mupirocin ointment as prescribed.    If you develop worsening/changing symptoms at any time such as worsening pain, numbness/tingling, fevers, discharge or other worrisome symptoms, please be seen in clinic/urgent care.    I sent your prescriptions to your pharmacy.    If you have any questions or concerns, please feel free to call us at [phone]    Sincerely,    Jonathan Potter PA-C    Did you know?      You can schedule a video visit for follow-up appointments as well as future appointments for certain conditions.  Please see the below link.     [email or web address]    If you have not already done so,  I encourage you to sign up for [email or web address] ([email or web address]).  This will allow you to review your results, securely communicate with a provider, and schedule virtual visits as well.

## 2025-06-02 NOTE — PROGRESS NOTES
Assessment & Plan   Problem List Items Addressed This Visit    None  Visit Diagnoses         Open wound of finger, initial encounter    -  Primary    Relevant Medications    mupirocin (BACTROBAN) 2 % external ointment           Assessment & Plan  Skin lesion on hand:  - favor spontaneous thumb hematoma due to warfarin use, with no signs of infection such as fever or pus drainage. Low likelihood of osteomyelitis, felon, paronychia, or other infectious process. Instructed to not attempt drainage/manipulation again.  - Prescribe mupirocin ointment to be applied 2-3 times daily. Avoid using iodine or isopropyl alcohol. Wash with soapy warm water once or twice a day. Return in 2-3 weeks if no improvement.Complete history and physical exam as below. Afebrile with normal vital signs.    DDx and Dx discussed with and explained to the pt to their satisfaction.  All questions were answered at this time. Pt expressed understanding of and agreement with this dx, tx, and plan. No further workup warranted and standard medication warnings given. I have given the patient a list of pertinent indications for re-evaluation. Will be re-evaluated if symptoms worsen or new concerning symptoms arise. Patient left in no apparent distress.     Follow-up  Return in about 3 weeks (around 6/23/2025) for a recheck of your symptoms if not improving, a recheck as needed.    Paige Guy is a 80 year old, presenting for the following health issues:  Infection        6/2/2025     1:16 PM   Additional Questions   Roomed by Radha Anderson CMA   Accompanied by N/A         6/2/2025     1:16 PM   Patient Reported Additional Medications   Patient reports taking the following new medications No new medications     HPI Brooks Summers, age 80 years, male  - Noticed a hard, warm spot on the skin about a month ago.  - No history of injury to the area; the spot appeared spontaneously.  - Opened the spot with a heated needle, resulting in bleeding but no  "pus.  - Experiencing pain at the site, which is affecting his golf swing.  - No fever or pus drainage reported.  - No pain in other areas.  - On warfarin, which may contribute to bruising or hematoma formation.  Patient is coming in today with a possible left thumb infection.  Appeared about a month ago and started as a red spot/lump.  Patient had squeezed blood out of it.  There is no itchiness or fever, but there is soreness and throbbing in the area.      Review of Systems  Constitutional, HEENT, cardiovascular, heme, skin systems are negative, except as otherwise noted.      Objective    /64   Pulse 72   Temp 97.5  F (36.4  C) (Temporal)   Resp 16   Ht 1.753 m (5' 9\")   Wt 75.8 kg (167 lb)   SpO2 96%   BMI 24.66 kg/m    Body mass index is 24.66 kg/m .  Physical Exam  Vitals and nursing note reviewed.   Constitutional:       General: He is not in acute distress.     Appearance: Normal appearance. He is not ill-appearing or diaphoretic.   HENT:      Head: Normocephalic and atraumatic.      Nose: Nose normal.      Mouth/Throat:      Mouth: Mucous membranes are moist.   Eyes:      Conjunctiva/sclera: Conjunctivae normal.   Cardiovascular:      Rate and Rhythm: Normal rate and regular rhythm.      Heart sounds: Normal heart sounds. No murmur heard.     No friction rub. No gallop.   Pulmonary:      Effort: Pulmonary effort is normal. No respiratory distress.      Breath sounds: Normal breath sounds. No stridor. No wheezing, rhonchi or rales.   Skin:     General: Skin is warm and dry.      Coloration: Skin is not jaundiced or pale.      Comments: Left thumb: There is a 5 mm diameter area of induration with a 2 mm central crust.  No erythema, warmth, drainage, crepitus or other overlying signs of trauma or infection. Distal CMS intact. Remainder of digit and hand non-tender. No splinter hemorrhages noted.   Neurological:      General: No focal deficit present.      Mental Status: He is alert. Mental status " is at baseline.   Psychiatric:         Mood and Affect: Mood normal.         Behavior: Behavior normal.              Signed Electronically by: EDWARD To

## 2025-06-02 NOTE — TELEPHONE ENCOUNTER
Patient Quality Outreach    Patient is due for the following:   Physical Annual Wellness Visit      Topic Date Due    Zoster (Shingles) Vaccine (1 of 2) Never done    COVID-19 Vaccine (6 - 2024-25 season) 05/27/2025       Action(s) Taken:   Schedule a Annual Wellness Visit    Type of outreach:    Sent [employer] message.    Questions for provider review:    None         ARNIE, MEDICAL ASSISTANT

## 2025-06-02 NOTE — LETTER
June 2, 2025    Brooks Summers  [address]   [place] [address]    Hello,     Your care team at [facility] values your health and well-being. After reviewing your chart, we have identified recommendation(s) to help you better manage your health.    It's time for your Medicare AWV. During your visit, we'll discuss your health, well-being, and any questions you may have related to preventive care. We'll also review any recommended tests, exams, or screenings you might need. To schedule please call [phone] ([phone]) or use your [employer] account.     If you recently had or are having any of these services soon, please contact the clinic via phone or [employer] so that your care team can update your records.    We look forward to seeing you at your upcoming visit.    If you have any questions or concerns, please contact our clinic. Thank you for continuing to trust us with your care.    Sincerely,    Your [facility] Care Team

## 2025-06-16 DIAGNOSIS — B02.8 HERPES ZOSTER WITH OTHER COMPLICATION: ICD-10-CM

## 2025-06-16 DIAGNOSIS — M10.00 IDIOPATHIC GOUT, UNSPECIFIED CHRONICITY, UNSPECIFIED SITE: ICD-10-CM

## 2025-06-16 DIAGNOSIS — M79.2 NERVE PAIN: ICD-10-CM

## 2025-06-18 RX ORDER — ALLOPURINOL 100 MG/1
100 TABLET ORAL DAILY
Qty: 30 TABLET | Refills: 1 | OUTPATIENT
Start: 2025-06-18

## 2025-06-18 RX ORDER — GABAPENTIN 100 MG/1
CAPSULE ORAL
Qty: 30 CAPSULE | Refills: 1 | OUTPATIENT
Start: 2025-06-18

## 2025-06-18 NOTE — TELEPHONE ENCOUNTER
Last Written Prescription:  allopurinol (ZYLOPRIM) 100 MG tablet 30 tablet 1 4/2/2025 -- No   Sig - Route: Take 1 tablet (100 mg) by mouth daily. - Oral   Sent to pharmacy as: Allopurinol 100 MG Oral Tablet (ZYLOPRIM)   Class: E-Prescribe   Order: [id number]     ----------------------  Last Visit Date: 6/2/25  Future Visit Date: 0  ----------------------        Refill decision: Medication unable to be refilled by RN due to: Overdue labs/test:          Request from pharmacy:  Requested Prescriptions   Refused Prescriptions Disp Refills    allopurinol (ZYLOPRIM) 100 MG tablet 30 tablet 1     Sig: Take 1 tablet (100 mg) by mouth daily.       Gout Agents Protocol Failed - 6/18/2025 11:33 AM        Failed - CBC on file in past 12 months     Recent Labs   Lab Test 02/16/23  1343   WBC 5.3   RBC 4.25*   HGB 14.1   HCT 43.2   PLT 72*                 Failed - ALT on file in past 12 months     Recent Labs   Lab Test 01/30/23  1435   ALT 33             Failed - Has Uric Acid on file in past 12 months and value is less than 6     Recent Labs   Lab Test 01/30/23  1435   URIC 5.3     If level is 6mg/dL or greater, ok to refill one time and refer to provider.           Failed - Has GFR on file in past 12 months and most recent value is normal        Failed - Recent (12 month) or future (90 days) visit with authorizing provider's specialty (provided they have been seen in the past 15 months)     The patient must have completed an in-person or virtual visit within the past 12 months or has a future visit scheduled within the next 90 days with the authorizing provider s specialty.  Urgent care and e-visits do not qualify as an office visit for this protocol.          Passed - Medication is active on med list and the sig matches. RN to manually verify dose and sig if red X/fail.     If the protocol passes (green check), you do not need to verify med dose and sig.    A prescription matches if they are the same clinical  intention.    For Example: once daily and every morning are the same.    The protocol can not identify upper and lower case letters as matching and will fail.     For Example: Take 1 tablet (50 mg) by mouth daily     TAKE 1 TABLET (50 MG) BY MOUTH DAILY    For all fails (red x), verify dose and sig.    If the refill does match what is on file, the RN can still proceed to approve the refill request.       If they do not match, route to the appropriate provider.             Passed - Medication indicated for associated diagnosis     One of the following medications: colchicine is prescribed for one or more of the following conditions:     Amyloidosis   ulcer of mouth   Bechet's syndrome   Pericarditis   Peyronie's disease, psoriasis          Passed - Patient is age 18 or older     Refill protocol is for patient's aged 18 years and older                          Last Written Prescription:  gabapentin (NEURONTIN) 100 MG capsule 30 capsule 1 4/2/2025 -- No   Sig: TAKE 1 TO 2 CAPSULES BY MOUTH AT NIGHT FOR NERVE PAIN   Sent to pharmacy as: Gabapentin 100 MG Oral Capsule (NEURONTIN)   Class: E-Prescribe   Order: [id number]       ----------------------  Last Visit Date: 6/2/25  Future Visit Date: 0  ----------------------        Refill decision: Medication unable to be refilled by RN due to: not on refill protocol              gabapentin (NEURONTIN) 100 MG capsule 30 capsule 1     Sig: TAKE 1 TO 2 CAPSULES BY MOUTH AT NIGHT FOR NERVE PAIN       There is no refill protocol information for this order

## 2025-07-08 ENCOUNTER — LAB (OUTPATIENT)
Dept: LAB | Facility: CLINIC | Age: 80
End: 2025-07-08
Payer: MEDICARE

## 2025-07-08 ENCOUNTER — ANTICOAGULATION THERAPY VISIT (OUTPATIENT)
Dept: ANTICOAGULATION | Facility: CLINIC | Age: 80
End: 2025-07-08

## 2025-07-08 DIAGNOSIS — Z79.01 ANTICOAGULATED: ICD-10-CM

## 2025-07-08 DIAGNOSIS — I48.0 PAROXYSMAL ATRIAL FIBRILLATION (H): ICD-10-CM

## 2025-07-08 DIAGNOSIS — Z79.01 ANTICOAGULATED ON COUMADIN: ICD-10-CM

## 2025-07-08 DIAGNOSIS — Z95.2 S/P AVR: ICD-10-CM

## 2025-07-08 DIAGNOSIS — Z95.2 S/P AORTIC VALVE REPLACEMENT: Primary | ICD-10-CM

## 2025-07-08 DIAGNOSIS — Z79.01 LONG TERM CURRENT USE OF ANTICOAGULANT THERAPY: ICD-10-CM

## 2025-07-08 LAB — INR BLD: 3.3 (ref 0.9–1.1)

## 2025-07-08 PROCEDURE — 85610 PROTHROMBIN TIME: CPT

## 2025-07-08 PROCEDURE — 36416 COLLJ CAPILLARY BLOOD SPEC: CPT

## 2025-07-08 NOTE — PROGRESS NOTES
ANTICOAGULATION MANAGEMENT     Brooks Summers 80 year old male is on warfarin with supratherapeutic INR result. (Goal INR 2.0-3.0)    Recent labs: (last 7 days)     07/08/25  0843   INR 3.3*       ASSESSMENT     Source(s): Chart Review and Patient/Caregiver Call     Warfarin doses taken: Warfarin taken as instructed  Diet: No new diet changes identified  Medication/supplement changes: None noted  New illness, injury, or hospitalization: No  Signs or symptoms of bleeding or clotting: No  Previous result: Therapeutic last 2(+) visits (TTR 93%)  Additional findings: None       PLAN     Recommended plan for no diet, medication or health factor changes affecting INR     Dosing Instructions: Continue your current warfarin dose with next INR in 2 weeks       Summary  As of 7/8/2025      Full warfarin instructions:  7.5 mg every Tue, Thu, Sat; 5 mg all other days   Next INR check:  7/22/2025               Telephone call with Brooks who verbalizes understanding and agrees to plan    Lab visit scheduled    Education provided: Goal range and lab monitoring: goal range and significance of current result  Dietary considerations: importance of consistent vitamin K intake  Contact [phone] with any changes, questions or concerns.     Plan made per [facility] anticoagulation protocol    Traci Lindsay RN  7/8/2025  Anticoagulation Clinic  [employer] for routing messages: madelaine [facility] patient phone line: [phone]        _______________________________________________________________________     Anticoagulation Episode Summary       Current INR goal:  2.0-3.0   TTR:  93.1% (1 y)   Target end date:  Indefinite   Send INR reminders to:  [facility]    Indications    S/P aortic valve replacement [Z95.2]  Paroxysmal atrial fibrillation (H) [I48.0]  Long term current use of anticoagulant therapy [Z79.01]  Anticoagulated on Coumadin [Z79.01]  Anticoagulated [Z79.01]  S/P AVR [Z95.2]             Comments:  Aortic 21 mm  Johnson Perimount Magna Tissue Valve.             Anticoagulation Care Providers       Provider Role Specialty Phone number    Edin Mays MD Referring Internal Medicine [phone]

## 2025-07-09 ENCOUNTER — TELEPHONE (OUTPATIENT)
Dept: DERMATOLOGY | Facility: CLINIC | Age: 80
End: 2025-07-09
Payer: MEDICARE

## 2025-07-13 ENCOUNTER — HEALTH MAINTENANCE LETTER (OUTPATIENT)
Age: 80
End: 2025-07-13

## 2025-07-14 NOTE — TELEPHONE ENCOUNTER
Spoke with Brooks. Per Brooks: He has some cardiac stuff to attend to and would like to wait until after that to schedule his Consult and Mohs. He said he has my direct line and will call me in Mid-August. I left him know we wouldn't have availability until late-Aug anyway, but he still wants to wait.      Ricky Baker, Complex

## 2025-07-21 DIAGNOSIS — I48.0 PAROXYSMAL ATRIAL FIBRILLATION (H): ICD-10-CM

## 2025-07-21 DIAGNOSIS — Z95.2 S/P AORTIC VALVE REPLACEMENT: ICD-10-CM

## 2025-07-21 DIAGNOSIS — Z95.2 S/P AVR (AORTIC VALVE REPLACEMENT): ICD-10-CM

## 2025-07-21 DIAGNOSIS — Z79.01 LONG TERM CURRENT USE OF ANTICOAGULANT THERAPY: ICD-10-CM

## 2025-07-21 DIAGNOSIS — R56.9 SEIZURE (H): ICD-10-CM

## 2025-07-21 RX ORDER — LEVETIRACETAM 500 MG/1
500 TABLET ORAL 2 TIMES DAILY
Qty: 90 TABLET | Refills: 0 | Status: CANCELLED | OUTPATIENT
Start: 2025-07-21

## 2025-07-21 NOTE — TELEPHONE ENCOUNTER
Please contact patient to verify if he is requesting refill from [facility] (or maybe refill request was sent to wrong provider?).     Last Rx was sent on 6/14/24 for 90 tablets (45 day supply). Pt should have run out last year.     Sheri Hilton RN

## 2025-07-22 ENCOUNTER — TELEPHONE (OUTPATIENT)
Dept: CARDIOLOGY | Facility: CLINIC | Age: 80
End: 2025-07-22
Payer: MEDICARE

## 2025-07-22 RX ORDER — LEVETIRACETAM 500 MG/1
500 TABLET ORAL 2 TIMES DAILY
Qty: 90 TABLET | Refills: 0 | Status: SHIPPED | OUTPATIENT
Start: 2025-07-22

## 2025-07-22 NOTE — TELEPHONE ENCOUNTER
Team    Please call pt and tell him ne heeds to reach out to neurology for his keppra refills.     Thanks,  CALLUM Hermosillo  [facility]

## 2025-07-22 NOTE — TELEPHONE ENCOUNTER
Patient received med from neuro. Per most recent visit note 5/6/2025;    5. Allopurinol for gout. Ordered future uric acid on 5/4/2025  6. Keppra for seizures; see Dr. Westbrook's Neurology note from 3/30/2022. See discharge summary [facility] 5/11/2024  7. On Losartan and Metoprolol for HTN. Electrolytes and Creatinine done 5/11/2024.         Thanks,  CALLUM Hermosillo  [facility]

## 2025-07-22 NOTE — TELEPHONE ENCOUNTER
Contacted the patient to notify regarding keppra medication. No answer left message for call back.

## 2025-07-22 NOTE — TELEPHONE ENCOUNTER
Called patient.  He stated that he wants his PCP to fill this     Routed to Dr. Mays.    Marlena SHIELDS RN  Triage Nurse  [facility]

## 2025-07-22 NOTE — TELEPHONE ENCOUNTER
EP  left the patient a message that Dr. Dawn isn't going to be in the lab on August 5th and offered Dr. Torres to do the procedure or offered to book a different date.  EP Scheduling line and cath lab phone numbers were left for the patient to respond.     Chelly Kong  Periop Electrophysiology   [phone]

## 2025-07-23 ENCOUNTER — LAB (OUTPATIENT)
Dept: LAB | Facility: CLINIC | Age: 80
End: 2025-07-23
Payer: MEDICARE

## 2025-07-23 ENCOUNTER — ANTICOAGULATION THERAPY VISIT (OUTPATIENT)
Dept: ANTICOAGULATION | Facility: CLINIC | Age: 80
End: 2025-07-23

## 2025-07-23 DIAGNOSIS — Z95.2 S/P AVR: ICD-10-CM

## 2025-07-23 DIAGNOSIS — Z79.01 ANTICOAGULATED: ICD-10-CM

## 2025-07-23 DIAGNOSIS — Z79.01 ANTICOAGULATED ON COUMADIN: ICD-10-CM

## 2025-07-23 DIAGNOSIS — Z79.01 LONG TERM CURRENT USE OF ANTICOAGULANT THERAPY: ICD-10-CM

## 2025-07-23 DIAGNOSIS — I48.0 PAROXYSMAL ATRIAL FIBRILLATION (H): ICD-10-CM

## 2025-07-23 DIAGNOSIS — Z95.2 S/P AORTIC VALVE REPLACEMENT: Primary | ICD-10-CM

## 2025-07-23 LAB — INR BLD: 3.1 (ref 0.9–1.1)

## 2025-07-23 PROCEDURE — 36416 COLLJ CAPILLARY BLOOD SPEC: CPT

## 2025-07-23 PROCEDURE — 85610 PROTHROMBIN TIME: CPT

## 2025-07-23 RX ORDER — WARFARIN SODIUM 5 MG/1
5-7.5 TABLET ORAL EVERY EVENING
Qty: 105 TABLET | Refills: 0 | Status: SHIPPED | OUTPATIENT
Start: 2025-07-23

## 2025-07-23 NOTE — PROGRESS NOTES
ANTICOAGULATION MANAGEMENT     Brooks Summers 80 year old male is on warfarin with supratherapeutic INR result. (Goal INR 2.0-3.0)    Recent labs: (last 7 days)     07/23/25  0950   INR 3.1*       ASSESSMENT     Source(s): Chart Review     Warfarin doses taken: Reviewed in chart  Diet: RONI  Medication/supplement changes: None noted  New illness, injury, or hospitalization: RONI  Signs or symptoms of bleeding or clotting: RONI  Previous result: Supratherapeutic  Additional findings: Upcoming surgery/procedure ICD generator change 8/5/25 - instructions to patient 7/21/25 say to continue coumadin  Upcoming Mohs procedure not yet scheduled  Advised over /[facility] to schedule appointment with Dr. Mays  Consult with JOSE Hough: if no temp factors decrease dosing 5.9% and recheck 8/4/25 to ensure INR <3 for procedure       PLAN     Unable to reach Brooks today.    Left message to continue current dose of warfarin 5 mg tonight. Request call back for assessment.    Follow up required to confirm warfarin dose taken and assess for changes and discuss dosing instructions and confirm understanding of instructions    Ibeth Mercedes RN  7/23/2025  Anticoagulation Clinic  [facility] for routing messages: madelaine [facility]  [facility] patient phone line: [phone]

## 2025-07-23 NOTE — TELEPHONE ENCOUNTER
ANTICOAGULATION MANAGEMENT:  Medication Refill    Anticoagulation Summary  As of 7/8/2025      Warfarin maintenance plan:  7.5 mg (5 mg x 1.5) every Tue, Thu, Sat; 5 mg (5 mg x 1) all other days   Next INR check:  7/22/2025   Target end date:  Indefinite    Indications    S/P aortic valve replacement [Z95.2]  Paroxysmal atrial fibrillation (H) [I48.0]  Long term current use of anticoagulant therapy [Z79.01]  Anticoagulated on Coumadin [Z79.01]  Anticoagulated [Z79.01]  S/P AVR [Z95.2]                 Anticoagulation Care Providers       Provider Role Specialty Phone number    Edin Mays MD Referring Internal Medicine [phone]            Refill Criteria    Visit with referring provider/group: Overdue for referring provider visit, last visit on 1/30/23 (Cancelled appt on 5/6/25)    [facility] referral last signed: 05/27/2025; within last year:  Yes    Lab monitoring is up to date (not exceeding 2 weeks overdue): Yes    Brooks does NOT meet all criteria for refill: Visit with referring provider's group was >= 2 year ago. 90 day mauricio fill approved; patient notified to schedule with referring provider per [facility] protocol    Chuck Mirza, RN  Anticoagulation Clinic

## 2025-07-24 ENCOUNTER — TELEPHONE (OUTPATIENT)
Dept: INTERNAL MEDICINE | Facility: CLINIC | Age: 80
End: 2025-07-24
Payer: MEDICARE

## 2025-07-24 NOTE — PROGRESS NOTES
ANTICOAGULATION MANAGEMENT     Brooks Summers 80 year old male is on warfarin with supratherapeutic INR result. (Goal INR 2.0-3.0)    Recent labs: (last 7 days)     07/23/25  0950   INR 3.1*       ASSESSMENT     Source(s): Chart Review and Patient/Caregiver Call     Warfarin doses taken: Warfarin taken as instructed  Diet: No new diet changes identified  Medication/supplement changes: None noted  New illness, injury, or hospitalization: No  Signs or symptoms of bleeding or clotting: No  Previous result: Supratherapeutic  Additional findings: Upcoming surgery/procedure ICD generator change 8/5/25, continue warfarin uninterrupted       PLAN     Recommended plan for no diet, medication or health factor changes affecting INR     Dosing Instructions: decrease your warfarin dose (5.9% change) with next INR in 10 days       Summary  As of 7/23/2025      Full warfarin instructions:  7.5 mg every Tue, Sat; 5 mg all other days   Next INR check:  8/4/2025               Telephone call with Brooks who agrees to plan and repeated back plan correctly    Lab visit scheduled    Education provided: Goal range and lab monitoring: goal range and significance of current result  Contact [phone] with any changes, questions or concerns.     Plan made with [facility] Pharmacist France Lindsay RN  7/24/2025  Anticoagulation Clinic  [place] for routing messages: p [facility] patient phone line: [phone]        _______________________________________________________________________     Anticoagulation Episode Summary       Current INR goal:  2.0-3.0   TTR:  89.0% (1 y)   Target end date:  Indefinite   Send INR reminders to:  [facility]    Indications    S/P aortic valve replacement [Z95.2]  Paroxysmal atrial fibrillation (H) [I48.0]  Long term current use of anticoagulant therapy [Z79.01]  Anticoagulated on Coumadin [Z79.01]  Anticoagulated [Z79.01]  S/P AVR [Z95.2]             Comments:  Aortic 21 mm  Johnson Perimount Magna Tissue Valve.             Anticoagulation Care Providers       Provider Role Specialty Phone number    Edin Mays MD Referring Internal Medicine [phone]

## 2025-07-24 NOTE — TELEPHONE ENCOUNTER
General Call    Contacts       Contact Date/Time Type Contact Phone/Fax    07/24/2025 10:16 AM CDT Phone (Incoming) Brooks Summers (Self) [phone] ()          Reason for Call:  Returning Call    What are your questions or concerns:  Patient Returning Ibeth's call     Date of last appointment with provider: 07/23/25    Could we send this information to you in [employer] or would you prefer to receive a phone call?:   Patient would prefer a phone call   Okay to leave a detailed message?: Yes at Home number on file [phone] (home)

## 2025-07-29 ENCOUNTER — MYC MEDICAL ADVICE (OUTPATIENT)
Dept: DERMATOLOGY | Facility: CLINIC | Age: 80
End: 2025-07-29
Payer: MEDICARE

## 2025-07-30 NOTE — TELEPHONE ENCOUNTER
Called and left message for patient to review pre-procedure instructions for upcoming generator change on 8/5/25, specifically reviewed medication holds: none. Asked for a call back/My[facility] message with any questions.

## 2025-07-31 DIAGNOSIS — E78.5 HYPERLIPIDEMIA LDL GOAL <100: Primary | ICD-10-CM

## 2025-07-31 RX ORDER — SIMVASTATIN 40 MG
40 TABLET ORAL AT BEDTIME
Qty: 30 TABLET | Refills: 1 | Status: SHIPPED | OUTPATIENT
Start: 2025-07-31

## 2025-08-04 ENCOUNTER — ANTICOAGULATION THERAPY VISIT (OUTPATIENT)
Dept: ANTICOAGULATION | Facility: CLINIC | Age: 80
End: 2025-08-04

## 2025-08-04 ENCOUNTER — RESULTS FOLLOW-UP (OUTPATIENT)
Dept: ANTICOAGULATION | Facility: CLINIC | Age: 80
End: 2025-08-04

## 2025-08-04 ENCOUNTER — LAB (OUTPATIENT)
Dept: LAB | Facility: CLINIC | Age: 80
End: 2025-08-04
Payer: MEDICARE

## 2025-08-04 DIAGNOSIS — Z95.2 S/P AORTIC VALVE REPLACEMENT: Primary | ICD-10-CM

## 2025-08-04 DIAGNOSIS — Z79.01 ANTICOAGULATED ON COUMADIN: ICD-10-CM

## 2025-08-04 DIAGNOSIS — I48.0 PAROXYSMAL ATRIAL FIBRILLATION (H): ICD-10-CM

## 2025-08-04 DIAGNOSIS — I10 BENIGN ESSENTIAL HYPERTENSION: ICD-10-CM

## 2025-08-04 DIAGNOSIS — Z79.01 LONG TERM CURRENT USE OF ANTICOAGULANT THERAPY: ICD-10-CM

## 2025-08-04 DIAGNOSIS — Z95.2 S/P AVR: ICD-10-CM

## 2025-08-04 DIAGNOSIS — Z79.01 ANTICOAGULATED: ICD-10-CM

## 2025-08-04 LAB — INR BLD: 1.5 (ref 0.9–1.1)

## 2025-08-04 PROCEDURE — 85610 PROTHROMBIN TIME: CPT

## 2025-08-04 PROCEDURE — 36416 COLLJ CAPILLARY BLOOD SPEC: CPT

## 2025-08-05 ENCOUNTER — LAB (OUTPATIENT)
Dept: LAB | Facility: CLINIC | Age: 80
End: 2025-08-05
Attending: INTERNAL MEDICINE
Payer: MEDICARE

## 2025-08-05 ENCOUNTER — HOSPITAL ENCOUNTER (OUTPATIENT)
Facility: CLINIC | Age: 80
Discharge: HOME | End: 2025-08-05
Attending: INTERNAL MEDICINE | Admitting: INTERNAL MEDICINE
Payer: MEDICARE

## 2025-08-05 ENCOUNTER — APPOINTMENT (OUTPATIENT)
Dept: GENERAL RADIOLOGY | Facility: CLINIC | Age: 80
End: 2025-08-05
Payer: MEDICARE

## 2025-08-05 ENCOUNTER — DOCUMENTATION ONLY (OUTPATIENT)
Dept: ANTICOAGULATION | Facility: CLINIC | Age: 80
End: 2025-08-05

## 2025-08-05 PROCEDURE — 999N000065 XR CHEST PORT 1 VIEW

## 2025-08-05 PROCEDURE — 71045 X-RAY EXAM CHEST 1 VIEW: CPT | Mod: 26 | Performed by: RADIOLOGY

## 2025-08-05 ASSESSMENT — ACTIVITIES OF DAILY LIVING (ADL)
ADLS_ACUITY_SCORE: 48
ADLS_ACUITY_SCORE: 46
ADLS_ACUITY_SCORE: 48
ADLS_ACUITY_SCORE: 48

## 2025-08-06 RX ORDER — METOPROLOL SUCCINATE 50 MG/1
50 TABLET, EXTENDED RELEASE ORAL DAILY
Qty: 90 TABLET | Refills: 1 | Status: SHIPPED | OUTPATIENT
Start: 2025-08-06

## 2025-08-11 ENCOUNTER — ANTICOAGULATION THERAPY VISIT (OUTPATIENT)
Dept: ANTICOAGULATION | Facility: CLINIC | Age: 80
End: 2025-08-11

## 2025-08-11 ENCOUNTER — LAB (OUTPATIENT)
Dept: LAB | Facility: CLINIC | Age: 80
End: 2025-08-11
Payer: MEDICARE

## 2025-08-11 ENCOUNTER — ALLIED HEALTH/NURSE VISIT (OUTPATIENT)
Dept: CARDIOLOGY | Facility: CLINIC | Age: 80
End: 2025-08-11
Payer: MEDICARE

## 2025-08-11 DIAGNOSIS — Z95.2 S/P AVR: ICD-10-CM

## 2025-08-11 DIAGNOSIS — Z79.01 LONG TERM CURRENT USE OF ANTICOAGULANT THERAPY: ICD-10-CM

## 2025-08-11 DIAGNOSIS — I48.0 PAROXYSMAL ATRIAL FIBRILLATION (H): ICD-10-CM

## 2025-08-11 DIAGNOSIS — Z95.810 BIVENTRICULAR IMPLANTABLE CARDIOVERTER-DEFIBRILLATOR (ICD) IN SITU: Primary | ICD-10-CM

## 2025-08-11 LAB — INR BLD: 2.1 (ref 0.9–1.1)

## 2025-08-11 PROCEDURE — 36416 COLLJ CAPILLARY BLOOD SPEC: CPT

## 2025-08-11 PROCEDURE — 85610 PROTHROMBIN TIME: CPT

## 2025-08-11 PROCEDURE — 99207 PR NO CHARGE NURSE ONLY: CPT

## 2025-08-19 ENCOUNTER — MYC MEDICAL ADVICE (OUTPATIENT)
Dept: DERMATOLOGY | Facility: CLINIC | Age: 80
End: 2025-08-19
Payer: MEDICARE

## 2025-08-25 ENCOUNTER — LAB (OUTPATIENT)
Dept: LAB | Facility: CLINIC | Age: 80
End: 2025-08-25
Payer: MEDICARE

## 2025-08-25 ENCOUNTER — TELEPHONE (OUTPATIENT)
Dept: INTERNAL MEDICINE | Facility: CLINIC | Age: 80
End: 2025-08-25

## 2025-08-25 ENCOUNTER — ANTICOAGULATION THERAPY VISIT (OUTPATIENT)
Dept: ANTICOAGULATION | Facility: CLINIC | Age: 80
End: 2025-08-25

## 2025-08-25 DIAGNOSIS — Z95.2 S/P AVR: ICD-10-CM

## 2025-08-25 DIAGNOSIS — I48.0 PAROXYSMAL ATRIAL FIBRILLATION (H): ICD-10-CM

## 2025-08-25 DIAGNOSIS — Z79.01 LONG TERM CURRENT USE OF ANTICOAGULANT THERAPY: ICD-10-CM

## 2025-08-25 DIAGNOSIS — Z95.2 S/P AORTIC VALVE REPLACEMENT: Primary | ICD-10-CM

## 2025-08-25 DIAGNOSIS — Z79.01 ANTICOAGULATED ON COUMADIN: ICD-10-CM

## 2025-08-25 DIAGNOSIS — Z79.01 ANTICOAGULATED: ICD-10-CM

## 2025-08-25 LAB — INR BLD: 1.7 (ref 0.9–1.1)

## 2025-08-25 PROCEDURE — 36416 COLLJ CAPILLARY BLOOD SPEC: CPT

## 2025-08-25 PROCEDURE — 85610 PROTHROMBIN TIME: CPT

## 2025-08-29 DIAGNOSIS — M79.2 NERVE PAIN: ICD-10-CM

## 2025-08-29 DIAGNOSIS — B02.8 HERPES ZOSTER WITH OTHER COMPLICATION: ICD-10-CM

## 2025-08-29 DIAGNOSIS — M10.00 IDIOPATHIC GOUT, UNSPECIFIED CHRONICITY, UNSPECIFIED SITE: ICD-10-CM

## 2025-09-03 RX ORDER — GABAPENTIN 100 MG/1
CAPSULE ORAL
Qty: 30 CAPSULE | Refills: 1 | Status: SHIPPED | OUTPATIENT
Start: 2025-09-03

## 2025-09-03 RX ORDER — ALLOPURINOL 100 MG/1
100 TABLET ORAL DAILY
Qty: 30 TABLET | Refills: 1 | Status: SHIPPED | OUTPATIENT
Start: 2025-09-03

## (undated) DEVICE — SOL NACL 0.9% IRRIG 1000ML BOTTLE 2F7124

## (undated) DEVICE — DRSG TELFA 3X8" 1238

## (undated) DEVICE — DRAIN PENROSE 1/4"X18" LATEX  30416-025

## (undated) DEVICE — PREP CHLORAPREP 26ML TINTED HI-LITE ORANGE 930815

## (undated) DEVICE — CABLE PACING ALLIGATOR CLIP 12FT 5833SL

## (undated) DEVICE — DRAPE SLEEVE MEDT STERILE 10FT 6177

## (undated) DEVICE — SU VICRYL 3-0 TIE 12X18" J904T

## (undated) DEVICE — SU SILK 3-0 TIE 12X30" A304H

## (undated) DEVICE — GLOVE SENSICARE PI POWDER FREE 6.5 LATEX FREE MSG9065

## (undated) DEVICE — NDL 30GA 0.5" 305106

## (undated) DEVICE — SUCTION MANIFOLD DORNOCH ULTRA CART UL-CL500

## (undated) DEVICE — SOL WATER IRRIG 3000ML BAG 2B7117

## (undated) DEVICE — ADH SKIN CLOSURE PREMIERPRO EXOFIN 1.0ML 3470

## (undated) DEVICE — PAD CHUX UNDERPAD 23X24" 7136

## (undated) DEVICE — SU MONOCRYL 4-0 PS-2 18" UND Y496G

## (undated) DEVICE — GLOVE BIOGEL PI MICRO SZ 7.5 48575

## (undated) DEVICE — Device

## (undated) DEVICE — KIT WRENCH 5873W

## (undated) DEVICE — LINEN GOWN XLG 5407

## (undated) DEVICE — LINEN TOWEL PACK X5 5464

## (undated) DEVICE — SU SILK 2-0 SH 30" K833H

## (undated) DEVICE — PACK CYSTO UMMC CUSTOM

## (undated) DEVICE — DRAPE GYN/UROLOGY FLUID POUCH TUR 29455

## (undated) DEVICE — TYRX ANTIBIOTIC ENVELOPE FOR I

## (undated) DEVICE — SU PROLENE 2-0 SHDA 36" 8523H

## (undated) DEVICE — NDL BLUNT 18GA 1" W/O FILTER 305181

## (undated) DEVICE — SOL WATER IRRIG 1000ML BOTTLE 2F7114

## (undated) DEVICE — SUCTION MANIFOLD NEPTUNE 2 SYS 4 PORT 0702-020-000

## (undated) DEVICE — SPONGE LAP 18X18" X8435

## (undated) DEVICE — SU VICRYL 3-0 SH 27" J316H

## (undated) DEVICE — ESU GROUND PAD ADULT W/CORD E7507

## (undated) DEVICE — LINEN TOWEL PACK X6 WHITE 5487

## (undated) RX ORDER — SODIUM CHLORIDE 9 MG/ML
INJECTION, SOLUTION INTRAVENOUS
Status: DISPENSED
Start: 2021-03-10

## (undated) RX ORDER — BUPIVACAINE HYDROCHLORIDE 2.5 MG/ML
INJECTION, SOLUTION EPIDURAL; INFILTRATION; INTRACAUDAL
Status: DISPENSED
Start: 2023-03-09

## (undated) RX ORDER — PROPOFOL 10 MG/ML
INJECTION, EMULSION INTRAVENOUS
Status: DISPENSED
Start: 2023-03-09

## (undated) RX ORDER — CEFAZOLIN SODIUM 1 G/3ML
INJECTION, POWDER, FOR SOLUTION INTRAMUSCULAR; INTRAVENOUS
Status: DISPENSED
Start: 2021-03-10

## (undated) RX ORDER — KETOROLAC TROMETHAMINE 30 MG/ML
INJECTION, SOLUTION INTRAMUSCULAR; INTRAVENOUS
Status: DISPENSED
Start: 2023-03-09

## (undated) RX ORDER — OXYCODONE HYDROCHLORIDE 5 MG/1
TABLET ORAL
Status: DISPENSED
Start: 2023-03-09

## (undated) RX ORDER — CIPROFLOXACIN 500 MG/1
TABLET, FILM COATED ORAL
Status: DISPENSED
Start: 2017-02-24

## (undated) RX ORDER — CEFAZOLIN SODIUM/WATER 2 G/20 ML
SYRINGE (ML) INTRAVENOUS
Status: DISPENSED
Start: 2023-03-09

## (undated) RX ORDER — CEFAZOLIN SODIUM 2 G/100ML
INJECTION, SOLUTION INTRAVENOUS
Status: DISPENSED
Start: 2017-04-03

## (undated) RX ORDER — CEFAZOLIN SODIUM 2 G/100ML
INJECTION, SOLUTION INTRAVENOUS
Status: DISPENSED
Start: 2021-03-10

## (undated) RX ORDER — FENTANYL CITRATE 50 UG/ML
INJECTION, SOLUTION INTRAMUSCULAR; INTRAVENOUS
Status: DISPENSED
Start: 2021-03-10

## (undated) RX ORDER — FENTANYL CITRATE 50 UG/ML
INJECTION, SOLUTION INTRAMUSCULAR; INTRAVENOUS
Status: DISPENSED
Start: 2018-11-19

## (undated) RX ORDER — FENTANYL CITRATE 50 UG/ML
INJECTION, SOLUTION INTRAMUSCULAR; INTRAVENOUS
Status: DISPENSED
Start: 2023-03-09